# Patient Record
Sex: FEMALE | Race: WHITE | NOT HISPANIC OR LATINO | Employment: OTHER | URBAN - METROPOLITAN AREA
[De-identification: names, ages, dates, MRNs, and addresses within clinical notes are randomized per-mention and may not be internally consistent; named-entity substitution may affect disease eponyms.]

---

## 2017-06-05 ENCOUNTER — OFFICE VISIT (OUTPATIENT)
Dept: FAMILY MEDICINE CLINIC | Facility: CLINIC | Age: 47
End: 2017-06-05

## 2017-06-05 VITALS
OXYGEN SATURATION: 99 % | HEIGHT: 65 IN | WEIGHT: 123 LBS | BODY MASS INDEX: 20.49 KG/M2 | TEMPERATURE: 98.5 F | HEART RATE: 82 BPM | DIASTOLIC BLOOD PRESSURE: 82 MMHG | SYSTOLIC BLOOD PRESSURE: 124 MMHG

## 2017-06-05 DIAGNOSIS — F17.210 CIGARETTE SMOKER: ICD-10-CM

## 2017-06-05 DIAGNOSIS — Z86.19 HISTORY OF HPV INFECTION: ICD-10-CM

## 2017-06-05 DIAGNOSIS — G62.89 OTHER POLYNEUROPATHY: ICD-10-CM

## 2017-06-05 DIAGNOSIS — K58.2 IRRITABLE BOWEL SYNDROME WITH BOTH CONSTIPATION AND DIARRHEA: Primary | ICD-10-CM

## 2017-06-05 DIAGNOSIS — E03.9 HYPOTHYROIDISM, UNSPECIFIED TYPE: ICD-10-CM

## 2017-06-05 DIAGNOSIS — E55.9 VITAMIN D DEFICIENCY: ICD-10-CM

## 2017-06-05 PROBLEM — N17.9 ACUTE RENAL FAILURE: Status: ACTIVE | Noted: 2017-02-20

## 2017-06-05 PROBLEM — N20.0 KIDNEY STONE: Status: RESOLVED | Noted: 2017-06-05 | Resolved: 2017-06-05

## 2017-06-05 PROBLEM — N17.9 ACUTE RENAL FAILURE: Status: RESOLVED | Noted: 2017-02-20 | Resolved: 2017-06-05

## 2017-06-05 PROBLEM — G62.9 PERIPHERAL NEUROPATHY: Status: ACTIVE | Noted: 2017-06-05

## 2017-06-05 PROBLEM — N20.0 KIDNEY STONE: Status: ACTIVE | Noted: 2017-06-05

## 2017-06-05 LAB
25(OH)D3+25(OH)D2 SERPL-MCNC: 48.4 NG/ML (ref 30–100)
T4 FREE SERPL-MCNC: 1.26 NG/DL (ref 0.93–1.7)
TSH SERPL DL<=0.005 MIU/L-ACNC: 4.52 MIU/ML (ref 0.27–4.2)

## 2017-06-05 PROCEDURE — 99214 OFFICE O/P EST MOD 30 MIN: CPT | Performed by: NURSE PRACTITIONER

## 2017-06-05 RX ORDER — GABAPENTIN 600 MG/1
TABLET ORAL
Qty: 30 TABLET | Refills: 2 | Status: SHIPPED | OUTPATIENT
Start: 2017-06-05 | End: 2018-08-22

## 2017-06-05 RX ORDER — FLUOXETINE 10 MG/1
40 TABLET, FILM COATED ORAL DAILY
Qty: 30 TABLET | Refills: 2 | Status: SHIPPED | OUTPATIENT
Start: 2017-06-05 | End: 2017-08-01 | Stop reason: SDUPTHER

## 2017-06-05 NOTE — PROGRESS NOTES
"Subjective   Bekah Gibson is a 46 y.o. female.     History of Present Illness   New pt to me.  Has been seen in the [facility] multiple times in past year.    Hx hypothyroidism:  Last levels checked fall of 2016.  Has been on current dose levothyroxine for many years.  dxd age 5.  Vit d def:  Not on supplement.  Has chronic peripheral neuropathy--\"shooting pains in my wrists.\"  Has been taking gabapentin 600 mg about twice a week.    IBS stable.  Sees Adalberto Hoffman MD.  Hx recurrent nephrolithiasis.  Last stone feb 2017 txd with lithotripsy--Deion Saldana MD.  Former pt Amelie Riggins MD ob/gyn--but she doesn't accept [employer].  Pt has hx HPV infection.  Will refer her to another gyn.  Recovering alcoholic.  Sober x 2 years.  Goes to AA most days of week.  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  , 2 children.  Smokes 1/2 ppd since age 38.  Thinking about quitting.    Depression stable on prozac.    The following portions of the patient's history were reviewed and updated as appropriate: allergies, current medications, past family history, past medical history, past social history, past surgical history and problem list.    Review of Systems   Constitutional: Negative.  Negative for fatigue.   Respiratory: Negative.    Cardiovascular: Negative.    Gastrointestinal: Negative.  Negative for constipation and diarrhea.   Neurological:        As discussed in the HPI   Psychiatric/Behavioral: Negative.  Negative for dysphoric mood.   All other systems reviewed and are negative.      Objective   Physical Exam   Constitutional: Vital signs are normal. She appears well-developed and well-nourished.   Cardiovascular: Normal rate, regular rhythm, S1 normal, S2 normal and normal heart sounds.    No murmur heard.  Pulmonary/Chest: Effort normal and breath sounds normal.   Neurological: She is alert.   Psychiatric: She has a normal mood and affect.   Nursing note and vitals reviewed.      Assessment/Plan "   Bekah was seen today for establish care.    Diagnoses and all orders for this visit:    Irritable bowel syndrome with both constipation and diarrhea    Other polyneuropathy    Vitamin D deficiency  -     Vitamin D 25 Hydroxy    History of HPV infection  -     Ambulatory Referral to Gynecology    Hypothyroidism, unspecified type  -     TSH  -     T4, Free    Cigarette smoker    Other orders  -     gabapentin (NEURONTIN) 600 MG tablet; 1 po once daily prn  -     FLUoxetine (PROzac) 10 MG tablet; Take 4 tablets by mouth Daily.      No changes pending lab results.  FIORELLA queried/acceptable.  RTC 6 months, sooner if necessary.      25 min spent with patient with >50% spent in counseling and coordination of care.

## 2017-06-05 NOTE — PROGRESS NOTES
Subjective   Bekah Gibson is a 46 y.o. female.     History of Present Illness     The following portions of the patient's history were reviewed and updated as appropriate: allergies, current medications, past family history, past medical history, past social history, past surgical history and problem list.    Review of Systems    Objective   Physical Exam    Assessment/Plan   Bekah was seen today for establish care.    Diagnoses and all orders for this visit:    Irritable bowel syndrome with both constipation and diarrhea    Other polyneuropathy    Vitamin D deficiency  -     Vitamin D 25 Hydroxy    History of HPV infection  -     Ambulatory Referral to Gynecology    Hypothyroidism, unspecified type  -     TSH  -     T4, Free    Other orders  -     gabapentin (NEURONTIN) 600 MG tablet; 1 po once daily prn  -     FLUoxetine (PROzac) 10 MG tablet; Take 4 tablets by mouth Daily.

## 2017-06-06 ENCOUNTER — TELEPHONE (OUTPATIENT)
Dept: FAMILY MEDICINE CLINIC | Facility: CLINIC | Age: 47
End: 2017-06-06

## 2017-06-06 RX ORDER — LEVOTHYROXINE SODIUM 0.12 MG/1
125 TABLET ORAL DAILY
Qty: 90 TABLET | Refills: 2 | Status: SHIPPED | OUTPATIENT
Start: 2017-06-06 | End: 2019-12-13 | Stop reason: DRUGHIGH

## 2017-06-06 NOTE — TELEPHONE ENCOUNTER
----- Message from AMBAR Pollack sent at 6/6/2017 12:14 PM EDT -----  Call pt.  Her thyroid hormone level is in normal range.  Vitamin d is in normal range.  Okay to give her 6 months refills of her thyroid medication.  I will see her in December.

## 2017-06-06 NOTE — PROGRESS NOTES
Call pt.  Her thyroid hormone level is in normal range.  Vitamin d is in normal range.  Okay to give her 6 months refills of her thyroid medication.  I will see her in December.

## 2017-06-18 ENCOUNTER — HOSPITAL ENCOUNTER (EMERGENCY)
Facility: HOSPITAL | Age: 47
Discharge: HOME | End: 2017-06-18
Attending: EMERGENCY MEDICINE | Admitting: EMERGENCY MEDICINE

## 2017-06-18 ENCOUNTER — APPOINTMENT (OUTPATIENT)
Dept: CT IMAGING | Facility: HOSPITAL | Age: 47
End: 2017-06-18

## 2017-06-18 VITALS
OXYGEN SATURATION: 99 % | BODY MASS INDEX: 20.83 KG/M2 | HEART RATE: 108 BPM | DIASTOLIC BLOOD PRESSURE: 74 MMHG | TEMPERATURE: 98.4 F | HEIGHT: 65 IN | RESPIRATION RATE: 18 BRPM | WEIGHT: 125 LBS | SYSTOLIC BLOOD PRESSURE: 120 MMHG

## 2017-06-18 DIAGNOSIS — K52.9 ENTERITIS: Primary | ICD-10-CM

## 2017-06-18 DIAGNOSIS — N39.0 URINARY TRACT INFECTION IN FEMALE: ICD-10-CM

## 2017-06-18 LAB
ALBUMIN SERPL-MCNC: 4.6 G/DL (ref 3.5–5.2)
ALBUMIN/GLOB SERPL: 1.4 G/DL
ALP SERPL-CCNC: 92 U/L (ref 39–117)
ALT SERPL W P-5'-P-CCNC: 14 U/L (ref 1–33)
AMPHET+METHAMPHET UR QL: NEGATIVE
ANION GAP SERPL CALCULATED.3IONS-SCNC: 18.9 MMOL/L
AST SERPL-CCNC: 30 U/L (ref 1–32)
BACTERIA UR QL AUTO: ABNORMAL /HPF
BARBITURATES UR QL SCN: NEGATIVE
BASOPHILS # BLD AUTO: 0.03 10*3/MM3 (ref 0–0.2)
BASOPHILS NFR BLD AUTO: 0.5 % (ref 0–1.5)
BENZODIAZ UR QL SCN: NEGATIVE
BILIRUB SERPL-MCNC: 0.5 MG/DL (ref 0.1–1.2)
BILIRUB UR QL STRIP: NEGATIVE
BUN BLD-MCNC: 7 MG/DL (ref 6–20)
BUN/CREAT SERPL: 5.5 (ref 7–25)
CALCIUM SPEC-SCNC: 9.4 MG/DL (ref 8.6–10.5)
CANNABINOIDS SERPL QL: NEGATIVE
CHLORIDE SERPL-SCNC: 94 MMOL/L (ref 98–107)
CLARITY UR: CLEAR
CO2 SERPL-SCNC: 24.1 MMOL/L (ref 22–29)
COCAINE UR QL: NEGATIVE
COLOR UR: YELLOW
CREAT BLD-MCNC: 1.28 MG/DL (ref 0.57–1)
DEPRECATED RDW RBC AUTO: 50.2 FL (ref 37–54)
EOSINOPHIL # BLD AUTO: 0 10*3/MM3 (ref 0–0.7)
EOSINOPHIL NFR BLD AUTO: 0 % (ref 0.3–6.2)
ERYTHROCYTE [DISTWIDTH] IN BLOOD BY AUTOMATED COUNT: 14.1 % (ref 11.7–13)
ETHANOL BLD-MCNC: <10 MG/DL (ref 0–10)
ETHANOL UR QL: <0.01 %
GFR SERPL CREATININE-BSD FRML MDRD: 45 ML/MIN/1.73
GLOBULIN UR ELPH-MCNC: 3.4 GM/DL
GLUCOSE BLD-MCNC: 123 MG/DL (ref 65–99)
GLUCOSE UR STRIP-MCNC: NEGATIVE MG/DL
HCG SERPL QL: NEGATIVE
HCT VFR BLD AUTO: 40.9 % (ref 35.6–45.5)
HGB BLD-MCNC: 14.1 G/DL (ref 11.9–15.5)
HGB UR QL STRIP.AUTO: NEGATIVE
HOLD SPECIMEN: NORMAL
HYALINE CASTS UR QL AUTO: ABNORMAL /LPF
IMM GRANULOCYTES # BLD: 0 10*3/MM3 (ref 0–0.03)
IMM GRANULOCYTES NFR BLD: 0 % (ref 0–0.5)
KETONES UR QL STRIP: ABNORMAL
LEUKOCYTE ESTERASE UR QL STRIP.AUTO: ABNORMAL
LIPASE SERPL-CCNC: 25 U/L (ref 13–60)
LYMPHOCYTES # BLD AUTO: 1.54 10*3/MM3 (ref 0.9–4.8)
LYMPHOCYTES NFR BLD AUTO: 25.4 % (ref 19.6–45.3)
MCH RBC QN AUTO: 33.3 PG (ref 26.9–32)
MCHC RBC AUTO-ENTMCNC: 34.5 G/DL (ref 32.4–36.3)
MCV RBC AUTO: 96.7 FL (ref 80.5–98.2)
METHADONE UR QL SCN: NEGATIVE
MONOCYTES # BLD AUTO: 0.32 10*3/MM3 (ref 0.2–1.2)
MONOCYTES NFR BLD AUTO: 5.3 % (ref 5–12)
NEUTROPHILS # BLD AUTO: 4.18 10*3/MM3 (ref 1.9–8.1)
NEUTROPHILS NFR BLD AUTO: 68.8 % (ref 42.7–76)
NITRITE UR QL STRIP: NEGATIVE
OPIATES UR QL: NEGATIVE
OXYCODONE UR QL SCN: NEGATIVE
PH UR STRIP.AUTO: 6 [PH] (ref 5–8)
PLATELET # BLD AUTO: 208 10*3/MM3 (ref 140–500)
PMV BLD AUTO: 9.7 FL (ref 6–12)
POTASSIUM BLD-SCNC: 2.9 MMOL/L (ref 3.5–5.2)
PROT SERPL-MCNC: 8 G/DL (ref 6–8.5)
PROT UR QL STRIP: ABNORMAL
RBC # BLD AUTO: 4.23 10*6/MM3 (ref 3.9–5.2)
RBC # UR: ABNORMAL /HPF
REF LAB TEST METHOD: ABNORMAL
SODIUM BLD-SCNC: 137 MMOL/L (ref 136–145)
SP GR UR STRIP: 1.02 (ref 1–1.03)
SQUAMOUS #/AREA URNS HPF: ABNORMAL /HPF
UROBILINOGEN UR QL STRIP: ABNORMAL
WBC NRBC COR # BLD: 6.07 10*3/MM3 (ref 4.5–10.7)
WBC UR QL AUTO: ABNORMAL /HPF
WHOLE BLOOD HOLD SPECIMEN: NORMAL
WHOLE BLOOD HOLD SPECIMEN: NORMAL

## 2017-06-18 PROCEDURE — 87186 SC STD MICRODIL/AGAR DIL: CPT | Performed by: EMERGENCY MEDICINE

## 2017-06-18 PROCEDURE — 80307 DRUG TEST PRSMV CHEM ANLYZR: CPT | Performed by: PHYSICIAN ASSISTANT

## 2017-06-18 PROCEDURE — 83690 ASSAY OF LIPASE: CPT | Performed by: EMERGENCY MEDICINE

## 2017-06-18 PROCEDURE — 80053 COMPREHEN METABOLIC PANEL: CPT | Performed by: EMERGENCY MEDICINE

## 2017-06-18 PROCEDURE — 96374 THER/PROPH/DIAG INJ IV PUSH: CPT

## 2017-06-18 PROCEDURE — 81001 URINALYSIS AUTO W/SCOPE: CPT | Performed by: EMERGENCY MEDICINE

## 2017-06-18 PROCEDURE — 25010000002 ONDANSETRON PER 1 MG: Performed by: PHYSICIAN ASSISTANT

## 2017-06-18 PROCEDURE — 36415 COLL VENOUS BLD VENIPUNCTURE: CPT | Performed by: EMERGENCY MEDICINE

## 2017-06-18 PROCEDURE — 85025 COMPLETE CBC W/AUTO DIFF WBC: CPT | Performed by: EMERGENCY MEDICINE

## 2017-06-18 PROCEDURE — 84703 CHORIONIC GONADOTROPIN ASSAY: CPT | Performed by: EMERGENCY MEDICINE

## 2017-06-18 PROCEDURE — 96375 TX/PRO/DX INJ NEW DRUG ADDON: CPT

## 2017-06-18 PROCEDURE — 74176 CT ABD & PELVIS W/O CONTRAST: CPT

## 2017-06-18 PROCEDURE — 25010000002 HYDROMORPHONE PER 4 MG: Performed by: EMERGENCY MEDICINE

## 2017-06-18 PROCEDURE — 99283 EMERGENCY DEPT VISIT LOW MDM: CPT

## 2017-06-18 PROCEDURE — 87086 URINE CULTURE/COLONY COUNT: CPT | Performed by: EMERGENCY MEDICINE

## 2017-06-18 PROCEDURE — 87077 CULTURE AEROBIC IDENTIFY: CPT | Performed by: EMERGENCY MEDICINE

## 2017-06-18 RX ORDER — DICYCLOMINE HYDROCHLORIDE 10 MG/1
10 CAPSULE ORAL 4 TIMES DAILY PRN
Qty: 20 CAPSULE | Refills: 0 | Status: SHIPPED | OUTPATIENT
Start: 2017-06-18 | End: 2017-09-01

## 2017-06-18 RX ORDER — ONDANSETRON 4 MG/1
4 TABLET, ORALLY DISINTEGRATING ORAL EVERY 8 HOURS PRN
Qty: 12 TABLET | Refills: 0 | Status: SHIPPED | OUTPATIENT
Start: 2017-06-18 | End: 2017-09-01

## 2017-06-18 RX ORDER — ONDANSETRON 2 MG/ML
4 INJECTION INTRAMUSCULAR; INTRAVENOUS ONCE
Status: COMPLETED | OUTPATIENT
Start: 2017-06-18 | End: 2017-06-18

## 2017-06-18 RX ORDER — SODIUM CHLORIDE 0.9 % (FLUSH) 0.9 %
10 SYRINGE (ML) INJECTION AS NEEDED
Status: DISCONTINUED | OUTPATIENT
Start: 2017-06-18 | End: 2017-06-19 | Stop reason: HOSPADM

## 2017-06-18 RX ORDER — CEPHALEXIN 500 MG/1
500 CAPSULE ORAL 3 TIMES DAILY
Qty: 21 CAPSULE | Refills: 0 | Status: SHIPPED | OUTPATIENT
Start: 2017-06-18 | End: 2017-09-01

## 2017-06-18 RX ORDER — HYDROMORPHONE HYDROCHLORIDE 1 MG/ML
0.5 INJECTION, SOLUTION INTRAMUSCULAR; INTRAVENOUS; SUBCUTANEOUS ONCE
Status: COMPLETED | OUTPATIENT
Start: 2017-06-18 | End: 2017-06-18

## 2017-06-18 RX ORDER — FLUOXETINE HYDROCHLORIDE 40 MG/1
60 CAPSULE ORAL DAILY
COMMUNITY
End: 2017-08-01 | Stop reason: SDUPTHER

## 2017-06-18 RX ADMIN — ONDANSETRON 4 MG: 2 INJECTION INTRAMUSCULAR; INTRAVENOUS at 21:58

## 2017-06-18 RX ADMIN — SODIUM CHLORIDE 1000 ML: 9 INJECTION, SOLUTION INTRAVENOUS at 22:01

## 2017-06-18 RX ADMIN — HYDROMORPHONE HYDROCHLORIDE 0.5 MG: 1 INJECTION, SOLUTION INTRAMUSCULAR; INTRAVENOUS; SUBCUTANEOUS at 21:59

## 2017-06-19 NOTE — DISCHARGE INSTRUCTIONS
Home, rest, home medicine as prescribed, medicine as directed, keep well hydrated, follow up with PCP for recheck. Return to care with further concerns.

## 2017-06-19 NOTE — ED PROVIDER NOTES
EMERGENCY DEPARTMENT ENCOUNTER    CHIEF COMPLAINT  Chief Complaint: Lupus   History given by: Pt  History limited by: None  Room Number: 05/05  PMD: AMBAR Hernandez       HPI:  Pt is a 46 y.o. female with a history of lupus who presents complaining of lupus flareup for 2 days. She c/o abd pain, weakness, chills, diarrhea, headaches, and nausea but denies fever, rectal bleeding, vomiting, dysuria, loss of appetite, or hematuria. She denies recent sick contacts.     Duration:  2 days  Onset: gradual  Timing: constant  Location: n/a  Radiation: n/a  Quality: flareup  Intensity/Severity: moderate  Progression: worsening  Associated Symptoms: abd pain, weakness, chills, diarrhea, headaches, and nausea   Aggravating Factors: none  Alleviating Factors: none  Previous Episodes: hx of lupus  Treatment before arrival: none    PAST MEDICAL HISTORY  Active Ambulatory Problems     Diagnosis Date Noted   • Irritable bowel syndrome with both constipation and diarrhea 06/05/2017   • Peripheral neuropathy 06/05/2017   • Vitamin D deficiency 06/05/2017   • History of HPV infection 06/05/2017   • Hypothyroidism 06/05/2017   • Cigarette smoker 06/05/2017     Resolved Ambulatory Problems     Diagnosis Date Noted   • Acute renal failure 02/20/2017   • Kidney stone 06/05/2017     Past Medical History:   Diagnosis Date   • Anxiety    • Disease of thyroid gland    • Hypertension    • Kidney stone    • Lupus        PAST SURGICAL HISTORY  Past Surgical History:   Procedure Laterality Date   • JOINT REPLACEMENT     • KIDNEY STONE SURGERY      LITHOTRIPSY AND STENT (R SIDE)       FAMILY HISTORY  History reviewed. No pertinent family history.    SOCIAL HISTORY  Social History     Social History   • Marital status: Unknown     Spouse name: N/A   • Number of children: N/A   • Years of education: N/A     Occupational History   • Not on file.     Social History Main Topics   • Smoking status: Current Every Day Smoker     Types: Cigarettes    • Smokeless tobacco: Not on file   • Alcohol use No      Comment: recovering alcoholic since 2015.     • Drug use: No   • Sexual activity: Not on file     Other Topics Concern   • Not on file     Social History Narrative   • No narrative on file       ALLERGIES  Morphine; Morphine and related; Phenergan [promethazine hcl]; Tessalon [benzonatate]; Cucumber extract; and Promethazine-phenylephrine    REVIEW OF SYSTEMS  Review of Systems   Constitutional: Positive for chills. Negative for fever.   HENT: Negative for sore throat.    Eyes: Negative.    Respiratory: Negative for cough and shortness of breath.    Cardiovascular: Negative for chest pain.   Gastrointestinal: Positive for abdominal pain, diarrhea and nausea. Negative for vomiting.   Genitourinary: Negative for dysuria.   Musculoskeletal: Negative for neck pain.   Skin: Negative for rash.   Allergic/Immunologic: Negative.    Neurological: Positive for weakness and headaches. Negative for numbness.   Hematological: Negative.    Psychiatric/Behavioral: Negative.    All other systems reviewed and are negative.      PHYSICAL EXAM  ED Triage Vitals   Temp Heart Rate Resp BP SpO2   06/18/17 1938 06/18/17 1938 06/18/17 1938 06/18/17 1942 06/18/17 1938   97.5 °F (36.4 °C) 118 18 122/90 99 %      Temp src Heart Rate Source Patient Position BP Location FiO2 (%)   -- -- 06/18/17 1942 06/18/17 1942 --     Sitting Right arm        Physical Exam   Constitutional: She is oriented to person, place, and time and well-developed, well-nourished, and in no distress. No distress.   HENT:   Head: Normocephalic and atraumatic.   Mouth/Throat: Mucous membranes are dry.   Eyes: EOM are normal. Pupils are equal, round, and reactive to light.   Neck: Normal range of motion. Neck supple.   Cardiovascular: Regular rhythm and normal heart sounds.  Tachycardia present.    Pulmonary/Chest: Effort normal and breath sounds normal. No respiratory distress.   Abdominal: Soft. There is  tenderness (diffuse). There is no rebound and no guarding.   Musculoskeletal: Normal range of motion. She exhibits no edema.   Neurological: She is alert and oriented to person, place, and time. She has normal sensation and normal strength.   Skin: Skin is warm and dry. No rash noted.   Psychiatric: Mood and affect normal.   Nursing note and vitals reviewed.      LAB RESULTS  Lab Results (last 24 hours)     Procedure Component Value Units Date/Time    CBC & Differential [[id number]] Collected:  06/18/17 1951    Specimen:  Blood Updated:  06/18/17 2010    Narrative:       The following orders were created for panel order CBC & Differential.  Procedure                               Abnormality         Status                     ---------                               -----------         ------                     CBC Auto Differential[[id number]]        Abnormal            Final result                 Please view results for these tests on the individual orders.    Comprehensive Metabolic Panel [[id number]]  (Abnormal) Collected:  06/18/17 1951    Specimen:  Blood Updated:  06/18/17 2031     Glucose 123 (H) mg/dL      BUN 7 mg/dL      Creatinine 1.28 (H) mg/dL      Sodium 137 mmol/L      Potassium 2.9 (L) mmol/L      Chloride 94 (L) mmol/L      CO2 24.1 mmol/L      Calcium 9.4 mg/dL      Total Protein 8.0 g/dL      Albumin 4.60 g/dL      ALT (SGPT) 14 U/L      AST (SGOT) 30 U/L      Alkaline Phosphatase 92 U/L      Total Bilirubin 0.5 mg/dL      eGFR Non African Amer 45 (L) mL/min/1.73      Globulin 3.4 gm/dL      A/G Ratio 1.4 g/dL      BUN/Creatinine Ratio 5.5 (L)     Anion Gap 18.9 mmol/L     Lipase [[id number]]  (Normal) Collected:  06/18/17 1951    Specimen:  Blood Updated:  06/18/17 2031     Lipase 25 U/L     hCG, Serum, Qualitative [[id number]]  (Normal) Collected:  06/18/17 1951    Specimen:  Blood Updated:  06/18/17 2029     HCG Qualitative Negative    CBC Auto Differential [[id number]]  (Abnormal) Collected:   06/18/17 1951    Specimen:  Blood Updated:  06/18/17 2010     WBC 6.07 10*3/mm3      RBC 4.23 10*6/mm3      Hemoglobin 14.1 g/dL      Hematocrit 40.9 %      MCV 96.7 fL      MCH 33.3 (H) pg      MCHC 34.5 g/dL      RDW 14.1 (H) %      RDW-SD 50.2 fl      MPV 9.7 fL      Platelets 208 10*3/mm3      Neutrophil % 68.8 %      Lymphocyte % 25.4 %      Monocyte % 5.3 %      Eosinophil % 0.0 (L) %      Basophil % 0.5 %      Immature Grans % 0.0 %      Neutrophils, Absolute 4.18 10*3/mm3      Lymphocytes, Absolute 1.54 10*3/mm3      Monocytes, Absolute 0.32 10*3/mm3      Eosinophils, Absolute 0.00 10*3/mm3      Basophils, Absolute 0.03 10*3/mm3      Immature Grans, Absolute 0.00 10*3/mm3     Ethanol [[id number]] Collected:  06/18/17 1951    Specimen:  Blood Updated:  06/18/17 2157     Ethanol <10 mg/dL      Ethanol % <0.010 %     Urinalysis With / Culture If Indicated [[id number]]  (Abnormal) Collected:  06/18/17 2158    Specimen:  Urine from Urine, Catheter Updated:  06/18/17 2225     Color, UA Yellow     Appearance, UA Clear     pH, UA 6.0     Specific Gravity, UA 1.020     Glucose, UA Negative     Ketones, UA Trace (A)     Bilirubin, UA Negative     Blood, UA Negative     Protein, UA 30 mg/dL (1+) (A)     Leuk Esterase, UA Small (1+) (A)     Nitrite, UA Negative     Urobilinogen, UA 0.2 E.U./dL    Urine Drug Screen [[id number]]  (Normal) Collected:  06/18/17 2158    Specimen:  Urine from Urine, Catheter Updated:  06/18/17 2240     Amphet/Methamphet, Screen Negative     Barbiturates Screen, Urine Negative     Benzodiazepine Screen, Urine Negative     Cocaine Screen, Urine Negative     Opiate Screen Negative     THC, Screen, Urine Negative     Methadone Screen, Urine Negative     Oxycodone Screen, Urine Negative    Narrative:       Negative Thresholds For Drugs Screened:     Amphetamines               500 ng/ml   Barbiturates               200 ng/ml   Benzodiazepines            100 ng/ml   Cocaine                    300  ng/ml   Methadone                  300 ng/ml   Opiates                    300 ng/ml   Oxycodone                  100 ng/ml   THC                        50 ng/ml    The Normal Value for all drugs tested is negative. This report includes final unconfirmed screening results to be used for medical treatment purposes only. Unconfirmed results must not be used for non-medical purposes such as employment or legal testing. Clinical consideration should be applied to any drug of abuse test, particulary when unconfirmed results are used.    Urinalysis, Microscopic Only [[id number]]  (Abnormal) Collected:  06/18/17 2158    Specimen:  Urine from Urine, Catheter Updated:  06/18/17 2225     RBC, UA 3-5 (A) /HPF      WBC, UA 6-12 (A) /HPF      Bacteria, UA 3+ (A) /HPF      Squamous Epithelial Cells, UA 0-2 /HPF      Hyaline Casts, UA 0-2 /LPF      Methodology Automated Microscopy    Urine Culture [[id number]] Collected:  06/18/17 2158    Specimen:  Urine from Urine, Catheter Updated:  06/18/17 2218          I ordered the above labs and reviewed the results    RADIOLOGY  CT Abdomen Pelvis Without Contrast   Preliminary Result   1.  Moderately severe enteritis, no obstruction, perforation or abscess.   2.  Nonobstructing 0.3 cm stone in the inferior pole left kidney.                          I ordered the above noted radiological studies. Interpreted by radiologist. Reviewed by me in PACS.       PROCEDURES  Procedures      PROGRESS AND CONSULTS  ED Course     1944  Ordered blood work and UA for further evaluation.     2109  Ordered CT abd/pelvis for further evaluation, Zofran/dialudid for pain and IV fluids for hydration.     2158  Discussed patient's case with Dr. Ragsdale, who agrees to see the patient. After bedside evaluation, he agrees with plan of care.     2253  Pt rechecked, resting comfortably in bed, states feels much better and is ready to go home.  Discussed results showing UTI, unremarkable bloodwork, CT showing enteritis  and plan to discharge. Pt understands and agrees with plan. All questions addressed.     MEDICAL DECISION MAKING  Results were reviewed/discussed with the patient and they were also made aware of online access. Pt also made aware that some labs, such as cultures, will not be resulted during ER visit and follow up with PMD is necessary.     MDM  Number of Diagnoses or Management Options     Amount and/or Complexity of Data Reviewed  Clinical lab tests: reviewed and ordered (WBC - 6.17  Leuk Esterase, UA - 1+   RBC, UA micro - 3-5  WBC, UA micro - 6-12  Bacteria, UA micro - 3+  UDS - negative)  Tests in the radiology section of CPT®: ordered and reviewed (CT abd/pelvis - enteritis, nonobstructing 3mm stone in inferior pole of left kidney)  Decide to obtain previous medical records or to obtain history from someone other than the patient: yes           DIAGNOSIS  Final diagnoses:   Enteritis   Urinary tract infection in female       DISPOSITION  DISCHARGE    Patient discharged in stable condition.    Reviewed implications of results, diagnosis, meds, responsibility to follow up, warning signs and symptoms of possible worsening, potential complications and reasons to return to ER.    Patient/Family voiced understanding of above instructions.    Discussed plan for discharge, as there is no emergent indication for admission.  Pt/family is agreeable and understands need for follow up and repeat testing.  Pt is aware that discharge does not mean that nothing is wrong but it indicates no emergency is present that requires admission and they must continue care with follow-up as given below or physician of their choice.     FOLLOW-UP  Tiffanie Hernandez, APRN  [address] [facility] [address] [phone]    Schedule an appointment as soon as possible for a visit in 3 days           Medication List      New Prescriptions          cephalexin 500 MG capsule   Commonly known as:  KEFLEX   Take 1 capsule by mouth 3  (Three) Times a Day.       dicyclomine 10 MG capsule   Commonly known as:  BENTYL   Take 1 capsule by mouth 4 (Four) Times a Day As Needed (cramping).       ondansetron ODT 4 MG disintegrating tablet   Commonly known as:  ZOFRAN ODT   Take 1 tablet by mouth Every 8 (Eight) Hours As Needed for Nausea.         Changed          gabapentin 600 MG tablet   Commonly known as:  NEURONTIN   1 po once daily prn   What changed:    - how much to take  - how to take this  - when to take this  - reasons to take this  - additional instructions       PROZAC 40 MG capsule   Generic drug:  FLUoxetine   What changed:  Another medication with the same name was removed. Continue   taking this medication, and follow the directions you see here.               Latest Documented Vital Signs:  As of 11:11 PM  BP- 126/83 HR- 89 Temp- 97.5 °F (36.4 °C) O2 sat- 98%    --  Documentation assistance provided by thais Pavon for Ag Archer PA-C.  Information recorded by the jean-paulibe was done at my direction and has been verified and validated by me.         Taty Pavon  06/18/17 0038       JESIKA Donaldson  06/18/17 4695

## 2017-06-19 NOTE — ED PROVIDER NOTES
"I supervised care provided by the midlevel provider.    We have discussed this patient's history, physical exam, and treatment plan.   I have reviewed the note and personally saw and examined the patient and agree with the plan of care.    Pt with abd pain due to lupus flare. She reports that she \"hurts.\"    On exam, there is mild suprapubic tenderness. She is in NAD. Heart is RRR and lungs are CTAB.    Labs showing evidence of UTI. She will be discharged with abx.    Documentation assistance provided by thais Frey for Dr. Ragsdale.  Information recorded by the scribe was done at my direction and has been verified and validated by me.           Hola Frey  06/18/17 2232       Víctor Ragsdale MD  06/19/17 0000    "

## 2017-06-20 ENCOUNTER — TELEPHONE (OUTPATIENT)
Dept: SOCIAL WORK | Facility: HOSPITAL | Age: 47
End: 2017-06-20

## 2017-06-21 LAB — BACTERIA SPEC AEROBE CULT: ABNORMAL

## 2017-08-01 RX ORDER — FLUOXETINE 10 MG/1
40 TABLET, FILM COATED ORAL DAILY
Qty: 30 TABLET | Refills: 4 | Status: SHIPPED | OUTPATIENT
Start: 2017-08-01 | End: 2017-08-01

## 2017-08-01 RX ORDER — FLUOXETINE HYDROCHLORIDE 40 MG/1
40 CAPSULE ORAL DAILY
Qty: 30 CAPSULE | Refills: 3 | Status: SHIPPED | OUTPATIENT
Start: 2017-08-01

## 2018-02-08 ENCOUNTER — TRANSCRIBE ORDERS (OUTPATIENT)
Dept: ADMINISTRATIVE | Facility: HOSPITAL | Age: 48
End: 2018-02-08

## 2018-02-08 DIAGNOSIS — Z78.0 POSTMENOPAUSAL: Primary | ICD-10-CM

## 2018-02-22 ENCOUNTER — HOSPITAL ENCOUNTER (OUTPATIENT)
Dept: BONE DENSITY | Facility: HOSPITAL | Age: 48
Discharge: HOME | End: 2018-02-22
Attending: SPECIALIST

## 2018-03-02 ENCOUNTER — TRANSCRIBE ORDERS (OUTPATIENT)
Dept: LAB | Facility: HOSPITAL | Age: 48
End: 2018-03-02

## 2018-03-02 ENCOUNTER — HOSPITAL ENCOUNTER (OUTPATIENT)
Dept: CARDIOLOGY | Facility: HOSPITAL | Age: 48
Discharge: HOME | End: 2018-03-02
Attending: SPECIALIST | Admitting: SPECIALIST

## 2018-03-02 ENCOUNTER — LAB (OUTPATIENT)
Dept: LAB | Facility: HOSPITAL | Age: 48
End: 2018-03-02
Attending: SPECIALIST

## 2018-03-02 DIAGNOSIS — Z01.818 PRE-OP TESTING: ICD-10-CM

## 2018-03-02 DIAGNOSIS — I10 ESSENTIAL HYPERTENSION, MALIGNANT: Primary | ICD-10-CM

## 2018-03-02 DIAGNOSIS — I10 ESSENTIAL HYPERTENSION, MALIGNANT: ICD-10-CM

## 2018-03-02 LAB
ANION GAP SERPL CALCULATED.3IONS-SCNC: 14.7 MMOL/L
BUN BLD-MCNC: 8 MG/DL (ref 6–20)
BUN/CREAT SERPL: 13.8 (ref 7–25)
CALCIUM SPEC-SCNC: 10 MG/DL (ref 8.6–10.5)
CHLORIDE SERPL-SCNC: 96 MMOL/L (ref 98–107)
CO2 SERPL-SCNC: 26.3 MMOL/L (ref 22–29)
CREAT BLD-MCNC: 0.58 MG/DL (ref 0.57–1)
DEPRECATED RDW RBC AUTO: 48.9 FL (ref 37–54)
ERYTHROCYTE [DISTWIDTH] IN BLOOD BY AUTOMATED COUNT: 12.4 % (ref 11.7–13)
GFR SERPL CREATININE-BSD FRML MDRD: 110 ML/MIN/1.73
GLUCOSE BLD-MCNC: 89 MG/DL (ref 65–99)
HCT VFR BLD AUTO: 42 % (ref 35.6–45.5)
HGB BLD-MCNC: 13.4 G/DL (ref 11.9–15.5)
MCH RBC QN AUTO: 34.6 PG (ref 26.9–32)
MCHC RBC AUTO-ENTMCNC: 31.9 G/DL (ref 32.4–36.3)
MCV RBC AUTO: 108.5 FL (ref 80.5–98.2)
PLATELET # BLD AUTO: 487 10*3/MM3 (ref 140–500)
PMV BLD AUTO: 10.8 FL (ref 6–12)
POTASSIUM BLD-SCNC: 4.4 MMOL/L (ref 3.5–5.2)
RBC # BLD AUTO: 3.87 10*6/MM3 (ref 3.9–5.2)
SODIUM BLD-SCNC: 137 MMOL/L (ref 136–145)
WBC NRBC COR # BLD: 5.99 10*3/MM3 (ref 4.5–10.7)

## 2018-03-02 PROCEDURE — 80048 BASIC METABOLIC PNL TOTAL CA: CPT

## 2018-03-02 PROCEDURE — 36415 COLL VENOUS BLD VENIPUNCTURE: CPT

## 2018-03-02 PROCEDURE — 85027 COMPLETE CBC AUTOMATED: CPT

## 2018-03-02 PROCEDURE — 93010 ELECTROCARDIOGRAM REPORT: CPT | Performed by: INTERNAL MEDICINE

## 2018-03-02 PROCEDURE — 93005 ELECTROCARDIOGRAM TRACING: CPT | Performed by: SPECIALIST

## 2018-05-08 ENCOUNTER — OFFICE (AMBULATORY)
Dept: URBAN - METROPOLITAN AREA CLINIC 64 | Facility: CLINIC | Age: 48
End: 2018-05-08

## 2018-05-08 VITALS
DIASTOLIC BLOOD PRESSURE: 94 MMHG | WEIGHT: 124 LBS | HEART RATE: 90 BPM | SYSTOLIC BLOOD PRESSURE: 139 MMHG | HEIGHT: 65 IN

## 2018-05-08 DIAGNOSIS — Z72.0 TOBACCO USE: ICD-10-CM

## 2018-05-08 DIAGNOSIS — R93.3 ABNORMAL FINDINGS ON DIAGNOSTIC IMAGING OF OTHER PARTS OF DI: ICD-10-CM

## 2018-05-08 DIAGNOSIS — R79.89 OTHER SPECIFIED ABNORMAL FINDINGS OF BLOOD CHEMISTRY: ICD-10-CM

## 2018-05-08 DIAGNOSIS — K85.90 ACUTE PANCREATITIS WITHOUT NECROSIS OR INFECTION, UNSPECIFIE: ICD-10-CM

## 2018-05-08 DIAGNOSIS — R74.8 ABNORMAL LEVELS OF OTHER SERUM ENZYMES: ICD-10-CM

## 2018-05-08 DIAGNOSIS — Z72.89 OTHER PROBLEMS RELATED TO LIFESTYLE: ICD-10-CM

## 2018-05-08 PROCEDURE — 99204 OFFICE O/P NEW MOD 45 MIN: CPT | Performed by: NURSE PRACTITIONER

## 2018-06-13 ENCOUNTER — TRANSCRIBE ORDERS (OUTPATIENT)
Dept: ADMINISTRATIVE | Facility: HOSPITAL | Age: 48
End: 2018-06-13

## 2018-06-13 DIAGNOSIS — T14.8XXA WOUND INFECTION: Primary | ICD-10-CM

## 2018-06-13 DIAGNOSIS — L08.9 WOUND INFECTION: Primary | ICD-10-CM

## 2018-06-29 ENCOUNTER — HOSPITAL ENCOUNTER (INPATIENT)
Facility: HOSPITAL | Age: 48
LOS: 5 days | Discharge: HOME | End: 2018-07-04
Attending: EMERGENCY MEDICINE | Admitting: INTERNAL MEDICINE

## 2018-06-29 ENCOUNTER — APPOINTMENT (OUTPATIENT)
Dept: CT IMAGING | Facility: HOSPITAL | Age: 48
End: 2018-06-29

## 2018-06-29 ENCOUNTER — APPOINTMENT (OUTPATIENT)
Dept: GENERAL RADIOLOGY | Facility: HOSPITAL | Age: 48
End: 2018-06-29

## 2018-06-29 DIAGNOSIS — R26.89 DECREASED FUNCTIONAL MOBILITY: ICD-10-CM

## 2018-06-29 DIAGNOSIS — K62.5 BRBPR (BRIGHT RED BLOOD PER RECTUM): ICD-10-CM

## 2018-06-29 DIAGNOSIS — D53.9 MACROCYTIC ANEMIA: ICD-10-CM

## 2018-06-29 DIAGNOSIS — E83.42 HYPOMAGNESEMIA: ICD-10-CM

## 2018-06-29 DIAGNOSIS — F10.931 DTS (DELIRIUM TREMENS) (HCC): Primary | ICD-10-CM

## 2018-06-29 DIAGNOSIS — G62.89 OTHER POLYNEUROPATHY: ICD-10-CM

## 2018-06-29 PROBLEM — K92.2 GI BLEED: Status: ACTIVE | Noted: 2018-06-29

## 2018-06-29 PROBLEM — K70.10 ALCOHOLIC HEPATITIS: Status: ACTIVE | Noted: 2018-06-29

## 2018-06-29 PROBLEM — S41.001A: Status: ACTIVE | Noted: 2018-06-29

## 2018-06-29 PROBLEM — D69.6 THROMBOCYTOPENIA: Status: ACTIVE | Noted: 2018-06-29

## 2018-06-29 PROBLEM — D62 ACUTE POST-HEMORRHAGIC ANEMIA: Status: ACTIVE | Noted: 2018-06-29

## 2018-06-29 LAB
ABO GROUP BLD: NORMAL
ALBUMIN SERPL-MCNC: 3.9 G/DL (ref 3.5–5.2)
ALBUMIN/GLOB SERPL: 1.3 G/DL
ALP SERPL-CCNC: 115 U/L (ref 39–117)
ALT SERPL W P-5'-P-CCNC: 62 U/L (ref 1–33)
AMMONIA BLD-SCNC: 22 UMOL/L (ref 11–51)
AMPHET+METHAMPHET UR QL: POSITIVE
ANION GAP SERPL CALCULATED.3IONS-SCNC: 14.9 MMOL/L
APTT PPP: 41.1 SECONDS (ref 22.7–35.4)
AST SERPL-CCNC: 46 U/L (ref 1–32)
BACTERIA UR QL AUTO: ABNORMAL /HPF
BARBITURATES UR QL SCN: NEGATIVE
BASOPHILS # BLD AUTO: 0.01 10*3/MM3 (ref 0–0.2)
BASOPHILS NFR BLD AUTO: 0.2 % (ref 0–1.5)
BENZODIAZ UR QL SCN: NEGATIVE
BILIRUB SERPL-MCNC: 1.9 MG/DL (ref 0.1–1.2)
BILIRUB UR QL STRIP: NEGATIVE
BLD GP AB SCN SERPL QL: NEGATIVE
BUN BLD-MCNC: 12 MG/DL (ref 6–20)
BUN/CREAT SERPL: 18.2 (ref 7–25)
CALCIUM SPEC-SCNC: 9.1 MG/DL (ref 8.6–10.5)
CANNABINOIDS SERPL QL: NEGATIVE
CHLORIDE SERPL-SCNC: 100 MMOL/L (ref 98–107)
CLARITY UR: CLEAR
CO2 SERPL-SCNC: 20.1 MMOL/L (ref 22–29)
COCAINE UR QL: NEGATIVE
COLOR UR: ABNORMAL
CREAT BLD-MCNC: 0.66 MG/DL (ref 0.57–1)
DACRYOCYTES BLD QL SMEAR: NORMAL
DEPRECATED RDW RBC AUTO: 70.6 FL (ref 37–54)
EOSINOPHIL # BLD AUTO: 0.01 10*3/MM3 (ref 0–0.7)
EOSINOPHIL NFR BLD AUTO: 0.2 % (ref 0.3–6.2)
ERYTHROCYTE [DISTWIDTH] IN BLOOD BY AUTOMATED COUNT: 17.6 % (ref 11.7–13)
ETHANOL BLD-MCNC: <10 MG/DL (ref 0–10)
ETHANOL UR QL: <0.01 %
GFR SERPL CREATININE-BSD FRML MDRD: 95 ML/MIN/1.73
GLOBULIN UR ELPH-MCNC: 3.1 GM/DL
GLUCOSE BLD-MCNC: 105 MG/DL (ref 65–99)
GLUCOSE UR STRIP-MCNC: NEGATIVE MG/DL
HCT VFR BLD AUTO: 23.1 % (ref 35.6–45.5)
HCT VFR BLD AUTO: 25.5 % (ref 35.6–45.5)
HGB BLD-MCNC: 7.8 G/DL (ref 11.9–15.5)
HGB BLD-MCNC: 8.6 G/DL (ref 11.9–15.5)
HGB UR QL STRIP.AUTO: ABNORMAL
HOLD SPECIMEN: NORMAL
HOLD SPECIMEN: NORMAL
HYALINE CASTS UR QL AUTO: ABNORMAL /LPF
HYPOCHROMIA BLD QL: NORMAL
IMM GRANULOCYTES # BLD: 0.01 10*3/MM3 (ref 0–0.03)
IMM GRANULOCYTES NFR BLD: 0.2 % (ref 0–0.5)
INR PPP: 1.21 (ref 0.9–1.1)
KETONES UR QL STRIP: ABNORMAL
LEUKOCYTE ESTERASE UR QL STRIP.AUTO: NEGATIVE
LYMPHOCYTES # BLD AUTO: 0.48 10*3/MM3 (ref 0.9–4.8)
LYMPHOCYTES NFR BLD AUTO: 9.4 % (ref 19.6–45.3)
MACROCYTES BLD QL SMEAR: NORMAL
MAGNESIUM SERPL-MCNC: 1.1 MG/DL (ref 1.6–2.6)
MCH RBC QN AUTO: 37.7 PG (ref 26.9–32)
MCHC RBC AUTO-ENTMCNC: 33.7 G/DL (ref 32.4–36.3)
MCV RBC AUTO: 111.8 FL (ref 80.5–98.2)
METHADONE UR QL SCN: POSITIVE
MONOCYTES # BLD AUTO: 0.53 10*3/MM3 (ref 0.2–1.2)
MONOCYTES NFR BLD AUTO: 10.4 % (ref 5–12)
NEUTROPHILS # BLD AUTO: 4.08 10*3/MM3 (ref 1.9–8.1)
NEUTROPHILS NFR BLD AUTO: 79.8 % (ref 42.7–76)
NITRITE UR QL STRIP: NEGATIVE
OPIATES UR QL: POSITIVE
OXYCODONE UR QL SCN: ABNORMAL
PH UR STRIP.AUTO: 6 [PH] (ref 5–8)
PLAT MORPH BLD: NORMAL
PLATELET # BLD AUTO: 58 10*3/MM3 (ref 140–500)
PMV BLD AUTO: 11.8 FL (ref 6–12)
POTASSIUM BLD-SCNC: 3.6 MMOL/L (ref 3.5–5.2)
PROT SERPL-MCNC: 7 G/DL (ref 6–8.5)
PROT UR QL STRIP: NEGATIVE
PROTHROMBIN TIME: 15.1 SECONDS (ref 11.7–14.2)
RBC # BLD AUTO: 2.28 10*6/MM3 (ref 3.9–5.2)
RBC # UR: ABNORMAL /HPF
REF LAB TEST METHOD: ABNORMAL
RH BLD: POSITIVE
SODIUM BLD-SCNC: 135 MMOL/L (ref 136–145)
SP GR UR STRIP: 1.02 (ref 1–1.03)
SQUAMOUS #/AREA URNS HPF: ABNORMAL /HPF
T&S EXPIRATION DATE: NORMAL
UROBILINOGEN UR QL STRIP: ABNORMAL
WBC MORPH BLD: NORMAL
WBC NRBC COR # BLD: 5.11 10*3/MM3 (ref 4.5–10.7)
WBC UR QL AUTO: ABNORMAL /HPF
WHOLE BLOOD HOLD SPECIMEN: NORMAL
WHOLE BLOOD HOLD SPECIMEN: NORMAL

## 2018-06-29 PROCEDURE — 85025 COMPLETE CBC W/AUTO DIFF WBC: CPT | Performed by: EMERGENCY MEDICINE

## 2018-06-29 PROCEDURE — 71045 X-RAY EXAM CHEST 1 VIEW: CPT

## 2018-06-29 PROCEDURE — 80307 DRUG TEST PRSMV CHEM ANLYZR: CPT | Performed by: EMERGENCY MEDICINE

## 2018-06-29 PROCEDURE — 25010000002 MAGNESIUM SULFATE PER 500 MG OF MAGNESIUM: Performed by: EMERGENCY MEDICINE

## 2018-06-29 PROCEDURE — 74177 CT ABD & PELVIS W/CONTRAST: CPT

## 2018-06-29 PROCEDURE — 82140 ASSAY OF AMMONIA: CPT | Performed by: INTERNAL MEDICINE

## 2018-06-29 PROCEDURE — 85018 HEMOGLOBIN: CPT | Performed by: INTERNAL MEDICINE

## 2018-06-29 PROCEDURE — 99253 IP/OBS CNSLTJ NEW/EST LOW 45: CPT | Performed by: INTERNAL MEDICINE

## 2018-06-29 PROCEDURE — 25010000002 MAGNESIUM SULFATE 2 GM/50ML SOLUTION: Performed by: INTERNAL MEDICINE

## 2018-06-29 PROCEDURE — G0480 DRUG TEST DEF 1-7 CLASSES: HCPCS | Performed by: EMERGENCY MEDICINE

## 2018-06-29 PROCEDURE — 99285 EMERGENCY DEPT VISIT HI MDM: CPT

## 2018-06-29 PROCEDURE — 85610 PROTHROMBIN TIME: CPT | Performed by: EMERGENCY MEDICINE

## 2018-06-29 PROCEDURE — 25010000002 THIAMINE PER 100 MG: Performed by: EMERGENCY MEDICINE

## 2018-06-29 PROCEDURE — 81001 URINALYSIS AUTO W/SCOPE: CPT | Performed by: EMERGENCY MEDICINE

## 2018-06-29 PROCEDURE — 86850 RBC ANTIBODY SCREEN: CPT | Performed by: EMERGENCY MEDICINE

## 2018-06-29 PROCEDURE — 85007 BL SMEAR W/DIFF WBC COUNT: CPT | Performed by: EMERGENCY MEDICINE

## 2018-06-29 PROCEDURE — 73030 X-RAY EXAM OF SHOULDER: CPT

## 2018-06-29 PROCEDURE — 25010000002 IOPAMIDOL 61 % SOLUTION: Performed by: EMERGENCY MEDICINE

## 2018-06-29 PROCEDURE — 70450 CT HEAD/BRAIN W/O DYE: CPT

## 2018-06-29 PROCEDURE — 85730 THROMBOPLASTIN TIME PARTIAL: CPT | Performed by: EMERGENCY MEDICINE

## 2018-06-29 PROCEDURE — 83735 ASSAY OF MAGNESIUM: CPT | Performed by: EMERGENCY MEDICINE

## 2018-06-29 PROCEDURE — 80074 ACUTE HEPATITIS PANEL: CPT | Performed by: INTERNAL MEDICINE

## 2018-06-29 PROCEDURE — 86901 BLOOD TYPING SEROLOGIC RH(D): CPT | Performed by: EMERGENCY MEDICINE

## 2018-06-29 PROCEDURE — 25010000002 LORAZEPAM PER 2 MG: Performed by: INTERNAL MEDICINE

## 2018-06-29 PROCEDURE — 85014 HEMATOCRIT: CPT | Performed by: INTERNAL MEDICINE

## 2018-06-29 PROCEDURE — 80053 COMPREHEN METABOLIC PANEL: CPT | Performed by: EMERGENCY MEDICINE

## 2018-06-29 PROCEDURE — 25010000002 LORAZEPAM PER 2 MG: Performed by: EMERGENCY MEDICINE

## 2018-06-29 PROCEDURE — 86900 BLOOD TYPING SEROLOGIC ABO: CPT | Performed by: EMERGENCY MEDICINE

## 2018-06-29 RX ORDER — FOLIC ACID 1 MG/1
1 TABLET ORAL DAILY
COMMUNITY

## 2018-06-29 RX ORDER — ONDANSETRON 4 MG/1
4 TABLET, ORALLY DISINTEGRATING ORAL EVERY 6 HOURS PRN
Status: DISCONTINUED | OUTPATIENT
Start: 2018-06-29 | End: 2018-07-04 | Stop reason: HOSPADM

## 2018-06-29 RX ORDER — LORAZEPAM 2 MG/ML
2 INJECTION INTRAMUSCULAR
Status: DISCONTINUED | OUTPATIENT
Start: 2018-06-29 | End: 2018-07-04 | Stop reason: HOSPADM

## 2018-06-29 RX ORDER — PANTOPRAZOLE SODIUM 40 MG/10ML
40 INJECTION, POWDER, LYOPHILIZED, FOR SOLUTION INTRAVENOUS EVERY 12 HOURS SCHEDULED
Status: DISCONTINUED | OUTPATIENT
Start: 2018-06-29 | End: 2018-07-03 | Stop reason: CLARIF

## 2018-06-29 RX ORDER — ONDANSETRON 2 MG/ML
4 INJECTION INTRAMUSCULAR; INTRAVENOUS EVERY 6 HOURS PRN
Status: DISCONTINUED | OUTPATIENT
Start: 2018-06-29 | End: 2018-07-04 | Stop reason: HOSPADM

## 2018-06-29 RX ORDER — ONDANSETRON 4 MG/1
4 TABLET, FILM COATED ORAL EVERY 8 HOURS PRN
COMMUNITY
End: 2019-12-13 | Stop reason: SDUPTHER

## 2018-06-29 RX ORDER — MAGNESIUM SULFATE HEPTAHYDRATE 40 MG/ML
2 INJECTION, SOLUTION INTRAVENOUS AS NEEDED
Status: DISCONTINUED | OUTPATIENT
Start: 2018-06-29 | End: 2018-07-04 | Stop reason: HOSPADM

## 2018-06-29 RX ORDER — ACETAMINOPHEN 325 MG/1
650 TABLET ORAL EVERY 4 HOURS PRN
Status: DISCONTINUED | OUTPATIENT
Start: 2018-06-29 | End: 2018-07-04 | Stop reason: HOSPADM

## 2018-06-29 RX ORDER — CHLORDIAZEPOXIDE HYDROCHLORIDE 25 MG/1
25 CAPSULE, GELATIN COATED ORAL EVERY 8 HOURS SCHEDULED
Status: DISCONTINUED | OUTPATIENT
Start: 2018-06-29 | End: 2018-07-02

## 2018-06-29 RX ORDER — SODIUM CHLORIDE 0.9 % (FLUSH) 0.9 %
1-10 SYRINGE (ML) INJECTION AS NEEDED
Status: DISCONTINUED | OUTPATIENT
Start: 2018-06-29 | End: 2018-07-04 | Stop reason: HOSPADM

## 2018-06-29 RX ORDER — SODIUM CHLORIDE 0.9 % (FLUSH) 0.9 %
10 SYRINGE (ML) INJECTION AS NEEDED
Status: DISCONTINUED | OUTPATIENT
Start: 2018-06-29 | End: 2018-07-04 | Stop reason: HOSPADM

## 2018-06-29 RX ORDER — LORAZEPAM 1 MG/1
1 TABLET ORAL
Status: DISCONTINUED | OUTPATIENT
Start: 2018-06-29 | End: 2018-07-04 | Stop reason: HOSPADM

## 2018-06-29 RX ORDER — MAGNESIUM SULFATE HEPTAHYDRATE 40 MG/ML
4 INJECTION, SOLUTION INTRAVENOUS AS NEEDED
Status: DISCONTINUED | OUTPATIENT
Start: 2018-06-29 | End: 2018-07-04 | Stop reason: HOSPADM

## 2018-06-29 RX ORDER — LORAZEPAM 2 MG/ML
1 INJECTION INTRAMUSCULAR
Status: DISCONTINUED | OUTPATIENT
Start: 2018-06-29 | End: 2018-07-04 | Stop reason: HOSPADM

## 2018-06-29 RX ORDER — HYDROCORTISONE ACETATE 25 MG/1
25 SUPPOSITORY RECTAL 2 TIMES DAILY PRN
Status: DISCONTINUED | OUTPATIENT
Start: 2018-06-29 | End: 2018-07-04 | Stop reason: HOSPADM

## 2018-06-29 RX ORDER — LORAZEPAM 1 MG/1
2 TABLET ORAL
Status: DISCONTINUED | OUTPATIENT
Start: 2018-06-29 | End: 2018-07-04 | Stop reason: HOSPADM

## 2018-06-29 RX ORDER — LORAZEPAM 2 MG/ML
1 INJECTION INTRAMUSCULAR ONCE
Status: COMPLETED | OUTPATIENT
Start: 2018-06-29 | End: 2018-06-29

## 2018-06-29 RX ORDER — ONDANSETRON 4 MG/1
4 TABLET, FILM COATED ORAL EVERY 6 HOURS PRN
Status: DISCONTINUED | OUTPATIENT
Start: 2018-06-29 | End: 2018-07-04 | Stop reason: HOSPADM

## 2018-06-29 RX ORDER — LORAZEPAM 2 MG/1
2 TABLET ORAL EVERY 6 HOURS PRN
COMMUNITY
End: 2019-05-17

## 2018-06-29 RX ADMIN — MAGNESIUM SULFATE HEPTAHYDRATE 2 G: 40 INJECTION, SOLUTION INTRAVENOUS at 20:51

## 2018-06-29 RX ADMIN — LORAZEPAM 1 MG: 2 INJECTION INTRAMUSCULAR; INTRAVENOUS at 11:17

## 2018-06-29 RX ADMIN — PANTOPRAZOLE SODIUM 40 MG: 40 INJECTION, POWDER, FOR SOLUTION INTRAVENOUS at 20:50

## 2018-06-29 RX ADMIN — LORAZEPAM 2 MG: 2 INJECTION INTRAMUSCULAR; INTRAVENOUS at 16:19

## 2018-06-29 RX ADMIN — IOPAMIDOL 85 ML: 612 INJECTION, SOLUTION INTRAVENOUS at 13:01

## 2018-06-29 RX ADMIN — LORAZEPAM 1 MG: 2 INJECTION INTRAMUSCULAR; INTRAVENOUS at 12:58

## 2018-06-29 RX ADMIN — FOLIC ACID 1000 ML/HR: 5 INJECTION, SOLUTION INTRAMUSCULAR; INTRAVENOUS; SUBCUTANEOUS at 11:17

## 2018-06-29 RX ADMIN — MAGNESIUM SULFATE HEPTAHYDRATE 2 G: 40 INJECTION, SOLUTION INTRAVENOUS at 22:59

## 2018-06-29 RX ADMIN — CHLORDIAZEPOXIDE HYDROCHLORIDE 25 MG: 25 CAPSULE ORAL at 16:19

## 2018-06-29 RX ADMIN — LORAZEPAM 2 MG: 2 INJECTION INTRAMUSCULAR; INTRAVENOUS at 15:53

## 2018-06-29 RX ADMIN — CHLORDIAZEPOXIDE HYDROCHLORIDE 25 MG: 25 CAPSULE ORAL at 22:10

## 2018-06-30 LAB
ALBUMIN SERPL-MCNC: 3.3 G/DL (ref 3.5–5.2)
ALBUMIN/GLOB SERPL: 1.2 G/DL
ALP SERPL-CCNC: 107 U/L (ref 39–117)
ALT SERPL W P-5'-P-CCNC: 46 U/L (ref 1–33)
ANION GAP SERPL CALCULATED.3IONS-SCNC: 16 MMOL/L
AST SERPL-CCNC: 42 U/L (ref 1–32)
BASOPHILS # BLD AUTO: 0.01 10*3/MM3 (ref 0–0.2)
BASOPHILS NFR BLD AUTO: 0.2 % (ref 0–1.5)
BILIRUB SERPL-MCNC: 1 MG/DL (ref 0.1–1.2)
BUN BLD-MCNC: 7 MG/DL (ref 6–20)
BUN/CREAT SERPL: 16.3 (ref 7–25)
CALCIUM SPEC-SCNC: 8.8 MG/DL (ref 8.6–10.5)
CHLORIDE SERPL-SCNC: 102 MMOL/L (ref 98–107)
CO2 SERPL-SCNC: 17 MMOL/L (ref 22–29)
CREAT BLD-MCNC: 0.43 MG/DL (ref 0.57–1)
DEPRECATED RDW RBC AUTO: 71.4 FL (ref 37–54)
EOSINOPHIL # BLD AUTO: 0.06 10*3/MM3 (ref 0–0.7)
EOSINOPHIL NFR BLD AUTO: 1.5 % (ref 0.3–6.2)
ERYTHROCYTE [DISTWIDTH] IN BLOOD BY AUTOMATED COUNT: 17.7 % (ref 11.7–13)
GFR SERPL CREATININE-BSD FRML MDRD: >150 ML/MIN/1.73
GLOBULIN UR ELPH-MCNC: 2.8 GM/DL
GLUCOSE BLD-MCNC: 93 MG/DL (ref 65–99)
HAV IGM SERPL QL IA: NORMAL
HBV CORE IGM SERPL QL IA: NORMAL
HBV SURFACE AG SERPL QL IA: NORMAL
HCT VFR BLD AUTO: 24.2 % (ref 35.6–45.5)
HCT VFR BLD AUTO: 24.6 % (ref 35.6–45.5)
HCT VFR BLD AUTO: 24.8 % (ref 35.6–45.5)
HCT VFR BLD AUTO: 25.1 % (ref 35.6–45.5)
HCV AB SER DONR QL: NORMAL
HGB BLD-MCNC: 8 G/DL (ref 11.9–15.5)
HGB BLD-MCNC: 8 G/DL (ref 11.9–15.5)
HGB BLD-MCNC: 8.1 G/DL (ref 11.9–15.5)
HGB BLD-MCNC: 8.3 G/DL (ref 11.9–15.5)
IMM GRANULOCYTES # BLD: 0.01 10*3/MM3 (ref 0–0.03)
IMM GRANULOCYTES NFR BLD: 0.2 % (ref 0–0.5)
LYMPHOCYTES # BLD AUTO: 0.87 10*3/MM3 (ref 0.9–4.8)
LYMPHOCYTES NFR BLD AUTO: 21.3 % (ref 19.6–45.3)
MAGNESIUM SERPL-MCNC: 4.2 MG/DL (ref 1.6–2.6)
MCH RBC QN AUTO: 37 PG (ref 26.9–32)
MCHC RBC AUTO-ENTMCNC: 32.9 G/DL (ref 32.4–36.3)
MCV RBC AUTO: 112.3 FL (ref 80.5–98.2)
MONOCYTES # BLD AUTO: 0.37 10*3/MM3 (ref 0.2–1.2)
MONOCYTES NFR BLD AUTO: 9.1 % (ref 5–12)
NEUTROPHILS # BLD AUTO: 2.77 10*3/MM3 (ref 1.9–8.1)
NEUTROPHILS NFR BLD AUTO: 67.9 % (ref 42.7–76)
NRBC BLD MANUAL-RTO: 0 /100 WBC (ref 0–0)
PLATELET # BLD AUTO: 35 10*3/MM3 (ref 140–500)
PMV BLD AUTO: 11.6 FL (ref 6–12)
POTASSIUM BLD-SCNC: 3.7 MMOL/L (ref 3.5–5.2)
PROT SERPL-MCNC: 6.1 G/DL (ref 6–8.5)
RBC # BLD AUTO: 2.19 10*6/MM3 (ref 3.9–5.2)
SODIUM BLD-SCNC: 135 MMOL/L (ref 136–145)
WBC NRBC COR # BLD: 4.08 10*3/MM3 (ref 4.5–10.7)

## 2018-06-30 PROCEDURE — 85014 HEMATOCRIT: CPT | Performed by: INTERNAL MEDICINE

## 2018-06-30 PROCEDURE — 85018 HEMOGLOBIN: CPT | Performed by: INTERNAL MEDICINE

## 2018-06-30 PROCEDURE — 94799 UNLISTED PULMONARY SVC/PX: CPT

## 2018-06-30 PROCEDURE — 99232 SBSQ HOSP IP/OBS MODERATE 35: CPT | Performed by: INTERNAL MEDICINE

## 2018-06-30 PROCEDURE — 92610 EVALUATE SWALLOWING FUNCTION: CPT

## 2018-06-30 PROCEDURE — 25810000003 DEXTROSE-NACL PER 500 ML: Performed by: INTERNAL MEDICINE

## 2018-06-30 PROCEDURE — 80053 COMPREHEN METABOLIC PANEL: CPT | Performed by: INTERNAL MEDICINE

## 2018-06-30 PROCEDURE — 25010000002 LORAZEPAM PER 2 MG: Performed by: INTERNAL MEDICINE

## 2018-06-30 PROCEDURE — 85025 COMPLETE CBC W/AUTO DIFF WBC: CPT | Performed by: INTERNAL MEDICINE

## 2018-06-30 PROCEDURE — 25010000002 MAGNESIUM SULFATE 2 GM/50ML SOLUTION: Performed by: INTERNAL MEDICINE

## 2018-06-30 PROCEDURE — 83735 ASSAY OF MAGNESIUM: CPT | Performed by: INTERNAL MEDICINE

## 2018-06-30 RX ORDER — OXYCODONE HYDROCHLORIDE 5 MG/1
10 TABLET ORAL EVERY 4 HOURS PRN
Status: DISCONTINUED | OUTPATIENT
Start: 2018-06-30 | End: 2018-07-04 | Stop reason: HOSPADM

## 2018-06-30 RX ORDER — DEXTROSE AND SODIUM CHLORIDE 5; .9 G/100ML; G/100ML
75 INJECTION, SOLUTION INTRAVENOUS CONTINUOUS
Status: DISCONTINUED | OUTPATIENT
Start: 2018-06-30 | End: 2018-07-02

## 2018-06-30 RX ADMIN — CHLORDIAZEPOXIDE HYDROCHLORIDE 25 MG: 25 CAPSULE ORAL at 06:00

## 2018-06-30 RX ADMIN — LORAZEPAM 2 MG: 2 INJECTION INTRAMUSCULAR; INTRAVENOUS at 22:10

## 2018-06-30 RX ADMIN — LORAZEPAM 1 MG: 2 INJECTION INTRAMUSCULAR; INTRAVENOUS at 10:39

## 2018-06-30 RX ADMIN — PANTOPRAZOLE SODIUM 40 MG: 40 INJECTION, POWDER, FOR SOLUTION INTRAVENOUS at 20:19

## 2018-06-30 RX ADMIN — DEXTROSE AND SODIUM CHLORIDE 75 ML/HR: 5; 900 INJECTION, SOLUTION INTRAVENOUS at 09:12

## 2018-06-30 RX ADMIN — DEXTROSE AND SODIUM CHLORIDE 75 ML/HR: 5; 900 INJECTION, SOLUTION INTRAVENOUS at 23:13

## 2018-06-30 RX ADMIN — CHLORDIAZEPOXIDE HYDROCHLORIDE 25 MG: 25 CAPSULE ORAL at 15:48

## 2018-06-30 RX ADMIN — LORAZEPAM 2 MG: 2 INJECTION INTRAMUSCULAR; INTRAVENOUS at 20:19

## 2018-06-30 RX ADMIN — OXYCODONE HYDROCHLORIDE 10 MG: 5 TABLET ORAL at 15:46

## 2018-06-30 RX ADMIN — MAGNESIUM SULFATE HEPTAHYDRATE 2 G: 40 INJECTION, SOLUTION INTRAVENOUS at 01:12

## 2018-06-30 RX ADMIN — OXYCODONE HYDROCHLORIDE 10 MG: 5 TABLET ORAL at 09:12

## 2018-06-30 RX ADMIN — OXYCODONE HYDROCHLORIDE 10 MG: 5 TABLET ORAL at 20:19

## 2018-06-30 RX ADMIN — PANTOPRAZOLE SODIUM 40 MG: 40 INJECTION, POWDER, FOR SOLUTION INTRAVENOUS at 09:12

## 2018-06-30 RX ADMIN — OXYCODONE HYDROCHLORIDE 10 MG: 5 TABLET ORAL at 12:41

## 2018-06-30 RX ADMIN — LORAZEPAM 2 MG: 2 INJECTION INTRAMUSCULAR; INTRAVENOUS at 15:46

## 2018-06-30 RX ADMIN — LORAZEPAM 2 MG: 2 INJECTION INTRAMUSCULAR; INTRAVENOUS at 00:06

## 2018-06-30 RX ADMIN — CHLORDIAZEPOXIDE HYDROCHLORIDE 25 MG: 25 CAPSULE ORAL at 22:10

## 2018-06-30 RX ADMIN — SODIUM CHLORIDE 500 ML: 9 INJECTION, SOLUTION INTRAVENOUS at 22:10

## 2018-07-01 PROBLEM — I95.9 HYPOTENSION: Status: ACTIVE | Noted: 2018-07-01

## 2018-07-01 LAB
ALBUMIN SERPL-MCNC: 3.4 G/DL (ref 3.5–5.2)
ALBUMIN/GLOB SERPL: 1.3 G/DL
ALP SERPL-CCNC: 107 U/L (ref 39–117)
ALT SERPL W P-5'-P-CCNC: 40 U/L (ref 1–33)
ANION GAP SERPL CALCULATED.3IONS-SCNC: 11.9 MMOL/L
AST SERPL-CCNC: 45 U/L (ref 1–32)
BASOPHILS # BLD AUTO: 0.01 10*3/MM3 (ref 0–0.2)
BASOPHILS NFR BLD AUTO: 0.2 % (ref 0–1.5)
BILIRUB SERPL-MCNC: 0.7 MG/DL (ref 0.1–1.2)
BUN BLD-MCNC: 5 MG/DL (ref 6–20)
BUN/CREAT SERPL: 9.6 (ref 7–25)
CALCIUM SPEC-SCNC: 8.7 MG/DL (ref 8.6–10.5)
CHLORIDE SERPL-SCNC: 105 MMOL/L (ref 98–107)
CO2 SERPL-SCNC: 20.1 MMOL/L (ref 22–29)
CREAT BLD-MCNC: 0.52 MG/DL (ref 0.57–1)
DEPRECATED RDW RBC AUTO: 72.3 FL (ref 37–54)
EOSINOPHIL # BLD AUTO: 0.14 10*3/MM3 (ref 0–0.7)
EOSINOPHIL NFR BLD AUTO: 3.3 % (ref 0.3–6.2)
ERYTHROCYTE [DISTWIDTH] IN BLOOD BY AUTOMATED COUNT: 17.3 % (ref 11.7–13)
GFR SERPL CREATININE-BSD FRML MDRD: 125 ML/MIN/1.73
GLOBULIN UR ELPH-MCNC: 2.6 GM/DL
GLUCOSE BLD-MCNC: 90 MG/DL (ref 65–99)
HCT VFR BLD AUTO: 23.2 % (ref 35.6–45.5)
HCT VFR BLD AUTO: 26.1 % (ref 35.6–45.5)
HCT VFR BLD AUTO: 27.6 % (ref 35.6–45.5)
HGB BLD-MCNC: 7.7 G/DL (ref 11.9–15.5)
HGB BLD-MCNC: 8.5 G/DL (ref 11.9–15.5)
HGB BLD-MCNC: 8.5 G/DL (ref 11.9–15.5)
IMM GRANULOCYTES # BLD: 0 10*3/MM3 (ref 0–0.03)
IMM GRANULOCYTES NFR BLD: 0 % (ref 0–0.5)
INR PPP: 1.18 (ref 0.9–1.1)
LYMPHOCYTES # BLD AUTO: 1.09 10*3/MM3 (ref 0.9–4.8)
LYMPHOCYTES NFR BLD AUTO: 26 % (ref 19.6–45.3)
MCH RBC QN AUTO: 37.6 PG (ref 26.9–32)
MCHC RBC AUTO-ENTMCNC: 32.6 G/DL (ref 32.4–36.3)
MCV RBC AUTO: 115.5 FL (ref 80.5–98.2)
MONOCYTES # BLD AUTO: 0.48 10*3/MM3 (ref 0.2–1.2)
MONOCYTES NFR BLD AUTO: 11.4 % (ref 5–12)
NEUTROPHILS # BLD AUTO: 2.48 10*3/MM3 (ref 1.9–8.1)
NEUTROPHILS NFR BLD AUTO: 59.1 % (ref 42.7–76)
PLATELET # BLD AUTO: 48 10*3/MM3 (ref 140–500)
PMV BLD AUTO: 12.1 FL (ref 6–12)
POTASSIUM BLD-SCNC: 3.4 MMOL/L (ref 3.5–5.2)
PROT SERPL-MCNC: 6 G/DL (ref 6–8.5)
PROTHROMBIN TIME: 14.8 SECONDS (ref 11.7–14.2)
RBC # BLD AUTO: 2.26 10*6/MM3 (ref 3.9–5.2)
SODIUM BLD-SCNC: 137 MMOL/L (ref 136–145)
WBC NRBC COR # BLD: 4.2 10*3/MM3 (ref 4.5–10.7)

## 2018-07-01 PROCEDURE — 99232 SBSQ HOSP IP/OBS MODERATE 35: CPT | Performed by: INTERNAL MEDICINE

## 2018-07-01 PROCEDURE — 85018 HEMOGLOBIN: CPT | Performed by: INTERNAL MEDICINE

## 2018-07-01 PROCEDURE — 97161 PT EVAL LOW COMPLEX 20 MIN: CPT

## 2018-07-01 PROCEDURE — 85014 HEMATOCRIT: CPT | Performed by: INTERNAL MEDICINE

## 2018-07-01 PROCEDURE — 85025 COMPLETE CBC W/AUTO DIFF WBC: CPT | Performed by: INTERNAL MEDICINE

## 2018-07-01 PROCEDURE — 97110 THERAPEUTIC EXERCISES: CPT

## 2018-07-01 PROCEDURE — 85610 PROTHROMBIN TIME: CPT | Performed by: INTERNAL MEDICINE

## 2018-07-01 PROCEDURE — 25010000002 LORAZEPAM PER 2 MG: Performed by: INTERNAL MEDICINE

## 2018-07-01 PROCEDURE — 80053 COMPREHEN METABOLIC PANEL: CPT | Performed by: INTERNAL MEDICINE

## 2018-07-01 RX ADMIN — CHLORDIAZEPOXIDE HYDROCHLORIDE 25 MG: 25 CAPSULE ORAL at 22:10

## 2018-07-01 RX ADMIN — CHLORDIAZEPOXIDE HYDROCHLORIDE 25 MG: 25 CAPSULE ORAL at 05:25

## 2018-07-01 RX ADMIN — PANTOPRAZOLE SODIUM 40 MG: 40 INJECTION, POWDER, FOR SOLUTION INTRAVENOUS at 20:35

## 2018-07-01 RX ADMIN — OXYCODONE HYDROCHLORIDE 10 MG: 5 TABLET ORAL at 14:08

## 2018-07-01 RX ADMIN — OXYCODONE HYDROCHLORIDE 10 MG: 5 TABLET ORAL at 23:18

## 2018-07-01 RX ADMIN — CHLORDIAZEPOXIDE HYDROCHLORIDE 25 MG: 25 CAPSULE ORAL at 14:12

## 2018-07-01 RX ADMIN — PANTOPRAZOLE SODIUM 40 MG: 40 INJECTION, POWDER, FOR SOLUTION INTRAVENOUS at 09:20

## 2018-07-01 RX ADMIN — LORAZEPAM 2 MG: 2 INJECTION INTRAMUSCULAR; INTRAVENOUS at 01:35

## 2018-07-01 RX ADMIN — LORAZEPAM 2 MG: 2 INJECTION INTRAMUSCULAR; INTRAVENOUS at 05:37

## 2018-07-01 RX ADMIN — OXYCODONE HYDROCHLORIDE 10 MG: 5 TABLET ORAL at 00:19

## 2018-07-01 RX ADMIN — LORAZEPAM 2 MG: 2 INJECTION INTRAMUSCULAR; INTRAVENOUS at 00:19

## 2018-07-01 NOTE — PROGRESS NOTES
[facility]  Inpatient Progress Note    Reason for Follow Up:  GI bleed, alcohol withdrawl    Subjective     Interval History:   Sleeping, hard to understand. No rectal bleeding? H/H stable.     Current Facility-Administered Medications:   •  acetaminophen (TYLENOL) tablet 650 mg, 650 mg, Oral, Q4H PRN, Jaime Davis MD  •  chlordiazePOXIDE (LIBRIUM) capsule 25 mg, 25 mg, Oral, Q8H, Jaime Davis MD, 25 mg at 07/01/18 0525  •  dextrose 5 % and sodium chloride 0.9 % infusion, 75 mL/hr, Intravenous, Continuous, Jaime Davis MD, Last Rate: 75 mL/hr at 06/30/18 2313, 75 mL/hr at 06/30/18 2313  •  hydrocortisone (ANUSOL-HC) suppository 25 mg, 25 mg, Rectal, BID PRN, Kwaku Sawant MD  •  LORazepam (ATIVAN) tablet 1 mg, 1 mg, Oral, Q2H PRN **OR** LORazepam (ATIVAN) injection 1 mg, 1 mg, Intravenous, Q2H PRN, 1 mg at 06/30/18 1039 **OR** LORazepam (ATIVAN) tablet 2 mg, 2 mg, Oral, Q1H PRN **OR** LORazepam (ATIVAN) injection 2 mg, 2 mg, Intravenous, Q1H PRN, 2 mg at 07/01/18 0537 **OR** LORazepam (ATIVAN) injection 2 mg, 2 mg, Intravenous, Q15 Min PRN, 2 mg at 06/29/18 1619 **OR** LORazepam (ATIVAN) injection 2 mg, 2 mg, Intramuscular, Q15 Min PRN, Jaime Davis MD  •  Magnesium Sulfate 2 gram Bolus, followed by 8 gram infusion (total Mg dose 10 grams)- Mg less than or equal to 1mg/dL, 2 g, Intravenous, PRN **OR** Magnesium Sulfate 2 gram / 50mL Infusion (GIVE X 3 BAGS TO EQUAL 6GM TOTAL DOSE) - Mg 1.1 - 1/5 mg/dl, 2 g, Intravenous, PRN, Last Rate: 25 mL/hr at 06/30/18 0112, 2 g at 06/30/18 0112 **OR** Magnesium Sulfate 4 gram infusion- Mg 1.6-1.9 mg/dL, 4 g, Intravenous, PRN, Jaime Davis MD  •  ondansetron (ZOFRAN) tablet 4 mg, 4 mg, Oral, Q6H PRN **OR** ondansetron ODT (ZOFRAN-ODT) disintegrating tablet 4 mg, 4 mg, Oral, Q6H PRN **OR** ondansetron (ZOFRAN) injection 4 mg, 4 mg, Intravenous, Q6H PRN, Jaime Davis MD  •  oxyCODONE  (ROXICODONE) immediate release tablet 10 mg, 10 mg, Oral, Q4H PRN, Jaime Davis MD, 10 mg at 07/01/18 0019  •  pantoprazole (PROTONIX) injection 40 mg, 40 mg, Intravenous, Q12H, Jaime Davis MD, 40 mg at 06/30/18 2019  •  sodium chloride 0.9 % flush 1-10 mL, 1-10 mL, Intravenous, PRN, Jaime Davis MD  •  sodium chloride 0.9 % flush 10 mL, 10 mL, Intravenous, PRN, Ken Mar MD  Review of Systems:    The following systems were reviewed and negative;  respiratory, cardiovascular and musculoskeletal    Objective     Vital Signs  Temp:  [97.4 °F (36.3 °C)-98 °F (36.7 °C)] 97.4 °F (36.3 °C)  Heart Rate:  [66-85] 66  Resp:  [16-18] 18  BP: ()/(54-78) 110/76  Body mass index is 24.06 kg/m².    Intake/Output Summary (Last 24 hours) at 07/01/18 0950  Last data filed at 07/01/18 0900   Gross per 24 hour   Intake             1784 ml   Output                0 ml   Net             1784 ml     No intake/output data recorded.     Physical Exam:   General: patient awake, alert and cooperative   Eyes: Normal lids and lashes, no scleral icterus   Neck: supple, normal ROM   Skin: warm and dry, not jaundiced   Cardiovascular: regular rhythm and rate, no murmurs auscultated   Pulm: clear to auscultation bilaterally, regular and unlabored   Abdomen: soft, nontender, nondistended; normal bowel sounds   Rectal: deferred   Extremities: no rash or edema   Psychiatric: Normal mood and behavior; memory intact     Results Review:     I reviewed the patient's new clinical results.      Results from last 7 days  Lab Units 07/01/18  0620 07/01/18  0003 06/30/18  1800  06/30/18  0339  06/29/18  1010   WBC 10*3/mm3 4.20*  --   --   --  4.08*  --  5.11   HEMOGLOBIN g/dL 8.5* 7.7* 8.0*  < > 8.1*  < > 8.6*   HEMATOCRIT % 26.1* 23.2* 24.8*  < > 24.6*  < > 25.5*   PLATELETS 10*3/mm3 48*  --   --   --  35*  --  58*   < > = values in this interval not displayed.    Results from last 7 days  Lab Units 07/01/18  0661  06/30/18  0339 06/29/18  1010   SODIUM mmol/L 137 135* 135*   POTASSIUM mmol/L 3.4* 3.7 3.6   CHLORIDE mmol/L 105 102 100   CO2 mmol/L 20.1* 17.0* 20.1*   BUN mg/dL 5* 7 12   CREATININE mg/dL 0.52* 0.43* 0.66   CALCIUM mg/dL 8.7 8.8 9.1   BILIRUBIN mg/dL 0.7 1.0 1.9*   ALK PHOS U/L 107 107 115   ALT (SGPT) U/L 40* 46* 62*   AST (SGOT) U/L 45* 42* 46*   GLUCOSE mg/dL 90 93 105*       Results from last 7 days  Lab Units 07/01/18  0620 06/29/18  1010   INR  1.18* 1.21*       Lab Results  Lab Value Date/Time   LIPASE 25 06/18/2017 1951       Radiology:  CT Head Without Contrast   Final Result   The study is hampered by patient motion but no acute process   identified. Further evaluation could be performed with a MRI examination   of brain as indicated.        The above information was called to and discussed with Dr. Mar.               Radiation dose reduction techniques were utilized, including automated   exposure control and exposure modulation based on body size.       This report was finalized on 6/29/2018 5:05 PM by Dr. Michael Ortiz M.D.          CT Abdomen Pelvis With Contrast   Preliminary Result   Right infraumbilical bulky rectus muscle that appears   heterogeneous suggestive of abdominal wall hematoma.       These findings were discussed with Dr. Mar by telephone.       Radiation dose reduction techniques were utilized, including automated   exposure control and exposure modulation based on body size.              XR Chest 1 View   Final Result   Negative.       This report was finalized on 6/29/2018 12:30 PM by Dr. Ag Cheng M.D.          XR Shoulder 2+ View Right   Final Result   Postsurgical change at the right clavicle.        I discussed the case with Dr. Mar.       This report was finalized on 6/29/2018 12:20 PM by Dr. Ag Cheng M.D.              Assessment/Plan     Patient Active Problem List   Diagnosis   • Irritable bowel syndrome with both constipation and diarrhea    • Peripheral neuropathy   • Vitamin D deficiency   • History of HPV infection   • Hypothyroidism   • Cigarette smoker   • Acute kidney injury   • Ascites   • UTI (urinary tract infection)   • Alcohol withdrawal syndrome, with delirium   • Alcoholic hepatitis   • GI bleed   • Acute post-hemorrhagic anemia   • Thrombocytopenia   • Open wound of right shoulder region       I discussed the patients findings and my recommendations with patient and nursing staff.    Assessment/Recommendations:     1. EtOH withdrawal/DTs - agree with scheduled librium  2. EtOH hepatitis -   3. Rectal bleeding - ? Hemorrhoids vs secondary to possible enteritis/colitis on CT. She last had EGD and colonoscopy in 2015 by [facility]. If she has ongoing Gi bleeding may need to consider endoscopic evaluation although not currently ideal candidate given active withdrawal/DT and low platelet count. Continue Anusol HC suppositories.H/H stable.   4. Thrombocytopenia - secondary to EtOH abuse     Tyson Tierney MD

## 2018-07-01 NOTE — PROGRESS NOTES
Name: Bekah Gibson ADMIT: 2018   : 1970  PCP: No Known Provider    MRN: [id number] LOS: 2 days   AGE/SEX: 48 y.o. female  ROOM: [facility]/   Subjective     Mental status is better today.  Complains of mild shoulder pain.  No shortness breath, chest pain, cough nausea or vomiting      Objective   Vital Signs  Temp:  [97.4 °F (36.3 °C)-98 °F (36.7 °C)] 97.4 °F (36.3 °C)  Heart Rate:  [66-85] 66  Resp:  [16-18] 18  BP: ()/(54-78) 110/76  SpO2:  [100 %] 100 %  on   ;   Device (Oxygen Therapy): room air  Body mass index is 24.06 kg/m².    Physical Exam   Constitutional: She appears well-developed. No distress.   Alcohol withdrawals much improved   HENT:   Head: Normocephalic and atraumatic.   Cardiovascular: Normal rate and regular rhythm.    Pulmonary/Chest: Effort normal and breath sounds normal. No respiratory distress.   Abdominal: Bowel sounds are normal. She exhibits no distension. There is tenderness (mild diffuse). There is no guarding.   Neurological: She is alert.   Mild tremors (improved)  Oriented to person, Hospital but thought it was  but thought it was      Skin: Skin is warm and dry. There is erythema.   Psychiatric: She has a normal mood and affect. Her speech is slurred. She is slowed.   Slurring of speech is better     Vitals reviewed.      Results Review:       I reviewed the patient's new clinical results.    Results from last 7 days  Lab Units 18  0620 18  0003 18  1800 18  0748 18  0339  18  1010   WBC 10*3/mm3 4.20*  --   --   --  4.08*  --  5.11   HEMOGLOBIN g/dL 8.5* 7.7* 8.0* 8.0* 8.1*  < > 8.6*   PLATELETS 10*3/mm3 48*  --   --   --  35*  --  58*   < > = values in this interval not displayed.    Results from last 7 days  Lab Units 18  0620 18  0339 18  1010   SODIUM mmol/L 137 135* 135*   POTASSIUM mmol/L 3.4* 3.7 3.6   CHLORIDE mmol/L 105 102 100   CO2 mmol/L 20.1* 17.0* 20.1*   BUN mg/dL 5* 7 12    CREATININE mg/dL 0.52* 0.43* 0.66   GLUCOSE mg/dL 90 93 105*   Estimated Creatinine Clearance: 125.9 mL/min (A) (by C-G formula based on SCr of 0.52 mg/dL (L)).    Results from last 7 days  Lab Units 07/01/18  0620 06/30/18  0339 06/29/18  1010   CALCIUM mg/dL 8.7 8.8 9.1   ALBUMIN g/dL 3.40* 3.30* 3.90   MAGNESIUM mg/dL  --  4.2* 1.1*         chlordiazePOXIDE 25 mg Oral Q8H   pantoprazole 40 mg Intravenous Q12H       dextrose 5 % and sodium chloride 0.9 % 75 mL/hr Last Rate: 75 mL/hr (06/30/18 2313)   Diet Dysphagia; IV - Mechanical Soft No Mixed Consistencies; Nectar / Syrup Thick; No Straws; Cardiac      Assessment/Plan      Active Hospital Problems    Diagnosis Date Noted   • **Alcohol withdrawal syndrome, with delirium [F10.231] 06/29/2018   • Alcoholic hepatitis [K70.10] 06/29/2018   • GI bleed [K92.2] 06/29/2018   • Acute post-hemorrhagic anemia [D62] 06/29/2018   • Thrombocytopenia [D69.6] 06/29/2018   • Open wound of right shoulder region [S41.001A] 06/29/2018   • Hypothyroidism [E03.9] 06/05/2017      Resolved Hospital Problems    Diagnosis Date Noted Date Resolved   No resolved problems to display.     · Alcohol withdrawal with delirium tremens: CIWA improved.Scheduled Librium and as needed Ativan per protocol.  Multivitamins.  IV fluids. She continues to improve. Consulted access Center  · Hypotension: last night, needed bolus, bp better today, hgb stable  · Elevated LFTs: Suspect mild alcoholic hepatitis, ammonia level normal  · Chronic pain: restarted oxycodone to prevent withdrawal from this  · Lower GI bleed: Bright red blood per rectum in the emergency room, hemoglobin is low but stable.  Appreciate GI-likely has hemorrhoids vs possible enteritis and stool studies pending, may need endoscopy  · Pancytopenoia: Secondary to alcohol use, watch closely, wait to consult heme  · Metabolic acidosis: suspect ketosis, resolving now with PO intake and D5  · Open wound of the right shoulder: We'll try to get  records from [facility] regarding her recent surgery.  Consulted orthopedic surgery as there is an open wound along the incision line.  · Ask PT to see today  · SCDs    D/W RN    Jaime Davis MD  [facility]  07/01/18  11:59 AM

## 2018-07-01 NOTE — PLAN OF CARE
Problem: Patient Care Overview  Goal: Plan of Care Review  Outcome: Ongoing (interventions implemented as appropriate)   07/01/18 1457    07/01/18 1457   Coping/Psychosocial   Plan of Care Reviewed With patient   OTHER   Outcome Summary Pt minAx1 walking 25'. Difficulty with transfers needing frequent verbal cues throughout. Pt with some confusion, is uncertain of d/c disposition. Pt will benefit from skilled acute PT care services.       Plan of Care Reviewed With patient   OTHER   Outcome Summary Pt minAx1 walking 25'. Difficulty with transfers needing frequent verbal cues throughout. Pt with some confusion, is uncertain of d/c disposition. Pt will benefit from skilled PT care services.

## 2018-07-01 NOTE — NURSING NOTE
This nurse spoke to the ortho surgeon and he requested some information. The prior surgeon was Dr. Jaime Mcdowell at [facility] orthopedic surgery. It was done about 4 weeks ago on Wednesday. It started draining about 1 week ago. No post op appts were attended at this time. Will continue to monitor.

## 2018-07-01 NOTE — THERAPY EVALUATION
Acute Care - Physical Therapy Initial Evaluation  [facility]     Patient Name: Bekah Gibson  : 1970  MRN: [id number]  Today's Date: 2018      Date of Referral to PT: 18  Referring Physician: Ryan      Admit Date: 2018    Visit Dx:     ICD-10-CM ICD-9-CM   1. DTs (delirium tremens) F10.231 291.0   2. Hypomagnesemia E83.42 275.2   3. Macrocytic anemia D53.9 281.9   4. BRBPR (bright red blood per rectum) K62.5 569.3   5. Decreased functional mobility R26.89 781.99     Patient Active Problem List   Diagnosis   • Irritable bowel syndrome with both constipation and diarrhea   • Peripheral neuropathy   • Vitamin D deficiency   • History of HPV infection   • Hypothyroidism   • Cigarette smoker   • Acute kidney injury   • Ascites   • UTI (urinary tract infection)   • Alcohol withdrawal syndrome, with delirium   • Alcoholic hepatitis   • GI bleed   • Acute post-hemorrhagic anemia   • Thrombocytopenia   • Open wound of right shoulder region   • Hypotension     Past Medical History:   Diagnosis Date   • Anxiety    • Disease of thyroid gland    • ETOH abuse    • Hypertension    • Kidney stone    • Lupus    • Lupus    • MDD (major depressive disorder)    • Pancreatitis      Past Surgical History:   Procedure Laterality Date   • JOINT REPLACEMENT     • KIDNEY STONE SURGERY      LITHOTRIPSY AND STENT (R SIDE)        PT ASSESSMENT (last 12 hours)      Physical Therapy Evaluation     Row Name 18 1444          PT Evaluation Time/Intention    Subjective Information pain;complains of;weakness  -CS     Document Type evaluation  -CS     Mode of Treatment physical therapy  -CS     Patient Effort good  -CS     Row Name 18 1444          General Information    Patient Profile Reviewed? yes  -CS     Referring Physician Ryan  -CS     Patient Observations alert;cooperative;agree to therapy  -CS     Patient/Family Observations Pt supine in bed. Wanting pain meds. Agreeable to work with PT.   -CS      Prior Level of Function independent:  -CS     Equipment Currently Used at Home cane, straight  -CS     Existing Precautions/Restrictions fall  -CS     Equipment Issued to Patient walker, front wheeled  -CS     Equipment Ordered for Patient walker, front wheeled  -CS     Row Name 07/01/18 1444          Relationship/Environment    Primary Source of Support/Comfort spouse  -CS     Lives With spouse  -CS     Row Name 07/01/18 1444          Resource/Environmental Concerns    Current Living Arrangements home/apartment/condo  -CS     Resource/Environmental Concerns none  -CS     Row Name 07/01/18 1444          Home Main Entrance    Number of Stairs, Main Entrance two  -CS     Row Name 07/01/18 1444          Cognitive Assessment/Intervention- PT/OT    Orientation Status (Cognition) person;place;oriented to  -CS     Follows Commands (Cognition) over 90% accuracy  -CS     Safety Deficit (Cognitive) mild deficit;awareness of need for assistance;ability to follow commands;insight into deficits/self awareness  -     Row Name 07/01/18 1444          Safety Issues, Functional Mobility    Safety Issues Affecting Function (Mobility) safety precautions follow-through/compliance;safety precaution awareness;insight into deficits/self awareness;awareness of need for assistance;ability to follow commands  -CS     Impairments Affecting Function (Mobility) balance;endurance/activity tolerance;coordination;motor control;pain;strength  -CS     Row Name 07/01/18 1440          Bed Mobility Assessment/Treatment    Bed Mobility Assessment/Treatment sit-supine;supine-sit  -CS     Supine-Sit [place] (Bed Mobility) minimum assist (75% patient effort)  -CS     Sit-Supine [place] (Bed Mobility) moderate assist (50% patient effort)  -CS     Bed Mobility, Safety Issues decreased use of legs for bridging/pushing  -CS     Assistive Device (Bed Mobility) head of bed elevated;bed rails  -     Row Name 07/01/18 1444          Transfer  Assessment/Treatment    Transfer Assessment/Treatment sit-stand transfer;stand-sit transfer  -     Sit-Stand [place] (Transfers) verbal cues;minimum assist (75% patient effort)  -CS     Stand-Sit [place] (Transfers) minimum assist (75% patient effort);verbal cues  -[facility] Name 07/01/18 1444          Sit-Stand Transfer    Assistive Device (Sit-Stand Transfers) walker, front-wheeled  -CS     Row Name 07/01/18 1444          Stand-Sit Transfer    Assistive Device (Stand-Sit Transfers) walker, front-wheeled  -CS     [facility] Name 07/01/18 1444          Gait/Stairs Assessment/Training    Gait/Stairs Assessment/Training gait/ambulation independence;gait/ambulation assistive device  -     [place] Level (Gait) minimum assist (75% patient effort)  -CS     Assistive Device (Gait) walker, front-wheeled  -     Distance in Feet (Gait) 25  -CS     Pattern (Gait) step-through  -CS     Deviations/Abnormal Patterns (Gait) stride length decreased;gait speed decreased;[place] decreased  -CS     Comment (Gait/Stairs) Pt with very short choppy steps throughout gait cycle, unable to correct with verbal cues given to take bigger steps.   -[facility] Name 07/01/18 1444          General ROM    GENERAL ROM COMMENTS generally WFL  -CS     Row Name 07/01/18 1444          MMT (Manual Muscle Testing)    Additional Documentation General Assessment (Manual Muscle Testing) (Group)  -CS     Row Name 07/01/18 1444          General Assessment (Manual Muscle Testing)    Comment, General Manual Muscle Testing (MMT) Assessment generally >3+5; with exception to RUE  -CS     Row Name 07/01/18 1444          Pain Assessment    Additional Documentation Pain Scale: Numbers Pre/Post-Treatment (Group)  -CS     Row Name 07/01/18 1444          Pain Scale: Numbers Pre/Post-Treatment    Pain Scale: Numbers, Pretreatment --   nurse gave pain medication, did not rate  -CS     Pain Intervention(s) Repositioned;Ambulation/increased activity  -      Row Name 07/01/18 1444          Physical Therapy Clinical Impression    Date of Referral to PT 07/01/18  -CS     PT Diagnosis (PT Clinical Impression) Poor mobility  -CS     Patient/Family Goals Statement (PT Clinical Impression) Wants to go home  -CS     Criteria for Skilled Interventions Met (PT Clinical Impression) yes  -CS     Pathology/Pathophysiology Noted (Describe Specifically for Each System) neuromuscular  -CS     Impairments Found (describe specific impairments) aerobic capacity/endurance;gait, locomotion, and balance;muscle performance;motor function  -CS     Functional Limitations in Following Categories (Describe Specific Limitations) self-care  -CS     Rehab Potential (PT Clinical Summary) good, to achieve stated therapy goals  -CS     Care Plan Review (PT) evaluation/treatment results reviewed  -CS     Row Name 07/01/18 1444          Physical Therapy Goals    Bed Mobility Goal Selection (PT) bed mobility, PT goal 1  -CS     Transfer Goal Selection (PT) transfer, PT goal 1  -CS     Gait Training Goal Selection (PT) gait training, PT goal 1  -CS     Row Name 07/01/18 1444          Bed Mobility Goal 1 (PT)    Activity/Assistive Device (Bed Mobility Goal 1, PT) sit to supine;supine to sit  -CS     [place] Level/Cues Needed (Bed Mobility Goal 1, PT) contact guard assist  -CS     Time Frame (Bed Mobility Goal 1, PT) 1 week  -CS     Row Name 07/01/18 1444          Transfer Goal 1 (PT)    Activity/Assistive Device (Transfer Goal 1, PT) sit-to-stand/stand-to-sit  -CS     [place] Level/Cues Needed (Transfer Goal 1, PT) contact guard assist  -CS     Time Frame (Transfer Goal 1, PT) 1 week  -CS     Row Name 07/01/18 1444          Gait Training Goal 1 (PT)    Activity/Assistive Device (Gait Training Goal 1, PT) assistive device use  -CS     [place] Level (Gait Training Goal 1, PT) contact guard assist  -CS     Distance (Gait Goal 1, PT) 150  -CS     Time Frame (Gait Training Goal 1, PT) 1 week  -CS      Row Name 07/01/18 1444          Positioning and Restraints    Pre-Treatment Position in bed  -CS     Post Treatment Position bed  -CS     In Bed notified nsg;supine;call light within reach;encouraged to call for assist;exit alarm on  -CS     Row Name 07/01/18 1444          Living Environment    Home Accessibility stairs to enter home  -CS       User Key  (r) = Recorded By, (t) = Taken By, (c) = Cosigned By    Initials Name Provider Type    CS Aric VALLADARES Teresa, PT Physical Therapist              PT Recommendation and Plan  Anticipated Discharge Disposition (PT): unknown, skilled nursing facility  Planned Therapy Interventions (PT Eval): balance training, bed mobility training, gait training, home exercise program, patient/family education, strengthening, transfer training  Therapy Frequency (PT Clinical Impression): daily  Outcome Summary/Treatment Plan (PT)  Anticipated Equipment Needs at Discharge (PT): front wheeled walker  Anticipated Discharge Disposition (PT): unknown, skilled nursing facility  Plan of Care Reviewed With: patient  Outcome Summary: Pt minAx1 walking 25'. Difficulty with transfers needing frequent verbal cues throughout. Pt with some confusion, is uncertain of d/c disposition. Pt will benefit from skilled acute PT care services.           Outcome Measures     Row Name 07/01/18 1500             How much help from another person do you currently need...    Turning from your back to your side while in flat bed without using bedrails? 3  -CS      Moving from lying on back to sitting on the side of a flat bed without bedrails? 3  -CS      Moving to and from a bed to a chair (including a wheelchair)? 3  -CS      Standing up from a chair using your arms (e.g., wheelchair, bedside chair)? 3  -CS      Climbing 3-5 steps with a railing? 2  -CS      To walk in hospital room? 3  -CS      AM-PAC 6 Clicks Score 17  -CS         Functional Assessment    Outcome Measure Options AM-PAC 6 Clicks Basic Mobility  (PT)  -CS        User Key  (r) = Recorded By, (t) = Taken By, (c) = Cosigned By    Initials Name Provider Type    CS Aric Moore PT Physical Therapist           Time Calculation:         PT Charges     Row Name 07/01/18 1500             Time Calculation    Start Time 1420  -CS      Stop Time 1443  -CS      Time Calculation (min) 23 min  -CS      PT Received On 07/01/18  -CS      PT - Next Appointment 07/02/18  -CS      PT Goal Re-Cert Due Date 07/08/18  -CS        User Key  (r) = Recorded By, (t) = Taken By, (c) = Cosigned By    Initials Name Provider Type    CS Aric Moore, PT Physical Therapist        Therapy Suggested Charges     Code   Minutes Charges    None           Therapy Charges for Today     Code Description Service Date Service Provider Modifiers Qty    70916214218 HC PT EVAL LOW COMPLEXITY 1 7/1/2018 Aric Moore, PT GP 1    [id number] HC PT THER PROC EA 15 MIN 7/1/2018 Aric Moore, PT GP 1          PT G-Codes  Outcome Measure Options: AM-PAC 6 Clicks Basic Mobility (PT)      Aric Moore PT  7/1/2018

## 2018-07-01 NOTE — PLAN OF CARE
Problem: Fall Risk (Adult)  Goal: Absence of Fall  Outcome: Ongoing (interventions implemented as appropriate)      Problem: Patient Care Overview  Goal: Plan of Care Review  Outcome: Ongoing (interventions implemented as appropriate)   07/01/18 0444   Coping/Psychosocial   Plan of Care Reviewed With patient   Plan of Care Review   Progress no change   OTHER   Outcome Summary Patients blood pressure low this shift. ALL other vitals stable. Patient very anxious attempting to get out of bed. Patient very confused. Several doses of PRN anxiety medication given. Patients CIWA ranged from 6 to 15. Will continue to monitor.       Problem: Alcohol Withdrawal Acute, Risk/Actual (Adult)  Goal: Signs and Symptoms of Listed Potential Problems Will be Absent, Minimized or Managed (Alcohol Withdrawal Acute, Risk/Actual)  Outcome: Ongoing (interventions implemented as appropriate)      Problem: Mood Impairment (Anxiety Signs/Symptoms) (Adult)  Goal: Improved Mood Symptoms (Anxiety Signs/Symptoms)  Outcome: Ongoing (interventions implemented as appropriate)      Problem: Cognitive Impairment (Anxiety Signs/Symptoms) (Adult)  Goal: Improved Cognitive Function (Anxiety Signs/Symptoms)  Outcome: Ongoing (interventions implemented as appropriate)      Problem: Social/Occupational/Functional Impairment (Anxiety Signs/Symptoms) (Adult)  Goal: Improved Social/Occupational/Functional Skills (Anxiety Signs/Symptoms)  Outcome: Ongoing (interventions implemented as appropriate)

## 2018-07-01 NOTE — CONSULTS
"Adult Nutrition  Assessment/PES    Patient Name:  Bekah Gibson  YOB: 1970  MRN: [id number]  Admit Date:  6/29/2018    Assessment Date:  7/1/2018    Comments:  Nursing nutrition screen trigger. ETOH withdrawal.  No MVI/ thiamine/ folate ordered?    Intake 25-50% of po diet reported.    Follow for intake and tolerance. Suggest vitamin mineral supplementation.           Reason for Assessment     Row Name 07/01/18 1149          Reason for Assessment    Reason For Assessment identified at risk by screening criteria     Diagnosis substance use/abuse   ETOH w/d     Identified At Risk by Screening Criteria MST SCORE 2+               Anthropometrics     Row Name 07/01/18 1149 07/01/18 0500       Anthropometrics    Height 160 cm (62.99\")  --    Weight 61.6 kg (135 lb 13 oz) 61.6 kg (135 lb 12.9 oz)   equipment on bed       Ideal Body Weight (IBW)    Ideal Body Weight (IBW) (kg) 52.7  --    % Ideal Body Weight 116.9  --       Usual Body Weight (UBW)    Usual Body Weight 50.8 kg (112 lb)  --    % Usual Body Weight 121.26  --       Body Mass Index (BMI)    BMI (kg/m2) 24.11  --               Labs/Tests/Procedures/Meds     Row Name 07/01/18 1149          Labs/Procedures/Meds    Lab Results Reviewed reviewed, pertinent        Diagnostic Tests/Procedures    Diagnostic Test/Procedure Reviewed reviewed, pertinent     Diagnostic Test/Procedures Comments SLP eval at bedside        Medications    Pertinent Medications Reviewed reviewed, pertinent     Pertinent Medications Comments librium, PPI,              Physical Findings     Row Name 07/01/18 1152          Physical Findings    Skin --   intact             Estimated/Assessed Needs     Row Name 07/01/18 1152 07/01/18 1149       Calculation Measurements    Weight Used For Calculations 61.6 kg (135 lb 12.9 oz)  --    Height  -- 160 cm (62.99\")       Estimated/Assessed Needs    Additional Documentation Calorie Requirements (Group);KCAL/KG (Group);Protein Requirements " "(Group);Pima-St. Jeor Equation (Group);Fluid Requirements (Group)  --       Calorie Requirements    Estimated Calorie Need Method kcal/kg;Pima-St Jeor  --    Estimated Calorie Requirement Comment 1850  --       KCAL/KG    30 Kcal/Kg (kcal) 1848  --    35 Kcal/Kg (kcal) 2156  --       Pima-St. Jeor Equation    RMR (Pima-St. Jeor Equation) 1205  --       Protein Requirements    Est Protein Requirement Amount (gms/kg) 1.2 gm protein  --    Estimated Protein Requirements (gms/day) 73.92  --       Fluid Requirements    Yari-Rocco Method (over 20 kg) 2732  --            Nutrition Prescription Ordered     Row Name 07/01/18 1153          Nutrition Prescription PO    Current PO Diet Dysphagia     Dysphagia Level 4  Mechanical soft no mixed consistencies     Fluid Consistency Nectar/syrup thick     Common Modifiers Cardiac             Evaluation of Received Nutrient/Fluid Intake     Row Name 07/01/18 1153 07/01/18 1152       Calculation Measurements    Weight Used For Calculations  -- 61.6 kg (135 lb 12.9 oz)       PO Evaluation    % PO Intake 25-50%  --    Row Name 07/01/18 1149          Calculation Measurements    Height 160 cm (62.99\")             Evaluation of Prescribed Nutrient/Fluid Intake     Row Name 07/01/18 1152 07/01/18 1149       Calculation Measurements    Weight Used For Calculations 61.6 kg (135 lb 12.9 oz)  --    Height  -- 160 cm (62.99\")          Problem/Interventions:        Problem 1     Row Name 07/01/18 1153          Nutrition Diagnoses Problem 1    Problem 1 Predicted Suboptimal Intake     Etiology (related to) Medical Diagnosis     Substance Use ETOH     Signs/Symptoms (evidenced by) PO Intake;SLP/Swallow eval     Percent (%) intake recorded 37 %     Over number of meals 2     Swallow eval status Done     Type of SLP Evaluation Bedside                     Intervention Goal     Row Name 07/01/18 3524          Intervention Goal    General Maintain nutrition     PO PO intake (%);Increase " intake;Tolerate PO     PO Intake % 75 %     Weight No significant weight loss             Nutrition Intervention     Row Name 07/01/18 1154          Nutrition Intervention    RD/Tech Action Follow Tx progress;Care plan reviewd;Encourage intake             Nutrition Prescription     Row Name 07/01/18 1155          Other Orders    Supplement Vitamin mineral supplement             Education/Evaluation     Row Name 07/01/18 1155          Monitor/Evaluation    Monitor Per protocol;PO intake;Symptoms;Pertinent labs         Electronically signed by:  Em Lang RD  07/01/18 11:55 AM

## 2018-07-02 LAB
BASOPHILS # BLD AUTO: 0.02 10*3/MM3 (ref 0–0.2)
BASOPHILS NFR BLD AUTO: 0.5 % (ref 0–1.5)
DEPRECATED RDW RBC AUTO: 72.8 FL (ref 37–54)
EOSINOPHIL # BLD AUTO: 0.11 10*3/MM3 (ref 0–0.7)
EOSINOPHIL NFR BLD AUTO: 2.8 % (ref 0.3–6.2)
ERYTHROCYTE [DISTWIDTH] IN BLOOD BY AUTOMATED COUNT: 17.6 % (ref 11.7–13)
FERRITIN SERPL-MCNC: 291.6 NG/ML (ref 13–150)
HCT VFR BLD AUTO: 21.7 % (ref 35.6–45.5)
HCT VFR BLD AUTO: 21.7 % (ref 35.6–45.5)
HCT VFR BLD AUTO: 23.3 % (ref 35.6–45.5)
HGB BLD-MCNC: 7.2 G/DL (ref 11.9–15.5)
HGB BLD-MCNC: 7.3 G/DL (ref 11.9–15.5)
HGB BLD-MCNC: 7.6 G/DL (ref 11.9–15.5)
IMM GRANULOCYTES # BLD: 0.01 10*3/MM3 (ref 0–0.03)
IMM GRANULOCYTES NFR BLD: 0.3 % (ref 0–0.5)
IRON 24H UR-MRATE: 30 MCG/DL (ref 37–145)
IRON SATN MFR SERPL: 15 % (ref 20–50)
LYMPHOCYTES # BLD AUTO: 1.09 10*3/MM3 (ref 0.9–4.8)
LYMPHOCYTES NFR BLD AUTO: 27.8 % (ref 19.6–45.3)
MAGNESIUM SERPL-MCNC: 1.4 MG/DL (ref 1.6–2.6)
MCH RBC QN AUTO: 38.2 PG (ref 26.9–32)
MCHC RBC AUTO-ENTMCNC: 33.6 G/DL (ref 32.4–36.3)
MCV RBC AUTO: 113.6 FL (ref 80.5–98.2)
MONOCYTES # BLD AUTO: 0.57 10*3/MM3 (ref 0.2–1.2)
MONOCYTES NFR BLD AUTO: 14.5 % (ref 5–12)
NEUTROPHILS # BLD AUTO: 2.13 10*3/MM3 (ref 1.9–8.1)
NEUTROPHILS NFR BLD AUTO: 54.4 % (ref 42.7–76)
PLATELET # BLD AUTO: 50 10*3/MM3 (ref 140–500)
PMV BLD AUTO: 11.3 FL (ref 6–12)
RBC # BLD AUTO: 1.91 10*6/MM3 (ref 3.9–5.2)
TIBC SERPL-MCNC: 206 MCG/DL
TRANSFERRIN SERPL-MCNC: 138 MG/DL (ref 200–360)
VIT B12 BLD-MCNC: 737 PG/ML (ref 211–946)
WBC NRBC COR # BLD: 3.92 10*3/MM3 (ref 4.5–10.7)

## 2018-07-02 PROCEDURE — 85025 COMPLETE CBC W/AUTO DIFF WBC: CPT | Performed by: INTERNAL MEDICINE

## 2018-07-02 PROCEDURE — 85014 HEMATOCRIT: CPT | Performed by: INTERNAL MEDICINE

## 2018-07-02 PROCEDURE — 99232 SBSQ HOSP IP/OBS MODERATE 35: CPT | Performed by: INTERNAL MEDICINE

## 2018-07-02 PROCEDURE — 82607 VITAMIN B-12: CPT | Performed by: INTERNAL MEDICINE

## 2018-07-02 PROCEDURE — 85018 HEMOGLOBIN: CPT | Performed by: INTERNAL MEDICINE

## 2018-07-02 PROCEDURE — 82728 ASSAY OF FERRITIN: CPT | Performed by: INTERNAL MEDICINE

## 2018-07-02 PROCEDURE — 84466 ASSAY OF TRANSFERRIN: CPT | Performed by: INTERNAL MEDICINE

## 2018-07-02 PROCEDURE — 83735 ASSAY OF MAGNESIUM: CPT | Performed by: INTERNAL MEDICINE

## 2018-07-02 PROCEDURE — 90791 PSYCH DIAGNOSTIC EVALUATION: CPT

## 2018-07-02 PROCEDURE — 25010000002 MAGNESIUM SULFATE 2 GM/50ML SOLUTION: Performed by: INTERNAL MEDICINE

## 2018-07-02 PROCEDURE — 83540 ASSAY OF IRON: CPT | Performed by: INTERNAL MEDICINE

## 2018-07-02 PROCEDURE — 97110 THERAPEUTIC EXERCISES: CPT

## 2018-07-02 PROCEDURE — 92526 ORAL FUNCTION THERAPY: CPT

## 2018-07-02 PROCEDURE — 82747 ASSAY OF FOLIC ACID RBC: CPT | Performed by: INTERNAL MEDICINE

## 2018-07-02 PROCEDURE — 99254 IP/OBS CNSLTJ NEW/EST MOD 60: CPT | Performed by: INTERNAL MEDICINE

## 2018-07-02 RX ORDER — LEVOFLOXACIN 750 MG/1
750 TABLET ORAL DAILY
COMMUNITY
Start: 2018-06-13 | End: 2018-07-04 | Stop reason: HOSPADM

## 2018-07-02 RX ORDER — SULFAMETHOXAZOLE AND TRIMETHOPRIM 800; 160 MG/1; MG/1
1 TABLET ORAL 2 TIMES DAILY
Status: ON HOLD | COMMUNITY
End: 2018-07-04

## 2018-07-02 RX ADMIN — MAGNESIUM SULFATE HEPTAHYDRATE 2 G: 40 INJECTION, SOLUTION INTRAVENOUS at 12:23

## 2018-07-02 RX ADMIN — OXYCODONE HYDROCHLORIDE 10 MG: 5 TABLET ORAL at 06:02

## 2018-07-02 RX ADMIN — MAGNESIUM SULFATE HEPTAHYDRATE 2 G: 40 INJECTION, SOLUTION INTRAVENOUS at 14:43

## 2018-07-02 RX ADMIN — PANTOPRAZOLE SODIUM 40 MG: 40 INJECTION, POWDER, FOR SOLUTION INTRAVENOUS at 21:18

## 2018-07-02 RX ADMIN — OXYCODONE HYDROCHLORIDE 10 MG: 5 TABLET ORAL at 19:59

## 2018-07-02 RX ADMIN — PANTOPRAZOLE SODIUM 40 MG: 40 INJECTION, POWDER, FOR SOLUTION INTRAVENOUS at 09:41

## 2018-07-02 RX ADMIN — OXYCODONE HYDROCHLORIDE 10 MG: 5 TABLET ORAL at 14:35

## 2018-07-02 RX ADMIN — LORAZEPAM 1 MG: 1 TABLET ORAL at 22:38

## 2018-07-02 RX ADMIN — MAGNESIUM SULFATE HEPTAHYDRATE 2 G: 40 INJECTION, SOLUTION INTRAVENOUS at 09:42

## 2018-07-02 RX ADMIN — CHLORDIAZEPOXIDE HYDROCHLORIDE 25 MG: 25 CAPSULE ORAL at 06:01

## 2018-07-02 NOTE — PROGRESS NOTES
Name: Bekah Gibson ADMIT: 2018   : 1970  PCP: No Known Provider    MRN: [id number] LOS: 3 days   AGE/SEX: 48 y.o. female  ROOM: [facility]   Subjective     Mental status is better today.  Complains of mild shoulder pain.  No shortness breath, chest pain, cough nausea or vomiting  No ativan needed in last 24h    Objective   Vital Signs  Temp:  [97.3 °F (36.3 °C)-97.4 °F (36.3 °C)] 97.4 °F (36.3 °C)  Heart Rate:  [68-72] 72  Resp:  [16] 16  BP: (109-118)/(72-83) 118/83     on   ;   Device (Oxygen Therapy): room air  Body mass index is 30.82 kg/m².    Physical Exam   Constitutional: She appears well-developed. No distress.   Alcohol withdrawals much improved   HENT:   Head: Normocephalic and atraumatic.   Cardiovascular: Normal rate and regular rhythm.    Pulmonary/Chest: Effort normal and breath sounds normal. No respiratory distress.   Abdominal: Bowel sounds are normal. She exhibits no distension. There is tenderness (mild diffuse). There is no guarding.   Neurological: She is alert.   No tremors  Oriented to person, Hospital but thought it was [place],  but thought it was       Skin: Skin is warm and dry. There is erythema.   Psychiatric: She has a normal mood and affect. Her speech is slurred. She is slowed.   Slurring of speech is better     Vitals reviewed.      Results Review:       I reviewed the patient's new clinical results.    Results from last 7 days  Lab Units 18  0551 18  0014 18  1614 18  0620  18  0339  18  1010   WBC 10*3/mm3 3.92*  --   --  4.20*  --  4.08*  --  5.11   HEMOGLOBIN g/dL 7.3* 7.6* 8.5* 8.5*  < > 8.1*  < > 8.6*   PLATELETS 10*3/mm3 50*  --   --  48*  --  35*  --  58*   < > = values in this interval not displayed.    Results from last 7 days  Lab Units 18  0620 18  0339 18  1010   SODIUM mmol/L 137 135* 135*   POTASSIUM mmol/L 3.4* 3.7 3.6   CHLORIDE mmol/L 105 102 100   CO2 mmol/L 20.1* 17.0* 20.1*   BUN mg/dL  5* 7 12   CREATININE mg/dL 0.52* 0.43* 0.66   GLUCOSE mg/dL 90 93 105*   Estimated Creatinine Clearance: 128.7 mL/min (A) (by C-G formula based on SCr of 0.52 mg/dL (L)).    Results from last 7 days  Lab Units 07/02/18  0551 07/01/18  0620 06/30/18  0339 06/29/18  1010   CALCIUM mg/dL  --  8.7 8.8 9.1   ALBUMIN g/dL  --  3.40* 3.30* 3.90   MAGNESIUM mg/dL 1.4*  --  4.2* 1.1*         pantoprazole 40 mg Intravenous Q12H      Diet Soft Texture; Whole Foods; Thin; Cardiac      Assessment/Plan      Active Hospital Problems    Diagnosis Date Noted   • **Alcohol withdrawal syndrome, with delirium [F10.231] 06/29/2018   • Hypotension [I95.9] 07/01/2018   • Alcoholic hepatitis [K70.10] 06/29/2018   • GI bleed [K92.2] 06/29/2018   • Acute post-hemorrhagic anemia [D62] 06/29/2018   • Thrombocytopenia [D69.6] 06/29/2018   • Open wound of right shoulder region [S41.001A] 06/29/2018   • Hypothyroidism [E03.9] 06/05/2017      Resolved Hospital Problems    Diagnosis Date Noted Date Resolved   No resolved problems to display.     · Alcohol withdrawal with delirium tremens: CIWA 0, no ativan in last 24h. She's sleepy so will d/c scheduled Librium given much improvement.  She is 5 days out from last drink and I think she's now come out of withdrawal. Continue needed Ativan per protocol.  Multivitamins.  IV fluids. She continues to improve. Consulted access Center.  · Pancytopenia: Secondary to alcohol use BUT given father's history of leukopenia I'll ask heme/onc to see today, watch closely   · Hypotension: resolved  · Elevated LFTs: Suspect mild alcoholic hepatitis, ammonia level normal  · Chronic pain: restarted oxycodone to prevent withdrawal from this  · Lower GI bleed: Bright red blood per rectum in the emergency room, hemoglobin is low and slowly decreasing.  Appreciate GI-likely has hemorrhoids, may need endoscopy  · Metabolic acidosis: suspect ketosis, resolving now with PO intake and D5  · Open wound of the right shoulder:  We'll try to get records from [facility] regarding her recent surgery.  Consulted orthopedic surgery as there is an open wound along the incision line.  · Replace K and magnesium  · Asked PT   · SCDs    D/W RN    Jaime Davis MD  [facility]  07/02/18  12:10 PM

## 2018-07-02 NOTE — PLAN OF CARE
Problem: Patient Care Overview  Goal: Plan of Care Review   07/02/18 1412   OTHER   Outcome Summary Pt demonstrates decreased balance and is a high fall risk. Decreased safety insight. Recent R shoulder surgery, pt unable to remember if she has restrictions to R UE. Will continue to assess for safe discharge location.

## 2018-07-02 NOTE — THERAPY TREATMENT NOTE
Acute Care - Physical Therapy Treatment Note  [facility]     Patient Name: Bekah Gibson  : 1970  MRN: [id number]  Today's Date: 2018     Date of Referral to PT: 18  Referring Physician: Ryan    Admit Date: 2018    Visit Dx:    ICD-10-CM ICD-9-CM   1. DTs (delirium tremens) F10.231 291.0   2. Hypomagnesemia E83.42 275.2   3. Macrocytic anemia D53.9 281.9   4. BRBPR (bright red blood per rectum) K62.5 569.3   5. Decreased functional mobility R26.89 781.99     Patient Active Problem List   Diagnosis   • Irritable bowel syndrome with both constipation and diarrhea   • Peripheral neuropathy   • Vitamin D deficiency   • History of HPV infection   • Hypothyroidism   • Cigarette smoker   • Acute kidney injury   • Ascites   • UTI (urinary tract infection)   • Alcohol withdrawal syndrome, with delirium   • Alcoholic hepatitis   • GI bleed   • Acute post-hemorrhagic anemia   • Thrombocytopenia   • Open wound of right shoulder region   • Hypotension       Therapy Treatment          Rehabilitation Treatment Summary     Row Name 18 1400             Treatment Time/Intention    Discipline physical therapist  -MG      Document Type therapy note (daily note)  -MG      Subjective Information no complaints  -MG      Patient/Family Observations pt supine in bed, no acute distress  -MG      Patient Effort good  -MG      Existing Precautions/Restrictions fall  -MG      Recorded by [MG] Megan Gosselin, PT 18 1412      Row Name 18 1400             Cognitive Assessment/Intervention- PT/OT    Orientation Status (Cognition) oriented to;person;place  -MG      Follows Commands (Cognition) follows two step commands;over 90% accuracy;delayed response/completion;increased processing time needed;verbal cues/prompting required  -MG      Safety Deficit (Cognitive) mild deficit;awareness of need for assistance;insight into deficits/self awareness  -MG      Recorded by [MG] Megan Gosselin, PT 18  1412      Row Name 07/02/18 1400             Safety Issues, Functional Mobility    Impairments Affecting Function (Mobility) balance;endurance/activity tolerance;strength  -MG      Recorded by [MG] Megan Gosselin, PT 07/02/18 1412      Row Name 07/02/18 1400             Bed Mobility Assessment/Treatment    Supine-Sit Alexander (Bed Mobility) supervision  -MG      Sit-Supine Alexander (Bed Mobility) supervision  -MG      Comment (Bed Mobility) increased time required   -MG      Recorded by [MG] Megan Gosselin, PT 07/02/18 1412      Row Name 07/02/18 1400             Transfer Assessment/Treatment    Sit-Stand Alexander (Transfers) contact guard  -MG      Stand-Sit Alexander (Transfers) contact guard  -MG      Recorded by [MG] Megan Gosselin, PT 07/02/18 1412      Row Name 07/02/18 1400             Sit-Stand Transfer    Assistive Device (Sit-Stand Transfers) --   HHA  -MG      Recorded by [MG] Megan Gosselin, PT 07/02/18 1412      Row Name 07/02/18 1400             Stand-Sit Transfer    Assistive Device (Stand-Sit Transfers) --   HHA  -MG      Recorded by [MG] Megan Gosselin, PT 07/02/18 1412      Row Name 07/02/18 1400             Gait/Stairs Assessment/Training    Alexander Level (Gait) minimum assist (75% patient effort)  -MG      Assistive Device (Gait) --   R HHA  -MG      Distance in Feet (Gait) 30  -MG      Pattern (Gait) step-to  -MG      Deviations/Abnormal Patterns (Gait) stride length decreased;rachana decreased;base of support, narrow  -MG      Comment (Gait/Stairs) very short step length, pause between each step. Declines using FWW or L UE HHA. R UE HHA used however recent shoulder surgery. Limited WB through R UE  -MG      Recorded by [MG] Megan Gosselin, PT 07/02/18 1412      Row Name 07/02/18 1400             Positioning and Restraints    Pre-Treatment Position in bed  -MG      Post Treatment Position bed  -MG      In Bed supine;call light within reach;encouraged to call for assist;exit  alarm on  -MG      Recorded by [MG] Megan Gosselin, PT 07/02/18 1412      Row Name 07/02/18 1400             Pain Scale: Numbers Pre/Post-Treatment    Pain Scale: Numbers, Pretreatment 0/10 - no pain  -MG      Recorded by [MG] Megan Gosselin,  07/02/18 1412        User Key  (r) = Recorded By, (t) = Taken By, (c) = Cosigned By    Initials Name Effective Dates Discipline     Megan Gosselin,  04/03/18 -  PT                     Physical Therapy Education     Title: PT OT SLP Therapies (Active)     Topic: Physical Therapy (Active)     Point: Mobility training (Active)    Learning Progress Summary     Learner Status Readiness Method Response Comment Documented by    Patient Active Acceptance E NR   07/02/18 1412     Done CRISTI Taylor D VU, DU,[facility] 07/01/18 2012          Point: Body mechanics (Done)    Learning Progress Summary     Learner Status Readiness Method Response Comment Documented by    Patient Done CRISTI Taylor D VU, DU,[facility] 07/01/18 2012          Point: Precautions (Active)    Learning Progress Summary     Learner Status Readiness Method Response Comment Documented by    Patient Active Acceptance E NR   07/02/18 1412     Done CRISTI Taylor D VU, DU,[facility] 07/01/18 2012                      User Key     Initials Effective Dates Name Provider Type Discipline     04/06/17 -  Delma Leo, RN Registered Nurse Nurse     04/03/18 -  Megan Gosselin, PT Physical Therapist PT                    PT Recommendation and Plan     Outcome Summary: Pt demonstrates decreased balance and is a high fall risk. Decreased safety insight. Recent R shoulder surgery, pt unable to remember if she has restrictions to R UE. Will continue to assess for safe discharge location.           Outcome Measures     Row Name 07/02/18 1400 07/01/18 1500          How much help from another person do you currently need...    Turning from your back to your side while in flat bed without using bedrails? 3  -MG 3  -CS     Moving from lying on back  to sitting on the side of a flat bed without bedrails? 3  -MG 3  -CS     Moving to and from a bed to a chair (including a wheelchair)? 3  -MG 3  -CS     Standing up from a chair using your arms (e.g., wheelchair, bedside chair)? 3  -MG 3  -CS     Climbing 3-5 steps with a railing? 2  -MG 2  -CS     To walk in hospital room? 3  -MG 3  -CS     AM-PAC 6 Clicks Score 17  -MG 17  -CS        Functional Assessment    Outcome Measure Options AM-PAC 6 Clicks Basic Mobility (PT)  -MG AM-PAC 6 Clicks Basic Mobility (PT)  -CS       User Key  (r) = Recorded By, (t) = Taken By, (c) = Cosigned By    Initials Name Provider Type    MG Megan Gosselin, PT Physical Therapist    CS Aric Moore, PT Physical Therapist           Time Calculation:         PT Charges     Row Name 07/02/18 1413             Time Calculation    Start Time 1358  -MG      Stop Time 1407  -MG      Time Calculation (min) 9 min  -MG      PT Received On 07/02/18  -MG      PT - Next Appointment 07/03/18  -MG         Time Calculation- PT    Total Timed Code Minutes- PT 9 minute(s)  -MG        User Key  (r) = Recorded By, (t) = Taken By, (c) = Cosigned By    Initials Name Provider Type    MG Megan Gosselin, PT Physical Therapist        Therapy Suggested Charges     Code   Minutes Charges    None           Therapy Charges for Today     Code Description Service Date Service Provider Modifiers Qty    17165835712 HC PT THER PROC EA 15 MIN 7/2/2018 Megan Gosselin, PT GP 1          PT G-Codes  Outcome Measure Options: AM-PAC 6 Clicks Basic Mobility (PT)    Megan Gosselin, PT  7/2/2018

## 2018-07-02 NOTE — PLAN OF CARE
Problem: Fall Risk (Adult)  Goal: Absence of Fall  Outcome: Ongoing (interventions implemented as appropriate)      Problem: Skin Injury Risk (Adult)  Goal: Skin Health and Integrity  Outcome: Ongoing (interventions implemented as appropriate)      Problem: Patient Care Overview  Goal: Plan of Care Review  Outcome: Ongoing (interventions implemented as appropriate)   07/02/18 1346   Coping/Psychosocial   Plan of Care Reviewed With patient   Plan of Care Review   Progress improving   OTHER   Outcome Summary Pt up with PT today, CIWA 0. c/o pain in abdomen and generalized. called several times for pain medications but when RN arrived to room pt was asleep. no Ativan given this shift and Librium d/c today. pt somewhat more alert this afternoon. oriented x2 to 3. no falls. will CTM      Goal: Individualization and Mutuality  Outcome: Ongoing (interventions implemented as appropriate)    Goal: Discharge Needs Assessment  Outcome: Ongoing (interventions implemented as appropriate)    Goal: Interprofessional Rounds/Family Conf  Outcome: Ongoing (interventions implemented as appropriate)      Problem: Alcohol Withdrawal Acute, Risk/Actual (Adult)  Goal: Signs and Symptoms of Listed Potential Problems Will be Absent, Minimized or Managed (Alcohol Withdrawal Acute, Risk/Actual)  Outcome: Ongoing (interventions implemented as appropriate)      Problem: Mood Impairment (Anxiety Signs/Symptoms) (Adult)  Goal: Improved Mood Symptoms (Anxiety Signs/Symptoms)  Outcome: Outcome(s) achieved Date Met: 07/02/18      Problem: Cognitive Impairment (Anxiety Signs/Symptoms) (Adult)  Goal: Improved Cognitive Function (Anxiety Signs/Symptoms)  Outcome: Outcome(s) achieved Date Met: 07/02/18      Problem: Social/Occupational/Functional Impairment (Anxiety Signs/Symptoms) (Adult)  Goal: Improved Social/Occupational/Functional Skills (Anxiety Signs/Symptoms)  Outcome: Outcome(s) achieved Date Met: 07/02/18      Problem: Nutrition, Imbalanced:  Inadequate Oral Intake (Adult)  Goal: Improved Oral Intake  Outcome: Ongoing (interventions implemented as appropriate)    Goal: Prevent Further Weight Loss  Outcome: Ongoing (interventions implemented as appropriate)

## 2018-07-02 NOTE — PLAN OF CARE
Problem: Patient Care Overview  Goal: Plan of Care Review   07/02/18 1123   Coping/Psychosocial   Plan of Care Reviewed With patient   OTHER   Outcome Summary Swallow re-eval completed. OK for soft, whole foods/thin liquids. Will follow PRN for diet tolerance.

## 2018-07-02 NOTE — CONSULTS
Subjective     REASON FOR CONSULTATION: pancytopenia  Provide an opinion on any further workup or treatment                             REQUESTING PHYSICIAN:  [facility]    RECORDS OBTAINED:  Records of the patients history including those obtained from the referring provider were reviewed and summarized in detail.    HISTORY OF PRESENT ILLNESS:  The patient is a 48 y.o. year old female who is here for an opinion about the above issue.    History of Present Illness patient is a 48-year-old female admitted from alcohol and drug addiction facility confusion due to alcohol withdrawal and was found to have progressive pancytopenia.  Her blood counts were all normal in March of this year and this is a new finding.  She had been drinking fairly heavily and was being admitted for alcoholic addiction.    Patient fell on the ice and broke her right clavicle in January and she's had trouble with healing and had surgery 3 weeks ago for this.  She has been on antibiotics but this was stopped at admission. Bactrim+levaquin    Interestingly her urine drug screen showed methadone opiates and methamphetamine/  Past Medical History:   Diagnosis Date   • Anxiety    • Disease of thyroid gland    • ETOH abuse    • Hypertension    • Kidney stone    • Lupus    • Lupus    • MDD (major depressive disorder)    • Pancreatitis         Past Surgical History:   Procedure Laterality Date   • JOINT REPLACEMENT     • KIDNEY STONE SURGERY      LITHOTRIPSY AND STENT (R SIDE)        No current facility-administered medications on file prior to encounter.      Current Outpatient Prescriptions on File Prior to Encounter   Medication Sig Dispense Refill   • levothyroxine (SYNTHROID, LEVOTHROID) 125 MCG tablet Take 1 tablet by mouth Daily. 90 tablet 2   • lisinopril (PRINIVIL,ZESTRIL) 20 MG tablet Take 20 mg by mouth.     • Multiple Vitamin (MULTIVITAMIN) capsule Take 1 capsule by mouth Daily.     • FLUoxetine (PROZAC) 40 MG capsule Take 1 capsule by mouth  "Daily. 30 capsule 3   • gabapentin (NEURONTIN) 600 MG tablet 1 po once daily prn (Patient taking differently: Take 600 mg by mouth Daily As Needed (pain). 1 po once daily prn) 30 tablet 2        ALLERGIES:    Allergies   Allergen Reactions   • Morphine      ITCHING   • Morphine And Related    • Phenergan [Promethazine Hcl] Hives   • Tessalon [Benzonatate] Nausea Only   • Cucumber Extract Rash   • Promethazine-Phenylephrine Other (See Comments)     \"FEEL WEIRD\"        Social History     Social History   • Marital status:      Social History Main Topics   • Smoking status: Current Every Day Smoker     Packs/day: 0.50     Types: Cigarettes   • Smokeless tobacco: Never Used   • Alcohol use 3.0 - 6.0 oz/week     5 - 10 Shots of liquor per week      Comment: recovering alcoholic since 2015, sober 4 wks in Nov 2018 with relapse   • Drug use: No      Comment: used THC in college   • Sexual activity: Defer     Other Topics Concern   • Not on file        Family History   Problem Relation Age of Onset   • Thyroid disease Father    • Leukemia Father    • Drug abuse Brother         Review of Systems   Skin: Positive for wound (Right shoulder with).   Psychiatric/Behavioral: Positive for agitation (Alcohol related), behavioral problems, confusion and dysphoric mood. The patient is nervous/anxious.        Objective     Vitals:    07/01/18 2300 07/02/18 0500 07/02/18 0735 07/02/18 1317   BP: 112/80  118/83 112/77   BP Location: Left arm  Left arm Left arm   Patient Position: Lying  Lying Lying   Pulse: 72   71   Resp: 16  16 16   Temp:   97.4 °F (36.3 °C) 97.7 °F (36.5 °C)   TempSrc:   Oral Oral   SpO2:    100%   Weight:  78.9 kg (173 lb 15.1 oz)  Comment: equipment on bed     Height:         No flowsheet data found.    Physical Exam    GENERAL:  Well-developed, well-nourished in no acute distress.   SKIN:  Warm, dry without rashes, ecchymosis on her legs and back.  EYES:  Pupils equal, round and reactive to light.  EOMs " intact.  Conjunctivae normal.  EARS:  Hearing intact.  NOSE:  Septum midline.  No excoriations or nasal discharge.  MOUTH:  Tongue is well-papillated; no stomatitis or ulcers.  Lips normal.  THROAT:  Oropharynx without lesions or exudates.  NECK:  Supple with good range of motion; no thyromegaly or masses, no JVD.  LYMPHATICS:  No cervical, supraclavicular, axillary or inguinal adenopathy.  CHEST:  Lungs clear to auscultation. Good airflow.  CARDIAC:  Regular rate and rhythm without murmurs, rubs or gallops. Normal S1,S2.  ABDOMEN:  Soft, nontender with no hepatosplenomegaly or masses.  EXTREMITIES:  No clubbing, cyanosis or edema.  NEUROLOGICAL:  Cranial Nerves II-XII grossly intact.  No focal neurological deficits.  PSYCHIATRIC:  Normal affect and mood.        RECENT LABS:  Hematology WBC   Date Value Ref Range Status   07/02/2018 3.92 (L) 4.50 - 10.70 10*3/mm3 Final     RBC   Date Value Ref Range Status   07/02/2018 1.91 (L) 3.90 - 5.20 10*6/mm3 Final     Hemoglobin   Date Value Ref Range Status   07/02/2018 7.3 (L) 11.9 - 15.5 g/dL Final     Hematocrit   Date Value Ref Range Status   07/02/2018 21.7 (L) 35.6 - 45.5 % Final     Platelets   Date Value Ref Range Status   07/02/2018 50 (L) 140 - 500 10*3/mm3 Final      PTinr 1.16  Iron studies normal B12 and folate pending   due to alcohol    Assessment/Plan   1.  New onset pancytopenia since 3/18 likely a combination of alcohol and Bactrim  2.  Alcohol abuse with withdrawal  3.  Malunion right clavicle with repeat surgery no obvious significant wound infection    Plan  1.  Check folic acid and follow  Blood counts should recover on their own from alcohol suppression and Bactrim  2. Transfuse if her hemoglobin drops below 7    We'll follow thank you

## 2018-07-02 NOTE — CONSULTS
A 47 yo white female seen in rm # [id number]. Pt states she's here for abdominal pain and clavicle pain. Pt admits she has a problem with alcohol. Pt had 4 weeks sobriety in November 2017 and then relapsed. Pt tells me she has a couple shots of Vodka on a good day and 5-10 shots on a bad day. Pt states she also has depression and anxiety. Pt is a  and starts drinking after 3 pm. States started drinking at age 36 partly due to stress and also everyone else drinking. Pt used marijuana in college. Has had 2 inpt treatments at [facility] for her alcohol use. Has seen Dr. Childs for her depression. Pt has been disoriented, today thoughts she was at [facility] but when corrected was able to give me landmarks for the different hospitals and realizes she's at [facility]. Pt currently has tremors. Thinks she's had hallucinations in the past, denies seizures.  Pt has attended AA with the last meeting a month ago. Has had 1 prior DUI. Has had suicidal ideations in the past, denies currently. No HI. CIWA currently being done and Ativan given as indicated.  [facility] will follow. Discussed with her RN, Trinh.

## 2018-07-02 NOTE — THERAPY RE-EVALUATION
Acute Care - Speech Language Pathology   Swallow Re-Evaluation [facility]     Patient Name: Bekah Gibson  : 1970  MRN: [id number]  Today's Date: 2018        Referring Physician: Ryan      Admit Date: 2018    Visit Dx:     ICD-10-CM ICD-9-CM   1. DTs (delirium tremens) F10.231 291.0   2. Hypomagnesemia E83.42 275.2   3. Macrocytic anemia D53.9 281.9   4. BRBPR (bright red blood per rectum) K62.5 569.3   5. Decreased functional mobility R26.89 781.99     Patient Active Problem List   Diagnosis   • Irritable bowel syndrome with both constipation and diarrhea   • Peripheral neuropathy   • Vitamin D deficiency   • History of HPV infection   • Hypothyroidism   • Cigarette smoker   • Acute kidney injury   • Ascites   • UTI (urinary tract infection)   • Alcohol withdrawal syndrome, with delirium   • Alcoholic hepatitis   • GI bleed   • Acute post-hemorrhagic anemia   • Thrombocytopenia   • Open wound of right shoulder region   • Hypotension     Past Medical History:   Diagnosis Date   • Anxiety    • Disease of thyroid gland    • ETOH abuse    • Hypertension    • Kidney stone    • Lupus    • Lupus    • MDD (major depressive disorder)    • Pancreatitis      Past Surgical History:   Procedure Laterality Date   • JOINT REPLACEMENT     • KIDNEY STONE SURGERY      LITHOTRIPSY AND STENT (R SIDE)          SWALLOW EVALUATION (last 72 hours)      SLP Adult Swallow Evaluation     Row Name 18 1100 18 0950                Rehab Evaluation    Document Type re-evaluation  -CP evaluation  -SA       Subjective Information no complaints  -CP no complaints  -SA       Patient Observations alert;cooperative  -CP cooperative;lethargic;agree to therapy  -SA       Patient/Family Observations  -- Speech is garbled  -SA       Patient Effort good  -CP good  -SA       Symptoms Noted During/After Treatment none  -CP  --          General Information    Patient Profile Reviewed yes  -CP yes  -SA       Pertinent  History Of Current Problem  -- Pt admit w/ ETOH withdrawal; pt w/ possible GI bleed; MD stated pt not ideal candidate for endoscopic evaluation d/t active withdrawal/DT  -SA       Current Method of Nutrition  -- regular textures;thin liquids  -SA       Precautions/Limitations, Vision  -- WFL  -SA       Precautions/Limitations, Hearing  -- WFL  -SA       Prior Level of Function-Communication  -- WFL  -SA       Prior Level of Function-Swallowing  -- no diet consistency restrictions  -SA       Plans/Goals Discussed with patient  -CP patient;agreed upon  -       Barriers to Rehab none identified  -CP none identified  -SA       Patient's Goals for Discharge  -- patient did not state  -SA          Pain Scale: Numbers Pre/Post-Treatment    Pain Scale: Numbers, Pretreatment 0/10 - no pain  -CP  --          Oral Motor and Function    Dentition Assessment  -- natural, present and adequate  -SA       Secretion Management  -- WNL/WFL  -SA       Mucosal Quality  -- moist, healthy  -SA       Volitional Swallow  -- delayed  -SA          Oral Musculature and Cranial Nerve Assessment    Oral Motor General Assessment  -- generalized oral motor weakness  -SA          General Eating/Swallowing Observations    Eating/Swallowing Skills  -- self-fed  -SA       Positioning During Eating  -- upright 90 degree;upright in bed  -SA       Utensils Used  -- spoon;cup  -SA       Consistencies Trialed  -- regular textures;mechanical soft, no mixed consistencies;thin liquids;nectar/syrup-thick liquids  -SA          Clinical Swallow Eval    Oral Prep Phase WFL  -CP WFL  -SA       Oral Transit WFL  -CP impaired  -SA       Oral Residue WFL  -CP WFL  -SA       Pharyngeal Phase no overt signs/symptoms of pharyngeal impairment  -CP suspected pharyngeal impairment  -SA       Esophageal Phase  -- unremarkable  -SA       Clinical Swallow Evaluation Summary No s/s of aspiration with thin via cup or straw this date. Slow but functional mastication with dry  solid. Pt has dry mouth and prefers soft, whole foods.  -CP Bedside swallow completed. Pt w/ difficutly w/ thin post swallow; coughed every time pt given thin. No difficulty w/ nectar liquids. Suspect aspiration w/ thin. Pt w/ slow mastication; appears functional for mech soft. Feel pt would be safe on soft diet/no mixed with nectar. Allow free water protocol b/w meals. ST will follow for diet and reassess swallow for possible advancement as pt strength improves.   -SA          Clinical Impression    SLP Swallowing Diagnosis functional oral phase;functional pharyngeal phase  -CP mild;suspected pharyngeal dysfunction  -SA       Functional Impact no impact on function  -CP risk of aspiration/pneumonia  -SA       Rehab Potential/Prognosis, Swallowing good, to achieve stated therapy goals  -CP good, to achieve stated therapy goals  -SA       Criteria for Skilled Therapeutic Interventions Met demonstrates skilled criteria  -CP demonstrates skilled criteria  -SA          Recommendations    Therapy Frequency (Swallow) PRN  -CP PRN  -SA       Predicted Duration Therapy Intervention (Days) until discharge  -CP until discharge  -SA       SLP Diet Recommendation soft textures;whole;thin liquids  -CP mechanical soft with no mixed consistencies;nectar thick liquids;water between meals after oral care, with supervision;ice chips between meals after oral care, with supervision  -SA       Recommended Diagnostics  -- reassess via clinical swallow evaluation  -SA       Recommended Precautions and Strategies upright posture during/after eating;small bites of food and sips of liquid  -CP upright posture during/after eating;small bites of food and sips of liquid;no straw  -SA       SLP Rec. for Method of Medication Administration meds whole;with pudding or applesauce  -CP meds whole;with thick liquids;with pudding or applesauce  -SA       Monitor for Signs of Aspiration yes;notify SLP if any concerns  -CP yes;notify SLP if any  concerns;elevated WBC count;cough;fever;gurgly voice;throat clearing;upper respiratory;pneumonia;right lower lobe infiltrates  -SA       Anticipated Dischage Disposition unknown  -CP unknown  -SA          Swallow Goals (SLP)    Oral Nutrition/Hydration Goal Selection (SLP)  -- oral nutrition/hydration, SLP goal 1  -SA          Oral Nutrition/Hydration Goal 1 (SLP)    Oral Nutrition/Hydration Goal 1, SLP  -- Pt will tolerate diet w/o clinical s/s aspiration/dysphagia  -SA       Time Frame (Oral Nutrition/Hydration Goal 1, SLP)  -- by discharge  -SA         User Key  (r) = Recorded By, (t) = Taken By, (c) = Cosigned By    Initials Name Effective Dates    CP Jennifer Valles, MS CCC-SLP 06/08/18 -     SA Suzy Gann, MS CCC-SLP 04/03/18 -         EDUCATION  The patient has been educated in the following areas:   Dysphagia (Swallowing Impairment).    SLP Recommendation and Plan  SLP Swallowing Diagnosis: functional oral phase, functional pharyngeal phase  SLP Diet Recommendation: soft textures, whole, thin liquids  Recommended Precautions and Strategies: upright posture during/after eating, small bites of food and sips of liquid     Monitor for Signs of Aspiration: yes, notify SLP if any concerns     Criteria for Skilled Therapeutic Interventions Met: demonstrates skilled criteria  Anticipated Dischage Disposition: unknown  Rehab Potential/Prognosis, Swallowing: good, to achieve stated therapy goals  Therapy Frequency (Swallow): PRN  Predicted Duration Therapy Intervention (Days): until discharge       Plan of Care Reviewed With: patient  Plan of Care Review  Plan of Care Reviewed With: patient  Outcome Summary: Swallow re-eval completed. OK for soft, whole foods/thin liquids. Will follow PRN for diet tolerance.          SLP GOALS     Row Name 06/30/18 0950             Oral Nutrition/Hydration Goal 1 (SLP)    Oral Nutrition/Hydration Goal 1, SLP Pt will tolerate diet w/o clinical s/s aspiration/dysphagia  -SA      Time  Frame (Oral Nutrition/Hydration Goal 1, SLP) by discharge  -        User Key  (r) = Recorded By, (t) = Taken By, (c) = Cosigned By    Initials Name Provider Type    SA Suzy Gann MS CCC-SLP Speech and Language Pathologist             SLP Outcome Measures (last 72 hours)      SLP Outcome Measures     Row Name 07/02/18 1100 06/30/18 1100          SLP Outcome Measures    Outcome Measure Used? Adult NOMS  -CP Adult NOMS  -SA        FCM Scores    FCM Chosen Swallowing  -CP  --     Swallowing FCM Score 6  -CP 5  -SA       User Key  (r) = Recorded By, (t) = Taken By, (c) = Cosigned By    Initials Name Effective Dates    ANTONIO Valles MS CCC-SLP 06/08/18 -     SA Suzy Gann MS CCC-LOULOU 04/03/18 -            Time Calculation:         Time Calculation- SLP     Row Name 07/02/18 1125             Time Calculation- SLP    SLP Start Time 1015  -CP      SLP Stop Time 1100  -CP      SLP Time Calculation (min) 45 min  -CP      SLP Received On 07/02/18  -CP        User Key  (r) = Recorded By, (t) = Taken By, (c) = Cosigned By    Initials Name Provider Type    ANTONIO Valles MS CCC-SLP Speech and Language Pathologist          Therapy Charges for Today     Code Description Service Date Service Provider Modifiers Qty    [id number] HC ST TREATMENT SWALLOW 3 7/2/2018 Jennifer Valles MS CCC-SLP GN 1               Jennifer Valles MS CCC-LOULOU  7/2/2018

## 2018-07-02 NOTE — NURSING NOTE
Patient with recent right shoulder surgery . She is poor historian but according to the chart this was anywhere from 3-5 weeks ago.  Surgical sutures remain intact.  One area of incision is open with adherent slough. Unable to probe any depth with Q tip.  No redness or warmth to cinthia wound area. I applied betadine to the entire incision and covered with gauze dressing. This can be changed once daily.  Orthopedics has been consulted; any orders they give in regards to dressing changed should be followed above mine. Discussed with primary nurse.

## 2018-07-02 NOTE — PLAN OF CARE
Problem: Fall Risk (Adult)  Goal: Absence of Fall  Outcome: Ongoing (interventions implemented as appropriate)      Problem: Skin Injury Risk (Adult)  Goal: Skin Health and Integrity  Outcome: Ongoing (interventions implemented as appropriate)      Problem: Patient Care Overview  Goal: Plan of Care Review  Outcome: Ongoing (interventions implemented as appropriate)   07/02/18 0527   Coping/Psychosocial   Plan of Care Reviewed With patient   Plan of Care Review   Progress improving   OTHER   Outcome Summary PT MORE ALERT knows self, time, situation, c/o of back pain, no nausea, hungry requesting snacks, CIWAS 2-4, BP's improved, Hgb 7.5 MD called oreder to transfuse 2 unit PRBC Hgb<7.0, CTM       Problem: Alcohol Withdrawal Acute, Risk/Actual (Adult)  Goal: Signs and Symptoms of Listed Potential Problems Will be Absent, Minimized or Managed (Alcohol Withdrawal Acute, Risk/Actual)  Outcome: Ongoing (interventions implemented as appropriate)      Problem: Mood Impairment (Anxiety Signs/Symptoms) (Adult)  Goal: Improved Mood Symptoms (Anxiety Signs/Symptoms)  Outcome: Ongoing (interventions implemented as appropriate)      Problem: Nutrition, Imbalanced: Inadequate Oral Intake (Adult)  Goal: Identify Related Risk Factors and Signs and Symptoms  Outcome: Outcome(s) achieved Date Met: 07/02/18    Goal: Improved Oral Intake  Outcome: Ongoing (interventions implemented as appropriate)    Goal: Prevent Further Weight Loss  Outcome: Ongoing (interventions implemented as appropriate)

## 2018-07-02 NOTE — PROGRESS NOTES
"[facility]  Inpatient Progress Note    Reason for Follow Up:  GI bleed, alcohol withdrawl    Subjective     Interval History:   She complains of abdominal pain - diffuse upper pain.  Not as bad as the pancreatitis pain she has had before - feels like \"sore muscles\". No further bleeding.  She reports feeling anxious    Current Facility-Administered Medications:   •  acetaminophen (TYLENOL) tablet 650 mg, 650 mg, Oral, Q4H PRN, Jaime Davis MD  •  chlordiazePOXIDE (LIBRIUM) capsule 25 mg, 25 mg, Oral, Q8H, Jaime Davis MD, 25 mg at 07/02/18 0601  •  dextrose 5 % and sodium chloride 0.9 % infusion, 75 mL/hr, Intravenous, Continuous, Jaime Davis MD, Last Rate: 75 mL/hr at 06/30/18 2313, 75 mL/hr at 06/30/18 2313  •  hydrocortisone (ANUSOL-HC) suppository 25 mg, 25 mg, Rectal, BID PRN, Kwaku Sawant MD  •  LORazepam (ATIVAN) tablet 1 mg, 1 mg, Oral, Q2H PRN **OR** LORazepam (ATIVAN) injection 1 mg, 1 mg, Intravenous, Q2H PRN, 1 mg at 06/30/18 1039 **OR** LORazepam (ATIVAN) tablet 2 mg, 2 mg, Oral, Q1H PRN **OR** LORazepam (ATIVAN) injection 2 mg, 2 mg, Intravenous, Q1H PRN, 2 mg at 07/01/18 0537 **OR** LORazepam (ATIVAN) injection 2 mg, 2 mg, Intravenous, Q15 Min PRN, 2 mg at 06/29/18 1619 **OR** LORazepam (ATIVAN) injection 2 mg, 2 mg, Intramuscular, Q15 Min PRN, Jaime Davis MD  •  Magnesium Sulfate 2 gram Bolus, followed by 8 gram infusion (total Mg dose 10 grams)- Mg less than or equal to 1mg/dL, 2 g, Intravenous, PRN **OR** Magnesium Sulfate 2 gram / 50mL Infusion (GIVE X 3 BAGS TO EQUAL 6GM TOTAL DOSE) - Mg 1.1 - 1/5 mg/dl, 2 g, Intravenous, PRN, Last Rate: 25 mL/hr at 06/30/18 0112, 2 g at 06/30/18 0112 **OR** Magnesium Sulfate 4 gram infusion- Mg 1.6-1.9 mg/dL, 4 g, Intravenous, PRN, Jaime Davis MD  •  ondansetron (ZOFRAN) tablet 4 mg, 4 mg, Oral, Q6H PRN **OR** ondansetron ODT (ZOFRAN-ODT) disintegrating tablet 4 mg, 4 mg, Oral, Q6H " PRN **OR** ondansetron (ZOFRAN) injection 4 mg, 4 mg, Intravenous, Q6H PRN, Jaime Davis MD  •  oxyCODONE (ROXICODONE) immediate release tablet 10 mg, 10 mg, Oral, Q4H PRN, Jaime Davis MD, 10 mg at 07/02/18 0602  •  pantoprazole (PROTONIX) injection 40 mg, 40 mg, Intravenous, Q12H, Jaime Davis MD, 40 mg at 07/01/18 2035  •  sodium chloride 0.9 % flush 1-10 mL, 1-10 mL, Intravenous, PRN, Jaime Davis MD  •  sodium chloride 0.9 % flush 10 mL, 10 mL, Intravenous, PRN, Ken Mar MD  Review of Systems:    All systems were reviewed and negative except for:  Gastrointestinal: postitive for  pain  Musculoskeletal: positive for  muscle pain  Behavioral/Psych: positive for  anxiety    Objective     Vital Signs  Temp:  [97.3 °F (36.3 °C)-97.4 °F (36.3 °C)] 97.4 °F (36.3 °C)  Heart Rate:  [68-72] 72  Resp:  [16] 16  BP: (109-118)/(72-83) 118/83  Body mass index is 30.82 kg/m².    Intake/Output Summary (Last 24 hours) at 07/02/18 0928  Last data filed at 07/01/18 1300   Gross per 24 hour   Intake                0 ml   Output                0 ml   Net                0 ml     No intake/output data recorded.     Physical Exam:   General: patient awake, alert and cooperative   Eyes: Normal lids and lashes, no scleral icterus   Neck: supple, normal ROM   Skin: warm and dry, not jaundiced   Cardiovascular: regular rhythm and rate, no murmurs auscultated   Pulm: clear to auscultation bilaterally, regular and unlabored   Abdomen: soft, nontender, nondistended; no significant hematoma noted   Rectal: deferred   Extremities: no rash or edema   Psychiatric: Normal mood and behavior; memory intact     Results Review:     I reviewed the patient's new clinical results.      Results from last 7 days  Lab Units 07/02/18  0551 07/02/18  0014 07/01/18  1614 07/01/18  0620  06/30/18  0339   WBC 10*3/mm3 3.92*  --   --  4.20*  --  4.08*   HEMOGLOBIN g/dL 7.3* 7.6* 8.5* 8.5*  < > 8.1*   HEMATOCRIT % 21.7*  23.3* 27.6* 26.1*  < > 24.6*   PLATELETS 10*3/mm3 50*  --   --  48*  --  35*   < > = values in this interval not displayed.    Results from last 7 days  Lab Units 07/01/18  0620 06/30/18  0339 06/29/18  1010   SODIUM mmol/L 137 135* 135*   POTASSIUM mmol/L 3.4* 3.7 3.6   CHLORIDE mmol/L 105 102 100   CO2 mmol/L 20.1* 17.0* 20.1*   BUN mg/dL 5* 7 12   CREATININE mg/dL 0.52* 0.43* 0.66   CALCIUM mg/dL 8.7 8.8 9.1   BILIRUBIN mg/dL 0.7 1.0 1.9*   ALK PHOS U/L 107 107 115   ALT (SGPT) U/L 40* 46* 62*   AST (SGOT) U/L 45* 42* 46*   GLUCOSE mg/dL 90 93 105*       Results from last 7 days  Lab Units 07/01/18  0620 06/29/18  1010   INR  1.18* 1.21*       Lab Results  Lab Value Date/Time   LIPASE 25 06/18/2017 1951       Radiology:  CT Head Without Contrast   Final Result   The study is hampered by patient motion but no acute process   identified. Further evaluation could be performed with a MRI examination   of brain as indicated.        The above information was called to and discussed with Dr. Mar.               Radiation dose reduction techniques were utilized, including automated   exposure control and exposure modulation based on body size.       This report was finalized on 6/29/2018 5:05 PM by Dr. Michael Ortiz M.D.          CT Abdomen Pelvis With Contrast   Preliminary Result   Right infraumbilical bulky rectus muscle that appears   heterogeneous suggestive of abdominal wall hematoma.       These findings were discussed with Dr. Mar by telephone.       Radiation dose reduction techniques were utilized, including automated   exposure control and exposure modulation based on body size.              XR Chest 1 View   Final Result   Negative.       This report was finalized on 6/29/2018 12:30 PM by Dr. Ag Cheng M.D.          XR Shoulder 2+ View Right   Final Result   Postsurgical change at the right clavicle.        I discussed the case with Dr. Mar.       This report was finalized on  6/29/2018 12:20 PM by Dr. Ag Cheng M.D.              Assessment/Plan     Patient Active Problem List   Diagnosis   • Irritable bowel syndrome with both constipation and diarrhea   • Peripheral neuropathy   • Vitamin D deficiency   • History of HPV infection   • Hypothyroidism   • Cigarette smoker   • Acute kidney injury   • Ascites   • UTI (urinary tract infection)   • Alcohol withdrawal syndrome, with delirium   • Alcoholic hepatitis   • GI bleed   • Acute post-hemorrhagic anemia   • Thrombocytopenia   • Open wound of right shoulder region   • Hypotension     Assessment:     1. EtOH withdrawal/DTs - agree with scheduled librium  2. EtOH hepatitis    3. Rectal bleeding - ? Hemorrhoids vs secondary to possible enteritis/colitis on CT. She last had EGD and colonoscopy in 2015 by [facility]. If she has ongoing Gi bleeding may need to consider endoscopic evaluation although not currently ideal candidate given active withdrawal/DT and low platelet count. Thrombocytopenia - secondary to EtOH abuse      Plan:  - Continue Anusol HC suppositories.   - h/h trending down - may need transfusion if continues- no gross bleeding  - continue to treat DTs      I discussed the patients findings and my recommendations with patient.    Ashlyn Uribe MD

## 2018-07-03 LAB
ALBUMIN SERPL-MCNC: 3.2 G/DL (ref 3.5–5.2)
ALBUMIN/GLOB SERPL: 1.4 G/DL
ALP SERPL-CCNC: 132 U/L (ref 39–117)
ALT SERPL W P-5'-P-CCNC: 31 U/L (ref 1–33)
ANION GAP SERPL CALCULATED.3IONS-SCNC: 10.5 MMOL/L
AST SERPL-CCNC: 46 U/L (ref 1–32)
BILIRUB SERPL-MCNC: 0.6 MG/DL (ref 0.1–1.2)
BUN BLD-MCNC: 3 MG/DL (ref 6–20)
BUN/CREAT SERPL: 6.8 (ref 7–25)
CALCIUM SPEC-SCNC: 8.1 MG/DL (ref 8.6–10.5)
CHLORIDE SERPL-SCNC: 101 MMOL/L (ref 98–107)
CO2 SERPL-SCNC: 25.5 MMOL/L (ref 22–29)
CREAT BLD-MCNC: 0.44 MG/DL (ref 0.57–1)
FOLATE BLD-MCNC: 329.7 NG/ML
FOLATE RBC-MCNC: 1499 NG/ML
GFR SERPL CREATININE-BSD FRML MDRD: >150 ML/MIN/1.73
GLOBULIN UR ELPH-MCNC: 2.3 GM/DL
GLUCOSE BLD-MCNC: 84 MG/DL (ref 65–99)
HCT VFR BLD AUTO: 22 % (ref 34–46.6)
HCT VFR BLD AUTO: 23.1 % (ref 35.6–45.5)
HGB BLD-MCNC: 7.6 G/DL (ref 11.9–15.5)
MAGNESIUM SERPL-MCNC: 1.8 MG/DL (ref 1.6–2.6)
POTASSIUM BLD-SCNC: 2.9 MMOL/L (ref 3.5–5.2)
POTASSIUM BLD-SCNC: 4.6 MMOL/L (ref 3.5–5.2)
PROT SERPL-MCNC: 5.5 G/DL (ref 6–8.5)
SODIUM BLD-SCNC: 137 MMOL/L (ref 136–145)

## 2018-07-03 PROCEDURE — 85018 HEMOGLOBIN: CPT | Performed by: INTERNAL MEDICINE

## 2018-07-03 PROCEDURE — 80053 COMPREHEN METABOLIC PANEL: CPT | Performed by: INTERNAL MEDICINE

## 2018-07-03 PROCEDURE — 97110 THERAPEUTIC EXERCISES: CPT

## 2018-07-03 PROCEDURE — 99231 SBSQ HOSP IP/OBS SF/LOW 25: CPT | Performed by: INTERNAL MEDICINE

## 2018-07-03 PROCEDURE — 84132 ASSAY OF SERUM POTASSIUM: CPT | Performed by: INTERNAL MEDICINE

## 2018-07-03 PROCEDURE — 85014 HEMATOCRIT: CPT | Performed by: INTERNAL MEDICINE

## 2018-07-03 PROCEDURE — 83735 ASSAY OF MAGNESIUM: CPT | Performed by: INTERNAL MEDICINE

## 2018-07-03 PROCEDURE — 99253 IP/OBS CNSLTJ NEW/EST LOW 45: CPT | Performed by: ORTHOPAEDIC SURGERY

## 2018-07-03 RX ORDER — POTASSIUM CHLORIDE 1.5 G/1.77G
40 POWDER, FOR SOLUTION ORAL AS NEEDED
Status: DISCONTINUED | OUTPATIENT
Start: 2018-07-03 | End: 2018-07-04 | Stop reason: HOSPADM

## 2018-07-03 RX ORDER — LEVOTHYROXINE SODIUM 0.12 MG/1
125 TABLET ORAL
Status: DISCONTINUED | OUTPATIENT
Start: 2018-07-04 | End: 2018-07-04 | Stop reason: HOSPADM

## 2018-07-03 RX ORDER — PANTOPRAZOLE SODIUM 40 MG/1
40 TABLET, DELAYED RELEASE ORAL
Status: DISCONTINUED | OUTPATIENT
Start: 2018-07-03 | End: 2018-07-04 | Stop reason: HOSPADM

## 2018-07-03 RX ORDER — POTASSIUM CHLORIDE 750 MG/1
40 CAPSULE, EXTENDED RELEASE ORAL AS NEEDED
Status: DISCONTINUED | OUTPATIENT
Start: 2018-07-03 | End: 2018-07-04 | Stop reason: HOSPADM

## 2018-07-03 RX ORDER — DOXYCYCLINE 100 MG/1
100 CAPSULE ORAL EVERY 12 HOURS SCHEDULED
Status: DISCONTINUED | OUTPATIENT
Start: 2018-07-03 | End: 2018-07-04 | Stop reason: HOSPADM

## 2018-07-03 RX ADMIN — OXYCODONE HYDROCHLORIDE 10 MG: 5 TABLET ORAL at 06:26

## 2018-07-03 RX ADMIN — PANTOPRAZOLE SODIUM 40 MG: 40 INJECTION, POWDER, FOR SOLUTION INTRAVENOUS at 08:44

## 2018-07-03 RX ADMIN — LORAZEPAM 1 MG: 1 TABLET ORAL at 21:39

## 2018-07-03 RX ADMIN — DOXYCYCLINE 100 MG: 100 CAPSULE ORAL at 20:14

## 2018-07-03 RX ADMIN — OXYCODONE HYDROCHLORIDE 10 MG: 5 TABLET ORAL at 01:35

## 2018-07-03 RX ADMIN — PANTOPRAZOLE SODIUM 40 MG: 40 TABLET, DELAYED RELEASE ORAL at 17:20

## 2018-07-03 RX ADMIN — OXYCODONE HYDROCHLORIDE 10 MG: 5 TABLET ORAL at 15:03

## 2018-07-03 RX ADMIN — OXYCODONE HYDROCHLORIDE 10 MG: 5 TABLET ORAL at 10:20

## 2018-07-03 RX ADMIN — OXYCODONE HYDROCHLORIDE 10 MG: 5 TABLET ORAL at 19:30

## 2018-07-03 RX ADMIN — POTASSIUM CHLORIDE 40 MEQ: 750 CAPSULE, EXTENDED RELEASE ORAL at 19:27

## 2018-07-03 RX ADMIN — POTASSIUM CHLORIDE 40 MEQ: 750 CAPSULE, EXTENDED RELEASE ORAL at 12:42

## 2018-07-03 RX ADMIN — POTASSIUM CHLORIDE 40 MEQ: 750 CAPSULE, EXTENDED RELEASE ORAL at 14:44

## 2018-07-03 NOTE — PLAN OF CARE
Problem: Patient Care Overview  Goal: Plan of Care Review   07/03/18 0955   Coping/Psychosocial   Plan of Care Reviewed With patient   Plan of Care Review   Progress improving   OTHER   Outcome Summary Pt with overall improved activity tolerance today. Pt requires CGA for all transfers with verbal cues for correct hand placement. Pt requires CGA to min A for amb for 100 ft with FWW. Pt amb with narrow SRAVANI, decreased heel strike, and short shuffling steps, but unable to correct despite verbal cues. Will cont to assess for d/c disposition.

## 2018-07-03 NOTE — PLAN OF CARE
Problem: Fall Risk (Adult)  Goal: Absence of Fall  Outcome: Ongoing (interventions implemented as appropriate)      Problem: Skin Injury Risk (Adult)  Goal: Skin Health and Integrity  Outcome: Ongoing (interventions implemented as appropriate)      Problem: Patient Care Overview  Goal: Plan of Care Review  Outcome: Ongoing (interventions implemented as appropriate)   07/03/18 0456   Coping/Psychosocial   Plan of Care Reviewed With patient;spouse   Plan of Care Review   Progress improving   OTHER   Outcome Summary Pt more alert today, c/o of pain and anxiety, Ciwas 8 PO ativan given once, librium D/C'ed, speech still slurred, speech eval diet changed to soft whole with thins, Hgb 7.6, VSS, CTM       Problem: Alcohol Withdrawal Acute, Risk/Actual (Adult)  Goal: Signs and Symptoms of Listed Potential Problems Will be Absent, Minimized or Managed (Alcohol Withdrawal Acute, Risk/Actual)  Outcome: Ongoing (interventions implemented as appropriate)      Problem: Nutrition, Imbalanced: Inadequate Oral Intake (Adult)  Goal: Improved Oral Intake  Outcome: Ongoing (interventions implemented as appropriate)    Goal: Prevent Further Weight Loss  Outcome: Ongoing (interventions implemented as appropriate)

## 2018-07-03 NOTE — PROGRESS NOTES
Discharge Planning Assessment  [facility]     Patient Name: Bekah Gibson  MRN: [id number]  Today's Date: 7/3/2018    Admit Date: 6/29/2018          Discharge Needs Assessment     Row Name 07/03/18 1152       Living Environment    Lives With spouse    Current Living Arrangements home/apartment/condo    Primary Care Provided by self    Quality of Family Relationships involved;supportive;helpful    Able to Return to Prior Arrangements yes       Resource/Environmental Concerns    Resource/Environmental Concerns none    Transportation Concerns car, none       Transition Planning    Patient/Family Anticipates Transition to other (see comments)    Patient/Family Anticipated Services at Transition outpatient care    Transportation Anticipated family or friend will provide       Discharge Needs Assessment    Readmission Within the Last 30 Days no previous admission in last 30 days    Concerns to be Addressed substance/tobacco abuse/use    Anticipated Changes Related to Illness inability to care for self;other (see comments)    Equipment Needed After Discharge none    Discharge Facility/Level of Care Needs substance abuse facility    Offered/Gave Vendor List no    Current Discharge Risk substance use/abuse            Discharge Plan     Row Name 07/03/18 2946       Plan    Plan Return back to [facility] for eval for ETOH treatment    Patient/Family in Agreement with Plan yes    Plan Comments Spoke with patient at bedside, introduced self and explained CCP role. Verified facesheet and confirmed local pharmacy is [facility]. Pt was independent prior to admission, lives with her spouse and children. Pt denies any DME, HH or SNF hx. Pt states she will return back to [facility] and her spouse to transport. CCP explained hospital does not arrange admission, but pts go there and [facility] does their own eval for admission into facility. Pt verbalized understanding. Updated COLEEN Adorno. ANIKET HORNE/CCP         Destination     No service coordination in this encounter.      Durable Medical Equipment     No service coordination in this encounter.      Dialysis/Infusion     No service coordination in this encounter.      Home Medical Care     No service coordination in this encounter.      Social Care     No service coordination in this encounter.                Demographic Summary     Row Name 07/03/18 1150       General Information    Admission Type inpatient    Referral Source admission list    Reason for Consult discharge planning;substance use concerns    Preferred Language English     Used During This Interaction no       Contact Information    Permission Granted to Share Info With family/designee            Functional Status     Row Name 07/03/18 1147       Functional Status    Usual Activity Tolerance good       Functional Status, IADL    Medications independent    Meal Preparation independent    Housekeeping independent    Laundry independent    Shopping independent       Mental Status    General Appearance WDL WDL       Employment/    Employment/ Comments pt states she is self employed            Psychosocial    No documentation.           Abuse/Neglect    No documentation.           Legal     Row Name 07/03/18 1148       Financial/Legal    Finance Comments Pt denies advance directive.             Substance Abuse    No documentation.           Patient Forms    No documentation.         Joy Cespedes RN

## 2018-07-03 NOTE — THERAPY TREATMENT NOTE
Acute Care - Physical Therapy Treatment Note  [facility]     Patient Name: Bekah Gibson  : 1970  MRN: [id number]  Today's Date: 7/3/2018     Date of Referral to PT: 18  Referring Physician: Ryan    Admit Date: 2018    Visit Dx:    ICD-10-CM ICD-9-CM   1. DTs (delirium tremens) (CMS/HCC) F10.231 291.0   2. Hypomagnesemia E83.42 275.2   3. Macrocytic anemia D53.9 281.9   4. BRBPR (bright red blood per rectum) K62.5 569.3   5. Decreased functional mobility R26.89 781.99     Patient Active Problem List   Diagnosis   • Irritable bowel syndrome with both constipation and diarrhea   • Peripheral neuropathy   • Vitamin D deficiency   • History of HPV infection   • Hypothyroidism   • Cigarette smoker   • Acute kidney injury (CMS/HCC)   • Ascites   • UTI (urinary tract infection)   • Alcohol withdrawal syndrome, with delirium (CMS/HCC)   • Alcoholic hepatitis   • GI bleed   • Acute post-hemorrhagic anemia   • Thrombocytopenia (CMS/HCC)   • Open wound of right shoulder region   • Hypotension       Therapy Treatment          Rehabilitation Treatment Summary     Row Name 18 0949             Treatment Time/Intention    Discipline physical therapist  -CA      Document Type therapy note (daily note)  -CA      Subjective Information complains of;pain  -CA      Mode of Treatment individual therapy;physical therapy  -CA      Patient/Family Observations Pt supine in bed sleeping, but easily awakens. No signs of acute distress.  -CA      Patient Effort good  -CA      Recorded by [CA] Ami Gann, PT 18 0954      Row Name 18 0949             Cognitive Assessment/Intervention- PT/OT    Orientation Status (Cognition) oriented to;person;place  -CA      Follows Commands (Cognition) follows two step commands;over 90% accuracy;delayed response/completion;increased processing time needed;verbal cues/prompting required  -CA      Safety Deficit (Cognitive) moderate deficit;ability to follow  commands;judgment  -CA      Recorded by [CA] Ami Gann, PT 07/03/18 0954      Row Name 07/03/18 0949             Bed Mobility Assessment/Treatment    Supine-Sit Jay (Bed Mobility) supervision  -CA      Sit-Supine Jay (Bed Mobility) supervision  -CA      Assistive Device (Bed Mobility) head of bed elevated;bed rails  -CA      Comment (Bed Mobility) Increased time to complete, VC's for encouragement  -CA      Recorded by [CA] Ami Gann, PT 07/03/18 0954      Row Name 07/03/18 0949             Sit-Stand Transfer    Sit-Stand Jay (Transfers) contact guard;verbal cues  -CA      Assistive Device (Sit-Stand Transfers) walker, front-wheeled  -CA      Recorded by [CA] Ami Gann, PT 07/03/18 0954      Row Name 07/03/18 0949             Stand-Sit Transfer    Stand-Sit Jay (Transfers) contact guard;verbal cues  -CA      Assistive Device (Stand-Sit Transfers) walker, front-wheeled  -CA      Recorded by [CA] Ami Gann, PT 07/03/18 0954      Row Name 07/03/18 0949             Gait/Stairs Assessment/Training    Jay Level (Gait) contact guard;minimum assist (75% patient effort);verbal cues  -CA      Assistive Device (Gait) walker, front-wheeled  -CA      Distance in Feet (Gait) 100  -CA      Pattern (Gait) step-to  -CA      Deviations/Abnormal Patterns (Gait) stride length decreased;rachana decreased;base of support, narrow  -CA      Bilateral Gait Deviations heel strike decreased  -CA      Comment (Gait/Stairs) Pt unable to correct short, shuffling steps despite verbal cues. Requires occassional assist for FWW navigation  -CA      Recorded by [CA] Ami Gann, PT 07/03/18 0954      Row Name 07/03/18 0949             Positioning and Restraints    Pre-Treatment Position in bed  -CA      Post Treatment Position chair  -CA      In Chair notified nsg;reclined;call light within reach;encouraged to call for assist  -CA      Recorded by [CA] Ami Gann, PT  07/03/18 0954      Row Name 07/03/18 0949             Pain Assessment    Additional Documentation Pain Scale: Numbers Pre/Post-Treatment (Group)  -CA      Recorded by [CA] Ami Gann, PT 07/03/18 0954      Row Name 07/03/18 0949             Pain Scale: Numbers Pre/Post-Treatment    Pain Location shoulder  -CA      Pre/Post Treatment Pain Comment Pt unable to rate at this time  -CA      Pain Intervention(s) Repositioned;Ambulation/increased activity  -CA      Recorded by [CA] Ami Gann, PT 07/03/18 0954        User Key  (r) = Recorded By, (t) = Taken By, (c) = Cosigned By    Initials Name Effective Dates Discipline    CA Ami Gann, PT 06/13/18 -  PT                     Physical Therapy Education     Title: PT OT SLP Therapies (Done)     Topic: Physical Therapy (Done)     Point: Mobility training (Done)    Learning Progress Summary     Learner Status Readiness Method Response Comment Documented by    Patient Done Acceptance E VU  CA 07/03/18 0954     Done Con ETB,D VU,DU,[facility] 07/02/18 1954     Active Acceptance E NR   07/02/18 1412     Done Eager E,TB,D VU,DU,NR   07/01/18 2012    Family Done Eager ETB,D VU,DU,[facility] 07/02/18 1954          Point: Body mechanics (Done)    Learning Progress Summary     Learner Status Readiness Method Response Comment Documented by    Patient Done Eager E,TB,D VU,DU,NR   07/02/18 1954     Done Eager E,TB,D VU,DU,NR   07/01/18 2012    Family Done Eager ETB,D VU,DU,[facility] 07/02/18 1954          Point: Precautions (Done)    Learning Progress Summary     Learner Status Readiness Method Response Comment Documented by    Patient Done Eager E,TB,D VU,DU,NR   07/02/18 1954     Active Acceptance E NR   07/02/18 1412     Done Eager E,TB,D VU,DU,[facility] 07/01/18 2012    Family Done Eager E,TB,D VU,DU,[facility] 07/02/18 1954                      User Key     Initials Effective Dates Name Provider Type Discipline     04/06/17 -  Delma Leo, RN Registered Nurse Nurse     MG 04/03/18 -  Megan Gosselin, PT Physical Therapist PT    CA 06/13/18 -  Ami Gann, PT Physical Therapist PT                    PT Recommendation and Plan     Plan of Care Reviewed With: patient  Progress: improving  Outcome Summary: Pt with overall improved activity tolerance today. Pt requires CGA for all transfers with verbal cues for correct hand placement. Pt requires CGA to min A for amb for 100 ft with FWW. Pt amb with narrow SRAVANI, decreased heel strike, and short shuffling steps, but unable to correct despite verbal cues. Will cont to assess for d/c disposition.          Outcome Measures     Row Name 07/03/18 0900 07/02/18 1400 07/01/18 1500       How much help from another person do you currently need...    Turning from your back to your side while in flat bed without using bedrails? 4  -CA 3  -MG 3  -CS    Moving from lying on back to sitting on the side of a flat bed without bedrails? 4  -CA 3  -MG 3  -CS    Moving to and from a bed to a chair (including a wheelchair)? 3  -CA 3  -MG 3  -CS    Standing up from a chair using your arms (e.g., wheelchair, bedside chair)? 3  -CA 3  -MG 3  -CS    Climbing 3-5 steps with a railing? 2  -CA 2  -MG 2  -CS    To walk in hospital room? 3  -CA 3  -MG 3  -CS    AM-PAC 6 Clicks Score 19  -CA 17  -MG 17  -CS       Functional Assessment    Outcome Measure Options AM-PAC 6 Clicks Basic Mobility (PT)  -CA AM-PAC 6 Clicks Basic Mobility (PT)  -MG AM-PAC 6 Clicks Basic Mobility (PT)  -CS      User Key  (r) = Recorded By, (t) = Taken By, (c) = Cosigned By    Initials Name Provider Type    MG Megan Gosselin, PT Physical Therapist    CS Aric Moore, PT Physical Therapist    CA Ami Gann, PT Physical Therapist           Time Calculation:         PT Charges     Row Name 07/03/18 0957             Time Calculation    Start Time 0926  -CA      Stop Time 0945  -CA      Time Calculation (min) 19 min  -CA      PT Received On 07/03/18  -CA      PT - Next Appointment  07/04/18  -CA         Time Calculation- PT    Total Timed Code Minutes- PT 19 minute(s)  -CA        User Key  (r) = Recorded By, (t) = Taken By, (c) = Cosigned By    Initials Name Provider Type    MARCUS Gann PT Physical Therapist        Therapy Suggested Charges     Code   Minutes Charges    None           Therapy Charges for Today     Code Description Service Date Service Provider Modifiers Qty    25796792885 HC PT THER PROC EA 15 MIN 7/3/2018 Ami Gann, PT GP 1          PT G-Codes  Outcome Measure Options: AM-PAC 6 Clicks Basic Mobility (PT)    Ami Gann PT  7/3/2018

## 2018-07-03 NOTE — PAYOR COMM NOTE
"Ba Gibson (48 y.o. Female)     PLEASE SEE ATTACHED CLINICAL REVIEW. PT. REMAINS ON A MONITORED TELEM FLOOR.     REF#[id number]    PLEASE CALL   OR  [phone] WITH CONTINUED STAY AUTHORIZATION.    THANK YOU    ARTIS SHAH, HONEY, CCP            Date of Birth Social Security Number Address Home Phone MRN    1970  [facility] [address] [phone] [id number]    Restoration[employer] Marital Status          Hinduism        Admission Date Admission Type Admitting Provider Attending Provider Department, Room/Bed    6/29/18 Emergency Jaime Davis MD [place], Jaime Love MD [address], 534/1    Discharge Date Discharge Disposition Discharge Destination                       Attending Provider:  Jiame Davis MD    Allergies:  Morphine, Morphine And Related, Phenergan [Promethazine Hcl], Tessalon [Benzonatate], Cucumber Extract, Promethazine-phenylephrine    Isolation:  None   Infection:  None   Code Status:  CPR    Ht:  160 cm (62.99\")   Wt:  78.9 kg (173 lb 15.1 oz)    Admission Cmt:  None   Principal Problem:  Alcohol withdrawal syndrome, with delirium (CMS/[facility]) [F10.231]                 Active Insurance as of 6/29/2018     Primary Coverage     Payor Plan Insurance Group Employer/Plan Group    MISC COMMERCIAL MISC COMMERCIAL INS IAIC     Coverage Address Coverage Phone Number Effective From Effective To    [address] [address] [phone] 4/13/2018     [place] [id number]       Subscriber Name Subscriber Birth Date Member ID       BA GIBSON 1970 [id number]                 Emergency Contacts      (Rel.) Home Phone Work Phone Mobile Phone    Jeff Gibson (Spouse) [phone] -- [phone]                  ICU Vital Signs     Row Name 07/03/18 0844 07/03/18 0700 07/03/18 0500 07/02/18 2253 07/02/18 2011       Height and Weight    Weight  --  -- --   Function Disabled; Bed will not zero  --  --       Vitals    " Temp  -- 97.6 °F (36.4 °C)  -- 98.4 °F (36.9 °C) 97.4 °F (36.3 °C)    Temp src  -- Oral  -- Oral Oral    Pulse  -- 82  -- 103 75    Heart Rate Source  -- Monitor  -- Monitor Monitor    Resp  -- 16  -- 16 16    Resp Rate Source  -- Visual  -- Visual Visual    BP  -- 109/74  -- 112/77 104/70    BP Location  -- Left arm  -- Left arm Left arm    BP Method  -- Automatic  -- Automatic Automatic    Patient Position  -- Lying  -- Lying Lying       Oxygen Therapy    SpO2  -- 100 %  -- 100 %  --    Pulse Oximetry Type  -- Continuous  -- Continuous  --    Device (Oxygen Therapy) (P)  room air room air  -- room air room air    Row Name 07/02/18 1317                   Vitals    Temp 97.7 °F (36.5 °C)        Temp src Oral        Pulse 71        Heart Rate Source Monitor        Resp 16        Resp Rate Source Visual        /77        Noninvasive MAP (mmHg) 96        BP Location Left arm        BP Method Automatic        Patient Position Lying           Oxygen Therapy    SpO2 100 %        Pulse Oximetry Type Continuous        Device (Oxygen Therapy) room air            Intake & Output (last day)       07/02 0701 - 07/03 0700 07/03 0701 - 07/04 0700    P.O. 600     I.V. (mL/kg) 150 (1.9)     Total Intake(mL/kg) 750 (9.5)     Urine (mL/kg/hr) 750 (0.4)     Stool 0 (0)     Total Output 750      Net 0            Unmeasured Stool Occurrence 1 x         Lines, Drains & Airways    Active LDAs     Name:   Placement date:   Placement time:   Site:   Days:    Peripheral IV 07/01/18 1620 Left Hand  07/01/18    1620    Hand    1                Lab Results (last 24 hours)     Procedure Component Value Units Date/Time    Comprehensive Metabolic Panel [[id number]] Collected:  07/03/18 0817    Specimen:  Blood Updated:  07/03/18 0838    Magnesium [[id number]] Collected:  07/03/18 0817    Specimen:  Blood Updated:  07/03/18 0838    Hemoglobin & Hematocrit, Blood [[id number]]  (Abnormal) Collected:  07/03/18 0006    Specimen:  Blood Updated:   07/03/18 0025     Hemoglobin 7.6 (L) g/dL      Hematocrit 23.1 (L) %     Hemoglobin & Hematocrit, Blood [[id number]]  (Abnormal) Collected:  07/02/18 1606    Specimen:  Blood Updated:  07/02/18 1620     Hemoglobin 7.2 (L) g/dL      Hematocrit 21.7 (L) %         Imaging Results (last 24 hours)     Procedure Component Value Units Date/Time    CT Abdomen Pelvis With Contrast [[id number]] Collected:  06/29/18 1414     Updated:  07/02/18 1054    Narrative:       CT ABDOMEN AND PELVIS WITH CONTRAST     HISTORY: Abdominal pain and GI bleed     TECHNIQUE: Axial CT images of the abdomen and pelvis were obtained  following administration of intravenous contrast. The patient was was  not given oral contrast Coronal and sagittal reformats were obtained.     COMPARISON: 06/18/2017.     FINDINGS: The liver demonstrates normal attenuation. No focal hepatic  lesion is seen. The portal vein and its intrahepatic branches are  patent. The gallbladder, spleen and the pancreas are normal. Bilateral  adrenal glands are normal. Both kidneys are normal in size and  attenuation. No renal calculi or hydronephrosis. The urinary bladder is  minimally distended limiting evaluation. The uterus is anteverted and  normal. No abnormal adnexal mass is seen. The small and large bowel  loops demonstrate normal caliber. Submucosal fat deposition is seen  within the cecum and the portions of the right colon likely representing  sequela of enteritis. Incidental note is made of a circumaortic left  renal vein. No abdominal lymphadenopathy is identified. Narrowing of the  celiac axis at its origin secondary to median ligament arcuate arteries  syndrome. No ascites is seen.     There is a mildly bulky and heterogenous appearance of the right  infraumbilical rectus muscle that may suggest an abdominal wall rectus  sheath hematoma.       Impression:       Right infraumbilical bulky rectus muscle that appears  heterogeneous suggestive of abdominal wall  hematoma.     These findings were discussed with Dr. Mar by telephone.     Radiation dose reduction techniques were utilized, including automated  exposure control and exposure modulation based on body size.     This report was finalized on 2018 10:51 AM by Dr. Rodrigo Abdalla M.D.           Orders (last 24 hrs)     Start     Ordered    18 0802  Comprehensive Metabolic Panel  Once      18 0801    18 0802  Magnesium  Once      18 0801    18 0800  Inpatient Case Management  Consult  Once     Provider:  (Not yet assigned)    18 0759    18 0600  CBC Auto Differential  PROCEDURE ONCE,   Status:  Canceled      18 0002    18 1133  Hematology & Oncology Inpatient Consult  Once     Specialty:  Hematology and Oncology  Provider:  Hola Leo MD    18 1132    18 1120  Diet Soft Texture; Whole Foods; Thin; Cardiac  Diet Effective Now      18 1119    18 1600  Hemoglobin & Hematocrit, Blood  Every 8 Hours,   Status:  Canceled      18 1520    Unscheduled  Up With Assistance  As Needed      18 1520    Unscheduled  Wound Care  As Needed     Comments:  If orthopedic MD writes orders differently than this, those orders take precedent.    18 1507             Physician Progress Notes (last 24 hours) (Notes from 2018  8:54 AM through 7/3/2018  8:54 AM)      Jaime Davis MD at 2018 12:10 PM              Name: Bekah HUDSON Arthur ADMIT: 2018   : 1970  PCP: No Known Provider    MRN: [id number] LOS: 3 days   AGE/SEX: 48 y.o. female  ROOM: [facility]   Subjective     Mental status is better today.  Complains of mild shoulder pain.  No shortness breath, chest pain, cough nausea or vomiting  No ativan needed in last 24h    Objective   Vital Signs  Temp:  [97.3 °F (36.3 °C)-97.4 °F (36.3 °C)] 97.4 °F (36.3 °C)  Heart Rate:  [68-72] 72  Resp:  [16] 16  BP: (109-118)/(72-83) 118/83     on   ;   Device  (Oxygen Therapy): room air  Body mass index is 30.82 kg/m².    Physical Exam   Constitutional: She appears well-developed. No distress.   Alcohol withdrawals much improved   HENT:   Head: Normocephalic and atraumatic.   Cardiovascular: Normal rate and regular rhythm.    Pulmonary/Chest: Effort normal and breath sounds normal. No respiratory distress.   Abdominal: Bowel sounds are normal. She exhibits no distension. There is tenderness (mild diffuse). There is no guarding.   Neurological: She is alert.   No tremors  Oriented to person, Hospital but thought it was [place], year but thought it was June      Skin: Skin is warm and dry. There is erythema.   Psychiatric: She has a normal mood and affect. Her speech is slurred. She is slowed.   Slurring of speech is better     Vitals reviewed.      Results Review:       I reviewed the patient's new clinical results.    Results from last 7 days  Lab Units 07/02/18  0551 07/02/18  0014 07/01/18  1614 07/01/18 0620 06/30/18 0339 06/29/18  1010   WBC 10*3/mm3 3.92*  --   --  4.20*  --  4.08*  --  5.11   HEMOGLOBIN g/dL 7.3* 7.6* 8.5* 8.5*  < > 8.1*  < > 8.6*   PLATELETS 10*3/mm3 50*  --   --  48*  --  35*  --  58*   < > = values in this interval not displayed.    Results from last 7 days  Lab Units 07/01/18  0620 06/30/18 0339 06/29/18  1010   SODIUM mmol/L 137 135* 135*   POTASSIUM mmol/L 3.4* 3.7 3.6   CHLORIDE mmol/L 105 102 100   CO2 mmol/L 20.1* 17.0* 20.1*   BUN mg/dL 5* 7 12   CREATININE mg/dL 0.52* 0.43* 0.66   GLUCOSE mg/dL 90 93 105*   Estimated Creatinine Clearance: 128.7 mL/min (A) (by C-G formula based on SCr of 0.52 mg/dL (L)).    Results from last 7 days  Lab Units 07/02/18  0551 07/01/18  0620 06/30/18  0339 06/29/18  1010   CALCIUM mg/dL  --  8.7 8.8 9.1   ALBUMIN g/dL  --  3.40* 3.30* 3.90   MAGNESIUM mg/dL 1.4*  --  4.2* 1.1*         pantoprazole 40 mg Intravenous Q12H      Diet Soft Texture; Whole Foods; Thin; Cardiac      Assessment/Plan     Active  Hospital Problems    Diagnosis Date Noted   • **Alcohol withdrawal syndrome, with delirium [F10.231] 06/29/2018   • Hypotension [I95.9] 07/01/2018   • Alcoholic hepatitis [K70.10] 06/29/2018   • GI bleed [K92.2] 06/29/2018   • Acute post-hemorrhagic anemia [D62] 06/29/2018   • Thrombocytopenia [D69.6] 06/29/2018   • Open wound of right shoulder region [S41.001A] 06/29/2018   • Hypothyroidism [E03.9] 06/05/2017      Resolved Hospital Problems    Diagnosis Date Noted Date Resolved   No resolved problems to display.     · Alcohol withdrawal with delirium tremens: CIWA 0, no ativan in last 24h. She's sleepy so will d/c scheduled Librium given much improvement.  She is 5 days out from last drink and I think she's now come out of withdrawal. Continue needed Ativan per protocol.  Multivitamins.  IV fluids. She continues to improve. Consulted access Center.  · Pancytopenia: Secondary to alcohol use BUT given father's history of leukopenia I'll ask heme/onc to see today, watch closely   · Hypotension: resolved  · Elevated LFTs: Suspect mild alcoholic hepatitis, ammonia level normal  · Chronic pain: restarted oxycodone to prevent withdrawal from this  · Lower GI bleed: Bright red blood per rectum in the emergency room, hemoglobin is low and slowly decreasing.  Appreciate GI-likely has hemorrhoids, may need endoscopy  · Metabolic acidosis: suspect ketosis, resolving now with PO intake and D5  · Open wound of the right shoulder: We'll try to get records from [facility] regarding her recent surgery.  Consulted orthopedic surgery as there is an open wound along the incision line.  · Replace K and magnesium  · Asked PT   · SCDs    D/W RN    Jamie Davis MD  [facility]  07/02/18  12:10 PM      Electronically signed by Jaime Davis MD at 7/2/2018 12:16 PM     Ashlyn Uribe MD at 7/2/2018  9:28 AM          [facility]  Inpatient Progress Note    Reason for Follow Up:   "GI bleed, alcohol withdrawl    Subjective     Interval History:   She complains of abdominal pain - diffuse upper pain.  Not as bad as the pancreatitis pain she has had before - feels like \"sore muscles\". No further bleeding.  She reports feeling anxious    Current Facility-Administered Medications:   •  acetaminophen (TYLENOL) tablet 650 mg, 650 mg, Oral, Q4H PRN, Jaime Davis MD  •  chlordiazePOXIDE (LIBRIUM) capsule 25 mg, 25 mg, Oral, Q8H, Jaime Davis MD, 25 mg at 07/02/18 0601  •  dextrose 5 % and sodium chloride 0.9 % infusion, 75 mL/hr, Intravenous, Continuous, Jaime Davis MD, Last Rate: 75 mL/hr at 06/30/18 2313, 75 mL/hr at 06/30/18 2313  •  hydrocortisone (ANUSOL-HC) suppository 25 mg, 25 mg, Rectal, BID PRN, Kwaku Sawant MD  •  LORazepam (ATIVAN) tablet 1 mg, 1 mg, Oral, Q2H PRN **OR** LORazepam (ATIVAN) injection 1 mg, 1 mg, Intravenous, Q2H PRN, 1 mg at 06/30/18 1039 **OR** LORazepam (ATIVAN) tablet 2 mg, 2 mg, Oral, Q1H PRN **OR** LORazepam (ATIVAN) injection 2 mg, 2 mg, Intravenous, Q1H PRN, 2 mg at 07/01/18 0537 **OR** LORazepam (ATIVAN) injection 2 mg, 2 mg, Intravenous, Q15 Min PRN, 2 mg at 06/29/18 1619 **OR** LORazepam (ATIVAN) injection 2 mg, 2 mg, Intramuscular, Q15 Min PRN, Jaime Davis MD  •  Magnesium Sulfate 2 gram Bolus, followed by 8 gram infusion (total Mg dose 10 grams)- Mg less than or equal to 1mg/dL, 2 g, Intravenous, PRN **OR** Magnesium Sulfate 2 gram / 50mL Infusion (GIVE X 3 BAGS TO EQUAL 6GM TOTAL DOSE) - Mg 1.1 - 1/5 mg/dl, 2 g, Intravenous, PRN, Last Rate: 25 mL/hr at 06/30/18 0112, 2 g at 06/30/18 0112 **OR** Magnesium Sulfate 4 gram infusion- Mg 1.6-1.9 mg/dL, 4 g, Intravenous, PRN, Jaime Davis MD  •  ondansetron (ZOFRAN) tablet 4 mg, 4 mg, Oral, Q6H PRN **OR** ondansetron ODT (ZOFRAN-ODT) disintegrating tablet 4 mg, 4 mg, Oral, Q6H PRN **OR** ondansetron (ZOFRAN) injection 4 mg, 4 mg, Intravenous, Q6H PRN, Jaime Love " MD Ryan  •  oxyCODONE (ROXICODONE) immediate release tablet 10 mg, 10 mg, Oral, Q4H PRN, Jaime Davis MD, 10 mg at 07/02/18 0602  •  pantoprazole (PROTONIX) injection 40 mg, 40 mg, Intravenous, Q12H, Jaime Davis MD, 40 mg at 07/01/18 2035  •  sodium chloride 0.9 % flush 1-10 mL, 1-10 mL, Intravenous, PRN, Jaime Davis MD  •  sodium chloride 0.9 % flush 10 mL, 10 mL, Intravenous, PRN, Ken Mar MD  Review of Systems:    All systems were reviewed and negative except for:  Gastrointestinal: postitive for  pain  Musculoskeletal: positive for  muscle pain  Behavioral/Psych: positive for  anxiety    Objective     Vital Signs  Temp:  [97.3 °F (36.3 °C)-97.4 °F (36.3 °C)] 97.4 °F (36.3 °C)  Heart Rate:  [68-72] 72  Resp:  [16] 16  BP: (109-118)/(72-83) 118/83  Body mass index is 30.82 kg/m².    Intake/Output Summary (Last 24 hours) at 07/02/18 0928  Last data filed at 07/01/18 1300   Gross per 24 hour   Intake                0 ml   Output                0 ml   Net                0 ml     No intake/output data recorded.     Physical Exam:   General: patient awake, alert and cooperative   Eyes: Normal lids and lashes, no scleral icterus   Neck: supple, normal ROM   Skin: warm and dry, not jaundiced   Cardiovascular: regular rhythm and rate, no murmurs auscultated   Pulm: clear to auscultation bilaterally, regular and unlabored   Abdomen: soft, nontender, nondistended; no significant hematoma noted   Rectal: deferred   Extremities: no rash or edema   Psychiatric: Normal mood and behavior; memory intact        Assessment/Plan     Patient Active Problem List   Diagnosis   • Irritable bowel syndrome with both constipation and diarrhea   • Peripheral neuropathy   • Vitamin D deficiency   • History of HPV infection   • Hypothyroidism   • Cigarette smoker   • Acute kidney injury   • Ascites   • UTI (urinary tract infection)   • Alcohol withdrawal syndrome, with delirium   • Alcoholic hepatitis    • GI bleed   • Acute post-hemorrhagic anemia   • Thrombocytopenia   • Open wound of right shoulder region   • Hypotension     Assessment:     1. EtOH withdrawal/DTs - agree with scheduled librium  2. EtOH hepatitis    3. Rectal bleeding - ? Hemorrhoids vs secondary to possible enteritis/colitis on CT. She last had EGD and colonoscopy in 2015 by [facility]. If she has ongoing Gi bleeding may need to consider endoscopic evaluation although not currently ideal candidate given active withdrawal/DT and low platelet count. Thrombocytopenia - secondary to EtOH abuse      Plan:  - Continue Anusol HC suppositories.   - h/h trending down - may need transfusion if continues- no gross bleeding  - continue to treat DTs      I discussed the patients findings and my recommendations with patient.    Ashlyn Uribe MD                  Electronically signed by Ashlyn Uribe MD at 7/2/2018  9:49 AM          Consult Notes (last 24 hours) (Notes from 7/2/2018  8:54 AM through 7/3/2018  8:54 AM)      Tab Malloy MD at 7/2/2018  2:30 PM      Consult Orders:    1. Hematology & Oncology Inpatient Consult [[id number]] ordered by Jaime Davis MD at 07/02/18 1132                  Subjective     REASON FOR CONSULTATION: pancytopenia  Provide an opinion on any further workup or treatment                             REQUESTING PHYSICIAN:  A    RECORDS OBTAINED:  Records of the patients history including those obtained from the referring provider were reviewed and summarized in detail.    HISTORY OF PRESENT ILLNESS:  The patient is a 48 y.o. year old female who is here for an opinion about the above issue.    History of Present Illness patient is a 48-year-old female admitted from alcohol and drug addiction facility confusion due to alcohol withdrawal and was found to have progressive pancytopenia.  Her blood counts were all normal in March of this year and this is a new finding.  She had been drinking fairly heavily  "and was being admitted for alcoholic addiction.    Patient fell on the ice and broke her right clavicle in January and she's had trouble with healing and had surgery 3 weeks ago for this.  She has been on antibiotics but this was stopped at admission. Bactrim+levaquin    Interestingly her urine drug screen showed methadone opiates and methamphetamine/  Past Medical History:   Diagnosis Date   • Anxiety    • Disease of thyroid gland    • ETOH abuse    • Hypertension    • Kidney stone    • Lupus    • Lupus    • MDD (major depressive disorder)    • Pancreatitis         Past Surgical History:   Procedure Laterality Date   • JOINT REPLACEMENT     • KIDNEY STONE SURGERY      LITHOTRIPSY AND STENT (R SIDE)        No current facility-administered medications on file prior to encounter.      Current Outpatient Prescriptions on File Prior to Encounter   Medication Sig Dispense Refill   • levothyroxine (SYNTHROID, LEVOTHROID) 125 MCG tablet Take 1 tablet by mouth Daily. 90 tablet 2   • lisinopril (PRINIVIL,ZESTRIL) 20 MG tablet Take 20 mg by mouth.     • Multiple Vitamin (MULTIVITAMIN) capsule Take 1 capsule by mouth Daily.     • FLUoxetine (PROZAC) 40 MG capsule Take 1 capsule by mouth Daily. 30 capsule 3   • gabapentin (NEURONTIN) 600 MG tablet 1 po once daily prn (Patient taking differently: Take 600 mg by mouth Daily As Needed (pain). 1 po once daily prn) 30 tablet 2        ALLERGIES:    Allergies   Allergen Reactions   • Morphine      ITCHING   • Morphine And Related    • Phenergan [Promethazine Hcl] Hives   • Tessalon [Benzonatate] Nausea Only   • Cucumber Extract Rash   • Promethazine-Phenylephrine Other (See Comments)     \"FEEL WEIRD\"        Social History     Social History   • Marital status:      Social History Main Topics   • Smoking status: Current Every Day Smoker     Packs/day: 0.50     Types: Cigarettes   • Smokeless tobacco: Never Used   • Alcohol use 3.0 - 6.0 oz/week     5 - 10 Shots of liquor per " week      Comment: recovering alcoholic since 2015, sober 4 wks in Nov 2018 with relapse   • Drug use: No      Comment: used THC in college   • Sexual activity: Defer     Other Topics Concern   • Not on file        Family History   Problem Relation Age of Onset   • Thyroid disease Father    • Leukemia Father    • Drug abuse Brother         Review of Systems   Skin: Positive for wound (Right shoulder with).   Psychiatric/Behavioral: Positive for agitation (Alcohol related), behavioral problems, confusion and dysphoric mood. The patient is nervous/anxious.        Objective     Vitals:    07/01/18 2300 07/02/18 0500 07/02/18 0735 07/02/18 1317   BP: 112/80  118/83 112/77   BP Location: Left arm  Left arm Left arm   Patient Position: Lying  Lying Lying   Pulse: 72   71   Resp: 16  16 16   Temp:   97.4 °F (36.3 °C) 97.7 °F (36.5 °C)   TempSrc:   Oral Oral   SpO2:    100%   Weight:  78.9 kg (173 lb 15.1 oz)  Comment: equipment on bed     Height:         No flowsheet data found.    Physical Exam    GENERAL:  Well-developed, well-nourished in no acute distress.   SKIN:  Warm, dry without rashes, ecchymosis on her legs and back.  EYES:  Pupils equal, round and reactive to light.  EOMs intact.  Conjunctivae normal.  EARS:  Hearing intact.  NOSE:  Septum midline.  No excoriations or nasal discharge.  MOUTH:  Tongue is well-papillated; no stomatitis or ulcers.  Lips normal.  THROAT:  Oropharynx without lesions or exudates.  NECK:  Supple with good range of motion; no thyromegaly or masses, no JVD.  LYMPHATICS:  No cervical, supraclavicular, axillary or inguinal adenopathy.  CHEST:  Lungs clear to auscultation. Good airflow.  CARDIAC:  Regular rate and rhythm without murmurs, rubs or gallops. Normal S1,S2.  ABDOMEN:  Soft, nontender with no hepatosplenomegaly or masses.  EXTREMITIES:  No clubbing, cyanosis or edema.  NEUROLOGICAL:  Cranial Nerves II-XII grossly intact.  No focal neurological deficits.  PSYCHIATRIC:  Normal  affect and mood.      PTinr 1.16  Iron studies normal B12 and folate pending   due to alcohol    Assessment/Plan   1.  New onset pancytopenia since 3/18 likely a combination of alcohol and Bactrim  2.  Alcohol abuse with withdrawal  3.  Malunion right clavicle with repeat surgery no obvious significant wound infection    Plan  1.  Check folic acid and follow  Blood counts should recover on their own from alcohol suppression and Bactrim  2. Transfuse if her hemoglobin drops below 7    We'll follow thank you    Electronically signed by Tab Malloy MD at 7/2/2018  2:57 PM         All medication doses during the admission are shown, including meds that are no longer on order.   Scheduled Meds Sorted by Name   for Arthur Bekah TRISTAN as of 7/1/18 through 7/3/18     1 Day 3 Days 7 Days 10 Days < Today >    Legend:                          Inactive     Active     Other Encounter    Linked               Medications 07/01/18 07/02/18 07/03/18   chlordiazePOXIDE (LIBRIUM) capsule 25 mg  Dose: 25 mg  Freq: Every 8 Hours Scheduled Route: PO  Start: 06/29/18 1600 End: 07/02/18 1042    Admin Instructions:   Caution: Look alike/sound alike drug alert.    0525   1412   2210      0601   1042-D/C'd         iopamidol (ISOVUE-300) 61 % injection 100 mL  Dose: 100 mL  Freq: Once in Imaging Route: IV  Start: 06/29/18 1301 End: 06/29/18 1301         LORazepam (ATIVAN) injection 1 mg  Dose: 1 mg  Freq: Once Route: IV  Start: 06/29/18 1256 End: 06/29/18 1258    Admin Instructions:   Caution: Look alike/sound alike drug alert         LORazepam (ATIVAN) injection 1 mg  Dose: 1 mg  Freq: Once Route: IV  Start: 06/29/18 1035 End: 06/29/18 1117    Admin Instructions:   Caution: Look alike/sound alike drug alert         multiple vitamin (M.V.I. Adult) 10 mL, thiamine (B-1) 100 mg, folic acid 1 mg, magnesium sulfate 2 g in sodium chloride 0.9 % 1,000 mL infusion  Dose: 1,000 mL/hr  Freq: Once Route: IV  Last Dose: Stopped (06/29/18  1242)  Start: 06/29/18 1035 End: 06/29/18 1242         pantoprazole (PROTONIX) injection 40 mg  Dose: 40 mg  Freq: Every 12 Hours Scheduled Route: IV  Indications of Use: Gastrointestinal (GI) Bleed  Start: 06/29/18 2100    Admin Instructions:   Dilute with 10 mL of 0.9% NaCl and give IV push over 2 minutes.    0920   2035        0941   2118        0844   2100          sodium chloride 0.9 % bolus 500 mL  Dose: 500 mL  Freq: Once Route: IV  Last Dose: 500 mL (06/30/18 2210)  Start: 06/30/18 2230 End: 06/30/18 2310         Medications 07/01/18 07/02/18 07/03/18       Continuous Meds Sorted by Name   for Bekah Gibson as of 7/1/18 through 7/3/18    Legend:                          Inactive     Active     Other Encounter    Linked               Medications 07/01/18 07/02/18 07/03/18   dextrose 5 % and sodium chloride 0.9 % infusion  Rate: 75 mL/hr Dose: 75 mL/hr  Freq: Continuous Route: IV  Last Dose: 75 mL/hr (06/30/18 2313)  Start: 06/30/18 0930 End: 07/02/18 1042     1042-D/C'd               PRN Meds Sorted by Name   for Bekah Gibson as of 7/1/18 through 7/3/18    Legend:                          Inactive     Active     Other Encounter    Linked               Medications 07/01/18 07/02/18 07/03/18   acetaminophen (TYLENOL) tablet 650 mg  Dose: 650 mg  Freq: Every 4 Hours PRN Route: PO  PRN Reason: Mild Pain   Start: 06/29/18 1520    Admin Instructions:   Do not exceed 4 grams of acetaminophen in a 24 hr period.    If given for pain, use the following pain scale:   Mild Pain = Pain Score of 1-3, CPOT 1-2  Moderate Pain = Pain Score of 4-6, CPOT 3-4  Severe Pain = Pain Score of 7-10, CPOT 5-8         hydrocortisone (ANUSOL-HC) suppository 25 mg  Dose: 25 mg  Freq: 2 Times Daily PRN Route: RE  PRN Reason: Hemorrhoids  Start: 06/29/18 1703         LORazepam (ATIVAN) tablet 1 mg  Dose: 1 mg  Freq: Every 2 Hours PRN Route: PO  PRN Reason: Withdrawal  PRN Comment: For CIWA score 8-10  Start: 06/29/18 1519 End: 07/09/18  1518    Admin Instructions:   CIWA-Ar  8-10 : Give lorazepam 1 mg oral or IV and reassess patient in 2 hours  Caution: Look alike/sound alike drug alert          2238             Or  LORazepam (ATIVAN) injection 1 mg  Dose: 1 mg  Freq: Every 2 Hours PRN Route: IV  PRN Reason: Withdrawal  PRN Comment: For CIWA score 8-10  Start: 06/29/18 1519 End: 07/09/18 1518    Admin Instructions:   CIWA-Ar  8-10 : Give lorazepam 1 mg oral or IV and reassess patient in 2 hours  Caution: Look alike/sound alike drug alert                     Or  LORazepam (ATIVAN) tablet 2 mg  Dose: 2 mg  Freq: Every 1 Hour PRN Route: PO  PRN Reason: Withdrawal  PRN Comment: For CIWA score 11-15  Start: 06/29/18 1519 End: 07/09/18 1518    Admin Instructions:   CIWA-Ar 11-15: Give lorazepam 2 mg oral or IV and reassess patient in 1 hour  Caution: Look alike/sound alike drug alert                     Or  LORazepam (ATIVAN) injection 2 mg  Dose: 2 mg  Freq: Every 1 Hour PRN Route: IV  PRN Reason: Withdrawal  PRN Comment: For CIWA score 11-15  Start: 06/29/18 1519 End: 07/09/18 1518    Admin Instructions:   CIWA-Ar 11-15: Give lorazepam 2 mg oral or IV and reassess patient in 1 hour  Caution: Look alike/sound alike drug alert    0019   0135   0537                 Or  LORazepam (ATIVAN) injection 2 mg  Dose: 2 mg  Freq: Every 15 Minutes PRN Route: IV  PRN Reason: Anxiety  PRN Comment: Use IV route first.  If unable to give IV, use IM.  For CIWA-Ar greater than 15.  Repeat dose in 15 minutes if CIWA-Ar is not decreasing.  Start: 06/29/18 1519 End: 07/09/18 1518    Admin Instructions:   CIWA-Ar  > 15 : Give lorazepam 2 mg IV or IM and reassess every 15 minutes x 2.  If CIWA-Ar not decreasing contact physician for transfer to higher level of care. May repeat lorazepam dose in 15 minutes if CIWA-Ar is not decreasing. Use IV route first; if unable to use IV, give IM.  Caution: Look alike/sound alike drug alert                     Or  LORazepam (ATIVAN)  injection 2 mg  Dose: 2 mg  Freq: Every 15 Minutes PRN Route: IM  PRN Reason: Withdrawal  PRN Comment: Use IV route first.  If unable to give IV, use IM.  For CIWA-Ar greater than 15.  Repeat dose in 15 minutes if CIWA-Ar is not decreasing.  Start: 06/29/18 1519 End: 07/09/18 1518    Admin Instructions:   CIWA-Ar  > 15 : Give lorazepam 2 mg IV or IM and reassess every 15 minutes x 2.  If CIWA-Ar not decreasing contact physician for transfer to higher level of care. May repeat lorazepam dose in 15 minutes if CIWA-Ar is not decreasing. Use IV route first; if unable to use IV, give IM.  Caution: Look alike/sound alike drug alert                     Magnesium Sulfate 2 gram Bolus, followed by 8 gram infusion (total Mg dose 10 grams)- Mg less than or equal to 1mg/dL  Dose: 2 g  Freq: As Needed Route: IV  PRN Comment: See Administration Instructions  Start: 06/29/18 1757    Admin Instructions:   Mg less than or equal to 1mg/dL. Give 2 gm over 30 minutes as bolus, then infuse 2 gm over 2 hours for 4 doses (8 grams) for total dose of 10 grams.  Recheck Mg levels in the AM.              Or  Magnesium Sulfate 2 gram / 50mL Infusion (GIVE X 3 BAGS TO EQUAL 6GM TOTAL DOSE) - Mg 1.1 - 1/5 mg/dl  Dose: 2 g  Freq: As Needed Route: IV  PRN Comment: See Administration Instructions  Start: 06/29/18 1757    Admin Instructions:   Mg 1.1 -1.5 mg/dL. Infuse 2 grams over 2 hours for 3 doses (for a total Mg dose of 6 grams).  Recheck Mg level in the AM.     0942   [id number]         Or  Magnesium Sulfate 4 gram infusion- Mg 1.6-1.9 mg/dL  Dose: 4 g  Freq: As Needed Route: IV  PRN Comment: See Administration Instructions  Start: 06/29/18 1757    Admin Instructions:   Mg 1.6-1.9 mg/dL. Recheck Mg level in the AM.              ondansetron (ZOFRAN) tablet 4 mg  Dose: 4 mg  Freq: Every 6 Hours PRN Route: PO  PRN Reasons: Nausea,Vomiting  Start: 06/29/18 1520    Admin Instructions:   If BOTH ondansetron (ZOFRAN) and promethazine (PHENERGAN) are  ordered use ondansetron first and THEN promethazine IF ondansetron is ineffective.         Or  ondansetron ODT (ZOFRAN-ODT) disintegrating tablet 4 mg  Dose: 4 mg  Freq: Every 6 Hours PRN Route: PO  PRN Reasons: Nausea,Vomiting  Start: 18 1520    Admin Instructions:   If BOTH ondansetron (ZOFRAN) and promethazine (PHENERGAN) are ordered use ondansetron first and THEN promethazine IF ondansetron is ineffective.  Place on tongue and allow to dissolve.         Or  ondansetron (ZOFRAN) injection 4 mg  Dose: 4 mg  Freq: Every 6 Hours PRN Route: IV  PRN Reasons: Nausea,Vomiting  Start: 18 1520    Admin Instructions:   If BOTH ondansetron (ZOFRAN) and promethazine (PHENERGAN) are ordered use ondansetron first and THEN promethazine IF ondansetron is ineffective.         oxyCODONE (ROXICODONE) immediate release tablet 10 mg  Dose: 10 mg  Freq: Every 4 Hours PRN Route: PO  PRN Reason: Moderate Pain   Start: 18 0850 End: 07/10/18 0849    Admin Instructions:   If given for pain, use the following pain scale:  Mild Pain = Pain Score of 1-3, CPOT 1-2  Moderate Pain = Pain Score of 4-6, CPOT 3-4  Severe Pain = Pain Score of 7-10, CPOT 5-8    0019   0950   1408     2318          0602   1435   1959      0135   0626          sodium chloride 0.9 % flush 1-10 mL  Dose: 1-10 mL  Freq: As Needed Route: IV  PRN Reason: Line Care  Start: 18 1517         sodium chloride 0.9 % flush 10 mL  Dose: 10 mL  Freq: As Needed Route: IV  PRN Reason: Line Care  Start: 18 1009         Medications 18            Jaime Davis MD Physician Signed Medicine  Progress Notes Date of Service: 7/3/2018 12:16 PM      Expand All Collapse All    []Manual[]Template  []Copied       Name: Bekah HUDSON Arthur ADMIT: 2018   : 1970  PCP: No Known Provider    MRN: [id number] LOS: 4 days   AGE/SEX: 48 y.o. female  ROOM: [facility]      Subjective         Mental status back to baseline, c/o chronic dizziness  and trouble walking.  Complains of mild shoulder pain.  No shortness breath, chest pain, cough nausea or vomiting           Objective      Vital Signs  Temp:  [97.4 °F (36.3 °C)-98.4 °F (36.9 °C)] 97.6 °F (36.4 °C)  Heart Rate:  [] 82  Resp:  [16] 16  BP: (104-112)/(70-77) 109/74  SpO2:  [100 %] 100 %  on   ;   Device (Oxygen Therapy): room air  Body mass index is 30.82 kg/m².     Physical Exam   Constitutional: She appears well-developed. No distress.   Alcohol withdrawals resolved     HENT:   Head: Normocephalic and atraumatic.   Cardiovascular: Normal rate and regular rhythm.    Pulmonary/Chest: Effort normal and breath sounds normal. No respiratory distress.   Abdominal: Bowel sounds are normal. She exhibits no distension. There is no tenderness. There is no guarding.   Neurological: She is alert.   No tremors  Oriented to person, [place] but thought it was [place], year but thought it was [place]      Skin: Skin is warm and dry. No erythema.   Psychiatric: She has a normal mood and affect. Her speech is slurred. She is slowed.   Slurring of speech is better     Vitals reviewed.        Results Review:       I reviewed the patient's new clinical results.     Results from last 7 days  Lab Units 07/03/18  0006 07/02/18  1606 07/02/18  0551 07/02/18  0014   07/01/18  0620   06/30/18  0339   06/29/18  1010   WBC 10*3/mm3  --   --  3.92*  --   --  4.20*  --  4.08*  --  5.11   HEMOGLOBIN g/dL 7.6* 7.2* 7.3* 7.6*  < > 8.5*  < > 8.1*  < > 8.6*   PLATELETS 10*3/mm3  --   --  50*  --   --  48*  --  35*  --  58*   < > = values in this interval not displayed.     Results from last 7 days  Lab Units 07/03/18  0817 07/01/18  0620 06/30/18  0339 06/29/18  1010   SODIUM mmol/L 137 137 135* 135*   POTASSIUM mmol/L 2.9* 3.4* 3.7 3.6   CHLORIDE mmol/L 101 105 102 100   CO2 mmol/L 25.5 20.1* 17.0* 20.1*   BUN mg/dL 3* 5* 7 12   CREATININE mg/dL 0.44* 0.52* 0.43* 0.66   GLUCOSE mg/dL 84 90 93 105*   Estimated Creatinine  Clearance: 152.1 mL/min (A) (by C-G formula based on SCr of 0.44 mg/dL (L)).     Results from last 7 days  Lab Units 07/03/18  0817 07/02/18  0551 07/01/18  0620 06/30/18  0339 06/29/18  1010   CALCIUM mg/dL 8.1*  --  8.7 8.8 9.1   ALBUMIN g/dL 3.20*  --  3.40* 3.30* 3.90   MAGNESIUM mg/dL 1.8 1.4*  --  4.2* 1.1*            pantoprazole 40 mg Intravenous Q12H       Diet Soft Texture; Whole Foods; Thin; Cardiac           Assessment/Plan               Active Hospital Problems     Diagnosis Date Noted   • **Alcohol withdrawal syndrome, with delirium (CMS/HCC) [F10.231] 06/29/2018   • Hypotension [I95.9] 07/01/2018   • Alcoholic hepatitis [K70.10] 06/29/2018   • GI bleed [K92.2] 06/29/2018   • Acute post-hemorrhagic anemia [D62] 06/29/2018   • Thrombocytopenia (CMS/HCC) [D69.6] 06/29/2018   • Open wound of right shoulder region [S41.001A] 06/29/2018   • Hypothyroidism [E03.9] 06/05/2017       Resolved Hospital Problems     Diagnosis Date Noted Date Resolved   No resolved problems to display.      · Alcohol withdrawal with delirium tremens: CIWA 0, no ativan in last 24h. Doing well of Librium and is more awake.  She is 6 days out from last drink and I think she's now come out of withdrawal. Continue needed Ativan per protocol.  Multivitamins. She continues to improve. Consulted access Center.  · Pancytopenia: Secondary to alcohol use and bactrim BUT given father's history of leukopenia I've asked heme/onc to see, watch closely   · Hypotension: resolved  · Elevated LFTs: Suspect mild alcoholic hepatitis, ammonia level normal  · Chronic pain: restarted oxycodone to prevent withdrawal from this  · Lower GI bleed: Bright red blood per rectum in the emergency room, hemoglobin is low and slowly decreasing.  Appreciate GI-likely has hemorrhoids, may need endoscopy  · Metabolic acidosis: suspect ketosis, resolved   · Open wound of the right shoulder: Consulted orthopedic surgery days ago. Awaiting eval to give OK to go back to  the [place]  · Replace K and magnesium  · Asked PT   · SCDs     D/W RN    DISPO: [facility] tomorrow     Jaime Davis MD  [facility]  07/03/18  12:16 PM           AMBAR Dunn Nurse Practitioner Cos[facility] Needed Gastroenterology  Progress Notes Date of Service: 7/3/2018 10:12 AM      Expand All Collapse All    []Manual[]Template  []Copied  [facility]  Inpatient Progress Note     Reason for Follow Up:  GI bleed, alcohol withdrawl        Subjective         Interval History:   C/o lower abdominal soreness but better. C/o shoulder pain.    No further rectal bleeding.  She reports feeling anxious and is seeing people in room that are not there     Current Facility-Administered Medications:   •  acetaminophen (TYLENOL) tablet 650 mg, 650 mg, Oral, Q4H PRN, Jaime Davis MD  •  hydrocortisone (ANUSOL-HC) suppository 25 mg, 25 mg, Rectal, BID PRN, Kwaku Sawant MD  •  LORazepam (ATIVAN) tablet 1 mg, 1 mg, Oral, Q2H PRN, 1 mg at 07/02/18 2238 **OR** LORazepam (ATIVAN) injection 1 mg, 1 mg, Intravenous, Q2H PRN, 1 mg at 06/30/18 1039 **OR** LORazepam (ATIVAN) tablet 2 mg, 2 mg, Oral, Q1H PRN **OR** LORazepam (ATIVAN) injection 2 mg, 2 mg, Intravenous, Q1H PRN, 2 mg at 07/01/18 0537 **OR** LORazepam (ATIVAN) injection 2 mg, 2 mg, Intravenous, Q15 Min PRN, 2 mg at 06/29/18 1619 **OR** LORazepam (ATIVAN) injection 2 mg, 2 mg, Intramuscular, Q15 Min PRN, Jaime Davis MD  •  Magnesium Sulfate 2 gram Bolus, followed by 8 gram infusion (total Mg dose 10 grams)- Mg less than or equal to 1mg/dL, 2 g, Intravenous, PRN **OR** Magnesium Sulfate 2 gram / 50mL Infusion (GIVE X 3 BAGS TO EQUAL 6GM TOTAL DOSE) - Mg 1.1 - 1/5 mg/dl, 2 g, Intravenous, PRN, Last Rate: 25 mL/hr at 07/02/18 1443, 2 g at 07/02/18 1443 **OR** Magnesium Sulfate 4 gram infusion- Mg 1.6-1.9 mg/dL, 4 g, Intravenous, PRN, Jaime Davis MD  •  ondansetron (ZOFRAN) tablet 4 mg, 4  mg, Oral, Q6H PRN **OR** ondansetron ODT (ZOFRAN-ODT) disintegrating tablet 4 mg, 4 mg, Oral, Q6H PRN **OR** ondansetron (ZOFRAN) injection 4 mg, 4 mg, Intravenous, Q6H PRN, Jaime Davis MD  •  oxyCODONE (ROXICODONE) immediate release tablet 10 mg, 10 mg, Oral, Q4H PRN, Jaime Davis MD, 10 mg at 07/03/18 0626  •  pantoprazole (PROTONIX) injection 40 mg, 40 mg, Intravenous, Q12H, Jaime Davis MD, 40 mg at 07/03/18 0844  •  sodium chloride 0.9 % flush 1-10 mL, 1-10 mL, Intravenous, PRN, Jaime Davis MD  •  sodium chloride 0.9 % flush 10 mL, 10 mL, Intravenous, PRN, Ken Mar MD  Review of Systems:               All systems were reviewed and negative except for:  Gastrointestinal: postitive for  pain  Musculoskeletal: positive for  muscle pain  Behavioral/Psych: positive for  anxiety        Objective         Vital Signs  Temp:  [97.4 °F (36.3 °C)-98.4 °F (36.9 °C)] 97.6 °F (36.4 °C)  Heart Rate:  [] 82  Resp:  [16] 16  BP: (104-112)/(70-77) 109/74  Body mass index is 30.82 kg/m².     Intake/Output Summary (Last 24 hours) at 07/03/18 1012  Last data filed at 07/03/18 0900    Gross per 24 hour   Intake              700 ml   Output              750 ml   Net              -50 ml      I/O this shift:  In: 360 [P.O.:360]  Out: -                 Physical Exam:              General: patient awake, alert and cooperative              Eyes: Normal lids and lashes, no scleral icterus              Neck: supple, normal ROM              Skin: warm and dry, not jaundiced              Cardiovascular: regular rhythm and rate, no murmurs auscultated              Pulm: clear to auscultation bilaterally, regular and unlabored              Abdomen: soft, nontender, nondistended; no significant hematoma noted              Rectal: deferred              Extremities: no rash or edema              Psychiatric: Normal mood and behavior; memory intact                Results Review:                I  reviewed the patient's new clinical results.        Results from last 7 days  Lab Units 07/03/18  0006 07/02/18  1606 07/02/18  0551   07/01/18  0620 06/30/18  0339   WBC 10*3/mm3  --   --  3.92*  --  4.20*  --  4.08*   HEMOGLOBIN g/dL 7.6* 7.2* 7.3*  < > 8.5*  < > 8.1*   HEMATOCRIT % 23.1* 21.7* 21.7*  < > 26.1*  < > 24.6*   PLATELETS 10*3/mm3  --   --  50*  --  48*  --  35*   < > = values in this interval not displayed.     Results from last 7 days  Lab Units 07/03/18  0817 07/01/18  0620 06/30/18  0339   SODIUM mmol/L 137 137 135*   POTASSIUM mmol/L 2.9* 3.4* 3.7   CHLORIDE mmol/L 101 105 102   CO2 mmol/L 25.5 20.1* 17.0*   BUN mg/dL 3* 5* 7   CREATININE mg/dL 0.44* 0.52* 0.43*   CALCIUM mg/dL 8.1* 8.7 8.8   BILIRUBIN mg/dL 0.6 0.7 1.0   ALK PHOS U/L 132* 107 107   ALT (SGPT) U/L 31 40* 46*   AST (SGOT) U/L 46* 45* 42*   GLUCOSE mg/dL 84 90 93         Results from last 7 days  Lab Units 07/01/18  0620 06/29/18  1010   INR   1.18* 1.21*         Lab Results  Lab Value Date/Time   LIPASE 25 06/18/2017 1951         Radiology:  CT Abdomen Pelvis With Contrast   Final Result   Right infraumbilical bulky rectus muscle that appears   heterogeneous suggestive of abdominal wall hematoma.       These findings were discussed with Dr. Mar by telephone.       Radiation dose reduction techniques were utilized, including automated   exposure control and exposure modulation based on body size.       This report was finalized on 7/2/2018 10:51 AM by Dr. Rodrigo Abdalla M.D.           CT Head Without Contrast   Final Result   The study is hampered by patient motion but no acute process   identified. Further evaluation could be performed with a MRI examination   of brain as indicated.        The above information was called to and discussed with Dr. Mar.               Radiation dose reduction techniques were utilized, including automated   exposure control and exposure modulation based on body size.       This report  was finalized on 6/29/2018 5:05 PM by Dr. Michael Ortiz M.D.           XR Chest 1 View   Final Result   Negative.       This report was finalized on 6/29/2018 12:30 PM by Dr. Ag Cheng M.D.           XR Shoulder 2+ View Right   Final Result   Postsurgical change at the right clavicle.        I discussed the case with Dr. Mar.       This report was finalized on 6/29/2018 12:20 PM by Dr. Ag Cheng M.D.                    Assessment/Plan             Patient Active Problem List   Diagnosis   • Irritable bowel syndrome with both constipation and diarrhea   • Peripheral neuropathy   • Vitamin D deficiency   • History of HPV infection   • Hypothyroidism   • Cigarette smoker   • Acute kidney injury (CMS/HCC)   • Ascites   • UTI (urinary tract infection)   • Alcohol withdrawal syndrome, with delirium (CMS/HCC)   • Alcoholic hepatitis   • GI bleed   • Acute post-hemorrhagic anemia   • Thrombocytopenia (CMS/HCC)   • Open wound of right shoulder region   • Hypotension            I discussed the patients findings and my recommendations with patient.     AMBAR Dunn

## 2018-07-03 NOTE — PROGRESS NOTES
Name: Bekah Gibson ADMIT: 2018   : 1970  PCP: No Known Provider    MRN: [id number] LOS: 4 days   AGE/SEX: 48 y.o. female  ROOM: [facility]   Subjective     Mental status back to baseline, c/o chronic dizziness and trouble walking.  Complains of mild shoulder pain.  No shortness breath, chest pain, cough nausea or vomiting       Objective   Vital Signs  Temp:  [97.4 °F (36.3 °C)-98.4 °F (36.9 °C)] 97.6 °F (36.4 °C)  Heart Rate:  [] 82  Resp:  [16] 16  BP: (104-112)/(70-77) 109/74  SpO2:  [100 %] 100 %  on   ;   Device (Oxygen Therapy): room air  Body mass index is 30.82 kg/m².    Physical Exam   Constitutional: She appears well-developed. No distress.   Alcohol withdrawals resolved     HENT:   Head: Normocephalic and atraumatic.   Cardiovascular: Normal rate and regular rhythm.    Pulmonary/Chest: Effort normal and breath sounds normal. No respiratory distress.   Abdominal: Bowel sounds are normal. She exhibits no distension. There is no tenderness. There is no guarding.   Neurological: She is alert.   No tremors  Oriented to person, [place] but thought it was [place],  but thought it was       Skin: Skin is warm and dry. No erythema.   Psychiatric: She has a normal mood and affect. Her speech is slurred. She is slowed.   Slurring of speech is better     Vitals reviewed.      Results Review:       I reviewed the patient's new clinical results.    Results from last 7 days  Lab Units 18  0006 18  1606 18  0551 18  0014  18  0620  18  0339  18  1010   WBC 10*3/mm3  --   --  3.92*  --   --  4.20*  --  4.08*  --  5.11   HEMOGLOBIN g/dL 7.6* 7.2* 7.3* 7.6*  < > 8.5*  < > 8.1*  < > 8.6*   PLATELETS 10*3/mm3  --   --  50*  --   --  48*  --  35*  --  58*   < > = values in this interval not displayed.    Results from last 7 days  Lab Units 18  0817 18  0620 18  0339 18  1010   SODIUM mmol/L 137 137 135* 135*   POTASSIUM mmol/L 2.9*  3.4* 3.7 3.6   CHLORIDE mmol/L 101 105 102 100   CO2 mmol/L 25.5 20.1* 17.0* 20.1*   BUN mg/dL 3* 5* 7 12   CREATININE mg/dL 0.44* 0.52* 0.43* 0.66   GLUCOSE mg/dL 84 90 93 105*   Estimated Creatinine Clearance: 152.1 mL/min (A) (by C-G formula based on SCr of 0.44 mg/dL (L)).    Results from last 7 days  Lab Units 07/03/18  0817 07/02/18  0551 07/01/18  0620 06/30/18  0339 06/29/18  1010   CALCIUM mg/dL 8.1*  --  8.7 8.8 9.1   ALBUMIN g/dL 3.20*  --  3.40* 3.30* 3.90   MAGNESIUM mg/dL 1.8 1.4*  --  4.2* 1.1*         pantoprazole 40 mg Intravenous Q12H      Diet Soft Texture; Whole Foods; Thin; Cardiac      Assessment/Plan      Active Hospital Problems    Diagnosis Date Noted   • **Alcohol withdrawal syndrome, with delirium (CMS/HCC) [F10.231] 06/29/2018   • Hypotension [I95.9] 07/01/2018   • Alcoholic hepatitis [K70.10] 06/29/2018   • GI bleed [K92.2] 06/29/2018   • Acute post-hemorrhagic anemia [D62] 06/29/2018   • Thrombocytopenia (CMS/HCC) [D69.6] 06/29/2018   • Open wound of right shoulder region [S41.001A] 06/29/2018   • Hypothyroidism [E03.9] 06/05/2017      Resolved Hospital Problems    Diagnosis Date Noted Date Resolved   No resolved problems to display.     · Alcohol withdrawal with delirium tremens: CIWA 0, no ativan in last 24h. Doing well of Librium and is more awake.  She is 6 days out from last drink and I think she's now come out of withdrawal. Continue needed Ativan per protocol.  Multivitamins. She continues to improve. Consulted access Center.  · Pancytopenia: Secondary to alcohol use and bactrim BUT given father's history of leukopenia I've asked heme/onc to see, watch closely   · Hypotension: resolved  · Elevated LFTs: Suspect mild alcoholic hepatitis, ammonia level normal  · Chronic pain: restarted oxycodone to prevent withdrawal from this  · Lower GI bleed: Bright red blood per rectum in the emergency room, hemoglobin is low and slowly decreasing.  Appreciate GI-likely has hemorrhoids, may  need endoscopy  · Metabolic acidosis: suspect ketosis, resolved   · Open wound of the right shoulder: Consulted orthopedic surgery days ago. Awaiting eval to give OK to go back to the [facility]  · Replace K and magnesium  · Asked PT   · SCDs    D/W RN    DISPO: [facility] tomorrow    Jaime Davis MD  [facility]  07/03/18  12:16 PM

## 2018-07-03 NOTE — PROGRESS NOTES
"  Subjective   Reason for consultation  1-pancytopenia-new since 3/18  2.  Alcohol abuse    History of Present Illness  patient is a 48-year-old female admitted from alcohol and drug addiction facility confusion due to alcohol withdrawal and was found to have progressive pancytopenia.  Her blood counts were all normal in March of this year and this is a new finding.  She had been drinking fairly heavily and was being admitted for alcoholic addiction.     Patient fell on the ice and broke her right clavicle in January and she's had trouble with healing and had surgery 3 weeks ago for this.  She has been on antibiotics but this was stopped at admission. Bactrim+levaquin     Interestingly her urine drug screen showed methadone opiates and methamphetamine/     Interval history:  7/3  More alert and coherent-no fever or bleeding  Hemoglobin stable white count and platelets not drawn today  We'll follow    Past Medical History, Past Surgical History, Social History, Family History have been reviewed and are without significant changes except as mentioned.    Review of Systems   A comprehensive 14 point review of systems was performed and was negative except as mentioned.    Medications:  The current medication list was reviewed in the EMR    ALLERGIES:    Allergies   Allergen Reactions   • Morphine      ITCHING   • Morphine And Related    • Phenergan [Promethazine Hcl] Hives   • Tessalon [Benzonatate] Nausea Only   • Cucumber Extract Rash   • Promethazine-Phenylephrine Other (See Comments)     \"FEEL WEIRD\"       Objective      Vitals:    07/02/18 2253 07/03/18 0500 07/03/18 0700 07/03/18 1300   BP: 112/77  109/74 116/86   BP Location: Left arm  Left arm Left arm   Patient Position: Lying  Lying Lying   Pulse: 103  82 84   Resp: 16 16 18   Temp: 98.4 °F (36.9 °C)  97.6 °F (36.4 °C) 97.8 °F (36.6 °C)   TempSrc: Oral  Oral Oral   SpO2: 100%  100%    Weight:  Comment: Function Disabled; Bed will not zero     Height:     "     No flowsheet data found.    Physical Exam  Physical Exam    GENERAL:  Well-developed, well-nourished in no acute distress.   SKIN:  Warm, dry without rashes, ecchymosis on her legs and back.  EYES:  Pupils equal, round and reactive to light.  EOMs intact.  Conjunctivae normal.  EARS:  Hearing intact.  NOSE:  Septum midline.  No excoriations or nasal discharge.  MOUTH:  Tongue is well-papillated; no stomatitis or ulcers.  Lips normal.  THROAT:  Oropharynx without lesions or exudates.  NECK:  Supple with good range of motion; no thyromegaly or masses, no JVD.  LYMPHATICS:  No cervical, supraclavicular, axillary or inguinal adenopathy.  CHEST:  Lungs clear to auscultation. Good airflow.  CARDIAC:  Regular rate and rhythm without murmurs, rubs or gallops. Normal S1,S2.  ABDOMEN:  Soft, nontender with no hepatosplenomegaly or masses.  EXTREMITIES:  No clubbing, cyanosis or edema.  NEUROLOGICAL:  Cranial Nerves II-XII grossly intact.  No focal neurological deficits.  PSYCHIATRIC:  Normal affect and mood.         RECENT LABS:  Hematology WBC   Date Value Ref Range Status   07/02/2018 3.92 (L) 4.50 - 10.70 10*3/mm3 Final     RBC   Date Value Ref Range Status   07/02/2018 1.91 (L) 3.90 - 5.20 10*6/mm3 Final     Hemoglobin   Date Value Ref Range Status   07/03/2018 7.6 (L) 11.9 - 15.5 g/dL Final     Hematocrit   Date Value Ref Range Status   07/03/2018 23.1 (L) 35.6 - 45.5 % Final     Platelets   Date Value Ref Range Status   07/02/2018 50 (L) 140 - 500 10*3/mm3 Final     B12 folate and iron stores were normal.         Assessment/Plan   1.  New onset pancytopenia since 3/18 likely a combination of alcohol and Bactrim  2.  Alcohol abuse with withdrawal  3.  Malunion right clavicle with repeat surgery no obvious significant wound infection     Plan  1.  Follow Blood counts -should recover on their own from alcohol suppression and Bactrim  2. Transfuse if her hemoglobin drops below 7                     7/3/2018      CC:

## 2018-07-03 NOTE — PLAN OF CARE
Problem: Patient Care Overview  Goal: Plan of Care Review   07/03/18 1732   Coping/Psychosocial   Plan of Care Reviewed With patient   Plan of Care Review   Progress improving   OTHER   Outcome Summary Pain meds q4h. CIWA-3-4. No ativan required. worked with PT. D/C to [facility]. CT[facility]

## 2018-07-03 NOTE — PROGRESS NOTES
"[facility] did follow up with pt. Pt states she is planning to go to [facility] upon d/c from [facility]. Pt was mumbling some but clear at times. She stated she was \"hungry\" which she stated was \"a good thing\" since she had not eaten in 4 days. Access will follow.  "

## 2018-07-03 NOTE — CONSULTS
"   Orthopedic Consult      Patient: Bekah Gibson    Date of Admission: 6/29/2018  9:51 AM    YOB: 1970    Medical Record Number: [id number]    Consulting Physician: Jaime Davis MD    Chief Complaints: Consult for right shoulder wound    History of Present Illness: 48 y.o. female admitted to [facility] several days ago to services of Jaime Davis MD with alcohol withdrawal and delirium tremens.  The patient is a poor historian.  She tells me that she fractured her clavicle back in January.  She had surgery for this with Dr. Felder.  She tells me that she went back in to see Dr. Felder and was told that \"it was a disaster\".  She tells me that at some point she was then referred to [place] and had a second surgery.  She had yet another surgery approximately 2 weeks ago with another physician.  She cannot remember the physician's name but she thinks it might have been Dr. Mcdowell.  She describes her current pain as mild, constant, and aching.  She does not recall having injured the shoulder since the surgery but she is unsure.    Allergies:   Allergies   Allergen Reactions   • Morphine      ITCHING   • Morphine And Related    • Phenergan [Promethazine Hcl] Hives   • Tessalon [Benzonatate] Nausea Only   • Cucumber Extract Rash   • Promethazine-Phenylephrine Other (See Comments)     \"FEEL WEIRD\"       Home Medications:    Current Facility-Administered Medications:   •  acetaminophen (TYLENOL) tablet 650 mg, 650 mg, Oral, Q4H PRN, Jaime Davis MD  •  hydrocortisone (ANUSOL-HC) suppository 25 mg, 25 mg, Rectal, BID PRN, Kwaku Sawant MD  •  LORazepam (ATIVAN) tablet 1 mg, 1 mg, Oral, Q2H PRN, 1 mg at 07/02/18 2238 **OR** LORazepam (ATIVAN) injection 1 mg, 1 mg, Intravenous, Q2H PRN, 1 mg at 06/30/18 1039 **OR** LORazepam (ATIVAN) tablet 2 mg, 2 mg, Oral, Q1H PRN **OR** LORazepam (ATIVAN) injection 2 mg, 2 mg, Intravenous, Q1H PRN, 2 mg at 07/01/18 0937 " **OR** LORazepam (ATIVAN) injection 2 mg, 2 mg, Intravenous, Q15 Min PRN, 2 mg at 06/29/18 1619 **OR** LORazepam (ATIVAN) injection 2 mg, 2 mg, Intramuscular, Q15 Min PRN, Jaime Davis MD  •  Magnesium Sulfate 2 gram Bolus, followed by 8 gram infusion (total Mg dose 10 grams)- Mg less than or equal to 1mg/dL, 2 g, Intravenous, PRN **OR** Magnesium Sulfate 2 gram / 50mL Infusion (GIVE X 3 BAGS TO EQUAL 6GM TOTAL DOSE) - Mg 1.1 - 1/5 mg/dl, 2 g, Intravenous, PRN, Last Rate: 25 mL/hr at 07/02/18 1443, 2 g at 07/02/18 1443 **OR** Magnesium Sulfate 4 gram infusion- Mg 1.6-1.9 mg/dL, 4 g, Intravenous, PRN, Jaime Davis MD  •  ondansetron (ZOFRAN) tablet 4 mg, 4 mg, Oral, Q6H PRN **OR** ondansetron ODT (ZOFRAN-ODT) disintegrating tablet 4 mg, 4 mg, Oral, Q6H PRN **OR** ondansetron (ZOFRAN) injection 4 mg, 4 mg, Intravenous, Q6H PRN, Jaime Davis MD  •  oxyCODONE (ROXICODONE) immediate release tablet 10 mg, 10 mg, Oral, Q4H PRN, Jaime Davis MD, 10 mg at 07/03/18 1020  •  pantoprazole (PROTONIX) injection 40 mg, 40 mg, Intravenous, Q12H, Jaime Davis MD, 40 mg at 07/03/18 0844  •  potassium chloride (KLOR-CON) packet 40 mEq, 40 mEq, Oral, PRN, Jaime Davis MD  •  potassium chloride (MICRO-K) CR capsule 40 mEq, 40 mEq, Oral, PRN, Jaime Davis MD, 40 mEq at 07/03/18 1242  •  sodium chloride 0.9 % flush 1-10 mL, 1-10 mL, Intravenous, PRN, Jaime Davis MD  •  sodium chloride 0.9 % flush 10 mL, 10 mL, Intravenous, PRN, Ken Mar MD    Current Medications:  Scheduled Meds:  pantoprazole 40 mg Intravenous Q12H     Continuous Infusions:   PRN Meds:.•  acetaminophen  •  hydrocortisone  •  LORazepam **OR** LORazepam **OR** LORazepam **OR** LORazepam **OR** LORazepam **OR** LORazepam  •  magnesium sulfate **OR** magnesium sulfate **OR** magnesium sulfate  •  ondansetron **OR** ondansetron ODT **OR** ondansetron  •  oxyCODONE  •  potassium chloride  •   potassium chloride  •  sodium chloride  •  sodium chloride    Past Medical History:   Diagnosis Date   • Anxiety    • Disease of thyroid gland    • ETOH abuse    • Hypertension    • Kidney stone    • Lupus    • Lupus    • MDD (major depressive disorder)    • Pancreatitis        Past Surgical History:   Procedure Laterality Date   • JOINT REPLACEMENT     • KIDNEY STONE SURGERY      LITHOTRIPSY AND STENT (R SIDE)       Social History     Occupational History   • Not on file.     Social History Main Topics   • Smoking status: Current Every Day Smoker     Packs/day: 0.50     Types: Cigarettes   • Smokeless tobacco: Never Used   • Alcohol use 3.0 - 6.0 oz/week     5 - 10 Shots of liquor per week      Comment: recovering alcoholic since 2015, sober 4 wks in Nov 2018 with relapse   • Drug use: No      Comment: used THC in college   • Sexual activity: Defer    Social History     Social History Narrative   • No narrative on file   see above as well    Family History   Problem Relation Age of Onset   • Thyroid disease Father    • Leukemia Father    • Drug abuse Brother        Review of Systems:     Constitutional:  Denies fever, shaking or chills   Eyes:  Denies change in visual acuity   HEENT:  Denies nasal congestion or sore throat   Respiratory:  Denies cough or shortness of breath   Cardiovascular:  Denies chest pain or edema  Endocrine: Denies tremors, palpitations, intolerance of heat or cold, polyuria, polydipsia.  GI:  Denies abdominal pain, nausea, vomiting, bloody stools or diarrhea  :  Denies frequency, urgency, incontinence, retention, or nocturia.  Musculoskeletal:  Denies numbness tingling or loss of motor function  Integument:  Denies rash, lesion or ulceration   Neurologic:  Denies headache or focal weakness, deficits  Heme:  Denies epistaxis, spontaneous or excessive bleeding, epistaxis, hematuria, melena, fatigue, enlarged or tender lymph nodes.      All other pertinent positives and negatives as noted  above in HPI.    Physical Exam: 48 y.o. female  Vitals:    07/04/18 0036 07/04/18 0155 07/04/18 0405 07/04/18 0713   BP: 112/81   121/84   BP Location: Left arm   Right arm   Patient Position: Sitting   Lying   Pulse: 93 90 89 84   Resp: 18   18   Temp: 98.4 °F (36.9 °C)   97.5 °F (36.4 °C)   TempSrc: Oral   Oral   SpO2: 97% 98% 97% 99%   Weight:       Height:         General:  Awake, alert. No acute distress.  She is oriented to person and place but has difficulty recalling the details of her history of present illness and is a poor historian    Head/Neck:  Normocephalic, atraumatic.  Conjunctiva and sclera clear.  Hearing adequate for the exam.  Neck is supple with normal ROM.    Psych:  Affect and demeanor appropriate.    CV:  Regular rate and rhythm.  Hemodynamically stable.    Lungs:  Good chest expansion, breathing unlabored.    Abdomen:  Soft.  Non-tender, non-distended.    Extremities:      Right shoulder:  There is an incision over the clavicle with partial wound dehiscence at the distal extent.  The sutures remain in place.  There is slight marginal erythema.  There is some slight serous sanguinous drainage at the site of the dehiscence.  No purulence.  No palpable fluid collections or fluctuance.  I can range her shoulder passively without significant discomfort.  She struggles with active motion.  I could get her to weakly fire her deltoid onexam but she reports diminished sensation in the axillary nerve distribution.  Arm and forearm are both soft.  She can weakly flex and extend the elbow and wrist as well as weakly perform  and pinch.  She seems to have diffusely diminished strength in the right arm as compared to the left.  Sensation is intact in the lower arm and hand.  Palpable radial pulse.  Brisk capillary refill.    All other extremities atraumatic without gross abnormality.       Diagnostic Tests:    Admission on 06/29/2018   No results displayed because visit has over 200 results.         Lab Results (last 24 hours)     Procedure Component Value Units Date/Time    Magnesium [[id number]]  (Normal) Collected:  07/03/18 0817    Specimen:  Blood Updated:  07/03/18 0911     Magnesium 1.8 mg/dL     Comprehensive Metabolic Panel [[id number]]  (Abnormal) Collected:  07/03/18 0817    Specimen:  Blood Updated:  07/03/18 0911     Glucose 84 mg/dL      BUN 3 (L) mg/dL      Creatinine 0.44 (L) mg/dL      Sodium 137 mmol/L      Potassium 2.9 (L) mmol/L      Chloride 101 mmol/L      CO2 25.5 mmol/L      Calcium 8.1 (L) mg/dL      Total Protein 5.5 (L) g/dL      Albumin 3.20 (L) g/dL      ALT (SGPT) 31 U/L      AST (SGOT) 46 (H) U/L      Alkaline Phosphatase 132 (H) U/L      Total Bilirubin 0.6 mg/dL      eGFR Non African Amer >150 mL/min/1.73      Globulin 2.3 gm/dL      A/G Ratio 1.4 g/dL      BUN/Creatinine Ratio 6.8 (L)     Anion Gap 10.5 mmol/L     Hemoglobin & Hematocrit, Blood [[id number]]  (Abnormal) Collected:  07/03/18 0006    Specimen:  Blood Updated:  07/03/18 0025     Hemoglobin 7.6 (L) g/dL      Hematocrit 23.1 (L) %     Hemoglobin & Hematocrit, Blood [[id number]]  (Abnormal) Collected:  07/02/18 1606    Specimen:  Blood Updated:  07/02/18 1620     Hemoglobin 7.2 (L) g/dL      Hematocrit 21.7 (L) %         Imaging: AP and AP axial views of the right shoulder are reviewed on the [employer] system along with the associated reports.  She has a clavicle plate which was dislodged and hook is now sitting above the acromion.  There is widening of the coracoclavicular interval.  She has a distal clavicle fracture but I cannot tell on the limited views if this is healing or not.    Assessment: Wound dehiscence status post plate fixation of clavicle fracture and failure of fixation of the hook plate    Plan:  She is not septic but she has a post-operative wound infection.  There is no urgency to intervene but she is certainly going to need further intervention.  I recommend putting her on oral suppressive  antibiotics and getting her back to her surgeon to discuss options.  It appears that she has fallen recently and dislodged the hook plate.  She can discuss how to address this problem for the long-term with her surgeon.  I am happy to follow her while she remains here.  If she is going to be here for some time then I can take over care.  It sounds like she is going to be leaving later today or tomorrow and so she needs to follow-up with her surgeon in early next week to discuss options.  Thank you for the consultation.  Please call with any questions or concerns.    Date: 7/3/2018    Giovanni Denise MD    CC: Jaime Davis MD

## 2018-07-03 NOTE — PROGRESS NOTES
[facility]  Inpatient Progress Note    Reason for Follow Up:  GI bleed, alcohol withdrawl    Subjective     Interval History:   C/o lower abdominal soreness but better. C/o shoulder pain.    No further rectal bleeding.  She reports feeling anxious and is seeing people in room that are not there    Current Facility-Administered Medications:   •  acetaminophen (TYLENOL) tablet 650 mg, 650 mg, Oral, Q4H PRN, Jaime Davis MD  •  hydrocortisone (ANUSOL-HC) suppository 25 mg, 25 mg, Rectal, BID PRN, Kwaku Sawant MD  •  LORazepam (ATIVAN) tablet 1 mg, 1 mg, Oral, Q2H PRN, 1 mg at 07/02/18 2238 **OR** LORazepam (ATIVAN) injection 1 mg, 1 mg, Intravenous, Q2H PRN, 1 mg at 06/30/18 1039 **OR** LORazepam (ATIVAN) tablet 2 mg, 2 mg, Oral, Q1H PRN **OR** LORazepam (ATIVAN) injection 2 mg, 2 mg, Intravenous, Q1H PRN, 2 mg at 07/01/18 0537 **OR** LORazepam (ATIVAN) injection 2 mg, 2 mg, Intravenous, Q15 Min PRN, 2 mg at 06/29/18 1619 **OR** LORazepam (ATIVAN) injection 2 mg, 2 mg, Intramuscular, Q15 Min PRN, Jaime Davis MD  •  Magnesium Sulfate 2 gram Bolus, followed by 8 gram infusion (total Mg dose 10 grams)- Mg less than or equal to 1mg/dL, 2 g, Intravenous, PRN **OR** Magnesium Sulfate 2 gram / 50mL Infusion (GIVE X 3 BAGS TO EQUAL 6GM TOTAL DOSE) - Mg 1.1 - 1/5 mg/dl, 2 g, Intravenous, PRN, Last Rate: 25 mL/hr at 07/02/18 1443, 2 g at 07/02/18 1443 **OR** Magnesium Sulfate 4 gram infusion- Mg 1.6-1.9 mg/dL, 4 g, Intravenous, PRN, Jaime Davis MD  •  ondansetron (ZOFRAN) tablet 4 mg, 4 mg, Oral, Q6H PRN **OR** ondansetron ODT (ZOFRAN-ODT) disintegrating tablet 4 mg, 4 mg, Oral, Q6H PRN **OR** ondansetron (ZOFRAN) injection 4 mg, 4 mg, Intravenous, Q6H PRN, Jaime Davis MD  •  oxyCODONE (ROXICODONE) immediate release tablet 10 mg, 10 mg, Oral, Q4H PRN, Jaime Davis MD, 10 mg at 07/03/18 0626  •  pantoprazole (PROTONIX) injection 40 mg, 40 mg,  Intravenous, Q12H, Jaime Davis MD, 40 mg at 07/03/18 0844  •  sodium chloride 0.9 % flush 1-10 mL, 1-10 mL, Intravenous, PRN, Jaime Davis MD  •  sodium chloride 0.9 % flush 10 mL, 10 mL, Intravenous, PRN, Ken Mar MD  Review of Systems:    All systems were reviewed and negative except for:  Gastrointestinal: postitive for  pain  Musculoskeletal: positive for  muscle pain  Behavioral/Psych: positive for  anxiety    Objective     Vital Signs  Temp:  [97.4 °F (36.3 °C)-98.4 °F (36.9 °C)] 97.6 °F (36.4 °C)  Heart Rate:  [] 82  Resp:  [16] 16  BP: (104-112)/(70-77) 109/74  Body mass index is 30.82 kg/m².    Intake/Output Summary (Last 24 hours) at 07/03/18 1012  Last data filed at 07/03/18 0900   Gross per 24 hour   Intake              700 ml   Output              750 ml   Net              -50 ml     I/O this shift:  In: 360 [P.O.:360]  Out: -      Physical Exam:   General: patient awake, alert and cooperative   Eyes: Normal lids and lashes, no scleral icterus   Neck: supple, normal ROM   Skin: warm and dry, not jaundiced   Cardiovascular: regular rhythm and rate, no murmurs auscultated   Pulm: clear to auscultation bilaterally, regular and unlabored   Abdomen: soft, nontender, nondistended; no significant hematoma noted   Rectal: deferred   Extremities: no rash or edema   Psychiatric: Normal mood and behavior; memory intact     Results Review:     I reviewed the patient's new clinical results.      Results from last 7 days  Lab Units 07/03/18  0006 07/02/18  1606 07/02/18  0551  07/01/18  0620  06/30/18  0339   WBC 10*3/mm3  --   --  3.92*  --  4.20*  --  4.08*   HEMOGLOBIN g/dL 7.6* 7.2* 7.3*  < > 8.5*  < > 8.1*   HEMATOCRIT % 23.1* 21.7* 21.7*  < > 26.1*  < > 24.6*   PLATELETS 10*3/mm3  --   --  50*  --  48*  --  35*   < > = values in this interval not displayed.    Results from last 7 days  Lab Units 07/03/18  0817 07/01/18  0620 06/30/18  0339   SODIUM mmol/L 137 137 135*   POTASSIUM  mmol/L 2.9* 3.4* 3.7   CHLORIDE mmol/L 101 105 102   CO2 mmol/L 25.5 20.1* 17.0*   BUN mg/dL 3* 5* 7   CREATININE mg/dL 0.44* 0.52* 0.43*   CALCIUM mg/dL 8.1* 8.7 8.8   BILIRUBIN mg/dL 0.6 0.7 1.0   ALK PHOS U/L 132* 107 107   ALT (SGPT) U/L 31 40* 46*   AST (SGOT) U/L 46* 45* 42*   GLUCOSE mg/dL 84 90 93       Results from last 7 days  Lab Units 07/01/18  0620 06/29/18  1010   INR  1.18* 1.21*       Lab Results  Lab Value Date/Time   LIPASE 25 06/18/2017 1951       Radiology:  CT Abdomen Pelvis With Contrast   Final Result   Right infraumbilical bulky rectus muscle that appears   heterogeneous suggestive of abdominal wall hematoma.       These findings were discussed with Dr. Mar by telephone.       Radiation dose reduction techniques were utilized, including automated   exposure control and exposure modulation based on body size.       This report was finalized on 7/2/2018 10:51 AM by Dr. Rodrigo Abdalla M.D.          CT Head Without Contrast   Final Result   The study is hampered by patient motion but no acute process   identified. Further evaluation could be performed with a MRI examination   of brain as indicated.        The above information was called to and discussed with Dr. Mar.               Radiation dose reduction techniques were utilized, including automated   exposure control and exposure modulation based on body size.       This report was finalized on 6/29/2018 5:05 PM by Dr. Michael Ortiz M.D.          XR Chest 1 View   Final Result   Negative.       This report was finalized on 6/29/2018 12:30 PM by Dr. Ag Cheng M.D.          XR Shoulder 2+ View Right   Final Result   Postsurgical change at the right clavicle.        I discussed the case with Dr. Mar.       This report was finalized on 6/29/2018 12:20 PM by Dr. Ag Cheng M.D.              Assessment/Plan     Patient Active Problem List   Diagnosis   • Irritable bowel syndrome with both constipation and diarrhea    • Peripheral neuropathy   • Vitamin D deficiency   • History of HPV infection   • Hypothyroidism   • Cigarette smoker   • Acute kidney injury (CMS/HCC)   • Ascites   • UTI (urinary tract infection)   • Alcohol withdrawal syndrome, with delirium (CMS/HCC)   • Alcoholic hepatitis   • GI bleed   • Acute post-hemorrhagic anemia   • Thrombocytopenia (CMS/HCC)   • Open wound of right shoulder region   • Hypotension          I discussed the patients findings and my recommendations with patient.    Jessica Fatima, APRN    C/o right shoulder pain.  Eating.  Bowels are moving - no further bleeding    abd - mildly tender over liver, otherwise soft and nontender    Labs reviewed    Assessment:     1. EtOH withdrawal/DTs -  librium d/c'd. Ativan PRN   2. EtOH hepatitis- improved    3. Rectal bleeding - ? Hemorrhoids vs secondary to possible enteritis/colitis on CT. She last had EGD and colonoscopy in 2015 by [facility].    4. Thrombocytopenia - secondary to EtOH abuse  5. Anemia/pancytopenia - H&H low but stable.  Likely BM suppression from etoh abuse     Plan:  -   Discharge with Anusol HC suppositories PRN, no rectal bleeding or pain currently    -   h/h trending down, no further bleeding - consider transfusion if continues to decline   -   DTs improving - plans for d/c tomorrow to the [place] noted  -   Ok to d/c from GI standpoint

## 2018-07-04 VITALS
DIASTOLIC BLOOD PRESSURE: 84 MMHG | OXYGEN SATURATION: 99 % | HEIGHT: 63 IN | SYSTOLIC BLOOD PRESSURE: 121 MMHG | RESPIRATION RATE: 18 BRPM | TEMPERATURE: 97.5 F | WEIGHT: 173.94 LBS | BODY MASS INDEX: 30.82 KG/M2 | HEART RATE: 84 BPM

## 2018-07-04 PROBLEM — K86.89 PANCREATIC INSUFFICIENCY: Status: ACTIVE | Noted: 2018-07-04

## 2018-07-04 PROBLEM — I95.9 HYPOTENSION: Status: RESOLVED | Noted: 2018-07-01 | Resolved: 2018-07-04

## 2018-07-04 LAB
Lab: NORMAL
OXYCODONE/OXYMORPH: NEGATIVE

## 2018-07-04 RX ORDER — FERROUS SULFATE 325(65) MG
325 TABLET ORAL
Qty: 30 TABLET | Refills: 0 | OUTPATIENT
Start: 2018-07-04 | End: 2019-06-28

## 2018-07-04 RX ORDER — FLUOXETINE HYDROCHLORIDE 20 MG/1
40 CAPSULE ORAL DAILY
Status: DISCONTINUED | OUTPATIENT
Start: 2018-07-04 | End: 2018-07-04 | Stop reason: HOSPADM

## 2018-07-04 RX ORDER — DOXYCYCLINE 100 MG/1
100 CAPSULE ORAL EVERY 12 HOURS SCHEDULED
Qty: 59 CAPSULE | Refills: 0 | Status: SHIPPED | OUTPATIENT
Start: 2018-07-04 | End: 2018-08-03

## 2018-07-04 RX ORDER — FOLIC ACID 1 MG/1
1 TABLET ORAL DAILY
Status: DISCONTINUED | OUTPATIENT
Start: 2018-07-04 | End: 2018-07-04 | Stop reason: HOSPADM

## 2018-07-04 RX ORDER — HYDROCORTISONE ACETATE 25 MG/1
25 SUPPOSITORY RECTAL 2 TIMES DAILY PRN
Qty: 24 EACH | Refills: 0 | OUTPATIENT
Start: 2018-07-04 | End: 2019-05-17

## 2018-07-04 RX ORDER — PANTOPRAZOLE SODIUM 40 MG/1
40 TABLET, DELAYED RELEASE ORAL
Qty: 60 TABLET | Refills: 0 | OUTPATIENT
Start: 2018-07-04 | End: 2019-05-17

## 2018-07-04 RX ORDER — OXYCODONE HYDROCHLORIDE 10 MG/1
10 TABLET ORAL EVERY 4 HOURS PRN
Qty: 18 TABLET | Refills: 0 | Status: SHIPPED | OUTPATIENT
Start: 2018-07-04 | End: 2018-07-10

## 2018-07-04 RX ORDER — LORAZEPAM 1 MG/1
2 TABLET ORAL EVERY 6 HOURS PRN
Status: DISCONTINUED | OUTPATIENT
Start: 2018-07-04 | End: 2018-07-04 | Stop reason: HOSPADM

## 2018-07-04 RX ADMIN — PANCRELIPASE 24000 UNITS OF LIPASE: 60000; 12000; 38000 CAPSULE, DELAYED RELEASE PELLETS ORAL at 08:45

## 2018-07-04 RX ADMIN — OXYCODONE HYDROCHLORIDE 10 MG: 5 TABLET ORAL at 06:58

## 2018-07-04 RX ADMIN — FLUOXETINE HYDROCHLORIDE 40 MG: 20 CAPSULE ORAL at 08:44

## 2018-07-04 RX ADMIN — LEVOTHYROXINE SODIUM 125 MCG: 125 TABLET ORAL at 06:58

## 2018-07-04 RX ADMIN — OXYCODONE HYDROCHLORIDE 10 MG: 5 TABLET ORAL at 00:36

## 2018-07-04 RX ADMIN — FOLIC ACID 1 MG: 1 TABLET ORAL at 08:44

## 2018-07-04 RX ADMIN — PANTOPRAZOLE SODIUM 40 MG: 40 TABLET, DELAYED RELEASE ORAL at 06:58

## 2018-07-04 RX ADMIN — DOXYCYCLINE 100 MG: 100 CAPSULE ORAL at 08:44

## 2018-07-04 NOTE — DISCHARGE SUMMARY
Name: Bekah Gibson  Age: 48 y.o.  Sex: female  :  1970  MRN: [id number]         Primary Care Physician: No Known Provider      Date of Admission:  2018  Date of Discharge:  2018      CHIEF COMPLAINT  Altered Mental Status      DISCHARGE DIAGNOSIS  Active Hospital Problems    Diagnosis Date Noted   • **Alcohol withdrawal syndrome, with delirium (CMS/HCC) [F10.231] 2018   • Pancreatic insufficiency [K86.89] 2018   • Alcoholic hepatitis [K70.10] 2018   • GI bleed [K92.2] 2018   • Acute post-hemorrhagic anemia [D62] 2018   • Thrombocytopenia (CMS/HCC) [D69.6] 2018   • Open wound of right shoulder region [S41.001A] 2018   • Hypothyroidism [E03.9] 2017   • Peripheral neuropathy [G62.9] 2017      Resolved Hospital Problems    Diagnosis Date Noted Date Resolved   • Hypotension [I95.9] 2018       SECONDARY DIAGNOSES  Past Medical History:   Diagnosis Date   • Anxiety    • Disease of thyroid gland    • ETOH abuse    • Hypertension    • Kidney stone    • Lupus    • Lupus    • MDD (major depressive disorder)    • Pancreatitis        CONSULTS   Consult Orders (all)     Start     Ordered    18 0800  Inpatient Case Management  Consult  Once     Provider:  (Not yet assigned)    18 0759    18 1133  Hematology & Oncology Inpatient Consult  Once     Specialty:  Hematology and Oncology  Provider:  Hola Leo MD    18 1132    18 1653  Inpatient Orthopedic Surgery Consult  Once     Specialty:  Orthopedic Surgery  Provider:  Mauri Patel MD    18 1653    18 1601  Inpatient Nutrition Consult  Once     Provider:  (Not yet assigned)    18 1601    18 1538  Inpatient Orthopedic Surgery Consult  Once,   Status:  Canceled     Specialty:  Orthopedic Surgery  Provider:  Marlen Auguste MD    18 1537    18 1520  Inpatient Gastroenterology Consult  Once      Specialty:  Gastroenterology  Provider:  Kwaku Sawant MD    06/29/18 1520    06/29/18 1519  Inpatient Case Management  Consult  Once     Provider:  (Not yet assigned)    06/29/18 1520    06/29/18 1519  Inpatient Access Center Consult  Once     Provider:  (Not yet assigned)    06/29/18 1520    06/29/18 1300  [facility] (on-call MD unless specified)  Once     Specialty:  Internal Medicine  Provider:  (Not yet assigned)    06/29/18 1259        Consulting Physician(s)     Provider Relationship Specialty    Hola Leo MD Consulting Physician Hematology and Oncology    Mauri Patel MD Consulting Physician Orthopedic Surgery            PROCEDURES PERFORMED    CT ABDOMEN AND PELVIS WITH CONTRAST      IMPRESSION:  Right infraumbilical bulky rectus muscle that appears  heterogeneous suggestive of abdominal wall hematoma.     CT HEAD WITHOUT CONTRAST    IMPRESSION:  The study is hampered by patient motion but no acute process  identified. Further evaluation could be performed with a MRI examination  of brain as indicated.     HOSPITAL COURSE    The patient is a 48-year-old female with a history of alcohol abuse, recent right shoulder surgery, previous alcohol withdrawal seizures, chronic pancreatic insufficiency who came to the hospital from the [facility] for alcohol withdrawal.  Initially history was completely unobtainable from the patient due to her altered mental status.  She was aggressively treated for her alcohol withdrawal with scheduled Librium and Ativan per protocol.  Fortunately she did not require intensive care unit and she improved with time.  On admission she did have some bright red blood per rectum and GI was consulted.  CT abdomen was also obtained and showed possible rectus sheath hematoma which I suspect is from a fall.  Her hemoglobin did drop but has remained overall pretty stable.  She was placed on Anusol suppositories for possible hemorrhoids.  She did not require any scopes.  Her  hemoglobin is now 7.6 I recommend rechecking this in a couple days.  In addition she had alcoholic hepatitis which remained pretty stable.  She had a low discriminate function and did not require steroids for it.  She had decreased mobility and physical therapy worked with her and have her walk with a walker which will be somewhat difficult due to her right shoulder injury.  I asked orthopedic surgery to see her as well and she's been placed on suppressive antibiotics.  She needs to follow-up with Dr. Mcdowell very shortly in a couple days for further care of this right shoulder chronic infection.  She was placed on doxycycline for this.  She also had pancytopenia and oncology saw her given her father's history of leukemia.  It was felt like her pancytopenia was bone marrow suppression from her alcohol use and Bactrim.  She did receive Ativan last night but this was to help her sleep and not for alcohol withdrawal.  She was taken off her scheduled Librium and prior to last night did not have any Ativan for 48 hours.  She is medically stable for discharge today.  She will go back to the [facility] today for continuation of her alcohol abuse treatment.  PHYSICAL EXAM  Temp:  [97.5 °F (36.4 °C)-98.5 °F (36.9 °C)] 97.5 °F (36.4 °C)  Heart Rate:  [82-93] 84  Resp:  [16-18] 18  BP: (112-121)/(81-86) 121/84  Body mass index is 30.82 kg/m².  Physical Exam  Constitutional: She appears well-developed. No distress. Sitting eating breakfast  Alcohol withdrawals resolved  Head: Normocephalic and atraumatic.   Cardiovascular: Normal rate and regular rhythm.    Pulmonary/Chest: Effort normal and breath sounds normal. No respiratory distress.   Abdominal: Bowel sounds are normal. She exhibits no distension. There is no tenderness. There is no guarding.   Neurological: She is alert.   No tremors  Oriented to person, Hospital but thought it was [place], year but thought it was June      Skin: Skin is warm and dry. No erythema.  "  Psychiatric: She has a normal mood and affect. Her speech is slurred. She is slowed.   Slurring of speech is better    CONDITION ON DISCHARGE  Stable.      DISCHARGE DISPOSITION   [facility]      ALLERGIES  Allergies   Allergen Reactions   • Morphine      ITCHING   • Morphine And Related    • Phenergan [Promethazine Hcl] Hives   • Tessalon [Benzonatate] Nausea Only   • Cucumber Extract Rash   • Promethazine-Phenylephrine Other (See Comments)     \"FEEL WEIRD\"         DISCHARGE MEDICATIONS     Your medication list      START taking these medications      Instructions Last Dose Given Next Dose Due   doxycycline 100 MG capsule  Commonly known as:  MONODOX      Take 1 capsule by mouth Every 12 (Twelve) Hours for 59 doses.       ferrous sulfate 325 (65 FE) MG tablet  Commonly known as:  IRON SUPPLEMENT      Take 1 tablet by mouth Daily With Breakfast.       hydrocortisone 25 MG suppository  Commonly known as:  ANUSOL-HC      Insert 1 suppository into the rectum 2 (Two) Times a Day As Needed for Hemorrhoids.       oxyCODONE 10 MG tablet  Commonly known as:  ROXICODONE      Take 1 tablet by mouth Every 4 (Four) Hours As Needed for Moderate Pain  for up to 6 days.       pantoprazole 40 MG EC tablet  Commonly known as:  PROTONIX      Take 1 tablet by mouth 2 (Two) Times a Day Before Meals.          CHANGE how you take these medications      Instructions Last Dose Given Next Dose Due   gabapentin 600 MG tablet  Commonly known as:  NEURONTIN  What changed:  · how much to take  · how to take this  · when to take this  · reasons to take this  · additional instructions      1 po once daily prn          CONTINUE taking these medications      Instructions Last Dose Given Next Dose Due   FLUoxetine 40 MG capsule  Commonly known as:  PROZAC      Take 1 capsule by mouth Daily.       folic acid 1 MG tablet  Commonly known as:  FOLVITE      Take 1 mg by mouth Daily.       levothyroxine 125 MCG tablet  Commonly known as:  SYNTHROID, " LEVOTHROID      Take 1 tablet by mouth Daily.       LORazepam 2 MG tablet  Commonly known as:  ATIVAN      Take 2 mg by mouth Every 6 (Six) Hours As Needed for Anxiety.       multivitamin capsule      Take 1 capsule by mouth Daily.       ondansetron 4 MG tablet  Commonly known as:  ZOFRAN      Take 4 mg by mouth Every 8 (Eight) Hours As Needed for Nausea or Vomiting.       pancrelipase (Lip-Prot-Amyl) 25501-64857 units capsule delayed-release particles capsule  Commonly known as:  CREON      Take 24,000 units of lipase by mouth 3 (Three) Times a Day With Meals.          STOP taking these medications    levoFLOXacin 750 MG tablet  Commonly known as:  LEVAQUIN        lisinopril 20 MG tablet  Commonly known as:  PRINIVIL,ZESTRIL              Where to Get Your Medications      These medications were sent to [address] - [phone]  - [phone]   [address] [place], [place] [address]    Phone:  [phone]   · doxycycline 100 MG capsule  · ferrous sulfate 325 (65 FE) MG tablet  · hydrocortisone 25 MG suppository  · pantoprazole 40 MG EC tablet     You can get these medications from any pharmacy    Bring a paper prescription for each of these medications  · oxyCODONE 10 MG tablet       Diet Instructions     Advance Diet As Tolerated            Activity Instructions     Activity as Tolerated         No future appointments.  Follow-up Information     No Known Provider .    Contact information:  [facility] [address]                   TEST  RESULTS PENDING AT DISCHARGE  None   Order Current Status    Oxycodone / Morphone Confirmation, Urine - Urine, Clean Catch In process             CODE STATUS  Code Status and Medical Interventions:   Ordered at: 06/29/18 8328     Code Status:    CPR     Medical Interventions (Level of Support Prior to Arrest):    Full           Jaime Davis MD  [facility]  07/04/18  7:56 AM      Time: greater than 30 minutes.

## 2018-07-04 NOTE — PLAN OF CARE
Problem: Fall Risk (Adult)  Goal: Absence of Fall  Outcome: Ongoing (interventions implemented as appropriate)      Problem: Skin Injury Risk (Adult)  Goal: Skin Health and Integrity  Outcome: Ongoing (interventions implemented as appropriate)      Problem: Patient Care Overview  Goal: Plan of Care Review  Outcome: Ongoing (interventions implemented as appropriate)   07/04/18 0412   Coping/Psychosocial   Plan of Care Reviewed With patient   Plan of Care Review   Progress improving   OTHER   Outcome Summary pt is alert and oriented, room air, up with assist, no falls, some anxiety/tearful, ativan given once before bed, continue to monitor     Goal: Individualization and Mutuality  Outcome: Ongoing (interventions implemented as appropriate)    Goal: Discharge Needs Assessment  Outcome: Ongoing (interventions implemented as appropriate)      Problem: Alcohol Withdrawal Acute, Risk/Actual (Adult)  Goal: Signs and Symptoms of Listed Potential Problems Will be Absent, Minimized or Managed (Alcohol Withdrawal Acute, Risk/Actual)  Outcome: Ongoing (interventions implemented as appropriate)      Problem: Nutrition, Imbalanced: Inadequate Oral Intake (Adult)  Goal: Improved Oral Intake  Outcome: Ongoing (interventions implemented as appropriate)    Goal: Prevent Further Weight Loss  Outcome: Ongoing (interventions implemented as appropriate)

## 2018-07-04 NOTE — PROGRESS NOTES
Continued Stay Note  [facility]     Patient Name: Bekah Gibson  MRN: [id number]  Today's Date: 7/4/2018    Admit Date: 6/29/2018          Discharge Plan     Row Name 07/04/18 0839       Plan    Plan Return back to the [facility]     Patient/Family in Agreement with Plan yes    Plan Comments Order for walker noted.  Spoke with pt's  who requests [facility].  Spoke with Sandra/Stephanie and order faxed.  [facility] to provide.   SHANE Goldsmith RN              Discharge Codes    No documentation.       Expected Discharge Date and Time     Expected Discharge Date Expected Discharge Time    Jul 4, 2018             Jennifer Goldsmith

## 2018-07-06 NOTE — PROGRESS NOTES
Case Management Discharge Note    Final Note: Pt was dc'd from hospital, plans were to go to the [facility] for eval to continue ETOH tx    Destination     No service has been selected for the patient.      Durable Medical Equipment     No service has been selected for the patient.      Dialysis/Infusion     No service has been selected for the patient.      Home Medical Care     No service has been selected for the patient.      Social Care     No service has been selected for the patient.        Other: Other    Final Discharge Disposition Code: 01 - home or self-care

## 2018-09-16 ENCOUNTER — HOSPITAL ENCOUNTER (EMERGENCY)
Facility: HOSPITAL | Age: 48
Discharge: HOME | End: 2018-09-16
Attending: EMERGENCY MEDICINE | Admitting: EMERGENCY MEDICINE

## 2018-09-16 VITALS
OXYGEN SATURATION: 97 % | DIASTOLIC BLOOD PRESSURE: 110 MMHG | RESPIRATION RATE: 16 BRPM | TEMPERATURE: 97.9 F | SYSTOLIC BLOOD PRESSURE: 151 MMHG | BODY MASS INDEX: 20.33 KG/M2 | WEIGHT: 122 LBS | HEART RATE: 88 BPM | HEIGHT: 65 IN

## 2018-09-16 DIAGNOSIS — K29.21 ACUTE ALCOHOLIC GASTRITIS WITH HEMORRHAGE: Primary | ICD-10-CM

## 2018-09-16 DIAGNOSIS — N39.0 ACUTE UTI: ICD-10-CM

## 2018-09-16 LAB
ALBUMIN SERPL-MCNC: 3.9 G/DL (ref 3.5–5.2)
ALBUMIN/GLOB SERPL: 1.3 G/DL
ALP SERPL-CCNC: 130 U/L (ref 39–117)
ALT SERPL W P-5'-P-CCNC: 21 U/L (ref 1–33)
ANION GAP SERPL CALCULATED.3IONS-SCNC: 16.5 MMOL/L
AST SERPL-CCNC: 69 U/L (ref 1–32)
BACTERIA UR QL AUTO: ABNORMAL /HPF
BASOPHILS # BLD AUTO: 0.02 10*3/MM3 (ref 0–0.2)
BASOPHILS NFR BLD AUTO: 0.6 % (ref 0–1.5)
BILIRUB SERPL-MCNC: 0.6 MG/DL (ref 0.1–1.2)
BILIRUB UR QL STRIP: NEGATIVE
BUN BLD-MCNC: 22 MG/DL (ref 6–20)
BUN/CREAT SERPL: 40 (ref 7–25)
CALCIUM SPEC-SCNC: 8.8 MG/DL (ref 8.6–10.5)
CHLORIDE SERPL-SCNC: 102 MMOL/L (ref 98–107)
CLARITY UR: ABNORMAL
CO2 SERPL-SCNC: 19.5 MMOL/L (ref 22–29)
COLOR UR: YELLOW
CREAT BLD-MCNC: 0.55 MG/DL (ref 0.57–1)
DEPRECATED RDW RBC AUTO: 47.5 FL (ref 37–54)
EOSINOPHIL # BLD AUTO: 0.04 10*3/MM3 (ref 0–0.7)
EOSINOPHIL NFR BLD AUTO: 1.1 % (ref 0.3–6.2)
ERYTHROCYTE [DISTWIDTH] IN BLOOD BY AUTOMATED COUNT: 14.1 % (ref 11.7–13)
ETHANOL BLD-MCNC: 192 MG/DL (ref 0–10)
ETHANOL UR QL: 0.19 %
GFR SERPL CREATININE-BSD FRML MDRD: 117 ML/MIN/1.73
GLOBULIN UR ELPH-MCNC: 3 GM/DL
GLUCOSE BLD-MCNC: 93 MG/DL (ref 65–99)
GLUCOSE UR STRIP-MCNC: NEGATIVE MG/DL
HCT VFR BLD AUTO: 34 % (ref 35.6–45.5)
HGB BLD-MCNC: 11 G/DL (ref 11.9–15.5)
HGB UR QL STRIP.AUTO: NEGATIVE
HYALINE CASTS UR QL AUTO: ABNORMAL /LPF
IMM GRANULOCYTES # BLD: 0 10*3/MM3 (ref 0–0.03)
IMM GRANULOCYTES NFR BLD: 0 % (ref 0–0.5)
KETONES UR QL STRIP: NEGATIVE
LEUKOCYTE ESTERASE UR QL STRIP.AUTO: ABNORMAL
LIPASE SERPL-CCNC: 29 U/L (ref 13–60)
LYMPHOCYTES # BLD AUTO: 1.09 10*3/MM3 (ref 0.9–4.8)
LYMPHOCYTES NFR BLD AUTO: 31.3 % (ref 19.6–45.3)
MCH RBC QN AUTO: 30 PG (ref 26.9–32)
MCHC RBC AUTO-ENTMCNC: 32.4 G/DL (ref 32.4–36.3)
MCV RBC AUTO: 92.6 FL (ref 80.5–98.2)
MONOCYTES # BLD AUTO: 0.29 10*3/MM3 (ref 0.2–1.2)
MONOCYTES NFR BLD AUTO: 8.3 % (ref 5–12)
NEUTROPHILS # BLD AUTO: 2.04 10*3/MM3 (ref 1.9–8.1)
NEUTROPHILS NFR BLD AUTO: 58.7 % (ref 42.7–76)
NITRITE UR QL STRIP: POSITIVE
PH UR STRIP.AUTO: 6 [PH] (ref 5–8)
PLATELET # BLD AUTO: 86 10*3/MM3 (ref 140–500)
PMV BLD AUTO: 11.2 FL (ref 6–12)
POTASSIUM BLD-SCNC: 3.1 MMOL/L (ref 3.5–5.2)
PROT SERPL-MCNC: 6.9 G/DL (ref 6–8.5)
PROT UR QL STRIP: ABNORMAL
RBC # BLD AUTO: 3.67 10*6/MM3 (ref 3.9–5.2)
RBC # UR: ABNORMAL /HPF
REF LAB TEST METHOD: ABNORMAL
SODIUM BLD-SCNC: 138 MMOL/L (ref 136–145)
SP GR UR STRIP: 1.03 (ref 1–1.03)
SQUAMOUS #/AREA URNS HPF: ABNORMAL /HPF
UROBILINOGEN UR QL STRIP: ABNORMAL
WBC NRBC COR # BLD: 3.48 10*3/MM3 (ref 4.5–10.7)
WBC UR QL AUTO: ABNORMAL /HPF

## 2018-09-16 PROCEDURE — 99284 EMERGENCY DEPT VISIT MOD MDM: CPT

## 2018-09-16 PROCEDURE — 96374 THER/PROPH/DIAG INJ IV PUSH: CPT

## 2018-09-16 PROCEDURE — 80307 DRUG TEST PRSMV CHEM ANLYZR: CPT | Performed by: NURSE PRACTITIONER

## 2018-09-16 PROCEDURE — 85025 COMPLETE CBC W/AUTO DIFF WBC: CPT | Performed by: NURSE PRACTITIONER

## 2018-09-16 PROCEDURE — 96361 HYDRATE IV INFUSION ADD-ON: CPT

## 2018-09-16 PROCEDURE — 81001 URINALYSIS AUTO W/SCOPE: CPT | Performed by: NURSE PRACTITIONER

## 2018-09-16 PROCEDURE — 83690 ASSAY OF LIPASE: CPT | Performed by: NURSE PRACTITIONER

## 2018-09-16 PROCEDURE — 25010000002 ONDANSETRON PER 1 MG: Performed by: NURSE PRACTITIONER

## 2018-09-16 PROCEDURE — 96375 TX/PRO/DX INJ NEW DRUG ADDON: CPT

## 2018-09-16 PROCEDURE — 80053 COMPREHEN METABOLIC PANEL: CPT | Performed by: NURSE PRACTITIONER

## 2018-09-16 RX ORDER — ONDANSETRON 2 MG/ML
4 INJECTION INTRAMUSCULAR; INTRAVENOUS ONCE
Status: COMPLETED | OUTPATIENT
Start: 2018-09-16 | End: 2018-09-16

## 2018-09-16 RX ORDER — CEPHALEXIN 250 MG/1
250 CAPSULE ORAL 3 TIMES DAILY
Qty: 15 CAPSULE | Refills: 0 | OUTPATIENT
Start: 2018-09-16 | End: 2019-05-17

## 2018-09-16 RX ORDER — PANTOPRAZOLE SODIUM 40 MG/10ML
80 INJECTION, POWDER, LYOPHILIZED, FOR SOLUTION INTRAVENOUS ONCE
Status: COMPLETED | OUTPATIENT
Start: 2018-09-16 | End: 2018-09-16

## 2018-09-16 RX ADMIN — PANTOPRAZOLE SODIUM 80 MG: 40 INJECTION, POWDER, FOR SOLUTION INTRAVENOUS at 12:09

## 2018-09-16 RX ADMIN — SODIUM CHLORIDE 1000 ML: 9 INJECTION, SOLUTION INTRAVENOUS at 10:52

## 2018-09-16 RX ADMIN — ONDANSETRON 4 MG: 2 INJECTION INTRAMUSCULAR; INTRAVENOUS at 10:58

## 2018-09-16 NOTE — ED TRIAGE NOTES
"Generalized abd. Pain started last night with dark stools since yesterday. Patient also states she wants medical clearance so she can go to the [facility] for alcohol detox. Pt reports nausea and vomiting. Last drink 0500 \"little bit\" of [place]. Daily drinker for last 3 weeks liquor states usually vodka less than half pint.   "

## 2018-09-16 NOTE — ED PROVIDER NOTES
EMERGENCY DEPARTMENT ENCOUNTER    CHIEF COMPLAINT  Chief Complaint: abdominal pain  History given by:patient  History limited by:nothing  Time Seen: 1050  Room Number: 24/24  PMD: Tylor Davis      HPI:  Pt is a 48 y.o. female who presents with abdominal pain that started last night.  Patient describes pain as burning and generalized.  Patient also reports nausea, vomiting and diarrhea but denies fever chills, urinary concerns or back pain.  Patient has a history of alcohol-induced pancreatitis.  Patient denies drinking yesterday.  Patient also denies any sick contacts, recent travel.  Patient states she finished a seven-day dose of doxycycline yesterday for an eye infection    Duration: since last night  Location: abd  Radiation:denies  Quality:burning  Intensity/Severity:10/10  Progression:persistent  Associated Symptoms:n/v/d  Aggravating Factors:denies  Alleviating Factors:denies  Previous Episodes:hx of pancreatitis and chronic abd pain  Treatment before arrival:nothing    PAST MEDICAL HISTORY  Active Ambulatory Problems     Diagnosis Date Noted   • Irritable bowel syndrome with both constipation and diarrhea 06/05/2017   • Peripheral neuropathy 06/05/2017   • Vitamin D deficiency 06/05/2017   • History of HPV infection 06/05/2017   • Hypothyroidism 06/05/2017   • Cigarette smoker 06/05/2017   • Acute kidney injury (CMS/HCC) 12/28/2015   • Ascites 06/06/2016   • UTI (urinary tract infection) 06/06/2016   • Alcohol withdrawal syndrome, with delirium (CMS/HCC) 06/29/2018   • Alcoholic hepatitis 06/29/2018   • GI bleed 06/29/2018   • Acute post-hemorrhagic anemia 06/29/2018   • Thrombocytopenia (CMS/HCC) 06/29/2018   • Open wound of right shoulder region 06/29/2018   • Pancreatic insufficiency 07/04/2018     Resolved Ambulatory Problems     Diagnosis Date Noted   • Acute renal failure (CMS/HCC) 02/20/2017   • Kidney stone 06/05/2017   • Hypotension 07/01/2018     Past Medical History:   Diagnosis Date   •  Anxiety    • Disease of thyroid gland    • ETOH abuse    • Hypertension    • Kidney stone    • Lupus    • Lupus    • MDD (major depressive disorder)    • Pancreatitis        PAST SURGICAL HISTORY  Past Surgical History:   Procedure Laterality Date   • JOINT REPLACEMENT     • KIDNEY STONE SURGERY      LITHOTRIPSY AND STENT (R SIDE)       FAMILY HISTORY  Family History   Problem Relation Age of Onset   • Thyroid disease Father    • Leukemia Father    • Drug abuse Brother        SOCIAL HISTORY  Social History     Social History   • Marital status:      Spouse name: N/A   • Number of children: N/A   • Years of education: N/A     Occupational History   • Not on file.     Social History Main Topics   • Smoking status: Current Every Day Smoker     Packs/day: 0.50     Types: Cigarettes   • Smokeless tobacco: Never Used   • Alcohol use 3.0 - 6.0 oz/week     5 - 10 Shots of liquor per week      Comment: recovering alcoholic since 2015, sober 4 wks in Nov 2018 with relapse   • Drug use: No      Comment: used THC in college   • Sexual activity: Defer     Other Topics Concern   • Not on file     Social History Narrative   • No narrative on file         ALLERGIES  Morphine; Morphine and related; Phenergan [promethazine hcl]; Tessalon [benzonatate]; Cucumber extract; and Promethazine-phenylephrine    REVIEW OF SYSTEMS  Review of Systems   Constitutional: Negative.  Negative for activity change, appetite change ( decreased), chills, fatigue and fever.   HENT: Negative.  Negative for congestion, ear pain, rhinorrhea and sore throat.    Eyes: Negative.    Respiratory: Negative.  Negative for cough and shortness of breath.    Cardiovascular: Negative.  Negative for chest pain, palpitations and leg swelling ( pedal).   Gastrointestinal: Positive for abdominal pain, diarrhea, nausea and vomiting. Negative for constipation.   Endocrine: Negative.    Genitourinary: Negative.  Negative for decreased urine volume, difficulty  urinating, dysuria, menstrual problem, pelvic pain, urgency and vaginal discharge.   Musculoskeletal: Negative.  Negative for back pain.   Skin: Negative.  Negative for rash.   Allergic/Immunologic: Negative.    Neurological: Negative.  Negative for dizziness, weakness, light-headedness, numbness and headaches.   Hematological: Negative.    Psychiatric/Behavioral: Negative.  The patient is not nervous/anxious.    All other systems reviewed and are negative.      PHYSICAL EXAM  ED Triage Vitals [09/16/18 1038]   Temp Heart Rate Resp BP SpO2   97.9 °F (36.6 °C) 87 18 125/82 99 %      Temp src Heart Rate Source Patient Position BP Location FiO2 (%)   Tympanic Monitor -- -- --       Physical Exam   Constitutional: She is well-developed, well-nourished, and in no distress. No distress.   HENT:   Head: Normocephalic and atraumatic.   Mouth/Throat: Oropharynx is clear and moist and mucous membranes are normal.   Eyes: Pupils are equal, round, and reactive to light.   Neck: Normal range of motion.   Cardiovascular: Normal rate, regular rhythm and normal heart sounds.    Pulmonary/Chest: Effort normal and breath sounds normal. She has no wheezes.   Abdominal: Soft. Bowel sounds are normal. There is no tenderness. There is no rebound and no guarding.   Musculoskeletal: Normal range of motion. She exhibits no edema.   Neurological: She is alert.   Skin: Skin is warm and dry. No rash noted.   Psychiatric: Mood, memory, affect and judgment normal.   Nursing note and vitals reviewed.      LAB RESULTS  Recent Results (from the past 24 hour(s))   Comprehensive Metabolic Panel    Collection Time: 09/16/18 10:51 AM   Result Value Ref Range    Glucose 93 65 - 99 mg/dL    BUN 22 (H) 6 - 20 mg/dL    Creatinine 0.55 (L) 0.57 - 1.00 mg/dL    Sodium 138 136 - 145 mmol/L    Potassium 3.1 (L) 3.5 - 5.2 mmol/L    Chloride 102 98 - 107 mmol/L    CO2 19.5 (L) 22.0 - 29.0 mmol/L    Calcium 8.8 8.6 - 10.5 mg/dL    Total Protein 6.9 6.0 - 8.5 g/dL     Albumin 3.90 3.50 - 5.20 g/dL    ALT (SGPT) 21 1 - 33 U/L    AST (SGOT) 69 (H) 1 - 32 U/L    Alkaline Phosphatase 130 (H) 39 - 117 U/L    Total Bilirubin 0.6 0.1 - 1.2 mg/dL    eGFR Non African Amer 117 >60 mL/min/1.73    Globulin 3.0 gm/dL    A/G Ratio 1.3 g/dL    BUN/Creatinine Ratio 40.0 (H) 7.0 - 25.0    Anion Gap 16.5 mmol/L   Lipase    Collection Time: 09/16/18 10:51 AM   Result Value Ref Range    Lipase 29 13 - 60 U/L   CBC Auto Differential    Collection Time: 09/16/18 10:51 AM   Result Value Ref Range    WBC 3.48 (L) 4.50 - 10.70 10*3/mm3    RBC 3.67 (L) 3.90 - 5.20 10*6/mm3    Hemoglobin 11.0 (L) 11.9 - 15.5 g/dL    Hematocrit 34.0 (L) 35.6 - 45.5 %    MCV 92.6 80.5 - 98.2 fL    MCH 30.0 26.9 - 32.0 pg    MCHC 32.4 32.4 - 36.3 g/dL    RDW 14.1 (H) 11.7 - 13.0 %    RDW-SD 47.5 37.0 - 54.0 fl    MPV 11.2 6.0 - 12.0 fL    Platelets 86 (L) 140 - 500 10*3/mm3    Neutrophil % 58.7 42.7 - 76.0 %    Lymphocyte % 31.3 19.6 - 45.3 %    Monocyte % 8.3 5.0 - 12.0 %    Eosinophil % 1.1 0.3 - 6.2 %    Basophil % 0.6 0.0 - 1.5 %    Immature Grans % 0.0 0.0 - 0.5 %    Neutrophils, Absolute 2.04 1.90 - 8.10 10*3/mm3    Lymphocytes, Absolute 1.09 0.90 - 4.80 10*3/mm3    Monocytes, Absolute 0.29 0.20 - 1.20 10*3/mm3    Eosinophils, Absolute 0.04 0.00 - 0.70 10*3/mm3    Basophils, Absolute 0.02 0.00 - 0.20 10*3/mm3    Immature Grans, Absolute 0.00 0.00 - 0.03 10*3/mm3   Ethanol    Collection Time: 09/16/18 10:51 AM   Result Value Ref Range    Ethanol 192 (H) 0 - 10 mg/dL    Ethanol % 0.192 %   Urinalysis With Microscopic If Indicated (No Culture) - Urine, Clean Catch    Collection Time: 09/16/18 11:27 AM   Result Value Ref Range    Color, UA Yellow Yellow, Straw    Appearance, UA Cloudy (A) Clear    pH, UA 6.0 5.0 - 8.0    Specific Gravity, UA 1.026 1.005 - 1.030    Glucose, UA Negative Negative    Ketones, UA Negative Negative    Bilirubin, UA Negative Negative    Blood, UA Negative Negative    Protein, UA Trace (A)  "Negative    Leuk Esterase, UA Moderate (2+) (A) Negative    Nitrite, UA Positive (A) Negative    Urobilinogen, UA 0.2 E.U./dL 0.2 - 1.0 E.U./dL   Urinalysis, Microscopic Only - Urine, Clean Catch    Collection Time: 09/16/18 11:27 AM   Result Value Ref Range    RBC, UA 0-2 None Seen, 0-2 /HPF    WBC, UA 21-30 (A) None Seen, 0-2 /HPF    Bacteria, UA 4+ (A) None Seen /HPF    Squamous Epithelial Cells, UA 3-6 (A) None Seen, 0-2 /HPF    Hyaline Casts, UA None Seen None Seen /LPF    Methodology Manual Light Microscopy        I ordered the above labs and reviewed the results    PROGRESS AND CONSULTS    Reviewed pt's history and workup with Dr. Ragsdale   After a bedside evaluation; Dr Ragsdale agrees with the plan of care    COURSE & MEDICAL DECISION MAKING  Pertinent Labs  studies that were ordered and reviewed are noted above.  Results were reviewed/discussed with the patient and they were also made aware of online assess.   Pt also made aware that some labs, such as cultures, will not be resulted during ER visit and follow up with PMD is necessary.     MEDICATIONS GIVEN IN ER  Medications   sodium chloride 0.9 % bolus 1,000 mL (1,000 mL Intravenous New Bag 9/16/18 1052)   ondansetron (ZOFRAN) injection 4 mg (4 mg Intravenous Given 9/16/18 1058)   pantoprazole (PROTONIX) injection 80 mg (80 mg Intravenous Given 9/16/18 1209)       /90   Pulse 78   Temp 97.9 °F (36.6 °C) (Tympanic)   Resp 18   Ht 165.1 cm (65\")   Wt 55.3 kg (122 lb)   LMP 01/01/2013 Comment: N[place]NAEL  SpO2 98%   BMI 20.30 kg/m²     Discussed all results and noted any abnormalities with patient.  Discussed absoute need to recheck abnormalities with PMD    Reviewed implications of results, diagnosis, meds, responsibility to follow up, warning signs and symptoms of possible worsening, potential complications and reasons to return to ER with patient    Discussed plan for discharge, as there is no emergent indication for admission.  Pt is agreeable and " understands need for follow up and repeat testing.  Pt is aware that discharge does not mean that nothing is wrong but it indicates no emergency is present and they must continue care with PMD.  Pt is discharged with instructions to follow up with primary care doctor to have their blood pressure rechecked.       DIAGNOSIS  Final diagnoses:   Acute alcoholic gastritis with hemorrhage   Acute UTI       FOLLOW UP   Tylor Davis  [address] [place] [id number] [phone]    Call         RX     Medication List      New Prescriptions    cephalexin 250 MG capsule  Commonly known as:  KEFLEX  Take 1 capsule by mouth 3 (Three) Times a Day.             Sofia Smyth, APRN  09/16/18 1247

## 2018-09-16 NOTE — ED PROVIDER NOTES
MD ATTESTATION NOTE    The PATO and I have discussed this patient's history, physical exam, and treatment plan.  I have reviewed the documentation and personally had a face to face interaction with the patient. I affirm the documentation and agree with the treatment and plan.  The attached note describes my personal findings.    Pt, with a h/x of alcohol induced pancreatitis, presents with diffuse abdominal pain that began last night. She also c/o N/V as well as chronic diarrhea. She states that her diarrhea has been dark and bloody since last night and that she had multiple BMs.     Limited Physical Exam:   Constitutional: awake, alert, oriented, NAD, pt does not show any indications of alcohol withdrawal at this time.  Heart: RRR  Lungs: CTAB  Abdomen: soft, non-tender, and non-distended    Informed Pt that her labs indicate that she has a UTI but her Hb is stable. Informed her that at this time, she can be discharged and that she needs to refrain from drinking alcohol in order to prevent gastric bleeding. Discussed plans for discharge and Pt has a ride here in the ER who agrees to take her home. Pt understands and agrees to all plans. All questions answered.      Miya Hassan  09/16/18 1217       Víctor Ragsdale MD  09/16/18 [id number]

## 2019-07-21 ENCOUNTER — HOSPITAL ENCOUNTER (INPATIENT)
Facility: HOSPITAL | Age: 49
LOS: 8 days | Discharge: HOME HEALTH | End: 2019-07-29
Attending: EMERGENCY MEDICINE | Admitting: INTERNAL MEDICINE

## 2019-07-21 DIAGNOSIS — F10.10 CHRONIC ALCOHOL ABUSE: ICD-10-CM

## 2019-07-21 DIAGNOSIS — E87.29 ALCOHOLIC KETOACIDOSIS: Primary | ICD-10-CM

## 2019-07-21 LAB
ALBUMIN SERPL-MCNC: 3.1 G/DL (ref 3.5–5.2)
ALBUMIN/GLOB SERPL: 0.8 G/DL
ALP SERPL-CCNC: 266 U/L (ref 39–117)
ALT SERPL W P-5'-P-CCNC: 30 U/L (ref 1–33)
AMPHET+METHAMPHET UR QL: NEGATIVE
AMPHETAMINES UR QL: NEGATIVE
ANION GAP SERPL CALCULATED.3IONS-SCNC: 19.4 MMOL/L (ref 5–15)
ANION GAP SERPL CALCULATED.3IONS-SCNC: 26.9 MMOL/L (ref 5–15)
APAP SERPL-MCNC: <5 MCG/ML (ref 10–30)
AST SERPL-CCNC: 119 U/L (ref 1–32)
BACTERIA UR QL AUTO: ABNORMAL /HPF
BARBITURATES UR QL SCN: NEGATIVE
BASOPHILS # BLD AUTO: 0.03 10*3/MM3 (ref 0–0.2)
BASOPHILS NFR BLD AUTO: 1 % (ref 0–1.5)
BENZODIAZ UR QL SCN: NEGATIVE
BILIRUB SERPL-MCNC: 1.7 MG/DL (ref 0.2–1.2)
BILIRUB UR QL STRIP: ABNORMAL
BUN BLD-MCNC: 4 MG/DL (ref 6–20)
BUN BLD-MCNC: 4 MG/DL (ref 6–20)
BUN/CREAT SERPL: 5.6 (ref 7–25)
BUN/CREAT SERPL: 6.2 (ref 7–25)
BUPRENORPHINE SERPL-MCNC: NEGATIVE NG/ML
CALCIUM SPEC-SCNC: 8 MG/DL (ref 8.6–10.5)
CALCIUM SPEC-SCNC: 8.2 MG/DL (ref 8.6–10.5)
CANNABINOIDS SERPL QL: NEGATIVE
CHLORIDE SERPL-SCNC: 95 MMOL/L (ref 98–107)
CHLORIDE SERPL-SCNC: 98 MMOL/L (ref 98–107)
CLARITY UR: ABNORMAL
CO2 SERPL-SCNC: 17.1 MMOL/L (ref 22–29)
CO2 SERPL-SCNC: 20.6 MMOL/L (ref 22–29)
COCAINE UR QL: NEGATIVE
COLOR UR: YELLOW
CREAT BLD-MCNC: 0.65 MG/DL (ref 0.57–1)
CREAT BLD-MCNC: 0.71 MG/DL (ref 0.57–1)
DEPRECATED RDW RBC AUTO: 61.4 FL (ref 37–54)
EOSINOPHIL # BLD AUTO: 0 10*3/MM3 (ref 0–0.4)
EOSINOPHIL NFR BLD AUTO: 0 % (ref 0.3–6.2)
ERYTHROCYTE [DISTWIDTH] IN BLOOD BY AUTOMATED COUNT: 16.5 % (ref 12.3–15.4)
ETHANOL BLD-MCNC: 55 MG/DL (ref 0–10)
ETHANOL UR QL: 0.06 %
GFR SERPL CREATININE-BSD FRML MDRD: 87 ML/MIN/1.73
GFR SERPL CREATININE-BSD FRML MDRD: 96 ML/MIN/1.73
GLOBULIN UR ELPH-MCNC: 3.8 GM/DL
GLUCOSE BLD-MCNC: 118 MG/DL (ref 65–99)
GLUCOSE BLD-MCNC: 132 MG/DL (ref 65–99)
GLUCOSE UR STRIP-MCNC: NEGATIVE MG/DL
HCT VFR BLD AUTO: 34.7 % (ref 34–46.6)
HGB BLD-MCNC: 10.6 G/DL (ref 12–15.9)
HGB UR QL STRIP.AUTO: ABNORMAL
HYALINE CASTS UR QL AUTO: ABNORMAL /LPF
IMM GRANULOCYTES # BLD AUTO: 0.02 10*3/MM3 (ref 0–0.05)
IMM GRANULOCYTES NFR BLD AUTO: 0.7 % (ref 0–0.5)
INR PPP: 1.46 (ref 0.9–1.1)
KETONES UR QL STRIP: NEGATIVE
LEUKOCYTE ESTERASE UR QL STRIP.AUTO: ABNORMAL
LIPASE SERPL-CCNC: 42 U/L (ref 13–60)
LYMPHOCYTES # BLD AUTO: 0.46 10*3/MM3 (ref 0.7–3.1)
LYMPHOCYTES NFR BLD AUTO: 16 % (ref 19.6–45.3)
MAGNESIUM SERPL-MCNC: 1.3 MG/DL (ref 1.6–2.6)
MAGNESIUM SERPL-MCNC: 1.7 MG/DL (ref 1.6–2.6)
MCH RBC QN AUTO: 30.8 PG (ref 26.6–33)
MCHC RBC AUTO-ENTMCNC: 30.5 G/DL (ref 31.5–35.7)
MCV RBC AUTO: 100.9 FL (ref 79–97)
METHADONE UR QL SCN: NEGATIVE
MONOCYTES # BLD AUTO: 0.46 10*3/MM3 (ref 0.1–0.9)
MONOCYTES NFR BLD AUTO: 16 % (ref 5–12)
NEUTROPHILS # BLD AUTO: 1.9 10*3/MM3 (ref 1.7–7)
NEUTROPHILS NFR BLD AUTO: 66.3 % (ref 42.7–76)
NITRITE UR QL STRIP: POSITIVE
OPIATES UR QL: POSITIVE
OXYCODONE UR QL SCN: NEGATIVE
PCP UR QL SCN: NEGATIVE
PH UR STRIP.AUTO: 6 [PH] (ref 4.5–8)
PHOSPHATE SERPL-MCNC: 2 MG/DL (ref 2.5–4.5)
PHOSPHATE SERPL-MCNC: 2 MG/DL (ref 2.5–4.5)
PLATELET # BLD AUTO: 79 10*3/MM3 (ref 140–450)
PMV BLD AUTO: 11.7 FL (ref 6–12)
POTASSIUM BLD-SCNC: 2.8 MMOL/L (ref 3.5–5.2)
POTASSIUM BLD-SCNC: 3.4 MMOL/L (ref 3.5–5.2)
PROPOXYPH UR QL: NEGATIVE
PROT SERPL-MCNC: 6.9 G/DL (ref 6–8.5)
PROT UR QL STRIP: ABNORMAL
PROTHROMBIN TIME: 17.4 SECONDS (ref 12.1–15)
RBC # BLD AUTO: 3.44 10*6/MM3 (ref 3.77–5.28)
RBC # UR: ABNORMAL /HPF
REF LAB TEST METHOD: ABNORMAL
SALICYLATES SERPL-MCNC: <3 MG/DL
SODIUM BLD-SCNC: 138 MMOL/L (ref 136–145)
SODIUM BLD-SCNC: 139 MMOL/L (ref 136–145)
SP GR UR STRIP: 1.01 (ref 1–1.03)
SQUAMOUS #/AREA URNS HPF: ABNORMAL /HPF
TRICYCLICS UR QL SCN: NEGATIVE
UROBILINOGEN UR QL STRIP: ABNORMAL
WBC NRBC COR # BLD: 2.87 10*3/MM3 (ref 3.4–10.8)
WBC UR QL AUTO: ABNORMAL /HPF

## 2019-07-21 PROCEDURE — 25010000002 ONDANSETRON PER 1 MG: Performed by: INTERNAL MEDICINE

## 2019-07-21 PROCEDURE — 80306 DRUG TEST PRSMV INSTRMNT: CPT | Performed by: EMERGENCY MEDICINE

## 2019-07-21 PROCEDURE — 85610 PROTHROMBIN TIME: CPT | Performed by: EMERGENCY MEDICINE

## 2019-07-21 PROCEDURE — 85025 COMPLETE CBC W/AUTO DIFF WBC: CPT | Performed by: EMERGENCY MEDICINE

## 2019-07-21 PROCEDURE — 84100 ASSAY OF PHOSPHORUS: CPT | Performed by: EMERGENCY MEDICINE

## 2019-07-21 PROCEDURE — 25010000002 MAGNESIUM SULFATE PER 500 MG OF MAGNESIUM: Performed by: EMERGENCY MEDICINE

## 2019-07-21 PROCEDURE — 87088 URINE BACTERIA CULTURE: CPT | Performed by: HOSPITALIST

## 2019-07-21 PROCEDURE — 99284 EMERGENCY DEPT VISIT MOD MDM: CPT

## 2019-07-21 PROCEDURE — 87186 SC STD MICRODIL/AGAR DIL: CPT | Performed by: HOSPITALIST

## 2019-07-21 PROCEDURE — 81001 URINALYSIS AUTO W/SCOPE: CPT | Performed by: EMERGENCY MEDICINE

## 2019-07-21 PROCEDURE — 25010000002 LORAZEPAM PER 2 MG: Performed by: EMERGENCY MEDICINE

## 2019-07-21 PROCEDURE — 80053 COMPREHEN METABOLIC PANEL: CPT | Performed by: EMERGENCY MEDICINE

## 2019-07-21 PROCEDURE — 83735 ASSAY OF MAGNESIUM: CPT | Performed by: EMERGENCY MEDICINE

## 2019-07-21 PROCEDURE — 87086 URINE CULTURE/COLONY COUNT: CPT | Performed by: HOSPITALIST

## 2019-07-21 PROCEDURE — 80307 DRUG TEST PRSMV CHEM ANLYZR: CPT | Performed by: EMERGENCY MEDICINE

## 2019-07-21 PROCEDURE — 83690 ASSAY OF LIPASE: CPT | Performed by: EMERGENCY MEDICINE

## 2019-07-21 PROCEDURE — 25010000002 HYDROMORPHONE PER 4 MG: Performed by: HOSPITALIST

## 2019-07-21 PROCEDURE — 83735 ASSAY OF MAGNESIUM: CPT | Performed by: INTERNAL MEDICINE

## 2019-07-21 PROCEDURE — 25010000002 THIAMINE PER 100 MG: Performed by: EMERGENCY MEDICINE

## 2019-07-21 PROCEDURE — 25010000002 ONDANSETRON PER 1 MG: Performed by: EMERGENCY MEDICINE

## 2019-07-21 PROCEDURE — 99284 EMERGENCY DEPT VISIT MOD MDM: CPT | Performed by: EMERGENCY MEDICINE

## 2019-07-21 PROCEDURE — 94799 UNLISTED PULMONARY SVC/PX: CPT

## 2019-07-21 PROCEDURE — 84100 ASSAY OF PHOSPHORUS: CPT | Performed by: INTERNAL MEDICINE

## 2019-07-21 PROCEDURE — 99223 1ST HOSP IP/OBS HIGH 75: CPT | Performed by: HOSPITALIST

## 2019-07-21 RX ORDER — MULTIPLE VITAMINS W/ MINERALS TAB 9MG-400MCG
1 TAB ORAL DAILY
Status: COMPLETED | OUTPATIENT
Start: 2019-07-22 | End: 2019-07-24

## 2019-07-21 RX ORDER — HYDROMORPHONE HCL 110MG/55ML
0.5 PATIENT CONTROLLED ANALGESIA SYRINGE INTRAVENOUS ONCE
Status: COMPLETED | OUTPATIENT
Start: 2019-07-21 | End: 2019-07-21

## 2019-07-21 RX ORDER — DEXTROSE AND SODIUM CHLORIDE 5; .9 G/100ML; G/100ML
50 INJECTION, SOLUTION INTRAVENOUS CONTINUOUS
Status: DISCONTINUED | OUTPATIENT
Start: 2019-07-21 | End: 2019-07-27

## 2019-07-21 RX ORDER — SODIUM CHLORIDE 0.9 % (FLUSH) 0.9 %
3 SYRINGE (ML) INJECTION EVERY 12 HOURS SCHEDULED
Status: DISCONTINUED | OUTPATIENT
Start: 2019-07-21 | End: 2019-07-29 | Stop reason: HOSPADM

## 2019-07-21 RX ORDER — LORAZEPAM 2 MG/ML
2 INJECTION INTRAMUSCULAR
Status: DISCONTINUED | OUTPATIENT
Start: 2019-07-21 | End: 2019-07-29 | Stop reason: HOSPADM

## 2019-07-21 RX ORDER — SODIUM CHLORIDE, SODIUM LACTATE, POTASSIUM CHLORIDE, CALCIUM CHLORIDE 600; 310; 30; 20 MG/100ML; MG/100ML; MG/100ML; MG/100ML
200 INJECTION, SOLUTION INTRAVENOUS CONTINUOUS
Status: DISCONTINUED | OUTPATIENT
Start: 2019-07-21 | End: 2019-07-21

## 2019-07-21 RX ORDER — LORAZEPAM 2 MG/ML
1 INJECTION INTRAMUSCULAR
Status: DISCONTINUED | OUTPATIENT
Start: 2019-07-21 | End: 2019-07-29 | Stop reason: HOSPADM

## 2019-07-21 RX ORDER — DEXTROSE AND SODIUM CHLORIDE 5; .9 G/100ML; G/100ML
INJECTION, SOLUTION INTRAVENOUS
Status: COMPLETED
Start: 2019-07-21 | End: 2019-07-21

## 2019-07-21 RX ORDER — SENNA AND DOCUSATE SODIUM 50; 8.6 MG/1; MG/1
2 TABLET, FILM COATED ORAL NIGHTLY PRN
Status: DISCONTINUED | OUTPATIENT
Start: 2019-07-21 | End: 2019-07-29 | Stop reason: HOSPADM

## 2019-07-21 RX ORDER — SODIUM CHLORIDE 9 MG/ML
40 INJECTION, SOLUTION INTRAVENOUS AS NEEDED
Status: DISCONTINUED | OUTPATIENT
Start: 2019-07-21 | End: 2019-07-29 | Stop reason: HOSPADM

## 2019-07-21 RX ORDER — LORAZEPAM 1 MG/1
2 TABLET ORAL
Status: DISCONTINUED | OUTPATIENT
Start: 2019-07-21 | End: 2019-07-29 | Stop reason: HOSPADM

## 2019-07-21 RX ORDER — FOLIC ACID 1 MG/1
1 TABLET ORAL DAILY
Status: COMPLETED | OUTPATIENT
Start: 2019-07-22 | End: 2019-07-24

## 2019-07-21 RX ORDER — THIAMINE MONONITRATE (VIT B1) 100 MG
100 TABLET ORAL ONCE
Status: COMPLETED | OUTPATIENT
Start: 2019-07-21 | End: 2019-07-21

## 2019-07-21 RX ORDER — CHOLECALCIFEROL (VITAMIN D3) 125 MCG
5 CAPSULE ORAL NIGHTLY PRN
Status: DISCONTINUED | OUTPATIENT
Start: 2019-07-21 | End: 2019-07-22

## 2019-07-21 RX ORDER — LORAZEPAM 2 MG/ML
1 INJECTION INTRAMUSCULAR ONCE
Status: COMPLETED | OUTPATIENT
Start: 2019-07-21 | End: 2019-07-21

## 2019-07-21 RX ORDER — MAGNESIUM SULFATE HEPTAHYDRATE 40 MG/ML
2 INJECTION, SOLUTION INTRAVENOUS AS NEEDED
Status: DISCONTINUED | OUTPATIENT
Start: 2019-07-21 | End: 2019-07-27

## 2019-07-21 RX ORDER — LORAZEPAM 1 MG/1
1 TABLET ORAL
Status: DISCONTINUED | OUTPATIENT
Start: 2019-07-21 | End: 2019-07-29 | Stop reason: HOSPADM

## 2019-07-21 RX ORDER — MAGNESIUM SULFATE HEPTAHYDRATE 40 MG/ML
4 INJECTION, SOLUTION INTRAVENOUS AS NEEDED
Status: DISCONTINUED | OUTPATIENT
Start: 2019-07-21 | End: 2019-07-27

## 2019-07-21 RX ORDER — SODIUM CHLORIDE 0.9 % (FLUSH) 0.9 %
10 SYRINGE (ML) INJECTION AS NEEDED
Status: DISCONTINUED | OUTPATIENT
Start: 2019-07-21 | End: 2019-07-29 | Stop reason: HOSPADM

## 2019-07-21 RX ORDER — FLUOXETINE HYDROCHLORIDE 20 MG/1
40 CAPSULE ORAL DAILY
Status: DISCONTINUED | OUTPATIENT
Start: 2019-07-21 | End: 2019-07-29 | Stop reason: HOSPADM

## 2019-07-21 RX ORDER — SODIUM CHLORIDE 0.9 % (FLUSH) 0.9 %
1-10 SYRINGE (ML) INJECTION AS NEEDED
Status: DISCONTINUED | OUTPATIENT
Start: 2019-07-21 | End: 2019-07-29 | Stop reason: HOSPADM

## 2019-07-21 RX ORDER — CHLORDIAZEPOXIDE HYDROCHLORIDE 5 MG/1
10 CAPSULE, GELATIN COATED ORAL EVERY 8 HOURS SCHEDULED
Status: DISCONTINUED | OUTPATIENT
Start: 2019-07-22 | End: 2019-07-26

## 2019-07-21 RX ORDER — ONDANSETRON 2 MG/ML
8 INJECTION INTRAMUSCULAR; INTRAVENOUS ONCE
Status: COMPLETED | OUTPATIENT
Start: 2019-07-21 | End: 2019-07-21

## 2019-07-21 RX ORDER — ONDANSETRON 4 MG/1
4 TABLET, FILM COATED ORAL EVERY 6 HOURS PRN
Status: DISCONTINUED | OUTPATIENT
Start: 2019-07-21 | End: 2019-07-22

## 2019-07-21 RX ORDER — POTASSIUM CHLORIDE 7.45 MG/ML
10 INJECTION INTRAVENOUS
Status: DISCONTINUED | OUTPATIENT
Start: 2019-07-21 | End: 2019-07-29 | Stop reason: HOSPADM

## 2019-07-21 RX ORDER — ONDANSETRON 2 MG/ML
4 INJECTION INTRAMUSCULAR; INTRAVENOUS EVERY 6 HOURS PRN
Status: DISCONTINUED | OUTPATIENT
Start: 2019-07-21 | End: 2019-07-22

## 2019-07-21 RX ORDER — FOLIC ACID 1 MG/1
1 TABLET ORAL DAILY
Status: DISCONTINUED | OUTPATIENT
Start: 2019-07-21 | End: 2019-07-21

## 2019-07-21 RX ORDER — THIAMINE MONONITRATE (VIT B1) 100 MG
100 TABLET ORAL DAILY
Status: COMPLETED | OUTPATIENT
Start: 2019-07-22 | End: 2019-07-24

## 2019-07-21 RX ORDER — MEPERIDINE HYDROCHLORIDE 25 MG/ML
25 INJECTION INTRAMUSCULAR; INTRAVENOUS; SUBCUTANEOUS ONCE
Status: ACTIVE | OUTPATIENT
Start: 2019-07-21 | End: 2019-07-24

## 2019-07-21 RX ORDER — AMLODIPINE BESYLATE 5 MG/1
5 TABLET ORAL DAILY
Status: DISCONTINUED | OUTPATIENT
Start: 2019-07-21 | End: 2019-07-29 | Stop reason: HOSPADM

## 2019-07-21 RX ORDER — LEVOTHYROXINE SODIUM 0.12 MG/1
125 TABLET ORAL EVERY MORNING
Status: DISCONTINUED | OUTPATIENT
Start: 2019-07-22 | End: 2019-07-29 | Stop reason: HOSPADM

## 2019-07-21 RX ORDER — AMLODIPINE BESYLATE 5 MG/1
5 TABLET ORAL DAILY
COMMUNITY

## 2019-07-21 RX ORDER — POTASSIUM CHLORIDE 1.5 G/1.77G
40 POWDER, FOR SOLUTION ORAL ONCE
Status: COMPLETED | OUTPATIENT
Start: 2019-07-21 | End: 2019-07-21

## 2019-07-21 RX ADMIN — Medication 100 MG: at 23:10

## 2019-07-21 RX ADMIN — SODIUM CHLORIDE, PRESERVATIVE FREE 3 ML: 5 INJECTION INTRAVENOUS at 20:15

## 2019-07-21 RX ADMIN — AMLODIPINE BESYLATE 5 MG: 5 TABLET ORAL at 15:52

## 2019-07-21 RX ADMIN — DEXTROSE AND SODIUM CHLORIDE 150 ML/HR: 5; 900 INJECTION, SOLUTION INTRAVENOUS at 21:16

## 2019-07-21 RX ADMIN — SODIUM CHLORIDE, POTASSIUM CHLORIDE, SODIUM LACTATE AND CALCIUM CHLORIDE 200 ML/HR: 600; 310; 30; 20 INJECTION, SOLUTION INTRAVENOUS at 12:48

## 2019-07-21 RX ADMIN — HYDROMORPHONE HYDROCHLORIDE 0.5 MG: 2 INJECTION, SOLUTION INTRAMUSCULAR; INTRAVENOUS; SUBCUTANEOUS at 22:12

## 2019-07-21 RX ADMIN — LORAZEPAM 1 MG: 2 INJECTION INTRAMUSCULAR; INTRAVENOUS at 10:20

## 2019-07-21 RX ADMIN — ONDANSETRON 4 MG: 2 INJECTION INTRAMUSCULAR; INTRAVENOUS at 21:41

## 2019-07-21 RX ADMIN — MAGNESIUM SULFATE HEPTAHYDRATE 4 G: 40 INJECTION, SOLUTION INTRAVENOUS at 22:13

## 2019-07-21 RX ADMIN — THIAMINE HYDROCHLORIDE 1000 ML/HR: 100 INJECTION, SOLUTION INTRAMUSCULAR; INTRAVENOUS at 10:22

## 2019-07-21 RX ADMIN — ONDANSETRON 8 MG: 2 INJECTION, SOLUTION INTRAMUSCULAR; INTRAVENOUS at 10:02

## 2019-07-21 RX ADMIN — FLUOXETINE 40 MG: 20 CAPSULE ORAL at 15:51

## 2019-07-21 RX ADMIN — ONDANSETRON 4 MG: 2 INJECTION INTRAMUSCULAR; INTRAVENOUS at 15:08

## 2019-07-21 RX ADMIN — FOLIC ACID 1 MG: 1 TABLET ORAL at 15:51

## 2019-07-21 RX ADMIN — POTASSIUM CHLORIDE 40 MEQ: 1.5 POWDER, FOR SOLUTION ORAL at 13:51

## 2019-07-22 LAB
ACETONE BLD QL: NEGATIVE
ALBUMIN SERPL-MCNC: 2.5 G/DL (ref 3.5–5.2)
ALBUMIN/GLOB SERPL: 0.8 G/DL
ALP SERPL-CCNC: 237 U/L (ref 39–117)
ALT SERPL W P-5'-P-CCNC: 26 U/L (ref 1–33)
ANION GAP SERPL CALCULATED.3IONS-SCNC: 14.6 MMOL/L (ref 5–15)
AST SERPL-CCNC: 104 U/L (ref 1–32)
BASOPHILS # BLD AUTO: 0.02 10*3/MM3 (ref 0–0.2)
BASOPHILS NFR BLD AUTO: 0.8 % (ref 0–1.5)
BILIRUB SERPL-MCNC: 1.4 MG/DL (ref 0.2–1.2)
BUN BLD-MCNC: 3 MG/DL (ref 6–20)
BUN/CREAT SERPL: 5.6 (ref 7–25)
CALCIUM SPEC-SCNC: 7 MG/DL (ref 8.6–10.5)
CHLORIDE SERPL-SCNC: 103 MMOL/L (ref 98–107)
CO2 SERPL-SCNC: 21.4 MMOL/L (ref 22–29)
CREAT BLD-MCNC: 0.54 MG/DL (ref 0.57–1)
DEPRECATED RDW RBC AUTO: 62.9 FL (ref 37–54)
EOSINOPHIL # BLD AUTO: 0.03 10*3/MM3 (ref 0–0.4)
EOSINOPHIL NFR BLD AUTO: 1.2 % (ref 0.3–6.2)
ERYTHROCYTE [DISTWIDTH] IN BLOOD BY AUTOMATED COUNT: 17.4 % (ref 12.3–15.4)
GFR SERPL CREATININE-BSD FRML MDRD: 119 ML/MIN/1.73
GLOBULIN UR ELPH-MCNC: 3.2 GM/DL
GLUCOSE BLD-MCNC: 143 MG/DL (ref 65–99)
HCT VFR BLD AUTO: 31.6 % (ref 34–46.6)
HGB BLD-MCNC: 10 G/DL (ref 12–15.9)
IMM GRANULOCYTES # BLD AUTO: 0.01 10*3/MM3 (ref 0–0.05)
IMM GRANULOCYTES NFR BLD AUTO: 0.4 % (ref 0–0.5)
LYMPHOCYTES # BLD AUTO: 0.82 10*3/MM3 (ref 0.7–3.1)
LYMPHOCYTES NFR BLD AUTO: 31.8 % (ref 19.6–45.3)
MAGNESIUM SERPL-MCNC: 2.4 MG/DL (ref 1.6–2.6)
MCH RBC QN AUTO: 31.2 PG (ref 26.6–33)
MCHC RBC AUTO-ENTMCNC: 31.6 G/DL (ref 31.5–35.7)
MCV RBC AUTO: 98.4 FL (ref 79–97)
MONOCYTES # BLD AUTO: 0.42 10*3/MM3 (ref 0.1–0.9)
MONOCYTES NFR BLD AUTO: 16.3 % (ref 5–12)
NEUTROPHILS # BLD AUTO: 1.28 10*3/MM3 (ref 1.7–7)
NEUTROPHILS NFR BLD AUTO: 49.5 % (ref 42.7–76)
NRBC BLD AUTO-RTO: 0 /100 WBC (ref 0–0.2)
PHOSPHATE SERPL-MCNC: 2.2 MG/DL (ref 2.5–4.5)
PHOSPHATE SERPL-MCNC: 2.4 MG/DL (ref 2.5–4.5)
PLATELET # BLD AUTO: 57 10*3/MM3 (ref 140–450)
PMV BLD AUTO: 11.4 FL (ref 6–12)
POTASSIUM BLD-SCNC: 2.8 MMOL/L (ref 3.5–5.2)
POTASSIUM BLD-SCNC: 3.3 MMOL/L (ref 3.5–5.2)
PROT SERPL-MCNC: 5.7 G/DL (ref 6–8.5)
RBC # BLD AUTO: 3.21 10*6/MM3 (ref 3.77–5.28)
SODIUM BLD-SCNC: 139 MMOL/L (ref 136–145)
WBC NRBC COR # BLD: 2.58 10*3/MM3 (ref 3.4–10.8)

## 2019-07-22 PROCEDURE — 82009 KETONE BODYS QUAL: CPT | Performed by: HOSPITALIST

## 2019-07-22 PROCEDURE — 80053 COMPREHEN METABOLIC PANEL: CPT | Performed by: INTERNAL MEDICINE

## 2019-07-22 PROCEDURE — 25010000003 POTASSIUM CHLORIDE 10 MEQ/100ML SOLUTION: Performed by: INTERNAL MEDICINE

## 2019-07-22 PROCEDURE — 25010000002 CEFTRIAXONE: Performed by: HOSPITALIST

## 2019-07-22 PROCEDURE — 83735 ASSAY OF MAGNESIUM: CPT | Performed by: INTERNAL MEDICINE

## 2019-07-22 PROCEDURE — 25010000002 ONDANSETRON PER 1 MG: Performed by: INTERNAL MEDICINE

## 2019-07-22 PROCEDURE — 84100 ASSAY OF PHOSPHORUS: CPT | Performed by: INTERNAL MEDICINE

## 2019-07-22 PROCEDURE — 84132 ASSAY OF SERUM POTASSIUM: CPT | Performed by: INTERNAL MEDICINE

## 2019-07-22 PROCEDURE — 99254 IP/OBS CNSLTJ NEW/EST MOD 60: CPT | Performed by: INTERNAL MEDICINE

## 2019-07-22 PROCEDURE — 85025 COMPLETE CBC W/AUTO DIFF WBC: CPT | Performed by: INTERNAL MEDICINE

## 2019-07-22 PROCEDURE — 99232 SBSQ HOSP IP/OBS MODERATE 35: CPT | Performed by: HOSPITALIST

## 2019-07-22 RX ORDER — ONDANSETRON 4 MG/1
4 TABLET, FILM COATED ORAL EVERY 4 HOURS
Status: DISCONTINUED | OUTPATIENT
Start: 2019-07-22 | End: 2019-07-23

## 2019-07-22 RX ORDER — CHOLECALCIFEROL (VITAMIN D3) 125 MCG
10 CAPSULE ORAL NIGHTLY PRN
Status: DISCONTINUED | OUTPATIENT
Start: 2019-07-22 | End: 2019-07-29 | Stop reason: HOSPADM

## 2019-07-22 RX ORDER — DEXTROSE AND SODIUM CHLORIDE 5; .9 G/100ML; G/100ML
INJECTION, SOLUTION INTRAVENOUS
Status: COMPLETED
Start: 2019-07-22 | End: 2019-07-22

## 2019-07-22 RX ORDER — CEFTRIAXONE 1 G/1
INJECTION, POWDER, FOR SOLUTION INTRAMUSCULAR; INTRAVENOUS
Status: DISPENSED
Start: 2019-07-22 | End: 2019-07-22

## 2019-07-22 RX ADMIN — CHLORDIAZEPOXIDE HYDROCHLORIDE 10 MG: 5 CAPSULE ORAL at 21:01

## 2019-07-22 RX ADMIN — POTASSIUM CHLORIDE 10 MEQ: 10 INJECTION, SOLUTION INTRAVENOUS at 18:52

## 2019-07-22 RX ADMIN — DEXTROSE AND SODIUM CHLORIDE 150 ML/HR: 5; 900 INJECTION, SOLUTION INTRAVENOUS at 18:00

## 2019-07-22 RX ADMIN — Medication 100 MG: at 09:00

## 2019-07-22 RX ADMIN — ONDANSETRON HYDROCHLORIDE 4 MG: 4 TABLET, FILM COATED ORAL at 20:59

## 2019-07-22 RX ADMIN — DEXTROSE AND SODIUM CHLORIDE: 5; 900 INJECTION, SOLUTION INTRAVENOUS at 13:57

## 2019-07-22 RX ADMIN — ONDANSETRON HYDROCHLORIDE 4 MG: 4 TABLET, FILM COATED ORAL at 13:28

## 2019-07-22 RX ADMIN — POTASSIUM CHLORIDE 10 MEQ: 10 INJECTION, SOLUTION INTRAVENOUS at 23:06

## 2019-07-22 RX ADMIN — POTASSIUM PHOSPHATE, MONOBASIC AND POTASSIUM PHOSPHATE, DIBASIC 15 MMOL: 224; 236 INJECTION, SOLUTION INTRAVENOUS at 20:05

## 2019-07-22 RX ADMIN — FAMOTIDINE 20 MG: 10 INJECTION, SOLUTION INTRAVENOUS at 08:24

## 2019-07-22 RX ADMIN — ONDANSETRON 4 MG: 2 INJECTION INTRAMUSCULAR; INTRAVENOUS at 03:57

## 2019-07-22 RX ADMIN — ONDANSETRON HYDROCHLORIDE 4 MG: 4 TABLET, FILM COATED ORAL at 10:17

## 2019-07-22 RX ADMIN — LEVOTHYROXINE SODIUM 125 MCG: 125 TABLET ORAL at 06:41

## 2019-07-22 RX ADMIN — LORAZEPAM 1 MG: 1 TABLET ORAL at 08:21

## 2019-07-22 RX ADMIN — CEFTRIAXONE 1 G: 1 INJECTION, POWDER, FOR SOLUTION INTRAMUSCULAR; INTRAVENOUS at 00:41

## 2019-07-22 RX ADMIN — FAMOTIDINE 20 MG: 10 INJECTION, SOLUTION INTRAVENOUS at 00:23

## 2019-07-22 RX ADMIN — SODIUM CHLORIDE, PRESERVATIVE FREE 3 ML: 5 INJECTION INTRAVENOUS at 08:34

## 2019-07-22 RX ADMIN — DEXTROSE AND SODIUM CHLORIDE 150 ML/HR: 5; 900 INJECTION, SOLUTION INTRAVENOUS at 05:13

## 2019-07-22 RX ADMIN — POTASSIUM CHLORIDE 10 MEQ: 10 INJECTION, SOLUTION INTRAVENOUS at 00:23

## 2019-07-22 RX ADMIN — LORAZEPAM 1 MG: 1 TABLET ORAL at 21:01

## 2019-07-22 RX ADMIN — SODIUM PHOSPHATE, MONOBASIC, MONOHYDRATE 15 MMOL: 276; 142 INJECTION, SOLUTION INTRAVENOUS at 07:41

## 2019-07-22 RX ADMIN — CHLORDIAZEPOXIDE HYDROCHLORIDE 10 MG: 5 CAPSULE ORAL at 13:28

## 2019-07-22 RX ADMIN — DEXTROSE AND SODIUM CHLORIDE 150 ML/HR: 5; 900 INJECTION, SOLUTION INTRAVENOUS at 11:47

## 2019-07-22 RX ADMIN — AMLODIPINE BESYLATE 5 MG: 5 TABLET ORAL at 08:22

## 2019-07-22 RX ADMIN — CHLORDIAZEPOXIDE HYDROCHLORIDE 10 MG: 5 CAPSULE ORAL at 00:23

## 2019-07-22 RX ADMIN — MULTIPLE VITAMINS W/ MINERALS TAB 1 TABLET: TAB at 08:23

## 2019-07-22 RX ADMIN — ONDANSETRON HYDROCHLORIDE 4 MG: 4 TABLET, FILM COATED ORAL at 17:20

## 2019-07-22 RX ADMIN — FOLIC ACID 1 MG: 1 TABLET ORAL at 09:00

## 2019-07-22 RX ADMIN — POTASSIUM CHLORIDE 10 MEQ: 10 INJECTION, SOLUTION INTRAVENOUS at 20:11

## 2019-07-22 RX ADMIN — NYSTATIN 500000 UNITS: 500000 SUSPENSION ORAL at 08:22

## 2019-07-22 RX ADMIN — FLUOXETINE 40 MG: 20 CAPSULE ORAL at 08:22

## 2019-07-22 RX ADMIN — NYSTATIN 500000 UNITS: 500000 SUSPENSION ORAL at 00:23

## 2019-07-22 RX ADMIN — POTASSIUM CHLORIDE 10 MEQ: 10 INJECTION, SOLUTION INTRAVENOUS at 03:55

## 2019-07-22 RX ADMIN — FAMOTIDINE 20 MG: 10 INJECTION, SOLUTION INTRAVENOUS at 20:10

## 2019-07-22 RX ADMIN — MELATONIN TAB 5 MG 5 MG: 5 TAB at 20:11

## 2019-07-22 RX ADMIN — POTASSIUM CHLORIDE 10 MEQ: 10 INJECTION, SOLUTION INTRAVENOUS at 02:58

## 2019-07-22 RX ADMIN — NYSTATIN 500000 UNITS: 500000 SUSPENSION ORAL at 20:11

## 2019-07-22 RX ADMIN — POTASSIUM CHLORIDE 10 MEQ: 10 INJECTION, SOLUTION INTRAVENOUS at 01:54

## 2019-07-22 RX ADMIN — CHLORDIAZEPOXIDE HYDROCHLORIDE 10 MG: 5 CAPSULE ORAL at 06:41

## 2019-07-23 ENCOUNTER — APPOINTMENT (OUTPATIENT)
Dept: CT IMAGING | Facility: HOSPITAL | Age: 49
End: 2019-07-23

## 2019-07-23 ENCOUNTER — APPOINTMENT (OUTPATIENT)
Dept: ULTRASOUND IMAGING | Facility: HOSPITAL | Age: 49
End: 2019-07-23

## 2019-07-23 LAB
ALBUMIN SERPL-MCNC: 2.2 G/DL (ref 3.5–5.2)
ALBUMIN/GLOB SERPL: 0.7 G/DL
ALP SERPL-CCNC: 212 U/L (ref 39–117)
ALT SERPL W P-5'-P-CCNC: 28 U/L (ref 1–33)
AMMONIA BLD-SCNC: 54 UMOL/L (ref 11–51)
ANION GAP SERPL CALCULATED.3IONS-SCNC: 11.7 MMOL/L (ref 5–15)
AST SERPL-CCNC: 132 U/L (ref 1–32)
BILIRUB SERPL-MCNC: 1.4 MG/DL (ref 0.2–1.2)
BUN BLD-MCNC: <2 MG/DL (ref 6–20)
BUN/CREAT SERPL: ABNORMAL (ref 7–25)
C DIFF GDH STL QL: NEGATIVE
CALCIUM SPEC-SCNC: 7 MG/DL (ref 8.6–10.5)
CHLORIDE SERPL-SCNC: 102 MMOL/L (ref 98–107)
CO2 SERPL-SCNC: 18.3 MMOL/L (ref 22–29)
CREAT BLD-MCNC: 0.39 MG/DL (ref 0.57–1)
GFR SERPL CREATININE-BSD FRML MDRD: >150 ML/MIN/1.73
GLOBULIN UR ELPH-MCNC: 3.3 GM/DL
GLUCOSE BLD-MCNC: 103 MG/DL (ref 65–99)
MAGNESIUM SERPL-MCNC: 1.3 MG/DL (ref 1.6–2.6)
PHOSPHATE SERPL-MCNC: 2.1 MG/DL (ref 2.5–4.5)
POTASSIUM BLD-SCNC: 3.6 MMOL/L (ref 3.5–5.2)
PROT SERPL-MCNC: 5.5 G/DL (ref 6–8.5)
SODIUM BLD-SCNC: 132 MMOL/L (ref 136–145)

## 2019-07-23 PROCEDURE — 99231 SBSQ HOSP IP/OBS SF/LOW 25: CPT | Performed by: HOSPITALIST

## 2019-07-23 PROCEDURE — 80053 COMPREHEN METABOLIC PANEL: CPT | Performed by: INTERNAL MEDICINE

## 2019-07-23 PROCEDURE — 25010000002 CEFTRIAXONE: Performed by: HOSPITALIST

## 2019-07-23 PROCEDURE — 87449 NOS EACH ORGANISM AG IA: CPT | Performed by: INTERNAL MEDICINE

## 2019-07-23 PROCEDURE — 25010000002 IOPAMIDOL 61 % SOLUTION: Performed by: INTERNAL MEDICINE

## 2019-07-23 PROCEDURE — 25010000003 POTASSIUM CHLORIDE 10 MEQ/100ML SOLUTION: Performed by: INTERNAL MEDICINE

## 2019-07-23 PROCEDURE — 84100 ASSAY OF PHOSPHORUS: CPT | Performed by: INTERNAL MEDICINE

## 2019-07-23 PROCEDURE — 74177 CT ABD & PELVIS W/CONTRAST: CPT

## 2019-07-23 PROCEDURE — 99232 SBSQ HOSP IP/OBS MODERATE 35: CPT | Performed by: INTERNAL MEDICINE

## 2019-07-23 PROCEDURE — 83735 ASSAY OF MAGNESIUM: CPT | Performed by: INTERNAL MEDICINE

## 2019-07-23 PROCEDURE — 82140 ASSAY OF AMMONIA: CPT | Performed by: INTERNAL MEDICINE

## 2019-07-23 PROCEDURE — 0 DIATRIZOATE MEGLUMINE & SODIUM PER 1 ML: Performed by: INTERNAL MEDICINE

## 2019-07-23 PROCEDURE — 25010000002 MAGNESIUM SULFATE 2 GM/50ML SOLUTION: Performed by: INTERNAL MEDICINE

## 2019-07-23 PROCEDURE — 76705 ECHO EXAM OF ABDOMEN: CPT

## 2019-07-23 PROCEDURE — 87324 CLOSTRIDIUM AG IA: CPT | Performed by: INTERNAL MEDICINE

## 2019-07-23 RX ORDER — HYDROCODONE BITARTRATE AND ACETAMINOPHEN 5; 325 MG/1; MG/1
TABLET ORAL
Status: COMPLETED
Start: 2019-07-23 | End: 2019-07-23

## 2019-07-23 RX ORDER — HYDROCODONE BITARTRATE AND ACETAMINOPHEN 5; 325 MG/1; MG/1
1 TABLET ORAL ONCE
Status: COMPLETED | OUTPATIENT
Start: 2019-07-23 | End: 2019-07-23

## 2019-07-23 RX ORDER — ONDANSETRON 4 MG/1
4 TABLET, FILM COATED ORAL EVERY 6 HOURS PRN
Status: DISCONTINUED | OUTPATIENT
Start: 2019-07-23 | End: 2019-07-29 | Stop reason: HOSPADM

## 2019-07-23 RX ADMIN — AMLODIPINE BESYLATE 5 MG: 5 TABLET ORAL at 08:48

## 2019-07-23 RX ADMIN — HYDROCODONE BITARTRATE AND ACETAMINOPHEN 1 TABLET: 5; 325 TABLET ORAL at 02:28

## 2019-07-23 RX ADMIN — FOLIC ACID 1 MG: 1 TABLET ORAL at 08:48

## 2019-07-23 RX ADMIN — MELATONIN TAB 5 MG 10 MG: 5 TAB at 20:56

## 2019-07-23 RX ADMIN — CEFTRIAXONE 1 G: 1 INJECTION, POWDER, FOR SOLUTION INTRAMUSCULAR; INTRAVENOUS at 00:08

## 2019-07-23 RX ADMIN — NYSTATIN 500000 UNITS: 500000 SUSPENSION ORAL at 19:03

## 2019-07-23 RX ADMIN — NYSTATIN 500000 UNITS: 500000 SUSPENSION ORAL at 20:57

## 2019-07-23 RX ADMIN — SODIUM CHLORIDE, PRESERVATIVE FREE 3 ML: 5 INJECTION INTRAVENOUS at 08:51

## 2019-07-23 RX ADMIN — ONDANSETRON HYDROCHLORIDE 4 MG: 4 TABLET, FILM COATED ORAL at 06:30

## 2019-07-23 RX ADMIN — POTASSIUM CHLORIDE 10 MEQ: 10 INJECTION, SOLUTION INTRAVENOUS at 02:29

## 2019-07-23 RX ADMIN — Medication 100 MG: at 08:48

## 2019-07-23 RX ADMIN — ONDANSETRON HYDROCHLORIDE 4 MG: 4 TABLET, FILM COATED ORAL at 02:33

## 2019-07-23 RX ADMIN — DEXTROSE AND SODIUM CHLORIDE 150 ML/HR: 5; 900 INJECTION, SOLUTION INTRAVENOUS at 06:33

## 2019-07-23 RX ADMIN — CHLORDIAZEPOXIDE HYDROCHLORIDE 10 MG: 5 CAPSULE ORAL at 20:57

## 2019-07-23 RX ADMIN — MULTIPLE VITAMINS W/ MINERALS TAB 1 TABLET: TAB at 08:48

## 2019-07-23 RX ADMIN — MAGNESIUM SULFATE HEPTAHYDRATE 2 G: 40 INJECTION, SOLUTION INTRAVENOUS at 11:34

## 2019-07-23 RX ADMIN — MAGNESIUM SULFATE HEPTAHYDRATE 2 G: 40 INJECTION, SOLUTION INTRAVENOUS at 16:22

## 2019-07-23 RX ADMIN — CHLORDIAZEPOXIDE HYDROCHLORIDE 10 MG: 5 CAPSULE ORAL at 13:09

## 2019-07-23 RX ADMIN — IOPAMIDOL 100 ML: 612 INJECTION, SOLUTION INTRAVENOUS at 12:25

## 2019-07-23 RX ADMIN — CHLORDIAZEPOXIDE HYDROCHLORIDE 10 MG: 5 CAPSULE ORAL at 06:30

## 2019-07-23 RX ADMIN — POTASSIUM CHLORIDE 10 MEQ: 10 INJECTION, SOLUTION INTRAVENOUS at 03:51

## 2019-07-23 RX ADMIN — ONDANSETRON HYDROCHLORIDE 4 MG: 4 TABLET, FILM COATED ORAL at 08:47

## 2019-07-23 RX ADMIN — FLUOXETINE 40 MG: 20 CAPSULE ORAL at 08:48

## 2019-07-23 RX ADMIN — FAMOTIDINE 20 MG: 10 INJECTION, SOLUTION INTRAVENOUS at 21:00

## 2019-07-23 RX ADMIN — POTASSIUM CHLORIDE 10 MEQ: 10 INJECTION, SOLUTION INTRAVENOUS at 00:01

## 2019-07-23 RX ADMIN — MAGNESIUM SULFATE HEPTAHYDRATE 2 G: 40 INJECTION, SOLUTION INTRAVENOUS at 08:48

## 2019-07-23 RX ADMIN — FAMOTIDINE 20 MG: 10 INJECTION, SOLUTION INTRAVENOUS at 08:51

## 2019-07-23 RX ADMIN — NYSTATIN 500000 UNITS: 500000 SUSPENSION ORAL at 08:48

## 2019-07-23 RX ADMIN — LEVOTHYROXINE SODIUM 125 MCG: 125 TABLET ORAL at 06:31

## 2019-07-23 RX ADMIN — POTASSIUM PHOSPHATE, MONOBASIC AND POTASSIUM PHOSPHATE, DIBASIC 15 MMOL: 224; 236 INJECTION, SOLUTION INTRAVENOUS at 20:57

## 2019-07-23 RX ADMIN — DEXTROSE AND SODIUM CHLORIDE 100 ML/HR: 5; 900 INJECTION, SOLUTION INTRAVENOUS at 20:57

## 2019-07-23 RX ADMIN — DIATRIZOATE MEGLUMINE AND DIATRIZOATE SODIUM 30 ML: 600; 100 SOLUTION ORAL; RECTAL at 13:06

## 2019-07-23 RX ADMIN — LORAZEPAM 1 MG: 1 TABLET ORAL at 21:11

## 2019-07-23 RX ADMIN — DEXTROSE AND SODIUM CHLORIDE 150 ML/HR: 5; 900 INJECTION, SOLUTION INTRAVENOUS at 00:05

## 2019-07-23 RX ADMIN — LORAZEPAM 2 MG: 1 TABLET ORAL at 06:39

## 2019-07-24 LAB
BACTERIA SPEC AEROBE CULT: ABNORMAL
MAGNESIUM SERPL-MCNC: 1.9 MG/DL (ref 1.6–2.6)
PHOSPHATE SERPL-MCNC: 3.1 MG/DL (ref 2.5–4.5)

## 2019-07-24 PROCEDURE — 84100 ASSAY OF PHOSPHORUS: CPT | Performed by: INTERNAL MEDICINE

## 2019-07-24 PROCEDURE — 25010000002 MAGNESIUM SULFATE 2 GM/50ML SOLUTION: Performed by: INTERNAL MEDICINE

## 2019-07-24 PROCEDURE — 25010000002 PIPERACILLIN SOD-TAZOBACTAM PER 1 G: Performed by: HOSPITALIST

## 2019-07-24 PROCEDURE — 99232 SBSQ HOSP IP/OBS MODERATE 35: CPT | Performed by: HOSPITALIST

## 2019-07-24 PROCEDURE — 83735 ASSAY OF MAGNESIUM: CPT | Performed by: INTERNAL MEDICINE

## 2019-07-24 PROCEDURE — 99232 SBSQ HOSP IP/OBS MODERATE 35: CPT | Performed by: INTERNAL MEDICINE

## 2019-07-24 PROCEDURE — 25010000002 CEFTRIAXONE: Performed by: HOSPITALIST

## 2019-07-24 PROCEDURE — 25010000002 CEFTRIAXONE SODIUM-DEXTROSE 1-3.74 GM-%(50ML) RECONSTITUTED SOLUTION: Performed by: HOSPITALIST

## 2019-07-24 RX ORDER — HYDROCODONE BITARTRATE AND ACETAMINOPHEN 5; 325 MG/1; MG/1
1 TABLET ORAL EVERY 6 HOURS PRN
Status: DISCONTINUED | OUTPATIENT
Start: 2019-07-24 | End: 2019-07-28

## 2019-07-24 RX ORDER — NICOTINE 21 MG/24HR
1 PATCH, TRANSDERMAL 24 HOURS TRANSDERMAL
Status: DISCONTINUED | OUTPATIENT
Start: 2019-07-24 | End: 2019-07-25

## 2019-07-24 RX ORDER — CEFTRIAXONE 1 G/50ML
1 INJECTION, SOLUTION INTRAVENOUS EVERY 24 HOURS
Status: DISCONTINUED | OUTPATIENT
Start: 2019-07-24 | End: 2019-07-26

## 2019-07-24 RX ADMIN — MULTIPLE VITAMINS W/ MINERALS TAB 1 TABLET: TAB at 08:51

## 2019-07-24 RX ADMIN — DEXTROSE AND SODIUM CHLORIDE 100 ML/HR: 5; 900 INJECTION, SOLUTION INTRAVENOUS at 20:47

## 2019-07-24 RX ADMIN — CEFTRIAXONE 1 G: 1 INJECTION, POWDER, FOR SOLUTION INTRAMUSCULAR; INTRAVENOUS at 00:37

## 2019-07-24 RX ADMIN — CHLORDIAZEPOXIDE HYDROCHLORIDE 10 MG: 5 CAPSULE ORAL at 13:43

## 2019-07-24 RX ADMIN — AMLODIPINE BESYLATE 5 MG: 5 TABLET ORAL at 08:52

## 2019-07-24 RX ADMIN — FLUOXETINE 40 MG: 20 CAPSULE ORAL at 08:51

## 2019-07-24 RX ADMIN — MELATONIN TAB 5 MG 10 MG: 5 TAB at 22:10

## 2019-07-24 RX ADMIN — ONDANSETRON HYDROCHLORIDE 4 MG: 4 TABLET, FILM COATED ORAL at 22:10

## 2019-07-24 RX ADMIN — DEXTROSE AND SODIUM CHLORIDE 100 ML/HR: 5; 900 INJECTION, SOLUTION INTRAVENOUS at 09:33

## 2019-07-24 RX ADMIN — Medication 100 MG: at 08:52

## 2019-07-24 RX ADMIN — NYSTATIN 500000 UNITS: 500000 SUSPENSION ORAL at 08:51

## 2019-07-24 RX ADMIN — HYDROCODONE BITARTRATE AND ACETAMINOPHEN 1 TABLET: 5; 325 TABLET ORAL at 16:00

## 2019-07-24 RX ADMIN — CHLORDIAZEPOXIDE HYDROCHLORIDE 10 MG: 5 CAPSULE ORAL at 21:06

## 2019-07-24 RX ADMIN — NICOTINE 1 PATCH: 14 PATCH, EXTENDED RELEASE TRANSDERMAL at 15:59

## 2019-07-24 RX ADMIN — NYSTATIN 500000 UNITS: 500000 SUSPENSION ORAL at 20:48

## 2019-07-24 RX ADMIN — CEFTRIAXONE 1 G: 1 INJECTION, SOLUTION INTRAVENOUS at 12:39

## 2019-07-24 RX ADMIN — PIPERACILLIN, TAZOBACTAM 4.5 G: 4; .5 INJECTION, POWDER, LYOPHILIZED, FOR SOLUTION INTRAVENOUS at 17:42

## 2019-07-24 RX ADMIN — HYDROCODONE BITARTRATE AND ACETAMINOPHEN 1 TABLET: 5; 325 TABLET ORAL at 22:10

## 2019-07-24 RX ADMIN — LORAZEPAM 1 MG: 1 TABLET ORAL at 04:34

## 2019-07-24 RX ADMIN — MAGNESIUM SULFATE HEPTAHYDRATE 4 G: 40 INJECTION, SOLUTION INTRAVENOUS at 13:09

## 2019-07-24 RX ADMIN — LEVOTHYROXINE SODIUM 125 MCG: 125 TABLET ORAL at 06:26

## 2019-07-24 RX ADMIN — FAMOTIDINE 20 MG: 10 INJECTION, SOLUTION INTRAVENOUS at 08:52

## 2019-07-24 RX ADMIN — SODIUM CHLORIDE, PRESERVATIVE FREE 3 ML: 5 INJECTION INTRAVENOUS at 08:52

## 2019-07-24 RX ADMIN — CHLORDIAZEPOXIDE HYDROCHLORIDE 10 MG: 5 CAPSULE ORAL at 06:26

## 2019-07-24 RX ADMIN — FAMOTIDINE 20 MG: 10 INJECTION, SOLUTION INTRAVENOUS at 20:47

## 2019-07-24 RX ADMIN — FOLIC ACID 1 MG: 1 TABLET ORAL at 08:51

## 2019-07-24 RX ADMIN — SODIUM CHLORIDE, PRESERVATIVE FREE 3 ML: 5 INJECTION INTRAVENOUS at 20:48

## 2019-07-25 LAB
ANION GAP SERPL CALCULATED.3IONS-SCNC: 9.8 MMOL/L (ref 5–15)
BUN BLD-MCNC: <2 MG/DL (ref 6–20)
BUN/CREAT SERPL: ABNORMAL (ref 7–25)
CALCIUM SPEC-SCNC: 7.4 MG/DL (ref 8.6–10.5)
CHLORIDE SERPL-SCNC: 103 MMOL/L (ref 98–107)
CO2 SERPL-SCNC: 21.2 MMOL/L (ref 22–29)
CREAT BLD-MCNC: 0.39 MG/DL (ref 0.57–1)
GFR SERPL CREATININE-BSD FRML MDRD: >150 ML/MIN/1.73
GLUCOSE BLD-MCNC: 111 MG/DL (ref 65–99)
MAGNESIUM SERPL-MCNC: 1.7 MG/DL (ref 1.6–2.6)
PHOSPHATE SERPL-MCNC: 2.6 MG/DL (ref 2.5–4.5)
POTASSIUM BLD-SCNC: 3.5 MMOL/L (ref 3.5–5.2)
SODIUM BLD-SCNC: 134 MMOL/L (ref 136–145)

## 2019-07-25 PROCEDURE — 94799 UNLISTED PULMONARY SVC/PX: CPT

## 2019-07-25 PROCEDURE — 25010000002 PIPERACILLIN SOD-TAZOBACTAM PER 1 G: Performed by: HOSPITALIST

## 2019-07-25 PROCEDURE — 83735 ASSAY OF MAGNESIUM: CPT | Performed by: INTERNAL MEDICINE

## 2019-07-25 PROCEDURE — 80048 BASIC METABOLIC PNL TOTAL CA: CPT | Performed by: HOSPITALIST

## 2019-07-25 PROCEDURE — 99232 SBSQ HOSP IP/OBS MODERATE 35: CPT | Performed by: INTERNAL MEDICINE

## 2019-07-25 PROCEDURE — 84100 ASSAY OF PHOSPHORUS: CPT | Performed by: INTERNAL MEDICINE

## 2019-07-25 PROCEDURE — 25010000002 CEFTRIAXONE SODIUM-DEXTROSE 1-3.74 GM-%(50ML) RECONSTITUTED SOLUTION: Performed by: HOSPITALIST

## 2019-07-25 PROCEDURE — 99231 SBSQ HOSP IP/OBS SF/LOW 25: CPT | Performed by: HOSPITALIST

## 2019-07-25 RX ORDER — LOPERAMIDE HYDROCHLORIDE 2 MG/1
2 CAPSULE ORAL 2 TIMES DAILY PRN
Status: DISCONTINUED | OUTPATIENT
Start: 2019-07-25 | End: 2019-07-29 | Stop reason: HOSPADM

## 2019-07-25 RX ADMIN — NYSTATIN 500000 UNITS: 500000 SUSPENSION ORAL at 17:43

## 2019-07-25 RX ADMIN — FLUOXETINE 40 MG: 20 CAPSULE ORAL at 08:07

## 2019-07-25 RX ADMIN — NYSTATIN 500000 UNITS: 500000 SUSPENSION ORAL at 08:08

## 2019-07-25 RX ADMIN — SODIUM CHLORIDE, PRESERVATIVE FREE 3 ML: 5 INJECTION INTRAVENOUS at 21:32

## 2019-07-25 RX ADMIN — PANCRELIPASE 12000 UNITS OF LIPASE: 30000; 6000; 19000 CAPSULE, DELAYED RELEASE PELLETS ORAL at 17:44

## 2019-07-25 RX ADMIN — PANCRELIPASE 12000 UNITS OF LIPASE: 30000; 6000; 19000 CAPSULE, DELAYED RELEASE PELLETS ORAL at 14:30

## 2019-07-25 RX ADMIN — LOPERAMIDE HYDROCHLORIDE 2 MG: 2 CAPSULE ORAL at 16:02

## 2019-07-25 RX ADMIN — FAMOTIDINE 20 MG: 10 INJECTION, SOLUTION INTRAVENOUS at 21:31

## 2019-07-25 RX ADMIN — HYDROCODONE BITARTRATE AND ACETAMINOPHEN 1 TABLET: 5; 325 TABLET ORAL at 16:46

## 2019-07-25 RX ADMIN — NYSTATIN 500000 UNITS: 500000 SUSPENSION ORAL at 13:32

## 2019-07-25 RX ADMIN — NYSTATIN 500000 UNITS: 500000 SUSPENSION ORAL at 21:31

## 2019-07-25 RX ADMIN — SODIUM CHLORIDE, PRESERVATIVE FREE 3 ML: 5 INJECTION INTRAVENOUS at 08:09

## 2019-07-25 RX ADMIN — PIPERACILLIN, TAZOBACTAM 4.5 G: 4; .5 INJECTION, POWDER, LYOPHILIZED, FOR SOLUTION INTRAVENOUS at 01:14

## 2019-07-25 RX ADMIN — PIPERACILLIN, TAZOBACTAM 4.5 G: 4; .5 INJECTION, POWDER, LYOPHILIZED, FOR SOLUTION INTRAVENOUS at 16:02

## 2019-07-25 RX ADMIN — DEXTROSE AND SODIUM CHLORIDE 100 ML/HR: 5; 900 INJECTION, SOLUTION INTRAVENOUS at 20:40

## 2019-07-25 RX ADMIN — LEVOTHYROXINE SODIUM 125 MCG: 125 TABLET ORAL at 06:46

## 2019-07-25 RX ADMIN — HYDROCODONE BITARTRATE AND ACETAMINOPHEN 1 TABLET: 5; 325 TABLET ORAL at 04:24

## 2019-07-25 RX ADMIN — HYDROCODONE BITARTRATE AND ACETAMINOPHEN 1 TABLET: 5; 325 TABLET ORAL at 23:16

## 2019-07-25 RX ADMIN — NICOTINE 1 PATCH: 7 PATCH, EXTENDED RELEASE TRANSDERMAL at 08:08

## 2019-07-25 RX ADMIN — PIPERACILLIN, TAZOBACTAM 4.5 G: 4; .5 INJECTION, POWDER, LYOPHILIZED, FOR SOLUTION INTRAVENOUS at 08:08

## 2019-07-25 RX ADMIN — CHLORDIAZEPOXIDE HYDROCHLORIDE 10 MG: 5 CAPSULE ORAL at 21:31

## 2019-07-25 RX ADMIN — CHLORDIAZEPOXIDE HYDROCHLORIDE 10 MG: 5 CAPSULE ORAL at 16:02

## 2019-07-25 RX ADMIN — AMLODIPINE BESYLATE 5 MG: 5 TABLET ORAL at 08:07

## 2019-07-25 RX ADMIN — HYDROCODONE BITARTRATE AND ACETAMINOPHEN 1 TABLET: 5; 325 TABLET ORAL at 10:30

## 2019-07-25 RX ADMIN — FAMOTIDINE 20 MG: 10 INJECTION, SOLUTION INTRAVENOUS at 08:08

## 2019-07-25 RX ADMIN — DEXTROSE AND SODIUM CHLORIDE 100 ML/HR: 5; 900 INJECTION, SOLUTION INTRAVENOUS at 08:20

## 2019-07-25 RX ADMIN — CHLORDIAZEPOXIDE HYDROCHLORIDE 10 MG: 5 CAPSULE ORAL at 05:24

## 2019-07-25 RX ADMIN — CEFTRIAXONE 1 G: 1 INJECTION, SOLUTION INTRAVENOUS at 13:32

## 2019-07-26 LAB
ANION GAP SERPL CALCULATED.3IONS-SCNC: 14.1 MMOL/L (ref 5–15)
ANISOCYTOSIS BLD QL: NORMAL
BASOPHILS # BLD AUTO: 0.01 10*3/MM3 (ref 0–0.2)
BASOPHILS NFR BLD AUTO: 0.3 % (ref 0–1.5)
BUN BLD-MCNC: <2 MG/DL (ref 6–20)
BUN/CREAT SERPL: ABNORMAL (ref 7–25)
CALCIUM SPEC-SCNC: 7.6 MG/DL (ref 8.6–10.5)
CHLORIDE SERPL-SCNC: 108 MMOL/L (ref 98–107)
CO2 SERPL-SCNC: 16.9 MMOL/L (ref 22–29)
CREAT BLD-MCNC: 0.41 MG/DL (ref 0.57–1)
DEPRECATED RDW RBC AUTO: 69.7 FL (ref 37–54)
EOSINOPHIL # BLD AUTO: 0.05 10*3/MM3 (ref 0–0.4)
EOSINOPHIL NFR BLD AUTO: 1.6 % (ref 0.3–6.2)
ERYTHROCYTE [DISTWIDTH] IN BLOOD BY AUTOMATED COUNT: 19.7 % (ref 12.3–15.4)
GFR SERPL CREATININE-BSD FRML MDRD: >150 ML/MIN/1.73
GLUCOSE BLD-MCNC: 126 MG/DL (ref 65–99)
HCT VFR BLD AUTO: 34.6 % (ref 34–46.6)
HGB BLD-MCNC: 11.3 G/DL (ref 12–15.9)
IMM GRANULOCYTES # BLD AUTO: 0.02 10*3/MM3 (ref 0–0.05)
IMM GRANULOCYTES NFR BLD AUTO: 0.6 % (ref 0–0.5)
LYMPHOCYTES # BLD AUTO: 0.59 10*3/MM3 (ref 0.7–3.1)
LYMPHOCYTES NFR BLD AUTO: 18.6 % (ref 19.6–45.3)
MAGNESIUM SERPL-MCNC: 1.3 MG/DL (ref 1.6–2.6)
MCH RBC QN AUTO: 31.7 PG (ref 26.6–33)
MCHC RBC AUTO-ENTMCNC: 32.7 G/DL (ref 31.5–35.7)
MCV RBC AUTO: 97.2 FL (ref 79–97)
MONOCYTES # BLD AUTO: 0.44 10*3/MM3 (ref 0.1–0.9)
MONOCYTES NFR BLD AUTO: 13.9 % (ref 5–12)
NEUTROPHILS # BLD AUTO: 2.06 10*3/MM3 (ref 1.7–7)
NEUTROPHILS NFR BLD AUTO: 65 % (ref 42.7–76)
NRBC BLD AUTO-RTO: 0 /100 WBC (ref 0–0.2)
PHOSPHATE SERPL-MCNC: 2.5 MG/DL (ref 2.5–4.5)
PLATELET # BLD AUTO: 48 10*3/MM3 (ref 140–450)
PMV BLD AUTO: 13 FL (ref 6–12)
POTASSIUM BLD-SCNC: 3.2 MMOL/L (ref 3.5–5.2)
RBC # BLD AUTO: 3.56 10*6/MM3 (ref 3.77–5.28)
SMALL PLATELETS BLD QL SMEAR: NORMAL
SODIUM BLD-SCNC: 139 MMOL/L (ref 136–145)
WBC MORPH BLD: NORMAL
WBC NRBC COR # BLD: 3.17 10*3/MM3 (ref 3.4–10.8)

## 2019-07-26 PROCEDURE — 99232 SBSQ HOSP IP/OBS MODERATE 35: CPT | Performed by: INTERNAL MEDICINE

## 2019-07-26 PROCEDURE — 85007 BL SMEAR W/DIFF WBC COUNT: CPT | Performed by: HOSPITALIST

## 2019-07-26 PROCEDURE — 80048 BASIC METABOLIC PNL TOTAL CA: CPT | Performed by: HOSPITALIST

## 2019-07-26 PROCEDURE — 0097U HC BIOFIRE FILMARRAY GI PANEL: CPT | Performed by: HOSPITALIST

## 2019-07-26 PROCEDURE — 25010000002 PIPERACILLIN SOD-TAZOBACTAM PER 1 G: Performed by: HOSPITALIST

## 2019-07-26 PROCEDURE — 85025 COMPLETE CBC W/AUTO DIFF WBC: CPT | Performed by: HOSPITALIST

## 2019-07-26 PROCEDURE — 99233 SBSQ HOSP IP/OBS HIGH 50: CPT | Performed by: HOSPITALIST

## 2019-07-26 PROCEDURE — 84100 ASSAY OF PHOSPHORUS: CPT | Performed by: INTERNAL MEDICINE

## 2019-07-26 PROCEDURE — 83735 ASSAY OF MAGNESIUM: CPT | Performed by: INTERNAL MEDICINE

## 2019-07-26 PROCEDURE — 25010000002 MAGNESIUM SULFATE 2 GM/50ML SOLUTION: Performed by: INTERNAL MEDICINE

## 2019-07-26 RX ORDER — CHLORDIAZEPOXIDE HYDROCHLORIDE 5 MG/1
5 CAPSULE, GELATIN COATED ORAL 2 TIMES DAILY
Status: DISCONTINUED | OUTPATIENT
Start: 2019-07-26 | End: 2019-07-28

## 2019-07-26 RX ADMIN — MAGNESIUM SULFATE HEPTAHYDRATE 2 G: 40 INJECTION, SOLUTION INTRAVENOUS at 15:33

## 2019-07-26 RX ADMIN — FAMOTIDINE 20 MG: 10 INJECTION, SOLUTION INTRAVENOUS at 09:04

## 2019-07-26 RX ADMIN — NYSTATIN 500000 UNITS: 500000 SUSPENSION ORAL at 08:45

## 2019-07-26 RX ADMIN — NYSTATIN 500000 UNITS: 500000 SUSPENSION ORAL at 21:19

## 2019-07-26 RX ADMIN — DEXTROSE AND SODIUM CHLORIDE 100 ML/HR: 5; 900 INJECTION, SOLUTION INTRAVENOUS at 06:45

## 2019-07-26 RX ADMIN — PANCRELIPASE 12000 UNITS OF LIPASE: 30000; 6000; 19000 CAPSULE, DELAYED RELEASE PELLETS ORAL at 17:45

## 2019-07-26 RX ADMIN — NYSTATIN 500000 UNITS: 500000 SUSPENSION ORAL at 11:17

## 2019-07-26 RX ADMIN — SODIUM CHLORIDE, PRESERVATIVE FREE 3 ML: 5 INJECTION INTRAVENOUS at 21:19

## 2019-07-26 RX ADMIN — PIPERACILLIN, TAZOBACTAM 4.5 G: 4; .5 INJECTION, POWDER, LYOPHILIZED, FOR SOLUTION INTRAVENOUS at 00:30

## 2019-07-26 RX ADMIN — HYDROCODONE BITARTRATE AND ACETAMINOPHEN 1 TABLET: 5; 325 TABLET ORAL at 17:44

## 2019-07-26 RX ADMIN — FLUOXETINE 40 MG: 20 CAPSULE ORAL at 08:43

## 2019-07-26 RX ADMIN — MAGNESIUM SULFATE HEPTAHYDRATE 2 G: 40 INJECTION, SOLUTION INTRAVENOUS at 13:36

## 2019-07-26 RX ADMIN — PIPERACILLIN, TAZOBACTAM 4.5 G: 4; .5 INJECTION, POWDER, LYOPHILIZED, FOR SOLUTION INTRAVENOUS at 16:54

## 2019-07-26 RX ADMIN — AMLODIPINE BESYLATE 5 MG: 5 TABLET ORAL at 08:43

## 2019-07-26 RX ADMIN — PANCRELIPASE 12000 UNITS OF LIPASE: 30000; 6000; 19000 CAPSULE, DELAYED RELEASE PELLETS ORAL at 08:45

## 2019-07-26 RX ADMIN — FAMOTIDINE 20 MG: 10 INJECTION, SOLUTION INTRAVENOUS at 21:29

## 2019-07-26 RX ADMIN — HYDROCODONE BITARTRATE AND ACETAMINOPHEN 1 TABLET: 5; 325 TABLET ORAL at 11:16

## 2019-07-26 RX ADMIN — PANCRELIPASE 12000 UNITS OF LIPASE: 30000; 6000; 19000 CAPSULE, DELAYED RELEASE PELLETS ORAL at 11:18

## 2019-07-26 RX ADMIN — LEVOTHYROXINE SODIUM 125 MCG: 125 TABLET ORAL at 06:47

## 2019-07-26 RX ADMIN — NYSTATIN 500000 UNITS: 500000 SUSPENSION ORAL at 17:45

## 2019-07-26 RX ADMIN — PIPERACILLIN, TAZOBACTAM 4.5 G: 4; .5 INJECTION, POWDER, LYOPHILIZED, FOR SOLUTION INTRAVENOUS at 08:46

## 2019-07-26 RX ADMIN — MAGNESIUM SULFATE HEPTAHYDRATE 2 G: 40 INJECTION, SOLUTION INTRAVENOUS at 12:02

## 2019-07-26 RX ADMIN — HYDROCODONE BITARTRATE AND ACETAMINOPHEN 1 TABLET: 5; 325 TABLET ORAL at 04:55

## 2019-07-26 RX ADMIN — CHLORDIAZEPOXIDE HYDROCHLORIDE 10 MG: 5 CAPSULE ORAL at 06:45

## 2019-07-26 RX ADMIN — CHLORDIAZEPOXIDE HYDROCHLORIDE 5 MG: 5 CAPSULE ORAL at 21:29

## 2019-07-26 RX ADMIN — NICOTINE 1 PATCH: 7 PATCH, EXTENDED RELEASE TRANSDERMAL at 08:45

## 2019-07-26 RX ADMIN — LOPERAMIDE HYDROCHLORIDE 2 MG: 2 CAPSULE ORAL at 09:04

## 2019-07-27 LAB
ADV 40+41 DNA STL QL NAA+NON-PROBE: NOT DETECTED
ANION GAP SERPL CALCULATED.3IONS-SCNC: 9.1 MMOL/L (ref 5–15)
ASTRO TYP 1-8 RNA STL QL NAA+NON-PROBE: NOT DETECTED
BUN BLD-MCNC: <2 MG/DL (ref 6–20)
BUN/CREAT SERPL: ABNORMAL (ref 7–25)
C CAYETANENSIS DNA STL QL NAA+NON-PROBE: NOT DETECTED
CALCIUM SPEC-SCNC: 7.9 MG/DL (ref 8.6–10.5)
CAMPY SP DNA.DIARRHEA STL QL NAA+PROBE: NOT DETECTED
CHLORIDE SERPL-SCNC: 103 MMOL/L (ref 98–107)
CO2 SERPL-SCNC: 23.9 MMOL/L (ref 22–29)
CREAT BLD-MCNC: 0.4 MG/DL (ref 0.57–1)
CRYPTOSP STL CULT: NOT DETECTED
E COLI DNA SPEC QL NAA+PROBE: NOT DETECTED
E HISTOLYT AG STL-ACNC: NOT DETECTED
EAEC PAA PLAS AGGR+AATA ST NAA+NON-PRB: NOT DETECTED
EC STX1 + STX2 GENES STL NAA+PROBE: NOT DETECTED
EPEC EAE GENE STL QL NAA+NON-PROBE: NOT DETECTED
ETEC LTA+ST1A+ST1B TOX ST NAA+NON-PROBE: NOT DETECTED
G LAMBLIA DNA SPEC QL NAA+PROBE: NOT DETECTED
GFR SERPL CREATININE-BSD FRML MDRD: >150 ML/MIN/1.73
GLUCOSE BLD-MCNC: 99 MG/DL (ref 65–99)
MAGNESIUM SERPL-MCNC: 1.9 MG/DL (ref 1.6–2.6)
NOROVIRUS GI+II RNA STL QL NAA+NON-PROBE: NOT DETECTED
P SHIGELLOIDES DNA STL QL NAA+PROBE: NOT DETECTED
PHOSPHATE SERPL-MCNC: 3.1 MG/DL (ref 2.5–4.5)
POTASSIUM BLD-SCNC: 3 MMOL/L (ref 3.5–5.2)
RV RNA STL NAA+PROBE: NOT DETECTED
SALMONELLA DNA SPEC QL NAA+PROBE: NOT DETECTED
SAPO I+II+IV+V RNA STL QL NAA+NON-PROBE: NOT DETECTED
SHIGELLA SP+EIEC IPAH STL QL NAA+PROBE: NOT DETECTED
SODIUM BLD-SCNC: 136 MMOL/L (ref 136–145)
TSH SERPL DL<=0.05 MIU/L-ACNC: 21.41 MIU/ML (ref 0.27–4.2)
V CHOLERAE DNA SPEC QL NAA+PROBE: NOT DETECTED
VIBRIO DNA SPEC NAA+PROBE: NOT DETECTED
YERSINIA STL CULT: NOT DETECTED

## 2019-07-27 PROCEDURE — 83735 ASSAY OF MAGNESIUM: CPT | Performed by: INTERNAL MEDICINE

## 2019-07-27 PROCEDURE — 25010000002 PIPERACILLIN-TAZOBACTAM: Performed by: HOSPITALIST

## 2019-07-27 PROCEDURE — 99232 SBSQ HOSP IP/OBS MODERATE 35: CPT | Performed by: HOSPITALIST

## 2019-07-27 PROCEDURE — 84443 ASSAY THYROID STIM HORMONE: CPT | Performed by: HOSPITALIST

## 2019-07-27 PROCEDURE — 99231 SBSQ HOSP IP/OBS SF/LOW 25: CPT | Performed by: INTERNAL MEDICINE

## 2019-07-27 PROCEDURE — 25010000002 MAGNESIUM SULFATE 2 GM/50ML SOLUTION: Performed by: HOSPITALIST

## 2019-07-27 PROCEDURE — 25010000002 ERTAPENEM 1 GM/100ML SOLUTION 1 EACH BAG: Performed by: HOSPITALIST

## 2019-07-27 PROCEDURE — 80048 BASIC METABOLIC PNL TOTAL CA: CPT | Performed by: HOSPITALIST

## 2019-07-27 PROCEDURE — 84100 ASSAY OF PHOSPHORUS: CPT | Performed by: INTERNAL MEDICINE

## 2019-07-27 RX ORDER — MAGNESIUM SULFATE HEPTAHYDRATE 40 MG/ML
4 INJECTION, SOLUTION INTRAVENOUS AS NEEDED
Status: DISCONTINUED | OUTPATIENT
Start: 2019-07-27 | End: 2019-07-29 | Stop reason: HOSPADM

## 2019-07-27 RX ORDER — SODIUM CHLORIDE 0.9 % (FLUSH) 0.9 %
20 SYRINGE (ML) INJECTION AS NEEDED
Status: DISCONTINUED | OUTPATIENT
Start: 2019-07-27 | End: 2019-07-29 | Stop reason: HOSPADM

## 2019-07-27 RX ORDER — L.ACID,PARA/B.BIFIDUM/S.THERM 8B CELL
1 CAPSULE ORAL DAILY
Status: DISCONTINUED | OUTPATIENT
Start: 2019-07-27 | End: 2019-07-29 | Stop reason: HOSPADM

## 2019-07-27 RX ORDER — MAGNESIUM SULFATE HEPTAHYDRATE 40 MG/ML
2 INJECTION, SOLUTION INTRAVENOUS AS NEEDED
Status: DISCONTINUED | OUTPATIENT
Start: 2019-07-27 | End: 2019-07-29 | Stop reason: HOSPADM

## 2019-07-27 RX ORDER — POTASSIUM CHLORIDE 7.45 MG/ML
10 INJECTION INTRAVENOUS
Status: DISCONTINUED | OUTPATIENT
Start: 2019-07-27 | End: 2019-07-29 | Stop reason: HOSPADM

## 2019-07-27 RX ORDER — POTASSIUM CHLORIDE 20 MEQ/1
40 TABLET, EXTENDED RELEASE ORAL AS NEEDED
Status: DISCONTINUED | OUTPATIENT
Start: 2019-07-27 | End: 2019-07-29 | Stop reason: HOSPADM

## 2019-07-27 RX ORDER — POTASSIUM CHLORIDE 1.5 G/1.77G
40 POWDER, FOR SOLUTION ORAL AS NEEDED
Status: DISCONTINUED | OUTPATIENT
Start: 2019-07-27 | End: 2019-07-29 | Stop reason: HOSPADM

## 2019-07-27 RX ORDER — SODIUM CHLORIDE 0.9 % (FLUSH) 0.9 %
10 SYRINGE (ML) INJECTION AS NEEDED
Status: DISCONTINUED | OUTPATIENT
Start: 2019-07-27 | End: 2019-07-29 | Stop reason: HOSPADM

## 2019-07-27 RX ORDER — MAGNESIUM SULFATE 1 G/100ML
1 INJECTION INTRAVENOUS AS NEEDED
Status: DISCONTINUED | OUTPATIENT
Start: 2019-07-27 | End: 2019-07-29 | Stop reason: HOSPADM

## 2019-07-27 RX ORDER — SODIUM CHLORIDE 0.9 % (FLUSH) 0.9 %
10 SYRINGE (ML) INJECTION EVERY 12 HOURS SCHEDULED
Status: DISCONTINUED | OUTPATIENT
Start: 2019-07-27 | End: 2019-07-29 | Stop reason: HOSPADM

## 2019-07-27 RX ORDER — LEVOTHYROXINE SODIUM 0.05 MG/1
TABLET ORAL
Status: COMPLETED
Start: 2019-07-27 | End: 2019-07-27

## 2019-07-27 RX ORDER — LEVOTHYROXINE SODIUM 0.03 MG/1
TABLET ORAL
Status: DISPENSED
Start: 2019-07-27 | End: 2019-07-27

## 2019-07-27 RX ADMIN — CHLORDIAZEPOXIDE HYDROCHLORIDE 5 MG: 5 CAPSULE ORAL at 08:45

## 2019-07-27 RX ADMIN — NYSTATIN 500000 UNITS: 500000 SUSPENSION ORAL at 11:11

## 2019-07-27 RX ADMIN — MAGNESIUM SULFATE HEPTAHYDRATE 2 G: 40 INJECTION, SOLUTION INTRAVENOUS at 11:06

## 2019-07-27 RX ADMIN — HYDROCODONE BITARTRATE AND ACETAMINOPHEN 1 TABLET: 5; 325 TABLET ORAL at 06:36

## 2019-07-27 RX ADMIN — PIPERACILLIN, TAZOBACTAM 4.5 G: 4; .5 INJECTION, POWDER, LYOPHILIZED, FOR SOLUTION INTRAVENOUS at 06:35

## 2019-07-27 RX ADMIN — AMLODIPINE BESYLATE 5 MG: 5 TABLET ORAL at 08:37

## 2019-07-27 RX ADMIN — NICOTINE 1 PATCH: 7 PATCH, EXTENDED RELEASE TRANSDERMAL at 08:34

## 2019-07-27 RX ADMIN — POTASSIUM CHLORIDE 40 MEQ: 1500 TABLET, EXTENDED RELEASE ORAL at 12:36

## 2019-07-27 RX ADMIN — ERTAPENEM SODIUM 1 G: 1 INJECTION, POWDER, LYOPHILIZED, FOR SOLUTION INTRAMUSCULAR; INTRAVENOUS at 13:26

## 2019-07-27 RX ADMIN — FAMOTIDINE 20 MG: 10 INJECTION, SOLUTION INTRAVENOUS at 08:45

## 2019-07-27 RX ADMIN — NYSTATIN 500000 UNITS: 500000 SUSPENSION ORAL at 17:03

## 2019-07-27 RX ADMIN — HYDROCODONE BITARTRATE AND ACETAMINOPHEN 1 TABLET: 5; 325 TABLET ORAL at 12:55

## 2019-07-27 RX ADMIN — LEVOTHYROXINE SODIUM 125 MCG: 125 TABLET ORAL at 06:36

## 2019-07-27 RX ADMIN — CHLORDIAZEPOXIDE HYDROCHLORIDE 5 MG: 5 CAPSULE ORAL at 21:27

## 2019-07-27 RX ADMIN — PANCRELIPASE 12000 UNITS OF LIPASE: 30000; 6000; 19000 CAPSULE, DELAYED RELEASE PELLETS ORAL at 17:01

## 2019-07-27 RX ADMIN — FAMOTIDINE 20 MG: 10 INJECTION, SOLUTION INTRAVENOUS at 21:27

## 2019-07-27 RX ADMIN — Medication 1 CAPSULE: at 15:22

## 2019-07-27 RX ADMIN — NYSTATIN 500000 UNITS: 500000 SUSPENSION ORAL at 08:32

## 2019-07-27 RX ADMIN — NYSTATIN 500000 UNITS: 500000 SUSPENSION ORAL at 21:27

## 2019-07-27 RX ADMIN — SODIUM CHLORIDE, PRESERVATIVE FREE 3 ML: 5 INJECTION INTRAVENOUS at 21:27

## 2019-07-27 RX ADMIN — LEVOTHYROXINE SODIUM 125 MCG: 25 TABLET ORAL at 06:36

## 2019-07-27 RX ADMIN — PANCRELIPASE 12000 UNITS OF LIPASE: 30000; 6000; 19000 CAPSULE, DELAYED RELEASE PELLETS ORAL at 11:11

## 2019-07-27 RX ADMIN — POTASSIUM CHLORIDE 40 MEQ: 1500 TABLET, EXTENDED RELEASE ORAL at 08:54

## 2019-07-27 RX ADMIN — FLUOXETINE 40 MG: 20 CAPSULE ORAL at 08:36

## 2019-07-27 RX ADMIN — POTASSIUM CHLORIDE 40 MEQ: 1500 TABLET, EXTENDED RELEASE ORAL at 17:01

## 2019-07-27 RX ADMIN — PANCRELIPASE 12000 UNITS OF LIPASE: 30000; 6000; 19000 CAPSULE, DELAYED RELEASE PELLETS ORAL at 08:32

## 2019-07-27 RX ADMIN — PIPERACILLIN, TAZOBACTAM 4.5 G: 4; .5 INJECTION, POWDER, LYOPHILIZED, FOR SOLUTION INTRAVENOUS at 00:05

## 2019-07-27 RX ADMIN — HYDROCODONE BITARTRATE AND ACETAMINOPHEN 1 TABLET: 5; 325 TABLET ORAL at 19:07

## 2019-07-27 RX ADMIN — SODIUM CHLORIDE, PRESERVATIVE FREE 10 ML: 5 INJECTION INTRAVENOUS at 15:23

## 2019-07-27 RX ADMIN — SODIUM CHLORIDE, PRESERVATIVE FREE 10 ML: 5 INJECTION INTRAVENOUS at 21:00

## 2019-07-27 RX ADMIN — HYDROCODONE BITARTRATE AND ACETAMINOPHEN 1 TABLET: 5; 325 TABLET ORAL at 00:05

## 2019-07-28 LAB
ANION GAP SERPL CALCULATED.3IONS-SCNC: 8.2 MMOL/L (ref 5–15)
ANISOCYTOSIS BLD QL: NORMAL
BASOPHILS # BLD AUTO: 0.03 10*3/MM3 (ref 0–0.2)
BASOPHILS # BLD AUTO: 0.04 10*3/MM3 (ref 0–0.2)
BASOPHILS NFR BLD AUTO: 0.7 % (ref 0–1.5)
BASOPHILS NFR BLD AUTO: 1 % (ref 0–1.5)
BUN BLD-MCNC: <2 MG/DL (ref 6–20)
BUN/CREAT SERPL: ABNORMAL (ref 7–25)
CALCIUM SPEC-SCNC: 8.2 MG/DL (ref 8.6–10.5)
CHLORIDE SERPL-SCNC: 103 MMOL/L (ref 98–107)
CO2 SERPL-SCNC: 23.8 MMOL/L (ref 22–29)
CREAT BLD-MCNC: 0.36 MG/DL (ref 0.57–1)
DEPRECATED RDW RBC AUTO: 78.1 FL (ref 37–54)
DEPRECATED RDW RBC AUTO: 78.4 FL (ref 37–54)
DIMORPHIC RBC: PRESENT
EOSINOPHIL # BLD AUTO: 0.05 10*3/MM3 (ref 0–0.4)
EOSINOPHIL # BLD AUTO: 0.08 10*3/MM3 (ref 0–0.4)
EOSINOPHIL NFR BLD AUTO: 1.2 % (ref 0.3–6.2)
EOSINOPHIL NFR BLD AUTO: 1.8 % (ref 0.3–6.2)
ERYTHROCYTE [DISTWIDTH] IN BLOOD BY AUTOMATED COUNT: 21 % (ref 12.3–15.4)
ERYTHROCYTE [DISTWIDTH] IN BLOOD BY AUTOMATED COUNT: 21 % (ref 12.3–15.4)
GFR SERPL CREATININE-BSD FRML MDRD: >150 ML/MIN/1.73
GLUCOSE BLD-MCNC: 91 MG/DL (ref 65–99)
HCT VFR BLD AUTO: 30.3 % (ref 34–46.6)
HCT VFR BLD AUTO: 31.8 % (ref 34–46.6)
HGB BLD-MCNC: 9.4 G/DL (ref 12–15.9)
HGB BLD-MCNC: 9.8 G/DL (ref 12–15.9)
IMM GRANULOCYTES # BLD AUTO: 0.01 10*3/MM3 (ref 0–0.05)
IMM GRANULOCYTES # BLD AUTO: 0.02 10*3/MM3 (ref 0–0.05)
IMM GRANULOCYTES NFR BLD AUTO: 0.2 % (ref 0–0.5)
IMM GRANULOCYTES NFR BLD AUTO: 0.5 % (ref 0–0.5)
LYMPHOCYTES # BLD AUTO: 0.9 10*3/MM3 (ref 0.7–3.1)
LYMPHOCYTES # BLD AUTO: 1.08 10*3/MM3 (ref 0.7–3.1)
LYMPHOCYTES NFR BLD AUTO: 21.8 % (ref 19.6–45.3)
LYMPHOCYTES NFR BLD AUTO: 23.8 % (ref 19.6–45.3)
MACROCYTES BLD QL SMEAR: NORMAL
MAGNESIUM SERPL-MCNC: 1.6 MG/DL (ref 1.6–2.6)
MCH RBC QN AUTO: 31.3 PG (ref 26.6–33)
MCH RBC QN AUTO: 31.4 PG (ref 26.6–33)
MCHC RBC AUTO-ENTMCNC: 30.8 G/DL (ref 31.5–35.7)
MCHC RBC AUTO-ENTMCNC: 31 G/DL (ref 31.5–35.7)
MCV RBC AUTO: 101 FL (ref 79–97)
MCV RBC AUTO: 101.9 FL (ref 79–97)
MONOCYTES # BLD AUTO: 0.61 10*3/MM3 (ref 0.1–0.9)
MONOCYTES # BLD AUTO: 0.92 10*3/MM3 (ref 0.1–0.9)
MONOCYTES NFR BLD AUTO: 14.8 % (ref 5–12)
MONOCYTES NFR BLD AUTO: 20.3 % (ref 5–12)
NEUTROPHILS # BLD AUTO: 2.42 10*3/MM3 (ref 1.7–7)
NEUTROPHILS # BLD AUTO: 2.5 10*3/MM3 (ref 1.7–7)
NEUTROPHILS NFR BLD AUTO: 53.2 % (ref 42.7–76)
NEUTROPHILS NFR BLD AUTO: 60.7 % (ref 42.7–76)
NRBC BLD AUTO-RTO: 0 /100 WBC (ref 0–0.2)
NRBC BLD AUTO-RTO: 0 /100 WBC (ref 0–0.2)
PHOSPHATE SERPL-MCNC: 2.4 MG/DL (ref 2.5–4.5)
PLAT MORPH BLD: NORMAL
PLATELET # BLD AUTO: 116 10*3/MM3 (ref 140–450)
PLATELET # BLD AUTO: 122 10*3/MM3 (ref 140–450)
PMV BLD AUTO: 12.1 FL (ref 6–12)
PMV BLD AUTO: 12.2 FL (ref 6–12)
POTASSIUM BLD-SCNC: 4.7 MMOL/L (ref 3.5–5.2)
RBC # BLD AUTO: 3 10*6/MM3 (ref 3.77–5.28)
RBC # BLD AUTO: 3.12 10*6/MM3 (ref 3.77–5.28)
SODIUM BLD-SCNC: 135 MMOL/L (ref 136–145)
WBC MORPH BLD: NORMAL
WBC NRBC COR # BLD: 4.12 10*3/MM3 (ref 3.4–10.8)
WBC NRBC COR # BLD: 4.54 10*3/MM3 (ref 3.4–10.8)

## 2019-07-28 PROCEDURE — 85025 COMPLETE CBC W/AUTO DIFF WBC: CPT | Performed by: HOSPITALIST

## 2019-07-28 PROCEDURE — 99233 SBSQ HOSP IP/OBS HIGH 50: CPT | Performed by: HOSPITALIST

## 2019-07-28 PROCEDURE — 25010000002 ERTAPENEM 1 GM/100ML SOLUTION 1 EACH BAG: Performed by: HOSPITALIST

## 2019-07-28 PROCEDURE — 85007 BL SMEAR W/DIFF WBC COUNT: CPT | Performed by: HOSPITALIST

## 2019-07-28 PROCEDURE — 84100 ASSAY OF PHOSPHORUS: CPT | Performed by: INTERNAL MEDICINE

## 2019-07-28 PROCEDURE — 25010000002 MAGNESIUM SULFATE 2 GM/50ML SOLUTION: Performed by: HOSPITALIST

## 2019-07-28 PROCEDURE — 80048 BASIC METABOLIC PNL TOTAL CA: CPT | Performed by: HOSPITALIST

## 2019-07-28 PROCEDURE — 83735 ASSAY OF MAGNESIUM: CPT | Performed by: INTERNAL MEDICINE

## 2019-07-28 RX ORDER — HYDROCODONE BITARTRATE AND ACETAMINOPHEN 7.5; 325 MG/1; MG/1
1 TABLET ORAL EVERY 4 HOURS PRN
Status: DISCONTINUED | OUTPATIENT
Start: 2019-07-28 | End: 2019-07-29 | Stop reason: HOSPADM

## 2019-07-28 RX ADMIN — HYDROCODONE BITARTRATE AND ACETAMINOPHEN 1 TABLET: 7.5; 325 TABLET ORAL at 20:50

## 2019-07-28 RX ADMIN — NYSTATIN 500000 UNITS: 500000 SUSPENSION ORAL at 18:24

## 2019-07-28 RX ADMIN — Medication 1 CAPSULE: at 08:40

## 2019-07-28 RX ADMIN — NICOTINE 1 PATCH: 7 PATCH, EXTENDED RELEASE TRANSDERMAL at 08:45

## 2019-07-28 RX ADMIN — HYDROCODONE BITARTRATE AND ACETAMINOPHEN 1 TABLET: 5; 325 TABLET ORAL at 08:41

## 2019-07-28 RX ADMIN — LEVOTHYROXINE SODIUM 125 MCG: 125 TABLET ORAL at 08:37

## 2019-07-28 RX ADMIN — FAMOTIDINE 20 MG: 10 INJECTION, SOLUTION INTRAVENOUS at 20:53

## 2019-07-28 RX ADMIN — CHLORDIAZEPOXIDE HYDROCHLORIDE 5 MG: 5 CAPSULE ORAL at 08:40

## 2019-07-28 RX ADMIN — FLUOXETINE 40 MG: 20 CAPSULE ORAL at 08:40

## 2019-07-28 RX ADMIN — NYSTATIN 500000 UNITS: 500000 SUSPENSION ORAL at 12:16

## 2019-07-28 RX ADMIN — HYDROCODONE BITARTRATE AND ACETAMINOPHEN 1 TABLET: 7.5; 325 TABLET ORAL at 14:58

## 2019-07-28 RX ADMIN — FAMOTIDINE 20 MG: 10 INJECTION, SOLUTION INTRAVENOUS at 08:43

## 2019-07-28 RX ADMIN — PANCRELIPASE 12000 UNITS OF LIPASE: 30000; 6000; 19000 CAPSULE, DELAYED RELEASE PELLETS ORAL at 08:43

## 2019-07-28 RX ADMIN — SODIUM CHLORIDE, PRESERVATIVE FREE 10 ML: 5 INJECTION INTRAVENOUS at 08:43

## 2019-07-28 RX ADMIN — HYDROCODONE BITARTRATE AND ACETAMINOPHEN 1 TABLET: 5; 325 TABLET ORAL at 02:05

## 2019-07-28 RX ADMIN — NYSTATIN 500000 UNITS: 500000 SUSPENSION ORAL at 20:53

## 2019-07-28 RX ADMIN — PANCRELIPASE 12000 UNITS OF LIPASE: 30000; 6000; 19000 CAPSULE, DELAYED RELEASE PELLETS ORAL at 12:16

## 2019-07-28 RX ADMIN — NYSTATIN 500000 UNITS: 500000 SUSPENSION ORAL at 08:43

## 2019-07-28 RX ADMIN — ERTAPENEM SODIUM 1 G: 1 INJECTION, POWDER, LYOPHILIZED, FOR SOLUTION INTRAMUSCULAR; INTRAVENOUS at 12:49

## 2019-07-28 RX ADMIN — SODIUM CHLORIDE, PRESERVATIVE FREE 10 ML: 5 INJECTION INTRAVENOUS at 20:53

## 2019-07-28 RX ADMIN — PANCRELIPASE 12000 UNITS OF LIPASE: 30000; 6000; 19000 CAPSULE, DELAYED RELEASE PELLETS ORAL at 18:24

## 2019-07-28 RX ADMIN — MAGNESIUM SULFATE HEPTAHYDRATE 2 G: 40 INJECTION, SOLUTION INTRAVENOUS at 14:50

## 2019-07-28 RX ADMIN — AMLODIPINE BESYLATE 5 MG: 5 TABLET ORAL at 08:40

## 2019-07-28 RX ADMIN — SODIUM CHLORIDE, PRESERVATIVE FREE 3 ML: 5 INJECTION INTRAVENOUS at 20:53

## 2019-07-28 RX ADMIN — SODIUM PHOSPHATE, MONOBASIC, MONOHYDRATE 15 MMOL: 276; 142 INJECTION, SOLUTION INTRAVENOUS at 10:08

## 2019-07-29 ENCOUNTER — APPOINTMENT (OUTPATIENT)
Dept: GENERAL RADIOLOGY | Facility: HOSPITAL | Age: 49
End: 2019-07-29

## 2019-07-29 VITALS
OXYGEN SATURATION: 98 % | WEIGHT: 117.5 LBS | SYSTOLIC BLOOD PRESSURE: 93 MMHG | BODY MASS INDEX: 19.58 KG/M2 | RESPIRATION RATE: 18 BRPM | HEIGHT: 65 IN | DIASTOLIC BLOOD PRESSURE: 64 MMHG | HEART RATE: 96 BPM | TEMPERATURE: 98.6 F

## 2019-07-29 PROBLEM — F10.10 CHRONIC ALCOHOL ABUSE: Status: ACTIVE | Noted: 2019-07-29

## 2019-07-29 PROBLEM — Z16.12 ESBL (EXTENDED SPECTRUM BETA-LACTAMASE) PRODUCING BACTERIA INFECTION: Status: ACTIVE | Noted: 2019-07-29

## 2019-07-29 PROBLEM — A49.9 ESBL (EXTENDED SPECTRUM BETA-LACTAMASE) PRODUCING BACTERIA INFECTION: Status: ACTIVE | Noted: 2019-07-29

## 2019-07-29 LAB
ANION GAP SERPL CALCULATED.3IONS-SCNC: 9.5 MMOL/L (ref 5–15)
BASOPHILS # BLD AUTO: 0.02 10*3/MM3 (ref 0–0.2)
BASOPHILS NFR BLD AUTO: 0.6 % (ref 0–1.5)
BUN BLD-MCNC: <2 MG/DL (ref 6–20)
BUN/CREAT SERPL: ABNORMAL (ref 7–25)
CALCIUM SPEC-SCNC: 8 MG/DL (ref 8.6–10.5)
CHLORIDE SERPL-SCNC: 97 MMOL/L (ref 98–107)
CO2 SERPL-SCNC: 26.5 MMOL/L (ref 22–29)
CREAT BLD-MCNC: 0.38 MG/DL (ref 0.57–1)
DEPRECATED RDW RBC AUTO: 78.3 FL (ref 37–54)
EOSINOPHIL # BLD AUTO: 0.05 10*3/MM3 (ref 0–0.4)
EOSINOPHIL NFR BLD AUTO: 1.4 % (ref 0.3–6.2)
ERYTHROCYTE [DISTWIDTH] IN BLOOD BY AUTOMATED COUNT: 21.5 % (ref 12.3–15.4)
GFR SERPL CREATININE-BSD FRML MDRD: >150 ML/MIN/1.73
GLUCOSE BLD-MCNC: 103 MG/DL (ref 65–99)
HCT VFR BLD AUTO: 27.5 % (ref 34–46.6)
HGB BLD-MCNC: 8.6 G/DL (ref 12–15.9)
IMM GRANULOCYTES # BLD AUTO: 0.01 10*3/MM3 (ref 0–0.05)
IMM GRANULOCYTES NFR BLD AUTO: 0.3 % (ref 0–0.5)
LYMPHOCYTES # BLD AUTO: 0.91 10*3/MM3 (ref 0.7–3.1)
LYMPHOCYTES NFR BLD AUTO: 25.4 % (ref 19.6–45.3)
MAGNESIUM SERPL-MCNC: 1.5 MG/DL (ref 1.6–2.6)
MCH RBC QN AUTO: 31.4 PG (ref 26.6–33)
MCHC RBC AUTO-ENTMCNC: 31.3 G/DL (ref 31.5–35.7)
MCV RBC AUTO: 100.4 FL (ref 79–97)
MONOCYTES # BLD AUTO: 0.72 10*3/MM3 (ref 0.1–0.9)
MONOCYTES NFR BLD AUTO: 20.1 % (ref 5–12)
NEUTROPHILS # BLD AUTO: 1.87 10*3/MM3 (ref 1.7–7)
NEUTROPHILS NFR BLD AUTO: 52.2 % (ref 42.7–76)
NRBC BLD AUTO-RTO: 0 /100 WBC (ref 0–0.2)
PHOSPHATE SERPL-MCNC: 3.7 MG/DL (ref 2.5–4.5)
PLATELET # BLD AUTO: 116 10*3/MM3 (ref 140–450)
PMV BLD AUTO: 12.4 FL (ref 6–12)
POTASSIUM BLD-SCNC: 4.2 MMOL/L (ref 3.5–5.2)
RBC # BLD AUTO: 2.74 10*6/MM3 (ref 3.77–5.28)
SODIUM BLD-SCNC: 133 MMOL/L (ref 136–145)
WBC NRBC COR # BLD: 3.58 10*3/MM3 (ref 3.4–10.8)

## 2019-07-29 PROCEDURE — 25010000002 ERTAPENEM 1 GM/100ML SOLUTION 1 EACH BAG: Performed by: HOSPITALIST

## 2019-07-29 PROCEDURE — 83735 ASSAY OF MAGNESIUM: CPT | Performed by: INTERNAL MEDICINE

## 2019-07-29 PROCEDURE — C1751 CATH, INF, PER/CENT/MIDLINE: HCPCS

## 2019-07-29 PROCEDURE — 84100 ASSAY OF PHOSPHORUS: CPT | Performed by: INTERNAL MEDICINE

## 2019-07-29 PROCEDURE — 85025 COMPLETE CBC W/AUTO DIFF WBC: CPT | Performed by: HOSPITALIST

## 2019-07-29 PROCEDURE — 02HV33Z INSERTION OF INFUSION DEVICE INTO SUPERIOR VENA CAVA, PERCUTANEOUS APPROACH: ICD-10-PCS | Performed by: HOSPITALIST

## 2019-07-29 PROCEDURE — 80048 BASIC METABOLIC PNL TOTAL CA: CPT | Performed by: HOSPITALIST

## 2019-07-29 PROCEDURE — 71045 X-RAY EXAM CHEST 1 VIEW: CPT

## 2019-07-29 PROCEDURE — 99238 HOSP IP/OBS DSCHRG MGMT 30/<: CPT | Performed by: INTERNAL MEDICINE

## 2019-07-29 RX ORDER — SODIUM CHLORIDE 0.9 % (FLUSH) 0.9 %
10 SYRINGE (ML) INJECTION EVERY 12 HOURS SCHEDULED
Status: DISCONTINUED | OUTPATIENT
Start: 2019-07-29 | End: 2019-07-29 | Stop reason: HOSPADM

## 2019-07-29 RX ORDER — L.ACID,PARA/B.BIFIDUM/S.THERM 8B CELL
1 CAPSULE ORAL DAILY
Qty: 30 CAPSULE | Refills: 0 | Status: SHIPPED | OUTPATIENT
Start: 2019-07-30 | End: 2019-08-29

## 2019-07-29 RX ORDER — MAGNESIUM OXIDE 400 MG/1
400 TABLET ORAL ONCE
Status: COMPLETED | OUTPATIENT
Start: 2019-07-29 | End: 2019-07-29

## 2019-07-29 RX ORDER — SODIUM CHLORIDE 0.9 % (FLUSH) 0.9 %
20 SYRINGE (ML) INJECTION AS NEEDED
Status: DISCONTINUED | OUTPATIENT
Start: 2019-07-29 | End: 2019-07-29 | Stop reason: HOSPADM

## 2019-07-29 RX ORDER — SODIUM CHLORIDE 0.9 % (FLUSH) 0.9 %
10 SYRINGE (ML) INJECTION AS NEEDED
Status: DISCONTINUED | OUTPATIENT
Start: 2019-07-29 | End: 2019-07-29 | Stop reason: HOSPADM

## 2019-07-29 RX ORDER — LOPERAMIDE HYDROCHLORIDE 2 MG/1
2 CAPSULE ORAL 2 TIMES DAILY PRN
Qty: 30 CAPSULE | Refills: 0 | Status: SHIPPED | OUTPATIENT
Start: 2019-07-29 | End: 2019-08-12

## 2019-07-29 RX ADMIN — NYSTATIN 500000 UNITS: 500000 SUSPENSION ORAL at 12:40

## 2019-07-29 RX ADMIN — FLUOXETINE 40 MG: 20 CAPSULE ORAL at 08:55

## 2019-07-29 RX ADMIN — MAGNESIUM OXIDE TAB 400 MG (241.3 MG ELEMENTAL MG) 400 MG: 400 (241.3 MG) TAB at 16:05

## 2019-07-29 RX ADMIN — SODIUM CHLORIDE, PRESERVATIVE FREE 3 ML: 5 INJECTION INTRAVENOUS at 10:54

## 2019-07-29 RX ADMIN — Medication 1 CAPSULE: at 08:57

## 2019-07-29 RX ADMIN — LEVOTHYROXINE SODIUM 125 MCG: 125 TABLET ORAL at 08:54

## 2019-07-29 RX ADMIN — NICOTINE 1 PATCH: 7 PATCH, EXTENDED RELEASE TRANSDERMAL at 10:53

## 2019-07-29 RX ADMIN — PANCRELIPASE 12000 UNITS OF LIPASE: 30000; 6000; 19000 CAPSULE, DELAYED RELEASE PELLETS ORAL at 12:40

## 2019-07-29 RX ADMIN — FAMOTIDINE 20 MG: 10 INJECTION, SOLUTION INTRAVENOUS at 09:17

## 2019-07-29 RX ADMIN — SODIUM CHLORIDE, PRESERVATIVE FREE 10 ML: 5 INJECTION INTRAVENOUS at 09:22

## 2019-07-29 RX ADMIN — NYSTATIN 500000 UNITS: 500000 SUSPENSION ORAL at 09:02

## 2019-07-29 RX ADMIN — HYDROCODONE BITARTRATE AND ACETAMINOPHEN 1 TABLET: 7.5; 325 TABLET ORAL at 03:05

## 2019-07-29 RX ADMIN — ERTAPENEM SODIUM 1 G: 1 INJECTION, POWDER, LYOPHILIZED, FOR SOLUTION INTRAMUSCULAR; INTRAVENOUS at 12:40

## 2019-07-29 RX ADMIN — PANCRELIPASE 12000 UNITS OF LIPASE: 30000; 6000; 19000 CAPSULE, DELAYED RELEASE PELLETS ORAL at 08:57

## 2019-07-29 RX ADMIN — HYDROCODONE BITARTRATE AND ACETAMINOPHEN 1 TABLET: 7.5; 325 TABLET ORAL at 10:57

## 2019-07-29 RX ADMIN — AMLODIPINE BESYLATE 5 MG: 5 TABLET ORAL at 08:54

## 2019-07-30 ENCOUNTER — READMISSION MANAGEMENT (OUTPATIENT)
Dept: CALL CENTER | Facility: HOSPITAL | Age: 49
End: 2019-07-30

## 2019-07-30 NOTE — OUTREACH NOTE
Prep Survey      Responses   Facility patient discharged from?  [place]   Is LACE score < 7 ?  No   Is patient eligible?  No   What are the reasons patient is not eligible?  Other [Alcoholic ketoacidosis]   Does the patient have one of the following disease processes/diagnoses(primary or secondary)?  Other   Prep survey completed?  Yes          Beena Stone RN

## 2019-08-06 DIAGNOSIS — B96.29 UTI DUE TO EXTENDED-SPECTRUM BETA LACTAMASE (ESBL) PRODUCING ESCHERICHIA COLI: Primary | ICD-10-CM

## 2019-08-06 DIAGNOSIS — N39.0 UTI DUE TO EXTENDED-SPECTRUM BETA LACTAMASE (ESBL) PRODUCING ESCHERICHIA COLI: Primary | ICD-10-CM

## 2019-08-06 DIAGNOSIS — Z16.12 UTI DUE TO EXTENDED-SPECTRUM BETA LACTAMASE (ESBL) PRODUCING ESCHERICHIA COLI: Primary | ICD-10-CM

## 2019-08-06 RX ORDER — SODIUM CHLORIDE 0.9 % (FLUSH) 0.9 %
20 SYRINGE (ML) INJECTION AS NEEDED
Status: ACTIVE | OUTPATIENT
Start: 2019-08-06

## 2019-08-06 RX ORDER — SODIUM CHLORIDE 0.9 % (FLUSH) 0.9 %
10 SYRINGE (ML) INJECTION AS NEEDED
Status: ACTIVE | OUTPATIENT
Start: 2019-08-06

## 2019-08-06 RX ORDER — SODIUM CHLORIDE 0.9 % (FLUSH) 0.9 %
10 SYRINGE (ML) INJECTION EVERY 12 HOURS SCHEDULED
Status: ACTIVE | OUTPATIENT
Start: 2019-08-06

## 2019-08-07 ENCOUNTER — HOSPITAL ENCOUNTER (OUTPATIENT)
Dept: INFUSION THERAPY | Facility: HOSPITAL | Age: 49
Discharge: HOME | End: 2019-08-07
Admitting: HOSPITALIST

## 2019-08-07 VITALS
BODY MASS INDEX: 19.58 KG/M2 | HEIGHT: 65 IN | WEIGHT: 117.5 LBS | RESPIRATION RATE: 16 BRPM | OXYGEN SATURATION: 98 % | SYSTOLIC BLOOD PRESSURE: 125 MMHG | DIASTOLIC BLOOD PRESSURE: 87 MMHG | HEART RATE: 100 BPM | TEMPERATURE: 98.1 F

## 2019-08-07 DIAGNOSIS — N39.0 UTI DUE TO EXTENDED-SPECTRUM BETA LACTAMASE (ESBL) PRODUCING ESCHERICHIA COLI: ICD-10-CM

## 2019-08-07 DIAGNOSIS — Z16.12 UTI DUE TO EXTENDED-SPECTRUM BETA LACTAMASE (ESBL) PRODUCING ESCHERICHIA COLI: ICD-10-CM

## 2019-08-07 DIAGNOSIS — B96.29 UTI DUE TO EXTENDED-SPECTRUM BETA LACTAMASE (ESBL) PRODUCING ESCHERICHIA COLI: ICD-10-CM

## 2019-08-07 PROCEDURE — G0463 HOSPITAL OUTPT CLINIC VISIT: HCPCS

## 2019-08-07 NOTE — NURSING NOTE
NURSING PROGRESS NOTE:Patient ambulated to [facility] for scheduled PICC maintenance at 1135.  Left upper single lumen power PICC dressing changed per policy.  See flow sheet for documentation.  Tolerated Procedure well.  Discharged home at 1200. COLEEN Olmos

## 2019-08-13 ENCOUNTER — HOSPITAL ENCOUNTER (OUTPATIENT)
Dept: INFUSION THERAPY | Facility: HOSPITAL | Age: 49
Discharge: HOME | End: 2019-08-13
Admitting: INTERNAL MEDICINE

## 2019-08-13 VITALS
HEART RATE: 95 BPM | TEMPERATURE: 98 F | DIASTOLIC BLOOD PRESSURE: 84 MMHG | OXYGEN SATURATION: 100 % | RESPIRATION RATE: 16 BRPM | SYSTOLIC BLOOD PRESSURE: 128 MMHG

## 2019-08-13 DIAGNOSIS — B96.29 UTI DUE TO EXTENDED-SPECTRUM BETA LACTAMASE (ESBL) PRODUCING ESCHERICHIA COLI: Primary | ICD-10-CM

## 2019-08-13 DIAGNOSIS — N39.0 UTI DUE TO EXTENDED-SPECTRUM BETA LACTAMASE (ESBL) PRODUCING ESCHERICHIA COLI: Primary | ICD-10-CM

## 2019-08-13 DIAGNOSIS — Z16.12 UTI DUE TO EXTENDED-SPECTRUM BETA LACTAMASE (ESBL) PRODUCING ESCHERICHIA COLI: Primary | ICD-10-CM

## 2019-08-13 PROCEDURE — G0463 HOSPITAL OUTPT CLINIC VISIT: HCPCS

## 2019-08-13 NOTE — PATIENT INSTRUCTIONS
"  Call DR. ARELI REID [phone] if you have any problems or concerns.    We know you have a Choice in healthcare and appreciate you using [facility].  Our purpose is to provide you \"Excellent Care\".  We hope that you will always choose us in the future and continue to recommend us to your family and friends.              PICC Removal, Adult, Care After  This sheet gives you information about how to care for yourself after your procedure. Your health care provider may also give you more specific instructions. If you have problems or questions, contact your health care provider.  What can I expect after the procedure?  After your procedure, it is common to have:  · Tenderness or soreness.  · Redness, swelling, or a scab where the PICC was removed (exit site).  Follow these instructions at home:  For the first 24 hours after the procedure    · Keep the bandage (dressing) on the exit site clean and dry. Do not remove the dressing until your health care provider tells you to do so.  · Check your arm often for signs and symptoms of an infection. Check for:  ? A red streak that spreads away from the dressing.  ? Blood or fluid that you can see on the dressing.  ? More redness or swelling.  · Do not lift anything heavy or do activities that require great effort until your health care provider says it is okay. You should avoid:  ? Lifting weights.  ? Yard work.  ? Any physical activity with repetitive arm movement.  · Watch closely for any signs of an air bubble in the vein (air embolism). This is a rare but serious complication. If you have signs of air embolism, call 911 immediately and lie down on your left side to keep the air from moving into the lungs. Signs of an air embolism include:  ? Difficulty breathing.  ? Chest pain.  ? Coughing or wheezing.  ? Skin that is pale, blue, cold, or clammy.  ? Rapid pulse.  ? Rapid breathing.  ? Fainting.  After 24 Hours have passed:  · Remove your dressing " as told by your health care provider. Make sure you wash your hands with soap and water before and after you change the dressing. If soap and water are not available, use hand .  · Return to your normal activities as told by your health care provider.  · A small scab may develop over the exit site. Do not pick at the scab.  · When bathing or showering, gently wash the exit site with soap and water. Pat it dry.  · Watch for signs of infection, such as:  ? Fever or chills.  ? Swollen glands under the arm.  ? More redness, swelling, or soreness in the arm.  ? Blood, fluid, or pus coming from the exit site.  ? Warmth or a bad smell at the exit site.  ? A red streak spreading away from the exit site.  General instructions  · Take over-the-counter and prescription medicines only as told by your health care provider. Do not take any new medicines without checking with your health care provider first.  · If you were prescribed an antibiotic medicine, apply or take it as told by your health care provider. Do not stop using the antibiotic even if your condition improves.  · Keep all follow-up visits as told by your health care provider. This is important.  Contact a health care provider if:  · You have a fever or chills.  · You have soreness, redness, or swelling on your exit site, and it gets worse.  · You have swollen glands under your arm.  · You have any of the following symptoms at your exit site:  ? Blood, fluid, or pus.  ? Unusual warmth.  ? A bad smell.  ? A red streak spreading away from the exit site.  Get help right away if:  · You have numbness or tingling in your fingers, hand, or arm.  · Your arm looks blue and feels cold.  · You have signs of an air embolism, such as:  ? Difficulty breathing.  ? Chest pain.  ? Coughing or wheezing.  ? Skin that is pale, blue, cold, or clammy.  ? Rapid pulse.  ? Rapid breathing.  ? Fainting.  These symptoms may represent a serious problem that is an emergency. Do not  wait to see if the symptoms will go away. Get medical help right away. Call your local emergency services (911 in the [place] Do not drive yourself to the hospital.  Summary  · After your procedure, it is common to have tenderness or soreness, redness, swelling, or a scab at the exit site.  · Keep the dressing over the exit site clean and dry. Do not remove the dressing until your health care provider tells you to do so.  · Do not lift anything heavy or do activities that require great effort until your health care provider says it is okay.  · Watch closely for any signs of an air embolism. If you have signs of air embolism, call 911 immediately and lie down on your left side.  This information is not intended to replace advice given to you by your health care provider. Make sure you discuss any questions you have with your health care provider.  Document Released: 12/23/2014 Document Revised: 02/13/2018 Document Reviewed: 02/13/2018  [employer] Interactive Patient Education © 2019 [employer]

## 2019-08-13 NOTE — NURSING NOTE
NURSING PROGRESS NOTE: Patient ambulated to [facility] to have left upper arm single lumen PICC removed per order.  VSS.  PICC removed per Policy, with patient supine at 1135. Petroleum gauze applied to site and secured with a clear op-site dressing.  Remained supine on stretcher times 30 minutes. Tolerated well.  AVS reviewed with patient and a copy provided.  Verbalized understanding. Discharged home with her  at 1210. COLEEN Heller

## 2019-08-14 ENCOUNTER — APPOINTMENT (OUTPATIENT)
Dept: INFUSION THERAPY | Facility: HOSPITAL | Age: 49
End: 2019-08-14

## 2019-08-21 ENCOUNTER — APPOINTMENT (OUTPATIENT)
Dept: INFUSION THERAPY | Facility: HOSPITAL | Age: 49
End: 2019-08-21

## 2019-10-10 ENCOUNTER — TELEPHONE (OUTPATIENT)
Dept: ENDOCRINOLOGY | Age: 49
End: 2019-10-10

## 2019-10-10 NOTE — TELEPHONE ENCOUNTER
RECORDS FOR NEW PT ENDO REFERRAL HAVE BEEN GIVEN TO DR GARCIA TO REVIEW. WAITING FOR APPROVAL FROM DR THAPA TO SCHEDULE. PT REFERRED BY DR MOHINDER LANCE. NEW PT RECORDS HAVE BEEN SCANNED INTO PT'S CHART.

## 2019-10-29 ENCOUNTER — HOSPITAL ENCOUNTER (EMERGENCY)
Facility: HOSPITAL | Age: 49
Discharge: HOME | End: 2019-10-29
Attending: EMERGENCY MEDICINE | Admitting: EMERGENCY MEDICINE

## 2019-10-29 VITALS
WEIGHT: 125.5 LBS | OXYGEN SATURATION: 97 % | BODY MASS INDEX: 20.88 KG/M2 | DIASTOLIC BLOOD PRESSURE: 82 MMHG | RESPIRATION RATE: 18 BRPM | HEART RATE: 100 BPM | SYSTOLIC BLOOD PRESSURE: 120 MMHG | TEMPERATURE: 98 F

## 2019-10-29 DIAGNOSIS — R82.71 BACTERIA IN URINE: Primary | ICD-10-CM

## 2019-10-29 DIAGNOSIS — R10.30 LOWER ABDOMINAL PAIN: ICD-10-CM

## 2019-10-29 DIAGNOSIS — R11.10 VOMITING, INTRACTABILITY OF VOMITING NOT SPECIFIED, PRESENCE OF NAUSEA NOT SPECIFIED, UNSPECIFIED VOMITING TYPE: ICD-10-CM

## 2019-10-29 LAB
ALBUMIN SERPL-MCNC: 3.2 G/DL (ref 3.5–5.2)
ALBUMIN/GLOB SERPL: 0.7 G/DL
ALP SERPL-CCNC: 207 U/L (ref 39–117)
ALT SERPL W P-5'-P-CCNC: 17 U/L (ref 1–33)
ANION GAP SERPL CALCULATED.3IONS-SCNC: 11.5 MMOL/L (ref 5–15)
ANISOCYTOSIS BLD QL: NORMAL
AST SERPL-CCNC: 82 U/L (ref 1–32)
BACTERIA UR QL AUTO: ABNORMAL /HPF
BASOPHILS # BLD AUTO: 0.08 10*3/MM3 (ref 0–0.2)
BASOPHILS NFR BLD AUTO: 1.6 % (ref 0–1.5)
BILIRUB SERPL-MCNC: 2.1 MG/DL (ref 0.2–1.2)
BILIRUB UR QL STRIP: NEGATIVE
BUN BLD-MCNC: 4 MG/DL (ref 6–20)
BUN/CREAT SERPL: 4.9 (ref 7–25)
CALCIUM SPEC-SCNC: 8.7 MG/DL (ref 8.6–10.5)
CHLORIDE SERPL-SCNC: 104 MMOL/L (ref 98–107)
CLARITY UR: CLEAR
CO2 SERPL-SCNC: 24.5 MMOL/L (ref 22–29)
COLOR UR: YELLOW
CREAT BLD-MCNC: 0.82 MG/DL (ref 0.57–1)
DEPRECATED RDW RBC AUTO: 71.7 FL (ref 37–54)
EOSINOPHIL # BLD AUTO: 0.02 10*3/MM3 (ref 0–0.4)
EOSINOPHIL NFR BLD AUTO: 0.4 % (ref 0.3–6.2)
ERYTHROCYTE [DISTWIDTH] IN BLOOD BY AUTOMATED COUNT: 20.1 % (ref 12.3–15.4)
GFR SERPL CREATININE-BSD FRML MDRD: 73 ML/MIN/1.73
GLOBULIN UR ELPH-MCNC: 4.3 GM/DL
GLUCOSE BLD-MCNC: 122 MG/DL (ref 65–99)
GLUCOSE UR STRIP-MCNC: NEGATIVE MG/DL
HCT VFR BLD AUTO: 33.2 % (ref 34–46.6)
HGB BLD-MCNC: 10.2 G/DL (ref 12–15.9)
HGB UR QL STRIP.AUTO: ABNORMAL
HYALINE CASTS UR QL AUTO: ABNORMAL /LPF
IMM GRANULOCYTES # BLD AUTO: 0.02 10*3/MM3 (ref 0–0.05)
IMM GRANULOCYTES NFR BLD AUTO: 0.4 % (ref 0–0.5)
KETONES UR QL STRIP: NEGATIVE
LEUKOCYTE ESTERASE UR QL STRIP.AUTO: ABNORMAL
LIPASE SERPL-CCNC: 19 U/L (ref 13–60)
LYMPHOCYTES # BLD AUTO: 1.21 10*3/MM3 (ref 0.7–3.1)
LYMPHOCYTES NFR BLD AUTO: 23.7 % (ref 19.6–45.3)
MCH RBC QN AUTO: 29.8 PG (ref 26.6–33)
MCHC RBC AUTO-ENTMCNC: 30.7 G/DL (ref 31.5–35.7)
MCV RBC AUTO: 97.1 FL (ref 79–97)
MONOCYTES # BLD AUTO: 0.34 10*3/MM3 (ref 0.1–0.9)
MONOCYTES NFR BLD AUTO: 6.7 % (ref 5–12)
NEUTROPHILS # BLD AUTO: 3.44 10*3/MM3 (ref 1.7–7)
NEUTROPHILS NFR BLD AUTO: 67.2 % (ref 42.7–76)
NITRITE UR QL STRIP: NEGATIVE
NRBC BLD AUTO-RTO: 0 /100 WBC (ref 0–0.2)
PH UR STRIP.AUTO: 7 [PH] (ref 4.5–8)
PLAT MORPH BLD: NORMAL
PLATELET # BLD AUTO: 106 10*3/MM3 (ref 140–450)
PMV BLD AUTO: 12.5 FL (ref 6–12)
POTASSIUM BLD-SCNC: 3.5 MMOL/L (ref 3.5–5.2)
PROT SERPL-MCNC: 7.5 G/DL (ref 6–8.5)
PROT UR QL STRIP: ABNORMAL
RBC # BLD AUTO: 3.42 10*6/MM3 (ref 3.77–5.28)
RBC # UR: ABNORMAL /HPF
REF LAB TEST METHOD: ABNORMAL
SODIUM BLD-SCNC: 140 MMOL/L (ref 136–145)
SP GR UR STRIP: 1.02 (ref 1–1.03)
SQUAMOUS #/AREA URNS HPF: ABNORMAL /HPF
UROBILINOGEN UR QL STRIP: ABNORMAL
WBC MORPH BLD: NORMAL
WBC NRBC COR # BLD: 5.11 10*3/MM3 (ref 3.4–10.8)
WBC UR QL AUTO: ABNORMAL /HPF

## 2019-10-29 PROCEDURE — 96374 THER/PROPH/DIAG INJ IV PUSH: CPT

## 2019-10-29 PROCEDURE — 80053 COMPREHEN METABOLIC PANEL: CPT | Performed by: EMERGENCY MEDICINE

## 2019-10-29 PROCEDURE — 85025 COMPLETE CBC W/AUTO DIFF WBC: CPT | Performed by: EMERGENCY MEDICINE

## 2019-10-29 PROCEDURE — 99282 EMERGENCY DEPT VISIT SF MDM: CPT | Performed by: EMERGENCY MEDICINE

## 2019-10-29 PROCEDURE — 96375 TX/PRO/DX INJ NEW DRUG ADDON: CPT

## 2019-10-29 PROCEDURE — 81001 URINALYSIS AUTO W/SCOPE: CPT | Performed by: EMERGENCY MEDICINE

## 2019-10-29 PROCEDURE — 83690 ASSAY OF LIPASE: CPT | Performed by: EMERGENCY MEDICINE

## 2019-10-29 PROCEDURE — 25010000002 METOCLOPRAMIDE PER 10 MG: Performed by: EMERGENCY MEDICINE

## 2019-10-29 PROCEDURE — 87077 CULTURE AEROBIC IDENTIFY: CPT | Performed by: EMERGENCY MEDICINE

## 2019-10-29 PROCEDURE — 25010000002 ONDANSETRON PER 1 MG: Performed by: EMERGENCY MEDICINE

## 2019-10-29 PROCEDURE — 99283 EMERGENCY DEPT VISIT LOW MDM: CPT

## 2019-10-29 PROCEDURE — 87086 URINE CULTURE/COLONY COUNT: CPT | Performed by: EMERGENCY MEDICINE

## 2019-10-29 PROCEDURE — 85007 BL SMEAR W/DIFF WBC COUNT: CPT | Performed by: EMERGENCY MEDICINE

## 2019-10-29 RX ORDER — METOCLOPRAMIDE HYDROCHLORIDE 5 MG/ML
10 INJECTION INTRAMUSCULAR; INTRAVENOUS ONCE
Status: COMPLETED | OUTPATIENT
Start: 2019-10-29 | End: 2019-10-29

## 2019-10-29 RX ORDER — DICYCLOMINE HYDROCHLORIDE 10 MG/1
10 CAPSULE ORAL 3 TIMES DAILY PRN
Qty: 12 CAPSULE | Refills: 0 | Status: SHIPPED | OUTPATIENT
Start: 2019-10-29 | End: 2019-12-13

## 2019-10-29 RX ORDER — ONDANSETRON 2 MG/ML
8 INJECTION INTRAMUSCULAR; INTRAVENOUS ONCE
Status: COMPLETED | OUTPATIENT
Start: 2019-10-29 | End: 2019-10-29

## 2019-10-29 RX ORDER — CEPHALEXIN 500 MG/1
500 CAPSULE ORAL 3 TIMES DAILY
Qty: 21 CAPSULE | Refills: 0 | Status: SHIPPED | OUTPATIENT
Start: 2019-10-29 | End: 2019-12-13

## 2019-10-29 RX ORDER — DICYCLOMINE HYDROCHLORIDE 10 MG/1
20 CAPSULE ORAL ONCE
Status: COMPLETED | OUTPATIENT
Start: 2019-10-29 | End: 2019-10-29

## 2019-10-29 RX ORDER — ONDANSETRON 4 MG/1
4 TABLET, ORALLY DISINTEGRATING ORAL 4 TIMES DAILY PRN
Qty: 12 TABLET | Refills: 0 | Status: ON HOLD | OUTPATIENT
Start: 2019-10-29 | End: 2022-10-24

## 2019-10-29 RX ADMIN — SODIUM CHLORIDE 1000 ML: 9 INJECTION, SOLUTION INTRAVENOUS at 11:21

## 2019-10-29 RX ADMIN — DICYCLOMINE HYDROCHLORIDE 20 MG: 10 CAPSULE ORAL at 12:07

## 2019-10-29 RX ADMIN — ONDANSETRON 8 MG: 2 INJECTION, SOLUTION INTRAMUSCULAR; INTRAVENOUS at 11:22

## 2019-10-29 RX ADMIN — METOCLOPRAMIDE 10 MG: 5 INJECTION, SOLUTION INTRAMUSCULAR; INTRAVENOUS at 11:22

## 2019-10-31 LAB
BACTERIA SPEC AEROBE CULT: ABNORMAL
BACTERIA SPEC AEROBE CULT: ABNORMAL

## 2019-12-13 ENCOUNTER — OFFICE VISIT (OUTPATIENT)
Dept: ENDOCRINOLOGY | Age: 49
End: 2019-12-13

## 2019-12-13 VITALS
SYSTOLIC BLOOD PRESSURE: 136 MMHG | HEIGHT: 65 IN | HEART RATE: 100 BPM | BODY MASS INDEX: 19.79 KG/M2 | DIASTOLIC BLOOD PRESSURE: 88 MMHG | WEIGHT: 118.8 LBS

## 2019-12-13 DIAGNOSIS — R63.4 ABNORMAL WEIGHT LOSS: ICD-10-CM

## 2019-12-13 DIAGNOSIS — E06.3 HASHIMOTO'S DISEASE: ICD-10-CM

## 2019-12-13 DIAGNOSIS — G62.89 OTHER POLYNEUROPATHY: ICD-10-CM

## 2019-12-13 DIAGNOSIS — E03.9 HYPOTHYROIDISM, UNSPECIFIED TYPE: Primary | ICD-10-CM

## 2019-12-13 DIAGNOSIS — R53.82 CHRONIC FATIGUE: ICD-10-CM

## 2019-12-13 PROCEDURE — 99204 OFFICE O/P NEW MOD 45 MIN: CPT | Performed by: INTERNAL MEDICINE

## 2019-12-13 RX ORDER — HYDROCODONE BITARTRATE AND ACETAMINOPHEN 5; 325 MG/1; MG/1
1 TABLET ORAL EVERY 4 HOURS PRN
Status: ON HOLD | COMMUNITY
Start: 2019-10-04 | End: 2022-10-24

## 2019-12-13 RX ORDER — PANTOPRAZOLE SODIUM 40 MG/1
TABLET, DELAYED RELEASE ORAL
COMMUNITY
Start: 2019-11-24 | End: 2022-05-04 | Stop reason: SDUPTHER

## 2019-12-13 RX ORDER — EPINEPHRINE 0.15 MG/.3ML
INJECTION INTRAMUSCULAR
COMMUNITY
Start: 2019-07-29 | End: 2023-01-16

## 2019-12-13 RX ORDER — POTASSIUM CHLORIDE 20 MEQ/1
TABLET, EXTENDED RELEASE ORAL
COMMUNITY
Start: 2019-10-11 | End: 2022-10-30 | Stop reason: HOSPADM

## 2019-12-13 RX ORDER — LEVOTHYROXINE SODIUM 0.1 MG/1
TABLET ORAL
COMMUNITY
Start: 2019-12-03 | End: 2019-12-13 | Stop reason: SDUPTHER

## 2019-12-13 RX ORDER — LEVOTHYROXINE SODIUM 0.1 MG/1
100 TABLET ORAL DAILY
Qty: 90 TABLET | Refills: 2 | Status: SHIPPED | OUTPATIENT
Start: 2019-12-13 | End: 2020-08-12

## 2019-12-13 RX ORDER — INFLUENZA A VIRUS A/MICHIGAN/45/2015 (H1N1) RECOMBINANT HEMAGGLUTININ ANTIGEN, INFLUENZA A VIRUS A/SINGAPORE/INFIMH-16-0019/2016 (H3N2) RECOMBINANT HEMAGGLUTININ ANTIGEN, INFLUENZA B VIRUS B/MARYLAND/15/2016 RECOMBINANT HEMAGGLUTININ ANTIGEN, AND INFLUENZA B VIRUS B/PHUKET/3073/2013 RECOMBINANT HEMAGGLUTININ ANTIGEN 45; 45; 45; 45 UG/.5ML; UG/.5ML; UG/.5ML; UG/.5ML
INJECTION INTRAMUSCULAR
COMMUNITY
Start: 2019-10-17 | End: 2021-09-21

## 2019-12-13 NOTE — PROGRESS NOTES
49 y.o.  Patient Care Team:  Mohinder Davis as PCP - General (Family Medicine)  Deion Saldana MD as Consulting Physician (Urology)  Adalberto Hoffman MD as Consulting Physician (Gastroenterology)    Chief complaint     NEW PATIENT CONSULT, REFERRED BY MOHINDER DAVIS MD FOR HYPOTHYROIDISM.    HPI   Patient is a 49-year-old white female with a history of Hashimoto's thyroiditis and hypothyroidism since age 6 came for new patient consultation    Patient was seeing endocrinologist in [facility] and is now seeking to move to [facility]    Hashimoto's thyroiditis  Patient was diagnosed with positive antibodies  Hypothyroidism  Patient has been on medication since age 6  She has tried several different brands and is currently taking generic levothyroxine  She is currently on 100 mcg at least for the past 1 year  She reports compliance with the medication  She takes it on empty stomach early in the morning  She does not eat typically for 4 hours  She takes all other medications in the afternoon or in the evening  Abnormal weight loss  Patient reports nearly 14 pound weight loss recently  She apparently had mucositis and had ulcers in the mouth and could not eat for the past several weeks  She has been managing drinking Ensure for the most part    Patient denies any nausea vomiting diarrhea or constipation  She does however report chronic fatigue  Fatigue has been longstanding and worsening since her weight loss  She does not recall having had any thyroid ultrasound in the past    Interval History      The following portions of the patient's history were reviewed and updated as appropriate: allergies, current medications, past family history, past medical history, past social history, past surgical history and problem list.    Past Medical History:   Diagnosis Date   • Anxiety    • Disease of thyroid gland    • ETOH abuse    • Hypertension    • Kidney stone    • Lupus (CMS/HCC)    • Lupus (CMS/HCC)    • MDD (major  "depressive disorder)    • Pancreatitis        Family History   Problem Relation Age of Onset   • Thyroid disease Father    • Leukemia Father    • Drug abuse Brother        Social History     Socioeconomic History   • Marital status:      Spouse name: Not on file   • Number of children: Not on file   • Years of education: Not on file   • Highest education level: Not on file   Tobacco Use   • Smoking status: Current Every Day Smoker     Packs/day: 0.50     Types: Cigarettes   • Smokeless tobacco: Never Used   Substance and Sexual Activity   • Alcohol use: No     Frequency: Never   • Drug use: No     Comment: used THC in college   • Sexual activity: Defer       Allergies   Allergen Reactions   • Benzonatate Nausea Only and Other (See Comments)     Rash    • Phenergan [Promethazine Hcl] Hives   • Promethazine Hives   • Cucumber Extract Rash   • Promethazine-Phenylephrine Other (See Comments)     \"FEEL WEIRD\"         Current Outpatient Medications:   •  acetaminophen (TYLENOL) 500 MG tablet, Take 500 mg by mouth Every 6 (Six) Hours As Needed for Mild Pain ., Disp: , Rfl:   •  amLODIPine (NORVASC) 5 MG tablet, Take 5 mg by mouth Daily., Disp: , Rfl:   •  EPINEPHrine (EPIPEN JR) 0.15 MG/0.3ML solution auto-injector injection, , Disp: , Rfl:   •  FLUBLOK QUADRIVALENT 0.5 ML solution prefilled syringe IM suspension, , Disp: , Rfl:   •  FLUoxetine (PROZAC) 40 MG capsule, Take 1 capsule by mouth Daily., Disp: 30 capsule, Rfl: 3  •  folic acid (FOLVITE) 1 MG tablet, Take 1 mg by mouth Daily., Disp: , Rfl:   •  HYDROcodone-acetaminophen (NORCO) 5-325 MG per tablet, , Disp: , Rfl:   •  levothyroxine (SYNTHROID, LEVOTHROID) 100 MCG tablet, Take 1 tablet by mouth Daily., Disp: 90 tablet, Rfl: 2  •  Multiple Vitamin (MULTIVITAMIN) capsule, Take 1 capsule by mouth Daily., Disp: , Rfl:   •  ondansetron ODT (ZOFRAN-ODT) 4 MG disintegrating tablet, Take 1 tablet by mouth 4 (Four) Times a Day As Needed for Nausea or Vomiting., " "Disp: 12 tablet, Rfl: 0  •  pancrelipase, Lip-Prot-Amyl, (CREON) 6000 units capsule delayed-release particles capsule, Take  by mouth 3 (Three) Times a Day., Disp: , Rfl:   •  pantoprazole (PROTONIX) 40 MG EC tablet, , Disp: , Rfl:   •  potassium chloride (K-DUR,KLOR-CON) 20 MEQ CR tablet, , Disp: , Rfl:   No current facility-administered medications for this visit.     Facility-Administered Medications Ordered in Other Visits:   •  sodium chloride 0.9 % flush 10 mL, 10 mL, Intravenous, Q12H, Callie Quiroz MD  •  sodium chloride 0.9 % flush 10 mL, 10 mL, Intravenous, PRN, Callie Quiroz MD  •  sodium chloride 0.9 % flush 20 mL, 20 mL, Intravenous, PRN, Callie Quiroz MD           Review of Systems   Constitutional: Positive for fatigue. Negative for chills and fever.   HENT: Negative for sore throat, trouble swallowing and voice change.    Eyes: Negative for pain and redness.   Respiratory: Negative for shortness of breath and wheezing.    Cardiovascular: Negative for chest pain and palpitations.   Gastrointestinal: Negative for abdominal pain, constipation, diarrhea, nausea and vomiting.   Endocrine: Negative for cold intolerance and heat intolerance.   Genitourinary: Negative for frequency.   Musculoskeletal: Negative for joint swelling.   Skin: Negative for rash and wound.   Neurological: Negative for tremors, light-headedness and headaches.   Hematological: Bruises/bleeds easily.   Psychiatric/Behavioral: Positive for sleep disturbance. Negative for confusion. The patient is not nervous/anxious.    All other systems reviewed and are negative.        Objective:  /88   Pulse 100   Ht 165.1 cm (65\")   Wt 53.9 kg (118 lb 12.8 oz)   LMP 01/01/2013 Comment: NATANAEL  BMI 19.77 kg/m²     Physical Exam   Constitutional: She is oriented to person, place, and time.   Eyes: Pupils are equal, round, and reactive to light. EOM are normal.   No circumorbital puffiness "   Neck: Normal range of motion. Neck supple. No thyromegaly present.   Cardiovascular: Normal rate, regular rhythm, normal heart sounds and intact distal pulses.   Pulmonary/Chest: Effort normal and breath sounds normal.   Abdominal: Soft. Bowel sounds are normal.   Musculoskeletal: Normal range of motion. She exhibits no edema.   Neurological: She is alert and oriented to person, place, and time.   Skin: Skin is warm and dry.   Psychiatric: She has a normal mood and affect. Her behavior is normal.   Nursing note and vitals reviewed.      Medical record review  Medical records from [facility]  Patient was known to have Hashimoto's thyroiditis since age 6 and was on replacement since that age  She had a problem with alcohol for a very long time  She apparently has been abstinent since November 17, 2019  She joined Alcoholics Anonymous and is keeping up with regular appointments  \  Results Review:    I reviewed the patient's new clinical results.  Admission on 10/29/2019, Discharged on 10/29/2019   Component Date Value Ref Range Status   • Glucose 10/29/2019 122* 65 - 99 mg/dL Final   • BUN 10/29/2019 4* 6 - 20 mg/dL Final   • Creatinine 10/29/2019 0.82  0.57 - 1.00 mg/dL Final   • Sodium 10/29/2019 140  136 - 145 mmol/L Final   • Potassium 10/29/2019 3.5  3.5 - 5.2 mmol/L Final   • Chloride 10/29/2019 104  98 - 107 mmol/L Final   • CO2 10/29/2019 24.5  22.0 - 29.0 mmol/L Final   • Calcium 10/29/2019 8.7  8.6 - 10.5 mg/dL Final   • Total Protein 10/29/2019 7.5  6.0 - 8.5 g/dL Final   • Albumin 10/29/2019 3.20* 3.50 - 5.20 g/dL Final   • ALT (SGPT) 10/29/2019 17  1 - 33 U/L Final   • AST (SGOT) 10/29/2019 82* 1 - 32 U/L Final   • Alkaline Phosphatase 10/29/2019 207* 39 - 117 U/L Final   • Total Bilirubin 10/29/2019 2.1* 0.2 - 1.2 mg/dL Final   • eGFR Non African Amer 10/29/2019 73  >60 mL/min/1.73 Final   • Globulin 10/29/2019 4.3  gm/dL Final   • A/G Ratio 10/29/2019 0.7  g/dL Final   • BUN/Creatinine Ratio  10/29/2019 4.9* 7.0 - 25.0 Final   • Anion Gap 10/29/2019 11.5  5.0 - 15.0 mmol/L Final   • Lipase 10/29/2019 19  13 - 60 U/L Final   • Color, UA 10/29/2019 Yellow  Yellow, Straw Final   • Appearance, UA 10/29/2019 Clear  Clear Final   • pH, UA 10/29/2019 7.0  4.5 - 8.0 Final   • Specific Gravity, UA 10/29/2019 1.020  1.003 - 1.030 Final   • Glucose, UA 10/29/2019 Negative  Negative Final   • Ketones, UA 10/29/2019 Negative  Negative Final   • Bilirubin, UA 10/29/2019 Negative  Negative Final   • Blood, UA 10/29/2019 Trace* Negative Final   • Protein, UA 10/29/2019 Trace* Negative Final   • Leuk Esterase, UA 10/29/2019 Small (1+)* Negative Final   • Nitrite, UA 10/29/2019 Negative  Negative Final   • Urobilinogen, UA 10/29/2019 0.2 E.U./dL  0.2 - 1.0 E.U./dL Final   • WBC 10/29/2019 5.11  3.40 - 10.80 10*3/mm3 Final   • RBC 10/29/2019 3.42* 3.77 - 5.28 10*6/mm3 Final   • Hemoglobin 10/29/2019 10.2* 12.0 - 15.9 g/dL Final   • Hematocrit 10/29/2019 33.2* 34.0 - 46.6 % Final   • MCV 10/29/2019 97.1* 79.0 - 97.0 fL Final   • MCH 10/29/2019 29.8  26.6 - 33.0 pg Final   • MCHC 10/29/2019 30.7* 31.5 - 35.7 g/dL Final   • RDW 10/29/2019 20.1* 12.3 - 15.4 % Final   • RDW-SD 10/29/2019 71.7* 37.0 - 54.0 fl Final   • MPV 10/29/2019 12.5* 6.0 - 12.0 fL Final   • Platelets 10/29/2019 106* 140 - 450 10*3/mm3 Final   • Neutrophil % 10/29/2019 67.2  42.7 - 76.0 % Final   • Lymphocyte % 10/29/2019 23.7  19.6 - 45.3 % Final   • Monocyte % 10/29/2019 6.7  5.0 - 12.0 % Final   • Eosinophil % 10/29/2019 0.4  0.3 - 6.2 % Final   • Basophil % 10/29/2019 1.6* 0.0 - 1.5 % Final   • Immature Grans % 10/29/2019 0.4  0.0 - 0.5 % Final   • Neutrophils, Absolute 10/29/2019 3.44  1.70 - 7.00 10*3/mm3 Final   • Lymphocytes, Absolute 10/29/2019 1.21  0.70 - 3.10 10*3/mm3 Final   • Monocytes, Absolute 10/29/2019 0.34  0.10 - 0.90 10*3/mm3 Final   • Eosinophils, Absolute 10/29/2019 0.02  0.00 - 0.40 10*3/mm3 Final   • Basophils, Absolute 10/29/2019  0.08  0.00 - 0.20 10*3/mm3 Final   • Immature Grans, Absolute 10/29/2019 0.02  0.00 - 0.05 10*3/mm3 Final   • nRBC 10/29/2019 0.0  0.0 - 0.2 /100 WBC Final   • Anisocytosis 10/29/2019 Mod/2+  None Seen Final   • WBC Morphology 10/29/2019 Normal  Normal Final   • Platelet Morphology 10/29/2019 Normal  Normal Final   • RBC, UA 10/29/2019 3-5* None Seen /HPF Final   • WBC, UA 10/29/2019 6-12* None Seen /HPF Final   • Bacteria, UA 10/29/2019 Trace* None Seen /HPF Final   • Squamous Epithelial Cells, UA 10/29/2019 3-6* None Seen, 0-2 /HPF Final   • Hyaline Casts, UA 10/29/2019 None Seen  None Seen /LPF Final   • Methodology 10/29/2019 Manual Light Microscopy   Final   • Urine Culture 10/29/2019 <10,000 CFU/mL Enterococcus species*  Final    Call if further workup needed.     • Urine Culture 10/29/2019 <10,000 CFU/mL Mixed [place] Isolated   Final       No images are attached to the encounter.    Bekah was seen today for hypothyroidism.    Diagnoses and all orders for this visit:    Hypothyroidism, unspecified type  -     Comprehensive Metabolic Panel  -     T4, Free  -     TSH  -     Thyroid Peroxidase Antibody  -     Thyroid Stimulating Immunoglobulin  -     US Thyroid; Future    Hashimoto's disease  -     Comprehensive Metabolic Panel  -     T4, Free  -     TSH  -     Thyroid Peroxidase Antibody  -     Thyroid Stimulating Immunoglobulin    Abnormal weight loss    Other polyneuropathy    Chronic fatigue    Other orders  -     levothyroxine (SYNTHROID, LEVOTHROID) 100 MCG tablet; Take 1 tablet by mouth Daily.      Patient reports that she has been on 100 mcg at least for the past 1 year  This was the lowest dose    She reports increasing fatigue  She lost significant weight nearly 18 pounds in the past 3 months  She reports that she had mucositis and could not eat much in the past 4 to 5 weeks    Patient never had a thyroid ultrasound  I recommend lab testing today  I also recommend thyroid ultrasound completed    Advised  patient to take levothyroxine on empty stomach early in the morning  No other medication to take along with  She typically eats 3 to 4 hours later which is good    Patient return to follow-up in 3 months

## 2019-12-14 PROBLEM — R53.82 CHRONIC FATIGUE: Status: ACTIVE | Noted: 2019-12-14

## 2019-12-14 LAB
ALBUMIN SERPL-MCNC: 3.3 G/DL (ref 3.5–5.2)
ALBUMIN/GLOB SERPL: 0.8 G/DL
ALP SERPL-CCNC: 138 U/L (ref 39–117)
ALT SERPL-CCNC: 22 U/L (ref 1–33)
AST SERPL-CCNC: 56 U/L (ref 1–32)
BILIRUB SERPL-MCNC: 1.4 MG/DL (ref 0.2–1.2)
BUN SERPL-MCNC: 4 MG/DL (ref 6–20)
BUN/CREAT SERPL: 6.1 (ref 7–25)
CALCIUM SERPL-MCNC: 8.7 MG/DL (ref 8.6–10.5)
CHLORIDE SERPL-SCNC: 103 MMOL/L (ref 98–107)
CO2 SERPL-SCNC: 25.8 MMOL/L (ref 22–29)
CREAT SERPL-MCNC: 0.66 MG/DL (ref 0.57–1)
GLOBULIN SER CALC-MCNC: 4.1 GM/DL
GLUCOSE SERPL-MCNC: 91 MG/DL (ref 65–99)
POTASSIUM SERPL-SCNC: 3 MMOL/L (ref 3.5–5.2)
PROT SERPL-MCNC: 7.4 G/DL (ref 6–8.5)
SODIUM SERPL-SCNC: 142 MMOL/L (ref 136–145)
T4 FREE SERPL-MCNC: 1.78 NG/DL (ref 0.93–1.7)
THYROPEROXIDASE AB SERPL-ACNC: 15 IU/ML (ref 0–34)
TSH SERPL DL<=0.005 MIU/L-ACNC: 1.98 UIU/ML (ref 0.27–4.2)
TSI SER-ACNC: <0.1 IU/L (ref 0–0.55)

## 2019-12-16 ENCOUNTER — APPOINTMENT (OUTPATIENT)
Dept: ULTRASOUND IMAGING | Facility: HOSPITAL | Age: 49
End: 2019-12-16

## 2020-03-12 DIAGNOSIS — E06.3 HASHIMOTO'S DISEASE: Primary | ICD-10-CM

## 2020-03-12 DIAGNOSIS — E03.9 HYPOTHYROIDISM, UNSPECIFIED TYPE: ICD-10-CM

## 2020-03-20 ENCOUNTER — TELEPHONE (OUTPATIENT)
Dept: ENDOCRINOLOGY | Age: 50
End: 2020-03-20

## 2020-03-20 NOTE — TELEPHONE ENCOUNTER
S/W PT TO COMPLETE TRAVEL PRESCREEN. PT DOES NOT HAVE ANY SYMPTOMS RELATED TO COVID-19 AND WILL BE AT APPT 3/23/2020.

## 2020-03-23 ENCOUNTER — RESULTS ENCOUNTER (OUTPATIENT)
Dept: ENDOCRINOLOGY | Age: 50
End: 2020-03-23

## 2020-03-23 DIAGNOSIS — E03.9 HYPOTHYROIDISM, UNSPECIFIED TYPE: ICD-10-CM

## 2020-03-23 DIAGNOSIS — E06.3 HASHIMOTO'S DISEASE: ICD-10-CM

## 2020-08-12 RX ORDER — LEVOTHYROXINE SODIUM 0.1 MG/1
TABLET ORAL
Qty: 90 TABLET | Refills: 1 | Status: SHIPPED | OUTPATIENT
Start: 2020-08-12 | End: 2021-01-27

## 2021-01-28 RX ORDER — LEVOTHYROXINE SODIUM 0.1 MG/1
TABLET ORAL
Qty: 30 TABLET | Refills: 3 | Status: SHIPPED | OUTPATIENT
Start: 2021-01-28

## 2021-03-26 ENCOUNTER — BULK ORDERING (OUTPATIENT)
Dept: CASE MANAGEMENT | Facility: OTHER | Age: 51
End: 2021-03-26

## 2021-03-26 DIAGNOSIS — Z23 IMMUNIZATION DUE: ICD-10-CM

## 2021-09-20 ENCOUNTER — APPOINTMENT (OUTPATIENT)
Dept: CT IMAGING | Facility: HOSPITAL | Age: 51
End: 2021-09-20

## 2021-09-20 ENCOUNTER — HOSPITAL ENCOUNTER (EMERGENCY)
Facility: HOSPITAL | Age: 51
Discharge: LEFT BEFORE DISCHARGE | End: 2021-09-20
Attending: EMERGENCY MEDICINE | Admitting: EMERGENCY MEDICINE

## 2021-09-20 VITALS
HEART RATE: 70 BPM | SYSTOLIC BLOOD PRESSURE: 143 MMHG | DIASTOLIC BLOOD PRESSURE: 84 MMHG | RESPIRATION RATE: 16 BRPM | TEMPERATURE: 97.5 F | OXYGEN SATURATION: 98 %

## 2021-09-20 DIAGNOSIS — R10.9 ABDOMINAL PAIN, UNSPECIFIED ABDOMINAL LOCATION: ICD-10-CM

## 2021-09-20 DIAGNOSIS — R19.7 DIARRHEA, UNSPECIFIED TYPE: ICD-10-CM

## 2021-09-20 DIAGNOSIS — F10.10 ALCOHOL ABUSE: Primary | ICD-10-CM

## 2021-09-20 DIAGNOSIS — R11.2 NAUSEA AND VOMITING, INTRACTABILITY OF VOMITING NOT SPECIFIED, UNSPECIFIED VOMITING TYPE: ICD-10-CM

## 2021-09-20 LAB
AMPHET+METHAMPHET UR QL: NEGATIVE
AMPHETAMINES UR QL: NEGATIVE
BACTERIA UR QL AUTO: ABNORMAL /HPF
BARBITURATES UR QL SCN: NEGATIVE
BENZODIAZ UR QL SCN: NEGATIVE
BILIRUB UR QL STRIP: NEGATIVE
BUPRENORPHINE SERPL-MCNC: NEGATIVE NG/ML
CANNABINOIDS SERPL QL: NEGATIVE
CLARITY UR: CLEAR
COCAINE UR QL: NEGATIVE
COLOR UR: YELLOW
GLUCOSE UR STRIP-MCNC: NEGATIVE MG/DL
HGB UR QL STRIP.AUTO: NEGATIVE
HYALINE CASTS UR QL AUTO: ABNORMAL /LPF
KETONES UR QL STRIP: NEGATIVE
LEUKOCYTE ESTERASE UR QL STRIP.AUTO: ABNORMAL
METHADONE UR QL SCN: NEGATIVE
NITRITE UR QL STRIP: POSITIVE
OPIATES UR QL: NEGATIVE
OXYCODONE UR QL SCN: NEGATIVE
PCP UR QL SCN: NEGATIVE
PH UR STRIP.AUTO: 6 [PH] (ref 4.5–8)
PROPOXYPH UR QL: NEGATIVE
PROT UR QL STRIP: NEGATIVE
RBC # UR: ABNORMAL /HPF
REF LAB TEST METHOD: ABNORMAL
SP GR UR STRIP: 1.01 (ref 1–1.03)
SQUAMOUS #/AREA URNS HPF: ABNORMAL /HPF
TRICYCLICS UR QL SCN: NEGATIVE
UROBILINOGEN UR QL STRIP: ABNORMAL
WBC UR QL AUTO: ABNORMAL /HPF

## 2021-09-20 PROCEDURE — 80306 DRUG TEST PRSMV INSTRMNT: CPT | Performed by: PHYSICIAN ASSISTANT

## 2021-09-20 PROCEDURE — 81001 URINALYSIS AUTO W/SCOPE: CPT | Performed by: PHYSICIAN ASSISTANT

## 2021-09-20 PROCEDURE — 99283 EMERGENCY DEPT VISIT LOW MDM: CPT

## 2021-09-20 RX ORDER — SODIUM CHLORIDE 0.9 % (FLUSH) 0.9 %
10 SYRINGE (ML) INJECTION AS NEEDED
Status: DISCONTINUED | OUTPATIENT
Start: 2021-09-20 | End: 2021-09-20 | Stop reason: HOSPADM

## 2021-09-20 RX ORDER — ONDANSETRON 2 MG/ML
4 INJECTION INTRAMUSCULAR; INTRAVENOUS ONCE
Status: DISCONTINUED | OUTPATIENT
Start: 2021-09-20 | End: 2021-09-20 | Stop reason: HOSPADM

## 2021-09-20 RX ORDER — DICYCLOMINE HYDROCHLORIDE 10 MG/ML
20 INJECTION INTRAMUSCULAR ONCE
Status: DISCONTINUED | OUTPATIENT
Start: 2021-09-20 | End: 2021-09-20 | Stop reason: HOSPADM

## 2021-09-20 NOTE — ED NOTES
"Attempting to place IV when patient stated \" I just want to leave, Im not important\" encouraged patient to stay for tests, but declined. Patient to waiting room per w/c for family to .     Joyce Freeman, RN  09/20/21 7993    "

## 2021-09-20 NOTE — ED PROVIDER NOTES
EMERGENCY DEPARTMENT ENCOUNTER      Room Number: KIERAN/KIERAN    History is provided by the patient, no translation services needed    HPI:    Chief complaint: Abdominal pain    Location: Generalized    Quality/Severity: Pounding    Timing/Duration: Today    Modifying Factors: Nothing makes it better or worse    Associated Symptoms: Vomiting and diarrhea    Narrative: Pt is a 51 y.o. female who presents complaining of abdominal pain times today.  Patient dates that she has been drinking for the last 4 days heavily.  Patient states that she is also had vomiting and diarrhea.  Patient denies any fever or chills.  Patient states that she is an alcoholic.      PMD: Tylor Davis    REVIEW OF SYSTEMS  Review of Systems   Constitutional: Positive for appetite change. Negative for chills and fever.   Eyes: Negative for pain and visual disturbance.   Respiratory: Negative for cough and shortness of breath.    Cardiovascular: Negative for chest pain and leg swelling.   Gastrointestinal: Positive for abdominal pain, diarrhea, nausea and vomiting. Negative for constipation.   Genitourinary: Negative for dysuria and flank pain.   Musculoskeletal: Negative for arthralgias and myalgias.   Skin: Negative for rash and wound.   Neurological: Negative for dizziness, syncope and headaches.   Psychiatric/Behavioral: Negative for suicidal ideas. The patient is not nervous/anxious.          PAST MEDICAL HISTORY  Active Ambulatory Problems     Diagnosis Date Noted   • Irritable bowel syndrome with both constipation and diarrhea 06/05/2017   • Peripheral neuropathy 06/05/2017   • Vitamin D deficiency 06/05/2017   • History of HPV infection 06/05/2017   • Hypothyroidism 06/05/2017   • Cigarette smoker 06/05/2017   • Acute kidney injury (CMS/[facility]) 12/28/2015   • Ascites 06/06/2016   • E. coli UTI (urinary tract infection) 06/06/2016   • Alcohol withdrawal syndrome, with delirium (CMS/[facility]) 06/29/2018   • Alcoholic hepatitis 06/29/2018   • GI  bleed 06/29/2018   • Acute post-hemorrhagic anemia 06/29/2018   • Thrombocytopenia (CMS/HCC) 06/29/2018   • Open wound of right shoulder region 06/29/2018   • Pancreatic insufficiency 07/04/2018   • Alcoholic ketoacidosis 07/21/2019   • Chronic alcohol abuse 07/29/2019   • ESBL (extended spectrum beta-lactamase) producing bacteria infection 07/29/2019   • Abnormal weight loss 12/13/2019   • Hashimoto's disease 12/13/2019   • Chronic fatigue 12/14/2019     Resolved Ambulatory Problems     Diagnosis Date Noted   • Acute renal failure (CMS/HCC) 02/20/2017   • Kidney stone 06/05/2017   • Hypotension 07/01/2018     Past Medical History:   Diagnosis Date   • Anxiety    • Disease of thyroid gland    • ETOH abuse    • Hypertension    • Lupus (CMS/HCC)    • Lupus (CMS/[facility])    • MDD (major depressive disorder)    • Pancreatitis        PAST SURGICAL HISTORY  Past Surgical History:   Procedure Laterality Date   • CLAVICLE SURGERY Right 2018   • JOINT REPLACEMENT      GREAT TOE   • KIDNEY STONE SURGERY      LITHOTRIPSY AND STENT (R SIDE)   • LAPAROSCOPIC TUBAL LIGATION  2005       FAMILY HISTORY  Family History   Problem Relation Age of Onset   • Thyroid disease Father    • Leukemia Father    • Drug abuse Brother        SOCIAL HISTORY  Social History     Socioeconomic History   • Marital status:      Spouse name: Not on file   • Number of children: Not on file   • Years of education: Not on file   • Highest education level: Not on file   Tobacco Use   • Smoking status: Current Every Day Smoker     Packs/day: 0.50     Types: Cigarettes   • Smokeless tobacco: Never Used   Substance and Sexual Activity   • Alcohol use: Yes     Comment: has had alcohol today   • Drug use: No     Comment: used THC in college   • Sexual activity: Defer       ALLERGIES  Benzonatate, Phenergan [promethazine hcl], Promethazine, Cucumber extract, and Promethazine-phenylephrine      Current Facility-Administered Medications:   •  dicyclomine  (BENTYL) injection 20 mg, 20 mg, Intramuscular, Once, Maribel Tirado PA-C  •  ondansetron (ZOFRAN) injection 4 mg, 4 mg, Intravenous, Once, Maribel Tirado PA-C  •  sodium chloride 0.9 % bolus 1,000 mL, 1,000 mL, Intravenous, Once, Maribel Tirado PA-C  •  Insert peripheral IV, , , Once **AND** sodium chloride 0.9 % flush 10 mL, 10 mL, Intravenous, PRN, Maribel Tirado PA-C    Current Outpatient Medications:   •  amLODIPine (NORVASC) 5 MG tablet, Take 5 mg by mouth Daily., Disp: , Rfl:   •  FLUoxetine (PROZAC) 40 MG capsule, Take 1 capsule by mouth Daily., Disp: 30 capsule, Rfl: 3  •  folic acid (FOLVITE) 1 MG tablet, Take 1 mg by mouth Daily., Disp: , Rfl:   •  levothyroxine (SYNTHROID, LEVOTHROID) 100 MCG tablet, TAKE ONE TABLET BY MOUTH DAILY, Disp: 30 tablet, Rfl: 3  •  Multiple Vitamin (MULTIVITAMIN) capsule, Take 1 capsule by mouth Daily., Disp: , Rfl:   •  ondansetron ODT (ZOFRAN-ODT) 4 MG disintegrating tablet, Take 1 tablet by mouth 4 (Four) Times a Day As Needed for Nausea or Vomiting., Disp: 12 tablet, Rfl: 0  •  pancrelipase, Lip-Prot-Amyl, (CREON) 6000 units capsule delayed-release particles capsule, Take  by mouth 3 (Three) Times a Day., Disp: , Rfl:   •  pantoprazole (PROTONIX) 40 MG EC tablet, , Disp: , Rfl:   •  potassium chloride (K-DUR,KLOR-CON) 20 MEQ CR tablet, , Disp: , Rfl:   •  acetaminophen (TYLENOL) 500 MG tablet, Take 500 mg by mouth Every 6 (Six) Hours As Needed for Mild Pain ., Disp: , Rfl:   •  EPINEPHrine (EPIPEN JR) 0.15 MG/0.3ML solution auto-injector injection, , Disp: , Rfl:   •  FLUBLOK QUADRIVALENT 0.5 ML solution prefilled syringe IM suspension, , Disp: , Rfl:   •  HYDROcodone-acetaminophen (NORCO) 5-325 MG per tablet, , Disp: , Rfl:     Facility-Administered Medications Ordered in Other Encounters:   •  sodium chloride 0.9 % flush 10 mL, 10 mL, Intravenous, Q12H, Callie Quiroz MD  •  sodium chloride 0.9 % flush 10 mL, 10 mL, Intravenous, PRN,  Callie Quiroz MD  •  sodium chloride 0.9 % flush 20 mL, 20 mL, Intravenous, PRN, Callie Quiroz MD    PHYSICAL EXAM  ED Triage Vitals [09/20/21 1503]   Temp Heart Rate Resp BP SpO2   97.5 °F (36.4 °C) 70 16 143/84 98 %      Temp src Heart Rate Source Patient Position BP Location FiO2 (%)   Oral Monitor Lying Right arm --       Physical Exam  Vitals and nursing note reviewed.   HENT:      Head: Normocephalic and atraumatic.   Eyes:      General: No visual field deficit.     Conjunctiva/sclera: Conjunctivae normal.   Cardiovascular:      Rate and Rhythm: Normal rate and regular rhythm.      Heart sounds: Normal heart sounds.   Pulmonary:      Effort: Pulmonary effort is normal. No respiratory distress.      Breath sounds: Normal breath sounds.   Abdominal:      General: Bowel sounds are normal. There is no distension.      Palpations: Abdomen is soft.      Tenderness: There is no abdominal tenderness.   Musculoskeletal:         General: Normal range of motion.      Cervical back: Normal range of motion and neck supple.   Skin:     General: Skin is warm and dry.   Neurological:      Mental Status: She is alert and oriented to person, place, and time.      Cranial Nerves: No cranial nerve deficit or facial asymmetry.      Sensory: Sensation is intact.      Motor: Motor function is intact.      Coordination: Coordination is intact.   Psychiatric:         Mood and Affect: Mood and affect normal.         Speech: Speech is slurred.           LAB RESULTS  Lab Results (last 24 hours)     Procedure Component Value Units Date/Time    Urinalysis With Microscopic If Indicated (No Culture) - Urine, Clean Catch [[id number]] Collected: 09/20/21 1532    Specimen: Urine, Clean Catch Updated: 09/20/21 1545    Urine Drug Screen - Urine, Clean Catch [[id number]] Collected: 09/20/21 1545    Specimen: Urine, Clean Catch Updated: 09/20/21 1545            I ordered the above labs and reviewed the  results    RADIOLOGY  No Radiology Exams Resulted Within Past 24 Hours    I ordered the above radiologic testing and reviewed the results    PROCEDURES  Procedures      PROGRESS AND CONSULTS  ED Course as of Sep 20 1552   Mon Sep 20, 2021   1551 Patient left the ER AMA.  No laboratory studies or radiologic exams had been started other than the urinalysis and urine drug screen.  Results are still pending.    [GT]      ED Course User Index  [GT] Maribel Tirado PA-C           MEDICAL DECISION MAKING    MDM       DIAGNOSIS  Final diagnoses:   Alcohol abuse   Nausea and vomiting, intractability of vomiting not specified, unspecified vomiting type   Abdominal pain, unspecified abdominal location   Diarrhea, unspecified type       Latest Documented Vital Signs:  As of 15:52 EDT  BP- 143/84 HR- 70 Temp- 97.5 °F (36.4 °C) (Oral) O2 sat- 98%    DISPOSITION  Left AMA        Discussed pertinent findings with the patient/family.  Patient/Family voiced understanding of need to follow-up for recheck and further testing as needed.  Return to the Emergency Department warnings were given.         Medication List      No changes were made to your prescriptions during this visit.                   Dictated utilizing Dragon dictation     Maribel Tirado PA-C  09/20/21 1545       Maribel Tirado PA-C  09/20/21 2326

## 2021-09-20 NOTE — ED NOTES
Patient ambulated to waiting room chair independently with steady gait.      Joyce Freeman RN  09/20/21 0110

## 2021-09-21 ENCOUNTER — APPOINTMENT (OUTPATIENT)
Dept: CT IMAGING | Facility: HOSPITAL | Age: 51
End: 2021-09-21

## 2021-09-21 ENCOUNTER — HOSPITAL ENCOUNTER (INPATIENT)
Facility: HOSPITAL | Age: 51
LOS: 6 days | Discharge: HOME | End: 2021-09-27
Attending: EMERGENCY MEDICINE | Admitting: INTERNAL MEDICINE

## 2021-09-21 DIAGNOSIS — D61.818 PANCYTOPENIA (HCC): ICD-10-CM

## 2021-09-21 DIAGNOSIS — K70.30 ALCOHOLIC CIRRHOSIS, UNSPECIFIED WHETHER ASCITES PRESENT (HCC): ICD-10-CM

## 2021-09-21 DIAGNOSIS — E83.42 HYPOMAGNESEMIA: ICD-10-CM

## 2021-09-21 DIAGNOSIS — R10.84 GENERALIZED ABDOMINAL PAIN: ICD-10-CM

## 2021-09-21 DIAGNOSIS — D68.9 COAGULOPATHY (HCC): ICD-10-CM

## 2021-09-21 DIAGNOSIS — R74.01 TRANSAMINITIS: ICD-10-CM

## 2021-09-21 DIAGNOSIS — Z87.898 HISTORY OF ALCOHOL USE DISORDER: Primary | ICD-10-CM

## 2021-09-21 PROBLEM — F10.929 ALCOHOL INTOXICATION: Status: ACTIVE | Noted: 2021-09-21

## 2021-09-21 PROBLEM — R10.31 BILATERAL LOWER ABDOMINAL DISCOMFORT: Status: ACTIVE | Noted: 2021-09-21

## 2021-09-21 PROBLEM — R10.32 BILATERAL LOWER ABDOMINAL DISCOMFORT: Status: ACTIVE | Noted: 2021-09-21

## 2021-09-21 PROBLEM — R11.2 NAUSEA AND VOMITING: Status: ACTIVE | Noted: 2021-09-21

## 2021-09-21 LAB
ALBUMIN SERPL-MCNC: 3.4 G/DL (ref 3.5–5.2)
ALBUMIN/GLOB SERPL: 0.9 G/DL
ALP SERPL-CCNC: 144 U/L (ref 39–117)
ALT SERPL W P-5'-P-CCNC: 28 U/L (ref 1–33)
ANION GAP SERPL CALCULATED.3IONS-SCNC: 13.3 MMOL/L (ref 5–15)
APTT PPP: 43.7 SECONDS (ref 22.7–35.4)
AST SERPL-CCNC: 119 U/L (ref 1–32)
BASOPHILS # BLD AUTO: 0.04 10*3/MM3 (ref 0–0.2)
BASOPHILS NFR BLD AUTO: 1.3 % (ref 0–1.5)
BILIRUB SERPL-MCNC: 1.6 MG/DL (ref 0–1.2)
BUN SERPL-MCNC: 5 MG/DL (ref 6–20)
BUN/CREAT SERPL: 6.8 (ref 7–25)
CALCIUM SPEC-SCNC: 7.7 MG/DL (ref 8.6–10.5)
CHLORIDE SERPL-SCNC: 107 MMOL/L (ref 98–107)
CO2 SERPL-SCNC: 23.7 MMOL/L (ref 22–29)
CREAT SERPL-MCNC: 0.74 MG/DL (ref 0.57–1)
DEPRECATED RDW RBC AUTO: 60.3 FL (ref 37–54)
EOSINOPHIL # BLD AUTO: 0.09 10*3/MM3 (ref 0–0.4)
EOSINOPHIL NFR BLD AUTO: 2.8 % (ref 0.3–6.2)
ERYTHROCYTE [DISTWIDTH] IN BLOOD BY AUTOMATED COUNT: 20.9 % (ref 12.3–15.4)
ETHANOL BLD-MCNC: 254 MG/DL (ref 0–10)
ETHANOL UR QL: 0.25 %
GFR SERPL CREATININE-BSD FRML MDRD: 82 ML/MIN/1.73
GLOBULIN UR ELPH-MCNC: 3.7 GM/DL
GLUCOSE SERPL-MCNC: 98 MG/DL (ref 65–99)
HCT VFR BLD AUTO: 37.1 % (ref 34–46.6)
HGB BLD-MCNC: 11.4 G/DL (ref 12–15.9)
INR PPP: 1.78 (ref 0.9–1.1)
LIPASE SERPL-CCNC: 36 U/L (ref 13–60)
LYMPHOCYTES # BLD AUTO: 0.97 10*3/MM3 (ref 0.7–3.1)
LYMPHOCYTES NFR BLD AUTO: 30.6 % (ref 19.6–45.3)
MAGNESIUM SERPL-MCNC: 1.4 MG/DL (ref 1.6–2.6)
MCH RBC QN AUTO: 24.9 PG (ref 26.6–33)
MCHC RBC AUTO-ENTMCNC: 30.7 G/DL (ref 31.5–35.7)
MCV RBC AUTO: 81.2 FL (ref 79–97)
MONOCYTES # BLD AUTO: 0.25 10*3/MM3 (ref 0.1–0.9)
MONOCYTES NFR BLD AUTO: 7.9 % (ref 5–12)
NEUTROPHILS NFR BLD AUTO: 1.82 10*3/MM3 (ref 1.7–7)
NEUTROPHILS NFR BLD AUTO: 57.4 % (ref 42.7–76)
PLATELET # BLD AUTO: 44 10*3/MM3 (ref 140–450)
POTASSIUM SERPL-SCNC: 4.8 MMOL/L (ref 3.5–5.2)
PROT SERPL-MCNC: 7.1 G/DL (ref 6–8.5)
PROTHROMBIN TIME: 20.5 SECONDS (ref 11.7–14.2)
RBC # BLD AUTO: 4.57 10*6/MM3 (ref 3.77–5.28)
SARS-COV-2 ORF1AB RESP QL NAA+PROBE: NOT DETECTED
SODIUM SERPL-SCNC: 144 MMOL/L (ref 136–145)
WBC # BLD AUTO: 3.17 10*3/MM3 (ref 3.4–10.8)

## 2021-09-21 PROCEDURE — 25010000002 ONDANSETRON PER 1 MG: Performed by: PHYSICIAN ASSISTANT

## 2021-09-21 PROCEDURE — 82077 ASSAY SPEC XCP UR&BREATH IA: CPT | Performed by: PHYSICIAN ASSISTANT

## 2021-09-21 PROCEDURE — 25010000002 MAGNESIUM SULFATE 2 GM/50ML SOLUTION: Performed by: PHYSICIAN ASSISTANT

## 2021-09-21 PROCEDURE — 74176 CT ABD & PELVIS W/O CONTRAST: CPT

## 2021-09-21 PROCEDURE — 83735 ASSAY OF MAGNESIUM: CPT | Performed by: PHYSICIAN ASSISTANT

## 2021-09-21 PROCEDURE — 25010000002 THIAMINE PER 100 MG: Performed by: NURSE PRACTITIONER

## 2021-09-21 PROCEDURE — 85610 PROTHROMBIN TIME: CPT | Performed by: PHYSICIAN ASSISTANT

## 2021-09-21 PROCEDURE — 25010000002 MORPHINE PER 10 MG: Performed by: EMERGENCY MEDICINE

## 2021-09-21 PROCEDURE — 85025 COMPLETE CBC W/AUTO DIFF WBC: CPT | Performed by: PHYSICIAN ASSISTANT

## 2021-09-21 PROCEDURE — 25010000002 LORAZEPAM PER 2 MG: Performed by: NURSE PRACTITIONER

## 2021-09-21 PROCEDURE — 80053 COMPREHEN METABOLIC PANEL: CPT | Performed by: PHYSICIAN ASSISTANT

## 2021-09-21 PROCEDURE — 83690 ASSAY OF LIPASE: CPT | Performed by: PHYSICIAN ASSISTANT

## 2021-09-21 PROCEDURE — 99284 EMERGENCY DEPT VISIT MOD MDM: CPT

## 2021-09-21 PROCEDURE — U0004 COV-19 TEST NON-CDC HGH THRU: HCPCS | Performed by: PHYSICIAN ASSISTANT

## 2021-09-21 PROCEDURE — 25010000002 MORPHINE PER 10 MG: Performed by: NURSE PRACTITIONER

## 2021-09-21 PROCEDURE — 25010000002 ONDANSETRON PER 1 MG: Performed by: NURSE PRACTITIONER

## 2021-09-21 PROCEDURE — 99221 1ST HOSP IP/OBS SF/LOW 40: CPT | Performed by: INTERNAL MEDICINE

## 2021-09-21 PROCEDURE — 85730 THROMBOPLASTIN TIME PARTIAL: CPT | Performed by: PHYSICIAN ASSISTANT

## 2021-09-21 RX ORDER — MAGNESIUM SULFATE HEPTAHYDRATE 40 MG/ML
2 INJECTION, SOLUTION INTRAVENOUS ONCE
Status: COMPLETED | OUTPATIENT
Start: 2021-09-21 | End: 2021-09-21

## 2021-09-21 RX ORDER — LORAZEPAM 1 MG/1
2 TABLET ORAL
Status: DISCONTINUED | OUTPATIENT
Start: 2021-09-21 | End: 2021-09-27 | Stop reason: HOSPADM

## 2021-09-21 RX ORDER — FOLIC ACID 1 MG/1
1 TABLET ORAL DAILY
Status: DISPENSED | OUTPATIENT
Start: 2021-09-22 | End: 2021-09-25

## 2021-09-21 RX ORDER — SODIUM CHLORIDE 0.9 % (FLUSH) 0.9 %
10 SYRINGE (ML) INJECTION AS NEEDED
Status: DISCONTINUED | OUTPATIENT
Start: 2021-09-21 | End: 2021-09-27 | Stop reason: HOSPADM

## 2021-09-21 RX ORDER — LORAZEPAM 2 MG/ML
2 INJECTION INTRAMUSCULAR
Status: DISCONTINUED | OUTPATIENT
Start: 2021-09-21 | End: 2021-09-27 | Stop reason: HOSPADM

## 2021-09-21 RX ORDER — PANTOPRAZOLE SODIUM 40 MG/10ML
40 INJECTION, POWDER, LYOPHILIZED, FOR SOLUTION INTRAVENOUS ONCE
Status: COMPLETED | OUTPATIENT
Start: 2021-09-21 | End: 2021-09-21

## 2021-09-21 RX ORDER — FLUOXETINE HYDROCHLORIDE 20 MG/1
40 CAPSULE ORAL DAILY
Status: DISCONTINUED | OUTPATIENT
Start: 2021-09-21 | End: 2021-09-27 | Stop reason: HOSPADM

## 2021-09-21 RX ORDER — LORAZEPAM 2 MG/ML
1 INJECTION INTRAMUSCULAR
Status: DISCONTINUED | OUTPATIENT
Start: 2021-09-21 | End: 2021-09-27 | Stop reason: HOSPADM

## 2021-09-21 RX ORDER — ACETAMINOPHEN 325 MG/1
650 TABLET ORAL EVERY 6 HOURS PRN
Status: DISCONTINUED | OUTPATIENT
Start: 2021-09-21 | End: 2021-09-27 | Stop reason: HOSPADM

## 2021-09-21 RX ORDER — MORPHINE SULFATE 2 MG/ML
1 INJECTION, SOLUTION INTRAMUSCULAR; INTRAVENOUS EVERY 4 HOURS PRN
Status: DISCONTINUED | OUTPATIENT
Start: 2021-09-21 | End: 2021-09-22

## 2021-09-21 RX ORDER — SODIUM CHLORIDE 9 MG/ML
50 INJECTION, SOLUTION INTRAVENOUS CONTINUOUS
Status: DISCONTINUED | OUTPATIENT
Start: 2021-09-21 | End: 2021-09-27

## 2021-09-21 RX ORDER — NICOTINE 21 MG/24HR
1 PATCH, TRANSDERMAL 24 HOURS TRANSDERMAL
Status: DISCONTINUED | OUTPATIENT
Start: 2021-09-21 | End: 2021-09-27 | Stop reason: HOSPADM

## 2021-09-21 RX ORDER — LEVOTHYROXINE SODIUM 0.1 MG/1
100 TABLET ORAL
Status: DISCONTINUED | OUTPATIENT
Start: 2021-09-21 | End: 2021-09-24

## 2021-09-21 RX ORDER — DIPHENOXYLATE HYDROCHLORIDE AND ATROPINE SULFATE 2.5; .025 MG/1; MG/1
1 TABLET ORAL DAILY
Status: DISPENSED | OUTPATIENT
Start: 2021-09-22 | End: 2021-09-25

## 2021-09-21 RX ORDER — MORPHINE SULFATE 2 MG/ML
2 INJECTION, SOLUTION INTRAMUSCULAR; INTRAVENOUS ONCE
Status: COMPLETED | OUTPATIENT
Start: 2021-09-21 | End: 2021-09-21

## 2021-09-21 RX ORDER — ONDANSETRON 2 MG/ML
4 INJECTION INTRAMUSCULAR; INTRAVENOUS ONCE
Status: COMPLETED | OUTPATIENT
Start: 2021-09-21 | End: 2021-09-21

## 2021-09-21 RX ORDER — ONDANSETRON 4 MG/1
4 TABLET, FILM COATED ORAL EVERY 6 HOURS PRN
Status: DISCONTINUED | OUTPATIENT
Start: 2021-09-21 | End: 2021-09-27 | Stop reason: HOSPADM

## 2021-09-21 RX ORDER — LORAZEPAM 1 MG/1
1 TABLET ORAL
Status: DISCONTINUED | OUTPATIENT
Start: 2021-09-21 | End: 2021-09-27 | Stop reason: HOSPADM

## 2021-09-21 RX ORDER — ONDANSETRON 2 MG/ML
4 INJECTION INTRAMUSCULAR; INTRAVENOUS EVERY 6 HOURS PRN
Status: DISCONTINUED | OUTPATIENT
Start: 2021-09-21 | End: 2021-09-27 | Stop reason: HOSPADM

## 2021-09-21 RX ORDER — PANTOPRAZOLE SODIUM 40 MG/10ML
40 INJECTION, POWDER, LYOPHILIZED, FOR SOLUTION INTRAVENOUS
Status: DISCONTINUED | OUTPATIENT
Start: 2021-09-21 | End: 2021-09-22

## 2021-09-21 RX ORDER — ECHINACEA PURPUREA EXTRACT 125 MG
2 TABLET ORAL AS NEEDED
Status: DISCONTINUED | OUTPATIENT
Start: 2021-09-21 | End: 2021-09-27 | Stop reason: HOSPADM

## 2021-09-21 RX ADMIN — NICOTINE 1 PATCH: 14 PATCH, EXTENDED RELEASE TRANSDERMAL at 13:35

## 2021-09-21 RX ADMIN — LORAZEPAM 1 MG: 2 INJECTION INTRAMUSCULAR; INTRAVENOUS at 18:19

## 2021-09-21 RX ADMIN — MORPHINE SULFATE 1 MG: 2 INJECTION, SOLUTION INTRAMUSCULAR; INTRAVENOUS at 21:44

## 2021-09-21 RX ADMIN — PANTOPRAZOLE SODIUM 40 MG: 40 INJECTION, POWDER, FOR SOLUTION INTRAVENOUS at 13:34

## 2021-09-21 RX ADMIN — THIAMINE HYDROCHLORIDE 100 MG: 100 INJECTION, SOLUTION INTRAMUSCULAR; INTRAVENOUS at 10:28

## 2021-09-21 RX ADMIN — LEVOTHYROXINE SODIUM 100 MCG: 0.1 TABLET ORAL at 13:41

## 2021-09-21 RX ADMIN — ONDANSETRON 4 MG: 2 INJECTION INTRAMUSCULAR; INTRAVENOUS at 08:01

## 2021-09-21 RX ADMIN — MORPHINE SULFATE 2 MG: 2 INJECTION, SOLUTION INTRAMUSCULAR; INTRAVENOUS at 06:07

## 2021-09-21 RX ADMIN — LORAZEPAM 1 MG: 2 INJECTION INTRAMUSCULAR; INTRAVENOUS at 13:56

## 2021-09-21 RX ADMIN — MORPHINE SULFATE 1 MG: 2 INJECTION, SOLUTION INTRAMUSCULAR; INTRAVENOUS at 17:39

## 2021-09-21 RX ADMIN — SODIUM CHLORIDE 100 ML/HR: 9 INJECTION, SOLUTION INTRAVENOUS at 20:17

## 2021-09-21 RX ADMIN — LORAZEPAM 1 MG: 2 INJECTION INTRAMUSCULAR; INTRAVENOUS at 10:28

## 2021-09-21 RX ADMIN — ONDANSETRON 4 MG: 2 INJECTION INTRAMUSCULAR; INTRAVENOUS at 23:08

## 2021-09-21 RX ADMIN — MORPHINE SULFATE 1 MG: 2 INJECTION, SOLUTION INTRAMUSCULAR; INTRAVENOUS at 13:37

## 2021-09-21 RX ADMIN — LORAZEPAM 1 MG: 2 INJECTION INTRAMUSCULAR; INTRAVENOUS at 20:17

## 2021-09-21 RX ADMIN — FLUOXETINE HYDROCHLORIDE 40 MG: 20 CAPSULE ORAL at 13:40

## 2021-09-21 RX ADMIN — SODIUM CHLORIDE, PRESERVATIVE FREE 10 ML: 5 INJECTION INTRAVENOUS at 04:45

## 2021-09-21 RX ADMIN — MAGNESIUM SULFATE HEPTAHYDRATE 2 G: 2 INJECTION, SOLUTION INTRAVENOUS at 05:47

## 2021-09-21 RX ADMIN — PANTOPRAZOLE SODIUM 40 MG: 40 INJECTION, POWDER, FOR SOLUTION INTRAVENOUS at 04:41

## 2021-09-21 RX ADMIN — ONDANSETRON 4 MG: 2 INJECTION INTRAMUSCULAR; INTRAVENOUS at 04:44

## 2021-09-21 RX ADMIN — SODIUM CHLORIDE 100 ML/HR: 9 INJECTION, SOLUTION INTRAVENOUS at 09:15

## 2021-09-21 RX ADMIN — SODIUM CHLORIDE, POTASSIUM CHLORIDE, SODIUM LACTATE AND CALCIUM CHLORIDE 1000 ML: 600; 310; 30; 20 INJECTION, SOLUTION INTRAVENOUS at 04:40

## 2021-09-22 LAB
ADV 40+41 DNA STL QL NAA+NON-PROBE: NOT DETECTED
ALBUMIN SERPL-MCNC: 3.1 G/DL (ref 3.5–5.2)
ALBUMIN/GLOB SERPL: 1 G/DL
ALP SERPL-CCNC: 123 U/L (ref 39–117)
ALT SERPL W P-5'-P-CCNC: 21 U/L (ref 1–33)
ANION GAP SERPL CALCULATED.3IONS-SCNC: 9.8 MMOL/L (ref 5–15)
AST SERPL-CCNC: 81 U/L (ref 1–32)
ASTRO TYP 1-8 RNA STL QL NAA+NON-PROBE: NOT DETECTED
BILIRUB SERPL-MCNC: 3 MG/DL (ref 0–1.2)
BUN SERPL-MCNC: 5 MG/DL (ref 6–20)
BUN/CREAT SERPL: 7.7 (ref 7–25)
C CAYETANENSIS DNA STL QL NAA+NON-PROBE: NOT DETECTED
C COLI+JEJ+UPSA DNA STL QL NAA+NON-PROBE: NOT DETECTED
CALCIUM SPEC-SCNC: 7.4 MG/DL (ref 8.6–10.5)
CHLORIDE SERPL-SCNC: 104 MMOL/L (ref 98–107)
CO2 SERPL-SCNC: 22.2 MMOL/L (ref 22–29)
CREAT SERPL-MCNC: 0.65 MG/DL (ref 0.57–1)
CRYPTOSP DNA STL QL NAA+NON-PROBE: NOT DETECTED
DEPRECATED RDW RBC AUTO: 58.9 FL (ref 37–54)
E HISTOLYT DNA STL QL NAA+NON-PROBE: NOT DETECTED
EAEC PAA PLAS AGGR+AATA ST NAA+NON-PRB: NOT DETECTED
EC STX1+STX2 GENES STL QL NAA+NON-PROBE: NOT DETECTED
EPEC EAE GENE STL QL NAA+NON-PROBE: NOT DETECTED
ERYTHROCYTE [DISTWIDTH] IN BLOOD BY AUTOMATED COUNT: 19.6 % (ref 12.3–15.4)
ETEC LTA+ST1A+ST1B TOX ST NAA+NON-PROBE: NOT DETECTED
G LAMBLIA DNA STL QL NAA+NON-PROBE: NOT DETECTED
GFR SERPL CREATININE-BSD FRML MDRD: 95 ML/MIN/1.73
GLOBULIN UR ELPH-MCNC: 3 GM/DL
GLUCOSE SERPL-MCNC: 105 MG/DL (ref 65–99)
HCT VFR BLD AUTO: 32.4 % (ref 34–46.6)
HCT VFR BLD AUTO: 32.9 % (ref 34–46.6)
HGB BLD-MCNC: 10.1 G/DL (ref 12–15.9)
HGB BLD-MCNC: 9.8 G/DL (ref 12–15.9)
INR PPP: 1.97 (ref 0.9–1.1)
MAGNESIUM SERPL-MCNC: 1.3 MG/DL (ref 1.6–2.6)
MCH RBC QN AUTO: 24.7 PG (ref 26.6–33)
MCHC RBC AUTO-ENTMCNC: 29.8 G/DL (ref 31.5–35.7)
MCV RBC AUTO: 83.1 FL (ref 79–97)
NOROVIRUS GI+II RNA STL QL NAA+NON-PROBE: NOT DETECTED
P SHIGELLOIDES DNA STL QL NAA+NON-PROBE: NOT DETECTED
PLATELET # BLD AUTO: 32 10*3/MM3 (ref 140–450)
POTASSIUM SERPL-SCNC: 3.3 MMOL/L (ref 3.5–5.2)
PROT SERPL-MCNC: 6.1 G/DL (ref 6–8.5)
PROTHROMBIN TIME: 22.1 SECONDS (ref 11.7–14.2)
RBC # BLD AUTO: 3.96 10*6/MM3 (ref 3.77–5.28)
RVA RNA STL QL NAA+NON-PROBE: NOT DETECTED
S ENT+BONG DNA STL QL NAA+NON-PROBE: NOT DETECTED
SAPO I+II+IV+V RNA STL QL NAA+NON-PROBE: NOT DETECTED
SHIGELLA SP+EIEC IPAH ST NAA+NON-PROBE: NOT DETECTED
SODIUM SERPL-SCNC: 136 MMOL/L (ref 136–145)
V CHOL+PARA+VUL DNA STL QL NAA+NON-PROBE: NOT DETECTED
V CHOLERAE DNA STL QL NAA+NON-PROBE: NOT DETECTED
WBC # BLD AUTO: 2.16 10*3/MM3 (ref 3.4–10.8)
Y ENTEROCOL DNA STL QL NAA+NON-PROBE: NOT DETECTED

## 2021-09-22 PROCEDURE — 99222 1ST HOSP IP/OBS MODERATE 55: CPT | Performed by: INTERNAL MEDICINE

## 2021-09-22 PROCEDURE — 85018 HEMOGLOBIN: CPT | Performed by: PHYSICIAN ASSISTANT

## 2021-09-22 PROCEDURE — 25010000002 ONDANSETRON PER 1 MG: Performed by: NURSE PRACTITIONER

## 2021-09-22 PROCEDURE — 0097U HC BIOFIRE FILMARRAY GI PANEL: CPT | Performed by: NURSE PRACTITIONER

## 2021-09-22 PROCEDURE — 85610 PROTHROMBIN TIME: CPT | Performed by: NURSE PRACTITIONER

## 2021-09-22 PROCEDURE — 36415 COLL VENOUS BLD VENIPUNCTURE: CPT | Performed by: NURSE PRACTITIONER

## 2021-09-22 PROCEDURE — 25010000002 MORPHINE PER 10 MG: Performed by: NURSE PRACTITIONER

## 2021-09-22 PROCEDURE — 25010000002 OCTREOTIDE PER 25 MCG: Performed by: PHYSICIAN ASSISTANT

## 2021-09-22 PROCEDURE — 63710000001 ONDANSETRON PER 8 MG: Performed by: NURSE PRACTITIONER

## 2021-09-22 PROCEDURE — 80053 COMPREHEN METABOLIC PANEL: CPT | Performed by: NURSE PRACTITIONER

## 2021-09-22 PROCEDURE — 99232 SBSQ HOSP IP/OBS MODERATE 35: CPT | Performed by: PHYSICIAN ASSISTANT

## 2021-09-22 PROCEDURE — 25010000002 LORAZEPAM PER 2 MG: Performed by: NURSE PRACTITIONER

## 2021-09-22 PROCEDURE — 83735 ASSAY OF MAGNESIUM: CPT | Performed by: NURSE PRACTITIONER

## 2021-09-22 PROCEDURE — 25010000002 MAGNESIUM SULFATE 2 GM/50ML SOLUTION: Performed by: INTERNAL MEDICINE

## 2021-09-22 PROCEDURE — 85027 COMPLETE CBC AUTOMATED: CPT | Performed by: NURSE PRACTITIONER

## 2021-09-22 PROCEDURE — 85014 HEMATOCRIT: CPT | Performed by: PHYSICIAN ASSISTANT

## 2021-09-22 RX ORDER — MAGNESIUM SULFATE HEPTAHYDRATE 40 MG/ML
2 INJECTION, SOLUTION INTRAVENOUS AS NEEDED
Status: DISCONTINUED | OUTPATIENT
Start: 2021-09-22 | End: 2021-09-27 | Stop reason: HOSPADM

## 2021-09-22 RX ORDER — POTASSIUM CHLORIDE 1.5 G/1.77G
40 POWDER, FOR SOLUTION ORAL AS NEEDED
Status: DISCONTINUED | OUTPATIENT
Start: 2021-09-22 | End: 2021-09-27 | Stop reason: HOSPADM

## 2021-09-22 RX ORDER — MORPHINE SULFATE 2 MG/ML
2 INJECTION, SOLUTION INTRAMUSCULAR; INTRAVENOUS
Status: DISCONTINUED | OUTPATIENT
Start: 2021-09-22 | End: 2021-09-26

## 2021-09-22 RX ORDER — POTASSIUM CHLORIDE 750 MG/1
40 TABLET, FILM COATED, EXTENDED RELEASE ORAL AS NEEDED
Status: DISCONTINUED | OUTPATIENT
Start: 2021-09-22 | End: 2021-09-27 | Stop reason: HOSPADM

## 2021-09-22 RX ORDER — OCTREOTIDE ACETATE 50 UG/ML
50 INJECTION, SOLUTION INTRAVENOUS; SUBCUTANEOUS ONCE
Status: COMPLETED | OUTPATIENT
Start: 2021-09-22 | End: 2021-09-22

## 2021-09-22 RX ORDER — MAGNESIUM SULFATE HEPTAHYDRATE 40 MG/ML
4 INJECTION, SOLUTION INTRAVENOUS AS NEEDED
Status: DISCONTINUED | OUTPATIENT
Start: 2021-09-22 | End: 2021-09-27 | Stop reason: HOSPADM

## 2021-09-22 RX ORDER — CALCIUM GLUCONATE 20 MG/ML
1 INJECTION, SOLUTION INTRAVENOUS AS NEEDED
Status: DISCONTINUED | OUTPATIENT
Start: 2021-09-22 | End: 2021-09-27 | Stop reason: HOSPADM

## 2021-09-22 RX ADMIN — MORPHINE SULFATE 2 MG: 2 INJECTION, SOLUTION INTRAMUSCULAR; INTRAVENOUS at 15:56

## 2021-09-22 RX ADMIN — LORAZEPAM 2 MG: 1 TABLET ORAL at 10:54

## 2021-09-22 RX ADMIN — LORAZEPAM 1 MG: 2 INJECTION INTRAMUSCULAR; INTRAVENOUS at 03:30

## 2021-09-22 RX ADMIN — PANTOPRAZOLE SODIUM 40 MG: 40 INJECTION, POWDER, FOR SOLUTION INTRAVENOUS at 05:10

## 2021-09-22 RX ADMIN — Medication 100 MG: at 08:42

## 2021-09-22 RX ADMIN — FLUOXETINE HYDROCHLORIDE 40 MG: 20 CAPSULE ORAL at 08:43

## 2021-09-22 RX ADMIN — MAGNESIUM SULFATE HEPTAHYDRATE 2 G: 2 INJECTION, SOLUTION INTRAVENOUS at 08:43

## 2021-09-22 RX ADMIN — LORAZEPAM 1 MG: 2 INJECTION INTRAMUSCULAR; INTRAVENOUS at 00:41

## 2021-09-22 RX ADMIN — MORPHINE SULFATE 2 MG: 2 INJECTION, SOLUTION INTRAMUSCULAR; INTRAVENOUS at 01:09

## 2021-09-22 RX ADMIN — SODIUM CHLORIDE 100 ML/HR: 9 INJECTION, SOLUTION INTRAVENOUS at 08:44

## 2021-09-22 RX ADMIN — LORAZEPAM 1 MG: 2 INJECTION INTRAMUSCULAR; INTRAVENOUS at 06:37

## 2021-09-22 RX ADMIN — MORPHINE SULFATE 2 MG: 2 INJECTION, SOLUTION INTRAMUSCULAR; INTRAVENOUS at 12:31

## 2021-09-22 RX ADMIN — Medication 1 TABLET: at 08:43

## 2021-09-22 RX ADMIN — LORAZEPAM 1 MG: 1 TABLET ORAL at 12:19

## 2021-09-22 RX ADMIN — LORAZEPAM 1 MG: 1 TABLET ORAL at 21:52

## 2021-09-22 RX ADMIN — MORPHINE SULFATE 2 MG: 2 INJECTION, SOLUTION INTRAMUSCULAR; INTRAVENOUS at 21:52

## 2021-09-22 RX ADMIN — POTASSIUM CHLORIDE 40 MEQ: 750 TABLET, EXTENDED RELEASE ORAL at 08:43

## 2021-09-22 RX ADMIN — OCTREOTIDE ACETATE 50 MCG: 50 INJECTION, SOLUTION INTRAVENOUS; SUBCUTANEOUS at 14:44

## 2021-09-22 RX ADMIN — MAGNESIUM SULFATE HEPTAHYDRATE 2 G: 2 INJECTION, SOLUTION INTRAVENOUS at 10:54

## 2021-09-22 RX ADMIN — LORAZEPAM 2 MG: 2 INJECTION INTRAMUSCULAR; INTRAVENOUS at 15:56

## 2021-09-22 RX ADMIN — LEVOTHYROXINE SODIUM 100 MCG: 0.1 TABLET ORAL at 05:11

## 2021-09-22 RX ADMIN — ONDANSETRON 4 MG: 2 INJECTION INTRAMUSCULAR; INTRAVENOUS at 05:10

## 2021-09-22 RX ADMIN — PANTOPRAZOLE SODIUM 8 MG/HR: 40 INJECTION, POWDER, FOR SOLUTION INTRAVENOUS at 13:45

## 2021-09-22 RX ADMIN — OCTREOTIDE ACETATE 25 MCG/HR: 500 INJECTION, SOLUTION INTRAVENOUS; SUBCUTANEOUS at 14:44

## 2021-09-22 RX ADMIN — NICOTINE 1 PATCH: 14 PATCH, EXTENDED RELEASE TRANSDERMAL at 12:19

## 2021-09-22 RX ADMIN — ONDANSETRON HYDROCHLORIDE 4 MG: 4 TABLET, FILM COATED ORAL at 21:52

## 2021-09-22 RX ADMIN — LORAZEPAM 1 MG: 2 INJECTION INTRAMUSCULAR; INTRAVENOUS at 08:57

## 2021-09-22 RX ADMIN — FOLIC ACID 1 MG: 1 TABLET ORAL at 08:43

## 2021-09-22 RX ADMIN — MORPHINE SULFATE 2 MG: 2 INJECTION, SOLUTION INTRAMUSCULAR; INTRAVENOUS at 05:10

## 2021-09-23 LAB
ALBUMIN SERPL-MCNC: 3.1 G/DL (ref 3.5–5.2)
ALBUMIN/GLOB SERPL: 1 G/DL
ALP SERPL-CCNC: 127 U/L (ref 39–117)
ALT SERPL W P-5'-P-CCNC: 19 U/L (ref 1–33)
ANION GAP SERPL CALCULATED.3IONS-SCNC: 9.5 MMOL/L (ref 5–15)
AST SERPL-CCNC: 65 U/L (ref 1–32)
BASOPHILS # BLD AUTO: 0.03 10*3/MM3 (ref 0–0.2)
BASOPHILS NFR BLD AUTO: 1.1 % (ref 0–1.5)
BILIRUB SERPL-MCNC: 3.3 MG/DL (ref 0–1.2)
BUN SERPL-MCNC: 5 MG/DL (ref 6–20)
BUN/CREAT SERPL: 7.4 (ref 7–25)
CALCIUM SPEC-SCNC: 7.8 MG/DL (ref 8.6–10.5)
CHLORIDE SERPL-SCNC: 105 MMOL/L (ref 98–107)
CO2 SERPL-SCNC: 21.5 MMOL/L (ref 22–29)
CREAT SERPL-MCNC: 0.68 MG/DL (ref 0.57–1)
DEPRECATED RDW RBC AUTO: 57 FL (ref 37–54)
EOSINOPHIL # BLD AUTO: 0.08 10*3/MM3 (ref 0–0.4)
EOSINOPHIL NFR BLD AUTO: 2.9 % (ref 0.3–6.2)
ERYTHROCYTE [DISTWIDTH] IN BLOOD BY AUTOMATED COUNT: 19.3 % (ref 12.3–15.4)
FERRITIN SERPL-MCNC: 43.2 NG/ML (ref 13–150)
FOLATE SERPL-MCNC: 14.2 NG/ML (ref 4.78–24.2)
GFR SERPL CREATININE-BSD FRML MDRD: 91 ML/MIN/1.73
GLOBULIN UR ELPH-MCNC: 3.2 GM/DL
GLUCOSE SERPL-MCNC: 100 MG/DL (ref 65–99)
HCT VFR BLD AUTO: 33.2 % (ref 34–46.6)
HGB BLD-MCNC: 10.2 G/DL (ref 12–15.9)
INR PPP: 1.88 (ref 0.9–1.1)
IRON 24H UR-MRATE: 141 MCG/DL (ref 37–145)
IRON SATN MFR SERPL: 38 % (ref 20–50)
LYMPHOCYTES # BLD AUTO: 0.77 10*3/MM3 (ref 0.7–3.1)
LYMPHOCYTES NFR BLD AUTO: 28.3 % (ref 19.6–45.3)
MAGNESIUM SERPL-MCNC: 1.7 MG/DL (ref 1.6–2.6)
MCH RBC QN AUTO: 25 PG (ref 26.6–33)
MCHC RBC AUTO-ENTMCNC: 30.7 G/DL (ref 31.5–35.7)
MCV RBC AUTO: 81.4 FL (ref 79–97)
MONOCYTES # BLD AUTO: 0.25 10*3/MM3 (ref 0.1–0.9)
MONOCYTES NFR BLD AUTO: 9.2 % (ref 5–12)
NEUTROPHILS NFR BLD AUTO: 1.58 10*3/MM3 (ref 1.7–7)
NEUTROPHILS NFR BLD AUTO: 58.1 % (ref 42.7–76)
PLATELET # BLD AUTO: 23 10*3/MM3 (ref 140–450)
POTASSIUM SERPL-SCNC: 3.9 MMOL/L (ref 3.5–5.2)
PROT SERPL-MCNC: 6.3 G/DL (ref 6–8.5)
PROTHROMBIN TIME: 21.4 SECONDS (ref 11.7–14.2)
RBC # BLD AUTO: 4.08 10*6/MM3 (ref 3.77–5.28)
SODIUM SERPL-SCNC: 136 MMOL/L (ref 136–145)
TIBC SERPL-MCNC: 371 MCG/DL (ref 298–536)
TRANSFERRIN SERPL-MCNC: 249 MG/DL (ref 200–360)
VIT B12 BLD-MCNC: 1343 PG/ML (ref 211–946)
WBC # BLD AUTO: 2.72 10*3/MM3 (ref 3.4–10.8)

## 2021-09-23 PROCEDURE — 85025 COMPLETE CBC W/AUTO DIFF WBC: CPT | Performed by: INTERNAL MEDICINE

## 2021-09-23 PROCEDURE — 90791 PSYCH DIAGNOSTIC EVALUATION: CPT

## 2021-09-23 PROCEDURE — 99232 SBSQ HOSP IP/OBS MODERATE 35: CPT | Performed by: NURSE PRACTITIONER

## 2021-09-23 PROCEDURE — 25010000002 LORAZEPAM PER 2 MG: Performed by: NURSE PRACTITIONER

## 2021-09-23 PROCEDURE — 85610 PROTHROMBIN TIME: CPT | Performed by: NURSE PRACTITIONER

## 2021-09-23 PROCEDURE — 25010000002 DIAZEPAM PER 5 MG: Performed by: INTERNAL MEDICINE

## 2021-09-23 PROCEDURE — 82746 ASSAY OF FOLIC ACID SERUM: CPT | Performed by: INTERNAL MEDICINE

## 2021-09-23 PROCEDURE — 84466 ASSAY OF TRANSFERRIN: CPT | Performed by: INTERNAL MEDICINE

## 2021-09-23 PROCEDURE — 25010000002 OCTREOTIDE PER 25 MCG: Performed by: PHYSICIAN ASSISTANT

## 2021-09-23 PROCEDURE — 25010000002 MORPHINE PER 10 MG: Performed by: NURSE PRACTITIONER

## 2021-09-23 PROCEDURE — 99232 SBSQ HOSP IP/OBS MODERATE 35: CPT | Performed by: INTERNAL MEDICINE

## 2021-09-23 PROCEDURE — 83735 ASSAY OF MAGNESIUM: CPT | Performed by: NURSE PRACTITIONER

## 2021-09-23 PROCEDURE — 82607 VITAMIN B-12: CPT | Performed by: INTERNAL MEDICINE

## 2021-09-23 PROCEDURE — 82728 ASSAY OF FERRITIN: CPT | Performed by: INTERNAL MEDICINE

## 2021-09-23 PROCEDURE — 63710000001 ONDANSETRON PER 8 MG: Performed by: NURSE PRACTITIONER

## 2021-09-23 PROCEDURE — 83540 ASSAY OF IRON: CPT | Performed by: INTERNAL MEDICINE

## 2021-09-23 PROCEDURE — 80053 COMPREHEN METABOLIC PANEL: CPT | Performed by: PHYSICIAN ASSISTANT

## 2021-09-23 RX ORDER — LORAZEPAM 1 MG/1
1 TABLET ORAL EVERY 8 HOURS
Status: DISCONTINUED | OUTPATIENT
Start: 2021-09-24 | End: 2021-09-24

## 2021-09-23 RX ORDER — DIAZEPAM 5 MG/ML
2.5 INJECTION, SOLUTION INTRAMUSCULAR; INTRAVENOUS ONCE
Status: DISCONTINUED | OUTPATIENT
Start: 2021-09-23 | End: 2021-09-23

## 2021-09-23 RX ORDER — DIAZEPAM 5 MG/ML
5 INJECTION, SOLUTION INTRAMUSCULAR; INTRAVENOUS ONCE
Status: COMPLETED | OUTPATIENT
Start: 2021-09-23 | End: 2021-09-23

## 2021-09-23 RX ORDER — LORAZEPAM 1 MG/1
1 TABLET ORAL EVERY 6 HOURS
Status: COMPLETED | OUTPATIENT
Start: 2021-09-23 | End: 2021-09-24

## 2021-09-23 RX ORDER — DIAZEPAM 5 MG/ML
5 INJECTION, SOLUTION INTRAMUSCULAR; INTRAVENOUS ONCE AS NEEDED
Status: COMPLETED | OUTPATIENT
Start: 2021-09-23 | End: 2021-09-24

## 2021-09-23 RX ADMIN — Medication 1 TABLET: at 08:41

## 2021-09-23 RX ADMIN — MORPHINE SULFATE 2 MG: 2 INJECTION, SOLUTION INTRAMUSCULAR; INTRAVENOUS at 08:40

## 2021-09-23 RX ADMIN — DIAZEPAM 5 MG: 5 INJECTION, SOLUTION INTRAMUSCULAR; INTRAVENOUS at 17:45

## 2021-09-23 RX ADMIN — LORAZEPAM 2 MG: 2 INJECTION INTRAMUSCULAR; INTRAVENOUS at 05:38

## 2021-09-23 RX ADMIN — NICOTINE 1 PATCH: 14 PATCH, EXTENDED RELEASE TRANSDERMAL at 08:41

## 2021-09-23 RX ADMIN — LORAZEPAM 1 MG: 1 TABLET ORAL at 20:50

## 2021-09-23 RX ADMIN — OCTREOTIDE ACETATE 25 MCG/HR: 500 INJECTION, SOLUTION INTRAVENOUS; SUBCUTANEOUS at 09:18

## 2021-09-23 RX ADMIN — PANTOPRAZOLE SODIUM 8 MG/HR: 40 INJECTION, POWDER, FOR SOLUTION INTRAVENOUS at 05:55

## 2021-09-23 RX ADMIN — LORAZEPAM 1 MG: 1 TABLET ORAL at 14:38

## 2021-09-23 RX ADMIN — MORPHINE SULFATE 2 MG: 2 INJECTION, SOLUTION INTRAMUSCULAR; INTRAVENOUS at 04:27

## 2021-09-23 RX ADMIN — MORPHINE SULFATE 2 MG: 2 INJECTION, SOLUTION INTRAMUSCULAR; INTRAVENOUS at 01:36

## 2021-09-23 RX ADMIN — LORAZEPAM 2 MG: 2 INJECTION INTRAMUSCULAR; INTRAVENOUS at 11:12

## 2021-09-23 RX ADMIN — LORAZEPAM 2 MG: 2 INJECTION INTRAMUSCULAR; INTRAVENOUS at 09:47

## 2021-09-23 RX ADMIN — MORPHINE SULFATE 2 MG: 2 INJECTION, SOLUTION INTRAMUSCULAR; INTRAVENOUS at 11:13

## 2021-09-23 RX ADMIN — LEVOTHYROXINE SODIUM 100 MCG: 0.1 TABLET ORAL at 05:58

## 2021-09-23 RX ADMIN — LORAZEPAM 1 MG: 1 TABLET ORAL at 01:36

## 2021-09-23 RX ADMIN — LORAZEPAM 2 MG: 1 TABLET ORAL at 16:46

## 2021-09-23 RX ADMIN — ONDANSETRON HYDROCHLORIDE 4 MG: 4 TABLET, FILM COATED ORAL at 04:27

## 2021-09-23 RX ADMIN — LORAZEPAM 1 MG: 1 TABLET ORAL at 04:27

## 2021-09-23 RX ADMIN — LORAZEPAM 2 MG: 2 INJECTION INTRAMUSCULAR; INTRAVENOUS at 11:39

## 2021-09-23 RX ADMIN — FLUOXETINE HYDROCHLORIDE 40 MG: 20 CAPSULE ORAL at 08:41

## 2021-09-23 RX ADMIN — PANTOPRAZOLE SODIUM 8 MG/HR: 40 INJECTION, POWDER, FOR SOLUTION INTRAVENOUS at 11:48

## 2021-09-23 RX ADMIN — MORPHINE SULFATE 2 MG: 2 INJECTION, SOLUTION INTRAMUSCULAR; INTRAVENOUS at 21:54

## 2021-09-23 RX ADMIN — Medication 100 MG: at 08:40

## 2021-09-23 RX ADMIN — LORAZEPAM 2 MG: 2 INJECTION INTRAMUSCULAR; INTRAVENOUS at 08:40

## 2021-09-23 RX ADMIN — MORPHINE SULFATE 2 MG: 2 INJECTION, SOLUTION INTRAMUSCULAR; INTRAVENOUS at 14:38

## 2021-09-23 RX ADMIN — FOLIC ACID 1 MG: 1 TABLET ORAL at 08:41

## 2021-09-24 LAB
ALBUMIN SERPL-MCNC: 3.4 G/DL (ref 3.5–5.2)
ALBUMIN/GLOB SERPL: 1 G/DL
ALP SERPL-CCNC: 127 U/L (ref 39–117)
ALT SERPL W P-5'-P-CCNC: 17 U/L (ref 1–33)
AMMONIA BLD-SCNC: 49 UMOL/L (ref 11–51)
ANION GAP SERPL CALCULATED.3IONS-SCNC: 9 MMOL/L (ref 5–15)
AST SERPL-CCNC: 54 U/L (ref 1–32)
BASOPHILS # BLD AUTO: 0.03 10*3/MM3 (ref 0–0.2)
BASOPHILS NFR BLD AUTO: 0.9 % (ref 0–1.5)
BILIRUB SERPL-MCNC: 3.2 MG/DL (ref 0–1.2)
BUN SERPL-MCNC: 4 MG/DL (ref 6–20)
BUN/CREAT SERPL: 5.7 (ref 7–25)
CALCIUM SPEC-SCNC: 7.9 MG/DL (ref 8.6–10.5)
CHLORIDE SERPL-SCNC: 105 MMOL/L (ref 98–107)
CO2 SERPL-SCNC: 23 MMOL/L (ref 22–29)
CREAT SERPL-MCNC: 0.7 MG/DL (ref 0.57–1)
DEPRECATED RDW RBC AUTO: 56.7 FL (ref 37–54)
EOSINOPHIL # BLD AUTO: 0.08 10*3/MM3 (ref 0–0.4)
EOSINOPHIL NFR BLD AUTO: 2.4 % (ref 0.3–6.2)
ERYTHROCYTE [DISTWIDTH] IN BLOOD BY AUTOMATED COUNT: 19.2 % (ref 12.3–15.4)
GFR SERPL CREATININE-BSD FRML MDRD: 88 ML/MIN/1.73
GLOBULIN UR ELPH-MCNC: 3.3 GM/DL
GLUCOSE SERPL-MCNC: 96 MG/DL (ref 65–99)
HCT VFR BLD AUTO: 34.9 % (ref 34–46.6)
HGB BLD-MCNC: 10.4 G/DL (ref 12–15.9)
LYMPHOCYTES # BLD AUTO: 0.62 10*3/MM3 (ref 0.7–3.1)
LYMPHOCYTES NFR BLD AUTO: 18.3 % (ref 19.6–45.3)
MCH RBC QN AUTO: 24.9 PG (ref 26.6–33)
MCHC RBC AUTO-ENTMCNC: 29.8 G/DL (ref 31.5–35.7)
MCV RBC AUTO: 83.7 FL (ref 79–97)
MONOCYTES # BLD AUTO: 0.31 10*3/MM3 (ref 0.1–0.9)
MONOCYTES NFR BLD AUTO: 9.1 % (ref 5–12)
NEUTROPHILS NFR BLD AUTO: 2.33 10*3/MM3 (ref 1.7–7)
NEUTROPHILS NFR BLD AUTO: 68.7 % (ref 42.7–76)
PLATELET # BLD AUTO: 28 10*3/MM3 (ref 140–450)
POTASSIUM SERPL-SCNC: 3.4 MMOL/L (ref 3.5–5.2)
PROT SERPL-MCNC: 6.7 G/DL (ref 6–8.5)
RBC # BLD AUTO: 4.17 10*6/MM3 (ref 3.77–5.28)
SODIUM SERPL-SCNC: 137 MMOL/L (ref 136–145)
WBC # BLD AUTO: 3.39 10*3/MM3 (ref 3.4–10.8)

## 2021-09-24 PROCEDURE — 25010000002 OCTREOTIDE PER 25 MCG: Performed by: PHYSICIAN ASSISTANT

## 2021-09-24 PROCEDURE — 99232 SBSQ HOSP IP/OBS MODERATE 35: CPT | Performed by: NURSE PRACTITIONER

## 2021-09-24 PROCEDURE — 25010000002 LORAZEPAM PER 2 MG: Performed by: NURSE PRACTITIONER

## 2021-09-24 PROCEDURE — 80053 COMPREHEN METABOLIC PANEL: CPT | Performed by: NURSE PRACTITIONER

## 2021-09-24 PROCEDURE — 82140 ASSAY OF AMMONIA: CPT | Performed by: INTERNAL MEDICINE

## 2021-09-24 PROCEDURE — 99231 SBSQ HOSP IP/OBS SF/LOW 25: CPT | Performed by: INTERNAL MEDICINE

## 2021-09-24 PROCEDURE — 25010000002 HALOPERIDOL LACTATE PER 5 MG: Performed by: SPECIALIST

## 2021-09-24 PROCEDURE — 25010000002 DIAZEPAM PER 5 MG: Performed by: INTERNAL MEDICINE

## 2021-09-24 PROCEDURE — 25010000002 MORPHINE PER 10 MG: Performed by: NURSE PRACTITIONER

## 2021-09-24 PROCEDURE — 85025 COMPLETE CBC W/AUTO DIFF WBC: CPT | Performed by: INTERNAL MEDICINE

## 2021-09-24 RX ORDER — NITROGLYCERIN 0.4 MG/1
0.4 TABLET SUBLINGUAL
Status: DISCONTINUED | OUTPATIENT
Start: 2021-09-24 | End: 2021-09-27 | Stop reason: HOSPADM

## 2021-09-24 RX ORDER — HALOPERIDOL 5 MG/ML
5 INJECTION INTRAMUSCULAR EVERY 6 HOURS PRN
Status: DISCONTINUED | OUTPATIENT
Start: 2021-09-24 | End: 2021-09-27 | Stop reason: HOSPADM

## 2021-09-24 RX ORDER — DIAZEPAM 5 MG/ML
5 INJECTION, SOLUTION INTRAMUSCULAR; INTRAVENOUS ONCE
Status: COMPLETED | OUTPATIENT
Start: 2021-09-24 | End: 2021-09-24

## 2021-09-24 RX ADMIN — LORAZEPAM 2 MG: 2 INJECTION INTRAMUSCULAR; INTRAVENOUS at 12:13

## 2021-09-24 RX ADMIN — PANTOPRAZOLE SODIUM 8 MG/HR: 40 INJECTION, POWDER, FOR SOLUTION INTRAVENOUS at 04:55

## 2021-09-24 RX ADMIN — LORAZEPAM 2 MG: 2 INJECTION INTRAMUSCULAR; INTRAVENOUS at 10:21

## 2021-09-24 RX ADMIN — HALOPERIDOL LACTATE 5 MG: 5 INJECTION, SOLUTION INTRAMUSCULAR at 13:23

## 2021-09-24 RX ADMIN — LORAZEPAM 2 MG: 2 INJECTION INTRAMUSCULAR; INTRAVENOUS at 10:55

## 2021-09-24 RX ADMIN — LORAZEPAM 2 MG: 2 INJECTION INTRAMUSCULAR; INTRAVENOUS at 13:13

## 2021-09-24 RX ADMIN — DIAZEPAM 5 MG: 5 INJECTION, SOLUTION INTRAMUSCULAR; INTRAVENOUS at 14:54

## 2021-09-24 RX ADMIN — LORAZEPAM 1 MG: 2 INJECTION INTRAMUSCULAR; INTRAVENOUS at 02:40

## 2021-09-24 RX ADMIN — LORAZEPAM 2 MG: 2 INJECTION INTRAMUSCULAR; INTRAVENOUS at 21:30

## 2021-09-24 RX ADMIN — PANTOPRAZOLE SODIUM 8 MG/HR: 40 INJECTION, POWDER, FOR SOLUTION INTRAVENOUS at 10:55

## 2021-09-24 RX ADMIN — DIAZEPAM 5 MG: 5 INJECTION, SOLUTION INTRAMUSCULAR; INTRAVENOUS at 09:40

## 2021-09-24 RX ADMIN — NICOTINE 1 PATCH: 14 PATCH, EXTENDED RELEASE TRANSDERMAL at 10:21

## 2021-09-24 RX ADMIN — LORAZEPAM 2 MG: 2 INJECTION INTRAMUSCULAR; INTRAVENOUS at 09:59

## 2021-09-24 RX ADMIN — LORAZEPAM 2 MG: 2 INJECTION INTRAMUSCULAR; INTRAVENOUS at 07:58

## 2021-09-24 RX ADMIN — LORAZEPAM 1 MG: 1 TABLET ORAL at 06:50

## 2021-09-24 RX ADMIN — PANTOPRAZOLE SODIUM 8 MG/HR: 40 INJECTION, POWDER, FOR SOLUTION INTRAVENOUS at 00:03

## 2021-09-24 RX ADMIN — OCTREOTIDE ACETATE 25 MCG/HR: 500 INJECTION, SOLUTION INTRAVENOUS; SUBCUTANEOUS at 09:40

## 2021-09-24 RX ADMIN — LORAZEPAM 2 MG: 2 INJECTION INTRAMUSCULAR; INTRAVENOUS at 08:39

## 2021-09-24 RX ADMIN — LEVOTHYROXINE SODIUM 100 MCG: 0.1 TABLET ORAL at 06:42

## 2021-09-24 RX ADMIN — LORAZEPAM 2 MG: 2 INJECTION INTRAMUSCULAR; INTRAVENOUS at 03:30

## 2021-09-24 RX ADMIN — LORAZEPAM 1 MG: 1 TABLET ORAL at 01:35

## 2021-09-24 RX ADMIN — LORAZEPAM 2 MG: 2 INJECTION INTRAMUSCULAR; INTRAVENOUS at 05:35

## 2021-09-24 RX ADMIN — LORAZEPAM 2 MG: 2 INJECTION INTRAMUSCULAR; INTRAVENOUS at 09:04

## 2021-09-24 RX ADMIN — MORPHINE SULFATE 2 MG: 2 INJECTION, SOLUTION INTRAMUSCULAR; INTRAVENOUS at 01:35

## 2021-09-24 RX ADMIN — SODIUM CHLORIDE 100 ML/HR: 9 INJECTION, SOLUTION INTRAVENOUS at 21:46

## 2021-09-24 RX ADMIN — LORAZEPAM 2 MG: 2 INJECTION INTRAMUSCULAR; INTRAVENOUS at 04:37

## 2021-09-25 LAB
ALBUMIN SERPL-MCNC: 3.2 G/DL (ref 3.5–5.2)
ALBUMIN/GLOB SERPL: 1.1 G/DL
ALP SERPL-CCNC: 119 U/L (ref 39–117)
ALT SERPL W P-5'-P-CCNC: 19 U/L (ref 1–33)
ANION GAP SERPL CALCULATED.3IONS-SCNC: 15.5 MMOL/L (ref 5–15)
AST SERPL-CCNC: 60 U/L (ref 1–32)
BASOPHILS # BLD AUTO: 0.02 10*3/MM3 (ref 0–0.2)
BASOPHILS NFR BLD AUTO: 0.4 % (ref 0–1.5)
BILIRUB SERPL-MCNC: 3.6 MG/DL (ref 0–1.2)
BUN SERPL-MCNC: 5 MG/DL (ref 6–20)
BUN/CREAT SERPL: 8.1 (ref 7–25)
CALCIUM SPEC-SCNC: 7.6 MG/DL (ref 8.6–10.5)
CHLORIDE SERPL-SCNC: 105 MMOL/L (ref 98–107)
CO2 SERPL-SCNC: 18.5 MMOL/L (ref 22–29)
CREAT SERPL-MCNC: 0.62 MG/DL (ref 0.57–1)
DEPRECATED RDW RBC AUTO: 57.8 FL (ref 37–54)
EOSINOPHIL # BLD AUTO: 0.03 10*3/MM3 (ref 0–0.4)
EOSINOPHIL NFR BLD AUTO: 0.7 % (ref 0.3–6.2)
ERYTHROCYTE [DISTWIDTH] IN BLOOD BY AUTOMATED COUNT: 20 % (ref 12.3–15.4)
GFR SERPL CREATININE-BSD FRML MDRD: 101 ML/MIN/1.73
GLOBULIN UR ELPH-MCNC: 3 GM/DL
GLUCOSE SERPL-MCNC: 90 MG/DL (ref 65–99)
HCT VFR BLD AUTO: 35.2 % (ref 34–46.6)
HGB BLD-MCNC: 11 G/DL (ref 12–15.9)
LYMPHOCYTES # BLD AUTO: 0.5 10*3/MM3 (ref 0.7–3.1)
LYMPHOCYTES NFR BLD AUTO: 10.9 % (ref 19.6–45.3)
MAGNESIUM SERPL-MCNC: 1.3 MG/DL (ref 1.6–2.6)
MCH RBC QN AUTO: 25.6 PG (ref 26.6–33)
MCHC RBC AUTO-ENTMCNC: 31.3 G/DL (ref 31.5–35.7)
MCV RBC AUTO: 81.9 FL (ref 79–97)
MONOCYTES # BLD AUTO: 0.26 10*3/MM3 (ref 0.1–0.9)
MONOCYTES NFR BLD AUTO: 5.7 % (ref 5–12)
NEUTROPHILS NFR BLD AUTO: 3.74 10*3/MM3 (ref 1.7–7)
NEUTROPHILS NFR BLD AUTO: 81.6 % (ref 42.7–76)
PLATELET # BLD AUTO: 25 10*3/MM3 (ref 140–450)
PMV BLD AUTO: ABNORMAL FL
POTASSIUM SERPL-SCNC: 3.8 MMOL/L (ref 3.5–5.2)
POTASSIUM SERPL-SCNC: 4.8 MMOL/L (ref 3.5–5.2)
PROT SERPL-MCNC: 6.2 G/DL (ref 6–8.5)
RBC # BLD AUTO: 4.3 10*6/MM3 (ref 3.77–5.28)
SODIUM SERPL-SCNC: 139 MMOL/L (ref 136–145)
WBC # BLD AUTO: 4.58 10*3/MM3 (ref 3.4–10.8)

## 2021-09-25 PROCEDURE — 25010000002 OCTREOTIDE PER 25 MCG: Performed by: PHYSICIAN ASSISTANT

## 2021-09-25 PROCEDURE — 99232 SBSQ HOSP IP/OBS MODERATE 35: CPT | Performed by: INTERNAL MEDICINE

## 2021-09-25 PROCEDURE — 25010000002 MAGNESIUM SULFATE 2 GM/50ML SOLUTION: Performed by: INTERNAL MEDICINE

## 2021-09-25 PROCEDURE — 85025 COMPLETE CBC W/AUTO DIFF WBC: CPT | Performed by: INTERNAL MEDICINE

## 2021-09-25 PROCEDURE — 83735 ASSAY OF MAGNESIUM: CPT | Performed by: INTERNAL MEDICINE

## 2021-09-25 PROCEDURE — 80053 COMPREHEN METABOLIC PANEL: CPT | Performed by: INTERNAL MEDICINE

## 2021-09-25 PROCEDURE — 84132 ASSAY OF SERUM POTASSIUM: CPT | Performed by: INTERNAL MEDICINE

## 2021-09-25 PROCEDURE — 25010000002 MORPHINE PER 10 MG: Performed by: NURSE PRACTITIONER

## 2021-09-25 RX ADMIN — PANTOPRAZOLE SODIUM 8 MG/HR: 40 INJECTION, POWDER, FOR SOLUTION INTRAVENOUS at 00:37

## 2021-09-25 RX ADMIN — FLUOXETINE HYDROCHLORIDE 40 MG: 20 CAPSULE ORAL at 08:33

## 2021-09-25 RX ADMIN — PANTOPRAZOLE SODIUM 8 MG/HR: 40 INJECTION, POWDER, FOR SOLUTION INTRAVENOUS at 17:35

## 2021-09-25 RX ADMIN — PANTOPRAZOLE SODIUM 8 MG/HR: 40 INJECTION, POWDER, FOR SOLUTION INTRAVENOUS at 05:05

## 2021-09-25 RX ADMIN — MAGNESIUM SULFATE HEPTAHYDRATE 2 G: 2 INJECTION, SOLUTION INTRAVENOUS at 17:29

## 2021-09-25 RX ADMIN — POTASSIUM CHLORIDE 40 MEQ: 750 TABLET, EXTENDED RELEASE ORAL at 07:32

## 2021-09-25 RX ADMIN — PANTOPRAZOLE SODIUM 8 MG/HR: 40 INJECTION, POWDER, FOR SOLUTION INTRAVENOUS at 12:15

## 2021-09-25 RX ADMIN — OCTREOTIDE ACETATE 25 MCG/HR: 500 INJECTION, SOLUTION INTRAVENOUS; SUBCUTANEOUS at 03:59

## 2021-09-25 RX ADMIN — MORPHINE SULFATE 2 MG: 2 INJECTION, SOLUTION INTRAMUSCULAR; INTRAVENOUS at 06:07

## 2021-09-25 RX ADMIN — POTASSIUM CHLORIDE 40 MEQ: 750 TABLET, EXTENDED RELEASE ORAL at 12:11

## 2021-09-25 RX ADMIN — MAGNESIUM SULFATE HEPTAHYDRATE 2 G: 2 INJECTION, SOLUTION INTRAVENOUS at 15:39

## 2021-09-25 RX ADMIN — MAGNESIUM SULFATE HEPTAHYDRATE 2 G: 2 INJECTION, SOLUTION INTRAVENOUS at 20:54

## 2021-09-25 RX ADMIN — NICOTINE 1 PATCH: 14 PATCH, EXTENDED RELEASE TRANSDERMAL at 08:50

## 2021-09-26 LAB
ALBUMIN SERPL-MCNC: 3.4 G/DL (ref 3.5–5.2)
ALBUMIN/GLOB SERPL: 1.1 G/DL
ALP SERPL-CCNC: 131 U/L (ref 39–117)
ALT SERPL W P-5'-P-CCNC: 20 U/L (ref 1–33)
ANION GAP SERPL CALCULATED.3IONS-SCNC: 11.9 MMOL/L (ref 5–15)
AST SERPL-CCNC: 59 U/L (ref 1–32)
BASOPHILS # BLD AUTO: 0.03 10*3/MM3 (ref 0–0.2)
BASOPHILS NFR BLD AUTO: 0.6 % (ref 0–1.5)
BILIRUB SERPL-MCNC: 3.2 MG/DL (ref 0–1.2)
BUN SERPL-MCNC: 4 MG/DL (ref 6–20)
BUN/CREAT SERPL: 5.7 (ref 7–25)
CALCIUM SPEC-SCNC: 8.1 MG/DL (ref 8.6–10.5)
CHLORIDE SERPL-SCNC: 107 MMOL/L (ref 98–107)
CO2 SERPL-SCNC: 17.1 MMOL/L (ref 22–29)
CREAT SERPL-MCNC: 0.7 MG/DL (ref 0.57–1)
DEPRECATED RDW RBC AUTO: 59.8 FL (ref 37–54)
EOSINOPHIL # BLD AUTO: 0.07 10*3/MM3 (ref 0–0.4)
EOSINOPHIL NFR BLD AUTO: 1.4 % (ref 0.3–6.2)
ERYTHROCYTE [DISTWIDTH] IN BLOOD BY AUTOMATED COUNT: 21 % (ref 12.3–15.4)
GFR SERPL CREATININE-BSD FRML MDRD: 88 ML/MIN/1.73
GLOBULIN UR ELPH-MCNC: 3.2 GM/DL
GLUCOSE SERPL-MCNC: 152 MG/DL (ref 65–99)
HCT VFR BLD AUTO: 36 % (ref 34–46.6)
HGB BLD-MCNC: 11.2 G/DL (ref 12–15.9)
LYMPHOCYTES # BLD AUTO: 0.58 10*3/MM3 (ref 0.7–3.1)
LYMPHOCYTES NFR BLD AUTO: 11.4 % (ref 19.6–45.3)
MAGNESIUM SERPL-MCNC: 1.9 MG/DL (ref 1.6–2.6)
MCH RBC QN AUTO: 25.6 PG (ref 26.6–33)
MCHC RBC AUTO-ENTMCNC: 31.1 G/DL (ref 31.5–35.7)
MCV RBC AUTO: 82.2 FL (ref 79–97)
MONOCYTES # BLD AUTO: 0.42 10*3/MM3 (ref 0.1–0.9)
MONOCYTES NFR BLD AUTO: 8.3 % (ref 5–12)
NEUTROPHILS NFR BLD AUTO: 3.85 10*3/MM3 (ref 1.7–7)
NEUTROPHILS NFR BLD AUTO: 75.9 % (ref 42.7–76)
PLATELET # BLD AUTO: 24 10*3/MM3 (ref 140–450)
PMV BLD AUTO: ABNORMAL FL
POTASSIUM SERPL-SCNC: 4.7 MMOL/L (ref 3.5–5.2)
PROT SERPL-MCNC: 6.6 G/DL (ref 6–8.5)
RBC # BLD AUTO: 4.38 10*6/MM3 (ref 3.77–5.28)
SODIUM SERPL-SCNC: 136 MMOL/L (ref 136–145)
WBC # BLD AUTO: 5.07 10*3/MM3 (ref 3.4–10.8)

## 2021-09-26 PROCEDURE — 25010000002 OCTREOTIDE PER 25 MCG: Performed by: PHYSICIAN ASSISTANT

## 2021-09-26 PROCEDURE — 99232 SBSQ HOSP IP/OBS MODERATE 35: CPT | Performed by: INTERNAL MEDICINE

## 2021-09-26 PROCEDURE — 83735 ASSAY OF MAGNESIUM: CPT | Performed by: INTERNAL MEDICINE

## 2021-09-26 PROCEDURE — 85025 COMPLETE CBC W/AUTO DIFF WBC: CPT | Performed by: INTERNAL MEDICINE

## 2021-09-26 PROCEDURE — 99231 SBSQ HOSP IP/OBS SF/LOW 25: CPT | Performed by: INTERNAL MEDICINE

## 2021-09-26 PROCEDURE — 80053 COMPREHEN METABOLIC PANEL: CPT | Performed by: INTERNAL MEDICINE

## 2021-09-26 RX ORDER — HYDROCODONE BITARTRATE AND ACETAMINOPHEN 5; 325 MG/1; MG/1
1 TABLET ORAL EVERY 4 HOURS PRN
Status: DISCONTINUED | OUTPATIENT
Start: 2021-09-26 | End: 2021-09-26

## 2021-09-26 RX ORDER — OXYCODONE HYDROCHLORIDE 5 MG/1
5 TABLET ORAL EVERY 4 HOURS PRN
Status: DISCONTINUED | OUTPATIENT
Start: 2021-09-26 | End: 2021-09-27 | Stop reason: HOSPADM

## 2021-09-26 RX ADMIN — PANTOPRAZOLE SODIUM 8 MG/HR: 40 INJECTION, POWDER, FOR SOLUTION INTRAVENOUS at 12:50

## 2021-09-26 RX ADMIN — PANTOPRAZOLE SODIUM 8 MG/HR: 40 INJECTION, POWDER, FOR SOLUTION INTRAVENOUS at 01:32

## 2021-09-26 RX ADMIN — SODIUM CHLORIDE 100 ML/HR: 9 INJECTION, SOLUTION INTRAVENOUS at 04:02

## 2021-09-26 RX ADMIN — PANTOPRAZOLE SODIUM 8 MG/HR: 40 INJECTION, POWDER, FOR SOLUTION INTRAVENOUS at 19:02

## 2021-09-26 RX ADMIN — OCTREOTIDE ACETATE 25 MCG/HR: 500 INJECTION, SOLUTION INTRAVENOUS; SUBCUTANEOUS at 01:32

## 2021-09-26 RX ADMIN — FLUOXETINE HYDROCHLORIDE 40 MG: 20 CAPSULE ORAL at 08:57

## 2021-09-26 RX ADMIN — OCTREOTIDE ACETATE 25 MCG/HR: 500 INJECTION, SOLUTION INTRAVENOUS; SUBCUTANEOUS at 22:20

## 2021-09-26 RX ADMIN — OXYCODONE 5 MG: 5 TABLET ORAL at 14:40

## 2021-09-26 RX ADMIN — NICOTINE 1 PATCH: 14 PATCH, EXTENDED RELEASE TRANSDERMAL at 09:00

## 2021-09-26 RX ADMIN — OXYCODONE 5 MG: 5 TABLET ORAL at 08:57

## 2021-09-26 RX ADMIN — PANTOPRAZOLE SODIUM 8 MG/HR: 40 INJECTION, POWDER, FOR SOLUTION INTRAVENOUS at 06:38

## 2021-09-26 RX ADMIN — OXYCODONE 5 MG: 5 TABLET ORAL at 19:38

## 2021-09-27 VITALS
HEIGHT: 65 IN | TEMPERATURE: 98.6 F | HEART RATE: 74 BPM | RESPIRATION RATE: 18 BRPM | BODY MASS INDEX: 23.32 KG/M2 | WEIGHT: 140 LBS | DIASTOLIC BLOOD PRESSURE: 98 MMHG | SYSTOLIC BLOOD PRESSURE: 148 MMHG | OXYGEN SATURATION: 97 %

## 2021-09-27 LAB
ALBUMIN SERPL-MCNC: 3.1 G/DL (ref 3.5–5.2)
ALBUMIN/GLOB SERPL: 1 G/DL
ALP SERPL-CCNC: 147 U/L (ref 39–117)
ALT SERPL W P-5'-P-CCNC: 15 U/L (ref 1–33)
ANION GAP SERPL CALCULATED.3IONS-SCNC: 9.8 MMOL/L (ref 5–15)
AST SERPL-CCNC: 42 U/L (ref 1–32)
BASOPHILS # BLD AUTO: 0.03 10*3/MM3 (ref 0–0.2)
BASOPHILS NFR BLD AUTO: 0.8 % (ref 0–1.5)
BILIRUB SERPL-MCNC: 2.4 MG/DL (ref 0–1.2)
BUN SERPL-MCNC: 4 MG/DL (ref 6–20)
BUN/CREAT SERPL: 5.5 (ref 7–25)
CALCIUM SPEC-SCNC: 7.8 MG/DL (ref 8.6–10.5)
CHLORIDE SERPL-SCNC: 109 MMOL/L (ref 98–107)
CO2 SERPL-SCNC: 21.2 MMOL/L (ref 22–29)
CREAT SERPL-MCNC: 0.73 MG/DL (ref 0.57–1)
DEPRECATED RDW RBC AUTO: 59.4 FL (ref 37–54)
EOSINOPHIL # BLD AUTO: 0.1 10*3/MM3 (ref 0–0.4)
EOSINOPHIL NFR BLD AUTO: 2.8 % (ref 0.3–6.2)
ERYTHROCYTE [DISTWIDTH] IN BLOOD BY AUTOMATED COUNT: 20.3 % (ref 12.3–15.4)
GFR SERPL CREATININE-BSD FRML MDRD: 83 ML/MIN/1.73
GLOBULIN UR ELPH-MCNC: 3 GM/DL
GLUCOSE SERPL-MCNC: 96 MG/DL (ref 65–99)
HCT VFR BLD AUTO: 32.8 % (ref 34–46.6)
HGB BLD-MCNC: 10.3 G/DL (ref 12–15.9)
LYMPHOCYTES # BLD AUTO: 0.9 10*3/MM3 (ref 0.7–3.1)
LYMPHOCYTES NFR BLD AUTO: 25.4 % (ref 19.6–45.3)
MCH RBC QN AUTO: 25.7 PG (ref 26.6–33)
MCHC RBC AUTO-ENTMCNC: 31.4 G/DL (ref 31.5–35.7)
MCV RBC AUTO: 81.8 FL (ref 79–97)
MONOCYTES # BLD AUTO: 0.45 10*3/MM3 (ref 0.1–0.9)
MONOCYTES NFR BLD AUTO: 12.7 % (ref 5–12)
NEUTROPHILS NFR BLD AUTO: 2.06 10*3/MM3 (ref 1.7–7)
NEUTROPHILS NFR BLD AUTO: 58 % (ref 42.7–76)
PLATELET # BLD AUTO: 31 10*3/MM3 (ref 140–450)
PMV BLD AUTO: ABNORMAL FL
POTASSIUM SERPL-SCNC: 3.6 MMOL/L (ref 3.5–5.2)
PROT SERPL-MCNC: 6.1 G/DL (ref 6–8.5)
RBC # BLD AUTO: 4.01 10*6/MM3 (ref 3.77–5.28)
SODIUM SERPL-SCNC: 140 MMOL/L (ref 136–145)
WBC # BLD AUTO: 3.55 10*3/MM3 (ref 3.4–10.8)

## 2021-09-27 PROCEDURE — 85025 COMPLETE CBC W/AUTO DIFF WBC: CPT | Performed by: INTERNAL MEDICINE

## 2021-09-27 PROCEDURE — 99231 SBSQ HOSP IP/OBS SF/LOW 25: CPT | Performed by: PHYSICIAN ASSISTANT

## 2021-09-27 PROCEDURE — 80053 COMPREHEN METABOLIC PANEL: CPT | Performed by: NURSE PRACTITIONER

## 2021-09-27 RX ORDER — AMLODIPINE BESYLATE 5 MG/1
5 TABLET ORAL DAILY
Status: DISCONTINUED | OUTPATIENT
Start: 2021-09-27 | End: 2021-09-27 | Stop reason: HOSPADM

## 2021-09-27 RX ADMIN — POTASSIUM CHLORIDE 40 MEQ: 750 TABLET, EXTENDED RELEASE ORAL at 13:41

## 2021-09-27 RX ADMIN — OXYCODONE 5 MG: 5 TABLET ORAL at 06:00

## 2021-09-27 RX ADMIN — PANTOPRAZOLE SODIUM 8 MG/HR: 40 INJECTION, POWDER, FOR SOLUTION INTRAVENOUS at 00:23

## 2021-09-27 RX ADMIN — AMLODIPINE BESYLATE 5 MG: 5 TABLET ORAL at 11:26

## 2021-09-27 RX ADMIN — PANTOPRAZOLE SODIUM 8 MG/HR: 40 INJECTION, POWDER, FOR SOLUTION INTRAVENOUS at 13:41

## 2021-09-27 RX ADMIN — POTASSIUM CHLORIDE 40 MEQ: 750 TABLET, EXTENDED RELEASE ORAL at 09:32

## 2021-09-27 RX ADMIN — NICOTINE 1 PATCH: 14 PATCH, EXTENDED RELEASE TRANSDERMAL at 08:48

## 2021-09-27 RX ADMIN — FLUOXETINE HYDROCHLORIDE 40 MG: 20 CAPSULE ORAL at 08:48

## 2021-09-27 RX ADMIN — OXYCODONE 5 MG: 5 TABLET ORAL at 01:55

## 2021-09-27 RX ADMIN — OXYCODONE 5 MG: 5 TABLET ORAL at 13:40

## 2021-09-27 RX ADMIN — PANTOPRAZOLE SODIUM 8 MG/HR: 40 INJECTION, POWDER, FOR SOLUTION INTRAVENOUS at 05:18

## 2021-09-28 ENCOUNTER — READMISSION MANAGEMENT (OUTPATIENT)
Dept: CALL CENTER | Facility: HOSPITAL | Age: 51
End: 2021-09-28

## 2021-09-28 NOTE — OUTREACH NOTE
Prep Survey      Responses   [facility] facility patient discharged from?  [place]   Is LACE score < 7 ?  No   Emergency Room discharge w/ pulse ox?  No   Eligibility  Not Eligible   What are the reasons patient is not eligible?  Other [etoh]   Does the patient have one of the following disease processes/diagnoses(primary or secondary)?  Other   Prep survey completed?  Yes          Sheeba Valdez RN

## 2022-05-04 ENCOUNTER — APPOINTMENT (OUTPATIENT)
Dept: CT IMAGING | Facility: HOSPITAL | Age: 52
End: 2022-05-04

## 2022-05-04 ENCOUNTER — HOSPITAL ENCOUNTER (EMERGENCY)
Facility: HOSPITAL | Age: 52
Discharge: HOME | End: 2022-05-04
Attending: EMERGENCY MEDICINE | Admitting: EMERGENCY MEDICINE

## 2022-05-04 ENCOUNTER — APPOINTMENT (OUTPATIENT)
Dept: GENERAL RADIOLOGY | Facility: HOSPITAL | Age: 52
End: 2022-05-04

## 2022-05-04 VITALS
SYSTOLIC BLOOD PRESSURE: 137 MMHG | TEMPERATURE: 98.3 F | HEIGHT: 65 IN | HEART RATE: 74 BPM | BODY MASS INDEX: 23.3 KG/M2 | RESPIRATION RATE: 16 BRPM | DIASTOLIC BLOOD PRESSURE: 80 MMHG | OXYGEN SATURATION: 99 %

## 2022-05-04 DIAGNOSIS — R10.13 EPIGASTRIC PAIN: ICD-10-CM

## 2022-05-04 DIAGNOSIS — F10.10 ALCOHOL ABUSE: Primary | ICD-10-CM

## 2022-05-04 LAB
ALBUMIN SERPL-MCNC: 4.1 G/DL (ref 3.5–5.2)
ALBUMIN/GLOB SERPL: 1.3 G/DL
ALP SERPL-CCNC: 117 U/L (ref 39–117)
ALT SERPL W P-5'-P-CCNC: 23 U/L (ref 1–33)
ANION GAP SERPL CALCULATED.3IONS-SCNC: 15.9 MMOL/L (ref 5–15)
AST SERPL-CCNC: 59 U/L (ref 1–32)
BASOPHILS # BLD AUTO: 0.04 10*3/MM3 (ref 0–0.2)
BASOPHILS NFR BLD AUTO: 1.1 % (ref 0–1.5)
BILIRUB SERPL-MCNC: 2.3 MG/DL (ref 0–1.2)
BUN SERPL-MCNC: 6 MG/DL (ref 6–20)
BUN/CREAT SERPL: 10 (ref 7–25)
CALCIUM SPEC-SCNC: 9.1 MG/DL (ref 8.6–10.5)
CHLORIDE SERPL-SCNC: 100 MMOL/L (ref 98–107)
CO2 SERPL-SCNC: 23.1 MMOL/L (ref 22–29)
CREAT SERPL-MCNC: 0.6 MG/DL (ref 0.57–1)
DEPRECATED RDW RBC AUTO: 51.9 FL (ref 37–54)
EGFRCR SERPLBLD CKD-EPI 2021: 107.5 ML/MIN/1.73
EOSINOPHIL # BLD AUTO: 0.11 10*3/MM3 (ref 0–0.4)
EOSINOPHIL NFR BLD AUTO: 2.9 % (ref 0.3–6.2)
ERYTHROCYTE [DISTWIDTH] IN BLOOD BY AUTOMATED COUNT: 16.5 % (ref 12.3–15.4)
ETHANOL BLD-MCNC: <10 MG/DL (ref 0–10)
ETHANOL UR QL: <0.01 %
GLOBULIN UR ELPH-MCNC: 3.2 GM/DL
GLUCOSE SERPL-MCNC: 126 MG/DL (ref 65–99)
HCT VFR BLD AUTO: 41.2 % (ref 34–46.6)
HGB BLD-MCNC: 14 G/DL (ref 12–15.9)
LIPASE SERPL-CCNC: 23 U/L (ref 13–60)
LYMPHOCYTES # BLD AUTO: 0.93 10*3/MM3 (ref 0.7–3.1)
LYMPHOCYTES NFR BLD AUTO: 24.9 % (ref 19.6–45.3)
MAGNESIUM SERPL-MCNC: 1.5 MG/DL (ref 1.6–2.6)
MCH RBC QN AUTO: 29.3 PG (ref 26.6–33)
MCHC RBC AUTO-ENTMCNC: 34 G/DL (ref 31.5–35.7)
MCV RBC AUTO: 86.2 FL (ref 79–97)
MONOCYTES # BLD AUTO: 0.51 10*3/MM3 (ref 0.1–0.9)
MONOCYTES NFR BLD AUTO: 13.7 % (ref 5–12)
NEUTROPHILS NFR BLD AUTO: 2.13 10*3/MM3 (ref 1.7–7)
NEUTROPHILS NFR BLD AUTO: 57.1 % (ref 42.7–76)
PLATELET # BLD AUTO: 33 10*3/MM3 (ref 140–450)
PMV BLD AUTO: 11.5 FL (ref 6–12)
POTASSIUM SERPL-SCNC: 3.7 MMOL/L (ref 3.5–5.2)
PROT SERPL-MCNC: 7.3 G/DL (ref 6–8.5)
QT INTERVAL: 483 MS
RBC # BLD AUTO: 4.78 10*6/MM3 (ref 3.77–5.28)
SODIUM SERPL-SCNC: 139 MMOL/L (ref 136–145)
TROPONIN T SERPL-MCNC: <0.01 NG/ML (ref 0–0.03)
WBC NRBC COR # BLD: 3.73 10*3/MM3 (ref 3.4–10.8)

## 2022-05-04 PROCEDURE — 74176 CT ABD & PELVIS W/O CONTRAST: CPT

## 2022-05-04 PROCEDURE — 80053 COMPREHEN METABOLIC PANEL: CPT | Performed by: EMERGENCY MEDICINE

## 2022-05-04 PROCEDURE — 83690 ASSAY OF LIPASE: CPT | Performed by: EMERGENCY MEDICINE

## 2022-05-04 PROCEDURE — 84484 ASSAY OF TROPONIN QUANT: CPT | Performed by: EMERGENCY MEDICINE

## 2022-05-04 PROCEDURE — 83735 ASSAY OF MAGNESIUM: CPT | Performed by: PHYSICIAN ASSISTANT

## 2022-05-04 PROCEDURE — 85025 COMPLETE CBC W/AUTO DIFF WBC: CPT | Performed by: EMERGENCY MEDICINE

## 2022-05-04 PROCEDURE — 71045 X-RAY EXAM CHEST 1 VIEW: CPT

## 2022-05-04 PROCEDURE — 99284 EMERGENCY DEPT VISIT MOD MDM: CPT

## 2022-05-04 PROCEDURE — 93010 ELECTROCARDIOGRAM REPORT: CPT | Performed by: INTERNAL MEDICINE

## 2022-05-04 PROCEDURE — 82077 ASSAY SPEC XCP UR&BREATH IA: CPT | Performed by: EMERGENCY MEDICINE

## 2022-05-04 PROCEDURE — 93005 ELECTROCARDIOGRAM TRACING: CPT | Performed by: EMERGENCY MEDICINE

## 2022-05-04 RX ORDER — PANTOPRAZOLE SODIUM 40 MG/1
40 TABLET, DELAYED RELEASE ORAL DAILY
Qty: 30 TABLET | Refills: 0 | Status: SHIPPED | OUTPATIENT
Start: 2022-05-04 | End: 2022-10-30 | Stop reason: HOSPADM

## 2022-05-04 RX ORDER — CHLORDIAZEPOXIDE HYDROCHLORIDE 25 MG/1
25 CAPSULE, GELATIN COATED ORAL ONCE
Status: COMPLETED | OUTPATIENT
Start: 2022-05-04 | End: 2022-05-04

## 2022-05-04 RX ORDER — CHLORDIAZEPOXIDE HYDROCHLORIDE 25 MG/1
25 CAPSULE, GELATIN COATED ORAL 3 TIMES DAILY PRN
Qty: 6 CAPSULE | Refills: 0 | Status: SHIPPED | OUTPATIENT
Start: 2022-05-04 | End: 2022-10-24

## 2022-05-04 RX ADMIN — CHLORDIAZEPOXIDE HYDROCHLORIDE 25 MG: 25 CAPSULE ORAL at 07:40

## 2022-05-04 NOTE — ED PROVIDER NOTES
MD ATTESTATION NOTE    The PATO and I have discussed this patient's history, physical exam, and treatment plan.  I have reviewed the documentation and personally had a face to face interaction with the patient. I affirm the documentation and agree with the treatment and plan.  The attached note describes my personal findings.      I provided a substantive portion of the care of the patient.  I personally performed the physical exam in its entirety, and below are my findings.  For this patient encounter, the patient wore surgical mask, I wore full protective PPE including N95 and eye protection.      Brief HPI: Patient has been drinking daily for the past 5 days.  She had previously been sober for the past 2 months.  Her last drink was yesterday afternoon.  She is wanting help to stop drinking.  She complains of feeling anxious and having mild epigastric pain.  Denies vomiting, diarrhea, fever, chills, chest pain, or shortness of breath.    PHYSICAL EXAM  ED Triage Vitals [05/04/22 0612]   Temp Heart Rate Resp BP SpO2   98.3 °F (36.8 °C) 79 16 138/90 98 %      Temp src Heart Rate Source Patient Position BP Location FiO2 (%)   Tympanic Monitor Standing Right arm --         GENERAL: Awake, alert, oriented x3.  Well-developed, well-nourished female.  Resting comfortably in no acute distress.  She is not tremulous  HENT: nares patent  EYES: no scleral icterus  CV: regular rhythm, normal rate  RESPIRATORY: normal effort, clear to auscultation bilaterally  ABDOMEN: soft, minimal epigastric tenderness without rebound or guarding, no CVA tenderness  MUSCULOSKELETAL: no deformity  NEURO: alert, moves all extremities, follows commands  PSYCH:  calm, cooperative  SKIN: warm, dry    Vital signs and nursing notes reviewed.        Plan: We will obtain labs, chest x-ray, and CT abdomen/pelvis for further evaluation.  Patient will be given Librium.  I personally reviewed the patient's EKG and agree with Steph's interpretation.    845:  Labs reviewed.  CT is negative acute.  Patient is not tachycardic or tremulous.  She will be discharged with a prescription for Librium.  She is going [facility].     Babar Martinez MD  05/04/22 0812

## 2022-05-04 NOTE — ED NOTES
Pt ambulatory to triage from home with c/o etoh withdrawal  - states hallucinations and tremors.  Last etoh Tuesday afternoon. Pt states had 2 shots of schnapps, normally drinks 4 shot daily. Pt states had been clean for a few months when she relapsed.  Pt wearing mask in triage. Triage personnel wore appropriate PPE

## 2022-05-04 NOTE — ED PROVIDER NOTES
EMERGENCY DEPARTMENT ENCOUNTER    Room Number:  14/14  Date seen:  5/4/2022  Time seen: 06:31 EDT  PCP: Tylor Davis  Historian: patient      HPI:  Chief Complaint: Alcohol abuse, epigastric pain    A complete HPI/ROS/PMH/PSH/SH/FH are unobtainable due to: none    Context: Bekah Gibson is a 51 y.o. female with a history of alcohol induced cirrhosis, pancreatitis, who presents to the ED for evaluation of alcohol abuse and epigastric pain.  She states she was sober from alcohol for 2 months and 5 days ago relapsed.  She has been drinking 2 or 3 airplane bottles of peach schnapps daily since.  She called the nurse hotline on her insurance card for rehab recommendations and was referred to the ER.  She does report some mild epigastric pain, some feelings of anxiety.  She denies any chest pain shortness of breath nausea vomiting diarrhea or urinary symptoms.        PAST MEDICAL HISTORY  Active Ambulatory Problems     Diagnosis Date Noted   • Irritable bowel syndrome with both constipation and diarrhea 06/05/2017   • Peripheral neuropathy 06/05/2017   • Vitamin D deficiency 06/05/2017   • History of HPV infection 06/05/2017   • Hypothyroidism 06/05/2017   • Cigarette smoker 06/05/2017   • Acute kidney injury (HCC) 12/28/2015   • Ascites 06/06/2016   • E. coli UTI (urinary tract infection) 06/06/2016   • Alcohol withdrawal syndrome, with delirium (HCC) 06/29/2018   • Alcoholic hepatitis 06/29/2018   • GI bleed 06/29/2018   • Acute post-hemorrhagic anemia 06/29/2018   • Thrombocytopenia (HCC) 06/29/2018   • Open wound of right shoulder region 06/29/2018   • Pancreatic insufficiency 07/04/2018   • Alcoholic ketoacidosis 07/21/2019   • Chronic alcohol abuse 07/29/2019   • ESBL (extended spectrum beta-lactamase) producing bacteria infection 07/29/2019   • Abnormal weight loss 12/13/2019   • Hashimoto's disease 12/13/2019   • Chronic fatigue 12/14/2019   • History of alcohol use disorder 09/21/2021   • Alcohol intoxication  (HCC) 09/21/2021   • Lupus (HCC)    • Hypomagnesemia    • Pancytopenia (HCC)    • Alcoholic cirrhosis (HCC)    • Nausea and vomiting 09/21/2021   • Bilateral lower abdominal discomfort 09/21/2021     Resolved Ambulatory Problems     Diagnosis Date Noted   • Acute renal failure (HCC) 02/20/2017   • Kidney stone 06/05/2017   • Hypotension 07/01/2018     Past Medical History:   Diagnosis Date   • Anxiety    • Disease of thyroid gland    • ETOH abuse    • Hypertension    • MDD (major depressive disorder)    • Pancreatitis          PAST SURGICAL HISTORY  Past Surgical History:   Procedure Laterality Date   • CLAVICLE SURGERY Right 2018   • JOINT REPLACEMENT      GREAT TOE   • KIDNEY STONE SURGERY      LITHOTRIPSY AND STENT (R SIDE)   • LAPAROSCOPIC TUBAL LIGATION  2005         FAMILY HISTORY  Family History   Problem Relation Age of Onset   • Thyroid disease Father    • Leukemia Father    • Drug abuse Brother          SOCIAL HISTORY  Social History     Socioeconomic History   • Marital status:    Tobacco Use   • Smoking status: Current Every Day Smoker     Packs/day: 0.50     Types: Cigarettes   • Smokeless tobacco: Never Used   Substance and Sexual Activity   • Alcohol use: Yes     Comment: has had alcohol today   • Drug use: No     Comment: used THC in college   • Sexual activity: Defer         ALLERGIES  Benzonatate, Phenergan [promethazine hcl], Promethazine, Cucumber extract, and Promethazine-phenylephrine        REVIEW OF SYSTEMS  Review of Systems     All systems reviewed and negative except for those discussed in HPI.       PHYSICAL EXAM  ED Triage Vitals [05/04/22 0612]   Temp Heart Rate Resp BP SpO2   98.3 °F (36.8 °C) 79 16 138/90 98 %      Temp src Heart Rate Source Patient Position BP Location FiO2 (%)   Tympanic Monitor Standing Right arm --         GENERAL: not distressed  HENT: atraumatic  EYES: no scleral icterus  CV: regular rhythm, regular rate  RESPIRATORY: normal effort CTA B  ABDOMEN: soft,  mild epigastric tenderness, nondistended normal bowel sounds no guarding or rigidity  MUSCULOSKELETAL: no deformity  NEURO: alert, moves all extremities, follows commands  SKIN: warm, dry    Vital signs and nursing notes reviewed.          LAB RESULTS  Recent Results (from the past 24 hour(s))   ECG 12 Lead    Collection Time: 05/04/22  6:51 AM   Result Value Ref Range    QT Interval 483 ms   Comprehensive Metabolic Panel    Collection Time: 05/04/22  6:53 AM    Specimen: Blood   Result Value Ref Range    Glucose 126 (H) 65 - 99 mg/dL    BUN 6 6 - 20 mg/dL    Creatinine 0.60 0.57 - 1.00 mg/dL    Sodium 139 136 - 145 mmol/L    Potassium 3.7 3.5 - 5.2 mmol/L    Chloride 100 98 - 107 mmol/L    CO2 23.1 22.0 - 29.0 mmol/L    Calcium 9.1 8.6 - 10.5 mg/dL    Total Protein 7.3 6.0 - 8.5 g/dL    Albumin 4.10 3.50 - 5.20 g/dL    ALT (SGPT) 23 1 - 33 U/L    AST (SGOT) 59 (H) 1 - 32 U/L    Alkaline Phosphatase 117 39 - 117 U/L    Total Bilirubin 2.3 (H) 0.0 - 1.2 mg/dL    Globulin 3.2 gm/dL    A/G Ratio 1.3 g/dL    BUN/Creatinine Ratio 10.0 7.0 - 25.0    Anion Gap 15.9 (H) 5.0 - 15.0 mmol/L    eGFR 107.5 >60.0 mL/min/1.73   Lipase    Collection Time: 05/04/22  6:53 AM    Specimen: Blood   Result Value Ref Range    Lipase 23 13 - 60 U/L   Troponin    Collection Time: 05/04/22  6:53 AM    Specimen: Blood   Result Value Ref Range    Troponin T <0.010 0.000 - 0.030 ng/mL   Ethanol    Collection Time: 05/04/22  6:53 AM    Specimen: Blood   Result Value Ref Range    Ethanol <10 0 - 10 mg/dL    Ethanol % <0.010 %   CBC Auto Differential    Collection Time: 05/04/22  6:53 AM    Specimen: Blood   Result Value Ref Range    WBC 3.73 3.40 - 10.80 10*3/mm3    RBC 4.78 3.77 - 5.28 10*6/mm3    Hemoglobin 14.0 12.0 - 15.9 g/dL    Hematocrit 41.2 34.0 - 46.6 %    MCV 86.2 79.0 - 97.0 fL    MCH 29.3 26.6 - 33.0 pg    MCHC 34.0 31.5 - 35.7 g/dL    RDW 16.5 (H) 12.3 - 15.4 %    RDW-SD 51.9 37.0 - 54.0 fl    MPV 11.5 6.0 - 12.0 fL    Platelets 33  (C) 140 - 450 10*3/mm3    Neutrophil % 57.1 42.7 - 76.0 %    Lymphocyte % 24.9 19.6 - 45.3 %    Monocyte % 13.7 (H) 5.0 - 12.0 %    Eosinophil % 2.9 0.3 - 6.2 %    Basophil % 1.1 0.0 - 1.5 %    Neutrophils, Absolute 2.13 1.70 - 7.00 10*3/mm3    Lymphocytes, Absolute 0.93 0.70 - 3.10 10*3/mm3    Monocytes, Absolute 0.51 0.10 - 0.90 10*3/mm3    Eosinophils, Absolute 0.11 0.00 - 0.40 10*3/mm3    Basophils, Absolute 0.04 0.00 - 0.20 10*3/mm3   Magnesium    Collection Time: 05/04/22  6:53 AM    Specimen: Blood   Result Value Ref Range    Magnesium 1.5 (L) 1.6 - 2.6 mg/dL       Ordered the above labs and independently reviewed the results.        RADIOLOGY  CT Abdomen Pelvis Without Contrast   Final Result      XR Chest 1 View   Final Result   Negative.       This report was finalized on 5/4/2022 7:20 AM by Dr. Ag Cheng M.D.              I ordered the above noted radiological studies. Reviewed by me and discussed with radiologist.  See dictation for official radiology interpretation.    PROCEDURES  Procedures        MEDICATIONS GIVEN IN ER  Medications   chlordiazePOXIDE (LIBRIUM) capsule 25 mg (25 mg Oral Given 5/4/22 0740)             PROGRESS AND CONSULTS    Differential diagnosis includes but is not limited to:  - hepatobiliary pathology such as cholecystitis, cholangitis, and symptomatic cholelithiasis  - Pancreatitis  - Dyspepsia  - Small bowel obstruction  - Appendicitis  - Diverticulitis  - UTI including pyelonephritis  - Ureteral stone  - Zoster  - Colitis, including infectious and ischemic  - Atypical ACS      ED Course as of 05/04/22 0929   Wed May 04, 2022   0632 Medical chart reviewed.  Patient admitted 9/21/2021 until 9/27/2021 with EtOH dependence with significantly drawl, noted to have a history of esophageal varices which have been banded and chronic pancreatitis and chronic thrombocytopenia/pancytopenia. [KA]   2645 My interpretation of the EKG performed at 6:51 AM is sinus rhythm rate 67 normal  axis, normal PA and QRS, prolonged QT, no ST elevation or acute ST depression.  No significant change from prior on 3/2/2018. [KA]   0802 Platelets(!!): 33  Chronic, stable [KA]   0802 Ethanol: <10 [KA]   0802 Troponin T: <0.010 [KA]   0802 Lipase: 23 [KA]   0832 I reassessed the patient, she is resting comfortably.  Vitals normal on the monitor.  Mild hypertension, not tachycardic.  She has no hallucinations or delusions or agitated behavior in the ER.  No indication that she is an active alcohol withdrawal.  We discussed all of her labs and imaging including all the incidental findings which are chronic or improved from priors.  She states she is already in touch with [facility] for [facility] and will call them today.  I suspect epigastric pain is due to some gastric irritation from her alcohol abuse and recommended cessation and represcribed her PPI, Protonix.  Will prescribe a short course of Librium.  She is stable for discharge and outpatient follow-up.  Counseled her not to take Librium and use alcohol.  If she is going to drink she needs to discontinue the Librium immediately. [KA]      ED Course User Index  [KA] Yanira Lee PA             Patient was placed in face mask in first look. Patient was wearing facemask each time I entered the room and throughout our encounter. I wore protective equipment throughout this patient encounter including a face mask, eye shield and gloves. Hand hygiene was performed before donning protective equipment and after removal when leaving the room.        DIAGNOSIS  Final diagnoses:   Alcohol abuse   Epigastric pain         Follow Up:  Tylor Davis  [address] [id number] [phone]    In 2 days        RX:     Medication List      New Prescriptions    chlordiazePOXIDE 25 MG capsule  Commonly known as: LIBRIUM  Take 1 capsule by mouth 3 (Three) Times a Day As Needed for Anxiety.        Changed    pantoprazole 40 MG EC tablet  Commonly  known as: PROTONIX  Take 1 tablet by mouth Daily.  What changed:   · how much to take  · how to take this  · when to take this           Where to Get Your Medications      These medications were sent to [facility] - [address] - [phone]  - [phone]   [address] [place] [address]    Phone: [phone]   · chlordiazePOXIDE 25 MG capsule  · pantoprazole 40 MG EC tablet           Latest Documented Vital Signs:  As of 09:29 EDT  BP- 137/80 HR- 74 Temp- 98.3 °F (36.8 °C) (Tympanic) O2 sat- 99%       Yanira Lee PA  05/04/22 0928       Yanira Lee PA  05/04/22 0929

## 2022-10-18 ENCOUNTER — HOSPITAL ENCOUNTER (EMERGENCY)
Facility: HOSPITAL | Age: 52
Discharge: LEFT BEFORE DISCHARGE | End: 2022-10-18

## 2022-10-18 VITALS
OXYGEN SATURATION: 98 % | SYSTOLIC BLOOD PRESSURE: 121 MMHG | DIASTOLIC BLOOD PRESSURE: 81 MMHG | TEMPERATURE: 98 F | HEART RATE: 76 BPM

## 2022-10-18 PROCEDURE — 99211 OFF/OP EST MAY X REQ PHY/QHP: CPT

## 2022-10-19 NOTE — ED NOTES
Pt arrives PV. Pt fell today, c/o extreme lethargy, dizziness, pain in the right arm, and midsternal CP. Denies hitting head. Denies blood thinners.     RN wearing appropriate PPE while in triage, patient in mask.

## 2022-10-24 ENCOUNTER — HOSPITAL ENCOUNTER (INPATIENT)
Facility: HOSPITAL | Age: 52
LOS: 3 days | Discharge: HOME | End: 2022-10-30
Attending: EMERGENCY MEDICINE | Admitting: STUDENT IN AN ORGANIZED HEALTH CARE EDUCATION/TRAINING PROGRAM

## 2022-10-24 DIAGNOSIS — Z87.19 HISTORY OF ESOPHAGEAL VARICES: ICD-10-CM

## 2022-10-24 DIAGNOSIS — K92.0 GASTROINTESTINAL HEMORRHAGE WITH HEMATEMESIS: ICD-10-CM

## 2022-10-24 DIAGNOSIS — D69.6 THROMBOCYTOPENIA: Primary | ICD-10-CM

## 2022-10-24 PROBLEM — K92.2 ACUTE GI BLEEDING: Status: ACTIVE | Noted: 2022-10-24

## 2022-10-24 PROBLEM — I10 PRIMARY HYPERTENSION: Status: ACTIVE | Noted: 2022-10-24

## 2022-10-24 LAB
ABO GROUP BLD: NORMAL
ALBUMIN SERPL-MCNC: 3.9 G/DL (ref 3.5–5.2)
ALBUMIN/GLOB SERPL: 1.4 G/DL
ALP SERPL-CCNC: 123 U/L (ref 39–117)
ALT SERPL W P-5'-P-CCNC: 38 U/L (ref 1–33)
ANION GAP SERPL CALCULATED.3IONS-SCNC: 14.4 MMOL/L (ref 5–15)
APTT PPP: 28.7 SECONDS (ref 22.7–35.4)
AST SERPL-CCNC: 47 U/L (ref 1–32)
BILIRUB SERPL-MCNC: 1.8 MG/DL (ref 0–1.2)
BLD GP AB SCN SERPL QL: NEGATIVE
BUN SERPL-MCNC: 13 MG/DL (ref 6–20)
BUN/CREAT SERPL: 18.6 (ref 7–25)
CALCIUM SPEC-SCNC: 8.9 MG/DL (ref 8.6–10.5)
CHLORIDE SERPL-SCNC: 99 MMOL/L (ref 98–107)
CO2 SERPL-SCNC: 27.6 MMOL/L (ref 22–29)
CREAT SERPL-MCNC: 0.7 MG/DL (ref 0.57–1)
DEPRECATED RDW RBC AUTO: 45 FL (ref 37–54)
EGFRCR SERPLBLD CKD-EPI 2021: 102.9 ML/MIN/1.73
EOSINOPHIL # BLD MANUAL: 0.02 10*3/MM3 (ref 0–0.4)
EOSINOPHIL NFR BLD MANUAL: 0.4 % (ref 0.3–6.2)
ERYTHROCYTE [DISTWIDTH] IN BLOOD BY AUTOMATED COUNT: 13.4 % (ref 12.3–15.4)
ETHANOL BLD-MCNC: <10 MG/DL (ref 0–10)
ETHANOL UR QL: <0.01 %
FERRITIN SERPL-MCNC: 61.6 NG/ML (ref 13–150)
GLOBULIN UR ELPH-MCNC: 2.8 GM/DL
GLUCOSE SERPL-MCNC: 106 MG/DL (ref 65–99)
HCT VFR BLD AUTO: 43.7 % (ref 34–46.6)
HCT VFR BLD AUTO: 43.9 % (ref 34–46.6)
HGB BLD-MCNC: 14.8 G/DL (ref 12–15.9)
HGB BLD-MCNC: 15.8 G/DL (ref 12–15.9)
INR PPP: 1.49 (ref 0.9–1.1)
IRON 24H UR-MRATE: 116 MCG/DL (ref 37–145)
IRON SATN MFR SERPL: 29 % (ref 20–50)
LIPASE SERPL-CCNC: 15 U/L (ref 13–60)
LYMPHOCYTES # BLD MANUAL: 0.92 10*3/MM3 (ref 0.7–3.1)
LYMPHOCYTES NFR BLD MANUAL: 8.5 % (ref 5–12)
MAGNESIUM SERPL-MCNC: 1.8 MG/DL (ref 1.6–2.6)
MCH RBC QN AUTO: 32.4 PG (ref 26.6–33)
MCHC RBC AUTO-ENTMCNC: 36 G/DL (ref 31.5–35.7)
MCV RBC AUTO: 90 FL (ref 79–97)
MONOCYTES # BLD: 0.46 10*3/MM3 (ref 0.1–0.9)
NEUTROPHILS # BLD AUTO: 4.06 10*3/MM3 (ref 1.7–7)
NEUTROPHILS NFR BLD MANUAL: 74.2 % (ref 42.7–76)
PLAT MORPH BLD: NORMAL
PLATELET # BLD AUTO: 38 10*3/MM3 (ref 140–450)
PMV BLD AUTO: 11.3 FL (ref 6–12)
POTASSIUM SERPL-SCNC: 3.8 MMOL/L (ref 3.5–5.2)
PROT SERPL-MCNC: 6.7 G/DL (ref 6–8.5)
PROTHROMBIN TIME: 18.2 SECONDS (ref 11.7–14.2)
RBC # BLD AUTO: 4.88 10*6/MM3 (ref 3.77–5.28)
RBC MORPH BLD: NORMAL
RH BLD: POSITIVE
SODIUM SERPL-SCNC: 141 MMOL/L (ref 136–145)
T&S EXPIRATION DATE: NORMAL
TIBC SERPL-MCNC: 393 MCG/DL (ref 298–536)
TRANSFERRIN SERPL-MCNC: 264 MG/DL (ref 200–360)
VARIANT LYMPHS NFR BLD MANUAL: 16.9 % (ref 19.6–45.3)
WBC MORPH BLD: NORMAL
WBC NRBC COR # BLD: 5.47 10*3/MM3 (ref 3.4–10.8)

## 2022-10-24 PROCEDURE — 86850 RBC ANTIBODY SCREEN: CPT | Performed by: PHYSICIAN ASSISTANT

## 2022-10-24 PROCEDURE — 82077 ASSAY SPEC XCP UR&BREATH IA: CPT | Performed by: PHYSICIAN ASSISTANT

## 2022-10-24 PROCEDURE — 85014 HEMATOCRIT: CPT | Performed by: STUDENT IN AN ORGANIZED HEALTH CARE EDUCATION/TRAINING PROGRAM

## 2022-10-24 PROCEDURE — 86901 BLOOD TYPING SEROLOGIC RH(D): CPT | Performed by: PHYSICIAN ASSISTANT

## 2022-10-24 PROCEDURE — 83540 ASSAY OF IRON: CPT | Performed by: STUDENT IN AN ORGANIZED HEALTH CARE EDUCATION/TRAINING PROGRAM

## 2022-10-24 PROCEDURE — G0378 HOSPITAL OBSERVATION PER HR: HCPCS

## 2022-10-24 PROCEDURE — 85018 HEMOGLOBIN: CPT | Performed by: STUDENT IN AN ORGANIZED HEALTH CARE EDUCATION/TRAINING PROGRAM

## 2022-10-24 PROCEDURE — 85730 THROMBOPLASTIN TIME PARTIAL: CPT | Performed by: PHYSICIAN ASSISTANT

## 2022-10-24 PROCEDURE — 25010000002 ONDANSETRON PER 1 MG: Performed by: PHYSICIAN ASSISTANT

## 2022-10-24 PROCEDURE — 85025 COMPLETE CBC W/AUTO DIFF WBC: CPT | Performed by: PHYSICIAN ASSISTANT

## 2022-10-24 PROCEDURE — 85610 PROTHROMBIN TIME: CPT | Performed by: PHYSICIAN ASSISTANT

## 2022-10-24 PROCEDURE — 99284 EMERGENCY DEPT VISIT MOD MDM: CPT

## 2022-10-24 PROCEDURE — 86900 BLOOD TYPING SEROLOGIC ABO: CPT | Performed by: PHYSICIAN ASSISTANT

## 2022-10-24 PROCEDURE — 80053 COMPREHEN METABOLIC PANEL: CPT | Performed by: PHYSICIAN ASSISTANT

## 2022-10-24 PROCEDURE — 84466 ASSAY OF TRANSFERRIN: CPT | Performed by: STUDENT IN AN ORGANIZED HEALTH CARE EDUCATION/TRAINING PROGRAM

## 2022-10-24 PROCEDURE — 85007 BL SMEAR W/DIFF WBC COUNT: CPT | Performed by: PHYSICIAN ASSISTANT

## 2022-10-24 PROCEDURE — 83690 ASSAY OF LIPASE: CPT | Performed by: PHYSICIAN ASSISTANT

## 2022-10-24 PROCEDURE — 25010000002 LORAZEPAM PER 2 MG: Performed by: STUDENT IN AN ORGANIZED HEALTH CARE EDUCATION/TRAINING PROGRAM

## 2022-10-24 PROCEDURE — 82728 ASSAY OF FERRITIN: CPT | Performed by: STUDENT IN AN ORGANIZED HEALTH CARE EDUCATION/TRAINING PROGRAM

## 2022-10-24 PROCEDURE — 83735 ASSAY OF MAGNESIUM: CPT | Performed by: PHYSICIAN ASSISTANT

## 2022-10-24 RX ORDER — NITROGLYCERIN 0.4 MG/1
0.4 TABLET SUBLINGUAL
Status: DISCONTINUED | OUTPATIENT
Start: 2022-10-24 | End: 2022-10-30 | Stop reason: HOSPADM

## 2022-10-24 RX ORDER — LORAZEPAM 2 MG/ML
1 INJECTION INTRAMUSCULAR
Status: DISCONTINUED | OUTPATIENT
Start: 2022-10-24 | End: 2022-10-30 | Stop reason: HOSPADM

## 2022-10-24 RX ORDER — RISPERIDONE 0.5 MG/1
0.5 TABLET ORAL NIGHTLY
COMMUNITY
End: 2023-01-16

## 2022-10-24 RX ORDER — PANTOPRAZOLE SODIUM 40 MG/10ML
80 INJECTION, POWDER, LYOPHILIZED, FOR SOLUTION INTRAVENOUS ONCE
Status: COMPLETED | OUTPATIENT
Start: 2022-10-24 | End: 2022-10-24

## 2022-10-24 RX ORDER — UREA 10 %
3 LOTION (ML) TOPICAL NIGHTLY PRN
Status: DISCONTINUED | OUTPATIENT
Start: 2022-10-24 | End: 2022-10-30 | Stop reason: HOSPADM

## 2022-10-24 RX ORDER — LORAZEPAM 2 MG/ML
2 INJECTION INTRAMUSCULAR
Status: DISCONTINUED | OUTPATIENT
Start: 2022-10-24 | End: 2022-10-30 | Stop reason: HOSPADM

## 2022-10-24 RX ORDER — SODIUM CHLORIDE 9 MG/ML
75 INJECTION, SOLUTION INTRAVENOUS CONTINUOUS
Status: DISCONTINUED | OUTPATIENT
Start: 2022-10-24 | End: 2022-10-28

## 2022-10-24 RX ORDER — ONDANSETRON 4 MG/1
4 TABLET, FILM COATED ORAL EVERY 6 HOURS PRN
Status: DISCONTINUED | OUTPATIENT
Start: 2022-10-24 | End: 2022-10-30 | Stop reason: HOSPADM

## 2022-10-24 RX ORDER — ONDANSETRON 2 MG/ML
4 INJECTION INTRAMUSCULAR; INTRAVENOUS EVERY 6 HOURS PRN
Status: DISCONTINUED | OUTPATIENT
Start: 2022-10-24 | End: 2022-10-30 | Stop reason: HOSPADM

## 2022-10-24 RX ORDER — THIAMINE HYDROCHLORIDE 100 MG/ML
100 INJECTION, SOLUTION INTRAMUSCULAR; INTRAVENOUS ONCE
Status: DISCONTINUED | OUTPATIENT
Start: 2022-10-24 | End: 2022-10-30 | Stop reason: HOSPADM

## 2022-10-24 RX ORDER — ACETAMINOPHEN 325 MG/1
650 TABLET ORAL EVERY 4 HOURS PRN
Status: DISCONTINUED | OUTPATIENT
Start: 2022-10-24 | End: 2022-10-30 | Stop reason: HOSPADM

## 2022-10-24 RX ORDER — FOLIC ACID 1 MG/1
1 TABLET ORAL DAILY
Status: COMPLETED | OUTPATIENT
Start: 2022-10-25 | End: 2022-10-27

## 2022-10-24 RX ORDER — DIPHENOXYLATE HYDROCHLORIDE AND ATROPINE SULFATE 2.5; .025 MG/1; MG/1
1 TABLET ORAL DAILY
Status: COMPLETED | OUTPATIENT
Start: 2022-10-25 | End: 2022-10-27

## 2022-10-24 RX ORDER — LORAZEPAM 1 MG/1
2 TABLET ORAL
Status: DISCONTINUED | OUTPATIENT
Start: 2022-10-24 | End: 2022-10-30 | Stop reason: HOSPADM

## 2022-10-24 RX ORDER — LEVOTHYROXINE SODIUM 0.1 MG/1
100 TABLET ORAL DAILY
Status: DISCONTINUED | OUTPATIENT
Start: 2022-10-24 | End: 2022-10-30 | Stop reason: HOSPADM

## 2022-10-24 RX ORDER — LORAZEPAM 1 MG/1
1 TABLET ORAL
Status: DISCONTINUED | OUTPATIENT
Start: 2022-10-24 | End: 2022-10-30 | Stop reason: HOSPADM

## 2022-10-24 RX ORDER — NICOTINE 21 MG/24HR
1 PATCH, TRANSDERMAL 24 HOURS TRANSDERMAL
Status: DISCONTINUED | OUTPATIENT
Start: 2022-10-24 | End: 2022-10-30 | Stop reason: HOSPADM

## 2022-10-24 RX ORDER — ONDANSETRON 2 MG/ML
4 INJECTION INTRAMUSCULAR; INTRAVENOUS ONCE
Status: COMPLETED | OUTPATIENT
Start: 2022-10-24 | End: 2022-10-24

## 2022-10-24 RX ORDER — FLUOXETINE HYDROCHLORIDE 20 MG/1
40 CAPSULE ORAL DAILY
Status: DISCONTINUED | OUTPATIENT
Start: 2022-10-24 | End: 2022-10-30 | Stop reason: HOSPADM

## 2022-10-24 RX ADMIN — ACETAMINOPHEN 650 MG: 325 TABLET, FILM COATED ORAL at 12:39

## 2022-10-24 RX ADMIN — SODIUM CHLORIDE 150 ML/HR: 9 INJECTION, SOLUTION INTRAVENOUS at 23:33

## 2022-10-24 RX ADMIN — PANTOPRAZOLE SODIUM 8 MG/HR: 40 INJECTION, POWDER, FOR SOLUTION INTRAVENOUS at 11:32

## 2022-10-24 RX ADMIN — SODIUM CHLORIDE 150 ML/HR: 9 INJECTION, SOLUTION INTRAVENOUS at 18:16

## 2022-10-24 RX ADMIN — Medication 1 PATCH: at 21:43

## 2022-10-24 RX ADMIN — PANTOPRAZOLE SODIUM 8 MG/HR: 40 INJECTION, POWDER, FOR SOLUTION INTRAVENOUS at 21:49

## 2022-10-24 RX ADMIN — SODIUM CHLORIDE 150 ML/HR: 9 INJECTION, SOLUTION INTRAVENOUS at 12:39

## 2022-10-24 RX ADMIN — SODIUM CHLORIDE 1000 ML: 9 INJECTION, SOLUTION INTRAVENOUS at 09:38

## 2022-10-24 RX ADMIN — LORAZEPAM 2 MG: 2 INJECTION INTRAMUSCULAR; INTRAVENOUS at 17:40

## 2022-10-24 RX ADMIN — PANTOPRAZOLE SODIUM 80 MG: 40 INJECTION, POWDER, FOR SOLUTION INTRAVENOUS at 11:29

## 2022-10-24 RX ADMIN — ONDANSETRON 4 MG: 2 INJECTION INTRAMUSCULAR; INTRAVENOUS at 09:39

## 2022-10-24 RX ADMIN — PANTOPRAZOLE SODIUM 8 MG/HR: 40 INJECTION, POWDER, FOR SOLUTION INTRAVENOUS at 17:41

## 2022-10-25 ENCOUNTER — PREP FOR SURGERY (OUTPATIENT)
Dept: OTHER | Facility: HOSPITAL | Age: 52
End: 2022-10-25

## 2022-10-25 DIAGNOSIS — K92.1 MELENA: Primary | ICD-10-CM

## 2022-10-25 LAB
ALBUMIN SERPL-MCNC: 3 G/DL (ref 3.5–5.2)
ALBUMIN/GLOB SERPL: 1.2 G/DL
ALP SERPL-CCNC: 96 U/L (ref 39–117)
ALT SERPL W P-5'-P-CCNC: 25 U/L (ref 1–33)
ANION GAP SERPL CALCULATED.3IONS-SCNC: 7.7 MMOL/L (ref 5–15)
AST SERPL-CCNC: 32 U/L (ref 1–32)
BASOPHILS # BLD AUTO: 0 10*3/MM3 (ref 0–0.2)
BASOPHILS NFR BLD AUTO: 0 % (ref 0–1.5)
BILIRUB SERPL-MCNC: 1.7 MG/DL (ref 0–1.2)
BUN SERPL-MCNC: 10 MG/DL (ref 6–20)
BUN/CREAT SERPL: 15.6 (ref 7–25)
CALCIUM SPEC-SCNC: 8.2 MG/DL (ref 8.6–10.5)
CHLORIDE SERPL-SCNC: 106 MMOL/L (ref 98–107)
CO2 SERPL-SCNC: 25.3 MMOL/L (ref 22–29)
CREAT SERPL-MCNC: 0.64 MG/DL (ref 0.57–1)
DEPRECATED RDW RBC AUTO: 47.7 FL (ref 37–54)
EGFRCR SERPLBLD CKD-EPI 2021: 105.2 ML/MIN/1.73
EOSINOPHIL # BLD AUTO: 0.13 10*3/MM3 (ref 0–0.4)
EOSINOPHIL NFR BLD AUTO: 3.2 % (ref 0.3–6.2)
ERYTHROCYTE [DISTWIDTH] IN BLOOD BY AUTOMATED COUNT: 14 % (ref 12.3–15.4)
GLOBULIN UR ELPH-MCNC: 2.6 GM/DL
GLUCOSE SERPL-MCNC: 86 MG/DL (ref 65–99)
HCT VFR BLD AUTO: 38.7 % (ref 34–46.6)
HCT VFR BLD AUTO: 39.3 % (ref 34–46.6)
HCT VFR BLD AUTO: 40.3 % (ref 34–46.6)
HGB BLD-MCNC: 13.7 G/DL (ref 12–15.9)
HGB BLD-MCNC: 14 G/DL (ref 12–15.9)
HGB BLD-MCNC: 14.3 G/DL (ref 12–15.9)
LYMPHOCYTES # BLD AUTO: 1.03 10*3/MM3 (ref 0.7–3.1)
LYMPHOCYTES NFR BLD AUTO: 25.1 % (ref 19.6–45.3)
MCH RBC QN AUTO: 32.6 PG (ref 26.6–33)
MCHC RBC AUTO-ENTMCNC: 34.7 G/DL (ref 31.5–35.7)
MCV RBC AUTO: 93.7 FL (ref 79–97)
MONOCYTES # BLD AUTO: 0.43 10*3/MM3 (ref 0.1–0.9)
MONOCYTES NFR BLD AUTO: 10.5 % (ref 5–12)
NEUTROPHILS NFR BLD AUTO: 2.49 10*3/MM3 (ref 1.7–7)
NEUTROPHILS NFR BLD AUTO: 60.5 % (ref 42.7–76)
PLATELET # BLD AUTO: 35 10*3/MM3 (ref 140–450)
PMV BLD AUTO: 11.1 FL (ref 6–12)
POTASSIUM SERPL-SCNC: 4.2 MMOL/L (ref 3.5–5.2)
PROT SERPL-MCNC: 5.6 G/DL (ref 6–8.5)
RBC # BLD AUTO: 4.3 10*6/MM3 (ref 3.77–5.28)
SODIUM SERPL-SCNC: 139 MMOL/L (ref 136–145)
TSH SERPL DL<=0.05 MIU/L-ACNC: 1.13 UIU/ML (ref 0.27–4.2)
WBC NRBC COR # BLD: 4.11 10*3/MM3 (ref 3.4–10.8)

## 2022-10-25 PROCEDURE — 85014 HEMATOCRIT: CPT | Performed by: STUDENT IN AN ORGANIZED HEALTH CARE EDUCATION/TRAINING PROGRAM

## 2022-10-25 PROCEDURE — 25010000002 LORAZEPAM PER 2 MG: Performed by: STUDENT IN AN ORGANIZED HEALTH CARE EDUCATION/TRAINING PROGRAM

## 2022-10-25 PROCEDURE — 85018 HEMOGLOBIN: CPT | Performed by: STUDENT IN AN ORGANIZED HEALTH CARE EDUCATION/TRAINING PROGRAM

## 2022-10-25 PROCEDURE — 80050 GENERAL HEALTH PANEL: CPT | Performed by: NURSE PRACTITIONER

## 2022-10-25 PROCEDURE — G0378 HOSPITAL OBSERVATION PER HR: HCPCS

## 2022-10-25 PROCEDURE — 99214 OFFICE O/P EST MOD 30 MIN: CPT | Performed by: INTERNAL MEDICINE

## 2022-10-25 PROCEDURE — 36415 COLL VENOUS BLD VENIPUNCTURE: CPT | Performed by: STUDENT IN AN ORGANIZED HEALTH CARE EDUCATION/TRAINING PROGRAM

## 2022-10-25 PROCEDURE — 90791 PSYCH DIAGNOSTIC EVALUATION: CPT

## 2022-10-25 RX ADMIN — LORAZEPAM 2 MG: 1 TABLET ORAL at 09:24

## 2022-10-25 RX ADMIN — Medication 1 TABLET: at 09:24

## 2022-10-25 RX ADMIN — PANTOPRAZOLE SODIUM 8 MG/HR: 40 INJECTION, POWDER, FOR SOLUTION INTRAVENOUS at 06:50

## 2022-10-25 RX ADMIN — LORAZEPAM 1 MG: 1 TABLET ORAL at 16:58

## 2022-10-25 RX ADMIN — SODIUM CHLORIDE 75 ML/HR: 9 INJECTION, SOLUTION INTRAVENOUS at 22:27

## 2022-10-25 RX ADMIN — LORAZEPAM 2 MG: 1 TABLET ORAL at 21:46

## 2022-10-25 RX ADMIN — LEVOTHYROXINE SODIUM 100 MCG: 0.1 TABLET ORAL at 09:24

## 2022-10-25 RX ADMIN — FLUOXETINE HYDROCHLORIDE 40 MG: 20 CAPSULE ORAL at 09:24

## 2022-10-25 RX ADMIN — SODIUM CHLORIDE 150 ML/HR: 9 INJECTION, SOLUTION INTRAVENOUS at 06:51

## 2022-10-25 RX ADMIN — PANTOPRAZOLE SODIUM 8 MG/HR: 40 INJECTION, POWDER, FOR SOLUTION INTRAVENOUS at 02:25

## 2022-10-25 RX ADMIN — FOLIC ACID 1 MG: 1 TABLET ORAL at 09:24

## 2022-10-25 RX ADMIN — PANTOPRAZOLE SODIUM 8 MG/HR: 40 INJECTION, POWDER, FOR SOLUTION INTRAVENOUS at 16:58

## 2022-10-25 RX ADMIN — LORAZEPAM 2 MG: 2 INJECTION INTRAMUSCULAR; INTRAVENOUS at 22:35

## 2022-10-25 RX ADMIN — PANTOPRAZOLE SODIUM 8 MG/HR: 40 INJECTION, POWDER, FOR SOLUTION INTRAVENOUS at 22:27

## 2022-10-25 RX ADMIN — SODIUM CHLORIDE 150 ML/HR: 9 INJECTION, SOLUTION INTRAVENOUS at 13:16

## 2022-10-25 RX ADMIN — PANTOPRAZOLE SODIUM 8 MG/HR: 40 INJECTION, POWDER, FOR SOLUTION INTRAVENOUS at 11:41

## 2022-10-25 RX ADMIN — Medication 1 PATCH: at 16:58

## 2022-10-25 RX ADMIN — Medication 100 MG: at 09:25

## 2022-10-26 ENCOUNTER — ANESTHESIA (OUTPATIENT)
Dept: GASTROENTEROLOGY | Facility: HOSPITAL | Age: 52
End: 2022-10-26

## 2022-10-26 ENCOUNTER — ANESTHESIA EVENT (OUTPATIENT)
Dept: GASTROENTEROLOGY | Facility: HOSPITAL | Age: 52
End: 2022-10-26

## 2022-10-26 PROBLEM — K92.1 MELENA: Status: ACTIVE | Noted: 2022-10-24

## 2022-10-26 LAB
BASOPHILS # BLD AUTO: 0.01 10*3/MM3 (ref 0–0.2)
BASOPHILS NFR BLD AUTO: 0.2 % (ref 0–1.5)
DEPRECATED RDW RBC AUTO: 48.4 FL (ref 37–54)
EOSINOPHIL # BLD AUTO: 0.17 10*3/MM3 (ref 0–0.4)
EOSINOPHIL NFR BLD AUTO: 3.6 % (ref 0.3–6.2)
ERYTHROCYTE [DISTWIDTH] IN BLOOD BY AUTOMATED COUNT: 14.1 % (ref 12.3–15.4)
GLUCOSE BLDC GLUCOMTR-MCNC: 87 MG/DL (ref 70–130)
HCT VFR BLD AUTO: 40.6 % (ref 34–46.6)
HCT VFR BLD AUTO: 42.6 % (ref 34–46.6)
HCT VFR BLD AUTO: 43.5 % (ref 34–46.6)
HGB BLD-MCNC: 14.5 G/DL (ref 12–15.9)
HGB BLD-MCNC: 14.8 G/DL (ref 12–15.9)
HGB BLD-MCNC: 15 G/DL (ref 12–15.9)
LYMPHOCYTES # BLD AUTO: 0.98 10*3/MM3 (ref 0.7–3.1)
LYMPHOCYTES NFR BLD AUTO: 20.7 % (ref 19.6–45.3)
MCH RBC QN AUTO: 31.8 PG (ref 26.6–33)
MCHC RBC AUTO-ENTMCNC: 33.9 G/DL (ref 31.5–35.7)
MCV RBC AUTO: 93.7 FL (ref 79–97)
MONOCYTES # BLD AUTO: 0.48 10*3/MM3 (ref 0.1–0.9)
MONOCYTES NFR BLD AUTO: 10.1 % (ref 5–12)
NEUTROPHILS NFR BLD AUTO: 3.07 10*3/MM3 (ref 1.7–7)
NEUTROPHILS NFR BLD AUTO: 65 % (ref 42.7–76)
PLATELET # BLD AUTO: 34 10*3/MM3 (ref 140–450)
PMV BLD AUTO: 11.5 FL (ref 6–12)
RBC # BLD AUTO: 4.62 10*6/MM3 (ref 3.77–5.28)
WBC NRBC COR # BLD: 4.73 10*3/MM3 (ref 3.4–10.8)

## 2022-10-26 PROCEDURE — 82962 GLUCOSE BLOOD TEST: CPT

## 2022-10-26 PROCEDURE — G0378 HOSPITAL OBSERVATION PER HR: HCPCS

## 2022-10-26 PROCEDURE — 25010000002 LORAZEPAM PER 2 MG: Performed by: STUDENT IN AN ORGANIZED HEALTH CARE EDUCATION/TRAINING PROGRAM

## 2022-10-26 PROCEDURE — 36415 COLL VENOUS BLD VENIPUNCTURE: CPT | Performed by: STUDENT IN AN ORGANIZED HEALTH CARE EDUCATION/TRAINING PROGRAM

## 2022-10-26 PROCEDURE — 85025 COMPLETE CBC W/AUTO DIFF WBC: CPT | Performed by: STUDENT IN AN ORGANIZED HEALTH CARE EDUCATION/TRAINING PROGRAM

## 2022-10-26 PROCEDURE — 25010000002 ONDANSETRON PER 1 MG: Performed by: STUDENT IN AN ORGANIZED HEALTH CARE EDUCATION/TRAINING PROGRAM

## 2022-10-26 PROCEDURE — 25010000002 CEFTRIAXONE PER 250 MG: Performed by: INTERNAL MEDICINE

## 2022-10-26 PROCEDURE — 25010000002 OCTREOTIDE PER 25 MCG: Performed by: INTERNAL MEDICINE

## 2022-10-26 PROCEDURE — 43244 EGD VARICES LIGATION: CPT | Performed by: INTERNAL MEDICINE

## 2022-10-26 PROCEDURE — 25010000002 PROPOFOL 10 MG/ML EMULSION: Performed by: STUDENT IN AN ORGANIZED HEALTH CARE EDUCATION/TRAINING PROGRAM

## 2022-10-26 PROCEDURE — 85018 HEMOGLOBIN: CPT | Performed by: STUDENT IN AN ORGANIZED HEALTH CARE EDUCATION/TRAINING PROGRAM

## 2022-10-26 PROCEDURE — 06L38CZ OCCLUSION OF ESOPHAGEAL VEIN WITH EXTRALUMINAL DEVICE, VIA NATURAL OR ARTIFICIAL OPENING ENDOSCOPIC: ICD-10-PCS | Performed by: INTERNAL MEDICINE

## 2022-10-26 PROCEDURE — 85014 HEMATOCRIT: CPT | Performed by: STUDENT IN AN ORGANIZED HEALTH CARE EDUCATION/TRAINING PROGRAM

## 2022-10-26 RX ORDER — SODIUM CHLORIDE, SODIUM LACTATE, POTASSIUM CHLORIDE, CALCIUM CHLORIDE 600; 310; 30; 20 MG/100ML; MG/100ML; MG/100ML; MG/100ML
INJECTION, SOLUTION INTRAVENOUS CONTINUOUS PRN
Status: DISCONTINUED | OUTPATIENT
Start: 2022-10-26 | End: 2022-10-26 | Stop reason: SURG

## 2022-10-26 RX ORDER — SODIUM CHLORIDE 9 MG/ML
30 INJECTION, SOLUTION INTRAVENOUS CONTINUOUS PRN
Status: DISCONTINUED | OUTPATIENT
Start: 2022-10-26 | End: 2022-10-30 | Stop reason: HOSPADM

## 2022-10-26 RX ORDER — PROPOFOL 10 MG/ML
VIAL (ML) INTRAVENOUS AS NEEDED
Status: DISCONTINUED | OUTPATIENT
Start: 2022-10-26 | End: 2022-10-26 | Stop reason: SURG

## 2022-10-26 RX ORDER — OCTREOTIDE ACETATE 50 UG/ML
50 INJECTION, SOLUTION INTRAVENOUS; SUBCUTANEOUS ONCE
Status: DISCONTINUED | OUTPATIENT
Start: 2022-10-26 | End: 2022-10-29

## 2022-10-26 RX ORDER — LIDOCAINE HYDROCHLORIDE 20 MG/ML
INJECTION, SOLUTION INFILTRATION; PERINEURAL AS NEEDED
Status: DISCONTINUED | OUTPATIENT
Start: 2022-10-26 | End: 2022-10-26 | Stop reason: SURG

## 2022-10-26 RX ADMIN — PANTOPRAZOLE SODIUM 8 MG/HR: 40 INJECTION, POWDER, FOR SOLUTION INTRAVENOUS at 03:15

## 2022-10-26 RX ADMIN — PROPOFOL 200 MCG/KG/MIN: 10 INJECTION, EMULSION INTRAVENOUS at 12:26

## 2022-10-26 RX ADMIN — PROPOFOL 100 MG: 10 INJECTION, EMULSION INTRAVENOUS at 12:25

## 2022-10-26 RX ADMIN — OCTREOTIDE ACETATE 50 MCG/HR: 500 INJECTION, SOLUTION INTRAVENOUS; SUBCUTANEOUS at 20:02

## 2022-10-26 RX ADMIN — LORAZEPAM 2 MG: 2 INJECTION INTRAMUSCULAR; INTRAVENOUS at 17:54

## 2022-10-26 RX ADMIN — LEVOTHYROXINE SODIUM 100 MCG: 0.1 TABLET ORAL at 08:35

## 2022-10-26 RX ADMIN — Medication 100 MG: at 08:35

## 2022-10-26 RX ADMIN — SODIUM CHLORIDE 75 ML/HR: 9 INJECTION, SOLUTION INTRAVENOUS at 19:54

## 2022-10-26 RX ADMIN — PANTOPRAZOLE SODIUM 8 MG/HR: 40 INJECTION, POWDER, FOR SOLUTION INTRAVENOUS at 08:35

## 2022-10-26 RX ADMIN — CEFTRIAXONE SODIUM 1 G: 1 INJECTION, POWDER, FOR SOLUTION INTRAMUSCULAR; INTRAVENOUS at 16:13

## 2022-10-26 RX ADMIN — PANTOPRAZOLE SODIUM 8 MG/HR: 40 INJECTION, POWDER, FOR SOLUTION INTRAVENOUS at 20:09

## 2022-10-26 RX ADMIN — SODIUM CHLORIDE, POTASSIUM CHLORIDE, SODIUM LACTATE AND CALCIUM CHLORIDE: 600; 310; 30; 20 INJECTION, SOLUTION INTRAVENOUS at 12:10

## 2022-10-26 RX ADMIN — FLUOXETINE HYDROCHLORIDE 40 MG: 20 CAPSULE ORAL at 08:35

## 2022-10-26 RX ADMIN — PANTOPRAZOLE SODIUM 8 MG/HR: 40 INJECTION, POWDER, FOR SOLUTION INTRAVENOUS at 23:53

## 2022-10-26 RX ADMIN — LIDOCAINE HYDROCHLORIDE 60 MG: 20 INJECTION, SOLUTION INFILTRATION; PERINEURAL at 12:25

## 2022-10-26 RX ADMIN — LORAZEPAM 2 MG: 1 TABLET ORAL at 08:35

## 2022-10-26 RX ADMIN — ONDANSETRON 4 MG: 2 INJECTION INTRAMUSCULAR; INTRAVENOUS at 17:49

## 2022-10-26 RX ADMIN — SODIUM CHLORIDE 30 ML/HR: 9 INJECTION, SOLUTION INTRAVENOUS at 10:48

## 2022-10-26 RX ADMIN — Medication 1 PATCH: at 16:13

## 2022-10-26 RX ADMIN — Medication 1 TABLET: at 08:35

## 2022-10-26 RX ADMIN — FOLIC ACID 1 MG: 1 TABLET ORAL at 08:35

## 2022-10-26 RX ADMIN — LORAZEPAM 2 MG: 2 INJECTION INTRAMUSCULAR; INTRAVENOUS at 19:53

## 2022-10-26 NOTE — ANESTHESIA POSTPROCEDURE EVALUATION
Patient: Bekah Gibson    Procedure Summary     Date: 10/26/22 Room / Location:  [facility]    Anesthesia Start: 1206 Anesthesia Stop: 1245    Procedure: ESOPHAGOGASTRODUODENOSCOPY wtih banding (Esophagus) Diagnosis:       Melena      (Melena [K92.1])    Surgeons: Ag Patel MD Provider: Felix Major MD    Anesthesia Type: MAC ASA Status: 3          Anesthesia Type: MAC    Vitals  Vitals Value Taken Time   /110 10/26/22 1304   Temp     Pulse 77 10/26/22 1304   Resp 20 10/26/22 1256   SpO2 97 % 10/26/22 1304   Vitals shown include unvalidated device data.        Post Anesthesia Care and Evaluation    Patient location during evaluation: bedside  Patient participation: complete - patient participated  Level of consciousness: awake and alert  Pain management: adequate    Airway patency: patent  Anesthetic complications: No anesthetic complications  PONV Status: controlled  Cardiovascular status: blood pressure returned to baseline and acceptable  Respiratory status: acceptable  Hydration status: acceptable

## 2022-10-26 NOTE — ANESTHESIA PREPROCEDURE EVALUATION
Anesthesia Evaluation     Patient summary reviewed and Nursing notes reviewed   no history of anesthetic complications:  NPO Solid Status: > 8 hours  NPO Liquid Status: > 2 hours           Airway   Mallampati: II  TM distance: >3 FB  Neck ROM: full  Dental      Pulmonary    Cardiovascular     ECG reviewed    (+) hypertension,       Neuro/Psych  GI/Hepatic/Renal/Endo    (+)  GI bleeding , liver disease cirrhosis, thyroid problem hypothyroidism    Musculoskeletal     Abdominal    Substance History   (+) alcohol use,      OB/GYN          Other   autoimmune disease lupus, blood dyscrasia thrombocytopenia,                     Anesthesia Plan    ASA 3     MAC     intravenous induction     Anesthetic plan, risks, benefits, and alternatives have been provided, discussed and informed consent has been obtained with: patient.        CODE STATUS:    Level Of Support Discussed With: Patient  Code Status (Patient has no pulse and is not breathing): CPR (Attempt to Resuscitate)  Medical Interventions (Patient has pulse or is breathing): Full

## 2022-10-27 ENCOUNTER — APPOINTMENT (OUTPATIENT)
Dept: GENERAL RADIOLOGY | Facility: HOSPITAL | Age: 52
End: 2022-10-27

## 2022-10-27 LAB
ALBUMIN SERPL-MCNC: 3.5 G/DL (ref 3.5–5.2)
ALBUMIN/GLOB SERPL: 1.3 G/DL
ALP SERPL-CCNC: 113 U/L (ref 39–117)
ALT SERPL W P-5'-P-CCNC: 24 U/L (ref 1–33)
ANION GAP SERPL CALCULATED.3IONS-SCNC: 13.1 MMOL/L (ref 5–15)
AST SERPL-CCNC: 33 U/L (ref 1–32)
BASOPHILS # BLD AUTO: 0.01 10*3/MM3 (ref 0–0.2)
BASOPHILS NFR BLD AUTO: 0.2 % (ref 0–1.5)
BILIRUB SERPL-MCNC: 1.7 MG/DL (ref 0–1.2)
BUN SERPL-MCNC: 9 MG/DL (ref 6–20)
BUN/CREAT SERPL: 14.8 (ref 7–25)
CALCIUM SPEC-SCNC: 8.6 MG/DL (ref 8.6–10.5)
CHLORIDE SERPL-SCNC: 99 MMOL/L (ref 98–107)
CO2 SERPL-SCNC: 26.9 MMOL/L (ref 22–29)
CREAT SERPL-MCNC: 0.61 MG/DL (ref 0.57–1)
DEPRECATED RDW RBC AUTO: 47.6 FL (ref 37–54)
EGFRCR SERPLBLD CKD-EPI 2021: 106.4 ML/MIN/1.73
EOSINOPHIL # BLD AUTO: 0.16 10*3/MM3 (ref 0–0.4)
EOSINOPHIL NFR BLD AUTO: 3.4 % (ref 0.3–6.2)
ERYTHROCYTE [DISTWIDTH] IN BLOOD BY AUTOMATED COUNT: 13.9 % (ref 12.3–15.4)
GLOBULIN UR ELPH-MCNC: 2.8 GM/DL
GLUCOSE SERPL-MCNC: 157 MG/DL (ref 65–99)
HCT VFR BLD AUTO: 44.8 % (ref 34–46.6)
HCT VFR BLD AUTO: 46 % (ref 34–46.6)
HGB BLD-MCNC: 15.6 G/DL (ref 12–15.9)
HGB BLD-MCNC: 16 G/DL (ref 12–15.9)
LYMPHOCYTES # BLD AUTO: 0.96 10*3/MM3 (ref 0.7–3.1)
LYMPHOCYTES NFR BLD AUTO: 20.3 % (ref 19.6–45.3)
MCH RBC QN AUTO: 31.9 PG (ref 26.6–33)
MCHC RBC AUTO-ENTMCNC: 33.9 G/DL (ref 31.5–35.7)
MCV RBC AUTO: 94.1 FL (ref 79–97)
MONOCYTES # BLD AUTO: 0.65 10*3/MM3 (ref 0.1–0.9)
MONOCYTES NFR BLD AUTO: 13.7 % (ref 5–12)
NEUTROPHILS NFR BLD AUTO: 2.91 10*3/MM3 (ref 1.7–7)
NEUTROPHILS NFR BLD AUTO: 61.6 % (ref 42.7–76)
PLATELET # BLD AUTO: 34 10*3/MM3 (ref 140–450)
PMV BLD AUTO: 12.4 FL (ref 6–12)
POTASSIUM SERPL-SCNC: 4.1 MMOL/L (ref 3.5–5.2)
PROT SERPL-MCNC: 6.3 G/DL (ref 6–8.5)
RBC # BLD AUTO: 4.89 10*6/MM3 (ref 3.77–5.28)
SODIUM SERPL-SCNC: 139 MMOL/L (ref 136–145)
WBC NRBC COR # BLD: 4.73 10*3/MM3 (ref 3.4–10.8)

## 2022-10-27 PROCEDURE — 85014 HEMATOCRIT: CPT | Performed by: STUDENT IN AN ORGANIZED HEALTH CARE EDUCATION/TRAINING PROGRAM

## 2022-10-27 PROCEDURE — 25010000002 CEFTRIAXONE PER 250 MG: Performed by: INTERNAL MEDICINE

## 2022-10-27 PROCEDURE — 85025 COMPLETE CBC W/AUTO DIFF WBC: CPT | Performed by: STUDENT IN AN ORGANIZED HEALTH CARE EDUCATION/TRAINING PROGRAM

## 2022-10-27 PROCEDURE — 85018 HEMOGLOBIN: CPT | Performed by: STUDENT IN AN ORGANIZED HEALTH CARE EDUCATION/TRAINING PROGRAM

## 2022-10-27 PROCEDURE — 99232 SBSQ HOSP IP/OBS MODERATE 35: CPT | Performed by: NURSE PRACTITIONER

## 2022-10-27 PROCEDURE — 80053 COMPREHEN METABOLIC PANEL: CPT | Performed by: NURSE PRACTITIONER

## 2022-10-27 PROCEDURE — 25010000002 LORAZEPAM PER 2 MG: Performed by: STUDENT IN AN ORGANIZED HEALTH CARE EDUCATION/TRAINING PROGRAM

## 2022-10-27 PROCEDURE — 71045 X-RAY EXAM CHEST 1 VIEW: CPT

## 2022-10-27 PROCEDURE — 25010000002 OCTREOTIDE PER 25 MCG: Performed by: INTERNAL MEDICINE

## 2022-10-27 RX ADMIN — LORAZEPAM 2 MG: 2 INJECTION INTRAMUSCULAR; INTRAVENOUS at 15:24

## 2022-10-27 RX ADMIN — FLUOXETINE HYDROCHLORIDE 40 MG: 20 CAPSULE ORAL at 08:23

## 2022-10-27 RX ADMIN — PANTOPRAZOLE SODIUM 8 MG/HR: 40 INJECTION, POWDER, FOR SOLUTION INTRAVENOUS at 03:14

## 2022-10-27 RX ADMIN — LORAZEPAM 2 MG: 2 INJECTION INTRAMUSCULAR; INTRAVENOUS at 18:01

## 2022-10-27 RX ADMIN — PANTOPRAZOLE SODIUM 8 MG/HR: 40 INJECTION, POWDER, FOR SOLUTION INTRAVENOUS at 16:42

## 2022-10-27 RX ADMIN — CEFTRIAXONE SODIUM 1 G: 1 INJECTION, POWDER, FOR SOLUTION INTRAMUSCULAR; INTRAVENOUS at 14:07

## 2022-10-27 RX ADMIN — OCTREOTIDE ACETATE 50 MCG/HR: 500 INJECTION, SOLUTION INTRAVENOUS; SUBCUTANEOUS at 20:34

## 2022-10-27 RX ADMIN — LORAZEPAM 2 MG: 1 TABLET ORAL at 20:34

## 2022-10-27 RX ADMIN — LORAZEPAM 2 MG: 2 INJECTION INTRAMUSCULAR; INTRAVENOUS at 03:14

## 2022-10-27 RX ADMIN — PANTOPRAZOLE SODIUM 8 MG/HR: 40 INJECTION, POWDER, FOR SOLUTION INTRAVENOUS at 08:23

## 2022-10-27 RX ADMIN — SODIUM CHLORIDE 75 ML/HR: 9 INJECTION, SOLUTION INTRAVENOUS at 16:41

## 2022-10-27 RX ADMIN — FOLIC ACID 1 MG: 1 TABLET ORAL at 08:23

## 2022-10-27 RX ADMIN — Medication 1 TABLET: at 08:23

## 2022-10-27 RX ADMIN — LORAZEPAM 2 MG: 2 INJECTION INTRAMUSCULAR; INTRAVENOUS at 00:09

## 2022-10-27 RX ADMIN — LEVOTHYROXINE SODIUM 100 MCG: 0.1 TABLET ORAL at 08:23

## 2022-10-27 RX ADMIN — Medication 1 PATCH: at 16:44

## 2022-10-27 RX ADMIN — Medication 100 MG: at 08:23

## 2022-10-28 ENCOUNTER — TELEPHONE (OUTPATIENT)
Dept: GASTROENTEROLOGY | Facility: CLINIC | Age: 52
End: 2022-10-28

## 2022-10-28 LAB
ALBUMIN SERPL-MCNC: 3.4 G/DL (ref 3.5–5.2)
ALBUMIN/GLOB SERPL: 1.3 G/DL
ALP SERPL-CCNC: 115 U/L (ref 39–117)
ALT SERPL W P-5'-P-CCNC: 23 U/L (ref 1–33)
ANION GAP SERPL CALCULATED.3IONS-SCNC: 9.8 MMOL/L (ref 5–15)
AST SERPL-CCNC: 32 U/L (ref 1–32)
BASOPHILS # BLD AUTO: 0.01 10*3/MM3 (ref 0–0.2)
BASOPHILS NFR BLD AUTO: 0.2 % (ref 0–1.5)
BILIRUB SERPL-MCNC: 1.6 MG/DL (ref 0–1.2)
BUN SERPL-MCNC: 7 MG/DL (ref 6–20)
BUN/CREAT SERPL: 11.9 (ref 7–25)
CALCIUM SPEC-SCNC: 8.9 MG/DL (ref 8.6–10.5)
CHLORIDE SERPL-SCNC: 103 MMOL/L (ref 98–107)
CO2 SERPL-SCNC: 23.2 MMOL/L (ref 22–29)
CREAT SERPL-MCNC: 0.59 MG/DL (ref 0.57–1)
DEPRECATED RDW RBC AUTO: 47.5 FL (ref 37–54)
EGFRCR SERPLBLD CKD-EPI 2021: 107.3 ML/MIN/1.73
EOSINOPHIL # BLD AUTO: 0.21 10*3/MM3 (ref 0–0.4)
EOSINOPHIL NFR BLD AUTO: 4 % (ref 0.3–6.2)
ERYTHROCYTE [DISTWIDTH] IN BLOOD BY AUTOMATED COUNT: 13.9 % (ref 12.3–15.4)
GLOBULIN UR ELPH-MCNC: 2.6 GM/DL
GLUCOSE SERPL-MCNC: 124 MG/DL (ref 65–99)
HCT VFR BLD AUTO: 43.9 % (ref 34–46.6)
HGB BLD-MCNC: 15 G/DL (ref 12–15.9)
LYMPHOCYTES # BLD AUTO: 1.06 10*3/MM3 (ref 0.7–3.1)
LYMPHOCYTES NFR BLD AUTO: 20.2 % (ref 19.6–45.3)
MCH RBC QN AUTO: 32.1 PG (ref 26.6–33)
MCHC RBC AUTO-ENTMCNC: 34.2 G/DL (ref 31.5–35.7)
MCV RBC AUTO: 93.8 FL (ref 79–97)
MONOCYTES # BLD AUTO: 0.78 10*3/MM3 (ref 0.1–0.9)
MONOCYTES NFR BLD AUTO: 14.9 % (ref 5–12)
NEUTROPHILS NFR BLD AUTO: 3.15 10*3/MM3 (ref 1.7–7)
NEUTROPHILS NFR BLD AUTO: 60.1 % (ref 42.7–76)
PLATELET # BLD AUTO: 32 10*3/MM3 (ref 140–450)
PMV BLD AUTO: 11.3 FL (ref 6–12)
POTASSIUM SERPL-SCNC: 3.6 MMOL/L (ref 3.5–5.2)
PROT SERPL-MCNC: 6 G/DL (ref 6–8.5)
RBC # BLD AUTO: 4.68 10*6/MM3 (ref 3.77–5.28)
SODIUM SERPL-SCNC: 136 MMOL/L (ref 136–145)
WBC NRBC COR # BLD: 5.24 10*3/MM3 (ref 3.4–10.8)

## 2022-10-28 PROCEDURE — 80053 COMPREHEN METABOLIC PANEL: CPT | Performed by: NURSE PRACTITIONER

## 2022-10-28 PROCEDURE — 97530 THERAPEUTIC ACTIVITIES: CPT

## 2022-10-28 PROCEDURE — 99232 SBSQ HOSP IP/OBS MODERATE 35: CPT | Performed by: NURSE PRACTITIONER

## 2022-10-28 PROCEDURE — 25010000002 CEFTRIAXONE PER 250 MG: Performed by: INTERNAL MEDICINE

## 2022-10-28 PROCEDURE — 25010000002 OCTREOTIDE PER 25 MCG: Performed by: INTERNAL MEDICINE

## 2022-10-28 PROCEDURE — 97162 PT EVAL MOD COMPLEX 30 MIN: CPT

## 2022-10-28 PROCEDURE — 85025 COMPLETE CBC W/AUTO DIFF WBC: CPT | Performed by: NURSE PRACTITIONER

## 2022-10-28 RX ADMIN — ACETAMINOPHEN 650 MG: 325 TABLET, FILM COATED ORAL at 08:12

## 2022-10-28 RX ADMIN — Medication 3 MG: at 22:22

## 2022-10-28 RX ADMIN — LORAZEPAM 2 MG: 1 TABLET ORAL at 08:12

## 2022-10-28 RX ADMIN — OCTREOTIDE ACETATE 50 MCG/HR: 500 INJECTION, SOLUTION INTRAVENOUS; SUBCUTANEOUS at 17:30

## 2022-10-28 RX ADMIN — PANTOPRAZOLE SODIUM 8 MG/HR: 40 INJECTION, POWDER, FOR SOLUTION INTRAVENOUS at 01:37

## 2022-10-28 RX ADMIN — LORAZEPAM 1 MG: 1 TABLET ORAL at 20:09

## 2022-10-28 RX ADMIN — LEVOTHYROXINE SODIUM 100 MCG: 0.1 TABLET ORAL at 08:12

## 2022-10-28 RX ADMIN — PANTOPRAZOLE SODIUM 8 MG/HR: 40 INJECTION, POWDER, FOR SOLUTION INTRAVENOUS at 22:03

## 2022-10-28 RX ADMIN — Medication 1 PATCH: at 15:42

## 2022-10-28 RX ADMIN — PANTOPRAZOLE SODIUM 8 MG/HR: 40 INJECTION, POWDER, FOR SOLUTION INTRAVENOUS at 10:58

## 2022-10-28 RX ADMIN — LORAZEPAM 2 MG: 1 TABLET ORAL at 22:21

## 2022-10-28 RX ADMIN — FLUOXETINE HYDROCHLORIDE 40 MG: 20 CAPSULE ORAL at 08:12

## 2022-10-28 RX ADMIN — PANTOPRAZOLE SODIUM 8 MG/HR: 40 INJECTION, POWDER, FOR SOLUTION INTRAVENOUS at 17:28

## 2022-10-28 RX ADMIN — CEFTRIAXONE SODIUM 1 G: 1 INJECTION, POWDER, FOR SOLUTION INTRAMUSCULAR; INTRAVENOUS at 13:54

## 2022-10-29 LAB
BASOPHILS # BLD AUTO: 0.01 10*3/MM3 (ref 0–0.2)
BASOPHILS NFR BLD AUTO: 0.2 % (ref 0–1.5)
DEPRECATED RDW RBC AUTO: 45.3 FL (ref 37–54)
EOSINOPHIL # BLD AUTO: 0.16 10*3/MM3 (ref 0–0.4)
EOSINOPHIL NFR BLD AUTO: 3.1 % (ref 0.3–6.2)
ERYTHROCYTE [DISTWIDTH] IN BLOOD BY AUTOMATED COUNT: 13.5 % (ref 12.3–15.4)
HCT VFR BLD AUTO: 41.8 % (ref 34–46.6)
HGB BLD-MCNC: 14.6 G/DL (ref 12–15.9)
LYMPHOCYTES # BLD AUTO: 0.89 10*3/MM3 (ref 0.7–3.1)
LYMPHOCYTES NFR BLD AUTO: 17.4 % (ref 19.6–45.3)
MCH RBC QN AUTO: 31.7 PG (ref 26.6–33)
MCHC RBC AUTO-ENTMCNC: 34.9 G/DL (ref 31.5–35.7)
MCV RBC AUTO: 90.7 FL (ref 79–97)
MONOCYTES # BLD AUTO: 0.69 10*3/MM3 (ref 0.1–0.9)
MONOCYTES NFR BLD AUTO: 13.5 % (ref 5–12)
NEUTROPHILS NFR BLD AUTO: 3.33 10*3/MM3 (ref 1.7–7)
NEUTROPHILS NFR BLD AUTO: 65.2 % (ref 42.7–76)
PLATELET # BLD AUTO: 32 10*3/MM3 (ref 140–450)
PMV BLD AUTO: 11.9 FL (ref 6–12)
RBC # BLD AUTO: 4.61 10*6/MM3 (ref 3.77–5.28)
WBC NRBC COR # BLD: 5.11 10*3/MM3 (ref 3.4–10.8)

## 2022-10-29 PROCEDURE — 85025 COMPLETE CBC W/AUTO DIFF WBC: CPT | Performed by: NURSE PRACTITIONER

## 2022-10-29 PROCEDURE — 97530 THERAPEUTIC ACTIVITIES: CPT

## 2022-10-29 PROCEDURE — 25010000002 OCTREOTIDE PER 25 MCG: Performed by: INTERNAL MEDICINE

## 2022-10-29 PROCEDURE — 99232 SBSQ HOSP IP/OBS MODERATE 35: CPT | Performed by: INTERNAL MEDICINE

## 2022-10-29 PROCEDURE — 25010000002 CEFTRIAXONE PER 250 MG: Performed by: INTERNAL MEDICINE

## 2022-10-29 PROCEDURE — 97110 THERAPEUTIC EXERCISES: CPT

## 2022-10-29 RX ORDER — PANTOPRAZOLE SODIUM 40 MG/1
40 TABLET, DELAYED RELEASE ORAL
Status: DISCONTINUED | OUTPATIENT
Start: 2022-10-29 | End: 2022-10-30 | Stop reason: HOSPADM

## 2022-10-29 RX ADMIN — OCTREOTIDE ACETATE 50 MCG/HR: 500 INJECTION, SOLUTION INTRAVENOUS; SUBCUTANEOUS at 12:21

## 2022-10-29 RX ADMIN — PANTOPRAZOLE SODIUM 8 MG/HR: 40 INJECTION, POWDER, FOR SOLUTION INTRAVENOUS at 12:21

## 2022-10-29 RX ADMIN — PANTOPRAZOLE SODIUM 8 MG/HR: 40 INJECTION, POWDER, FOR SOLUTION INTRAVENOUS at 07:35

## 2022-10-29 RX ADMIN — Medication 1 PATCH: at 15:10

## 2022-10-29 RX ADMIN — PANTOPRAZOLE SODIUM 8 MG/HR: 40 INJECTION, POWDER, FOR SOLUTION INTRAVENOUS at 02:35

## 2022-10-29 RX ADMIN — PANTOPRAZOLE SODIUM 40 MG: 40 TABLET, DELAYED RELEASE ORAL at 17:20

## 2022-10-29 RX ADMIN — FLUOXETINE HYDROCHLORIDE 40 MG: 20 CAPSULE ORAL at 08:47

## 2022-10-29 RX ADMIN — CEFTRIAXONE SODIUM 1 G: 1 INJECTION, POWDER, FOR SOLUTION INTRAMUSCULAR; INTRAVENOUS at 11:52

## 2022-10-29 RX ADMIN — LORAZEPAM 1 MG: 1 TABLET ORAL at 00:07

## 2022-10-29 RX ADMIN — LEVOTHYROXINE SODIUM 100 MCG: 0.1 TABLET ORAL at 08:47

## 2022-10-29 RX ADMIN — OCTREOTIDE ACETATE 50 MCG/HR: 500 INJECTION, SOLUTION INTRAVENOUS; SUBCUTANEOUS at 02:50

## 2022-10-30 ENCOUNTER — READMISSION MANAGEMENT (OUTPATIENT)
Dept: CALL CENTER | Facility: HOSPITAL | Age: 52
End: 2022-10-30

## 2022-10-30 VITALS
BODY MASS INDEX: 23.32 KG/M2 | HEART RATE: 87 BPM | WEIGHT: 140 LBS | DIASTOLIC BLOOD PRESSURE: 101 MMHG | RESPIRATION RATE: 16 BRPM | HEIGHT: 65 IN | SYSTOLIC BLOOD PRESSURE: 151 MMHG | OXYGEN SATURATION: 100 % | TEMPERATURE: 98.2 F

## 2022-10-30 PROBLEM — K92.1 MELENA: Status: RESOLVED | Noted: 2022-10-24 | Resolved: 2022-10-30

## 2022-10-30 PROBLEM — R11.2 NAUSEA AND VOMITING: Status: RESOLVED | Noted: 2021-09-21 | Resolved: 2022-10-30

## 2022-10-30 PROBLEM — K92.2 ACUTE GI BLEEDING: Status: RESOLVED | Noted: 2022-10-24 | Resolved: 2022-10-30

## 2022-10-30 PROCEDURE — 99232 SBSQ HOSP IP/OBS MODERATE 35: CPT | Performed by: INTERNAL MEDICINE

## 2022-10-30 RX ORDER — PANTOPRAZOLE SODIUM 40 MG/1
40 TABLET, DELAYED RELEASE ORAL
Qty: 60 TABLET | Refills: 0 | Status: SHIPPED | OUTPATIENT
Start: 2022-10-30 | End: 2022-12-29

## 2022-10-30 RX ORDER — LANOLIN ALCOHOL/MO/W.PET/CERES
100 CREAM (GRAM) TOPICAL DAILY
Qty: 30 TABLET | Refills: 0 | Status: SHIPPED | OUTPATIENT
Start: 2022-10-30 | End: 2022-11-29

## 2022-10-30 RX ADMIN — LEVOTHYROXINE SODIUM 100 MCG: 0.1 TABLET ORAL at 08:34

## 2022-10-30 RX ADMIN — PANTOPRAZOLE SODIUM 40 MG: 40 TABLET, DELAYED RELEASE ORAL at 06:42

## 2022-10-30 RX ADMIN — FLUOXETINE HYDROCHLORIDE 40 MG: 20 CAPSULE ORAL at 08:34

## 2022-10-31 ENCOUNTER — PATIENT ROUNDING (BHMG ONLY) (OUTPATIENT)
Dept: ORTHOPEDIC SURGERY | Facility: CLINIC | Age: 52
End: 2022-10-31

## 2022-10-31 ENCOUNTER — OFFICE VISIT (OUTPATIENT)
Dept: ORTHOPEDIC SURGERY | Facility: CLINIC | Age: 52
End: 2022-10-31

## 2022-10-31 VITALS — WEIGHT: 139.9 LBS | BODY MASS INDEX: 23.31 KG/M2 | TEMPERATURE: 98.2 F | HEIGHT: 65 IN

## 2022-10-31 DIAGNOSIS — R52 PAIN: Primary | ICD-10-CM

## 2022-10-31 PROCEDURE — 73030 X-RAY EXAM OF SHOULDER: CPT | Performed by: ORTHOPAEDIC SURGERY

## 2022-10-31 PROCEDURE — 99203 OFFICE O/P NEW LOW 30 MIN: CPT | Performed by: ORTHOPAEDIC SURGERY

## 2022-10-31 RX ORDER — METHOCARBAMOL 750 MG/1
TABLET, FILM COATED ORAL
COMMUNITY
Start: 2022-10-06 | End: 2023-01-16

## 2022-10-31 RX ORDER — NADOLOL 40 MG/1
20 TABLET ORAL
COMMUNITY
Start: 2022-08-31

## 2022-10-31 RX ORDER — TRAZODONE HYDROCHLORIDE 50 MG/1
TABLET ORAL
COMMUNITY
Start: 2022-10-11 | End: 2023-01-16

## 2022-10-31 RX ORDER — NALTREXONE HYDROCHLORIDE 50 MG/1
TABLET, FILM COATED ORAL
COMMUNITY
Start: 2022-08-31 | End: 2023-01-16

## 2022-10-31 RX ORDER — BUSPIRONE HYDROCHLORIDE 15 MG/1
15 TABLET ORAL 3 TIMES DAILY PRN
COMMUNITY
Start: 2022-10-11

## 2022-10-31 RX ORDER — MAGNESIUM OXIDE TAB 400 MG (241.3 MG ELEMENTAL MG) 400 (241.3 MG) MG
TAB ORAL DAILY
COMMUNITY
Start: 2022-09-16

## 2022-10-31 NOTE — OUTREACH NOTE
Prep Survey    Flowsheet Row Responses   [facility] facility patient discharged from? [place]   Is LACE score < 7 ? No   Emergency Room discharge w/ pulse ox? No   Eligibility Readm Mgmt   Discharge diagnosis Acute GI bleeding    Does the patient have one of the following disease processes/diagnoses(primary or secondary)? Other   Does the patient have Home health ordered? No   Is there a DME ordered? No   Prep survey completed? Yes          SANJUANA CONTI - Registered Nurse

## 2022-10-31 NOTE — PROGRESS NOTES
"   New Left Shoulder      Patient: Bekah Gibson        YOB: 1970    Medical Record Number: [id number]        Chief Complaints:  Left shoulder pain      History of Present Illness: This is a 52-year-old female who is right-hand-dominant presents left shoulder pain is been ongoing for more than 5 months she states over the last 5 months its gotten a lot worse no history injury change in activity she had an injection by Dr. Mchugh in the past which only lasted a couple of days.  She was trying to get scheduled for surgery with him but she states he just kept putting her off.  She is an alcoholic but has been sober for 6 months and is doing well from that standpoint.  She does have thrombocytopenia she does see a hematologist and they know she is getting ready have released needs shoulder surgery and we will give her medication to bump her platelets up prior to surgery.  In addition to the above issue she has kidney stones thyroid disease depressive disorder pancreatitis      Allergies:   Allergies   Allergen Reactions   • Benzonatate Nausea Only and Other (See Comments)     Rash    • Phenergan [Promethazine Hcl] Hives   • Promethazine Hives   • Cucumber Extract Rash   • Promethazine-Phenylephrine Other (See Comments)     \"FEEL WEIRD\"       Medications:   Home Medications:  Current Outpatient Medications on File Prior to Visit   Medication Sig   • amLODIPine (NORVASC) 5 MG tablet Take 5 mg by mouth Daily.   • busPIRone (BUSPAR) 15 MG tablet    • FLUoxetine (PROZAC) 40 MG capsule Take 1 capsule by mouth Daily.   • folic acid (FOLVITE) 1 MG tablet Take 1 mg by mouth Daily.   • levothyroxine (SYNTHROID, LEVOTHROID) 100 MCG tablet TAKE ONE TABLET BY MOUTH DAILY   • MAGnesium-Oxide 400 (240 Mg) MG tablet    • methocarbamol (ROBAXIN) 750 MG tablet    • Multiple Vitamin (MULTIVITAMIN) capsule Take 1 capsule by mouth Daily.   • nadolol (CORGARD) 40 MG tablet    • naltrexone (DEPADE) 50 MG tablet    • " pantoprazole (PROTONIX) 40 MG EC tablet Take 1 tablet by mouth 2 (Two) Times a Day Before Meals for 30 days.   • risperiDONE (risperDAL) 0.5 MG tablet Take 1 tablet by mouth Every Night.   • thiamine (VITAMIN B1) 100 MG tablet Take 1 tablet by mouth Daily for 30 doses.   • traZODone (DESYREL) 50 MG tablet    • EPINEPHrine (EPIPEN JR) 0.15 MG/0.3ML solution auto-injector injection      Current Facility-Administered Medications on File Prior to Visit   Medication   • sodium chloride 0.9 % flush 10 mL   • sodium chloride 0.9 % flush 10 mL   • sodium chloride 0.9 % flush 20 mL   • [DISCONTINUED] acetaminophen (TYLENOL) tablet 650 mg   • [DISCONTINUED] cefTRIAXone (ROCEPHIN) 1 g in sodium chloride 0.9 % 100 mL IVPB-VTB   • [DISCONTINUED] FLUoxetine (PROzac) capsule 40 mg   • [DISCONTINUED] levothyroxine (SYNTHROID, LEVOTHROID) tablet 100 mcg   • [DISCONTINUED] LORazepam (ATIVAN) injection 1 mg   • [DISCONTINUED] LORazepam (ATIVAN) injection 2 mg   • [DISCONTINUED] LORazepam (ATIVAN) injection 2 mg   • [DISCONTINUED] LORazepam (ATIVAN) injection 2 mg   • [DISCONTINUED] LORazepam (ATIVAN) tablet 1 mg   • [DISCONTINUED] LORazepam (ATIVAN) tablet 2 mg   • [DISCONTINUED] melatonin tablet 3 mg   • [DISCONTINUED] nicotine (NICODERM CQ) 14 MG/24HR patch 1 patch   • [DISCONTINUED] nitroglycerin (NITROSTAT) SL tablet 0.4 mg   • [DISCONTINUED] ondansetron (ZOFRAN) injection 4 mg   • [DISCONTINUED] ondansetron (ZOFRAN) tablet 4 mg   • [DISCONTINUED] pantoprazole (PROTONIX) EC tablet 40 mg   • [DISCONTINUED] sodium chloride 0.9 % infusion   • [DISCONTINUED] thiamine (B-1) injection 100 mg   • [DISCONTINUED] thiamine (VITAMIN B-1) tablet 100 mg     Current Medications:  Scheduled Meds:  Continuous Infusions:No current facility-administered medications for this visit.    PRN Meds:.    Past Medical History:   Diagnosis Date   • Ankle sprain 2/2004    no operation nessesary   • Anxiety    • Disease of thyroid gland    • ETOH abuse    •  Fracture, clavicle 1/2021    shattered clavicel on issue slip and fall   • Hypertension    • Kidney stone    • Lupus (HCC)    • Lupus (HCC)    • MDD (major depressive disorder)    • Pancreatitis    • Periarthritis of shoulder see above   • Rotator cuff syndrome odre for shoulder replacement    unable to receive due to low platelets        Past Surgical History:   Procedure Laterality Date   • CLAVICLE SURGERY Right 2018   • ENDOSCOPY N/A 10/26/2022    Procedure: ESOPHAGOGASTRODUODENOSCOPY wtih banding;  Surgeon: Ag Patel MD;  Location: [facility] END[facility];  Service: Gastroenterology;  Laterality: N/A;  pre: melena  post: esophageal varicies with banding x2 bands   • JOINT REPLACEMENT      GREAT TOE   • KIDNEY STONE SURGERY      LITHOTRIPSY AND STENT (R SIDE)   • LAPAROSCOPIC TUBAL LIGATION  2005   • SHOULDER SURGERY  desperately needed        Social History     Occupational History   • Not on file   Tobacco Use   • Smoking status: Some Days     Packs/day: 0.25     Years: 10.00     Pack years: 2.50     Types: Cigarettes   • Smokeless tobacco: Never   • Tobacco comments:     Rarely smoke sometimes will to rlaxx   Vaping Use   • Vaping Use: Never used   Substance and Sexual Activity   • Alcohol use: Yes     Alcohol/week: 5.0 - 10.0 standard drinks     Types: 5 - 10 Shots of liquor per week     Comment: Am in recovery   • Drug use: No     Comment: used THC in college   • Sexual activity: Not Currently     Partners: Male     Birth control/protection: Surgical     Comment: cinthia- menepausal      Social History     Social History Narrative   • Not on file        Family History   Problem Relation Age of Onset   • Rheumatologic disease Mother         congestive heart   • Thyroid disease Father    • Leukemia Father    • Cancer Father    • Drug abuse Brother    • Malig Hyperthermia Neg Hx              Review of Systems:     Review of Systems      Physical Exam: 52 y.o. female  General Appearance:    Alert, cooperative, in no  "acute distress                   Vitals:    10/31/22 0937   Temp: 98.2 °F (36.8 °C)   Weight: 63.5 kg (139 lb 14.4 oz)   Height: 165.1 cm (65\")   PainSc:   9      Patient is alert and read ×3 no acute distress appears her above-listed at height weight and age.  Affect is normal respiratory rate is normal unlabored. Heart rate regular rate rhythm, sclera, dentition and hearing are normal for the purpose of this exam.    Ortho Exam Physical exam the left shoulder reveals no overlying skin changes no lymphedema lymphadenopathy the patient can actively flex to about 150 passively I get them to 160 abduction is similar external rotation is 40 internal rotation to there buttock.  Rotator cuff strength is 4+ over 5 with isometric strength testing no overlying skin changes.  Patient has reasonable cervical range of motion for their age no radicular symptoms and a normal elbow exam.  There are good distal pulses.    Procedures          Radiology:   AP, Scapular Y and Axillary Lateral of the left shoulder were ordered/reviewed to evauate shoulder pain.  I have no comparative films she has significant degenerative change glenohumeral joint  Imaging Results (Most Recent)     Procedure Component Value Units Date/Time    XR Shoulder 2+ View Left [[id number]] Resulted: 10/31/22 0920     Updated: 10/31/22 0920    Impression:      Ordering physician's impression is located in the Encounter Note dated 10/31/22. X-ray performed in the DR room.            Assessment/Plan: Left shoulder OA plan is to proceed with a referral to Dr. Denise I have discussed with Dr. Sams and he knows she is coming she understands the importance of getting her platelets up    "

## 2022-11-01 NOTE — PAYOR COMM NOTE
"Ba Gibson (52 y.o. Female)     PLEASE SEE ATTACHED FOR DC NOTICE    PT ID  [id number]  REF #  [id number]      THANK YOU  EMA RUBIO RN/CCP  [phone]   [phone]    Date of Birth   1970    Social Security Number       Address   [address] [place] [address]    Home Phone   [phone]    MRN   [id number]       Shinto   Lutheran    Marital Status                               Admission Date   10/24/22    Admission Type   Emergency    Admitting Provider   Ivan Bateman MD    Attending Provider       Department, Room/Bed   [address], [address]/       Discharge Date   10/30/2022    Discharge Disposition   Home or Self Care    Discharge Destination                               Attending Provider: (none)   Allergies: Benzonatate, Phenergan [Promethazine Hcl], Promethazine, Cucumber Extract, Promethazine-phenylephrine    Isolation: None   Infection: None   Code Status: Prior    Ht: 165.1 cm (65\")   Wt: 63.5 kg (140 lb)    Admission Cmt: None   Principal Problem: Acute GI bleeding [K92.2]                 Active Insurance as of 10/24/2022     Primary Coverage     Payor Plan Insurance Group Employer/Plan Group    ANTHEM MEDICAID ANTHEM MEDICAID [id number]     Payor Plan Address Payor Plan Phone Number Payor Plan Fax Number Effective Dates    [address] [address] [phone]  2018 - None Entered    [facility] [address]       Subscriber Name Subscriber Birth Date Member ID       BA GIBSON 1970 [id number]                 Emergency Contacts      (Rel.) Home Phone Work Phone Mobile Phone    Jeff Gibson (Spouse) [phone] -- [phone]            [place]: [facility] [id number]  Tax ID [id number]     Discharge Summary      Preston Whitten MD at 10/30/22 1102              Patient Name: Ba Gibson  : 1970  MRN: [id number]    Date of Admission: 10/24/2022  Date of Discharge:  10/30/2022  Primary Care " Physician: Tylor Davis      Chief Complaint:   Vomiting and Black or Bloody Stool      Discharge Diagnoses     Active Hospital Problems    Diagnosis  POA   • Primary hypertension [I10]  Yes   • Alcoholic cirrhosis (HCC) [K70.30]  Yes   • Lupus (HCC) [M32.9]  Yes   • Thrombocytopenia (HCC) [D69.6]  Yes   • Hypothyroidism [E03.9]  Yes   • Tobacco dependence due to cigarettes [F17.210]  Yes      Resolved Hospital Problems    Diagnosis Date Resolved POA   • **Acute GI bleeding [K92.2] 10/30/2022 Yes   • Melena [K92.1] 10/30/2022 Yes   • Nausea and vomiting [R11.2] 10/30/2022 Yes        Hospital Course     Ms. Gibson is a 52 y.o. female with a history of hypothyroidism, lupus, alcohol dependence, cirrhosis with history of Veraseal bleed, portal hypertension, chronic thrombocytopenia who presented to [facility] initially complaining of neurolysed weakness as well as melena.  Please see the admitting history and physical for further details.  She was found to have alcohol withdrawal and some mild anemia was admitted to the hospital for further evaluation and treatment.      She was seen in consultation by GI and started on a PPI drip.  Hemoglobin remained stable.  GI took her for an EGD on 10/26 which showed grade 2 esophageal varices, a type I gastric varix, portal hypertensive gastropathy.  The esophageal varices were banded, and she was started on an octreotide drip in addition to the PPI drip as well as ceftriaxone.  She was monitored for the next several days on the drips and her hemoglobin remained stable.  She did experience alcohol withdrawal, and required CIWA protocol/as needed Ativan.  After several days, her alcohol withdrawal stopped, and she completed her course of antibiotics.  She was transitioned to oral twice daily PPI.  GI recommends follow-up for repeat EGD in about 4 weeks.  These recommendations were discussed with the patient.    Day of Discharge     Subjective:  Events overnight.   The patient is no longer experiencing any alcohol withdrawal and has not required as needed Ativan for greater than 24 hours.  He feels well, and is ready go home.    Physical Exam:  Temp:  [97.5 °F (36.4 °C)-98.6 °F (37 °C)] 98.2 °F (36.8 °C)  Heart Rate:  [67-87] 87  Resp:  [16-18] 16  BP: (123-151)/() 151/101  Body mass index is 23.3 kg/m².  Physical Exam  Constitutional:       General: She is not in acute distress.     Appearance: She is not toxic-appearing.   Cardiovascular:      Rate and Rhythm: Normal rate and regular rhythm.      Heart sounds: Normal heart sounds.   Pulmonary:      Effort: Pulmonary effort is normal.      Breath sounds: Normal breath sounds.   Abdominal:      General: Bowel sounds are normal.      Palpations: Abdomen is soft.   Musculoskeletal:         General: No tenderness.      Right lower leg: No edema.      Left lower leg: No edema.   Neurological:      Mental Status: She is alert.   Psychiatric:         Mood and Affect: Mood normal.         Behavior: Behavior normal.         Consultants     Consult Orders (all) (From admission, onward)     Start     Ordered    10/25/22 1421  Inpatient Gastroenterology Consult  Once        Specialty:  Gastroenterology  Provider:  Tyson Tierney MD    10/25/22 1420    10/24/22 1445  Inpatient Access Center Consult  Once        Provider:  (Not yet assigned)    10/24/22 1445    10/24/22 1219  Inpatient Gastroenterology Consult  Once,   Status:  Canceled        Specialty:  Gastroenterology  Provider:  (Not yet assigned)    10/24/22 1221    10/24/22 1057  [facility] (on-call MD unless specified) Details  Once        Specialty:  Hospitalist  Provider:  Ivan Bateman MD    10/24/22 1056              Procedures     Imaging Results (All)     Procedure Component Value Units Date/Time    XR Chest 1 View [[id number]] Collected: 10/27/22 1624     Updated: 10/27/22 1637    Narrative:      PORTABLE CHEST X-RAY     HISTORY: Hypoxia.     Portable chest x-ray is  provided. Correlation: Chest x-ray 05/04/2022.     FINDINGS: The cardiomediastinal silhouette is normal. The lungs are  clear. The costophrenic sulci are dry and the bones appear normal. There  is no pneumothorax.       Impression:      Negative.     This report was finalized on 10/27/2022 4:34 PM by Dr. Ag Cheng M.D.             Pertinent Labs     Results from last 7 days   Lab Units 10/29/22  0903 10/28/22  0554 10/27/22  0521 10/27/22  0009 10/26/22  1624 10/26/22  0749   WBC 10*3/mm3 5.11 5.24 4.73  --   --  4.73   HEMOGLOBIN g/dL 14.6 15.0 15.6 16.0*   < > 14.8   PLATELETS 10*3/mm3 32* 32* 34*  --   --  34*    < > = values in this interval not displayed.     Results from last 7 days   Lab Units 10/28/22  0554 10/27/22  0521 10/25/22  0548 10/24/22  0937   SODIUM mmol/L 136 139 139 141   POTASSIUM mmol/L 3.6 4.1 4.2 3.8   CHLORIDE mmol/L 103 99 106 99   CO2 mmol/L 23.2 26.9 25.3 27.6   BUN mg/dL 7 9 10 13   CREATININE mg/dL 0.59 0.61 0.64 0.70   GLUCOSE mg/dL 124* 157* 86 106*   Estimated Creatinine Clearance: 109.3 mL/min (by C-G formula based on SCr of 0.59 mg/dL).  Results from last 7 days   Lab Units 10/28/22  0554 10/27/22  0521 10/25/22  0548 10/24/22  0937   ALBUMIN g/dL 3.40* 3.50 3.00* 3.90   BILIRUBIN mg/dL 1.6* 1.7* 1.7* 1.8*   ALK PHOS U/L 115 113 96 123*   AST (SGOT) U/L 32 33* 32 47*   ALT (SGPT) U/L 23 24 25 38*     Results from last 7 days   Lab Units 10/28/22  0554 10/27/22  0521 10/25/22  0548 10/24/22  0937   CALCIUM mg/dL 8.9 8.6 8.2* 8.9   ALBUMIN g/dL 3.40* 3.50 3.00* 3.90   MAGNESIUM mg/dL  --   --   --  1.8     Results from last 7 days   Lab Units 10/24/22  0937   LIPASE U/L 15             Invalid input(s): LDLCALC        Test Results Pending at Discharge       Discharge Details        Discharge Medications      New Medications      Instructions Start Date   thiamine 100 MG tablet  Commonly known as: VITAMIN B1   100 mg, Oral, Daily         Changes to Medications      Instructions  "Start Date   pantoprazole 40 MG EC tablet  Commonly known as: PROTONIX  What changed: when to take this   40 mg, Oral, 2 Times Daily Before Meals         Continue These Medications      Instructions Start Date   amLODIPine 5 MG tablet  Commonly known as: NORVASC   5 mg, Oral, Daily      EPINEPHrine 0.15 MG/0.3ML solution auto-injector injection  Commonly known as: EPIPEN JR   No dose, route, or frequency recorded.      FLUoxetine 40 MG capsule  Commonly known as: PROzac   40 mg, Oral, Daily      folic acid 1 MG tablet  Commonly known as: FOLVITE   1 mg, Oral, Daily      levothyroxine 100 MCG tablet  Commonly known as: SYNTHROID, LEVOTHROID   TAKE ONE TABLET BY MOUTH DAILY      multivitamin capsule   1 capsule, Oral, Daily      risperiDONE 0.5 MG tablet  Commonly known as: risperDAL   0.5 mg, Oral, Nightly         Stop These Medications    potassium chloride 20 MEQ CR tablet  Commonly known as: K-DUR,KLOR-CON            Allergies   Allergen Reactions   • Benzonatate Nausea Only and Other (See Comments)     Rash    • Phenergan [Promethazine Hcl] Hives   • Promethazine Hives   • Cucumber Extract Rash   • Promethazine-Phenylephrine Other (See Comments)     \"FEEL WEIRD\"         Discharge Disposition:  Home or Self Care    Discharge Diet:  Diet Order   Procedures   • Diet Regular; GI Soft       Discharge Activity:   Activity Instructions     Activity as Tolerated            CODE STATUS:    Code Status and Medical Interventions:   Ordered at: 10/24/22 1221     Level Of Support Discussed With:    Patient     Code Status (Patient has no pulse and is not breathing):    CPR (Attempt to Resuscitate)     Medical Interventions (Patient has pulse or is breathing):    Full       Future Appointments   Date Time Provider Department Center   10/31/2022  9:00 AM Dorothy Hdz MD [facility] [address] [facility]     Additional Instructions for the Follow-ups that You Need to Schedule     Call MD With Problems / Concerns   As directed      " Instructions: return to the hospital if you experience chest pain, shortness of breath, abdominal pain, nausea, vomiting, fevers, sweats, chills, or worsening of your symptoms    Order Comments: Instructions: return to the hospital if you experience chest pain, shortness of breath, abdominal pain, nausea, vomiting, fevers, sweats, chills, or worsening of your symptoms          Discharge Follow-up with PCP   As directed       Currently Documented PCP:    Tylor Davis    PCP Phone Number:    [phone]     Follow Up Details: 2 weeks         Discharge Follow-up with Specialty: gastroenterology for repeat EGD in 4 weeks   As directed      Specialty: gastroenterology for repeat EGD in 4 weeks            Follow-up Information     Tylor Davis .    Specialty: Family Medicine  Why: 2 weeks  Contact information:  [address]  [facility] [address]  [facility] [id number] [phone]             Kwaku Hui MD. Schedule an appointment as soon as possible for a visit in 4 week(s).    Specialty: Gastroenterology  Why: Needs repeat EGD.  Contact information:  [address]  [facility] [address]  [facility] [id number] [phone]                         Additional Instructions for the Follow-ups that You Need to Schedule     Call MD With Problems / Concerns   As directed      Instructions: return to the hospital if you experience chest pain, shortness of breath, abdominal pain, nausea, vomiting, fevers, sweats, chills, or worsening of your symptoms    Order Comments: Instructions: return to the hospital if you experience chest pain, shortness of breath, abdominal pain, nausea, vomiting, fevers, sweats, chills, or worsening of your symptoms          Discharge Follow-up with PCP   As directed       Currently Documented PCP:    Tylor Davis    PCP Phone Number:    [phone]     Follow Up Details: 2 weeks         Discharge Follow-up with Specialty: gastroenterology for repeat EGD in 4 weeks   As directed      Specialty: gastroenterology  for repeat EGD in 4 weeks           Time Spent on Discharge:  Greater than 30 minutes      Preston Whittne MD  [facility]  10/30/22  11:11 EDT              Electronically signed by Preston Whitten MD at 10/30/22 7061

## 2022-11-02 ENCOUNTER — READMISSION MANAGEMENT (OUTPATIENT)
Dept: CALL CENTER | Facility: HOSPITAL | Age: 52
End: 2022-11-02

## 2022-11-02 NOTE — OUTREACH NOTE
Medical Week 1 Survey    Flowsheet Row Responses   [facility] [place] patient discharged from? [facility]   Does the patient have one of the following disease processes/diagnoses(primary or secondary)? Other   Week 1 attempt successful? No   Unsuccessful attempts Attempt 1          MADHU RAWLS - Registered Nurse

## 2022-11-03 ENCOUNTER — TELEPHONE (OUTPATIENT)
Dept: GASTROENTEROLOGY | Facility: CLINIC | Age: 52
End: 2022-11-03

## 2022-11-03 NOTE — TELEPHONE ENCOUNTER
Caller: Bekah Gibson    Relationship to patient: Self    Best call back number: [phone]    Patient is needing: PT CALLED TO SCHEDULE ENDOSCOPY.

## 2022-11-06 ENCOUNTER — HOSPITAL ENCOUNTER (EMERGENCY)
Facility: HOSPITAL | Age: 52
Discharge: HOME | End: 2022-11-06
Attending: EMERGENCY MEDICINE | Admitting: EMERGENCY MEDICINE

## 2022-11-06 ENCOUNTER — APPOINTMENT (OUTPATIENT)
Dept: GENERAL RADIOLOGY | Facility: HOSPITAL | Age: 52
End: 2022-11-06

## 2022-11-06 VITALS
HEART RATE: 62 BPM | DIASTOLIC BLOOD PRESSURE: 95 MMHG | SYSTOLIC BLOOD PRESSURE: 158 MMHG | RESPIRATION RATE: 18 BRPM | OXYGEN SATURATION: 98 % | TEMPERATURE: 98.3 F

## 2022-11-06 DIAGNOSIS — R79.89 ELEVATED LFTS: ICD-10-CM

## 2022-11-06 DIAGNOSIS — I10 HYPERTENSION NOT AT GOAL: Primary | ICD-10-CM

## 2022-11-06 DIAGNOSIS — R07.89 ATYPICAL CHEST PAIN: ICD-10-CM

## 2022-11-06 DIAGNOSIS — D69.6 THROMBOCYTOPENIA: ICD-10-CM

## 2022-11-06 LAB
ALBUMIN SERPL-MCNC: 4.6 G/DL (ref 3.5–5.2)
ALBUMIN/GLOB SERPL: 2 G/DL
ALP SERPL-CCNC: 137 U/L (ref 39–117)
ALT SERPL W P-5'-P-CCNC: 49 U/L (ref 1–33)
ANION GAP SERPL CALCULATED.3IONS-SCNC: 10.9 MMOL/L (ref 5–15)
AST SERPL-CCNC: 42 U/L (ref 1–32)
BASOPHILS # BLD AUTO: 0 10*3/MM3 (ref 0–0.2)
BASOPHILS NFR BLD AUTO: 0 % (ref 0–1.5)
BILIRUB SERPL-MCNC: 1.5 MG/DL (ref 0–1.2)
BUN SERPL-MCNC: 10 MG/DL (ref 6–20)
BUN/CREAT SERPL: 16.4 (ref 7–25)
CALCIUM SPEC-SCNC: 9.8 MG/DL (ref 8.6–10.5)
CHLORIDE SERPL-SCNC: 100 MMOL/L (ref 98–107)
CO2 SERPL-SCNC: 29.1 MMOL/L (ref 22–29)
CREAT SERPL-MCNC: 0.61 MG/DL (ref 0.57–1)
DEPRECATED RDW RBC AUTO: 48 FL (ref 37–54)
EGFRCR SERPLBLD CKD-EPI 2021: 106.4 ML/MIN/1.73
EOSINOPHIL # BLD AUTO: 0 10*3/MM3 (ref 0–0.4)
EOSINOPHIL NFR BLD AUTO: 0 % (ref 0.3–6.2)
ERYTHROCYTE [DISTWIDTH] IN BLOOD BY AUTOMATED COUNT: 13.9 % (ref 12.3–15.4)
GLOBULIN UR ELPH-MCNC: 2.3 GM/DL
GLUCOSE SERPL-MCNC: 162 MG/DL (ref 65–99)
HCT VFR BLD AUTO: 45 % (ref 34–46.6)
HGB BLD-MCNC: 15.4 G/DL (ref 12–15.9)
LYMPHOCYTES # BLD AUTO: 0.4 10*3/MM3 (ref 0.7–3.1)
LYMPHOCYTES NFR BLD AUTO: 7.2 % (ref 19.6–45.3)
MCH RBC QN AUTO: 32.3 PG (ref 26.6–33)
MCHC RBC AUTO-ENTMCNC: 34.2 G/DL (ref 31.5–35.7)
MCV RBC AUTO: 94.3 FL (ref 79–97)
MONOCYTES # BLD AUTO: 0.31 10*3/MM3 (ref 0.1–0.9)
MONOCYTES NFR BLD AUTO: 5.6 % (ref 5–12)
NEUTROPHILS NFR BLD AUTO: 4.8 10*3/MM3 (ref 1.7–7)
NEUTROPHILS NFR BLD AUTO: 86.7 % (ref 42.7–76)
NT-PROBNP SERPL-MCNC: 531 PG/ML (ref 0–900)
PLAT MORPH BLD: NORMAL
PLATELET # BLD AUTO: 42 10*3/MM3 (ref 140–450)
PMV BLD AUTO: 11.5 FL (ref 6–12)
POTASSIUM SERPL-SCNC: 4 MMOL/L (ref 3.5–5.2)
PROT SERPL-MCNC: 6.9 G/DL (ref 6–8.5)
RBC # BLD AUTO: 4.77 10*6/MM3 (ref 3.77–5.28)
RBC MORPH BLD: NORMAL
SODIUM SERPL-SCNC: 140 MMOL/L (ref 136–145)
TROPONIN T SERPL-MCNC: <0.01 NG/ML (ref 0–0.03)
WBC MORPH BLD: NORMAL
WBC NRBC COR # BLD: 5.54 10*3/MM3 (ref 3.4–10.8)

## 2022-11-06 PROCEDURE — 80053 COMPREHEN METABOLIC PANEL: CPT | Performed by: EMERGENCY MEDICINE

## 2022-11-06 PROCEDURE — 71045 X-RAY EXAM CHEST 1 VIEW: CPT

## 2022-11-06 PROCEDURE — 83880 ASSAY OF NATRIURETIC PEPTIDE: CPT | Performed by: EMERGENCY MEDICINE

## 2022-11-06 PROCEDURE — 85025 COMPLETE CBC W/AUTO DIFF WBC: CPT | Performed by: EMERGENCY MEDICINE

## 2022-11-06 PROCEDURE — 99284 EMERGENCY DEPT VISIT MOD MDM: CPT

## 2022-11-06 PROCEDURE — 85007 BL SMEAR W/DIFF WBC COUNT: CPT | Performed by: EMERGENCY MEDICINE

## 2022-11-06 PROCEDURE — 84484 ASSAY OF TROPONIN QUANT: CPT | Performed by: EMERGENCY MEDICINE

## 2022-11-06 RX ORDER — SODIUM CHLORIDE 0.9 % (FLUSH) 0.9 %
10 SYRINGE (ML) INJECTION AS NEEDED
Status: DISCONTINUED | OUTPATIENT
Start: 2022-11-06 | End: 2022-11-06 | Stop reason: HOSPADM

## 2022-11-06 NOTE — ED TRIAGE NOTES
Patient to ER via car from home for high blood pressure  Patient states she took her blood pressure because she had a headache, dizziness, balance issues and chest tightness.  x3am    Patients  states her speech and face is normal  Patient a&ox4 and no other stroke deficits  Patient is on medication for high blood pressure

## 2022-11-06 NOTE — ED PROVIDER NOTES
" EMERGENCY DEPARTMENT ENCOUNTER    Room Number:  16/16  Date seen:  11/6/2022  PCP: Tylor Davis  Historian: Patient      HPI:  Chief Complaint: High blood pressure  A complete HPI/ROS/PMH/PSH/SH/FH are unobtainable due to: Nothing  Context: Bekah Gibson is a 52 y.o. female who presents to the ED c/o high blood pressure that she noticed this morning.  When asked more specifically what actual symptoms she was having, she reports that it was primarily the elevated number that concerned her enough to come to the emergency department.  She also reports a little bit of chest discomfort.  She also noticed upon getting up out of bed around 3 AM that she was slightly dizzy.  She denies room spinning sensation.  She denies double vision.  She denies difficulty walking.  She denies any acute vision or speech disturbance.  Her dizziness is improved currently.  She reports only minimal chest discomfort currently.  She has chronic left shoulder pain.  She denies acute shortness of breath, nausea, vomiting, diaphoresis.  Patient reports that she was recently in the hospital due to being ill.  She denies leg pain or leg swelling.  When asked if she smokes she responded \"not really, less than a pack a week\".            PAST MEDICAL HISTORY  Active Ambulatory Problems     Diagnosis Date Noted   • Irritable bowel syndrome with both constipation and diarrhea 06/05/2017   • Peripheral neuropathy 06/05/2017   • Vitamin D deficiency 06/05/2017   • History of HPV infection 06/05/2017   • Hypothyroidism 06/05/2017   • Tobacco dependence due to cigarettes 06/05/2017   • Acute kidney injury (HCC) 12/28/2015   • Ascites 06/06/2016   • E. coli UTI (urinary tract infection) 06/06/2016   • Alcohol withdrawal syndrome, with delirium (HCC) 06/29/2018   • Alcoholic hepatitis 06/29/2018   • GI bleed 06/29/2018   • Acute post-hemorrhagic anemia 06/29/2018   • Thrombocytopenia (HCC) 06/29/2018   • Open wound of right shoulder region 06/29/2018   • " Pancreatic insufficiency 07/04/2018   • Alcoholic ketoacidosis 07/21/2019   • Chronic alcohol abuse 07/29/2019   • ESBL (extended spectrum beta-lactamase) producing bacteria infection 07/29/2019   • Abnormal weight loss 12/13/2019   • Hashimoto's disease 12/13/2019   • Chronic fatigue 12/14/2019   • History of alcohol use disorder 09/21/2021   • Alcohol intoxication (HCC) 09/21/2021   • Lupus (HCC)    • Hypomagnesemia    • Pancytopenia (HCC)    • Alcoholic cirrhosis (HCC)    • Bilateral lower abdominal discomfort 09/21/2021   • Primary hypertension 10/24/2022     Resolved Ambulatory Problems     Diagnosis Date Noted   • Acute renal failure (HCC) 02/20/2017   • Kidney stone 06/05/2017   • Hypotension 07/01/2018   • Nausea and vomiting 09/21/2021   • Acute GI bleeding 10/24/2022   • Melena 10/24/2022     Past Medical History:   Diagnosis Date   • Ankle sprain 2/2004   • Anxiety    • Disease of thyroid gland    • ETOH abuse    • Fracture, clavicle 1/2021   • Hypertension    • MDD (major depressive disorder)    • Pancreatitis    • Periarthritis of shoulder see above   • Rotator cuff syndrome odre for shoulder replacement         PAST SURGICAL HISTORY  Past Surgical History:   Procedure Laterality Date   • CLAVICLE SURGERY Right 2018   • ENDOSCOPY N/A 10/26/2022    Procedure: ESOPHAGOGASTRODUODENOSCOPY wtih banding;  Surgeon: Ag Patel MD;  Location: [facility] ENDOSCOPY;  Service: Gastroenterology;  Laterality: N/A;  pre: melena  post: esophageal varicies with banding x2 bands   • JOINT REPLACEMENT      GREAT TOE   • KIDNEY STONE SURGERY      LITHOTRIPSY AND STENT (R SIDE)   • LAPAROSCOPIC TUBAL LIGATION  2005   • SHOULDER SURGERY  desperately needed         FAMILY HISTORY  Family History   Problem Relation Age of Onset   • Rheumatologic disease Mother         congestive heart   • Thyroid disease Father    • Leukemia Father    • Cancer Father    • Drug abuse Brother    • Malig Hyperthermia Neg Hx          SOCIAL  HISTORY  Social History     Socioeconomic History   • Marital status:    Tobacco Use   • Smoking status: Some Days     Packs/day: 0.25     Years: 10.00     Pack years: 2.50     Types: Cigarettes   • Smokeless tobacco: Never   • Tobacco comments:     Rarely smoke sometimes will to rlaxx   Vaping Use   • Vaping Use: Never used   Substance and Sexual Activity   • Alcohol use: Yes     Alcohol/week: 5.0 - 10.0 standard drinks     Types: 5 - 10 Shots of liquor per week     Comment: Am in recovery   • Drug use: No     Comment: used THC in college   • Sexual activity: Not Currently     Partners: Male     Birth control/protection: Surgical     Comment: cinthia- menepausal         ALLERGIES  Benzonatate, Phenergan [promethazine hcl], Promethazine, Cucumber extract, and Promethazine-phenylephrine        REVIEW OF SYSTEMS  Review of Systems   Review of all 14 systems is negative other than stated in the HPI above.      PHYSICAL EXAM  ED Triage Vitals [11/06/22 0826]   Temp Heart Rate Resp BP SpO2   98.3 °F (36.8 °C) 83 18 167/99 97 %      Temp src Heart Rate Source Patient Position BP Location FiO2 (%)   -- -- -- -- --         GENERAL: Awake and alert, no acute distress  HENT: nares patent, oropharynx clear  EYES: no scleral icterus, pupils 3 mm reactive bilaterally  CV: regular rhythm, normal rate, no murmur  RESPIRATORY: normal effort, lungs clear auscultation bilaterally  ABDOMEN: soft, nondistended, nontender throughout.  Small area of ecchymosis left lower abdominal wall.  MUSCULOSKELETAL: no deformity, no lower extremity edema, no calf tenderness bilaterally  NEURO: alert, moves all extremities, follows commands, cranial nerves II through XII grossly intact, speech fluent and clear.  Extraocular is intact without nystagmus.  Normal sensation to light touch about all extremities.  PSYCH:  calm, cooperative  SKIN: warm, dry    Vital signs and nursing notes reviewed.          LAB RESULTS  Recent Results (from the past 24  hour(s))   Comprehensive Metabolic Panel    Collection Time: 11/06/22  8:52 AM    Specimen: Blood   Result Value Ref Range    Glucose 162 (H) 65 - 99 mg/dL    BUN 10 6 - 20 mg/dL    Creatinine 0.61 0.57 - 1.00 mg/dL    Sodium 140 136 - 145 mmol/L    Potassium 4.0 3.5 - 5.2 mmol/L    Chloride 100 98 - 107 mmol/L    CO2 29.1 (H) 22.0 - 29.0 mmol/L    Calcium 9.8 8.6 - 10.5 mg/dL    Total Protein 6.9 6.0 - 8.5 g/dL    Albumin 4.60 3.50 - 5.20 g/dL    ALT (SGPT) 49 (H) 1 - 33 U/L    AST (SGOT) 42 (H) 1 - 32 U/L    Alkaline Phosphatase 137 (H) 39 - 117 U/L    Total Bilirubin 1.5 (H) 0.0 - 1.2 mg/dL    Globulin 2.3 gm/dL    A/G Ratio 2.0 g/dL    BUN/Creatinine Ratio 16.4 7.0 - 25.0    Anion Gap 10.9 5.0 - 15.0 mmol/L    eGFR 106.4 >60.0 mL/min/1.73   BNP    Collection Time: 11/06/22  8:52 AM    Specimen: Blood   Result Value Ref Range    proBNP 531.0 0.0 - 900.0 pg/mL   Troponin    Collection Time: 11/06/22  8:52 AM    Specimen: Blood   Result Value Ref Range    Troponin T <0.010 0.000 - 0.030 ng/mL   CBC Auto Differential    Collection Time: 11/06/22  8:52 AM    Specimen: Blood   Result Value Ref Range    WBC 5.54 3.40 - 10.80 10*3/mm3    RBC 4.77 3.77 - 5.28 10*6/mm3    Hemoglobin 15.4 12.0 - 15.9 g/dL    Hematocrit 45.0 34.0 - 46.6 %    MCV 94.3 79.0 - 97.0 fL    MCH 32.3 26.6 - 33.0 pg    MCHC 34.2 31.5 - 35.7 g/dL    RDW 13.9 12.3 - 15.4 %    RDW-SD 48.0 37.0 - 54.0 fl    MPV 11.5 6.0 - 12.0 fL    Platelets 42 (C) 140 - 450 10*3/mm3    Neutrophil % 86.7 (H) 42.7 - 76.0 %    Lymphocyte % 7.2 (L) 19.6 - 45.3 %    Monocyte % 5.6 5.0 - 12.0 %    Eosinophil % 0.0 (L) 0.3 - 6.2 %    Basophil % 0.0 0.0 - 1.5 %    Neutrophils, Absolute 4.80 1.70 - 7.00 10*3/mm3    Lymphocytes, Absolute 0.40 (L) 0.70 - 3.10 10*3/mm3    Monocytes, Absolute 0.31 0.10 - 0.90 10*3/mm3    Eosinophils, Absolute 0.00 0.00 - 0.40 10*3/mm3    Basophils, Absolute 0.00 0.00 - 0.20 10*3/mm3   Scan Slide    Collection Time: 11/06/22  8:52 AM    Specimen:  Blood   Result Value Ref Range    RBC Morphology Normal Normal    WBC Morphology Normal Normal    Platelet Morphology Normal Normal       Ordered the above labs and reviewed the results.        RADIOLOGY  XR Chest 1 View    Result Date: 11/6/2022  XR CHEST 1 VW-  HISTORY: Female who is 52 years-old,  chest pain  TECHNIQUE: Frontal view of the chest  COMPARISON: 10/27/2022  FINDINGS: Heart, mediastinum and pulmonary vasculature are unremarkable. No focal pulmonary consolidation, pleural effusion, or pneumothorax. No acute osseous process.      No evidence for acute pulmonary process. Follow-up as clinical indications persist.  This report was finalized on 11/6/2022 9:36 AM by Dr. Wilder Hanna M.D.        Ordered the above noted radiological studies. Reviewed by me in PACS.            PROCEDURES  Procedures            MEDICATIONS GIVEN IN ER  Medications - No data to display                MEDICAL DECISION MAKING, PROGRESS, and CONSULTS    All labs have been independently reviewed by me.  All radiology studies have been reviewed by me and discussed with radiologist dictating the report.   EKG's independently viewed and interpreted by me.  Discussion below represents my analysis of pertinent findings related to patient's condition, differential diagnosis, treatment plan and final disposition.      Differential diagnosis:  Hypertensive urgency  Acute coronary syndrome  Pneumothorax  Acute CHF  Acute renal failure      ED Course as of 11/06/22 1323   Sun Nov 06, 2022   0845 Stroke alert was called from triage.  Patient does not have any acute neurologic deficits.  NIH stroke scale 0.  I do not suspect acute ischemic or hemorrhagic stroke. [JR]   0958 Platelets(!!): 42 [JR]   0958 CO2(!): 29.1 [JR]   0958 ALT (SGPT)(!): 49 [JR]   0958 AST (SGOT)(!): 42 [JR]   0958 Glucose(!): 162 [JR]   0958 Hemoglobin: 15.4 [JR]   0959 Troponin T: <0.010 [JR]   0959 BP: 151/92 [JR]   0959 Chronic thrombocytopenia and elevation of  liver enzymes, likely secondary to patient's history of alcohol dependence and underlying liver disease. [JR]   0959 Blood pressure has spontaneously improved to 151/92. [JR]   0959 EKG without ischemic changes, cardiac troponin is normal. [JR]   1000 HEART score 2. [JR]   1000 Patient appropriate discharge home with outpatient PCP follow-up for further evaluation of her elevated blood pressure.  Return precautions were discussed. [JR]   1018 EKG          EKG time: 6:51 AM  Rhythm/Rate: Sinus rhythm, 67  P waves and CO: Normal  QRS, axis: Normal axis  ST and T waves: No acute ischemic changes    Interpreted Contemporaneously by me, independently viewed         [JR]      ED Course User Index  [JR] Gama Mendez MD              I wore an N95 mask, face shield, and gloves during this patient encounter.  Patient also wearing a surgical mask.  Hand hygeine performed before and after seeing the patient.    DIAGNOSIS  Final diagnoses:   Hypertension not at goal   Atypical chest pain   Elevated LFTs   Thrombocytopenia (HCC)         DISPOSITION  DISCHARGE    Patient discharged in stable condition.    Reviewed implications of results, diagnosis, meds, responsibility to follow up, warning signs and symptoms of possible worsening, potential complications and reasons to return to ER.    Patient/Family voiced understanding of above instructions.    Discussed plan for discharge, as there is no emergent indication for admission. Patient referred to primary care provider for BP management due to today's BP. Pt/family is agreeable and understands need for follow up and repeat testing.  Pt is aware that discharge does not mean that nothing is wrong but it indicates no emergency is present that requires admission and they must continue care with follow-up as given below or physician of their choice.     FOLLOW-UP  Tylor Davis  [address]  [address] [address] [phone]    Schedule an appointment as soon as  possible for a visit            Medication List      No changes were made to your prescriptions during this visit.                   Latest Documented Vital Signs:  As of 13:23 EST  BP- 158/95 HR- 62 Temp- 98.3 °F (36.8 °C) O2 sat- 98%        --    Please note that portions of this were completed with a voice recognition program.          Gama Mendez MD  11/06/22 5213

## 2022-11-09 ENCOUNTER — HOSPITAL ENCOUNTER (EMERGENCY)
Facility: HOSPITAL | Age: 52
Discharge: HOME | End: 2022-11-09
Attending: EMERGENCY MEDICINE | Admitting: EMERGENCY MEDICINE

## 2022-11-09 ENCOUNTER — READMISSION MANAGEMENT (OUTPATIENT)
Dept: CALL CENTER | Facility: HOSPITAL | Age: 52
End: 2022-11-09

## 2022-11-09 VITALS
HEIGHT: 65 IN | BODY MASS INDEX: 23.66 KG/M2 | RESPIRATION RATE: 18 BRPM | SYSTOLIC BLOOD PRESSURE: 184 MMHG | DIASTOLIC BLOOD PRESSURE: 102 MMHG | OXYGEN SATURATION: 97 % | TEMPERATURE: 98.4 F | HEART RATE: 92 BPM | WEIGHT: 142 LBS

## 2022-11-09 DIAGNOSIS — I10 POORLY-CONTROLLED HYPERTENSION: Primary | ICD-10-CM

## 2022-11-09 LAB
HOLD SPECIMEN: NORMAL
HOLD SPECIMEN: NORMAL
WHOLE BLOOD HOLD COAG: NORMAL
WHOLE BLOOD HOLD SPECIMEN: NORMAL

## 2022-11-09 PROCEDURE — 99284 EMERGENCY DEPT VISIT MOD MDM: CPT

## 2022-11-09 RX ORDER — AMLODIPINE BESYLATE 5 MG/1
5 TABLET ORAL ONCE
Status: COMPLETED | OUTPATIENT
Start: 2022-11-09 | End: 2022-11-09

## 2022-11-09 RX ADMIN — AMLODIPINE BESYLATE 5 MG: 5 TABLET ORAL at 11:13

## 2022-11-09 NOTE — DISCHARGE INSTRUCTIONS
Increase your amlodipine/Norvasc dose to 10 mg daily.  Check and record your blood pressure regularly.  Follow-up with your primary care provider soon as possible.  Return to the emergency department for worsening/persistent symptoms, chest pain, headache, dizziness, shortness of breath, or other concern.
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
## 2022-11-09 NOTE — OUTREACH NOTE
Medical Week 2 Survey    Flowsheet Row Responses   [facility] patient discharged from? [facility]   Does the patient have one of the following disease processes/diagnoses(primary or secondary)? Other   Week 2 attempt successful? Yes   Call start time 1607   Call end time 1608   Person spoke with today (if not patient) and relationship Sister   Meds reviewed with patient/caregiver? Yes   Is the patient having any side effects they believe may be caused by any medication additions or changes? No   Does the patient have all medications ordered at discharge? Yes   Is the patient taking all medications as directed (includes completed medication regime)? Yes   Does the patient have a primary care provider?  Yes   Does the patient have an appointment with their PCP within 7 days of discharge? Yes   Has the patient kept scheduled appointments due by today? Yes   What is the patient's perception of their health status since discharge? Improving   Week 2 Call Completed? Yes          MAG MONROY - Registered Nurse

## 2022-11-09 NOTE — ED PROVIDER NOTES
EMERGENCY DEPARTMENT ENCOUNTER    Room Number:  22/22  Date of encounter:  11/9/2022  PCP: Tylor Davis  Historian: Patient     I used full protective equipment while examining this patient.  This includes face mask, gloves and protective eyewear.  I washed my hands before entering the room and immediately upon leaving the room.  Patient was wearing a surgical mask.      HPI:  Chief Complaint: Elevated blood pressure  A complete HPI/ROS/PMH/PSH/SH/FH are unobtainable due to: None    Context: Bekah Gibson is a 52 y.o. female who presents to the ED from home by EMS c/o high blood pressure.  Patient checked her blood pressure this morning it was 180/106.  Because her blood pressure was elevated, she states that she became anxious and then believes she had a panic attack.  She felt like her heart was racing and she was short of breath.  She took her normal morning dose of amlodipine at around 7 AM.  She takes nadolol at night.  Denies headache, dizziness, vision changes, chest pain, abdominal pain, or numbness/tingling/weakness in her extremities.  Patient was seen here 3 days ago for high blood pressure.  Patient is currently feeling better.  Triage note states that the patient says she does not feel safe at home but the patient adamantly denies this and states she did not say that.      PAST MEDICAL HISTORY  Active Ambulatory Problems     Diagnosis Date Noted   • Irritable bowel syndrome with both constipation and diarrhea 06/05/2017   • Peripheral neuropathy 06/05/2017   • Vitamin D deficiency 06/05/2017   • History of HPV infection 06/05/2017   • Hypothyroidism 06/05/2017   • Tobacco dependence due to cigarettes 06/05/2017   • Acute kidney injury (HCC) 12/28/2015   • Ascites 06/06/2016   • E. coli UTI (urinary tract infection) 06/06/2016   • Alcohol withdrawal syndrome, with delirium (HCC) 06/29/2018   • Alcoholic hepatitis 06/29/2018   • GI bleed 06/29/2018   • Acute post-hemorrhagic anemia 06/29/2018   •  Thrombocytopenia (HCC) 06/29/2018   • Open wound of right shoulder region 06/29/2018   • Pancreatic insufficiency 07/04/2018   • Alcoholic ketoacidosis 07/21/2019   • Chronic alcohol abuse 07/29/2019   • ESBL (extended spectrum beta-lactamase) producing bacteria infection 07/29/2019   • Abnormal weight loss 12/13/2019   • Hashimoto's disease 12/13/2019   • Chronic fatigue 12/14/2019   • History of alcohol use disorder 09/21/2021   • Alcohol intoxication (HCC) 09/21/2021   • Lupus (HCC)    • Hypomagnesemia    • Pancytopenia (HCC)    • Alcoholic cirrhosis (HCC)    • Bilateral lower abdominal discomfort 09/21/2021   • Primary hypertension 10/24/2022     Resolved Ambulatory Problems     Diagnosis Date Noted   • Acute renal failure (HCC) 02/20/2017   • Kidney stone 06/05/2017   • Hypotension 07/01/2018   • Nausea and vomiting 09/21/2021   • Acute GI bleeding 10/24/2022   • Melena 10/24/2022     Past Medical History:   Diagnosis Date   • Ankle sprain 2/2004   • Anxiety    • Disease of thyroid gland    • ETOH abuse    • Fracture, clavicle 1/2021   • Hypertension    • MDD (major depressive disorder)    • Pancreatitis    • Periarthritis of shoulder see above   • Rotator cuff syndrome odre for shoulder replacement         PAST SURGICAL HISTORY  Past Surgical History:   Procedure Laterality Date   • CLAVICLE SURGERY Right 2018   • ENDOSCOPY N/A 10/26/2022    Procedure: ESOPHAGOGASTRODUODENOSCOPY wtih banding;  Surgeon: Ag Patel MD;  Location: [facility] ENDOSCOPY;  Service: Gastroenterology;  Laterality: N/A;  pre: melena  post: esophageal varicies with banding x2 bands   • JOINT REPLACEMENT      GREAT TOE   • KIDNEY STONE SURGERY      LITHOTRIPSY AND STENT (R SIDE)   • LAPAROSCOPIC TUBAL LIGATION  2005   • SHOULDER SURGERY  desperately needed         FAMILY HISTORY  Family History   Problem Relation Age of Onset   • Rheumatologic disease Mother         congestive heart   • Thyroid disease Father    • Leukemia Father    •  Cancer Father    • Drug abuse Brother    • Malig Hyperthermia Neg Hx          SOCIAL HISTORY  Social History     Socioeconomic History   • Marital status:    Tobacco Use   • Smoking status: Some Days     Packs/day: 0.25     Years: 10.00     Pack years: 2.50     Types: Cigarettes   • Smokeless tobacco: Never   • Tobacco comments:     Rarely smoke sometimes will to rlaxx   Vaping Use   • Vaping Use: Never used   Substance and Sexual Activity   • Alcohol use: Yes     Alcohol/week: 5.0 - 10.0 standard drinks     Types: 5 - 10 Shots of liquor per week     Comment: Am in recovery   • Drug use: No     Comment: used THC in college   • Sexual activity: Not Currently     Partners: Male     Birth control/protection: Surgical     Comment: cinthia- menepausal         ALLERGIES  Benzonatate, Phenergan [promethazine hcl], Promethazine, Cucumber extract, and Promethazine-phenylephrine       REVIEW OF SYSTEMS  Review of Systems      All systems have been reviewed and are negative except as as discussed in the HPI    PHYSICAL EXAM    I have reviewed the triage vital signs and nursing notes.    ED Triage Vitals [11/09/22 0845]   Temp Heart Rate Resp BP SpO2   98.4 °F (36.9 °C) 89 18 (!) 172/101 99 %      Temp src Heart Rate Source Patient Position BP Location FiO2 (%)   -- -- -- -- --       Physical Exam  GENERAL: Awake, alert, oriented x3.  Well-developed, well-nourished female.  Resting comfortably in no acute distress  HENT: NCAT, nares patent, moist mucous membranes  EYES: Extraocular muscles intact  CV: regular rhythm, regular rate  RESPIRATORY: normal effort, clear to auscultation bilaterally  ABDOMEN: soft, nontender  MUSCULOSKELETAL: Extremities are nontender and without obvious deformity.  There is no calf tenderness or pedal edema  NEURO: Speech is normal.  No facial droop.  Normal strength and light touch sensation all extremities  SKIN: warm, dry, no rash  PSYCH: Normal mood and affect      LAB RESULTS  Recent Results  (from the past 24 hour(s))   Green Top (Gel)    Collection Time: 11/09/22 10:11 AM   Result Value Ref Range    Extra Tube Hold for add-ons.    Lavender Top    Collection Time: 11/09/22 10:11 AM   Result Value Ref Range    Extra Tube hold for add-on    Gold Top - SST    Collection Time: 11/09/22 10:11 AM   Result Value Ref Range    Extra Tube Hold for add-ons.    Light Blue Top    Collection Time: 11/09/22 10:11 AM   Result Value Ref Range    Extra Tube Hold for add-ons.        Ordered the above labs and independently reviewed the results.      RADIOLOGY  No Radiology Exams Resulted Within Past 24 Hours    I ordered the above noted radiological studies. Reviewed by me and discussed with radiologist.  See dictation for official radiology interpretation.      PROCEDURES  Procedures      MEDICATIONS GIVEN IN ER    Medications   amLODIPine (NORVASC) tablet 5 mg (5 mg Oral Given 11/9/22 1113)         PROGRESS, DATA ANALYSIS, CONSULTS, AND MEDICAL DECISION MAKING    All labs have been independently reviewed by me.  All radiology studies have been reviewed by me and discussed with radiologist dictating the report.   EKG's independently viewed and interpreted by me.  I have reviewed the nurse's notes, vital signs, past medical history, and medication list.  Discussion below represents my analysis of pertinent findings related to patient's condition, differential diagnosis, treatment plan and final disposition.      ED Course as of 11/09/22 1747   Wed Nov 09, 2022   1101 Records reviewed.  Patient was seen here in the ED 3 days ago for hypertension and atypical chest pain.  Work-up was unremarkable except for chronic thrombocytopenia and mild chronic elevation of ALT and AST.  She was admitted here last month for alcohol withdrawal and anemia.  She has a history of alcohol dependence and cirrhosis. [WH]   1101 BP(!): 172/101 [WH]   1110 Blood pressure is currently 170/100.  Patient will be given an extra 5 mg of amlodipine.   I advised her to increase her amlodipine dose to 10 mg daily and to check and record her blood pressure regularly until she can follow-up with her PCP. [WH]   1229 Patient is resting comfortably.  Blood pressure is now 180/102.  She wants to go home.  She is asymptomatic.  She was advised to follow-up with her PCP.  Return precautions were discussed. [WH]      ED Course User Index  [WH] Babar Martinez MD       AS OF 17:47 EST VITALS:    BP - (!) 184/102  HR - 92  TEMP - 98.4 °F (36.9 °C)  O2 SATS - 97%      DIAGNOSIS  Final diagnoses:   Poorly-controlled hypertension         DISPOSITION  DISCHARGE    Patient discharged in stable condition.    Reviewed implications of results, diagnosis, meds, responsibility to follow up, warning signs and symptoms of possible worsening, potential complications and reasons to return to ER, including worsening or persistent symptoms, chest pain, headache, dizziness, numbness/tingling/weakness in extremities, or other concern..    Patient/Family voiced understanding of above instructions.    Discussed plan for discharge, as there is no emergent indication for admission. Patient referred to primary care provider for BP management due to today's BP. Pt/family is agreeable and understands need for follow up and repeat testing.  Pt is aware that discharge does not mean that nothing is wrong but it indicates no emergency is present that requires admission and they must continue care with follow-up as given below or physician of their choice.     FOLLOW-UP  Tylor Davis  [address] [id number] [phone]    Schedule an appointment as soon as possible for a visit            Medication List      No changes were made to your prescriptions during this visit.           Dictated utilizing Dragon dictation     Babar Martinez MD  11/09/22 [id number]

## 2022-11-10 ENCOUNTER — HOSPITAL ENCOUNTER (EMERGENCY)
Facility: HOSPITAL | Age: 52
Discharge: HOME | End: 2022-11-10
Attending: EMERGENCY MEDICINE | Admitting: EMERGENCY MEDICINE

## 2022-11-10 VITALS
HEIGHT: 65 IN | BODY MASS INDEX: 23.63 KG/M2 | SYSTOLIC BLOOD PRESSURE: 165 MMHG | HEART RATE: 66 BPM | RESPIRATION RATE: 18 BRPM | OXYGEN SATURATION: 99 % | TEMPERATURE: 97.6 F | DIASTOLIC BLOOD PRESSURE: 107 MMHG

## 2022-11-10 DIAGNOSIS — F41.1 ANXIETY STATE: ICD-10-CM

## 2022-11-10 DIAGNOSIS — I10 ELEVATED BLOOD PRESSURE READING WITH DIAGNOSIS OF HYPERTENSION: Primary | ICD-10-CM

## 2022-11-10 DIAGNOSIS — D69.6 THROMBOCYTOPENIA: ICD-10-CM

## 2022-11-10 LAB
ALBUMIN SERPL-MCNC: 3.5 G/DL (ref 3.5–5.2)
ALBUMIN/GLOB SERPL: 1.5 G/DL
ALP SERPL-CCNC: 125 U/L (ref 39–117)
ALT SERPL W P-5'-P-CCNC: 40 U/L (ref 1–33)
ANION GAP SERPL CALCULATED.3IONS-SCNC: 10.6 MMOL/L (ref 5–15)
AST SERPL-CCNC: 40 U/L (ref 1–32)
BASOPHILS # BLD AUTO: 0 10*3/MM3 (ref 0–0.2)
BASOPHILS NFR BLD AUTO: 0 % (ref 0–1.5)
BILIRUB SERPL-MCNC: 2 MG/DL (ref 0–1.2)
BUN SERPL-MCNC: 10 MG/DL (ref 6–20)
BUN/CREAT SERPL: 15.9 (ref 7–25)
CALCIUM SPEC-SCNC: 8.8 MG/DL (ref 8.6–10.5)
CHLORIDE SERPL-SCNC: 101 MMOL/L (ref 98–107)
CO2 SERPL-SCNC: 24.4 MMOL/L (ref 22–29)
CREAT SERPL-MCNC: 0.63 MG/DL (ref 0.57–1)
DEPRECATED RDW RBC AUTO: 45.4 FL (ref 37–54)
EGFRCR SERPLBLD CKD-EPI 2021: 105.6 ML/MIN/1.73
EOSINOPHIL # BLD AUTO: 0.1 10*3/MM3 (ref 0–0.4)
EOSINOPHIL NFR BLD AUTO: 1.7 % (ref 0.3–6.2)
ERYTHROCYTE [DISTWIDTH] IN BLOOD BY AUTOMATED COUNT: 13.7 % (ref 12.3–15.4)
GLOBULIN UR ELPH-MCNC: 2.3 GM/DL
GLUCOSE SERPL-MCNC: 123 MG/DL (ref 65–99)
HCT VFR BLD AUTO: 41.9 % (ref 34–46.6)
HGB BLD-MCNC: 14.9 G/DL (ref 12–15.9)
LYMPHOCYTES # BLD AUTO: 1.04 10*3/MM3 (ref 0.7–3.1)
LYMPHOCYTES NFR BLD AUTO: 17.8 % (ref 19.6–45.3)
MCH RBC QN AUTO: 32.1 PG (ref 26.6–33)
MCHC RBC AUTO-ENTMCNC: 35.6 G/DL (ref 31.5–35.7)
MCV RBC AUTO: 90.3 FL (ref 79–97)
MONOCYTES # BLD AUTO: 0.91 10*3/MM3 (ref 0.1–0.9)
MONOCYTES NFR BLD AUTO: 15.6 % (ref 5–12)
NEUTROPHILS NFR BLD AUTO: 3.72 10*3/MM3 (ref 1.7–7)
NEUTROPHILS NFR BLD AUTO: 63.9 % (ref 42.7–76)
PLATELET # BLD AUTO: 47 10*3/MM3 (ref 140–450)
PMV BLD AUTO: 11.2 FL (ref 6–12)
POTASSIUM SERPL-SCNC: 3.7 MMOL/L (ref 3.5–5.2)
PROT SERPL-MCNC: 5.8 G/DL (ref 6–8.5)
QT INTERVAL: 473 MS
RBC # BLD AUTO: 4.64 10*6/MM3 (ref 3.77–5.28)
SODIUM SERPL-SCNC: 136 MMOL/L (ref 136–145)
TROPONIN T SERPL-MCNC: <0.01 NG/ML (ref 0–0.03)
WBC NRBC COR # BLD: 5.83 10*3/MM3 (ref 3.4–10.8)

## 2022-11-10 PROCEDURE — 93010 ELECTROCARDIOGRAM REPORT: CPT | Performed by: INTERNAL MEDICINE

## 2022-11-10 PROCEDURE — 25010000002 LORAZEPAM PER 2 MG: Performed by: NURSE PRACTITIONER

## 2022-11-10 PROCEDURE — 84484 ASSAY OF TROPONIN QUANT: CPT | Performed by: NURSE PRACTITIONER

## 2022-11-10 PROCEDURE — 96374 THER/PROPH/DIAG INJ IV PUSH: CPT

## 2022-11-10 PROCEDURE — 93005 ELECTROCARDIOGRAM TRACING: CPT

## 2022-11-10 PROCEDURE — 80053 COMPREHEN METABOLIC PANEL: CPT | Performed by: NURSE PRACTITIONER

## 2022-11-10 PROCEDURE — 85025 COMPLETE CBC W/AUTO DIFF WBC: CPT | Performed by: NURSE PRACTITIONER

## 2022-11-10 PROCEDURE — 99284 EMERGENCY DEPT VISIT MOD MDM: CPT

## 2022-11-10 PROCEDURE — 93005 ELECTROCARDIOGRAM TRACING: CPT | Performed by: EMERGENCY MEDICINE

## 2022-11-10 RX ORDER — SODIUM CHLORIDE 0.9 % (FLUSH) 0.9 %
10 SYRINGE (ML) INJECTION AS NEEDED
Status: DISCONTINUED | OUTPATIENT
Start: 2022-11-10 | End: 2022-11-10 | Stop reason: HOSPADM

## 2022-11-10 RX ORDER — LORAZEPAM 2 MG/ML
1 INJECTION INTRAMUSCULAR ONCE
Status: COMPLETED | OUTPATIENT
Start: 2022-11-10 | End: 2022-11-10

## 2022-11-10 RX ADMIN — LORAZEPAM 1 MG: 2 INJECTION INTRAMUSCULAR; INTRAVENOUS at 06:58

## 2022-11-10 NOTE — ED PROVIDER NOTES
The PATO and I have discussed this patient's history, physical exam and treatment plan.  I provided a substantive portion of the care of this patient.  I have reviewed the documentation and personally had a face to face interaction with the patient and personally performed the physical exam, in its entirety.  I affirm the documentation and agree with the treatment and plan.  The following describes my personal findings.      The patient presents complaining of constant chest tightness for the past 3 months, elevated blood pressures, anxiety.  Patient denies SI/HI, hallucinations.  Patient reports she is no longer drinking, is decreasing her smoking, reports only a few cigarettes daily.      Comprehensive Physical exam:  Patient is nontoxic appearing oriented, conversant awake, alert  HEENT: normocephalic, atraumatic  Neck: No JVD, no goiter, no pain with ROM  Pulmonary: Nontachypneic, breath sounds heard well bilaterally  cardiovascular: Nontachycardic  Abdomen: Soft, nontender  musculoskeletal: Good range of motion, pulse, sensation is warm  Neuro/psychiatric:calm, appropriate, cooperative  Skin:warm, dry    EKG          EKG time: 06 23  Rhythm/Rate: Sinus rhythm, rate in the 60s  P waves and LA: Normal P waves, normal KATERINA's  QRS, axis: Poor R wave progression  ST and T waves: Unremarkable ST/T wave findings, somewhat limited by baseline artifact in V3/V6    Interpreted Contemporaneously by me, independently viewed  No old for comparison    Patient was wearing facemask when I entered the room and throughout our encounter. Full protective equipment was worn throughout this patient encounter including a face mask, eye protection and gloves. Hand hygiene was performed before donning protective equipment and after removal when leaving the room.           Janice Medeiros MD  11/11/22 1956

## 2022-11-10 NOTE — ED TRIAGE NOTES
Patient to ED per PV from home w/ reports of high blood pressure, nausea, chest tightness x15 minutes. Patient states she was seen for same yesterday, started on new BP med; took 2 doses, states med now working.

## 2022-11-10 NOTE — ED PROVIDER NOTES
"EMERGENCY DEPARTMENT ENCOUNTER    Room Number:  26/26  Date seen:  11/10/2022  Time seen: 06:25 EST  PCP: Tylor Davis  Historian: Patient, recent records    HPI:  Chief complaint: Evaded blood pressure reading, chest tightness  A complete HPI/ROS/PMH/PSH/SH/FH are unobtainable due to: Not applicable  Context:Bekah Gibson is a 52 y.o. female with history of IBS, peripheral neuropathy, hypothyroid, acute kidney injury, alcohol use disorder, alcoholic hepatitis, hypomagnesemia, hypertension who presents to the ED with c/o several elevated blood pressure readings noted at home.  She also reports recent significant stressors related to losing her parents and recent hospitalizations and she also tells me that she has stopped alcohol use after long history of drinking.  She states that she feels \"in a mode of panic\".  She does take some BuSpar 3 times a day but states it does not really help.  She denies any chest pain but does have some chest tightness.  She has no current complaints of shortness of breath, headache or unilateral weakness.    Patient was placed in face mask in first look. Patient was wearing facemask when I entered the room and throughout our encounter. I wore full protective equipment throughout this patient encounter including a N95 face mask, eye shield and gloves. Hand hygiene/washing of hands was performed before donning protective equipment and after removal when leaving the room.      MEDICAL RECORD REVIEW  Patient was seen here in the emergency department 1 day ago for complaints of high blood pressure.  She also had some associated sensation of heart racing and felt short of breath.  She was cleared from a cardiac standpoint and blood pressure was improved after an extra dose of amlodipine 5 mg.  She was instructed to increase her amlodipine to 10 mg daily.    Patient also had emergency department visit dated 11/6/2022 with complaints of high blood pressure.  At that time, she also reported " a bit of chest discomfort.  Her blood pressure spontaneously improved.  She had a normal EKG without ischemic changes and a very low heart score.      ALLERGIES  Benzonatate, Phenergan [promethazine hcl], Promethazine, Cucumber extract, and Promethazine-phenylephrine    PAST MEDICAL HISTORY  Active Ambulatory Problems     Diagnosis Date Noted   • Irritable bowel syndrome with both constipation and diarrhea 06/05/2017   • Peripheral neuropathy 06/05/2017   • Vitamin D deficiency 06/05/2017   • History of HPV infection 06/05/2017   • Hypothyroidism 06/05/2017   • Tobacco dependence due to cigarettes 06/05/2017   • Acute kidney injury (HCC) 12/28/2015   • Ascites 06/06/2016   • E. coli UTI (urinary tract infection) 06/06/2016   • Alcohol withdrawal syndrome, with delirium ([facility]) 06/29/2018   • Alcoholic hepatitis 06/29/2018   • GI bleed 06/29/2018   • Acute post-hemorrhagic anemia 06/29/2018   • Thrombocytopenia ([facility]) 06/29/2018   • Open wound of right shoulder region 06/29/2018   • Pancreatic insufficiency 07/04/2018   • Alcoholic ketoacidosis 07/21/2019   • Chronic alcohol abuse 07/29/2019   • ESBL (extended spectrum beta-lactamase) producing bacteria infection 07/29/2019   • Abnormal weight loss 12/13/2019   • Hashimoto's disease 12/13/2019   • Chronic fatigue 12/14/2019   • History of alcohol use disorder 09/21/2021   • Alcohol intoxication (HCC) 09/21/2021   • Lupus ([facility])    • Hypomagnesemia    • Pancytopenia ([facility])    • Alcoholic cirrhosis (HCC)    • Bilateral lower abdominal discomfort 09/21/2021   • Primary hypertension 10/24/2022     Resolved Ambulatory Problems     Diagnosis Date Noted   • Acute renal failure (HCC) 02/20/2017   • Kidney stone 06/05/2017   • Hypotension 07/01/2018   • Nausea and vomiting 09/21/2021   • Acute GI bleeding 10/24/2022   • Melena 10/24/2022     Past Medical History:   Diagnosis Date   • Ankle sprain 2/2004   • Anxiety    • Disease of thyroid gland    • ETOH abuse    • Fracture,  clavicle 1/2021   • Hypertension    • MDD (major depressive disorder)    • Pancreatitis    • Periarthritis of shoulder see above   • Rotator cuff syndrome odre for shoulder replacement       PAST SURGICAL HISTORY  Past Surgical History:   Procedure Laterality Date   • CLAVICLE SURGERY Right 2018   • ENDOSCOPY N/A 10/26/2022    Procedure: ESOPHAGOGASTRODUODENOSCOPY wtih banding;  Surgeon: Ag Patel MD;  Location: [facility] ENDOSCOPY;  Service: Gastroenterology;  Laterality: N/A;  pre: melena  post: esophageal varicies with banding x2 bands   • JOINT REPLACEMENT      GREAT TOE   • KIDNEY STONE SURGERY      LITHOTRIPSY AND STENT (R SIDE)   • LAPAROSCOPIC TUBAL LIGATION  2005   • SHOULDER SURGERY  desperately needed       FAMILY HISTORY  Family History   Problem Relation Age of Onset   • Rheumatologic disease Mother         congestive heart   • Thyroid disease Father    • Leukemia Father    • Cancer Father    • Drug abuse Brother    • Malig Hyperthermia Neg Hx        SOCIAL HISTORY  Social History     Socioeconomic History   • Marital status:    Tobacco Use   • Smoking status: Some Days     Packs/day: 0.25     Years: 10.00     Pack years: 2.50     Types: Cigarettes   • Smokeless tobacco: Never   • Tobacco comments:     Rarely smoke sometimes will to rlaxx   Vaping Use   • Vaping Use: Never used   Substance and Sexual Activity   • Alcohol use: Yes     Alcohol/week: 5.0 - 10.0 standard drinks     Types: 5 - 10 Shots of liquor per week     Comment: Am in recovery   • Drug use: No     Comment: used THC in college   • Sexual activity: Not Currently     Partners: Male     Birth control/protection: Surgical     Comment: cinthia- menepausal       REVIEW OF SYSTEMS  Review of Systems    All systems reviewed and negative except for those discussed in HPI.     PHYSICAL EXAM    ED Triage Vitals [11/10/22 0549]   Temp Heart Rate Resp BP SpO2   97.6 °F (36.4 °C) 74 18 150/95 97 %      Temp src Heart Rate Source Patient Position  BP Location FiO2 (%)   Tympanic Monitor Standing Right arm --     Physical Exam    I have reviewed the triage vital signs and nursing notes.      GENERAL: not distressed, anxious  HENT: nares patent, mm moist  EYES: no scleral icterus  NECK: no ROM limitations  CV: regular rhythm, regular rate, no murmur  RESPIRATORY: normal effort, CTAB  ABDOMEN: soft  : deferred  MUSCULOSKELETAL: no deformity  NEURO: alert, moves all extremities, follows commands  SKIN: warm, dry    HEARTSCORE    History  Highly suspicious              2    Moderately suspicious             1    Slightly or non-suspicious             0    ECG  Significant ST depression              2    Nonspecific repol disturbance            1    Normal                           0    Age  > or = 65                          2     46-65                           1    < or = 45                          0    Risk factors (hypercholesterolemia, HTN, DM, smoking, pos fam hx, obesity)                            > or = to 3 RF for atherosclerotic dx   2    1 or 2                 1    No risk factors                0    Troponin > or = 3x normal limit               2    1-3x normal limit    1    < or = Normal limit    0    Score  0 - 3 is low risk (0.9-1.7% risk of adverse cardiac event)  Score  4 - 6 is moderate risk (12-16.6% risk of adverse cardiac event)  Score  6 - 9 is high risk (50-65% risk of adverse cardiac event)    This patient's HEART score is 2         LAB RESULTS  Recent Results (from the past 24 hour(s))   ECG 12 Lead Chest Pain    Collection Time: 11/10/22  6:23 AM   Result Value Ref Range    QT Interval 473 ms   Comprehensive Metabolic Panel    Collection Time: 11/10/22  7:05 AM    Specimen: Blood   Result Value Ref Range    Glucose 123 (H) 65 - 99 mg/dL    BUN 10 6 - 20 mg/dL    Creatinine 0.63 0.57 - 1.00 mg/dL    Sodium 136 136 - 145 mmol/L    Potassium 3.7 3.5 - 5.2 mmol/L    Chloride 101 98 - 107 mmol/L    CO2 24.4 22.0 - 29.0 mmol/L    Calcium  8.8 8.6 - 10.5 mg/dL    Total Protein 5.8 (L) 6.0 - 8.5 g/dL    Albumin 3.50 3.50 - 5.20 g/dL    ALT (SGPT) 40 (H) 1 - 33 U/L    AST (SGOT) 40 (H) 1 - 32 U/L    Alkaline Phosphatase 125 (H) 39 - 117 U/L    Total Bilirubin 2.0 (H) 0.0 - 1.2 mg/dL    Globulin 2.3 gm/dL    A/G Ratio 1.5 g/dL    BUN/Creatinine Ratio 15.9 7.0 - 25.0    Anion Gap 10.6 5.0 - 15.0 mmol/L    eGFR 105.6 >60.0 mL/min/1.73   Troponin    Collection Time: 11/10/22  7:05 AM    Specimen: Blood   Result Value Ref Range    Troponin T <0.010 0.000 - 0.030 ng/mL   CBC Auto Differential    Collection Time: 11/10/22  7:05 AM    Specimen: Blood   Result Value Ref Range    WBC 5.83 3.40 - 10.80 10*3/mm3    RBC 4.64 3.77 - 5.28 10*6/mm3    Hemoglobin 14.9 12.0 - 15.9 g/dL    Hematocrit 41.9 34.0 - 46.6 %    MCV 90.3 79.0 - 97.0 fL    MCH 32.1 26.6 - 33.0 pg    MCHC 35.6 31.5 - 35.7 g/dL    RDW 13.7 12.3 - 15.4 %    RDW-SD 45.4 37.0 - 54.0 fl    MPV 11.2 6.0 - 12.0 fL    Platelets 47 (C) 140 - 450 10*3/mm3    Neutrophil % 63.9 42.7 - 76.0 %    Lymphocyte % 17.8 (L) 19.6 - 45.3 %    Monocyte % 15.6 (H) 5.0 - 12.0 %    Eosinophil % 1.7 0.3 - 6.2 %    Basophil % 0.0 0.0 - 1.5 %    Neutrophils, Absolute 3.72 1.70 - 7.00 10*3/mm3    Lymphocytes, Absolute 1.04 0.70 - 3.10 10*3/mm3    Monocytes, Absolute 0.91 (H) 0.10 - 0.90 10*3/mm3    Eosinophils, Absolute 0.10 0.00 - 0.40 10*3/mm3    Basophils, Absolute 0.00 0.00 - 0.20 10*3/mm3         RADIOLOGY RESULTS  No Radiology Exams Resulted Within Past 24 Hours       PROGRESS, DATA ANALYSIS, CONSULTS AND MEDICAL DECISION MAKING  All labs have been independently reviewed by me.  All radiology studies have been reviewed by me and discussed with radiologist dictating the report.  EKG's independently viewed and interpreted by me unless stated otherwise. Discussion below represents my analysis of pertinent findings related to patient's condition, differential diagnosis, treatment plan and final disposition.     ED Course as  "of 11/10/22 1011   u Nov 10, 2022   0639 Patient seems very wound up and anxious.  She has had several ED visits in the past 4 days for elevated blood pressure readings.  She has had some medication adjustments at home.  She does endorse feeling very \"wound up\".  I will recheck a cardiac enzyme and I plan to give her a dose of Ativan to see if it helps her.  Her blood pressure was systolic 160 but I feel it might trend down with some Ativan and a little bit of relaxation and reassurance. [EW]   0650 EKG      Viewed by ER MD prior to my interpretation    EKG time: 0623  Rhythm/Rate: 65, sinus rhythm  P waves and AL: borderline prolonged KATERINA  QRS, axis: normal QRS and axis  ST and T waves: no acute ST/T wave abnormality    Interpreted Contemporaneously by me, independently viewed  No prior available for comparison.  There are rhythm strips documented and rate/rhythm are normal from 10/24/2022   [EW]   0759 Troponin T: <0.010 [EW]   0812 Platelets(!!): 47  Pt has h/o thrombocytopenia   [EW]   0813 ALT (SGPT)(!): 40 [EW]   0813 AST (SGOT)(!): 40  Pt with chronically elevated liver enzymes, today's values are improved from one day ago.  [EW]   0816 Pt reports feeling less anxious. Labs are stable. We discussed plan to stop taking blood pressure as frequently as it is contributing to her anxiety.  She desires to get her life back and wants to go back to gym.  BP has trended down here. EKG unchanged.  [EW]      ED Course User Index  [EW] Lisa Wilks, AMBAR     DDX: elevated blood pressure reading, anxiety, atypical chest pain    Reviewed pt's history and workup with Dr. Medeiros.  After a bedside evaluation, Dr. Medeiros agrees with the plan of care.    The patient's history, physical exam, and lab findings were discussed with the physician, who also performed a face to face history and physical exam.  I discussed all results and noted any abnormalities with patient.  Discussed absoute need to recheck abnormalities with " "their family physician.  I answered any of the patient's questions.  Discussed plan for discharge, as there is no emergent indication for admission.  Pt is agreeable and understands need for follow up and repeat testing.  Pt is aware that discharge does not mean that nothing is wrong but it indicates no emergency is present and they must continue care with their family physician.  Pt is discharged with instructions to follow up with primary care doctor to have their blood pressure rechecked.         Disposition vitals:  BP (!) 165/107   Pulse 66   Temp 97.6 °F (36.4 °C) (Tympanic)   Resp 18   Ht 165.1 cm (65\")   LMP 01/01/2013 Comment: NATANAEL  SpO2 99%   BMI 23.63 kg/m²       DIAGNOSIS  Final diagnoses:   Elevated blood pressure reading with diagnosis of hypertension   Anxiety state   Thrombocytopenia (HCC)       FOLLOW UP   Tylor Davis  [address] [id number] [phone]    Schedule an appointment as soon as possible for a visit in 1 week           Lisa Wilks, APRN  11/10/22 1013    "

## 2022-11-11 ENCOUNTER — OFFICE VISIT (OUTPATIENT)
Dept: ORTHOPEDIC SURGERY | Facility: CLINIC | Age: 52
End: 2022-11-11

## 2022-11-11 VITALS — WEIGHT: 141 LBS | HEIGHT: 65 IN | TEMPERATURE: 97.6 F | BODY MASS INDEX: 23.49 KG/M2

## 2022-11-11 DIAGNOSIS — M19.019 ARTHRITIS OF SHOULDER: Primary | ICD-10-CM

## 2022-11-11 PROCEDURE — 99214 OFFICE O/P EST MOD 30 MIN: CPT | Performed by: ORTHOPAEDIC SURGERY

## 2022-11-11 RX ORDER — METOPROLOL TARTRATE 50 MG/1
25 TABLET, FILM COATED ORAL NIGHTLY
Status: ON HOLD | COMMUNITY
Start: 2022-08-29 | End: 2023-01-16

## 2022-11-11 NOTE — PROGRESS NOTES
"Patient: Bekah Gibson    YOB: 1970    Medical Record Number: [id number]    Chief Complaints:  Left shoulder pain    History of Present Illness:     52 y.o. female patient who presents with a complaint of left shoulder pain.  She reports that the symptoms first started years ago.  This has been a longstanding issue for her, slowly getting worse.  She was being treated by physicians at [facility].  She was actually scheduled for a shoulder arthroplasty earlier this year.  She had very low platelets and they ended up canceling her surgery the day it was scheduled.  She says that her low platelets are the result of alcoholism and cirrhosis.  She says that she has been off of alcohol since June.  She is seeing Dr. Quinonez, a hematologist who is managing her low platelets.  She comes to me for second opinion wanting to get the surgery scheduled.  She reports constant severe pain in the left shoulder.  She reports very limited movement and function.  She has tried activity modifications, anti-inflammatories, therapy and multiple injections through her other doctors.      Allergies   Allergen Reactions   • Benzonatate Nausea Only and Other (See Comments)     Rash    • Phenergan [Promethazine Hcl] Hives   • Promethazine Hives   • Cucumber Extract Rash   • Promethazine-Phenylephrine Other (See Comments)     \"FEEL WEIRD\"       Home Medications:    Current Outpatient Medications:   •  amLODIPine (NORVASC) 5 MG tablet, Take 5 mg by mouth Daily., Disp: , Rfl:   •  busPIRone (BUSPAR) 15 MG tablet, , Disp: , Rfl:   •  EPINEPHrine (EPIPEN JR) 0.15 MG/0.3ML solution auto-injector injection, , Disp: , Rfl:   •  FLUoxetine (PROZAC) 40 MG capsule, Take 1 capsule by mouth Daily., Disp: 30 capsule, Rfl: 3  •  folic acid (FOLVITE) 1 MG tablet, Take 1 mg by mouth Daily., Disp: , Rfl:   •  levothyroxine (SYNTHROID, LEVOTHROID) 100 MCG tablet, TAKE ONE TABLET BY MOUTH DAILY, Disp: 30 tablet, Rfl: 3  •  MAGnesium-Oxide 400 (240 " Mg) MG tablet, , Disp: , Rfl:   •  methocarbamol (ROBAXIN) 750 MG tablet, , Disp: , Rfl:   •  metoprolol tartrate (LOPRESSOR) 50 MG tablet, , Disp: , Rfl:   •  Multiple Vitamin (MULTIVITAMIN) capsule, Take 1 capsule by mouth Daily., Disp: , Rfl:   •  pantoprazole (PROTONIX) 40 MG EC tablet, Take 1 tablet by mouth 2 (Two) Times a Day Before Meals for 30 days., Disp: 60 tablet, Rfl: 0  •  risperiDONE (risperDAL) 0.5 MG tablet, Take 1 tablet by mouth Every Night., Disp: , Rfl:   •  thiamine (VITAMIN B1) 100 MG tablet, Take 1 tablet by mouth Daily for 30 doses., Disp: 30 tablet, Rfl: 0  •  traZODone (DESYREL) 50 MG tablet, , Disp: , Rfl:   •  nadolol (CORGARD) 40 MG tablet, , Disp: , Rfl:   •  naltrexone (DEPADE) 50 MG tablet, , Disp: , Rfl:   No current facility-administered medications for this visit.    Facility-Administered Medications Ordered in Other Visits:   •  sodium chloride 0.9 % flush 10 mL, 10 mL, Intravenous, Q12H, Callie Quiroz MD  •  sodium chloride 0.9 % flush 10 mL, 10 mL, Intravenous, PRN, Callie Quiroz MD  •  sodium chloride 0.9 % flush 20 mL, 20 mL, Intravenous, PRN, Callie Quiroz MD    Past Medical History:   Diagnosis Date   • Ankle sprain 2/2004    no operation nessesary   • Anxiety    • Disease of thyroid gland    • ETOH abuse    • Fracture, clavicle 1/2021    shattered clavicel on issue slip and fall   • Hypertension    • Kidney stone    • Lupus (HCC)    • Lupus (HCC)    • MDD (major depressive disorder)    • Pancreatitis    • Periarthritis of shoulder see above   • Rotator cuff syndrome odre for shoulder replacement    unable to receive due to low platelets       Past Surgical History:   Procedure Laterality Date   • CLAVICLE SURGERY Right 2018   • ENDOSCOPY N/A 10/26/2022    Procedure: ESOPHAGOGASTRODUODENOSCOPY wtih banding;  Surgeon: Ag Patel MD;  Location: [facility] ENDOSCOPY;  Service: Gastroenterology;  Laterality: N/A;  pre:  melena  post: esophageal varicies with banding x2 bands   • JOINT REPLACEMENT      GREAT TOE   • KIDNEY STONE SURGERY      LITHOTRIPSY AND STENT (R SIDE)   • LAPAROSCOPIC TUBAL LIGATION  2005   • SHOULDER SURGERY  desperately needed       Social History     Occupational History   • Not on file   Tobacco Use   • Smoking status: Some Days     Packs/day: 0.25     Years: 10.00     Pack years: 2.50     Types: Cigarettes   • Smokeless tobacco: Never   • Tobacco comments:     Rarely smoke sometimes will to rlaxx   Vaping Use   • Vaping Use: Never used   Substance and Sexual Activity   • Alcohol use: Yes     Alcohol/week: 5.0 - 10.0 standard drinks     Types: 5 - 10 Shots of liquor per week     Comment: Am in recovery   • Drug use: No     Comment: used THC in college   • Sexual activity: Not Currently     Partners: Male     Birth control/protection: Surgical     Comment: cinthia- menepausal      Social History     Social History Narrative   • Not on file       Family History   Problem Relation Age of Onset   • Rheumatologic disease Mother         congestive heart   • Thyroid disease Father    • Leukemia Father    • Cancer Father    • Drug abuse Brother    • Malig Hyperthermia Neg Hx        Review of Systems:      Constitutional: Denies fever, shaking or chills   Eyes: Denies change in visual acuity   HEENT: Denies nasal congestion or sore throat   Respiratory: Denies cough or shortness of breath   Cardiovascular: Denies chest pain or edema  Endocrine: Denies tremors, palpitations, intolerance of heat or cold, polyuria, polydipsia.  GI: Denies abdominal pain, nausea, vomiting, bloody stools or diarrhea  : Denies frequency, urgency, incontinence, retention, or nocturia.  Musculoskeletal: Denies numbness, tingling or loss of motor function   Integument: Denies rash, lesion or ulceration   Neurologic: Denies headache or focal weakness, deficits  Heme: Denies spontaneous or excessive bleeding, epistaxis, hematuria, melena,  "fatigue, enlarged or tender lymph nodes.      All other pertinent positives and negatives as noted above in HPI.    Physical Exam:   52 y.o. female  Vitals:    11/11/22 1457   Temp: 97.6 °F (36.4 °C)   Weight: 64 kg (141 lb)   Height: 165.1 cm (65\")       General:  Patient is awake and alert.  Appears in no acute distress or discomfort.    Psych:  Affect and demeanor are appropriate.    Cardiovascular:  Regular rate and rhythm.    Lungs:  Good chest expansion.  Breathing unlabored.    Spine:  Neck appears grossly normal.  No palpable masses or adenopathy.  Good motion.  Spurling's maneuver is negative for any shoulder or arm symptoms.    Extremities:  Left shoulder is examined.  Skin is benign.  No obvious gross abnormalities.  No palpable masses or adenopathy.  Moderate tenderness noted over anterior glenohumeral joint and rotator interval.  Motion is to 85 of forward elevation, 20 external rotation, minus 10 horizontal abduction, L2 internal rotation.  No instability.  Rotator cuff strength seems to be well preserved but the exam is limited due to discomfort with resisted active motion.  Good motor and sensory function in her lower arm and hand.  Palpable radial pulse.  Brisk capillary refill.         Radiology:  AP, scapular Y, and axillary views of the left shoulder are ordered by myself and reviewed to evaluate the patient's complaint.  No comparison films are immediately available.  The x-rays show severe glenohumeral osteoarthritis with bone on bone, osteophyte formation, and subchondral sclerosis.  The acromiohumeral interval measures normal.  Reports of outside MRI and CT scan are reviewed.  Both describe end-stage glenohumeral osteoarthritis with severe glenoid retroversion and glenoid dysplasia.  Rotator cuff appears to be intact.    Assessment/Plan:  1.  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  Thrombocytopenia     I had a long conversation with her.  Her last platelet count " that I have on record was 47.  There is mention in the notes that she did not respond to a platelet transfusion.  From a pathology standpoint she is certainly a candidate for an arthroplasty.  Unfortunately she would require a reverse due to the severity of her retroversion.  That is an option for her but I need her platelets above 50 and preferably above 100 before I would offer her the surgery.  I think it best that I have a conversation with her hematologist, Dr. Quinonez.  She is scheduled to see him on Monday.  I will try to reach out to him early next week to discuss her.  I told her I will reach back out to her once I have a chance to talk with him.  Before we consider any surgery for her we need to make sure that we can do this safely.  She understands.    Giovanni Denise MD    11/11/2022    CC to Tylor Davis

## 2022-11-18 ENCOUNTER — READMISSION MANAGEMENT (OUTPATIENT)
Dept: CALL CENTER | Facility: HOSPITAL | Age: 52
End: 2022-11-18

## 2022-11-18 NOTE — OUTREACH NOTE
Medical Week 3 Survey    Flowsheet Row Responses   [facility] patient discharged from? [place]   Does the patient have one of the following disease processes/diagnoses(primary or secondary)? Other   Week 3 attempt successful? No   Unsuccessful attempts Attempt 1          ALLAN MAGUIRE - Registered Nurse

## 2022-11-23 ENCOUNTER — READMISSION MANAGEMENT (OUTPATIENT)
Dept: CALL CENTER | Facility: HOSPITAL | Age: 52
End: 2022-11-23

## 2022-11-23 NOTE — OUTREACH NOTE
Medical Week 3 Survey    Flowsheet Row Responses   [facility] patient discharged from? [place]   Does the patient have one of the following disease processes/diagnoses(primary or secondary)? Other   Week 3 attempt successful? No   Unsuccessful attempts Attempt 2          RAMILA Ball Registered Nurse

## 2022-12-09 ENCOUNTER — OFFICE VISIT (OUTPATIENT)
Dept: ORTHOPEDIC SURGERY | Facility: CLINIC | Age: 52
End: 2022-12-09

## 2022-12-09 VITALS — RESPIRATION RATE: 12 BRPM | TEMPERATURE: 96.2 F | WEIGHT: 149.1 LBS | BODY MASS INDEX: 24.84 KG/M2 | HEIGHT: 65 IN

## 2022-12-09 DIAGNOSIS — M19.019 ARTHRITIS OF SHOULDER: Primary | ICD-10-CM

## 2022-12-09 PROCEDURE — 99214 OFFICE O/P EST MOD 30 MIN: CPT | Performed by: ORTHOPAEDIC SURGERY

## 2022-12-09 NOTE — PROGRESS NOTES
CC:  Endstage left shoulder osteoarthritis, thrombocytopenia    Ms. Gibson follows up for her left shoulder.  Basically, she wants to discuss getting the surgery scheduled.  She says she is miserable.  Nothing seems to help.  She is having trouble with activities of daily living.  Her platelets remain low.  She is not sure exactly what her hematologist has planned for her.  We had a long discussion about the surgery.  Her last lab work shows her platelets were 48.  I told her I would prefer that her platelets were over 100 for surgery.  The absolute floor is 50.  I was not able to contact Dr. Mancia after her last visit.  I will try again today or first of next week.  I told her to reach back out to me if she has not heard from our office by the middle of next week.    Giovanni Denise MD

## 2022-12-12 ENCOUNTER — TELEPHONE (OUTPATIENT)
Dept: ORTHOPEDIC SURGERY | Facility: CLINIC | Age: 52
End: 2022-12-12

## 2022-12-12 NOTE — TELEPHONE ENCOUNTER
I received a call from the nurse with hematology's office.  Patient's platelet count is 48 and low which is her baseline.  Is due to her past history of cirrhosis.  I have talked to the nurse about the possibility of transfusing her prior to her surgery.  She states that they did transfuse her prior to her surgery she was having and unfortunately her platelet count did not change.  Therefore they would recommend giving her medication for about 10 days before surgery which would help increase her platelet count.  I have asked that we notify them when the surgery is scheduled so they can prescribe the medication.  Will notify [facility] and his surgery scheduler.

## 2022-12-14 ENCOUNTER — PREP FOR SURGERY (OUTPATIENT)
Dept: OTHER | Facility: HOSPITAL | Age: 52
End: 2022-12-14

## 2022-12-14 DIAGNOSIS — M19.019 ARTHRITIS OF SHOULDER: Primary | ICD-10-CM

## 2022-12-14 RX ORDER — VANCOMYCIN HYDROCHLORIDE 1 G/200ML
15 INJECTION, SOLUTION INTRAVENOUS ONCE
Status: CANCELLED | OUTPATIENT
Start: 2023-02-02 | End: 2022-12-14

## 2022-12-14 RX ORDER — PREGABALIN 75 MG/1
150 CAPSULE ORAL ONCE
Status: CANCELLED | OUTPATIENT
Start: 2023-02-02 | End: 2022-12-14

## 2022-12-14 RX ORDER — MELOXICAM 15 MG/1
15 TABLET ORAL ONCE
Status: CANCELLED | OUTPATIENT
Start: 2023-02-02 | End: 2022-12-14

## 2022-12-14 RX ORDER — CEFAZOLIN SODIUM 2 G/100ML
2 INJECTION, SOLUTION INTRAVENOUS ONCE
Status: CANCELLED | OUTPATIENT
Start: 2023-02-02 | End: 2022-12-14

## 2022-12-14 RX ORDER — ACETAMINOPHEN 500 MG
1000 TABLET ORAL ONCE
Status: CANCELLED | OUTPATIENT
Start: 2023-02-02 | End: 2022-12-14

## 2022-12-19 PROBLEM — M19.019 ARTHRITIS OF SHOULDER: Status: ACTIVE | Noted: 2022-12-19

## 2023-01-04 ENCOUNTER — TELEPHONE (OUTPATIENT)
Dept: ORTHOPEDIC SURGERY | Facility: CLINIC | Age: 53
End: 2023-01-04
Payer: MEDICAID

## 2023-01-04 NOTE — TELEPHONE ENCOUNTER
Have contacted the hematologist office to let them know that the patient surgery has been scheduled for February 2, 2023.  They were planning to treat her with medication 10 days prior to her surgery in order to see if they could improve her platelet count prior to surgery.  I have left a message on the nurses voicemail with this information and have asked him to call me if they have any other questions.     I have also left a message for the patient with this information and have asked her to call me if she has any questions

## 2023-01-16 ENCOUNTER — APPOINTMENT (OUTPATIENT)
Dept: CT IMAGING | Facility: HOSPITAL | Age: 53
DRG: 433 | End: 2023-01-16
Payer: MEDICAID

## 2023-01-16 ENCOUNTER — HOSPITAL ENCOUNTER (INPATIENT)
Facility: HOSPITAL | Age: 53
LOS: 5 days | Discharge: HOME | DRG: 433 | End: 2023-01-21
Attending: EMERGENCY MEDICINE | Admitting: STUDENT IN AN ORGANIZED HEALTH CARE EDUCATION/TRAINING PROGRAM
Payer: MEDICAID

## 2023-01-16 DIAGNOSIS — K70.30 ALCOHOLIC CIRRHOSIS OF LIVER WITHOUT ASCITES: ICD-10-CM

## 2023-01-16 DIAGNOSIS — F10.10 ALCOHOL ABUSE: ICD-10-CM

## 2023-01-16 DIAGNOSIS — K92.2 GASTROINTESTINAL HEMORRHAGE, UNSPECIFIED GASTROINTESTINAL HEMORRHAGE TYPE: ICD-10-CM

## 2023-01-16 DIAGNOSIS — D69.6 THROMBOCYTOPENIA: Primary | ICD-10-CM

## 2023-01-16 PROBLEM — I85.00 ESOPHAGEAL VARICES: Chronic | Status: ACTIVE | Noted: 2023-01-16

## 2023-01-16 PROBLEM — I10 ESSENTIAL HYPERTENSION: Chronic | Status: ACTIVE | Noted: 2023-01-16

## 2023-01-16 LAB
ABO GROUP BLD: NORMAL
ALBUMIN SERPL-MCNC: 4.1 G/DL (ref 3.5–5.2)
ALBUMIN/GLOB SERPL: 1.7 G/DL
ALP SERPL-CCNC: 143 U/L (ref 39–117)
ALT SERPL W P-5'-P-CCNC: 16 U/L (ref 1–33)
ANION GAP SERPL CALCULATED.3IONS-SCNC: 12.1 MMOL/L (ref 5–15)
APTT PPP: 39.2 SECONDS (ref 22.7–35.4)
AST SERPL-CCNC: 47 U/L (ref 1–32)
BACTERIA UR QL AUTO: NORMAL /HPF
BASOPHILS # BLD AUTO: 0.03 10*3/MM3 (ref 0–0.2)
BASOPHILS # BLD AUTO: 0.05 10*3/MM3 (ref 0–0.2)
BASOPHILS NFR BLD AUTO: 0.8 % (ref 0–1.5)
BASOPHILS NFR BLD AUTO: 1.1 % (ref 0–1.5)
BILIRUB SERPL-MCNC: 1.7 MG/DL (ref 0–1.2)
BILIRUB UR QL STRIP: NEGATIVE
BLD GP AB SCN SERPL QL: NEGATIVE
BUN SERPL-MCNC: 8 MG/DL (ref 6–20)
BUN/CREAT SERPL: 13.1 (ref 7–25)
CALCIUM SPEC-SCNC: 8.7 MG/DL (ref 8.6–10.5)
CHLORIDE SERPL-SCNC: 105 MMOL/L (ref 98–107)
CLARITY UR: CLEAR
CO2 SERPL-SCNC: 25.9 MMOL/L (ref 22–29)
COLOR UR: YELLOW
CREAT SERPL-MCNC: 0.61 MG/DL (ref 0.57–1)
D-LACTATE SERPL-SCNC: 1.8 MMOL/L (ref 0.5–2)
DEPRECATED RDW RBC AUTO: 40.8 FL (ref 37–54)
DEPRECATED RDW RBC AUTO: 42.7 FL (ref 37–54)
EGFRCR SERPLBLD CKD-EPI 2021: 106.4 ML/MIN/1.73
EOSINOPHIL # BLD AUTO: 0.04 10*3/MM3 (ref 0–0.4)
EOSINOPHIL # BLD AUTO: 0.17 10*3/MM3 (ref 0–0.4)
EOSINOPHIL NFR BLD AUTO: 1 % (ref 0.3–6.2)
EOSINOPHIL NFR BLD AUTO: 3.6 % (ref 0.3–6.2)
ERYTHROCYTE [DISTWIDTH] IN BLOOD BY AUTOMATED COUNT: 12.5 % (ref 12.3–15.4)
ERYTHROCYTE [DISTWIDTH] IN BLOOD BY AUTOMATED COUNT: 12.8 % (ref 12.3–15.4)
ETHANOL BLD-MCNC: 42 MG/DL (ref 0–10)
ETHANOL UR QL: 0.04 %
EXPIRATION DATE: NORMAL
FECAL OCCULT BLOOD SCREEN, POC: NEGATIVE
GLOBULIN UR ELPH-MCNC: 2.4 GM/DL
GLUCOSE SERPL-MCNC: 110 MG/DL (ref 65–99)
GLUCOSE UR STRIP-MCNC: NEGATIVE MG/DL
HCT VFR BLD AUTO: 39.8 % (ref 34–46.6)
HCT VFR BLD AUTO: 43.2 % (ref 34–46.6)
HCT VFR BLD AUTO: 44.8 % (ref 34–46.6)
HGB BLD-MCNC: 13.4 G/DL (ref 12–15.9)
HGB BLD-MCNC: 14.3 G/DL (ref 12–15.9)
HGB BLD-MCNC: 14.8 G/DL (ref 12–15.9)
HGB UR QL STRIP.AUTO: NEGATIVE
HYALINE CASTS UR QL AUTO: NORMAL /LPF
IMM GRANULOCYTES # BLD AUTO: 0.01 10*3/MM3 (ref 0–0.05)
IMM GRANULOCYTES NFR BLD AUTO: 0.3 % (ref 0–0.5)
INR PPP: 1.48 (ref 0.9–1.1)
KETONES UR QL STRIP: NEGATIVE
LEUKOCYTE ESTERASE UR QL STRIP.AUTO: ABNORMAL
LYMPHOCYTES # BLD AUTO: 0.86 10*3/MM3 (ref 0.7–3.1)
LYMPHOCYTES # BLD AUTO: 1.3 10*3/MM3 (ref 0.7–3.1)
LYMPHOCYTES NFR BLD AUTO: 22.5 % (ref 19.6–45.3)
LYMPHOCYTES NFR BLD AUTO: 27.7 % (ref 19.6–45.3)
Lab: 204
MAGNESIUM SERPL-MCNC: 1.5 MG/DL (ref 1.6–2.6)
MCH RBC QN AUTO: 30.2 PG (ref 26.6–33)
MCH RBC QN AUTO: 30.6 PG (ref 26.6–33)
MCHC RBC AUTO-ENTMCNC: 33.1 G/DL (ref 31.5–35.7)
MCHC RBC AUTO-ENTMCNC: 33.7 G/DL (ref 31.5–35.7)
MCV RBC AUTO: 90.9 FL (ref 79–97)
MCV RBC AUTO: 91.1 FL (ref 79–97)
MONOCYTES # BLD AUTO: 0.45 10*3/MM3 (ref 0.1–0.9)
MONOCYTES # BLD AUTO: 0.46 10*3/MM3 (ref 0.1–0.9)
MONOCYTES NFR BLD AUTO: 11.7 % (ref 5–12)
MONOCYTES NFR BLD AUTO: 9.8 % (ref 5–12)
NEGATIVE CONTROL: NEGATIVE
NEUTROPHILS NFR BLD AUTO: 2.44 10*3/MM3 (ref 1.7–7)
NEUTROPHILS NFR BLD AUTO: 2.72 10*3/MM3 (ref 1.7–7)
NEUTROPHILS NFR BLD AUTO: 57.8 % (ref 42.7–76)
NEUTROPHILS NFR BLD AUTO: 63.7 % (ref 42.7–76)
NITRITE UR QL STRIP: NEGATIVE
NRBC BLD AUTO-RTO: 0 /100 WBC (ref 0–0.2)
PH UR STRIP.AUTO: 7 [PH] (ref 5–8)
PHOSPHATE SERPL-MCNC: 3.2 MG/DL (ref 2.5–4.5)
PLATELET # BLD AUTO: 35 10*3/MM3 (ref 140–450)
PLATELET # BLD AUTO: 45 10*3/MM3 (ref 140–450)
PMV BLD AUTO: 12.3 FL (ref 6–12)
PMV BLD AUTO: 12.9 FL (ref 6–12)
POSITIVE CONTROL: POSITIVE
POTASSIUM SERPL-SCNC: 3.6 MMOL/L (ref 3.5–5.2)
PROT SERPL-MCNC: 6.5 G/DL (ref 6–8.5)
PROT UR QL STRIP: NEGATIVE
PROTHROMBIN TIME: 18.1 SECONDS (ref 11.7–14.2)
RBC # BLD AUTO: 4.38 10*6/MM3 (ref 3.77–5.28)
RBC # BLD AUTO: 4.74 10*6/MM3 (ref 3.77–5.28)
RBC # UR STRIP: NORMAL /HPF
REF LAB TEST METHOD: NORMAL
RH BLD: POSITIVE
SODIUM SERPL-SCNC: 143 MMOL/L (ref 136–145)
SP GR UR STRIP: 1.01 (ref 1–1.03)
SQUAMOUS #/AREA URNS HPF: NORMAL /HPF
T&S EXPIRATION DATE: NORMAL
UROBILINOGEN UR QL STRIP: ABNORMAL
WBC # UR STRIP: NORMAL /HPF
WBC NRBC COR # BLD: 3.83 10*3/MM3 (ref 3.4–10.8)
WBC NRBC COR # BLD: 4.7 10*3/MM3 (ref 3.4–10.8)

## 2023-01-16 PROCEDURE — 84100 ASSAY OF PHOSPHORUS: CPT | Performed by: STUDENT IN AN ORGANIZED HEALTH CARE EDUCATION/TRAINING PROGRAM

## 2023-01-16 PROCEDURE — 25010000002 MAGNESIUM SULFATE 2 GM/50ML SOLUTION: Performed by: STUDENT IN AN ORGANIZED HEALTH CARE EDUCATION/TRAINING PROGRAM

## 2023-01-16 PROCEDURE — P9035 PLATELET PHERES LEUKOREDUCED: HCPCS

## 2023-01-16 PROCEDURE — 85025 COMPLETE CBC W/AUTO DIFF WBC: CPT | Performed by: STUDENT IN AN ORGANIZED HEALTH CARE EDUCATION/TRAINING PROGRAM

## 2023-01-16 PROCEDURE — 80053 COMPREHEN METABOLIC PANEL: CPT | Performed by: EMERGENCY MEDICINE

## 2023-01-16 PROCEDURE — P9100 PATHOGEN TEST FOR PLATELETS: HCPCS

## 2023-01-16 PROCEDURE — G0378 HOSPITAL OBSERVATION PER HR: HCPCS

## 2023-01-16 PROCEDURE — 86901 BLOOD TYPING SEROLOGIC RH(D): CPT | Performed by: EMERGENCY MEDICINE

## 2023-01-16 PROCEDURE — 25010000002 ONDANSETRON PER 1 MG: Performed by: EMERGENCY MEDICINE

## 2023-01-16 PROCEDURE — 81001 URINALYSIS AUTO W/SCOPE: CPT | Performed by: PHYSICIAN ASSISTANT

## 2023-01-16 PROCEDURE — 25010000002 THIAMINE PER 100 MG: Performed by: STUDENT IN AN ORGANIZED HEALTH CARE EDUCATION/TRAINING PROGRAM

## 2023-01-16 PROCEDURE — 25010000002 MORPHINE PER 10 MG: Performed by: EMERGENCY MEDICINE

## 2023-01-16 PROCEDURE — 85025 COMPLETE CBC W/AUTO DIFF WBC: CPT | Performed by: EMERGENCY MEDICINE

## 2023-01-16 PROCEDURE — 85014 HEMATOCRIT: CPT | Performed by: PHYSICIAN ASSISTANT

## 2023-01-16 PROCEDURE — 25010000002 IOPAMIDOL 61 % SOLUTION: Performed by: EMERGENCY MEDICINE

## 2023-01-16 PROCEDURE — 85730 THROMBOPLASTIN TIME PARTIAL: CPT | Performed by: EMERGENCY MEDICINE

## 2023-01-16 PROCEDURE — 90791 PSYCH DIAGNOSTIC EVALUATION: CPT

## 2023-01-16 PROCEDURE — 25010000002 OCTREOTIDE PER 25 MCG: Performed by: INTERNAL MEDICINE

## 2023-01-16 PROCEDURE — 25010000002 ONDANSETRON PER 1 MG: Performed by: NURSE PRACTITIONER

## 2023-01-16 PROCEDURE — 86850 RBC ANTIBODY SCREEN: CPT | Performed by: EMERGENCY MEDICINE

## 2023-01-16 PROCEDURE — 36415 COLL VENOUS BLD VENIPUNCTURE: CPT

## 2023-01-16 PROCEDURE — 85018 HEMOGLOBIN: CPT | Performed by: PHYSICIAN ASSISTANT

## 2023-01-16 PROCEDURE — 83605 ASSAY OF LACTIC ACID: CPT | Performed by: PHYSICIAN ASSISTANT

## 2023-01-16 PROCEDURE — 82270 OCCULT BLOOD FECES: CPT | Performed by: PHYSICIAN ASSISTANT

## 2023-01-16 PROCEDURE — 25010000002 MORPHINE PER 10 MG: Performed by: NURSE PRACTITIONER

## 2023-01-16 PROCEDURE — 85610 PROTHROMBIN TIME: CPT | Performed by: EMERGENCY MEDICINE

## 2023-01-16 PROCEDURE — 74177 CT ABD & PELVIS W/CONTRAST: CPT

## 2023-01-16 PROCEDURE — 25010000002 OCTREOTIDE PER 25 MCG: Performed by: STUDENT IN AN ORGANIZED HEALTH CARE EDUCATION/TRAINING PROGRAM

## 2023-01-16 PROCEDURE — 86900 BLOOD TYPING SEROLOGIC ABO: CPT | Performed by: EMERGENCY MEDICINE

## 2023-01-16 PROCEDURE — 99285 EMERGENCY DEPT VISIT HI MDM: CPT

## 2023-01-16 PROCEDURE — 82077 ASSAY SPEC XCP UR&BREATH IA: CPT | Performed by: NURSE PRACTITIONER

## 2023-01-16 PROCEDURE — 99214 OFFICE O/P EST MOD 30 MIN: CPT | Performed by: PHYSICIAN ASSISTANT

## 2023-01-16 PROCEDURE — 36430 TRANSFUSION BLD/BLD COMPNT: CPT

## 2023-01-16 PROCEDURE — 25010000002 OCTREOTIDE PER 25 MCG: Performed by: PHYSICIAN ASSISTANT

## 2023-01-16 PROCEDURE — 83735 ASSAY OF MAGNESIUM: CPT | Performed by: STUDENT IN AN ORGANIZED HEALTH CARE EDUCATION/TRAINING PROGRAM

## 2023-01-16 RX ORDER — OCTREOTIDE ACETATE 50 UG/ML
50 INJECTION, SOLUTION INTRAVENOUS; SUBCUTANEOUS ONCE
Status: COMPLETED | OUTPATIENT
Start: 2023-01-16 | End: 2023-01-16

## 2023-01-16 RX ORDER — HYDROMORPHONE HYDROCHLORIDE 1 MG/ML
0.5 INJECTION, SOLUTION INTRAMUSCULAR; INTRAVENOUS; SUBCUTANEOUS
Status: DISCONTINUED | OUTPATIENT
Start: 2023-01-16 | End: 2023-01-21 | Stop reason: HOSPADM

## 2023-01-16 RX ORDER — LORAZEPAM 2 MG/ML
2 INJECTION INTRAMUSCULAR
Status: DISCONTINUED | OUTPATIENT
Start: 2023-01-16 | End: 2023-01-21 | Stop reason: HOSPADM

## 2023-01-16 RX ORDER — PANTOPRAZOLE SODIUM 40 MG/10ML
80 INJECTION, POWDER, LYOPHILIZED, FOR SOLUTION INTRAVENOUS ONCE
Status: COMPLETED | OUTPATIENT
Start: 2023-01-16 | End: 2023-01-16

## 2023-01-16 RX ORDER — MAGNESIUM SULFATE HEPTAHYDRATE 40 MG/ML
2 INJECTION, SOLUTION INTRAVENOUS ONCE
Status: COMPLETED | OUTPATIENT
Start: 2023-01-16 | End: 2023-01-16

## 2023-01-16 RX ORDER — LORAZEPAM 2 MG/ML
1 INJECTION INTRAMUSCULAR
Status: DISCONTINUED | OUTPATIENT
Start: 2023-01-16 | End: 2023-01-21 | Stop reason: HOSPADM

## 2023-01-16 RX ORDER — LEVOTHYROXINE SODIUM 0.1 MG/1
100 TABLET ORAL DAILY
Status: DISCONTINUED | OUTPATIENT
Start: 2023-01-16 | End: 2023-01-17

## 2023-01-16 RX ORDER — THIAMINE HYDROCHLORIDE 100 MG/ML
100 INJECTION, SOLUTION INTRAMUSCULAR; INTRAVENOUS ONCE
Status: COMPLETED | OUTPATIENT
Start: 2023-01-16 | End: 2023-01-16

## 2023-01-16 RX ORDER — FOLIC ACID 1 MG/1
1 TABLET ORAL DAILY
Status: COMPLETED | OUTPATIENT
Start: 2023-01-17 | End: 2023-01-19

## 2023-01-16 RX ORDER — MORPHINE SULFATE 2 MG/ML
4 INJECTION, SOLUTION INTRAMUSCULAR; INTRAVENOUS ONCE
Status: COMPLETED | OUTPATIENT
Start: 2023-01-16 | End: 2023-01-16

## 2023-01-16 RX ORDER — ONDANSETRON 2 MG/ML
4 INJECTION INTRAMUSCULAR; INTRAVENOUS ONCE
Status: COMPLETED | OUTPATIENT
Start: 2023-01-16 | End: 2023-01-16

## 2023-01-16 RX ORDER — SODIUM CHLORIDE 0.9 % (FLUSH) 0.9 %
10 SYRINGE (ML) INJECTION AS NEEDED
Status: DISCONTINUED | OUTPATIENT
Start: 2023-01-16 | End: 2023-01-21 | Stop reason: HOSPADM

## 2023-01-16 RX ORDER — TRAZODONE HYDROCHLORIDE 50 MG/1
50 TABLET ORAL NIGHTLY
COMMUNITY

## 2023-01-16 RX ORDER — LORAZEPAM 1 MG/1
2 TABLET ORAL
Status: DISCONTINUED | OUTPATIENT
Start: 2023-01-16 | End: 2023-01-21 | Stop reason: HOSPADM

## 2023-01-16 RX ORDER — NITROGLYCERIN 0.4 MG/1
0.4 TABLET SUBLINGUAL
Status: DISCONTINUED | OUTPATIENT
Start: 2023-01-16 | End: 2023-01-21 | Stop reason: HOSPADM

## 2023-01-16 RX ORDER — OXYCODONE HYDROCHLORIDE AND ACETAMINOPHEN 5; 325 MG/1; MG/1
1 TABLET ORAL EVERY 6 HOURS PRN
Status: DISCONTINUED | OUTPATIENT
Start: 2023-01-16 | End: 2023-01-21 | Stop reason: HOSPADM

## 2023-01-16 RX ORDER — SODIUM CHLORIDE 0.9 % (FLUSH) 0.9 %
10 SYRINGE (ML) INJECTION EVERY 12 HOURS SCHEDULED
Status: DISCONTINUED | OUTPATIENT
Start: 2023-01-16 | End: 2023-01-21 | Stop reason: HOSPADM

## 2023-01-16 RX ORDER — FLUOXETINE HYDROCHLORIDE 20 MG/1
40 CAPSULE ORAL DAILY
Status: DISCONTINUED | OUTPATIENT
Start: 2023-01-16 | End: 2023-01-21 | Stop reason: HOSPADM

## 2023-01-16 RX ORDER — DIPHENOXYLATE HYDROCHLORIDE AND ATROPINE SULFATE 2.5; .025 MG/1; MG/1
1 TABLET ORAL DAILY
Status: COMPLETED | OUTPATIENT
Start: 2023-01-17 | End: 2023-01-19

## 2023-01-16 RX ORDER — SODIUM CHLORIDE 9 MG/ML
40 INJECTION, SOLUTION INTRAVENOUS AS NEEDED
Status: DISCONTINUED | OUTPATIENT
Start: 2023-01-16 | End: 2023-01-21 | Stop reason: HOSPADM

## 2023-01-16 RX ORDER — LORAZEPAM 1 MG/1
1 TABLET ORAL
Status: DISCONTINUED | OUTPATIENT
Start: 2023-01-16 | End: 2023-01-21 | Stop reason: HOSPADM

## 2023-01-16 RX ORDER — TRAZODONE HYDROCHLORIDE 50 MG/1
50 TABLET ORAL NIGHTLY
Status: DISCONTINUED | OUTPATIENT
Start: 2023-01-16 | End: 2023-01-21 | Stop reason: HOSPADM

## 2023-01-16 RX ORDER — NADOLOL 40 MG/1
40 TABLET ORAL
Status: DISCONTINUED | OUTPATIENT
Start: 2023-01-16 | End: 2023-01-21 | Stop reason: HOSPADM

## 2023-01-16 RX ADMIN — OXYCODONE AND ACETAMINOPHEN 1 TABLET: 5; 325 TABLET ORAL at 15:10

## 2023-01-16 RX ADMIN — ONDANSETRON 4 MG: 2 INJECTION INTRAMUSCULAR; INTRAVENOUS at 08:48

## 2023-01-16 RX ADMIN — ONDANSETRON 4 MG: 2 INJECTION INTRAMUSCULAR; INTRAVENOUS at 05:16

## 2023-01-16 RX ADMIN — LORAZEPAM 2 MG: 1 TABLET ORAL at 10:52

## 2023-01-16 RX ADMIN — OXYCODONE AND ACETAMINOPHEN 1 TABLET: 5; 325 TABLET ORAL at 21:14

## 2023-01-16 RX ADMIN — MORPHINE SULFATE 4 MG: 2 INJECTION, SOLUTION INTRAMUSCULAR; INTRAVENOUS at 05:16

## 2023-01-16 RX ADMIN — LEVOTHYROXINE SODIUM 100 MCG: 0.1 TABLET ORAL at 10:52

## 2023-01-16 RX ADMIN — LORAZEPAM 2 MG: 1 TABLET ORAL at 18:57

## 2023-01-16 RX ADMIN — MAGNESIUM SULFATE HEPTAHYDRATE 2 G: 2 INJECTION, SOLUTION INTRAVENOUS at 20:46

## 2023-01-16 RX ADMIN — OCTREOTIDE ACETATE 50 MCG/HR: 500 INJECTION, SOLUTION INTRAVENOUS; SUBCUTANEOUS at 11:36

## 2023-01-16 RX ADMIN — Medication 10 ML: at 20:47

## 2023-01-16 RX ADMIN — FLUOXETINE HYDROCHLORIDE 40 MG: 20 CAPSULE ORAL at 11:36

## 2023-01-16 RX ADMIN — OCTREOTIDE ACETATE 50 MCG: 50 INJECTION, SOLUTION INTRAVENOUS; SUBCUTANEOUS at 05:40

## 2023-01-16 RX ADMIN — OCTREOTIDE ACETATE 50 MCG/HR: 500 INJECTION, SOLUTION INTRAVENOUS; SUBCUTANEOUS at 22:46

## 2023-01-16 RX ADMIN — NADOLOL 40 MG: 40 TABLET ORAL at 11:36

## 2023-01-16 RX ADMIN — THIAMINE HYDROCHLORIDE 100 MG: 100 INJECTION, SOLUTION INTRAMUSCULAR; INTRAVENOUS at 11:37

## 2023-01-16 RX ADMIN — PANTOPRAZOLE SODIUM 40 MG: 40 INJECTION, POWDER, FOR SOLUTION INTRAVENOUS at 15:11

## 2023-01-16 RX ADMIN — PANTOPRAZOLE SODIUM 40 MG: 40 INJECTION, POWDER, FOR SOLUTION INTRAVENOUS at 10:53

## 2023-01-16 RX ADMIN — MORPHINE SULFATE 4 MG: 2 INJECTION, SOLUTION INTRAMUSCULAR; INTRAVENOUS at 08:48

## 2023-01-16 RX ADMIN — PANTOPRAZOLE SODIUM 80 MG: 40 INJECTION, POWDER, FOR SOLUTION INTRAVENOUS at 05:11

## 2023-01-16 RX ADMIN — POLYETHYLENE GLYCOL 3350, SODIUM SULFATE ANHYDROUS, SODIUM BICARBONATE, SODIUM CHLORIDE, POTASSIUM CHLORIDE 2000 ML: 236; 22.74; 6.74; 5.86; 2.97 POWDER, FOR SOLUTION ORAL at 21:02

## 2023-01-16 RX ADMIN — IOPAMIDOL 85 ML: 612 INJECTION, SOLUTION INTRAVENOUS at 07:26

## 2023-01-17 LAB
ALBUMIN SERPL-MCNC: 3.9 G/DL (ref 3.5–5.2)
ALBUMIN/GLOB SERPL: 1.7 G/DL
ALP SERPL-CCNC: 119 U/L (ref 39–117)
ALT SERPL W P-5'-P-CCNC: 17 U/L (ref 1–33)
ANION GAP SERPL CALCULATED.3IONS-SCNC: 10.4 MMOL/L (ref 5–15)
AST SERPL-CCNC: 36 U/L (ref 1–32)
BASOPHILS # BLD AUTO: 0.02 10*3/MM3 (ref 0–0.2)
BASOPHILS # BLD AUTO: 0.02 10*3/MM3 (ref 0–0.2)
BASOPHILS NFR BLD AUTO: 0.6 % (ref 0–1.5)
BASOPHILS NFR BLD AUTO: 0.7 % (ref 0–1.5)
BH BB BLOOD EXPIRATION DATE: NORMAL
BH BB BLOOD TYPE BARCODE: 6200
BH BB DISPENSE STATUS: NORMAL
BH BB PRODUCT CODE: NORMAL
BH BB UNIT NUMBER: NORMAL
BILIRUB SERPL-MCNC: 3.2 MG/DL (ref 0–1.2)
BUN SERPL-MCNC: 9 MG/DL (ref 6–20)
BUN/CREAT SERPL: 16.7 (ref 7–25)
CALCIUM SPEC-SCNC: 8.7 MG/DL (ref 8.6–10.5)
CHLORIDE SERPL-SCNC: 102 MMOL/L (ref 98–107)
CO2 SERPL-SCNC: 26.6 MMOL/L (ref 22–29)
CREAT SERPL-MCNC: 0.54 MG/DL (ref 0.57–1)
DEPRECATED RDW RBC AUTO: 40.7 FL (ref 37–54)
DEPRECATED RDW RBC AUTO: 41.9 FL (ref 37–54)
EGFRCR SERPLBLD CKD-EPI 2021: 109.6 ML/MIN/1.73
EOSINOPHIL # BLD AUTO: 0.15 10*3/MM3 (ref 0–0.4)
EOSINOPHIL # BLD AUTO: 0.15 10*3/MM3 (ref 0–0.4)
EOSINOPHIL NFR BLD AUTO: 4.7 % (ref 0.3–6.2)
EOSINOPHIL NFR BLD AUTO: 5.5 % (ref 0.3–6.2)
ERYTHROCYTE [DISTWIDTH] IN BLOOD BY AUTOMATED COUNT: 12.4 % (ref 12.3–15.4)
ERYTHROCYTE [DISTWIDTH] IN BLOOD BY AUTOMATED COUNT: 12.6 % (ref 12.3–15.4)
GLOBULIN UR ELPH-MCNC: 2.3 GM/DL
GLUCOSE SERPL-MCNC: 101 MG/DL (ref 65–99)
HCT VFR BLD AUTO: 38.3 % (ref 34–46.6)
HCT VFR BLD AUTO: 38.3 % (ref 34–46.6)
HGB BLD-MCNC: 12.7 G/DL (ref 12–15.9)
HGB BLD-MCNC: 12.9 G/DL (ref 12–15.9)
LYMPHOCYTES # BLD AUTO: 0.6 10*3/MM3 (ref 0.7–3.1)
LYMPHOCYTES # BLD AUTO: 0.9 10*3/MM3 (ref 0.7–3.1)
LYMPHOCYTES NFR BLD AUTO: 18.9 % (ref 19.6–45.3)
LYMPHOCYTES NFR BLD AUTO: 32.7 % (ref 19.6–45.3)
MAGNESIUM SERPL-MCNC: 1.8 MG/DL (ref 1.6–2.6)
MCH RBC QN AUTO: 30.2 PG (ref 26.6–33)
MCH RBC QN AUTO: 30.9 PG (ref 26.6–33)
MCHC RBC AUTO-ENTMCNC: 33.2 G/DL (ref 31.5–35.7)
MCHC RBC AUTO-ENTMCNC: 33.7 G/DL (ref 31.5–35.7)
MCV RBC AUTO: 91.2 FL (ref 79–97)
MCV RBC AUTO: 91.6 FL (ref 79–97)
MONOCYTES # BLD AUTO: 0.32 10*3/MM3 (ref 0.1–0.9)
MONOCYTES # BLD AUTO: 0.45 10*3/MM3 (ref 0.1–0.9)
MONOCYTES NFR BLD AUTO: 10.1 % (ref 5–12)
MONOCYTES NFR BLD AUTO: 16.4 % (ref 5–12)
NEUTROPHILS NFR BLD AUTO: 1.22 10*3/MM3 (ref 1.7–7)
NEUTROPHILS NFR BLD AUTO: 2.08 10*3/MM3 (ref 1.7–7)
NEUTROPHILS NFR BLD AUTO: 44.3 % (ref 42.7–76)
NEUTROPHILS NFR BLD AUTO: 65.4 % (ref 42.7–76)
PHOSPHATE SERPL-MCNC: 2.7 MG/DL (ref 2.5–4.5)
PLATELET # BLD AUTO: 29 10*3/MM3 (ref 140–450)
PLATELET # BLD AUTO: 30 10*3/MM3 (ref 140–450)
PMV BLD AUTO: 12.4 FL (ref 6–12)
POTASSIUM SERPL-SCNC: 3.5 MMOL/L (ref 3.5–5.2)
PROT SERPL-MCNC: 6.2 G/DL (ref 6–8.5)
RBC # BLD AUTO: 4.18 10*6/MM3 (ref 3.77–5.28)
RBC # BLD AUTO: 4.2 10*6/MM3 (ref 3.77–5.28)
SODIUM SERPL-SCNC: 139 MMOL/L (ref 136–145)
T4 FREE SERPL-MCNC: 1.79 NG/DL (ref 0.93–1.7)
TSH SERPL DL<=0.05 MIU/L-ACNC: 0.1 UIU/ML (ref 0.27–4.2)
UNIT  ABO: NORMAL
UNIT  RH: NORMAL
VIT B12 BLD-MCNC: 827 PG/ML (ref 211–946)
WBC NRBC COR # BLD: 2.75 10*3/MM3 (ref 3.4–10.8)
WBC NRBC COR # BLD: 3.18 10*3/MM3 (ref 3.4–10.8)

## 2023-01-17 PROCEDURE — 25010000002 OCTREOTIDE PER 25 MCG: Performed by: STUDENT IN AN ORGANIZED HEALTH CARE EDUCATION/TRAINING PROGRAM

## 2023-01-17 PROCEDURE — 80050 GENERAL HEALTH PANEL: CPT | Performed by: STUDENT IN AN ORGANIZED HEALTH CARE EDUCATION/TRAINING PROGRAM

## 2023-01-17 PROCEDURE — 25010000002 CEFTRIAXONE PER 250 MG: Performed by: INTERNAL MEDICINE

## 2023-01-17 PROCEDURE — G0378 HOSPITAL OBSERVATION PER HR: HCPCS

## 2023-01-17 PROCEDURE — 84439 ASSAY OF FREE THYROXINE: CPT | Performed by: STUDENT IN AN ORGANIZED HEALTH CARE EDUCATION/TRAINING PROGRAM

## 2023-01-17 PROCEDURE — 83735 ASSAY OF MAGNESIUM: CPT | Performed by: STUDENT IN AN ORGANIZED HEALTH CARE EDUCATION/TRAINING PROGRAM

## 2023-01-17 PROCEDURE — 99214 OFFICE O/P EST MOD 30 MIN: CPT | Performed by: INTERNAL MEDICINE

## 2023-01-17 PROCEDURE — 99223 1ST HOSP IP/OBS HIGH 75: CPT | Performed by: INTERNAL MEDICINE

## 2023-01-17 PROCEDURE — 83921 ORGANIC ACID SINGLE QUANT: CPT | Performed by: INTERNAL MEDICINE

## 2023-01-17 PROCEDURE — 82607 VITAMIN B-12: CPT | Performed by: INTERNAL MEDICINE

## 2023-01-17 PROCEDURE — 85014 HEMATOCRIT: CPT | Performed by: INTERNAL MEDICINE

## 2023-01-17 PROCEDURE — 84100 ASSAY OF PHOSPHORUS: CPT | Performed by: STUDENT IN AN ORGANIZED HEALTH CARE EDUCATION/TRAINING PROGRAM

## 2023-01-17 PROCEDURE — 25010000002 OCTREOTIDE PER 25 MCG: Performed by: INTERNAL MEDICINE

## 2023-01-17 PROCEDURE — 82747 ASSAY OF FOLIC ACID RBC: CPT | Performed by: INTERNAL MEDICINE

## 2023-01-17 PROCEDURE — 85025 COMPLETE CBC W/AUTO DIFF WBC: CPT | Performed by: STUDENT IN AN ORGANIZED HEALTH CARE EDUCATION/TRAINING PROGRAM

## 2023-01-17 RX ORDER — LEVOTHYROXINE SODIUM 0.07 MG/1
75 TABLET ORAL DAILY
Status: DISCONTINUED | OUTPATIENT
Start: 2023-01-18 | End: 2023-01-21 | Stop reason: HOSPADM

## 2023-01-17 RX ADMIN — NICOTINE 1 PATCH: 7 PATCH, EXTENDED RELEASE TRANSDERMAL at 12:10

## 2023-01-17 RX ADMIN — OXYCODONE AND ACETAMINOPHEN 1 TABLET: 5; 325 TABLET ORAL at 05:18

## 2023-01-17 RX ADMIN — LORAZEPAM 2 MG: 1 TABLET ORAL at 10:39

## 2023-01-17 RX ADMIN — Medication 100 MG: at 08:33

## 2023-01-17 RX ADMIN — OCTREOTIDE ACETATE 50 MCG/HR: 500 INJECTION, SOLUTION INTRAVENOUS; SUBCUTANEOUS at 20:18

## 2023-01-17 RX ADMIN — Medication 1 TABLET: at 08:33

## 2023-01-17 RX ADMIN — Medication 10 ML: at 20:19

## 2023-01-17 RX ADMIN — PANTOPRAZOLE SODIUM 40 MG: 40 INJECTION, POWDER, FOR SOLUTION INTRAVENOUS at 02:01

## 2023-01-17 RX ADMIN — PANTOPRAZOLE SODIUM 40 MG: 40 INJECTION, POWDER, FOR SOLUTION INTRAVENOUS at 08:47

## 2023-01-17 RX ADMIN — OXYCODONE AND ACETAMINOPHEN 1 TABLET: 5; 325 TABLET ORAL at 23:19

## 2023-01-17 RX ADMIN — NADOLOL 40 MG: 40 TABLET ORAL at 08:33

## 2023-01-17 RX ADMIN — FOLIC ACID 1 MG: 1 TABLET ORAL at 08:33

## 2023-01-17 RX ADMIN — OCTREOTIDE ACETATE 50 MCG/HR: 500 INJECTION, SOLUTION INTRAVENOUS; SUBCUTANEOUS at 08:47

## 2023-01-17 RX ADMIN — TRAZODONE HYDROCHLORIDE 50 MG: 50 TABLET ORAL at 20:19

## 2023-01-17 RX ADMIN — FLUOXETINE HYDROCHLORIDE 40 MG: 20 CAPSULE ORAL at 08:33

## 2023-01-17 RX ADMIN — LORAZEPAM 1 MG: 1 TABLET ORAL at 08:32

## 2023-01-17 RX ADMIN — LEVOTHYROXINE SODIUM 100 MCG: 0.1 TABLET ORAL at 08:33

## 2023-01-17 RX ADMIN — OXYCODONE AND ACETAMINOPHEN 1 TABLET: 5; 325 TABLET ORAL at 12:10

## 2023-01-17 RX ADMIN — CEFTRIAXONE SODIUM 1 G: 1 INJECTION, POWDER, FOR SOLUTION INTRAMUSCULAR; INTRAVENOUS at 15:49

## 2023-01-18 ENCOUNTER — ANESTHESIA (OUTPATIENT)
Dept: GASTROENTEROLOGY | Facility: HOSPITAL | Age: 53
DRG: 433 | End: 2023-01-18
Payer: MEDICAID

## 2023-01-18 ENCOUNTER — ANESTHESIA EVENT (OUTPATIENT)
Dept: GASTROENTEROLOGY | Facility: HOSPITAL | Age: 53
DRG: 433 | End: 2023-01-18
Payer: MEDICAID

## 2023-01-18 LAB
ALBUMIN SERPL-MCNC: 4.1 G/DL (ref 3.5–5.2)
ALBUMIN/GLOB SERPL: 2.1 G/DL
ALP SERPL-CCNC: 116 U/L (ref 39–117)
ALT SERPL W P-5'-P-CCNC: 16 U/L (ref 1–33)
ANION GAP SERPL CALCULATED.3IONS-SCNC: 11.3 MMOL/L (ref 5–15)
AST SERPL-CCNC: 40 U/L (ref 1–32)
BASOPHILS # BLD AUTO: 0.03 10*3/MM3 (ref 0–0.2)
BASOPHILS NFR BLD AUTO: 1 % (ref 0–1.5)
BILIRUB SERPL-MCNC: 2.3 MG/DL (ref 0–1.2)
BUN SERPL-MCNC: 7 MG/DL (ref 6–20)
BUN/CREAT SERPL: 12.1 (ref 7–25)
CALCIUM SPEC-SCNC: 8.9 MG/DL (ref 8.6–10.5)
CHLORIDE SERPL-SCNC: 104 MMOL/L (ref 98–107)
CO2 SERPL-SCNC: 25.7 MMOL/L (ref 22–29)
CREAT SERPL-MCNC: 0.58 MG/DL (ref 0.57–1)
DEPRECATED RDW RBC AUTO: 41.6 FL (ref 37–54)
EGFRCR SERPLBLD CKD-EPI 2021: 107.7 ML/MIN/1.73
EOSINOPHIL # BLD AUTO: 0.12 10*3/MM3 (ref 0–0.4)
EOSINOPHIL NFR BLD AUTO: 4.1 % (ref 0.3–6.2)
ERYTHROCYTE [DISTWIDTH] IN BLOOD BY AUTOMATED COUNT: 12.5 % (ref 12.3–15.4)
FOLATE BLD-MCNC: 538 NG/ML
FOLATE RBC-MCNC: 1355 NG/ML
GLOBULIN UR ELPH-MCNC: 2 GM/DL
GLUCOSE SERPL-MCNC: 91 MG/DL (ref 65–99)
HCT VFR BLD AUTO: 38.2 % (ref 34–46.6)
HCT VFR BLD AUTO: 39.7 % (ref 34–46.6)
HGB BLD-MCNC: 12.7 G/DL (ref 12–15.9)
LYMPHOCYTES # BLD AUTO: 0.65 10*3/MM3 (ref 0.7–3.1)
LYMPHOCYTES NFR BLD AUTO: 22 % (ref 19.6–45.3)
MAGNESIUM SERPL-MCNC: 1.5 MG/DL (ref 1.6–2.6)
MCH RBC QN AUTO: 30.5 PG (ref 26.6–33)
MCHC RBC AUTO-ENTMCNC: 33.2 G/DL (ref 31.5–35.7)
MCV RBC AUTO: 91.8 FL (ref 79–97)
MONOCYTES # BLD AUTO: 0.43 10*3/MM3 (ref 0.1–0.9)
MONOCYTES NFR BLD AUTO: 14.5 % (ref 5–12)
NEUTROPHILS NFR BLD AUTO: 1.72 10*3/MM3 (ref 1.7–7)
NEUTROPHILS NFR BLD AUTO: 58.1 % (ref 42.7–76)
PHOSPHATE SERPL-MCNC: 3 MG/DL (ref 2.5–4.5)
PLATELET # BLD AUTO: 28 10*3/MM3 (ref 140–450)
PMV BLD AUTO: 13 FL (ref 6–12)
POTASSIUM SERPL-SCNC: 3.6 MMOL/L (ref 3.5–5.2)
PROT SERPL-MCNC: 6.1 G/DL (ref 6–8.5)
RBC # BLD AUTO: 4.16 10*6/MM3 (ref 3.77–5.28)
SODIUM SERPL-SCNC: 141 MMOL/L (ref 136–145)
WBC NRBC COR # BLD: 2.96 10*3/MM3 (ref 3.4–10.8)

## 2023-01-18 PROCEDURE — G0378 HOSPITAL OBSERVATION PER HR: HCPCS

## 2023-01-18 PROCEDURE — 80053 COMPREHEN METABOLIC PANEL: CPT | Performed by: STUDENT IN AN ORGANIZED HEALTH CARE EDUCATION/TRAINING PROGRAM

## 2023-01-18 PROCEDURE — 45330 DIAGNOSTIC SIGMOIDOSCOPY: CPT | Performed by: INTERNAL MEDICINE

## 2023-01-18 PROCEDURE — 84100 ASSAY OF PHOSPHORUS: CPT | Performed by: STUDENT IN AN ORGANIZED HEALTH CARE EDUCATION/TRAINING PROGRAM

## 2023-01-18 PROCEDURE — 85025 COMPLETE CBC W/AUTO DIFF WBC: CPT | Performed by: STUDENT IN AN ORGANIZED HEALTH CARE EDUCATION/TRAINING PROGRAM

## 2023-01-18 PROCEDURE — 83735 ASSAY OF MAGNESIUM: CPT | Performed by: STUDENT IN AN ORGANIZED HEALTH CARE EDUCATION/TRAINING PROGRAM

## 2023-01-18 PROCEDURE — 43235 EGD DIAGNOSTIC BRUSH WASH: CPT | Performed by: INTERNAL MEDICINE

## 2023-01-18 PROCEDURE — 0DJD8ZZ INSPECTION OF LOWER INTESTINAL TRACT, VIA NATURAL OR ARTIFICIAL OPENING ENDOSCOPIC: ICD-10-PCS | Performed by: INTERNAL MEDICINE

## 2023-01-18 PROCEDURE — 99232 SBSQ HOSP IP/OBS MODERATE 35: CPT | Performed by: INTERNAL MEDICINE

## 2023-01-18 PROCEDURE — 25010000002 OCTREOTIDE PER 25 MCG: Performed by: INTERNAL MEDICINE

## 2023-01-18 PROCEDURE — 0DJ08ZZ INSPECTION OF UPPER INTESTINAL TRACT, VIA NATURAL OR ARTIFICIAL OPENING ENDOSCOPIC: ICD-10-PCS | Performed by: INTERNAL MEDICINE

## 2023-01-18 PROCEDURE — 25010000002 PROPOFOL 10 MG/ML EMULSION: Performed by: ANESTHESIOLOGY

## 2023-01-18 PROCEDURE — 36430 TRANSFUSION BLD/BLD COMPNT: CPT

## 2023-01-18 PROCEDURE — P9035 PLATELET PHERES LEUKOREDUCED: HCPCS

## 2023-01-18 PROCEDURE — P9100 PATHOGEN TEST FOR PLATELETS: HCPCS

## 2023-01-18 RX ORDER — LIDOCAINE HYDROCHLORIDE 20 MG/ML
INJECTION, SOLUTION INFILTRATION; PERINEURAL AS NEEDED
Status: DISCONTINUED | OUTPATIENT
Start: 2023-01-18 | End: 2023-01-18 | Stop reason: SURG

## 2023-01-18 RX ORDER — SODIUM CHLORIDE 9 MG/ML
100 INJECTION, SOLUTION INTRAVENOUS CONTINUOUS
Status: DISCONTINUED | OUTPATIENT
Start: 2023-01-18 | End: 2023-01-19

## 2023-01-18 RX ORDER — PROPOFOL 10 MG/ML
VIAL (ML) INTRAVENOUS AS NEEDED
Status: DISCONTINUED | OUTPATIENT
Start: 2023-01-18 | End: 2023-01-18 | Stop reason: SURG

## 2023-01-18 RX ORDER — AMLODIPINE BESYLATE 5 MG/1
5 TABLET ORAL DAILY
Status: DISCONTINUED | OUTPATIENT
Start: 2023-01-18 | End: 2023-01-21 | Stop reason: HOSPADM

## 2023-01-18 RX ORDER — PROPOFOL 10 MG/ML
VIAL (ML) INTRAVENOUS CONTINUOUS PRN
Status: DISCONTINUED | OUTPATIENT
Start: 2023-01-18 | End: 2023-01-18 | Stop reason: SURG

## 2023-01-18 RX ORDER — SODIUM CHLORIDE 0.9 % (FLUSH) 0.9 %
10 SYRINGE (ML) INJECTION AS NEEDED
Status: DISCONTINUED | OUTPATIENT
Start: 2023-01-18 | End: 2023-01-20

## 2023-01-18 RX ORDER — SODIUM CHLORIDE 9 MG/ML
40 INJECTION, SOLUTION INTRAVENOUS AS NEEDED
Status: DISCONTINUED | OUTPATIENT
Start: 2023-01-18 | End: 2023-01-20

## 2023-01-18 RX ORDER — SODIUM CHLORIDE 0.9 % (FLUSH) 0.9 %
10 SYRINGE (ML) INJECTION EVERY 12 HOURS SCHEDULED
Status: DISCONTINUED | OUTPATIENT
Start: 2023-01-18 | End: 2023-01-20

## 2023-01-18 RX ORDER — POTASSIUM CHLORIDE 750 MG/1
40 TABLET, FILM COATED, EXTENDED RELEASE ORAL ONCE
Status: COMPLETED | OUTPATIENT
Start: 2023-01-18 | End: 2023-01-18

## 2023-01-18 RX ORDER — SODIUM CHLORIDE 9 MG/ML
30 INJECTION, SOLUTION INTRAVENOUS CONTINUOUS PRN
Status: DISCONTINUED | OUTPATIENT
Start: 2023-01-18 | End: 2023-01-21 | Stop reason: HOSPADM

## 2023-01-18 RX ORDER — PANTOPRAZOLE SODIUM 40 MG/1
40 TABLET, DELAYED RELEASE ORAL
Status: DISCONTINUED | OUTPATIENT
Start: 2023-01-19 | End: 2023-01-21 | Stop reason: HOSPADM

## 2023-01-18 RX ADMIN — NADOLOL 40 MG: 40 TABLET ORAL at 08:06

## 2023-01-18 RX ADMIN — PROPOFOL 30 MG: 10 INJECTION, EMULSION INTRAVENOUS at 13:35

## 2023-01-18 RX ADMIN — OXYCODONE AND ACETAMINOPHEN 1 TABLET: 5; 325 TABLET ORAL at 21:46

## 2023-01-18 RX ADMIN — PANTOPRAZOLE SODIUM 40 MG: 40 INJECTION, POWDER, FOR SOLUTION INTRAVENOUS at 05:42

## 2023-01-18 RX ADMIN — TRAZODONE HYDROCHLORIDE 50 MG: 50 TABLET ORAL at 21:40

## 2023-01-18 RX ADMIN — SODIUM CHLORIDE 100 ML/HR: 9 INJECTION, SOLUTION INTRAVENOUS at 21:46

## 2023-01-18 RX ADMIN — MAGNESIUM OXIDE 400 MG (241.3 MG MAGNESIUM) TABLET 400 MG: TABLET at 10:43

## 2023-01-18 RX ADMIN — FOLIC ACID 1 MG: 1 TABLET ORAL at 08:06

## 2023-01-18 RX ADMIN — FLUOXETINE HYDROCHLORIDE 40 MG: 20 CAPSULE ORAL at 08:06

## 2023-01-18 RX ADMIN — SODIUM CHLORIDE: 9 INJECTION, SOLUTION INTRAVENOUS at 13:00

## 2023-01-18 RX ADMIN — NICOTINE 1 PATCH: 7 PATCH, EXTENDED RELEASE TRANSDERMAL at 08:06

## 2023-01-18 RX ADMIN — PROPOFOL 60 MG: 10 INJECTION, EMULSION INTRAVENOUS at 13:33

## 2023-01-18 RX ADMIN — Medication 1 TABLET: at 08:06

## 2023-01-18 RX ADMIN — OCTREOTIDE ACETATE 50 MCG/HR: 500 INJECTION, SOLUTION INTRAVENOUS; SUBCUTANEOUS at 15:09

## 2023-01-18 RX ADMIN — Medication 140 MCG/KG/MIN: at 13:33

## 2023-01-18 RX ADMIN — LORAZEPAM 1 MG: 1 TABLET ORAL at 10:54

## 2023-01-18 RX ADMIN — OXYCODONE AND ACETAMINOPHEN 1 TABLET: 5; 325 TABLET ORAL at 05:48

## 2023-01-18 RX ADMIN — AMLODIPINE BESYLATE 5 MG: 5 TABLET ORAL at 15:09

## 2023-01-18 RX ADMIN — POTASSIUM CHLORIDE 40 MEQ: 750 TABLET, EXTENDED RELEASE ORAL at 15:10

## 2023-01-18 RX ADMIN — Medication 10 ML: at 21:40

## 2023-01-18 RX ADMIN — Medication 100 MG: at 08:06

## 2023-01-18 RX ADMIN — Medication 10 ML: at 08:07

## 2023-01-18 RX ADMIN — PANTOPRAZOLE SODIUM: 40 INJECTION, POWDER, FOR SOLUTION INTRAVENOUS at 11:52

## 2023-01-18 RX ADMIN — PROPOFOL 30 MG: 10 INJECTION, EMULSION INTRAVENOUS at 13:34

## 2023-01-18 RX ADMIN — MAGNESIUM OXIDE 400 MG (241.3 MG MAGNESIUM) TABLET 400 MG: TABLET at 21:40

## 2023-01-18 RX ADMIN — LIDOCAINE HYDROCHLORIDE 30 MG: 20 INJECTION, SOLUTION INFILTRATION; PERINEURAL at 13:32

## 2023-01-18 RX ADMIN — LORAZEPAM 1 MG: 1 TABLET ORAL at 08:13

## 2023-01-18 RX ADMIN — OXYCODONE AND ACETAMINOPHEN 1 TABLET: 5; 325 TABLET ORAL at 15:14

## 2023-01-18 RX ADMIN — LEVOTHYROXINE SODIUM 75 MCG: 0.07 TABLET ORAL at 08:06

## 2023-01-18 RX ADMIN — LORAZEPAM 1 MG: 1 TABLET ORAL at 17:08

## 2023-01-18 RX ADMIN — Medication 10 ML: at 15:15

## 2023-01-18 NOTE — ANESTHESIA POSTPROCEDURE EVALUATION
"Patient: Bekah Gibson    Procedure Summary     Date: 01/18/23 Room / Location:  [facility]    Anesthesia Start: 1312 Anesthesia Stop: 1402    Procedures:       ESOPHAGOGASTRODUODENOSCOPY (Esophagus)      SIGMOIDOSCOPY FLEXIBLE Diagnosis:       Alcoholic cirrhosis of liver without ascites (HCC)      Gastrointestinal hemorrhage, unspecified gastrointestinal hemorrhage type      (Alcoholic cirrhosis of liver without ascites (HCC) [K70.30])      (Gastrointestinal hemorrhage, unspecified gastrointestinal hemorrhage type [K92.2])    Surgeons: Ag Patel MD Provider: Víctor Saldana MD    Anesthesia Type: MAC ASA Status: 4          Anesthesia Type: MAC    Vitals  Vitals Value Taken Time   /74 01/18/23 1410   Temp 36.8 °C (98.3 °F) 01/18/23 1330   Pulse 62 01/18/23 1410   Resp 16 01/18/23 1410   SpO2 95 % 01/18/23 1410           Post Anesthesia Care and Evaluation    Level of consciousness: awake and alert  Pain management: adequate  Anesthetic complications: No anesthetic complications    Cardiovascular status: acceptable  Respiratory status: acceptable  Hydration status: acceptable    Comments: /74 (BP Location: Right arm, Patient Position: Lying)   Pulse 62   Temp 36.8 °C (98.3 °F)   Resp 16   Ht 165.1 cm (65\")   Wt 65.8 kg (145 lb)   LMP 01/01/2013 Comment: NATANAEL  SpO2 95%   BMI 24.13 kg/m²         "

## 2023-01-18 NOTE — ANESTHESIA PREPROCEDURE EVALUATION
Anesthesia Evaluation     Patient summary reviewed and Nursing notes reviewed   no history of anesthetic complications:  NPO Solid Status: > 8 hours  NPO Liquid Status: > 2 hours           Airway   Mallampati: II  TM distance: >3 FB  Neck ROM: full  Dental      Comment: Loose and broken teeth, Risks of dental trauma discussed    Pulmonary    Cardiovascular     ECG reviewed    (+) hypertension,       Neuro/Psych  (+) psychiatric history Anxiety,    GI/Hepatic/Renal/Endo    (+)  GI bleeding , liver disease cirrhosis, renal disease stones, thyroid problem hypothyroidism    Musculoskeletal     Abdominal    Substance History   (+) alcohol use,      OB/GYN          Other   autoimmune disease lupus, blood dyscrasia thrombocytopenia,     ROS/Med Hx Other: Received platelets                    Anesthesia Plan    ASA 4     MAC     intravenous induction     Anesthetic plan, risks, benefits, and alternatives have been provided, discussed and informed consent has been obtained with: patient.        CODE STATUS:

## 2023-01-19 ENCOUNTER — APPOINTMENT (OUTPATIENT)
Dept: GENERAL RADIOLOGY | Facility: HOSPITAL | Age: 53
DRG: 433 | End: 2023-01-19
Payer: MEDICAID

## 2023-01-19 LAB
ANION GAP SERPL CALCULATED.3IONS-SCNC: 12.1 MMOL/L (ref 5–15)
BASOPHILS # BLD AUTO: 0.02 10*3/MM3 (ref 0–0.2)
BASOPHILS NFR BLD AUTO: 0.7 % (ref 0–1.5)
BH BB BLOOD EXPIRATION DATE: NORMAL
BH BB BLOOD EXPIRATION DATE: NORMAL
BH BB BLOOD TYPE BARCODE: 600
BH BB BLOOD TYPE BARCODE: 6200
BH BB DISPENSE STATUS: NORMAL
BH BB DISPENSE STATUS: NORMAL
BH BB PRODUCT CODE: NORMAL
BH BB PRODUCT CODE: NORMAL
BH BB UNIT NUMBER: NORMAL
BH BB UNIT NUMBER: NORMAL
BUN SERPL-MCNC: 6 MG/DL (ref 6–20)
BUN/CREAT SERPL: 11.1 (ref 7–25)
CALCIUM SPEC-SCNC: 8.9 MG/DL (ref 8.6–10.5)
CHLORIDE SERPL-SCNC: 104 MMOL/L (ref 98–107)
CO2 SERPL-SCNC: 23.9 MMOL/L (ref 22–29)
CREAT SERPL-MCNC: 0.54 MG/DL (ref 0.57–1)
DEPRECATED RDW RBC AUTO: 40.4 FL (ref 37–54)
EGFRCR SERPLBLD CKD-EPI 2021: 109.6 ML/MIN/1.73
EOSINOPHIL # BLD AUTO: 0.12 10*3/MM3 (ref 0–0.4)
EOSINOPHIL NFR BLD AUTO: 4.1 % (ref 0.3–6.2)
ERYTHROCYTE [DISTWIDTH] IN BLOOD BY AUTOMATED COUNT: 12.2 % (ref 12.3–15.4)
GLUCOSE SERPL-MCNC: 189 MG/DL (ref 65–99)
HCT VFR BLD AUTO: 37.4 % (ref 34–46.6)
HGB BLD-MCNC: 12.5 G/DL (ref 12–15.9)
LYMPHOCYTES # BLD AUTO: 0.71 10*3/MM3 (ref 0.7–3.1)
LYMPHOCYTES NFR BLD AUTO: 24.1 % (ref 19.6–45.3)
MAGNESIUM SERPL-MCNC: 1.5 MG/DL (ref 1.6–2.6)
MCH RBC QN AUTO: 30 PG (ref 26.6–33)
MCHC RBC AUTO-ENTMCNC: 33.4 G/DL (ref 31.5–35.7)
MCV RBC AUTO: 89.9 FL (ref 79–97)
MONOCYTES # BLD AUTO: 0.42 10*3/MM3 (ref 0.1–0.9)
MONOCYTES NFR BLD AUTO: 14.2 % (ref 5–12)
NEUTROPHILS NFR BLD AUTO: 1.67 10*3/MM3 (ref 1.7–7)
NEUTROPHILS NFR BLD AUTO: 56.6 % (ref 42.7–76)
PHOSPHATE SERPL-MCNC: 2.6 MG/DL (ref 2.5–4.5)
PLATELET # BLD AUTO: 38 10*3/MM3 (ref 140–450)
PMV BLD AUTO: 12 FL (ref 6–12)
POTASSIUM SERPL-SCNC: 3.7 MMOL/L (ref 3.5–5.2)
RBC # BLD AUTO: 4.16 10*6/MM3 (ref 3.77–5.28)
SODIUM SERPL-SCNC: 140 MMOL/L (ref 136–145)
UNIT  ABO: NORMAL
UNIT  ABO: NORMAL
UNIT  RH: NORMAL
UNIT  RH: NORMAL
WBC NRBC COR # BLD: 2.95 10*3/MM3 (ref 3.4–10.8)

## 2023-01-19 PROCEDURE — 71046 X-RAY EXAM CHEST 2 VIEWS: CPT

## 2023-01-19 PROCEDURE — G0378 HOSPITAL OBSERVATION PER HR: HCPCS

## 2023-01-19 PROCEDURE — 25010000002 MAGNESIUM SULFATE IN D5W 1G/100ML (PREMIX) 1-5 GM/100ML-% SOLUTION: Performed by: STUDENT IN AN ORGANIZED HEALTH CARE EDUCATION/TRAINING PROGRAM

## 2023-01-19 PROCEDURE — 85025 COMPLETE CBC W/AUTO DIFF WBC: CPT | Performed by: INTERNAL MEDICINE

## 2023-01-19 PROCEDURE — 80048 BASIC METABOLIC PNL TOTAL CA: CPT | Performed by: STUDENT IN AN ORGANIZED HEALTH CARE EDUCATION/TRAINING PROGRAM

## 2023-01-19 PROCEDURE — 99214 OFFICE O/P EST MOD 30 MIN: CPT | Performed by: NURSE PRACTITIONER

## 2023-01-19 PROCEDURE — 83735 ASSAY OF MAGNESIUM: CPT | Performed by: INTERNAL MEDICINE

## 2023-01-19 PROCEDURE — 99231 SBSQ HOSP IP/OBS SF/LOW 25: CPT | Performed by: INTERNAL MEDICINE

## 2023-01-19 PROCEDURE — 84100 ASSAY OF PHOSPHORUS: CPT | Performed by: INTERNAL MEDICINE

## 2023-01-19 RX ORDER — MAGNESIUM SULFATE HEPTAHYDRATE 40 MG/ML
2 INJECTION, SOLUTION INTRAVENOUS ONCE
Status: DISCONTINUED | OUTPATIENT
Start: 2023-01-19 | End: 2023-01-19

## 2023-01-19 RX ORDER — MAGNESIUM SULFATE 1 G/100ML
1 INJECTION INTRAVENOUS ONCE
Status: COMPLETED | OUTPATIENT
Start: 2023-01-19 | End: 2023-01-19

## 2023-01-19 RX ADMIN — FLUOXETINE HYDROCHLORIDE 40 MG: 20 CAPSULE ORAL at 09:08

## 2023-01-19 RX ADMIN — Medication 10 ML: at 09:08

## 2023-01-19 RX ADMIN — NICOTINE 1 PATCH: 7 PATCH, EXTENDED RELEASE TRANSDERMAL at 09:08

## 2023-01-19 RX ADMIN — TRAZODONE HYDROCHLORIDE 50 MG: 50 TABLET ORAL at 20:15

## 2023-01-19 RX ADMIN — MAGNESIUM OXIDE 400 MG (241.3 MG MAGNESIUM) TABLET 400 MG: TABLET at 20:15

## 2023-01-19 RX ADMIN — LORAZEPAM 1 MG: 1 TABLET ORAL at 13:37

## 2023-01-19 RX ADMIN — NADOLOL 40 MG: 40 TABLET ORAL at 09:08

## 2023-01-19 RX ADMIN — PANTOPRAZOLE SODIUM 40 MG: 40 TABLET, DELAYED RELEASE ORAL at 06:33

## 2023-01-19 RX ADMIN — Medication 1 TABLET: at 09:08

## 2023-01-19 RX ADMIN — MAGNESIUM OXIDE 400 MG (241.3 MG MAGNESIUM) TABLET 400 MG: TABLET at 09:08

## 2023-01-19 RX ADMIN — OXYCODONE AND ACETAMINOPHEN 1 TABLET: 5; 325 TABLET ORAL at 20:15

## 2023-01-19 RX ADMIN — LEVOTHYROXINE SODIUM 75 MCG: 0.07 TABLET ORAL at 09:08

## 2023-01-19 RX ADMIN — AMLODIPINE BESYLATE 5 MG: 5 TABLET ORAL at 09:08

## 2023-01-19 RX ADMIN — OXYCODONE AND ACETAMINOPHEN 1 TABLET: 5; 325 TABLET ORAL at 13:33

## 2023-01-19 RX ADMIN — Medication 10 ML: at 20:16

## 2023-01-19 RX ADMIN — LORAZEPAM 1 MG: 1 TABLET ORAL at 09:08

## 2023-01-19 RX ADMIN — MAGNESIUM SULFATE IN DEXTROSE 1 G: 10 INJECTION, SOLUTION INTRAVENOUS at 13:33

## 2023-01-19 RX ADMIN — OXYCODONE AND ACETAMINOPHEN 1 TABLET: 5; 325 TABLET ORAL at 06:33

## 2023-01-19 RX ADMIN — Medication 100 MG: at 09:08

## 2023-01-19 RX ADMIN — FOLIC ACID 1 MG: 1 TABLET ORAL at 09:08

## 2023-01-20 LAB
DEPRECATED RDW RBC AUTO: 40.5 FL (ref 37–54)
EOSINOPHIL # BLD MANUAL: 0.03 10*3/MM3 (ref 0–0.4)
EOSINOPHIL NFR BLD MANUAL: 1.1 % (ref 0.3–6.2)
ERYTHROCYTE [DISTWIDTH] IN BLOOD BY AUTOMATED COUNT: 12.4 % (ref 12.3–15.4)
GIANT PLATELETS: ABNORMAL
GLUCOSE BLDC GLUCOMTR-MCNC: 110 MG/DL (ref 70–130)
HCT VFR BLD AUTO: 38.2 % (ref 34–46.6)
HGB BLD-MCNC: 12.9 G/DL (ref 12–15.9)
LYMPHOCYTES # BLD MANUAL: 0.71 10*3/MM3 (ref 0.7–3.1)
LYMPHOCYTES NFR BLD MANUAL: 12.9 % (ref 5–12)
MAGNESIUM SERPL-MCNC: 1.6 MG/DL (ref 1.6–2.6)
MCH RBC QN AUTO: 30.3 PG (ref 26.6–33)
MCHC RBC AUTO-ENTMCNC: 33.8 G/DL (ref 31.5–35.7)
MCV RBC AUTO: 89.7 FL (ref 79–97)
METHYLMALONATE SERPL-SCNC: 82 NMOL/L (ref 0–378)
MONOCYTES # BLD: 0.41 10*3/MM3 (ref 0.1–0.9)
NEUTROPHILS # BLD AUTO: 2 10*3/MM3 (ref 1.7–7)
NEUTROPHILS NFR BLD MANUAL: 63.4 % (ref 42.7–76)
NRBC SPEC MANUAL: 1.1 /100 WBC (ref 0–0.2)
PHOSPHATE SERPL-MCNC: 2.4 MG/DL (ref 2.5–4.5)
PLATELET # BLD AUTO: 35 10*3/MM3 (ref 140–450)
PMV BLD AUTO: 12 FL (ref 6–12)
RBC # BLD AUTO: 4.26 10*6/MM3 (ref 3.77–5.28)
RBC MORPH BLD: NORMAL
VARIANT LYMPHS NFR BLD MANUAL: 22.6 % (ref 19.6–45.3)
WBC MORPH BLD: NORMAL
WBC NRBC COR # BLD: 3.15 10*3/MM3 (ref 3.4–10.8)

## 2023-01-20 PROCEDURE — 82962 GLUCOSE BLOOD TEST: CPT

## 2023-01-20 PROCEDURE — 85007 BL SMEAR W/DIFF WBC COUNT: CPT | Performed by: INTERNAL MEDICINE

## 2023-01-20 PROCEDURE — 83735 ASSAY OF MAGNESIUM: CPT | Performed by: INTERNAL MEDICINE

## 2023-01-20 PROCEDURE — 85025 COMPLETE CBC W/AUTO DIFF WBC: CPT | Performed by: INTERNAL MEDICINE

## 2023-01-20 PROCEDURE — 84100 ASSAY OF PHOSPHORUS: CPT | Performed by: INTERNAL MEDICINE

## 2023-01-20 RX ADMIN — PANTOPRAZOLE SODIUM 40 MG: 40 TABLET, DELAYED RELEASE ORAL at 06:20

## 2023-01-20 RX ADMIN — AMLODIPINE BESYLATE 5 MG: 5 TABLET ORAL at 08:18

## 2023-01-20 RX ADMIN — OXYCODONE AND ACETAMINOPHEN 1 TABLET: 5; 325 TABLET ORAL at 18:21

## 2023-01-20 RX ADMIN — TRAZODONE HYDROCHLORIDE 50 MG: 50 TABLET ORAL at 21:07

## 2023-01-20 RX ADMIN — Medication 10 ML: at 21:07

## 2023-01-20 RX ADMIN — MAGNESIUM OXIDE 400 MG (241.3 MG MAGNESIUM) TABLET 400 MG: TABLET at 08:18

## 2023-01-20 RX ADMIN — NADOLOL 40 MG: 40 TABLET ORAL at 08:18

## 2023-01-20 RX ADMIN — MAGNESIUM OXIDE 400 MG (241.3 MG MAGNESIUM) TABLET 400 MG: TABLET at 21:07

## 2023-01-20 RX ADMIN — LEVOTHYROXINE SODIUM 75 MCG: 0.07 TABLET ORAL at 08:18

## 2023-01-20 RX ADMIN — NICOTINE 1 PATCH: 7 PATCH, EXTENDED RELEASE TRANSDERMAL at 08:19

## 2023-01-20 RX ADMIN — FLUOXETINE HYDROCHLORIDE 40 MG: 20 CAPSULE ORAL at 08:18

## 2023-01-20 RX ADMIN — OXYCODONE AND ACETAMINOPHEN 1 TABLET: 5; 325 TABLET ORAL at 12:16

## 2023-01-20 RX ADMIN — OXYCODONE AND ACETAMINOPHEN 1 TABLET: 5; 325 TABLET ORAL at 06:20

## 2023-01-20 RX ADMIN — Medication 10 ML: at 08:18

## 2023-01-21 ENCOUNTER — READMISSION MANAGEMENT (OUTPATIENT)
Dept: CALL CENTER | Facility: HOSPITAL | Age: 53
End: 2023-01-21
Payer: MEDICAID

## 2023-01-21 ENCOUNTER — HOSPITAL ENCOUNTER (OUTPATIENT)
Facility: HOSPITAL | Age: 53
Setting detail: OBSERVATION
Discharge: HOME | DRG: 433 | End: 2023-01-24
Attending: EMERGENCY MEDICINE | Admitting: STUDENT IN AN ORGANIZED HEALTH CARE EDUCATION/TRAINING PROGRAM
Payer: MEDICAID

## 2023-01-21 VITALS
HEART RATE: 69 BPM | DIASTOLIC BLOOD PRESSURE: 83 MMHG | TEMPERATURE: 98 F | WEIGHT: 158.73 LBS | HEIGHT: 65 IN | BODY MASS INDEX: 26.45 KG/M2 | RESPIRATION RATE: 18 BRPM | SYSTOLIC BLOOD PRESSURE: 145 MMHG | OXYGEN SATURATION: 94 %

## 2023-01-21 DIAGNOSIS — K86.1 CHRONIC PANCREATITIS, UNSPECIFIED PANCREATITIS TYPE: ICD-10-CM

## 2023-01-21 DIAGNOSIS — K86.1 ACUTE ON CHRONIC PANCREATITIS: Primary | ICD-10-CM

## 2023-01-21 DIAGNOSIS — K85.90 ACUTE ON CHRONIC PANCREATITIS: Primary | ICD-10-CM

## 2023-01-21 DIAGNOSIS — D69.6 THROMBOCYTOPENIA: ICD-10-CM

## 2023-01-21 LAB
ALBUMIN SERPL-MCNC: 4 G/DL (ref 3.5–5.2)
ALBUMIN SERPL-MCNC: 4.2 G/DL (ref 3.5–5.2)
ALBUMIN/GLOB SERPL: 1.4 G/DL
ALBUMIN/GLOB SERPL: 1.4 G/DL
ALP SERPL-CCNC: 131 U/L (ref 39–117)
ALP SERPL-CCNC: 134 U/L (ref 39–117)
ALT SERPL W P-5'-P-CCNC: 15 U/L (ref 1–33)
ALT SERPL W P-5'-P-CCNC: 16 U/L (ref 1–33)
ANION GAP SERPL CALCULATED.3IONS-SCNC: 11.3 MMOL/L (ref 5–15)
ANION GAP SERPL CALCULATED.3IONS-SCNC: 13.7 MMOL/L (ref 5–15)
APTT PPP: 36.5 SECONDS (ref 22.7–35.4)
AST SERPL-CCNC: 29 U/L (ref 1–32)
AST SERPL-CCNC: 30 U/L (ref 1–32)
BACTERIA UR QL AUTO: ABNORMAL /HPF
BASOPHILS # BLD AUTO: 0.02 10*3/MM3 (ref 0–0.2)
BASOPHILS NFR BLD AUTO: 0.6 % (ref 0–1.5)
BILIRUB SERPL-MCNC: 1.6 MG/DL (ref 0–1.2)
BILIRUB SERPL-MCNC: 1.7 MG/DL (ref 0–1.2)
BILIRUB UR QL STRIP: NEGATIVE
BUN SERPL-MCNC: 9 MG/DL (ref 6–20)
BUN SERPL-MCNC: 9 MG/DL (ref 6–20)
BUN/CREAT SERPL: 14.5 (ref 7–25)
BUN/CREAT SERPL: 17.3 (ref 7–25)
CALCIUM SPEC-SCNC: 9.3 MG/DL (ref 8.6–10.5)
CALCIUM SPEC-SCNC: 9.7 MG/DL (ref 8.6–10.5)
CHLORIDE SERPL-SCNC: 104 MMOL/L (ref 98–107)
CHLORIDE SERPL-SCNC: 105 MMOL/L (ref 98–107)
CLARITY UR: CLEAR
CO2 SERPL-SCNC: 24.3 MMOL/L (ref 22–29)
CO2 SERPL-SCNC: 24.7 MMOL/L (ref 22–29)
COLOR UR: YELLOW
CREAT SERPL-MCNC: 0.52 MG/DL (ref 0.57–1)
CREAT SERPL-MCNC: 0.62 MG/DL (ref 0.57–1)
D-LACTATE SERPL-SCNC: 1.2 MMOL/L (ref 0.5–2)
DEPRECATED RDW RBC AUTO: 41 FL (ref 37–54)
DEPRECATED RDW RBC AUTO: 42.9 FL (ref 37–54)
EGFRCR SERPLBLD CKD-EPI 2021: 106 ML/MIN/1.73
EGFRCR SERPLBLD CKD-EPI 2021: 110.6 ML/MIN/1.73
EOSINOPHIL # BLD AUTO: 0.13 10*3/MM3 (ref 0–0.4)
EOSINOPHIL NFR BLD AUTO: 3.6 % (ref 0.3–6.2)
ERYTHROCYTE [DISTWIDTH] IN BLOOD BY AUTOMATED COUNT: 12.8 % (ref 12.3–15.4)
ERYTHROCYTE [DISTWIDTH] IN BLOOD BY AUTOMATED COUNT: 13.1 % (ref 12.3–15.4)
ETHANOL BLD-MCNC: <10 MG/DL (ref 0–10)
ETHANOL UR QL: <0.01 %
GLOBULIN UR ELPH-MCNC: 2.8 GM/DL
GLOBULIN UR ELPH-MCNC: 3.1 GM/DL
GLUCOSE SERPL-MCNC: 110 MG/DL (ref 65–99)
GLUCOSE SERPL-MCNC: 96 MG/DL (ref 65–99)
GLUCOSE UR STRIP-MCNC: NEGATIVE MG/DL
HCT VFR BLD AUTO: 42.4 % (ref 34–46.6)
HCT VFR BLD AUTO: 43.6 % (ref 34–46.6)
HGB BLD-MCNC: 14.1 G/DL (ref 12–15.9)
HGB BLD-MCNC: 14.8 G/DL (ref 12–15.9)
HGB UR QL STRIP.AUTO: NEGATIVE
HOLD SPECIMEN: NORMAL
HOLD SPECIMEN: NORMAL
HYALINE CASTS UR QL AUTO: ABNORMAL /LPF
IMM GRANULOCYTES # BLD AUTO: 0.01 10*3/MM3 (ref 0–0.05)
IMM GRANULOCYTES NFR BLD AUTO: 0.3 % (ref 0–0.5)
INR PPP: 1.35 (ref 0.9–1.1)
KETONES UR QL STRIP: NEGATIVE
LEUKOCYTE ESTERASE UR QL STRIP.AUTO: ABNORMAL
LIPASE SERPL-CCNC: 31 U/L (ref 13–60)
LYMPHOCYTES # BLD AUTO: 0.82 10*3/MM3 (ref 0.7–3.1)
LYMPHOCYTES NFR BLD AUTO: 22.7 % (ref 19.6–45.3)
MAGNESIUM SERPL-MCNC: 1.6 MG/DL (ref 1.6–2.6)
MCH RBC QN AUTO: 29.9 PG (ref 26.6–33)
MCH RBC QN AUTO: 30.8 PG (ref 26.6–33)
MCHC RBC AUTO-ENTMCNC: 33.3 G/DL (ref 31.5–35.7)
MCHC RBC AUTO-ENTMCNC: 33.9 G/DL (ref 31.5–35.7)
MCV RBC AUTO: 89.8 FL (ref 79–97)
MCV RBC AUTO: 90.8 FL (ref 79–97)
MONOCYTES # BLD AUTO: 0.53 10*3/MM3 (ref 0.1–0.9)
MONOCYTES NFR BLD AUTO: 14.6 % (ref 5–12)
NEUTROPHILS NFR BLD AUTO: 2.11 10*3/MM3 (ref 1.7–7)
NEUTROPHILS NFR BLD AUTO: 58.2 % (ref 42.7–76)
NITRITE UR QL STRIP: NEGATIVE
NRBC BLD AUTO-RTO: 0 /100 WBC (ref 0–0.2)
PH UR STRIP.AUTO: 7 [PH] (ref 5–8)
PHOSPHATE SERPL-MCNC: 3.8 MG/DL (ref 2.5–4.5)
PLATELET # BLD AUTO: 39 10*3/MM3 (ref 140–450)
PLATELET # BLD AUTO: 40 10*3/MM3 (ref 140–450)
PMV BLD AUTO: 12.3 FL (ref 6–12)
PMV BLD AUTO: 12.8 FL (ref 6–12)
POTASSIUM SERPL-SCNC: 3.1 MMOL/L (ref 3.5–5.2)
POTASSIUM SERPL-SCNC: 3.5 MMOL/L (ref 3.5–5.2)
PROT SERPL-MCNC: 6.8 G/DL (ref 6–8.5)
PROT SERPL-MCNC: 7.3 G/DL (ref 6–8.5)
PROT UR QL STRIP: NEGATIVE
PROTHROMBIN TIME: 16.9 SECONDS (ref 11.7–14.2)
RBC # BLD AUTO: 4.72 10*6/MM3 (ref 3.77–5.28)
RBC # BLD AUTO: 4.8 10*6/MM3 (ref 3.77–5.28)
RBC # UR STRIP: ABNORMAL /HPF
REF LAB TEST METHOD: ABNORMAL
SODIUM SERPL-SCNC: 141 MMOL/L (ref 136–145)
SODIUM SERPL-SCNC: 142 MMOL/L (ref 136–145)
SP GR UR STRIP: 1.01 (ref 1–1.03)
SQUAMOUS #/AREA URNS HPF: ABNORMAL /HPF
UROBILINOGEN UR QL STRIP: ABNORMAL
WBC # UR STRIP: ABNORMAL /HPF
WBC NRBC COR # BLD: 3.62 10*3/MM3 (ref 3.4–10.8)
WBC NRBC COR # BLD: 5.14 10*3/MM3 (ref 3.4–10.8)
WHOLE BLOOD HOLD COAG: NORMAL
WHOLE BLOOD HOLD SPECIMEN: NORMAL

## 2023-01-21 PROCEDURE — 99284 EMERGENCY DEPT VISIT MOD MDM: CPT

## 2023-01-21 PROCEDURE — 84100 ASSAY OF PHOSPHORUS: CPT | Performed by: INTERNAL MEDICINE

## 2023-01-21 PROCEDURE — 85025 COMPLETE CBC W/AUTO DIFF WBC: CPT | Performed by: EMERGENCY MEDICINE

## 2023-01-21 PROCEDURE — 82077 ASSAY SPEC XCP UR&BREATH IA: CPT | Performed by: EMERGENCY MEDICINE

## 2023-01-21 PROCEDURE — 85610 PROTHROMBIN TIME: CPT | Performed by: EMERGENCY MEDICINE

## 2023-01-21 PROCEDURE — 83605 ASSAY OF LACTIC ACID: CPT | Performed by: EMERGENCY MEDICINE

## 2023-01-21 PROCEDURE — 85025 COMPLETE CBC W/AUTO DIFF WBC: CPT | Performed by: INTERNAL MEDICINE

## 2023-01-21 PROCEDURE — 81001 URINALYSIS AUTO W/SCOPE: CPT

## 2023-01-21 PROCEDURE — 83690 ASSAY OF LIPASE: CPT | Performed by: EMERGENCY MEDICINE

## 2023-01-21 PROCEDURE — 83735 ASSAY OF MAGNESIUM: CPT | Performed by: INTERNAL MEDICINE

## 2023-01-21 PROCEDURE — 85730 THROMBOPLASTIN TIME PARTIAL: CPT | Performed by: EMERGENCY MEDICINE

## 2023-01-21 PROCEDURE — 80053 COMPREHEN METABOLIC PANEL: CPT | Performed by: INTERNAL MEDICINE

## 2023-01-21 PROCEDURE — 85007 BL SMEAR W/DIFF WBC COUNT: CPT | Performed by: EMERGENCY MEDICINE

## 2023-01-21 PROCEDURE — 80053 COMPREHEN METABOLIC PANEL: CPT | Performed by: EMERGENCY MEDICINE

## 2023-01-21 RX ORDER — SODIUM CHLORIDE 0.9 % (FLUSH) 0.9 %
10 SYRINGE (ML) INJECTION AS NEEDED
Status: DISCONTINUED | OUTPATIENT
Start: 2023-01-21 | End: 2023-01-24 | Stop reason: HOSPADM

## 2023-01-21 RX ORDER — PANTOPRAZOLE SODIUM 40 MG/1
40 TABLET, DELAYED RELEASE ORAL
Qty: 30 TABLET | Refills: 0 | Status: SHIPPED | OUTPATIENT
Start: 2023-01-22 | End: 2023-02-21

## 2023-01-21 RX ADMIN — OXYCODONE AND ACETAMINOPHEN 1 TABLET: 5; 325 TABLET ORAL at 13:06

## 2023-01-21 RX ADMIN — Medication 10 ML: at 13:07

## 2023-01-21 RX ADMIN — FLUOXETINE HYDROCHLORIDE 40 MG: 20 CAPSULE ORAL at 08:37

## 2023-01-21 RX ADMIN — AMLODIPINE BESYLATE 5 MG: 5 TABLET ORAL at 08:37

## 2023-01-21 RX ADMIN — MAGNESIUM OXIDE 400 MG (241.3 MG MAGNESIUM) TABLET 400 MG: TABLET at 08:37

## 2023-01-21 RX ADMIN — OXYCODONE AND ACETAMINOPHEN 1 TABLET: 5; 325 TABLET ORAL at 06:45

## 2023-01-21 RX ADMIN — NICOTINE 1 PATCH: 7 PATCH, EXTENDED RELEASE TRANSDERMAL at 08:40

## 2023-01-21 RX ADMIN — OXYCODONE AND ACETAMINOPHEN 1 TABLET: 5; 325 TABLET ORAL at 00:26

## 2023-01-21 RX ADMIN — PANTOPRAZOLE SODIUM 40 MG: 40 TABLET, DELAYED RELEASE ORAL at 06:29

## 2023-01-21 RX ADMIN — NADOLOL 40 MG: 40 TABLET ORAL at 08:37

## 2023-01-21 RX ADMIN — LEVOTHYROXINE SODIUM 75 MCG: 0.07 TABLET ORAL at 08:37

## 2023-01-21 NOTE — OUTREACH NOTE
Prep Survey    Flowsheet Row Responses   [facility] facility patient discharged from? [place]   Is LACE score < 7 ? No   Eligibility Readm Mgmt   Discharge diagnosis Melena   Does the patient have one of the following disease processes/diagnoses(primary or secondary)? Other   Does the patient have Home health ordered? No   Is there a DME ordered? No   Prep survey completed? Yes          LARON VALLADARES - Registered Nurse

## 2023-01-22 PROBLEM — K85.90 ACUTE ON CHRONIC PANCREATITIS: Status: ACTIVE | Noted: 2023-01-22

## 2023-01-22 PROBLEM — R10.9 ABDOMINAL PAIN: Status: ACTIVE | Noted: 2023-01-22

## 2023-01-22 PROBLEM — K86.1 ACUTE ON CHRONIC PANCREATITIS: Status: ACTIVE | Noted: 2023-01-22

## 2023-01-22 LAB
ALBUMIN SERPL-MCNC: 3.9 G/DL (ref 3.5–5.2)
ALBUMIN/GLOB SERPL: 1.3 G/DL
ALP SERPL-CCNC: 120 U/L (ref 39–117)
ALT SERPL W P-5'-P-CCNC: 14 U/L (ref 1–33)
ANION GAP SERPL CALCULATED.3IONS-SCNC: 10.3 MMOL/L (ref 5–15)
AST SERPL-CCNC: 26 U/L (ref 1–32)
BASOPHILS # BLD AUTO: 0.02 10*3/MM3 (ref 0–0.2)
BASOPHILS NFR BLD AUTO: 0.5 % (ref 0–1.5)
BILIRUB SERPL-MCNC: 1.6 MG/DL (ref 0–1.2)
BUN SERPL-MCNC: 10 MG/DL (ref 6–20)
BUN/CREAT SERPL: 15.9 (ref 7–25)
CALCIUM SPEC-SCNC: 9.3 MG/DL (ref 8.6–10.5)
CHLORIDE SERPL-SCNC: 105 MMOL/L (ref 98–107)
CO2 SERPL-SCNC: 24.7 MMOL/L (ref 22–29)
CREAT SERPL-MCNC: 0.63 MG/DL (ref 0.57–1)
DEPRECATED RDW RBC AUTO: 42.9 FL (ref 37–54)
EGFRCR SERPLBLD CKD-EPI 2021: 105.6 ML/MIN/1.73
EOSINOPHIL # BLD AUTO: 0.13 10*3/MM3 (ref 0–0.4)
EOSINOPHIL NFR BLD AUTO: 3.5 % (ref 0.3–6.2)
ERYTHROCYTE [DISTWIDTH] IN BLOOD BY AUTOMATED COUNT: 13.1 % (ref 12.3–15.4)
GLOBULIN UR ELPH-MCNC: 3 GM/DL
GLUCOSE SERPL-MCNC: 113 MG/DL (ref 65–99)
HCT VFR BLD AUTO: 41.1 % (ref 34–46.6)
HGB BLD-MCNC: 13.7 G/DL (ref 12–15.9)
IMM GRANULOCYTES # BLD AUTO: 0.01 10*3/MM3 (ref 0–0.05)
IMM GRANULOCYTES NFR BLD AUTO: 0.3 % (ref 0–0.5)
LYMPHOCYTES # BLD AUTO: 0.82 10*3/MM3 (ref 0.7–3.1)
LYMPHOCYTES NFR BLD AUTO: 22 % (ref 19.6–45.3)
MAGNESIUM SERPL-MCNC: 1.5 MG/DL (ref 1.6–2.6)
MCH RBC QN AUTO: 30.6 PG (ref 26.6–33)
MCHC RBC AUTO-ENTMCNC: 33.3 G/DL (ref 31.5–35.7)
MCV RBC AUTO: 91.7 FL (ref 79–97)
MONOCYTES # BLD AUTO: 0.49 10*3/MM3 (ref 0.1–0.9)
MONOCYTES NFR BLD AUTO: 13.2 % (ref 5–12)
NEUTROPHILS NFR BLD AUTO: 2.25 10*3/MM3 (ref 1.7–7)
NEUTROPHILS NFR BLD AUTO: 60.5 % (ref 42.7–76)
NRBC BLD AUTO-RTO: 0 /100 WBC (ref 0–0.2)
PLAT MORPH BLD: NORMAL
PLATELET # BLD AUTO: 37 10*3/MM3 (ref 140–450)
PMV BLD AUTO: 10.6 FL (ref 6–12)
POTASSIUM SERPL-SCNC: 3.7 MMOL/L (ref 3.5–5.2)
PROT SERPL-MCNC: 6.9 G/DL (ref 6–8.5)
RBC # BLD AUTO: 4.48 10*6/MM3 (ref 3.77–5.28)
RBC MORPH BLD: NORMAL
SODIUM SERPL-SCNC: 140 MMOL/L (ref 136–145)
WBC MORPH BLD: NORMAL
WBC NRBC COR # BLD: 3.72 10*3/MM3 (ref 3.4–10.8)

## 2023-01-22 PROCEDURE — 96361 HYDRATE IV INFUSION ADD-ON: CPT

## 2023-01-22 PROCEDURE — G0378 HOSPITAL OBSERVATION PER HR: HCPCS

## 2023-01-22 PROCEDURE — 25010000002 ONDANSETRON PER 1 MG: Performed by: NURSE PRACTITIONER

## 2023-01-22 PROCEDURE — 36415 COLL VENOUS BLD VENIPUNCTURE: CPT | Performed by: NURSE PRACTITIONER

## 2023-01-22 PROCEDURE — 25010000002 ONDANSETRON PER 1 MG: Performed by: EMERGENCY MEDICINE

## 2023-01-22 PROCEDURE — 83735 ASSAY OF MAGNESIUM: CPT | Performed by: STUDENT IN AN ORGANIZED HEALTH CARE EDUCATION/TRAINING PROGRAM

## 2023-01-22 PROCEDURE — 25010000002 HYDROMORPHONE PER 4 MG: Performed by: EMERGENCY MEDICINE

## 2023-01-22 PROCEDURE — 85025 COMPLETE CBC W/AUTO DIFF WBC: CPT | Performed by: NURSE PRACTITIONER

## 2023-01-22 PROCEDURE — 85007 BL SMEAR W/DIFF WBC COUNT: CPT | Performed by: NURSE PRACTITIONER

## 2023-01-22 PROCEDURE — 80053 COMPREHEN METABOLIC PANEL: CPT | Performed by: NURSE PRACTITIONER

## 2023-01-22 PROCEDURE — 25010000002 HYDROMORPHONE PER 4 MG: Performed by: NURSE PRACTITIONER

## 2023-01-22 PROCEDURE — 96374 THER/PROPH/DIAG INJ IV PUSH: CPT

## 2023-01-22 PROCEDURE — 96376 TX/PRO/DX INJ SAME DRUG ADON: CPT

## 2023-01-22 PROCEDURE — 96375 TX/PRO/DX INJ NEW DRUG ADDON: CPT

## 2023-01-22 RX ORDER — HYDROMORPHONE HYDROCHLORIDE 1 MG/ML
0.5 INJECTION, SOLUTION INTRAMUSCULAR; INTRAVENOUS; SUBCUTANEOUS EVERY 4 HOURS PRN
Status: COMPLETED | OUTPATIENT
Start: 2023-01-22 | End: 2023-01-22

## 2023-01-22 RX ORDER — AMLODIPINE BESYLATE 5 MG/1
5 TABLET ORAL DAILY
Status: DISCONTINUED | OUTPATIENT
Start: 2023-01-22 | End: 2023-01-24 | Stop reason: HOSPADM

## 2023-01-22 RX ORDER — BUSPIRONE HYDROCHLORIDE 15 MG/1
15 TABLET ORAL EVERY 8 HOURS SCHEDULED
Status: DISCONTINUED | OUTPATIENT
Start: 2023-01-22 | End: 2023-01-24 | Stop reason: HOSPADM

## 2023-01-22 RX ORDER — FLUOXETINE HYDROCHLORIDE 20 MG/1
40 CAPSULE ORAL DAILY
Status: DISCONTINUED | OUTPATIENT
Start: 2023-01-22 | End: 2023-01-24 | Stop reason: HOSPADM

## 2023-01-22 RX ORDER — SODIUM CHLORIDE 0.9 % (FLUSH) 0.9 %
10 SYRINGE (ML) INJECTION AS NEEDED
Status: DISCONTINUED | OUTPATIENT
Start: 2023-01-22 | End: 2023-01-24 | Stop reason: HOSPADM

## 2023-01-22 RX ORDER — LEVOTHYROXINE SODIUM 0.1 MG/1
100 TABLET ORAL DAILY
Status: DISCONTINUED | OUTPATIENT
Start: 2023-01-22 | End: 2023-01-24 | Stop reason: HOSPADM

## 2023-01-22 RX ORDER — NALOXONE HCL 0.4 MG/ML
0.4 VIAL (ML) INJECTION
Status: DISCONTINUED | OUTPATIENT
Start: 2023-01-22 | End: 2023-01-24 | Stop reason: HOSPADM

## 2023-01-22 RX ORDER — NADOLOL 40 MG/1
40 TABLET ORAL
Status: DISCONTINUED | OUTPATIENT
Start: 2023-01-22 | End: 2023-01-24 | Stop reason: HOSPADM

## 2023-01-22 RX ORDER — PANTOPRAZOLE SODIUM 40 MG/1
40 TABLET, DELAYED RELEASE ORAL
Status: DISCONTINUED | OUTPATIENT
Start: 2023-01-22 | End: 2023-01-24 | Stop reason: HOSPADM

## 2023-01-22 RX ORDER — SODIUM CHLORIDE 9 MG/ML
40 INJECTION, SOLUTION INTRAVENOUS AS NEEDED
Status: DISCONTINUED | OUTPATIENT
Start: 2023-01-22 | End: 2023-01-24 | Stop reason: HOSPADM

## 2023-01-22 RX ORDER — SODIUM CHLORIDE 0.9 % (FLUSH) 0.9 %
10 SYRINGE (ML) INJECTION EVERY 12 HOURS SCHEDULED
Status: DISCONTINUED | OUTPATIENT
Start: 2023-01-22 | End: 2023-01-24 | Stop reason: HOSPADM

## 2023-01-22 RX ORDER — NICOTINE 21 MG/24HR
1 PATCH, TRANSDERMAL 24 HOURS TRANSDERMAL
Status: DISCONTINUED | OUTPATIENT
Start: 2023-01-22 | End: 2023-01-24 | Stop reason: HOSPADM

## 2023-01-22 RX ORDER — LANOLIN ALCOHOL/MO/W.PET/CERES
400 CREAM (GRAM) TOPICAL DAILY
Status: DISCONTINUED | OUTPATIENT
Start: 2023-01-22 | End: 2023-01-24 | Stop reason: HOSPADM

## 2023-01-22 RX ORDER — DIPHENOXYLATE HYDROCHLORIDE AND ATROPINE SULFATE 2.5; .025 MG/1; MG/1
1 TABLET ORAL DAILY
Status: DISCONTINUED | OUTPATIENT
Start: 2023-01-22 | End: 2023-01-24 | Stop reason: HOSPADM

## 2023-01-22 RX ORDER — FERROUS SULFATE 325(65) MG
325 TABLET ORAL
Status: DISCONTINUED | OUTPATIENT
Start: 2023-01-22 | End: 2023-01-24 | Stop reason: HOSPADM

## 2023-01-22 RX ORDER — ONDANSETRON 2 MG/ML
4 INJECTION INTRAMUSCULAR; INTRAVENOUS ONCE
Status: COMPLETED | OUTPATIENT
Start: 2023-01-22 | End: 2023-01-22

## 2023-01-22 RX ORDER — SODIUM CHLORIDE 9 MG/ML
100 INJECTION, SOLUTION INTRAVENOUS CONTINUOUS
Status: DISCONTINUED | OUTPATIENT
Start: 2023-01-22 | End: 2023-01-24 | Stop reason: HOSPADM

## 2023-01-22 RX ORDER — TRAZODONE HYDROCHLORIDE 50 MG/1
50 TABLET ORAL NIGHTLY
Status: DISCONTINUED | OUTPATIENT
Start: 2023-01-22 | End: 2023-01-24 | Stop reason: HOSPADM

## 2023-01-22 RX ORDER — ONDANSETRON 2 MG/ML
4 INJECTION INTRAMUSCULAR; INTRAVENOUS EVERY 6 HOURS PRN
Status: DISCONTINUED | OUTPATIENT
Start: 2023-01-22 | End: 2023-01-24 | Stop reason: HOSPADM

## 2023-01-22 RX ORDER — FERROUS SULFATE 325(65) MG
325 TABLET ORAL
COMMUNITY

## 2023-01-22 RX ORDER — ACETAMINOPHEN 325 MG/1
650 TABLET ORAL EVERY 4 HOURS PRN
Status: DISCONTINUED | OUTPATIENT
Start: 2023-01-22 | End: 2023-01-24 | Stop reason: HOSPADM

## 2023-01-22 RX ORDER — HYDROCODONE BITARTRATE AND ACETAMINOPHEN 5; 325 MG/1; MG/1
1 TABLET ORAL EVERY 4 HOURS PRN
Status: DISCONTINUED | OUTPATIENT
Start: 2023-01-22 | End: 2023-01-24 | Stop reason: HOSPADM

## 2023-01-22 RX ORDER — ACETAMINOPHEN 160 MG/5ML
650 SOLUTION ORAL EVERY 4 HOURS PRN
Status: DISCONTINUED | OUTPATIENT
Start: 2023-01-22 | End: 2023-01-24 | Stop reason: HOSPADM

## 2023-01-22 RX ORDER — ACETAMINOPHEN 650 MG/1
650 SUPPOSITORY RECTAL EVERY 4 HOURS PRN
Status: DISCONTINUED | OUTPATIENT
Start: 2023-01-22 | End: 2023-01-24 | Stop reason: HOSPADM

## 2023-01-22 RX ORDER — HYDROMORPHONE HYDROCHLORIDE 1 MG/ML
0.5 INJECTION, SOLUTION INTRAMUSCULAR; INTRAVENOUS; SUBCUTANEOUS ONCE
Status: COMPLETED | OUTPATIENT
Start: 2023-01-22 | End: 2023-01-22

## 2023-01-22 RX ORDER — FOLIC ACID 1 MG/1
1 TABLET ORAL DAILY
Status: DISCONTINUED | OUTPATIENT
Start: 2023-01-22 | End: 2023-01-24 | Stop reason: HOSPADM

## 2023-01-22 RX ADMIN — MAGNESIUM OXIDE 400 MG (241.3 MG MAGNESIUM) TABLET 400 MG: TABLET at 12:15

## 2023-01-22 RX ADMIN — HYDROMORPHONE HYDROCHLORIDE 0.5 MG: 1 INJECTION, SOLUTION INTRAMUSCULAR; INTRAVENOUS; SUBCUTANEOUS at 09:28

## 2023-01-22 RX ADMIN — FERROUS SULFATE TAB 325 MG (65 MG ELEMENTAL FE) 325 MG: 325 (65 FE) TAB at 12:15

## 2023-01-22 RX ADMIN — FLUOXETINE HYDROCHLORIDE 40 MG: 20 CAPSULE ORAL at 12:15

## 2023-01-22 RX ADMIN — HYDROMORPHONE HYDROCHLORIDE 0.5 MG: 1 INJECTION, SOLUTION INTRAMUSCULAR; INTRAVENOUS; SUBCUTANEOUS at 15:03

## 2023-01-22 RX ADMIN — FOLIC ACID 1 MG: 1 TABLET ORAL at 12:16

## 2023-01-22 RX ADMIN — HYDROMORPHONE HYDROCHLORIDE 0.5 MG: 1 INJECTION, SOLUTION INTRAMUSCULAR; INTRAVENOUS; SUBCUTANEOUS at 04:39

## 2023-01-22 RX ADMIN — HYDROCODONE BITARTRATE AND ACETAMINOPHEN 1 TABLET: 5; 325 TABLET ORAL at 20:21

## 2023-01-22 RX ADMIN — NADOLOL 40 MG: 40 TABLET ORAL at 20:22

## 2023-01-22 RX ADMIN — SODIUM CHLORIDE 100 ML/HR: 9 INJECTION, SOLUTION INTRAVENOUS at 21:36

## 2023-01-22 RX ADMIN — ONDANSETRON 4 MG: 2 INJECTION INTRAMUSCULAR; INTRAVENOUS at 20:23

## 2023-01-22 RX ADMIN — SODIUM CHLORIDE 100 ML/HR: 9 INJECTION, SOLUTION INTRAVENOUS at 11:30

## 2023-01-22 RX ADMIN — AMLODIPINE BESYLATE 5 MG: 5 TABLET ORAL at 12:15

## 2023-01-22 RX ADMIN — NICOTINE 1 PATCH: 21 PATCH, EXTENDED RELEASE TRANSDERMAL at 20:22

## 2023-01-22 RX ADMIN — HYDROMORPHONE HYDROCHLORIDE 0.5 MG: 1 INJECTION, SOLUTION INTRAMUSCULAR; INTRAVENOUS; SUBCUTANEOUS at 00:20

## 2023-01-22 RX ADMIN — BUSPIRONE HYDROCHLORIDE 15 MG: 15 TABLET ORAL at 21:33

## 2023-01-22 RX ADMIN — Medication 1 TABLET: at 12:16

## 2023-01-22 RX ADMIN — TRAZODONE HYDROCHLORIDE 50 MG: 50 TABLET ORAL at 20:23

## 2023-01-22 RX ADMIN — LEVOTHYROXINE SODIUM 100 MCG: 0.1 TABLET ORAL at 12:16

## 2023-01-22 RX ADMIN — PANTOPRAZOLE SODIUM 40 MG: 40 TABLET, DELAYED RELEASE ORAL at 11:30

## 2023-01-22 RX ADMIN — ONDANSETRON 4 MG: 2 INJECTION INTRAMUSCULAR; INTRAVENOUS at 00:19

## 2023-01-22 RX ADMIN — SODIUM CHLORIDE 100 ML/HR: 9 INJECTION, SOLUTION INTRAVENOUS at 01:18

## 2023-01-23 ENCOUNTER — READMISSION MANAGEMENT (OUTPATIENT)
Dept: CALL CENTER | Facility: HOSPITAL | Age: 53
End: 2023-01-23
Payer: MEDICAID

## 2023-01-23 LAB
ALBUMIN SERPL-MCNC: 3.8 G/DL (ref 3.5–5.2)
ALBUMIN/GLOB SERPL: 1.4 G/DL
ALP SERPL-CCNC: 118 U/L (ref 39–117)
ALT SERPL W P-5'-P-CCNC: 16 U/L (ref 1–33)
ANION GAP SERPL CALCULATED.3IONS-SCNC: 10.1 MMOL/L (ref 5–15)
AST SERPL-CCNC: 25 U/L (ref 1–32)
BILIRUB SERPL-MCNC: 1.6 MG/DL (ref 0–1.2)
BUN SERPL-MCNC: 7 MG/DL (ref 6–20)
BUN/CREAT SERPL: 11.1 (ref 7–25)
CALCIUM SPEC-SCNC: 9 MG/DL (ref 8.6–10.5)
CHLORIDE SERPL-SCNC: 108 MMOL/L (ref 98–107)
CO2 SERPL-SCNC: 21.9 MMOL/L (ref 22–29)
CREAT SERPL-MCNC: 0.63 MG/DL (ref 0.57–1)
DEPRECATED RDW RBC AUTO: 41 FL (ref 37–54)
EGFRCR SERPLBLD CKD-EPI 2021: 105.6 ML/MIN/1.73
ERYTHROCYTE [DISTWIDTH] IN BLOOD BY AUTOMATED COUNT: 12.6 % (ref 12.3–15.4)
GLOBULIN UR ELPH-MCNC: 2.7 GM/DL
GLUCOSE SERPL-MCNC: 103 MG/DL (ref 65–99)
HCT VFR BLD AUTO: 39.7 % (ref 34–46.6)
HGB BLD-MCNC: 13.6 G/DL (ref 12–15.9)
MCH RBC QN AUTO: 30.6 PG (ref 26.6–33)
MCHC RBC AUTO-ENTMCNC: 34.3 G/DL (ref 31.5–35.7)
MCV RBC AUTO: 89.2 FL (ref 79–97)
PHOSPHATE SERPL-MCNC: 3.5 MG/DL (ref 2.5–4.5)
PLATELET # BLD AUTO: 36 10*3/MM3 (ref 140–450)
PMV BLD AUTO: 11.9 FL (ref 6–12)
POTASSIUM SERPL-SCNC: 3.6 MMOL/L (ref 3.5–5.2)
PROT SERPL-MCNC: 6.5 G/DL (ref 6–8.5)
RBC # BLD AUTO: 4.45 10*6/MM3 (ref 3.77–5.28)
SODIUM SERPL-SCNC: 140 MMOL/L (ref 136–145)
WBC NRBC COR # BLD: 3.71 10*3/MM3 (ref 3.4–10.8)

## 2023-01-23 PROCEDURE — 85027 COMPLETE CBC AUTOMATED: CPT | Performed by: STUDENT IN AN ORGANIZED HEALTH CARE EDUCATION/TRAINING PROGRAM

## 2023-01-23 PROCEDURE — G0378 HOSPITAL OBSERVATION PER HR: HCPCS

## 2023-01-23 PROCEDURE — 96361 HYDRATE IV INFUSION ADD-ON: CPT

## 2023-01-23 PROCEDURE — 80053 COMPREHEN METABOLIC PANEL: CPT | Performed by: STUDENT IN AN ORGANIZED HEALTH CARE EDUCATION/TRAINING PROGRAM

## 2023-01-23 PROCEDURE — 96376 TX/PRO/DX INJ SAME DRUG ADON: CPT

## 2023-01-23 PROCEDURE — 25010000002 ONDANSETRON PER 1 MG: Performed by: NURSE PRACTITIONER

## 2023-01-23 PROCEDURE — 84100 ASSAY OF PHOSPHORUS: CPT | Performed by: STUDENT IN AN ORGANIZED HEALTH CARE EDUCATION/TRAINING PROGRAM

## 2023-01-23 RX ADMIN — ONDANSETRON 4 MG: 2 INJECTION INTRAMUSCULAR; INTRAVENOUS at 06:51

## 2023-01-23 RX ADMIN — HYDROCODONE BITARTRATE AND ACETAMINOPHEN 1 TABLET: 5; 325 TABLET ORAL at 05:01

## 2023-01-23 RX ADMIN — BUSPIRONE HYDROCHLORIDE 15 MG: 15 TABLET ORAL at 05:01

## 2023-01-23 RX ADMIN — HYDROCODONE BITARTRATE AND ACETAMINOPHEN 1 TABLET: 5; 325 TABLET ORAL at 14:06

## 2023-01-23 RX ADMIN — SODIUM CHLORIDE 100 ML/HR: 9 INJECTION, SOLUTION INTRAVENOUS at 21:07

## 2023-01-23 RX ADMIN — AMLODIPINE BESYLATE 5 MG: 5 TABLET ORAL at 08:52

## 2023-01-23 RX ADMIN — FLUOXETINE HYDROCHLORIDE 40 MG: 20 CAPSULE ORAL at 08:52

## 2023-01-23 RX ADMIN — BUSPIRONE HYDROCHLORIDE 15 MG: 15 TABLET ORAL at 21:07

## 2023-01-23 RX ADMIN — PANTOPRAZOLE SODIUM 40 MG: 40 TABLET, DELAYED RELEASE ORAL at 05:01

## 2023-01-23 RX ADMIN — MAGNESIUM OXIDE 400 MG (241.3 MG MAGNESIUM) TABLET 400 MG: TABLET at 08:52

## 2023-01-23 RX ADMIN — FERROUS SULFATE TAB 325 MG (65 MG ELEMENTAL FE) 325 MG: 325 (65 FE) TAB at 08:52

## 2023-01-23 RX ADMIN — SODIUM CHLORIDE 100 ML/HR: 9 INJECTION, SOLUTION INTRAVENOUS at 05:05

## 2023-01-23 RX ADMIN — FOLIC ACID 1 MG: 1 TABLET ORAL at 08:51

## 2023-01-23 RX ADMIN — ONDANSETRON 4 MG: 2 INJECTION INTRAMUSCULAR; INTRAVENOUS at 16:29

## 2023-01-23 RX ADMIN — HYDROCODONE BITARTRATE AND ACETAMINOPHEN 1 TABLET: 5; 325 TABLET ORAL at 19:37

## 2023-01-23 RX ADMIN — HYDROCODONE BITARTRATE AND ACETAMINOPHEN 1 TABLET: 5; 325 TABLET ORAL at 00:19

## 2023-01-23 RX ADMIN — BUSPIRONE HYDROCHLORIDE 15 MG: 15 TABLET ORAL at 16:30

## 2023-01-23 RX ADMIN — NADOLOL 40 MG: 40 TABLET ORAL at 20:15

## 2023-01-23 RX ADMIN — Medication 1 TABLET: at 08:51

## 2023-01-23 RX ADMIN — HYDROCODONE BITARTRATE AND ACETAMINOPHEN 1 TABLET: 5; 325 TABLET ORAL at 08:58

## 2023-01-23 RX ADMIN — LEVOTHYROXINE SODIUM 100 MCG: 0.1 TABLET ORAL at 08:51

## 2023-01-23 RX ADMIN — NICOTINE 1 PATCH: 21 PATCH, EXTENDED RELEASE TRANSDERMAL at 08:58

## 2023-01-23 RX ADMIN — TRAZODONE HYDROCHLORIDE 50 MG: 50 TABLET ORAL at 20:15

## 2023-01-23 NOTE — OUTREACH NOTE
Medical Week 1 Survey    Flowsheet Row Responses   [facility] patient discharged from? [place]   Does the patient have one of the following disease processes/diagnoses(primary or secondary)? Other   Week 1 attempt successful? No   Unsuccessful attempts Attempt 1   Revoke Readmitted          SANJUANA CONTI - Registered Nurse

## 2023-01-24 ENCOUNTER — READMISSION MANAGEMENT (OUTPATIENT)
Dept: CALL CENTER | Facility: HOSPITAL | Age: 53
End: 2023-01-24
Payer: MEDICAID

## 2023-01-24 VITALS
TEMPERATURE: 97.2 F | HEIGHT: 65 IN | WEIGHT: 145.06 LBS | OXYGEN SATURATION: 98 % | DIASTOLIC BLOOD PRESSURE: 92 MMHG | RESPIRATION RATE: 16 BRPM | HEART RATE: 69 BPM | BODY MASS INDEX: 24.17 KG/M2 | SYSTOLIC BLOOD PRESSURE: 150 MMHG

## 2023-01-24 LAB
ALBUMIN SERPL-MCNC: 4.1 G/DL (ref 3.5–5.2)
ALBUMIN/GLOB SERPL: 1.6 G/DL
ALP SERPL-CCNC: 118 U/L (ref 39–117)
ALT SERPL W P-5'-P-CCNC: 25 U/L (ref 1–33)
ANION GAP SERPL CALCULATED.3IONS-SCNC: 9.9 MMOL/L (ref 5–15)
AST SERPL-CCNC: 43 U/L (ref 1–32)
BILIRUB SERPL-MCNC: 1.9 MG/DL (ref 0–1.2)
BUN SERPL-MCNC: 6 MG/DL (ref 6–20)
BUN/CREAT SERPL: 11.1 (ref 7–25)
CALCIUM SPEC-SCNC: 9.3 MG/DL (ref 8.6–10.5)
CHLORIDE SERPL-SCNC: 102 MMOL/L (ref 98–107)
CO2 SERPL-SCNC: 23.1 MMOL/L (ref 22–29)
CREAT SERPL-MCNC: 0.54 MG/DL (ref 0.57–1)
DEPRECATED RDW RBC AUTO: 43.2 FL (ref 37–54)
EGFRCR SERPLBLD CKD-EPI 2021: 109.6 ML/MIN/1.73
ERYTHROCYTE [DISTWIDTH] IN BLOOD BY AUTOMATED COUNT: 13.1 % (ref 12.3–15.4)
GLOBULIN UR ELPH-MCNC: 2.5 GM/DL
GLUCOSE SERPL-MCNC: 102 MG/DL (ref 65–99)
HCT VFR BLD AUTO: 41.3 % (ref 34–46.6)
HGB BLD-MCNC: 13.4 G/DL (ref 12–15.9)
MCH RBC QN AUTO: 29.8 PG (ref 26.6–33)
MCHC RBC AUTO-ENTMCNC: 32.4 G/DL (ref 31.5–35.7)
MCV RBC AUTO: 91.8 FL (ref 79–97)
PLATELET # BLD AUTO: 35 10*3/MM3 (ref 140–450)
PMV BLD AUTO: 12.8 FL (ref 6–12)
POTASSIUM SERPL-SCNC: 3.3 MMOL/L (ref 3.5–5.2)
PROT SERPL-MCNC: 6.6 G/DL (ref 6–8.5)
RBC # BLD AUTO: 4.5 10*6/MM3 (ref 3.77–5.28)
SODIUM SERPL-SCNC: 135 MMOL/L (ref 136–145)
WBC NRBC COR # BLD: 2.87 10*3/MM3 (ref 3.4–10.8)

## 2023-01-24 PROCEDURE — 80053 COMPREHEN METABOLIC PANEL: CPT | Performed by: STUDENT IN AN ORGANIZED HEALTH CARE EDUCATION/TRAINING PROGRAM

## 2023-01-24 PROCEDURE — 96361 HYDRATE IV INFUSION ADD-ON: CPT

## 2023-01-24 PROCEDURE — G0378 HOSPITAL OBSERVATION PER HR: HCPCS

## 2023-01-24 PROCEDURE — 85027 COMPLETE CBC AUTOMATED: CPT | Performed by: STUDENT IN AN ORGANIZED HEALTH CARE EDUCATION/TRAINING PROGRAM

## 2023-01-24 RX ORDER — OXYCODONE HYDROCHLORIDE 5 MG/1
5 TABLET ORAL EVERY 4 HOURS PRN
Qty: 20 TABLET | Refills: 0 | Status: SHIPPED | OUTPATIENT
Start: 2023-01-24

## 2023-01-24 RX ORDER — NICOTINE 21 MG/24HR
1 PATCH, TRANSDERMAL 24 HOURS TRANSDERMAL
Qty: 21 EACH | Refills: 0 | Status: SHIPPED | OUTPATIENT
Start: 2023-01-24

## 2023-01-24 RX ADMIN — HYDROCODONE BITARTRATE AND ACETAMINOPHEN 1 TABLET: 5; 325 TABLET ORAL at 05:03

## 2023-01-24 RX ADMIN — MAGNESIUM OXIDE 400 MG (241.3 MG MAGNESIUM) TABLET 400 MG: TABLET at 09:28

## 2023-01-24 RX ADMIN — NICOTINE 1 PATCH: 21 PATCH, EXTENDED RELEASE TRANSDERMAL at 09:28

## 2023-01-24 RX ADMIN — Medication 1 TABLET: at 09:28

## 2023-01-24 RX ADMIN — FLUOXETINE HYDROCHLORIDE 40 MG: 20 CAPSULE ORAL at 09:28

## 2023-01-24 RX ADMIN — LEVOTHYROXINE SODIUM 100 MCG: 0.1 TABLET ORAL at 09:28

## 2023-01-24 RX ADMIN — BUSPIRONE HYDROCHLORIDE 15 MG: 15 TABLET ORAL at 05:03

## 2023-01-24 RX ADMIN — SODIUM CHLORIDE 100 ML/HR: 9 INJECTION, SOLUTION INTRAVENOUS at 05:07

## 2023-01-24 RX ADMIN — PANTOPRAZOLE SODIUM 40 MG: 40 TABLET, DELAYED RELEASE ORAL at 05:03

## 2023-01-24 RX ADMIN — HYDROCODONE BITARTRATE AND ACETAMINOPHEN 1 TABLET: 5; 325 TABLET ORAL at 09:28

## 2023-01-24 RX ADMIN — HYDROCODONE BITARTRATE AND ACETAMINOPHEN 1 TABLET: 5; 325 TABLET ORAL at 00:17

## 2023-01-24 RX ADMIN — FERROUS SULFATE TAB 325 MG (65 MG ELEMENTAL FE) 325 MG: 325 (65 FE) TAB at 09:28

## 2023-01-24 RX ADMIN — AMLODIPINE BESYLATE 5 MG: 5 TABLET ORAL at 09:28

## 2023-01-24 RX ADMIN — FOLIC ACID 1 MG: 1 TABLET ORAL at 09:28

## 2023-01-25 ENCOUNTER — TELEPHONE (OUTPATIENT)
Dept: ORTHOPEDIC SURGERY | Facility: CLINIC | Age: 53
End: 2023-01-25

## 2023-01-25 NOTE — TELEPHONE ENCOUNTER
Caller: BA JENNINGS    Relationship to patient: SELF    Best call back number: [phone]    Chief complaint: PREOP    Type of visit: PREOP    Requested date: NA    If rescheduling, when is the original appointment: 01/23/2023    Additional notes: PATIENT MISSED HER PREOP APPOINTMENT ON 01/23/2023

## 2023-01-25 NOTE — OUTREACH NOTE
Prep Survey    Flowsheet Row Responses   [facility] facility patient discharged from? [place]   Is LACE score < 7 ? No   Eligibility Readm Mgmt   Discharge diagnosis Acute on chronic pancreatitis    Does the patient have one of the following disease processes/diagnoses(primary or secondary)? Other   Does the patient have Home health ordered? No   Is there a DME ordered? No   Prep survey completed? Yes          ARIN MAGUIRE - Registered Nurse

## 2023-01-26 ENCOUNTER — READMISSION MANAGEMENT (OUTPATIENT)
Dept: CALL CENTER | Facility: HOSPITAL | Age: 53
End: 2023-01-26
Payer: MEDICAID

## 2023-01-26 NOTE — OUTREACH NOTE
Medical Week 1 Survey    Flowsheet Row Responses   [facility] patient discharged from? [place]   Does the patient have one of the following disease processes/diagnoses(primary or secondary)? Other   Week 1 attempt successful? No   Unsuccessful attempts Attempt 2  [attempted pt and ]          BRENDA STAHL - Registered Nurse

## 2023-01-30 ENCOUNTER — TELEPHONE (OUTPATIENT)
Dept: ONCOLOGY | Facility: CLINIC | Age: 53
End: 2023-01-30
Payer: MEDICAID

## 2023-01-30 ENCOUNTER — READMISSION MANAGEMENT (OUTPATIENT)
Dept: CALL CENTER | Facility: HOSPITAL | Age: 53
End: 2023-01-30
Payer: MEDICAID

## 2023-01-30 NOTE — OUTREACH NOTE
"Medical Week 1 Survey    Flowsheet Row Responses   [facility] patient discharged from? [place]   Does the patient have one of the following disease processes/diagnoses(primary or secondary)? Other   Week 1 attempt successful? Yes   Call start time 1801   Call end time 1804   Discharge diagnosis Acute on chronic pancreatitis    Meds reviewed with patient/caregiver? Yes   Is the patient having any side effects they believe may be caused by any medication additions or changes? No   Does the patient have all medications ordered at discharge? Yes   Is the patient taking all medications as directed (includes completed medication regime)? Yes   Does the patient have a primary care provider?  Yes   Does the patient have an appointment with their PCP within 7 days of discharge? Greater than 7 days   Has the patient kept scheduled appointments due by today? N/A   Psychosocial issues? No   Did the patient receive a copy of their discharge instructions? Yes   Nursing interventions Reviewed instructions with patient   What is the patient's perception of their health status since discharge? Same   Is the patient/caregiver able to teach back signs and symptoms related to disease process for when to call PCP? Yes   Is the patient/caregiver able to teach back signs and symptoms related to disease process for when to call 911? Yes   Is the patient/caregiver able to teach back the hierarchy of who to call/visit for symptoms/problems? PCP, Specialist, Home health nurse, Urgent Care, ED, 911 Yes   Additional teach back comments States she is doing \"terrible\".  Still in pain and has taken all pain meds.  Has contacted her pain management and they are reviewing.  Advised if pain is unmanagable to go to ED.     Week 1 call completed? Yes   Wrap up additional comments If symptoms worsen and pain is not managed, she will go to ED.          EMA CONTI - Licensed Nurse  "
No

## 2023-02-02 ENCOUNTER — TELEPHONE (OUTPATIENT)
Dept: ORTHOPEDIC SURGERY | Facility: CLINIC | Age: 53
End: 2023-02-02

## 2023-02-02 NOTE — TELEPHONE ENCOUNTER
Caller: Bekah Gisbon     Relationship: SELF    Best call back number: [phone]    What is your medical concern?PT WANTS TO LET JOHN KNOW THAT SHE HAS SCHEDULED HER GASTRO APPT 02/16 AND SHE DOESN'T SEE HER ONCOLOGIST UNTIL 02/26 BUT IN ORDER FOR HER TO HAVE ORTHOPEDIC SURGERY, DR. ARIAS [phone] [facility] WILL FILL ANY MEDICAL CLEARANCE THAT IS NEEDED, PER PT.    Writer called and lmom for patient to call our office back. Writer discussed with nephrology RN and we will be recommending patient to contact her PCP or a gynecologist in regards to her symptoms.

## 2023-02-08 ENCOUNTER — READMISSION MANAGEMENT (OUTPATIENT)
Dept: CALL CENTER | Facility: HOSPITAL | Age: 53
End: 2023-02-08
Payer: MEDICAID

## 2023-02-09 NOTE — OUTREACH NOTE
Medical Week 2 Survey    Flowsheet Row Responses   [facility] patient discharged from? [place]   Does the patient have one of the following disease processes/diagnoses(primary or secondary)? Other   Week 2 attempt successful? No   Unsuccessful attempts Attempt 1          Tonya CONTI - Licensed Nurse

## 2023-02-16 ENCOUNTER — READMISSION MANAGEMENT (OUTPATIENT)
Dept: CALL CENTER | Facility: HOSPITAL | Age: 53
End: 2023-02-16
Payer: MEDICAID

## 2023-02-16 NOTE — OUTREACH NOTE
Medical Week 4 Survey    Flowsheet Row Responses   [facility] patient discharged from? [place]   Does the patient have one of the following disease processes/diagnoses(primary or secondary)? Other   Week 4 attempt successful? No          Ashia MONROY - Registered Nurse

## 2023-04-13 ENCOUNTER — PREP FOR SURGERY (OUTPATIENT)
Dept: OTHER | Facility: HOSPITAL | Age: 53
End: 2023-04-13
Payer: MEDICAID

## 2023-04-13 VITALS — HEIGHT: 65 IN | WEIGHT: 145 LBS | BODY MASS INDEX: 24.16 KG/M2

## 2023-04-13 DIAGNOSIS — M19.019 ARTHRITIS OF SHOULDER: Primary | ICD-10-CM

## 2023-04-13 RX ORDER — VANCOMYCIN HYDROCHLORIDE 1 G/200ML
15 INJECTION, SOLUTION INTRAVENOUS ONCE
OUTPATIENT
Start: 2023-05-09 | End: 2023-04-13

## 2023-04-13 RX ORDER — CEFAZOLIN SODIUM 2 G/100ML
2 INJECTION, SOLUTION INTRAVENOUS ONCE
OUTPATIENT
Start: 2023-05-09 | End: 2023-04-13

## 2023-04-13 RX ORDER — ACETAMINOPHEN 500 MG
1000 TABLET ORAL ONCE
OUTPATIENT
Start: 2023-05-09 | End: 2023-04-13

## 2023-04-13 RX ORDER — PREGABALIN 75 MG/1
150 CAPSULE ORAL ONCE
OUTPATIENT
Start: 2023-05-09 | End: 2023-04-13

## 2023-04-13 RX ORDER — MELOXICAM 15 MG/1
15 TABLET ORAL ONCE
OUTPATIENT
Start: 2023-05-09 | End: 2023-04-13

## 2023-04-25 ENCOUNTER — PRE-ADMISSION TESTING (OUTPATIENT)
Dept: PREADMISSION TESTING | Facility: HOSPITAL | Age: 53
End: 2023-04-25
Payer: MEDICAID

## 2023-04-25 VITALS
BODY MASS INDEX: 24.36 KG/M2 | HEIGHT: 65 IN | WEIGHT: 146.2 LBS | TEMPERATURE: 97.7 F | OXYGEN SATURATION: 97 % | SYSTOLIC BLOOD PRESSURE: 134 MMHG | RESPIRATION RATE: 18 BRPM | HEART RATE: 99 BPM | DIASTOLIC BLOOD PRESSURE: 90 MMHG

## 2023-04-25 DIAGNOSIS — M19.019 ARTHRITIS OF SHOULDER: ICD-10-CM

## 2023-04-25 LAB
DEPRECATED RDW RBC AUTO: 45.2 FL (ref 37–54)
ERYTHROCYTE [DISTWIDTH] IN BLOOD BY AUTOMATED COUNT: 13.3 % (ref 12.3–15.4)
HBA1C MFR BLD: 5.2 % (ref 4.8–5.6)
HCT VFR BLD AUTO: 42.2 % (ref 34–46.6)
HGB BLD-MCNC: 13.9 G/DL (ref 12–15.9)
MCH RBC QN AUTO: 30.2 PG (ref 26.6–33)
MCHC RBC AUTO-ENTMCNC: 32.9 G/DL (ref 31.5–35.7)
MCV RBC AUTO: 91.7 FL (ref 79–97)
PLATELET # BLD AUTO: 44 10*3/MM3 (ref 140–450)
PMV BLD AUTO: 12.8 FL (ref 6–12)
RBC # BLD AUTO: 4.6 10*6/MM3 (ref 3.77–5.28)
WBC NRBC COR # BLD: 2.93 10*3/MM3 (ref 3.4–10.8)

## 2023-04-25 PROCEDURE — 36415 COLL VENOUS BLD VENIPUNCTURE: CPT

## 2023-04-25 PROCEDURE — 83036 HEMOGLOBIN GLYCOSYLATED A1C: CPT

## 2023-04-25 PROCEDURE — 85027 COMPLETE CBC AUTOMATED: CPT

## 2023-04-25 RX ORDER — AVATROMBOPAG MALEATE 20 MG/1
60 TABLET, FILM COATED ORAL DAILY
COMMUNITY
Start: 2023-02-01

## 2023-04-25 RX ORDER — DICYCLOMINE HCL 20 MG
20 TABLET ORAL EVERY 6 HOURS
COMMUNITY
Start: 2023-04-19

## 2023-04-25 RX ORDER — ONDANSETRON 4 MG/1
4 TABLET, ORALLY DISINTEGRATING ORAL EVERY 8 HOURS PRN
COMMUNITY
Start: 2023-04-19

## 2023-04-25 RX ORDER — PANTOPRAZOLE SODIUM 40 MG/1
40 TABLET, DELAYED RELEASE ORAL DAILY
COMMUNITY
Start: 2023-03-16

## 2023-04-25 NOTE — DISCHARGE INSTRUCTIONS
Take the following medications the morning of surgery:  AMLODIPINE,PROZAC,LEVOTHYROXINE AND PROTONIX    If you are on prescription narcotic pain medication to control your pain you may also take that medication the morning of surgery.    General Instructions:  Do not eat solid food after midnight the night before surgery.  You may drink clear liquids day of surgery but must stop at least one hour before your hospital arrival time.  It is beneficial for you to have a clear drink that contains carbohydrates the day of surgery.  We suggest a 12 to 20 ounce bottle of Gatorade or Powerade for non-diabetic patients or a 12 to 20 ounce bottle of G2 or Powerade Zero for diabetic patients. (Pediatric patients, are not advised to drink a 12 to 20 ounce carbohydrate drink)    Clear liquids are liquids you can see through.  Nothing red in color.     Plain water                               Sports drinks  Sodas                                   Gelatin (Jell-O)  Fruit juices without pulp such as white grape juice and apple juice  Popsicles that contain no fruit or yogurt  Tea or coffee (no cream or milk added)  Gatorade / Powerade  G2 / Powerade Zero    Infants may have breast milk up to four hours before surgery.  Infants drinking formula may drink formula up to six hours before surgery.   Patients who avoid smoking, chewing tobacco and alcohol for 4 weeks prior to surgery have a reduced risk of post-operative complications.  Quit smoking as many days before surgery as you can.  Do not smoke, use chewing tobacco or drink alcohol the day of surgery.   If applicable bring your C-PAP/ BI-PAP machine in with you to preop day of surgery.  Bring any papers given to you in the doctor’s office.  Wear clean comfortable clothes.  Do not wear contact lenses, false eyelashes or make-up.  Bring a case for your glasses.   Bring crutches or walker if applicable.  Remove all piercings.  Leave jewelry and any other valuables at home.  Hair  extensions with metal clips must be removed prior to surgery.  The Pre-Admission Testing nurse will instruct you to bring medications if unable to obtain an accurate list in Pre-Admission Testing.        If you were given a blood bank ID arm band remember to bring it with you the day of surgery.    Day of surgery:5/9/2023 PT WILL BE NOTIFIED OF ARRIVAL TIME  Your arrival time is approximately two hours before your scheduled surgery time.  Upon arrival, a Pre-op nurse and Anesthesiologist will review your health history, obtain vital signs, and answer questions you may have.  The only belongings needed at this time will be a list of your home medications and if applicable your C-PAP/BI-PAP machine.  A Pre-op nurse will start an IV and you may receive medication in preparation for surgery, including something to help you relax.     Please be aware that surgery does come with discomfort.  We want to make every effort to control your discomfort so please discuss any uncontrolled symptoms with your nurse.   Your doctor will most likely have prescribed pain medications.      If you are going home after surgery you will receive individualized written care instructions before being discharged.  A responsible adult must drive you to and from the hospital on the day of your surgery and stay with you for 24 hours.  Discharge prescriptions can be filled by the hospital pharmacy during regular pharmacy hours.  If you are having surgery late in the day/evening your prescription may be e-prescribed to your pharmacy.  Please verify your pharmacy hours or chose a 24 hour pharmacy to avoid not having access to your prescription because your pharmacy has closed for the day.    If you are staying overnight following surgery, you will be transported to your hospital room following the recovery period.  [facility] has all private rooms.    If you have any questions please call Pre-Admission Testing at  [phone].  Deductibles and co-payments are collected on the day of service. Please be prepared to pay the required co-pay, deductible or deposit on the day of service as defined by your plan.    Call your surgeon immediately if you experience any of the following symptoms:  Sore Throat  Shortness of Breath or difficulty breathing  Cough  Chills  Body soreness or muscle pain  Headache  Fever  New loss of taste or smell  Do not arrive for your surgery ill.  Your procedure will need to be rescheduled to another time.  You will need to call your physician before the day of surgery to avoid any unnecessary exposure to hospital staff as well as other patients.        PREVENTING INFECTION IN SHOULDER SURGICAL SITES     C. acnes is a bacteria that lives deep within follicles and pores of the skin. It is found in large numbers on the skin of the face, axilla (armpit), chest and back and is the primary bacteria to cause a surgical site infection after shoulder surgery.      Use of a Benzoyl Peroxide solution prior to shoulder surgery decreases C. acnes and reduces post-op infections.   Your surgeon has ordered 5% Benzoyl Peroxide wash to be used three times prior to your surgery.     Please read the following instructions carefully and bring this form with you the day of surgery.     General bathing instructions starting two days before your surgery:    Shower using a fresh bar of anti-bacterial soap (such as Dial) and clean washcloth.  Pay special attention to the neck, shoulder and armpit area.   Wash your hair as usual with your regular shampoo.   Rinse hair and body thoroughly with warm water (not hot water) to remove shampoo and residue.   Dry with a clean towel.              Sleep in a clean bed with clean clothing.  Do not allow pets to sleep with you.     For 2 days before surgery, avoid shaving with a razor because the razor can irritate skin and make it easier to develop an infection.    Any areas of open skin  can increase the risk of a post-operative wound infection by allowing bacteria to enter and travel throughout the body.  Notify your surgeon if you have any skin wounds / rashes even if it is not near the expected surgical site.  The area will need assessed to determine if surgery should be delayed until it is healed.      First application of 5% Benzoyl Peroxide Wash two nights before surgery:                                                                Wash neck, shoulder (front, back, side) and armpit   with warm water, rinse and dry - see picture.  Gently wash the same areas with the Benzoyl Peroxide   cleanser going away from the neck for 10-20 seconds.   Work into a full lather and leave on the skin for   2 minutes for greatest effect.  Rinse thoroughly with warm water, not hot water.                     Pat dry with a clean towel.                                                            Wash your hands thoroughly.  Do not apply lotion, powder, perfume or deodorant.   Put on clean clothes.    Second application the night before surgery:  Repeat the above steps.        Third application morning of surgery:  Repeat the above steps.    Due to shoulder pain or decreased range of motion of your shoulder and arm, you may need assistance washing under the arm or the back portion of your shoulder.     Avoid further washing the areas of the skin treated with Benzoyl Peroxide for at least 1 hour.    For your convenience, you may purchase Benzoyl Peroxide at [facility] retail pharmacy.     Warning:  Let your physician know if you are allergic to Benzoyl Peroxide or have very sensitive skin and cannot use it.   Stop using and contact your surgeon if you experience any excessive scaling, itching, swelling, skin irritation or other signs of a reaction.  Keep out of eyes, ears, nose and mouth.  Do not apply to sunburned, irritated or broken area on the skin.  Avoid unwanted problems with bleaching effect by following  these tips:  Wash hands after each use.  Avoid contact with hair, clothing, furnishings or carpeting.  Wear clean, old t-shirt or clothing to bed.   Use clean, old white pillow cases and sheets to avoid discoloring your bed linens.                                                                                                                                                                                                                                                                                   Please complete the checklist below, bring it with you to the hospital                               the day of surgery and give to the Pre-op Nurse     Preoperative Skin Prep Checklist        Patient Name Label             Enter dates and ?  boxes to indicate completed    Surgery Date: _______________ Regular Shower  Benzoyl Peroxide Wash   First Application:  2 days before_______________  (Stop shaving all body parts)           Morning or Evening                        Evening    Second Application:  1 day  before________________        Morning or Evening                          Evening   Third Application:  Day of Surgery______________                           Morning                   Morning

## 2023-05-02 ENCOUNTER — TELEPHONE (OUTPATIENT)
Dept: ORTHOPEDIC SURGERY | Facility: CLINIC | Age: 53
End: 2023-05-02
Payer: MEDICAID

## 2023-05-05 ENCOUNTER — TELEPHONE (OUTPATIENT)
Dept: ORTHOPEDIC SURGERY | Facility: CLINIC | Age: 53
End: 2023-05-05
Payer: MEDICAID

## 2023-05-05 NOTE — TELEPHONE ENCOUNTER
Called patient again to advise her of the importance of a return call.  I need to know if she has started with her platelets.  I will cancel surgery per Dr. Denise if I do not hear from her by 1:00 today.

## 2023-05-08 ENCOUNTER — OFFICE VISIT (OUTPATIENT)
Dept: ORTHOPEDIC SURGERY | Facility: CLINIC | Age: 53
End: 2023-05-08
Payer: MEDICAID

## 2023-05-08 VITALS — WEIGHT: 146 LBS | TEMPERATURE: 98 F | BODY MASS INDEX: 24.32 KG/M2 | HEIGHT: 65 IN

## 2023-05-08 DIAGNOSIS — Z01.818 PREOP EXAMINATION: Primary | ICD-10-CM

## 2023-05-08 RX ORDER — LEVOTHYROXINE SODIUM 88 UG/1
TABLET ORAL
COMMUNITY
Start: 2023-05-04

## 2023-05-08 RX ORDER — LACTULOSE 10 G/15ML
SOLUTION ORAL
COMMUNITY
Start: 2023-05-04

## 2023-05-08 NOTE — PROGRESS NOTES
"   History & Physical       Patient: Bekah Gibson    YOB: 1970    Medical Record Number: [id number]    Chief Complaints:   Chief Complaint   Patient presents with   • Left Shoulder - Follow-up       History of Present Illness: 52 y.o. female who comes in today in anticipation of upcoming total shoulder replacement surgery. Reports a long history of progressively worsening pain, motion loss, and dysfunction.  Describes current pain as severe.  Has failed prolonged conservative treatment.  Denies any new complaints or issues.    Allergies:   Allergies   Allergen Reactions   • Benzonatate Nausea Only and Other (See Comments)     Rash    • Phenergan [Promethazine Hcl] Hives   • Cucumber Extract Rash   • Promethazine-Phenylephrine Other (See Comments)     \"FEEL WEIRD\"       Medications:   Home Medications:    Current Outpatient Medications:   •  amLODIPine (NORVASC) 5 MG tablet, Take 1 tablet by mouth Daily., Disp: , Rfl:   •  busPIRone (BUSPAR) 15 MG tablet, 1 tablet 3 (Three) Times a Day As Needed., Disp: , Rfl:   •  dicyclomine (BENTYL) 20 MG tablet, Take 1 tablet by mouth Every 6 (Six) Hours., Disp: , Rfl:   •  Doptelet 20 MG tablet, 3 tablets Daily. TAKES 10 DAYS BEFORE SURGERY, Disp: , Rfl:   •  ferrous sulfate 325 (65 FE) MG tablet, Take 1 tablet by mouth Daily With Breakfast., Disp: , Rfl:   •  FLUoxetine (PROZAC) 40 MG capsule, Take 1 capsule by mouth Daily., Disp: 30 capsule, Rfl: 3  •  folic acid (FOLVITE) 1 MG tablet, Take 1 tablet by mouth Daily., Disp: , Rfl:   •  lactulose (CHRONULAC) 10 GM/15ML solution, , Disp: , Rfl:   •  levothyroxine (SYNTHROID, LEVOTHROID) 88 MCG tablet, , Disp: , Rfl:   •  MAGnesium-Oxide 400 (240 Mg) MG tablet, Daily. HOLD PRIOR TO SURG, Disp: , Rfl:   •  Multiple Vitamin (MULTIVITAMIN) capsule, Take 1 capsule by mouth Daily. HOLD PRIOR TO SURG, Disp: , Rfl:   •  nadolol (CORGARD) 40 MG tablet, Take 1 tablet by mouth every night at bedtime., Disp: , Rfl:   •  " nicotine (NICODERM CQ) 21 MG/24HR patch, Place 1 patch on the skin as directed by provider Daily., Disp: 21 each, Rfl: 0  •  ondansetron ODT (ZOFRAN-ODT) 4 MG disintegrating tablet, Place 1 tablet on the tongue Every 8 (Eight) Hours As Needed., Disp: , Rfl:   •  oxyCODONE (Roxicodone) 5 MG immediate release tablet, Take 1 tablet by mouth Every 4 (Four) Hours As Needed for Moderate Pain., Disp: 20 tablet, Rfl: 0  •  pantoprazole (PROTONIX) 40 MG EC tablet, 1 tablet Daily., Disp: , Rfl:   •  traZODone (DESYREL) 50 MG tablet, Take 1 tablet by mouth At Night As Needed., Disp: , Rfl:   •  levothyroxine (SYNTHROID, LEVOTHROID) 100 MCG tablet, TAKE ONE TABLET BY MOUTH DAILY (Patient not taking: Reported on 5/8/2023), Disp: 30 tablet, Rfl: 3  No current facility-administered medications for this visit.    Facility-Administered Medications Ordered in Other Visits:   •  sodium chloride 0.9 % flush 10 mL, 10 mL, Intravenous, Q12H, Callie Quiroz MD  •  sodium chloride 0.9 % flush 10 mL, 10 mL, Intravenous, PRN, Callie Quiroz MD  •  sodium chloride 0.9 % flush 20 mL, 20 mL, Intravenous, PRN, Callie Quiroz MD    Past Medical History:   Diagnosis Date   • Anxiety    • Arthritis    • Cirrhosis    • Disease of thyroid gland    • ETOH abuse     SOBER FOR 3 MONTHS   • Fracture, clavicle 1/2021    shattered clavicel on issue slip and fall   • GERD (gastroesophageal reflux disease)    • History of kidney stones     5 YR AGO   • Hypertension    • Left shoulder pain    • MDD (major depressive disorder)    • Pancreatitis    • Periarthritis of shoulder see above   • Rotator cuff syndrome odre for shoulder replacement    unable to receive due to low platelets   • Thrombocytopenia           Past Surgical History:   Procedure Laterality Date   • CLAVICLE SURGERY Right 2018   • ENDOSCOPY N/A 10/26/2022    Procedure: ESOPHAGOGASTRODUODENOSCOPY wtih banding;  Surgeon: Ag Patel MD;  Location:  [facility] ENDOSCOPY;  Service: Gastroenterology;  Laterality: N/A;  pre: melena  post: esophageal varicies with banding x2 bands   • ENDOSCOPY N/A 01/18/2023    Procedure: ESOPHAGOGASTRODUODENOSCOPY;  Surgeon: Ag Patel MD;  Location: [facility] ENDOSCOPY;  Service: Gastroenterology;  Laterality: N/A;  PRE OP - MAROON STOOLS  POST OP - SM ESOPHAGEAL VARICES   • ESOPHAGEAL VARICES LIGATION     • JOINT REPLACEMENT Bilateral     GREAT TOE   • KIDNEY STONE SURGERY      LITHOTRIPSY AND STENT (R SIDE)   • LAPAROSCOPIC TUBAL LIGATION  2005   • SIGMOIDOSCOPY N/A 01/18/2023    Procedure: SIGMOIDOSCOPY FLEXIBLE;  Surgeon: Ag Patel MD;  Location: [facility] ENDOSCOPY;  Service: Gastroenterology;  Laterality: N/A;  PRE OP - MAROON STOOLS  POST OP - RECTAL VARICES          Social History     Occupational History   • Not on file   Tobacco Use   • Smoking status: Some Days     Packs/day: 0.25     Years: 10.00     Pack years: 2.50     Types: Cigarettes   • Smokeless tobacco: Never   • Tobacco comments:     1 PACK PER WEEK   Vaping Use   • Vaping Use: Never used   Substance and Sexual Activity   • Alcohol use: Not Currently     Comment: SOBER FOR 3 MONTHS   • Drug use: Not Currently   • Sexual activity: Not Currently     Partners: Male     Birth control/protection: Surgical     Comment: cinthia- menepausal      Social History     Social History Narrative   • Not on file          Family History   Problem Relation Age of Onset   • Rheumatologic disease Mother         congestive heart   • Thyroid disease Father    • Leukemia Father    • Cancer Father    • Drug abuse Brother    • Malig Hyperthermia Neg Hx        Review of Systems:     Constitutional:  Denies fever, shaking or chills   Eyes:  Denies change in visual acuity   HEENT:  Denies nasal congestion or sore throat   Respiratory:  Denies cough or shortness of breath   Cardiovascular:  Denies chest pain or edema   GI:  Denies abdominal pain, nausea, vomiting, bloody stools or diarrhea  "  Musculoskeletal:  Denies numbness tingling or loss of motor function except as outlined above in history of present illness.  Integument:  Denies rash, lesion or ulceration   Neurologic:  Denies headache or focal weakness, deficits      All other pertinent positives and negatives as noted above in HPI.    Physical Exam: 52 y.o. female    Vitals:    05/08/23 1339   Temp: 98 °F (36.7 °C)   TempSrc: Temporal   Weight: 66.2 kg (146 lb)   Height: 165.1 cm (65\")     General:  Patient is awake and alert.  Appears in no acute distress or discomfort.    Psych:  Affect and demeanor are appropriate.    Eyes:  Conjunctiva and sclera appear grossly normal.  Eyes track well and EOM seem to be intact.    Ears:  No gross abnormalities.  Hearing adequate for the exam.    Cardiovascular:  Regular rate and rhythm.    Lungs:  Good chest expansion.  Breathing unlabored.    Lymph:  No palpable adenopathy about neck or axilla.    Neck:  Supple.  Normal ROM.  Negative Spurling's for shoulder or arm pain.    Left upper extremity:  Skin benign and intact without evidence for swelling, masses or atrophy.  No palpable masses.  No focal areas of exquisite tenderness.  Shoulder ROM limited and uncomfortable--90° FE, 20° ER, IR to back pocket.  Crepitus noted with passive range of motion.  No evident instability or apprehension.  Good strength in rotator cuff, but her exam is limited due to pain.  Good strength in the deltoid, wrist and hand.  Intact sensation in arm, hand.  Palpable radial pulse with brisk cap refill.    Diagnostic Tests:  Lab Results   Component Value Date    GLUCOSE 102 (H) 01/24/2023    CALCIUM 9.3 01/24/2023     (L) 01/24/2023    K 3.3 (L) 01/24/2023    CO2 23.1 01/24/2023     01/24/2023    BUN 6 01/24/2023    CREATININE 0.54 (L) 01/24/2023    EGFRIFAFRI 114 12/13/2019    EGFRIFNONA 83 09/27/2021    BCR 11.1 01/24/2023    ANIONGAP 9.9 01/24/2023     Lab Results   Component Value Date    WBC 4.16 (L) 05/08/2023 "    HGB 13.6 05/08/2023    HCT 41.2 05/08/2023    MCV 91.2 05/08/2023    PLT 50 (L) 05/08/2023     Lab Results   Component Value Date    INR 1.4 05/01/2023    INR 1.3 02/16/2023    INR 1.35 (H) 01/21/2023    PROTIME 16.1 (H) 05/01/2023    PROTIME 14.8 (H) 02/16/2023    PROTIME 16.9 (H) 01/21/2023       Imaging:   AP, scapular Y, and axillary views of the left shoulder are ordered by myself and reviewed to evaluate the patient's complaint.  These are compared to previous x-rays.  The x-rays show severe glenohumeral osteoarthritis with bone on bone degeneration, subluxation of the humeral head, osteophyte formation, and subchondral sclerosis.  The acromiohumeral interval measures normal.    The left shoulder MRI report from 7/13/2022 is reviewed.  The findings are as follows:    IMPRESSION:  1.  There is a single low-grade partial-thickness interstitial tear in the supraspinatus tendon at the footprint.  The rotator cuff tendons are otherwise intact.  2.  There is severe osteoarthritis of the glenohumeral joint.  3.  The posterior aspect of the bony glenoid is mildly hypoplastic with secondary hypertrophy of the posterior labral.  This is suggestive of mild glenoid hypoplasia.  4.  There is extensive degenerative tearing of the superior labrum.  The posterior glenoid labrum is hypertrophied, degenerated with extensive complex tearing noted.  5.  Secondary findings are discussed in the body of the report.     Assessment:  1.  Left shoulder end-stage osteoarthritis with glenoid retroversion  2.  Thrombocytopenia    Plan: We had a thorough discussion regarding the risks, benefits and alternatives to an arthroplasty and non-surgical management versus surgery.  I explained that surgical risks include infection, hematoma, hardware related complications including failure of fixation, loosening, fracture, subscapularis repair failure and/or rotator cuff tear necessitating revision surgery, persistent pain and/or loss of  motion, iatrogenic nerve and/or blood vessel injury resulting in permanent weakness, numbness or dysfunction, DVT, PE, positioning related neuropraxia, and anesthesia related complications resulting in death.  I did explain the possible need for reverse arthroplasty depending upon the degree of retroversion and the status of the rotator cuff at the time of surgery.  I further explained the risks inherent to reverse arthroplasty as compared to a standard total shoulder.  Specifically, we discussed the risks of notching, glenoid loosening, instability, and traction related neuropraxia, any of which could result in persistent pain or problems requiring further surgery.   Lastly, we discussed the rehab and all that will be expected of the patient post operatively to ensure an optimal outcome.  The patient has acknowledged understanding and consents to proceed.     Patient has completed a 5-day course of Doptelet 60 mg daily to help improve her platelet levels.  Her platelet value today is 50.  Dr. Denise was made aware.  He has agreed to proceed with surgery, but will plan transfuse her in the preoperative area.  I explained she is at increased risk for bleeding and infection due to her health status.  She verbalized understanding of all we discussed and consented to proceed.      Patient is planning for hospital admission following surgery.  Denies history of DVT or metal allergy.     Date: 5/8/2023    AMBAR Greene

## 2023-05-08 NOTE — H&P (VIEW-ONLY)
"   History & Physical       Patient: Bekah Gibson    YOB: 1970    Medical Record Number: [id number]    Chief Complaints:   Chief Complaint   Patient presents with   • Left Shoulder - Follow-up       History of Present Illness: 52 y.o. female who comes in today in anticipation of upcoming total shoulder replacement surgery. Reports a long history of progressively worsening pain, motion loss, and dysfunction.  Describes current pain as severe.  Has failed prolonged conservative treatment.  Denies any new complaints or issues.    Allergies:   Allergies   Allergen Reactions   • Benzonatate Nausea Only and Other (See Comments)     Rash    • Phenergan [Promethazine Hcl] Hives   • Cucumber Extract Rash   • Promethazine-Phenylephrine Other (See Comments)     \"FEEL WEIRD\"       Medications:   Home Medications:    Current Outpatient Medications:   •  amLODIPine (NORVASC) 5 MG tablet, Take 1 tablet by mouth Daily., Disp: , Rfl:   •  busPIRone (BUSPAR) 15 MG tablet, 1 tablet 3 (Three) Times a Day As Needed., Disp: , Rfl:   •  dicyclomine (BENTYL) 20 MG tablet, Take 1 tablet by mouth Every 6 (Six) Hours., Disp: , Rfl:   •  Doptelet 20 MG tablet, 3 tablets Daily. TAKES 10 DAYS BEFORE SURGERY, Disp: , Rfl:   •  ferrous sulfate 325 (65 FE) MG tablet, Take 1 tablet by mouth Daily With Breakfast., Disp: , Rfl:   •  FLUoxetine (PROZAC) 40 MG capsule, Take 1 capsule by mouth Daily., Disp: 30 capsule, Rfl: 3  •  folic acid (FOLVITE) 1 MG tablet, Take 1 tablet by mouth Daily., Disp: , Rfl:   •  lactulose (CHRONULAC) 10 GM/15ML solution, , Disp: , Rfl:   •  levothyroxine (SYNTHROID, LEVOTHROID) 88 MCG tablet, , Disp: , Rfl:   •  MAGnesium-Oxide 400 (240 Mg) MG tablet, Daily. HOLD PRIOR TO SURG, Disp: , Rfl:   •  Multiple Vitamin (MULTIVITAMIN) capsule, Take 1 capsule by mouth Daily. HOLD PRIOR TO SURG, Disp: , Rfl:   •  nadolol (CORGARD) 40 MG tablet, Take 1 tablet by mouth every night at bedtime., Disp: , Rfl:   •  " nicotine (NICODERM CQ) 21 MG/24HR patch, Place 1 patch on the skin as directed by provider Daily., Disp: 21 each, Rfl: 0  •  ondansetron ODT (ZOFRAN-ODT) 4 MG disintegrating tablet, Place 1 tablet on the tongue Every 8 (Eight) Hours As Needed., Disp: , Rfl:   •  oxyCODONE (Roxicodone) 5 MG immediate release tablet, Take 1 tablet by mouth Every 4 (Four) Hours As Needed for Moderate Pain., Disp: 20 tablet, Rfl: 0  •  pantoprazole (PROTONIX) 40 MG EC tablet, 1 tablet Daily., Disp: , Rfl:   •  traZODone (DESYREL) 50 MG tablet, Take 1 tablet by mouth At Night As Needed., Disp: , Rfl:   •  levothyroxine (SYNTHROID, LEVOTHROID) 100 MCG tablet, TAKE ONE TABLET BY MOUTH DAILY (Patient not taking: Reported on 5/8/2023), Disp: 30 tablet, Rfl: 3  No current facility-administered medications for this visit.    Facility-Administered Medications Ordered in Other Visits:   •  sodium chloride 0.9 % flush 10 mL, 10 mL, Intravenous, Q12H, Callie Quiroz MD  •  sodium chloride 0.9 % flush 10 mL, 10 mL, Intravenous, PRN, Callie Quiroz MD  •  sodium chloride 0.9 % flush 20 mL, 20 mL, Intravenous, PRN, Callie Quiroz MD    Past Medical History:   Diagnosis Date   • Anxiety    • Arthritis    • Cirrhosis    • Disease of thyroid gland    • ETOH abuse     SOBER FOR 3 MONTHS   • Fracture, clavicle 1/2021    shattered clavicel on issue slip and fall   • GERD (gastroesophageal reflux disease)    • History of kidney stones     5 YR AGO   • Hypertension    • Left shoulder pain    • MDD (major depressive disorder)    • Pancreatitis    • Periarthritis of shoulder see above   • Rotator cuff syndrome odre for shoulder replacement    unable to receive due to low platelets   • Thrombocytopenia           Past Surgical History:   Procedure Laterality Date   • CLAVICLE SURGERY Right 2018   • ENDOSCOPY N/A 10/26/2022    Procedure: ESOPHAGOGASTRODUODENOSCOPY wtih banding;  Surgeon: Ag Patel MD;  Location:  [facility] ENDOSCOPY;  Service: Gastroenterology;  Laterality: N/A;  pre: melena  post: esophageal varicies with banding x2 bands   • ENDOSCOPY N/A 01/18/2023    Procedure: ESOPHAGOGASTRODUODENOSCOPY;  Surgeon: Ag Patel MD;  Location: [facility] ENDOSCOPY;  Service: Gastroenterology;  Laterality: N/A;  PRE OP - MAROON STOOLS  POST OP - SM ESOPHAGEAL VARICES   • ESOPHAGEAL VARICES LIGATION     • JOINT REPLACEMENT Bilateral     GREAT TOE   • KIDNEY STONE SURGERY      LITHOTRIPSY AND STENT (R SIDE)   • LAPAROSCOPIC TUBAL LIGATION  2005   • SIGMOIDOSCOPY N/A 01/18/2023    Procedure: SIGMOIDOSCOPY FLEXIBLE;  Surgeon: Ag Patel MD;  Location: [facility] ENDOSCOPY;  Service: Gastroenterology;  Laterality: N/A;  PRE OP - MAROON STOOLS  POST OP - RECTAL VARICES          Social History     Occupational History   • Not on file   Tobacco Use   • Smoking status: Some Days     Packs/day: 0.25     Years: 10.00     Pack years: 2.50     Types: Cigarettes   • Smokeless tobacco: Never   • Tobacco comments:     1 PACK PER WEEK   Vaping Use   • Vaping Use: Never used   Substance and Sexual Activity   • Alcohol use: Not Currently     Comment: SOBER FOR 3 MONTHS   • Drug use: Not Currently   • Sexual activity: Not Currently     Partners: Male     Birth control/protection: Surgical     Comment: cinthia- menepausal      Social History     Social History Narrative   • Not on file          Family History   Problem Relation Age of Onset   • Rheumatologic disease Mother         congestive heart   • Thyroid disease Father    • Leukemia Father    • Cancer Father    • Drug abuse Brother    • Malig Hyperthermia Neg Hx        Review of Systems:     Constitutional:  Denies fever, shaking or chills   Eyes:  Denies change in visual acuity   HEENT:  Denies nasal congestion or sore throat   Respiratory:  Denies cough or shortness of breath   Cardiovascular:  Denies chest pain or edema   GI:  Denies abdominal pain, nausea, vomiting, bloody stools or diarrhea  "  Musculoskeletal:  Denies numbness tingling or loss of motor function except as outlined above in history of present illness.  Integument:  Denies rash, lesion or ulceration   Neurologic:  Denies headache or focal weakness, deficits      All other pertinent positives and negatives as noted above in HPI.    Physical Exam: 52 y.o. female    Vitals:    05/08/23 1339   Temp: 98 °F (36.7 °C)   TempSrc: Temporal   Weight: 66.2 kg (146 lb)   Height: 165.1 cm (65\")     General:  Patient is awake and alert.  Appears in no acute distress or discomfort.    Psych:  Affect and demeanor are appropriate.    Eyes:  Conjunctiva and sclera appear grossly normal.  Eyes track well and EOM seem to be intact.    Ears:  No gross abnormalities.  Hearing adequate for the exam.    Cardiovascular:  Regular rate and rhythm.    Lungs:  Good chest expansion.  Breathing unlabored.    Lymph:  No palpable adenopathy about neck or axilla.    Neck:  Supple.  Normal ROM.  Negative Spurling's for shoulder or arm pain.    Left upper extremity:  Skin benign and intact without evidence for swelling, masses or atrophy.  No palpable masses.  No focal areas of exquisite tenderness.  Shoulder ROM limited and uncomfortable--90° FE, 20° ER, IR to back pocket.  Crepitus noted with passive range of motion.  No evident instability or apprehension.  Good strength in rotator cuff, but her exam is limited due to pain.  Good strength in the deltoid, wrist and hand.  Intact sensation in arm, hand.  Palpable radial pulse with brisk cap refill.    Diagnostic Tests:  Lab Results   Component Value Date    GLUCOSE 102 (H) 01/24/2023    CALCIUM 9.3 01/24/2023     (L) 01/24/2023    K 3.3 (L) 01/24/2023    CO2 23.1 01/24/2023     01/24/2023    BUN 6 01/24/2023    CREATININE 0.54 (L) 01/24/2023    EGFRIFAFRI 114 12/13/2019    EGFRIFNONA 83 09/27/2021    BCR 11.1 01/24/2023    ANIONGAP 9.9 01/24/2023     Lab Results   Component Value Date    WBC 4.16 (L) 05/08/2023 "    HGB 13.6 05/08/2023    HCT 41.2 05/08/2023    MCV 91.2 05/08/2023    PLT 50 (L) 05/08/2023     Lab Results   Component Value Date    INR 1.4 05/01/2023    INR 1.3 02/16/2023    INR 1.35 (H) 01/21/2023    PROTIME 16.1 (H) 05/01/2023    PROTIME 14.8 (H) 02/16/2023    PROTIME 16.9 (H) 01/21/2023       Imaging:   AP, scapular Y, and axillary views of the left shoulder are ordered by myself and reviewed to evaluate the patient's complaint.  These are compared to previous x-rays.  The x-rays show severe glenohumeral osteoarthritis with bone on bone degeneration, subluxation of the humeral head, osteophyte formation, and subchondral sclerosis.  The acromiohumeral interval measures normal.    The left shoulder MRI report from 7/13/2022 is reviewed.  The findings are as follows:    IMPRESSION:  1.  There is a single low-grade partial-thickness interstitial tear in the supraspinatus tendon at the footprint.  The rotator cuff tendons are otherwise intact.  2.  There is severe osteoarthritis of the glenohumeral joint.  3.  The posterior aspect of the bony glenoid is mildly hypoplastic with secondary hypertrophy of the posterior labral.  This is suggestive of mild glenoid hypoplasia.  4.  There is extensive degenerative tearing of the superior labrum.  The posterior glenoid labrum is hypertrophied, degenerated with extensive complex tearing noted.  5.  Secondary findings are discussed in the body of the report.     Assessment:  1.  Left shoulder end-stage osteoarthritis with glenoid retroversion  2.  Thrombocytopenia    Plan: We had a thorough discussion regarding the risks, benefits and alternatives to an arthroplasty and non-surgical management versus surgery.  I explained that surgical risks include infection, hematoma, hardware related complications including failure of fixation, loosening, fracture, subscapularis repair failure and/or rotator cuff tear necessitating revision surgery, persistent pain and/or loss of  motion, iatrogenic nerve and/or blood vessel injury resulting in permanent weakness, numbness or dysfunction, DVT, PE, positioning related neuropraxia, and anesthesia related complications resulting in death.  I did explain the possible need for reverse arthroplasty depending upon the degree of retroversion and the status of the rotator cuff at the time of surgery.  I further explained the risks inherent to reverse arthroplasty as compared to a standard total shoulder.  Specifically, we discussed the risks of notching, glenoid loosening, instability, and traction related neuropraxia, any of which could result in persistent pain or problems requiring further surgery.   Lastly, we discussed the rehab and all that will be expected of the patient post operatively to ensure an optimal outcome.  The patient has acknowledged understanding and consents to proceed.     Patient has completed a 5-day course of Doptelet 60 mg daily to help improve her platelet levels.  Her platelet value today is 50.  Dr. Denise was made aware.  He has agreed to proceed with surgery, but will plan transfuse her in the preoperative area.  I explained she is at increased risk for bleeding and infection due to her health status.  She verbalized understanding of all we discussed and consented to proceed.      Patient is planning for hospital admission following surgery.  Denies history of DVT or metal allergy.     Date: 5/8/2023    AMBAR Greene

## 2023-05-09 ENCOUNTER — APPOINTMENT (OUTPATIENT)
Dept: GENERAL RADIOLOGY | Facility: HOSPITAL | Age: 53
End: 2023-05-09
Payer: MEDICAID

## 2023-05-09 ENCOUNTER — HOSPITAL ENCOUNTER (OUTPATIENT)
Facility: HOSPITAL | Age: 53
Discharge: HOME | End: 2023-05-10
Attending: ORTHOPAEDIC SURGERY | Admitting: ORTHOPAEDIC SURGERY
Payer: MEDICAID

## 2023-05-09 ENCOUNTER — ANESTHESIA EVENT (OUTPATIENT)
Dept: PERIOP | Facility: HOSPITAL | Age: 53
End: 2023-05-09
Payer: MEDICAID

## 2023-05-09 ENCOUNTER — ANESTHESIA (OUTPATIENT)
Dept: PERIOP | Facility: HOSPITAL | Age: 53
End: 2023-05-09
Payer: MEDICAID

## 2023-05-09 DIAGNOSIS — Z96.612 H/O TOTAL SHOULDER REPLACEMENT, LEFT: Primary | ICD-10-CM

## 2023-05-09 DIAGNOSIS — M19.019 ARTHRITIS OF SHOULDER: ICD-10-CM

## 2023-05-09 PROCEDURE — 25010000002 SUGAMMADEX 200 MG/2ML SOLUTION: Performed by: NURSE ANESTHETIST, CERTIFIED REGISTERED

## 2023-05-09 PROCEDURE — 25010000002 PROPOFOL 10 MG/ML EMULSION: Performed by: NURSE ANESTHETIST, CERTIFIED REGISTERED

## 2023-05-09 PROCEDURE — C1776 JOINT DEVICE (IMPLANTABLE): HCPCS | Performed by: ORTHOPAEDIC SURGERY

## 2023-05-09 PROCEDURE — G0378 HOSPITAL OBSERVATION PER HR: HCPCS

## 2023-05-09 PROCEDURE — P9100 PATHOGEN TEST FOR PLATELETS: HCPCS

## 2023-05-09 PROCEDURE — 25010000002 ONDANSETRON PER 1 MG: Performed by: NURSE ANESTHETIST, CERTIFIED REGISTERED

## 2023-05-09 PROCEDURE — 73020 X-RAY EXAM OF SHOULDER: CPT

## 2023-05-09 PROCEDURE — 25010000002 PHENYLEPHRINE 10 MG/ML SOLUTION: Performed by: NURSE ANESTHETIST, CERTIFIED REGISTERED

## 2023-05-09 PROCEDURE — 23472 RECONSTRUCT SHOULDER JOINT: CPT | Performed by: NURSE PRACTITIONER

## 2023-05-09 PROCEDURE — C9290 INJ, BUPIVACAINE LIPOSOME: HCPCS | Performed by: ANESTHESIOLOGY

## 2023-05-09 PROCEDURE — 25010000002 VANCOMYCIN PER 500 MG: Performed by: ORTHOPAEDIC SURGERY

## 2023-05-09 PROCEDURE — P9035 PLATELET PHERES LEUKOREDUCED: HCPCS

## 2023-05-09 PROCEDURE — 25010000002 MIDAZOLAM PER 1 MG: Performed by: ANESTHESIOLOGY

## 2023-05-09 PROCEDURE — 25010000002 DEXAMETHASONE SODIUM PHOSPHATE 20 MG/5ML SOLUTION: Performed by: NURSE ANESTHETIST, CERTIFIED REGISTERED

## 2023-05-09 PROCEDURE — 25010000002 FENTANYL CITRATE (PF) 50 MCG/ML SOLUTION: Performed by: ANESTHESIOLOGY

## 2023-05-09 PROCEDURE — 25010000002 ONDANSETRON PER 1 MG: Performed by: ANESTHESIOLOGY

## 2023-05-09 PROCEDURE — 25010000002 CEFAZOLIN IN DEXTROSE 2-4 GM/100ML-% SOLUTION: Performed by: ORTHOPAEDIC SURGERY

## 2023-05-09 PROCEDURE — 25010000002 HYDROMORPHONE PER 4 MG: Performed by: ORTHOPAEDIC SURGERY

## 2023-05-09 PROCEDURE — 36430 TRANSFUSION BLD/BLD COMPNT: CPT

## 2023-05-09 PROCEDURE — 23472 RECONSTRUCT SHOULDER JOINT: CPT | Performed by: ORTHOPAEDIC SURGERY

## 2023-05-09 PROCEDURE — 0 BUPIVACAINE LIPOSOME 1.3 % SUSPENSION: Performed by: ANESTHESIOLOGY

## 2023-05-09 DEVICE — IMPLANTABLE DEVICE
Type: IMPLANTABLE DEVICE | Site: SHOULDER | Status: FUNCTIONAL
Brand: COMPREHENSIVE® REVERSE SHOULDER

## 2023-05-09 DEVICE — TRY HUM/SHLDR COMPREHENSIVE/REVERSE MINI COCR STD PLS3MM 40M: Type: IMPLANTABLE DEVICE | Site: SHOULDER | Status: FUNCTIONAL

## 2023-05-09 DEVICE — IMPLANTABLE DEVICE
Type: IMPLANTABLE DEVICE | Site: SHOULDER | Status: FUNCTIONAL
Brand: COMPREHENSIVE REVERSE SHOULDER

## 2023-05-09 DEVICE — BEAR HUM VIT/E STD 36MM: Type: IMPLANTABLE DEVICE | Site: SHOULDER | Status: FUNCTIONAL

## 2023-05-09 DEVICE — SUT NONABS MAXBRAID/PE NMBR2 C7 38IN BLU 900335: Type: IMPLANTABLE DEVICE | Site: SHOULDER | Status: FUNCTIONAL

## 2023-05-09 DEVICE — STEM HUM/SHLDR COMPREHENSIVE MIC 10X55MM: Type: IMPLANTABLE DEVICE | Site: SHOULDER | Status: FUNCTIONAL

## 2023-05-09 DEVICE — TOTL SHLDER REV: Type: IMPLANTABLE DEVICE | Status: FUNCTIONAL

## 2023-05-09 DEVICE — KNOTLESS TISSUE CONTROL DEVICE, UNDYED UNIDIRECTIONAL (ANTIBACTERIAL) SYNTHETIC ABSORBABLE DEVICE
Type: IMPLANTABLE DEVICE | Site: SHOULDER | Status: FUNCTIONAL
Brand: STRATAFIX

## 2023-05-09 RX ORDER — MIDAZOLAM HYDROCHLORIDE 1 MG/ML
1 INJECTION INTRAMUSCULAR; INTRAVENOUS
Status: DISCONTINUED | OUTPATIENT
Start: 2023-05-09 | End: 2023-05-09 | Stop reason: HOSPADM

## 2023-05-09 RX ORDER — SODIUM CHLORIDE 0.9 % (FLUSH) 0.9 %
3 SYRINGE (ML) INJECTION EVERY 12 HOURS SCHEDULED
Status: DISCONTINUED | OUTPATIENT
Start: 2023-05-09 | End: 2023-05-09 | Stop reason: HOSPADM

## 2023-05-09 RX ORDER — DIPHENHYDRAMINE HYDROCHLORIDE 50 MG/ML
12.5 INJECTION INTRAMUSCULAR; INTRAVENOUS
Status: DISCONTINUED | OUTPATIENT
Start: 2023-05-09 | End: 2023-05-09 | Stop reason: HOSPADM

## 2023-05-09 RX ORDER — NALOXONE HCL 0.4 MG/ML
0.2 VIAL (ML) INJECTION AS NEEDED
Status: DISCONTINUED | OUTPATIENT
Start: 2023-05-09 | End: 2023-05-09 | Stop reason: HOSPADM

## 2023-05-09 RX ORDER — DEXAMETHASONE SODIUM PHOSPHATE 4 MG/ML
INJECTION, SOLUTION INTRA-ARTICULAR; INTRALESIONAL; INTRAMUSCULAR; INTRAVENOUS; SOFT TISSUE AS NEEDED
Status: DISCONTINUED | OUTPATIENT
Start: 2023-05-09 | End: 2023-05-09 | Stop reason: SURG

## 2023-05-09 RX ORDER — ONDANSETRON 2 MG/ML
4 INJECTION INTRAMUSCULAR; INTRAVENOUS ONCE AS NEEDED
Status: DISCONTINUED | OUTPATIENT
Start: 2023-05-09 | End: 2023-05-09 | Stop reason: HOSPADM

## 2023-05-09 RX ORDER — VANCOMYCIN HYDROCHLORIDE 1 G/200ML
15 INJECTION, SOLUTION INTRAVENOUS ONCE
Status: COMPLETED | OUTPATIENT
Start: 2023-05-09 | End: 2023-05-09

## 2023-05-09 RX ORDER — FLUOXETINE HYDROCHLORIDE 20 MG/1
40 CAPSULE ORAL DAILY
Status: DISCONTINUED | OUTPATIENT
Start: 2023-05-10 | End: 2023-05-10 | Stop reason: HOSPADM

## 2023-05-09 RX ORDER — TRAZODONE HYDROCHLORIDE 50 MG/1
50 TABLET ORAL NIGHTLY PRN
Status: DISCONTINUED | OUTPATIENT
Start: 2023-05-09 | End: 2023-05-10 | Stop reason: HOSPADM

## 2023-05-09 RX ORDER — DROPERIDOL 2.5 MG/ML
0.62 INJECTION, SOLUTION INTRAMUSCULAR; INTRAVENOUS
Status: DISCONTINUED | OUTPATIENT
Start: 2023-05-09 | End: 2023-05-09 | Stop reason: HOSPADM

## 2023-05-09 RX ORDER — HYDROCODONE BITARTRATE AND ACETAMINOPHEN 7.5; 325 MG/1; MG/1
1 TABLET ORAL EVERY 4 HOURS PRN
Status: DISCONTINUED | OUTPATIENT
Start: 2023-05-09 | End: 2023-05-10 | Stop reason: HOSPADM

## 2023-05-09 RX ORDER — SODIUM CHLORIDE 0.9 % (FLUSH) 0.9 %
3-10 SYRINGE (ML) INJECTION AS NEEDED
Status: DISCONTINUED | OUTPATIENT
Start: 2023-05-09 | End: 2023-05-09 | Stop reason: HOSPADM

## 2023-05-09 RX ORDER — TRANEXAMIC ACID 100 MG/ML
INJECTION, SOLUTION INTRAVENOUS AS NEEDED
Status: DISCONTINUED | OUTPATIENT
Start: 2023-05-09 | End: 2023-05-09 | Stop reason: SURG

## 2023-05-09 RX ORDER — CEFAZOLIN SODIUM 2 G/100ML
2 INJECTION, SOLUTION INTRAVENOUS ONCE
Status: COMPLETED | OUTPATIENT
Start: 2023-05-09 | End: 2023-05-09

## 2023-05-09 RX ORDER — ACETAMINOPHEN 500 MG
1000 TABLET ORAL ONCE
Status: DISCONTINUED | OUTPATIENT
Start: 2023-05-09 | End: 2023-05-09 | Stop reason: HOSPADM

## 2023-05-09 RX ORDER — PREGABALIN 75 MG/1
150 CAPSULE ORAL ONCE
Status: COMPLETED | OUTPATIENT
Start: 2023-05-09 | End: 2023-05-09

## 2023-05-09 RX ORDER — SODIUM CHLORIDE, SODIUM LACTATE, POTASSIUM CHLORIDE, CALCIUM CHLORIDE 600; 310; 30; 20 MG/100ML; MG/100ML; MG/100ML; MG/100ML
100 INJECTION, SOLUTION INTRAVENOUS CONTINUOUS
Status: DISCONTINUED | OUTPATIENT
Start: 2023-05-09 | End: 2023-05-10 | Stop reason: HOSPADM

## 2023-05-09 RX ORDER — SODIUM CHLORIDE 9 MG/ML
40 INJECTION, SOLUTION INTRAVENOUS AS NEEDED
Status: DISCONTINUED | OUTPATIENT
Start: 2023-05-09 | End: 2023-05-10 | Stop reason: HOSPADM

## 2023-05-09 RX ORDER — HYDRALAZINE HYDROCHLORIDE 20 MG/ML
5 INJECTION INTRAMUSCULAR; INTRAVENOUS
Status: DISCONTINUED | OUTPATIENT
Start: 2023-05-09 | End: 2023-05-09 | Stop reason: HOSPADM

## 2023-05-09 RX ORDER — LIDOCAINE HYDROCHLORIDE 10 MG/ML
0.5 INJECTION, SOLUTION EPIDURAL; INFILTRATION; INTRACAUDAL; PERINEURAL ONCE AS NEEDED
Status: DISCONTINUED | OUTPATIENT
Start: 2023-05-09 | End: 2023-05-09 | Stop reason: HOSPADM

## 2023-05-09 RX ORDER — BISACODYL 10 MG
10 SUPPOSITORY, RECTAL RECTAL DAILY PRN
Status: DISCONTINUED | OUTPATIENT
Start: 2023-05-09 | End: 2023-05-10 | Stop reason: HOSPADM

## 2023-05-09 RX ORDER — FERROUS SULFATE 325(65) MG
325 TABLET ORAL
Status: DISCONTINUED | OUTPATIENT
Start: 2023-05-10 | End: 2023-05-10 | Stop reason: HOSPADM

## 2023-05-09 RX ORDER — BUPIVACAINE HYDROCHLORIDE 2.5 MG/ML
INJECTION, SOLUTION EPIDURAL; INFILTRATION; INTRACAUDAL
Status: COMPLETED | OUTPATIENT
Start: 2023-05-09 | End: 2023-05-09

## 2023-05-09 RX ORDER — EPHEDRINE SULFATE 50 MG/ML
INJECTION INTRAVENOUS AS NEEDED
Status: DISCONTINUED | OUTPATIENT
Start: 2023-05-09 | End: 2023-05-09 | Stop reason: SURG

## 2023-05-09 RX ORDER — AMLODIPINE BESYLATE 5 MG/1
5 TABLET ORAL DAILY
Status: DISCONTINUED | OUTPATIENT
Start: 2023-05-10 | End: 2023-05-10 | Stop reason: HOSPADM

## 2023-05-09 RX ORDER — FLUMAZENIL 0.1 MG/ML
0.2 INJECTION INTRAVENOUS AS NEEDED
Status: DISCONTINUED | OUTPATIENT
Start: 2023-05-09 | End: 2023-05-09 | Stop reason: HOSPADM

## 2023-05-09 RX ORDER — FENTANYL CITRATE 50 UG/ML
50 INJECTION, SOLUTION INTRAMUSCULAR; INTRAVENOUS
Status: DISCONTINUED | OUTPATIENT
Start: 2023-05-09 | End: 2023-05-09 | Stop reason: HOSPADM

## 2023-05-09 RX ORDER — LABETALOL HYDROCHLORIDE 5 MG/ML
5 INJECTION, SOLUTION INTRAVENOUS
Status: DISCONTINUED | OUTPATIENT
Start: 2023-05-09 | End: 2023-05-09 | Stop reason: HOSPADM

## 2023-05-09 RX ORDER — POLYETHYLENE GLYCOL 3350 17 G/17G
17 POWDER, FOR SOLUTION ORAL DAILY
Status: DISCONTINUED | OUTPATIENT
Start: 2023-05-09 | End: 2023-05-10 | Stop reason: HOSPADM

## 2023-05-09 RX ORDER — MAGNESIUM HYDROXIDE 1200 MG/15ML
LIQUID ORAL AS NEEDED
Status: DISCONTINUED | OUTPATIENT
Start: 2023-05-09 | End: 2023-05-09 | Stop reason: HOSPADM

## 2023-05-09 RX ORDER — IPRATROPIUM BROMIDE AND ALBUTEROL SULFATE 2.5; .5 MG/3ML; MG/3ML
3 SOLUTION RESPIRATORY (INHALATION) ONCE AS NEEDED
Status: DISCONTINUED | OUTPATIENT
Start: 2023-05-09 | End: 2023-05-09 | Stop reason: HOSPADM

## 2023-05-09 RX ORDER — HYDROCODONE BITARTRATE AND ACETAMINOPHEN 7.5; 325 MG/1; MG/1
2 TABLET ORAL EVERY 4 HOURS PRN
Status: DISCONTINUED | OUTPATIENT
Start: 2023-05-09 | End: 2023-05-10 | Stop reason: HOSPADM

## 2023-05-09 RX ORDER — ONDANSETRON 2 MG/ML
INJECTION INTRAMUSCULAR; INTRAVENOUS AS NEEDED
Status: DISCONTINUED | OUTPATIENT
Start: 2023-05-09 | End: 2023-05-09 | Stop reason: SURG

## 2023-05-09 RX ORDER — CEFAZOLIN SODIUM 2 G/100ML
2 INJECTION, SOLUTION INTRAVENOUS EVERY 8 HOURS
Status: COMPLETED | OUTPATIENT
Start: 2023-05-09 | End: 2023-05-10

## 2023-05-09 RX ORDER — OXYCODONE HYDROCHLORIDE 5 MG/1
5 TABLET ORAL EVERY 4 HOURS PRN
Status: DISCONTINUED | OUTPATIENT
Start: 2023-05-09 | End: 2023-05-10 | Stop reason: HOSPADM

## 2023-05-09 RX ORDER — PROPOFOL 10 MG/ML
VIAL (ML) INTRAVENOUS AS NEEDED
Status: DISCONTINUED | OUTPATIENT
Start: 2023-05-09 | End: 2023-05-09 | Stop reason: SURG

## 2023-05-09 RX ORDER — ONDANSETRON 4 MG/1
4 TABLET, FILM COATED ORAL EVERY 6 HOURS PRN
Status: DISCONTINUED | OUTPATIENT
Start: 2023-05-09 | End: 2023-05-10 | Stop reason: HOSPADM

## 2023-05-09 RX ORDER — SODIUM CHLORIDE 0.9 % (FLUSH) 0.9 %
3 SYRINGE (ML) INJECTION EVERY 12 HOURS SCHEDULED
Status: DISCONTINUED | OUTPATIENT
Start: 2023-05-09 | End: 2023-05-10 | Stop reason: HOSPADM

## 2023-05-09 RX ORDER — SODIUM CHLORIDE, SODIUM LACTATE, POTASSIUM CHLORIDE, CALCIUM CHLORIDE 600; 310; 30; 20 MG/100ML; MG/100ML; MG/100ML; MG/100ML
9 INJECTION, SOLUTION INTRAVENOUS CONTINUOUS
Status: DISCONTINUED | OUTPATIENT
Start: 2023-05-09 | End: 2023-05-09

## 2023-05-09 RX ORDER — HYDROMORPHONE HYDROCHLORIDE 1 MG/ML
0.5 INJECTION, SOLUTION INTRAMUSCULAR; INTRAVENOUS; SUBCUTANEOUS
Status: DISCONTINUED | OUTPATIENT
Start: 2023-05-09 | End: 2023-05-09 | Stop reason: HOSPADM

## 2023-05-09 RX ORDER — ACETAMINOPHEN 500 MG
500 TABLET ORAL 2 TIMES DAILY PRN
Status: DISCONTINUED | OUTPATIENT
Start: 2023-05-09 | End: 2023-05-10 | Stop reason: HOSPADM

## 2023-05-09 RX ORDER — NICOTINE 21 MG/24HR
1 PATCH, TRANSDERMAL 24 HOURS TRANSDERMAL
Status: DISCONTINUED | OUTPATIENT
Start: 2023-05-09 | End: 2023-05-10 | Stop reason: HOSPADM

## 2023-05-09 RX ORDER — LEVOTHYROXINE SODIUM 0.1 MG/1
100 TABLET ORAL DAILY
Status: DISCONTINUED | OUTPATIENT
Start: 2023-05-09 | End: 2023-05-10 | Stop reason: HOSPADM

## 2023-05-09 RX ORDER — NADOLOL 40 MG/1
40 TABLET ORAL
Status: DISCONTINUED | OUTPATIENT
Start: 2023-05-09 | End: 2023-05-10 | Stop reason: HOSPADM

## 2023-05-09 RX ORDER — PANTOPRAZOLE SODIUM 40 MG/1
40 TABLET, DELAYED RELEASE ORAL
Status: DISCONTINUED | OUTPATIENT
Start: 2023-05-10 | End: 2023-05-10 | Stop reason: HOSPADM

## 2023-05-09 RX ORDER — PHENYLEPHRINE HYDROCHLORIDE 10 MG/ML
INJECTION INTRAVENOUS AS NEEDED
Status: DISCONTINUED | OUTPATIENT
Start: 2023-05-09 | End: 2023-05-09 | Stop reason: SURG

## 2023-05-09 RX ORDER — HYDROMORPHONE HYDROCHLORIDE 1 MG/ML
0.5 INJECTION, SOLUTION INTRAMUSCULAR; INTRAVENOUS; SUBCUTANEOUS
Status: DISCONTINUED | OUTPATIENT
Start: 2023-05-09 | End: 2023-05-10 | Stop reason: HOSPADM

## 2023-05-09 RX ORDER — LIDOCAINE HYDROCHLORIDE 20 MG/ML
INJECTION, SOLUTION EPIDURAL; INFILTRATION; INTRACAUDAL; PERINEURAL AS NEEDED
Status: DISCONTINUED | OUTPATIENT
Start: 2023-05-09 | End: 2023-05-09 | Stop reason: SURG

## 2023-05-09 RX ORDER — OXYCODONE AND ACETAMINOPHEN 7.5; 325 MG/1; MG/1
1 TABLET ORAL EVERY 4 HOURS PRN
Status: DISCONTINUED | OUTPATIENT
Start: 2023-05-09 | End: 2023-05-09 | Stop reason: HOSPADM

## 2023-05-09 RX ORDER — ROCURONIUM BROMIDE 10 MG/ML
INJECTION, SOLUTION INTRAVENOUS AS NEEDED
Status: DISCONTINUED | OUTPATIENT
Start: 2023-05-09 | End: 2023-05-09 | Stop reason: SURG

## 2023-05-09 RX ORDER — ONDANSETRON 2 MG/ML
4 INJECTION INTRAMUSCULAR; INTRAVENOUS ONCE AS NEEDED
Status: COMPLETED | OUTPATIENT
Start: 2023-05-09 | End: 2023-05-09

## 2023-05-09 RX ORDER — ONDANSETRON 2 MG/ML
4 INJECTION INTRAMUSCULAR; INTRAVENOUS EVERY 6 HOURS PRN
Status: DISCONTINUED | OUTPATIENT
Start: 2023-05-09 | End: 2023-05-10 | Stop reason: HOSPADM

## 2023-05-09 RX ORDER — DOCUSATE SODIUM 100 MG/1
100 CAPSULE, LIQUID FILLED ORAL 2 TIMES DAILY
Status: DISCONTINUED | OUTPATIENT
Start: 2023-05-09 | End: 2023-05-10 | Stop reason: HOSPADM

## 2023-05-09 RX ORDER — FOLIC ACID 1 MG/1
1 TABLET ORAL DAILY
Status: DISCONTINUED | OUTPATIENT
Start: 2023-05-09 | End: 2023-05-10 | Stop reason: HOSPADM

## 2023-05-09 RX ORDER — BUSPIRONE HYDROCHLORIDE 15 MG/1
15 TABLET ORAL EVERY 12 HOURS SCHEDULED
Status: DISCONTINUED | OUTPATIENT
Start: 2023-05-09 | End: 2023-05-10 | Stop reason: HOSPADM

## 2023-05-09 RX ORDER — SODIUM CHLORIDE 0.9 % (FLUSH) 0.9 %
10 SYRINGE (ML) INJECTION AS NEEDED
Status: DISCONTINUED | OUTPATIENT
Start: 2023-05-09 | End: 2023-05-10 | Stop reason: HOSPADM

## 2023-05-09 RX ORDER — HYDROCODONE BITARTRATE AND ACETAMINOPHEN 7.5; 325 MG/1; MG/1
1 TABLET ORAL ONCE AS NEEDED
Status: COMPLETED | OUTPATIENT
Start: 2023-05-09 | End: 2023-05-09

## 2023-05-09 RX ORDER — FAMOTIDINE 10 MG/ML
20 INJECTION, SOLUTION INTRAVENOUS ONCE
Status: COMPLETED | OUTPATIENT
Start: 2023-05-09 | End: 2023-05-09

## 2023-05-09 RX ORDER — NALOXONE HCL 0.4 MG/ML
0.1 VIAL (ML) INJECTION
Status: DISCONTINUED | OUTPATIENT
Start: 2023-05-09 | End: 2023-05-10 | Stop reason: HOSPADM

## 2023-05-09 RX ORDER — EPHEDRINE SULFATE 50 MG/ML
5 INJECTION, SOLUTION INTRAVENOUS ONCE AS NEEDED
Status: DISCONTINUED | OUTPATIENT
Start: 2023-05-09 | End: 2023-05-09 | Stop reason: HOSPADM

## 2023-05-09 RX ORDER — MELOXICAM 15 MG/1
15 TABLET ORAL ONCE
Status: COMPLETED | OUTPATIENT
Start: 2023-05-09 | End: 2023-05-09

## 2023-05-09 RX ADMIN — DOCUSATE SODIUM 100 MG: 100 CAPSULE, LIQUID FILLED ORAL at 20:52

## 2023-05-09 RX ADMIN — PROPOFOL 100 MG: 10 INJECTION, EMULSION INTRAVENOUS at 11:56

## 2023-05-09 RX ADMIN — BUPIVACAINE 10 ML: 13.3 INJECTION, SUSPENSION, LIPOSOMAL INFILTRATION at 10:31

## 2023-05-09 RX ADMIN — ROCURONIUM BROMIDE 50 MG: 10 INJECTION, SOLUTION INTRAVENOUS at 11:56

## 2023-05-09 RX ADMIN — POLYETHYLENE GLYCOL 3350 17 G: 17 POWDER, FOR SOLUTION ORAL at 16:46

## 2023-05-09 RX ADMIN — DEXAMETHASONE SODIUM PHOSPHATE 6 MG: 4 INJECTION, SOLUTION INTRAMUSCULAR; INTRAVENOUS at 12:03

## 2023-05-09 RX ADMIN — MELOXICAM 15 MG: 15 TABLET ORAL at 09:33

## 2023-05-09 RX ADMIN — FENTANYL CITRATE 50 MCG: 50 INJECTION, SOLUTION INTRAMUSCULAR; INTRAVENOUS at 10:24

## 2023-05-09 RX ADMIN — CEFAZOLIN SODIUM 2 G: 2 INJECTION, SOLUTION INTRAVENOUS at 18:00

## 2023-05-09 RX ADMIN — HYDROMORPHONE HYDROCHLORIDE 0.5 MG: 1 INJECTION, SOLUTION INTRAMUSCULAR; INTRAVENOUS; SUBCUTANEOUS at 20:52

## 2023-05-09 RX ADMIN — Medication 3 ML: at 21:00

## 2023-05-09 RX ADMIN — SODIUM CHLORIDE, POTASSIUM CHLORIDE, SODIUM LACTATE AND CALCIUM CHLORIDE 100 ML/HR: 600; 310; 30; 20 INJECTION, SOLUTION INTRAVENOUS at 15:19

## 2023-05-09 RX ADMIN — SODIUM CHLORIDE, POTASSIUM CHLORIDE, SODIUM LACTATE AND CALCIUM CHLORIDE 9 ML/HR: 600; 310; 30; 20 INJECTION, SOLUTION INTRAVENOUS at 09:52

## 2023-05-09 RX ADMIN — ONDANSETRON 4 MG: 2 INJECTION INTRAMUSCULAR; INTRAVENOUS at 10:22

## 2023-05-09 RX ADMIN — HYDROCODONE BITARTRATE AND ACETAMINOPHEN 2 TABLET: 7.5; 325 TABLET ORAL at 18:24

## 2023-05-09 RX ADMIN — EPHEDRINE SULFATE 10 MG: 50 INJECTION INTRAVENOUS at 12:26

## 2023-05-09 RX ADMIN — PREGABALIN 150 MG: 75 CAPSULE ORAL at 09:33

## 2023-05-09 RX ADMIN — EPHEDRINE SULFATE 10 MG: 50 INJECTION INTRAVENOUS at 12:06

## 2023-05-09 RX ADMIN — VANCOMYCIN HYDROCHLORIDE 1000 MG: 1 INJECTION, SOLUTION INTRAVENOUS at 11:00

## 2023-05-09 RX ADMIN — HYDROMORPHONE HYDROCHLORIDE 0.5 MG: 1 INJECTION, SOLUTION INTRAMUSCULAR; INTRAVENOUS; SUBCUTANEOUS at 15:54

## 2023-05-09 RX ADMIN — BUPIVACAINE HYDROCHLORIDE 20 ML: 2.5 INJECTION, SOLUTION EPIDURAL; INFILTRATION; INTRACAUDAL; PERINEURAL at 10:31

## 2023-05-09 RX ADMIN — ONDANSETRON 4 MG: 2 INJECTION INTRAMUSCULAR; INTRAVENOUS at 13:05

## 2023-05-09 RX ADMIN — LIDOCAINE HYDROCHLORIDE 60 MG: 20 INJECTION, SOLUTION EPIDURAL; INFILTRATION; INTRACAUDAL; PERINEURAL at 11:56

## 2023-05-09 RX ADMIN — BUSPIRONE HYDROCHLORIDE 15 MG: 15 TABLET ORAL at 20:51

## 2023-05-09 RX ADMIN — HYDROCODONE BITARTRATE AND ACETAMINOPHEN 1 TABLET: 7.5; 325 TABLET ORAL at 14:27

## 2023-05-09 RX ADMIN — MIDAZOLAM 1 MG: 1 INJECTION INTRAMUSCULAR; INTRAVENOUS at 10:24

## 2023-05-09 RX ADMIN — SODIUM CHLORIDE, POTASSIUM CHLORIDE, SODIUM LACTATE AND CALCIUM CHLORIDE: 600; 310; 30; 20 INJECTION, SOLUTION INTRAVENOUS at 13:04

## 2023-05-09 RX ADMIN — CEFAZOLIN SODIUM 2 G: 2 INJECTION, SOLUTION INTRAVENOUS at 11:38

## 2023-05-09 RX ADMIN — NICOTINE 1 PATCH: 21 PATCH, EXTENDED RELEASE TRANSDERMAL at 17:58

## 2023-05-09 RX ADMIN — SUGAMMADEX 200 MG: 100 INJECTION, SOLUTION INTRAVENOUS at 13:16

## 2023-05-09 RX ADMIN — TRANEXAMIC ACID 1000 MG: 100 INJECTION INTRAVENOUS at 12:07

## 2023-05-09 RX ADMIN — NADOLOL 40 MG: 40 TABLET ORAL at 20:52

## 2023-05-09 RX ADMIN — FAMOTIDINE 20 MG: 10 INJECTION INTRAVENOUS at 09:49

## 2023-05-09 RX ADMIN — PHENYLEPHRINE HYDROCHLORIDE 200 MCG: 10 INJECTION, SOLUTION INTRAVENOUS at 12:43

## 2023-05-09 RX ADMIN — HYDROCODONE BITARTRATE AND ACETAMINOPHEN 2 TABLET: 7.5; 325 TABLET ORAL at 22:52

## 2023-05-09 NOTE — BRIEF OP NOTE
TOTAL SHOULDER ARTHROPLASTY  Progress Note    Bekah Gibson  5/9/2023    Pre-op Diagnosis:   Arthritis of shoulder [M19.019]       Post-Op Diagnosis Codes:     * Arthritis of shoulder [M19.019]    Procedure/CPT® Codes:        Procedure(s):  reverse total shoulder arthroplasty        Surgeon(s):  Giovanni Denise MD    Anesthesia: General with Block    Staff:   Circulator: Nadia Whipple RN  Scrub Person: Eduard Brush  Vendor Representative: Lambert Izquierdo  Assistant: Jessica Gaona  Assistant: Jessica Gaona      Estimated Blood Loss: 100ml    Urine Voided: * No values recorded between 5/9/2023 11:45 AM and 5/9/2023  1:25 PM *    Specimens:                None          Drains: * No LDAs found *    Findings: See dictation        Complications: None    Assistant: Jessica Gaona  was responsible for performing the following activities: Retraction and their skilled assistance was necessary for the success of this case.    Giovanni Denise MD     Date: 5/9/2023  Time: 14:19 EDT

## 2023-05-09 NOTE — ANESTHESIA PROCEDURE NOTES
Peripheral Block      Patient reassessed immediately prior to procedure    Patient location during procedure: pre-op  Start time: 5/9/2023 10:26 AM  Stop time: 5/9/2023 10:31 AM  Reason for block: at surgeon's request and post-op pain management  Performed by  Anesthesiologist: Hola Montenegro MD  Preanesthetic Checklist  Completed: patient identified, IV checked, site marked, risks and benefits discussed, surgical consent, monitors and equipment checked, pre-op evaluation and timeout performed  Prep:  Sterile barriers:cap, gloves, mask, washed/disinfected hands and alcohol skin prep  Prep: ChloraPrep  Patient monitoring: blood pressure monitoring, continuous pulse oximetry and EKG  Procedure    Sedation: yes    Guidance:ultrasound guided    ULTRASOUND INTERPRETATION.  Using ultrasound guidance a 21 G gauge needle was placed in close proximity to the brachial plexus nerve, at which point, under ultrasound guidance anesthetic was injected in the area of the nerve and spread of the anesthesia was seen on ultrasound in close proximity thereto.  There were no abnormalities seen on ultrasound; a digital image was taken; and the patient tolerated the procedure with no complications. Images:still images obtained, printed/placed on chart    Laterality:left  Block Type:interscalene  Injection Technique:single-shotNeedle Gauge:21 G  Loss of resistance: normal.    Medications Used: bupivacaine PF (MARCAINE) 0.25 % injection - Injection   20 mL - 5/9/2023 10:31:00 AM  bupivacaine liposome (EXPAREL) 1.3 % injection - Infiltration   10 mL - 5/9/2023 10:31:00 AM      Medications  Comment:Ultrasound Interpretation:  Ultrasound guidance utilized for visualization of needle approach to brachial plexus at interscalene level and verification of local anesthetic disbursement to surrounding tissues. Photo printed and placed on chart for reference.    Post Assessment  Injection Assessment: negative aspiration for heme, no  paresthesia on injection and incremental injection  Patient Tolerance:comfortable throughout block  Complications:no

## 2023-05-09 NOTE — ANESTHESIA PREPROCEDURE EVALUATION
Anesthesia Evaluation     no history of anesthetic complications:  NPO Solid Status: > 8 hours  NPO Liquid Status: > 2 hours           Airway   Mallampati: II  Neck ROM: full  no difficulty expected  Dental      Comment: Temporary implants on top teeth; lower gums with posts for implants yet to be inserted    Pulmonary - normal exam   (+) a smoker ( just recently quit) Former,   (-) COPD, asthma, sleep apnea    PE comment: nonlabored  Cardiovascular - normal exam  Exercise tolerance: good (4-7 METS)    Rhythm: regular  Rate: normal    (+) hypertension,   (-) valvular problems/murmurs, past MI, CAD, dysrhythmias, angina      Neuro/Psych  (+) numbness (peripheral neuropathy), psychiatric history Anxiety and Depression,    (-) seizures, TIA, CVA  GI/Hepatic/Renal/Endo    (+)  GERD, GI bleeding upper , liver disease (EtOH-induced cirrhosis) cirrhosis, renal disease (h/o PINO) stones, thyroid problem (Hashimoto's thyroiditis) hypothyroidism  (-) diabetes    ROS Comment: H/o pancreatitis--acute on chronic;  Esophageal varices    Musculoskeletal     (+) arthralgias,   Abdominal    Substance History   (+) alcohol use (EtOH abuse hx),      OB/GYN          Other   arthritis, autoimmune disease lupus, blood dyscrasia (platelets 50k on 5/8--receiving platelets for surgery) thrombocytopenia,                     Anesthesia Plan    ASA 3     general     (ISB for post-op pain PSR)  intravenous induction     Anesthetic plan, risks, benefits, and alternatives have been provided, discussed and informed consent has been obtained with: patient.        CODE STATUS:

## 2023-05-09 NOTE — ANESTHESIA PROCEDURE NOTES
Airway  Urgency: elective    Date/Time: 5/9/2023 11:58 AM  Airway not difficult    General Information and Staff    Patient location during procedure: OR  Anesthesiologist: Felix Major MD  CRNA/CAA: Danielle Hughes CRNA    Indications and Patient Condition  Indications for airway management: airway protection    Preoxygenated: yes  MILS not maintained throughout  Mask difficulty assessment: 1 - vent by mask    Final Airway Details  Final airway type: endotracheal airway      Successful airway: ETT  Cuffed: yes   Successful intubation technique: direct laryngoscopy  Facilitating devices/methods: intubating stylet  Endotracheal tube insertion site: oral  Blade: Lisa  Blade size: 3  ETT size (mm): 7.0  Cormack-Lehane Classification: grade I - full view of glottis  Placement verified by: chest auscultation   Cuff volume (mL): 8  Measured from: lips  Number of attempts at approach: 1  Assessment: lips, teeth, and gum same as pre-op and atraumatic intubation    Additional Comments  PreO2 100% face mask, IV induction, easy mask, DVL x1, cords noted, tube through, cuff up, EBBSH, +etCO2, = chest movement, tube secured in place, atraumatic, teeth and lips intact as preop.

## 2023-05-09 NOTE — PLAN OF CARE
Goal Outcome Evaluation:           Progress: improving  Outcome Evaluation: 51 y/o s/POD 0 L reverse TSA. AOX4. Pt up w/ stand by assist x1. Voiding function intact. Neuro checks WNL, no c/o numbness/tingling. ABD dsg c/d/i and sling intact. Pain managed via PO pills. PIV saline locked. Needs met. VSS. RA. Educated pt on falls precautions. Plan to d/c home tomorrow. Will CTM.

## 2023-05-09 NOTE — INTERVAL H&P NOTE
H&P reviewed. The patient was examined and there are no changes to the H&P.  We brought her in for surgery today several hours early so that she could get a platelet transfusion prior to surgery.  She understands the significant added surgical risk with her low platelet count and end-stage liver disease.

## 2023-05-09 NOTE — OP NOTE
Orthopaedic Operative Note    Facility: [facility]    Patient: Bekah Gibson    Medical Record Number: [id number]    YOB: 1970    Dictating Surgeon: Giovanni Denise M.D.*    Primary Care Physician: Tylor Davis    Date of Operation: 5/9/2023    Pre-Operative Diagnosis:  Left shoulder end-stage osteoarthritis    Post-Operative Diagnosis: Left shoulder end-stage osteoarthritis with severe glenoid retroversion    Procedure Performed:    1.  Left reverse total shoulder arthroplasty    2.  Tenodesis of long head of biceps tendon    Surgeon: Giovanni Denise MD     Assistant: AMBAR Mauro whose assistance was critical for help with patient positioning, suctioning and irrigation, retraction, manipulation of the extremity for insertion of the implants, wound closure and application of the bandages.  Her assistance was critical to the success of this case.    Anesthesia: Regional followed by Gen.    Complications: None.     Estimated Blood Loss: Less than 50 mL.     Implants: 1.  Biomet size 10 mini comprehensive stem  2.  Large augmented mini glenoid baseplate with one 6.5 mm central compression screw and 4 peripheral 4.75 mm locking screws  3.  Size 36 glenosphere  4.  +3 offset standard thickness humeral tray with 36 mm standard thickness E1 humeral bearing    Specimens:   Order Name Source Comment Collection Info Order Time   PREPARE PLATELET PHERESIS    5/9/2023  6:45 AM     When to Transfuse?   Today          Indication for Transfusion   Platelet <50,000 with Planned Surgery or Procedure          Brief Operative Indication:  Ms. Gibson had a history of worsening left shoulder pain and dysfunction which had been nonresponsive to conservative treatment.  We had a thorough discussion regarding the risks, benefits and alternatives to an arthroplasty and nonsurgical management versus surgery.  She was very high risk due to her history of alcoholic cirrhosis and thrombocytopenia.  We  did bring her in early for a platelet transfusion prior to the surgery.  We also had her hematologist optimize her medically to try to get her platelets as high as possible.  I explained that surgical risks include infection, hematoma, hardware related complications including failure of fixation, loosening, fracture, persistent pain and/or loss of motion, iatrogenic nerve and/or blood vessel injury resulting in permanent weakness, numbness or dysfunction, DVT, PE, positioning related neuropraxia, and anesthesia related complications resulting in death.  We discussed the indication for a reverse as opposed to a standard total shoulder and the risks inherent to the reverse including notching, glenoid loosening, instability, and traction related neuropraxia, any of which could result in persistent pain or problems requiring further surgery.  Last, we discussed the rehab and all that will be expected of the patient postoperatively to ensure an optimal outcome.  She voiced understanding of the risks, benefits, and alternative forms of treatment that were discussed and consented to proceed.    Description of the procedure in detail:  The patient and operative site were identified in the preoperative holding area.  The surgical site was marked.  Adequate regional anesthesia was administered. She was then taken to the operating room.  The patient was placed on the operating table where adequate general anesthesia was administered. She was then repositioned into the modified beachchair position with the head and neck in neutral alignment.  All bony prominences were carefully padded and protected.    The left upper extremity was then prepped and draped in the standard, sterile fashion.  The extremity was cleaned with an alcohol solution.  A Hibiclens scrub was performed and then the extremity was prepped with 2 ChloraPrep preps.  I allowed this to dry for 3 minutes before the draping procedure was carried out.    A timeout  was taken and preoperative antibiotics administered.  Tranexamic acid was administered at this time as well.  Following this, I began by fashioning an approximately 6 cm incision over the anterior aspect of the shoulder.  This was carried down through the skin and subcutaneous tissues.  Full-thickness medial and lateral skin flaps were developed.  The interval between the deltoid and pectoralis was carefully identified and developed.  The underlying cephalic vein was dissected out and retracted laterally.  This structure was kept protected throughout the case.    The clavipectoral fascia was divided.  The strap muscles were retracted medially.  The biceps groove was identified.  The biceps tendon was carefully dissected out.  The biceps was released from its attachment to the supraglenoid tubercle using curved Hunter scissors.  The diseased, intra-articular portion of the biceps was removed.  The remaining intact tendon was tenodesed to the pectoralis tendon using multiple MaxBraid sutures which were tied using 6 throw surgeon's knots.    I then directed my attention to the shoulder.  At this point, I was still deciding between an anatomic versus reverse replacement.  Her subscapularis was carefully tagged and released.  I left a small cuff of tissue on the lesser tuberosity for a later anatomic repair of this structure.   The humeral head was then completely exposed.  The periarticular osteophytes were carefully removed with a rongeur.    The cutting guide for the head cut was inserted.  This was set to 30° of retroversion which I judged off of the forearm.  With the guide in position, I demarcated the cut and then carried out the cut using an oscillating saw.  The cut portion of the bone was removed and taken to the back table for possible bone grafting later in the case, if needed.    Having completed the humeral sided preparations, I then directed my attention to the glenoid.  The axillary nerve was carefully  dissected out and identified.  This structure was protected.  Retractors were carefully positioned along the anterior, posterior and inferior glenoid rim.  I carefully exposed the caudal rim of the glenoid using the electrocautery.  The anterior, inferior and posterior glenoid labrum were then carefully debrided and removed along with the remaining biceps stump superiorly.      She had severe retroversion, greater than 25 to 30 degrees by my estimate.  Even with an augmented glenoid and corrective reaming, I was concerned that she would be high risk for failure with an anatomic total shoulder.  At this point I decided to abort to the reverse replacement.  The centering guide for the baseplate was inserted.  I did place this to allow for eccentric reaming to correct some of her version but I consider that an augment would be necessary.  The center pin for the baseplate was drilled in the center of the glenoid vault.  I then carefully reamed and prepared the glenoid in typical fashion, taking care to maintain appropriate inferior tilt for proper positioning of the baseplate.  I trialed with multiple augments.  The large augment was necessary to restore optimal version and inclination.    Once I had completed the reaming process and made sure that the reaming was adequate, the large augmented baseplate was impacted into position.  This was secured with a single screw central compression screw which got great purchase.  The 4 peripheral locking screws were then placed without complication.  All 4 screws were confirmed to lock into the baseplate.  With the baseplate secured, I examined the remaining glenoid.  I did determine that a 36 glenosphere would fit best in this case.  The appropriate size implant was selected for use and then impacted.  I took care to make sure that the Nguyen taper was fully engaged and that the implant was well-seated.  I used a right angle clamp to pass this beneath the implant and pull up.   When doing so, the entire scapula moved.  Once I was satisfied that this was well seated, I then directed my attention back to the humerus.    The humeral sided preparations were carried out in the typical fashion.  I did recut to 20 degrees of retroversion as opposed to the 30 which I had cut initially.  Next, I reamed and broached the humerus up to a 10 micro stem which seemed to fit well.  The appropriate size implant was impacted into position, taking care to maintain appropriate retroversion as judged off of the forearm.  The implants seated well.  I then trialed off of the stem.  The +3 offset standard thickness trial tray with a 36 standard thickness trial bearing seemed to fit best.  This allowed for excellent motion and stability.  The trial component was removed and then the final component impacted.  Again, I took care to make sure that the Nguyen taper was fully engaged before reducing it and carrying the shoulder through range of motion.    Again, the shoulder demonstrated excellent motion and stability.  I could fully elevate, abduct and externally rotate the shoulder without any impingement or limitation.  The shoulder demonstrated excellent motion and absolutely no instability.  There was good tension in the periarticular soft tissue structures.  At this point, I directed my attention to the subscapularis repair.  The arm was placed in approximately 30 degrees of external rotation.  The subscapularis was then anatomically repaired using multiple #2 MaxBraid sutures.  I then irrigated the wound with 500 mL of a Betadine-containing saline solution.  I then irrigated with 3 L of sterile saline via pulsatile lavage.  I made sure that we had complete hemostasis prior to wound closure.  I made sure her wound was completely bone dry before we started the closure process.  The wound was sequentially closed in a layered fashion.  Vicryl was used to repair the subcutaneous tissues.  A running subcuticular  Monocryl stitch was used to close the skin followed by Steri-strips.  Sterile dressings were applied.  The drapes were withdrawn. Her arm was placed in a sling. She was awakened and taken to the recovery room in good condition.    Giovanni Denise MD  05/09/23

## 2023-05-09 NOTE — ANESTHESIA POSTPROCEDURE EVALUATION
Patient: Bekah Gibson    Procedure Summary     Date: 05/09/23 Room / Location:  [facility]  /  [facility]    Anesthesia Start: 1145 Anesthesia Stop: 1331    Procedure: reverse total shoulder arthroplasty (Left: Shoulder) Diagnosis:       Arthritis of shoulder      (Arthritis of shoulder [M19.019])    Surgeons: Giovanni Denise MD Provider: Felix Major MD    Anesthesia Type: general ASA Status: 3          Anesthesia Type: general    Vitals  Vitals Value Taken Time   /88 05/09/23 1430   Temp 37.2 °C (98.9 °F) 05/09/23 1425   Pulse 75 05/09/23 1440   Resp 16 05/09/23 1430   SpO2 94 % 05/09/23 1440   Vitals shown include unvalidated device data.        Post Anesthesia Care and Evaluation    Patient location during evaluation: bedside  Patient participation: complete - patient participated  Level of consciousness: awake and alert  Pain management: adequate    Airway patency: patent  Anesthetic complications: No anesthetic complications  PONV Status: controlled  Cardiovascular status: acceptable  Respiratory status: acceptable  Hydration status: acceptable

## 2023-05-10 ENCOUNTER — READMISSION MANAGEMENT (OUTPATIENT)
Dept: CALL CENTER | Facility: HOSPITAL | Age: 53
End: 2023-05-10
Payer: MEDICAID

## 2023-05-10 VITALS
OXYGEN SATURATION: 97 % | HEART RATE: 75 BPM | BODY MASS INDEX: 23.32 KG/M2 | RESPIRATION RATE: 16 BRPM | SYSTOLIC BLOOD PRESSURE: 129 MMHG | DIASTOLIC BLOOD PRESSURE: 77 MMHG | HEIGHT: 65 IN | TEMPERATURE: 97.4 F | WEIGHT: 140 LBS

## 2023-05-10 LAB
BASOPHILS # BLD AUTO: 0.01 10*3/MM3 (ref 0–0.2)
BASOPHILS NFR BLD AUTO: 0.2 % (ref 0–1.5)
BH BB BLOOD EXPIRATION DATE: NORMAL
BH BB BLOOD TYPE BARCODE: 7300
BH BB DISPENSE STATUS: NORMAL
BH BB PRODUCT CODE: NORMAL
BH BB UNIT NUMBER: NORMAL
DEPRECATED RDW RBC AUTO: 44.3 FL (ref 37–54)
EOSINOPHIL # BLD AUTO: 0 10*3/MM3 (ref 0–0.4)
EOSINOPHIL NFR BLD AUTO: 0 % (ref 0.3–6.2)
ERYTHROCYTE [DISTWIDTH] IN BLOOD BY AUTOMATED COUNT: 13.7 % (ref 12.3–15.4)
HCT VFR BLD AUTO: 35 % (ref 34–46.6)
HGB BLD-MCNC: 11.7 G/DL (ref 12–15.9)
LYMPHOCYTES # BLD AUTO: 0.33 10*3/MM3 (ref 0.7–3.1)
LYMPHOCYTES NFR BLD AUTO: 7.7 % (ref 19.6–45.3)
MCH RBC QN AUTO: 30 PG (ref 26.6–33)
MCHC RBC AUTO-ENTMCNC: 33.4 G/DL (ref 31.5–35.7)
MCV RBC AUTO: 89.7 FL (ref 79–97)
MONOCYTES # BLD AUTO: 0.47 10*3/MM3 (ref 0.1–0.9)
MONOCYTES NFR BLD AUTO: 11 % (ref 5–12)
NEUTROPHILS NFR BLD AUTO: 3.41 10*3/MM3 (ref 1.7–7)
NEUTROPHILS NFR BLD AUTO: 79.9 % (ref 42.7–76)
PLATELET # BLD AUTO: 53 10*3/MM3 (ref 140–450)
PMV BLD AUTO: 13 FL (ref 6–12)
RBC # BLD AUTO: 3.9 10*6/MM3 (ref 3.77–5.28)
UNIT  ABO: NORMAL
UNIT  RH: NORMAL
WBC NRBC COR # BLD: 4.27 10*3/MM3 (ref 3.4–10.8)

## 2023-05-10 PROCEDURE — 25010000002 HYDROMORPHONE PER 4 MG: Performed by: ORTHOPAEDIC SURGERY

## 2023-05-10 PROCEDURE — G0378 HOSPITAL OBSERVATION PER HR: HCPCS

## 2023-05-10 PROCEDURE — 97535 SELF CARE MNGMENT TRAINING: CPT

## 2023-05-10 PROCEDURE — 25010000002 CEFAZOLIN IN DEXTROSE 2-4 GM/100ML-% SOLUTION: Performed by: ORTHOPAEDIC SURGERY

## 2023-05-10 PROCEDURE — 63710000001 ONDANSETRON PER 8 MG: Performed by: ORTHOPAEDIC SURGERY

## 2023-05-10 PROCEDURE — 85025 COMPLETE CBC W/AUTO DIFF WBC: CPT | Performed by: ORTHOPAEDIC SURGERY

## 2023-05-10 PROCEDURE — 97165 OT EVAL LOW COMPLEX 30 MIN: CPT

## 2023-05-10 RX ORDER — ONDANSETRON 4 MG/1
4 TABLET, FILM COATED ORAL ONCE AS NEEDED
Status: DISCONTINUED | OUTPATIENT
Start: 2023-05-10 | End: 2023-05-10 | Stop reason: HOSPADM

## 2023-05-10 RX ORDER — ACETAMINOPHEN 325 MG/1
650 TABLET ORAL 2 TIMES DAILY PRN
Qty: 60 TABLET | Refills: 0 | Status: SHIPPED | OUTPATIENT
Start: 2023-05-10

## 2023-05-10 RX ORDER — OXYCODONE AND ACETAMINOPHEN 10; 325 MG/1; MG/1
1 TABLET ORAL EVERY 4 HOURS PRN
Qty: 50 TABLET | Refills: 0 | Status: SHIPPED | OUTPATIENT
Start: 2023-05-10 | End: 2023-05-19 | Stop reason: SDUPTHER

## 2023-05-10 RX ORDER — DOCUSATE SODIUM 100 MG/1
100 CAPSULE, LIQUID FILLED ORAL 2 TIMES DAILY
Qty: 60 CAPSULE | Refills: 0 | Status: SHIPPED | OUTPATIENT
Start: 2023-05-10

## 2023-05-10 RX ORDER — ONDANSETRON 2 MG/ML
4 INJECTION INTRAMUSCULAR; INTRAVENOUS ONCE AS NEEDED
Status: DISCONTINUED | OUTPATIENT
Start: 2023-05-10 | End: 2023-05-10 | Stop reason: HOSPADM

## 2023-05-10 RX ORDER — ONDANSETRON 4 MG/1
4 TABLET, FILM COATED ORAL EVERY 8 HOURS PRN
Qty: 12 TABLET | Refills: 0 | Status: SHIPPED | OUTPATIENT
Start: 2023-05-10

## 2023-05-10 RX ORDER — NALOXONE HYDROCHLORIDE 4 MG/.1ML
SPRAY NASAL
Qty: 2 EACH | Refills: 0 | Status: SHIPPED | OUTPATIENT
Start: 2023-05-10

## 2023-05-10 RX ADMIN — NADOLOL 40 MG: 40 TABLET ORAL at 08:55

## 2023-05-10 RX ADMIN — HYDROCODONE BITARTRATE AND ACETAMINOPHEN 2 TABLET: 7.5; 325 TABLET ORAL at 05:47

## 2023-05-10 RX ADMIN — BUSPIRONE HYDROCHLORIDE 15 MG: 15 TABLET ORAL at 08:53

## 2023-05-10 RX ADMIN — Medication 3 ML: at 08:59

## 2023-05-10 RX ADMIN — AMLODIPINE BESYLATE 5 MG: 5 TABLET ORAL at 08:55

## 2023-05-10 RX ADMIN — HYDROMORPHONE HYDROCHLORIDE 0.5 MG: 1 INJECTION, SOLUTION INTRAMUSCULAR; INTRAVENOUS; SUBCUTANEOUS at 01:36

## 2023-05-10 RX ADMIN — HYDROMORPHONE HYDROCHLORIDE 0.5 MG: 1 INJECTION, SOLUTION INTRAMUSCULAR; INTRAVENOUS; SUBCUTANEOUS at 08:53

## 2023-05-10 RX ADMIN — Medication 1 MG: at 08:53

## 2023-05-10 RX ADMIN — FERROUS SULFATE TAB 325 MG (65 MG ELEMENTAL FE) 325 MG: 325 (65 FE) TAB at 08:53

## 2023-05-10 RX ADMIN — PANTOPRAZOLE SODIUM 40 MG: 40 TABLET, DELAYED RELEASE ORAL at 05:47

## 2023-05-10 RX ADMIN — FLUOXETINE HYDROCHLORIDE 40 MG: 20 CAPSULE ORAL at 08:53

## 2023-05-10 RX ADMIN — NICOTINE 1 PATCH: 21 PATCH, EXTENDED RELEASE TRANSDERMAL at 09:05

## 2023-05-10 RX ADMIN — ONDANSETRON HYDROCHLORIDE 4 MG: 4 TABLET, FILM COATED ORAL at 05:47

## 2023-05-10 RX ADMIN — CEFAZOLIN SODIUM 2 G: 2 INJECTION, SOLUTION INTRAVENOUS at 03:12

## 2023-05-10 RX ADMIN — LEVOTHYROXINE SODIUM 100 MCG: 0.1 TABLET ORAL at 08:54

## 2023-05-10 RX ADMIN — DOCUSATE SODIUM 100 MG: 100 CAPSULE, LIQUID FILLED ORAL at 08:53

## 2023-05-10 RX ADMIN — POLYETHYLENE GLYCOL 3350 17 G: 17 POWDER, FOR SOLUTION ORAL at 08:55

## 2023-05-10 NOTE — PLAN OF CARE
Goal Outcome Evaluation:  Plan of Care Reviewed With: patient        Progress: improving  Outcome Evaluation: Pt seen for OT, POD1 for LUE RTSA. Pt alert, supine in bed, pain meds given prior to session. Pt completed TBD this date with education provided for adaptive dressing, don/doffing sling, total shoulder HEP education and education on protocols. Pt verbalized understanding, plan is home with spouse and OPPT. Acute OT to sign off.

## 2023-05-10 NOTE — THERAPY EVALUATION
Patient Name: Bekah Gibson  : 1970    MRN: [id number]                              Today's Date: 5/10/2023       Admit Date: 2023    Visit Dx:     ICD-10-CM ICD-9-CM   1. H/O total shoulder replacement, left  Z96.612 V43.61   2. Arthritis of shoulder  M19.019 716.91     Patient Active Problem List   Diagnosis   • Irritable bowel syndrome with both constipation and diarrhea   • Peripheral neuropathy   • Vitamin D deficiency   • History of HPV infection   • Hypothyroidism   • Tobacco dependence due to cigarettes   • Acute kidney injury   • Ascites   • E. coli UTI (urinary tract infection)   • Alcohol withdrawal syndrome, with delirium   • Alcoholic hepatitis   • GI bleed   • Acute post-hemorrhagic anemia   • Thrombocytopenia   • Open wound of right shoulder region   • Pancreatic insufficiency   • Alcoholic ketoacidosis   • Chronic alcohol abuse   • ESBL (extended spectrum beta-lactamase) producing bacteria infection   • Abnormal weight loss   • Hashimoto's disease   • Chronic fatigue   • History of alcohol use disorder   • Alcohol intoxication   • Lupus   • Hypomagnesemia   • Pancytopenia   • Alcoholic cirrhosis (HCC)   • Bilateral lower abdominal discomfort   • Primary hypertension   • Melena   • Arthritis of shoulder   • Esophageal varices   • Essential hypertension   • Acute on chronic pancreatitis   • Abdominal pain     Past Medical History:   Diagnosis Date   • Anxiety    • Arthritis    • Cirrhosis    • Disease of thyroid gland    • ETOH abuse     SOBER FOR 3 MONTHS   • Fracture, clavicle 2021    shattered clavicel on issue slip and fall   • GERD (gastroesophageal reflux disease)    • History of kidney stones     5 YR AGO   • Hypertension    • Left shoulder pain    • MDD (major depressive disorder)    • Pancreatitis    • Periarthritis of shoulder see above   • Rotator cuff syndrome odre for shoulder replacement    unable to receive due to low platelets   • Thrombocytopenia      Past Surgical  History:   Procedure Laterality Date   • CLAVICLE SURGERY Right 2018   • ENDOSCOPY N/A 10/26/2022    Procedure: ESOPHAGOGASTRODUODENOSCOPY wtih banding;  Surgeon: Ag Patel MD;  [facility];  Service: Gastroenterology;  Laterality: N/A;  pre: melena  post: esophageal varicies with banding x2 bands   • ENDOSCOPY N/A 01/18/2023    Procedure: ESOPHAGOGASTRODUODENOSCOPY;  Surgeon: Ag Patel MD;  [facility];  Service: Gastroenterology;  Laterality: N/A;  PRE OP - MAROON STOOLS  POST OP - SM ESOPHAGEAL VARICES   • ESOPHAGEAL VARICES LIGATION     • JOINT REPLACEMENT Bilateral     GREAT TOE   • KIDNEY STONE SURGERY      LITHOTRIPSY AND STENT (R SIDE)   • LAPAROSCOPIC TUBAL LIGATION  2005   • SIGMOIDOSCOPY N/A 01/18/2023    Procedure: SIGMOIDOSCOPY FLEXIBLE;  Surgeon: Ag Patel MD;  [facility];  Service: Gastroenterology;  Laterality: N/A;  PRE OP - MAROON STOOLS  POST OP - RECTAL VARICES   • TOTAL SHOULDER ARTHROPLASTY Left 5/9/2023    Procedure: reverse total shoulder arthroplasty;  Surgeon: Giovanni Denise MD;  Location:  [facility];  Service: Orthopedics;  Laterality: Left;      General Information     Row Name 05/10/23 [facility]          OT Time and Intention    Document Type evaluation;discharge evaluation/summary  -     Mode of Treatment occupational therapy;individual therapy  -MW     Row Name 05/10/23 [facility]          General Information    Patient Profile Reviewed yes  -MW     Prior Level of Function independent:  -MW     Existing Precautions/Restrictions left;non-weight bearing;shoulder  -MW     Barriers to Rehab none identified  -MW     Row Name 05/10/23 1221          Living Environment    People in Home spouse  -MW     Row Name 05/10/23 1221          Cognition    Orientation Status (Cognition) oriented x 4  -MW           User Key  (r) = Recorded By, (t) = Taken By, (c) = Cosigned By    Initials Name Provider Type    Radha Blanco OT Occupational  Therapist                 Mobility/ADL's     [facility] Name 05/10/23 1221          Bed Mobility    Bed Mobility supine-sit;sit-supine  -     Supine-Sit [place] (Bed Mobility) standby assist  -     Sit-Supine [place] (Bed Mobility) standby assist  -[facility] Name 05/10/23 1221          Transfers    Transfers sit-stand transfer  -[facility] Name 05/10/23 1221          Sit-Stand Transfer    Sit-Stand [place] (Transfers) standby assist  -[facility] Name 05/10/23 1221          Activities of Daily Living    BADL Assessment/Intervention upper body dressing;lower body dressing  -[facility] Name 05/10/23 1221          Upper Body Dressing Assessment/Training    Comment, (Upper Body Dressing) adaptive dressing education completed, don/doffing sling, education, over head shirt education  -[facility] Name 05/10/23 1221          Lower Body Dressing Assessment/Training    Comment, (Lower Body Dressing) donning pants and socks, min A for donning completely over hips  -           User Key  (r) = Recorded By, (t) = Taken By, (c) = Cosigned By    Initials Name Provider Type    Radha Blanco OT Occupational Therapist               Obj/Interventions     [facility] Name 05/10/23 1223          Sensory Assessment (Somatosensory)    Sensory Assessment nerve block in place, decreased sensation  -[facility] Name 05/10/23 1223          Motor Skills    Therapeutic Exercise --  educated on total shoulder HEP, pt able to demo on unaffected extremity, verbalized understanding  -[facility] Name 05/10/23 1223          Balance    Balance Interventions occupation based/functional task  -           User Key  (r) = Recorded By, (t) = Taken By, (c) = Cosigned By    Initials Name Provider Type    Radha Blanco OT Occupational Therapist               Goals/Plan     [facility] Name 05/10/23 1227          Strength Goal 1 (OT)    Strength Goal 1 (OT) (I) total shoulder HEP  -     Time Frame (Strength Goal 1, OT) short term goal (STG);1  day  -MW     Progress/Outcome (Strength Goal 1, OT) goal met  -           User Key  (r) = Recorded By, (t) = Taken By, (c) = Cosigned By    Initials Name Provider Type    Radha Blanco OT Occupational Therapist               Clinical Impression     Row Name 05/10/23 1225          Pain Assessment    Pretreatment Pain Rating 5/10  -MW     Posttreatment Pain Rating 5/10  -MW     Pain Location - Side/Orientation Left  -     Pain Location - shoulder  -MW     Pre/Posttreatment Pain Comment pain meds given prior to session  -     Row Name 05/10/23 1225          Plan of Care Review    Plan of Care Reviewed With patient  -     Progress improving  -MW     Outcome Evaluation Pt seen for OT, POD1 for LUE RTSA. Pt alert, supine in bed, pain meds given prior to session. Pt completed TBD this date with education provided for adaptive dressing, don/doffing sling, total shoulder HEP education and education on protocols. Pt verbalized understanding, plan is home with spouse and OPPT. Acute OT to sign off.  -     Row Name 05/10/23 1225          Therapy Plan Review/Discharge Plan (OT)    Anticipated Discharge Disposition (OT) home;home with assist;home with outpatient therapy services  -     Row Name 05/10/23 1225          Vital Signs    O2 Delivery Pre Treatment room air  -     Row Name 05/10/23 1225          Positioning and Restraints    Pre-Treatment Position in bed  -     Post Treatment Position bed  -MW     In Bed notified nsg;call light within reach;encouraged to call for assist;fowlers  -           User Key  (r) = Recorded By, (t) = Taken By, (c) = Cosigned By    Initials Name Provider Type    Radha Blanco OT Occupational Therapist               Outcome Measures     Row Name 05/10/23 1228          How much help from another is currently needed...    Putting on and taking off regular lower body clothing? 3  -MW     Bathing (including washing, rinsing, and drying) 4  -MW     Toileting (which  includes using toilet bed pan or urinal) 4  -MW     Putting on and taking off regular upper body clothing 3  -MW     Taking care of personal grooming (such as brushing teeth) 4  -MW     Eating meals 4  -MW     AM-PAC 6 Clicks Score (OT) 22  -MW     Row Name 05/10/23 1228          Functional Assessment    Outcome Measure Options AM-PAC 6 Clicks Daily Activity (OT)  -MW           User Key  (r) = Recorded By, (t) = Taken By, (c) = Cosigned By    Initials Name Provider Type    Radha Blanco OT Occupational Therapist                Occupational Therapy Education     Title: PT OT SLP Therapies (Done)     Topic: Occupational Therapy (Done)     Point: ADL training (Done)     Description:   Instruct learner(s) on proper safety adaptation and remediation techniques during self care or transfers.   Instruct in proper use of assistive devices.              Learning Progress Summary           Patient Acceptance, E,TB,H,D, VU by ANAHI at 5/10/2023 1228    Comment: role of OT, HEP handout, don/doffing sling, adaptive dressing                   Point: Home exercise program (Done)     Description:   Instruct learner(s) on appropriate technique for monitoring, assisting and/or progressing therapeutic exercises/activities.              Learning Progress Summary           Patient Acceptance, E,TB,H,D, VU by ANAHI at 5/10/2023 1228    Comment: role of OT, HEP handout, don/doffing sling, adaptive dressing                   Point: Precautions (Done)     Description:   Instruct learner(s) on prescribed precautions during self-care and functional transfers.              Learning Progress Summary           Patient Acceptance, E,TB,H,D, VU by  at 5/10/2023 1228    Comment: role of OT, HEP handout, don/doffing sling, adaptive dressing                   Point: Body mechanics (Done)     Description:   Instruct learner(s) on proper positioning and spine alignment during self-care, functional mobility activities and/or exercises.               Learning Progress Summary           Patient Acceptance, E,TB,H,D, VU by  at 5/10/2023 1228    Comment: role of OT, HEP handout, don/doffing sling, adaptive dressing                               User Key     Initials Effective Dates Name Provider Type Discipline     08/20/21 -  Radha Wills OT Occupational Therapist OT              OT Recommendation and Plan     Plan of Care Review  Plan of Care Reviewed With: patient  Progress: improving  Outcome Evaluation: Pt seen for OT, POD1 for LUE RTSA. Pt alert, supine in bed, pain meds given prior to session. Pt completed TBD this date with education provided for adaptive dressing, don/doffing sling, total shoulder HEP education and education on protocols. Pt verbalized understanding, plan is home with spouse and OPPT. Acute OT to sign off.     Time Calculation:    Time Calculation- OT     Row Name 05/10/23 1229             Time Calculation- OT    OT Start Time 0927  -MW      OT Stop Time 0946  -MW      OT Time Calculation (min) 19 min  -MW      Total Timed Code Minutes- OT 12 minute(s)  -MW      OT Received On 05/10/23  -MW         Timed Charges    35366 - OT Self Care/Mgmt Minutes 12  -MW         Untimed Charges    OT Eval/Re-eval Minutes 7  -MW         Total Minutes    Timed Charges Total Minutes 12  -MW      Untimed Charges Total Minutes 7  -MW       Total Minutes 19  -MW            User Key  (r) = Recorded By, (t) = Taken By, (c) = Cosigned By    Initials Name Provider Type     Radha Wills OT Occupational Therapist              Therapy Charges for Today     Code Description Service Date Service Provider Modifiers Qty    24361595345 HC OT SELF CARE/MGMT/TRAIN EA 15 MIN 5/10/2023 Radha Wills OT GO 1    38507092212 HC OT EVAL LOW COMPLEXITY 2 5/10/2023 Radha Wills OT GO 1               Radha Wills OT  5/10/2023

## 2023-05-10 NOTE — DISCHARGE SUMMARY
Date of Admission:  5/9/2023    Date of Discharge:  5/10/2023    Discharge Diagnosis: s/p Left reverse total shoulder arthroplasty    Admitting Physician: Giovanni Denise    Consults: none    DETAILS OF HOSPITAL STAY:  Patient is a 52 y.o. female was admitted to the floor following a reverse total shoulder arthroplasty.  Post-operatively the patient was transferred to the post-operative floor where the patient underwent physical therapy including both active and passive ROM exercises. Opioids were titrated to achieve appropriate pain management to allow for participation in mobilization exercises. Vital signs are now stable. The incision is benign without signs or symptoms of infection. Operative extremity neurovascular status remains intact. Appropriate education re: incision care, activity levels, medications, and follow up visits was completed and all questions were answered. The patient is now deemed stable for discharge to home.    Condition on Discharge:  Stable    Discharge Medications     Discharge Medications      New Medications      Instructions Start Date   acetaminophen 325 MG tablet  Commonly known as: TYLENOL   650 mg, Oral, 2 Times Daily PRN      docusate sodium 100 MG capsule  Commonly known as: COLACE   100 mg, Oral, 2 Times Daily      naloxone 4 MG/0.1ML nasal spray  Commonly known as: NARCAN   Call 911. Don't prime. [place] in 1 nostril for overdose. Repeat in 2-3 minutes in other nostril if no or minimal breathing/responsiveness.      ondansetron 4 MG tablet  Commonly known as: Zofran   4 mg, Oral, Every 8 Hours PRN      oxyCODONE-acetaminophen  MG per tablet  Commonly known as: Percocet   1 tablet, Oral, Every 4 Hours PRN         Continue These Medications      Instructions Start Date   amLODIPine 5 MG tablet  Commonly known as: NORVASC   5 mg, Oral, Daily      busPIRone 15 MG tablet  Commonly known as: BUSPAR   15 mg, 3 Times Daily PRN      dicyclomine 20 MG tablet  Commonly known as:  BENTYL   20 mg, Oral, Every 6 Hours      Doptelet 20 MG tablet  Generic drug: avatrombopag maleate   60 mg, Daily, TAKES 10 DAYS BEFORE SURGERY      ferrous sulfate 325 (65 FE) MG tablet   325 mg, Oral, Daily With Breakfast      FLUoxetine 40 MG capsule  Commonly known as: PROzac   40 mg, Oral, Daily      folic acid 1 MG tablet  Commonly known as: FOLVITE   1 mg, Oral, Daily      lactulose 10 GM/15ML solution  Commonly known as: CHRONULAC   No dose, route, or frequency recorded.      levothyroxine 88 MCG tablet  Commonly known as: SYNTHROID, LEVOTHROID   No dose, route, or frequency recorded.      MAGnesium-Oxide 400 (240 Mg) MG tablet  Generic drug: Magnesium Oxide   Daily, HOLD PRIOR TO SURG      multivitamin capsule   1 capsule, Oral, Daily, HOLD PRIOR TO SURG      nadolol 40 MG tablet  Commonly known as: CORGARD   40 mg, Oral, Every Night at Bedtime      nicotine 21 MG/24HR patch  Commonly known as: NICODERM CQ   1 patch, Transdermal, Every 24 Hours Scheduled      ondansetron ODT 4 MG disintegrating tablet  Commonly known as: ZOFRAN-ODT   4 mg, Translingual, Every 8 Hours PRN      oxyCODONE 5 MG immediate release tablet  Commonly known as: Roxicodone   5 mg, Oral, Every 4 Hours PRN      pantoprazole 40 MG EC tablet  Commonly known as: PROTONIX   40 mg, Daily      traZODone 50 MG tablet  Commonly known as: DESYREL   50 mg, Oral, Nightly PRN         ASK your doctor about these medications      Instructions Start Date   levothyroxine 100 MCG tablet  Commonly known as: SYNTHROID, LEVOTHROID   TAKE ONE TABLET BY MOUTH DAILY             Discharge Diet: regular    Activity at Discharge: as tolerated    Discharge Instructions:   1)  Patient is to continue with physical therapy exercises daily and continue working with the physical therapist as ordered.  2)  Continue to follow precautions as instructed.   3)  Patient is instructed to continue use of the sling except when performing their physical therapy exercising and  while dressing or showering.  4)  Continue to ice regularly. Patient also instructed on incentive spirometer during hospitalization and encouraged to continue to use at home regularly.   5)  The dressing should be left in place. If waterproof dressing is intact the patient may shower immediately following discharge. If the dressing becomes disloged or saturated it should be changed and patient must wait until POD #5 to shower. If dressing is changed, apply dry sterile dressing after showering.  6)  Follow up appointment in 2 weeks - patient to call the office at [phone] to schedule.     Follow-up Appointments  2 weeks with Dr Camelia Denise MD  05/10/23  15:00 EDT

## 2023-05-10 NOTE — PLAN OF CARE
Goal Outcome Evaluation:               Pt is a post op day 1 of a left reverse total shoulder. Dressing is clean dry and intact. Pt continues with the Gauze and Tegaderm with the blue sling. Pt is alert and oriented x4. Pt has ambulated to the bathroom with standby assist. Pt continues with increased pain, IV and PO pain meds given. Pt educated on the importance of monitoring blood pressures related to comorbidity of HTN. Pt voiced understanding. Pt is resting at this time, will continue to monitor.

## 2023-05-11 NOTE — OUTREACH NOTE
Prep Survey    Flowsheet Row Responses   [facility] facility patient discharged from? [facility]   Is LACE score < 7 ? No   Eligibility Readm Mgmt   Discharge diagnosis s/p Left reverse total shoulder arthroplasty   Does the patient have one of the following disease processes/diagnoses(primary or secondary)? Total Joint Replacement   Does the patient have Home health ordered? No   Is there a DME ordered? No   Prep survey completed? Yes          Irma MAGUIRE - Registered Nurse

## 2023-05-12 ENCOUNTER — TELEPHONE (OUTPATIENT)
Dept: ORTHOPEDIC SURGERY | Facility: CLINIC | Age: 53
End: 2023-05-12

## 2023-05-12 NOTE — TELEPHONE ENCOUNTER
Caller: BA JENNINGS    Relationship: PATIENT    Best call back number:  [phone]    What orders are you requesting (i.e. lab or imaging):  POST OP PT ORDER FOR LEFT SHLDR-IN Epic 5/10/23        Where will you receive your lab/imaging services:  [facility] [place] / FAX# [phone]    Additional notes:  PLEASE INFORM PATIENT WHEN ORDER FAXED SO PATIENT CAN GET SCHEDULED

## 2023-05-12 NOTE — CASE MANAGEMENT/SOCIAL WORK
Discharge Planning Assessment  [facility]     Patient Name: Bekah Gibson  MRN: [id number]  Today's Date: 5/12/2023    Admit Date: 5/9/2023        Discharge Needs Assessment    No documentation.                Discharge Plan     Row Name 05/12/23 1550       Plan    Final Discharge Disposition Code 01 - home or self-care    Final Note DC Home              Continued Care and Services - Discharged on 5/10/2023 Admission date: 5/9/2023 - Discharge disposition: Home or Self Care   Coordination has not been started for this encounter.       Expected Discharge Date and Time     Expected Discharge Date Expected Discharge Time    May 10, 2023          Demographic Summary    No documentation.                Functional Status    No documentation.                Psychosocial    No documentation.                Abuse/Neglect    No documentation.                Legal    No documentation.                Substance Abuse    No documentation.                Patient Forms    No documentation.                   Gail Mo

## 2023-05-15 ENCOUNTER — TELEPHONE (OUTPATIENT)
Dept: ORTHOPEDIC SURGERY | Facility: CLINIC | Age: 53
End: 2023-05-15

## 2023-05-15 ENCOUNTER — READMISSION MANAGEMENT (OUTPATIENT)
Dept: CALL CENTER | Facility: HOSPITAL | Age: 53
End: 2023-05-15
Payer: MEDICAID

## 2023-05-15 ENCOUNTER — TELEPHONE (OUTPATIENT)
Dept: ORTHOPEDIC SURGERY | Facility: CLINIC | Age: 53
End: 2023-05-15
Payer: MEDICAID

## 2023-05-15 NOTE — OUTREACH NOTE
Total Joint Week 1 Survey    Flowsheet Row Responses   [facility] facility patient discharged from? [facility]   Does the patient have one of the following disease processes/diagnoses(primary or secondary)? Total Joint Replacement   Joint surgery performed? Shoulder   Week 1 attempt successful? No   Unsuccessful attempts Attempt 1          Vadim EAST - Registered Nurse

## 2023-05-15 NOTE — TELEPHONE ENCOUNTER
Caller: Bekah Gibson    Relationship to patient: Self    Best call back number: [phone]    Patient is needing: WOULD LIKE TO KNOW WHEN SHE CAN START DRIVING AGAIN AND BEGIN THE EXERCISES SHE WAS TOLD TO DO.

## 2023-05-15 NOTE — TELEPHONE ENCOUNTER
Caller: BA JENNINGS    Relationship: PATIENT     Best call back number: [phone]    What orders are you requesting (i.e. lab or imaging): PHYSICAL THERAPY     In what timeframe would the patient need to come in: ASAP     Where will you receive your lab/imaging services: [facility]  [address] [facility]  [address]  PHONE [phone]  [phone]  PATIENT ALSO PROVIDED CENTRAL OFFICE -[phone]

## 2023-05-16 ENCOUNTER — TELEPHONE (OUTPATIENT)
Dept: ORTHOPEDIC SURGERY | Facility: CLINIC | Age: 53
End: 2023-05-16

## 2023-05-16 NOTE — TELEPHONE ENCOUNTER
Could you please reopen her referral and change it back to Internal so scheduling can contact her again? Her locations are limited.

## 2023-05-16 NOTE — TELEPHONE ENCOUNTER
Caller: Bekah Gibson    Relationship: Self    Best call back number: [phone]     What orders are you requesting (i.e. lab or imaging): LEFT SHOULDER PHYSICAL THERAPY ORDER (REFERRAL [id number] ALREADY IN [facility] IF CAN BE USED?)     In what timeframe would the patient need to come in: 1st AVAILABLE, ASAP     Where will you receive your lab/imaging services: [facility]     Additional notes: PATIENT HAD LEFT REVERSE TOTAL SHOULDER ARTHROPLASTY 05-09-23 BY DR FUENTES     FOUND OUT [facility] DOES NOT TAKE PATIENT's [facility] MEDICAID INSURANCE     PLEASE CALL / LEAVE VMAIL NOTIFYING PATIENT WHEN LEFT SHOULDER PT ORDER HAS BEEN SENT OUT     THANKS

## 2023-05-17 ENCOUNTER — READMISSION MANAGEMENT (OUTPATIENT)
Dept: CALL CENTER | Facility: HOSPITAL | Age: 53
End: 2023-05-17
Payer: MEDICAID

## 2023-05-17 NOTE — OUTREACH NOTE
Total Joint Week 1 Survey    Flowsheet Row Responses   [facility], [place], operated by [facility] patient discharged from? [place]   Does the patient have one of the following disease processes/diagnoses(primary or secondary)? Total Joint Replacement   Joint surgery performed? Shoulder   Week 1 attempt successful? Yes   Call start time 1014   Call end time 1017   Has the patient been back in either the hospital or Emergency Department since discharge? No   Discharge diagnosis s/p Left reverse total shoulder arthroplasty   Is patient permission given to speak with other caregiver? Yes   List who call center can speak with Spouse   Person spoke with today (if not patient) and relationship Spouse   Does the patient have all medications related to this admission filled (includes all antibiotics, pain medications, etc.) Yes   Is the patient taking all medications as directed (includes completed medication regime)? Yes   Is the patient able to teach back alternate methods of pain control? Ice, Reposition, Shoulder-elevate above heart/ keep in sling as advised   Does the patient have a follow up appointment with their surgeon? Yes   Has the patient kept scheduled appointments due by today? N/A   Comments Surgery follow up 5/22/23   Has home health visited the patient within 72 hours of discharge? N/A   Psychosocial issues? No   Has the patient began therapy sessions (either in the home or as an out patient)? No  [Spouse reports therapy to start next week]   Does the patient have a wound vac in place? N/A   Has the patient fallen since discharge? No   Did the patient receive a copy of their discharge instructions? Yes   Nursing interventions Reviewed instructions with patient   What is the patient's perception of their functional status since discharge? Improving   Is the patient able to teach back signs and symptoms of infection? Temp >100.4 for 24h or longer, Incisional drainage, Blisters around incision, Increased swelling or redness around  incision (not associated with surgical edema), Severe discomfort or pain, Changes in mobility, Shortness of breath or chest pain   Is the patient able to teach back how to prevent infection? Check incision daily, Keep incision covered if drainage, Eat well-balanced diet, No tub baths, hot tub or swimming   Is the patient able to teach back signs and symptoms of DVT? Redness in calf, Area hot to touch, Shortness of breath or chest pain, Swelling in calf, Severe pain in calf   Did the patient implement home safety suggestions from pre-surgery classes if attended? N/A   If the patient is a current smoker, are they able to teach back resources for cessation? Smoking cessation medications   Is the patient/caregiver able to teach back the hierarchy of who to call/visit for symptoms/problems? PCP, Specialist, Home health nurse, Urgent Care, ED, 911 Yes   Week 1 call completed? Yes   Is the patient interested in additional calls from an ambulatory ?  NOTE:  applies to high risk patients requiring additional follow-up. No   Wrap up additional comments Spouse reports patient doing better than she expected. No concerns today.          Starr VALLADARES - Registered Nurse

## 2023-05-18 ENCOUNTER — HOSPITAL ENCOUNTER (OUTPATIENT)
Dept: PHYSICAL THERAPY | Facility: HOSPITAL | Age: 53
Setting detail: THERAPIES SERIES
Discharge: HOME | End: 2023-05-18
Payer: MEDICAID

## 2023-05-18 DIAGNOSIS — R29.898 IMPAIRED STRENGTH OF SHOULDER MUSCLES: ICD-10-CM

## 2023-05-18 DIAGNOSIS — M25.612 IMPAIRED RANGE OF MOTION OF LEFT SHOULDER: ICD-10-CM

## 2023-05-18 DIAGNOSIS — Z47.89 ORTHOPEDIC AFTERCARE: Primary | ICD-10-CM

## 2023-05-18 DIAGNOSIS — Z96.612 STATUS POST REVERSE TOTAL REPLACEMENT OF LEFT SHOULDER: ICD-10-CM

## 2023-05-18 PROCEDURE — 97110 THERAPEUTIC EXERCISES: CPT

## 2023-05-18 PROCEDURE — 97161 PT EVAL LOW COMPLEX 20 MIN: CPT

## 2023-05-18 NOTE — THERAPY EVALUATION
Outpatient Physical Therapy Ortho Initial Evaluation  [facility]     Patient Name: Bekah Gibson  : 1970  MRN: [id number]  Today's Date: 2023      Visit Date: 2023    Patient Active Problem List   Diagnosis   • Irritable bowel syndrome with both constipation and diarrhea   • Peripheral neuropathy   • Vitamin D deficiency   • History of HPV infection   • Hypothyroidism   • Tobacco dependence due to cigarettes   • Acute kidney injury   • Ascites   • E. coli UTI (urinary tract infection)   • Alcohol withdrawal syndrome, with delirium   • Alcoholic hepatitis   • GI bleed   • Acute post-hemorrhagic anemia   • Thrombocytopenia   • Open wound of right shoulder region   • Pancreatic insufficiency   • Alcoholic ketoacidosis   • Chronic alcohol abuse   • ESBL (extended spectrum beta-lactamase) producing bacteria infection   • Abnormal weight loss   • Hashimoto's disease   • Chronic fatigue   • History of alcohol use disorder   • Alcohol intoxication   • Lupus   • Hypomagnesemia   • Pancytopenia   • Alcoholic cirrhosis (HCC)   • Bilateral lower abdominal discomfort   • Primary hypertension   • Melena   • Arthritis of shoulder   • Esophageal varices   • Essential hypertension   • Acute on chronic pancreatitis   • Abdominal pain        Past Medical History:   Diagnosis Date   • Anxiety    • Arthritis    • Cirrhosis    • Disease of thyroid gland    • ETOH abuse     SOBER FOR 3 MONTHS   • Fracture, clavicle 2021    shattered clavicel on issue slip and fall   • GERD (gastroesophageal reflux disease)    • History of kidney stones     5 YR AGO   • Hypertension    • Left shoulder pain    • MDD (major depressive disorder)    • Pancreatitis    • Periarthritis of shoulder see above   • Rotator cuff syndrome odre for shoulder replacement    unable to receive due to low platelets   • Thrombocytopenia         Past Surgical History:   Procedure Laterality Date   • CLAVICLE SURGERY Right 2018   • ENDOSCOPY N/A  10/26/2022    Procedure: ESOPHAGOGASTRODUODENOSCOPY wtih banding;  Surgeon: Ag Patel MD;  Location: [facility] ENDOSCOPY;  Service: Gastroenterology;  Laterality: N/A;  pre: melena  post: esophageal varicies with banding x2 bands   • ENDOSCOPY N/A 01/18/2023    Procedure: ESOPHAGOGASTRODUODENOSCOPY;  Surgeon: Ag Patel MD;  Location: [facility] ENDOSCOPY;  Service: Gastroenterology;  Laterality: N/A;  PRE OP - MAROON STOOLS  POST OP - SM ESOPHAGEAL VARICES   • ESOPHAGEAL VARICES LIGATION     • JOINT REPLACEMENT Bilateral     GREAT TOE   • KIDNEY STONE SURGERY      LITHOTRIPSY AND STENT (R SIDE)   • LAPAROSCOPIC TUBAL LIGATION  2005   • SIGMOIDOSCOPY N/A 01/18/2023    Procedure: SIGMOIDOSCOPY FLEXIBLE;  Surgeon: Ag Patel MD;  Location: [facility] ENDOSCOPY;  Service: Gastroenterology;  Laterality: N/A;  PRE OP - MAROON STOOLS  POST OP - RECTAL VARICES   • TOTAL SHOULDER ARTHROPLASTY Left 5/9/2023    Procedure: reverse total shoulder arthroplasty;  Surgeon: Giovanni Denise MD;  Location: [facility] OR [facility];  Service: Orthopedics;  Laterality: Left;       Visit Dx:     ICD-10-CM ICD-9-CM   1. Orthopedic aftercare  Z47.89 V54.9   2. Status post reverse total replacement of left shoulder  Z96.612 V43.61   3. Impaired range of motion of left shoulder  M25.612 719.51   4. Impaired strength of shoulder muscles  R29.898 729.89          Patient History     Row Name 05/18/23 1600             History    Chief Complaint Pain  -MH      Type of Pain Shoulder pain  -      Date Current Problem(s) Began --  chronic shoulder pain  -MH      Brief Description of Current Complaint Pt. Presents to clinic for evaluation s/p L rTSA 5/9/2023. She presents wearing sling, reports understanding of precautions. Prior to surgery pt. Was having pretty significant pain leading to have rTSA. Since surgery she reports moderate to severe pain levels and using pain medication as prescribed, she is wearing sling but states MD told her it was  optional at home and doing pendulums 5x/daily.  -      Patient/Caregiver Goals Relieve pain;Return to prior level of function;Know what to do to help the symptoms  -      Patient's Rating of General Health Good  -      Hand Dominance right-handed  -      Occupation/sports/leisure activities retired, enjoys gardening, watering plants, weeding, has bene unable to feed her sewing macchine or play guitar  -      What clinical tests have you had for this problem? X-ray  -      Related/Recent Hospitalizations Yes  -      Surgery/Hospitalization 5/9/2023  -         Pain     Pain Location Shoulder  -      Pain at Present 8  -      What Performance Factors Make the Current Problem(s) BETTER? pain medication, icing  -      Is your sleep disturbed? Yes  -      Difficulties with ADL's? yes  -      Difficulties with recreational activities? yes  -         Fall Risk Assessment    Any falls in the past year: No  -         Services    Prior Rehab/Home Health Experiences Yes  -      When was the prior experience with Rehab/Home Health 20021  -         Daily Activities    Primary Language English  -      Are you able to read Yes  -      Are you able to write Yes  -      How does patient learn best? Listening;Reading;Demonstration  -      Does patient have problems with the following? None  -      Barriers to learning None  -      Pt Participated in POC and Goals Yes  -            User Key  (r) = Recorded By, (t) = Taken By, (c) = Cosigned By    Initials Name Provider Type     Charity Vazquez PT Physical Therapist                 PT Ortho     Row Name 05/18/23 1500       Posture/Observations    Observations Incision healing;Ecchymosis/bruising  -    Posture/Observations Comments brusing into forearm on posterior side, bandage intact over incision but no s/s of infection  -       Special Tests/Palpation    Special Tests/Palpation Shoulder  -       Shoulder Girdle Palpation     Shoulder Girdle Palpation? Yes  -MH    Deltoid Left:;Tender  -MH    Subscapularis Left:;Tender  -MH    Infraspinatus Left:;Tender  -MH    Teres Minor Left:;Tender  -MH       General ROM    RT Upper Ext Rt Shoulder ABduction;Rt Shoulder Flexion;Rt Shoulder External Rotation;Rt Shoulder Internal Rotation  -    LT Upper Ext Lt Shoulder ABduction;Lt Shoulder Flexion;Lt Shoulder External Rotation;Lt Shoulder Internal Rotation  -       Right Upper Ext    Rt Shoulder Abduction AROM 0-125  -    Rt Shoulder Flexion AROM 0-140  -    Rt Shoulder External Rotation AROM PETEY to C7  -    Rt Shoulder Internal Rotation AROM FIR to L3 midline  -    Rt Upper Extremity Comments  seated, compesation 2' hx R clavicle fracture  -       Left Upper Ext    Lt Shoulder Abduction PROM 0-90  -    Lt Shoulder External Rotation AROM 0-15  -MH    Lt Upper Extremity Comments  supine  -       MMT (Manual Muscle Testing)    Rt Upper Ext Rt Shoulder Flexion;Rt Shoulder ABduction;Rt Shoulder Internal Rotation;Rt Shoulder External Rotation;Rt Elbow Extension;Rt Elbow Flexion  -    Lt Upper Ext Lt Shoulder Flexion;Lt Shoulder ABduction;Lt Shoulder Internal Rotation;Lt Shoulder External Rotation;Lt Elbow Extension;Lt Elbow Flexion  -       MMT Right Upper Ext    Rt Shoulder Flexion MMT, Gross Movement (4/5) good  -MH    Rt Shoulder ABduction MMT, Gross Movement (4-/5) good minus  -MH    Rt Shoulder Internal Rotation MMT, Gross Movement (4/5) good  -MH    Rt Shoulder External Rotation MMT, Gross Movement (4-/5) good minus  -MH    Rt Elbow Flexion MMT, Gross Movement: (4+/5) good plus  -MH    Rt Elbow Extension MMT, Gross Movement: (4+/5) good plus  -       MMT Left Upper Ext    Lt Upper Extremity Comments  not tested 2' post-op status  -       Sensation    Sensation WNL? WNL  -          User Key  (r) = Recorded By, (t) = Taken By, (c) = Cosigned By    Initials Name Provider Type    Charity Morris PT Physical Therapist                             Therapy Education  Education Details: Educated on PT role and POC; discussed expectations/timeframe for healing. Post-op protocol - advised to continue with sling full time right now due to pain and protocol; Provided HEP, Access Code: [email or web address]  Given: HEP, Symptoms/condition management, Posture/body mechanics  Program: New  How Provided: Verbal, Demonstration, Written  Provided to: Patient  Level of Understanding: Verbalized, Demonstrated      PT OP Goals     Row Name 05/18/23 1500          PT Short Term Goals    STG Date to Achieve 06/17/23  -     STG 1 Pt. Will be independent with initial HEP to improve self-management of condition.  -     STG 1 Progress New  -     STG 2 Pt. Will improve PROM flexion to 140 and ER to 20 degrees to progress to next phase of protocol.  -     STG 2 Progress New  -     STG 3 Pt. Will be compliant with precautions including sling usage and ROM restrictions to reduce risk of injury/dislocation.  -     STG 3 Progress New  -     STG 4 Pt. will return to sleeping in bed with waking </= 1x due to shoulder pain to improve sleep quality.  -     STG 4 Progress New  -        Long Term Goals    LTG Date to Achieve 07/17/23  -     LTG 1 Pt. Will be independent with advanced HEP to improve long-term management of condition and independence.  -     LTG 1 Progress New  -     LTG 2 Pt. Will score </=30 on QuickDASH  (from 63.64 on initial evaluation) to indicate improved perception of disability.  -     LTG 2 Progress New  -     LTG 3 Pt. Will demonstrate improved AROM flexion to >/= 140, abd >/= 130, FIR >/= T12 to improve ability to complete ADLs.  -     LTG 3 Progress New  -     LTG 4 Pt. Will improve shoulder strength to >/= 4/5 to ease ability to perform household chores.  -     LTG 4 Progress New  [facility]     LTG 5 Pt. will report ability to feed sewing machine without pain to improve ability to participate in hobbies.  -     LTG 5 Progress  New  -        Time Calculation    PT Goal Re-Cert Due Date 08/16/23  -           User Key  (r) = Recorded By, (t) = Taken By, (c) = Cosigned By    Initials Name Provider Type    Charity Morris, [place] Physical Therapist                 PT Assessment/Plan     Row Name 05/18/23 6303          PT Assessment    Functional Limitations Performance in self-care ADL;Limitation in home management;Performance in leisure activities;Performance in work activities;Performance in sport activities;Limitations in functional capacity and performance  -     Impairments Range of motion;Posture;Poor body mechanics;Pain;Muscle strength;Joint mobility  -     Assessment Comments Bekah Gibson is a 52 y.o. year-old female referred to physical therapy s/p L rTSA 5/9/2023. She presents with a stable clinical presentation. She has comorbidities of low platelets, history R clavicle fracture and personal factors of chronicity of symptoms that may affect her progress in the plan of care. Self scored disability measure of QuickDASH was a 63.64 (0 represents no disability). She demonstrated expected deficits in L shoulder PROM, increased pain levels, tender to palpation and manual interventions. No s/s of infections or DVT, bandage intact over incision. She has bene unable to feed her sewing machine or play guitar due to shoulder pain. Signs and symptoms are consistent with referring diagnosis. She is appropriate for skilled therapy services at this time to address deficits and improve ease with ADLs and resume hobbies.  -     Please refer to paper survey for additional self-reported information No  -MH     Rehab Potential Fair  -     Patient/caregiver participated in establishment of treatment plan and goals Yes  -     Patient would benefit from skilled therapy intervention Yes  -        PT Plan    PT Frequency 2x/week  -     Predicted Duration of Therapy Intervention (PT) 14 visits  -     Planned CPT's? PT EVAL LOW  COMPLEXITY: 15756;PT RE-EVAL: 32145;PT THER PROC EA 15 MIN: 87315;PT THER ACT EA 15 MIN: 93942;PT MANUAL THERAPY EA 15 MIN: 99309;PT NEUROMUSC RE-EDUCATION EA 15 MIN: 28220;PT SELF CARE/HOME MGMT/TRAIN EA 15: 63416;PT HOT OR COLD PACK TREAT MCARE;PT ELECTRICAL STIM UNATTEND:   -     PT Plan Comments Progress per protocol; STM L shoulder; review HEP, self assist flex PROM to 90?  -           User Key  (r) = Recorded By, (t) = Taken By, (c) = Cosigned By    Initials Name Provider Type     Charity Vazquez, PT Physical Therapist                   OP Exercises     Row Name 05/18/23 1600             Total Minutes    23913 - PT Therapeutic Exercise Minutes 8  -MH      74835 - PT Manual Therapy Minutes 8  -MH         Exercise 2    Exercise Name 2 scap retract  -      Cueing 2 Verbal;Demo  -      Reps 2 10  -MH      Time 2 5sec  -         Exercise 3    Exercise Name 3 shoulder rolls  -      Cueing 3 Verbal;Demo  -      Reps 3 10  -MH      Time 3 5sec  -MH         Exercise 4    Exercise Name 4 elbow flex/ext  -      Cueing 4 Verbal;Demo  -      Reps 4 10  -MH         Exercise 5    Exercise Name 5 pronation/supination  -      Cueing 5 Verbal;Demo  -MH      Reps 5 10ea  -MH         Exercise 6    Exercise Name 6 reviewed pendulums  -         Exercise 7    Exercise Name 7 open/close fist  -      Cueing 7 Verbal;Demo  -MH      Reps 7 10  -MH            User Key  (r) = Recorded By, (t) = Taken By, (c) = Cosigned By    Initials Name Provider Type     Charity Vazquez, PT Physical Therapist              Manual Rx (last 36 hours)     Manual Treatments     Row Name 05/18/23 1600             Total Minutes    06787 - PT Manual Therapy Minutes 8  -MH         Manual Rx 1    Manual Rx 1 Location PROM L shoulder flex/ER per protocol  -      Manual Rx 1 Type gentle oscillations  -            User Key  (r) = Recorded By, (t) = Taken By, (c) = Cosigned By    Initials Name Provider Type     Charity Vazquez,  PT Physical Therapist                            Outcome Measure Options: Quick DASH  Quick DASH  Open a tight or new jar.: Moderate Difficulty  Do heavy household chores (e.g., wash walls, wash floors): Severe Difficulty  Carry a shopping bag or briefcase: Mild Difficulty  Wash your back: Severe Difficulty  Use a knife to cut food: Moderate Difficulty  Recreational activities in which you take some force or impact through your arm, should or hand (e.g. golf, hammering, tennis, etc.): Severe Difficulty  During the past week, to what extent has your arm, shoulder, or hand problem interfered with your normal social activites with family, friends, neighbors or groups?: Quite a bit  During the past week, were you limited in your work or other regular daily activities as a result of your arm, shoulder or hand problem?: Very limited  Arm, Shoulder, or hand pain: Severe  Tingling (pins and needles) in your arm, shoulder, or hand: Moderate  During the past week, how much difficulty have you had sleeping because of the pain in your arm, shoulder or hand?: Severe Difficulty  Number of Questions Answered: 11  Quick DASH Score: 63.64         Time Calculation:     Start Time: 1559  Stop Time: 1630  Time Calculation (min): 31 min  Total Timed Code Minutes- PT: 16 minute(s)  Timed Charges  13572 - PT Therapeutic Exercise Minutes: 8  55412 - PT Manual Therapy Minutes: 8  Untimed Charges  PT Eval/Re-eval Minutes: 15  Total Minutes  Timed Charges Total Minutes: 16  Untimed Charges Total Minutes: 15   Total Minutes: 31     Therapy Charges for Today     Code Description Service Date Service Provider Modifiers Qty    31497134771 HC PT EVAL LOW COMPLEXITY 1 5/18/2023 Charity Vazquez, PT GP 1    [id number] HC PT THER PROC EA 15 MIN 5/18/2023 Charity Vazquez, PT GP 1          PT G-Codes  Outcome Measure Options: Quick DASH  Quick DASH Score: 63.64         Charity Vazquez PT  5/18/2023

## 2023-05-19 DIAGNOSIS — Z96.612 H/O TOTAL SHOULDER REPLACEMENT, LEFT: ICD-10-CM

## 2023-05-19 RX ORDER — OXYCODONE AND ACETAMINOPHEN 10; 325 MG/1; MG/1
1 TABLET ORAL EVERY 4 HOURS PRN
Qty: 50 TABLET | Refills: 0 | Status: SHIPPED | OUTPATIENT
Start: 2023-05-19

## 2023-05-22 ENCOUNTER — OFFICE VISIT (OUTPATIENT)
Dept: ORTHOPEDIC SURGERY | Facility: CLINIC | Age: 53
End: 2023-05-22
Payer: MEDICAID

## 2023-05-22 VITALS — TEMPERATURE: 98.6 F | WEIGHT: 140 LBS | HEIGHT: 65 IN | BODY MASS INDEX: 23.32 KG/M2

## 2023-05-22 DIAGNOSIS — Z09 SURGERY FOLLOW-UP: ICD-10-CM

## 2023-05-22 DIAGNOSIS — Z96.612 H/O TOTAL SHOULDER REPLACEMENT, LEFT: Primary | ICD-10-CM

## 2023-05-22 NOTE — PROGRESS NOTES
"Bekah Gibson : 1970 MRN: [id number] DATE: 2023    DIAGNOSIS:  2 week follow up left shoulder arthroplasty      SUBJECTIVE:  Patient returns today for 2 week follow up of left shoulder replacement. Patient reports doing well with no unusual complaints.      OBJECTIVE:    Temp 98.6 °F (37 °C) (Temporal)   Ht 165.1 cm (65\")   Wt 63.5 kg (140 lb)   LMP 2013 Comment: NATANAEL  BMI 23.30 kg/m²     Exam:  The incision is well approximated.  No erythema or drainage. Shoulder moves fluidly with pendulums.  The arm is soft and nontender.  Intact motor and sensory function in the lower arm and hand.  Palpable radial pulse.    DIAGNOSTIC STUDIES    Xrays: AP and scapular Y views of the left shoulder are ordered and reviewed for evaluation of the recent shoulder replacement.  The x-rays demonstrate a well positioned, well aligned replacement without complicating factors noted.  In comparison with previous films, there has been no change.    ASSESSMENT: 2 week follow up left shoulder replacement.    PLAN:   1.  PT per protocol  2.  Continue sling until next visit.  3.  Counseled patient about appropriate activity modifications and restrictions, including no driving at this point.    Giovanni Denise MD    "

## 2023-05-23 ENCOUNTER — HOSPITAL ENCOUNTER (OUTPATIENT)
Dept: PHYSICAL THERAPY | Facility: HOSPITAL | Age: 53
Setting detail: THERAPIES SERIES
Discharge: HOME | End: 2023-05-23
Payer: MEDICAID

## 2023-05-23 DIAGNOSIS — Z47.89 ORTHOPEDIC AFTERCARE: Primary | ICD-10-CM

## 2023-05-23 DIAGNOSIS — M25.612 IMPAIRED RANGE OF MOTION OF LEFT SHOULDER: ICD-10-CM

## 2023-05-23 DIAGNOSIS — R29.898 IMPAIRED STRENGTH OF SHOULDER MUSCLES: ICD-10-CM

## 2023-05-23 DIAGNOSIS — Z96.612 STATUS POST REVERSE TOTAL REPLACEMENT OF LEFT SHOULDER: ICD-10-CM

## 2023-05-23 PROCEDURE — 97140 MANUAL THERAPY 1/> REGIONS: CPT | Performed by: PHYSICAL THERAPIST

## 2023-05-23 PROCEDURE — 97110 THERAPEUTIC EXERCISES: CPT | Performed by: PHYSICAL THERAPIST

## 2023-05-23 NOTE — THERAPY TREATMENT NOTE
Outpatient Physical Therapy Ortho Treatment Note  [facility]     Patient Name: Bekah Gibson  : 1970  MRN: [id number]  Today's Date: 2023      Visit Date: 2023    Visit Dx:    ICD-10-CM ICD-9-CM   1. Orthopedic aftercare  Z47.89 V54.9   2. Status post reverse total replacement of left shoulder  Z96.612 V43.61   3. Impaired range of motion of left shoulder  M25.612 719.51   4. Impaired strength of shoulder muscles  R29.898 729.89       Patient Active Problem List   Diagnosis   • Irritable bowel syndrome with both constipation and diarrhea   • Peripheral neuropathy   • Vitamin D deficiency   • History of HPV infection   • Hypothyroidism   • Tobacco dependence due to cigarettes   • Acute kidney injury   • Ascites   • E. coli UTI (urinary tract infection)   • Alcohol withdrawal syndrome, with delirium   • Alcoholic hepatitis   • GI bleed   • Acute post-hemorrhagic anemia   • Thrombocytopenia   • Open wound of right shoulder region   • Pancreatic insufficiency   • Alcoholic ketoacidosis   • Chronic alcohol abuse   • ESBL (extended spectrum beta-lactamase) producing bacteria infection   • Abnormal weight loss   • Hashimoto's disease   • Chronic fatigue   • History of alcohol use disorder   • Alcohol intoxication   • Lupus   • Hypomagnesemia   • Pancytopenia   • Alcoholic cirrhosis (HCC)   • Bilateral lower abdominal discomfort   • Primary hypertension   • Melena   • Arthritis of shoulder   • Esophageal varices   • Essential hypertension   • Acute on chronic pancreatitis   • Abdominal pain        Past Medical History:   Diagnosis Date   • Anxiety    • Arthritis    • Cirrhosis    • Disease of thyroid gland    • ETOH abuse     SOBER FOR 3 MONTHS   • Fracture, clavicle 2021    shattered clavicel on issue slip and fall   • GERD (gastroesophageal reflux disease)    • History of kidney stones     5 YR AGO   • Hypertension    • Left shoulder pain    • MDD (major depressive disorder)    • Pancreatitis     • Periarthritis of shoulder see above   • Rotator cuff syndrome odre for shoulder replacement    unable to receive due to low platelets   • Thrombocytopenia         Past Surgical History:   Procedure Laterality Date   • CLAVICLE SURGERY Right 2018   • ENDOSCOPY N/A 10/26/2022    Procedure: ESOPHAGOGASTRODUODENOSCOPY wtih banding;  Surgeon: Ag Patel MD;  Location: [facility] ENDOSCOPY;  Service: Gastroenterology;  Laterality: N/A;  pre: melena  post: esophageal varicies with banding x2 bands   • ENDOSCOPY N/A 01/18/2023    Procedure: ESOPHAGOGASTRODUODENOSCOPY;  Surgeon: Ag Patel MD;  Location: [facility] ENDOSCOPY;  Service: Gastroenterology;  Laterality: N/A;  PRE OP - MAROON STOOLS  POST OP - SM ESOPHAGEAL VARICES   • ESOPHAGEAL VARICES LIGATION     • JOINT REPLACEMENT Bilateral     GREAT TOE   • KIDNEY STONE SURGERY      LITHOTRIPSY AND STENT (R SIDE)   • LAPAROSCOPIC TUBAL LIGATION  2005   • SIGMOIDOSCOPY N/A 01/18/2023    Procedure: SIGMOIDOSCOPY FLEXIBLE;  Surgeon: Ag Patel MD;  Location: [facility] ENDOSCOPY;  Service: Gastroenterology;  Laterality: N/A;  PRE OP - MAROON STOOLS  POST OP - RECTAL VARICES   • TOTAL SHOULDER ARTHROPLASTY Left 5/9/2023    Procedure: reverse total shoulder arthroplasty;  Surgeon: Giovanni Denise MD;  Location: [facility] OR [facility] [place];  Service: Orthopedics;  Laterality: Left;        PT Ortho     Row Name 05/23/23 1400       Left Upper Ext    Lt Shoulder Flexion PROM 90supine  -GJ    Lt Shoulder External Rotation PROM 10 supine  -GJ          User Key  (r) = Recorded By, (t) = Taken By, (c) = Cosigned By    Initials Name Provider Type    Wilder Pierce, PT Physical Therapist                             PT Assessment/Plan     Row Name 05/23/23 1317          PT Assessment    Assessment Comments Ms. Gibson is 2 weeks s/p L rTSA 5/9/2023.  She returns for her first follow up session of PT. She presents out of sling, reporting MD has taken her out of the sling. We reviewed activity  modifications and precautions per protoccol.  She tends to gurad LUE across body with elbow flexed. Encoruaged her to let her LUE swing more freely with gait. Reviewed current program, added gentle shoulder flexionin supine with self and chest press with cane to HEP.  She demosntrates well healing incision, no s/s of infection, steri strips in place.  Ms. Gibson demonstates 90 degrees of PROM flexion fluidly and 10-15 degrees of PROM ER.  She continues to be a good candidate for skilled physical therapy.  -GJ        PT Plan    PT Plan Comments assess response to new exercises, progress PROM as able, scapular girdle activation as able  -GJ           User Key  (r) = Recorded By, (t) = Taken By, (c) = Cosigned By    Initials Name Provider Type    Wilder Pierce, PT Physical Therapist                   OP Exercises     Row Name 05/23/23 1316 05/23/23 1300          Total Minutes    72410 - PT Therapeutic Exercise Minutes 15  -GJ --     24999 - PT Manual Therapy Minutes 24  -GJ --        Exercise 2    Exercise Name 2 -- scap retract  -GJ     Cueing 2 -- Verbal;Demo  -GJ     Reps 2 -- 15  -GJ     Time 2 -- 5s  -GJ        Exercise 3    Exercise Name 3 -- shoulder shrugs  -GJ     Cueing 3 -- Verbal;Demo  -GJ     Reps 3 -- 15  -GJ     Time 3 -- 5s  -GJ        Exercise 4    Exercise Name 4 -- elbow flex/ext  -GJ     Cueing 4 -- Verbal;Demo  -GJ     Reps 4 -- 15  -GJ        Exercise 8    Exercise Name 8 -- supine AAROM flexion (with self)  -GJ     Cueing 8 -- Verbal;Demo  -GJ     Reps 8 -- 15  -GJ        Exercise 9    Exercise Name 9 -- supine chest press  -GJ     Cueing 9 -- Verbal;Demo  -GJ     Reps 9 -- 15  -GJ           User Key  (r) = Recorded By, (t) = Taken By, (c) = Cosigned By    Initials Name Provider Type    Wilder Pierce, PT Physical Therapist                         Manual Rx (last 36 hours)     Manual Treatments     Row Name 05/23/23 1316 05/23/23 1300          Total Minutes    65027 - PT Manual Therapy  Minutes 24  -GJ --        Manual Rx 1    Manual Rx 1 Location -- PROM L shoulder flex/ER per protocol  -GJ     Manual Rx 1 Type -- gentle oscillations  -GJ     Manual Rx 1 Grade -- gentle STM L mid to distal biceps tissue  -GJ     Manual Rx 1 Duration -- cues for relaxation  -GJ        Manual Rx 2    Manual Rx 2 Location -- pt in R SL,  -GJ     Manual Rx 2 Type -- rhythmic initiation of L scapular elevation/depression, protraction/retraction  -GJ           User Key  (r) = Recorded By, (t) = Taken By, (c) = Cosigned By    Initials Name Provider Type    Wilder Pierce, PT Physical Therapist                 PT OP Goals     Row Name 05/23/23 1300          PT Short Term Goals    STG Date to Achieve 06/17/23  -GJ     STG 1 Pt. Will be independent with initial HEP to improve self-management of condition.  -GJ     STG 1 Progress Ongoing  -GJ     STG 1 Progress Comments reviewed  -GJ     STG 2 Pt. Will improve PROM flexion to 140 and ER to 20 degrees to progress to next phase of protocol.  -GJ     STG 2 Progress Ongoing  -GJ     STG 3 Pt. Will be compliant with precautions including sling usage and ROM restrictions to reduce risk of injury/dislocation.  -GJ     STG 3 Progress Ongoing  -GJ     STG 4 Pt. will return to sleeping in bed with waking </= 1x due to shoulder pain to improve sleep quality.  -GJ     STG 4 Progress Ongoing  -GJ        Long Term Goals    LTG Date to Achieve 07/17/23  -GJ     LTG 1 Pt. Will be independent with advanced HEP to improve long-term management of condition and independence.  -GJ     LTG 1 Progress Ongoing  -GJ     LTG 2 Pt. Will score </=30 on QuickDASH  (from 63.64 on initial evaluation) to indicate improved perception of disability.  -GJ     LTG 2 Progress New  -GJ     LTG 3 Pt. Will demonstrate improved AROM flexion to >/= 140, abd >/= 130, FIR >/= T12 to improve ability to complete ADLs.  -GJ     LTG 3 Progress Ongoing  -GJ     LTG 4 Pt. Will improve shoulder strength to >/= 4/5 to  ease ability to perform household chores.  -GJ     LTG 4 Progress Ongoing  -     LTG 5 Pt. will report ability to feed sewing machine without pain to improve ability to participate in hobbies.  -     LTG 5 Progress Ongoing  -           User Key  (r) = Recorded By, (t) = Taken By, (c) = Cosigned By    Initials Name Provider Type    Wilder Pierce, PT Physical Therapist                Therapy Education  Education Details: reviewd activity modifiations, precautions per protocol, updated HEP, reviewed anatomy of the shoulder and physiology of healing, encoruaged pt to swing arm with gait to avoid guarding  Given: HEP, Symptoms/condition management, Pain management, Posture/body mechanics, Mobility training  Program: Reinforced, New, Progressed  How Provided: Verbal, Demonstration  Provided to: Patient  Level of Understanding: Teach back education performed, Verbalized, Demonstrated              Time Calculation:   Start Time: 1316  Stop Time: 1355  Time Calculation (min): 39 min  Timed Charges  14463 - PT Therapeutic Exercise Minutes: 15  62010 - PT Manual Therapy Minutes: 24  Total Minutes  Timed Charges Total Minutes: 39   Total Minutes: 39  Therapy Charges for Today     Code Description Service Date Service Provider Modifiers Qty    27857394192  PT THER PROC EA 15 MIN 5/23/2023 Wilder White, PT GP 1    76315685423 HC PT MANUAL THERAPY EA 15 MIN 5/23/2023 Wilder White, PT GP 2                    Wilder White, PT  5/23/2023

## 2023-05-26 ENCOUNTER — READMISSION MANAGEMENT (OUTPATIENT)
Dept: CALL CENTER | Facility: HOSPITAL | Age: 53
End: 2023-05-26
Payer: MEDICAID

## 2023-05-26 ENCOUNTER — HOSPITAL ENCOUNTER (OUTPATIENT)
Dept: PHYSICAL THERAPY | Facility: HOSPITAL | Age: 53
Setting detail: THERAPIES SERIES
Discharge: HOME | End: 2023-05-26
Payer: MEDICAID

## 2023-05-26 DIAGNOSIS — M25.612 IMPAIRED RANGE OF MOTION OF LEFT SHOULDER: ICD-10-CM

## 2023-05-26 DIAGNOSIS — Z47.89 ORTHOPEDIC AFTERCARE: Primary | ICD-10-CM

## 2023-05-26 DIAGNOSIS — R29.898 IMPAIRED STRENGTH OF SHOULDER MUSCLES: ICD-10-CM

## 2023-05-26 DIAGNOSIS — Z96.612 STATUS POST REVERSE TOTAL REPLACEMENT OF LEFT SHOULDER: ICD-10-CM

## 2023-05-26 PROCEDURE — 97110 THERAPEUTIC EXERCISES: CPT

## 2023-05-26 PROCEDURE — 97140 MANUAL THERAPY 1/> REGIONS: CPT

## 2023-05-26 NOTE — THERAPY TREATMENT NOTE
Outpatient Physical Therapy Ortho Treatment Note  [facility]     Patient Name: Bekah Gibson  : 1970  MRN: [id number]  Today's Date: 2023      Visit Date: 2023    Visit Dx:    ICD-10-CM ICD-9-CM   1. Orthopedic aftercare  Z47.89 V54.9   2. Impaired range of motion of left shoulder  M25.612 719.51   3. Status post reverse total replacement of left shoulder  Z96.612 V43.61   4. Impaired strength of shoulder muscles  R29.898 729.89       Patient Active Problem List   Diagnosis   • Irritable bowel syndrome with both constipation and diarrhea   • Peripheral neuropathy   • Vitamin D deficiency   • History of HPV infection   • Hypothyroidism   • Tobacco dependence due to cigarettes   • Acute kidney injury   • Ascites   • E. coli UTI (urinary tract infection)   • Alcohol withdrawal syndrome, with delirium   • Alcoholic hepatitis   • GI bleed   • Acute post-hemorrhagic anemia   • Thrombocytopenia   • Open wound of right shoulder region   • Pancreatic insufficiency   • Alcoholic ketoacidosis   • Chronic alcohol abuse   • ESBL (extended spectrum beta-lactamase) producing bacteria infection   • Abnormal weight loss   • Hashimoto's disease   • Chronic fatigue   • History of alcohol use disorder   • Alcohol intoxication   • Lupus   • Hypomagnesemia   • Pancytopenia   • Alcoholic cirrhosis (HCC)   • Bilateral lower abdominal discomfort   • Primary hypertension   • Melena   • Arthritis of shoulder   • Esophageal varices   • Essential hypertension   • Acute on chronic pancreatitis   • Abdominal pain        Past Medical History:   Diagnosis Date   • Anxiety    • Arthritis    • Cirrhosis    • Disease of thyroid gland    • ETOH abuse     SOBER FOR 3 MONTHS   • Fracture, clavicle 2021    shattered clavicel on issue slip and fall   • GERD (gastroesophageal reflux disease)    • History of kidney stones     5 YR AGO   • Hypertension    • Left shoulder pain    • MDD (major depressive disorder)    • Pancreatitis     • Periarthritis of shoulder see above   • Rotator cuff syndrome odre for shoulder replacement    unable to receive due to low platelets   • Thrombocytopenia         Past Surgical History:   Procedure Laterality Date   • CLAVICLE SURGERY Right 2018   • ENDOSCOPY N/A 10/26/2022    Procedure: ESOPHAGOGASTRODUODENOSCOPY wtih banding;  Surgeon: Ag Patel MD;  Location: [facility] ENDOSCOPY;  Service: Gastroenterology;  Laterality: N/A;  pre: melena  post: esophageal varicies with banding x2 bands   • ENDOSCOPY N/A 01/18/2023    Procedure: ESOPHAGOGASTRODUODENOSCOPY;  Surgeon: Ag Patel MD;  Location: [facility] ENDOSCOPY;  Service: Gastroenterology;  Laterality: N/A;  PRE OP - MAROON STOOLS  POST OP - SM ESOPHAGEAL VARICES   • ESOPHAGEAL VARICES LIGATION     • JOINT REPLACEMENT Bilateral     GREAT TOE   • KIDNEY STONE SURGERY      LITHOTRIPSY AND STENT (R SIDE)   • LAPAROSCOPIC TUBAL LIGATION  2005   • SIGMOIDOSCOPY N/A 01/18/2023    Procedure: SIGMOIDOSCOPY FLEXIBLE;  Surgeon: Ag Patel MD;  Location: [facility] ENDOSCOPY;  Service: Gastroenterology;  Laterality: N/A;  PRE OP - MAROON STOOLS  POST OP - RECTAL VARICES   • TOTAL SHOULDER ARTHROPLASTY Left 5/9/2023    Procedure: reverse total shoulder arthroplasty;  Surgeon: Giovanni Denise MD;  Location: [facility] OR [facility] [place];  Service: Orthopedics;  Laterality: Left;        PT Ortho     Row Name 05/26/23 1100       Left Upper Ext    Lt Shoulder Flexion PROM 0-110 supine  Pt guarded and asked to stop due to pain  -MH (r) CS (t) MH (c)    Lt Shoulder External Rotation PROM 0-15, supine  -MH (r) CS (t) MH (c)          User Key  (r) = Recorded By, (t) = Taken By, (c) = Cosigned By    Initials Name Provider Type    MH Charity Vazquez, PT Physical Therapist    CS Christopher Saldana, PT Student PT Student                             PT Assessment/Plan     Row Name 05/26/23 1200          PT Assessment    Assessment Comments Ms. Gibson is 2 weeks and 3 days s/p L rTSA on 05/09/2023. Pt  presented today without a sling reporting MD cleared her to removed. Reviewed with pt per protocol to wear sling during longer walks. Pt presented with L elbow flexed and  internally rotated across body. Pt able to progress this visit to 0-110 L shoulder flexion with PROM, pt heavily guarded and asked to stop at this range due to increase in pain. L shoulder ER PROM measured at 0-15 this visit. Pt able to tolerate exercises this visit with mild increase in symptoms during supine chest press and supine AAROM flexion with assistance from TRISTAN LOONEY. Pt remains a good candidate for skilled PT intervention.  -MH (r) CS (t) MH (c)        PT Plan    PT Plan Comments Continue to work through PROM per protocol (P)   -CS           User Key  (r) = Recorded By, (t) = Taken By, (c) = Cosigned By    Initials Name Provider Type     Charity Vazquez, PT Physical Therapist    CS Christopher Saldana, PT Student PT Student                   OP Exercises     Row Name 05/26/23 1100             Total Minutes    78810 - PT Therapeutic Exercise Minutes 18  -MH (r) CS (t) MH (c)      66466 - PT Manual Therapy Minutes 22  -MH (r) CS (t) MH (c)         Exercise 2    Exercise Name 2 scap retract  -MH (r) CS (t) MH (c)      Cueing 2 Verbal;Demo  -MH (r) CS (t) MH (c)      Reps 2 15  -MH (r) CS (t) MH (c)      Time 2 5s  -MH (r) CS (t) MH (c)         Exercise 3    Exercise Name 3 shoulder shrugs  -MH (r) CS (t) MH (c)      Cueing 3 Verbal;Demo  -MH (r) CS (t) MH (c)      Reps 3 15  -MH (r) CS (t) MH (c)      Time 3 5s  -MH (r) CS (t) MH (c)         Exercise 4    Exercise Name 4 elbow flex/ext  -MH (r) CS (t) MH (c)      Cueing 4 Verbal;Demo  -MH (r) CS (t) MH (c)      Reps 4 15  -MH (r) CS (t) MH (c)         Exercise 5    Exercise Name 5 Table top flex walk outs  -MH (r) CS (t) MH (c)      Cueing 5 Verbal;Demo  -MH (r) CS (t) MH (c)      Reps 5 10  -MH (r) CS (t) MH (c)      Time 5 5sec hold  -MH (r) CS (t) MH (c)         Exercise 8    Exercise Name 8  supine AAROM flexion (with self)  -MH (r) CS (t) MH (c)      Cueing 8 Verbal;Demo  -MH (r) CS (t) MH (c)      Reps 8 15  -MH (r) CS (t) MH (c)      Additional Comments interlocking fingers  -MH (r) CS (t) MH (c)         Exercise 9    Exercise Name 9 supine chest press  -MH (r) CS (t) MH (c)      Cueing 9 Verbal;Demo  -MH (r) CS (t) MH (c)      Reps 9 15  -MH (r) CS (t) MH (c)            User Key  (r) = Recorded By, (t) = Taken By, (c) = Cosigned By    Initials Name Provider Type    MH Charity Vazquez, PT Physical Therapist    Christopher Kingsley, PT Student PT Student                         Manual Rx (last 36 hours)     Manual Treatments     Row Name 05/26/23 1100 05/26/23 0900          Total Minutes    65623 - PT Manual Therapy Minutes 22  -MH (r) CS (t) MH (c) --        Manual Rx 1    Manual Rx 1 Location PROM L shoulder flex/ER per protocol  -MH (r) CS (t) MH (c) --  -MH (r) CS (t) MH (c)     Manual Rx 1 Type gentle oscillations  -MH (r) CS (t) MH (c) --  -MH (r) CS (t) MH (c)     Manual Rx 1 Grade gentle STM L mid to distal biceps tissue  -MH (r) CS (t) MH (c) --  -MH (r) CS (t) MH (c)        Manual Rx 2    Manual Rx 2 Location pt in R SL,  -MH (r) CS (t) MH (c) --  -MH (r) CS (t) MH (c)     Manual Rx 2 Type rhythmic initiation of L scapular elevation/depression, protraction/retraction  -MH (r) CS (t) MH (c) --  -MH (r) CS (t) MH (c)           User Key  (r) = Recorded By, (t) = Taken By, (c) = Cosigned By    Initials Name Provider Type     Charity Vazquez, PT Physical Therapist    Christopher Kingsley, PT Student PT Student                 PT OP Goals     Row Name 05/26/23 1100          PT Short Term Goals    STG Date to Achieve 06/17/23  -MH (r) CS (t) MH (c)     STG 1 Pt. Will be independent with initial HEP to improve self-management of condition.  -MH (r) CS (t) MH (c)     STG 1 Progress Ongoing  -MH (r) CS (t) MH (c)     STG 2 Pt. Will improve PROM flexion to 140 and ER to 20 degrees to progress to next phase of  protocol.  -MH (r) CS (t) MH (c)     STG 2 Progress Ongoing  -MH (r) CS (t) MH (c)     STG 3 Pt. Will be compliant with precautions including sling usage and ROM restrictions to reduce risk of injury/dislocation.  -MH (r) CS (t) MH (c)     STG 3 Progress Ongoing  -MH (r) CS (t) MH (c)     STG 4 Pt. will return to sleeping in bed with waking </= 1x due to shoulder pain to improve sleep quality.  -MH (r) CS (t) MH (c)     STG 4 Progress Ongoing  -MH (r) CS (t) MH (c)        Long Term Goals    LTG Date to Achieve 07/17/23  -MH (r) CS (t) MH (c)     LTG 1 Pt. Will be independent with advanced HEP to improve long-term management of condition and independence.  -MH (r) CS (t) MH (c)     LTG 1 Progress Ongoing  -MH (r) CS (t) MH (c)     LTG 2 Pt. Will score </=30 on QuickDASH  (from 63.64 on initial evaluation) to indicate improved perception of disability.  -MH (r) CS (t) MH (c)     LTG 2 Progress New  -MH (r) CS (t) MH (c)     LTG 3 Pt. Will demonstrate improved AROM flexion to >/= 140, abd >/= 130, FIR >/= T12 to improve ability to complete ADLs.  -MH (r) CS (t) MH (c)     LTG 3 Progress Ongoing  -MH (r) CS (t) MH (c)     LTG 4 Pt. Will improve shoulder strength to >/= 4/5 to ease ability to perform household chores.  -MH (r) CS (t) MH (c)     LTG 4 Progress Ongoing  -MH (r) CS (t) MH (c)     LTG 5 Pt. will report ability to feed sewing machine without pain to improve ability to participate in hobbies.  -MH (r) CS (t) MH (c)     LTG 5 Progress Ongoing  -MH (r) CS (t) MH (c)           User Key  (r) = Recorded By, (t) = Taken By, (c) = Cosigned By    Initials Name Provider Type    Charity Morris, PT Physical Therapist    Christopher Kingsley, PT Student PT Student                Therapy Education  Education Details: Discussed future visits, goals, and working towards new ROM. Reviewed with pt per protocol to wear sling, pt reports MD cleared her for no sling use. Encouraged pt to wear sling with long walks.  Given: HEP,  Symptoms/condition management, Pain management, Posture/body mechanics, Mobility training  Program: Reinforced  How Provided: Verbal, Demonstration  Provided to: Patient  Level of Understanding: Teach back education performed, Verbalized, Demonstrated              Time Calculation:   Start Time: 1048  Stop Time: 1128  Time Calculation (min): 40 min  Total Timed Code Minutes- PT: 40 minute(s)  Timed Charges  21431 - PT Therapeutic Exercise Minutes: 18  29541 - PT Manual Therapy Minutes: 22  Total Minutes  Timed Charges Total Minutes: 40   Total Minutes: 40  Therapy Charges for Today     Code Description Service Date Service Provider Modifiers Qty    68152794907 HC PT THER PROC EA 15 MIN 5/26/2023 Christopher Saldana, PT Student GP 1    04076340241 HC PT MANUAL THERAPY EA 15 MIN 5/26/2023 Christopher Saldana, PT Student GP 2                    Christopher Saldana, PT Student  5/26/2023

## 2023-05-26 NOTE — OUTREACH NOTE
Total Joint Week 2 Survey    Flowsheet Row Responses   [facility] facility patient discharged from? [place]   Does the patient have one of the following disease processes/diagnoses(primary or secondary)? Total Joint Replacement   Joint surgery performed? Shoulder   Week 2 attempt successful? No   Unsuccessful attempts Attempt 1  [All numbers listed were attempted-no answer]          Savi H - Registered Nurse

## 2023-05-30 DIAGNOSIS — Z96.612 H/O TOTAL SHOULDER REPLACEMENT, LEFT: ICD-10-CM

## 2023-05-30 RX ORDER — OXYCODONE AND ACETAMINOPHEN 10; 325 MG/1; MG/1
1 TABLET ORAL EVERY 4 HOURS PRN
Qty: 50 TABLET | Refills: 0 | Status: SHIPPED | OUTPATIENT
Start: 2023-05-30

## 2023-05-30 NOTE — TELEPHONE ENCOUNTER
LT RTSA 5/9/23  Last fill: 5/19/23, qty 50  Last OV: 5/22/23  Next OV: 6/19/23  Pharmacy confirmed.

## 2023-05-30 NOTE — TELEPHONE ENCOUNTER
Caller: BA JENNINGS    Relationship: SELF    Best call back number: [phone]    Requested Prescriptions:   PERCOCET    Pharmacy where request should be sent:  [address]    Last office visit with prescribing clinician: 5/22/2023   Last telemedicine visit with prescribing clinician: Visit date not found   Next office visit with prescribing clinician: Visit date not found     Additional details provided by patient: NA    Does the patient have less than a 3 day supply:  [x] Yes  [] No    Would you like a call back once the refill request has been completed: [x] Yes [] No    If the office needs to give you a call back, can they leave a voicemail: [x] Yes [] No    Arsenio Su Rep   05/30/23 10:12 EDT

## 2023-06-13 ENCOUNTER — HOSPITAL ENCOUNTER (OUTPATIENT)
Dept: PHYSICAL THERAPY | Facility: HOSPITAL | Age: 53
Setting detail: THERAPIES SERIES
Discharge: HOME | End: 2023-06-13
Payer: MEDICAID

## 2023-06-13 DIAGNOSIS — R29.898 IMPAIRED STRENGTH OF SHOULDER MUSCLES: ICD-10-CM

## 2023-06-13 DIAGNOSIS — Z47.89 ORTHOPEDIC AFTERCARE: Primary | ICD-10-CM

## 2023-06-13 DIAGNOSIS — K86.1 CHRONIC PANCREATITIS, UNSPECIFIED PANCREATITIS TYPE: ICD-10-CM

## 2023-06-13 DIAGNOSIS — Z96.612 STATUS POST REVERSE TOTAL REPLACEMENT OF LEFT SHOULDER: ICD-10-CM

## 2023-06-13 DIAGNOSIS — M25.612 IMPAIRED RANGE OF MOTION OF LEFT SHOULDER: ICD-10-CM

## 2023-06-13 PROCEDURE — 97110 THERAPEUTIC EXERCISES: CPT

## 2023-06-13 PROCEDURE — 97140 MANUAL THERAPY 1/> REGIONS: CPT

## 2023-06-13 RX ORDER — OXYCODONE HYDROCHLORIDE 5 MG/1
5 TABLET ORAL EVERY 4 HOURS PRN
Qty: 20 TABLET | Refills: 0 | Status: SHIPPED | OUTPATIENT
Start: 2023-06-13 | End: 2023-06-19 | Stop reason: SDUPTHER

## 2023-06-13 NOTE — THERAPY TREATMENT NOTE
Outpatient Physical Therapy Ortho Treatment Note  [facility]     Patient Name: Bekah Gibson  : 1970  MRN: [id number]  Today's Date: 2023      Visit Date: 2023    Visit Dx:    ICD-10-CM ICD-9-CM   1. Orthopedic aftercare  Z47.89 V54.9   2. Impaired range of motion of left shoulder  M25.612 719.51   3. Status post reverse total replacement of left shoulder  Z96.612 V43.61   4. Impaired strength of shoulder muscles  R29.898 729.89       Patient Active Problem List   Diagnosis    Irritable bowel syndrome with both constipation and diarrhea    Peripheral neuropathy    Vitamin D deficiency    History of HPV infection    Hypothyroidism    Tobacco dependence due to cigarettes    Acute kidney injury    Ascites    E. coli UTI (urinary tract infection)    Alcohol withdrawal syndrome, with delirium    Alcoholic hepatitis    GI bleed    Acute post-hemorrhagic anemia    Thrombocytopenia    Open wound of right shoulder region    Pancreatic insufficiency    Alcoholic ketoacidosis    Chronic alcohol abuse    ESBL (extended spectrum beta-lactamase) producing bacteria infection    Abnormal weight loss    Hashimoto's disease    Chronic fatigue    History of alcohol use disorder    Alcohol intoxication    Lupus    Hypomagnesemia    Pancytopenia    Alcoholic cirrhosis    Bilateral lower abdominal discomfort    Primary hypertension    Melena    Arthritis of shoulder    Esophageal varices    Essential hypertension    Acute on chronic pancreatitis    Abdominal pain        Past Medical History:   Diagnosis Date    Anxiety     Arthritis     Cirrhosis     Disease of thyroid gland     ETOH abuse     SOBER FOR 3 MONTHS    Fracture, clavicle 2021    shattered clavicel on issue slip and fall    GERD (gastroesophageal reflux disease)     History of kidney stones     5 YR AGO    Hypertension     Left shoulder pain     MDD (major depressive disorder)     Pancreatitis     Periarthritis of shoulder see above    Rotator cuff  syndrome odre for shoulder replacement    unable to receive due to low platelets    Thrombocytopenia         Past Surgical History:   Procedure Laterality Date    CLAVICLE SURGERY Right 2018    ENDOSCOPY N/A 10/26/2022    Procedure: ESOPHAGOGASTRODUODENOSCOPY wtih banding;  Surgeon: Ag Patel MD;  Location: [facility] ENDOSCOPY;  Service: Gastroenterology;  Laterality: N/A;  pre: melena  post: esophageal varicies with banding x2 bands    ENDOSCOPY N/A 01/18/2023    Procedure: ESOPHAGOGASTRODUODENOSCOPY;  Surgeon: Ag Patel MD;  Location: [facility] ENDOSCOPY;  Service: Gastroenterology;  Laterality: N/A;  PRE OP - MAROON STOOLS  POST OP - SM ESOPHAGEAL VARICES    ESOPHAGEAL VARICES LIGATION      JOINT REPLACEMENT Bilateral     GREAT TOE    KIDNEY STONE SURGERY      LITHOTRIPSY AND STENT (R SIDE)    LAPAROSCOPIC TUBAL LIGATION  2005    SIGMOIDOSCOPY N/A 01/18/2023    Procedure: SIGMOIDOSCOPY FLEXIBLE;  Surgeon: Ag Patel MD;  Location: [facility] ENDOSCOPY;  Service: Gastroenterology;  Laterality: N/A;  PRE OP - MAROON STOOLS  POST OP - RECTAL VARICES    TOTAL SHOULDER ARTHROPLASTY Left 5/9/2023    Procedure: reverse total shoulder arthroplasty;  Surgeon: Giovanni Denise MD;  [place] [facility] [place]  Service: Orthopedics;  Laterality: Left;                        PT Assessment/Plan       Row Name 06/13/23 1700          PT Assessment    Assessment Comments Ms. Gibson returns to PT after not being seen since 5/26/23 (2.5 weeks) due to patient cancellation. She continues to report ongoing L shoulder pain, although slowly improving. Progressed to AAROM per post operative protocol with addition of shoulder flexion pulleys, wall slides (R over L) and SL scapular clock with good tolerance. Patient remains guarded at end range and benefits from gentle oscillations and cues to focus on relaxing breathing techniques. Added seated UT stretch due to patient reporting ongoing muscle tension and presenting with muscle  tension and noted trigger points. HEP not updated to determine patient response. Ms. Gibson remains a candidate for skilled PT.  -KENY        PT Plan    PT Plan Comments update HEP with LADAN consider deltoid iso  -KENY               User Key  (r) = Recorded By, (t) = Taken By, (c) = Cosigned By      Initials Name Provider Type    Gloria Calixto, PT Physical Therapist                       OP Exercises       Row Name 06/13/23 1600             Subjective Comments    Subjective Comments I am doing okay. I still have pain but it is getting better  -KENY         Total Minutes    82744 - PT Therapeutic Exercise Minutes 25  -KENY      51289 - PT Manual Therapy Minutes 15  -KENY         Exercise 1    Exercise Name 1 pulleys  -KENY      Cueing 1 Verbal;Demo  -KENY      Time 1 3 mins  -KENY      Additional Comments flexion only  -KENY         Exercise 2    Exercise Name 2 scap retract  -KENY      Cueing 2 Verbal;Demo  -KENY      Reps 2 15  -KENY      Time 2 5s  -KENY         Exercise 3    Exercise Name 3 shoulder shrugs  -KENY      Cueing 3 Verbal;Demo  -KENY      Reps 3 15  -KENY      Time 3 5s  -KENY         Exercise 5    Exercise Name 5 Table top flex walk outs  -KENY      Cueing 5 Verbal;Demo  -KENY      Reps 5 10  -KENY      Time 5 5sec hold  -KENY         Exercise 6    Exercise Name 6 posterior shoulder rolls  -KENY      Cueing 6 Verbal;Demo  -KENY      Reps 6 20  -KENY         Exercise 7    Exercise Name 7 wall slides  -KENY      Cueing 7 Verbal;Demo  -KENY      Sets 7 1  -KENY      Reps 7 10  -KENY      Time 7 R over L and AAROM  -KENY         Exercise 8    Exercise Name 8 supine AAROM flexion (with self)  -KENY      Cueing 8 Verbal;Demo  -KENY      Reps 8 15  -KENY      Time 8 progress to [facility] next visit?  -KENY      Additional Comments interlocking fingers  -KENY         Exercise 9    Exercise Name 9 supine chest press  -KENY      Cueing 9 Verbal;Demo  -KENY      Reps 9 15  -KENY         Exercise 10    Exercise Name 10 SL scapular clock  -KENY      Cueing 10 Verbal;Demo  -KENY       Sets 10 1  -KENY      Reps 10 10e  -KENY      Time 10 12-6 and 3-9  -KENY         Exercise 11    Exercise Name 11 supine AAROM shoulder ER  -KENY      Cueing 11 Verbal;Tactile  -KENY      Sets 11 1  -KENY      Reps 11 15  -KENY      Time 11 5s  -KENY         Exercise 12    Exercise Name 12 seated UT stretch  -KENY      Cueing 12 Verbal;Demo  -KENY      Reps 12 3  -KENY      Time 12 20s  -KENY      Additional Comments R only  -KENY                User Key  (r) = Recorded By, (t) = Taken By, (c) = Cosigned By      Initials Name Provider Type    Gloria Calixto, PT Physical Therapist                             Manual Rx (last 36 hours)       Manual Treatments       Row Name 06/13/23 1600             Total Minutes    80790 - PT Manual Therapy Minutes 15  -KENY         Manual Rx 1    Manual Rx 1 Location PROM L shoulder flex/ER per protocol  -KENY      Manual Rx 1 Type gentle oscillations  -KENY      Manual Rx 1 Grade gentle STM L mid to distal biceps tissue  -KENY                User Key  (r) = Recorded By, (t) = Taken By, (c) = Cosigned By      Initials Name Provider Type    Gloria Calixto, PT Physical Therapist                     PT OP Goals       Row Name 06/13/23 1700          PT Short Term Goals    STG Date to Achieve 06/17/23  -KENY     STG 1 Pt. Will be independent with initial HEP to improve self-management of condition.  -     STG 1 Progress Ongoing  -     STG 2 Pt. Will improve PROM flexion to 140 and ER to 20 degrees to progress to next phase of protocol.  -     STG 2 Progress Ongoing  -     STG 3 Pt. Will be compliant with precautions including sling usage and ROM restrictions to reduce risk of injury/dislocation.  -     STG 3 Progress Ongoing  -     STG 4 Pt. will return to sleeping in bed with waking </= 1x due to shoulder pain to improve sleep quality.  -     STG 4 Progress Ongoing  -        Long Term Goals    LTG Date to Achieve 07/17/23  -     LTG 1 Pt. Will be independent with advanced HEP  to improve long-term management of condition and independence.  -KENY     LTG 1 Progress Ongoing  -KENY     LTG 2 Pt. Will score </=30 on QuickDASH  (from 63.64 on initial evaluation) to indicate improved perception of disability.  -KENY     LTG 2 Progress Ongoing  -KENY     LTG 3 Pt. Will demonstrate improved AROM flexion to >/= 140, abd >/= 130, FIR >/= T12 to improve ability to complete ADLs.  -KENY     LTG 3 Progress Ongoing  -KENY     LTG 4 Pt. Will improve shoulder strength to >/= 4/5 to ease ability to perform household chores.  -KENY     LTG 4 Progress Ongoing  -KENY     LTG 5 Pt. will report ability to feed sewing machine without pain to improve ability to participate in hobbies.  -     LTG 5 Progress Ongoing  -               User Key  (r) = Recorded By, (t) = Taken By, (c) = Cosigned By      Initials Name Provider Type    Gloria Calixto, PT Physical Therapist                    Therapy Education  Given: HEP, Symptoms/condition management, Pain management, Posture/body mechanics  Program: Reinforced  How Provided: Verbal, Demonstration  Provided to: Patient  Level of Understanding: Verbalized, Demonstrated              Time Calculation:   Start Time: 1650  Stop Time: 1730  Time Calculation (min): 40 min  Timed Charges  78549 - PT Therapeutic Exercise Minutes: 25  75944 - PT Manual Therapy Minutes: 15  Total Minutes  Timed Charges Total Minutes: 40   Total Minutes: 40  Therapy Charges for Today       Code Description Service Date Service Provider Modifiers Qty    64193142993  PT THER PROC EA 15 MIN 6/13/2023 Gloria Donaldson, PT GP 2    [id number]  PT MANUAL THERAPY EA 15 MIN 6/13/2023 Gloria Donaldson PT GP 1                      Gloria Donaldson, PT  6/13/2023

## 2023-06-15 ENCOUNTER — HOSPITAL ENCOUNTER (OUTPATIENT)
Dept: PHYSICAL THERAPY | Facility: HOSPITAL | Age: 53
Setting detail: THERAPIES SERIES
Discharge: HOME | End: 2023-06-15
Payer: MEDICAID

## 2023-06-15 DIAGNOSIS — R29.898 IMPAIRED STRENGTH OF SHOULDER MUSCLES: ICD-10-CM

## 2023-06-15 DIAGNOSIS — Z96.612 STATUS POST REVERSE TOTAL REPLACEMENT OF LEFT SHOULDER: ICD-10-CM

## 2023-06-15 DIAGNOSIS — M25.612 IMPAIRED RANGE OF MOTION OF LEFT SHOULDER: ICD-10-CM

## 2023-06-15 DIAGNOSIS — Z47.89 ORTHOPEDIC AFTERCARE: Primary | ICD-10-CM

## 2023-06-15 PROCEDURE — 97140 MANUAL THERAPY 1/> REGIONS: CPT

## 2023-06-15 PROCEDURE — 97110 THERAPEUTIC EXERCISES: CPT

## 2023-06-15 NOTE — THERAPY PROGRESS REPORT/RE-CERT
Outpatient Physical Therapy Ortho Progress Note  [facility]     Patient Name: Bekah Gibson  : 1970  MRN: [id number]  Today's Date: 6/15/2023      Visit Date: 06/15/2023    Visit Dx:    ICD-10-CM ICD-9-CM   1. Orthopedic aftercare  Z47.89 V54.9   2. Impaired range of motion of left shoulder  M25.612 719.51   3. Impaired strength of shoulder muscles  R29.898 729.89   4. Status post reverse total replacement of left shoulder  Z96.612 V43.61       Patient Active Problem List   Diagnosis    Irritable bowel syndrome with both constipation and diarrhea    Peripheral neuropathy    Vitamin D deficiency    History of HPV infection    Hypothyroidism    Tobacco dependence due to cigarettes    Acute kidney injury    Ascites    E. coli UTI (urinary tract infection)    Alcohol withdrawal syndrome, with delirium    Alcoholic hepatitis    GI bleed    Acute post-hemorrhagic anemia    Thrombocytopenia    Open wound of right shoulder region    Pancreatic insufficiency    Alcoholic ketoacidosis    Chronic alcohol abuse    ESBL (extended spectrum beta-lactamase) producing bacteria infection    Abnormal weight loss    Hashimoto's disease    Chronic fatigue    History of alcohol use disorder    Alcohol intoxication    Lupus    Hypomagnesemia    Pancytopenia    Alcoholic cirrhosis    Bilateral lower abdominal discomfort    Primary hypertension    Melena    Arthritis of shoulder    Esophageal varices    Essential hypertension    Acute on chronic pancreatitis    Abdominal pain        Past Medical History:   Diagnosis Date    Anxiety     Arthritis     Cirrhosis     Disease of thyroid gland     ETOH abuse     SOBER FOR 3 MONTHS    Fracture, clavicle 2021    shattered clavicel on issue slip and fall    GERD (gastroesophageal reflux disease)     History of kidney stones     5 YR AGO    Hypertension     Left shoulder pain     MDD (major depressive disorder)     Pancreatitis     Periarthritis of shoulder see above    Rotator cuff  syndrome odre for shoulder replacement    unable to receive due to low platelets    Thrombocytopenia         Past Surgical History:   Procedure Laterality Date    CLAVICLE SURGERY Right 2018    ENDOSCOPY N/A 10/26/2022    Procedure: ESOPHAGOGASTRODUODENOSCOPY wtih banding;  Surgeon: Ag Patel MD;  Location: [facility] ENDOSCOPY;  Service: Gastroenterology;  Laterality: N/A;  pre: melena  post: esophageal varicies with banding x2 bands    ENDOSCOPY N/A 01/18/2023    Procedure: ESOPHAGOGASTRODUODENOSCOPY;  Surgeon: Ag Patel MD;  Location: [facility] ENDOSCOPY;  Service: Gastroenterology;  Laterality: N/A;  PRE OP - MAROON STOOLS  POST OP - SM ESOPHAGEAL VARICES    ESOPHAGEAL VARICES LIGATION      JOINT REPLACEMENT Bilateral     GREAT TOE    KIDNEY STONE SURGERY      LITHOTRIPSY AND STENT (R SIDE)    LAPAROSCOPIC TUBAL LIGATION  2005    SIGMOIDOSCOPY N/A 01/18/2023    Procedure: SIGMOIDOSCOPY FLEXIBLE;  Surgeon: Ag Patel MD;  Location: [facility] ENDOSCOPY;  Service: Gastroenterology;  Laterality: N/A;  PRE OP - MAROON STOOLS  POST OP - RECTAL VARICES    TOTAL SHOULDER ARTHROPLASTY Left 5/9/2023    Procedure: reverse total shoulder arthroplasty;  Surgeon: Giovanni Denise MD;  [place] [facility] [place] [facility] [place]  Service: Orthopedics;  Laterality: Left;        PT Ortho       Row Name 06/15/23 1700       Left Upper Ext    Lt Shoulder Flexion PROM 0-122  -              User Key  (r) = Recorded By, (t) = Taken By, (c) = Cosigned By      Initials Name Provider Type    Charity Morris, PT Physical Therapist                                 PT Assessment/Plan       Row Name 06/15/23 1600          PT Assessment    Functional Limitations Performance in self-care ADL;Limitation in home management;Performance in leisure activities;Performance in work activities;Performance in sport activities;Limitations in functional capacity and performance  -     Impairments Range of motion;Posture;Poor body mechanics;Pain;Muscle  strength;Joint mobility  -     Assessment Comments Ms. Gibson has been seen for 5 skilled PT sessions, she is now ~5.5 weeks s/p L rTSA 5/9/2023, she is out of sling and progressing with P/AAROM. Treatment has consisted of manual therapy, therapeutic exericse and home exercise program and education. She has met 2/4 STGs and 0/5 LTGs. Remains limited into PROM flexion and ER (see ortho for measurements) and guarded through passive range. She continues to have difficulty sleeping due to pain but reports compliance with HEP. Cueing with AAROM ER to avoid elbow extension and with wall flexion to reduce shoulder hike. She will benefit from continued skilled PT.  -     Please refer to paper survey for additional self-reported information No  -MH     Rehab Potential Fair  -     Patient/caregiver participated in establishment of treatment plan and goals Yes  -     Patient would benefit from skilled therapy intervention Yes  -        PT Plan    PT Frequency 2x/week  -     Predicted Duration of Therapy Intervention (PT) 10 visits  -     Planned CPT's? PT RE-EVAL: 35526;PT THER PROC EA 15 MIN: 91228;PT MANUAL THERAPY EA 15 MIN: 82620;PT THER ACT EA 15 MIN: 79037;PT NEUROMUSC RE-EDUCATION EA 15 MIN: 72618;PT SELF CARE/HOME MGMT/TRAIN EA 15: 44033;PT HOT OR COLD PACK TREAT MCARE  -     PT Plan Comments deltoid iso?  -               User Key  (r) = Recorded By, (t) = Taken By, (c) = Cosigned By      Initials Name Provider Type     Charity Vazquez, PT Physical Therapist                       OP Exercises       Row Name 06/15/23 1600             Subjective Comments    Subjective Comments I feel like I have a little more motion in my shoulder  -         Total Minutes    45768 - PT Therapeutic Exercise Minutes 25  -      64431 - PT Manual Therapy Minutes 15  -         Exercise 1    Exercise Name 1 UBE AAROM/leatha  -      Cueing 1 Verbal;Demo  -      Time 1 3 min/3 min  -         Exercise 5     Exercise Name 5 counter walk out  -MH      Cueing 5 Verbal;Demo  -MH      Reps 5 10  -MH      Time 5 10sec  -MH         Exercise 7    Exercise Name 7 wall slides  -MH      Cueing 7 Verbal;Demo  -MH      Sets 7 1  -MH      Reps 7 10  -MH      Time 7 R over L and AAROM  -MH         Exercise 8    Exercise Name 8 supine AAROM flexion  -MH      Cueing 8 Verbal;Demo  -MH      Reps 8 15  -MH      Time 8 with dowel  -MH         Exercise 9    Exercise Name 9 supine chest press  -MH      Cueing 9 Verbal;Demo  -MH      Reps 9 15  -MH         Exercise 10    Exercise Name 10 SL scapular clock  -MH      Cueing 10 Verbal;Demo  -MH      Sets 10 1  -MH      Reps 10 15  -MH      Time 10 12-6 and 3-9  -MH         Exercise 11    Exercise Name 11 supine AAROM shoulder ER  -MH      Cueing 11 Verbal;Tactile  -MH      Reps 11 15  -MH      Time 11 5s  -MH                User Key  (r) = Recorded By, (t) = Taken By, (c) = Cosigned By      Initials Name Provider Type     Charity Vazquez, PT Physical Therapist                             Manual Rx (last 36 hours)       Manual Treatments       Row Name 06/15/23 1600             Total Minutes    17871 - PT Manual Therapy Minutes 15  -MH         Manual Rx 1    Manual Rx 1 Location PROM L shoulder flex/ER per protocol  -      Manual Rx 1 Type gentle oscillations  -      Manual Rx 1 Grade gentle STM L mid to distal biceps tissue  -                User Key  (r) = Recorded By, (t) = Taken By, (c) = Cosigned By      Initials Name Provider Type     Charity Vazquez, PT Physical Therapist                     PT OP Goals       Row Name 06/15/23 1600          PT Short Term Goals    STG Date to Achieve 06/17/23  -     STG 1 Pt. Will be independent with initial HEP to improve self-management of condition.  -     STG 1 Progress Met  -     STG 1 Progress Comments repotts compliance  -     STG 2 Pt. Will improve PROM flexion to 140 and ER to 20 degrees to progress to next phase of protocol.   -     STG 2 Progress Ongoing  -     STG 2 Progress Comments see ortho  -     STG 3 Pt. Will be compliant with precautions including sling usage and ROM restrictions to reduce risk of injury/dislocation.  -     STG 3 Progress Met  -     STG 3 Progress Comments no longer wearing sling, MD cleared at last appointment  -     STG 4 Pt. will return to sleeping in bed with waking </= 1x due to shoulder pain to improve sleep quality.  -     STG 4 Progress Ongoing  -     STG 4 Progress Comments still waking with pain  -        Long Term Goals    LTG Date to Achieve 07/17/23  -     LTG 1 Pt. Will be independent with advanced HEP to improve long-term management of condition and independence.  -     LTG 1 Progress Ongoing  -     LTG 1 Progress Comments updating as able to protocol  -     LTG 2 Pt. Will score </=30 on QuickDASH  (from 63.64 on initial evaluation) to indicate improved perception of disability.  -     LTG 2 Progress Ongoing  -     LTG 3 Pt. Will demonstrate improved AROM flexion to >/= 140, abd >/= 130, FIR >/= T12 to improve ability to complete ADLs.  -     LTG 3 Progress Ongoing  -     LTG 4 Pt. Will improve shoulder strength to >/= 4/5 to ease ability to perform household chores.  -     LTG 4 Progress Ongoing  -     LTG 5 Pt. will report ability to feed sewing machine without pain to improve ability to participate in hobbies.  -     LTG 5 Progress Ongoing  -               User Key  (r) = Recorded By, (t) = Taken By, (c) = Cosigned By      Initials Name Provider Type    Charity Morris, PT Physical Therapist                    Therapy Education  Given: Symptoms/condition management, Posture/body mechanics  Program: Reinforced  How Provided: Verbal, Demonstration  Provided to: Patient  Level of Understanding: Verbalized, Demonstrated              Time Calculation:   Start Time: 1637  Stop Time: 1717  Time Calculation (min): 40 min  Total Timed Code Minutes- PT: 40  minute(s)  Timed Charges  34209 - PT Therapeutic Exercise Minutes: 25  31546 - PT Manual Therapy Minutes: 15  Total Minutes  Timed Charges Total Minutes: 40   Total Minutes: 40  Therapy Charges for Today       Code Description Service Date Service Provider Modifiers Qty    51230018666 HC PT MANUAL THERAPY EA 15 MIN 6/15/2023 Charity Vazquez, PT GP 1    [id number] HC PT THER PROC EA 15 MIN 6/15/2023 Charity Vazquez, PT GP 2                      Charity Vazquez PT  6/15/2023

## 2023-06-19 ENCOUNTER — OFFICE VISIT (OUTPATIENT)
Dept: ORTHOPEDIC SURGERY | Facility: CLINIC | Age: 53
End: 2023-06-19
Payer: MEDICAID

## 2023-06-19 VITALS — HEIGHT: 65 IN | TEMPERATURE: 97.7 F | BODY MASS INDEX: 23.21 KG/M2 | WEIGHT: 139.3 LBS

## 2023-06-19 DIAGNOSIS — Z09 SURGERY FOLLOW-UP: Primary | ICD-10-CM

## 2023-06-19 DIAGNOSIS — K86.1 CHRONIC PANCREATITIS, UNSPECIFIED PANCREATITIS TYPE: ICD-10-CM

## 2023-06-19 RX ORDER — OXYCODONE HYDROCHLORIDE 5 MG/1
5 TABLET ORAL EVERY 4 HOURS PRN
Qty: 20 TABLET | Refills: 0 | Status: SHIPPED | OUTPATIENT
Start: 2023-06-19

## 2023-06-19 NOTE — PROGRESS NOTES
"Bekah Gibson : 1970 MRN: [id number] DATE: 2023    DIAGNOSIS: 6 week follow up left shoulder arthroplasty      SUBJECTIVE:  Patient returns today for 6 week follow up of left shoulder replacement. Reports physical therapy is going well.  She is pleased with her progress and happy she can fashion her hair in a ponytail.  She is requesting a refill of oxycodone today.  Denies any new issues or concerns.    OBJECTIVE:    Temp 97.7 °F (36.5 °C) (Temporal)   Ht 165.1 cm (65\")   Wt 63.2 kg (139 lb 4.8 oz)   LMP 2013 Comment: NATANAEL  BMI 23.18 kg/m²     Exam: The incision is well healed. No erythema or drainage. Shoulder moves fluidly with pendulums.  Motion is on track per protocol.  The arm is soft and nontender.  Good motor and sensory function.  Palpable distal pulses.     DIAGNOSTIC STUDIES    Xrays: AP and scapular Y views of the left shoulder are ordered and reviewed for evaluation of shoulder replacement.  They demonstrate a well positioned, well aligned replacement without complicating factors noted.  In comparison with previous films there has been no change.    ASSESSMENT: 6 week follow up left shoulder replacement    PLAN:   1.  Continue PT per protocol.  2.  Discontinue sling and begin working on progressing ROM as tolerated.  3.  Counseled patient about appropriate activity modifications and restrictions.  Released to drive at this point.  4.  I will ask Dr. Denise to refill her Oxycodone.   5.  We discussed the need for prophylactic antibiotics prior to dental appointments.  6.  Follow up with Dr. Denise in 6 weeks.      Kelsey Piedra, APRN    2023   "

## 2023-07-25 ENCOUNTER — HOSPITAL ENCOUNTER (OUTPATIENT)
Dept: PHYSICAL THERAPY | Facility: HOSPITAL | Age: 53
Setting detail: THERAPIES SERIES
Discharge: HOME | End: 2023-07-25
Payer: MEDICAID

## 2023-07-25 DIAGNOSIS — R29.898 IMPAIRED STRENGTH OF SHOULDER MUSCLES: ICD-10-CM

## 2023-07-25 DIAGNOSIS — M25.612 IMPAIRED RANGE OF MOTION OF LEFT SHOULDER: Primary | ICD-10-CM

## 2023-07-25 DIAGNOSIS — Z96.612 STATUS POST REVERSE TOTAL REPLACEMENT OF LEFT SHOULDER: ICD-10-CM

## 2023-07-25 DIAGNOSIS — Z47.89 ORTHOPEDIC AFTERCARE: ICD-10-CM

## 2023-07-25 PROCEDURE — 97110 THERAPEUTIC EXERCISES: CPT

## 2023-07-25 PROCEDURE — 97140 MANUAL THERAPY 1/> REGIONS: CPT

## 2023-07-25 NOTE — THERAPY TREATMENT NOTE
Outpatient Physical Therapy Ortho Treatment Note  [facility]     Patient Name: Bekah Gibson  : 1970  MRN: [id number]  Today's Date: 2023      Visit Date: 2023    Visit Dx:    ICD-10-CM ICD-9-CM   1. Impaired range of motion of left shoulder  M25.612 719.51   2. Orthopedic aftercare  Z47.89 V54.9   3. Impaired strength of shoulder muscles  R29.898 729.89   4. Status post reverse total replacement of left shoulder  Z96.612 V43.61       Patient Active Problem List   Diagnosis    Irritable bowel syndrome with both constipation and diarrhea    Peripheral neuropathy    Vitamin D deficiency    History of HPV infection    Hypothyroidism    Tobacco dependence due to cigarettes    Acute kidney injury    Ascites    E. coli UTI (urinary tract infection)    Alcohol withdrawal syndrome, with delirium    Alcoholic hepatitis    GI bleed    Acute post-hemorrhagic anemia    Thrombocytopenia    Open wound of right shoulder region    Pancreatic insufficiency    Alcoholic ketoacidosis    Chronic alcohol abuse    ESBL (extended spectrum beta-lactamase) producing bacteria infection    Abnormal weight loss    Hashimoto's disease    Chronic fatigue    History of alcohol use disorder    Alcohol intoxication    Lupus    Hypomagnesemia    Pancytopenia    Alcoholic cirrhosis    Bilateral lower abdominal discomfort    Primary hypertension    Melena    Arthritis of shoulder    Esophageal varices    Essential hypertension    Acute on chronic pancreatitis    Abdominal pain        Past Medical History:   Diagnosis Date    Anxiety     Arthritis     Cirrhosis     Disease of thyroid gland     ETOH abuse     SOBER FOR 3 MONTHS    Fracture, clavicle 2021    shattered clavicel on issue slip and fall    GERD (gastroesophageal reflux disease)     History of kidney stones     5 YR AGO    Hypertension     Left shoulder pain     MDD (major depressive disorder)     Pancreatitis     Periarthritis of shoulder see above    Rotator cuff  syndrome odre for shoulder replacement    unable to receive due to low platelets    Thrombocytopenia         Past Surgical History:   Procedure Laterality Date    CLAVICLE SURGERY Right 2018    ENDOSCOPY N/A 10/26/2022    Procedure: ESOPHAGOGASTRODUODENOSCOPY wtih banding;  Surgeon: Ag Patel MD;  Location: [facility] ENDOSCOPY;  Service: Gastroenterology;  Laterality: N/A;  pre: melena  post: esophageal varicies with banding x2 bands    ENDOSCOPY N/A 01/18/2023    Procedure: ESOPHAGOGASTRODUODENOSCOPY;  Surgeon: Ag Patel MD;  Location: [facility] ENDOSCOPY;  Service: Gastroenterology;  Laterality: N/A;  PRE OP - MAROON STOOLS  POST OP - SM ESOPHAGEAL VARICES    ESOPHAGEAL VARICES LIGATION      JOINT REPLACEMENT Bilateral     GREAT TOE    KIDNEY STONE SURGERY      LITHOTRIPSY AND STENT (R SIDE)    LAPAROSCOPIC TUBAL LIGATION  2005    SIGMOIDOSCOPY N/A 01/18/2023    Procedure: SIGMOIDOSCOPY FLEXIBLE;  Surgeon: Ag Patel MD;  Location: [facility] ENDOSCOPY;  Service: Gastroenterology;  Laterality: N/A;  PRE OP - MAROON STOOLS  POST OP - RECTAL VARICES    TOTAL SHOULDER ARTHROPLASTY Left 5/9/2023    Procedure: reverse total shoulder arthroplasty;  Surgeon: Giovanni Denise MD;  [place] [facility] [place] [facility] [place]  Service: Orthopedics;  Laterality: Left;                        PT Assessment/Plan       Row Name 07/25/23 1600          PT Assessment    Assessment Comments Ms. Gibson arrives to PT reporting minimal HEP compliance over the last several days due to  being in hospital. Therefore, she arrives with slight increase in pain and inc L shoulder stiffness. Inc time spent on on manual therapy and STM to L lat/subscap. Added gentle RS with shoulder flexion and IR/ER. Continued with current exercise routine with no new interventions added. Updated HEP, reviewed changes with patient and provided handout. Bekah Gibson remains a candidate for skilled PT.  -KENY        PT Plan    PT Plan Comments response to last session,  consider IR, gentle active ER?  -KENY               User Key  (r) = Recorded By, (t) = Taken By, (c) = Cosigned By      Initials Name Provider Type    Gloria Calixto, PT Physical Therapist                       OP Exercises       Row Name 07/25/23 1600 07/25/23 1500          Subjective Comments    Subjective Comments -- I have been in ED with my  and on lack of sleep0. shoulder feels tight  -KENY        Total Minutes    29239 - PT Therapeutic Exercise Minutes -- 32  -KENY     61557 - PT Manual Therapy Minutes -- 15  -KENY        Exercise 1    Exercise Name 1 -- UBE AAROM/pulleys  -KENY     Cueing 1 -- Verbal;Demo  -KENY     Time 1 -- 4 min/3 min  -KENY        Exercise 7    Exercise Name 7 --  -KENY wall slides  -KENY     Cueing 7 --  -KENY Verbal;Demo  -KENY     Sets 7 --  -KENY 1  -KENY     Reps 7 --  -KENY 15  -KENY     Time 7 --  -KENY tactile cues at scap  -KENY        Exercise 8    Exercise Name 8 --  -KENY supine AAROM flexion  -KENY     Cueing 8 --  -KENY Verbal;Demo  -KENY     Sets 8 --  -KENY 1  -KENY     Reps 8 --  -KENY 15  -KENY     Time 8 --  -KENY with dowel  -KENY     Additional Comments --  -KENY 1#  -KENY        Exercise 11    Exercise Name 11 --  -KENY supine AAROM shoulder ER  -KENY     Cueing 11 --  -KENY Verbal;Tactile  -KENY     Sets 11 --  -KENY 1  -KENY     Reps 11 --  -KENY 15  -KENY     Time 11 --  -KENY 5s  -KENY        Exercise 12    Exercise Name 12 --  -KENY supine SAP  -KENY     Cueing 12 --  -KENY Verbal;Tactile  -KENY     Reps 12 --  -KENY 1  -KENY     Time 12 --  -KENY 15  -KENY        Exercise 13    Exercise Name 13 --  -KENY supine ABC  -KENY     Cueing 13 --  -KENY Verbal;Tactile  -KENY     Sets 13 --  -KENY 1  -KENY     Reps 13 --  -KENY A-Z  -KENY        Exercise 14    Exercise Name 14 --  -KENY standing Isometric-Deltoid step outs  -KENY     Cueing 14 --  -KENY Verbal;Tactile;Demo  -KENY     Reps 14 --  -KENY 10  -KENY     Time 14 --  -KENY 3s  -KENY     Additional Comments --  -KENY YTB  -KENY        Exercise 15    Exercise Name 15 --  -KENY ER/IR isometric step outs  -KENY     Cueing 15  --  -KENY Verbal;Demo  -KENY     Reps 15 --  -KENY 10ea  -KENY     Time 15 --  -KENY 5s  -KENY     Additional Comments --  -KENY YTB  -KENY        Exercise 16    Exercise Name 16 --  -KENY shoulder row  -KENY     Cueing 16 --  -KENY Verbal;Demo  -KENY     Sets 16 --  -KENY 2  -KEYN     Reps 16 --  -KENY 10  -KENY     Time 16 --  -KENY YTB  -KENY        Exercise 17    Exercise Name 17 --  -KENY S/L flexion  -KENY     Cueing 17 --  -KENY Verbal;Tactile  -KENY     Reps 17 --  -KENY 10  -KENY        Exercise 18    Exercise Name 18 --  -KENY shoulder ext  -KENY     Cueing 18 --  -KENY Verbal;Demo  -KENY     Sets 18 --  -KENY 1  -KENY     Reps 18 --  -KENY 10  -KENY     Time 18 --  -KENY YTB  -KENY               User Key  (r) = Recorded By, (t) = Taken By, (c) = Cosigned By      Initials Name Provider Type    Gloria Calixto, PT Physical Therapist                             Manual Rx (last 36 hours)       Manual Treatments       Row Name 07/25/23 1500             Total Minutes    29937 - PT Manual Therapy Minutes 15  -KENY         Manual Rx 1    Manual Rx 1 Location PROM L shoulder flex/ER per protocol  -      Manual Rx 1 Type gentle oscillations  -KENY      Manual Rx 1 Grade gentle STM L mid to distal biceps tissue, pectoralis - lat/sub scap  -         Manual Rx 2    Manual Rx 2 Location RS shoulder flexion at 90 and IR/ER in neutral  -KENY                User Key  (r) = Recorded By, (t) = Taken By, (c) = Cosigned By      Initials Name Provider Type    Gloria Calixto PT Physical Therapist                     PT OP Goals       Row Name 07/25/23 1500          PT Short Term Goals    STG Date to Achieve 06/17/23  -     STG 1 Pt. Will be independent with initial HEP to improve self-management of condition.  -     STG 1 Progress Met  -     STG 2 Pt. Will improve PROM flexion to 140 and ER to 20 degrees to progress to next phase of protocol.  -     STG 2 Progress Met  -     STG 3 Pt. Will be compliant with precautions including sling usage and ROM restrictions  to reduce risk of injury/dislocation.  -     STG 3 Progress Met  -     STG 4 Pt. will return to sleeping in bed with waking </= 1x due to shoulder pain to improve sleep quality.  -     STG 4 Progress Met  -        Long Term Goals    LTG Date to Achieve 07/17/23  -     LTG 1 Pt. Will be independent with advanced HEP to improve long-term management of condition and independence.  -     LTG 1 Progress Ongoing  -     LTG 2 Pt. Will score </=30 on QuickDASH  (from 63.64 on initial evaluation) to indicate improved perception of disability.  -KENY     LTG 2 Progress Ongoing;Progressing  -     LTG 3 Pt. Will demonstrate improved AROM flexion to >/= 140, abd >/= 130, FIR >/= T12 to improve ability to complete ADLs.  -     LTG 3 Progress Ongoing  -KENY     LTG 4 Pt. Will improve shoulder strength to >/= 4/5 to ease ability to perform household chores.  -     LTG 4 Progress Ongoing  -     LTG 5 Pt. will report ability to feed sewing machine without pain to improve ability to participate in hobbies.  -     LTG 5 Progress Ongoing  -               User Key  (r) = Recorded By, (t) = Taken By, (c) = Cosigned By      Initials Name Provider Type    Gloria Calixto, PT Physical Therapist                    Therapy Education  Education Details: updated HEP  Given: HEP, Symptoms/condition management, Pain management, Posture/body mechanics  Program: Reinforced, Progressed  How Provided: Verbal, Demonstration, Written  Provided to: Patient  Level of Understanding: Verbalized, Demonstrated              Time Calculation:   Start Time: 1530  Stop Time: 1617  Time Calculation (min): 47 min  Timed Charges  22575 - PT Therapeutic Exercise Minutes: 32  80221 - PT Manual Therapy Minutes: 15  Total Minutes  Timed Charges Total Minutes: 47   Total Minutes: 47  Therapy Charges for Today       Code Description Service Date Service Provider Modifiers Qty    72790138599 HC PT THER PROC EA 15 MIN 7/25/2023 Anika  Gloria Lmia, PT GP 2    [id number]  PT MANUAL THERAPY EA 15 MIN 7/25/2023 Gloria Donaldson, PT GP 1                      Gloria Donaldson, PT  7/25/2023

## 2023-07-27 ENCOUNTER — HOSPITAL ENCOUNTER (OUTPATIENT)
Dept: PHYSICAL THERAPY | Facility: HOSPITAL | Age: 53
Setting detail: THERAPIES SERIES
Discharge: HOME | End: 2023-07-27
Payer: MEDICAID

## 2023-07-27 DIAGNOSIS — M25.612 IMPAIRED RANGE OF MOTION OF LEFT SHOULDER: Primary | ICD-10-CM

## 2023-07-27 DIAGNOSIS — Z96.612 STATUS POST REVERSE TOTAL REPLACEMENT OF LEFT SHOULDER: ICD-10-CM

## 2023-07-27 DIAGNOSIS — R29.898 IMPAIRED STRENGTH OF SHOULDER MUSCLES: ICD-10-CM

## 2023-07-27 DIAGNOSIS — Z47.89 ORTHOPEDIC AFTERCARE: ICD-10-CM

## 2023-07-27 PROCEDURE — 97110 THERAPEUTIC EXERCISES: CPT

## 2023-07-27 NOTE — THERAPY TREATMENT NOTE
Outpatient Physical Therapy Ortho Treatment Note  [facility]     Patient Name: Bekah Gibson  : 1970  MRN: [id number]  Today's Date: 2023      Visit Date: 2023    Visit Dx:    ICD-10-CM ICD-9-CM   1. Impaired range of motion of left shoulder  M25.612 719.51   2. Orthopedic aftercare  Z47.89 V54.9   3. Impaired strength of shoulder muscles  R29.898 729.89   4. Status post reverse total replacement of left shoulder  Z96.612 V43.61       Patient Active Problem List   Diagnosis    Irritable bowel syndrome with both constipation and diarrhea    Peripheral neuropathy    Vitamin D deficiency    History of HPV infection    Hypothyroidism    Tobacco dependence due to cigarettes    Acute kidney injury    Ascites    E. coli UTI (urinary tract infection)    Alcohol withdrawal syndrome, with delirium    Alcoholic hepatitis    GI bleed    Acute post-hemorrhagic anemia    Thrombocytopenia    Open wound of right shoulder region    Pancreatic insufficiency    Alcoholic ketoacidosis    Chronic alcohol abuse    ESBL (extended spectrum beta-lactamase) producing bacteria infection    Abnormal weight loss    Hashimoto's disease    Chronic fatigue    History of alcohol use disorder    Alcohol intoxication    Lupus    Hypomagnesemia    Pancytopenia    Alcoholic cirrhosis    Bilateral lower abdominal discomfort    Primary hypertension    Melena    Arthritis of shoulder    Esophageal varices    Essential hypertension    Acute on chronic pancreatitis    Abdominal pain        Past Medical History:   Diagnosis Date    Anxiety     Arthritis     Cirrhosis     Disease of thyroid gland     ETOH abuse     SOBER FOR 3 MONTHS    Fracture, clavicle 2021    shattered clavicel on issue slip and fall    GERD (gastroesophageal reflux disease)     History of kidney stones     5 YR AGO    Hypertension     Left shoulder pain     MDD (major depressive disorder)     Pancreatitis     Periarthritis of shoulder see above    Rotator cuff  syndrome odre for shoulder replacement    unable to receive due to low platelets    Thrombocytopenia         Past Surgical History:   Procedure Laterality Date    CLAVICLE SURGERY Right 2018    ENDOSCOPY N/A 10/26/2022    Procedure: ESOPHAGOGASTRODUODENOSCOPY wtih banding;  Surgeon: Ag Patel MD;  Location: [facility] ENDOSCOPY;  Service: Gastroenterology;  Laterality: N/A;  pre: melena  post: esophageal varicies with banding x2 bands    ENDOSCOPY N/A 01/18/2023    Procedure: ESOPHAGOGASTRODUODENOSCOPY;  Surgeon: Ag Patel MD;  Location: [facility] ENDOSCOPY;  Service: Gastroenterology;  Laterality: N/A;  PRE OP - MAROON STOOLS  POST OP - SM ESOPHAGEAL VARICES    ESOPHAGEAL VARICES LIGATION      JOINT REPLACEMENT Bilateral     GREAT TOE    KIDNEY STONE SURGERY      LITHOTRIPSY AND STENT (R SIDE)    LAPAROSCOPIC TUBAL LIGATION  2005    SIGMOIDOSCOPY N/A 01/18/2023    Procedure: SIGMOIDOSCOPY FLEXIBLE;  Surgeon: Ag Patel MD;  Location: [facility] ENDOSCOPY;  Service: Gastroenterology;  Laterality: N/A;  PRE OP - MAROON STOOLS  POST OP - RECTAL VARICES    TOTAL SHOULDER ARTHROPLASTY Left 5/9/2023    Procedure: reverse total shoulder arthroplasty;  Surgeon: Giovanni Denise MD;  [place]:  [facility] [place];  Service: Orthopedics;  Laterality: Left;                        PT Assessment/Plan       Row Name 07/27/23 1700          PT Assessment    Assessment Comments Ms. Gibson continues to make slow progress toward remaining goals. Her ROM remains limited in all cardinal planes and significant L shoulder muscle weakness. She presents with ongoing muscle guarding with PROM in shoulder flexion/ER. Initiated gentle IR stretching in PROM and also added small range FIR stretch with dowel. Also added gentle SL shoulder ER and patient declines increased pain. No changes made to HEP. Ms. Gibson remains a candidate for skilled PT.  -KENY        PT Plan    PT Plan Comments consider bent over row? update HEP  -KENY               User  Key  (r) = Recorded By, (t) = Taken By, (c) = Cosigned By      Initials Name Provider Type    Gloria Calixto, PT Physical Therapist                       OP Exercises       Row Name 07/27/23 1700 07/27/23 1600          Subjective Comments    Subjective Comments -- No changes  -KENY        Total Minutes    36225 - PT Therapeutic Exercise Minutes -- 40  -KENY     73082 - PT Manual Therapy Minutes -- 8  -KENY        Exercise 1    Exercise Name 1 -- UBE AAROM/pulleys  -KENY (r) BS (t) KENY (c)     Cueing 1 -- Verbal;Demo  -KENY (r) BS (t) KENY (c)     Time 1 -- 4 min/3 min  -KENY (r) BS (t) KENY (c)        Exercise 7    Exercise Name 7 -- wall slides  -KENY (r) BS (t) KENY (c)     Cueing 7 -- Verbal;Demo  -KENY (r) BS (t) KENY (c)     Sets 7 -- 1  -KENY (r) BS (t) KENY (c)     Reps 7 -- 15  -KENY (r) BS (t) KENY (c)     Time 7 -- tactile cues at scap  -KENY (r) BS (t) KENY (c)        Exercise 8    Exercise Name 8 -- supine AAROM flexion  -KENY (r) BS (t) KENY (c)     Cueing 8 -- Verbal;Demo  -KENY (r) BS (t) KENY (c)     Sets 8 -- 1  -KENY (r) BS (t) KENY (c)     Reps 8 -- 15  -KENY (r) BS (t) KENY (c)     Time 8 -- with dowel  -KENY (r) BS (t) KENY (c)        Exercise 11    Exercise Name 11 -- supine AAROM shoulder ER  -KENY (r) BS (t) KENY (c)     Cueing 11 -- Verbal;Tactile  -KENY (r) BS (t) KENY (c)     Sets 11 -- 1  -KENY (r) BS (t) KENY (c)     Reps 11 -- 15  -KENY (r) BS (t) KENY (c)     Time 11 -- 5s  -KENY (r) BS (t) KENY (c)        Exercise 12    Exercise Name 12 -- supine SAP  -KENY (r) BS (t) KENY (c)     Cueing 12 -- Verbal;Tactile  -KENY (r) BS (t) KENY (c)     Reps 12 -- 1  -KENY (r) BS (t) KENY (c)     Time 12 -- 15  -KENY (r) BS (t) KENY (c)        Exercise 13    Exercise Name 13 -- supine ABC  -KENY (r) BS (t) KENY (c)     Cueing 13 -- Verbal;Tactile  -KENY (r) BS (t) KENY (c)     Sets 13 -- 1  -KENY (r) BS (t) KENY (c)     Reps 13 -- A-Z  -KENY (r) BS (t) KENY (c)        Exercise 14    Exercise Name 14 -- standing Isometric-Deltoid step outs  -KENY (r) BS (t) KENY (c)     Cueing 14 --  Verbal;Tactile;Demo  -KENY (r) BS (t) KENY (c)     Reps 14 -- 10  -KENY (r) BS (t) KENY (c)     Time 14 -- 3s  -KENY (r) BS (t) KENY (c)     Additional Comments -- YTB  -KENY (r) BS (t) KENY (c)        Exercise 15    Exercise Name 15 -- ER/IR isometric step outs  -KENY (r) BS (t) KENY (c)     Cueing 15 -- Verbal;Demo  -KENY (r) BS (t) KENY (c)     Reps 15 -- 10ea  -KENY (r) BS (t) KENY (c)     Time 15 -- 5s  -KENY (r) BS (t) KENY (c)     Additional Comments -- YTB  -KENY (r) BS (t) KENY (c)        Exercise 16    Exercise Name 16 --  -KENY (r) BS (t) KENY (c) shoulder row  -KENY (r) BS (t) KENY (c)     Cueing 16 --  -KENY (r) BS (t) KENY (c) Verbal;Demo  -KENY (r) BS (t) KENY (c)     Sets 16 --  -KENY (r) BS (t) KENY (c) 2  -KENY (r) BS (t) KENY (c)     Reps 16 --  -KENY (r) BS (t) KENY (c) 10  -KENY (r) BS (t) KENY (c)     Time 16 --  -KENY (r) BS (t) KENY (c) YTB  -KENY (r) BS (t) KENY (c)        Exercise 17    Exercise Name 17 -- S/L flexion  -KENY (r) BS (t) KENY (c)     Cueing 17 -- Verbal;Tactile  -KENY (r) BS (t) KENY (c)     Reps 17 -- 10  -KENY (r) BS (t) KENY (c)        Exercise 18    Exercise Name 18 -- shoulder ext  -KENY (r) BS (t) KENY (c)     Cueing 18 -- Verbal;Demo  -KENY (r) BS (t) KENY (c)     Sets 18 -- 1  -KENY (r) BS (t) KENY (c)     Reps 18 -- 10  -KENY (r) BS (t) KENY (c)     Time 18 -- YTB  -KENY (r) BS (t) KENY (c)        Exercise 19    Exercise Name 19 -- SL ER  -KENY (r) BS (t) KENY (c)     Cueing 19 -- Verbal;Demo  -KENY (r) BS (t) KENY (c)     Sets 19 -- 1  -KENY (r) BS (t) KENY (c)     Reps 19 -- 10  -KENY (r) BS (t) KENY (c)               User Key  (r) = Recorded By, (t) = Taken By, (c) = Cosigned By      Initials Name Provider Type    Gloria Calixto, PT Physical Therapist    Russel Bird, PT Student PT Student                             Manual Rx (last 36 hours)       Manual Treatments       Row Name 07/27/23 1600             Total Minutes    17166 - PT Manual Therapy Minutes 8  -KENY         Manual Rx 1    Manual Rx 1 Location PROM L shoulder flex/ER per protocol  -KENY      Manual Rx 1  Type gentle oscillations  -KENY      Manual Rx 1 Grade gentle STM L mid to distal biceps tissue, pectoralis - lat/sub scap  -         Manual Rx 2    Manual Rx 2 Location RS shoulder flexion at 90 and IR/ER in neutral  -KENY                User Key  (r) = Recorded By, (t) = Taken By, (c) = Cosigned By      Initials Name Provider Type    Gloria Calixto, PT Physical Therapist                     PT OP Goals       Row Name 07/27/23 1700          PT Short Term Goals    STG Date to Achieve 06/17/23  -KENY     STG 1 Pt. Will be independent with initial HEP to improve self-management of condition.  -KENY     STG 1 Progress Met  -KENY     STG 2 Pt. Will improve PROM flexion to 140 and ER to 20 degrees to progress to next phase of protocol.  -KENY     STG 2 Progress Met  -KENY     STG 3 Pt. Will be compliant with precautions including sling usage and ROM restrictions to reduce risk of injury/dislocation.  -     STG 3 Progress Met  -KENY     STG 4 Pt. will return to sleeping in bed with waking </= 1x due to shoulder pain to improve sleep quality.  -     STG 4 Progress Met  -KENY        Long Term Goals    LTG Date to Achieve 07/17/23  -KENY     LTG 1 Pt. Will be independent with advanced HEP to improve long-term management of condition and independence.  -KENY     LTG 1 Progress Ongoing  -KENY     LTG 2 Pt. Will score </=30 on QuickDASH  (from 63.64 on initial evaluation) to indicate improved perception of disability.  -     LTG 2 Progress Ongoing;Progressing  -KENY     LTG 3 Pt. Will demonstrate improved AROM flexion to >/= 140, abd >/= 130, FIR >/= T12 to improve ability to complete ADLs.  -     LTG 3 Progress Ongoing  -KENY     LTG 4 Pt. Will improve shoulder strength to >/= 4/5 to ease ability to perform household chores.  -     LTG 4 Progress Ongoing  -KENY     LTG 5 Pt. will report ability to feed sewing machine without pain to improve ability to participate in hobbies.  -     LTG 5 Progress Ongoing  -KENY               User  Key  (r) = Recorded By, (t) = Taken By, (c) = Cosigned By      Initials Name Provider Type    Gloria Calixto, PT Physical Therapist                                   Time Calculation:   Start Time: 1652  Stop Time: 1740  Time Calculation (min): 48 min  Timed Charges  40175 - PT Therapeutic Exercise Minutes: 40  22466 - PT Manual Therapy Minutes: 8  Total Minutes  Timed Charges Total Minutes: 48   Total Minutes: 48  Therapy Charges for Today       Code Description Service Date Service Provider Modifiers Qty    [id number]  PT THER PROC EA 15 MIN 7/27/2023 Gloria Donaldson, PT GP 3                      Gloria Donaldson, PT  7/27/2023

## 2023-07-31 ENCOUNTER — OFFICE VISIT (OUTPATIENT)
Dept: ORTHOPEDIC SURGERY | Facility: CLINIC | Age: 53
End: 2023-07-31
Payer: MEDICAID

## 2023-07-31 VITALS — HEIGHT: 65 IN | BODY MASS INDEX: 23.57 KG/M2 | WEIGHT: 141.5 LBS | TEMPERATURE: 98.2 F

## 2023-07-31 DIAGNOSIS — Z96.612 H/O TOTAL SHOULDER REPLACEMENT, LEFT: Primary | ICD-10-CM

## 2023-08-07 ENCOUNTER — HOSPITAL ENCOUNTER (OUTPATIENT)
Dept: PHYSICAL THERAPY | Facility: HOSPITAL | Age: 53
Setting detail: THERAPIES SERIES
Discharge: HOME | End: 2023-08-07
Payer: MEDICAID

## 2023-08-07 DIAGNOSIS — Z96.612 STATUS POST REVERSE TOTAL REPLACEMENT OF LEFT SHOULDER: ICD-10-CM

## 2023-08-07 DIAGNOSIS — Z47.89 ORTHOPEDIC AFTERCARE: ICD-10-CM

## 2023-08-07 DIAGNOSIS — R29.898 IMPAIRED STRENGTH OF SHOULDER MUSCLES: ICD-10-CM

## 2023-08-07 DIAGNOSIS — M25.612 IMPAIRED RANGE OF MOTION OF LEFT SHOULDER: Primary | ICD-10-CM

## 2023-08-07 PROCEDURE — 97110 THERAPEUTIC EXERCISES: CPT

## 2023-08-07 NOTE — THERAPY TREATMENT NOTE
Outpatient Physical Therapy Ortho Treatment Note  [facility]     Patient Name: Bekah Gibson  : 1970  MRN: [id number]  Today's Date: 2023      Visit Date: 2023    Visit Dx:    ICD-10-CM ICD-9-CM   1. Impaired range of motion of left shoulder  M25.612 719.51   2. Orthopedic aftercare  Z47.89 V54.9   3. Impaired strength of shoulder muscles  R29.898 729.89   4. Status post reverse total replacement of left shoulder  Z96.612 V43.61       Patient Active Problem List   Diagnosis    Irritable bowel syndrome with both constipation and diarrhea    Peripheral neuropathy    Vitamin D deficiency    History of HPV infection    Hypothyroidism    Tobacco dependence due to cigarettes    Acute kidney injury    Ascites    E. coli UTI (urinary tract infection)    Alcohol withdrawal syndrome, with delirium    Alcoholic hepatitis    GI bleed    Acute post-hemorrhagic anemia    Thrombocytopenia    Open wound of right shoulder region    Pancreatic insufficiency    Alcoholic ketoacidosis    Chronic alcohol abuse    ESBL (extended spectrum beta-lactamase) producing bacteria infection    Abnormal weight loss    Hashimoto's disease    Chronic fatigue    History of alcohol use disorder    Alcohol intoxication    Lupus    Hypomagnesemia    Pancytopenia    Alcoholic cirrhosis    Bilateral lower abdominal discomfort    Primary hypertension    Melena    Arthritis of shoulder    Esophageal varices    Essential hypertension    Acute on chronic pancreatitis    Abdominal pain        Past Medical History:   Diagnosis Date    Anxiety     Arthritis     Cirrhosis     Disease of thyroid gland     ETOH abuse     SOBER FOR 3 MONTHS    Fracture, clavicle 2021    shattered clavicel on issue slip and fall    GERD (gastroesophageal reflux disease)     History of kidney stones     5 YR AGO    Hypertension     Left shoulder pain     MDD (major depressive disorder)     Pancreatitis     Periarthritis of shoulder see above    Rotator cuff  syndrome odre for shoulder replacement    unable to receive due to low platelets    Thrombocytopenia         Past Surgical History:   Procedure Laterality Date    CLAVICLE SURGERY Right 2018    ENDOSCOPY N/A 10/26/2022    Procedure: ESOPHAGOGASTRODUODENOSCOPY wtih banding;  Surgeon: Ag Patel MD;  Location: [facility] ENDOSCOPY;  Service: Gastroenterology;  Laterality: N/A;  pre: melena  post: esophageal varicies with banding x2 bands    ENDOSCOPY N/A 01/18/2023    Procedure: ESOPHAGOGASTRODUODENOSCOPY;  Surgeon: Ag Patel MD;  Location: [facility] ENDOSCOPY;  Service: Gastroenterology;  Laterality: N/A;  PRE OP - MAROON STOOLS  POST OP - SM ESOPHAGEAL VARICES    ESOPHAGEAL VARICES LIGATION      JOINT REPLACEMENT Bilateral     GREAT TOE    KIDNEY STONE SURGERY      LITHOTRIPSY AND STENT (R SIDE)    LAPAROSCOPIC TUBAL LIGATION  2005    SIGMOIDOSCOPY N/A 01/18/2023    Procedure: SIGMOIDOSCOPY FLEXIBLE;  Surgeon: Ag Patel MD;  Location: [facility] ENDOSCOPY;  Service: Gastroenterology;  Laterality: N/A;  PRE OP - MAROON STOOLS  POST OP - RECTAL VARICES    TOTAL SHOULDER ARTHROPLASTY Left 5/9/2023    Procedure: reverse total shoulder arthroplasty;  Surgeon: Giovanni Denise MD;  Location:  [facility] [place];  Service: Orthopedics;  Laterality: Left;                        PT Assessment/Plan       Row Name 08/07/23 0900          PT Assessment    Assessment Comments Ms. Gibson returns to PT reporting recent MD appt and received overall positive report by needs to continue to improve her L shoulder mobility. Reintroduced counter walk outs with emphasis on shoulder flexion ROM. Patient reports wanting to return to gym to improve her biceps/triceps strength. Therefore, incorporated into today's strengthening regimen. Also added SL shoulder abduction to tolerance. Updated HEP, reviewed changes with patient and provided handout. Bekah Gibson remains a candidate for skilled PT.  -KENY        PT Plan    PT Plan Comments resume  manual, consider FIR stretch? bent over row?  -KENY               User Key  (r) = Recorded By, (t) = Taken By, (c) = Cosigned By      Initials Name Provider Type    Gloria Calixto, PT Physical Therapist                       OP Exercises       Row Name 08/07/23 0800             Subjective Comments    Subjective Comments MD says things are going good but my motion could be better, I am doing my HEP  -KENY         Total Minutes    47124 - PT Therapeutic Exercise Minutes 40  -KENY         Exercise 1    Exercise Name 1 UBE AAROM/pulleys  -KENY      Cueing 1 Verbal;Demo  -KENY      Time 1 4 min/3 min  -KENY         Exercise 6    Exercise Name 6 supine skull crushers  -KENY      Cueing 6 Verbal;Demo  -KENY      Sets 6 2  -KENY      Reps 6 10  -KENY      Time 6 1#  -KENY         Exercise 7    Exercise Name 7 wall slides  -KENY      Cueing 7 Verbal;Demo  -KENY      Sets 7 1  -KENY      Reps 7 15  -KENY      Time 7 tactile cues at scap  -KENY         Exercise 9    Exercise Name 9 counter walk outs  -KENY      Cueing 9 Verbal;Demo  -KENY      Sets 9 1  -KENY      Reps 9 10  -KENY      Time 9 5s  -KENY         Exercise 10    Exercise Name 10 standing biceps curls  -KENY      Cueing 10 Verbal;Demo  -KENY      Sets 10 2  -KENY      Reps 10 10  -KENY      Time 10 1#  -KENY         Exercise 12    Exercise Name 12 supine SAP  -KENY      Cueing 12 Verbal;Tactile  -KENY      Reps 12 1  -KENY      Time 12 15  -KENY      Additional Comments 1#  -KENY         Exercise 13    Exercise Name 13 supine ABC  -KENY      Cueing 13 Verbal;Tactile  -KENY      Sets 13 1  -KENY      Reps 13 A-Z  -KENY      Additional Comments 1#  -KENY         Exercise 16    Exercise Name 16 shoulder row  -KENY      Cueing 16 Verbal;Demo  -KENY      Sets 16 2  -KENY      Reps 16 10  -KENY      Time 16 RTB  -KENY         Exercise 17    Exercise Name 17 S/L flexion/abduction  -KENY      Cueing 17 Verbal;Tactile  -KENY      Reps 17 10  -KENY         Exercise 18    Exercise Name 18 shoulder ext  -KENY      Cueing 18 Verbal;Demo  -KENY      Sets 18  2  -KENY      Reps 18 10  -KENY      Time 18 YTB  -KENY         Exercise 19    Exercise Name 19 SL ER  -KENY      Cueing 19 Verbal;Demo  -KENY      Sets 19 2  -KENY      Reps 19 10  -KENY                User Key  (r) = Recorded By, (t) = Taken By, (c) = Cosigned By      Initials Name Provider Type    Gloria Calixto, PT Physical Therapist                                  PT OP Goals       Row Name 08/07/23 0800          PT Short Term Goals    STG Date to Achieve 06/17/23  -KENY     STG 1 Pt. Will be independent with initial HEP to improve self-management of condition.  -KENY     STG 1 Progress Met  -KENY     STG 2 Pt. Will improve PROM flexion to 140 and ER to 20 degrees to progress to next phase of protocol.  -KENY     STG 2 Progress Met  -KENY     STG 3 Pt. Will be compliant with precautions including sling usage and ROM restrictions to reduce risk of injury/dislocation.  -KENY     STG 3 Progress Met  -KENY     STG 4 Pt. will return to sleeping in bed with waking </= 1x due to shoulder pain to improve sleep quality.  -     STG 4 Progress Met  -        Long Term Goals    LTG Date to Achieve 07/17/23  -KENY     LTG 1 Pt. Will be independent with advanced HEP to improve long-term management of condition and independence.  -KENY     LTG 1 Progress Ongoing  -KENY     LTG 2 Pt. Will score </=30 on QuickDASH  (from 63.64 on initial evaluation) to indicate improved perception of disability.  -KENY     LTG 2 Progress Ongoing;Progressing  -KENY     LTG 3 Pt. Will demonstrate improved AROM flexion to >/= 140, abd >/= 130, FIR >/= T12 to improve ability to complete ADLs.  -KENY     LTG 3 Progress Ongoing  -KENY     LTG 4 Pt. Will improve shoulder strength to >/= 4/5 to ease ability to perform household chores.  -KENY     LTG 4 Progress Ongoing  -KENY     LTG 5 Pt. will report ability to feed sewing machine without pain to improve ability to participate in hobbies.  -     LTG 5 Progress Ongoing  -KENY               User Key  (r) = Recorded By, (t) = Taken  By, (c) = Cosigned By      Initials Name Provider Type    Gloria Calixto, PT Physical Therapist                    Therapy Education  Education Details: updated HEP  Given: HEP, Symptoms/condition management, Pain management  Program: Reinforced, Progressed  How Provided: Verbal, Demonstration, Written  Provided to: Patient  Level of Understanding: Verbalized, Demonstrated              Time Calculation:   Start Time: 0835  Stop Time: 0915  Time Calculation (min): 40 min  Timed Charges  74254 - PT Therapeutic Exercise Minutes: 40  Total Minutes  Timed Charges Total Minutes: 40   Total Minutes: 40  Therapy Charges for Today       Code Description Service Date Service Provider Modifiers Qty    [id number]  PT THER PROC EA 15 MIN 8/7/2023 Gloria Donaldson, PT GP 3                      Gloria Donaldson, PT  8/7/2023

## 2023-08-11 ENCOUNTER — HOSPITAL ENCOUNTER (OUTPATIENT)
Dept: PHYSICAL THERAPY | Facility: HOSPITAL | Age: 53
Setting detail: THERAPIES SERIES
Discharge: HOME | End: 2023-08-11
Payer: MEDICAID

## 2023-08-11 DIAGNOSIS — Z96.612 STATUS POST REVERSE TOTAL REPLACEMENT OF LEFT SHOULDER: ICD-10-CM

## 2023-08-11 DIAGNOSIS — R29.898 IMPAIRED STRENGTH OF SHOULDER MUSCLES: ICD-10-CM

## 2023-08-11 DIAGNOSIS — M25.612 IMPAIRED RANGE OF MOTION OF LEFT SHOULDER: Primary | ICD-10-CM

## 2023-08-11 DIAGNOSIS — Z47.89 ORTHOPEDIC AFTERCARE: ICD-10-CM

## 2023-08-11 PROCEDURE — 97110 THERAPEUTIC EXERCISES: CPT

## 2023-08-11 PROCEDURE — 97140 MANUAL THERAPY 1/> REGIONS: CPT

## 2023-08-11 NOTE — THERAPY TREATMENT NOTE
Outpatient Physical Therapy Ortho Treatment Note  [facility]     Patient Name: Bekah Gibson  : 1970  MRN: [id number]  Today's Date: 2023      Visit Date: 2023    Visit Dx:    ICD-10-CM ICD-9-CM   1. Impaired range of motion of left shoulder  M25.612 719.51   2. Orthopedic aftercare  Z47.89 V54.9   3. Impaired strength of shoulder muscles  R29.898 729.89   4. Status post reverse total replacement of left shoulder  Z96.612 V43.61       Patient Active Problem List   Diagnosis    Irritable bowel syndrome with both constipation and diarrhea    Peripheral neuropathy    Vitamin D deficiency    History of HPV infection    Hypothyroidism    Tobacco dependence due to cigarettes    Acute kidney injury    Ascites    E. coli UTI (urinary tract infection)    Alcohol withdrawal syndrome, with delirium    Alcoholic hepatitis    GI bleed    Acute post-hemorrhagic anemia    Thrombocytopenia    Open wound of right shoulder region    Pancreatic insufficiency    Alcoholic ketoacidosis    Chronic alcohol abuse    ESBL (extended spectrum beta-lactamase) producing bacteria infection    Abnormal weight loss    Hashimoto's disease    Chronic fatigue    History of alcohol use disorder    Alcohol intoxication    Lupus    Hypomagnesemia    Pancytopenia    Alcoholic cirrhosis    Bilateral lower abdominal discomfort    Primary hypertension    Melena    Arthritis of shoulder    Esophageal varices    Essential hypertension    Acute on chronic pancreatitis    Abdominal pain        Past Medical History:   Diagnosis Date    Anxiety     Arthritis     Cirrhosis     Disease of thyroid gland     ETOH abuse     SOBER FOR 3 MONTHS    Fracture, clavicle 2021    shattered clavicel on issue slip and fall    GERD (gastroesophageal reflux disease)     History of kidney stones     5 YR AGO    Hypertension     Left shoulder pain     MDD (major depressive disorder)     Pancreatitis     Periarthritis of shoulder see above    Rotator cuff  syndrome odre for shoulder replacement    unable to receive due to low platelets    Thrombocytopenia         Past Surgical History:   Procedure Laterality Date    CLAVICLE SURGERY Right 2018    ENDOSCOPY N/A 10/26/2022    Procedure: ESOPHAGOGASTRODUODENOSCOPY wtih banding;  Surgeon: Ag Patel MD;  Location: [facility] ENDOSCOPY;  Service: Gastroenterology;  Laterality: N/A;  pre: melena  post: esophageal varicies with banding x2 bands    ENDOSCOPY N/A 01/18/2023    Procedure: ESOPHAGOGASTRODUODENOSCOPY;  Surgeon: Ag Patel MD;  Location: [facility] ENDOSCOPY;  Service: Gastroenterology;  Laterality: N/A;  PRE OP - MAROON STOOLS  POST OP - SM ESOPHAGEAL VARICES    ESOPHAGEAL VARICES LIGATION      JOINT REPLACEMENT Bilateral     GREAT TOE    KIDNEY STONE SURGERY      LITHOTRIPSY AND STENT (R SIDE)    LAPAROSCOPIC TUBAL LIGATION  2005    SIGMOIDOSCOPY N/A 01/18/2023    Procedure: SIGMOIDOSCOPY FLEXIBLE;  Surgeon: Ag Patel MD;  Location: [facility] ENDOSCOPY;  Service: Gastroenterology;  Laterality: N/A;  PRE OP - MAROON STOOLS  POST OP - RECTAL VARICES    TOTAL SHOULDER ARTHROPLASTY Left 5/9/2023    Procedure: reverse total shoulder arthroplasty;  Surgeon: Giovanni Denise MD;  [place]  [facility]  Service: [place]opedics;  Laterality: Left;                        PT Assessment/Plan       Row Name 08/11/23 0800          PT Assessment    Assessment Comments Ms. Gibson returns to therapy 13 weeks and 2 days s/p L rTSA. She arrives reporting mild L shoulder pain. Pt continues to demo limited L shoulder ROM in all directions, so initiated session with mobility exercises and resumed manual therapy. Continued with exercise program with addition of IR stretch with dowel tito. She has started going back to the gym, and reports good compliance with HEP. Ms. Gibson remains a candidate for skilled PT.  -KENY (r) BS (t) KENY (c)        PT Plan    PT Plan Comments 90 day, continue with manual, consider bent over row, potentially  add 1# to SL flex/abd, provide print out  -KENY               User Key  (r) = Recorded By, (t) = Taken By, (c) = Cosigned By      Initials Name Provider Type    Gloria Calixto, PT Physical Therapist    BS Russel Saldana, PT Student PT Student                       OP Exercises       Row Name 08/11/23 1100 08/11/23 0700          Subjective Comments    Subjective Comments -- My shoulder hurts a little today.  -KENY (r) BS (t) KENY (c)        Total Minutes    01287 - PT Therapeutic Exercise Minutes -- 25  -KENY (r) BS (t) KENY (c)     15790 - PT Manual Therapy Minutes --  -KENY (r) BS (t) KENY (c) 15  -KENY (r) BS (t) KENY (c)        Exercise 1    Exercise Name 1 -- Pulleys  -KENY (r) BS (t) KENY (c)     Cueing 1 -- Verbal  -KENY (r) BS (t) KENY (c)     Time 1 -- 3 min  -KENY (r) BS (t) KENY (c)     Additional Comments -- UBE taken  -KENY (r) BS (t) KENY (c)        Exercise 7    Exercise Name 7 -- wall slides  -KENY (r) BS (t) KENY (c)     Cueing 7 -- Verbal;Demo  -KENY (r) BS (t) KENY (c)     Sets 7 -- 1  -KENY (r) BS (t) KENY (c)     Reps 7 -- 15  -KENY (r) BS (t) KENY (c)     Time 7 -- tactile cues at scap  -KENY (r) BS (t) KENY (c)        Exercise 9    Exercise Name 9 -- counter walk outs  -KENY (r) BS (t) KENY (c)     Cueing 9 -- Verbal;Demo  -KENY (r) BS (t) KENY (c)     Sets 9 -- 1  -KENY (r) BS (t) KENY (c)     Reps 9 -- 10  -KENY (r) BS (t) KENY (c)     Time 9 -- 5s  -KENY (r) BS (t) KENY (c)        Exercise 12    Exercise Name 12 -- supine SAP  -KENY (r) BS (t) KENY (c)     Cueing 12 -- Verbal;Tactile  -KENY (r) BS (t) KENY (c)     Reps 12 -- 1  -KENY (r) BS (t) KENY (c)     Time 12 -- 15  -KENY (r) BS (t) KENY (c)        Exercise 13    Exercise Name 13 -- supine ABC  -KENY (r) BS (t) KENY (c)     Cueing 13 -- Verbal;Tactile  -KENY (r) BS (t) KENY (c)     Sets 13 -- 1  -KENY (r) BS (t) KENY (c)     Reps 13 -- A-Z  -KENY (r) BS (t) KENY (c)        Exercise 17    Exercise Name 17 -- S/L flexion/abduction  -KENY (r) BS (t) KENY (c)     Cueing 17 -- Verbal;Tactile  -KENY (r) BS (t) KENY (c)     Reps 17 -- 10   -KENY (r) BS (t) KENY (c)        Exercise 19    Exercise Name 19 -- SL ER  -KENY (r) BS (t) KENY (c)     Cueing 19 -- Verbal;Demo  -KENY (r) BS (t) KENY (c)     Sets 19 -- 2  -KENY (r) BS (t) KENY (c)     Reps 19 -- 10  -KENY (r) BS (t) KENY (c)        Exercise 20    Exercise Name 20 -- FIR stretch  -KENY (r) BS (t) KENY (c)     Cueing 20 -- Verbal;Demo  -KENY (r) BS (t) KENY (c)     Reps 20 -- 10  -KENY (r) BS (t) KENY (c)     Time 20 -- 5s  -KENY (r) BS (t) KENY (c)     Additional Comments -- with dowel tito  -KENY (r) BS (t) KENY (c)               User Key  (r) = Recorded By, (t) = Taken By, (c) = Cosigned By      Initials Name Provider Type    Gloria Calixto, PT Physical Therapist    Russel Bird, PT Student PT Student                             Manual Rx (last 36 hours)       Manual Treatments       Row Name 08/11/23 1100 08/11/23 0700          Total Minutes    37954 - PT Manual Therapy Minutes --  -KENY (r) BS (t) KENY (c) 15  -KENY (r) BS (t) KENY (c)        Manual Rx 1    Manual Rx 1 Location PROM L shoulder flex/ER per protocol  -KENY (r) BS (t) KENY (c) --     Manual Rx 1 Type gentle oscillations  -KENY (r) BS (t) KENY (c) --     Manual Rx 1 Grade gentle STM L mid to distal biceps tissue, pectoralis - lat/sub scap  -KENY (r) BS (t) KENY (c) --               User Key  (r) = Recorded By, (t) = Taken By, (c) = Cosigned By      Initials Name Provider Type    Gloria Calixto, PT Physical Therapist    Russel Bird, PT Student PT Student                     PT OP Goals       Row Name 08/11/23 0700          PT Short Term Goals    STG Date to Achieve 06/17/23  -KENY (r) BS (t) KENY (c)     STG 1 Pt. Will be independent with initial HEP to improve self-management of condition.  -KENY (r) BS (t) KENY (c)     STG 1 Progress Met  -KENY (r) BS (t) KENY (c)     STG 2 Pt. Will improve PROM flexion to 140 and ER to 20 degrees to progress to next phase of protocol.  -KENY (r) BS (t) KENY (c)     STG 2 Progress Met  -KENY (r) BS (t) KENY (c)     STG 3 Pt. Will be  compliant with precautions including sling usage and ROM restrictions to reduce risk of injury/dislocation.  -KENY (r) BS (t) KENY (c)     STG 3 Progress Met  -KENY (r) BS (t) KENY (c)     STG 4 Pt. will return to sleeping in bed with waking </= 1x due to shoulder pain to improve sleep quality.  -KENY (r) BS (t) KENY (c)     STG 4 Progress Met  -KENY (r) BS (t) KENY (c)        Long Term Goals    LTG Date to Achieve 07/17/23  -KENY (r) BS (t) KENY (c)     LTG 1 Pt. Will be independent with advanced HEP to improve long-term management of condition and independence.  -KENY (r) BS (t) KENY (c)     LTG 1 Progress Ongoing  -KENY (r) BS (t) KENY (c)     LTG 2 Pt. Will score </=30 on QuickDASH  (from 63.64 on initial evaluation) to indicate improved perception of disability.  -KENY (r) BS (t) KENY (c)     LTG 2 Progress Ongoing;Progressing  -KENY (r) BS (t) KENY (c)     LTG 3 Pt. Will demonstrate improved AROM flexion to >/= 140, abd >/= 130, FIR >/= T12 to improve ability to complete ADLs.  -KENY (r) BS (t) KENY (c)     LTG 3 Progress Ongoing  -KENY (r) BS (t) KENY (c)     LTG 4 Pt. Will improve shoulder strength to >/= 4/5 to ease ability to perform household chores.  -KENY (r) BS (t) KENY (c)     LTG 4 Progress Ongoing  -KENY (r) BS (t) KENY (c)     LTG 5 Pt. will report ability to feed sewing machine without pain to improve ability to participate in hobbies.  -KENY (r) BS (t) KENY (c)     LTG 5 Progress Ongoing  -KENY (r) BS (t) KENY (c)               User Key  (r) = Recorded By, (t) = Taken By, (c) = Cosigned By      Initials Name Provider Type    Gloria Calixto, PT Physical Therapist    Russel Bird, PT Student PT Student                    Therapy Education  Given: HEP  Program: Reinforced  How Provided: Verbal  Provided to: Patient  Level of Understanding: Verbalized              Time Calculation:   Start Time: 0750  Stop Time: 0830  Time Calculation (min): 40 min  Timed Charges  40205 - PT Therapeutic Exercise Minutes: 25  33297 - PT Manual Therapy  Minutes: 15  Total Minutes  Timed Charges Total Minutes: 40   Total Minutes: 40  Therapy Charges for Today       Code Description Service Date Service Provider Modifiers Qty    [id number]  PT THER PROC EA 15 MIN 8/11/2023 Russel Saldana, PT Student GP 2    [id number]  PT MANUAL THERAPY EA 15 MIN 8/11/2023 Russel Saldana, PT Student GP 1                      Russel Saldana, PT Student  8/11/2023

## 2023-08-15 ENCOUNTER — APPOINTMENT (OUTPATIENT)
Dept: PHYSICAL THERAPY | Facility: HOSPITAL | Age: 53
End: 2023-08-15
Payer: MEDICAID

## 2023-08-18 ENCOUNTER — TELEPHONE (OUTPATIENT)
Dept: PHYSICAL THERAPY | Facility: HOSPITAL | Age: 53
End: 2023-08-18
Payer: MEDICAID

## 2023-08-18 NOTE — TELEPHONE ENCOUNTER
Left voicemail regarding no show for yesterdays appointment. Reminded pt of next appointment date and time, as well as reminded of 24 hour cancellation policy. Requested pt call back if unable to make next appointment.

## 2023-08-29 ENCOUNTER — HOSPITAL ENCOUNTER (OUTPATIENT)
Dept: PHYSICAL THERAPY | Facility: HOSPITAL | Age: 53
Setting detail: THERAPIES SERIES
Discharge: HOME | End: 2023-08-29
Payer: MEDICAID

## 2023-08-29 DIAGNOSIS — R29.898 IMPAIRED STRENGTH OF SHOULDER MUSCLES: ICD-10-CM

## 2023-08-29 DIAGNOSIS — Z96.612 STATUS POST REVERSE TOTAL REPLACEMENT OF LEFT SHOULDER: ICD-10-CM

## 2023-08-29 DIAGNOSIS — M25.612 IMPAIRED RANGE OF MOTION OF LEFT SHOULDER: Primary | ICD-10-CM

## 2023-08-29 DIAGNOSIS — Z47.89 ORTHOPEDIC AFTERCARE: ICD-10-CM

## 2023-08-29 PROCEDURE — 97110 THERAPEUTIC EXERCISES: CPT

## 2023-08-29 PROCEDURE — 97140 MANUAL THERAPY 1/> REGIONS: CPT

## 2023-08-29 NOTE — THERAPY PROGRESS REPORT/RE-CERT
Outpatient Physical Therapy Ortho Re-Assessment  [facility]     Patient Name: Bekah Gibson  : 1970  MRN: [id number]  Today's Date: 2023      Visit Date: 2023    Patient Active Problem List   Diagnosis    Irritable bowel syndrome with both constipation and diarrhea    Peripheral neuropathy    Vitamin D deficiency    History of HPV infection    Hypothyroidism    Tobacco dependence due to cigarettes    Acute kidney injury    Ascites    E. coli UTI (urinary tract infection)    Alcohol withdrawal syndrome, with delirium    Alcoholic hepatitis    GI bleed    Acute post-hemorrhagic anemia    Thrombocytopenia    Open wound of right shoulder region    Pancreatic insufficiency    Alcoholic ketoacidosis    Chronic alcohol abuse    ESBL (extended spectrum beta-lactamase) producing bacteria infection    Abnormal weight loss    Hashimoto's disease    Chronic fatigue    History of alcohol use disorder    Alcohol intoxication    Lupus    Hypomagnesemia    Pancytopenia    Alcoholic cirrhosis    Bilateral lower abdominal discomfort    Primary hypertension    Melena    Arthritis of shoulder    Esophageal varices    Essential hypertension    Acute on chronic pancreatitis    Abdominal pain        Past Medical History:   Diagnosis Date    Anxiety     Arthritis     Cirrhosis     Disease of thyroid gland     ETOH abuse     SOBER FOR 3 MONTHS    Fracture, clavicle 2021    shattered clavicel on issue slip and fall    GERD (gastroesophageal reflux disease)     History of kidney stones     5 YR AGO    Hypertension     Left shoulder pain     MDD (major depressive disorder)     Pancreatitis     Periarthritis of shoulder see above    Rotator cuff syndrome odre for shoulder replacement    unable to receive due to low platelets    Thrombocytopenia         Past Surgical History:   Procedure Laterality Date    CLAVICLE SURGERY Right 2018    ENDOSCOPY N/A 10/26/2022    Procedure: ESOPHAGOGASTRODUODENOSCOPY wtih banding;   Surgeon: Ag Patel MD;  Location: [facility] ENDOSCOPY;  Service: Gastroenterology;  Laterality: N/A;  pre: melena  post: esophageal varicies with banding x2 bands    ENDOSCOPY N/A 01/18/2023    Procedure: ESOPHAGOGASTRODUODENOSCOPY;  Surgeon: Ag Patel MD;  Location: [facility] ENDOSCOPY;  Service: Gastroenterology;  Laterality: N/A;  PRE OP - MAROON STOOLS  POST OP - SM ESOPHAGEAL VARICES    ESOPHAGEAL VARICES LIGATION      JOINT REPLACEMENT Bilateral     GREAT TOE    KIDNEY STONE SURGERY      LITHOTRIPSY AND STENT (R SIDE)    LAPAROSCOPIC TUBAL LIGATION  2005    SIGMOIDOSCOPY N/A 01/18/2023    Procedure: SIGMOIDOSCOPY FLEXIBLE;  Surgeon: Ag Patel MD;  Location: [facility] ENDOSCOPY;  Service: Gastroenterology;  Laterality: N/A;  PRE OP - MAROON STOOLS  POST OP - RECTAL VARICES    TOTAL SHOULDER ARTHROPLASTY Left 5/9/2023    Procedure: reverse total shoulder arthroplasty;  Surgeon: Giovanni Denise MD;  [place] [facility] [place] [facility] [place]  Service: Orthopedics;  Laterality: Left;       Visit Dx:     ICD-10-CM ICD-9-CM   1. Impaired range of motion of left shoulder  M25.612 719.51   2. Orthopedic aftercare  Z47.89 V54.9   3. Impaired strength of shoulder muscles  R29.898 729.89   4. Status post reverse total replacement of left shoulder  Z96.612 V43.61              PT Ortho       Row Name 08/29/23 1000       Left Upper Ext    Lt Shoulder Abduction AROM 100  -KENY    Lt Shoulder Flexion AROM 120  -KENY    Lt Shoulder Flexion PROM 140  -KENY    Lt Shoulder External Rotation AROM PETEY to C7  -KENY    Lt Shoulder External Rotation PROM 53 in POS  -KENY    Lt Shoulder Internal Rotation AROM FIR to sacrum  -KENY    Lt Shoulder Internal Rotation PROM 62 deg in POS  -KENY    Lt Upper Extremity Comments  AROM in seated  -KENY       MMT Left Upper Ext    Lt Shoulder Flexion MMT, Gross Movement (4-/5) good minus  -KENY    Lt Shoulder ABduction MMT, Gross Movement (4-/5) good minus  -KENY    Lt Shoulder Internal Rotation MMT, Gross Movement (4-/5) good  minus  -KENY    Lt Shoulder External Rotation MMT, Gross Movement (3+/5) fair plus  -KENY              User Key  (r) = Recorded By, (t) = Taken By, (c) = Cosigned By      Initials Name Provider Type    Gloria Calixto, PT Physical Therapist                                Therapy Education  Education Details: gym related movements within HEP  Given: HEP  Program: Reinforced  How Provided: Verbal, Demonstration  Provided to: Patient  Level of Understanding: Verbalized, Demonstrated      PT OP Goals       Row Name 08/29/23 0900          PT Short Term Goals    STG Date to Achieve 06/17/23  -KENY     STG 1 Pt. Will be independent with initial HEP to improve self-management of condition.  -KENY     STG 1 Progress Met  -KENY     STG 2 Pt. Will improve PROM flexion to 140 and ER to 20 degrees to progress to next phase of protocol.  -KENY     STG 2 Progress Met  -KENY     STG 3 Pt. Will be compliant with precautions including sling usage and ROM restrictions to reduce risk of injury/dislocation.  -KENY     STG 3 Progress Met  -KENY     STG 4 Pt. will return to sleeping in bed with waking </= 1x due to shoulder pain to improve sleep quality.  -KENY     STG 4 Progress Met  -KENY        Long Term Goals    LTG Date to Achieve 07/17/23  -KENY     LTG 1 Pt. Will be independent with advanced HEP to improve long-term management of condition and independence.  -KENY     LTG 1 Progress Ongoing  -KENY     LTG 2 Pt. Will score </=30 on QuickDASH  (from 63.64 on initial evaluation) to indicate improved perception of disability.  -KENY     LTG 2 Progress Met  -KENY     LTG 2 Progress Comments 25%  -KENY     LTG 3 Pt. Will demonstrate improved AROM flexion to >/= 125, abd >/= 110, FIR >/= L4 to improve ability to complete ADLs.  -KENY     LTG 3 Progress Ongoing;Goal Revised  -KENY     LTG 4 Pt. Will improve shoulder strength to >/= 4/5 to ease ability to perform household chores.  -KENY     LTG 4 Progress Ongoing;Progressing  -KENY     LTG 4 Progress Comments see ortho   -     LTG 5 Pt. will report ability to feed sewing machine without pain to improve ability to participate in hobbies.  -     LTG 5 Progress Met  -KENY               User Key  (r) = Recorded By, (t) = Taken By, (c) = Cosigned By      Initials Name Provider Type    Gloria Calixto, PT Physical Therapist                     PT Assessment/Plan       Row Name 08/29/23 0900          PT Assessment    Functional Limitations Performance in self-care ADL;Limitation in home management;Performance in leisure activities;Performance in work activities;Performance in sport activities;Limitations in functional capacity and performance  -     Impairments Range of motion;Posture;Poor body mechanics;Pain;Muscle strength;Joint mobility  -     Assessment Comments Bekah Gibson has been seen for 11 physical therapy sessions for s/p L rTSA on 5/9/23 (16 weeks). Patient now reports overall 90% improvement in L shoulder pain/function since the start of PT. She also feels her L shoulder is 75% functional ability in comparison to contralateral side. She reports greater ease when fixing/washing her hair, dressing and when tucking in her shirt despite limited range of motion. Throughout her POC, treatment has included therapeutic exercise, manual therapy, and patient education with home exercise program. Progress to physical therapy goals is good. Pt has met 4/4 STG and 2/5 LTG and progressing toward remaining goals. Revised her AROM goal due to current status of L shoulder mobility of 120 degrees of flexion, 100 deg of abduction, ER to C7 and FIR to sacrum. She demonstrates improved L shoulder PROM to 140 deg of flexion and 53 deg of ER and 62 deg of IR in POS. Her greatest limitation involves limited L shoulder strength in all cardinal plances. Her QuickDASH score also improved from 63% disability to 25% disability, where 0% represents no perceived disability allowing her to meet LTG. Due to lingering strength/ROM deficits,  she will benefit from continued skilled physical therapy to address remaining impairments and functional limitations.  -KENY     Please refer to paper survey for additional self-reported information Yes  -KENY     Rehab Potential Good  -KENY     Patient/caregiver participated in establishment of treatment plan and goals Yes  -KENY     Patient would benefit from skilled therapy intervention Yes  -KENY        PT Plan    PT Frequency 1x/week  -KENY     Predicted Duration of Therapy Intervention (PT) 3-5 visits  -KENY     Planned CPT's? PT RE-EVAL: 78255;PT THER PROC EA 15 MIN: 91805;PT THER ACT EA 15 MIN: 40847;PT MANUAL THERAPY EA 15 MIN: 70102;PT NEUROMUSC RE-EDUCATION EA 15 MIN: 34731;PT SELF CARE/HOME MGMT/TRAIN EA 15: 11256;PT HOT OR COLD PACK TREAT MCARE;PT ELECTRICAL STIM UNATTEND:   -KENY     PT Plan Comments consider bent over row, lateral stepping, lat pulls?  -KENY               User Key  (r) = Recorded By, (t) = Taken By, (c) = Cosigned By      Initials Name Provider Type    Gloria Calixto, PT Physical Therapist                       OP Exercises       Row Name 08/29/23 1000             Subjective Comments    Subjective Comments Things have been going really well  -         Total Minutes    96788 - PT Therapeutic Exercise Minutes 37  -KENY      89931 - PT Manual Therapy Minutes 8  -KENY         Exercise 1    Exercise Name 1 UBE AAROM/pulleys  -KENY      Cueing 1 Verbal;Demo  -KENY      Time 1 4 min/3 min  -KENY         Exercise 2    Exercise Name 2 Time spent filling out survey, taking ROM/MMT, discussing goals/POC, expectations for remaining session  -KENY      Time 2 15 mins  -KENY         Exercise 12    Exercise Name 12 supine SAP  -KENY      Cueing 12 Verbal;Tactile  -KENY      Reps 12 2  -KENY      Time 12 10  -KENY      Additional Comments 1#  -KENY         Exercise 13    Exercise Name 13 supine ABC  -KENY      Cueing 13 Verbal;Tactile  -KENY      Sets 13 1  -KENY      Reps 13 A-Z  -KENY      Additional Comments 1#  -KENY          Exercise 17    Exercise Name 17 S/L flexion/abduction  -KENY      Cueing 17 Verbal;Tactile  -KENY      Reps 17 10  -KENY      Time 17 1#  -KENY         Exercise 19    Exercise Name 19 SL ER  -KENY      Cueing 19 Verbal;Demo  -KENY      Sets 19 2  -KENY      Reps 19 10  -KENY      Time 19 1#  -KENY         Exercise 20    Exercise Name 20 FIR stretch  -KENY      Cueing 20 Verbal;Demo  -KENY      Reps 20 10  -KENY      Time 20 5s  -KENY      Additional Comments with dowel tito  -KENY                User Key  (r) = Recorded By, (t) = Taken By, (c) = Cosigned By      Initials Name Provider Type    Gloria Calixto PT Physical Therapist                  Manual Rx (last 36 hours)       Manual Treatments       Row Name 08/29/23 1000             Total Minutes    06871 - PT Manual Therapy Minutes 8  -KENY         Manual Rx 1    Manual Rx 1 Location PROM L shoulder flex/ER/IR per protocol  -KENY      Manual Rx 1 Type gentle oscillations  -KENY                User Key  (r) = Recorded By, (t) = Taken By, (c) = Cosigned By      Initials Name Provider Type    Gloria Calixto PT Physical Therapist                                Outcome Measure Options: Quick DASH  Quick DASH  Open a tight or new jar.: Mild Difficulty  Do heavy household chores (e.g., wash walls, wash floors): Mild Difficulty  Carry a shopping bag or briefcase: No Difficulty  Wash your back: Moderate Difficulty  Use a knife to cut food: No Difficulty  Recreational activities in which you take some force or impact through your arm, should or hand (e.g. golf, hammering, tennis, etc.): Mild Difficulty  During the past week, to what extent has your arm, shoulder, or hand problem interfered with your normal social activites with family, friends, neighbors or groups?: Not at all  During the past week, were you limited in your work or other regular daily activities as a result of your arm, shoulder or hand problem?: Slightly Limited  Arm, Shoulder, or hand pain: Moderate  Tingling  (pins and needles) in your arm, shoulder, or hand: Moderate  During the past week, how much difficulty have you had sleeping because of the pain in your arm, shoulder or hand?: Mild Difficulty  Number of Questions Answered: 11  Quick DASH Score: 25         Time Calculation:     Start Time: 1000  Stop Time: 1045  Time Calculation (min): 45 min  Timed Charges  71183 - PT Therapeutic Exercise Minutes: 37  04016 - PT Manual Therapy Minutes: 8  Total Minutes  Timed Charges Total Minutes: 45   Total Minutes: 45     Therapy Charges for Today       Code Description Service Date Service Provider Modifiers Qty    75222649057  PT THER PROC EA 15 MIN 8/29/2023 Gloria Donaldson, PT GP 2    83446895168 HC PT MANUAL THERAPY EA 15 MIN 8/29/2023 Gloria Donaldson, PT GP 1            PT G-Codes  Outcome Measure Options: Quick DASH  Quick DASH Score: 25         Gloria Donaldson, PT  8/29/2023

## 2023-09-07 ENCOUNTER — HOSPITAL ENCOUNTER (OUTPATIENT)
Dept: PHYSICAL THERAPY | Facility: HOSPITAL | Age: 53
Setting detail: THERAPIES SERIES
Discharge: HOME | End: 2023-09-07
Payer: MEDICAID

## 2023-09-07 DIAGNOSIS — M25.612 IMPAIRED RANGE OF MOTION OF LEFT SHOULDER: Primary | ICD-10-CM

## 2023-09-07 DIAGNOSIS — Z47.89 ORTHOPEDIC AFTERCARE: ICD-10-CM

## 2023-09-07 DIAGNOSIS — R29.898 IMPAIRED STRENGTH OF SHOULDER MUSCLES: ICD-10-CM

## 2023-09-07 DIAGNOSIS — Z96.612 STATUS POST REVERSE TOTAL REPLACEMENT OF LEFT SHOULDER: ICD-10-CM

## 2023-09-07 PROCEDURE — 97110 THERAPEUTIC EXERCISES: CPT

## 2023-09-07 NOTE — THERAPY TREATMENT NOTE
Outpatient Physical Therapy Ortho Treatment Note  [facility]     Patient Name: Bekah Gibson  : 1970  MRN: [id number]  Today's Date: 2023      Visit Date: 2023    Visit Dx:    ICD-10-CM ICD-9-CM   1. Impaired range of motion of left shoulder  M25.612 719.51   2. Orthopedic aftercare  Z47.89 V54.9   3. Impaired strength of shoulder muscles  R29.898 729.89   4. Status post reverse total replacement of left shoulder  Z96.612 V43.61       Patient Active Problem List   Diagnosis    Irritable bowel syndrome with both constipation and diarrhea    Peripheral neuropathy    Vitamin D deficiency    History of HPV infection    Hypothyroidism    Tobacco dependence due to cigarettes    Acute kidney injury    Ascites    E. coli UTI (urinary tract infection)    Alcohol withdrawal syndrome, with delirium    Alcoholic hepatitis    GI bleed    Acute post-hemorrhagic anemia    Thrombocytopenia    Open wound of right shoulder region    Pancreatic insufficiency    Alcoholic ketoacidosis    Chronic alcohol abuse    ESBL (extended spectrum beta-lactamase) producing bacteria infection    Abnormal weight loss    Hashimoto's disease    Chronic fatigue    History of alcohol use disorder    Alcohol intoxication    Lupus    Hypomagnesemia    Pancytopenia    Alcoholic cirrhosis    Bilateral lower abdominal discomfort    Primary hypertension    Melena    Arthritis of shoulder    Esophageal varices    Essential hypertension    Acute on chronic pancreatitis    Abdominal pain        Past Medical History:   Diagnosis Date    Anxiety     Arthritis     Cirrhosis     Disease of thyroid gland     ETOH abuse     SOBER FOR 3 MONTHS    Fracture, clavicle 2021    shattered clavicel on issue slip and fall    GERD (gastroesophageal reflux disease)     History of kidney stones     5 YR AGO    Hypertension     Left shoulder pain     MDD (major depressive disorder)     Pancreatitis     Periarthritis of shoulder see above    Rotator cuff  syndrome odre for shoulder replacement    unable to receive due to low platelets    Thrombocytopenia         Past Surgical History:   Procedure Laterality Date    CLAVICLE SURGERY Right 2018    ENDOSCOPY N/A 10/26/2022    Procedure: ESOPHAGOGASTRODUODENOSCOPY wtih banding;  Surgeon: Ag Patel MD;  Location: [facility] ENDOSCOPY;  Service: Gastroenterology;  Laterality: N/A;  pre: melena  post: esophageal varicies with banding x2 bands    ENDOSCOPY N/A 01/18/2023    Procedure: ESOPHAGOGASTRODUODENOSCOPY;  Surgeon: Ag Patel MD;  Location: [facility] ENDOSCOPY;  Service: Gastroenterology;  Laterality: N/A;  PRE OP - MAROON STOOLS  POST OP - SM ESOPHAGEAL VARICES    ESOPHAGEAL VARICES LIGATION      JOINT REPLACEMENT Bilateral     GREAT TOE    KIDNEY STONE SURGERY      LITHOTRIPSY AND STENT (R SIDE)    LAPAROSCOPIC TUBAL LIGATION  2005    SIGMOIDOSCOPY N/A 01/18/2023    Procedure: SIGMOIDOSCOPY FLEXIBLE;  Surgeon: Ag Patel MD;  Location: [facility] ENDOSCOPY;  Service: Gastroenterology;  Laterality: N/A;  PRE OP - MAROON STOOLS  POST OP - RECTAL VARICES    TOTAL SHOULDER ARTHROPLASTY Left 5/9/2023    Procedure: reverse total shoulder arthroplasty;  Surgeon: Giovanni Denise MD;  [place]  [facility] [place] [facility] [place];  Service: Orthopedics;  Laterality: Left;                        PT Assessment/Plan       Row Name 09/07/23 1500          PT Assessment    Assessment Comments Ms. Gibson continues to make progress toward remaining goals. She request review of AAROM shoulder stretches. Therefore, initiated session with AAROM exercises in all cardinal planes. Added standing AAROM shoulder flexion/scaption with dowel. Following mobility exercises, performed active shoulder flexion in standing at mirror. Patient benefits from cues to avoid lateral trunk lean and compensatory muscle activation. Also added lat pull downs and B shoulder ER with good tolerance. Updated HEP and reviewed changes with patient. Provided handout of HEP and  schedule. Ms. Gibson remains a candidate for skilled PT.  -KENY        PT Plan    PT Plan Comments continue finalizing HEP, consider supine HA if able?  -KENY               User Key  (r) = Recorded By, (t) = Taken By, (c) = Cosigned By      Initials Name Provider Type    Gloria Calixto, PT Physical Therapist                       OP Exercises       Row Name 09/07/23 1500             Subjective Comments    Subjective Comments I would like to go over my stretches  -KENY         Total Minutes    91999 - PT Therapeutic Exercise Minutes 38  -KENY         Exercise 1    Exercise Name 1 UBE  -KENY      Time 1 4 mins  -KENY         Exercise 3    Exercise Name 3 AAROM shoulder scaption in standing  -KENY      Cueing 3 Verbal;Demo  -KENY      Sets 3 1  -KENY      Reps 3 10  -KENY      Time 3 5s  -KENY         Exercise 7    Exercise Name 7 wall slides  -KENY      Cueing 7 Verbal;Demo  -KENY      Sets 7 1  -KENY      Reps 7 15  -KENY      Time 7 tactile cues at scap  -KENY         Exercise 9    Exercise Name 9 counter walk outs  -KENY      Cueing 9 Verbal;Demo  -KENY      Sets 9 1  -KENY      Reps 9 10  -KENY      Time 9 5s  -KENY         Exercise 11    Exercise Name 11 AAROM shoulder flexion  -KENY      Cueing 11 Verbal;Demo  -KENY      Sets 11 1  -KENY      Reps 11 10  -KENY      Time 11 5s  -KENY      Additional Comments standing, dowel  -KENY         Exercise 14    Exercise Name 14 standing active shoulder flexion  -KENY      Cueing 14 Verbal;Demo  -KENY      Sets 14 2  -KENY      Reps 14 8  -KENY      Time 14 at mirror  -KENY         Exercise 15    Exercise Name 15 lat pull downs  -KENY      Cueing 15 Verbal;Demo  -KENY      Sets 15 2  -KENY      Reps 15 10  -EKNY         Exercise 16    Exercise Name 16 shoulder row  -KENY      Cueing 16 Verbal;Demo  -KENY      Sets 16 2  -KENY      Reps 16 10  -KENY      Time 16 RTB  -KENY         Exercise 18    Exercise Name 18 B shoulder ER  -KENY      Cueing 18 Verbal;Demo  -KENY      Sets 18 2  -KENY      Reps 18 10  -KENY      Time 18 YTB  -KENY         Exercise  19    Exercise Name 19 SL ER  -KENY      Cueing 19 Verbal;Demo  -KENY      Sets 19 2  -KENY      Reps 19 10  -KENY         Exercise 20    Exercise Name 20 FIR stretch  -KENY      Cueing 20 Verbal;Demo  -KENY      Reps 20 10  -KENY      Time 20 5s  -KENY      Additional Comments with xi tito  -KENY                User Key  (r) = Recorded By, (t) = Taken By, (c) = Cosigned By      Initials Name Provider Type    Gloria Calixto, PT Physical Therapist                                  PT OP Goals       Row Name 09/07/23 1500          PT Short Term Goals    STG Date to Achieve 06/17/23  -KENY     STG 1 Pt. Will be independent with initial HEP to improve self-management of condition.  -KENY     STG 1 Progress Met  -KENY     STG 2 Pt. Will improve PROM flexion to 140 and ER to 20 degrees to progress to next phase of protocol.  -KENY     STG 2 Progress Met  -KENY     STG 3 Pt. Will be compliant with precautions including sling usage and ROM restrictions to reduce risk of injury/dislocation.  -KENY     STG 3 Progress Met  -KENY     STG 4 Pt. will return to sleeping in bed with waking </= 1x due to shoulder pain to improve sleep quality.  -     STG 4 Progress Met  -        Long Term Goals    LTG Date to Achieve 07/17/23  -KENY     LTG 1 Pt. Will be independent with advanced HEP to improve long-term management of condition and independence.  -KENY     LTG 1 Progress Ongoing  -KENY     LTG 2 Pt. Will score </=30 on QuickDASH  (from 63.64 on initial evaluation) to indicate improved perception of disability.  -KENY     LTG 2 Progress Met  -KENY     LTG 3 Pt. Will demonstrate improved AROM flexion to >/= 125, abd >/= 110, FIR >/= L4 to improve ability to complete ADLs.  -KENY     LTG 3 Progress Ongoing;Goal Revised  -KENY     LTG 4 Pt. Will improve shoulder strength to >/= 4/5 to ease ability to perform household chores.  -KENY     LTG 4 Progress Ongoing;Progressing  -KENY     LTG 5 Pt. will report ability to feed sewing machine without pain to improve ability to  participate in hobbies.  -KENY     LTG 5 Progress Met  -KENY               User Key  (r) = Recorded By, (t) = Taken By, (c) = Cosigned By      Initials Name Provider Type    Gloria Calixto, PT Physical Therapist                    Therapy Education  Education Details: updated HEP  Given: HEP, Symptoms/condition management, Pain management, Posture/body mechanics  Program: Reinforced, Progressed  How Provided: Verbal, Demonstration, Written  Provided to: Patient  Level of Understanding: Verbalized, Demonstrated              Time Calculation:   Start Time: 1510  Stop Time: 1548  Time Calculation (min): 38 min  Timed Charges  64799 - PT Therapeutic Exercise Minutes: 38  Total Minutes  Timed Charges Total Minutes: 38   Total Minutes: 38  Therapy Charges for Today       Code Description Service Date Service Provider Modifiers Qty    [id number]  PT THER PROC EA 15 MIN 9/7/2023 Gloria Donaldson, PT GP 3                      Gloria Donaldson, PT  9/7/2023

## 2023-09-14 ENCOUNTER — HOSPITAL ENCOUNTER (OUTPATIENT)
Dept: PHYSICAL THERAPY | Facility: HOSPITAL | Age: 53
Setting detail: THERAPIES SERIES
Discharge: HOME | End: 2023-09-14
Payer: MEDICAID

## 2023-09-20 ENCOUNTER — APPOINTMENT (OUTPATIENT)
Dept: PHYSICAL THERAPY | Facility: HOSPITAL | Age: 53
End: 2023-09-20
Payer: MEDICAID

## 2023-09-22 ENCOUNTER — HOSPITAL ENCOUNTER (OUTPATIENT)
Dept: PHYSICAL THERAPY | Facility: HOSPITAL | Age: 53
Setting detail: THERAPIES SERIES
Discharge: HOME | End: 2023-09-22
Payer: MEDICAID

## 2023-09-25 ENCOUNTER — HOSPITAL ENCOUNTER (EMERGENCY)
Facility: HOSPITAL | Age: 53
Discharge: HOME | End: 2023-09-25
Attending: EMERGENCY MEDICINE | Admitting: EMERGENCY MEDICINE
Payer: MEDICAID

## 2023-09-25 ENCOUNTER — APPOINTMENT (OUTPATIENT)
Dept: CT IMAGING | Facility: HOSPITAL | Age: 53
End: 2023-09-25
Payer: MEDICAID

## 2023-09-25 VITALS
BODY MASS INDEX: 23.32 KG/M2 | HEIGHT: 65 IN | SYSTOLIC BLOOD PRESSURE: 116 MMHG | TEMPERATURE: 98.2 F | DIASTOLIC BLOOD PRESSURE: 80 MMHG | RESPIRATION RATE: 18 BRPM | WEIGHT: 140 LBS | OXYGEN SATURATION: 96 % | HEART RATE: 80 BPM

## 2023-09-25 DIAGNOSIS — K86.0 ALCOHOL-INDUCED CHRONIC PANCREATITIS: Primary | ICD-10-CM

## 2023-09-25 DIAGNOSIS — F14.90 COCAINE USE: ICD-10-CM

## 2023-09-25 LAB
ALBUMIN SERPL-MCNC: 4.1 G/DL (ref 3.5–5.2)
ALBUMIN/GLOB SERPL: 1.6 G/DL
ALP SERPL-CCNC: 153 U/L (ref 39–117)
ALT SERPL W P-5'-P-CCNC: 32 U/L (ref 1–33)
AMPHET+METHAMPHET UR QL: POSITIVE
ANION GAP SERPL CALCULATED.3IONS-SCNC: 13 MMOL/L (ref 5–15)
AST SERPL-CCNC: 60 U/L (ref 1–32)
BACTERIA UR QL AUTO: ABNORMAL /HPF
BARBITURATES UR QL SCN: NEGATIVE
BASOPHILS # BLD AUTO: 0.03 10*3/MM3 (ref 0–0.2)
BASOPHILS NFR BLD AUTO: 0.7 % (ref 0–1.5)
BENZODIAZ UR QL SCN: NEGATIVE
BILIRUB SERPL-MCNC: 0.9 MG/DL (ref 0–1.2)
BILIRUB UR QL STRIP: NEGATIVE
BUN SERPL-MCNC: 6 MG/DL (ref 6–20)
BUN/CREAT SERPL: 10.2 (ref 7–25)
CALCIUM SPEC-SCNC: 9.2 MG/DL (ref 8.6–10.5)
CANNABINOIDS SERPL QL: NEGATIVE
CHLORIDE SERPL-SCNC: 105 MMOL/L (ref 98–107)
CLARITY UR: CLEAR
CO2 SERPL-SCNC: 20 MMOL/L (ref 22–29)
COCAINE UR QL: POSITIVE
COLOR UR: ABNORMAL
CREAT SERPL-MCNC: 0.59 MG/DL (ref 0.57–1)
D-LACTATE SERPL-SCNC: 1.9 MMOL/L (ref 0.5–2)
DEPRECATED RDW RBC AUTO: 50.7 FL (ref 37–54)
EGFRCR SERPLBLD CKD-EPI 2021: 106.6 ML/MIN/1.73
EOSINOPHIL # BLD AUTO: 0.11 10*3/MM3 (ref 0–0.4)
EOSINOPHIL NFR BLD AUTO: 2.7 % (ref 0.3–6.2)
ERYTHROCYTE [DISTWIDTH] IN BLOOD BY AUTOMATED COUNT: 14.8 % (ref 12.3–15.4)
ETHANOL BLD-MCNC: 109 MG/DL (ref 0–10)
ETHANOL UR QL: 0.11 %
FENTANYL UR-MCNC: NEGATIVE NG/ML
GLOBULIN UR ELPH-MCNC: 2.6 GM/DL
GLUCOSE SERPL-MCNC: 112 MG/DL (ref 65–99)
GLUCOSE UR STRIP-MCNC: NEGATIVE MG/DL
HCT VFR BLD AUTO: 43.8 % (ref 34–46.6)
HGB BLD-MCNC: 14.8 G/DL (ref 12–15.9)
HGB UR QL STRIP.AUTO: NEGATIVE
HOLD SPECIMEN: NORMAL
HOLD SPECIMEN: NORMAL
HYALINE CASTS UR QL AUTO: ABNORMAL /LPF
KETONES UR QL STRIP: ABNORMAL
LEUKOCYTE ESTERASE UR QL STRIP.AUTO: ABNORMAL
LIPASE SERPL-CCNC: 18 U/L (ref 13–60)
LYMPHOCYTES # BLD AUTO: 1.43 10*3/MM3 (ref 0.7–3.1)
LYMPHOCYTES NFR BLD AUTO: 34.7 % (ref 19.6–45.3)
MCH RBC QN AUTO: 31.7 PG (ref 26.6–33)
MCHC RBC AUTO-ENTMCNC: 33.8 G/DL (ref 31.5–35.7)
MCV RBC AUTO: 93.8 FL (ref 79–97)
METHADONE UR QL SCN: NEGATIVE
MONOCYTES # BLD AUTO: 0.59 10*3/MM3 (ref 0.1–0.9)
MONOCYTES NFR BLD AUTO: 14.3 % (ref 5–12)
MUCOUS THREADS URNS QL MICRO: ABNORMAL /HPF
NEUTROPHILS NFR BLD AUTO: 1.96 10*3/MM3 (ref 1.7–7)
NEUTROPHILS NFR BLD AUTO: 47.6 % (ref 42.7–76)
NITRITE UR QL STRIP: NEGATIVE
OPIATES UR QL: POSITIVE
OXYCODONE UR QL SCN: POSITIVE
PH UR STRIP.AUTO: 6 [PH] (ref 5–8)
PLATELET # BLD AUTO: 58 10*3/MM3 (ref 140–450)
PMV BLD AUTO: 12.1 FL (ref 6–12)
POTASSIUM SERPL-SCNC: 3.6 MMOL/L (ref 3.5–5.2)
PROT SERPL-MCNC: 6.7 G/DL (ref 6–8.5)
PROT UR QL STRIP: NEGATIVE
RBC # BLD AUTO: 4.67 10*6/MM3 (ref 3.77–5.28)
RBC # UR STRIP: ABNORMAL /HPF
REF LAB TEST METHOD: ABNORMAL
SODIUM SERPL-SCNC: 138 MMOL/L (ref 136–145)
SP GR UR STRIP: >=1.03 (ref 1–1.03)
SQUAMOUS #/AREA URNS HPF: ABNORMAL /HPF
UROBILINOGEN UR QL STRIP: ABNORMAL
WBC # UR STRIP: ABNORMAL /HPF
WBC NRBC COR # BLD: 4.12 10*3/MM3 (ref 3.4–10.8)
WHOLE BLOOD HOLD COAG: NORMAL
WHOLE BLOOD HOLD SPECIMEN: NORMAL

## 2023-09-25 PROCEDURE — 83605 ASSAY OF LACTIC ACID: CPT | Performed by: EMERGENCY MEDICINE

## 2023-09-25 PROCEDURE — 80307 DRUG TEST PRSMV CHEM ANLYZR: CPT | Performed by: EMERGENCY MEDICINE

## 2023-09-25 PROCEDURE — 74176 CT ABD & PELVIS W/O CONTRAST: CPT

## 2023-09-25 PROCEDURE — 85025 COMPLETE CBC W/AUTO DIFF WBC: CPT | Performed by: EMERGENCY MEDICINE

## 2023-09-25 PROCEDURE — 81001 URINALYSIS AUTO W/SCOPE: CPT | Performed by: EMERGENCY MEDICINE

## 2023-09-25 PROCEDURE — 25010000002 HYDROMORPHONE PER 4 MG: Performed by: EMERGENCY MEDICINE

## 2023-09-25 PROCEDURE — 85007 BL SMEAR W/DIFF WBC COUNT: CPT | Performed by: EMERGENCY MEDICINE

## 2023-09-25 PROCEDURE — 96374 THER/PROPH/DIAG INJ IV PUSH: CPT

## 2023-09-25 PROCEDURE — 36415 COLL VENOUS BLD VENIPUNCTURE: CPT

## 2023-09-25 PROCEDURE — 99284 EMERGENCY DEPT VISIT MOD MDM: CPT

## 2023-09-25 PROCEDURE — 82077 ASSAY SPEC XCP UR&BREATH IA: CPT | Performed by: EMERGENCY MEDICINE

## 2023-09-25 PROCEDURE — 80053 COMPREHEN METABOLIC PANEL: CPT | Performed by: EMERGENCY MEDICINE

## 2023-09-25 PROCEDURE — 83690 ASSAY OF LIPASE: CPT | Performed by: EMERGENCY MEDICINE

## 2023-09-25 RX ORDER — SODIUM CHLORIDE 0.9 % (FLUSH) 0.9 %
10 SYRINGE (ML) INJECTION AS NEEDED
Status: DISCONTINUED | OUTPATIENT
Start: 2023-09-25 | End: 2023-09-26 | Stop reason: HOSPADM

## 2023-09-25 RX ORDER — HYDROMORPHONE HYDROCHLORIDE 1 MG/ML
0.5 INJECTION, SOLUTION INTRAMUSCULAR; INTRAVENOUS; SUBCUTANEOUS ONCE
Status: COMPLETED | OUTPATIENT
Start: 2023-09-25 | End: 2023-09-25

## 2023-09-25 RX ADMIN — HYDROMORPHONE HYDROCHLORIDE 0.5 MG: 1 INJECTION, SOLUTION INTRAMUSCULAR; INTRAVENOUS; SUBCUTANEOUS at 21:24

## 2023-09-25 RX ADMIN — SODIUM CHLORIDE, POTASSIUM CHLORIDE, SODIUM LACTATE AND CALCIUM CHLORIDE 1000 ML: 600; 310; 30; 20 INJECTION, SOLUTION INTRAVENOUS at 21:25

## 2023-09-25 NOTE — ED NOTES
"Pt to ED via [facility]. Pt c/o abdominal pain, N/V/D. Pt states \"I think it is my pancreatitis flaring up\". Denies fever.     Pt treated for UTI with keflx currently.   "

## 2023-09-26 NOTE — DISCHARGE INSTRUCTIONS
Recommend gentle diet and plenty of clear liquids as tolerated.  Please abstain from alcohol and all illicit drugs.  May return to the emergency room for any worsening pain, fevers, vomiting, weakness or any other concerns.

## 2023-09-26 NOTE — ED PROVIDER NOTES
" EMERGENCY DEPARTMENT ENCOUNTER    Room Number:  33/33  Date seen:  9/25/2023  PCP: Tylor Davis  Historian(s): Patient and her       HPI:  Chief Complaint: Abdominal pain, nausea and vomiting, alcohol use  A complete HPI/ROS/PMH/PSH/SH/FH are unobtainable / limited due to: Intoxicated  Context: Bekah Gibson is a 53 y.o. female who presents to the ED c/o abdominal pain with vomiting and diarrhea which she attributes to a \"flareup of her pancreatitis.\"  Patient has a history of alcohol induced pancreatitis problem.  She says that she had been sober for several months but relapsed over the past week and has been drinking heavily every day.  She also admits to recent illicit substance abuse with cocaine.  She apparently was seen at [facility] emergency department last Wednesday for similar problems and was told that she has \"mild pancreatitis at that time.\"  Despite the diagnosis, she has continued drinking alcohol and says her last drink was earlier this morning.  She denies any withdrawal symptoms At this time.  She was additionally diagnosed with a UTI at that time and has been taking Keflex antibiotic as prescribed.      PAST MEDICAL HISTORY  Active Ambulatory Problems     Diagnosis Date Noted    Irritable bowel syndrome with both constipation and diarrhea 06/05/2017    Peripheral neuropathy 06/05/2017    Vitamin D deficiency 06/05/2017    History of HPV infection 06/05/2017    Hypothyroidism 06/05/2017    Tobacco dependence due to cigarettes 06/05/2017    Acute kidney injury 12/28/2015    Ascites 06/06/2016    E. coli UTI (urinary tract infection) 06/06/2016    Alcohol withdrawal syndrome, with delirium 06/29/2018    Alcoholic hepatitis 06/29/2018    GI bleed 06/29/2018    Acute post-hemorrhagic anemia 06/29/2018    Thrombocytopenia 06/29/2018    Open wound of right shoulder region 06/29/2018    Pancreatic insufficiency 07/04/2018    Alcoholic ketoacidosis 07/21/2019    Chronic alcohol abuse " 07/29/2019    ESBL (extended spectrum beta-lactamase) producing bacteria infection 07/29/2019    Abnormal weight loss 12/13/2019    Hashimoto's disease 12/13/2019    Chronic fatigue 12/14/2019    History of alcohol use disorder 09/21/2021    Alcohol intoxication 09/21/2021    Lupus     Hypomagnesemia     Pancytopenia     Alcoholic cirrhosis     Bilateral lower abdominal discomfort 09/21/2021    Primary hypertension 10/24/2022    Melena 10/24/2022    Arthritis of shoulder 12/19/2022    Esophageal varices 01/16/2023    Essential hypertension 01/16/2023    Acute on chronic pancreatitis 01/22/2023    Abdominal pain 01/22/2023     Resolved Ambulatory Problems     Diagnosis Date Noted    Acute renal failure 02/20/2017    Kidney stone 06/05/2017    Hypotension 07/01/2018    Nausea and vomiting 09/21/2021    Acute GI bleeding 10/24/2022     Past Medical History:   Diagnosis Date    Anxiety     Arthritis     Cirrhosis     Disease of thyroid gland     ETOH abuse     Fracture, clavicle 1/2021    GERD (gastroesophageal reflux disease)     History of kidney stones     Hypertension     Left shoulder pain     MDD (major depressive disorder)     Pancreatitis     Periarthritis of shoulder see above    Rotator cuff syndrome odre for shoulder replacement         PAST SURGICAL HISTORY  Past Surgical History:   Procedure Laterality Date    CLAVICLE SURGERY Right 2018    ENDOSCOPY N/A 10/26/2022    Procedure: ESOPHAGOGASTRODUODENOSCOPY wtih banding;  Surgeon: Ag Patel MD;  Location: [facility] ENDOSCOPY;  Service: Gastroenterology;  Laterality: N/A;  pre: melena  post: esophageal varicies with banding x2 bands    ENDOSCOPY N/A 01/18/2023    Procedure: ESOPHAGOGASTRODUODENOSCOPY;  Surgeon: Ag Patel MD;  Location: [facility] ENDOSCOPY;  Service: Gastroenterology;  Laterality: N/A;  PRE OP - MAROON STOOLS  POST OP - SM ESOPHAGEAL VARICES    ESOPHAGEAL VARICES LIGATION      JOINT REPLACEMENT Bilateral     GREAT TOE    KIDNEY STONE  SURGERY      LITHOTRIPSY AND STENT (R SIDE)    LAPAROSCOPIC TUBAL LIGATION  2005    SIGMOIDOSCOPY N/A 01/18/2023    Procedure: SIGMOIDOSCOPY FLEXIBLE;  Surgeon: Ag Patel MD;  Location: [facility] ENDOSCOPY;  Service: Gastroenterology;  Laterality: N/A;  PRE OP - MAROON STOOLS  POST OP - RECTAL VARICES    TOTAL SHOULDER ARTHROPLASTY Left 5/9/2023    Procedure: reverse total shoulder arthroplasty;  Surgeon: Giovanni Denise MD;  [place] [facility] [place] [facility] [place]  Service: Orthopedics;  Laterality: Left;         FAMILY HISTORY  Family History   Problem Relation Age of Onset    Rheumatologic disease Mother         congestive heart    Thyroid disease Father     Leukemia Father     Cancer Father     Drug abuse Brother     Malig Hyperthermia Neg Hx          SOCIAL HISTORY  Social History     Socioeconomic History    Marital status:    Tobacco Use    Smoking status: Some Days     Packs/day: 0.25     Years: 10.00     Pack years: 2.50     Types: Cigarettes    Smokeless tobacco: Never    Tobacco comments:     1 PACK PER WEEK   Vaping Use    Vaping Use: Never used   Substance and Sexual Activity    Alcohol use: Not Currently     Comment: SOBER FOR 3 MONTHS    Drug use: Not Currently    Sexual activity: Not Currently     Partners: Male     Birth control/protection: Surgical     Comment: cinthia- menepausal         ALLERGIES  Benzonatate, Phenergan [promethazine hcl], Cucumber extract, and Promethazine-phenylephrine        REVIEW OF SYSTEMS  Review of Systems   Constitutional:  Positive for activity change and appetite change. Negative for fever.   HENT: Negative.     Eyes:  Negative for pain and visual disturbance.   Respiratory:  Negative for cough and shortness of breath.    Cardiovascular:  Negative for chest pain.   Gastrointestinal:  Positive for abdominal pain, diarrhea, nausea and vomiting.   Genitourinary:  Negative for dysuria.   Skin:  Negative for color change.   Neurological:  Negative for syncope and headaches.   All  other systems reviewed and are negative.         PHYSICAL EXAM  ED Triage Vitals   Temp Heart Rate Resp BP SpO2   09/25/23 1958 09/25/23 1958 09/25/23 1958 09/25/23 2019 09/25/23 1958   98.9 °F (37.2 °C) 92 18 100/69 94 %      Temp src Heart Rate Source Patient Position BP Location FiO2 (%)   -- -- 09/25/23 2019 09/25/23 2019 --     Sitting Left arm        Physical Exam      GENERAL: Intoxicated, appears uncomfortable but no acute distress, no diaphoresis  HENT: nares patent, normocephalic and atraumatic  EYES: no scleral icterus, EOMI, normal conjunctivae  CV: regular rhythm, normal rate, normal distal pulses  RESPIRATORY: normal effort, no stridor, lungs clear to auscultation bilaterally  ABDOMEN: soft, mild tenderness in the epigastrium only.  No peritoneal features evident anywhere.  Bowel sounds hypoactive throughout.  No masses palpable.  MUSCULOSKELETAL: no deformity, no asymmetry  NEURO: alert, moves all extremities, follows commands  PSYCH:  calm, cooperative, depressed affect  SKIN: warm, dry    Vital signs and nursing notes reviewed.        LAB RESULTS  Recent Results (from the past 24 hour(s))   Comprehensive Metabolic Panel    Collection Time: 09/25/23  8:26 PM    Specimen: Hand, Right; Blood   Result Value Ref Range    Glucose 112 (H) 65 - 99 mg/dL    BUN 6 6 - 20 mg/dL    Creatinine 0.59 0.57 - 1.00 mg/dL    Sodium 138 136 - 145 mmol/L    Potassium 3.6 3.5 - 5.2 mmol/L    Chloride 105 98 - 107 mmol/L    CO2 20.0 (L) 22.0 - 29.0 mmol/L    Calcium 9.2 8.6 - 10.5 mg/dL    Total Protein 6.7 6.0 - 8.5 g/dL    Albumin 4.1 3.5 - 5.2 g/dL    ALT (SGPT) 32 1 - 33 U/L    AST (SGOT) 60 (H) 1 - 32 U/L    Alkaline Phosphatase 153 (H) 39 - 117 U/L    Total Bilirubin 0.9 0.0 - 1.2 mg/dL    Globulin 2.6 gm/dL    A/G Ratio 1.6 g/dL    BUN/Creatinine Ratio 10.2 7.0 - 25.0    Anion Gap 13.0 5.0 - 15.0 mmol/L    eGFR 106.6 >60.0 mL/min/1.73   Lipase    Collection Time: 09/25/23  8:26 PM    Specimen: Hand, Right; Blood    Result Value Ref Range    Lipase 18 13 - 60 U/L   Lactic Acid, Plasma    Collection Time: 09/25/23  8:26 PM    Specimen: Hand, Right; Blood   Result Value Ref Range    Lactate 1.9 0.5 - 2.0 mmol/L   Green Top (Gel)    Collection Time: 09/25/23  8:26 PM   Result Value Ref Range    Extra Tube Hold for add-ons.    Lavender Top    Collection Time: 09/25/23  8:26 PM   Result Value Ref Range    Extra Tube hold for add-on    Gold Top - SST    Collection Time: 09/25/23  8:26 PM   Result Value Ref Range    Extra Tube Hold for add-ons.    Light Blue Top    Collection Time: 09/25/23  8:26 PM   Result Value Ref Range    Extra Tube Hold for add-ons.    CBC Auto Differential    Collection Time: 09/25/23  8:26 PM    Specimen: Blood   Result Value Ref Range    WBC 4.12 3.40 - 10.80 10*3/mm3    RBC 4.67 3.77 - 5.28 10*6/mm3    Hemoglobin 14.8 12.0 - 15.9 g/dL    Hematocrit 43.8 34.0 - 46.6 %    MCV 93.8 79.0 - 97.0 fL    MCH 31.7 26.6 - 33.0 pg    MCHC 33.8 31.5 - 35.7 g/dL    RDW 14.8 12.3 - 15.4 %    RDW-SD 50.7 37.0 - 54.0 fl    MPV 12.1 (H) 6.0 - 12.0 fL    Platelets 58 (L) 140 - 450 10*3/mm3    Neutrophil % 47.6 42.7 - 76.0 %    Lymphocyte % 34.7 19.6 - 45.3 %    Monocyte % 14.3 (H) 5.0 - 12.0 %    Eosinophil % 2.7 0.3 - 6.2 %    Basophil % 0.7 0.0 - 1.5 %    Neutrophils, Absolute 1.96 1.70 - 7.00 10*3/mm3    Lymphocytes, Absolute 1.43 0.70 - 3.10 10*3/mm3    Monocytes, Absolute 0.59 0.10 - 0.90 10*3/mm3    Eosinophils, Absolute 0.11 0.00 - 0.40 10*3/mm3    Basophils, Absolute 0.03 0.00 - 0.20 10*3/mm3   Ethanol    Collection Time: 09/25/23  8:26 PM    Specimen: Hand, Right; Blood   Result Value Ref Range    Ethanol 109 (H) 0 - 10 mg/dL    Ethanol % 0.109 %   Urinalysis With Microscopic If Indicated (No Culture) - Urine, Clean Catch    Collection Time: 09/25/23  9:09 PM    Specimen: Urine, Clean Catch   Result Value Ref Range    Color, UA Dark Yellow (A) Yellow, Straw    Appearance, UA Clear Clear    pH, UA 6.0 5.0 - 8.0     Specific Gravity, UA >=1.030 1.005 - 1.030    Glucose, UA Negative Negative    Ketones, UA Trace (A) Negative    Bilirubin, UA Negative Negative    Blood, UA Negative Negative    Protein, UA Negative Negative    Leuk Esterase, UA Small (1+) (A) Negative    Nitrite, UA Negative Negative    Urobilinogen, UA 0.2 E.U./dL 0.2 - 1.0 E.U./dL   Urine Drug Screen - Urine, Clean Catch    Collection Time: 09/25/23  9:09 PM    Specimen: Urine, Clean Catch   Result Value Ref Range    Amphet/Methamphet, Screen Positive (A) Negative    Barbiturates Screen, Urine Negative Negative    Benzodiazepine Screen, Urine Negative Negative    Cocaine Screen, Urine Positive (A) Negative    Opiate Screen Positive (A) Negative    THC, Screen, Urine Negative Negative    Methadone Screen, Urine Negative Negative    Oxycodone Screen, Urine Positive (A) Negative    Fentanyl, Urine Negative Negative   Urinalysis, Microscopic Only - Urine, Clean Catch    Collection Time: 09/25/23  9:09 PM    Specimen: Urine, Clean Catch   Result Value Ref Range    RBC, UA None Seen None Seen, 0-2 /HPF    WBC, UA 6-12 (A) None Seen, 0-2 /HPF    Bacteria, UA Trace (A) None Seen /HPF    Squamous Epithelial Cells, UA 7-12 (A) None Seen, 0-2 /HPF    Hyaline Casts, UA 0-2 None Seen /LPF    Mucus, UA Trace None Seen, Trace /HPF    Methodology Manual Light Microscopy        Ordered the above labs and reviewed the results.        RADIOLOGY  CT Abdomen Pelvis Without Contrast    Result Date: 9/25/2023  CT ABDOMEN AND PELVIS WITHOUT IV CONTRAST  HISTORY: Abdominal pain.  TECHNIQUE: Radiation dose reduction techniques were utilized, including automated exposure control and exposure modulation based on body size. 3 mm images were obtained through the abdomen and pelvis without the administration of IV contrast.  COMPARISON: CT abdomen pelvis 1/16/2023, 5/4/2022, 9/21/2021   FINDINGS: Unchanged dystrophic calcifications throughout the pancreas parenchyma with mild peripancreatic  inflammatory changes. A 1.2 cm organized mesenteric fluid collection with new dependent calcification (series 2 image 56).  Hepatic cirrhosis with similar sequela portal hypertension including upper abdominal portosystemic collaterals and mild splenomegaly (14 cm). Cholelithiasis. Remaining solid organs and bowel, including the appendix are normal. No abdominal pelvic lymphadenopathy. No focal osseous lesion.      1. Similar CT findings of chronic pancreatitis. Remaining small mesenteric pseudocyst is unchanged in size. 2. Hepatic cirrhosis with sequela of portal hypertension.  This report was finalized on 9/25/2023 10:26 PM by Dr. Familia Tobin M.D.       Ordered the above noted radiological studies. Reviewed by me in PACS.          PROCEDURES  Procedures        MEDICATIONS GIVEN IN ER  Medications   sodium chloride 0.9 % flush 10 mL (has no administration in time range)   sodium chloride 0.9 % flush 10 mL (has no administration in time range)   lactated ringers bolus 1,000 mL (0 mL Intravenous Stopped 9/25/23 2155)   HYDROmorphone (DILAUDID) injection 0.5 mg (0.5 mg Intravenous Given 9/25/23 2124)           MEDICAL DECISION MAKING, PROGRESS, and CONSULTS    All labs have been independently reviewed by me.  All radiology studies have been reviewed by me and I have also reviewed the radiology report.   EKG's independently viewed and interpreted by me.  Discussion below represents my analysis of pertinent findings related to patient's condition, differential diagnosis, treatment plan and final disposition.      Additional sources:  - Discussed/ obtained information from independent historians: None    - External (non-ED) record review: I reviewed the past discharge summary from May 10, 2023 when she was admitted here for a left reverse total shoulder arthroplasty.  She had follow-up care on May 22 and was recovering well without any complications.    - Chronic or social conditions impacting care: History of  alcohol abuse.  Says that she has been sober for several months but recently relapsed with heavy use and also admits to illicit substance abuse.  Tells me that she has been established with AA in the past and is planning to get back into their program this week.        Orders placed during this visit:  Orders Placed This Encounter   Procedures    CT Abdomen Pelvis Without Contrast    [place] Draw    Comprehensive Metabolic Panel    Lipase    Urinalysis With Microscopic If Indicated (No Culture) - Urine, Clean Catch    Lactic Acid, Plasma    CBC Auto Differential    Ethanol    Urine Drug Screen - Urine, Clean Catch    Scan Slide    Urinalysis, Microscopic Only - Urine, Clean Catch    NPO Diet NPO Type: Strict NPO    Undress & Gown    LHA (on-call MD unless specified) Details    Insert Peripheral IV    Insert Peripheral IV    CBC & Differential    Green Top (Gel)    Lavender Top    Gold Top - SST    Light Blue Top           Differential diagnosis includes but is not limited to:    Pancreatitis, small bowel obstruction, IBS, UTI, gastritis, peptic ulcer disease      Independent interpretation of labs, radiology studies, and discussions with consultants:  ED Course as of 09/25/23 2308   Mon Sep 25, 2023   2218 Second ED visit this week for persistent abdominal pain, nausea vomiting and diarrhea.  Patient says her last drink was earlier this morning.  High clinical suspicion for alcoholic pancreatitis. [ERIC]   2302 I just reassessed the patient.  She is calm and seems to be resting comfortably.  She has not had any vomiting.  Her pain has improved after a dose of Dilaudid and some IV fluids here.  Her work-up is stable.  Lipase is normal range.  CT shows chronic changes.  Her metabolic panel is not very worrisome.  AST and alk phos are elevated, consistent with alcohol use.  I do not think there is a clear indication that she has to be admitted at this time.  She indicates that me that she feels confident she can hold  "some clear liquids down at home and in fact she has been able to take her antibiotics as previously prescribed this week.  At this time, I will discharge her home to continue with her recovery process.  I encouraged her to abstain from alcohol and all illicit drugs and encouraged her to follow-up with AA group meetings this week as soon as possible.  We will review with her the usual \"return to ER\" instructions prior to discharge as well. [ERIC]   2308 Amphet/Methamphet, Screen(!): Positive [ERIC]   2308 Cocaine Screen, Urine(!): Positive [ERIC]   2308 Opiate Screen(!): Positive [ERIC]   2308 Oxycodone Screen, Urine(!): Positive [ERIC]   2308 Ethanol(!): 109 [ERIC]      ED Course User Index  [ERIC] Mukesh Andujar MD         DIAGNOSIS  Final diagnoses:   Alcohol-induced chronic pancreatitis   Cocaine use         DISPOSITION  Discharge home        Latest Documented Vital Signs:  As of 23:08 EDT  BP- 116/80 HR- 80 Temp- 98.2 °F (36.8 °C) O2 sat- 96%              --    Please note that portions of this were completed with a voice recognition program.       Note Disclaimer: At [facility], we believe that sharing information builds trust and better relationships. You are receiving this note because you are receiving care at [facility] or recently visited. It is possible you will see health information before a provider has talked with you about it. This kind of information can be easy to misunderstand. To help you fully understand what it means for your health, we urge you to discuss this note with your provider.             Mukesh Andujar MD  09/25/23 2308    "

## 2023-09-26 NOTE — ED TRIAGE NOTES
"Pt arrives ambulatory to triage desk via PV.    Pt states \"I have nausea, vomiting and diarrhea. I think my pancreas is flaring up again, I have been sober for 6 months but relapsed over the weekend and have been drinking everyday again. I also used cocaine this weekend too.\"    "

## 2023-10-07 ENCOUNTER — APPOINTMENT (OUTPATIENT)
Dept: CT IMAGING | Facility: HOSPITAL | Age: 53
DRG: 439 | End: 2023-10-07
Payer: MEDICAID

## 2023-10-07 ENCOUNTER — HOSPITAL ENCOUNTER (INPATIENT)
Facility: HOSPITAL | Age: 53
LOS: 3 days | Discharge: HOME | DRG: 439 | End: 2023-10-10
Attending: STUDENT IN AN ORGANIZED HEALTH CARE EDUCATION/TRAINING PROGRAM | Admitting: INTERNAL MEDICINE
Payer: MEDICAID

## 2023-10-07 DIAGNOSIS — F10.10 ALCOHOL ABUSE: ICD-10-CM

## 2023-10-07 DIAGNOSIS — K85.20 ALCOHOL-INDUCED ACUTE PANCREATITIS WITHOUT INFECTION OR NECROSIS: Primary | ICD-10-CM

## 2023-10-07 PROBLEM — D72.819 LEUKOPENIA: Status: ACTIVE | Noted: 2023-10-07

## 2023-10-07 PROBLEM — K85.90 PANCREATITIS: Status: ACTIVE | Noted: 2023-10-07

## 2023-10-07 LAB
ALBUMIN SERPL-MCNC: 4 G/DL (ref 3.5–5.2)
ALBUMIN/GLOB SERPL: 1.5 G/DL
ALP SERPL-CCNC: 139 U/L (ref 39–117)
ALT SERPL W P-5'-P-CCNC: 27 U/L (ref 1–33)
ANION GAP SERPL CALCULATED.3IONS-SCNC: 12.2 MMOL/L (ref 5–15)
AST SERPL-CCNC: 55 U/L (ref 1–32)
BACTERIA UR QL AUTO: ABNORMAL /HPF
BASOPHILS # BLD AUTO: 0.03 10*3/MM3 (ref 0–0.2)
BASOPHILS NFR BLD AUTO: 1 % (ref 0–1.5)
BILIRUB SERPL-MCNC: 1.3 MG/DL (ref 0–1.2)
BILIRUB UR QL STRIP: NEGATIVE
BUN SERPL-MCNC: 6 MG/DL (ref 6–20)
BUN/CREAT SERPL: 9.1 (ref 7–25)
CALCIUM SPEC-SCNC: 9.3 MG/DL (ref 8.6–10.5)
CHLORIDE SERPL-SCNC: 105 MMOL/L (ref 98–107)
CLARITY UR: CLEAR
CO2 SERPL-SCNC: 24.8 MMOL/L (ref 22–29)
COLOR UR: ABNORMAL
CREAT SERPL-MCNC: 0.66 MG/DL (ref 0.57–1)
D-LACTATE SERPL-SCNC: 1.2 MMOL/L (ref 0.5–2)
DEPRECATED RDW RBC AUTO: 49.2 FL (ref 37–54)
EGFRCR SERPLBLD CKD-EPI 2021: 103.7 ML/MIN/1.73
EOSINOPHIL # BLD AUTO: 0.13 10*3/MM3 (ref 0–0.4)
EOSINOPHIL NFR BLD AUTO: 4.4 % (ref 0.3–6.2)
ERYTHROCYTE [DISTWIDTH] IN BLOOD BY AUTOMATED COUNT: 14.3 % (ref 12.3–15.4)
ETHANOL BLD-MCNC: <10 MG/DL (ref 0–10)
ETHANOL UR QL: <0.01 %
GLOBULIN UR ELPH-MCNC: 2.7 GM/DL
GLUCOSE SERPL-MCNC: 106 MG/DL (ref 65–99)
GLUCOSE UR STRIP-MCNC: NEGATIVE MG/DL
HCT VFR BLD AUTO: 44.3 % (ref 34–46.6)
HGB BLD-MCNC: 15.2 G/DL (ref 12–15.9)
HGB UR QL STRIP.AUTO: NEGATIVE
HOLD SPECIMEN: NORMAL
HOLD SPECIMEN: NORMAL
HYALINE CASTS UR QL AUTO: ABNORMAL /LPF
IMM GRANULOCYTES # BLD AUTO: 0 10*3/MM3 (ref 0–0.05)
IMM GRANULOCYTES NFR BLD AUTO: 0 % (ref 0–0.5)
KETONES UR QL STRIP: NEGATIVE
LEUKOCYTE ESTERASE UR QL STRIP.AUTO: ABNORMAL
LIPASE SERPL-CCNC: 18 U/L (ref 13–60)
LYMPHOCYTES # BLD AUTO: 1.14 10*3/MM3 (ref 0.7–3.1)
LYMPHOCYTES NFR BLD AUTO: 38.5 % (ref 19.6–45.3)
MCH RBC QN AUTO: 32.6 PG (ref 26.6–33)
MCHC RBC AUTO-ENTMCNC: 34.3 G/DL (ref 31.5–35.7)
MCV RBC AUTO: 95.1 FL (ref 79–97)
MONOCYTES # BLD AUTO: 0.36 10*3/MM3 (ref 0.1–0.9)
MONOCYTES NFR BLD AUTO: 12.2 % (ref 5–12)
NEUTROPHILS NFR BLD AUTO: 1.3 10*3/MM3 (ref 1.7–7)
NEUTROPHILS NFR BLD AUTO: 43.9 % (ref 42.7–76)
NITRITE UR QL STRIP: NEGATIVE
NRBC BLD AUTO-RTO: 0 /100 WBC (ref 0–0.2)
PH UR STRIP.AUTO: 6 [PH] (ref 5–8)
PLATELET # BLD AUTO: 60 10*3/MM3 (ref 140–450)
PMV BLD AUTO: 11.7 FL (ref 6–12)
POTASSIUM SERPL-SCNC: 3.4 MMOL/L (ref 3.5–5.2)
PROT SERPL-MCNC: 6.7 G/DL (ref 6–8.5)
PROT UR QL STRIP: NEGATIVE
RBC # BLD AUTO: 4.66 10*6/MM3 (ref 3.77–5.28)
RBC # UR STRIP: ABNORMAL /HPF
REF LAB TEST METHOD: ABNORMAL
SODIUM SERPL-SCNC: 142 MMOL/L (ref 136–145)
SP GR UR STRIP: 1.02 (ref 1–1.03)
SQUAMOUS #/AREA URNS HPF: ABNORMAL /HPF
UROBILINOGEN UR QL STRIP: ABNORMAL
WBC # UR STRIP: ABNORMAL /HPF
WBC NRBC COR # BLD: 2.96 10*3/MM3 (ref 3.4–10.8)
WHOLE BLOOD HOLD COAG: NORMAL
WHOLE BLOOD HOLD SPECIMEN: NORMAL

## 2023-10-07 PROCEDURE — 83605 ASSAY OF LACTIC ACID: CPT

## 2023-10-07 PROCEDURE — 25010000002 MORPHINE PER 10 MG: Performed by: INTERNAL MEDICINE

## 2023-10-07 PROCEDURE — 25010000002 ONDANSETRON PER 1 MG: Performed by: STUDENT IN AN ORGANIZED HEALTH CARE EDUCATION/TRAINING PROGRAM

## 2023-10-07 PROCEDURE — 80053 COMPREHEN METABOLIC PANEL: CPT

## 2023-10-07 PROCEDURE — 25010000002 MORPHINE PER 10 MG: Performed by: STUDENT IN AN ORGANIZED HEALTH CARE EDUCATION/TRAINING PROGRAM

## 2023-10-07 PROCEDURE — 85025 COMPLETE CBC W/AUTO DIFF WBC: CPT

## 2023-10-07 PROCEDURE — 25010000002 HYDROMORPHONE PER 4 MG: Performed by: INTERNAL MEDICINE

## 2023-10-07 PROCEDURE — 83690 ASSAY OF LIPASE: CPT

## 2023-10-07 PROCEDURE — 99285 EMERGENCY DEPT VISIT HI MDM: CPT

## 2023-10-07 PROCEDURE — G0378 HOSPITAL OBSERVATION PER HR: HCPCS

## 2023-10-07 PROCEDURE — 25010000002 ONDANSETRON PER 1 MG: Performed by: INTERNAL MEDICINE

## 2023-10-07 PROCEDURE — 36415 COLL VENOUS BLD VENIPUNCTURE: CPT

## 2023-10-07 PROCEDURE — 82077 ASSAY SPEC XCP UR&BREATH IA: CPT

## 2023-10-07 PROCEDURE — 25010000002 SODIUM CHLORIDE 0.9 % WITH KCL 20 MEQ 20-0.9 MEQ/L-% SOLUTION: Performed by: INTERNAL MEDICINE

## 2023-10-07 PROCEDURE — 25010000002 THIAMINE HCL 200 MG/2ML SOLUTION: Performed by: INTERNAL MEDICINE

## 2023-10-07 PROCEDURE — 74176 CT ABD & PELVIS W/O CONTRAST: CPT

## 2023-10-07 PROCEDURE — 25810000003 SODIUM CHLORIDE 0.9 % SOLUTION: Performed by: STUDENT IN AN ORGANIZED HEALTH CARE EDUCATION/TRAINING PROGRAM

## 2023-10-07 PROCEDURE — 81001 URINALYSIS AUTO W/SCOPE: CPT | Performed by: STUDENT IN AN ORGANIZED HEALTH CARE EDUCATION/TRAINING PROGRAM

## 2023-10-07 RX ORDER — FOLIC ACID 1 MG/1
1 TABLET ORAL DAILY
Status: DISCONTINUED | OUTPATIENT
Start: 2023-10-07 | End: 2023-10-10 | Stop reason: HOSPADM

## 2023-10-07 RX ORDER — ONDANSETRON 2 MG/ML
4 INJECTION INTRAMUSCULAR; INTRAVENOUS ONCE
Status: COMPLETED | OUTPATIENT
Start: 2023-10-07 | End: 2023-10-07

## 2023-10-07 RX ORDER — OXYBUTYNIN CHLORIDE 10 MG/1
10 TABLET, EXTENDED RELEASE ORAL DAILY
COMMUNITY
Start: 2023-09-10

## 2023-10-07 RX ORDER — SODIUM CHLORIDE 0.9 % (FLUSH) 0.9 %
10 SYRINGE (ML) INJECTION AS NEEDED
Status: DISCONTINUED | OUTPATIENT
Start: 2023-10-07 | End: 2023-10-10 | Stop reason: HOSPADM

## 2023-10-07 RX ORDER — NADOLOL 40 MG/1
40 TABLET ORAL NIGHTLY
Status: DISCONTINUED | OUTPATIENT
Start: 2023-10-08 | End: 2023-10-07

## 2023-10-07 RX ORDER — LORAZEPAM 1 MG/1
2 TABLET ORAL
Status: DISCONTINUED | OUTPATIENT
Start: 2023-10-07 | End: 2023-10-10 | Stop reason: HOSPADM

## 2023-10-07 RX ORDER — FLUOXETINE HYDROCHLORIDE 20 MG/1
40 CAPSULE ORAL DAILY
Status: DISCONTINUED | OUTPATIENT
Start: 2023-10-07 | End: 2023-10-10 | Stop reason: HOSPADM

## 2023-10-07 RX ORDER — LORAZEPAM 1 MG/1
1 TABLET ORAL
Status: DISCONTINUED | OUTPATIENT
Start: 2023-10-07 | End: 2023-10-10 | Stop reason: HOSPADM

## 2023-10-07 RX ORDER — MORPHINE SULFATE 2 MG/ML
4 INJECTION, SOLUTION INTRAMUSCULAR; INTRAVENOUS ONCE
Status: COMPLETED | OUTPATIENT
Start: 2023-10-07 | End: 2023-10-07

## 2023-10-07 RX ORDER — NICOTINE 21 MG/24HR
1 PATCH, TRANSDERMAL 24 HOURS TRANSDERMAL
Status: DISCONTINUED | OUTPATIENT
Start: 2023-10-07 | End: 2023-10-10 | Stop reason: HOSPADM

## 2023-10-07 RX ORDER — NADOLOL 40 MG/1
40 TABLET ORAL
Status: DISCONTINUED | OUTPATIENT
Start: 2023-10-07 | End: 2023-10-07

## 2023-10-07 RX ORDER — BUSPIRONE HYDROCHLORIDE 15 MG/1
15 TABLET ORAL 3 TIMES DAILY PRN
Status: DISCONTINUED | OUTPATIENT
Start: 2023-10-07 | End: 2023-10-10 | Stop reason: HOSPADM

## 2023-10-07 RX ORDER — ONDANSETRON 2 MG/ML
4 INJECTION INTRAMUSCULAR; INTRAVENOUS EVERY 6 HOURS PRN
Status: DISCONTINUED | OUTPATIENT
Start: 2023-10-07 | End: 2023-10-10 | Stop reason: HOSPADM

## 2023-10-07 RX ORDER — MORPHINE SULFATE 2 MG/ML
4 INJECTION, SOLUTION INTRAMUSCULAR; INTRAVENOUS EVERY 4 HOURS PRN
Status: DISCONTINUED | OUTPATIENT
Start: 2023-10-07 | End: 2023-10-07

## 2023-10-07 RX ORDER — LACTULOSE 10 G/15ML
10 SOLUTION ORAL 3 TIMES DAILY
Status: DISCONTINUED | OUTPATIENT
Start: 2023-10-07 | End: 2023-10-10 | Stop reason: HOSPADM

## 2023-10-07 RX ORDER — LORAZEPAM 2 MG/ML
1 INJECTION INTRAMUSCULAR
Status: DISCONTINUED | OUTPATIENT
Start: 2023-10-07 | End: 2023-10-10 | Stop reason: HOSPADM

## 2023-10-07 RX ORDER — LORAZEPAM 2 MG/ML
2 INJECTION INTRAMUSCULAR
Status: DISCONTINUED | OUTPATIENT
Start: 2023-10-07 | End: 2023-10-10 | Stop reason: HOSPADM

## 2023-10-07 RX ORDER — SODIUM CHLORIDE 9 MG/ML
40 INJECTION, SOLUTION INTRAVENOUS AS NEEDED
Status: DISCONTINUED | OUTPATIENT
Start: 2023-10-07 | End: 2023-10-10 | Stop reason: HOSPADM

## 2023-10-07 RX ORDER — OXYBUTYNIN CHLORIDE 10 MG/1
10 TABLET, EXTENDED RELEASE ORAL DAILY
Status: DISCONTINUED | OUTPATIENT
Start: 2023-10-07 | End: 2023-10-10 | Stop reason: HOSPADM

## 2023-10-07 RX ORDER — TRAZODONE HYDROCHLORIDE 50 MG/1
50 TABLET ORAL NIGHTLY PRN
Status: DISCONTINUED | OUTPATIENT
Start: 2023-10-07 | End: 2023-10-10 | Stop reason: HOSPADM

## 2023-10-07 RX ORDER — THIAMINE HYDROCHLORIDE 100 MG/ML
200 INJECTION, SOLUTION INTRAMUSCULAR; INTRAVENOUS EVERY 8 HOURS SCHEDULED
Status: DISCONTINUED | OUTPATIENT
Start: 2023-10-07 | End: 2023-10-10 | Stop reason: HOSPADM

## 2023-10-07 RX ORDER — HYDROMORPHONE HYDROCHLORIDE 1 MG/ML
0.5 INJECTION, SOLUTION INTRAMUSCULAR; INTRAVENOUS; SUBCUTANEOUS
Status: DISCONTINUED | OUTPATIENT
Start: 2023-10-07 | End: 2023-10-08

## 2023-10-07 RX ORDER — LANOLIN ALCOHOL/MO/W.PET/CERES
400 CREAM (GRAM) TOPICAL DAILY
Status: DISCONTINUED | OUTPATIENT
Start: 2023-10-07 | End: 2023-10-10 | Stop reason: HOSPADM

## 2023-10-07 RX ORDER — NADOLOL 40 MG/1
40 TABLET ORAL NIGHTLY
Status: DISCONTINUED | OUTPATIENT
Start: 2023-10-07 | End: 2023-10-10 | Stop reason: HOSPADM

## 2023-10-07 RX ORDER — LEVOTHYROXINE SODIUM 0.1 MG/1
100 TABLET ORAL DAILY
Status: DISCONTINUED | OUTPATIENT
Start: 2023-10-07 | End: 2023-10-10 | Stop reason: HOSPADM

## 2023-10-07 RX ORDER — OXYCODONE HYDROCHLORIDE 5 MG/1
5 TABLET ORAL EVERY 4 HOURS PRN
Status: DISCONTINUED | OUTPATIENT
Start: 2023-10-07 | End: 2023-10-10 | Stop reason: HOSPADM

## 2023-10-07 RX ORDER — FERROUS SULFATE 325(65) MG
325 TABLET ORAL
Status: DISCONTINUED | OUTPATIENT
Start: 2023-10-08 | End: 2023-10-10 | Stop reason: HOSPADM

## 2023-10-07 RX ORDER — LEVOTHYROXINE SODIUM 88 UG/1
88 TABLET ORAL
Status: DISCONTINUED | OUTPATIENT
Start: 2023-10-08 | End: 2023-10-07

## 2023-10-07 RX ORDER — AMLODIPINE BESYLATE 5 MG/1
5 TABLET ORAL DAILY
Status: DISCONTINUED | OUTPATIENT
Start: 2023-10-07 | End: 2023-10-10 | Stop reason: HOSPADM

## 2023-10-07 RX ORDER — DIPHENOXYLATE HYDROCHLORIDE AND ATROPINE SULFATE 2.5; .025 MG/1; MG/1
1 TABLET ORAL DAILY
Status: DISCONTINUED | OUTPATIENT
Start: 2023-10-07 | End: 2023-10-10 | Stop reason: HOSPADM

## 2023-10-07 RX ORDER — SODIUM CHLORIDE AND POTASSIUM CHLORIDE 150; 900 MG/100ML; MG/100ML
100 INJECTION, SOLUTION INTRAVENOUS CONTINUOUS
Status: DISCONTINUED | OUTPATIENT
Start: 2023-10-07 | End: 2023-10-10 | Stop reason: HOSPADM

## 2023-10-07 RX ORDER — LORAZEPAM 1 MG/1
2 TABLET ORAL EVERY 6 HOURS
Status: DISCONTINUED | OUTPATIENT
Start: 2023-10-07 | End: 2023-10-08

## 2023-10-07 RX ORDER — PANTOPRAZOLE SODIUM 40 MG/1
40 TABLET, DELAYED RELEASE ORAL DAILY
Status: DISCONTINUED | OUTPATIENT
Start: 2023-10-07 | End: 2023-10-10 | Stop reason: HOSPADM

## 2023-10-07 RX ORDER — LORAZEPAM 1 MG/1
1 TABLET ORAL EVERY 6 HOURS
Status: DISCONTINUED | OUTPATIENT
Start: 2023-10-08 | End: 2023-10-08

## 2023-10-07 RX ORDER — SODIUM CHLORIDE 0.9 % (FLUSH) 0.9 %
10 SYRINGE (ML) INJECTION EVERY 12 HOURS SCHEDULED
Status: DISCONTINUED | OUTPATIENT
Start: 2023-10-07 | End: 2023-10-10 | Stop reason: HOSPADM

## 2023-10-07 RX ADMIN — ONDANSETRON 4 MG: 2 INJECTION INTRAMUSCULAR; INTRAVENOUS at 18:21

## 2023-10-07 RX ADMIN — NADOLOL 40 MG: 40 TABLET ORAL at 21:10

## 2023-10-07 RX ADMIN — ONDANSETRON 4 MG: 2 INJECTION INTRAMUSCULAR; INTRAVENOUS at 12:45

## 2023-10-07 RX ADMIN — THIAMINE HYDROCHLORIDE 200 MG: 100 INJECTION, SOLUTION INTRAMUSCULAR; INTRAVENOUS at 22:11

## 2023-10-07 RX ADMIN — FLUOXETINE HYDROCHLORIDE 40 MG: 20 CAPSULE ORAL at 21:10

## 2023-10-07 RX ADMIN — POTASSIUM CHLORIDE AND SODIUM CHLORIDE 100 ML/HR: 900; 150 INJECTION, SOLUTION INTRAVENOUS at 21:04

## 2023-10-07 RX ADMIN — HYDROMORPHONE HYDROCHLORIDE 0.5 MG: 1 INJECTION, SOLUTION INTRAMUSCULAR; INTRAVENOUS; SUBCUTANEOUS at 22:11

## 2023-10-07 RX ADMIN — Medication 10 ML: at 21:11

## 2023-10-07 RX ADMIN — NICOTINE 1 PATCH: 21 PATCH, EXTENDED RELEASE TRANSDERMAL at 19:35

## 2023-10-07 RX ADMIN — MORPHINE SULFATE 4 MG: 2 INJECTION, SOLUTION INTRAMUSCULAR; INTRAVENOUS at 18:21

## 2023-10-07 RX ADMIN — MORPHINE SULFATE 4 MG: 2 INJECTION, SOLUTION INTRAMUSCULAR; INTRAVENOUS at 12:48

## 2023-10-07 RX ADMIN — PANTOPRAZOLE SODIUM 40 MG: 40 TABLET, DELAYED RELEASE ORAL at 18:20

## 2023-10-07 RX ADMIN — OXYCODONE HYDROCHLORIDE 5 MG: 5 TABLET ORAL at 19:36

## 2023-10-07 RX ADMIN — SODIUM CHLORIDE 1000 ML: 9 INJECTION, SOLUTION INTRAVENOUS at 12:46

## 2023-10-07 RX ADMIN — LORAZEPAM 2 MG: 1 TABLET ORAL at 18:20

## 2023-10-07 NOTE — ED NOTES
Nursing report ED to floor  Bekah Gibson  53 y.o.  female    HPI :   Chief Complaint   Patient presents with    Alcohol Problem       Admitting doctor:   Giovana Baires MD    Admitting diagnosis:   The primary encounter diagnosis was Alcohol-induced acute pancreatitis without infection or necrosis. A diagnosis of Alcohol abuse was also pertinent to this visit.    Code status:   Current Code Status       Date Active Code Status Order ID Comments User Context       Prior            Allergies:   Benzonatate, Phenergan [promethazine hcl], Cucumber extract, and Promethazine-phenylephrine    Isolation:   No active isolations    Intake and Output    Intake/Output Summary (Last 24 hours) at 10/7/2023 1555  Last data filed at 10/7/2023 1246  Gross per 24 hour   Intake 1000 ml   Output --   Net 1000 ml       Weight:       10/07/23  1010   Weight: 62.1 kg (137 lb)       Most recent vitals:   Vitals:    10/07/23 1201 10/07/23 1213 10/07/23 1231 10/07/23 1250   BP: 141/80  143/87    BP Location:       Patient Position:       Pulse: 56 60 61 60   Resp:       Temp:       TempSrc:       SpO2: 92% 92% 90% 96%   Weight:       Height:           Active LDAs/IV Access:   Lines, Drains & Airways       Active LDAs       Name Placement date Placement time Site Days    Peripheral IV 10/07/23 1016 Anterior;Proximal;Right Forearm 10/07/23  1016  Forearm  less than 1                    Labs (abnormal labs have a star):   Labs Reviewed   COMPREHENSIVE METABOLIC PANEL - Abnormal; Notable for the following components:       Result Value    Glucose 106 (*)     Potassium 3.4 (*)     AST (SGOT) 55 (*)     Alkaline Phosphatase 139 (*)     Total Bilirubin 1.3 (*)     All other components within normal limits    Narrative:     GFR Normal >60  Chronic Kidney Disease <60  Kidney Failure <15     URINALYSIS W/ MICROSCOPIC IF INDICATED (NO CULTURE) - Abnormal; Notable for the following components:    Color, UA Dark Yellow (*)     Leuk Esterase, UA Small  (1+) (*)     All other components within normal limits   CBC WITH AUTO DIFFERENTIAL - Abnormal; Notable for the following components:    WBC 2.96 (*)     Platelets 60 (*)     Monocyte % 12.2 (*)     Neutrophils, Absolute 1.30 (*)     All other components within normal limits   URINALYSIS, MICROSCOPIC ONLY - Abnormal; Notable for the following components:    WBC, UA 6-12 (*)     Squamous Epithelial Cells, UA 3-6 (*)     All other components within normal limits   LIPASE - Normal   LACTIC ACID, PLASMA - Normal   RAINBOW DRAW    Narrative:     The following orders were created for panel order [place] Draw.  Procedure                               Abnormality         Status                     ---------                               -----------         ------                     Green Top (Gel)[[id number]]                                  Final result               Lavender Top[[id number]]                                     Final result               Gold Top - SST[[id number]]                                   Final result               Light Blue Top[[id number]]                                   Final result                 Please view results for these tests on the individual orders.   ETHANOL   CBC AND DIFFERENTIAL    Narrative:     The following orders were created for panel order CBC & Differential.  Procedure                               Abnormality         Status                     ---------                               -----------         ------                     CBC Auto Differential[[id number]]        Abnormal            Final result                 Please view results for these tests on the individual orders.   GREEN TOP   LAVENDER TOP   GOLD TOP - SST   LIGHT BLUE TOP       EKG:   No orders to display       Meds given in ED:   Medications   sodium chloride 0.9 % flush 10 mL (has no administration in time range)   morphine injection 4 mg (4 mg Intravenous Given 10/7/23 1248)   ondansetron (ZOFRAN) injection  4 mg (4 mg Intravenous Given 10/7/23 1245)   sodium chloride 0.9 % bolus 1,000 mL (1,000 mL Intravenous New Bag 10/7/23 1246)       Imaging results:  CT Abdomen Pelvis Without Contrast    Result Date: 10/7/2023    1. Small strandy density around the pancreatic body could be evidence of acute pancreatitis, superimposed on chronic pancreatitis, correlate clinically. Cholelithiasis.  2. No acute inflammatory process of bowel is identified, follow-up as indications persist.  3. No urolithiasis or hydronephrosis.   This report was finalized on 10/7/2023 11:34 AM by Dr. Wilder Hanna M.D on Workstation: [employer]       Ambulatory status:   - bedrest    Social issues:   Social History     Socioeconomic History    Marital status:    Tobacco Use    Smoking status: Some Days     Packs/day: 0.25     Years: 10.00     Additional pack years: 0.00     Total pack years: 2.50     Types: Cigarettes    Smokeless tobacco: Never    Tobacco comments:     1 PACK PER WEEK   Vaping Use    Vaping Use: Never used   Substance and Sexual Activity    Alcohol use: Not Currently     Comment: SOBER FOR 3 MONTHS    Drug use: Not Currently    Sexual activity: Not Currently     Partners: Male     Birth control/protection: Surgical     Comment: cinthia- menepausal       NIH Stroke Scale:       Aide Potts RN  10/07/23 15:55 EDT

## 2023-10-07 NOTE — ED TRIAGE NOTES
C/O ETOH relapse since Monday after 90 days clean. Last ETOH drink 1500 yesterday. Also C/O pancreatitis flare up with upper abd pain

## 2023-10-07 NOTE — ED NOTES
Nursing report ED to floor  Bekah Gibson  53 y.o.  female    HPI :   Chief Complaint   Patient presents with    Alcohol Problem       Admitting doctor:   No admitting provider for patient encounter.    Admitting diagnosis:   The primary encounter diagnosis was Alcohol-induced acute pancreatitis without infection or necrosis. A diagnosis of Alcohol abuse was also pertinent to this visit.    Code status:   Current Code Status       Date Active Code Status Order ID Comments User Context       Prior            Allergies:   Benzonatate, Phenergan [promethazine hcl], Cucumber extract, and Promethazine-phenylephrine    Isolation:   No active isolations    Intake and Output    Intake/Output Summary (Last 24 hours) at 10/7/2023 1251  Last data filed at 10/7/2023 1246  Gross per 24 hour   Intake 1000 ml   Output --   Net 1000 ml       Weight:       10/07/23  1010   Weight: 62.1 kg (137 lb)       Most recent vitals:   Vitals:    10/07/23 1201 10/07/23 1213 10/07/23 1231 10/07/23 1250   BP: 141/80  143/87    BP Location:       Patient Position:       Pulse: 56 60 61 60   Resp:       Temp:       TempSrc:       SpO2: 92% 92% 90% 96%   Weight:       Height:           Active LDAs/IV Access:   Lines, Drains & Airways       Active LDAs       Name Placement date Placement time Site Days    Peripheral IV 10/07/23 1016 Anterior;Proximal;Right Forearm 10/07/23  1016  Forearm  less than 1                    Labs (abnormal labs have a star):   Labs Reviewed   COMPREHENSIVE METABOLIC PANEL - Abnormal; Notable for the following components:       Result Value    Glucose 106 (*)     Potassium 3.4 (*)     AST (SGOT) 55 (*)     Alkaline Phosphatase 139 (*)     Total Bilirubin 1.3 (*)     All other components within normal limits    Narrative:     GFR Normal >60  Chronic Kidney Disease <60  Kidney Failure <15     URINALYSIS W/ MICROSCOPIC IF INDICATED (NO CULTURE) - Abnormal; Notable for the following components:    Color, UA Dark Yellow (*)      Leuk Esterase, UA Small (1+) (*)     All other components within normal limits   CBC WITH AUTO DIFFERENTIAL - Abnormal; Notable for the following components:    WBC 2.96 (*)     Platelets 60 (*)     Monocyte % 12.2 (*)     Neutrophils, Absolute 1.30 (*)     All other components within normal limits   URINALYSIS, MICROSCOPIC ONLY - Abnormal; Notable for the following components:    WBC, UA 6-12 (*)     Squamous Epithelial Cells, UA 3-6 (*)     All other components within normal limits   LIPASE - Normal   LACTIC ACID, PLASMA - Normal   RAINBOW DRAW    Narrative:     The following orders were created for panel order [facility] Draw.  Procedure                               Abnormality         Status                     ---------                               -----------         ------                     Green Top (Gel)[[id number]]                                  Final result               Lavender Top[[id number]]                                     Final result               Gold Top - SST[[id number]]                                   Final result               Light Blue Top[[id number]]                                   Final result                 Please view results for these tests on the individual orders.   ETHANOL   CBC AND DIFFERENTIAL    Narrative:     The following orders were created for panel order CBC & Differential.  Procedure                               Abnormality         Status                     ---------                               -----------         ------                     CBC Auto Differential[[id number]]        Abnormal            Final result                 Please view results for these tests on the individual orders.   GREEN TOP   LAVENDER TOP   GOLD TOP - SST   LIGHT BLUE TOP       EKG:   No orders to display       Meds given in ED:   Medications   sodium chloride 0.9 % flush 10 mL (has no administration in time range)   sodium chloride 0.9 % bolus 1,000 mL (1,000 mL Intravenous New Bag  10/7/23 1246)   morphine injection 4 mg (4 mg Intravenous Given 10/7/23 1248)   ondansetron (ZOFRAN) injection 4 mg (4 mg Intravenous Given 10/7/23 1245)       Imaging results:  CT Abdomen Pelvis Without Contrast    Result Date: 10/7/2023    1. Small strandy density around the pancreatic body could be evidence of acute pancreatitis, superimposed on chronic pancreatitis, correlate clinically. Cholelithiasis.  2. No acute inflammatory process of bowel is identified, follow-up as indications persist.  3. No urolithiasis or hydronephrosis.   This report was finalized on 10/7/2023 11:34 AM by Dr. Wilder Hanna M.D on Workstation: [employer]       Ambulatory status:   - bedrest/ up with assist    Social issues:   Social History     Socioeconomic History    Marital status:    Tobacco Use    Smoking status: Some Days     Packs/day: 0.25     Years: 10.00     Additional pack years: 0.00     Total pack years: 2.50     Types: Cigarettes    Smokeless tobacco: Never    Tobacco comments:     1 PACK PER WEEK   Vaping Use    Vaping Use: Never used   Substance and Sexual Activity    Alcohol use: Not Currently     Comment: SOBER FOR 3 MONTHS    Drug use: Not Currently    Sexual activity: Not Currently     Partners: Male     Birth control/protection: Surgical     Comment: cinthia- menepausal       NIH Stroke Scale:       Aide Potts RN  10/07/23 12:51 EDT

## 2023-10-07 NOTE — H&P
Internal medicine history and physical  INTERNAL MEDICINE   [facility]       Patient Identification:  Name: Bekah Gibson  Age: 53 y.o.  Sex: female  :  1970  MRN: [id number]                   Primary Care Physician: Tylor Davis                               Date of admission:10/7/2023    Chief Complaint: Upper abdominal pain and possible flare of pancreatitis with discomfort going on for the last few days.    History of Present Illness:   Patient is a 53-year-old female who has history of chronic liver disease due to alcohol abuse, chronic alcohol abuse, chronic recurrent abdominal pain and pancreatitis, history of cirrhosis and major depressive disorder as well as hypertension has been sober for quite some time from drinking alcohol until a month or so ago when she had a flare and started drinking again.  She has been to various emergency rooms in the last month including [facility] emergency room as well as to our facility on 2023.  Patient has similar symptoms of abdominal pain after bout of drinking and was diagnosed with mild pancreatitis and was treated with supportive care.  In this background patient had another flare of alcoholic changed and had significant drinking this past Wednesday.  Subsequently she started having abdominal discomfort which she said this was there all along but got worse.  She decided not to drink any alcohol anymore but had to have a drink yesterday because she was having tremors and shakes.  Abdominal pain got worse and was not getting any better so decided to come to the emergency room and is diagnosed with acute pancreatitis based on the imaging studies as her lipase essentially within normal limit.  Patient is feeling somewhat better after receiving IV fluids and pain medications but wants Dilaudid instead of morphine as morphine does not last very long.  She denies any hematemesis or melena.  Her pain does get worse upon  eating food.  She is nauseous but has not had significant vomiting.  Upon review of record it appears that she tells the same story about having been free of alcohol use for 100 days but has had visits to the emergency room for the last month.      Past Medical History:  Past Medical History:   Diagnosis Date    Anxiety     Arthritis     Cirrhosis     Disease of thyroid gland     ETOH abuse     SOBER FOR 3 MONTHS    Fracture, clavicle 1/2021    shattered clavicel on issue slip and fall    GERD (gastroesophageal reflux disease)     History of kidney stones     5 YR AGO    Hypertension     Left shoulder pain     MDD (major depressive disorder)     Pancreatitis     Periarthritis of shoulder see above    Rotator cuff syndrome odre for shoulder replacement    unable to receive due to low platelets    Thrombocytopenia      Past Surgical History:  Past Surgical History:   Procedure Laterality Date    CLAVICLE SURGERY Right 2018    ENDOSCOPY N/A 10/26/2022    Procedure: ESOPHAGOGASTRODUODENOSCOPY wtih banding;  Surgeon: Ag Patel MD;  Location: [facility] ENDOSCOPY;  Service: Gastroenterology;  Laterality: N/A;  pre: melena  post: esophageal varicies with banding x2 bands    ENDOSCOPY N/A 01/18/2023    Procedure: ESOPHAGOGASTRODUODENOSCOPY;  Surgeon: Ag Patel MD;  Location: [facility] ENDOSCOPY;  Service: Gastroenterology;  Laterality: N/A;  PRE OP - MAROON STOOLS  POST OP - SM ESOPHAGEAL VARICES    ESOPHAGEAL VARICES LIGATION      JOINT REPLACEMENT Bilateral     GREAT TOE    KIDNEY STONE SURGERY      LITHOTRIPSY AND STENT (R SIDE)    LAPAROSCOPIC TUBAL LIGATION  2005    SIGMOIDOSCOPY N/A 01/18/2023    Procedure: SIGMOIDOSCOPY FLEXIBLE;  Surgeon: Ag Patel MD;  Location: [facility] ENDOSCOPY;  Service: Gastroenterology;  Laterality: N/A;  PRE OP - MAROON STOOLS  POST OP - RECTAL VARICES    TOTAL SHOULDER ARTHROPLASTY Left 5/9/2023    Procedure: reverse total shoulder arthroplasty;  Surgeon: Giovanni Denise MD;   [place] [facility] [place] [facility] [place]  Service: [place]opedics;  Laterality: Left;      Home Meds:  Medications Prior to Admission   Medication Sig Dispense Refill Last Dose    acetaminophen (TYLENOL) 325 MG tablet Take 2 tablets by mouth 2 (Two) Times a Day As Needed for Mild Pain. 60 tablet 0     amLODIPine (NORVASC) 5 MG tablet Take 1 tablet by mouth Daily.   10/7/2023    busPIRone (BUSPAR) 15 MG tablet 1 tablet 3 (Three) Times a Day As Needed.   10/7/2023    Doptelet 20 MG tablet 3 tablets Daily. TAKES 10 DAYS BEFORE SURGERY       ferrous sulfate 325 (65 FE) MG tablet Take 1 tablet by mouth Daily With Breakfast.   10/7/2023    FLUoxetine (PROZAC) 40 MG capsule Take 1 capsule by mouth Daily. 30 capsule 3 10/7/2023    folic acid (FOLVITE) 1 MG tablet Take 1 tablet by mouth Daily.   10/7/2023    lactulose (CHRONULAC) 10 GM/15ML solution    Past Month    levothyroxine (SYNTHROID, LEVOTHROID) 100 MCG tablet TAKE ONE TABLET BY MOUTH DAILY 30 tablet 3 10/6/2023    levothyroxine (SYNTHROID, LEVOTHROID) 88 MCG tablet    10/7/2023    MAGnesium-Oxide 400 (240 Mg) MG tablet Daily. HOLD PRIOR TO SURG   10/7/2023    Multiple Vitamin (MULTIVITAMIN) capsule Take 1 capsule by mouth Daily. HOLD PRIOR TO SURG   10/7/2023    nadolol (CORGARD) 40 MG tablet Take 1 tablet by mouth every night at bedtime.   10/6/2023    nicotine (NICODERM CQ) 21 MG/24HR patch Place 1 patch on the skin as directed by provider Daily. 21 each 0     ondansetron (Zofran) 4 MG tablet Take 1 tablet by mouth Every 8 (Eight) Hours As Needed for Nausea or Vomiting. 12 tablet 0 10/6/2023    ondansetron ODT (ZOFRAN-ODT) 4 MG disintegrating tablet Place 1 tablet on the tongue Every 8 (Eight) Hours As Needed.   10/6/2023    oxybutynin XL (DITROPAN-XL) 10 MG 24 hr tablet Take 1 tablet by mouth Daily.       oxyCODONE (Roxicodone) 5 MG immediate release tablet Take 1 tablet by mouth Every 4 (Four) Hours As Needed for Moderate Pain. 20 tablet 0 10/6/2023    oxyCODONE-acetaminophen  "(Percocet)  MG per tablet Take 1 tablet by mouth Every 4 (Four) Hours As Needed for Moderate Pain. 50 tablet 0 Past Month    pantoprazole (PROTONIX) 40 MG EC tablet 1 tablet Daily.   10/7/2023    traZODone (DESYREL) 50 MG tablet Take 1 tablet by mouth At Night As Needed.   10/6/2023    naloxone (NARCAN) 4 MG/0.1ML nasal spray Call 911. Don't prime. [place] in 1 nostril for overdose. Repeat in 2-3 minutes in other nostril if no or minimal breathing/responsiveness. 2 each 0 Unknown     Current Meds:     Current Facility-Administered Medications:     sodium chloride 0.9 % flush 10 mL, 10 mL, Intravenous, PRN, Familia Crum MD    Facility-Administered Medications Ordered in Other Encounters:     sodium chloride 0.9 % flush 10 mL, 10 mL, Intravenous, Q12H, Callie Quiroz MD    sodium chloride 0.9 % flush 10 mL, 10 mL, Intravenous, PRN, Callie Quiroz MD    sodium chloride 0.9 % flush 20 mL, 20 mL, Intravenous, PRN, Callie Quiroz MD  Allergies:  Allergies   Allergen Reactions    Benzonatate Nausea Only and Other (See Comments)     Rash     Phenergan [Promethazine Hcl] Hives    Cucumber Extract Rash    Promethazine-Phenylephrine Other (See Comments)     \"FEEL WEIRD\"     Social History:   Social History     Tobacco Use    Smoking status: Some Days     Packs/day: 0.25     Years: 10.00     Additional pack years: 0.00     Total pack years: 2.50     Types: Cigarettes    Smokeless tobacco: Never    Tobacco comments:     1 PACK PER WEEK   Substance Use Topics    Alcohol use: Not Currently     Comment: SOBER FOR 3 MONTHS      Family History:  Family History   Problem Relation Age of Onset    Rheumatologic disease Mother         congestive heart    Thyroid disease Father     Leukemia Father     Cancer Father     Drug abuse Brother     Malig Hyperthermia Neg Hx           Review of Systems  See history of present illness and past medical history.    As described in the history of " "presenting illness.      Vitals:   /85 (BP Location: Left arm, Patient Position: Lying)   Pulse 62   Temp 97.8 øF (36.6 øC) (Oral)   Resp 16   Ht 165.1 cm (65\")   Wt 62.9 kg (138 lb 10.7 oz)   LMP 01/01/2013 Comment: NATANAEL  SpO2 94%   BMI 23.08 kg/mý   I/O:   Intake/Output Summary (Last 24 hours) at 10/7/2023 1721  Last data filed at 10/7/2023 1316  Gross per 24 hour   Intake 2000 ml   Output --   Net 2000 ml     Exam:  Patient is examined using the personal protective equipment as per guidelines from infection control for this particular patient as enacted.  Hand washing was performed before and after patient interaction.  General Appearance:    Alert, cooperative, no distress, appears stated age   Head:    Normocephalic, without obvious abnormality, atraumatic   Eyes:    PERRL, conjunctiva/corneas clear, EOM's intact, both eyes   Ears:    Normal external ear canals, both ears   Nose:   Nares normal, septum midline, mucosa normal, no drainage    or sinus tenderness   Throat:   Lips, tongue, gums normal; oral mucosa pink and moist   Neck: Supple   Back:     Symmetric, no curvature, ROM normal, no CVA tenderness   Lungs:     Clear to auscultation bilaterally, respirations unlabored   Chest Wall:    No tenderness or deformity    Heart:    Regular rate and rhythm, S1 and S2 normal, no murmur, rub   or gallop   Abdomen:   Soft mild generalized abdominal tenderness   Extremities:   Extremities normal, atraumatic, no cyanosis or edema   Pulses:   Pulses palpable in all extremities; symmetric all extremities   Skin:   Skin color normal, Skin is warm and dry,  no rashes or palpable lesions   Neurologic: Alert oriented and grossly nonfocal examination with minimal tremors       Data Review:      I reviewed the patient's new clinical results.  Results from last 7 days   Lab Units 10/07/23  0924   WBC 10*3/mm3 2.96*   HEMOGLOBIN g/dL 15.2   PLATELETS 10*3/mm3 60*     Results from last 7 days   Lab Units " 10/07/23  0924   SODIUM mmol/L 142   POTASSIUM mmol/L 3.4*   CHLORIDE mmol/L 105   CO2 mmol/L 24.8   BUN mg/dL 6   CREATININE mg/dL 0.66   CALCIUM mg/dL 9.3   GLUCOSE mg/dL 106*     CT Abdomen Pelvis Without Contrast    Result Date: 10/7/2023    1. Small strandy density around the pancreatic body could be evidence of acute pancreatitis, superimposed on chronic pancreatitis, correlate clinically. Cholelithiasis.  2. No acute inflammatory process of bowel is identified, follow-up as indications persist.  3. No urolithiasis or hydronephrosis.   This report was finalized on 10/7/2023 11:34 AM by Dr. Wilder Hanna M.D on Workstation: [employer]      CT Abdomen Pelvis Without Contrast    Result Date: 9/25/2023  1. Similar CT findings of chronic pancreatitis. Remaining small mesenteric pseudocyst is unchanged in size. 2. Hepatic cirrhosis with sequela of portal hypertension.  This report was finalized on 9/25/2023 10:26 PM by Dr. Familia Tobin M.D.     Microbiology Results (last 10 days)       ** No results found for the last 240 hours. **            Assessment:  Active Hospital Problems    Diagnosis  POA    **Pancreatitis [K85.90]  Yes    Leukopenia [D72.819]  Unknown    Essential hypertension [I10]  Yes    History of alcohol use disorder [Z87.898]  Yes    Alcoholic cirrhosis [K70.30]  Yes    Lupus [M32.9]  Yes    Chronic fatigue [R53.82]  Yes    Hashimoto's disease [E06.3]  Yes    Chronic alcohol abuse [F10.10]  Yes    Thrombocytopenia [D69.6]  Yes    Hypothyroidism [E03.9]  Yes    Irritable bowel syndrome with both constipation and diarrhea [K58.2]  Yes       Medical decision making/care plan: See admitting orders  Probable acute on chronic recurrent pancreatitis with normal lipase level following an episode of drinking in the setting of chronic recurrent alcohol abuse-plan is to admit the patient provide her with symptomatic relief watch her pain pattern and advance diet as tolerated.  History of alcohol use and  recent binge drinking this past Wednesday with last alcohol intake yesterday-her blood alcohol level is 0 and she is at risk of alcohol withdrawal.  Plan is to provide her with CIWA protocol, replace vitamins and monitor.  Continue with Protonix.  Alcoholic cirrhosis with leukopenia and thrombocytopenia-monitor.  Hypothyroidism-continue thyroid replacement therapy.  Hypertension-continue her home regimen and avoid hypotensive episodes.  Electrolyte imbalance and dehydration-continue with IV fluids and replace electrolytes as per protocol.  Abnormal urinalysis but no symptoms of urinary tract infection and was recently treated with Keflex for UTI and middle of September when she visited [facility]-plan is to watch provided with review systems and if she has symptoms of urinary tract infection repeat urinalysis and urine culture and empirically treat her with antibiotic therapy.    Giovana Baires MD   10/7/2023  17:21 EDT    Parts of this note may be an electronic transcription/translation of spoken language to printed text using the Dragon dictation system.

## 2023-10-07 NOTE — ED PROVIDER NOTES
MD ATTESTATION NOTE    The PATO and I have discussed this patient's history, physical exam, and treatment plan.  I have reviewed the documentation and personally had a face to face interaction with the patient. I affirm the documentation and agree with the treatment and plan.  The attached note describes my personal findings.      I provided a substantive portion of the care of the patient.  I personally performed the physical exam in its entirety, and below are my findings.        Brief HPI: 53-year-old female presenting for evaluation of alcohol use.  Patient had been sober for approximately 100 days until 1 week ago when she began binge drinking again and has developed epigastric abdominal pain.  Patient has no history of pancreatitis.    PHYSICAL EXAM  ED Triage Vitals   Temp Heart Rate Resp BP SpO2   10/07/23 0855 10/07/23 0852 10/07/23 0856 10/07/23 0856 10/07/23 0852   97.7 øF (36.5 øC) 75 16 129/83 96 %      Temp src Heart Rate Source Patient Position BP Location FiO2 (%)   10/07/23 0855 10/07/23 1010 10/07/23 1010 10/07/23 1010 --   Tympanic Monitor Lying Right arm          GENERAL: no acute distress  HENT: nares patent  EYES: no scleral icterus  CV: regular rhythm, normal rate  RESPIRATORY: normal effort  ABDOMEN: soft, nondistended, mild epigastric tenderness to palpation  MUSCULOSKELETAL: no deformity  NEURO: alert, moves all extremities, follows commands  PSYCH:  calm, cooperative  SKIN: warm, dry    Vital signs and nursing notes reviewed.        Plan: 53-year-old female presenting for evaluation of relapse on alcohol use with associated epigastric abdominal pain.  Laboratory evaluation is notable for leukopenia, lipase within normal limits, otherwise unremarkable.  Radiological evaluation demonstrates possible acute on chronic pancreatitis with mild inflammatory stranding.  On reevaluation patient demonstrates worsening pain.  Will admit for further evaluation and management.       Familia Crum,  MD  10/07/23 4161

## 2023-10-07 NOTE — ED PROVIDER NOTES
EMERGENCY DEPARTMENT ENCOUNTER    Room Number:  24/24  PCP: Tylor Davis  Discussed/ obtained information from independent historians: patient      HPI:  Chief Complaint: abdominal pain. Alcohol relapse  A complete HPI/ROS/PMH/PSH/SH/FH are unobtainable due to: none  Context: Bekah Gibson is a 53 y.o. female who presents to the ED c/o relapse of alcohol approximately 1 week ago after 98 days sober.  States she has been drinking half a pint of peach paz daily.  Around the same time she started drinking again she started having epigastric abdominal pain consistent with prior episodes of pancreatitis.  She states her pain worsens with eating and other than not eating, nothing really makes it feel better.  Her last drink was yesterday evening around 6 PM.  She was feeling a little shaky this morning, having increased upper abdominal pain and presents for further evaluation.  She states she has chronic diarrhea which is stable and unchanged.  Denies any nausea or vomiting fever or chills or upper respiratory symptoms chest pain or shortness of breath.      External (non-ED) record review: Office visit with oncology 7/26/2023 at [facility] for macrocytic anemia leukopenia throughout both cytopenia which was felt likely secondary to cirrhosis and immune thrombocytopenia.  Patient previously had extensive work-up including bone marrow biopsy for anemia, she was to continue her oral iron.  Leukopenia and thrombocytopenia were stable and were going to be monitored.      PAST MEDICAL HISTORY  Active Ambulatory Problems     Diagnosis Date Noted    Irritable bowel syndrome with both constipation and diarrhea 06/05/2017    Peripheral neuropathy 06/05/2017    Vitamin D deficiency 06/05/2017    History of HPV infection 06/05/2017    Hypothyroidism 06/05/2017    Tobacco dependence due to cigarettes 06/05/2017    Acute kidney injury 12/28/2015    Ascites 06/06/2016    E. coli UTI (urinary tract infection) 06/06/2016     Alcohol withdrawal syndrome, with delirium 06/29/2018    Alcoholic hepatitis 06/29/2018    GI bleed 06/29/2018    Acute post-hemorrhagic anemia 06/29/2018    Thrombocytopenia 06/29/2018    Open wound of right shoulder region 06/29/2018    Pancreatic insufficiency 07/04/2018    Alcoholic ketoacidosis 07/21/2019    Chronic alcohol abuse 07/29/2019    ESBL (extended spectrum beta-lactamase) producing bacteria infection 07/29/2019    Abnormal weight loss 12/13/2019    Hashimoto's disease 12/13/2019    Chronic fatigue 12/14/2019    History of alcohol use disorder 09/21/2021    Alcohol intoxication 09/21/2021    Lupus     Hypomagnesemia     Pancytopenia     Alcoholic cirrhosis     Bilateral lower abdominal discomfort 09/21/2021    Primary hypertension 10/24/2022    Melena 10/24/2022    Arthritis of shoulder 12/19/2022    Esophageal varices 01/16/2023    Essential hypertension 01/16/2023    Acute on chronic pancreatitis 01/22/2023    Abdominal pain 01/22/2023     Resolved Ambulatory Problems     Diagnosis Date Noted    Acute renal failure 02/20/2017    Kidney stone 06/05/2017    Hypotension 07/01/2018    Nausea and vomiting 09/21/2021    Acute GI bleeding 10/24/2022     Past Medical History:   Diagnosis Date    Anxiety     Arthritis     Cirrhosis     Disease of thyroid gland     ETOH abuse     Fracture, clavicle 1/2021    GERD (gastroesophageal reflux disease)     History of kidney stones     Hypertension     Left shoulder pain     MDD (major depressive disorder)     Pancreatitis     Periarthritis of shoulder see above    Rotator cuff syndrome odre for shoulder replacement         PAST SURGICAL HISTORY  Past Surgical History:   Procedure Laterality Date    CLAVICLE SURGERY Right 2018    ENDOSCOPY N/A 10/26/2022    Procedure: ESOPHAGOGASTRODUODENOSCOPY wtih banding;  Surgeon: Ag Patel MD;  Location: [facility] ENDOSCOPY;  Service: Gastroenterology;  Laterality: N/A;  pre: melena  post: esophageal varicies with banding x2  bands    ENDOSCOPY N/A 01/18/2023    Procedure: ESOPHAGOGASTRODUODENOSCOPY;  Surgeon: Ag Patel MD;  Location:  [facility] ENDOSCOPY;  Service: Gastroenterology;  Laterality: N/A;  PRE OP - MAROON STOOLS  POST OP - SM ESOPHAGEAL VARICES    ESOPHAGEAL VARICES LIGATION      JOINT REPLACEMENT Bilateral     GREAT TOE    KIDNEY STONE SURGERY      LITHOTRIPSY AND STENT (R SIDE)    LAPAROSCOPIC TUBAL LIGATION  2005    SIGMOIDOSCOPY N/A 01/18/2023    Procedure: SIGMOIDOSCOPY FLEXIBLE;  Surgeon: Ag Patel MD;  Location:  [facility] ENDOSCOPY;  Service: Gastroenterology;  Laterality: N/A;  PRE OP - MAROON STOOLS  POST OP - RECTAL VARICES    TOTAL SHOULDER ARTHROPLASTY Left 5/9/2023    Procedure: reverse total shoulder arthroplasty;  Surgeon: Giovanni Denise MD;  Location:  [facility];  Service: Orthopedics;  Laterality: Left;         FAMILY HISTORY  Family History   Problem Relation Age of Onset    Rheumatologic disease Mother         congestive heart    Thyroid disease Father     Leukemia Father     Cancer Father     Drug abuse Brother     Malig Hyperthermia Neg Hx          SOCIAL HISTORY  Social History     Socioeconomic History    Marital status:    Tobacco Use    Smoking status: Some Days     Packs/day: 0.25     Years: 10.00     Additional pack years: 0.00     Total pack years: 2.50     Types: Cigarettes    Smokeless tobacco: Never    Tobacco comments:     1 PACK PER WEEK   Vaping Use    Vaping Use: Never used   Substance and Sexual Activity    Alcohol use: Not Currently     Comment: SOBER FOR 3 MONTHS    Drug use: Not Currently    Sexual activity: Not Currently     Partners: Male     Birth control/protection: Surgical     Comment: cinthia- menepausal         ALLERGIES  Benzonatate, Phenergan [promethazine hcl], Cucumber extract, and Promethazine-phenylephrine        REVIEW OF SYSTEMS  Review of Systems         PHYSICAL EXAM  ED Triage Vitals   Temp Heart Rate Resp BP SpO2   10/07/23 0855 10/07/23 0852 10/07/23  0856 10/07/23 0856 10/07/23 0852   97.7 øF (36.5 øC) 75 16 129/83 96 %      Temp src Heart Rate Source Patient Position BP Location FiO2 (%)   10/07/23 0855 -- -- -- --   Tympanic           Physical Exam      GENERAL: no acute distress  HENT: normocephalic, atraumatic  EYES: no scleral icterus  CV: regular rhythm, normal rate  RESPIRATORY: normal effort, CTA B  ABDOMEN: Soft moderate epigastric tenderness nondistended normal bowel sounds, no guarding or rigidity  MUSCULOSKELETAL: no deformity  NEURO: alert, moves all extremities, follows commands  PSYCH:  calm, cooperative  SKIN: warm, dry    Vital signs and nursing notes reviewed.          LAB RESULTS  Recent Results (from the past 24 hour(s))   Comprehensive Metabolic Panel    Collection Time: 10/07/23  9:24 AM    Specimen: Blood   Result Value Ref Range    Glucose 106 (H) 65 - 99 mg/dL    BUN 6 6 - 20 mg/dL    Creatinine 0.66 0.57 - 1.00 mg/dL    Sodium 142 136 - 145 mmol/L    Potassium 3.4 (L) 3.5 - 5.2 mmol/L    Chloride 105 98 - 107 mmol/L    CO2 24.8 22.0 - 29.0 mmol/L    Calcium 9.3 8.6 - 10.5 mg/dL    Total Protein 6.7 6.0 - 8.5 g/dL    Albumin 4.0 3.5 - 5.2 g/dL    ALT (SGPT) 27 1 - 33 U/L    AST (SGOT) 55 (H) 1 - 32 U/L    Alkaline Phosphatase 139 (H) 39 - 117 U/L    Total Bilirubin 1.3 (H) 0.0 - 1.2 mg/dL    Globulin 2.7 gm/dL    A/G Ratio 1.5 g/dL    BUN/Creatinine Ratio 9.1 7.0 - 25.0    Anion Gap 12.2 5.0 - 15.0 mmol/L    eGFR 103.7 >60.0 mL/min/1.73   Lipase    Collection Time: 10/07/23  9:24 AM    Specimen: Blood   Result Value Ref Range    Lipase 18 13 - 60 U/L   Lactic Acid, Plasma    Collection Time: 10/07/23  9:24 AM    Specimen: Blood   Result Value Ref Range    Lactate 1.2 0.5 - 2.0 mmol/L   CBC Auto Differential    Collection Time: 10/07/23  9:24 AM    Specimen: Blood   Result Value Ref Range    WBC 2.96 (L) 3.40 - 10.80 10*3/mm3    RBC 4.66 3.77 - 5.28 10*6/mm3    Hemoglobin 15.2 12.0 - 15.9 g/dL    Hematocrit 44.3 34.0 - 46.6 %    MCV 95.1  79.0 - 97.0 fL    MCH 32.6 26.6 - 33.0 pg    MCHC 34.3 31.5 - 35.7 g/dL    RDW 14.3 12.3 - 15.4 %    RDW-SD 49.2 37.0 - 54.0 fl    MPV 11.7 6.0 - 12.0 fL    Platelets 60 (L) 140 - 450 10*3/mm3    Neutrophil % 43.9 42.7 - 76.0 %    Lymphocyte % 38.5 19.6 - 45.3 %    Monocyte % 12.2 (H) 5.0 - 12.0 %    Eosinophil % 4.4 0.3 - 6.2 %    Basophil % 1.0 0.0 - 1.5 %    Immature Grans % 0.0 0.0 - 0.5 %    Neutrophils, Absolute 1.30 (L) 1.70 - 7.00 10*3/mm3    Lymphocytes, Absolute 1.14 0.70 - 3.10 10*3/mm3    Monocytes, Absolute 0.36 0.10 - 0.90 10*3/mm3    Eosinophils, Absolute 0.13 0.00 - 0.40 10*3/mm3    Basophils, Absolute 0.03 0.00 - 0.20 10*3/mm3    Immature Grans, Absolute 0.00 0.00 - 0.05 10*3/mm3    nRBC 0.0 0.0 - 0.2 /100 WBC   Ethanol    Collection Time: 10/07/23  9:24 AM    Specimen: Blood   Result Value Ref Range    Ethanol <10 0 - 10 mg/dL    Ethanol % <0.010 %       Ordered the above labs and reviewed the results.        RADIOLOGY  CT Abdomen Pelvis Without Contrast    Result Date: 10/7/2023  Narrative: CT ABDOMEN PELVIS WO CONTRAST-  INDICATIONS: Abdominal pain  TECHNIQUE: Radiation dose reduction techniques were utilized, including automated exposure control and exposure modulation based on body size. Unenhanced ABDOMEN AND PELVIS CT  COMPARISON: 9/25/2023  FINDINGS:  Cholelithiasis is evident.  The spleen is mildly enlarged, 13.2 cm.  Numerous pancreatic calcifications are seen, compatible with chronic pancreatitis. Small strandy density around the pancreatic body could be evidence of superimposed acute pancreatitis, correlate clinically.  Otherwise unremarkable unenhanced appearance of the liver, spleen, adrenal glands, pancreas, kidneys, bladder.  No bowel obstruction or abnormal bowel thickening is identified. The appendix does not appear inflamed.  No free intraperitoneal gas or free fluid. Minimal umbilical hernia of fat is present.  Scattered small mesenteric and para-aortic lymph nodes are seen that  are not significant by size criteria.  Abdominal aorta is not aneurysmal. Aortic and other arterial calcifications are present.  The lung bases show minimal likely atelectasis.  Degenerative changes are seen in the spine. No acute fracture is identified.        Impression:   1. Small strandy density around the pancreatic body could be evidence of acute pancreatitis, superimposed on chronic pancreatitis, correlate clinically. Cholelithiasis.  2. No acute inflammatory process of bowel is identified, follow-up as indications persist.  3. No urolithiasis or hydronephrosis.   This report was finalized on 10/7/2023 11:34 AM by Dr. Wilder Hanna M.D on Workstation: [facility]          Ordered the above noted radiological studies. Reviewed by me in PACS.            PROCEDURES  Procedures              MEDICATIONS GIVEN IN ER  Medications   sodium chloride 0.9 % flush 10 mL (has no administration in time range)                   MEDICAL DECISION MAKING, PROGRESS, and CONSULTS    All labs have been independently reviewed by me.  All radiology studies have been reviewed by me and I have also reviewed the radiology report.   EKG's independently viewed and interpreted by me.  Discussion below represents my analysis of pertinent findings related to patient's condition, differential diagnosis, treatment plan and final disposition.            Orders placed during this visit:  Orders Placed This Encounter   Procedures    [place] Draw    Comprehensive Metabolic Panel    Lipase    Urinalysis With Microscopic If Indicated (No Culture) - Urine, Clean Catch    Lactic Acid, Plasma    CBC Auto Differential    Ethanol    NPO Diet NPO Type: Strict NPO    Undress & Gown    Insert Peripheral IV    CBC & Differential    Green Top (Gel)    Lavender Top    Gold Top - SST    Light Blue Top           Differential diagnosis:  Differential diagnosis includes but is not limited to:  - hepatobiliary pathology such as cholecystitis, cholangitis, and  symptomatic cholelithiasis  - Pancreatitis  - Dyspepsia  - Small bowel obstruction  - Appendicitis  - Diverticulitis  - UTI including pyelonephritis  - Ureteral stone  - Zoster  - Colitis, including infectious and ischemic  - Atypical ACS      Independent interpretation of labs, radiology studies, and discussions with consultants:  ED Course as of 10/08/23 2039   Sat Oct 07, 2023   1010 Ethanol: <10 [KA]   1011 Lipase: 18 [KA]   1011 CBC & Differential(!) [KA]   1011 WBC(!): 2.96 [KA]   1011 Hemoglobin: 15.2 [KA]   1011 Platelets(!): 60  Chronic thrombocytopenia between 32 and 60 over the last 5 months [KA]   1011 Glucose(!): 106 [KA]   1011 Potassium(!): 3.4 [KA]   1011 AST (SGOT)(!): 55 [KA]   1011 Alkaline Phosphatase(!): 139 [KA]   1011 Total Bilirubin(!): 1.3 [KA]   1302 Reassessed the patient, he is reporting escalating epigastric pain, now rates it a 7 out of 10.  She does have some pancreatic stranding, tenderness in the epigastrium, history of pancreatitis, CT scan looks consistent with some acute on chronic pancreatitis.  Lipase normal, remaining labs that she does have some abnormalities are not significant based on previous findings and chronic illness.  Will admit for [KA]   1303  pancreatitis and further evaluation. [KA]   1303 I discussed patient with Dr. Baires, hospitalist including history presentation labs and imaging and he agrees to admit. [KA]      ED Course User Index  [KA] Yanira Lee PA       - Shared decision making: Discussed admission versus discharge patient states she would like to be admitted, I think reasonable based on her symptoms and CT findings        Additional sources:    - Chronic or social conditions impacting care: alcoholism          DIAGNOSIS  Final diagnoses:   Alcohol-induced acute pancreatitis without infection or necrosis   Alcohol abuse               Latest Documented Vital Signs:  As of 10:08 EDT  BP- 129/83 HR- 75 Temp- 97.7 øF (36.5 øC) (Tympanic) O2 sat-  96%              --    Please note that portions of this were completed with a voice recognition program.       Note Disclaimer: At [facility], we believe that sharing information builds trust and better relationships. You are receiving this note because you are receiving care at [facility] or recently visited. It is possible you will see health information before a provider has talked with you about it. This kind of information can be easy to misunderstand. To help you fully understand what it means for your health, we urge you to discuss this note with your provider.             Yanira Lee PA  10/08/23 2048

## 2023-10-08 LAB
ALBUMIN SERPL-MCNC: 3.5 G/DL (ref 3.5–5.2)
ALBUMIN/GLOB SERPL: 1.5 G/DL
ALP SERPL-CCNC: 109 U/L (ref 39–117)
ALT SERPL W P-5'-P-CCNC: 21 U/L (ref 1–33)
ANION GAP SERPL CALCULATED.3IONS-SCNC: 8 MMOL/L (ref 5–15)
AST SERPL-CCNC: 43 U/L (ref 1–32)
BASOPHILS # BLD AUTO: 0.01 10*3/MM3 (ref 0–0.2)
BASOPHILS NFR BLD AUTO: 0.4 % (ref 0–1.5)
BILIRUB SERPL-MCNC: 1.1 MG/DL (ref 0–1.2)
BUN SERPL-MCNC: 6 MG/DL (ref 6–20)
BUN/CREAT SERPL: 11.1 (ref 7–25)
CALCIUM SPEC-SCNC: 8.6 MG/DL (ref 8.6–10.5)
CHLORIDE SERPL-SCNC: 108 MMOL/L (ref 98–107)
CO2 SERPL-SCNC: 25 MMOL/L (ref 22–29)
CREAT SERPL-MCNC: 0.54 MG/DL (ref 0.57–1)
DEPRECATED RDW RBC AUTO: 49.3 FL (ref 37–54)
EGFRCR SERPLBLD CKD-EPI 2021: 108.9 ML/MIN/1.73
EOSINOPHIL # BLD AUTO: 0.07 10*3/MM3 (ref 0–0.4)
EOSINOPHIL NFR BLD AUTO: 2.9 % (ref 0.3–6.2)
ERYTHROCYTE [DISTWIDTH] IN BLOOD BY AUTOMATED COUNT: 14.3 % (ref 12.3–15.4)
GLOBULIN UR ELPH-MCNC: 2.4 GM/DL
GLUCOSE SERPL-MCNC: 87 MG/DL (ref 65–99)
HCT VFR BLD AUTO: 39.6 % (ref 34–46.6)
HGB BLD-MCNC: 13.6 G/DL (ref 12–15.9)
LIPASE SERPL-CCNC: 18 U/L (ref 13–60)
LYMPHOCYTES # BLD AUTO: 0.91 10*3/MM3 (ref 0.7–3.1)
LYMPHOCYTES NFR BLD AUTO: 37.3 % (ref 19.6–45.3)
MAGNESIUM SERPL-MCNC: 1.4 MG/DL (ref 1.6–2.6)
MCH RBC QN AUTO: 32.2 PG (ref 26.6–33)
MCHC RBC AUTO-ENTMCNC: 34.3 G/DL (ref 31.5–35.7)
MCV RBC AUTO: 93.8 FL (ref 79–97)
MONOCYTES # BLD AUTO: 0.34 10*3/MM3 (ref 0.1–0.9)
MONOCYTES NFR BLD AUTO: 13.9 % (ref 5–12)
NEUTROPHILS NFR BLD AUTO: 1.11 10*3/MM3 (ref 1.7–7)
NEUTROPHILS NFR BLD AUTO: 45.5 % (ref 42.7–76)
PHOSPHATE SERPL-MCNC: 3.3 MG/DL (ref 2.5–4.5)
PLATELET # BLD AUTO: 49 10*3/MM3 (ref 140–450)
PMV BLD AUTO: 11.4 FL (ref 6–12)
POTASSIUM SERPL-SCNC: 3.8 MMOL/L (ref 3.5–5.2)
PROT SERPL-MCNC: 5.9 G/DL (ref 6–8.5)
RBC # BLD AUTO: 4.22 10*6/MM3 (ref 3.77–5.28)
SODIUM SERPL-SCNC: 141 MMOL/L (ref 136–145)
WBC NRBC COR # BLD: 2.44 10*3/MM3 (ref 3.4–10.8)

## 2023-10-08 PROCEDURE — 25010000002 SODIUM CHLORIDE 0.9 % WITH KCL 20 MEQ 20-0.9 MEQ/L-% SOLUTION: Performed by: INTERNAL MEDICINE

## 2023-10-08 PROCEDURE — 85025 COMPLETE CBC W/AUTO DIFF WBC: CPT | Performed by: INTERNAL MEDICINE

## 2023-10-08 PROCEDURE — 25010000002 HYDROMORPHONE PER 4 MG: Performed by: INTERNAL MEDICINE

## 2023-10-08 PROCEDURE — 84100 ASSAY OF PHOSPHORUS: CPT | Performed by: INTERNAL MEDICINE

## 2023-10-08 PROCEDURE — 80053 COMPREHEN METABOLIC PANEL: CPT | Performed by: INTERNAL MEDICINE

## 2023-10-08 PROCEDURE — 83735 ASSAY OF MAGNESIUM: CPT | Performed by: INTERNAL MEDICINE

## 2023-10-08 PROCEDURE — 25010000002 MAGNESIUM SULFATE 2 GM/50ML SOLUTION: Performed by: INTERNAL MEDICINE

## 2023-10-08 PROCEDURE — 83690 ASSAY OF LIPASE: CPT | Performed by: INTERNAL MEDICINE

## 2023-10-08 PROCEDURE — 25010000002 THIAMINE HCL 200 MG/2ML SOLUTION: Performed by: INTERNAL MEDICINE

## 2023-10-08 RX ORDER — MAGNESIUM SULFATE HEPTAHYDRATE 40 MG/ML
2 INJECTION, SOLUTION INTRAVENOUS
Status: COMPLETED | OUTPATIENT
Start: 2023-10-08 | End: 2023-10-08

## 2023-10-08 RX ORDER — OXYCODONE HYDROCHLORIDE 5 MG/1
5 TABLET ORAL EVERY 4 HOURS PRN
Status: DISCONTINUED | OUTPATIENT
Start: 2023-10-08 | End: 2023-10-08

## 2023-10-08 RX ORDER — HYDROMORPHONE HYDROCHLORIDE 1 MG/ML
0.5 INJECTION, SOLUTION INTRAMUSCULAR; INTRAVENOUS; SUBCUTANEOUS EVERY 4 HOURS PRN
Status: DISCONTINUED | OUTPATIENT
Start: 2023-10-08 | End: 2023-10-10 | Stop reason: HOSPADM

## 2023-10-08 RX ADMIN — HYDROMORPHONE HYDROCHLORIDE 0.5 MG: 1 INJECTION, SOLUTION INTRAMUSCULAR; INTRAVENOUS; SUBCUTANEOUS at 20:19

## 2023-10-08 RX ADMIN — NADOLOL 40 MG: 40 TABLET ORAL at 20:18

## 2023-10-08 RX ADMIN — HYDROMORPHONE HYDROCHLORIDE 0.5 MG: 1 INJECTION, SOLUTION INTRAMUSCULAR; INTRAVENOUS; SUBCUTANEOUS at 00:07

## 2023-10-08 RX ADMIN — PANTOPRAZOLE SODIUM 40 MG: 40 TABLET, DELAYED RELEASE ORAL at 08:55

## 2023-10-08 RX ADMIN — THIAMINE HYDROCHLORIDE 200 MG: 100 INJECTION, SOLUTION INTRAMUSCULAR; INTRAVENOUS at 15:44

## 2023-10-08 RX ADMIN — POTASSIUM CHLORIDE AND SODIUM CHLORIDE 100 ML/HR: 900; 150 INJECTION, SOLUTION INTRAVENOUS at 18:09

## 2023-10-08 RX ADMIN — LORAZEPAM 2 MG: 1 TABLET ORAL at 05:53

## 2023-10-08 RX ADMIN — MAGNESIUM SULFATE HEPTAHYDRATE 2 G: 2 INJECTION, SOLUTION INTRAVENOUS at 12:01

## 2023-10-08 RX ADMIN — OXYCODONE HYDROCHLORIDE 5 MG: 5 TABLET ORAL at 12:02

## 2023-10-08 RX ADMIN — AMLODIPINE BESYLATE 5 MG: 5 TABLET ORAL at 08:53

## 2023-10-08 RX ADMIN — THIAMINE HYDROCHLORIDE 200 MG: 100 INJECTION, SOLUTION INTRAMUSCULAR; INTRAVENOUS at 22:14

## 2023-10-08 RX ADMIN — OXYBUTYNIN CHLORIDE 10 MG: 10 TABLET, EXTENDED RELEASE ORAL at 08:53

## 2023-10-08 RX ADMIN — FLUOXETINE HYDROCHLORIDE 40 MG: 20 CAPSULE ORAL at 08:53

## 2023-10-08 RX ADMIN — MAGNESIUM SULFATE HEPTAHYDRATE 2 G: 2 INJECTION, SOLUTION INTRAVENOUS at 08:52

## 2023-10-08 RX ADMIN — FERROUS SULFATE TAB 325 MG (65 MG ELEMENTAL FE) 325 MG: 325 (65 FE) TAB at 08:54

## 2023-10-08 RX ADMIN — POTASSIUM CHLORIDE AND SODIUM CHLORIDE 100 ML/HR: 900; 150 INJECTION, SOLUTION INTRAVENOUS at 08:25

## 2023-10-08 RX ADMIN — Medication 1 TABLET: at 08:53

## 2023-10-08 RX ADMIN — THIAMINE HYDROCHLORIDE 200 MG: 100 INJECTION, SOLUTION INTRAMUSCULAR; INTRAVENOUS at 05:53

## 2023-10-08 RX ADMIN — OXYCODONE HYDROCHLORIDE 5 MG: 5 TABLET ORAL at 22:14

## 2023-10-08 RX ADMIN — Medication 10 ML: at 08:55

## 2023-10-08 RX ADMIN — FOLIC ACID 1 MG: 1 TABLET ORAL at 08:53

## 2023-10-08 RX ADMIN — HYDROMORPHONE HYDROCHLORIDE 0.5 MG: 1 INJECTION, SOLUTION INTRAMUSCULAR; INTRAVENOUS; SUBCUTANEOUS at 14:23

## 2023-10-08 RX ADMIN — HYDROMORPHONE HYDROCHLORIDE 0.5 MG: 1 INJECTION, SOLUTION INTRAMUSCULAR; INTRAVENOUS; SUBCUTANEOUS at 05:04

## 2023-10-08 RX ADMIN — LEVOTHYROXINE SODIUM 100 MCG: 0.1 TABLET ORAL at 08:53

## 2023-10-08 RX ADMIN — MAGNESIUM SULFATE HEPTAHYDRATE 2 G: 2 INJECTION, SOLUTION INTRAVENOUS at 15:44

## 2023-10-08 RX ADMIN — LORAZEPAM 2 MG: 1 TABLET ORAL at 00:07

## 2023-10-08 RX ADMIN — OXYCODONE HYDROCHLORIDE 5 MG: 5 TABLET ORAL at 18:09

## 2023-10-08 RX ADMIN — NICOTINE 1 PATCH: 21 PATCH, EXTENDED RELEASE TRANSDERMAL at 08:56

## 2023-10-08 RX ADMIN — LACTULOSE 10 G: 10 SOLUTION ORAL at 15:44

## 2023-10-08 RX ADMIN — HYDROMORPHONE HYDROCHLORIDE 0.5 MG: 1 INJECTION, SOLUTION INTRAMUSCULAR; INTRAVENOUS; SUBCUTANEOUS at 02:35

## 2023-10-08 RX ADMIN — LORAZEPAM 1 MG: 1 TABLET ORAL at 08:53

## 2023-10-08 RX ADMIN — LACTULOSE 10 G: 10 SOLUTION ORAL at 08:54

## 2023-10-08 RX ADMIN — Medication 10 ML: at 20:18

## 2023-10-08 NOTE — PLAN OF CARE
Goal Outcome Evaluation: Pt resting in bed with  at bedisde. GCS 15. Vitals stable. Pain managed with PRN pain medication. Magnesium replaced this shift. No distress or complaints at this time. Will continue to monitor.

## 2023-10-09 LAB — MAGNESIUM SERPL-MCNC: 2 MG/DL (ref 1.6–2.6)

## 2023-10-09 PROCEDURE — 25010000002 HYDROMORPHONE PER 4 MG: Performed by: INTERNAL MEDICINE

## 2023-10-09 PROCEDURE — 25010000002 THIAMINE PER 100 MG: Performed by: INTERNAL MEDICINE

## 2023-10-09 PROCEDURE — 90791 PSYCH DIAGNOSTIC EVALUATION: CPT

## 2023-10-09 PROCEDURE — 83735 ASSAY OF MAGNESIUM: CPT | Performed by: INTERNAL MEDICINE

## 2023-10-09 PROCEDURE — 25010000002 THIAMINE HCL 200 MG/2ML SOLUTION: Performed by: INTERNAL MEDICINE

## 2023-10-09 PROCEDURE — 25010000002 HYDROMORPHONE 1 MG/ML SOLUTION: Performed by: INTERNAL MEDICINE

## 2023-10-09 PROCEDURE — 25010000002 SODIUM CHLORIDE 0.9 % WITH KCL 20 MEQ 20-0.9 MEQ/L-% SOLUTION: Performed by: INTERNAL MEDICINE

## 2023-10-09 RX ORDER — FLUTICASONE PROPIONATE 50 MCG
2 SPRAY, SUSPENSION (ML) NASAL DAILY
Status: DISCONTINUED | OUTPATIENT
Start: 2023-10-09 | End: 2023-10-10 | Stop reason: HOSPADM

## 2023-10-09 RX ADMIN — LEVOTHYROXINE SODIUM 100 MCG: 0.1 TABLET ORAL at 08:04

## 2023-10-09 RX ADMIN — POTASSIUM CHLORIDE AND SODIUM CHLORIDE 100 ML/HR: 900; 150 INJECTION, SOLUTION INTRAVENOUS at 17:26

## 2023-10-09 RX ADMIN — PANCRELIPASE 12000 UNITS OF LIPASE: 60000; 12000; 38000 CAPSULE, DELAYED RELEASE PELLETS ORAL at 12:53

## 2023-10-09 RX ADMIN — OXYCODONE HYDROCHLORIDE 5 MG: 5 TABLET ORAL at 04:33

## 2023-10-09 RX ADMIN — HYDROMORPHONE HYDROCHLORIDE 0.5 MG: 1 INJECTION, SOLUTION INTRAMUSCULAR; INTRAVENOUS; SUBCUTANEOUS at 00:39

## 2023-10-09 RX ADMIN — HYDROMORPHONE HYDROCHLORIDE 0.5 MG: 1 INJECTION, SOLUTION INTRAMUSCULAR; INTRAVENOUS; SUBCUTANEOUS at 12:34

## 2023-10-09 RX ADMIN — LACTULOSE 10 G: 10 SOLUTION ORAL at 20:05

## 2023-10-09 RX ADMIN — MAGNESIUM OXIDE 400 MG (241.3 MG MAGNESIUM) TABLET 400 MG: TABLET at 11:43

## 2023-10-09 RX ADMIN — THIAMINE HYDROCHLORIDE 200 MG: 100 INJECTION, SOLUTION INTRAMUSCULAR; INTRAVENOUS at 15:03

## 2023-10-09 RX ADMIN — NICOTINE 1 PATCH: 21 PATCH, EXTENDED RELEASE TRANSDERMAL at 08:09

## 2023-10-09 RX ADMIN — FOLIC ACID 1 MG: 1 TABLET ORAL at 08:05

## 2023-10-09 RX ADMIN — HYDROMORPHONE HYDROCHLORIDE 0.5 MG: 1 INJECTION, SOLUTION INTRAMUSCULAR; INTRAVENOUS; SUBCUTANEOUS at 08:05

## 2023-10-09 RX ADMIN — THIAMINE HYDROCHLORIDE 200 MG: 100 INJECTION, SOLUTION INTRAMUSCULAR; INTRAVENOUS at 22:03

## 2023-10-09 RX ADMIN — OXYCODONE HYDROCHLORIDE 5 MG: 5 TABLET ORAL at 10:35

## 2023-10-09 RX ADMIN — Medication 10 ML: at 08:05

## 2023-10-09 RX ADMIN — FERROUS SULFATE TAB 325 MG (65 MG ELEMENTAL FE) 325 MG: 325 (65 FE) TAB at 08:05

## 2023-10-09 RX ADMIN — NADOLOL 40 MG: 40 TABLET ORAL at 20:05

## 2023-10-09 RX ADMIN — AMLODIPINE BESYLATE 5 MG: 5 TABLET ORAL at 08:04

## 2023-10-09 RX ADMIN — HYDROMORPHONE HYDROCHLORIDE 0.5 MG: 1 INJECTION, SOLUTION INTRAMUSCULAR; INTRAVENOUS; SUBCUTANEOUS at 22:03

## 2023-10-09 RX ADMIN — FLUTICASONE PROPIONATE 2 SPRAY: 50 SPRAY, METERED NASAL at 08:07

## 2023-10-09 RX ADMIN — Medication 1 TABLET: at 08:05

## 2023-10-09 RX ADMIN — OXYCODONE HYDROCHLORIDE 5 MG: 5 TABLET ORAL at 15:11

## 2023-10-09 RX ADMIN — HYDROMORPHONE HYDROCHLORIDE 0.5 MG: 1 INJECTION, SOLUTION INTRAMUSCULAR; INTRAVENOUS; SUBCUTANEOUS at 17:19

## 2023-10-09 RX ADMIN — OXYCODONE HYDROCHLORIDE 5 MG: 5 TABLET ORAL at 20:05

## 2023-10-09 RX ADMIN — LACTULOSE 10 G: 10 SOLUTION ORAL at 08:08

## 2023-10-09 RX ADMIN — PANCRELIPASE 12000 UNITS OF LIPASE: 60000; 12000; 38000 CAPSULE, DELAYED RELEASE PELLETS ORAL at 17:19

## 2023-10-09 RX ADMIN — THIAMINE HYDROCHLORIDE 200 MG: 100 INJECTION, SOLUTION INTRAMUSCULAR; INTRAVENOUS at 05:53

## 2023-10-09 RX ADMIN — FLUOXETINE HYDROCHLORIDE 40 MG: 20 CAPSULE ORAL at 08:04

## 2023-10-09 RX ADMIN — PANTOPRAZOLE SODIUM 40 MG: 40 TABLET, DELAYED RELEASE ORAL at 08:05

## 2023-10-09 RX ADMIN — OXYBUTYNIN CHLORIDE 10 MG: 10 TABLET, EXTENDED RELEASE ORAL at 08:05

## 2023-10-09 NOTE — PLAN OF CARE
Goal Outcome Evaluation:  Pt alert and oriented today. Tolerated care well. Pain medications needed and pain level lowered. WCTM the rest of the shift.

## 2023-10-09 NOTE — CONSULTS
"Patient seen by [facility] d/t ETOH. Patient interviewed alone in room 526 (5N). Introduced self and role. Patient agreed to be evaluated. Patient is A/OX4. Patient seeny by [facility] multiple times with the last time being in January 2023. Please see previous notes.    The patient is a 53 y.o.  female living with her  and their dogs.   Adventism/Spiritual: The patient stated her and her  are spiritual but not into organized [employer].  Children: 2 children  Occupation: Retired. Previously worked as a .  Hobbies: Play guitar, sing, and gardening.  Legal: Denies  : No  Support System: 2 daughters, , 2 Brothers, AA sponsor  Hx of Violence: Denies  Hx of Abuse/Trauma: Denies  Feel safe at Home: Yes    The patient presented to the ED on 10/7/23 d/t pancreatitis flare up. The patient recently relapsed and began having epigastric abdominal pain which she said felt similar to past episodes of pancreatitis.     The patient stated she had been sober since she was discharged from the hospital in January 2023. The patient stated another time she had almost a year of sobriety. The patient stated she had been attending AA, has a sponsor and was relying on her support system to help her stay sober. The patient named stress from a signing performance and marital issues as contributing factors to her relapse. The patient stated she has been drinking about 1/2 pint/day since her relapse. The patient's last drink was on 10/6/23. The patient has done treatment at the [facility] in the past. The patient stated she did a line of \"coke\" over the weekend but stated this is not a regular occurrence for her. The patient denied any other substance use.     The patient has a mental health history of anxiety and major depressive disorder. The patient is currently prescribed Buspar, Trazadone and Prozac. The patient stated a psychiatrist originally prescribed these medications but now " "here PCP prescribes them. The patient stated she used to have a psychiatrist, Dr. Childs, but they retired and she does not currently have any outpatient mental health providers. The patient stated there is a program through her insurance where an EMT/paramedic comes by her house once/week and they are going to help her find a new psychiatrist. The patient denied any other mental health history.     The patient rated depression 7/10. The patient stated anxiety was \"not too bad.\" The patient denied any SI, wish to be dead, HI, or hallucinations. The patient voiced poor appetite and \"enough\" sleep.     The patient stated her plan is to abstain from ETOH. The patient stated she plans on continuing to utilize her support system and attend AA meeting. The patient stated she may seek treatment in [place] but outside of [place]. The patient stated she has resources for this at home. [facility] will follow and provide resources as needed.              "

## 2023-10-09 NOTE — PROGRESS NOTES
Name: Bekah Gibson ADMIT: 10/7/2023   : 1970  PCP: Davis Tylor    MRN: [id number] LOS: 1 days   AGE/SEX: 53 y.o. female  ROOM: [facility]   Subjective   Chief Complaint   Patient presents with    Alcohol Problem     53-year-old with history of hypothyroidism, lupus, alcohol dependence, cirrhosis with history of Grade 2 esophageal varices-s/p banded on , portal hypertension, chronic thrombocytopenia and other medical issues who presents with abdominal pain and potential acute on chronic pancreatitis    On IVFs  On liquid diet  Mild pain- though does have chronic pain  Tolerating liquids        Objective   Vital Signs  Temp:  [97.6 øF (36.4 øC)-98 øF (36.7 øC)] 98 øF (36.7 øC)  Heart Rate:  [72-75] 75  Resp:  [16] 16  BP: (104-140)/(70-84) 140/81  SpO2:  [94 %-97 %] 97 %  on   ;   Device (Oxygen Therapy): room air  Body mass index is 23.08 kg/mý.    Physical Exam  Constitutional:       General: She is not in acute distress.     Appearance: She is ill-appearing (chronically).   HENT:      Head: Normocephalic and atraumatic.   Eyes:      General: No scleral icterus.  Cardiovascular:      Rate and Rhythm: Regular rhythm.   Pulmonary:      Effort: Pulmonary effort is normal. No respiratory distress.   Abdominal:      General: There is no distension.      Palpations: Abdomen is soft.      Tenderness: There is no abdominal tenderness. There is no guarding.   Musculoskeletal:      Cervical back: Neck supple.     Drowsy, wakes up and converses but then goes back to sleep this morning  Abd benign     Results Review:       I reviewed the patient's new clinical results.  Results from last 7 days   Lab Units 10/08/23  0537 10/07/23  0924   WBC 10*3/mm3 2.44* 2.96*   HEMOGLOBIN g/dL 13.6 15.2   PLATELETS 10*3/mm3 49* 60*     Results from last 7 days   Lab Units 10/08/23  0537 10/07/23  0924   SODIUM mmol/L 141 142   POTASSIUM mmol/L 3.8 3.4*   CHLORIDE mmol/L 108* 105   CO2 mmol/L 25.0 24.8   BUN mg/dL 6 6  "  CREATININE mg/dL 0.54* 0.66   GLUCOSE mg/dL 87 106*   Estimated Creatinine Clearance: 116.9 mL/min (A) (by C-G formula based on SCr of 0.54 mg/dL (L)).  Results from last 7 days   Lab Units 10/08/23  0537 10/07/23  0924   ALBUMIN g/dL 3.5 4.0   BILIRUBIN mg/dL 1.1 1.3*   ALK PHOS U/L 109 139*   AST (SGOT) U/L 43* 55*   ALT (SGPT) U/L 21 27     Results from last 7 days   Lab Units 10/09/23  0430 10/08/23  0537 10/07/23  0924   CALCIUM mg/dL  --  8.6 9.3   ALBUMIN g/dL  --  3.5 4.0   MAGNESIUM mg/dL 2.0 1.4*  --    PHOSPHORUS mg/dL  --  3.3  --      Results from last 7 days   Lab Units 10/07/23  0924   LACTATE mmol/L 1.2       Coag     HbA1C   Lab Results   Component Value Date    HGBA1C 5.20 04/25/2023    HGBA1C 5.7 (H) 11/14/2022    HGBA1C 4.4 08/30/2020     Infection     Radiology(recent) CT Abdomen Pelvis Without Contrast    Result Date: 10/7/2023    1. Small strandy density around the pancreatic body could be evidence of acute pancreatitis, superimposed on chronic pancreatitis, correlate clinically. Cholelithiasis.  2. No acute inflammatory process of bowel is identified, follow-up as indications persist.  3. No urolithiasis or hydronephrosis.   This report was finalized on 10/7/2023 11:34 AM by Dr. Wilder Hanna M.D on Workstation: [id number]     No results found for: \"TROPONINT\", \"TROPONINI\", \"BNP\"  No components found for: \"TSH;2\"    amLODIPine, 5 mg, Oral, Daily  ferrous sulfate, 325 mg, Oral, Daily With Breakfast  FLUoxetine, 40 mg, Oral, Daily  fluticasone, 2 spray, Each Nare, Daily  folic acid, 1 mg, Oral, Daily  lactulose, 10 g, Oral, TID  levothyroxine, 100 mcg, Oral, Daily  Magnesium Oxide -Mg Supplement, 400 mg, Oral, Daily  multivitamin, 1 tablet, Oral, Daily  nadolol, 40 mg, Oral, Nightly  nicotine, 1 patch, Transdermal, Q24H  oxybutynin XL, 10 mg, Oral, Daily  pancrelipase (Lip-Prot-Amyl), 12,000 units of lipase, Oral, TID With Meals  pantoprazole, 40 mg, Oral, Daily  sodium chloride, 10 mL, " Intravenous, Q12H  thiamine (B-1) IV, 200 mg, Intravenous, Q8H   Followed by  [START ON 10/13/2023] thiamine, 100 mg, Oral, Daily      sodium chloride 0.9 % with KCl 20 mEq, 100 mL/hr, Last Rate: 100 mL/hr (10/08/23 1809)    Diet: Liquid Diets; Full Liquid; Fluid Consistency: Thin (IDDSI 0)      Assessment & Plan      Active Hospital Problems    Diagnosis  POA    **Pancreatitis [K85.90]  Yes    Leukopenia [D72.819]  Unknown    Essential hypertension [I10]  Yes    History of alcohol use disorder [Z87.898]  Yes    Alcoholic cirrhosis [K70.30]  Yes    Lupus [M32.9]  Yes    Chronic fatigue [R53.82]  Yes    Hashimoto's disease [E06.3]  Yes    Chronic alcohol abuse [F10.10]  Yes    Thrombocytopenia [D69.6]  Yes    Hypothyroidism [E03.9]  Yes    Irritable bowel syndrome with both constipation and diarrhea [K58.2]  Yes      Resolved Hospital Problems   No resolved problems to display.     53-year-old with history of hypothyroidism, lupus, alcohol dependence, cirrhosis with history of Grade 2 esophageal varices-s/p banded on , portal hypertension, chronic thrombocytopenia and other medical issues who presents with abdominal pain and potential acute on chronic pancreatitis    Fluids, bowel rest, PRN agents for nausea and pain. Symptoms are better will advance to full liquid. Add creon here and likely at discharged.   Monitor for alcohol withdrawal on CIWA protocol.    Abnormal U/A recently treated for UTI  Monitor leukopenia and thrombocytopenia likely related to alcohol use and liver disease. Recheck labs in AM  Replace electrolytes including magnesium and monitor      DW staff  Reviewed records     Dispo- likely to home 10/10    Judd Deal MD  [facility]  10/09/23  08:00 EDT

## 2023-10-09 NOTE — PAYOR COMM NOTE
"Ba Gibson (53 y.o. Female)     PLEASE SEE ATTACHED FOR INPT AUTH     REF #    [id number]    PLEASE CALL EMA RUBIO RN/ DEPT @ [phone]  OR FAX  DEPARTMENT @  [phone]    THANK YOU   EMA RUBIO RN  [facility]          Date of Birth   1970    Social Security Number       Address   [address] [place], [place] [address]    Home Phone   [phone]    MRN   [id number]       Hindu   Restoration    Marital Status                               Admission Date   10/7/23    Admission Type   Emergency    Admitting Provider   Giovana Baires MD    Attending Provider   Judd Deal MD    Department, Room/Bed   [address], [address]/1       Discharge Date       Discharge Disposition       Discharge Destination                                 Attending Provider: Judd Deal MD    Allergies: Benzonatate, Phenergan [Promethazine Hcl], Cucumber Extract, Promethazine-phenylephrine    Isolation: None   Infection: None   Code Status: CPR    Ht: 165.1 cm (65\")   Wt: 62.9 kg (138 lb 10.7 oz)    Admission Cmt: None   Principal Problem: Pancreatitis [K85.90]                   Active Insurance as of 10/7/2023       Primary Coverage       Payor Plan Insurance Group Employer/Plan Group    ANTHEM MEDICAID ANTHEM MEDICAID [id number]       Payor Plan Address Payor Plan Phone Number Payor Plan Fax Number Effective Dates    [address] [address] [phone]  6/1/2018 - None Entered    [facility] [id number]         Subscriber Name Subscriber Birth Date Member ID       BA GIBSON 1970 [id number]                     Emergency Contacts        (Rel.) Home Phone Work Phone Mobile Phone    Jeff Gibson (Spouse) [phone] -- [phone]              [place]: [facility] [id number]  Tax ID [id number]     History & Physical        Giovana Baires MD at 10/07/23 1721          Internal medicine history and physical  INTERNAL MEDICINE   [facility]   " [facility]       Patient Identification:  Name: Bekah Gibson  Age: 53 y.o.  Sex: female  :  1970  MRN: [id number]                   Primary Care Physician: Tylor Davis                               Date of admission:10/7/2023    Chief Complaint: Upper abdominal pain and possible flare of pancreatitis with discomfort going on for the last few days.    History of Present Illness:   Patient is a 53-year-old female who has history of chronic liver disease due to alcohol abuse, chronic alcohol abuse, chronic recurrent abdominal pain and pancreatitis, history of cirrhosis and major depressive disorder as well as hypertension has been sober for quite some time from drinking alcohol until a month or so ago when she had a flare and started drinking again.  She has been to various emergency rooms in the last month including [facility] emergency room as well as to our facility on 2023.  Patient has similar symptoms of abdominal pain after bout of drinking and was diagnosed with mild pancreatitis and was treated with supportive care.  In this background patient had another flare of alcoholic changed and had significant drinking this past Wednesday.  Subsequently she started having abdominal discomfort which she said this was there all along but got worse.  She decided not to drink any alcohol anymore but had to have a drink yesterday because she was having tremors and shakes.  Abdominal pain got worse and was not getting any better so decided to come to the emergency room and is diagnosed with acute pancreatitis based on the imaging studies as her lipase essentially within normal limit.  Patient is feeling somewhat better after receiving IV fluids and pain medications but wants Dilaudid instead of morphine as morphine does not last very long.  She denies any hematemesis or melena.  Her pain does get worse upon eating food.  She is nauseous but has not had significant vomiting.   Upon review of record it appears that she tells the same story about having been free of alcohol use for 100 days but has had visits to the emergency room for the last month.      Past Medical History:  Past Medical History:   Diagnosis Date    Anxiety     Arthritis     Cirrhosis     Disease of thyroid gland     ETOH abuse     SOBER FOR 3 MONTHS    Fracture, clavicle 1/2021    shattered clavicel on issue slip and fall    GERD (gastroesophageal reflux disease)     History of kidney stones     5 YR AGO    Hypertension     Left shoulder pain     MDD (major depressive disorder)     Pancreatitis     Periarthritis of shoulder see above    Rotator cuff syndrome odre for shoulder replacement    unable to receive due to low platelets    Thrombocytopenia      Past Surgical History:  Past Surgical History:   Procedure Laterality Date    CLAVICLE SURGERY Right 2018    ENDOSCOPY N/A 10/26/2022    Procedure: ESOPHAGOGASTRODUODENOSCOPY wtih banding;  Surgeon: Ag Patel MD;  Location: [facility] ENDOSCOPY;  Service: Gastroenterology;  Laterality: N/A;  pre: melena  post: esophageal varicies with banding x2 bands    ENDOSCOPY N/A 01/18/2023    Procedure: ESOPHAGOGASTRODUODENOSCOPY;  Surgeon: Ag Patel MD;  Location: [facility] ENDOSCOPY;  Service: Gastroenterology;  Laterality: N/A;  PRE OP - MAROON STOOLS  POST OP - SM ESOPHAGEAL VARICES    ESOPHAGEAL VARICES LIGATION      JOINT REPLACEMENT Bilateral     GREAT TOE    KIDNEY STONE SURGERY      LITHOTRIPSY AND STENT (R SIDE)    LAPAROSCOPIC TUBAL LIGATION  2005    SIGMOIDOSCOPY N/A 01/18/2023    Procedure: SIGMOIDOSCOPY FLEXIBLE;  Surgeon: Ag Patel MD;  Location: [facility] ENDOSCOPY;  Service: Gastroenterology;  Laterality: N/A;  PRE OP - MAROON STOOLS  POST OP - RECTAL VARICES    TOTAL SHOULDER ARTHROPLASTY Left 5/9/2023    Procedure: reverse total shoulder arthroplasty;  Surgeon: Giovanni Denise MD;  Location:  [facility];  Service: Orthopedics;  Laterality: Left;       Home Meds:  Medications Prior to Admission   Medication Sig Dispense Refill Last Dose    acetaminophen (TYLENOL) 325 MG tablet Take 2 tablets by mouth 2 (Two) Times a Day As Needed for Mild Pain. 60 tablet 0     amLODIPine (NORVASC) 5 MG tablet Take 1 tablet by mouth Daily.   10/7/2023    busPIRone (BUSPAR) 15 MG tablet 1 tablet 3 (Three) Times a Day As Needed.   10/7/2023    Doptelet 20 MG tablet 3 tablets Daily. TAKES 10 DAYS BEFORE SURGERY       ferrous sulfate 325 (65 FE) MG tablet Take 1 tablet by mouth Daily With Breakfast.   10/7/2023    FLUoxetine (PROZAC) 40 MG capsule Take 1 capsule by mouth Daily. 30 capsule 3 10/7/2023    folic acid (FOLVITE) 1 MG tablet Take 1 tablet by mouth Daily.   10/7/2023    lactulose (CHRONULAC) 10 GM/15ML solution    Past Month    levothyroxine (SYNTHROID, LEVOTHROID) 100 MCG tablet TAKE ONE TABLET BY MOUTH DAILY 30 tablet 3 10/6/2023    levothyroxine (SYNTHROID, LEVOTHROID) 88 MCG tablet    10/7/2023    MAGnesium-Oxide 400 (240 Mg) MG tablet Daily. HOLD PRIOR TO SURG   10/7/2023    Multiple Vitamin (MULTIVITAMIN) capsule Take 1 capsule by mouth Daily. HOLD PRIOR TO SURG   10/7/2023    nadolol (CORGARD) 40 MG tablet Take 1 tablet by mouth every night at bedtime.   10/6/2023    nicotine (NICODERM CQ) 21 MG/24HR patch Place 1 patch on the skin as directed by provider Daily. 21 each 0     ondansetron (Zofran) 4 MG tablet Take 1 tablet by mouth Every 8 (Eight) Hours As Needed for Nausea or Vomiting. 12 tablet 0 10/6/2023    ondansetron ODT (ZOFRAN-ODT) 4 MG disintegrating tablet Place 1 tablet on the tongue Every 8 (Eight) Hours As Needed.   10/6/2023    oxybutynin XL (DITROPAN-XL) 10 MG 24 hr tablet Take 1 tablet by mouth Daily.       oxyCODONE (Roxicodone) 5 MG immediate release tablet Take 1 tablet by mouth Every 4 (Four) Hours As Needed for Moderate Pain. 20 tablet 0 10/6/2023    oxyCODONE-acetaminophen (Percocet)  MG per tablet Take 1 tablet by mouth Every 4 (Four)  "Hours As Needed for Moderate Pain. 50 tablet 0 Past Month    pantoprazole (PROTONIX) 40 MG EC tablet 1 tablet Daily.   10/7/2023    traZODone (DESYREL) 50 MG tablet Take 1 tablet by mouth At Night As Needed.   10/6/2023    naloxone (NARCAN) 4 MG/0.1ML nasal spray Call 911. Don't prime. [place] in 1 nostril for overdose. Repeat in 2-3 minutes in other nostril if no or minimal breathing/responsiveness. 2 each 0 Unknown     Current Meds:     Current Facility-Administered Medications:     sodium chloride 0.9 % flush 10 mL, 10 mL, Intravenous, PRN, Familia Crum MD    Facility-Administered Medications Ordered in Other Encounters:     sodium chloride 0.9 % flush 10 mL, 10 mL, Intravenous, Q12H, Callie Quiroz MD    sodium chloride 0.9 % flush 10 mL, 10 mL, Intravenous, PRN, Callie Quiroz MD    sodium chloride 0.9 % flush 20 mL, 20 mL, Intravenous, PRN, Callie Quiroz MD  Allergies:  Allergies   Allergen Reactions    Benzonatate Nausea Only and Other (See Comments)     Rash     Phenergan [Promethazine Hcl] Hives    Cucumber Extract Rash    Promethazine-Phenylephrine Other (See Comments)     \"FEEL WEIRD\"     Social History:   Social History     Tobacco Use    Smoking status: Some Days     Packs/day: 0.25     Years: 10.00     Additional pack years: 0.00     Total pack years: 2.50     Types: Cigarettes    Smokeless tobacco: Never    Tobacco comments:     1 PACK PER WEEK   Substance Use Topics    Alcohol use: Not Currently     Comment: SOBER FOR 3 MONTHS      Family History:  Family History   Problem Relation Age of Onset    Rheumatologic disease Mother         congestive heart    Thyroid disease Father     Leukemia Father     Cancer Father     Drug abuse Brother     Malig Hyperthermia Neg Hx           Review of Systems  See history of present illness and past medical history.    As described in the history of presenting illness.      Vitals:   /85 (BP Location: Left arm, " "Patient Position: Lying)   Pulse 62   Temp 97.8 øF (36.6 øC) (Oral)   Resp 16   Ht 165.1 cm (65\")   Wt 62.9 kg (138 lb 10.7 oz)   LMP 01/01/2013 Comment: NATANAEL  SpO2 94%   BMI 23.08 kg/mý   I/O:   Intake/Output Summary (Last 24 hours) at 10/7/2023 1721  Last data filed at 10/7/2023 1316  Gross per 24 hour   Intake 2000 ml   Output --   Net 2000 ml     Exam:  Patient is examined using the personal protective equipment as per guidelines from infection control for this particular patient as enacted.  Hand washing was performed before and after patient interaction.  General Appearance:    Alert, cooperative, no distress, appears stated age   Head:    Normocephalic, without obvious abnormality, atraumatic   Eyes:    PERRL, conjunctiva/corneas clear, EOM's intact, both eyes   Ears:    Normal external ear canals, both ears   Nose:   Nares normal, septum midline, mucosa normal, no drainage    or sinus tenderness   Throat:   Lips, tongue, gums normal; oral mucosa pink and moist   Neck: Supple   Back:     Symmetric, no curvature, ROM normal, no CVA tenderness   Lungs:     Clear to auscultation bilaterally, respirations unlabored   Chest Wall:    No tenderness or deformity    Heart:    Regular rate and rhythm, S1 and S2 normal, no murmur, rub   or gallop   Abdomen:   Soft mild generalized abdominal tenderness   Extremities:   Extremities normal, atraumatic, no cyanosis or edema   Pulses:   Pulses palpable in all extremities; symmetric all extremities   Skin:   Skin color normal, Skin is warm and dry,  no rashes or palpable lesions   Neurologic: Alert oriented and grossly nonfocal examination with minimal tremors       Data Review:      I reviewed the patient's new clinical results.  Results from last 7 days   Lab Units 10/07/23  0924   WBC 10*3/mm3 2.96*   HEMOGLOBIN g/dL 15.2   PLATELETS 10*3/mm3 60*     Results from last 7 days   Lab Units 10/07/23  0924   SODIUM mmol/L 142   POTASSIUM mmol/L 3.4*   CHLORIDE mmol/L " 105   CO2 mmol/L 24.8   BUN mg/dL 6   CREATININE mg/dL 0.66   CALCIUM mg/dL 9.3   GLUCOSE mg/dL 106*     CT Abdomen Pelvis Without Contrast    Result Date: 10/7/2023    1. Small strandy density around the pancreatic body could be evidence of acute pancreatitis, superimposed on chronic pancreatitis, correlate clinically. Cholelithiasis.  2. No acute inflammatory process of bowel is identified, follow-up as indications persist.  3. No urolithiasis or hydronephrosis.   This report was finalized on 10/7/2023 11:34 AM by Dr. Wilder Hanna M.D on Workstation: [id number]      CT Abdomen Pelvis Without Contrast    Result Date: 9/25/2023  1. Similar CT findings of chronic pancreatitis. Remaining small mesenteric pseudocyst is unchanged in size. 2. Hepatic cirrhosis with sequela of portal hypertension.  This report was finalized on 9/25/2023 10:26 PM by Dr. Familia Tobin M.D.     Microbiology Results (last 10 days)       ** No results found for the last 240 hours. **            Assessment:  Active Hospital Problems    Diagnosis  POA    **Pancreatitis [K85.90]  Yes    Leukopenia [D72.819]  Unknown    Essential hypertension [I10]  Yes    History of alcohol use disorder [Z87.898]  Yes    Alcoholic cirrhosis [K70.30]  Yes    Lupus [M32.9]  Yes    Chronic fatigue [R53.82]  Yes    Hashimoto's disease [E06.3]  Yes    Chronic alcohol abuse [F10.10]  Yes    Thrombocytopenia [D69.6]  Yes    Hypothyroidism [E03.9]  Yes    Irritable bowel syndrome with both constipation and diarrhea [K58.2]  Yes       Medical decision making/care plan: See admitting orders  Probable acute on chronic recurrent pancreatitis with normal lipase level following an episode of drinking in the setting of chronic recurrent alcohol abuse-plan is to admit the patient provide her with symptomatic relief watch her pain pattern and advance diet as tolerated.  History of alcohol use and recent binge drinking this past Wednesday with last alcohol intake  yesterday-her blood alcohol level is 0 and she is at risk of alcohol withdrawal.  Plan is to provide her with CIWA protocol, replace vitamins and monitor.  Continue with Protonix.  Alcoholic cirrhosis with leukopenia and thrombocytopenia-monitor.  Hypothyroidism-continue thyroid replacement therapy.  Hypertension-continue her home regimen and avoid hypotensive episodes.  Electrolyte imbalance and dehydration-continue with IV fluids and replace electrolytes as per protocol.  Abnormal urinalysis but no symptoms of urinary tract infection and was recently treated with Keflex for UTI and middle of September when she visited [facility]-plan is to watch provided with review systems and if she has symptoms of urinary tract infection repeat urinalysis and urine culture and empirically treat her with antibiotic therapy.    Giovana Baires MD   10/7/2023  17:21 EDT    Parts of this note may be an electronic transcription/translation of spoken language to printed text using the Dragon dictation system.      Electronically signed by Giovana Baires MD at 10/08/23 0514          Emergency Department Notes        Aide Potts, RN at 10/07/23 1555          Nursing report ED to floor  Bekah Gibson  53 y.o.  female    HPI :   Chief Complaint   Patient presents with    Alcohol Problem       Admitting doctor:   Giovana Baires MD    Admitting diagnosis:   The primary encounter diagnosis was Alcohol-induced acute pancreatitis without infection or necrosis. A diagnosis of Alcohol abuse was also pertinent to this visit.    Code status:   Current Code Status       Date Active Code Status Order ID Comments User Context       Prior            Allergies:   Benzonatate, Phenergan [promethazine hcl], Cucumber extract, and Promethazine-phenylephrine    Isolation:   No active isolations    Intake and Output    Intake/Output Summary (Last 24 hours) at 10/7/2023 8794  Last data filed at 10/7/2023 1246  Gross per 24 hour    Intake 1000 ml   Output --   Net 1000 ml       Weight:       10/07/23  1010   Weight: 62.1 kg (137 lb)       Most recent vitals:   Vitals:    10/07/23 1201 10/07/23 1213 10/07/23 1231 10/07/23 1250   BP: 141/80  143/87    BP Location:       Patient Position:       Pulse: 56 60 61 60   Resp:       Temp:       TempSrc:       SpO2: 92% 92% 90% 96%   Weight:       Height:           Active LDAs/IV Access:   Lines, Drains & Airways       Active LDAs       Name Placement date Placement time Site Days    Peripheral IV 10/07/23 1016 Anterior;Proximal;Right Forearm 10/07/23  1016  Forearm  less than 1                    Labs (abnormal labs have a star):   Labs Reviewed   COMPREHENSIVE METABOLIC PANEL - Abnormal; Notable for the following components:       Result Value    Glucose 106 (*)     Potassium 3.4 (*)     AST (SGOT) 55 (*)     Alkaline Phosphatase 139 (*)     Total Bilirubin 1.3 (*)     All other components within normal limits    Narrative:     GFR Normal >60  Chronic Kidney Disease <60  Kidney Failure <15     URINALYSIS W/ MICROSCOPIC IF INDICATED (NO CULTURE) - Abnormal; Notable for the following components:    Color, UA Dark Yellow (*)     Leuk Esterase, UA Small (1+) (*)     All other components within normal limits   CBC WITH AUTO DIFFERENTIAL - Abnormal; Notable for the following components:    WBC 2.96 (*)     Platelets 60 (*)     Monocyte % 12.2 (*)     Neutrophils, Absolute 1.30 (*)     All other components within normal limits   URINALYSIS, MICROSCOPIC ONLY - Abnormal; Notable for the following components:    WBC, UA 6-12 (*)     Squamous Epithelial Cells, UA 3-6 (*)     All other components within normal limits   LIPASE - Normal   LACTIC ACID, PLASMA - Normal   RAINBOW DRAW    Narrative:     The following orders were created for panel order [place] Draw.  Procedure                               Abnormality         Status                     ---------                               -----------          ------                     Green Top (Gel)[[id number]]                                  Final result               Lavender Top[[id number]]                                     Final result               Gold Top - SST[[id number]]                                   Final result               Light Blue Top[[id number]]                                   Final result                 Please view results for these tests on the individual orders.   ETHANOL   CBC AND DIFFERENTIAL    Narrative:     The following orders were created for panel order CBC & Differential.  Procedure                               Abnormality         Status                     ---------                               -----------         ------                     CBC Auto Differential[[id number]]        Abnormal            Final result                 Please view results for these tests on the individual orders.   GREEN TOP   LAVENDER TOP   GOLD TOP - SST   LIGHT BLUE TOP       EKG:   No orders to display       Meds given in ED:   Medications   sodium chloride 0.9 % flush 10 mL (has no administration in time range)   morphine injection 4 mg (4 mg Intravenous Given 10/7/23 1248)   ondansetron (ZOFRAN) injection 4 mg (4 mg Intravenous Given 10/7/23 1245)   sodium chloride 0.9 % bolus 1,000 mL (1,000 mL Intravenous New Bag 10/7/23 1246)       Imaging results:  CT Abdomen Pelvis Without Contrast    Result Date: 10/7/2023    1. Small strandy density around the pancreatic body could be evidence of acute pancreatitis, superimposed on chronic pancreatitis, correlate clinically. Cholelithiasis.  2. No acute inflammatory process of bowel is identified, follow-up as indications persist.  3. No urolithiasis or hydronephrosis.   This report was finalized on 10/7/2023 11:34 AM by Dr. Wilder Hanna M.D on Workstation: [id number]       Ambulatory status:   - bedrest    Social issues:   Social History     Socioeconomic History    Marital status:    Tobacco Use     Smoking status: Some Days     Packs/day: 0.25     Years: 10.00     Additional pack years: 0.00     Total pack years: 2.50     Types: Cigarettes    Smokeless tobacco: Never    Tobacco comments:     1 PACK PER WEEK   Vaping Use    Vaping Use: Never used   Substance and Sexual Activity    Alcohol use: Not Currently     Comment: SOBER FOR 3 MONTHS    Drug use: Not Currently    Sexual activity: Not Currently     Partners: Male     Birth control/protection: Surgical     Comment: cinthia- menepausal       NIH Stroke Scale:       Aide Potts RN  10/07/23 15:55 EDT          Electronically signed by Aide Potts, RN at 10/07/23 1555       Aide Potts RN at 10/07/23 1251          Nursing report ED to floor  Bekah TRISTAN Gibson  53 y.o.  female    HPI :   Chief Complaint   Patient presents with    Alcohol Problem       Admitting doctor:   No admitting provider for patient encounter.    Admitting diagnosis:   The primary encounter diagnosis was Alcohol-induced acute pancreatitis without infection or necrosis. A diagnosis of Alcohol abuse was also pertinent to this visit.    Code status:   Current Code Status       Date Active Code Status Order ID Comments User Context       Prior            Allergies:   Benzonatate, Phenergan [promethazine hcl], Cucumber extract, and Promethazine-phenylephrine    Isolation:   No active isolations    Intake and Output    Intake/Output Summary (Last 24 hours) at 10/7/2023 1251  Last data filed at 10/7/2023 1246  Gross per 24 hour   Intake 1000 ml   Output --   Net 1000 ml       Weight:       10/07/23  1010   Weight: 62.1 kg (137 lb)       Most recent vitals:   Vitals:    10/07/23 1201 10/07/23 1213 10/07/23 1231 10/07/23 1250   BP: 141/80  143/87    BP Location:       Patient Position:       Pulse: 56 60 61 60   Resp:       Temp:       TempSrc:       SpO2: 92% 92% 90% 96%   Weight:       Height:           Active LDAs/IV Access:   Lines, Drains & Airways       Active LDAs       Name  Placement date Placement time Site Days    Peripheral IV 10/07/23 1016 Anterior;Proximal;Right Forearm 10/07/23  1016  Forearm  less than 1                    Labs (abnormal labs have a star):   Labs Reviewed   COMPREHENSIVE METABOLIC PANEL - Abnormal; Notable for the following components:       Result Value    Glucose 106 (*)     Potassium 3.4 (*)     AST (SGOT) 55 (*)     Alkaline Phosphatase 139 (*)     Total Bilirubin 1.3 (*)     All other components within normal limits    Narrative:     GFR Normal >60  Chronic Kidney Disease <60  Kidney Failure <15     URINALYSIS W/ MICROSCOPIC IF INDICATED (NO CULTURE) - Abnormal; Notable for the following components:    Color, UA Dark Yellow (*)     Leuk Esterase, UA Small (1+) (*)     All other components within normal limits   CBC WITH AUTO DIFFERENTIAL - Abnormal; Notable for the following components:    WBC 2.96 (*)     Platelets 60 (*)     Monocyte % 12.2 (*)     Neutrophils, Absolute 1.30 (*)     All other components within normal limits   URINALYSIS, MICROSCOPIC ONLY - Abnormal; Notable for the following components:    WBC, UA 6-12 (*)     Squamous Epithelial Cells, UA 3-6 (*)     All other components within normal limits   LIPASE - Normal   LACTIC ACID, PLASMA - Normal   RAINBOW DRAW    Narrative:     The following orders were created for panel order [place] Draw.  Procedure                               Abnormality         Status                     ---------                               -----------         ------                     Green Top (Gel)[[id number]]                                  Final result               Lavender Top[[id number]]                                     Final result               Gold Top - SST[[id number]]                                   Final result               Light Blue Top[[id number]]                                   Final result                 Please view results for these tests on the individual orders.   ETHANOL   CBC AND  DIFFERENTIAL    Narrative:     The following orders were created for panel order CBC & Differential.  Procedure                               Abnormality         Status                     ---------                               -----------         ------                     CBC Auto Differential[[id number]]        Abnormal            Final result                 Please view results for these tests on the individual orders.   GREEN TOP   LAVENDER TOP   GOLD TOP - SST   LIGHT BLUE TOP       EKG:   No orders to display       Meds given in ED:   Medications   sodium chloride 0.9 % flush 10 mL (has no administration in time range)   sodium chloride 0.9 % bolus 1,000 mL (1,000 mL Intravenous [place] Bag 10/7/23 1246)   morphine injection 4 mg (4 mg Intravenous Given 10/7/23 1248)   ondansetron (ZOFRAN) injection 4 mg (4 mg Intravenous Given 10/7/23 1245)       Imaging results:  CT Abdomen Pelvis Without Contrast    Result Date: 10/7/2023    1. Small strandy density around the pancreatic body could be evidence of acute pancreatitis, superimposed on chronic pancreatitis, correlate clinically. Cholelithiasis.  2. No acute inflammatory process of bowel is identified, follow-up as indications persist.  3. No urolithiasis or hydronephrosis.   This report was finalized on 10/7/2023 11:34 AM by Dr. Wilder Hanna M.D on Workstation: [employer]       Ambulatory status:   - bedrest/ up with assist    Social issues:   Social History     Socioeconomic History    Marital status:    Tobacco Use    Smoking status: Some Days     Packs/day: 0.25     Years: 10.00     Additional pack years: 0.00     Total pack years: 2.50     Types: Cigarettes    Smokeless tobacco: Never    Tobacco comments:     1 PACK PER WEEK   Vaping Use    Vaping Use: Never used   Substance and Sexual Activity    Alcohol use: Not Currently     Comment: SOBER FOR 3 MONTHS    Drug use: Not Currently    Sexual activity: Not Currently     Partners: Male     Birth  control/protection: Surgical     Comment: cinthia- menepausal       NIH Stroke Scale:       Aide Potts RN  10/07/23 12:51 EDT          Electronically signed by Aide Potts RN at 10/07/23 8586       Familia Crum MD at 10/07/23 6167          MD ATTESTATION NOTE    The PATO and I have discussed this patient's history, physical exam, and treatment plan.  I have reviewed the documentation and personally had a face to face interaction with the patient. I affirm the documentation and agree with the treatment and plan.  The attached note describes my personal findings.      I provided a substantive portion of the care of the patient.  I personally performed the physical exam in its entirety, and below are my findings.        Brief HPI: 53-year-old female presenting for evaluation of alcohol use.  Patient had been sober for approximately 100 days until 1 week ago when she began binge drinking again and has developed epigastric abdominal pain.  Patient has no history of pancreatitis.    PHYSICAL EXAM  ED Triage Vitals   Temp Heart Rate Resp BP SpO2   10/07/23 0855 10/07/23 0852 10/07/23 0856 10/07/23 0856 10/07/23 0852   97.7 øF (36.5 øC) 75 16 129/83 96 %      Temp src Heart Rate Source Patient Position BP Location FiO2 (%)   10/07/23 0855 10/07/23 1010 10/07/23 1010 10/07/23 1010 --   Tympanic Monitor Lying Right arm          GENERAL: no acute distress  HENT: nares patent  EYES: no scleral icterus  CV: regular rhythm, normal rate  RESPIRATORY: normal effort  ABDOMEN: soft, nondistended, mild epigastric tenderness to palpation  MUSCULOSKELETAL: no deformity  NEURO: alert, moves all extremities, follows commands  PSYCH:  calm, cooperative  SKIN: warm, dry    Vital signs and nursing notes reviewed.        Plan: 53-year-old female presenting for evaluation of relapse on alcohol use with associated epigastric abdominal pain.  Laboratory evaluation is notable for leukopenia, lipase within normal limits,  otherwise unremarkable.  Radiological evaluation demonstrates possible acute on chronic pancreatitis with mild inflammatory stranding.  On reevaluation patient demonstrates worsening pain.  Will admit for further evaluation and management.       Familia Crum MD  10/07/23 1511      Electronically signed by Familia Crum MD at 10/07/23 1511       Yanira Lee PA at 10/07/23 1008       Attestation signed by Familia Crum MD at 10/09/23 0153        SHARED APC FACE TO FACE: This visit was performed by both the physician and an APC. I personally evaluated and examined the patient. I performed the entirety of the physical exam and I documented this exam in this attestation or in accompanying documentation.    Familia Crum MD 10/9/2023 01:53 EDT                          EMERGENCY DEPARTMENT ENCOUNTER    Room Number:  24/24  PCP: Tylor Davis  Discussed/ obtained information from independent historians: patient      HPI:  Chief Complaint: abdominal pain. Alcohol relapse  A complete HPI/ROS/PMH/PSH/SH/FH are unobtainable due to: none  Context: Bekah Gibson is a 53 y.o. female who presents to the ED c/o relapse of alcohol approximately 1 week ago after 98 days sober.  States she has been drinking half a pint of peach paz daily.  Around the same time she started drinking again she started having epigastric abdominal pain consistent with prior episodes of pancreatitis.  She states her pain worsens with eating and other than not eating, nothing really makes it feel better.  Her last drink was yesterday evening around 6 PM.  She was feeling a little shaky this morning, having increased upper abdominal pain and presents for further evaluation.  She states she has chronic diarrhea which is stable and unchanged.  Denies any nausea or vomiting fever or chills or upper respiratory symptoms chest pain or shortness of breath.      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  Patient previously had extensive work-up including bone marrow biopsy for anemia, she was to continue her oral iron.  Leukopenia and thrombocytopenia were stable and were going to be monitored.      PAST MEDICAL HISTORY  Active Ambulatory Problems     Diagnosis Date Noted    Irritable bowel syndrome with both constipation and diarrhea 06/05/2017    Peripheral neuropathy 06/05/2017    Vitamin D deficiency 06/05/2017    History of HPV infection 06/05/2017    Hypothyroidism 06/05/2017    Tobacco dependence due to cigarettes 06/05/2017    Acute kidney injury 12/28/2015    Ascites 06/06/2016    E. coli UTI (urinary tract infection) 06/06/2016    Alcohol withdrawal syndrome, with delirium 06/29/2018    Alcoholic hepatitis 06/29/2018    GI bleed 06/29/2018    Acute post-hemorrhagic anemia 06/29/2018    Thrombocytopenia 06/29/2018    Open wound of right shoulder region 06/29/2018    Pancreatic insufficiency 07/04/2018    Alcoholic ketoacidosis 07/21/2019    Chronic alcohol abuse 07/29/2019    ESBL (extended spectrum beta-lactamase) producing bacteria infection 07/29/2019    Abnormal weight loss 12/13/2019    Hashimoto's disease 12/13/2019    Chronic fatigue 12/14/2019    History of alcohol use disorder 09/21/2021    Alcohol intoxication 09/21/2021    Lupus     Hypomagnesemia     Pancytopenia     Alcoholic cirrhosis     Bilateral lower abdominal discomfort 09/21/2021    Primary hypertension 10/24/2022    Melena 10/24/2022    Arthritis of shoulder 12/19/2022    Esophageal varices 01/16/2023    Essential hypertension 01/16/2023    Acute on chronic pancreatitis 01/22/2023    Abdominal pain 01/22/2023     Resolved Ambulatory Problems     Diagnosis Date Noted    Acute renal failure 02/20/2017    Kidney stone 06/05/2017    Hypotension 07/01/2018    Nausea and vomiting 09/21/2021     Acute GI bleeding 10/24/2022     Past Medical History:   Diagnosis Date    Anxiety     Arthritis     Cirrhosis     Disease of thyroid gland     ETOH abuse     Fracture, clavicle 1/2021    GERD (gastroesophageal reflux disease)     History of kidney stones     Hypertension     Left shoulder pain     MDD (major depressive disorder)     Pancreatitis     Periarthritis of shoulder see above    Rotator cuff syndrome odre for shoulder replacement         PAST SURGICAL HISTORY  Past Surgical History:   Procedure Laterality Date    CLAVICLE SURGERY Right 2018    ENDOSCOPY N/A 10/26/2022    Procedure: ESOPHAGOGASTRODUODENOSCOPY wtih banding;  Surgeon: Ag Patel MD;  Location: [facility] ENDOSCOPY;  Service: Gastroenterology;  Laterality: N/A;  pre: melena  post: esophageal varicies with banding x2 bands    ENDOSCOPY N/A 01/18/2023    Procedure: ESOPHAGOGASTRODUODENOSCOPY;  Surgeon: Ag Patel MD;  Location: [facility] ENDOSCOPY;  Service: Gastroenterology;  Laterality: N/A;  PRE OP - MAROON STOOLS  POST OP - SM ESOPHAGEAL VARICES    ESOPHAGEAL VARICES LIGATION      JOINT REPLACEMENT Bilateral     GREAT TOE    KIDNEY STONE SURGERY      LITHOTRIPSY AND STENT (R SIDE)    LAPAROSCOPIC TUBAL LIGATION  2005    SIGMOIDOSCOPY N/A 01/18/2023    Procedure: SIGMOIDOSCOPY FLEXIBLE;  Surgeon: Ag Patel MD;  Location: [facility] ENDOSCOPY;  Service: Gastroenterology;  Laterality: N/A;  PRE OP - MAROON STOOLS  POST OP - RECTAL VARICES    TOTAL SHOULDER ARTHROPLASTY Left 5/9/2023    Procedure: reverse total shoulder arthroplasty;  Surgeon: Giovanni Denise MD;  Location: [facility] OR [facility];  Service: Orthopedics;  Laterality: Left;         FAMILY HISTORY  Family History   Problem Relation Age of Onset    Rheumatologic disease Mother         congestive heart    Thyroid disease Father     Leukemia Father     Cancer Father     Drug abuse Brother     Malig Hyperthermia Neg Hx          SOCIAL HISTORY  Social History     Socioeconomic History     Marital status:    Tobacco Use    Smoking status: Some Days     Packs/day: 0.25     Years: 10.00     Additional pack years: 0.00     Total pack years: 2.50     Types: Cigarettes    Smokeless tobacco: Never    Tobacco comments:     1 PACK PER WEEK   Vaping Use    Vaping Use: Never used   Substance and Sexual Activity    Alcohol use: Not Currently     Comment: SOBER FOR 3 MONTHS    Drug use: Not Currently    Sexual activity: Not Currently     Partners: Male     Birth control/protection: Surgical     Comment: cinthia- menepausal         ALLERGIES  Benzonatate, Phenergan [promethazine hcl], Cucumber extract, and Promethazine-phenylephrine        REVIEW OF SYSTEMS  Review of Systems         PHYSICAL EXAM  ED Triage Vitals   Temp Heart Rate Resp BP SpO2   10/07/23 0855 10/07/23 0852 10/07/23 0856 10/07/23 0856 10/07/23 0852   97.7 øF (36.5 øC) 75 16 129/83 96 %      Temp src Heart Rate Source Patient Position BP Location FiO2 (%)   10/07/23 0855 -- -- -- --   Tympanic           Physical Exam      GENERAL: no acute distress  HENT: normocephalic, atraumatic  EYES: no scleral icterus  CV: regular rhythm, normal rate  RESPIRATORY: normal effort, CTA B  ABDOMEN: Soft moderate epigastric tenderness nondistended normal bowel sounds, no guarding or rigidity  MUSCULOSKELETAL: no deformity  NEURO: alert, moves all extremities, follows commands  PSYCH:  calm, cooperative  SKIN: warm, dry    Vital signs and nursing notes reviewed.          LAB RESULTS  Recent Results (from the past 24 hour(s))   Comprehensive Metabolic Panel    Collection Time: 10/07/23  9:24 AM    Specimen: Blood   Result Value Ref Range    Glucose 106 (H) 65 - 99 mg/dL    BUN 6 6 - 20 mg/dL    Creatinine 0.66 0.57 - 1.00 mg/dL    Sodium 142 136 - 145 mmol/L    Potassium 3.4 (L) 3.5 - 5.2 mmol/L    Chloride 105 98 - 107 mmol/L    CO2 24.8 22.0 - 29.0 mmol/L    Calcium 9.3 8.6 - 10.5 mg/dL    Total Protein 6.7 6.0 - 8.5 g/dL    Albumin 4.0 3.5 - 5.2 g/dL    ALT  (SGPT) 27 1 - 33 U/L    AST (SGOT) 55 (H) 1 - 32 U/L    Alkaline Phosphatase 139 (H) 39 - 117 U/L    Total Bilirubin 1.3 (H) 0.0 - 1.2 mg/dL    Globulin 2.7 gm/dL    A/G Ratio 1.5 g/dL    BUN/Creatinine Ratio 9.1 7.0 - 25.0    Anion Gap 12.2 5.0 - 15.0 mmol/L    eGFR 103.7 >60.0 mL/min/1.73   Lipase    Collection Time: 10/07/23  9:24 AM    Specimen: Blood   Result Value Ref Range    Lipase 18 13 - 60 U/L   Lactic Acid, Plasma    Collection Time: 10/07/23  9:24 AM    Specimen: Blood   Result Value Ref Range    Lactate 1.2 0.5 - 2.0 mmol/L   CBC Auto Differential    Collection Time: 10/07/23  9:24 AM    Specimen: Blood   Result Value Ref Range    WBC 2.96 (L) 3.40 - 10.80 10*3/mm3    RBC 4.66 3.77 - 5.28 10*6/mm3    Hemoglobin 15.2 12.0 - 15.9 g/dL    Hematocrit 44.3 34.0 - 46.6 %    MCV 95.1 79.0 - 97.0 fL    MCH 32.6 26.6 - 33.0 pg    MCHC 34.3 31.5 - 35.7 g/dL    RDW 14.3 12.3 - 15.4 %    RDW-SD 49.2 37.0 - 54.0 fl    MPV 11.7 6.0 - 12.0 fL    Platelets 60 (L) 140 - 450 10*3/mm3    Neutrophil % 43.9 42.7 - 76.0 %    Lymphocyte % 38.5 19.6 - 45.3 %    Monocyte % 12.2 (H) 5.0 - 12.0 %    Eosinophil % 4.4 0.3 - 6.2 %    Basophil % 1.0 0.0 - 1.5 %    Immature Grans % 0.0 0.0 - 0.5 %    Neutrophils, Absolute 1.30 (L) 1.70 - 7.00 10*3/mm3    Lymphocytes, Absolute 1.14 0.70 - 3.10 10*3/mm3    Monocytes, Absolute 0.36 0.10 - 0.90 10*3/mm3    Eosinophils, Absolute 0.13 0.00 - 0.40 10*3/mm3    Basophils, Absolute 0.03 0.00 - 0.20 10*3/mm3    Immature Grans, Absolute 0.00 0.00 - 0.05 10*3/mm3    nRBC 0.0 0.0 - 0.2 /100 WBC   Ethanol    Collection Time: 10/07/23  9:24 AM    Specimen: Blood   Result Value Ref Range    Ethanol <10 0 - 10 mg/dL    Ethanol % <0.010 %       Ordered the above labs and reviewed the results.        RADIOLOGY  CT Abdomen Pelvis Without Contrast    Result Date: 10/7/2023  Narrative: CT ABDOMEN PELVIS WO CONTRAST-  INDICATIONS: Abdominal pain  TECHNIQUE: Radiation dose reduction techniques were utilized,  including automated exposure control and exposure modulation based on body size. Unenhanced ABDOMEN AND PELVIS CT  COMPARISON: 9/25/2023  FINDINGS:  Cholelithiasis is evident.  The spleen is mildly enlarged, 13.2 cm.  Numerous pancreatic calcifications are seen, compatible with chronic pancreatitis. Small strandy density around the pancreatic body could be evidence of superimposed acute pancreatitis, correlate clinically.  Otherwise unremarkable unenhanced appearance of the liver, spleen, adrenal glands, pancreas, kidneys, bladder.  No bowel obstruction or abnormal bowel thickening is identified. The appendix does not appear inflamed.  No free intraperitoneal gas or free fluid. Minimal umbilical hernia of fat is present.  Scattered small mesenteric and para-aortic lymph nodes are seen that are not significant by size criteria.  Abdominal aorta is not aneurysmal. Aortic and other arterial calcifications are present.  The lung bases show minimal likely atelectasis.  Degenerative changes are seen in the spine. No acute fracture is identified.        Impression:   1. Small strandy density around the pancreatic body could be evidence of acute pancreatitis, superimposed on chronic pancreatitis, correlate clinically. Cholelithiasis.  2. No acute inflammatory process of bowel is identified, follow-up as indications persist.  3. No urolithiasis or hydronephrosis.   This report was finalized on 10/7/2023 11:34 AM by Dr. Wilder Hanna M.D on Workstation: [facility]          Ordered the above noted radiological studies. Reviewed by me in PACS.            PROCEDURES  Procedures              MEDICATIONS GIVEN IN ER  Medications   sodium chloride 0.9 % flush 10 mL (has no administration in time range)                   MEDICAL DECISION MAKING, PROGRESS, and CONSULTS    All labs have been independently reviewed by me.  All radiology studies have been reviewed by me and I have also reviewed the radiology report.   EKG's  independently viewed and interpreted by me.  Discussion below represents my analysis of pertinent findings related to patient's condition, differential diagnosis, treatment plan and final disposition.            Orders placed during this visit:  Orders Placed This Encounter   Procedures    [place] Draw    Comprehensive Metabolic Panel    Lipase    Urinalysis With Microscopic If Indicated (No Culture) - Urine, Clean Catch    Lactic Acid, Plasma    CBC Auto Differential    Ethanol    NPO Diet NPO Type: Strict NPO    Undress & Gown    Insert Peripheral IV    CBC & Differential    Green Top (Gel)    Lavender Top    Gold Top - SST    Light Blue Top           Differential diagnosis:  Differential diagnosis includes but is not limited to:  - hepatobiliary pathology such as cholecystitis, cholangitis, and symptomatic cholelithiasis  - Pancreatitis  - Dyspepsia  - Small bowel obstruction  - Appendicitis  - Diverticulitis  - UTI including pyelonephritis  - Ureteral stone  - Zoster  - Colitis, including infectious and ischemic  - Atypical ACS      Independent interpretation of labs, radiology studies, and discussions with consultants:  ED Course as of 10/08/23 2039   Sat Oct 07, 2023   1010 Ethanol: <10 [KA]   1011 Lipase: 18 [KA]   1011 CBC & Differential(!) [KA]   1011 WBC(!): 2.96 [KA]   1011 Hemoglobin: 15.2 [KA]   1011 Platelets(!): 60  Chronic thrombocytopenia between 32 and 60 over the last 5 months [KA]   1011 Glucose(!): 106 [KA]   1011 Potassium(!): 3.4 [KA]   1011 AST (SGOT)(!): 55 [KA]   1011 Alkaline Phosphatase(!): 139 [KA]   1011 Total Bilirubin(!): 1.3 [KA]   1302 Reassessed the patient, he is reporting escalating epigastric pain, now rates it a 7 out of 10.  She does have some pancreatic stranding, tenderness in the epigastrium, history of pancreatitis, CT scan looks consistent with some acute on chronic pancreatitis.  Lipase normal, remaining labs that she does have some abnormalities are not significant  based on previous findings and chronic illness.  Will admit for [KA]   1303  pancreatitis and further evaluation. [KA]   1303 I discussed patient with Dr. Baires, hospitalist including history presentation labs and imaging and he agrees to admit. [KA]      ED Course User Index  [KA] Yanira Lee PA       - Shared decision making: Discussed admission versus discharge patient states she would like to be admitted, I think reasonable based on her symptoms and CT findings        Additional sources:    - Chronic or social conditions impacting care: alcoholism          DIAGNOSIS  Final diagnoses:   Alcohol-induced acute pancreatitis without infection or necrosis   Alcohol abuse               Latest Documented Vital Signs:  As of 10:08 EDT  BP- 129/83 HR- 75 Temp- 97.7 øF (36.5 øC) (Tympanic) O2 sat- 96%              --    Please note that portions of this were completed with a voice recognition program.       Note Disclaimer: At [facility], we believe that sharing information builds trust and better relationships. You are receiving this note because you are receiving care at [facility] or recently visited. It is possible you will see health information before a provider has talked with you about it. This kind of information can be easy to misunderstand. To help you fully understand what it means for your health, we urge you to discuss this note with your provider.             Yanira Lee PA  10/08/23 2040      Electronically signed by Familia Crum MD at 10/09/23 0153       Jerri Alonso, RN at 10/07/23 0854          C/O ETOH relapse since Monday after 90 days clean. Last ETOH drink 1500 yesterday. Also C/O pancreatitis flare up with upper abd pain     Electronically signed by Jerri Alonso, RN at 10/07/23 0901       Medication Administration Report for Bekah Gibson as of 10/09/23 0920     Legend:    Given Hold Not Given Due Canceled Entry Other Actions    Time Time (Time) Time  "Time-Action         Discontinued     Completed     Future     MAR Hold     Linked             Medications 10/08/23 10/09/23      amLODIPine (NORVASC) tablet 5 mg  Dose: 5 mg  Freq: Daily Route: PO  Start: 10/07/23 1815   Admin Instructions:   Hold for SBP less than 100, DBP less than 60  Caution: Look alike/sound alike drug alert. Avoid grapefruit juice.    0853-Given            0804-Given               busPIRone (BUSPAR) tablet 15 mg  Dose: 15 mg  Freq: 3 Times Daily PRN Route: PO  PRN Comment: anxiety  Start: 10/07/23 1724   Admin Instructions:   Caution: Look alike/sound alike drug alert. Take with food.  Avoid grapefruit juice.         Calcium Replacement - Follow Nurse / BPA Driven Protocol  Freq: As Needed Route: XX  PRN Reason: Other  Start: 10/07/23 1725   Admin Instructions:   Open Order & Select \"[facility] Electrolyte Replacement Protocol Algorithm\" to View Details         ferrous sulfate tablet 325 mg  Dose: 325 mg  Freq: Daily With Breakfast Route: PO  Start: 10/08/23 0800   Admin Instructions:   Swallow whole. Do not crush, split, or chew. Take with food if GI upset occurs.    0854-Given            0805-Given               FLUoxetine (PROzac) capsule 40 mg  Dose: 40 mg  Freq: Daily Route: PO  Start: 10/07/23 1815   Admin Instructions:   Caution: Look alike/sound alike drug alert    0853-Given            0804-Given               fluticasone (FLONASE) 50 MCG/ACT nasal spray 2 spray  Dose: 2 spray  Freq: Daily Route: EACH NARE  Start: 10/09/23 0900     0807-Given               folic acid (FOLVITE) tablet 1 mg  Dose: 1 mg  Freq: Daily Route: PO  Start: 10/07/23 1815    0853-Given            0805-Given               HYDROmorphone (DILAUDID) injection 0.5 mg  Dose: 0.5 mg  Freq: Every 4 Hours PRN Route: IV  PRN Reason: Severe Pain  Start: 10/08/23 0930   Admin Instructions:   Based on patient request - if ordered for moderate or severe pain, provider allows for administration of a medication prescribed for a lower " pain scale.  If given for pain, use the following pain scale:  Mild Pain = Pain Score of 1-3, CPOT 1-2  Moderate Pain = Pain Score of 4-6, CPOT 3-4  Severe Pain = Pain Score of 7-10, CPOT 5-8    1423-Given     2019-Given           0039-Given     0805-Given              lactulose (CHRONULAC) 10 GM/15ML solution 10 g  Dose: 10 g  Freq: 3 Times Daily Route: PO  Start: 10/07/23 2100   Admin Instructions:   May be mixed with fruit juice, water, or milk.    0854-Given     1544-Given     (2018)-Not Given          0808-Given     1600 2100             levothyroxine (SYNTHROID, LEVOTHROID) tablet 100 mcg  Dose: 100 mcg  Freq: Daily Route: PO  Start: 10/07/23 1815   Admin Instructions:   Take on empty stomach.    0853-Given            0804-Given               LORazepam (ATIVAN) tablet 1 mg  Dose: 1 mg  Freq: Every 1 Hour PRN Route: PO  PRN Reason: Withdrawal  PRN Comment: For CIWA-Ar 8-10  Start: 10/07/23 1725   End: 10/14/23 1724   Admin Instructions:   Reassess 1 Hour After Administration   Caution: Look alike/sound alike drug alert    0853-Given               Or  LORazepam (ATIVAN) injection 1 mg  Dose: 1 mg  Freq: Every 1 Hour PRN Route: IV  PRN Reason: Withdrawal  PRN Comment: For CIWA-Ar 8-10  Start: 10/07/23 1725   End: 10/14/23 1724   Admin Instructions:   Reassess 1 Hour After Administration   Caution: Look alike/sound alike drug alert. Dilute 1:1 with normal saline.    0853-Not Given:  See Alt               Or  LORazepam (ATIVAN) tablet 2 mg  Dose: 2 mg  Freq: Every 1 Hour PRN Route: PO  PRN Reason: Withdrawal  PRN Comment: For CIWA-Ar 11-15 or -160, DBP , -125  Start: 10/07/23 1725   End: 10/14/23 1724   Admin Instructions:   Reassess 1 Hour After Administration.   Caution: Look alike/sound alike drug alert    0853-Not Given:  See Alt               Or  LORazepam (ATIVAN) injection 2 mg  Dose: 2 mg  Freq: Every 1 Hour PRN Route: IV  PRN Reason: Withdrawal  PRN Comment: For CIWA-Ar 11-15 or SBP  "140-160, DBP , -125  Start: 10/07/23 1725   End: 10/14/23 1724   Admin Instructions:   Reassess 1 Hour After Administration   Caution: Look alike/sound alike drug alert. Dilute 1:1 with normal saline.    0853-Not Given:  See Alt               Or  LORazepam (ATIVAN) injection 2 mg  Dose: 2 mg  Freq: Every 15 Minutes PRN Route: IV  PRN Reason: Withdrawal  PRN Comment: For CIWA-Ar Greater Than 15 or SBP greater than 160, DBP greater than 110, or HR greater than 125.  Start: 10/07/23 1725   End: 10/14/23 1724   Admin Instructions:   Reassess 15 Minutes After Each Administration.  If CIWA-Ar Does Not Decrease Contact Provider To Discuss Transfer to Higher Level of Care.   Caution: Look alike/sound alike drug alert. Dilute 1:1 with normal saline.    0853-Not Given:  See Alt               Or  LORazepam (ATIVAN) injection 2 mg  Dose: 2 mg  Freq: Every 15 Minutes PRN Route: IM  PRN Reason: Withdrawal  PRN Comment: For CIWA-Ar Greater Than 15 or SBP greater than 160, DBP greater than 110, or HR greater than 125.  Start: 10/07/23 1725   End: 10/14/23 1724   Admin Instructions:   Reassess 15 Minutes After Each Administration.  If CIWA-Ar Does Not Decrease Contact Provider To Discuss Transfer to Higher Level of Care.   Caution: Look alike/sound alike drug alert. Dilute 1:1 with normal saline.    0853-Not Given:  See Alt                Magnesium Oxide -Mg Supplement tablet 400 mg  Dose: 400 mg  Freq: Daily Route: PO  Start: 10/07/23 1815    (0855)-Not Given [C]            0900               Magnesium Standard Dose Replacement - Follow Nurse / BPA Driven Protocol  Freq: As Needed Route: XX  PRN Reason: Other  Start: 10/07/23 1725   Admin Instructions:   Open Order & Select \"[facility] Electrolyte Replacement Protocol Algorithm\" to View Details         multivitamin (THERAGRAN) tablet 1 tablet  Dose: 1 tablet  Freq: Daily Route: PO  Start: 10/07/23 1815   Admin Instructions:       0853-Given            0805-Given               " "nadolol (CORGARD) tablet 40 mg  Dose: 40 mg  Freq: Nightly Route: PO  Start: 10/07/23 2100   Admin Instructions:   Hold for SBP less than 100, DBP less than 60, or heart rate less than 50    2018-Given            2100               nicotine (NICODERM CQ) 21 MG/24HR patch 1 patch  Dose: 1 patch  Freq: Every 24 Hours Scheduled Route: TD  Start: 10/07/23 1815   Admin Instructions:   Apply to clean, dry, nonhairy area of skin (typically upper arm or shoulder)  Dispose of nicotine replacement therapies and their wrappers in non-hazardous pharmaceutical waste or in regular trash.    0854-Medication Removed     0856-Medication Applied           0808-Medication Removed     0809-Medication Applied              ondansetron (ZOFRAN) injection 4 mg  Dose: 4 mg  Freq: Every 6 Hours PRN Route: IV  PRN Reasons: Nausea,Vomiting  Start: 10/07/23 1722   Admin Instructions:   \"If multiple N/V medications ordered, use in the following order: Ondansetron, Prochlorperazine, Promethazine. Use PO unless patient refuses or patient unable to swallow.\"         oxybutynin XL (DITROPAN-XL) 24 hr tablet 10 mg  Dose: 10 mg  Freq: Daily Route: PO  Start: 10/07/23 1815   Admin Instructions:   Do not crush or chew the capsules or tablets. The drug may not work as designed if the capsule or tablet is crushed or chewed. Swallow whole.    0853-Given            0805-Given               oxyCODONE (ROXICODONE) immediate release tablet 5 mg  Dose: 5 mg  Freq: Every 4 Hours PRN Route: PO  PRN Reason: Moderate Pain  Start: 10/07/23 1722   Admin Instructions:   Based on patient request - if ordered for moderate or severe pain, provider allows for administration of a medication prescribed for a lower pain scale.      If given for pain, use the following pain scale:  Mild Pain = Pain Score of 1-3, CPOT 1-2  Moderate Pain = Pain Score of 4-6, CPOT 3-4  Severe Pain = Pain Score of 7-10, CPOT 5-8    1202-Given     1809-Given     2214-Given          0433-Given      " "         pantoprazole (PROTONIX) EC tablet 40 mg  Dose: 40 mg  Freq: Daily Route: PO  Start: 10/07/23 1815   Admin Instructions:   Do not crush or chew the capsules or tablets. The drug may not work as designed if the capsule or tablet is crushed or chewed. Swallow whole.  Swallow whole; do not crush, split, or chew.    0855-Given            0805-Given               Phosphorus Replacement - Follow Nurse / BPA Driven Protocol  Freq: As Needed Route: XX  PRN Reason: Other  Start: 10/07/23 1725   Admin Instructions:   Open Order & Select \"[facility] Electrolyte Replacement Protocol Algorithm\" to View Details         Potassium Replacement - Follow Nurse / BPA Driven Protocol  Freq: As Needed Route: XX  PRN Reason: Other  Start: 10/07/23 1725   Admin Instructions:   Open Order & Select \"[facility] Electrolyte Replacement Protocol Algorithm\" to View Details         sodium chloride 0.9 % flush 10 mL  Dose: 10 mL  Freq: As Needed Route: IV  PRN Reason: Line Care  Start: 10/07/23 1724         sodium chloride 0.9 % flush 10 mL  Dose: 10 mL  Freq: Every 12 Hours Scheduled Route: IV  Start: 10/07/23 2100    0855-Given     2018-Given           0805-Given     2100              sodium chloride 0.9 % flush 10 mL  Dose: 10 mL  Freq: As Needed Route: IV  PRN Reason: Line Care  Start: 10/07/23 0916         sodium chloride 0.9 % infusion 40 mL  Dose: 40 mL  Freq: As Needed Route: IV  PRN Reason: Line Care  Start: 10/07/23 1724   Admin Instructions:   Following administration of an IV intermittent medication, flush line with 40mL NS at 100mL/hr.         sodium chloride 0.9 % with KCl 20 mEq/L infusion  Rate: 100 mL/hr Dose: 100 mL/hr  Freq: Continuous Route: IV  Start: 10/07/23 1815    0235-Currently Infusing     0811-Currently Infusing     0825-New Bag     1809-New Bag             thiamine (B-1) injection 200 mg  Dose: 200 mg  Freq: Every 8 Hours Scheduled Route: IV  Start: 10/07/23 2200   End: 10/12/23 2159   Admin Instructions:   Doses of up to " "250mg, give over 1-2 minutes (IV push). Doses 250mg and above, give over 30 minutes.    0553-Given     1544-Given     2214-Given          0553-Given     1400     2200            Followed by  thiamine (VITAMIN B-1) tablet 100 mg  Dose: 100 mg  Freq: Daily Route: PO  Start: 10/13/23 0900         traZODone (DESYREL) tablet 50 mg  Dose: 50 mg  Freq: Nightly PRN Route: PO  PRN Reason: Sleep  Start: 10/07/23 1722   Admin Instructions:   Take with food.  Caution: Look alike/sound alike drug alert        Completed Medications  Medications 10/08/23 10/09/23       magnesium sulfate 2g/50 mL (PREMIX) infusion  Dose: 2 g  Freq: Every 2 Hours Route: IV  Start: 10/08/23 0845   End: 10/08/23 1744    0852-New Bag     1201-New Bag     1544-New Bag              morphine injection 4 mg  Dose: 4 mg  Freq: Once Route: IV  Start: 10/07/23 1256   End: 10/07/23 1248   Admin Instructions:   Based on patient request - if ordered for moderate or severe pain, provider allows for administration of a medication prescribed for a lower pain scale.  If given for pain, use the following pain scale:  Mild Pain = Pain Score of 1-3, CPOT 1-2  Moderate Pain = Pain Score of 4-6, CPOT 3-4  Severe Pain = Pain Score of 7-10, CPOT 5-8         ondansetron (ZOFRAN) injection 4 mg  Dose: 4 mg  Freq: Once Route: IV  Start: 10/07/23 1256   End: 10/07/23 1245   Admin Instructions:   \"If multiple N/V medications ordered, use in the following order: Ondansetron, Prochlorperazine, Promethazine. Use PO unless patient refuses or patient unable to swallow.\"         sodium chloride 0.9 % bolus 1,000 mL  Dose: 1,000 mL  Freq: Once Route: IV  Start: 10/07/23 1256   End: 10/07/23 1316        Discontinued Medications  Medications 10/08/23 10/09/23       HYDROmorphone (DILAUDID) injection 0.5 mg  Dose: 0.5 mg  Freq: Every 2 Hours PRN Route: IV  PRN Reason: Severe Pain  Start: 10/07/23 2108   End: 10/08/23 0917   Admin Instructions:   Based on patient request - if ordered for " moderate or severe pain, provider allows for administration of a medication prescribed for a lower pain scale.  If given for pain, use the following pain scale:  Mild Pain = Pain Score of 1-3, CPOT 1-2  Moderate Pain = Pain Score of 4-6, CPOT 3-4  Severe Pain = Pain Score of 7-10, CPOT 5-8    0007-Given     0235-Given     0504-Given              levothyroxine (SYNTHROID, LEVOTHROID) tablet 88 mcg  Dose: 88 mcg  Freq: Every Early Morning Route: PO  Start: 10/08/23 0600   End: 10/07/23 2202   Admin Instructions:   Take on empty stomach.         LORazepam (ATIVAN) tablet 2 mg  Dose: 2 mg  Freq: Every 6 Hours Route: PO  Start: 10/07/23 1815   End: 10/08/23 0804   Admin Instructions:    Caution: Look alike/sound alike drug alert    0007-Given     0553-Given              Followed by  LORazepam (ATIVAN) tablet 1 mg  Dose: 1 mg  Freq: Every 6 Hours Route: PO  Start: 10/08/23 1815   End: 10/08/23 0804   Admin Instructions:    Caution: Look alike/sound alike drug alert         morphine injection 4 mg  Dose: 4 mg  Freq: Every 4 Hours PRN Route: IV  PRN Reason: Severe Pain  Start: 10/07/23 1722   End: 10/07/23 2108   Admin Instructions:   Based on patient request - if ordered for moderate or severe pain, provider allows for administration of a medication prescribed for a lower pain scale.  If given for pain, use the following pain scale:  Mild Pain = Pain Score of 1-3, CPOT 1-2  Moderate Pain = Pain Score of 4-6, CPOT 3-4  Severe Pain = Pain Score of 7-10, CPOT 5-8         nadolol (CORGARD) tablet 40 mg  Dose: 40 mg  Freq: Nightly Route: PO  Start: 10/08/23 2100   End: 10/07/23 1829   Admin Instructions:   Hold for SBP less than 100, DBP less than 60, or heart rate less than 50         nadolol (CORGARD) tablet 40 mg  Dose: 40 mg  Freq: Every 24 Hours Scheduled Route: PO  Start: 10/07/23 1815   End: 10/07/23 1828   Admin Instructions:   Hold for SBP less than 100, DBP less than 60, or heart rate less than 50         oxyCODONE  (ROXICODONE) immediate release tablet 5 mg  Dose: 5 mg  Freq: Every 4 Hours PRN Route: PO  PRN Reason: Moderate Pain  Start: 10/08/23 0916   End: 10/08/23 0917   Admin Instructions:   Based on patient request - if ordered for moderate or severe pain, provider allows for administration of a medication prescribed for a lower pain scale.      If given for pain, use the following pain scale:  Mild Pain = Pain Score of 1-3, CPOT 1-2  Moderate Pain = Pain Score of 4-6, CPOT 3-4  Severe Pain = Pain Score of 7-10, CPOT 5-8                       Physician Progress Notes (last 48 hours)        Judd Deal MD at 10/08/23 0800              Name: Bekah Gibson ADMIT: 10/7/2023   : 1970  PCP: Tylor Davis    MRN: [id number] LOS: 0 days   AGE/SEX: 53 y.o. female  ROOM: [facility]   Subjective   Chief Complaint   Patient presents with    Alcohol Problem     53-year-old with history of hypothyroidism, lupus, alcohol dependence, cirrhosis with history of Grade 2 esophageal varices-s/p banded on , portal hypertension, chronic thrombocytopenia and other medical issues who presents with abdominal pain and potential acute on chronic pancreatitis    On IVFs  NPO  No current pain        Objective   Vital Signs  Temp:  [97.7 øF (36.5 øC)-98.5 øF (36.9 øC)] 97.9 øF (36.6 øC)  Heart Rate:  [56-75] 67  Resp:  [15-16] 16  BP: (128-150)/(79-99) 149/92  SpO2:  [90 %-96 %] 91 %  on   ;   Device (Oxygen Therapy): room air  Body mass index is 23.08 kg/mý.    Physical Exam  Constitutional:       General: She is not in acute distress.     Appearance: She is ill-appearing (chronically).   HENT:      Head: Normocephalic and atraumatic.   Eyes:      General: No scleral icterus.  Cardiovascular:      Rate and Rhythm: Regular rhythm.   Pulmonary:      Effort: Pulmonary effort is normal. No respiratory distress.   Abdominal:      General: There is no distension.      Palpations: Abdomen is soft.      Tenderness: There is no abdominal  "tenderness. There is no guarding.   Musculoskeletal:      Cervical back: Neck supple.     Drowsy, wakes up and converses but then goes back to sleep this morning  Abd benign     Results Review:       I reviewed the patient's new clinical results.  Results from last 7 days   Lab Units 10/08/23  0537 10/07/23  0924   WBC 10*3/mm3 2.44* 2.96*   HEMOGLOBIN g/dL 13.6 15.2   PLATELETS 10*3/mm3 49* 60*     Results from last 7 days   Lab Units 10/08/23  0537 10/07/23  0924   SODIUM mmol/L 141 142   POTASSIUM mmol/L 3.8 3.4*   CHLORIDE mmol/L 108* 105   CO2 mmol/L 25.0 24.8   BUN mg/dL 6 6   CREATININE mg/dL 0.54* 0.66   GLUCOSE mg/dL 87 106*   Estimated Creatinine Clearance: 116.9 mL/min (A) (by C-G formula based on SCr of 0.54 mg/dL (L)).  Results from last 7 days   Lab Units 10/08/23  0537 10/07/23  0924   ALBUMIN g/dL 3.5 4.0   BILIRUBIN mg/dL 1.1 1.3*   ALK PHOS U/L 109 139*   AST (SGOT) U/L 43* 55*   ALT (SGPT) U/L 21 27     Results from last 7 days   Lab Units 10/08/23  0537 10/07/23  0924   CALCIUM mg/dL 8.6 9.3   ALBUMIN g/dL 3.5 4.0   MAGNESIUM mg/dL 1.4*  --    PHOSPHORUS mg/dL 3.3  --      Results from last 7 days   Lab Units 10/07/23  0924   LACTATE mmol/L 1.2       Coag     HbA1C   Lab Results   Component Value Date    HGBA1C 5.20 04/25/2023    HGBA1C 5.7 (H) 11/14/2022    HGBA1C 4.4 08/30/2020     Infection     Radiology(recent) CT Abdomen Pelvis Without Contrast    Result Date: 10/7/2023    1. Small strandy density around the pancreatic body could be evidence of acute pancreatitis, superimposed on chronic pancreatitis, correlate clinically. Cholelithiasis.  2. No acute inflammatory process of bowel is identified, follow-up as indications persist.  3. No urolithiasis or hydronephrosis.   This report was finalized on 10/7/2023 11:34 AM by Dr. Wilder Hanna M.D on Workstation: [id number]     No results found for: \"TROPONINT\", \"TROPONINI\", \"BNP\"  No components found for: \"TSH;2\"    amLODIPine, 5 mg, Oral, " Daily  ferrous sulfate, 325 mg, Oral, Daily With Breakfast  FLUoxetine, 40 mg, Oral, Daily  folic acid, 1 mg, Oral, Daily  lactulose, 10 g, Oral, TID  levothyroxine, 100 mcg, Oral, Daily  LORazepam, 2 mg, Oral, Q6H   Followed by  LORazepam, 1 mg, Oral, Q6H  Magnesium Oxide -Mg Supplement, 400 mg, Oral, Daily  magnesium sulfate, 2 g, Intravenous, Q2H  multivitamin, 1 tablet, Oral, Daily  nadolol, 40 mg, Oral, Nightly  nicotine, 1 patch, Transdermal, Q24H  oxybutynin XL, 10 mg, Oral, Daily  pantoprazole, 40 mg, Oral, Daily  sodium chloride, 10 mL, Intravenous, Q12H  thiamine (B-1) IV, 200 mg, Intravenous, Q8H   Followed by  [START ON 10/13/2023] thiamine, 100 mg, Oral, Daily      sodium chloride 0.9 % with KCl 20 mEq, 100 mL/hr, Last Rate: 100 mL/hr (10/08/23 0235)    NPO Diet NPO Type: Sips with Meds, Ice Chips      Assessment & Plan     Active Hospital Problems    Diagnosis  POA    **Pancreatitis [K85.90]  Yes    Leukopenia [D72.819]  Unknown    Essential hypertension [I10]  Yes    History of alcohol use disorder [Z87.898]  Yes    Alcoholic cirrhosis [K70.30]  Yes    Lupus [M32.9]  Yes    Chronic fatigue [R53.82]  Yes    Hashimoto's disease [E06.3]  Yes    Chronic alcohol abuse [F10.10]  Yes    Thrombocytopenia [D69.6]  Yes    Hypothyroidism [E03.9]  Yes    Irritable bowel syndrome with both constipation and diarrhea [K58.2]  Yes      Resolved Hospital Problems   No resolved problems to display.     53-year-old with history of hypothyroidism, lupus, alcohol dependence, cirrhosis with history of Grade 2 esophageal varices-s/p banded on , portal hypertension, chronic thrombocytopenia and other medical issues who presents with abdominal pain and potential acute on chronic pancreatitis    Fluids, bowel rest, PRN agents for nausea and pain. Symptoms are better will advance to clears.   Monitor for alcohol withdrawal on CIWA protocol. Stop scheduled ativan   Abnormal U/A recently treated for UTI  Monitor leukopenia and  thrombocytopenia likely related to alcohol use and liver disease.  Replace electrolytes including magnesium and monitor      DW RN  Reviewed records     Judd Deal MD  [facility]  10/08/23  08:00 EDT    Electronically signed by Judd Deal MD at 10/08/23 0558

## 2023-10-09 NOTE — CASE MANAGEMENT/SOCIAL WORK
Discharge Planning Assessment  [facility]     Patient Name: Bekah Gibson  MRN: [id number]  Today's Date: 10/9/2023    Admit Date: 10/7/2023    Plan: Home with etoh tx pending   Discharge Needs Assessment       Row Name 10/09/23 1453       Living Environment    People in Home spouse    Current Living Arrangements home    Potentially Unsafe Housing Conditions none    Primary Care Provided by self    Provides Primary Care For no one, unable/limited ability to care for self    Family Caregiver if Needed spouse    Quality of Family Relationships helpful    Able to Return to Prior Arrangements yes       Resource/Environmental Concerns    Resource/Environmental Concerns home accessibility    Home Accessibility Concerns stairs to enter home    Transportation Concerns none       Transition Planning    Patient/Family Anticipates Transition to other (see comments);inpatient rehabilitation facility;home with family    Patient/Family Anticipated Services at Transition outpatient care;mental health services;rehabilitation services    Transportation Anticipated family or friend will provide       Discharge Needs Assessment    Readmission Within the Last 30 Days no previous admission in last 30 days    Equipment Currently Used at Home none    Anticipated Changes Related to Illness none    Equipment Needed After Discharge none    Outpatient/Agency/Support Group Needs outpatient substance abuse treatment    Current Discharge Risk chronically ill;substance use/abuse                   Discharge Plan       Row Name 10/09/23 1452       Plan    Plan Home with etoh tx pending    Roadmap to Recovery Yes    Patient/Family in Agreement with Plan yes    Provided Post Acute Provider List? Yes    Provided Post Acute Provider Quality & Resource List? Refused    Plan Comments CCP spoke with pt at bedside, introduced self and explained CCP role. Verified facesheet and confirmed [facility]. Pt denies  problems affording medication. Pt denies advance directive. PCP is Dr. Tylor Davis. Pt lives at home with spouse Jeff [phone] in a s/s home with 3 steps to enter. Prior to admission pt is IADL with mobility, denies any DME, HH or SNF hx. CCP discussed dc planning and pt interested in Substance abuse tx program. She states she is awaiting the list of places that are in network with her insurance. CCP explained Access team consulted and will be able to provide that. She denies any other dc needs at this time. Gunner HORNE/CCP                  Continued Care and Services - Admitted Since 10/7/2023    Coordination has not been started for this encounter.       Expected Discharge Date and Time       Expected Discharge Date Expected Discharge Time    Oct 10, 2023            Demographic Summary       Row Name 10/09/23 1452       General Information    Admission Type inpatient    Referral Source admission list    Reason for Consult decision-making;discharge planning    Preferred Language English                   Functional Status       Row Name 10/09/23 1452       Functional Status    Usual Activity Tolerance good    Current Activity Tolerance moderate       Functional Status, IADL    Medications independent    Meal Preparation independent    Housekeeping independent    Laundry independent    Shopping independent       Mental Status    General Appearance WDL WDL       Mental Status Summary    Recent Changes in Mental Status/Cognitive Functioning no changes       Employment/    Employment Status unemployed                   Psychosocial    No documentation.                  Abuse/Neglect    No documentation.                  Legal    No documentation.                  Substance Abuse    No documentation.                  Patient Forms    No documentation.                     Joy Mai RN

## 2023-10-10 ENCOUNTER — READMISSION MANAGEMENT (OUTPATIENT)
Dept: CALL CENTER | Facility: HOSPITAL | Age: 53
End: 2023-10-10
Payer: MEDICAID

## 2023-10-10 VITALS
WEIGHT: 138.67 LBS | HEIGHT: 65 IN | SYSTOLIC BLOOD PRESSURE: 140 MMHG | RESPIRATION RATE: 16 BRPM | DIASTOLIC BLOOD PRESSURE: 85 MMHG | TEMPERATURE: 97.5 F | HEART RATE: 65 BPM | BODY MASS INDEX: 23.1 KG/M2 | OXYGEN SATURATION: 95 %

## 2023-10-10 LAB
ALBUMIN SERPL-MCNC: 3.7 G/DL (ref 3.5–5.2)
ALBUMIN/GLOB SERPL: 1.5 G/DL
ALP SERPL-CCNC: 109 U/L (ref 39–117)
ALT SERPL W P-5'-P-CCNC: 20 U/L (ref 1–33)
ANION GAP SERPL CALCULATED.3IONS-SCNC: 8.9 MMOL/L (ref 5–15)
AST SERPL-CCNC: 36 U/L (ref 1–32)
BILIRUB SERPL-MCNC: 1.2 MG/DL (ref 0–1.2)
BUN SERPL-MCNC: 3 MG/DL (ref 6–20)
BUN/CREAT SERPL: 7 (ref 7–25)
CALCIUM SPEC-SCNC: 9 MG/DL (ref 8.6–10.5)
CHLORIDE SERPL-SCNC: 107 MMOL/L (ref 98–107)
CO2 SERPL-SCNC: 23.1 MMOL/L (ref 22–29)
CREAT SERPL-MCNC: 0.43 MG/DL (ref 0.57–1)
DEPRECATED RDW RBC AUTO: 46.9 FL (ref 37–54)
EGFRCR SERPLBLD CKD-EPI 2021: 115 ML/MIN/1.73
ERYTHROCYTE [DISTWIDTH] IN BLOOD BY AUTOMATED COUNT: 14 % (ref 12.3–15.4)
GLOBULIN UR ELPH-MCNC: 2.4 GM/DL
GLUCOSE SERPL-MCNC: 97 MG/DL (ref 65–99)
HCT VFR BLD AUTO: 40.2 % (ref 34–46.6)
HGB BLD-MCNC: 14.1 G/DL (ref 12–15.9)
MCH RBC QN AUTO: 32.3 PG (ref 26.6–33)
MCHC RBC AUTO-ENTMCNC: 35.1 G/DL (ref 31.5–35.7)
MCV RBC AUTO: 92.2 FL (ref 79–97)
PLATELET # BLD AUTO: 48 10*3/MM3 (ref 140–450)
PMV BLD AUTO: 11.7 FL (ref 6–12)
POTASSIUM SERPL-SCNC: 4 MMOL/L (ref 3.5–5.2)
PROT SERPL-MCNC: 6.1 G/DL (ref 6–8.5)
RBC # BLD AUTO: 4.36 10*6/MM3 (ref 3.77–5.28)
SODIUM SERPL-SCNC: 139 MMOL/L (ref 136–145)
WBC NRBC COR # BLD: 2.82 10*3/MM3 (ref 3.4–10.8)

## 2023-10-10 PROCEDURE — 85027 COMPLETE CBC AUTOMATED: CPT | Performed by: INTERNAL MEDICINE

## 2023-10-10 PROCEDURE — 80053 COMPREHEN METABOLIC PANEL: CPT | Performed by: INTERNAL MEDICINE

## 2023-10-10 PROCEDURE — 25010000002 SODIUM CHLORIDE 0.9 % WITH KCL 20 MEQ 20-0.9 MEQ/L-% SOLUTION: Performed by: INTERNAL MEDICINE

## 2023-10-10 PROCEDURE — 25010000002 THIAMINE HCL 200 MG/2ML SOLUTION: Performed by: INTERNAL MEDICINE

## 2023-10-10 PROCEDURE — 25010000002 HYDROMORPHONE PER 4 MG: Performed by: INTERNAL MEDICINE

## 2023-10-10 RX ORDER — LANOLIN ALCOHOL/MO/W.PET/CERES
100 CREAM (GRAM) TOPICAL DAILY
Qty: 30 TABLET | Refills: 0 | Status: SHIPPED | OUTPATIENT
Start: 2023-10-13

## 2023-10-10 RX ORDER — ONDANSETRON 4 MG/1
4 TABLET, ORALLY DISINTEGRATING ORAL EVERY 8 HOURS PRN
Qty: 10 TABLET | Refills: 0 | Status: SHIPPED | OUTPATIENT
Start: 2023-10-10

## 2023-10-10 RX ADMIN — PANCRELIPASE 12000 UNITS OF LIPASE: 60000; 12000; 38000 CAPSULE, DELAYED RELEASE PELLETS ORAL at 08:20

## 2023-10-10 RX ADMIN — FLUOXETINE HYDROCHLORIDE 40 MG: 20 CAPSULE ORAL at 08:20

## 2023-10-10 RX ADMIN — Medication 1 TABLET: at 08:21

## 2023-10-10 RX ADMIN — THIAMINE HYDROCHLORIDE 200 MG: 100 INJECTION, SOLUTION INTRAMUSCULAR; INTRAVENOUS at 05:38

## 2023-10-10 RX ADMIN — OXYBUTYNIN CHLORIDE 10 MG: 10 TABLET, EXTENDED RELEASE ORAL at 08:20

## 2023-10-10 RX ADMIN — MAGNESIUM OXIDE 400 MG (241.3 MG MAGNESIUM) TABLET 400 MG: TABLET at 08:21

## 2023-10-10 RX ADMIN — LACTULOSE 10 G: 10 SOLUTION ORAL at 08:19

## 2023-10-10 RX ADMIN — PANTOPRAZOLE SODIUM 40 MG: 40 TABLET, DELAYED RELEASE ORAL at 08:21

## 2023-10-10 RX ADMIN — HYDROMORPHONE HYDROCHLORIDE 0.5 MG: 1 INJECTION, SOLUTION INTRAMUSCULAR; INTRAVENOUS; SUBCUTANEOUS at 02:48

## 2023-10-10 RX ADMIN — OXYCODONE HYDROCHLORIDE 5 MG: 5 TABLET ORAL at 00:42

## 2023-10-10 RX ADMIN — LEVOTHYROXINE SODIUM 100 MCG: 0.1 TABLET ORAL at 08:20

## 2023-10-10 RX ADMIN — FOLIC ACID 1 MG: 1 TABLET ORAL at 08:21

## 2023-10-10 RX ADMIN — FLUTICASONE PROPIONATE 2 SPRAY: 50 SPRAY, METERED NASAL at 08:21

## 2023-10-10 RX ADMIN — AMLODIPINE BESYLATE 5 MG: 5 TABLET ORAL at 08:21

## 2023-10-10 RX ADMIN — FERROUS SULFATE TAB 325 MG (65 MG ELEMENTAL FE) 325 MG: 325 (65 FE) TAB at 08:20

## 2023-10-10 RX ADMIN — OXYCODONE HYDROCHLORIDE 5 MG: 5 TABLET ORAL at 06:59

## 2023-10-10 RX ADMIN — POTASSIUM CHLORIDE AND SODIUM CHLORIDE 100 ML/HR: 900; 150 INJECTION, SOLUTION INTRAVENOUS at 02:58

## 2023-10-10 NOTE — PLAN OF CARE
Goal Outcome Evaluation:  Plan of Care Reviewed With: patient  Pt tolerating diet well dc home

## 2023-10-10 NOTE — DISCHARGE SUMMARY
NAME: Bekah Gibsno ADMIT: 10/7/2023   : 1970  PCP: Tylor Davis    MRN: [id number] LOS: 2 days   AGE/SEX: 53 y.o. female  ROOM: [facility]     Date of Admission:  10/7/2023  Date of Discharge:  10/10/2023    PCP: Tylor Davis    CHIEF COMPLAINT  Alcohol Problem      DISCHARGE DIAGNOSIS  Active Hospital Problems    Diagnosis  POA    **Pancreatitis [K85.90]  Yes    Leukopenia [D72.819]  Unknown    Essential hypertension [I10]  Yes    History of alcohol use disorder [Z87.898]  Yes    Alcoholic cirrhosis [K70.30]  Yes    Lupus [M32.9]  Yes    Chronic fatigue [R53.82]  Yes    Hashimoto's disease [E06.3]  Yes    Chronic alcohol abuse [F10.10]  Yes    Thrombocytopenia [D69.6]  Yes    Hypothyroidism [E03.9]  Yes    Irritable bowel syndrome with both constipation and diarrhea [K58.2]  Yes      Resolved Hospital Problems   No resolved problems to display.       SECONDARY DIAGNOSES  Past Medical History:   Diagnosis Date    Anxiety     Arthritis     Cirrhosis     Disease of thyroid gland     ETOH abuse     SOBER FOR 3 MONTHS    Fracture, clavicle 2021    shattered clavicel on issue slip and fall    GERD (gastroesophageal reflux disease)     History of kidney stones     5 YR AGO    Hypertension     Left shoulder pain     MDD (major depressive disorder)     Pancreatitis     Periarthritis of shoulder see above    Rotator cuff syndrome odre for shoulder replacement    unable to receive due to low platelets    Thrombocytopenia        CONSULTS   Access Psychiatry     HOSPITAL COURSE  53-year-old with history of hypothyroidism, lupus, alcohol dependence, cirrhosis with history of Grade 2 esophageal varices-s/p banded on , portal hypertension, chronic thrombocytopenia and other medical issues who presents with abdominal pain and potential acute on chronic pancreatitis.    She was given bowel rest and had improvement in her pain and is now tolerating regular diet. Added creon with her element of chronic pancreatitis.  "No significant alcohol w/d. She is doing better and wishes for discharged today with follow up with her outpatient providers.     DIAGNOSTICS           10/10/2023 0441 10/10/2023 0550 Comprehensive Metabolic Panel [[id number]]    (Abnormal)   Blood    Final result Component Value Units   Glucose 97 mg/dL   BUN 3 Low  mg/dL   Creatinine 0.43 Low  mg/dL   Sodium 139 mmol/L   Potassium 4.0 mmol/L   Chloride 107 mmol/L   CO2 23.1 mmol/L   Calcium 9.0 mg/dL   Total Protein 6.1 g/dL   Albumin 3.7 g/dL   ALT (SGPT) 20 U/L   AST (SGOT) 36 High  U/L   Alkaline Phosphatase 109 U/L   Total Bilirubin 1.2 mg/dL   Globulin 2.4 gm/dL   A/G Ratio 1.5 g/dL   BUN/Creatinine Ratio 7.0    Anion Gap 8.9 mmol/L   eGFR 115.0 mL/min/1.73          10/10/2023 0440 10/10/2023 0541 CBC (No Diff) [[id number]]   (Abnormal)   Blood    Final result Component Value Units   WBC 2.82 Low  10*3/mm3   RBC 4.36 10*6/mm3   Hemoglobin 14.1 g/dL   Hematocrit 40.2 %   MCV 92.2 fL   MCH 32.3 pg   MCHC 35.1 g/dL   RDW 14.0 %   RDW-SD 46.9 fl   MPV 11.7 fL   Platelets 48 Low Critical  10*3/mm3                   PHYSICAL EXAM  Objective:  Vital signs: (most recent): Blood pressure 140/85, pulse 65, temperature 97.5 øF (36.4 øC), temperature source Oral, resp. rate 16, height 165.1 cm (65\"), weight 62.9 kg (138 lb 10.7 oz), last menstrual period 01/01/2013, SpO2 95%, not currently breastfeeding.                Alert  nad  No resp distress  Soft, nt  Chronically ill     CONDITION ON DISCHARGE  Stable.      DISCHARGE DISPOSITION   Home or Self Care      DISCHARGE MEDICATIONS       Your medication list        START taking these medications        Instructions Last Dose Given Next Dose Due   pancrelipase (Lip-Prot-Amyl) 35074-96531 units capsule delayed-release particles capsule  Commonly known as: CREON      Take 2 capsules by mouth 3 (Three) Times a Day With Meals.       thiamine 100 MG tablet  Commonly known as: VITAMIN B1  Start taking on: October 13, 2023      " Take 1 tablet by mouth Daily.              CHANGE how you take these medications        Instructions Last Dose Given Next Dose Due   levothyroxine 100 MCG tablet  Commonly known as: SYNTHROID, LEVOTHROID  What changed: Another medication with the same name was removed. Continue taking this medication, and follow the directions you see here.      TAKE ONE TABLET BY MOUTH DAILY       ondansetron ODT 4 MG disintegrating tablet  Commonly known as: ZOFRAN-ODT  What changed: reasons to take this      Place 1 tablet on the tongue Every 8 (Eight) Hours As Needed for Nausea or Vomiting.              CONTINUE taking these medications        Instructions Last Dose Given Next Dose Due   acetaminophen 325 MG tablet  Commonly known as: TYLENOL      Take 2 tablets by mouth 2 (Two) Times a Day As Needed for Mild Pain.       amLODIPine 5 MG tablet  Commonly known as: NORVASC      Take 1 tablet by mouth Daily.       busPIRone 15 MG tablet  Commonly known as: BUSPAR      1 tablet 3 (Three) Times a Day As Needed.       Doptelet 20 MG tablet  Generic drug: avatrombopag maleate      3 tablets Daily. TAKES 10 DAYS BEFORE SURGERY       ferrous sulfate 325 (65 FE) MG tablet      Take 1 tablet by mouth Daily With Breakfast.       FLUoxetine 40 MG capsule  Commonly known as: PROzac      Take 1 capsule by mouth Daily.       folic acid 1 MG tablet  Commonly known as: FOLVITE      Take 1 tablet by mouth Daily.       lactulose 10 GM/15ML solution  Commonly known as: CHRONULAC           MAGnesium-Oxide 400 (240 Mg) MG tablet  Generic drug: Magnesium Oxide -Mg Supplement      Daily. HOLD PRIOR TO SURG       multivitamin capsule      Take 1 capsule by mouth Daily. HOLD PRIOR TO SURG       nadolol 40 MG tablet  Commonly known as: CORGARD      Take 1 tablet by mouth every night at bedtime.       naloxone 4 MG/0.1ML nasal spray  Commonly known as: NARCAN      Call 911. Don't prime. [place] in 1 nostril for overdose. Repeat in 2-3 minutes in other nostril  if no or minimal breathing/responsiveness.       nicotine 21 MG/24HR patch  Commonly known as: NICODERM CQ      Place 1 patch on the skin as directed by provider Daily.       oxybutynin XL 10 MG 24 hr tablet  Commonly known as: DITROPAN-XL      Take 1 tablet by mouth Daily.       oxyCODONE 5 MG immediate release tablet  Commonly known as: Roxicodone      Take 1 tablet by mouth Every 4 (Four) Hours As Needed for Moderate Pain.       pantoprazole 40 MG EC tablet  Commonly known as: PROTONIX      1 tablet Daily.       traZODone 50 MG tablet  Commonly known as: DESYREL      Take 1 tablet by mouth At Night As Needed.              STOP taking these medications      ondansetron 4 MG tablet  Commonly known as: Zofran        oxyCODONE-acetaminophen  MG per tablet  Commonly known as: Percocet                  Where to Get Your Medications        These medications were sent to [place] [address]      Hours: Monday to Friday 7 AM to 6 PM, Saturday & Sunday 8 AM to 4:30 PM (Closed 12 PM to 12:30 PM) Phone: [phone]   ondansetron ODT 4 MG disintegrating tablet  pancrelipase (Lip-Prot-Amyl) 28665-24485 units capsule delayed-release particles capsule  thiamine 100 MG tablet       Diet Instructions       Diet: Gastrointestinal Diets; Fat-Restricted, Low Irritant; Regular Texture (IDDSI 7); Thin (IDDSI 0)      Discharge Diet: Gastrointestinal Diets    Gastrointestinal Diet:  Fat-Restricted  Low Irritant       Texture: Regular Texture (IDDSI 7)    Fluid Consistency: Thin (IDDSI 0)           Future Appointments   Date Time Provider Department [place]   11/1/2023  1:00 PM Giovanni Denise MD [facility]     Additional Instructions for the Follow-ups that You Need to Schedule       Discharge Follow-up with Specialty: PCP in 1-2 weeks, GI physician in 2-6 weeks, Psychiatrist call for appointment   As directed      Specialty: PCP in 1-2 weeks, GI physician in 2-6 weeks,  Psychiatrist call for appointment               Follow-up Information       Tylor Davis .    Specialty: Family Medicine  Contact information:  [address]  [place] [id number] [phone]                             TEST  RESULTS PENDING AT DISCHARGE         Judd Deal MD  [facility]  10/10/23  09:53 EDT      Time: greater than 32 minutes on discharge  It was a pleasure taking care of this patient while in the hospital.

## 2023-10-10 NOTE — CASE MANAGEMENT/SOCIAL WORK
Case Management Discharge Note      Final Note: HOME NO ADDITIONAL DC ORDERS NOTED FOR ccp. luis RN/CCP    Provided Post Acute Provider List?: Yes  Provided Post Acute Provider Quality & Resource List?: Refused    Selected Continued Care - Discharged on 10/10/2023 Admission date: 10/7/2023 - Discharge disposition: Home or Self Care      Destination    No services have been selected for the patient.                Durable Medical Equipment    No services have been selected for the patient.                Dialysis/Infusion    No services have been selected for the patient.                Home Medical Care    No services have been selected for the patient.                Therapy    No services have been selected for the patient.                Community Resources    No services have been selected for the patient.                Community & DME    No services have been selected for the patient.                    Transportation Services  Private: Car    Final Discharge Disposition Code: 01 - home or self-care

## 2023-10-11 NOTE — PAYOR COMM NOTE
"Ba Gibson (53 y.o. Female)   PLEASE SEE ATTACHED FOR DC NOTICE    REF #  [id number]     THANK YOU  EMA RUBIO RN/ DEPT  [facility]   [phone]  [phone]        Date of Birth   1970    Social Security Number       Address   [address] [place], [place] [address]    Home Phone   [phone]    MRN   [id number]       B[place]ai   Restorationist    Marital Status                               Admission Date   10/7/23    Admission Type   Emergency    Admitting Provider   Giovana Baires MD    Attending Provider       Department, Room/Bed   [address], [address]       Discharge Date   10/10/2023    Discharge Disposition   Home or Self Care    Discharge Destination                                 Attending Provider: (none)   Allergies: Benzonatate, Phenergan [Promethazine Hcl], Cucumber Extract, Promethazine-phenylephrine    Isolation: None   Infection: None   Code Status: Prior    Ht: 165.1 cm (65\")   Wt: 62.9 kg (138 lb 10.7 oz)    Admission Cmt: None   Principal Problem: Pancreatitis [K85.90]                   Active Insurance as of 10/7/2023       Primary Coverage       Payor Plan Insurance Group Employer/Plan Group    ANTHEM MEDICAID ANTHEM MEDICAID [id number]       Payor Plan Address Payor Plan Phone Number Payor Plan Fax Number Effective Dates    [address] [address] [phone]  2018 - None Entered    [employer] [address]         Subscriber Name Subscriber Birth Date Member ID       BA GIBSON 1970 [id number]                     Emergency Contacts        (Rel.) Home Phone Work Phone Mobile Phone    Jeff Gibson (Spouse) [phone] -- [phone]              [place]: [facility] [id number]  Tax ID [id number]     Discharge Summary        Judd Deal MD at 10/10/23 0953                 NAME: Ba Gibson ADMIT: 10/7/2023   : 1970  PCP: Tylor Davis    MRN: [id number] LOS: 2 days   AGE/SEX: 53 y.o. " female  ROOM: N526/1     Date of Admission:  10/7/2023  Date of Discharge:  10/10/2023    PCP: Tylor Davis    CHIEF COMPLAINT  Alcohol Problem      DISCHARGE DIAGNOSIS  Active Hospital Problems    Diagnosis  POA    **Pancreatitis [K85.90]  Yes    Leukopenia [D72.819]  Unknown    Essential hypertension [I10]  Yes    History of alcohol use disorder [Z87.898]  Yes    Alcoholic cirrhosis [K70.30]  Yes    Lupus [M32.9]  Yes    Chronic fatigue [R53.82]  Yes    Hashimoto's disease [E06.3]  Yes    Chronic alcohol abuse [F10.10]  Yes    Thrombocytopenia [D69.6]  Yes    Hypothyroidism [E03.9]  Yes    Irritable bowel syndrome with both constipation and diarrhea [K58.2]  Yes      Resolved Hospital Problems   No resolved problems to display.       SECONDARY DIAGNOSES  Past Medical History:   Diagnosis Date    Anxiety     Arthritis     Cirrhosis     Disease of thyroid gland     ETOH abuse     SOBER FOR 3 MONTHS    Fracture, clavicle 1/2021    shattered clavicel on issue slip and fall    GERD (gastroesophageal reflux disease)     History of kidney stones     5 YR AGO    Hypertension     Left shoulder pain     MDD (major depressive disorder)     Pancreatitis     Periarthritis of shoulder see above    Rotator cuff syndrome odre for shoulder replacement    unable to receive due to low platelets    Thrombocytopenia        CONSULTS   Access Psychiatry     HOSPITAL COURSE  53-year-old with history of hypothyroidism, lupus, alcohol dependence, cirrhosis with history of Grade 2 esophageal varices-s/p banded on , portal hypertension, chronic thrombocytopenia and other medical issues who presents with abdominal pain and potential acute on chronic pancreatitis.    She was given bowel rest and had improvement in her pain and is now tolerating regular diet. Added creon with her element of chronic pancreatitis. No significant alcohol w/d. She is doing better and wishes for discharged today with follow up with her outpatient providers.  "    DIAGNOSTICS           10/10/2023 0441 10/10/2023 0550 Comprehensive Metabolic Panel [[id number]]    (Abnormal)   Blood    Final result Component Value Units   Glucose 97 mg/dL   BUN 3 Low  mg/dL   Creatinine 0.43 Low  mg/dL   Sodium 139 mmol/L   Potassium 4.0 mmol/L   Chloride 107 mmol/L   CO2 23.1 mmol/L   Calcium 9.0 mg/dL   Total Protein 6.1 g/dL   Albumin 3.7 g/dL   ALT (SGPT) 20 U/L   AST (SGOT) 36 High  U/L   Alkaline Phosphatase 109 U/L   Total Bilirubin 1.2 mg/dL   Globulin 2.4 gm/dL   A/G Ratio 1.5 g/dL   BUN/Creatinine Ratio 7.0    Anion Gap 8.9 mmol/L   eGFR 115.0 mL/min/1.73          10/10/2023 0440 10/10/2023 0541 CBC (No Diff) [[id number]]   (Abnormal)   Blood    Final result Component Value Units   WBC 2.82 Low  10*3/mm3   RBC 4.36 10*6/mm3   Hemoglobin 14.1 g/dL   Hematocrit 40.2 %   MCV 92.2 fL   MCH 32.3 pg   MCHC 35.1 g/dL   RDW 14.0 %   RDW-SD 46.9 fl   MPV 11.7 fL   Platelets 48 Low Critical  10*3/mm3                   PHYSICAL EXAM  Objective:  Vital signs: (most recent): Blood pressure 140/85, pulse 65, temperature 97.5 øF (36.4 øC), temperature source Oral, resp. rate 16, height 165.1 cm (65\"), weight 62.9 kg (138 lb 10.7 oz), last menstrual period 01/01/2013, SpO2 95%, not currently breastfeeding.                Alert  nad  No resp distress  Soft, nt  Chronically ill     CONDITION ON DISCHARGE  Stable.      DISCHARGE DISPOSITION   Home or Self Care      DISCHARGE MEDICATIONS       Your medication list        START taking these medications        Instructions Last Dose Given Next Dose Due   pancrelipase (Lip-Prot-Amyl) 16427-43727 units capsule delayed-release particles capsule  Commonly known as: CREON      Take 2 capsules by mouth 3 (Three) Times a Day With Meals.       thiamine 100 MG tablet  Commonly known as: VITAMIN B1  Start taking on: October 13, 2023      Take 1 tablet by mouth Daily.              CHANGE how you take these medications        Instructions Last Dose Given Next Dose " Due   levothyroxine 100 MCG tablet  Commonly known as: SYNTHROID, LEVOTHROID  What changed: Another medication with the same name was removed. Continue taking this medication, and follow the directions you see here.      TAKE ONE TABLET BY MOUTH DAILY       ondansetron ODT 4 MG disintegrating tablet  Commonly known as: ZOFRAN-ODT  What changed: reasons to take this      Place 1 tablet on the tongue Every 8 (Eight) Hours As Needed for Nausea or Vomiting.              CONTINUE taking these medications        Instructions Last Dose Given Next Dose Due   acetaminophen 325 MG tablet  Commonly known as: TYLENOL      Take 2 tablets by mouth 2 (Two) Times a Day As Needed for Mild Pain.       amLODIPine 5 MG tablet  Commonly known as: NORVASC      Take 1 tablet by mouth Daily.       busPIRone 15 MG tablet  Commonly known as: BUSPAR      1 tablet 3 (Three) Times a Day As Needed.       Doptelet 20 MG tablet  Generic drug: avatrombopag maleate      3 tablets Daily. TAKES 10 DAYS BEFORE SURGERY       ferrous sulfate 325 (65 FE) MG tablet      Take 1 tablet by mouth Daily With Breakfast.       FLUoxetine 40 MG capsule  Commonly known as: PROzac      Take 1 capsule by mouth Daily.       folic acid 1 MG tablet  Commonly known as: FOLVITE      Take 1 tablet by mouth Daily.       lactulose 10 GM/15ML solution  Commonly known as: CHRONULAC           MAGnesium-Oxide 400 (240 Mg) MG tablet  Generic drug: Magnesium Oxide -Mg Supplement      Daily. HOLD PRIOR TO SURG       multivitamin capsule      Take 1 capsule by mouth Daily. HOLD PRIOR TO SURG       nadolol 40 MG tablet  Commonly known as: CORGARD      Take 1 tablet by mouth every night at bedtime.       naloxone 4 MG/0.1ML nasal spray  Commonly known as: NARCAN      Call 911. Don't prime. [place] in 1 nostril for overdose. Repeat in 2-3 minutes in other nostril if no or minimal breathing/responsiveness.       nicotine 21 MG/24HR patch  Commonly known as: NICODERM CQ      Place 1 patch  on the skin as directed by provider Daily.       oxybutynin XL 10 MG 24 hr tablet  Commonly known as: DITROPAN-XL      Take 1 tablet by mouth Daily.       oxyCODONE 5 MG immediate release tablet  Commonly known as: Roxicodone      Take 1 tablet by mouth Every 4 (Four) Hours As Needed for Moderate Pain.       pantoprazole 40 MG EC tablet  Commonly known as: PROTONIX      1 tablet Daily.       traZODone 50 MG tablet  Commonly known as: DESYREL      Take 1 tablet by mouth At Night As Needed.              STOP taking these medications      ondansetron 4 MG tablet  Commonly known as: Zofran        oxyCODONE-acetaminophen  MG per tablet  Commonly known as: Percocet                  Where to Get Your Medications        These medications were sent to [place] [address]      Hours: Monday to Friday 7 AM to 6 PM, Saturday & Sunday 8 AM to 4:30 PM (Closed 12 PM to 12:30 PM) Phone: [phone]   ondansetron ODT 4 MG disintegrating tablet  pancrelipase (Lip-Prot-Amyl) 05155-82743 units capsule delayed-release particles capsule  thiamine 100 MG tablet       Diet Instructions       Diet: Gastrointestinal Diets; Fat-Restricted, Low Irritant; Regular Texture (IDDSI 7); Thin (IDDSI 0)      Discharge Diet: Gastrointestinal Diets    Gastrointestinal Diet:  Fat-Restricted  Low Irritant       Texture: Regular Texture (IDDSI 7)    Fluid Consistency: Thin (IDDSI 0)           Future Appointments   Date Time Provider Department [place]   11/1/2023  1:00 PM Giovanni Denise MD [facility]     Additional Instructions for the Follow-ups that You Need to Schedule       Discharge Follow-up with Specialty: PCP in 1-2 weeks, GI physician in 2-6 weeks, Psychiatrist call for appointment   As directed      Specialty: PCP in 1-2 weeks, GI physician in 2-6 weeks, Psychiatrist call for appointment               Follow-up Information       Tylor Davis .    Specialty: Family  Medicine  Contact information:  [address]  [place] [id number]  [phone]                             TEST  RESULTS PENDING AT DISCHARGE         Judd Deal MD  [facility]  10/10/23  09:53 EDT      Time: greater than 32 minutes on discharge  It was a pleasure taking care of this patient while in the hospital.       Electronically signed by Judd Deal MD at 10/10/23 0909

## 2023-10-11 NOTE — OUTREACH NOTE
Prep Survey      Flowsheet Row Responses   [facility] [place]   Is LACE score < 7 ? No   Eligibility Readm Mgmt   Discharge diagnosis *Pancreatitis   Does the patient have one of the following disease processes/diagnoses(primary or secondary)? Other   Does the patient have Home health ordered? No   Is there a DME ordered? No   Prep survey completed? Yes            LARON VALLADARES - Registered Nurse

## 2023-10-13 ENCOUNTER — READMISSION MANAGEMENT (OUTPATIENT)
Dept: CALL CENTER | Facility: HOSPITAL | Age: 53
End: 2023-10-13
Payer: MEDICAID

## 2023-10-13 NOTE — OUTREACH NOTE
Medical Week 1 Survey      Flowsheet Row Responses   [facility] patient discharged from? [place]   Does the patient have one of the following disease processes/diagnoses(primary or secondary)? Other   Week 1 attempt successful? No   Unsuccessful attempts Attempt 1            Marley Ball Registered Nurse

## 2023-10-18 ENCOUNTER — READMISSION MANAGEMENT (OUTPATIENT)
Dept: CALL CENTER | Facility: HOSPITAL | Age: 53
End: 2023-10-18
Payer: MEDICAID

## 2023-10-18 NOTE — OUTREACH NOTE
Medical Week 1 Survey      Flowsheet Row Responses   [facility] patient discharged from? [place]   Does the patient have one of the following disease processes/diagnoses(primary or secondary)? Other   Week 1 attempt successful? Yes   Call start time 1329   Call end time 1332   Discharge diagnosis Pancreatitis   Meds reviewed with patient/caregiver? Yes   Is the patient having any side effects they believe may be caused by any medication additions or changes? No   Does the patient have all medications ordered at discharge? Yes   Is the patient taking all medications as directed (includes completed medication regime)? Yes   Does the patient have a primary care provider?  Yes   Comments regarding PCP 10/19/23 PCP apt   Has the patient kept scheduled appointments due by today? N/A   Has home health visited the patient within 72 hours of discharge? N/A   Psychosocial issues? No   Did the patient receive a copy of their discharge instructions? Yes   Nursing interventions Reviewed instructions with patient   What is the patient's perception of their health status since discharge? Improving   Is the patient/caregiver able to teach back signs and symptoms related to disease process for when to call PCP? Yes   Is the patient/caregiver able to teach back signs and symptoms related to disease process for when to call 911? Yes   Is the patient/caregiver able to teach back the hierarchy of who to call/visit for symptoms/problems? PCP, Specialist, Home health nurse, Urgent Care, ED, 911 Yes   If the patient is a current smoker, are they able to teach back resources for cessation? [id number]   Week 1 call completed? Yes   Did the patient feel the follow up calls were helpful during their recovery period? Yes   Was the number of calls appropriate? Yes   Wrap up additional comments Pt plans to check into an alcohol treatment center in the very near future   Call end time 1332            Savi H - Registered Nurse

## 2023-10-25 ENCOUNTER — READMISSION MANAGEMENT (OUTPATIENT)
Dept: CALL CENTER | Facility: HOSPITAL | Age: 53
End: 2023-10-25
Payer: MEDICAID

## 2023-10-25 NOTE — OUTREACH NOTE
Medical Week 2 Survey      Flowsheet Row Responses   [facility] patient discharged from? [place]   Does the patient have one of the following disease processes/diagnoses(primary or secondary)? Other   Week 2 attempt successful? No   Unsuccessful attempts Attempt 1            Alexa HUDSON - Registered Nurse

## 2023-11-08 ENCOUNTER — OFFICE VISIT (OUTPATIENT)
Dept: ORTHOPEDIC SURGERY | Facility: CLINIC | Age: 53
End: 2023-11-08
Payer: MEDICAID

## 2023-11-08 VITALS — HEIGHT: 65 IN | BODY MASS INDEX: 22.88 KG/M2 | WEIGHT: 137.3 LBS | TEMPERATURE: 98.3 F

## 2023-11-08 DIAGNOSIS — Z09 SURGERY FOLLOW-UP: ICD-10-CM

## 2023-11-08 DIAGNOSIS — Z96.612 H/O TOTAL SHOULDER REPLACEMENT, LEFT: Primary | ICD-10-CM

## 2023-11-08 NOTE — PROGRESS NOTES
Chief Complaint:  6 months s/p left RTSA    HPI:  Ms. Gibson is seen today in follow-up for her left shoulder.  She says that he is doing great.  No complaints or problems.    Exam: Left shoulder is examined.  Incision is healed.  No tenderness or effusion.  She has excellent motion: Forward elevation 160, external rotation 50, internal rotation to approximately T10.  She has good strength with abduction and forward elevation.    Imaging: AP, scapular Y and axillary views left shoulder are ordered and reviewed to evaluate her implants.  These compared to previous x-rays.  Implants appear well fixed and well-positioned.  No complicating process noted.    Assessment:  6 months s/p left RTSA    Plan: She seems to be doing well.  Appropriate activity modifications and restrictions were discussed.  Antibiotic prophylaxis recommendations were discussed as well.  Follow-up in 6 months for an annual visit.    Giovanni Denise MD  11/08/2023

## 2023-11-24 ENCOUNTER — HOSPITAL ENCOUNTER (EMERGENCY)
Facility: HOSPITAL | Age: 53
Discharge: HOME | End: 2023-11-24
Attending: EMERGENCY MEDICINE
Payer: MEDICAID

## 2023-11-24 ENCOUNTER — APPOINTMENT (OUTPATIENT)
Dept: CT IMAGING | Facility: HOSPITAL | Age: 53
End: 2023-11-24
Payer: MEDICAID

## 2023-11-24 VITALS
RESPIRATION RATE: 16 BRPM | TEMPERATURE: 97.7 F | DIASTOLIC BLOOD PRESSURE: 66 MMHG | HEART RATE: 92 BPM | OXYGEN SATURATION: 93 % | SYSTOLIC BLOOD PRESSURE: 121 MMHG

## 2023-11-24 DIAGNOSIS — D69.6 THROMBOCYTOPENIA: ICD-10-CM

## 2023-11-24 DIAGNOSIS — F10.10 ALCOHOL ABUSE: ICD-10-CM

## 2023-11-24 DIAGNOSIS — E83.42 HYPOMAGNESEMIA: ICD-10-CM

## 2023-11-24 DIAGNOSIS — F14.10 COCAINE ABUSE: Primary | ICD-10-CM

## 2023-11-24 DIAGNOSIS — R41.0 TRANSIENT CONFUSION: ICD-10-CM

## 2023-11-24 LAB
ALBUMIN SERPL-MCNC: 4 G/DL (ref 3.5–5.2)
ALBUMIN/GLOB SERPL: 1.5 G/DL
ALP SERPL-CCNC: 120 U/L (ref 39–117)
ALT SERPL W P-5'-P-CCNC: 21 U/L (ref 1–33)
AMPHET+METHAMPHET UR QL: NEGATIVE
ANION GAP SERPL CALCULATED.3IONS-SCNC: 16 MMOL/L (ref 5–15)
APAP SERPL-MCNC: <5 MCG/ML (ref 0–30)
AST SERPL-CCNC: 50 U/L (ref 1–32)
BARBITURATES UR QL SCN: NEGATIVE
BASOPHILS # BLD AUTO: 0.07 10*3/MM3 (ref 0–0.2)
BASOPHILS NFR BLD AUTO: 1.2 % (ref 0–1.5)
BENZODIAZ UR QL SCN: NEGATIVE
BILIRUB SERPL-MCNC: 1.4 MG/DL (ref 0–1.2)
BILIRUB UR QL STRIP: NEGATIVE
BUN SERPL-MCNC: 10 MG/DL (ref 6–20)
BUN/CREAT SERPL: 10.5 (ref 7–25)
CALCIUM SPEC-SCNC: 9.3 MG/DL (ref 8.6–10.5)
CANNABINOIDS SERPL QL: NEGATIVE
CHLORIDE SERPL-SCNC: 106 MMOL/L (ref 98–107)
CLARITY UR: CLEAR
CO2 SERPL-SCNC: 21 MMOL/L (ref 22–29)
COCAINE UR QL: POSITIVE
COLOR UR: ABNORMAL
CREAT SERPL-MCNC: 0.95 MG/DL (ref 0.57–1)
DEPRECATED RDW RBC AUTO: 41.6 FL (ref 37–54)
EGFRCR SERPLBLD CKD-EPI 2021: 70.9 ML/MIN/1.73
EOSINOPHIL # BLD AUTO: 0.09 10*3/MM3 (ref 0–0.4)
EOSINOPHIL NFR BLD AUTO: 1.5 % (ref 0.3–6.2)
ERYTHROCYTE [DISTWIDTH] IN BLOOD BY AUTOMATED COUNT: 12.2 % (ref 12.3–15.4)
ETHANOL BLD-MCNC: <10 MG/DL (ref 0–10)
ETHANOL UR QL: <0.01 %
FENTANYL UR-MCNC: NEGATIVE NG/ML
GLOBULIN UR ELPH-MCNC: 2.7 GM/DL
GLUCOSE SERPL-MCNC: 90 MG/DL (ref 65–99)
GLUCOSE UR STRIP-MCNC: NEGATIVE MG/DL
HCT VFR BLD AUTO: 47.2 % (ref 34–46.6)
HGB BLD-MCNC: 16.4 G/DL (ref 12–15.9)
HGB UR QL STRIP.AUTO: NEGATIVE
KETONES UR QL STRIP: ABNORMAL
LEUKOCYTE ESTERASE UR QL STRIP.AUTO: NEGATIVE
LYMPHOCYTES # BLD AUTO: 1.62 10*3/MM3 (ref 0.7–3.1)
LYMPHOCYTES NFR BLD AUTO: 27.2 % (ref 19.6–45.3)
MAGNESIUM SERPL-MCNC: 1.5 MG/DL (ref 1.6–2.6)
MCH RBC QN AUTO: 32.5 PG (ref 26.6–33)
MCHC RBC AUTO-ENTMCNC: 34.7 G/DL (ref 31.5–35.7)
MCV RBC AUTO: 93.7 FL (ref 79–97)
METHADONE UR QL SCN: NEGATIVE
MONOCYTES # BLD AUTO: 0.32 10*3/MM3 (ref 0.1–0.9)
MONOCYTES NFR BLD AUTO: 5.4 % (ref 5–12)
NEUTROPHILS NFR BLD AUTO: 3.85 10*3/MM3 (ref 1.7–7)
NEUTROPHILS NFR BLD AUTO: 64.5 % (ref 42.7–76)
NITRITE UR QL STRIP: NEGATIVE
OPIATES UR QL: NEGATIVE
OXYCODONE UR QL SCN: NEGATIVE
PH UR STRIP.AUTO: 6 [PH] (ref 5–8)
PLAT MORPH BLD: NORMAL
PLATELET # BLD AUTO: 70 10*3/MM3 (ref 140–450)
PMV BLD AUTO: 11.6 FL (ref 6–12)
POTASSIUM SERPL-SCNC: 4 MMOL/L (ref 3.5–5.2)
PROT SERPL-MCNC: 6.7 G/DL (ref 6–8.5)
PROT UR QL STRIP: NEGATIVE
RBC # BLD AUTO: 5.04 10*6/MM3 (ref 3.77–5.28)
RBC MORPH BLD: NORMAL
SALICYLATES SERPL-MCNC: <0.3 MG/DL
SODIUM SERPL-SCNC: 143 MMOL/L (ref 136–145)
SP GR UR STRIP: 1.02 (ref 1–1.03)
UROBILINOGEN UR QL STRIP: ABNORMAL
WBC MORPH BLD: NORMAL
WBC NRBC COR # BLD AUTO: 5.96 10*3/MM3 (ref 3.4–10.8)

## 2023-11-24 PROCEDURE — 80179 DRUG ASSAY SALICYLATE: CPT | Performed by: EMERGENCY MEDICINE

## 2023-11-24 PROCEDURE — 80307 DRUG TEST PRSMV CHEM ANLYZR: CPT | Performed by: EMERGENCY MEDICINE

## 2023-11-24 PROCEDURE — 96375 TX/PRO/DX INJ NEW DRUG ADDON: CPT

## 2023-11-24 PROCEDURE — 25810000003 SODIUM CHLORIDE 0.9 % SOLUTION: Performed by: EMERGENCY MEDICINE

## 2023-11-24 PROCEDURE — 99284 EMERGENCY DEPT VISIT MOD MDM: CPT

## 2023-11-24 PROCEDURE — 25010000002 THIAMINE HCL 200 MG/2ML SOLUTION: Performed by: EMERGENCY MEDICINE

## 2023-11-24 PROCEDURE — 96361 HYDRATE IV INFUSION ADD-ON: CPT

## 2023-11-24 PROCEDURE — 96365 THER/PROPH/DIAG IV INF INIT: CPT

## 2023-11-24 PROCEDURE — 82077 ASSAY SPEC XCP UR&BREATH IA: CPT | Performed by: EMERGENCY MEDICINE

## 2023-11-24 PROCEDURE — 81003 URINALYSIS AUTO W/O SCOPE: CPT | Performed by: EMERGENCY MEDICINE

## 2023-11-24 PROCEDURE — 70450 CT HEAD/BRAIN W/O DYE: CPT

## 2023-11-24 PROCEDURE — 36415 COLL VENOUS BLD VENIPUNCTURE: CPT

## 2023-11-24 PROCEDURE — 25010000002 MAGNESIUM SULFATE 2 GM/50ML SOLUTION: Performed by: EMERGENCY MEDICINE

## 2023-11-24 PROCEDURE — 85007 BL SMEAR W/DIFF WBC COUNT: CPT | Performed by: EMERGENCY MEDICINE

## 2023-11-24 PROCEDURE — 80143 DRUG ASSAY ACETAMINOPHEN: CPT | Performed by: EMERGENCY MEDICINE

## 2023-11-24 PROCEDURE — 96367 TX/PROPH/DG ADDL SEQ IV INF: CPT

## 2023-11-24 PROCEDURE — 36415 COLL VENOUS BLD VENIPUNCTURE: CPT | Performed by: EMERGENCY MEDICINE

## 2023-11-24 PROCEDURE — 80053 COMPREHEN METABOLIC PANEL: CPT | Performed by: EMERGENCY MEDICINE

## 2023-11-24 PROCEDURE — 83735 ASSAY OF MAGNESIUM: CPT | Performed by: EMERGENCY MEDICINE

## 2023-11-24 PROCEDURE — 85025 COMPLETE CBC W/AUTO DIFF WBC: CPT | Performed by: EMERGENCY MEDICINE

## 2023-11-24 PROCEDURE — P9612 CATHETERIZE FOR URINE SPEC: HCPCS

## 2023-11-24 RX ORDER — SODIUM CHLORIDE 9 MG/ML
1000 INJECTION, SOLUTION INTRAVENOUS ONCE
Status: COMPLETED | OUTPATIENT
Start: 2023-11-24 | End: 2023-11-24

## 2023-11-24 RX ORDER — MAGNESIUM SULFATE HEPTAHYDRATE 40 MG/ML
2 INJECTION, SOLUTION INTRAVENOUS ONCE
Status: COMPLETED | OUTPATIENT
Start: 2023-11-24 | End: 2023-11-24

## 2023-11-24 RX ORDER — MULTIPLE VITAMINS W/ MINERALS TAB 9MG-400MCG
1 TAB ORAL ONCE
Status: DISCONTINUED | OUTPATIENT
Start: 2023-11-24 | End: 2023-11-24 | Stop reason: HOSPADM

## 2023-11-24 RX ORDER — THIAMINE HYDROCHLORIDE 100 MG/ML
200 INJECTION, SOLUTION INTRAMUSCULAR; INTRAVENOUS ONCE
Status: COMPLETED | OUTPATIENT
Start: 2023-11-24 | End: 2023-11-24

## 2023-11-24 RX ADMIN — THIAMINE HYDROCHLORIDE 200 MG: 100 INJECTION, SOLUTION INTRAMUSCULAR; INTRAVENOUS at 14:20

## 2023-11-24 RX ADMIN — MAGNESIUM SULFATE HEPTAHYDRATE 2 G: 2 INJECTION, SOLUTION INTRAVENOUS at 17:50

## 2023-11-24 RX ADMIN — SODIUM CHLORIDE 1000 ML: 9 INJECTION, SOLUTION INTRAVENOUS at 14:17

## 2023-11-24 RX ADMIN — FOLIC ACID 1 MG: 5 INJECTION, SOLUTION INTRAMUSCULAR; INTRAVENOUS; SUBCUTANEOUS at 14:34

## 2023-11-24 NOTE — ED NOTES
"Pt asked to take a MVI for me. States \" I don't want it\". States she really needs something for her stomach pain, informed I cannot give her any pain meds right now, still awaiting CT head results and pt I still groggy. Md aware.   "

## 2023-11-24 NOTE — ED NOTES
" here, states he took her to [facility] last night but had him come pick her up because \"she would not take shower\". States \"she has a personal EMT that they called when she said she wanted to go into a rehab program today\". Informed we are still waiting on some bloodwork. States to his knowledge her last drink would have been 2 nights ago.  "

## 2023-11-24 NOTE — ED PROVIDER NOTES
" EMERGENCY DEPARTMENT ENCOUNTER  Room Number:  18/18  Date of encounter:  11/25/2023  PCP: Tylor Davis  Patient Care Team:  Tylor Davis as PCP - General (Family Medicine)  Deion Saldana MD as Consulting Physician (Urology)  Adalberto Hoffman MD as Consulting Physician (Gastroenterology)  Eliazar Quinonez MD (Hematology)     HPI:  Context: Bekah Gibson is a 53 y.o. female who presents to the ED c/o chief complaint of \"trouble with alcohol.\"  Patient reports that she has an alcoholic, drinks a pint a day, was drinking today, is unable to say last drink or how much she drank.  Patient denies any illicit substances.  Patient reports that she would like help with quitting drinking alcohol.  Patient denies any withdrawal symptoms but reports that she has had alcohol withdrawal in the past, denies any seizures related to alcohol withdrawal.  Patient denies any medical complaints, no fever or infectious symptoms, no cough or upper respiratory symptoms, no vomiting or diarrhea, no abdominal pain or chest pain.    MEDICAL HISTORY REVIEW  Reviewed in EPIC    PAST MEDICAL HISTORY  Active Ambulatory Problems     Diagnosis Date Noted    Irritable bowel syndrome with both constipation and diarrhea 06/05/2017    Peripheral neuropathy 06/05/2017    Vitamin D deficiency 06/05/2017    History of HPV infection 06/05/2017    Hypothyroidism 06/05/2017    Tobacco dependence due to cigarettes 06/05/2017    Acute kidney injury 12/28/2015    Ascites 06/06/2016    E. coli UTI (urinary tract infection) 06/06/2016    Alcohol withdrawal syndrome, with delirium 06/29/2018    Alcoholic hepatitis 06/29/2018    GI bleed 06/29/2018    Acute post-hemorrhagic anemia 06/29/2018    Thrombocytopenia 06/29/2018    Open wound of right shoulder region 06/29/2018    Pancreatic insufficiency 07/04/2018    Alcoholic ketoacidosis 07/21/2019    Chronic alcohol abuse 07/29/2019    ESBL (extended spectrum beta-lactamase) producing bacteria " infection 07/29/2019    Abnormal weight loss 12/13/2019    Hashimoto's disease 12/13/2019    Chronic fatigue 12/14/2019    History of alcohol use disorder 09/21/2021    Alcohol intoxication 09/21/2021    Lupus     Hypomagnesemia     Pancytopenia     Alcoholic cirrhosis     Bilateral lower abdominal discomfort 09/21/2021    Primary hypertension 10/24/2022    Melena 10/24/2022    Arthritis of shoulder 12/19/2022    Esophageal varices 01/16/2023    Essential hypertension 01/16/2023    Acute on chronic pancreatitis 01/22/2023    Abdominal pain 01/22/2023    Pancreatitis 10/07/2023    Leukopenia 10/07/2023     Resolved Ambulatory Problems     Diagnosis Date Noted    Acute renal failure 02/20/2017    Kidney stone 06/05/2017    Hypotension 07/01/2018    Nausea and vomiting 09/21/2021    Acute GI bleeding 10/24/2022     Past Medical History:   Diagnosis Date    Acromioclavicular separation 1/2021    Ankle sprain 2/2004    Anxiety     Arthritis     Arthritis of back Unsure    Cirrhosis     Disease of thyroid gland     ETOH abuse     Fracture, clavicle 1/2021    Fracture, foot Unsure    Frozen shoulder 1 /2009    GERD (gastroesophageal reflux disease)     History of kidney stones     Hypertension     Left shoulder pain     Low back strain 1/21    Lumbosacral disc disease 1/21    MDD (major depressive disorder)     Neck strain Unsure    Periarthritis of shoulder see above    Rotator cuff syndrome odre for shoulder replacement    Tennis elbow Unsure       PAST SURGICAL HISTORY  Past Surgical History:   Procedure Laterality Date    CLAVICLE SURGERY Right 2018    ENDOSCOPY N/A 10/26/2022    Procedure: ESOPHAGOGASTRODUODENOSCOPY wtih banding;  Surgeon: Ag Patel MD;  Location: [facility] ENDOSCOPY;  Service: Gastroenterology;  Laterality: N/A;  pre: melena  post: esophageal varicies with banding x2 bands    ENDOSCOPY N/A 01/18/2023    Procedure: ESOPHAGOGASTRODUODENOSCOPY;  Surgeon: Ag Patel MD;  Location: [facility] ENDOSCOPY;   Service: Gastroenterology;  Laterality: N/A;  PRE OP - MAROON STOOLS  POST OP - SM ESOPHAGEAL VARICES    ESOPHAGEAL VARICES LIGATION      FOOT SURGERY  12    JOINT REPLACEMENT Bilateral     GREAT TOE    KIDNEY STONE SURGERY      LITHOTRIPSY AND STENT (R SIDE)    LAPAROSCOPIC TUBAL LIGATION      NECK SURGERY  3/07    Not sure of dates    SIGMOIDOSCOPY N/A 2023    Procedure: SIGMOIDOSCOPY FLEXIBLE;  Surgeon: Ag Patel MD;  Location: [facility] ENDOSCOPY;  Service: Gastroenterology;  Laterality: N/A;  PRE OP - MAROON STOOLS  POST OP - RECTAL VARICES    TOTAL SHOULDER ARTHROPLASTY Left 2023    Procedure: reverse total shoulder arthroplasty;  Surgeon: Giovanni Denise MD;  [place] [facility] [place] [facility] [place]  Service: Orthopedics;  Laterality: Left;       FAMILY HISTORY  Family History   Problem Relation Age of Onset    Rheumatologic disease Mother         congestive heart diseased    Osteoporosis Mother             Thyroid disease Father     Leukemia Father     Cancer Father         Leukemia  deseased     Broken bones Father     Clotting disorder Father     Drug abuse Brother     Malig Hyperthermia Neg Hx        SOCIAL HISTORY  Social History     Socioeconomic History    Marital status:    Tobacco Use    Smoking status: Some Days     Packs/day: 0.25     Years: 15.00     Additional pack years: 0.00     Total pack years: 3.75     Types: Cigarettes     Passive exposure: Past    Smokeless tobacco: Never    Tobacco comments:     Rarely smoke sometimes will to   Vaping Use    Vaping Use: Never used   Substance and Sexual Activity    Alcohol use: Not Currently     Alcohol/week: 5.0 - 10.0 standard drinks of alcohol     Types: 5 - 10 Shots of liquor per week     Comment: Am in recovery 7 months!    Drug use: Not Currently    Sexual activity: Not Currently     Partners: Male     Birth control/protection: Post-menopausal, Natural family planning/Rhythm     Comment: cinthia- menepausal        ALLERGIES  Benzonatate, Ketorolac tromethamine, Phenergan [promethazine hcl], Cucumber extract, and Promethazine-phenylephrine    The patient's allergies have been reviewed    REVIEW OF SYSTEMS  All systems reviewed and negative except for those discussed in HPI.     PHYSICAL EXAM  I have reviewed the triage vital signs and nursing notes.  ED Triage Vitals [11/24/23 1342]   Temp Heart Rate Resp BP SpO2   97.7 °F (36.5 °C) 72 17 107/72 94 %      Temp src Heart Rate Source Patient Position BP Location FiO2 (%)   Tympanic -- -- -- --       General: No acute distress.  HENT: NCAT, PERRL, Nares patent.  Eyes: no scleral icterus.  Neck: trachea midline, no ROM limitations.  CV: regular rhythm, regular rate.  Respiratory: normal effort, CTAB.  Abdomen: soft, nondistended, NTTP, no rebound tenderness, no guarding or rigidity.  Musculoskeletal: no deformity.  Neuro: Drowsy, slurred speech, appears intoxicated, moves all extremities, follows commands.  Skin: warm, dry.    LAB RESULTS  Procedure Component Value Ref Range Date/Time   Urine Drug Screen - Straight Cath [[id number]] (Abnormal) Collected: 11/24/23 1751   Order Status: Completed Specimen: Urine from Straight Cath Updated: 11/24/23 1856    Amphet/Methamphet, Screen Negative Negative     Barbiturates Screen, Urine Negative Negative     Benzodiazepine Screen, Urine Negative Negative     Cocaine Screen, Urine Positive Abnormal  Negative     Opiate Screen Negative Negative     THC, Screen, Urine Negative Negative     Methadone Screen, Urine Negative Negative     Oxycodone Screen, Urine Negative Negative     Fentanyl, Urine Negative Negative    Narrative:     Negative Thresholds Per Drugs Screened:    Amphetamines                 500 ng/ml  Barbiturates                 200 ng/ml  Benzodiazepines              100 ng/ml  Cocaine                      300 ng/ml  Methadone                    300 ng/ml  Opiates                      300 ng/ml  Oxycodone                     100 ng/ml  THC                           50 ng/ml  Fentanyl                       5 ng/ml      The Normal Value for all drugs tested is negative. This report includes final unconfirmed screening results to be used for medical treatment purposes only. Unconfirmed results must not be used for non-medical purposes such as employment or legal testing. Clinical consideration should be applied to any drug of abuse test, particularly when unconfirmed results are used.         Urinalysis With Microscopic If Indicated (No Culture) - Straight Cath [[id number]] (Abnormal) Collected: 11/24/23 1751   Order Status: Completed Specimen: Urine from Straight Cath Updated: 11/24/23 1811    Color, UA Dark Yellow Abnormal  Yellow, Straw     Appearance, UA Clear Clear     pH, UA 6.0 5.0 - 8.0     Specific Gravity, UA 1.023 1.005 - 1.030     Glucose, UA Negative Negative     Ketones, UA 80 mg/dL (3+) Abnormal  Negative     Bilirubin, UA Negative Negative     Blood, UA Negative Negative     Protein, UA Negative Negative     Leuk Esterase, UA Negative Negative     Nitrite, UA Negative Negative     Urobilinogen, UA 1.0 E.U./dL 0.2 - 1.0 E.U./dL    Narrative:     Urine microscopic not indicated.   Urine Drug Screen - Straight Cath [[id number]]    Order Status: Canceled Specimen: Urine from Straight Cath    Ammonia [[id number]]    Order Status: Canceled Specimen: Blood    Comprehensive Metabolic Panel [[id number]] (Abnormal) Collected: 11/24/23 1642   Order Status: Completed Specimen: Blood Updated: 11/24/23 1707    Glucose 90 65 - 99 mg/dL     BUN 10 6 - 20 mg/dL     Creatinine 0.95 0.57 - 1.00 mg/dL     Sodium 143 136 - 145 mmol/L     Potassium 4.0 3.5 - 5.2 mmol/L     Comment: Slight hemolysis detected by analyzer. Result may be falsely elevated.       Chloride 106 98 - 107 mmol/L     CO2 21.0 Low  22.0 - 29.0 mmol/L     Calcium 9.3 8.6 - 10.5 mg/dL     Total Protein 6.7 6.0 - 8.5 g/dL     Albumin 4.0 3.5 - 5.2 g/dL     ALT (SGPT) 21 1 - 33 U/L      AST (SGOT) 50 High  1 - 32 U/L     Comment: Slight hemolysis detected by analyzer. Result may be falsely elevated.       Alkaline Phosphatase 120 High  39 - 117 U/L     Total Bilirubin 1.4 High  0.0 - 1.2 mg/dL     Globulin 2.7 gm/dL     A/G Ratio 1.5 g/dL     BUN/Creatinine Ratio 10.5 7.0 - 25.0     Anion Gap 16.0 High  5.0 - 15.0 mmol/L     eGFR 70.9 >60.0 mL/min/1.73    Narrative:     GFR Normal >60  Chronic Kidney Disease <60  Kidney Failure <15   Magnesium [[id number]] (Abnormal) Collected: 11/24/23 1642   Order Status: Completed Specimen: Blood Updated: 11/24/23 1707    Magnesium 1.5 Low  1.6 - 2.6 mg/dL    Ethanol [[id number]] Collected: 11/24/23 1642   Order Status: Completed Specimen: Blood Updated: 11/24/23 1706    Ethanol <10 0 - 10 mg/dL     Ethanol % <0.010 %    CBC Auto Differential [[id number]] (Abnormal) Collected: 11/24/23 1413   Order Status: Completed Specimen: Blood Updated: 11/24/23 1451    WBC 5.96 3.40 - 10.80 10*3/mm3     RBC 5.04 3.77 - 5.28 10*6/mm3     Hemoglobin 16.4 High  12.0 - 15.9 g/dL     Hematocrit 47.2 High  34.0 - 46.6 %     MCV 93.7 79.0 - 97.0 fL     MCH 32.5 26.6 - 33.0 pg     MCHC 34.7 31.5 - 35.7 g/dL     RDW 12.2 Low  12.3 - 15.4 %     RDW-SD 41.6 37.0 - 54.0 fl     MPV 11.6 6.0 - 12.0 fL     Platelets 70 Low  140 - 450 10*3/mm3     Neutrophil % 64.5 42.7 - 76.0 %     Lymphocyte % 27.2 19.6 - 45.3 %     Monocyte % 5.4 5.0 - 12.0 %     Eosinophil % 1.5 0.3 - 6.2 %     Basophil % 1.2 0.0 - 1.5 %     Neutrophils, Absolute 3.85 1.70 - 7.00 10*3/mm3     Lymphocytes, Absolute 1.62 0.70 - 3.10 10*3/mm3     Monocytes, Absolute 0.32 0.10 - 0.90 10*3/mm3     Eosinophils, Absolute 0.09 0.00 - 0.40 10*3/mm3     Basophils, Absolute 0.07 0.00 - 0.20 10*3/mm3    Scan Slide [[id number]] (Normal) Collected: 11/24/23 1413   Order Status: Completed Specimen: Blood Updated: 11/24/23 1451    RBC Morphology Normal Normal     WBC Morphology Normal Normal     Platelet Morphology Normal Normal     Acetaminophen Level [[id number]] (Normal) Collected: 11/24/23 1413   Order Status: Completed Specimen: Blood Updated: 11/24/23 1449    Acetaminophen <5.0 0.0 - 30.0 mcg/mL    Salicylate Level [[id number]] (Normal) Collected: 11/24/23 1413   Order Status: Completed Specimen: Blood Updated: 11/24/23 1449    Salicylate <0.3 <=30.0 mg/dL    Narrative:     Therapeutic range for Salicylates:  3.0 - 10.0 mg/dL for antipyretic/analgesic conditions  15.0 - 30.0 mg/dL for anti-inflammatory conditions   Manual Differential [[id number]] Collected: 11/24/23 1413   Order Status: Canceled Specimen: Blood Updated: 11/24/23 1428   Urinalysis With Microscopic If Indicated (No Culture) - Urine, Clean Catch [[id number]]    Order Status: Canceled Specimen: Urine, Clean Catch    Urine Drug Screen - Urine, Clean Catch [[id number]]    Order Status: Canceled Specimen: Urine, Clean Catch          I ordered the above labs and reviewed the results.    RADIOLOGY  CT Head Without Contrast (Final result)  Result time 11/24/23 18:10:23  Final result by Arnold Mullins MD (11/24/23 18:10:23)                Impression:       No CT evidence for acute intracranial pathology. The etiology of the  patient's confusion is not further elucidated on this examination; and  if further assessment is warranted, one could obtain an MRI of the brain  for follow-up.        Radiation dose reduction techniques were utilized, including automated  exposure control and exposure modulation based on body size.     This report was finalized on 11/24/2023 6:10 PM by Dr. Arnold Mullins M.D  on Workstation: [id number]               Narrative:    CT HEAD WITHOUT CONTRAST     CLINICAL HISTORY: Alcohol problem. Confusion.     TECHNIQUE: CT scan of the head was obtained with 3 mm axial soft tissue  algorithm images. No intravenous contrast was administered. Sagittal and  coronal reconstructions were obtained.     COMPARISON: CT head dated 06/29/2018.     FINDINGS:       The  ventricles, sulci, and cisterns are age-appropriate. The basal  ganglia and thalami are unremarkable in appearance. The gray-white  matter differentiation is within normal limits. The posterior fossa  structures are unremarkable.                I ordered the above noted radiological studies. I reviewed the images and results. I agree with the radiologist interpretation.    PROCEDURES  Procedures    MEDICATIONS GIVEN IN ER  Medications   sodium chloride 0.9 % infusion 1,000 mL (0 mL Intravenous Stopped 11/24/23 1702)   folic acid 1 mg in sodium chloride 0.9 % 50 mL IVPB (0 mg Intravenous Stopped 11/24/23 1511)   thiamine (B-1) injection 200 mg (200 mg Intravenous Given 11/24/23 1420)   magnesium sulfate 2g/50 mL (PREMIX) infusion (0 g Intravenous Stopped 11/24/23 1900)       PROGRESS, DATA ANALYSIS, CONSULTS, AND MEDICAL DECISION MAKING  A complete history and physical exam have been performed.  All available laboratory and imaging results have been reviewed by myself prior to disposition.    MDM    After the initial H&P, I discussed pertinent information from history and physical exam with patient/family.  Discussed differential diagnosis.  Discussed plan for ED evaluation/workup/treatment.  All questions answered.  Patient/family is agreeable with plan.  ED Course as of 11/25/23 2130   Fri Nov 24, 2023   1530 I reviewed patient's discharge summary from October 7, 2023, patient was admitted for acute on chronic pancreatitis.  Patient's last CT abdomen pelvis is from 10/15/2023, cirrhosis with portal hypertension and esophageal varices with chronic pancreatitis and cholelithiasis. [JG]   1705  now at bedside.  He reports that patient was admitted to alcohol rehab yesterday, patient was refusing to shower, patient left. [JG]   1707 Magnesium 1.5, repleted via IV. [JG]   1708 Thrombocytopenia, chronic in nature, likely secondary to chronic alcohol abuse. [JG]   1710 Blood alcohol level is negative.  Patient  altered, tox screen ordered, patient has not yet yet given urine sample.  Will obtain cath urinalysis, obtaining ammonia level and CT head imaging to ensure patient does not have intracranial hemorrhage given history of alcohol abuse and thrombocytopenia. [JG]   1719 I reviewed patient's Kirit, patient had previously been on benzos as well as hydrocodone and oxycodone but appears currently is only being prescribed buprenorphine hydrochloride/naloxone with last prescription from 11/17/2023, given 7-day supply at that time. [JG]   1750 Patient's wife is now here, additional history obtained.  She reports that patient had showered, was shaving, began to feel extremely weak, she assisted him to the toilet and then down to the ground, patient did lose consciousness.  Patient was not complaining of any chest pain or shortness of breath, patient did break out into a sweat.  No seizure-like activity, patient was not postictal.  She reports patient has passed out in the past, multiple times, has a history of vasovagal syncope.  She reports that prior to this patient was at baseline. [JG]   1811 Reviewed CT head imaging in PACS, negative for intracranial hemorrhage per my read. [JG]   1818 Patient is now awake and alert requesting something to drink.  Giving multivitamin. [JG]   1850 Patient is now alert and oriented, responding appropriately, has ambulated without difficulty and tolerating oral intake.  Patient is clinically sober. [JG]   1855 Patient's urine tox pending, phone call with laboratory, they inform me that tox has resulted, positive for cocaine but otherwise negative. [JG]   1856 ED workup unremarkable other than hypomagnesia anemia, chronic thrombocytopenia, patient positive for cocaine.  Patient has no signs of alcohol withdrawal, patient is well-appearing, appears appropriate for discharge.  Discussed need for close follow-up with primary care, discussed need for drug and alcohol cessation, given resources  to help out with drug and alcohol sensation, had extensive discussion return precautions and discussed return to the emergency department if patient begins to experience alcohol withdrawal symptoms.  Discharging. [JG]      ED Course User Index  [JG] Tray Gallegos MD       AS OF 21:30 EST VITALS:    BP - 121/66  HR - 92  TEMP - 97.7 °F (36.5 °C) (Tympanic)  O2 SATS - 93%    DIAGNOSIS  Final diagnoses:   Cocaine abuse   Transient confusion   Alcohol abuse   Hypomagnesemia   Thrombocytopenia         DISPOSITION  DISCHARGE    Patient discharged in stable condition.    Reviewed implications of results, diagnosis, meds, responsibility to follow up, warning signs and symptoms of possible worsening, potential complications and reasons to return to ER.    Patient/Family voiced understanding of above instructions.    Discussed plan for discharge, as there is no emergent indication for admission. Patient referred to primary care provider for BP management due to today's BP. Pt/family is agreeable and understands need for follow up and repeat testing.  Pt is aware that discharge does not mean that nothing is wrong but it indicates no emergency is present that requires admission and they must continue care with follow-up as given below or physician of their choice.     FOLLOW-UP  Tylor Davis  [address] [id number] [phone]    Schedule an appointment as soon as possible for a visit in 2 days  even if well    [facility]  [address] [facility] [address] [phone]  Go today  for further management of your substance abuse    [facility] EMERGENCY DEPARTMENT  [address] [facility] [address] [phone]  Go to   as needed         Medication List      No changes were made to your prescriptions during this visit.            Tray Gallegos MD  11/25/23 2289

## 2023-11-24 NOTE — ED NOTES
Pt to ED, appears intoxicated, can tell me her bday and name, arouses to loud voice, states she normally drinks a lot of wine, states her last drink as late last night. Pt sleeping.     Reports mild abd soreness.

## 2023-11-24 NOTE — ED NOTES
Pt now awake, states her stomach hurts, denies nausea. Reports has had this pain in the past. Drinking oneil. Md aware

## 2023-11-24 NOTE — DISCHARGE INSTRUCTIONS
You have been given emergency department evaluation.  This evaluation is intended to rule out life-threatening conditions.  Is not a complete evaluation.  You could require further testing as determined by your primary care physician or any referred specialist.  Please follow-up with all doctors that you are referred to.  Please be sure to take your prescribed medications and follow any specific instructions in the discharge instructions.  Please follow-up with your primary care physician within 48 hours.  Please have your primary care provider recheck your blood pressure.  Please return to the emergency department if you experience chest pain, shortness of breath, abdominal pain, fever greater than 102, intractable vomiting.  Please return to the emergency department if your symptoms continue or worsen, or if you begin to experience any other concerning symptom.    You have been seen for substance abuse related complaint.  Please stay with a responsible adult for 24 hours.  Please avoid driving for 24 hours.  Please quit taking alcohol/drugs, as it can result in your death or permanent disability.    Substance abuse treatment centers  The following list are some of the available alcohol and drug abuse treatment centers in the [facility] area.  This list is by no means inclusive of all available options, nor does it imply that these options are better than any other in the area.  Cost and available treatment plans to vary from site to site.  You will need to call, to see which if any of these options will work for your needs.    [facility]  [address] [facility] [address]  [phone]    [facility]  [address] [place] [address]  [phone]    [facility]  [address] [facility] [address]    [facility] [address]  [phone]    [facility]-medical stabilization  service  [address] [address]  [phone]    [facility]  [facility] [place]  [address] [place] [address]  [phone]  [facility]  [address], [place] [address] [phone]    [facility] [address] [address] [phone]

## 2023-11-24 NOTE — ED TRIAGE NOTES
To ER via EMS with request to get into a rehab facility. States usually drinks a pint/day. Last drink 1 hour PTA

## 2023-11-25 NOTE — ED NOTES
Assisted patient to sitting up and getting dressed.   at the bedside and verbalized understanding of discharge instructions and follow up care.. Wheeled patient from the ER to the car...

## 2024-04-01 ENCOUNTER — APPOINTMENT (OUTPATIENT)
Dept: CT IMAGING | Facility: HOSPITAL | Age: 54
End: 2024-04-01
Payer: MEDICAID

## 2024-04-01 ENCOUNTER — HOSPITAL ENCOUNTER (OUTPATIENT)
Facility: HOSPITAL | Age: 54
Setting detail: OBSERVATION
Discharge: HOME | End: 2024-04-03
Attending: EMERGENCY MEDICINE | Admitting: INTERNAL MEDICINE
Payer: MEDICAID

## 2024-04-01 DIAGNOSIS — R10.13 EPIGASTRIC PAIN: Primary | ICD-10-CM

## 2024-04-01 PROBLEM — K86.0 ALCOHOL-INDUCED CHRONIC PANCREATITIS: Status: ACTIVE | Noted: 2023-01-22

## 2024-04-01 PROBLEM — K29.20 ACUTE ALCOHOLIC GASTRITIS WITHOUT HEMORRHAGE: Status: ACTIVE | Noted: 2024-04-01

## 2024-04-01 LAB
ALBUMIN SERPL-MCNC: 4.2 G/DL (ref 3.5–5.2)
ALBUMIN/GLOB SERPL: 1.4 G/DL
ALP SERPL-CCNC: 165 U/L (ref 39–117)
ALT SERPL W P-5'-P-CCNC: 25 U/L (ref 1–33)
AMPHET+METHAMPHET UR QL: POSITIVE
ANION GAP SERPL CALCULATED.3IONS-SCNC: 10 MMOL/L (ref 5–15)
APTT PPP: 33.5 SECONDS (ref 22.7–35.4)
AST SERPL-CCNC: 57 U/L (ref 1–32)
BACTERIA UR QL AUTO: ABNORMAL /HPF
BARBITURATES UR QL SCN: NEGATIVE
BASOPHILS # BLD AUTO: 0.02 10*3/MM3 (ref 0–0.2)
BASOPHILS NFR BLD AUTO: 0.5 % (ref 0–1.5)
BENZODIAZ UR QL SCN: NEGATIVE
BILIRUB SERPL-MCNC: 1.2 MG/DL (ref 0–1.2)
BILIRUB UR QL STRIP: NEGATIVE
BUN SERPL-MCNC: 10 MG/DL (ref 6–20)
BUN/CREAT SERPL: 15.6 (ref 7–25)
CALCIUM SPEC-SCNC: 9.1 MG/DL (ref 8.6–10.5)
CANNABINOIDS SERPL QL: NEGATIVE
CHLORIDE SERPL-SCNC: 107 MMOL/L (ref 98–107)
CLARITY UR: CLEAR
CO2 SERPL-SCNC: 24 MMOL/L (ref 22–29)
COCAINE UR QL: NEGATIVE
COLOR UR: YELLOW
CREAT SERPL-MCNC: 0.64 MG/DL (ref 0.57–1)
DEPRECATED RDW RBC AUTO: 47 FL (ref 37–54)
EGFRCR SERPLBLD CKD-EPI 2021: 104.5 ML/MIN/1.73
EOSINOPHIL # BLD AUTO: 0.1 10*3/MM3 (ref 0–0.4)
EOSINOPHIL NFR BLD AUTO: 2.4 % (ref 0.3–6.2)
ERYTHROCYTE [DISTWIDTH] IN BLOOD BY AUTOMATED COUNT: 13.8 % (ref 12.3–15.4)
ETHANOL BLD-MCNC: <10 MG/DL (ref 0–10)
ETHANOL UR QL: <0.01 %
FENTANYL UR-MCNC: POSITIVE NG/ML
GLOBULIN UR ELPH-MCNC: 3 GM/DL
GLUCOSE SERPL-MCNC: 148 MG/DL (ref 65–99)
GLUCOSE UR STRIP-MCNC: NEGATIVE MG/DL
HCT VFR BLD AUTO: 41.8 % (ref 34–46.6)
HGB BLD-MCNC: 14 G/DL (ref 12–15.9)
HGB UR QL STRIP.AUTO: NEGATIVE
HOLD SPECIMEN: NORMAL
HOLD SPECIMEN: NORMAL
HYALINE CASTS UR QL AUTO: ABNORMAL /LPF
IMM GRANULOCYTES # BLD AUTO: 0.01 10*3/MM3 (ref 0–0.05)
IMM GRANULOCYTES NFR BLD AUTO: 0.2 % (ref 0–0.5)
INR PPP: 1.36 (ref 0.9–1.1)
KETONES UR QL STRIP: NEGATIVE
LEUKOCYTE ESTERASE UR QL STRIP.AUTO: ABNORMAL
LIPASE SERPL-CCNC: 30 U/L (ref 13–60)
LYMPHOCYTES # BLD AUTO: 0.74 10*3/MM3 (ref 0.7–3.1)
LYMPHOCYTES NFR BLD AUTO: 17.8 % (ref 19.6–45.3)
MCH RBC QN AUTO: 31.2 PG (ref 26.6–33)
MCHC RBC AUTO-ENTMCNC: 33.5 G/DL (ref 31.5–35.7)
MCV RBC AUTO: 93.1 FL (ref 79–97)
METHADONE UR QL SCN: NEGATIVE
MONOCYTES # BLD AUTO: 0.48 10*3/MM3 (ref 0.1–0.9)
MONOCYTES NFR BLD AUTO: 11.5 % (ref 5–12)
NEUTROPHILS NFR BLD AUTO: 2.81 10*3/MM3 (ref 1.7–7)
NEUTROPHILS NFR BLD AUTO: 67.6 % (ref 42.7–76)
NITRITE UR QL STRIP: NEGATIVE
NRBC BLD AUTO-RTO: 0 /100 WBC (ref 0–0.2)
OPIATES UR QL: NEGATIVE
OXYCODONE UR QL SCN: POSITIVE
PH UR STRIP.AUTO: 6 [PH] (ref 5–8)
PLATELET # BLD AUTO: 76 10*3/MM3 (ref 140–450)
PMV BLD AUTO: 10.9 FL (ref 6–12)
POTASSIUM SERPL-SCNC: 3.8 MMOL/L (ref 3.5–5.2)
PROT SERPL-MCNC: 7.2 G/DL (ref 6–8.5)
PROT UR QL STRIP: NEGATIVE
PROTHROMBIN TIME: 17 SECONDS (ref 11.7–14.2)
RBC # BLD AUTO: 4.49 10*6/MM3 (ref 3.77–5.28)
RBC # UR STRIP: ABNORMAL /HPF
REF LAB TEST METHOD: ABNORMAL
SODIUM SERPL-SCNC: 141 MMOL/L (ref 136–145)
SP GR UR STRIP: 1.02 (ref 1–1.03)
SQUAMOUS #/AREA URNS HPF: ABNORMAL /HPF
UROBILINOGEN UR QL STRIP: ABNORMAL
WBC # UR STRIP: ABNORMAL /HPF
WBC NRBC COR # BLD AUTO: 4.16 10*3/MM3 (ref 3.4–10.8)
WHOLE BLOOD HOLD COAG: NORMAL

## 2024-04-01 PROCEDURE — 80307 DRUG TEST PRSMV CHEM ANLYZR: CPT | Performed by: PHYSICIAN ASSISTANT

## 2024-04-01 PROCEDURE — 25010000002 HYDROMORPHONE PER 4 MG: Performed by: EMERGENCY MEDICINE

## 2024-04-01 PROCEDURE — 82077 ASSAY SPEC XCP UR&BREATH IA: CPT | Performed by: PHYSICIAN ASSISTANT

## 2024-04-01 PROCEDURE — 85610 PROTHROMBIN TIME: CPT | Performed by: PHYSICIAN ASSISTANT

## 2024-04-01 PROCEDURE — 99285 EMERGENCY DEPT VISIT HI MDM: CPT

## 2024-04-01 PROCEDURE — 25810000003 SODIUM CHLORIDE 0.9 % SOLUTION 1,000 ML FLEX CONT: Performed by: PHYSICIAN ASSISTANT

## 2024-04-01 PROCEDURE — 96375 TX/PRO/DX INJ NEW DRUG ADDON: CPT

## 2024-04-01 PROCEDURE — G0378 HOSPITAL OBSERVATION PER HR: HCPCS

## 2024-04-01 PROCEDURE — 25010000002 THIAMINE HCL 200 MG/2ML SOLUTION: Performed by: INTERNAL MEDICINE

## 2024-04-01 PROCEDURE — 80053 COMPREHEN METABOLIC PANEL: CPT | Performed by: STUDENT IN AN ORGANIZED HEALTH CARE EDUCATION/TRAINING PROGRAM

## 2024-04-01 PROCEDURE — 83690 ASSAY OF LIPASE: CPT | Performed by: STUDENT IN AN ORGANIZED HEALTH CARE EDUCATION/TRAINING PROGRAM

## 2024-04-01 PROCEDURE — 25010000002 THIAMINE HCL 200 MG/2ML SOLUTION 2 ML VIAL: Performed by: PHYSICIAN ASSISTANT

## 2024-04-01 PROCEDURE — 81001 URINALYSIS AUTO W/SCOPE: CPT | Performed by: STUDENT IN AN ORGANIZED HEALTH CARE EDUCATION/TRAINING PROGRAM

## 2024-04-01 PROCEDURE — 25010000002 ONDANSETRON PER 1 MG: Performed by: INTERNAL MEDICINE

## 2024-04-01 PROCEDURE — 25010000002 MORPHINE PER 10 MG: Performed by: EMERGENCY MEDICINE

## 2024-04-01 PROCEDURE — 96376 TX/PRO/DX INJ SAME DRUG ADON: CPT

## 2024-04-01 PROCEDURE — 25010000002 SODIUM CHLORIDE 0.9 % WITH KCL 20 MEQ 20-0.9 MEQ/L-% SOLUTION: Performed by: INTERNAL MEDICINE

## 2024-04-01 PROCEDURE — 85730 THROMBOPLASTIN TIME PARTIAL: CPT | Performed by: PHYSICIAN ASSISTANT

## 2024-04-01 PROCEDURE — 85025 COMPLETE CBC W/AUTO DIFF WBC: CPT | Performed by: STUDENT IN AN ORGANIZED HEALTH CARE EDUCATION/TRAINING PROGRAM

## 2024-04-01 PROCEDURE — 25010000002 LORAZEPAM PER 2 MG: Performed by: INTERNAL MEDICINE

## 2024-04-01 PROCEDURE — 74177 CT ABD & PELVIS W/CONTRAST: CPT

## 2024-04-01 PROCEDURE — 25510000001 IOPAMIDOL 61 % SOLUTION: Performed by: PHYSICIAN ASSISTANT

## 2024-04-01 PROCEDURE — 25010000002 HYDROMORPHONE PER 4 MG: Performed by: INTERNAL MEDICINE

## 2024-04-01 PROCEDURE — 96361 HYDRATE IV INFUSION ADD-ON: CPT

## 2024-04-01 RX ORDER — BISACODYL 5 MG/1
5 TABLET, DELAYED RELEASE ORAL DAILY PRN
Status: DISCONTINUED | OUTPATIENT
Start: 2024-04-01 | End: 2024-04-03 | Stop reason: HOSPADM

## 2024-04-01 RX ORDER — HYDROXYZINE 50 MG/1
50 TABLET, FILM COATED ORAL ONCE
Status: DISCONTINUED | OUTPATIENT
Start: 2024-04-01 | End: 2024-04-03 | Stop reason: HOSPADM

## 2024-04-01 RX ORDER — HYDROMORPHONE HYDROCHLORIDE 1 MG/ML
0.5 INJECTION, SOLUTION INTRAMUSCULAR; INTRAVENOUS; SUBCUTANEOUS
Status: DISCONTINUED | OUTPATIENT
Start: 2024-04-01 | End: 2024-04-03

## 2024-04-01 RX ORDER — LORAZEPAM 1 MG/1
1 TABLET ORAL
Status: DISCONTINUED | OUTPATIENT
Start: 2024-04-01 | End: 2024-04-03

## 2024-04-01 RX ORDER — SUCRALFATE 1 G/1
1 TABLET ORAL
Status: DISCONTINUED | OUTPATIENT
Start: 2024-04-01 | End: 2024-04-03 | Stop reason: HOSPADM

## 2024-04-01 RX ORDER — ACETAMINOPHEN 650 MG/1
650 SUPPOSITORY RECTAL EVERY 4 HOURS PRN
Status: DISCONTINUED | OUTPATIENT
Start: 2024-04-01 | End: 2024-04-03 | Stop reason: HOSPADM

## 2024-04-01 RX ORDER — ACETAMINOPHEN 160 MG/5ML
650 SOLUTION ORAL EVERY 4 HOURS PRN
Status: DISCONTINUED | OUTPATIENT
Start: 2024-04-01 | End: 2024-04-03 | Stop reason: HOSPADM

## 2024-04-01 RX ORDER — LORAZEPAM 1 MG/1
1 TABLET ORAL EVERY 6 HOURS
Status: DISCONTINUED | OUTPATIENT
Start: 2024-04-02 | End: 2024-04-02

## 2024-04-01 RX ORDER — HYDROCODONE BITARTRATE AND ACETAMINOPHEN 5; 325 MG/1; MG/1
1 TABLET ORAL EVERY 4 HOURS PRN
Status: DISCONTINUED | OUTPATIENT
Start: 2024-04-01 | End: 2024-04-03

## 2024-04-01 RX ORDER — NADOLOL 40 MG/1
40 TABLET ORAL
Status: DISCONTINUED | OUTPATIENT
Start: 2024-04-01 | End: 2024-04-03 | Stop reason: HOSPADM

## 2024-04-01 RX ORDER — CALCIUM CARBONATE 500 MG/1
3 TABLET, CHEWABLE ORAL 3 TIMES DAILY PRN
Status: DISCONTINUED | OUTPATIENT
Start: 2024-04-01 | End: 2024-04-03 | Stop reason: HOSPADM

## 2024-04-01 RX ORDER — POLYETHYLENE GLYCOL 3350 17 G/17G
17 POWDER, FOR SOLUTION ORAL DAILY PRN
Status: DISCONTINUED | OUTPATIENT
Start: 2024-04-01 | End: 2024-04-03 | Stop reason: HOSPADM

## 2024-04-01 RX ORDER — SODIUM CHLORIDE AND POTASSIUM CHLORIDE 150; 900 MG/100ML; MG/100ML
75 INJECTION, SOLUTION INTRAVENOUS CONTINUOUS
Status: DISCONTINUED | OUTPATIENT
Start: 2024-04-01 | End: 2024-04-03

## 2024-04-01 RX ORDER — MIRTAZAPINE 15 MG/1
15 TABLET, FILM COATED ORAL DAILY
Status: DISCONTINUED | OUTPATIENT
Start: 2024-04-01 | End: 2024-04-03 | Stop reason: HOSPADM

## 2024-04-01 RX ORDER — LORAZEPAM 1 MG/1
2 TABLET ORAL EVERY 6 HOURS
Status: DISCONTINUED | OUTPATIENT
Start: 2024-04-01 | End: 2024-04-02

## 2024-04-01 RX ORDER — LORAZEPAM 2 MG/ML
2 INJECTION INTRAMUSCULAR
Status: DISCONTINUED | OUTPATIENT
Start: 2024-04-01 | End: 2024-04-03

## 2024-04-01 RX ORDER — CEPHALEXIN 500 MG/1
500 CAPSULE ORAL DAILY
COMMUNITY
Start: 2024-03-22

## 2024-04-01 RX ORDER — HYDROMORPHONE HYDROCHLORIDE 1 MG/ML
0.5 INJECTION, SOLUTION INTRAMUSCULAR; INTRAVENOUS; SUBCUTANEOUS ONCE
Status: COMPLETED | OUTPATIENT
Start: 2024-04-01 | End: 2024-04-01

## 2024-04-01 RX ORDER — ACETAMINOPHEN 325 MG/1
650 TABLET ORAL EVERY 4 HOURS PRN
Status: DISCONTINUED | OUTPATIENT
Start: 2024-04-01 | End: 2024-04-03 | Stop reason: HOSPADM

## 2024-04-01 RX ORDER — SODIUM CHLORIDE 0.9 % (FLUSH) 0.9 %
10 SYRINGE (ML) INJECTION EVERY 12 HOURS SCHEDULED
Status: DISCONTINUED | OUTPATIENT
Start: 2024-04-01 | End: 2024-04-03 | Stop reason: HOSPADM

## 2024-04-01 RX ORDER — LANOLIN ALCOHOL/MO/W.PET/CERES
400 CREAM (GRAM) TOPICAL DAILY
Status: DISCONTINUED | OUTPATIENT
Start: 2024-04-01 | End: 2024-04-03 | Stop reason: HOSPADM

## 2024-04-01 RX ORDER — LORAZEPAM 1 MG/1
2 TABLET ORAL
Status: DISCONTINUED | OUTPATIENT
Start: 2024-04-01 | End: 2024-04-03

## 2024-04-01 RX ORDER — PANTOPRAZOLE SODIUM 40 MG/10ML
80 INJECTION, POWDER, LYOPHILIZED, FOR SOLUTION INTRAVENOUS ONCE
Status: COMPLETED | OUTPATIENT
Start: 2024-04-01 | End: 2024-04-01

## 2024-04-01 RX ORDER — LEVOTHYROXINE SODIUM 0.1 MG/1
100 TABLET ORAL DAILY
Status: DISCONTINUED | OUTPATIENT
Start: 2024-04-01 | End: 2024-04-03 | Stop reason: HOSPADM

## 2024-04-01 RX ORDER — SODIUM CHLORIDE 0.9 % (FLUSH) 0.9 %
10 SYRINGE (ML) INJECTION AS NEEDED
Status: DISCONTINUED | OUTPATIENT
Start: 2024-04-01 | End: 2024-04-03 | Stop reason: HOSPADM

## 2024-04-01 RX ORDER — HYDROXYZINE PAMOATE 50 MG/1
50 CAPSULE ORAL 2 TIMES DAILY PRN
Status: DISCONTINUED | OUTPATIENT
Start: 2024-04-01 | End: 2024-04-03 | Stop reason: HOSPADM

## 2024-04-01 RX ORDER — ERGOCALCIFEROL 1.25 MG/1
50000 CAPSULE ORAL
COMMUNITY
Start: 2024-03-07

## 2024-04-01 RX ORDER — NALOXONE HCL 0.4 MG/ML
0.4 VIAL (ML) INJECTION
Status: DISCONTINUED | OUTPATIENT
Start: 2024-04-01 | End: 2024-04-03

## 2024-04-01 RX ORDER — MIRTAZAPINE 15 MG/1
15 TABLET, FILM COATED ORAL DAILY
COMMUNITY
Start: 2024-02-19

## 2024-04-01 RX ORDER — FOLIC ACID 1 MG/1
1 TABLET ORAL DAILY
Status: DISCONTINUED | OUTPATIENT
Start: 2024-04-01 | End: 2024-04-03 | Stop reason: HOSPADM

## 2024-04-01 RX ORDER — LACTULOSE 10 G/15ML
10 SOLUTION ORAL 2 TIMES DAILY
Status: DISCONTINUED | OUTPATIENT
Start: 2024-04-01 | End: 2024-04-03 | Stop reason: HOSPADM

## 2024-04-01 RX ORDER — ONDANSETRON 4 MG/1
4 TABLET, ORALLY DISINTEGRATING ORAL EVERY 6 HOURS PRN
Status: DISCONTINUED | OUTPATIENT
Start: 2024-04-01 | End: 2024-04-02

## 2024-04-01 RX ORDER — AMOXICILLIN 250 MG
2 CAPSULE ORAL 2 TIMES DAILY PRN
Status: DISCONTINUED | OUTPATIENT
Start: 2024-04-01 | End: 2024-04-03 | Stop reason: HOSPADM

## 2024-04-01 RX ORDER — FLUOXETINE HYDROCHLORIDE 20 MG/1
40 CAPSULE ORAL DAILY
Status: DISCONTINUED | OUTPATIENT
Start: 2024-04-01 | End: 2024-04-03 | Stop reason: HOSPADM

## 2024-04-01 RX ORDER — PANTOPRAZOLE SODIUM 40 MG/10ML
40 INJECTION, POWDER, LYOPHILIZED, FOR SOLUTION INTRAVENOUS EVERY 12 HOURS SCHEDULED
Status: DISCONTINUED | OUTPATIENT
Start: 2024-04-01 | End: 2024-04-03 | Stop reason: HOSPADM

## 2024-04-01 RX ORDER — MORPHINE SULFATE 2 MG/ML
4 INJECTION, SOLUTION INTRAMUSCULAR; INTRAVENOUS ONCE
Status: COMPLETED | OUTPATIENT
Start: 2024-04-01 | End: 2024-04-01

## 2024-04-01 RX ORDER — THIAMINE HYDROCHLORIDE 100 MG/ML
200 INJECTION, SOLUTION INTRAMUSCULAR; INTRAVENOUS EVERY 8 HOURS SCHEDULED
Status: DISCONTINUED | OUTPATIENT
Start: 2024-04-01 | End: 2024-04-03 | Stop reason: HOSPADM

## 2024-04-01 RX ORDER — NITROGLYCERIN 0.4 MG/1
0.4 TABLET SUBLINGUAL
Status: DISCONTINUED | OUTPATIENT
Start: 2024-04-01 | End: 2024-04-03 | Stop reason: HOSPADM

## 2024-04-01 RX ORDER — LORAZEPAM 2 MG/ML
1 INJECTION INTRAMUSCULAR
Status: DISCONTINUED | OUTPATIENT
Start: 2024-04-01 | End: 2024-04-03

## 2024-04-01 RX ORDER — CHOLECALCIFEROL (VITAMIN D3) 125 MCG
5 CAPSULE ORAL NIGHTLY PRN
Status: DISCONTINUED | OUTPATIENT
Start: 2024-04-01 | End: 2024-04-03 | Stop reason: HOSPADM

## 2024-04-01 RX ORDER — BISACODYL 10 MG
10 SUPPOSITORY, RECTAL RECTAL DAILY PRN
Status: DISCONTINUED | OUTPATIENT
Start: 2024-04-01 | End: 2024-04-03 | Stop reason: HOSPADM

## 2024-04-01 RX ORDER — SODIUM CHLORIDE 9 MG/ML
40 INJECTION, SOLUTION INTRAVENOUS AS NEEDED
Status: DISCONTINUED | OUTPATIENT
Start: 2024-04-01 | End: 2024-04-03 | Stop reason: HOSPADM

## 2024-04-01 RX ORDER — ONDANSETRON 2 MG/ML
4 INJECTION INTRAMUSCULAR; INTRAVENOUS EVERY 6 HOURS PRN
Status: DISCONTINUED | OUTPATIENT
Start: 2024-04-01 | End: 2024-04-02

## 2024-04-01 RX ORDER — MULTIPLE VITAMINS W/ MINERALS TAB 9MG-400MCG
1 TAB ORAL DAILY
Status: DISCONTINUED | OUTPATIENT
Start: 2024-04-01 | End: 2024-04-03 | Stop reason: HOSPADM

## 2024-04-01 RX ADMIN — HYDROMORPHONE HYDROCHLORIDE 0.5 MG: 1 INJECTION, SOLUTION INTRAMUSCULAR; INTRAVENOUS; SUBCUTANEOUS at 17:41

## 2024-04-01 RX ADMIN — ONDANSETRON 4 MG: 2 INJECTION INTRAMUSCULAR; INTRAVENOUS at 21:26

## 2024-04-01 RX ADMIN — MORPHINE SULFATE 4 MG: 2 INJECTION, SOLUTION INTRAMUSCULAR; INTRAVENOUS at 07:01

## 2024-04-01 RX ADMIN — MIRTAZAPINE 15 MG: 15 TABLET, FILM COATED ORAL at 18:05

## 2024-04-01 RX ADMIN — SUCRALFATE 1 G: 1 TABLET ORAL at 17:40

## 2024-04-01 RX ADMIN — SUCRALFATE 1 G: 1 TABLET ORAL at 21:37

## 2024-04-01 RX ADMIN — HYDROCODONE BITARTRATE AND ACETAMINOPHEN 1 TABLET: 5; 325 TABLET ORAL at 13:23

## 2024-04-01 RX ADMIN — LORAZEPAM 1 MG: 2 INJECTION INTRAMUSCULAR; INTRAVENOUS at 13:56

## 2024-04-01 RX ADMIN — MUPIROCIN 1 APPLICATION: 20 OINTMENT TOPICAL at 17:40

## 2024-04-01 RX ADMIN — THIAMINE HYDROCHLORIDE 125 ML/HR: 100 INJECTION, SOLUTION INTRAMUSCULAR; INTRAVENOUS at 09:28

## 2024-04-01 RX ADMIN — LORAZEPAM 1 MG: 2 INJECTION INTRAMUSCULAR; INTRAVENOUS at 21:29

## 2024-04-01 RX ADMIN — MAGNESIUM OXIDE 400 MG (241.3 MG MAGNESIUM) TABLET 400 MG: TABLET at 18:05

## 2024-04-01 RX ADMIN — THIAMINE HYDROCHLORIDE 200 MG: 100 INJECTION, SOLUTION INTRAMUSCULAR; INTRAVENOUS at 21:33

## 2024-04-01 RX ADMIN — HYDROMORPHONE HYDROCHLORIDE 0.5 MG: 1 INJECTION, SOLUTION INTRAMUSCULAR; INTRAVENOUS; SUBCUTANEOUS at 10:38

## 2024-04-01 RX ADMIN — THIAMINE HYDROCHLORIDE 200 MG: 100 INJECTION, SOLUTION INTRAMUSCULAR; INTRAVENOUS at 14:18

## 2024-04-01 RX ADMIN — IOPAMIDOL 100 ML: 612 INJECTION, SOLUTION INTRAVENOUS at 07:18

## 2024-04-01 RX ADMIN — FLUOXETINE HYDROCHLORIDE 40 MG: 20 CAPSULE ORAL at 18:05

## 2024-04-01 RX ADMIN — HYDROMORPHONE HYDROCHLORIDE 0.5 MG: 1 INJECTION, SOLUTION INTRAMUSCULAR; INTRAVENOUS; SUBCUTANEOUS at 21:22

## 2024-04-01 RX ADMIN — NADOLOL 40 MG: 40 TABLET ORAL at 18:05

## 2024-04-01 RX ADMIN — ONDANSETRON 4 MG: 2 INJECTION INTRAMUSCULAR; INTRAVENOUS at 13:23

## 2024-04-01 RX ADMIN — Medication 10 ML: at 21:22

## 2024-04-01 RX ADMIN — PANCRELIPASE 24000 UNITS OF LIPASE: 60000; 12000; 38000 CAPSULE, DELAYED RELEASE PELLETS ORAL at 18:05

## 2024-04-01 RX ADMIN — POTASSIUM CHLORIDE AND SODIUM CHLORIDE 125 ML/HR: 900; 150 INJECTION, SOLUTION INTRAVENOUS at 21:43

## 2024-04-01 RX ADMIN — PANTOPRAZOLE SODIUM 80 MG: 40 INJECTION, POWDER, LYOPHILIZED, FOR SOLUTION INTRAVENOUS at 07:00

## 2024-04-01 RX ADMIN — LORAZEPAM 2 MG: 1 TABLET ORAL at 17:40

## 2024-04-01 RX ADMIN — LEVOTHYROXINE SODIUM 100 MCG: 100 TABLET ORAL at 18:05

## 2024-04-01 RX ADMIN — PANTOPRAZOLE SODIUM 40 MG: 40 INJECTION, POWDER, FOR SOLUTION INTRAVENOUS at 21:37

## 2024-04-01 NOTE — ED NOTES
".Nursing report ED to floor  Bekah Gibson  53 y.o.  female    HPI :  HPI (Adult)  Stated Reason for Visit: abd since yesterday  History Obtained From: patient    Chief Complaint  Chief Complaint   Patient presents with    Abdominal Pain       Admitting doctor:   Familia Benson MD    Admitting diagnosis:   The encounter diagnosis was Epigastric pain.    Code status:   Current Code Status       Date Active Code Status Order ID Comments User Context       Prior            Allergies:   Benzonatate, Ketorolac tromethamine, Phenergan [promethazine hcl], Cucumber extract, and Promethazine-phenylephrine    Isolation:   No active isolations    Intake and Output  No intake or output data in the 24 hours ending 04/01/24 1114    Weight:       04/01/24  0559   Weight: 61.2 kg (135 lb)       Most recent vitals:   Vitals:    04/01/24 0559 04/01/24 0633 04/01/24 0647 04/01/24 0920   BP:  151/99  157/99   Pulse: 105  78 75   Resp: 20      Temp: 98.4 °F (36.9 °C)      SpO2: 95%  96% 92%   Weight: 61.2 kg (135 lb)      Height: 165.1 cm (65\")          Active LDAs/IV Access:   Lines, Drains & Airways       Active LDAs       Name Placement date Placement time Site Days    Peripheral IV 04/01/24 0639 Anterior;Right Forearm 04/01/24  0639  Forearm  less than 1                    Labs (abnormal labs have a star):   Labs Reviewed   COMPREHENSIVE METABOLIC PANEL - Abnormal; Notable for the following components:       Result Value    Glucose 148 (*)     AST (SGOT) 57 (*)     Alkaline Phosphatase 165 (*)     All other components within normal limits    Narrative:     GFR Normal >60  Chronic Kidney Disease <60  Kidney Failure <15     URINALYSIS W/ MICROSCOPIC IF INDICATED (NO CULTURE) - Abnormal; Notable for the following components:    Leuk Esterase, UA Small (1+) (*)     All other components within normal limits   CBC WITH AUTO DIFFERENTIAL - Abnormal; Notable for the following components:    Platelets 76 (*)     Lymphocyte % 17.8 (*)     " All other components within normal limits   URINALYSIS, MICROSCOPIC ONLY - Abnormal; Notable for the following components:    WBC, UA 6-10 (*)     Bacteria, UA 1+ (*)     Squamous Epithelial Cells, UA 3-6 (*)     All other components within normal limits   PROTIME-INR - Abnormal; Notable for the following components:    Protime 17.0 (*)     INR 1.36 (*)     All other components within normal limits   URINE DRUG SCREEN - Abnormal; Notable for the following components:    Amphet/Methamphet, Screen Positive (*)     Oxycodone Screen, Urine Positive (*)     Fentanyl, Urine Positive (*)     All other components within normal limits    Narrative:     Negative Thresholds Per Drugs Screened:    Amphetamines                 500 ng/ml  Barbiturates                 200 ng/ml  Benzodiazepines              100 ng/ml  Cocaine                      300 ng/ml  Methadone                    300 ng/ml  Opiates                      300 ng/ml  Oxycodone                    100 ng/ml  THC                           50 ng/ml  Fentanyl                       5 ng/ml      The Normal Value for all drugs tested is negative. This report includes final unconfirmed screening results to be used for medical treatment purposes only. Unconfirmed results must not be used for non-medical purposes such as employment or legal testing. Clinical consideration should be applied to any drug of abuse test, particularly when unconfirmed results are used.           LIPASE - Normal   APTT - Normal   ETHANOL   RAINBOW DRAW    Narrative:     The following orders were created for panel order [place] Draw.  Procedure                               Abnormality         Status                     ---------                               -----------         ------                     [place] Top[[id number]]                                         Final result                 Please view results for these tests on the individual orders.   CBC AND DIFFERENTIAL    Narrative:     The  following orders were created for panel order CBC & Differential.  Procedure                               Abnormality         Status                     ---------                               -----------         ------                     CBC Auto Differential[[id number]]        Abnormal            Final result                 Please view results for these tests on the individual orders.   EXTRA TUBES    Narrative:     The following orders were created for panel order Extra Tubes.  Procedure                               Abnormality         Status                     ---------                               -----------         ------                     Lavender Top[[id number]]                                                                Gold Top - SST[[id number]]                                   Final result               Light Blue Top[[id number]]                                   Final result                 Please view results for these tests on the individual orders.   GOLD TOP - SST   LIGHT BLUE TOP   GRAY TOP       EKG:   No orders to display       Meds given in ED:   Medications   hydrOXYzine (ATARAX) tablet 50 mg (has no administration in time range)   pantoprazole (PROTONIX) injection 80 mg (80 mg Intravenous Given 4/1/24 0700)   thiamine (B-1) 100 mg in sodium chloride 0.9 % 1,000 mL infusion (125 mL/hr Intravenous New Bag 4/1/24 0928)   morphine injection 4 mg (4 mg Intravenous Given 4/1/24 0701)   iopamidol (ISOVUE-300) 61 % injection 100 mL (100 mL Intravenous Given by Other 4/1/24 0718)   HYDROmorphone (DILAUDID) injection 0.5 mg (0.5 mg Intravenous Given 4/1/24 1038)       Imaging results:  No radiology results for the last day    Ambulatory status:   -    Social issues:   Social History     Socioeconomic History    Marital status:    Tobacco Use    Smoking status: Every Day     Current packs/day: 0.25     Average packs/day: 0.3 packs/day for 15.0 years (3.8 ttl pk-yrs)     Types: Cigarettes      Passive exposure: Current    Smokeless tobacco: Never    Tobacco comments:     Rarely smoke sometimes will to   Vaping Use    Vaping status: Never Used   Substance and Sexual Activity    Alcohol use: Not Currently     Alcohol/week: 5.0 - 10.0 standard drinks of alcohol     Types: 5 - 10 Shots of liquor per week     Comment: 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    Drug use: Not Currently    Sexual activity: Not Currently     Partners: Male     Birth control/protection: Post-menopausal, Natural family planning/Rhythm     Comment: cinthia- menepausal       Peripheral Neurovascular       Neuro Cognitive  Neuro Cognitive (Adult)  Cognitive/Neuro/Behavioral WDL: WDL, mood/behavior, orientation  Orientation: oriented x 4  Mood/Behavior: cooperative, calm    Learning  Learning Assessment (Adult)  Learning Readiness and Ability: no barriers identified  Education Provided  Person Taught: patient, family member/friend  Education Outcome Evaluation: verbalizes understanding    Respiratory  Respiratory (Adult)  Airway WDL: WDL  Respiratory WDL  Respiratory WDL: WDL    Abdominal Pain       Pain Assessments  Pain (Adult)  (0-10) Pain Rating: Rest: 7  (0-10) Pain Rating: Activity: 7    NIH Stroke Scale       Danielle Desai RN  04/01/24 11:14 EDT

## 2024-04-01 NOTE — NURSING NOTE
04/01/24 1456   Wound 04/01/24 Left foot    Placement Date: 04/01/24   Present on Original Admission: Yes  Side: Left  Location: foot  Primary Wound Type: (c)    Dressing Appearance dry;intact   Base pink;red   Drainage Amount none   Wound 04/01/24 upper    Placement Date: 04/01/24   Present on Original Admission: Yes  Orientation: upper  Location: (c)    Dressing Appearance open to air   Base clean;red   Periwound intact   Edges irregular   Drainage Characteristics/Odor serosanguineous   Drainage Amount scant   Wound 04/01/24 Left anterior hand   Placement Date: 04/01/24   Present on Original Admission: Yes  Side: Left  Orientation: anterior  Location: hand   Dressing Appearance dry;intact   Base clean;pink;red   Edges irregular   Drainage Amount none     WOCN: History bone graft left foot, Is due to see ortho next week. After she showers daily applies Bactroban, roll gauze and ace wrap. Left hand with small scattered wounds. Patient states she picks at them. Silicone border dressing applied. Open wound upper chest, patient states from eczema. Cleanse with saline, apply antibiotic ointment and cover with bandaid.

## 2024-04-01 NOTE — H&P
Patient Name:  Bekah Gibson  YOB: 1970  MRN:  [id number]  Admit Date:  4/1/2024  Patient Care Team:  Tylor Davis as PCP - General (Family Medicine)  Deion Saldana MD as Consulting Physician (Urology)  Adalberto Hoffman MD as Consulting Physician (Gastroenterology)  Eliazar Quinonez MD (Hematology)      Subjective   History Present Illness     Chief Complaint   Patient presents with    Abdominal Pain       Ms. Gibson is a 53 y.o. smoker with a history of hypothyroidism and alcohol abuse with cirrhosis, esophageal varices, and chronic pancreatitis that presents to [facility] complaining of epigastric pain which started this AM and was associated with dry heaving and a non-bloody, non-bilious episode of emesis. She did have a couple episodes of diarrhea which were not bloody. She states that she had been sober but had some stress and relapsed about 4 days ago and had been drinking alcohol daily since then. She states that her last drink was yesterday. She was able to eat some cheese and crackers last night but nothing since.     Review of Systems   All other systems reviewed and are negative.    Personal History     Past Medical History:   Diagnosis Date    Acromioclavicular separation 1/2021    Ankle sprain 2/2004    no operation necessary  At Time unsure    Anxiety     Arthritis     Arthritis of back Unsure    Diseased    Cirrhosis     Disease of thyroid gland     ETOH abuse     SOBER FOR 3 MONTHS    Fracture, clavicle 1/2021    shattered clavicel on issue slip and fall    Fracture, foot Unsure    Frozen shoulder 1 /2009    4    GERD (gastroesophageal reflux disease)     History of kidney stones     5 YR AGO    Hypertension     Left shoulder pain     Low back strain 1/21    Lumbosacral disc disease 1/21    MDD (major depressive disorder)     Neck strain Unsure    Pancreatitis     Periarthritis of shoulder see above    Rotator cuff syndrome odre for shoulder replacement     unable to receive due to low platelets    Tennis elbow Unsure    Unsurpassed    Thrombocytopenia      Past Surgical History:   Procedure Laterality Date    CLAVICLE SURGERY Right 2018    ENDOSCOPY N/A 10/26/2022    Procedure: ESOPHAGOGASTRODUODENOSCOPY wtih banding;  Surgeon: Ag Patel MD;  Location: [facility] ENDOSCOPY;  Service: Gastroenterology;  Laterality: N/A;  pre: melena  post: esophageal varicies with banding x2 bands    ENDOSCOPY N/A 2023    Procedure: ESOPHAGOGASTRODUODENOSCOPY;  Surgeon: Ag Patel MD;  Location: [facility] ENDOSCOPY;  Service: Gastroenterology;  Laterality: N/A;  PRE OP - MAROON STOOLS  POST OP - SM ESOPHAGEAL VARICES    ESOPHAGEAL VARICES LIGATION      FOOT SURGERY  12    JOINT REPLACEMENT Bilateral     GREAT TOE    KIDNEY STONE SURGERY      LITHOTRIPSY AND STENT (R SIDE)    LAPAROSCOPIC TUBAL LIGATION      NECK SURGERY  3/07    Not sure of dates    SIGMOIDOSCOPY N/A 2023    Procedure: SIGMOIDOSCOPY FLEXIBLE;  Surgeon: Ag Patel MD;  Location: [facility] ENDOSCOPY;  Service: Gastroenterology;  Laterality: N/A;  PRE OP - MAROON STOOLS  POST OP - RECTAL VARICES    TOTAL SHOULDER ARTHROPLASTY Left 2023    Procedure: reverse total shoulder arthroplasty;  Surgeon: Giovanni Denise MD;  [place] [facility] [place] [facility] [place]  Service: [place]opedics;  Laterality: Left;     Family History   Problem Relation Age of Onset    Rheumatologic disease Mother         congestive heart diseased    Osteoporosis Mother             Thyroid disease Father     Leukemia Father     Cancer Father         Leukemia  deseased     Broken bones Father     Clotting disorder Father     Drug abuse Brother     Malig Hyperthermia Neg Hx      Social History     Tobacco Use    Smoking status: Every Day     Current packs/day: 0.25     Average packs/day: 0.3 packs/day for 15.0 years (3.8 ttl pk-yrs)     Types: Cigarettes     Passive exposure: Current    Smokeless tobacco: Never    Tobacco  comments:     Rarely smoke sometimes will to   Vaping Use    Vaping status: Never Used   Substance Use Topics    Alcohol use: Not Currently     Alcohol/week: 5.0 - 10.0 standard drinks of alcohol     Types: 5 - 10 Shots of liquor per week     Comment: 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    Drug use: Not Currently     Current Facility-Administered Medications on File Prior to Encounter   Medication Dose Route Frequency Provider Last Rate Last Admin    sodium chloride 0.9 % flush 10 mL  10 mL Intravenous Q12H Callie Quiroz MD        sodium chloride 0.9 % flush 10 mL  10 mL Intravenous PRN Callie Quiroz MD        sodium chloride 0.9 % flush 20 mL  20 mL Intravenous PRCallie Crawford MD         Current Outpatient Medications on File Prior to Encounter   Medication Sig Dispense Refill    ferrous sulfate 325 (65 FE) MG tablet Take 1 tablet by mouth Daily With Breakfast.      FLUoxetine (PROzac) 20 MG capsule Take 1 capsule by mouth Daily.      folic acid (FOLVITE) 1 MG tablet Take 1 tablet by mouth Daily.      hydrOXYzine pamoate (VISTARIL) 50 MG capsule       lactulose (CHRONULAC) 10 GM/15ML solution       levothyroxine (SYNTHROID, LEVOTHROID) 100 MCG tablet TAKE ONE TABLET BY MOUTH DAILY 30 tablet 3    MAGnesium-Oxide 400 (240 Mg) MG tablet Daily. HOLD PRIOR TO SURG      Multiple Vitamin (MULTIVITAMIN) capsule Take 1 capsule by mouth Daily. HOLD PRIOR TO SURG      nadolol (CORGARD) 40 MG tablet Take 1 tablet by mouth every night at bedtime.      naloxone (NARCAN) 4 MG/0.1ML nasal spray Call 911. Don't prime. [place] in 1 nostril for overdose. Repeat in 2-3 minutes in other nostril if no or minimal breathing/responsiveness. 2 each 0    ondansetron ODT (ZOFRAN-ODT) 4 MG disintegrating tablet Place 1 tablet on the tongue Every 8 (Eight) Hours As Needed for Nausea or Vomiting. 10 tablet 0    pancrelipase, Lip-Prot-Amyl, (CREON) 28394-44416 units capsule delayed-release particles capsule Take  "2 capsules by mouth 3 (Three) Times a Day With Meals. 180 capsule 0    pantoprazole (PROTONIX) 40 MG EC tablet 1 tablet Daily.      rOPINIRole (REQUIP) 1 MG tablet       sucralfate (CARAFATE) 1 g tablet Take 1 tablet by mouth 4 (Four) Times a Day.      thiamine (VITAMIN B1) 100 MG tablet Take 1 tablet by mouth Daily. 30 tablet 0    traZODone (DESYREL) 50 MG tablet Take 1 tablet by mouth At Night As Needed.       Allergies   Allergen Reactions    Benzonatate Nausea Only and Other (See Comments)     Rash     Ketorolac Tromethamine Other (See Comments)     \"I cannot take because of liver problems\"    Phenergan [Promethazine Hcl] Hives    Cucumber Extract Rash    Promethazine-Phenylephrine Other (See Comments)     \"FEEL WEIRD\"       Objective    Objective     Vital Signs  Temp:  [98.4 °F (36.9 °C)] 98.4 °F (36.9 °C)  Heart Rate:  [] 75  Resp:  [20] 20  BP: (151-157)/(99) 157/99  SpO2:  [92 %-96 %] 92 %  on   ;   Device (Oxygen Therapy): room air  Body mass index is 22.47 kg/m².    Physical Exam  Vitals and nursing note reviewed.   Constitutional:       General: She is not in acute distress.     Appearance: She is not toxic-appearing or diaphoretic.   HENT:      Head: Normocephalic and atraumatic.      Nose: Nose normal.      Mouth/Throat:      Mouth: Mucous membranes are moist.      Pharynx: Oropharynx is clear.   Eyes:      Conjunctiva/sclera: Conjunctivae normal.      Pupils: Pupils are equal, round, and reactive to light.   Cardiovascular:      Rate and Rhythm: Normal rate and regular rhythm.      Pulses: Normal pulses.   Pulmonary:      Effort: Pulmonary effort is normal.      Breath sounds: Normal breath sounds.   Abdominal:      General: Bowel sounds are normal.      Palpations: Abdomen is soft.      Tenderness: There is abdominal tenderness (mild, epigastric). There is no guarding or rebound.   Musculoskeletal:         General: No swelling or tenderness.      Cervical back: Neck supple.      Comments: Left " great toe with surgical pins c/d/i   Skin:     General: Skin is warm and dry.      Capillary Refill: Capillary refill takes less than 2 seconds.   Neurological:      General: No focal deficit present.      Mental Status: She is alert and oriented to person, place, and time.      Motor: No tremor.   Psychiatric:         Mood and Affect: Mood normal.         Behavior: Behavior normal.       Results Review:  I reviewed the patient's new clinical results.  I reviewed the patient's new imaging results and agree with the interpretation.  I reviewed the patient's other test results and agree with the interpretation  I personally viewed and interpreted the patient's EKG/Telemetry data  Discussed with ED provider.    Lab Results (last 24 hours)       Procedure Component Value Units Date/Time    CBC & Differential [[id number]]  (Abnormal) Collected: 04/01/24 0639    Specimen: Blood from Arm, Right Updated: 04/01/24 0658    Narrative:      The following orders were created for panel order CBC & Differential.  Procedure                               Abnormality         Status                     ---------                               -----------         ------                     CBC Auto Differential[[id number]]        Abnormal            Final result                 Please view results for these tests on the individual orders.    Comprehensive Metabolic Panel [[id number]]  (Abnormal) Collected: 04/01/24 0639    Specimen: Blood from Arm, Right Updated: 04/01/24 0706     Glucose 148 mg/dL      BUN 10 mg/dL      Creatinine 0.64 mg/dL      Sodium 141 mmol/L      Potassium 3.8 mmol/L      Chloride 107 mmol/L      CO2 24.0 mmol/L      Calcium 9.1 mg/dL      Total Protein 7.2 g/dL      Albumin 4.2 g/dL      ALT (SGPT) 25 U/L      AST (SGOT) 57 U/L      Alkaline Phosphatase 165 U/L      Total Bilirubin 1.2 mg/dL      Globulin 3.0 gm/dL      A/G Ratio 1.4 g/dL      BUN/Creatinine Ratio 15.6     Anion Gap 10.0 mmol/L      eGFR 104.5  mL/min/1.73     Narrative:      GFR Normal >60  Chronic Kidney Disease <60  Kidney Failure <15      Lipase [[id number]]  (Normal) Collected: 04/01/24 0639    Specimen: Blood from Arm, Right Updated: 04/01/24 0706     Lipase 30 U/L     CBC Auto Differential [[id number]]  (Abnormal) Collected: 04/01/24 0639    Specimen: Blood from Arm, Right Updated: 04/01/24 0658     WBC 4.16 10*3/mm3      RBC 4.49 10*6/mm3      Hemoglobin 14.0 g/dL      Hematocrit 41.8 %      MCV 93.1 fL      MCH 31.2 pg      MCHC 33.5 g/dL      RDW 13.8 %      RDW-SD 47.0 fl      MPV 10.9 fL      Platelets 76 10*3/mm3      Neutrophil % 67.6 %      Lymphocyte % 17.8 %      Monocyte % 11.5 %      Eosinophil % 2.4 %      Basophil % 0.5 %      Immature Grans % 0.2 %      Neutrophils, Absolute 2.81 10*3/mm3      Lymphocytes, Absolute 0.74 10*3/mm3      Monocytes, Absolute 0.48 10*3/mm3      Eosinophils, Absolute 0.10 10*3/mm3      Basophils, Absolute 0.02 10*3/mm3      Immature Grans, Absolute 0.01 10*3/mm3      nRBC 0.0 /100 WBC     Protime-INR [[id number]]  (Abnormal) Collected: 04/01/24 0639    Specimen: Blood Updated: 04/01/24 0712     Protime 17.0 Seconds      INR 1.36    aPTT [[id number]]  (Normal) Collected: 04/01/24 0639    Specimen: Blood Updated: 04/01/24 0712     PTT 33.5 seconds     Ethanol [[id number]] Collected: 04/01/24 0639    Specimen: Blood from Arm, Right Updated: 04/01/24 0706     Ethanol <10 mg/dL      Ethanol % <0.010 %     Urinalysis With Microscopic If Indicated (No Culture) - Urine, Clean Catch [[id number]]  (Abnormal) Collected: 04/01/24 0641    Specimen: Urine, Clean Catch Updated: 04/01/24 0652     Color, UA Yellow     Appearance, UA Clear     pH, UA 6.0     Specific Gravity, UA 1.021     Glucose, UA Negative     Ketones, UA Negative     Bilirubin, UA Negative     Blood, UA Negative     Protein, UA Negative     Leuk Esterase, UA Small (1+)     Nitrite, UA Negative     Urobilinogen, UA 0.2 E.U./dL    Urinalysis, Microscopic Only -  Urine, Clean Catch [[id number]]  (Abnormal) Collected: 04/01/24 0641    Specimen: Urine, Clean Catch Updated: 04/01/24 0719     RBC, UA None Seen /HPF      WBC, UA 6-10 /HPF      Bacteria, UA 1+ /HPF      Squamous Epithelial Cells, UA 3-6 /HPF      Hyaline Casts, UA None Seen /LPF      Methodology Manual Light Microscopy    Urine Drug Screen - Urine, Clean Catch [[id number]]  (Abnormal) Collected: 04/01/24 0641    Specimen: Urine, Clean Catch Updated: 04/01/24 0737     Amphet/Methamphet, Screen Positive     Barbiturates Screen, Urine Negative     Benzodiazepine Screen, Urine Negative     Cocaine Screen, Urine Negative     Opiate Screen Negative     THC, Screen, Urine Negative     Methadone Screen, Urine Negative     Oxycodone Screen, Urine Positive     Fentanyl, Urine Positive    Narrative:      Negative Thresholds Per Drugs Screened:    Amphetamines                 500 ng/ml  Barbiturates                 200 ng/ml  Benzodiazepines              100 ng/ml  Cocaine                      300 ng/ml  Methadone                    300 ng/ml  Opiates                      300 ng/ml  Oxycodone                    100 ng/ml  THC                           50 ng/ml  Fentanyl                       5 ng/ml      The Normal Value for all drugs tested is negative. This report includes final unconfirmed screening results to be used for medical treatment purposes only. Unconfirmed results must not be used for non-medical purposes such as employment or legal testing. Clinical consideration should be applied to any drug of abuse test, particularly when unconfirmed results are used.                    Imaging Results (Last 24 Hours)       Procedure Component Value Units Date/Time    CT Abdomen Pelvis With Contrast [[id number]] Collected: 04/01/24 0748     Updated: 04/01/24 1017    Narrative:      CT SCAN OF THE ABDOMEN AND PELVIS WITH INTRAVENOUS CONTRAST     HISTORY: Previous pancreatitis. Recent alcohol relapse. Previous kidney  stones.      FINDINGS: The CT scan was performed as an emergency procedure through  the abdomen and pelvis with intravenous contrast. It is compared to  previous exams including noncontrast CT scan of the abdomen and pelvis  dated 10/07/2023. The following findings are present:  1. The kidneys are normal in size and there is symmetric renal  enhancement. There is no evidence of renal stone or obstruction.  2. There are are numerous calcifications scattered throughout the  pancreas consistent with changes of chronic calcific pancreatitis.  However, there is currently no evidence of inflammatory change  surrounding the pancreas which appears similar to the study of  10/07/2023.  3. There are several tiny gallstones layering within the gallbladder.  There is no gallbladder wall thickening or other changes to suggest  acute cholecystitis.  4. There is enlargement of the liver and spleen and there is mild  diffuse fatty infiltration of the liver that is better visualized on  today's exam with intravenous contrast. The hepatosplenomegaly is  unchanged from 10/07/2023. There are no focal liver lesions. The adrenal  glands are unremarkable.  5. There is no aortic aneurysm or retroperitoneal lymphadenopathy. The  large and small bowel loops are normal in caliber and show no  inflammatory change. The appendix appears normal. No abnormality is seen  in the pelvis.              Radiation dose reduction techniques were utilized, including automated  exposure control and exposure modulation based on body size.      This report was finalized on 4/1/2024 10:14 AM by Dr. Philippe Urrutia M.D  on Workstation: [id number]                   No orders to display        Assessment/Plan     Active Hospital Problems    Diagnosis  POA    **Epigastric pain [R10.13]  Yes    Acute alcoholic gastritis without hemorrhage [K29.20]  Yes    Alcohol-induced chronic pancreatitis [K86.0]  Yes    Essential hypertension [I10]  Yes    Alcoholic cirrhosis [K70.30]  Yes     Hashimoto's disease [E06.3]  Yes      Resolved Hospital Problems   No resolved problems to display.   Epigastric Pain  - abdominal examination and labs are benign, CT abdomen/pelvis shows known chronic pancreas changes but nothing acute-no evidence of acute inflammation  - will start on IV protonix and carafate empirically for likely alcoholic gastritis, no evidence of bleeding  - provide pain control as needed  - resume creon  - start with clear liquid diet and advance to a low fat diet as tolerated    Alcoholic Cirrhosis  - has hx of varices and portosystemic encephalopathy but appears well-compensated  - will resume home nadolol and lactulose    EtOH Abuse  - had a recent relapse, last drink was yesterday  - no signs of withdrawal but will cover with CIWA protocol and replace vitamins  - her UDS was positive for amphetamines, fentanyl, and oxycodone-she denies use of these but admits to using cannabis    Hypothyroidism due to Hashimoto's Thyroiditis  - resume levothyroxine    Depression/Anxiety  - resume home prozac and PRN hydroxyzine    Recent Transfixation of the Left First Metatarsal Joint  - continue local wound care  - patient states she has close follow up with her orthopedic surgeon    I discussed the patient's findings and my recommendations with patient, nursing staff, and ED provider.    VTE Prophylaxis - SCDs.  Code Status - Full code.       Familia Benson MD  [facility]  04/01/24  11:22 EDT

## 2024-04-01 NOTE — LETTER
[facility]  [phone]    ACCESS CENTER STATEMENT OF DISPOSITION        I, Bekah Gibson, was assessed in the [facility] at [facility] on 4/3/2024.  I understand the recommendations below and what follow-up action is expected of me.      Substance Use Intensive Outpatient Programs:    [facility] [place] [facility]""  [phone]    [facility]  [phone]    [facility])  [phone]    [facility]  [phone]    [facility]  [phone]          ________________________________  Clinician Signature    4/3/2024  13:28 EDT

## 2024-04-01 NOTE — ED PROVIDER NOTES
EMERGENCY DEPARTMENT ENCOUNTER      Room Number:  16/16  PCP: Tylor Davis  Independent Historians: Patient  Patient Care Team:  Tylor Davis as PCP - General (Family Medicine)  Deion Saldana MD as Consulting Physician (Urology)  Adalberto Hoffman MD as Consulting Physician (Gastroenterology)  Eliazar Quinonez MD (Hematology)       HPI:  Chief Complaint: Abdominal pain    A complete HPI/ROS/PMH/PSH/SH/FH are unobtainable due to: None    Chronic or social conditions impacting patient care (Social Determinants of Health): None      Context: Bekha Gibson is a 53 y.o. female with a PMH significant for IBS, alcoholic hepatitis, GI bleed, lupus, pancytopenia, esophageal varices, acute on chronic pancreatitis who presents to the ED c/o acute epigastric abdominal pain with nausea and dry heaving.  Symptoms have been present for the past couple of days since relapsing on alcohol.  The patient states that she has been drinking 8 shots per day for the past several days.  Denies development of fever, chills.  No recent abdominal surgeries or antibiotic use.  Symptoms feel similar to prior pancreatitis flareups.      Upon review of prior external notes (non-ED) -and- Review of prior external test results outside of this encounter it appears the patient was evaluated via telemedicine with behavioral health for alcohol use disorder on March 18, 2023.  The patient had a normal potassium on 1/25/2024 and a normal magnesium on 1/23/2024.      PAST MEDICAL HISTORY  Active Ambulatory Problems     Diagnosis Date Noted    Irritable bowel syndrome with both constipation and diarrhea 06/05/2017    Peripheral neuropathy 06/05/2017    Vitamin D deficiency 06/05/2017    History of HPV infection 06/05/2017    Hypothyroidism 06/05/2017    Tobacco dependence due to cigarettes 06/05/2017    Acute kidney injury 12/28/2015    Ascites 06/06/2016    E. coli UTI (urinary tract infection) 06/06/2016    Alcohol withdrawal syndrome, with  delirium 06/29/2018    Alcoholic hepatitis 06/29/2018    GI bleed 06/29/2018    Acute post-hemorrhagic anemia 06/29/2018    Thrombocytopenia 06/29/2018    Open wound of right shoulder region 06/29/2018    Pancreatic insufficiency 07/04/2018    Alcoholic ketoacidosis 07/21/2019    Chronic alcohol abuse 07/29/2019    ESBL (extended spectrum beta-lactamase) producing bacteria infection 07/29/2019    Abnormal weight loss 12/13/2019    Hashimoto's disease 12/13/2019    Chronic fatigue 12/14/2019    History of alcohol use disorder 09/21/2021    Alcohol intoxication 09/21/2021    Lupus     Hypomagnesemia     Pancytopenia     Alcoholic cirrhosis     Bilateral lower abdominal discomfort 09/21/2021    Primary hypertension 10/24/2022    Melena 10/24/2022    Arthritis of shoulder 12/19/2022    Esophageal varices 01/16/2023    Essential hypertension 01/16/2023    Acute on chronic pancreatitis 01/22/2023    Abdominal pain 01/22/2023    Pancreatitis 10/07/2023    Leukopenia 10/07/2023     Resolved Ambulatory Problems     Diagnosis Date Noted    Acute renal failure 02/20/2017    Kidney stone 06/05/2017    Hypotension 07/01/2018    Nausea and vomiting 09/21/2021    Acute GI bleeding 10/24/2022     Past Medical History:   Diagnosis Date    Acromioclavicular separation 1/2021    Ankle sprain 2/2004    Anxiety     Arthritis     Arthritis of back Unsure    Cirrhosis     Disease of thyroid gland     ETOH abuse     Fracture, clavicle 1/2021    Fracture, foot Unsure    Frozen shoulder 1 /2009    GERD (gastroesophageal reflux disease)     History of kidney stones     Hypertension     Left shoulder pain     Low back strain 1/21    Lumbosacral disc disease 1/21    MDD (major depressive disorder)     Neck strain Unsure    Periarthritis of shoulder see above    Rotator cuff syndrome odre for shoulder replacement    Tennis elbow Unsure         PAST SURGICAL HISTORY  Past Surgical History:   Procedure Laterality Date    CLAVICLE SURGERY Right  2018    ENDOSCOPY N/A 10/26/2022    Procedure: ESOPHAGOGASTRODUODENOSCOPY wtih banding;  Surgeon: Ag Patel MD;  Location:  [facility];  Service: Gastroenterology;  Laterality: N/A;  pre: melena  post: esophageal varicies with banding x2 bands    ENDOSCOPY N/A 2023    Procedure: ESOPHAGOGASTRODUODENOSCOPY;  Surgeon: Ag Patel MD;  Location:  [facility];  Service: Gastroenterology;  Laterality: N/A;  PRE OP - MAROON STOOLS  POST OP - SM ESOPHAGEAL VARICES    ESOPHAGEAL VARICES LIGATION      FOOT SURGERY  14    JOINT REPLACEMENT Bilateral     GREAT TOE    KIDNEY STONE SURGERY      LITHOTRIPSY AND STENT (R SIDE)    LAPAROSCOPIC TUBAL LIGATION      NECK SURGERY  3/07    Not sure of dates    SIGMOIDOSCOPY N/A 2023    Procedure: SIGMOIDOSCOPY FLEXIBLE;  Surgeon: Ag Patel MD;  [facility];  Service: Gastroenterology;  Laterality: N/A;  PRE OP - MAROON STOOLS  POST OP - RECTAL VARICES    TOTAL SHOULDER ARTHROPLASTY Left 2023    Procedure: reverse total shoulder arthroplasty;  Surgeon: Giovanni Denise MD;  [place]  [facility]  Service: Orthopedics;  Laterality: Left;         FAMILY HISTORY  Family History   Problem Relation Age of Onset    Rheumatologic disease Mother         congestive heart diseased    Osteoporosis Mother             Thyroid disease Father     Leukemia Father     Cancer Father         Leukemia  deseased     Broken bones Father     Clotting disorder Father     Drug abuse Brother     Malig Hyperthermia Neg Hx          SOCIAL HISTORY  Social History     Socioeconomic History    Marital status:    Tobacco Use    Smoking status: Every Day     Current packs/day: 0.25     Average packs/day: 0.3 packs/day for 15.0 years (3.8 ttl pk-yrs)     Types: Cigarettes     Passive exposure: Current    Smokeless tobacco: Never    Tobacco comments:     Rarely smoke sometimes will to   Vaping Use    Vaping status: Never Used   Substance  and Sexual Activity    Alcohol use: Not Currently     Alcohol/week: 5.0 - 10.0 standard drinks of alcohol     Types: 5 - 10 Shots of liquor per week     Comment: 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    Drug use: Not Currently    Sexual activity: Not Currently     Partners: Male     Birth control/protection: Post-menopausal, Natural family planning/Rhythm     Comment: cinthia- menepausal         ALLERGIES  Benzonatate, Ketorolac tromethamine, Phenergan [promethazine hcl], Cucumber extract, and Promethazine-phenylephrine      REVIEW OF SYSTEMS  Included in HPI  All systems reviewed and negative except for those discussed in HPI.      PHYSICAL EXAM    I have reviewed the triage vital signs and nursing notes.    ED Triage Vitals   Temp Heart Rate Resp BP SpO2   04/01/24 0559 04/01/24 0559 04/01/24 0559 04/01/24 0633 04/01/24 0559   98.4 °F (36.9 °C) 105 20 151/99 95 %      Temp src Heart Rate Source Patient Position BP Location FiO2 (%)   -- -- -- -- --              Physical Exam  Constitutional:       General: She is not in acute distress.     Appearance: She is well-developed. She is ill-appearing. She is not toxic-appearing.   HENT:      Head: Normocephalic and atraumatic.   Eyes:      General: No scleral icterus.     Conjunctiva/sclera: Conjunctivae normal.   Neck:      Trachea: No tracheal deviation.   Cardiovascular:      Rate and Rhythm: Normal rate and regular rhythm.   Pulmonary:      Effort: Pulmonary effort is normal.      Breath sounds: Normal breath sounds.   Abdominal:      Palpations: Abdomen is soft.      Tenderness: There is abdominal tenderness in the epigastric area.   Musculoskeletal:         General: No deformity.      Cervical back: Normal range of motion.   Lymphadenopathy:      Cervical: No cervical adenopathy.   Skin:     General: Skin is warm and dry.   Neurological:      Mental Status: She is alert and oriented to person, place, and time.   Psychiatric:         Behavior: Behavior normal.         Vital signs  and nursing notes reviewed.      PPE: I wore a surgical mask throughout my encounters with the pt. I performed hand hygiene on entry into the pt room and upon exit.      LAB RESULTS  No results found for this or any previous visit (from the past 24 hour(s)).      RADIOLOGY  No Radiology Exams Resulted Within Past 24 Hours      MEDICATIONS GIVEN IN ER  Medications - No data to display      ORDERS PLACED DURING THIS VISIT:  Orders Placed This Encounter   Procedures    Comprehensive Metabolic Panel    Lipase    Urinalysis With Microscopic If Indicated (No Culture) - Urine, Clean Catch    CBC Auto Differential    CBC & Differential         OUTPATIENT MEDICATION MANAGEMENT:  Current Facility-Administered Medications Ordered in Epic   Medication Dose Route Frequency Provider Last Rate Last Admin    sodium chloride 0.9 % flush 10 mL  10 mL Intravenous Q12H Callie Quiroz MD        sodium chloride 0.9 % flush 10 mL  10 mL Intravenous PRN Callie Quiroz MD        sodium chloride 0.9 % flush 20 mL  20 mL Intravenous PRN Callie Quiroz MD         Current Outpatient Medications Ordered in Epic   Medication Sig Dispense Refill    ferrous sulfate 325 (65 FE) MG tablet Take 1 tablet by mouth Daily With Breakfast.      FLUoxetine (PROzac) 20 MG capsule Take 1 capsule by mouth Daily.      folic acid (FOLVITE) 1 MG tablet Take 1 tablet by mouth Daily.      hydrOXYzine pamoate (VISTARIL) 50 MG capsule       lactulose (CHRONULAC) 10 GM/15ML solution       levothyroxine (SYNTHROID, LEVOTHROID) 100 MCG tablet TAKE ONE TABLET BY MOUTH DAILY 30 tablet 3    MAGnesium-Oxide 400 (240 Mg) MG tablet Daily. HOLD PRIOR TO SURG      Multiple Vitamin (MULTIVITAMIN) capsule Take 1 capsule by mouth Daily. HOLD PRIOR TO SURG      nadolol (CORGARD) 40 MG tablet Take 1 tablet by mouth every night at bedtime.      naloxone (NARCAN) 4 MG/0.1ML nasal spray Call 911. Don't prime. [place] in 1 nostril for overdose.  Repeat in 2-3 minutes in other nostril if no or minimal breathing/responsiveness. 2 each 0    ondansetron ODT (ZOFRAN-ODT) 4 MG disintegrating tablet Place 1 tablet on the tongue Every 8 (Eight) Hours As Needed for Nausea or Vomiting. 10 tablet 0    pancrelipase, Lip-Prot-Amyl, (CREON) 93984-29632 units capsule delayed-release particles capsule Take 2 capsules by mouth 3 (Three) Times a Day With Meals. 180 capsule 0    pantoprazole (PROTONIX) 40 MG EC tablet 1 tablet Daily.      rOPINIRole (REQUIP) 1 MG tablet       sucralfate (CARAFATE) 1 g tablet Take 1 tablet by mouth 4 (Four) Times a Day.      thiamine (VITAMIN B1) 100 MG tablet Take 1 tablet by mouth Daily. 30 tablet 0    traZODone (DESYREL) 50 MG tablet Take 1 tablet by mouth At Night As Needed.               PROGRESS, DATA ANALYSIS, CONSULTS, AND MEDICAL DECISION MAKING  All labs have been independently interpreted by me.  All radiology studies have been reviewed by me. All EKG's have been independently viewed and interpreted by me.  Discussion below represents my analysis of pertinent findings related to patient's condition, differential diagnosis, treatment plan and final disposition.      DIFFERENTIAL DIAGNOSIS INCLUDE BUT NOT LIMITED TO:     Differential diagnosis includes but is not limited to:  - Hepatobiliary pathology such as cholecystitis, cholangitis, and symptomatic cholelithiasis  - Pancreatitis  - Dyspepsia  - Small bowel obstruction  - Appendicitis  - Diverticulitis  - UTI including pyelonephritis  - Ureteral stone  - Zoster  - Colitis, including infectious and ischemic  - Atypical ACS    Clinical Scores: N/A      ED Course as of 04/03/24 0549   Mon Apr 01, 2024   9251 I spoke with the radiologist at this time regarding the CT scan.  Changes of chronic pancreatitis with no acute findings. [DC]   3528 I assessed the patient at the bedside.  She is still complaining of severe pain in the upper abdomen.  Will redosed with pain medications.   Continuing IV fluids infusion [ERIC]   1057 I discussed with Dr. Benson from [facility] about this patient.  He agrees to accept her to the hospitalist service for further medical management today. [ERIC]      ED Course User Index  [DC] Jaime Leyva PA  [ERIC] Mukesh Andujar MD         AS OF 06:47 EDT VITALS:    BP - 151/99  HR - 105  TEMP - 98.4 °F (36.9 °C)  O2 SATS - 95%    COMPLEXITY OF CARE  The patient requires admission.      DIAGNOSIS  Final diagnoses:   Epigastric pain         DISPOSITION  ED Disposition       ED Disposition   Decision to Admit    Condition   --    Comment   Level of Care: Telemetry [5]   Diagnosis: Epigastric pain [537757]   Admitting Physician: FRANCINE BENSON [[id number]]   Attending Physician: FRANCINE BENSON [[id number]]                  Please note that portions of this document were completed with a voice recognition program.    Note Disclaimer: At [facility], we believe that sharing information builds trust and better relationships. You are receiving this note because you recently visited [facility]. It is possible you will see health information before a provider has talked with you about it. This kind of information can be easy to misunderstand. To help you fully understand what it means for your health, we urge you to discuss this note with your provider.         Jaime Leyva PA  04/03/24 0500

## 2024-04-01 NOTE — ED PROVIDER NOTES
EMERGENCY DEPARTMENT MD ATTESTATION NOTE    Room Number:  S516/1  PCP: Tylor Davis  Independent Historians: Patient    HPI:  A complete HPI/ROS/PMH/PSH/SH/FH are unobtainable due to: None    Chronic or social conditions impacting patient care (Social Determinants of Health): None      Context: Bekah Gibson is a 53 y.o. female with a medical history of alcohol abuse, pancreatitis and IBS who presents to the ED c/o acute abdominal pain with nausea and vomiting.  Patient says that she had a relapse with binge drinking of alcohol over the weekend.  Subsequent to that, she developed a lot of abdominal pain with nausea and vomiting and she is concerned that she is having a flareup of her pancreatitis.  She denies any fevers or chills.  Denies any dysuria.  Denies any blood in the stools.        Review of prior external notes (non-ED) -and- Review of prior external test results outside of this encounter: I independently reviewed the history and physical note from October 7, 2023 when patient presented for similar symptoms of abdominal pain and possible pancreatitis features.      PHYSICAL EXAM    I have reviewed the triage vital signs and nursing notes.    ED Triage Vitals   Temp Heart Rate Resp BP SpO2   04/01/24 0559 04/01/24 0559 04/01/24 0559 04/01/24 0633 04/01/24 0559   98.4 °F (36.9 °C) 105 20 151/99 95 %      Temp src Heart Rate Source Patient Position BP Location FiO2 (%)   -- -- -- -- --              Physical Exam  GENERAL: alert, appears uncomfortable but no acute distress  SKIN: Warm, dry  HENT: Normocephalic, atraumatic  EYES: no scleral icterus  CV: regular rhythm, regular rate  RESPIRATORY: normal effort, lungs clear  ABDOMEN: soft, nondistended, moderate tenderness in the central and epigastric areas but no peritoneal features elicited.  McBurney's point is negative.  MUSCULOSKELETAL: no deformity  NEURO: alert, moves all extremities, follows commands            MEDICATIONS GIVEN IN ER  Medications    hydrOXYzine (ATARAX) tablet 50 mg (50 mg Oral Not Given 4/1/24 1409)   sodium chloride 0.9 % flush 10 mL (10 mL Intravenous Given 4/3/24 0843)   sodium chloride 0.9 % flush 10 mL (has no administration in time range)   sodium chloride 0.9 % infusion 40 mL (has no administration in time range)   nitroglycerin (NITROSTAT) SL tablet 0.4 mg (has no administration in time range)   sennosides-docusate (PERICOLACE) 8.6-50 MG per tablet 2 tablet (has no administration in time range)     And   polyethylene glycol (MIRALAX) packet 17 g (has no administration in time range)     And   bisacodyl (DULCOLAX) EC tablet 5 mg (has no administration in time range)     And   bisacodyl (DULCOLAX) suppository 10 mg (has no administration in time range)   acetaminophen (TYLENOL) tablet 650 mg (has no administration in time range)     Or   acetaminophen (TYLENOL) 160 MG/5ML oral solution 650 mg (has no administration in time range)     Or   acetaminophen (TYLENOL) suppository 650 mg (has no administration in time range)   melatonin tablet 5 mg (has no administration in time range)   calcium carbonate (TUMS) chewable tablet 500 mg (200 mg elemental) (has no administration in time range)   Potassium Replacement - Follow Nurse / BPA Driven Protocol (has no administration in time range)   Magnesium Standard Dose Replacement - Follow Nurse / BPA Driven Protocol (has no administration in time range)   Phosphorus Replacement - Follow Nurse / BPA Driven Protocol (has no administration in time range)   Calcium Replacement - Follow Nurse / BPA Driven Protocol (has no administration in time range)   thiamine (B-1) injection 200 mg (200 mg Intravenous Given 4/3/24 0605)     Followed by   thiamine (VITAMIN B-1) tablet 100 mg (has no administration in time range)   folic acid (FOLVITE) tablet 1 mg (1 mg Oral Given 4/3/24 0844)   multivitamin with minerals 1 tablet (1 tablet Oral Given 4/3/24 0839)   pantoprazole (PROTONIX) injection 40 mg (40 mg  Intravenous Given 4/3/24 0840)   sucralfate (CARAFATE) tablet 1 g (1 g Oral Given 4/3/24 1120)   mupirocin (BACTROBAN) 2 % ointment 1 Application (1 Application Topical Given 4/3/24 0843)   FLUoxetine (PROzac) capsule 40 mg (40 mg Oral Given 4/3/24 0839)   hydrOXYzine pamoate (VISTARIL) capsule 50 mg (has no administration in time range)   levothyroxine (SYNTHROID, LEVOTHROID) tablet 100 mcg (100 mcg Oral Given 4/3/24 0605)   lactulose (CHRONULAC) 10 GM/15ML solution 10 g (10 g Oral Given 4/3/24 0840)   Magnesium Oxide -Mg Supplement tablet 400 mg (400 mg Oral Given 4/3/24 0839)   mirtazapine (REMERON) tablet 15 mg (15 mg Oral Given 4/3/24 0839)   nadolol (CORGARD) tablet 40 mg (40 mg Oral Given 4/3/24 0850)   pancrelipase (Lip-Prot-Amyl) (CREON) capsule 24,000 units of lipase (24,000 units of lipase Oral Given 4/3/24 1120)   prochlorperazine (COMPAZINE) injection 5 mg (5 mg Intravenous Given 4/2/24 1623)   pantoprazole (PROTONIX) injection 80 mg (80 mg Intravenous Given 4/1/24 0700)   thiamine (B-1) 100 mg in sodium chloride 0.9 % 1,000 mL infusion (125 mL/hr Intravenous New Bag 4/1/24 0928)   morphine injection 4 mg (4 mg Intravenous Given 4/1/24 0701)   iopamidol (ISOVUE-300) 61 % injection 100 mL (100 mL Intravenous Given by Other 4/1/24 0718)   HYDROmorphone (DILAUDID) injection 0.5 mg (0.5 mg Intravenous Given 4/1/24 1038)   magnesium sulfate 4g/100mL (PREMIX) infusion (0 g Intravenous Stopped 4/2/24 2024)         ORDERS PLACED DURING THIS VISIT:  Orders Placed This Encounter   Procedures    CT Abdomen Pelvis With Contrast    Comprehensive Metabolic Panel    Lipase    Urinalysis With Microscopic If Indicated (No Culture) - Urine, Clean Catch    CBC Auto Differential    Urinalysis, Microscopic Only - Urine, Clean Catch    Protime-INR    aPTT    Ethanol    Urine Drug Screen - Urine, Clean Catch    Oracle Draw    Magnesium    Phosphorus    CBC Auto Differential    Comprehensive Metabolic Panel    Comprehensive  Metabolic Panel    CBC (No Diff)    Diet: Regular/House, Gastrointestinal; Fat-Restricted, Low Irritant; Fluid Consistency: Thin (IDDSI 0)    Vital Signs    Intake & Output    Weigh Patient    Oral Care    Place Sequential Compression Device    Maintain Sequential Compression Device    Maintain IV Access    Telemetry - Place Orders & Notify Provider of Results When Patient Experiences Acute Chest Pain, Dysrhythmia or Respiratory Distress    May Be Off Telemetry for Tests    Up With Assistance    Notify Provider (With Default Parameters)    Vital Signs    Obtain Baseline Clinical [place] Withdrawal Assessment - Ar (CIWA-Ar), Sedation Scale & Vital Signs    Clinical [place] Withdrawal Assessment (CIWA-Ar)    If CIWA-Ar Score Less Than 8 For 3 Consecutive Assessments, Monitor Every 4 Hours & Discontinue Assessment When CIWA-Ar Less Than 8 for 24 Hours    Obtain Pre & Post Sedation Scores With Every Lorazepam Dose - Hold For POSS Greater Than 2 or RASS of -3 or Less    Notify Provider - Withdrawal    Notify Provider of Abnormal Lab Results    Notify Provider - Vitals    Wound Care    Wound Care    Advance Diet As Tolerated -Regular, Fat Restricted    Opioid Administration - Document EtCO2 and / or SpO2 With Each Set of Vitals & Any Change in Patient Status    Opioid Administration - Notify Provider Hypercapnic Monitoring    Opioid Administration - Continuous Pulse Oximetry (SpO2)    CIWA Q4 Hours X3    CIWA Q12 Hours X3    Code Status and Medical Interventions:    [facility] (on-call MD unless specified) Details    Inpatient Access Center Consult    ECG 12 Lead Other; chest pressure and tightness    Telemetry Scan    Telemetry Scan    Telemetry Scan    Telemetry Scan    Telemetry Scan    Telemetry Scan    Telemetry Scan    Wound Ostomy Eval & Treat    Insert Peripheral IV    Initiate Observation Status    Discharge patient    Seizure Precautions    Safety Precautions    CBC & Differential    Extra Tubes    Gold Top - SST     Light Blue Top    Gray Top         PROCEDURES  Procedures            PROGRESS, DATA ANALYSIS, CONSULTS, AND MEDICAL DECISION MAKING  All labs have been independently interpreted by me.  All radiology studies have been reviewed by me. All EKG's have been independently viewed and interpreted by me.  Discussion below represents my analysis of pertinent findings related to patient's condition, differential diagnosis, treatment plan and final disposition.    Differential diagnosis includes but is not limited to pancreatitis, peptic ulcer disease, gastritis, bowel obstruction, viral enteritis.    Clinical Scores:                   ED Course as of 04/03/24 1158   Mon Apr 01, 2024   0734 I spoke with the radiologist at this time regarding the CT scan.  Changes of chronic pancreatitis with no acute findings. [DC]   0909 I assessed the patient at the bedside.  She is still complaining of severe pain in the upper abdomen.  Will redosed with pain medications.  Continuing IV fluids infusion [ERIC]   3107 I discussed with Dr. Benson from [facility] about this patient.  He agrees to accept her to the hospitalist service for further medical management today. [ERIC]      ED Course User Index  [DC] Jaime Leyva PA  [ERIC] Mukesh Andujar MD       MDM:   I independently interpreted the abdomen CT study and my findings are: No free air, no small bowel obstruction pattern    Patient continues to complain of abdominal pain and nausea symptoms despite initial medications and IV fluids given.  The CT scan was reassuring for no radiographic features of acute pancreatitis and her lipase is in normal range.  There are gallstones noted on the CT scan and these may be contributing to some of her pain as well.  I think the more likely clinical diagnosis is acute alcoholic gastritis related to her recent binge drinking over the weekend.  We gave a dose of PPI here.  Will make arrangements to admit her to the hospitalist for further symptomatic  management and possible GI consultation today if necessary.  She is agreeable with this plan.    COMPLEXITY OF CARE  The patient requires admission.    Please note that portions of this document were completed with a voice recognition program.    Note Disclaimer: At [facility], we believe that sharing information builds trust and better relationships. You are receiving this note because you recently visited [facility]. It is possible you will see health information before a provider has talked with you about it. This kind of information can be easy to misunderstand. To help you fully understand what it means for your health, we urge you to discuss this note with your provider.         Mukesh Andujar MD  04/02/24 3384       Mukesh Andujar MD  04/03/24 5145

## 2024-04-02 LAB
ALBUMIN SERPL-MCNC: 3.3 G/DL (ref 3.5–5.2)
ALBUMIN/GLOB SERPL: 1.3 G/DL
ALP SERPL-CCNC: 134 U/L (ref 39–117)
ALT SERPL W P-5'-P-CCNC: 19 U/L (ref 1–33)
ANION GAP SERPL CALCULATED.3IONS-SCNC: 12.4 MMOL/L (ref 5–15)
AST SERPL-CCNC: 41 U/L (ref 1–32)
BASOPHILS # BLD AUTO: 0.02 10*3/MM3 (ref 0–0.2)
BASOPHILS NFR BLD AUTO: 0.7 % (ref 0–1.5)
BILIRUB SERPL-MCNC: 1.8 MG/DL (ref 0–1.2)
BUN SERPL-MCNC: 7 MG/DL (ref 6–20)
BUN/CREAT SERPL: 12.5 (ref 7–25)
CALCIUM SPEC-SCNC: 8.2 MG/DL (ref 8.6–10.5)
CHLORIDE SERPL-SCNC: 106 MMOL/L (ref 98–107)
CO2 SERPL-SCNC: 18.6 MMOL/L (ref 22–29)
CREAT SERPL-MCNC: 0.56 MG/DL (ref 0.57–1)
DEPRECATED RDW RBC AUTO: 46.1 FL (ref 37–54)
EGFRCR SERPLBLD CKD-EPI 2021: 107.9 ML/MIN/1.73
EOSINOPHIL # BLD AUTO: 0.11 10*3/MM3 (ref 0–0.4)
EOSINOPHIL NFR BLD AUTO: 3.9 % (ref 0.3–6.2)
ERYTHROCYTE [DISTWIDTH] IN BLOOD BY AUTOMATED COUNT: 13.5 % (ref 12.3–15.4)
GLOBULIN UR ELPH-MCNC: 2.5 GM/DL
GLUCOSE SERPL-MCNC: 113 MG/DL (ref 65–99)
HCT VFR BLD AUTO: 36.3 % (ref 34–46.6)
HGB BLD-MCNC: 12.2 G/DL (ref 12–15.9)
LYMPHOCYTES # BLD AUTO: 0.93 10*3/MM3 (ref 0.7–3.1)
LYMPHOCYTES NFR BLD AUTO: 32.9 % (ref 19.6–45.3)
MAGNESIUM SERPL-MCNC: 1.3 MG/DL (ref 1.6–2.6)
MCH RBC QN AUTO: 31.3 PG (ref 26.6–33)
MCHC RBC AUTO-ENTMCNC: 33.6 G/DL (ref 31.5–35.7)
MCV RBC AUTO: 93.1 FL (ref 79–97)
MONOCYTES # BLD AUTO: 0.33 10*3/MM3 (ref 0.1–0.9)
MONOCYTES NFR BLD AUTO: 11.7 % (ref 5–12)
NEUTROPHILS NFR BLD AUTO: 1.42 10*3/MM3 (ref 1.7–7)
NEUTROPHILS NFR BLD AUTO: 50.1 % (ref 42.7–76)
PHOSPHATE SERPL-MCNC: 2.5 MG/DL (ref 2.5–4.5)
PLATELET # BLD AUTO: 57 10*3/MM3 (ref 140–450)
PMV BLD AUTO: 11.4 FL (ref 6–12)
POTASSIUM SERPL-SCNC: 3.9 MMOL/L (ref 3.5–5.2)
PROT SERPL-MCNC: 5.8 G/DL (ref 6–8.5)
QT INTERVAL: 435 MS
QTC INTERVAL: 508 MS
RBC # BLD AUTO: 3.9 10*6/MM3 (ref 3.77–5.28)
SODIUM SERPL-SCNC: 137 MMOL/L (ref 136–145)
WBC NRBC COR # BLD AUTO: 2.83 10*3/MM3 (ref 3.4–10.8)

## 2024-04-02 PROCEDURE — 96366 THER/PROPH/DIAG IV INF ADDON: CPT

## 2024-04-02 PROCEDURE — G0378 HOSPITAL OBSERVATION PER HR: HCPCS

## 2024-04-02 PROCEDURE — 36415 COLL VENOUS BLD VENIPUNCTURE: CPT | Performed by: INTERNAL MEDICINE

## 2024-04-02 PROCEDURE — 96361 HYDRATE IV INFUSION ADD-ON: CPT

## 2024-04-02 PROCEDURE — 84100 ASSAY OF PHOSPHORUS: CPT | Performed by: INTERNAL MEDICINE

## 2024-04-02 PROCEDURE — 96376 TX/PRO/DX INJ SAME DRUG ADON: CPT

## 2024-04-02 PROCEDURE — 93010 ELECTROCARDIOGRAM REPORT: CPT | Performed by: INTERNAL MEDICINE

## 2024-04-02 PROCEDURE — 85025 COMPLETE CBC W/AUTO DIFF WBC: CPT | Performed by: INTERNAL MEDICINE

## 2024-04-02 PROCEDURE — 80053 COMPREHEN METABOLIC PANEL: CPT | Performed by: NURSE PRACTITIONER

## 2024-04-02 PROCEDURE — 25010000002 MAGNESIUM SULFATE 4 GM/100ML SOLUTION: Performed by: STUDENT IN AN ORGANIZED HEALTH CARE EDUCATION/TRAINING PROGRAM

## 2024-04-02 PROCEDURE — 25010000002 THIAMINE HCL 200 MG/2ML SOLUTION: Performed by: INTERNAL MEDICINE

## 2024-04-02 PROCEDURE — 83735 ASSAY OF MAGNESIUM: CPT | Performed by: INTERNAL MEDICINE

## 2024-04-02 PROCEDURE — 25010000002 PROCHLORPERAZINE 10 MG/2ML SOLUTION: Performed by: STUDENT IN AN ORGANIZED HEALTH CARE EDUCATION/TRAINING PROGRAM

## 2024-04-02 PROCEDURE — 25010000002 HYDROMORPHONE PER 4 MG: Performed by: INTERNAL MEDICINE

## 2024-04-02 PROCEDURE — 25010000002 LORAZEPAM PER 2 MG: Performed by: INTERNAL MEDICINE

## 2024-04-02 PROCEDURE — 96365 THER/PROPH/DIAG IV INF INIT: CPT

## 2024-04-02 PROCEDURE — 96375 TX/PRO/DX INJ NEW DRUG ADDON: CPT

## 2024-04-02 PROCEDURE — 25010000002 SODIUM CHLORIDE 0.9 % WITH KCL 20 MEQ 20-0.9 MEQ/L-% SOLUTION: Performed by: INTERNAL MEDICINE

## 2024-04-02 PROCEDURE — 93005 ELECTROCARDIOGRAM TRACING: CPT | Performed by: STUDENT IN AN ORGANIZED HEALTH CARE EDUCATION/TRAINING PROGRAM

## 2024-04-02 RX ORDER — PROCHLORPERAZINE EDISYLATE 5 MG/ML
5 INJECTION INTRAMUSCULAR; INTRAVENOUS EVERY 6 HOURS PRN
Status: DISCONTINUED | OUTPATIENT
Start: 2024-04-02 | End: 2024-04-03 | Stop reason: HOSPADM

## 2024-04-02 RX ORDER — MAGNESIUM SULFATE HEPTAHYDRATE 40 MG/ML
4 INJECTION, SOLUTION INTRAVENOUS ONCE
Status: COMPLETED | OUTPATIENT
Start: 2024-04-02 | End: 2024-04-02

## 2024-04-02 RX ADMIN — THIAMINE HYDROCHLORIDE 200 MG: 100 INJECTION, SOLUTION INTRAMUSCULAR; INTRAVENOUS at 16:22

## 2024-04-02 RX ADMIN — PROCHLORPERAZINE EDISYLATE 5 MG: 5 INJECTION INTRAMUSCULAR; INTRAVENOUS at 16:23

## 2024-04-02 RX ADMIN — Medication 1 TABLET: at 09:30

## 2024-04-02 RX ADMIN — THIAMINE HYDROCHLORIDE 200 MG: 100 INJECTION, SOLUTION INTRAMUSCULAR; INTRAVENOUS at 05:36

## 2024-04-02 RX ADMIN — LORAZEPAM 2 MG: 2 INJECTION INTRAMUSCULAR; INTRAVENOUS at 05:32

## 2024-04-02 RX ADMIN — HYDROMORPHONE HYDROCHLORIDE 0.5 MG: 1 INJECTION, SOLUTION INTRAMUSCULAR; INTRAVENOUS; SUBCUTANEOUS at 09:29

## 2024-04-02 RX ADMIN — PANCRELIPASE 24000 UNITS OF LIPASE: 60000; 12000; 38000 CAPSULE, DELAYED RELEASE PELLETS ORAL at 12:27

## 2024-04-02 RX ADMIN — PANTOPRAZOLE SODIUM 40 MG: 40 INJECTION, POWDER, FOR SOLUTION INTRAVENOUS at 09:29

## 2024-04-02 RX ADMIN — MAGNESIUM SULFATE IN WATER FOR 4 G: 40 INJECTION INTRAVENOUS at 12:25

## 2024-04-02 RX ADMIN — Medication 10 ML: at 20:24

## 2024-04-02 RX ADMIN — PANCRELIPASE 24000 UNITS OF LIPASE: 60000; 12000; 38000 CAPSULE, DELAYED RELEASE PELLETS ORAL at 17:45

## 2024-04-02 RX ADMIN — MIRTAZAPINE 15 MG: 15 TABLET, FILM COATED ORAL at 09:30

## 2024-04-02 RX ADMIN — HYDROCODONE BITARTRATE AND ACETAMINOPHEN 1 TABLET: 5; 325 TABLET ORAL at 05:30

## 2024-04-02 RX ADMIN — THIAMINE HYDROCHLORIDE 200 MG: 100 INJECTION, SOLUTION INTRAMUSCULAR; INTRAVENOUS at 22:24

## 2024-04-02 RX ADMIN — PANTOPRAZOLE SODIUM 40 MG: 40 INJECTION, POWDER, FOR SOLUTION INTRAVENOUS at 20:24

## 2024-04-02 RX ADMIN — LORAZEPAM 2 MG: 2 INJECTION INTRAMUSCULAR; INTRAVENOUS at 00:47

## 2024-04-02 RX ADMIN — POTASSIUM CHLORIDE AND SODIUM CHLORIDE 125 ML/HR: 900; 150 INJECTION, SOLUTION INTRAVENOUS at 05:40

## 2024-04-02 RX ADMIN — MAGNESIUM OXIDE 400 MG (241.3 MG MAGNESIUM) TABLET 400 MG: TABLET at 09:30

## 2024-04-02 RX ADMIN — SUCRALFATE 1 G: 1 TABLET ORAL at 16:53

## 2024-04-02 RX ADMIN — HYDROMORPHONE HYDROCHLORIDE 0.5 MG: 1 INJECTION, SOLUTION INTRAMUSCULAR; INTRAVENOUS; SUBCUTANEOUS at 16:15

## 2024-04-02 RX ADMIN — MUPIROCIN 1 APPLICATION: 20 OINTMENT TOPICAL at 09:29

## 2024-04-02 RX ADMIN — FOLIC ACID 1 MG: 1 TABLET ORAL at 09:30

## 2024-04-02 RX ADMIN — FLUOXETINE HYDROCHLORIDE 40 MG: 20 CAPSULE ORAL at 09:30

## 2024-04-02 RX ADMIN — LACTULOSE 10 G: 10 SOLUTION ORAL at 08:44

## 2024-04-02 RX ADMIN — HYDROMORPHONE HYDROCHLORIDE 0.5 MG: 1 INJECTION, SOLUTION INTRAMUSCULAR; INTRAVENOUS; SUBCUTANEOUS at 02:40

## 2024-04-02 RX ADMIN — SUCRALFATE 1 G: 1 TABLET ORAL at 08:44

## 2024-04-02 RX ADMIN — PANCRELIPASE 24000 UNITS OF LIPASE: 60000; 12000; 38000 CAPSULE, DELAYED RELEASE PELLETS ORAL at 08:44

## 2024-04-02 RX ADMIN — POTASSIUM CHLORIDE AND SODIUM CHLORIDE 125 ML/HR: 900; 150 INJECTION, SOLUTION INTRAVENOUS at 19:00

## 2024-04-02 RX ADMIN — SUCRALFATE 1 G: 1 TABLET ORAL at 20:24

## 2024-04-02 RX ADMIN — LACTULOSE 10 G: 10 SOLUTION ORAL at 16:23

## 2024-04-02 RX ADMIN — SUCRALFATE 1 G: 1 TABLET ORAL at 12:27

## 2024-04-02 RX ADMIN — HYDROCODONE BITARTRATE AND ACETAMINOPHEN 1 TABLET: 5; 325 TABLET ORAL at 20:34

## 2024-04-02 RX ADMIN — Medication 10 ML: at 09:31

## 2024-04-02 RX ADMIN — NADOLOL 40 MG: 40 TABLET ORAL at 09:30

## 2024-04-02 NOTE — PROGRESS NOTES
Name: Bekah Gibson ADMIT: 2024   : 1970  PCP: Tylor Davis    MRN: [id number] LOS: 0 days   AGE/SEX: 53 y.o. female  ROOM: [facility]     Subjective   Subjective   She is sleeping and awakens but falls back to sleep quickly. She has been receiving ativan. Complains of epigastric pain.        Objective   Objective   Vital Signs  Temp:  [97.4 °F (36.3 °C)-98.4 °F (36.9 °C)] 98.1 °F (36.7 °C)  Heart Rate:  [71-90] 82  Resp:  [2-20] 16  BP: (119-158)/(77-99) 128/81  SpO2:  [88 %-98 %] 93 %  on   ;   Device (Oxygen Therapy): room air  Body mass index is 22.47 kg/m².  Physical Exam  Vitals reviewed.   Constitutional:       Appearance: She is well-developed. She is ill-appearing (non toxic).   HENT:      Head: Normocephalic and atraumatic.      Mouth/Throat:      Mouth: Mucous membranes are moist.   Cardiovascular:      Rate and Rhythm: Normal rate and regular rhythm.   Pulmonary:      Effort: Pulmonary effort is normal. No respiratory distress.      Breath sounds: Normal breath sounds.   Abdominal:      General: Bowel sounds are normal. There is no distension.      Palpations: Abdomen is soft.      Tenderness: There is abdominal tenderness.   Skin:     General: Skin is warm and dry.   Neurological:      General: No focal deficit present.      Mental Status: She is alert and oriented to person, place, and time.   Psychiatric:         Mood and Affect: Mood normal.         Behavior: Behavior normal.         Thought Content: Thought content normal.       Results Review     I reviewed the patient's new clinical results.  Results from last 7 days   Lab Units 24  0544 24  0639   WBC 10*3/mm3 2.83* 4.16   HEMOGLOBIN g/dL 12.2 14.0   PLATELETS 10*3/mm3 57* 76*     Results from last 7 days   Lab Units 24  0639   SODIUM mmol/L 141   POTASSIUM mmol/L 3.8   CHLORIDE mmol/L 107   CO2 mmol/L 24.0   BUN mg/dL 10   CREATININE mg/dL 0.64   GLUCOSE mg/dL 148*   EGFR mL/min/1.73 104.5     Results from last 7  "days   Lab Units 04/01/24  0639   ALBUMIN g/dL 4.2   BILIRUBIN mg/dL 1.2   ALK PHOS U/L 165*   AST (SGOT) U/L 57*   ALT (SGPT) U/L 25     Results from last 7 days   Lab Units 04/02/24  0544 04/01/24  0639   CALCIUM mg/dL  --  9.1   ALBUMIN g/dL  --  4.2   MAGNESIUM mg/dL 1.3*  --    PHOSPHORUS mg/dL 2.5  --        No results found for: \"HGBA1C\", \"POCGLU\"    No radiology results for the last day    I have personally reviewed all medications:  Scheduled Medications  FLUoxetine, 40 mg, Oral, Daily  folic acid, 1 mg, Oral, Daily  hydrOXYzine, 50 mg, Oral, Once  lactulose, 10 g, Oral, BID  levothyroxine, 100 mcg, Oral, Daily  LORazepam, 2 mg, Oral, Q6H  Magnesium Oxide -Mg Supplement, 400 mg, Oral, Daily  mirtazapine, 15 mg, Oral, Daily  multivitamin with minerals, 1 tablet, Oral, Daily  mupirocin, 1 Application, Topical, Q24H  nadolol, 40 mg, Oral, Q24H  pancrelipase (Lip-Prot-Amyl), 24,000 units of lipase, Oral, TID With Meals  pantoprazole, 40 mg, Intravenous, Q12H  sodium chloride, 10 mL, Intravenous, Q12H  sucralfate, 1 g, Oral, 4x Daily AC & at Bedtime  thiamine (B-1) IV, 200 mg, Intravenous, Q8H   Followed by  [START ON 4/6/2024] thiamine, 100 mg, Oral, Daily    Infusions  sodium chloride 0.9 % with KCl 20 mEq, 125 mL/hr, Last Rate: 125 mL/hr (04/02/24 0540)    Diet  Diet: Liquid; Clear Liquid; Fluid Consistency: Thin (IDDSI 0)    I have personally reviewed:  [x]  Laboratory   [x]  Microbiology   [x]  Radiology   [x]  EKG/Telemetry  [x]  Cardiology/Vascular   []  Pathology    []  Records       Assessment/Plan     Active Hospital Problems    Diagnosis  POA    **Epigastric pain [R10.13]  Yes    Acute alcoholic gastritis without hemorrhage [K29.20]  Yes    Alcohol-induced chronic pancreatitis [K86.0]  Yes    Essential hypertension [I10]  Yes    Alcoholic cirrhosis [K70.30]  Yes    Hashimoto's disease [E06.3]  Yes      Resolved Hospital Problems   No resolved problems to display.       53 y.o. female admitted with " Epigastric pain.     Epigastric Pain  - abdominal examination and labs are benign, CT abdomen/pelvis shows known chronic pancreas changes but nothing acute-no evidence of acute inflammation. Lipase normal.   -  continue IV protonix and carafate empirically for likely alcoholic gastritis, no evidence of bleeding  - provide pain control as needed  - resume creon  - continue clear liquid diet and advance to a low fat diet as tolerated     Alcoholic Cirrhosis  - has hx of varices and portosystemic encephalopathy but appears well-compensated  - will resume home nadolol and lactulose     EtOH Abuse  - had a recent relapse, last drink was 3/31  -  CIWA protocol and replace vitamins. Ativan scheduled x4 doses but will stop as she seems overmedicated  - her UDS was positive for amphetamines, fentanyl, and oxycodone-she denies use of these but admits to using cannabis     Hypothyroidism due to Hashimoto's Thyroiditis  - resume levothyroxine     Depression/Anxiety  -   home prozac and PRN hydroxyzine     Recent Transfixation of the Left First Metatarsal Joint  - continue local wound care  - patient states she has close follow up with her orthopedic surgeon           VTE Prophylaxis - SCDs.  Code Status - Full code.     AMBAR Trinh  [facility]  04/02/24  08:44 EDT

## 2024-04-02 NOTE — PLAN OF CARE
Pt continues to have generalized abdominal pain, pain medications administered per request.  CIWA protocol remains in place, with scores ranging from 3 to 12, PRN CIWA Ativan administered per guidelines.  Pt resting well in bed, will continue to monitor and observe.     Goal Outcome Evaluation:  Plan of Care Reviewed With: patient        Progress: no change      Problem: Adult Inpatient Plan of Care  Goal: Plan of Care Review  Outcome: Ongoing, Progressing  Flowsheets (Taken 4/2/2024 9717)  Progress: no change  Plan of Care Reviewed With: patient     Problem: Pain Acute  Goal: Acceptable Pain Control and Functional Ability  Outcome: Ongoing, Progressing

## 2024-04-03 ENCOUNTER — READMISSION MANAGEMENT (OUTPATIENT)
Dept: CALL CENTER | Facility: HOSPITAL | Age: 54
End: 2024-04-03
Payer: MEDICAID

## 2024-04-03 VITALS
WEIGHT: 135 LBS | BODY MASS INDEX: 22.49 KG/M2 | OXYGEN SATURATION: 98 % | RESPIRATION RATE: 18 BRPM | HEART RATE: 75 BPM | DIASTOLIC BLOOD PRESSURE: 87 MMHG | TEMPERATURE: 98.8 F | HEIGHT: 65 IN | SYSTOLIC BLOOD PRESSURE: 137 MMHG

## 2024-04-03 PROBLEM — F10.931 ALCOHOL WITHDRAWAL SYNDROME, WITH DELIRIUM: Status: RESOLVED | Noted: 2018-06-29 | Resolved: 2024-04-03

## 2024-04-03 LAB
ALBUMIN SERPL-MCNC: 3.4 G/DL (ref 3.5–5.2)
ALBUMIN/GLOB SERPL: 1.4 G/DL
ALP SERPL-CCNC: 145 U/L (ref 39–117)
ALT SERPL W P-5'-P-CCNC: 18 U/L (ref 1–33)
ANION GAP SERPL CALCULATED.3IONS-SCNC: 11.3 MMOL/L (ref 5–15)
AST SERPL-CCNC: 42 U/L (ref 1–32)
BILIRUB SERPL-MCNC: 1.6 MG/DL (ref 0–1.2)
BUN SERPL-MCNC: 4 MG/DL (ref 6–20)
BUN/CREAT SERPL: 7.8 (ref 7–25)
CALCIUM SPEC-SCNC: 8.1 MG/DL (ref 8.6–10.5)
CHLORIDE SERPL-SCNC: 105 MMOL/L (ref 98–107)
CO2 SERPL-SCNC: 20.7 MMOL/L (ref 22–29)
CREAT SERPL-MCNC: 0.51 MG/DL (ref 0.57–1)
DEPRECATED RDW RBC AUTO: 44.3 FL (ref 37–54)
EGFRCR SERPLBLD CKD-EPI 2021: 110.4 ML/MIN/1.73
ERYTHROCYTE [DISTWIDTH] IN BLOOD BY AUTOMATED COUNT: 13.1 % (ref 12.3–15.4)
GLOBULIN UR ELPH-MCNC: 2.5 GM/DL
GLUCOSE SERPL-MCNC: 128 MG/DL (ref 65–99)
HCT VFR BLD AUTO: 37.8 % (ref 34–46.6)
HGB BLD-MCNC: 12.8 G/DL (ref 12–15.9)
MAGNESIUM SERPL-MCNC: 1.8 MG/DL (ref 1.6–2.6)
MCH RBC QN AUTO: 31.1 PG (ref 26.6–33)
MCHC RBC AUTO-ENTMCNC: 33.9 G/DL (ref 31.5–35.7)
MCV RBC AUTO: 91.7 FL (ref 79–97)
PHOSPHATE SERPL-MCNC: 2.7 MG/DL (ref 2.5–4.5)
PLATELET # BLD AUTO: 59 10*3/MM3 (ref 140–450)
PMV BLD AUTO: 11.5 FL (ref 6–12)
POTASSIUM SERPL-SCNC: 4 MMOL/L (ref 3.5–5.2)
PROT SERPL-MCNC: 5.9 G/DL (ref 6–8.5)
RBC # BLD AUTO: 4.12 10*6/MM3 (ref 3.77–5.28)
SODIUM SERPL-SCNC: 137 MMOL/L (ref 136–145)
WBC NRBC COR # BLD AUTO: 3.28 10*3/MM3 (ref 3.4–10.8)

## 2024-04-03 PROCEDURE — G0378 HOSPITAL OBSERVATION PER HR: HCPCS

## 2024-04-03 PROCEDURE — 96361 HYDRATE IV INFUSION ADD-ON: CPT

## 2024-04-03 PROCEDURE — 83735 ASSAY OF MAGNESIUM: CPT | Performed by: INTERNAL MEDICINE

## 2024-04-03 PROCEDURE — 25010000002 HYDROMORPHONE PER 4 MG: Performed by: INTERNAL MEDICINE

## 2024-04-03 PROCEDURE — 96376 TX/PRO/DX INJ SAME DRUG ADON: CPT

## 2024-04-03 PROCEDURE — 85027 COMPLETE CBC AUTOMATED: CPT | Performed by: NURSE PRACTITIONER

## 2024-04-03 PROCEDURE — 25010000002 SODIUM CHLORIDE 0.9 % WITH KCL 20 MEQ 20-0.9 MEQ/L-% SOLUTION: Performed by: INTERNAL MEDICINE

## 2024-04-03 PROCEDURE — 80053 COMPREHEN METABOLIC PANEL: CPT | Performed by: NURSE PRACTITIONER

## 2024-04-03 PROCEDURE — 36415 COLL VENOUS BLD VENIPUNCTURE: CPT | Performed by: NURSE PRACTITIONER

## 2024-04-03 PROCEDURE — 25010000002 THIAMINE HCL 200 MG/2ML SOLUTION: Performed by: INTERNAL MEDICINE

## 2024-04-03 PROCEDURE — 90791 PSYCH DIAGNOSTIC EVALUATION: CPT

## 2024-04-03 PROCEDURE — 25010000002 SODIUM CHLORIDE 0.9 % WITH KCL 20 MEQ 20-0.9 MEQ/L-% SOLUTION: Performed by: NURSE PRACTITIONER

## 2024-04-03 PROCEDURE — 84100 ASSAY OF PHOSPHORUS: CPT | Performed by: INTERNAL MEDICINE

## 2024-04-03 RX ADMIN — SUCRALFATE 1 G: 1 TABLET ORAL at 11:20

## 2024-04-03 RX ADMIN — NADOLOL 40 MG: 40 TABLET ORAL at 08:50

## 2024-04-03 RX ADMIN — PANCRELIPASE 24000 UNITS OF LIPASE: 60000; 12000; 38000 CAPSULE, DELAYED RELEASE PELLETS ORAL at 11:20

## 2024-04-03 RX ADMIN — THIAMINE HYDROCHLORIDE 200 MG: 100 INJECTION, SOLUTION INTRAMUSCULAR; INTRAVENOUS at 06:05

## 2024-04-03 RX ADMIN — THIAMINE HYDROCHLORIDE 200 MG: 100 INJECTION, SOLUTION INTRAMUSCULAR; INTRAVENOUS at 13:34

## 2024-04-03 RX ADMIN — POTASSIUM CHLORIDE AND SODIUM CHLORIDE 75 ML/HR: 900; 150 INJECTION, SOLUTION INTRAVENOUS at 10:47

## 2024-04-03 RX ADMIN — SUCRALFATE 1 G: 1 TABLET ORAL at 06:47

## 2024-04-03 RX ADMIN — Medication 1 TABLET: at 08:39

## 2024-04-03 RX ADMIN — Medication 10 ML: at 08:43

## 2024-04-03 RX ADMIN — LACTULOSE 10 G: 10 SOLUTION ORAL at 08:40

## 2024-04-03 RX ADMIN — PANCRELIPASE 24000 UNITS OF LIPASE: 60000; 12000; 38000 CAPSULE, DELAYED RELEASE PELLETS ORAL at 08:39

## 2024-04-03 RX ADMIN — LEVOTHYROXINE SODIUM 100 MCG: 100 TABLET ORAL at 06:05

## 2024-04-03 RX ADMIN — MUPIROCIN 1 APPLICATION: 20 OINTMENT TOPICAL at 08:43

## 2024-04-03 RX ADMIN — MAGNESIUM OXIDE 400 MG (241.3 MG MAGNESIUM) TABLET 400 MG: TABLET at 08:39

## 2024-04-03 RX ADMIN — FLUOXETINE HYDROCHLORIDE 40 MG: 20 CAPSULE ORAL at 08:39

## 2024-04-03 RX ADMIN — HYDROMORPHONE HYDROCHLORIDE 0.5 MG: 1 INJECTION, SOLUTION INTRAMUSCULAR; INTRAVENOUS; SUBCUTANEOUS at 02:24

## 2024-04-03 RX ADMIN — FOLIC ACID 1 MG: 1 TABLET ORAL at 08:44

## 2024-04-03 RX ADMIN — PANTOPRAZOLE SODIUM 40 MG: 40 INJECTION, POWDER, FOR SOLUTION INTRAVENOUS at 08:40

## 2024-04-03 RX ADMIN — HYDROCODONE BITARTRATE AND ACETAMINOPHEN 1 TABLET: 5; 325 TABLET ORAL at 08:38

## 2024-04-03 RX ADMIN — MIRTAZAPINE 15 MG: 15 TABLET, FILM COATED ORAL at 08:39

## 2024-04-03 RX ADMIN — POTASSIUM CHLORIDE AND SODIUM CHLORIDE 125 ML/HR: 900; 150 INJECTION, SOLUTION INTRAVENOUS at 01:08

## 2024-04-03 NOTE — THERAPY DISCHARGE NOTE
[facility]  [phone]    ACCESS CENTER STATEMENT OF DISPOSITION        I, Bekah Gibson, was assessed in the [facility] at [facility] on 4/3/2024.  I understand the recommendations below and what follow-up action is expected of me.      Substance Use Intensive Outpatient Programs:    [facility]  [phone]    [facility]  [phone]    [facility])  [phone]    [facility]  [phone]    [facility]  [phone]          ________________________________  Clinician Signature    4/3/2024  13:47 EDT

## 2024-04-03 NOTE — PROGRESS NOTES
Name: Bekah Gibson ADMIT: 2024   : 1970  PCP: Tylor Davis    MRN: [id number] LOS: 0 days   AGE/SEX: 53 y.o. female  ROOM: [facility]     Subjective   Subjective   She is sleeping but awakens easily. She is more oriented today. Still with abdominal pain and nausea but better. Willing to try advance in diet. No hallucinations but still anxious with tremors.        Objective   Objective   Vital Signs  Temp:  [97.9 °F (36.6 °C)-98.8 °F (37.1 °C)] 97.9 °F (36.6 °C)  Heart Rate:  [74-88] 84  Resp:  [16-18] 18  BP: (106-167)/(85-96) 134/92  SpO2:  [93 %-99 %] 95 %  on   ;   Device (Oxygen Therapy): room air  Body mass index is 22.47 kg/m².  Physical Exam  Vitals reviewed.   Constitutional:       Appearance: She is well-developed. She is ill-appearing (non toxic).   HENT:      Head: Normocephalic and atraumatic.      Mouth/Throat:      Mouth: Mucous membranes are moist.   Cardiovascular:      Rate and Rhythm: Normal rate and regular rhythm.   Pulmonary:      Effort: Pulmonary effort is normal. No respiratory distress.      Breath sounds: Normal breath sounds.   Abdominal:      General: Bowel sounds are normal. There is no distension.      Palpations: Abdomen is soft.      Tenderness: There is abdominal tenderness.   Skin:     General: Skin is warm and dry.   Neurological:      General: No focal deficit present.      Mental Status: She is alert and oriented to person, place, and time.   Psychiatric:         Mood and Affect: Mood normal.       Results Review     I reviewed the patient's new clinical results.  Results from last 7 days   Lab Units 24  04224  0544 24  0639   WBC 10*3/mm3 3.28* 2.83* 4.16   HEMOGLOBIN g/dL 12.8 12.2 14.0   PLATELETS 10*3/mm3 59* 57* 76*     Results from last 7 days   Lab Units 24  04224  0544 24  0639   SODIUM mmol/L 137 137 141   POTASSIUM mmol/L 4.0 3.9 3.8   CHLORIDE mmol/L 105 106 107   CO2 mmol/L 20.7* 18.6* 24.0   BUN mg/dL 4* 7 10  "  CREATININE mg/dL 0.51* 0.56* 0.64   GLUCOSE mg/dL 128* 113* 148*   EGFR mL/min/1.73 110.4 107.9 104.5     Results from last 7 days   Lab Units 04/03/24  0429 04/02/24  0544 04/01/24  0639   ALBUMIN g/dL 3.4* 3.3* 4.2   BILIRUBIN mg/dL 1.6* 1.8* 1.2   ALK PHOS U/L 145* 134* 165*   AST (SGOT) U/L 42* 41* 57*   ALT (SGPT) U/L 18 19 25     Results from last 7 days   Lab Units 04/03/24  0429 04/02/24  0544 04/01/24  0639   CALCIUM mg/dL 8.1* 8.2* 9.1   ALBUMIN g/dL 3.4* 3.3* 4.2   MAGNESIUM mg/dL 1.8 1.3*  --    PHOSPHORUS mg/dL 2.7 2.5  --        No results found for: \"HGBA1C\", \"POCGLU\"    No radiology results for the last day    I have personally reviewed all medications:  Scheduled Medications  FLUoxetine, 40 mg, Oral, Daily  folic acid, 1 mg, Oral, Daily  hydrOXYzine, 50 mg, Oral, Once  lactulose, 10 g, Oral, BID  levothyroxine, 100 mcg, Oral, Daily  Magnesium Oxide -Mg Supplement, 400 mg, Oral, Daily  mirtazapine, 15 mg, Oral, Daily  multivitamin with minerals, 1 tablet, Oral, Daily  mupirocin, 1 Application, Topical, Q24H  nadolol, 40 mg, Oral, Q24H  pancrelipase (Lip-Prot-Amyl), 24,000 units of lipase, Oral, TID With Meals  pantoprazole, 40 mg, Intravenous, Q12H  sodium chloride, 10 mL, Intravenous, Q12H  sucralfate, 1 g, Oral, 4x Daily AC & at Bedtime  thiamine (B-1) IV, 200 mg, Intravenous, Q8H   Followed by  [START ON 4/6/2024] thiamine, 100 mg, Oral, Daily    Infusions  sodium chloride 0.9 % with KCl 20 mEq, 75 mL/hr, Last Rate: 125 mL/hr (04/03/24 0605)    Diet  Diet: Regular/House, Gastrointestinal; Fat-Restricted, Low Irritant; Fluid Consistency: Thin (IDDSI 0)    I have personally reviewed:  [x]  Laboratory   [x]  Microbiology   [x]  Radiology   [x]  EKG/Telemetry  [x]  Cardiology/Vascular   []  Pathology    []  Records       Assessment/Plan     Active Hospital Problems    Diagnosis  POA    **Epigastric pain [R10.13]  Yes    Acute alcoholic gastritis without hemorrhage [K29.20]  Yes    Alcohol-induced " chronic pancreatitis [K86.0]  Yes    Essential hypertension [I10]  Yes    Alcoholic cirrhosis [K70.30]  Yes    Hashimoto's disease [E06.3]  Yes      Resolved Hospital Problems   No resolved problems to display.       53 y.o. female admitted with Epigastric pain.     Epigastric Pain  - abdominal examination and labs are benign, CT abdomen/pelvis shows known chronic pancreas changes but nothing acute-no evidence of acute inflammation. Lipase normal.  Advance diet   -  continue IV protonix and carafate empirically for likely alcoholic gastritis, no evidence of bleeding  - stop narcotics   - continue  creon  - continue clear liquid diet and advance to a low fat diet as tolerated     Alcoholic Cirrhosis  - has hx of varices and portosystemic encephalopathy but appears well-compensated  -   home nadolol and lactulose     EtOH Abuse  - had a recent relapse, last drink was 3/31  -  [facility] protocol and replace vitamins. Ativan scheduled x4 doses but will stop as she seems overmedicated  - her UDS was positive for amphetamines, fentanyl, and oxycodone-she denies use of these but admits to using cannabis. Stop narcotics      Hypothyroidism due to Hashimoto's Thyroiditis  - resume levothyroxine     Depression/Anxiety  -   home prozac and PRN hydroxyzine     Recent Transfixation of the Left First Metatarsal Joint  - continue local wound care  - patient states she has close follow up with her orthopedic surgeon           VTE Prophylaxis - SCDs.  Code Status - Full code. DC tmrw 4/4     AMBAR rTinh  [facility]  04/03/24  10:18 EDT

## 2024-04-03 NOTE — CONSULTS
"[facility] Center consult d/t ETOH. Pt presented to ED on 4/1/24 w/ c/o abdominal pain and transferred to S5 for further tx of epigastric pain. Primary RN reports no behavioral issues and planning for d/c today following [facility] consult. Previously seen by [facility]ESS in 6/2018, 9/2021, 10/2022, 1/2023, and 10/2023 (see previous notes).    Pt in room alone upon entry. Introduced self and role. Pt is a 54 yo  female. A&Ox4. Presents as unkempt w/ anxious mood and restricted affect. Cooperative yet guarded towards clinician. Speech was rushed and pt required vocal prompts to remain alert during assessment. UDS + amphetamines, oxycodone, and fentanyl. BAL normal at admission. CIWA 0 at 8:38 this AM. Denies withdrawals sxs but rates cravings for tobacco 4/10, ETOH 3/10, and THC 2/10 (10 being strongest). Denies use of any other substances despite + UDS. Stated \"It scares me because I don't know where that came from\" referring to + UDS. Rates current depression 5/10 and anxiety 6/10 (10 being worst). Denies SI/HI/AVH. Reports sleep is \"up and down\" but appetite is \"pretty good.\" Current stressors include \"a lot of things going on that I can't handle\" and recently being involved w/ \"toxic people.\" Pt very guarded around current stressors but reports being able to discuss stressors w/ support system.     MENTAL HEALTH/SUBSTANCE USE HX: Pt has experienced anxiety and depression across her lifetime, including panic attacks and occasional SI that have contributed to substance use. Previously hospitalized at [facility] for depression and SI but unable to recall timelines. Recently hospitalized at [facility] for \"a couple days\" in January 2024 for medication management d/t previous psychiatrist working there. Denied hx of HI/AVH. Currently prescribed Prozac (40 mg/day), Atarax (50 mg/day), Vistaril (50 mg 2x/day), and Remeron (15 mg 1x/day). OP psychiatrist is Dr. Aline Mahajan MD. Reports psychiatrist is " "referring her to OP counselor but she has not received further info at this time. Per chart, pt experienced loss of parents that contributed to beginning substance use, as well as recent loss of father in law. Acknowledges family hx of mental health issues but did not elaborate. Denies family hx of suicide. Acknowledges family hx of ETOH use. Reports currently smoking 2-3 cigarettes/day w/ first use being 15 yrs ago and last use being day of hospital admission. Recently relapsed on ETOH last Thursday and has drank 8 bottles of peach paz daily until hospitalization. Reports she had previously maintained sobriety for 90 days prior to relapse. Stated \"I was angry about a trigger\" but would not elaborate. Reports smoking THC 1x/week w/ last use being 2 weeks ago. Previous RANDY tx through [place], [facility], and [facility]. Most recent was [facility] in which she stepped down to IOP following residential tx. Described this as \"successful\" and wanting to \"get back to my routine and coping skills so I can stay away from toxic people.\" Identified sober supports through AA and one friend. Attends AA 5x/week and maintains contact w/ sponsor.    SOCIAL HX: Pt has been  to her spouse for 8 yrs. Has 2 adult daughters from previous marriage. Lives at home w/ spouse and dogs. Reports feeling safe at home. Support system includes children, spouse, 2 brothers, and AA sponsor. Completed HS and earned a master's degree. Retired . Denies  involvement or legal hx. Describes herself as \"spiritual\" and enjoys playing guitar, singing, gardening, and yoga.     PLAN: Discussed results from + UDS and pt continues to deny use of substances outside of tobacco, ETOH, and THC. Reflected on previous success with residential RANDY tx and stepping down to IOP for substance use. Recommended pt return to previous substance use IOP [facility] where she experienced success in sobriety or consider an " alternative option. Pt receptive to list of substance use IOP's in the area that accept her insurance. Pt reports intending to continue OP psychiatry services through Dr. Aline Mahajan MD and planning to f/u w/ psychiatrist to determine status of referral for OP counseling. Plans to continue attending AA meetings 5x/week and remaining in contact w/ sponsor. Pt and medical team denied any further concerns/needs at this time. [facility] will sign off d/t pt's scheduled d/c today.

## 2024-04-03 NOTE — PLAN OF CARE
Goal Outcome Evaluation:      This patient was sleeping most of the morning. She has been more awake this afternoon. Her  is at her bedside. She requests her analgesics as often as she can take them.

## 2024-04-03 NOTE — DISCHARGE SUMMARY
Patient Name: Bekah Gibson  : 1970  MRN: [id number]    Date of Admission: 2024  Date of Discharge:  4/3/2024  Primary Care Physician: Tylor Davis      Chief Complaint:   Abdominal Pain      Discharge Diagnoses     Active Hospital Problems    Diagnosis  POA    **Epigastric pain [R10.13]  Yes    Acute alcoholic gastritis without hemorrhage [K29.20]  Yes    Alcohol-induced chronic pancreatitis [K86.0]  Yes    Essential hypertension [I10]  Yes    Alcoholic cirrhosis [K70.30]  Yes    Hashimoto's disease [E06.3]  Yes      Resolved Hospital Problems    Diagnosis Date Resolved POA    Alcohol withdrawal syndrome, with delirium [F10.931] 2024 Yes        Hospital Course     Ms. Gibson is a 53 y.o. female with a history of alcohol cirrhosis, chronic pancreatitis, ETOH abuse, hypothyroidism, depression, recent transfixation of left first metatarsal joint that presents with abdominal pain and ETOH withdrawal.  CT abdomen pelvis does not demonstrate acute pancreatitis or other acute abnormality.  She denies overt bleeding nor has any been witnessed this admission.  She has a history of varices on nadolol.  Last EGD in  demonstrated gastritis likely related to EtOH.  She reports that she had been sober but recently relapsed with alcohol, etiology of abdominal pain is likely from EtOH induced gastritis.  She has had mild alcohol withdrawal this admission that is resolved.  Patient is now tolerating a diet and has been counseled on the importance of alcohol cessation with counseling session provided by access department.  All other chronic issues remained stable.  She has been advised to follow-up with her gastroenterologist and to continue Protonix, Carafate and alcohol cessation.       Day of Discharge     Subjective:  Mild abdominal pain but no n/v or overt bleeding. No ETOH withdrawal symptoms.     Physical Exam:  Temp:  [97.9 °F (36.6 °C)-98.8 °F (37.1 °C)] 98.8 °F (37.1 °C)  Heart Rate:  [74-88]  75  Resp:  [16-18] 18  BP: (106-167)/(85-92) 137/87  Body mass index is 22.47 kg/m².  Physical Exam  Vitals reviewed.   Constitutional:       Appearance: She is well-developed.   Cardiovascular:      Rate and Rhythm: Normal rate and regular rhythm.   Pulmonary:      Effort: Pulmonary effort is normal. No respiratory distress.      Breath sounds: Normal breath sounds.   Abdominal:      General: Bowel sounds are normal. There is no distension.      Palpations: Abdomen is soft.      Tenderness: There is no abdominal tenderness. There is no guarding.   Skin:     General: Skin is warm and dry.   Neurological:      General: No focal deficit present.      Mental Status: She is alert and oriented to person, place, and time. Mental status is at baseline.   Psychiatric:         Mood and Affect: Mood normal.         Behavior: Behavior normal.         Thought Content: Thought content normal.         Consultants     Consult Orders (all) (From admission, onward)       Start     Ordered    04/03/24 1145  [facility] Consult  Once        Provider:  (Not yet assigned)    04/03/24 1145    04/01/24 0943  [facility] (on-call MD unless specified) Details  Once        Specialty:  Hospitalist  Provider:  Familia Benson MD    04/01/24 0942                  Procedures     * Surgery not found *    Imaging Results (All)       Procedure Component Value Units Date/Time    CT Abdomen Pelvis With Contrast [[id number]] Collected: 04/01/24 0748     Updated: 04/01/24 1017    Narrative:      CT SCAN OF THE ABDOMEN AND PELVIS WITH INTRAVENOUS CONTRAST     HISTORY: Previous pancreatitis. Recent alcohol relapse. Previous kidney  stones.     FINDINGS: The CT scan was performed as an emergency procedure through  the abdomen and pelvis with intravenous contrast. It is compared to  previous exams including noncontrast CT scan of the abdomen and pelvis  dated 10/07/2023. The following findings are present:  1. The kidneys are normal in size and there  is symmetric renal  enhancement. There is no evidence of renal stone or obstruction.  2. There are are numerous calcifications scattered throughout the  pancreas consistent with changes of chronic calcific pancreatitis.  However, there is currently no evidence of inflammatory change  surrounding the pancreas which appears similar to the study of  10/07/2023.  3. There are several tiny gallstones layering within the gallbladder.  There is no gallbladder wall thickening or other changes to suggest  acute cholecystitis.  4. There is enlargement of the liver and spleen and there is mild  diffuse fatty infiltration of the liver that is better visualized on  today's exam with intravenous contrast. The hepatosplenomegaly is  unchanged from 10/07/2023. There are no focal liver lesions. The adrenal  glands are unremarkable.  5. There is no aortic aneurysm or retroperitoneal lymphadenopathy. The  large and small bowel loops are normal in caliber and show no  inflammatory change. The appendix appears normal. No abnormality is seen  in the pelvis.              Radiation dose reduction techniques were utilized, including automated  exposure control and exposure modulation based on body size.      This report was finalized on 4/1/2024 10:14 AM by Dr. Philippe Urrutia M.D  on Workstation: [id number]                 Pertinent Labs     Results from last 7 days   Lab Units 04/03/24 0429 04/02/24  0544 04/01/24  0639   WBC 10*3/mm3 3.28* 2.83* 4.16   HEMOGLOBIN g/dL 12.8 12.2 14.0   PLATELETS 10*3/mm3 59* 57* 76*     Results from last 7 days   Lab Units 04/03/24 0429 04/02/24  0544 04/01/24  0639   SODIUM mmol/L 137 137 141   POTASSIUM mmol/L 4.0 3.9 3.8   CHLORIDE mmol/L 105 106 107   CO2 mmol/L 20.7* 18.6* 24.0   BUN mg/dL 4* 7 10   CREATININE mg/dL 0.51* 0.56* 0.64   GLUCOSE mg/dL 128* 113* 148*   EGFR mL/min/1.73 110.4 107.9 104.5     Results from last 7 days   Lab Units 04/03/24 0429 04/02/24  0544 04/01/24  0639   ALBUMIN g/dL  "3.4* 3.3* 4.2   BILIRUBIN mg/dL 1.6* 1.8* 1.2   ALK PHOS U/L 145* 134* 165*   AST (SGOT) U/L 42* 41* 57*   ALT (SGPT) U/L 18 19 25     Results from last 7 days   Lab Units 04/03/24  0429 04/02/24  0544 04/01/24  0639   CALCIUM mg/dL 8.1* 8.2* 9.1   ALBUMIN g/dL 3.4* 3.3* 4.2   MAGNESIUM mg/dL 1.8 1.3*  --    PHOSPHORUS mg/dL 2.7 2.5  --      Results from last 7 days   Lab Units 04/01/24  0639   LIPASE U/L 30             Invalid input(s): \"LDLCALC\"          Test Results Pending at Discharge       Discharge Details        Discharge Medications        Continue These Medications        Instructions Start Date   cephalexin 500 MG capsule  Commonly known as: KEFLEX   500 mg, Oral, Daily      Creon 34950-84748 units capsule delayed-release particles capsule  Generic drug: pancrelipase (Lip-Prot-Amyl)   24,000 units of lipase, Oral, 3 Times Daily With Meals      ferrous sulfate 325 (65 FE) MG tablet   325 mg, Oral, Daily With Breakfast      FLUoxetine 20 MG capsule  Commonly known as: PROzac   40 mg, Oral, Daily      folic acid 1 MG tablet  Commonly known as: FOLVITE   1 mg, Oral, Daily      hydrOXYzine pamoate 50 MG capsule  Commonly known as: VISTARIL   Take 1 capsule by mouth 2 (Two) Times a Day As Needed for Anxiety.      lactulose 10 GM/15ML solution  Commonly known as: CHRONULAC   No dose, route, or frequency recorded.      levothyroxine 100 MCG tablet  Commonly known as: SYNTHROID, LEVOTHROID   TAKE ONE TABLET BY MOUTH DAILY      MAGnesium-Oxide 400 (240 Mg) MG tablet  Generic drug: Magnesium Oxide -Mg Supplement   Daily, HOLD PRIOR TO SURG      mirtazapine 15 MG tablet  Commonly known as: REMERON   15 mg, Oral, Daily      multivitamin capsule   1 capsule, Oral, Daily, HOLD PRIOR TO SURG      nadolol 40 MG tablet  Commonly known as: CORGARD   40 mg, Oral, Every Night at Bedtime      ondansetron ODT 4 MG disintegrating tablet  Commonly known as: ZOFRAN-ODT   4 mg, Translingual, Every 8 Hours PRN      pantoprazole 40 " "MG EC tablet  Commonly known as: PROTONIX   40 mg, Daily      sucralfate 1 g tablet  Commonly known as: CARAFATE   1 g, Oral, 4 Times Daily      thiamine 100 MG tablet  Commonly known as: VITAMIN B1   100 mg, Oral, Daily      vitamin D 1.25 MG (52456 UT) capsule capsule  Commonly known as: ERGOCALCIFEROL  Notes to patient: Take as directed   50,000 Units, Oral, Every 7 Days             Stop These Medications      traZODone 50 MG tablet  Commonly known as: DESYREL              Allergies   Allergen Reactions    Benzonatate Nausea Only and Other (See Comments)     Rash     Ketorolac Tromethamine Other (See Comments)     \"I cannot take because of liver problems\"    Phenergan [Promethazine Hcl] Hives    Cucumber Extract Rash    Promethazine-Phenylephrine Other (See Comments)     \"FEEL WEIRD\"       Discharge Disposition:  Home or Self Care      Discharge Diet:  Diet Order   Procedures    Diet: Regular/House, Gastrointestinal; Fat-Restricted, Low Irritant; Fluid Consistency: Thin (IDDSI 0)       Discharge Activity:       CODE STATUS:    Code Status and Medical Interventions:   Ordered at: 04/01/24 1121     Code Status (Patient has no pulse and is not breathing):    CPR (Attempt to Resuscitate)     Medical Interventions (Patient has pulse or is breathing):    Full Support       Future Appointments   Date Time Provider Department Center   5/8/2024 10:00 AM Giovanni Denise MD [facility]      Follow-up Information       Tylor Davis Follow up on 4/3/2024.    Specialty: Family Medicine  Why: Dr Office will contact Ms Arthur for follow up appointment  Contact information:  [address] [place] [id number] [phone]                             Time Spent on Discharge:  Greater than 55 minutes      AMBAR Trinh  [facility]  04/03/24  14:16 EDT            "

## 2024-04-03 NOTE — CASE MANAGEMENT/SOCIAL WORK
Discharge Planning Assessment  [facility]     Patient Name: Bekah Gibson  MRN: [id number]  Today's Date: 4/3/2024    Admit Date: 4/1/2024    Plan: Home with spouse   Discharge Needs Assessment       Row Name 04/03/24 1139       Living Environment    People in Home alone    Current Living Arrangements home    Potentially Unsafe Housing Conditions none    In the past 12 months has the electric, gas, oil, or water company threatened to shut off services in your home? No    Primary Care Provided by self    Provides Primary Care For no one    Family Caregiver if Needed spouse    Quality of Family Relationships supportive    Able to Return to Prior Arrangements yes       Resource/Environmental Concerns    Resource/Environmental Concerns none       Transportation Needs    In the past 12 months, has lack of transportation kept you from medical appointments or from getting medications? no    In the past 12 months, has lack of transportation kept you from meetings, work, or from getting things needed for daily living? No       Food Insecurity    Within the past 12 months, you worried that your food would run out before you got the money to buy more. Never true    Within the past 12 months, the food you bought just didn't last and you didn't have money to get more. Never true       Transition Planning    Patient/Family Anticipates Transition to home    Patient/Family Anticipated Services at Transition none    Transportation Anticipated family or friend will provide       Discharge Needs Assessment    Readmission Within the Last 30 Days no previous admission in last 30 days    Equipment Currently Used at Home none    Concerns to be Addressed denies needs/concerns at this time    Anticipated Changes Related to Illness none    Equipment Needed After Discharge none    Provided Post Acute Provider List? N/A    Provided Post Acute Provider Quality & Resource List? N/A                   Discharge Plan       Row Name 04/03/24  1140       Plan    Plan Home with spouse    Plan Comments S/W pt at bedside.  Pt was drowsy, but was able to answer questions.  Facesheet info confirmed.  Pt lives in a house w/ 3 PAGE w/ her spouse Jeff.  She does not use any home DME. Pt states she can drive, but Jeff does most of the driving.  No hx of HH or SNF.  Pt plans to return home with her  upon DC and denies any DC needs at this time. .........Suzy RICKS/ [facility]                  Continued Care and Services - Admitted Since 4/1/2024    No active coordination exists for this encounter.       Expected Discharge Date and Time       Expected Discharge Date Expected Discharge Time    Apr 4, 2024            Demographic Summary       Row Name 04/03/24 1138       General Information    Admission Type observation    Arrived From home    Referral Source admission list    Reason for Consult discharge planning    Preferred Language English                   Functional Status       Row Name 04/03/24 1138       Functional Status    Usual Activity Tolerance good    Current Activity Tolerance good       Physical Activity    On average, how many days per week do you engage in moderate to strenuous exercise (like a brisk walk)? 0 days    On average, how many minutes do you engage in exercise at this level? 0 min    Number of minutes of exercise per week 0       Assessment of Health Literacy    How often do you have someone help you read hospital materials? Sometimes       Functional Status, IADL    Medications independent    Meal Preparation independent;assistive person    Housekeeping independent;assistive person    Laundry independent;assistive person    Shopping independent;assistive person       Employment/    Employment Status retired                               Suzy Mathew RN

## 2024-04-03 NOTE — PLAN OF CARE
Goal Outcome Evaluation:  Plan of Care Reviewed With: patient        Progress: improving          Pt alert and oriented x 4, resp even and unlabored, NAD noted. Pt able to accurately recall timeline of events, pleasant mood, appropriate judgment.  Pt verbalized understanding of care plan.

## 2024-04-04 NOTE — OUTREACH NOTE
Prep Survey      Flowsheet Row Responses   [facility] facility patient discharged from? [place]   Is LACE score < 7 ? No   Eligibility Readm Mgmt   Discharge diagnosis Epigastric pain   Does the patient have one of the following disease processes/diagnoses(primary or secondary)? Other   Does the patient have Home health ordered? No   Is there a DME ordered? No   Prep survey completed? Yes            HARITHA ROSAS - Registered Nurse

## 2024-04-09 ENCOUNTER — READMISSION MANAGEMENT (OUTPATIENT)
Dept: CALL CENTER | Facility: HOSPITAL | Age: 54
End: 2024-04-09
Payer: MEDICAID

## 2024-04-09 NOTE — OUTREACH NOTE
Medical Week 1 Survey      Flowsheet Row Responses   [facility] [place] patient discharged from? [place]   Does the patient have one of the following disease processes/diagnoses(primary or secondary)? Other   Week 1 attempt successful? No   Unsuccessful attempts Attempt 1  [attempted pt and spouse]            BRENDA STAHL - Registered Nurse

## 2024-04-12 ENCOUNTER — READMISSION MANAGEMENT (OUTPATIENT)
Dept: CALL CENTER | Facility: HOSPITAL | Age: 54
End: 2024-04-12
Payer: MEDICAID

## 2024-04-12 NOTE — OUTREACH NOTE
Medical Week 1 Survey      Flowsheet Row Responses   [facility] patient discharged from? [place]   Does the patient have one of the following disease processes/diagnoses(primary or secondary)? Other   Week 1 attempt successful? No   Unsuccessful attempts Attempt 2            Loyda BE - Registered Nurse

## 2024-04-17 ENCOUNTER — READMISSION MANAGEMENT (OUTPATIENT)
Dept: CALL CENTER | Facility: HOSPITAL | Age: 54
End: 2024-04-17
Payer: MEDICAID

## 2024-04-17 NOTE — OUTREACH NOTE
Medical Week 1 Survey      Flowsheet Row Responses   [facility] patient discharged from? [place]   Does the patient have one of the following disease processes/diagnoses(primary or secondary)? Other   Week 1 attempt successful? No   Unsuccessful attempts Attempt 3            Marley RAWLS - Registered Nurse

## 2024-04-25 ENCOUNTER — OFFICE VISIT (OUTPATIENT)
Age: 54
End: 2024-04-25
Payer: MEDICAID

## 2024-04-25 VITALS — BODY MASS INDEX: 22.49 KG/M2 | TEMPERATURE: 98.2 F | HEIGHT: 65 IN | WEIGHT: 135 LBS

## 2024-04-25 DIAGNOSIS — M19.011 ARTHRITIS OF RIGHT SHOULDER REGION: Primary | ICD-10-CM

## 2024-04-25 PROCEDURE — 99213 OFFICE O/P EST LOW 20 MIN: CPT | Performed by: NURSE PRACTITIONER

## 2024-04-25 RX ORDER — NALTREXONE HYDROCHLORIDE 50 MG/1
50 TABLET, FILM COATED ORAL DAILY
COMMUNITY
Start: 2024-02-19

## 2024-04-25 RX ORDER — OXYCODONE HYDROCHLORIDE AND ACETAMINOPHEN 5; 325 MG/1; MG/1
1 TABLET ORAL
COMMUNITY
Start: 2024-04-11 | End: 2024-05-11

## 2024-04-25 NOTE — PROGRESS NOTES
"Patient: Bekah Gibson    YOB: 1970    Medical Record Number: [id number]    Chief Complaints:  Right shoulder pain    History of Present Illness:     53 y.o. female patient who presents with a complaint of right shoulder pain. She reports that the symptoms first started many years ago.  Reports her pain has significantly increased over the last 3 months.  Localizes the majority of her pain to the deltoid region.  Pain is severe, constant and aching.  She reports pain is significantly worse with lying on the right side, playing guitar, and certain reaching and lifting motions.  She has tried rest, heat, and pain medications without relief.  She denies any shooting pain down the arm, weakness, numbness or paresthesias.  Of note, she reports her left shoulder is doing well.  She is very pleased with the outcome of her replacement surgery.     Allergies:   Allergies   Allergen Reactions    Benzonatate Nausea Only and Other (See Comments)     Rash     Ketorolac Tromethamine Other (See Comments)     \"I cannot take because of liver problems\"    Phenergan [Promethazine Hcl] Hives    Cucumber Extract Rash    Promethazine-Phenylephrine Other (See Comments)     \"FEEL WEIRD\"       Home Medications:    Current Outpatient Medications:     cephalexin (KEFLEX) 500 MG capsule, Take 1 capsule by mouth Daily., Disp: , Rfl:     ferrous sulfate 325 (65 FE) MG tablet, Take 1 tablet by mouth Daily With Breakfast., Disp: , Rfl:     FLUoxetine (PROzac) 20 MG capsule, Take 2 capsules by mouth Daily., Disp: , Rfl:     folic acid (FOLVITE) 1 MG tablet, Take 1 tablet by mouth Daily., Disp: , Rfl:     hydrOXYzine pamoate (VISTARIL) 50 MG capsule, Take 1 capsule by mouth 2 (Two) Times a Day As Needed for Anxiety., Disp: , Rfl:     lactulose (CHRONULAC) 10 GM/15ML solution, , Disp: , Rfl:     levothyroxine (SYNTHROID, LEVOTHROID) 100 MCG tablet, TAKE ONE TABLET BY MOUTH DAILY, Disp: 30 tablet, Rfl: 3    MAGnesium-Oxide 400 (240 Mg) " MG tablet, Daily. HOLD PRIOR TO SURG, Disp: , Rfl:     mirtazapine (REMERON) 15 MG tablet, Take 1 tablet by mouth Daily., Disp: , Rfl:     Multiple Vitamin (MULTIVITAMIN) capsule, Take 1 capsule by mouth Daily. HOLD PRIOR TO SURG, Disp: , Rfl:     nadolol (CORGARD) 40 MG tablet, Take 1 tablet by mouth every night at bedtime., Disp: , Rfl:     oxyCODONE-acetaminophen (PERCOCET) 5-325 MG per tablet, Take 1 tablet by mouth., Disp: , Rfl:     pancrelipase, Lip-Prot-Amyl, (CREON) 89385-49265 units capsule delayed-release particles capsule, Take 2 capsules by mouth 3 (Three) Times a Day With Meals., Disp: 180 capsule, Rfl: 0    pantoprazole (PROTONIX) 40 MG EC tablet, 1 tablet Daily., Disp: , Rfl:     sucralfate (CARAFATE) 1 g tablet, Take 1 tablet by mouth 4 (Four) Times a Day., Disp: , Rfl:     thiamine (VITAMIN B1) 100 MG tablet, Take 1 tablet by mouth Daily., Disp: 30 tablet, Rfl: 0    vitamin D (ERGOCALCIFEROL) 1.25 MG (32112 UT) capsule capsule, Take 1 capsule by mouth Every 7 (Seven) Days., Disp: , Rfl:     naltrexone (DEPADE) 50 MG tablet, Take 1 tablet by mouth Daily. (Patient not taking: Reported on 4/25/2024), Disp: , Rfl:     ondansetron ODT (ZOFRAN-ODT) 4 MG disintegrating tablet, Place 1 tablet on the tongue Every 8 (Eight) Hours As Needed for Nausea or Vomiting. (Patient not taking: Reported on 4/25/2024), Disp: 10 tablet, Rfl: 0  No current facility-administered medications for this visit.    Facility-Administered Medications Ordered in Other Visits:     sodium chloride 0.9 % flush 10 mL, 10 mL, Intravenous, Q12H, Callie Quiroz MD    sodium chloride 0.9 % flush 10 mL, 10 mL, Intravenous, PRN, Callie Quiroz MD    sodium chloride 0.9 % flush 20 mL, 20 mL, Intravenous, PRN, Callie Quiroz MD    Past Medical History:   Diagnosis Date    Acromioclavicular separation 1/2021    Ankle sprain 2/2004    no operation necessary  At Time unsure    Anxiety     Arthritis      Arthritis of back Unsure    Diseased    Cirrhosis     Disease of thyroid gland     ETOH abuse     SOBER FOR 3 MONTHS    Fracture, clavicle 1/2021    shattered clavicel on issue slip and fall    Fracture, foot Unsure    Frozen shoulder 1 /2009    4    GERD (gastroesophageal reflux disease)     History of kidney stones     5 YR AGO    Hypertension     Left shoulder pain     Low back strain 1/21    Lumbosacral disc disease 1/21    MDD (major depressive disorder)     Neck strain Unsure    Pancreatitis     Periarthritis of shoulder see above    Rotator cuff syndrome odre for shoulder replacement    unable to receive due to low platelets    Tennis elbow Unsure    Unsurpassed    Thrombocytopenia        Past Surgical History:   Procedure Laterality Date    CLAVICLE SURGERY Right 2018    ENDOSCOPY N/A 10/26/2022    Procedure: ESOPHAGOGASTRODUODENOSCOPY wtih banding;  Surgeon: Ag Patel MD;  Location: [facility] ENDOSCOPY;  Service: Gastroenterology;  Laterality: N/A;  pre: melena  post: esophageal varicies with banding x2 bands    ENDOSCOPY N/A 01/18/2023    Procedure: ESOPHAGOGASTRODUODENOSCOPY;  Surgeon: gA Patel MD;  Location: [facility] ENDOSCOPY;  Service: Gastroenterology;  Laterality: N/A;  PRE OP - MAROON STOOLS  POST OP - SM ESOPHAGEAL VARICES    ESOPHAGEAL VARICES LIGATION      FOOT SURGERY  2/2/14    JOINT REPLACEMENT Bilateral     GREAT TOE    KIDNEY STONE SURGERY      LITHOTRIPSY AND STENT (R SIDE)    LAPAROSCOPIC TUBAL LIGATION  2005    NECK SURGERY  3/07    Not sure of dates    SIGMOIDOSCOPY N/A 01/18/2023    Procedure: SIGMOIDOSCOPY FLEXIBLE;  Surgeon: Ag Patel MD;  Location: [facility] ENDOSCOPY;  Service: Gastroenterology;  Laterality: N/A;  PRE OP - MAROON STOOLS  POST OP - RECTAL VARICES    TOTAL SHOULDER ARTHROPLASTY Left 05/09/2023    Procedure: reverse total shoulder arthroplasty;  Surgeon: Giovanni Denise MD;  Location:  [facility];  Service: Orthopedics;  Laterality: Left;       Social  History     Occupational History    Not on file   Tobacco Use    Smoking status: Every Day     Current packs/day: 0.25     Average packs/day: 0.3 packs/day for 15.0 years (3.8 ttl pk-yrs)     Types: Cigarettes     Passive exposure: Current    Smokeless tobacco: Never    Tobacco comments:     Rarely smoke sometimes will to   Vaping Use    Vaping status: Never Used   Substance and Sexual Activity    Alcohol use: Not Currently     Alcohol/week: 5.0 - 10.0 standard drinks of alcohol     Types: 5 - 10 Shots of liquor per week     Comment: 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    Drug use: Not Currently    Sexual activity: Not Currently     Partners: Male     Birth control/protection: Post-menopausal, Natural family planning/Rhythm     Comment: cinthia- menepausal      Social History     Social History Narrative    Not on file       Family History   Problem Relation Age of Onset    Rheumatologic disease Mother         congestive heart diseased    Osteoporosis Mother             Thyroid disease Father     Leukemia Father     Cancer Father         Leukemia  deseased     Broken bones Father     Clotting disorder Father     Drug abuse Brother     Malig Hyperthermia Neg Hx        Review of Systems:      Constitutional: Denies fever, shaking or chills   Eyes: Denies change in visual acuity   HEENT: Denies nasal congestion or sore throat   Respiratory: Denies cough or shortness of breath   Cardiovascular: Denies chest pain or edema  Endocrine: Denies tremors, palpitations, intolerance of heat or cold, polyuria, polydipsia.  GI: Denies abdominal pain, nausea, vomiting, bloody stools or diarrhea  : Denies frequency, urgency, incontinence, retention, or nocturia.  Musculoskeletal: Denies numbness, tingling or loss of motor function   Integument: Denies rash, lesion or ulceration   Neurologic: Denies headache or focal weakness, deficits  Heme: Denies spontaneous or excessive bleeding, epistaxis, hematuria, melena, fatigue, enlarged or  "tender lymph nodes.      All other pertinent positives and negatives as noted above in HPI.    Physical Exam:   53 y.o. female  Vitals:    04/25/24 1036   Temp: 98.2 °F (36.8 °C)   Weight: 61.2 kg (135 lb)   Height: 165.1 cm (65\")       General:  Patient is awake and alert.  Appears in no acute distress or discomfort.    Psych:  Affect and demeanor are appropriate.    Eyes:  Conjunctiva and sclera appear grossly normal.  Eyes track well and EOM seem to be intact.    Ears:  No gross abnormalities.  Hearing adequate for the exam.    Cardiovascular:  Regular rate and rhythm.    Lungs:  Good chest expansion.  Breathing unlabored.    Spine:  Neck appears grossly normal.  No palpable masses or adenopathy.  Good motion.  Spurling's maneuver is negative for any shoulder or arm symptoms.    Extremities:  Right shoulder is examined.  Skin is benign.  No obvious gross abnormalities.  No palpable masses or adenopathy.  Moderate tenderness noted over anterior glenohumeral joint and rotator interval.  Motion is to 165° of forward elevation, 65° external rotation, 120° horizontal abduction, internal rotation to back pocket.  No evident instability.  Rotator cuff strength seems to be well preserved but the exam is limited due to discomfort with resisted active motion.  Good motor and sensory function in her lower arm and hand.  Palpable radial pulse.  Brisk capillary refill.         Radiology:  AP, scapular Y, and axillary views of the right shoulder are ordered by myself and reviewed to evaluate the patient's complaint.  No comparison films are immediately available.  The x-rays show significant glenohumeral osteoarthritis with joint space narrowing, osteophyte formation, and subchondral sclerosis.  The acromiohumeral interval measures normal.  Evidence of previous clavicle fracture consistent with her history.     Assessment/Plan:  Right shoulder osteoarthritis    We discussed treatment options in detail including conservative " treatment versus surgical options.  Regarding conservative treatment, we discussed appropriate activity modifications, anti-inflammatories, injections, and physical therapy.  We also discussed the option of an arthroplasty and all that would entail.  I have recommended that we start with a conservative approach and the patient agrees.    She has acknowledged understanding of the information.  She has an upcoming appointment with Dr. Denise for left shoulder annual follow up.  She would like to discuss possible shoulder replacement surgery for the right shoulder.  If she decides to wait on surgery, she may ask Dr. Denise to give her a cortisone injection during that visit.  I have given her a prescription for a transdermal pain/anti-inflammatory cream through [facility].  The risks and dosing instructions for this medication were discussed.  Going forward, patient will follow-up with me on an as-needed basis.    AMBAR Greene    04/25/2024    CC to Tylor Davis    Much of this encounter note is an electronic transcription/translation of spoken language to printed text. The electronic translation of spoken language may permit erroneous, or at times, nonsensical words or phrases to be inadvertently transcribed.  Although I have reviewed the note for such errors, some may still exist.      Answers submitted by the patient for this visit:  Primary Reason for Visit (Submitted on 4/24/2024)  What is the primary reason for your visit?: Extremity Pain  Lower Extremity Injury Questionnaire (Submitted on 4/24/2024)  Chief Complaint: Extremity pain  Injury: No  Pain location: right arm  Pain quality: aching  Pain - numeric: 6/10  Progression since onset: worse  tingling: No  numbness: Yes  difficulty holding things: Yes  upper extremity swelling: No  redness: No  Additional information: May be caused by repeated movement of right hand ie dina mccormack

## 2024-04-26 ENCOUNTER — TELEPHONE (OUTPATIENT)
Dept: ORTHOPEDIC SURGERY | Facility: CLINIC | Age: 54
End: 2024-04-26

## 2024-04-26 NOTE — TELEPHONE ENCOUNTER
Caller: Bekah Gibson    Relationship: Self    Best call back number: [phone] (home)       Requested Prescriptions: COMPOUND CREAM   Requested Prescriptions      No prescriptions requested or ordered in this encounter        Pharmacy where request should be sent:  [facility]    Last office visit with prescribing clinician: 4/25/2024   Last telemedicine visit with prescribing clinician: Visit date not found   Next office visit with prescribing clinician: Visit date not found         Arsenio Coelho Rep   04/26/24 14:59 EDT

## 2024-05-08 ENCOUNTER — OFFICE VISIT (OUTPATIENT)
Dept: ORTHOPEDIC SURGERY | Facility: CLINIC | Age: 54
End: 2024-05-08
Payer: MEDICAID

## 2024-05-08 VITALS — WEIGHT: 140.3 LBS | TEMPERATURE: 98.6 F | BODY MASS INDEX: 23.38 KG/M2 | HEIGHT: 65 IN

## 2024-05-08 DIAGNOSIS — M25.511 ACUTE PAIN OF RIGHT SHOULDER: ICD-10-CM

## 2024-05-08 DIAGNOSIS — M25.512 ACUTE PAIN OF LEFT SHOULDER: ICD-10-CM

## 2024-05-08 DIAGNOSIS — Z96.612 H/O TOTAL SHOULDER REPLACEMENT, LEFT: ICD-10-CM

## 2024-05-08 DIAGNOSIS — Z09 SURGERY FOLLOW-UP: ICD-10-CM

## 2024-05-08 PROCEDURE — 1159F MED LIST DOCD IN RCRD: CPT | Performed by: ORTHOPAEDIC SURGERY

## 2024-05-08 PROCEDURE — 73030 X-RAY EXAM OF SHOULDER: CPT | Performed by: ORTHOPAEDIC SURGERY

## 2024-05-08 PROCEDURE — 99213 OFFICE O/P EST LOW 20 MIN: CPT | Performed by: ORTHOPAEDIC SURGERY

## 2024-05-08 PROCEDURE — 20610 DRAIN/INJ JOINT/BURSA W/O US: CPT | Performed by: ORTHOPAEDIC SURGERY

## 2024-05-08 PROCEDURE — 1160F RVW MEDS BY RX/DR IN RCRD: CPT | Performed by: ORTHOPAEDIC SURGERY

## 2024-05-08 RX ORDER — METHYLPREDNISOLONE ACETATE 80 MG/ML
80 INJECTION, SUSPENSION INTRA-ARTICULAR; INTRALESIONAL; INTRAMUSCULAR; SOFT TISSUE
Status: COMPLETED | OUTPATIENT
Start: 2024-05-08 | End: 2024-05-08

## 2024-05-08 RX ORDER — LIDOCAINE HYDROCHLORIDE 10 MG/ML
2 INJECTION, SOLUTION EPIDURAL; INFILTRATION; INTRACAUDAL; PERINEURAL
Status: COMPLETED | OUTPATIENT
Start: 2024-05-08 | End: 2024-05-08

## 2024-05-08 RX ORDER — FLUOXETINE HYDROCHLORIDE 40 MG/1
40 CAPSULE ORAL DAILY
COMMUNITY
Start: 2024-04-01

## 2024-05-08 RX ORDER — AMLODIPINE BESYLATE 2.5 MG/1
TABLET ORAL
COMMUNITY
Start: 2024-04-30

## 2024-05-08 RX ADMIN — LIDOCAINE HYDROCHLORIDE 2 ML: 10 INJECTION, SOLUTION EPIDURAL; INFILTRATION; INTRACAUDAL; PERINEURAL at 10:41

## 2024-05-08 RX ADMIN — METHYLPREDNISOLONE ACETATE 80 MG: 80 INJECTION, SUSPENSION INTRA-ARTICULAR; INTRALESIONAL; INTRAMUSCULAR; SOFT TISSUE at 10:41

## 2024-05-10 DIAGNOSIS — Z79.2 NEED FOR ANTIBIOTIC PROPHYLAXIS FOR DENTAL PROCEDURE: Primary | ICD-10-CM

## 2024-05-10 RX ORDER — CEPHALEXIN 500 MG/1
CAPSULE ORAL
Qty: 4 CAPSULE | Refills: 0 | Status: SHIPPED | OUTPATIENT
Start: 2024-05-10

## 2024-07-03 ENCOUNTER — HOSPITAL ENCOUNTER (OUTPATIENT)
Facility: HOSPITAL | Age: 54
Setting detail: OBSERVATION
LOS: 1 days | Discharge: HOME | End: 2024-07-05
Attending: EMERGENCY MEDICINE | Admitting: STUDENT IN AN ORGANIZED HEALTH CARE EDUCATION/TRAINING PROGRAM
Payer: MEDICAID

## 2024-07-03 ENCOUNTER — APPOINTMENT (OUTPATIENT)
Dept: CT IMAGING | Facility: HOSPITAL | Age: 54
End: 2024-07-03
Payer: MEDICAID

## 2024-07-03 DIAGNOSIS — K85.20 ALCOHOL-INDUCED ACUTE PANCREATITIS, UNSPECIFIED COMPLICATION STATUS: Primary | ICD-10-CM

## 2024-07-03 DIAGNOSIS — K70.30 ALCOHOLIC CIRRHOSIS, UNSPECIFIED WHETHER ASCITES PRESENT: ICD-10-CM

## 2024-07-03 DIAGNOSIS — K85.20 ALCOHOL-INDUCED ACUTE PANCREATITIS WITHOUT INFECTION OR NECROSIS: ICD-10-CM

## 2024-07-03 PROBLEM — F10.939 ALCOHOL WITHDRAWAL: Status: ACTIVE | Noted: 2024-07-03

## 2024-07-03 PROBLEM — K85.90 ACUTE PANCREATITIS: Status: ACTIVE | Noted: 2024-07-03

## 2024-07-03 LAB
ALBUMIN SERPL-MCNC: 4.2 G/DL (ref 3.5–5.2)
ALBUMIN/GLOB SERPL: 1.3 G/DL
ALP SERPL-CCNC: 145 U/L (ref 39–117)
ALT SERPL W P-5'-P-CCNC: 54 U/L (ref 1–33)
ANION GAP SERPL CALCULATED.3IONS-SCNC: 13.7 MMOL/L (ref 5–15)
AST SERPL-CCNC: 97 U/L (ref 1–32)
BACTERIA UR QL AUTO: ABNORMAL /HPF
BASOPHILS # BLD AUTO: 0.03 10*3/MM3 (ref 0–0.2)
BASOPHILS NFR BLD AUTO: 0.9 % (ref 0–1.5)
BILIRUB SERPL-MCNC: 1.2 MG/DL (ref 0–1.2)
BILIRUB UR QL STRIP: NEGATIVE
BUN SERPL-MCNC: 6 MG/DL (ref 6–20)
BUN/CREAT SERPL: 11.3 (ref 7–25)
CALCIUM SPEC-SCNC: 9.5 MG/DL (ref 8.6–10.5)
CHLORIDE SERPL-SCNC: 105 MMOL/L (ref 98–107)
CLARITY UR: CLEAR
CO2 SERPL-SCNC: 21.3 MMOL/L (ref 22–29)
COLOR UR: YELLOW
CREAT SERPL-MCNC: 0.53 MG/DL (ref 0.57–1)
D-LACTATE SERPL-SCNC: 1.6 MMOL/L (ref 0.5–2)
DEPRECATED RDW RBC AUTO: 46.8 FL (ref 37–54)
EGFRCR SERPLBLD CKD-EPI 2021: 108.7 ML/MIN/1.73
EOSINOPHIL # BLD AUTO: 0.11 10*3/MM3 (ref 0–0.4)
EOSINOPHIL NFR BLD AUTO: 3.2 % (ref 0.3–6.2)
ERYTHROCYTE [DISTWIDTH] IN BLOOD BY AUTOMATED COUNT: 13.9 % (ref 12.3–15.4)
ETHANOL BLD-MCNC: <10 MG/DL (ref 0–10)
ETHANOL UR QL: <0.01 %
GLOBULIN UR ELPH-MCNC: 3.2 GM/DL
GLUCOSE SERPL-MCNC: 156 MG/DL (ref 65–99)
GLUCOSE UR STRIP-MCNC: NEGATIVE MG/DL
HCT VFR BLD AUTO: 46.6 % (ref 34–46.6)
HGB BLD-MCNC: 15.8 G/DL (ref 12–15.9)
HGB UR QL STRIP.AUTO: NEGATIVE
HOLD SPECIMEN: NORMAL
HOLD SPECIMEN: NORMAL
HYALINE CASTS UR QL AUTO: ABNORMAL /LPF
IMM GRANULOCYTES # BLD AUTO: 0.01 10*3/MM3 (ref 0–0.05)
IMM GRANULOCYTES NFR BLD AUTO: 0.3 % (ref 0–0.5)
KETONES UR QL STRIP: NEGATIVE
LEUKOCYTE ESTERASE UR QL STRIP.AUTO: ABNORMAL
LIPASE SERPL-CCNC: 30 U/L (ref 13–60)
LYMPHOCYTES # BLD AUTO: 0.85 10*3/MM3 (ref 0.7–3.1)
LYMPHOCYTES NFR BLD AUTO: 24.7 % (ref 19.6–45.3)
MAGNESIUM SERPL-MCNC: 1.5 MG/DL (ref 1.6–2.6)
MCH RBC QN AUTO: 31 PG (ref 26.6–33)
MCHC RBC AUTO-ENTMCNC: 33.9 G/DL (ref 31.5–35.7)
MCV RBC AUTO: 91.4 FL (ref 79–97)
MONOCYTES # BLD AUTO: 0.46 10*3/MM3 (ref 0.1–0.9)
MONOCYTES NFR BLD AUTO: 13.4 % (ref 5–12)
NEUTROPHILS NFR BLD AUTO: 1.98 10*3/MM3 (ref 1.7–7)
NEUTROPHILS NFR BLD AUTO: 57.5 % (ref 42.7–76)
NITRITE UR QL STRIP: NEGATIVE
PH UR STRIP.AUTO: 6 [PH] (ref 5–8)
PLATELET # BLD AUTO: 66 10*3/MM3 (ref 140–450)
PMV BLD AUTO: 11.6 FL (ref 6–12)
POTASSIUM SERPL-SCNC: 3.7 MMOL/L (ref 3.5–5.2)
PROT SERPL-MCNC: 7.4 G/DL (ref 6–8.5)
PROT UR QL STRIP: NEGATIVE
RBC # BLD AUTO: 5.1 10*6/MM3 (ref 3.77–5.28)
RBC # UR STRIP: ABNORMAL /HPF
REF LAB TEST METHOD: ABNORMAL
SODIUM SERPL-SCNC: 140 MMOL/L (ref 136–145)
SP GR UR STRIP: 1.01 (ref 1–1.03)
SQUAMOUS #/AREA URNS HPF: ABNORMAL /HPF
UROBILINOGEN UR QL STRIP: ABNORMAL
WBC # UR STRIP: ABNORMAL /HPF
WBC NRBC COR # BLD AUTO: 3.44 10*3/MM3 (ref 3.4–10.8)
WHOLE BLOOD HOLD COAG: NORMAL
WHOLE BLOOD HOLD SPECIMEN: NORMAL

## 2024-07-03 PROCEDURE — 25510000001 IOPAMIDOL 61 % SOLUTION: Performed by: EMERGENCY MEDICINE

## 2024-07-03 PROCEDURE — 96375 TX/PRO/DX INJ NEW DRUG ADDON: CPT

## 2024-07-03 PROCEDURE — 96366 THER/PROPH/DIAG IV INF ADDON: CPT

## 2024-07-03 PROCEDURE — 25810000003 SODIUM CHLORIDE 0.9 % SOLUTION: Performed by: EMERGENCY MEDICINE

## 2024-07-03 PROCEDURE — 25010000002 HYDROMORPHONE PER 4 MG: Performed by: STUDENT IN AN ORGANIZED HEALTH CARE EDUCATION/TRAINING PROGRAM

## 2024-07-03 PROCEDURE — G0378 HOSPITAL OBSERVATION PER HR: HCPCS

## 2024-07-03 PROCEDURE — 90791 PSYCH DIAGNOSTIC EVALUATION: CPT

## 2024-07-03 PROCEDURE — 82077 ASSAY SPEC XCP UR&BREATH IA: CPT | Performed by: EMERGENCY MEDICINE

## 2024-07-03 PROCEDURE — 96376 TX/PRO/DX INJ SAME DRUG ADON: CPT

## 2024-07-03 PROCEDURE — 96361 HYDRATE IV INFUSION ADD-ON: CPT

## 2024-07-03 PROCEDURE — 81001 URINALYSIS AUTO W/SCOPE: CPT | Performed by: EMERGENCY MEDICINE

## 2024-07-03 PROCEDURE — 85025 COMPLETE CBC W/AUTO DIFF WBC: CPT | Performed by: EMERGENCY MEDICINE

## 2024-07-03 PROCEDURE — 83605 ASSAY OF LACTIC ACID: CPT | Performed by: EMERGENCY MEDICINE

## 2024-07-03 PROCEDURE — 25010000002 PHENOBARBITAL PER 120 MG: Performed by: STUDENT IN AN ORGANIZED HEALTH CARE EDUCATION/TRAINING PROGRAM

## 2024-07-03 PROCEDURE — 99285 EMERGENCY DEPT VISIT HI MDM: CPT

## 2024-07-03 PROCEDURE — 83735 ASSAY OF MAGNESIUM: CPT | Performed by: EMERGENCY MEDICINE

## 2024-07-03 PROCEDURE — 25010000002 MORPHINE PER 10 MG: Performed by: EMERGENCY MEDICINE

## 2024-07-03 PROCEDURE — 96365 THER/PROPH/DIAG IV INF INIT: CPT

## 2024-07-03 PROCEDURE — 25010000002 ONDANSETRON PER 1 MG: Performed by: EMERGENCY MEDICINE

## 2024-07-03 PROCEDURE — 80053 COMPREHEN METABOLIC PANEL: CPT | Performed by: EMERGENCY MEDICINE

## 2024-07-03 PROCEDURE — 25010000002 THIAMINE HCL 200 MG/2ML SOLUTION: Performed by: EMERGENCY MEDICINE

## 2024-07-03 PROCEDURE — 74177 CT ABD & PELVIS W/CONTRAST: CPT

## 2024-07-03 PROCEDURE — 96367 TX/PROPH/DG ADDL SEQ IV INF: CPT

## 2024-07-03 PROCEDURE — 25010000002 MAGNESIUM SULFATE 2 GM/50ML SOLUTION: Performed by: PHYSICIAN ASSISTANT

## 2024-07-03 PROCEDURE — 83690 ASSAY OF LIPASE: CPT | Performed by: EMERGENCY MEDICINE

## 2024-07-03 RX ORDER — MIDAZOLAM HYDROCHLORIDE 1 MG/ML
2 INJECTION INTRAMUSCULAR; INTRAVENOUS
Status: DISCONTINUED | OUTPATIENT
Start: 2024-07-03 | End: 2024-07-05 | Stop reason: HOSPADM

## 2024-07-03 RX ORDER — PHENOBARBITAL SODIUM 65 MG/ML
65 INJECTION, SOLUTION INTRAMUSCULAR; INTRAVENOUS ONCE
Status: COMPLETED | OUTPATIENT
Start: 2024-07-04 | End: 2024-07-04

## 2024-07-03 RX ORDER — MIRTAZAPINE 15 MG/1
15 TABLET, FILM COATED ORAL NIGHTLY PRN
Status: DISCONTINUED | OUTPATIENT
Start: 2024-07-03 | End: 2024-07-05 | Stop reason: HOSPADM

## 2024-07-03 RX ORDER — FAMOTIDINE 10 MG/ML
20 INJECTION, SOLUTION INTRAVENOUS ONCE
Status: DISCONTINUED | OUTPATIENT
Start: 2024-07-03 | End: 2024-07-03

## 2024-07-03 RX ORDER — ONDANSETRON 4 MG/1
4 TABLET, ORALLY DISINTEGRATING ORAL EVERY 6 HOURS PRN
Status: DISCONTINUED | OUTPATIENT
Start: 2024-07-03 | End: 2024-07-05 | Stop reason: HOSPADM

## 2024-07-03 RX ORDER — ACETAMINOPHEN 325 MG/1
650 TABLET ORAL EVERY 4 HOURS PRN
Status: DISCONTINUED | OUTPATIENT
Start: 2024-07-03 | End: 2024-07-05 | Stop reason: HOSPADM

## 2024-07-03 RX ORDER — DIPHENOXYLATE HYDROCHLORIDE AND ATROPINE SULFATE 2.5; .025 MG/1; MG/1
1 TABLET ORAL DAILY
Status: DISCONTINUED | OUTPATIENT
Start: 2024-07-03 | End: 2024-07-05 | Stop reason: HOSPADM

## 2024-07-03 RX ORDER — PHENOBARBITAL 32.4 MG/1
32.4 TABLET ORAL ONCE
Status: DISCONTINUED | OUTPATIENT
Start: 2024-07-06 | End: 2024-07-05 | Stop reason: HOSPADM

## 2024-07-03 RX ORDER — FLUOXETINE HYDROCHLORIDE 20 MG/1
40 CAPSULE ORAL DAILY
Status: DISCONTINUED | OUTPATIENT
Start: 2024-07-03 | End: 2024-07-05 | Stop reason: HOSPADM

## 2024-07-03 RX ORDER — BISACODYL 10 MG
10 SUPPOSITORY, RECTAL RECTAL DAILY PRN
Status: DISCONTINUED | OUTPATIENT
Start: 2024-07-03 | End: 2024-07-05 | Stop reason: HOSPADM

## 2024-07-03 RX ORDER — SODIUM CHLORIDE 9 MG/ML
125 INJECTION, SOLUTION INTRAVENOUS CONTINUOUS
Status: DISCONTINUED | OUTPATIENT
Start: 2024-07-03 | End: 2024-07-04

## 2024-07-03 RX ORDER — MIDAZOLAM HYDROCHLORIDE 1 MG/ML
4 INJECTION INTRAMUSCULAR; INTRAVENOUS
Status: DISCONTINUED | OUTPATIENT
Start: 2024-07-03 | End: 2024-07-05 | Stop reason: HOSPADM

## 2024-07-03 RX ORDER — PANTOPRAZOLE SODIUM 40 MG/10ML
40 INJECTION, POWDER, LYOPHILIZED, FOR SOLUTION INTRAVENOUS
Status: DISCONTINUED | OUTPATIENT
Start: 2024-07-03 | End: 2024-07-05 | Stop reason: HOSPADM

## 2024-07-03 RX ORDER — CHLORDIAZEPOXIDE HYDROCHLORIDE 25 MG/1
25 CAPSULE, GELATIN COATED ORAL ONCE
Status: COMPLETED | OUTPATIENT
Start: 2024-07-03 | End: 2024-07-03

## 2024-07-03 RX ORDER — MORPHINE SULFATE 2 MG/ML
4 INJECTION, SOLUTION INTRAMUSCULAR; INTRAVENOUS ONCE
Status: COMPLETED | OUTPATIENT
Start: 2024-07-03 | End: 2024-07-03

## 2024-07-03 RX ORDER — AMLODIPINE BESYLATE 2.5 MG/1
2.5 TABLET ORAL
Status: DISCONTINUED | OUTPATIENT
Start: 2024-07-03 | End: 2024-07-05 | Stop reason: HOSPADM

## 2024-07-03 RX ORDER — ONDANSETRON 2 MG/ML
4 INJECTION INTRAMUSCULAR; INTRAVENOUS ONCE
Status: COMPLETED | OUTPATIENT
Start: 2024-07-03 | End: 2024-07-03

## 2024-07-03 RX ORDER — FAMOTIDINE 10 MG/ML
20 INJECTION, SOLUTION INTRAVENOUS ONCE
Status: COMPLETED | OUTPATIENT
Start: 2024-07-03 | End: 2024-07-03

## 2024-07-03 RX ORDER — SODIUM CHLORIDE 0.9 % (FLUSH) 0.9 %
10 SYRINGE (ML) INJECTION AS NEEDED
Status: DISCONTINUED | OUTPATIENT
Start: 2024-07-03 | End: 2024-07-05 | Stop reason: HOSPADM

## 2024-07-03 RX ORDER — FOLIC ACID 1 MG/1
1 TABLET ORAL DAILY
Status: DISCONTINUED | OUTPATIENT
Start: 2024-07-03 | End: 2024-07-05 | Stop reason: HOSPADM

## 2024-07-03 RX ORDER — MORPHINE SULFATE 2 MG/ML
2 INJECTION, SOLUTION INTRAMUSCULAR; INTRAVENOUS ONCE
Status: COMPLETED | OUTPATIENT
Start: 2024-07-03 | End: 2024-07-03

## 2024-07-03 RX ORDER — HYDROMORPHONE HYDROCHLORIDE 1 MG/ML
0.5 INJECTION, SOLUTION INTRAMUSCULAR; INTRAVENOUS; SUBCUTANEOUS
Status: DISCONTINUED | OUTPATIENT
Start: 2024-07-03 | End: 2024-07-05 | Stop reason: HOSPADM

## 2024-07-03 RX ORDER — ONDANSETRON 2 MG/ML
4 INJECTION INTRAMUSCULAR; INTRAVENOUS EVERY 6 HOURS PRN
Status: DISCONTINUED | OUTPATIENT
Start: 2024-07-03 | End: 2024-07-05 | Stop reason: HOSPADM

## 2024-07-03 RX ORDER — MIDAZOLAM HYDROCHLORIDE 5 MG/ML
4 INJECTION INTRAMUSCULAR; INTRAVENOUS
Status: DISCONTINUED | OUTPATIENT
Start: 2024-07-03 | End: 2024-07-05 | Stop reason: HOSPADM

## 2024-07-03 RX ORDER — MAGNESIUM SULFATE HEPTAHYDRATE 40 MG/ML
2 INJECTION, SOLUTION INTRAVENOUS ONCE
Status: COMPLETED | OUTPATIENT
Start: 2024-07-03 | End: 2024-07-03

## 2024-07-03 RX ORDER — LEVOTHYROXINE SODIUM 0.1 MG/1
100 TABLET ORAL DAILY
Status: DISCONTINUED | OUTPATIENT
Start: 2024-07-04 | End: 2024-07-05 | Stop reason: HOSPADM

## 2024-07-03 RX ORDER — NICOTINE 21 MG/24HR
1 PATCH, TRANSDERMAL 24 HOURS TRANSDERMAL EVERY 24 HOURS
Status: DISCONTINUED | OUTPATIENT
Start: 2024-07-03 | End: 2024-07-05 | Stop reason: HOSPADM

## 2024-07-03 RX ORDER — SODIUM CHLORIDE 0.9 % (FLUSH) 0.9 %
10 SYRINGE (ML) INJECTION EVERY 12 HOURS SCHEDULED
Status: DISCONTINUED | OUTPATIENT
Start: 2024-07-03 | End: 2024-07-05 | Stop reason: HOSPADM

## 2024-07-03 RX ORDER — SODIUM CHLORIDE 9 MG/ML
40 INJECTION, SOLUTION INTRAVENOUS AS NEEDED
Status: DISCONTINUED | OUTPATIENT
Start: 2024-07-03 | End: 2024-07-05 | Stop reason: HOSPADM

## 2024-07-03 RX ORDER — SODIUM CHLORIDE 9 MG/ML
1000 INJECTION, SOLUTION INTRAVENOUS ONCE
Status: COMPLETED | OUTPATIENT
Start: 2024-07-03 | End: 2024-07-03

## 2024-07-03 RX ORDER — PHENOBARBITAL 32.4 MG/1
32.4 TABLET ORAL ONCE
Status: DISCONTINUED | OUTPATIENT
Start: 2024-07-05 | End: 2024-07-05 | Stop reason: HOSPADM

## 2024-07-03 RX ORDER — BISACODYL 5 MG/1
5 TABLET, DELAYED RELEASE ORAL DAILY PRN
Status: DISCONTINUED | OUTPATIENT
Start: 2024-07-03 | End: 2024-07-05 | Stop reason: HOSPADM

## 2024-07-03 RX ORDER — LACTULOSE 10 G/15ML
10 SOLUTION ORAL DAILY
Status: DISCONTINUED | OUTPATIENT
Start: 2024-07-03 | End: 2024-07-05 | Stop reason: HOSPADM

## 2024-07-03 RX ORDER — PHENOBARBITAL 32.4 MG/1
32.4 TABLET ORAL ONCE
Status: COMPLETED | OUTPATIENT
Start: 2024-07-05 | End: 2024-07-05

## 2024-07-03 RX ORDER — POLYETHYLENE GLYCOL 3350 17 G/17G
17 POWDER, FOR SOLUTION ORAL DAILY PRN
Status: DISCONTINUED | OUTPATIENT
Start: 2024-07-03 | End: 2024-07-05 | Stop reason: HOSPADM

## 2024-07-03 RX ORDER — ACETAMINOPHEN 160 MG/5ML
650 SOLUTION ORAL EVERY 4 HOURS PRN
Status: DISCONTINUED | OUTPATIENT
Start: 2024-07-03 | End: 2024-07-05 | Stop reason: HOSPADM

## 2024-07-03 RX ORDER — AMOXICILLIN 250 MG
2 CAPSULE ORAL 2 TIMES DAILY PRN
Status: DISCONTINUED | OUTPATIENT
Start: 2024-07-03 | End: 2024-07-05 | Stop reason: HOSPADM

## 2024-07-03 RX ORDER — LORAZEPAM 1 MG/1
2 TABLET ORAL
Status: DISCONTINUED | OUTPATIENT
Start: 2024-07-03 | End: 2024-07-05 | Stop reason: HOSPADM

## 2024-07-03 RX ORDER — ACETAMINOPHEN 650 MG/1
650 SUPPOSITORY RECTAL EVERY 4 HOURS PRN
Status: DISCONTINUED | OUTPATIENT
Start: 2024-07-03 | End: 2024-07-05 | Stop reason: HOSPADM

## 2024-07-03 RX ORDER — NALOXONE HCL 0.4 MG/ML
0.4 VIAL (ML) INJECTION
Status: DISCONTINUED | OUTPATIENT
Start: 2024-07-03 | End: 2024-07-05 | Stop reason: HOSPADM

## 2024-07-03 RX ORDER — HYDROCODONE BITARTRATE AND ACETAMINOPHEN 5; 325 MG/1; MG/1
1 TABLET ORAL EVERY 6 HOURS PRN
Status: DISCONTINUED | OUTPATIENT
Start: 2024-07-03 | End: 2024-07-05 | Stop reason: HOSPADM

## 2024-07-03 RX ORDER — LORAZEPAM 1 MG/1
1 TABLET ORAL
Status: DISCONTINUED | OUTPATIENT
Start: 2024-07-03 | End: 2024-07-05 | Stop reason: HOSPADM

## 2024-07-03 RX ORDER — THIAMINE HYDROCHLORIDE 100 MG/ML
200 INJECTION, SOLUTION INTRAMUSCULAR; INTRAVENOUS ONCE
Status: COMPLETED | OUTPATIENT
Start: 2024-07-03 | End: 2024-07-03

## 2024-07-03 RX ADMIN — CHLORDIAZEPOXIDE HYDROCHLORIDE 25 MG: 25 CAPSULE ORAL at 06:34

## 2024-07-03 RX ADMIN — PHENOBARBITAL SODIUM 113.8 MG: 65 INJECTION, SOLUTION INTRAMUSCULAR; INTRAVENOUS at 12:15

## 2024-07-03 RX ADMIN — SODIUM CHLORIDE 1000 ML: 9 INJECTION, SOLUTION INTRAVENOUS at 04:11

## 2024-07-03 RX ADMIN — FOLIC ACID 1 MG: 5 INJECTION, SOLUTION INTRAMUSCULAR; INTRAVENOUS; SUBCUTANEOUS at 04:21

## 2024-07-03 RX ADMIN — PANTOPRAZOLE SODIUM 40 MG: 40 INJECTION, POWDER, FOR SOLUTION INTRAVENOUS at 17:14

## 2024-07-03 RX ADMIN — LACTULOSE 10 G: 10 SOLUTION ORAL at 14:15

## 2024-07-03 RX ADMIN — FLUOXETINE HYDROCHLORIDE 40 MG: 20 CAPSULE ORAL at 14:16

## 2024-07-03 RX ADMIN — PANCRELIPASE 24000 UNITS OF LIPASE: 60000; 12000; 38000 CAPSULE, DELAYED RELEASE PELLETS ORAL at 14:17

## 2024-07-03 RX ADMIN — HYDROMORPHONE HYDROCHLORIDE 0.5 MG: 1 INJECTION, SOLUTION INTRAMUSCULAR; INTRAVENOUS; SUBCUTANEOUS at 22:09

## 2024-07-03 RX ADMIN — PANTOPRAZOLE SODIUM 40 MG: 40 INJECTION, POWDER, FOR SOLUTION INTRAVENOUS at 10:58

## 2024-07-03 RX ADMIN — MAGNESIUM SULFATE HEPTAHYDRATE 2 G: 2 INJECTION, SOLUTION INTRAVENOUS at 05:15

## 2024-07-03 RX ADMIN — SODIUM CHLORIDE 125 ML/HR: 9 INJECTION, SOLUTION INTRAVENOUS at 16:02

## 2024-07-03 RX ADMIN — MORPHINE SULFATE 4 MG: 2 INJECTION, SOLUTION INTRAMUSCULAR; INTRAVENOUS at 05:19

## 2024-07-03 RX ADMIN — Medication 1 TABLET: at 20:29

## 2024-07-03 RX ADMIN — IOPAMIDOL 85 ML: 612 INJECTION, SOLUTION INTRAVENOUS at 05:30

## 2024-07-03 RX ADMIN — HYDROCODONE BITARTRATE AND ACETAMINOPHEN 1 TABLET: 5; 325 TABLET ORAL at 14:29

## 2024-07-03 RX ADMIN — THIAMINE HYDROCHLORIDE 200 MG: 100 INJECTION, SOLUTION INTRAMUSCULAR; INTRAVENOUS at 04:51

## 2024-07-03 RX ADMIN — HYDROMORPHONE HYDROCHLORIDE 0.5 MG: 1 INJECTION, SOLUTION INTRAMUSCULAR; INTRAVENOUS; SUBCUTANEOUS at 16:01

## 2024-07-03 RX ADMIN — ONDANSETRON 4 MG: 2 INJECTION INTRAMUSCULAR; INTRAVENOUS at 04:19

## 2024-07-03 RX ADMIN — PHENOBARBITAL SODIUM 113.8 MG: 65 INJECTION, SOLUTION INTRAMUSCULAR; INTRAVENOUS at 14:15

## 2024-07-03 RX ADMIN — HYDROCODONE BITARTRATE AND ACETAMINOPHEN 1 TABLET: 5; 325 TABLET ORAL at 20:29

## 2024-07-03 RX ADMIN — PANCRELIPASE 24000 UNITS OF LIPASE: 60000; 12000; 38000 CAPSULE, DELAYED RELEASE PELLETS ORAL at 17:14

## 2024-07-03 RX ADMIN — AMLODIPINE BESYLATE 2.5 MG: 2.5 TABLET ORAL at 14:16

## 2024-07-03 RX ADMIN — HYDROMORPHONE HYDROCHLORIDE 0.5 MG: 1 INJECTION, SOLUTION INTRAMUSCULAR; INTRAVENOUS; SUBCUTANEOUS at 10:57

## 2024-07-03 RX ADMIN — Medication 10 ML: at 10:57

## 2024-07-03 RX ADMIN — SODIUM CHLORIDE 125 ML/HR: 9 INJECTION, SOLUTION INTRAVENOUS at 06:34

## 2024-07-03 RX ADMIN — Medication 100 MG: at 14:17

## 2024-07-03 RX ADMIN — NICOTINE 1 PATCH: 21 PATCH, EXTENDED RELEASE TRANSDERMAL at 14:17

## 2024-07-03 RX ADMIN — FAMOTIDINE 20 MG: 10 INJECTION INTRAVENOUS at 04:19

## 2024-07-03 RX ADMIN — MORPHINE SULFATE 2 MG: 2 INJECTION, SOLUTION INTRAMUSCULAR; INTRAVENOUS at 04:19

## 2024-07-03 RX ADMIN — FOLIC ACID 1 MG: 1 TABLET ORAL at 14:17

## 2024-07-03 RX ADMIN — PHENOBARBITAL SODIUM 113.8 MG: 65 INJECTION, SOLUTION INTRAMUSCULAR; INTRAVENOUS at 17:14

## 2024-07-03 NOTE — ED PROVIDER NOTES
EMERGENCY DEPARTMENT ENCOUNTER  Room Number:  01/01  PCP: Tylor Davis  Independent Historians: Patient      HPI:  Chief Complaint: had concerns including Withdrawal (Alcohol ), Abdominal Pain, and Nausea.     A complete HPI/ROS/PMH/PSH/SH/FH are unobtainable due to: None    Chronic or social conditions impacting patient care (Social Determinants of Health): None      Context: Bekah Gibson is a 54 y.o. female with a medical history of alcoholism, pancreatitis, hepatitis, cirrhosis, pancreatic insufficiency, and gastritis presents emergency department complaining of epigastric abdominal pain that began around 12 hours ago.  Patient reports that she has been sober from alcohol for a few months and relapsed about 3 days ago.  She has been drinking numerous airplane bottles of peach paz over the last 3 days.  She says her last episode of pancreatitis was about a year ago.  She denies nausea, vomiting or diarrhea.  She denies fever or chills.      Review of prior external notes (non-ED) -and- Review of prior external test results outside of this encounter:   Patient was admitted to [facility] on 4/7/2024 to 4/11/2024 for alcoholic ketoacidosis.  Patient was in withdrawal and was given multiple doses of Ativan.  She was having hematemesis and was evaluated by GI.  She had an EGD which showed gastric varices and portal hypertensive gastropathy and duodenitis.  She is maintained on a PPI and was to be followed up with GI.    I reviewed labs from 4/11/2024, hemoglobin 13, creatinine 0.57      PAST MEDICAL HISTORY  Active Ambulatory Problems     Diagnosis Date Noted    Irritable bowel syndrome with both constipation and diarrhea 06/05/2017    Peripheral neuropathy 06/05/2017    Vitamin D deficiency 06/05/2017    History of HPV infection 06/05/2017    Hypothyroidism 06/05/2017    Tobacco dependence due to cigarettes 06/05/2017    Acute kidney injury 12/28/2015    Ascites 06/06/2016    E. coli UTI (urinary tract  infection) 06/06/2016    Alcoholic hepatitis 06/29/2018    GI bleed 06/29/2018    Acute post-hemorrhagic anemia 06/29/2018    Thrombocytopenia 06/29/2018    Open wound of right shoulder region 06/29/2018    Pancreatic insufficiency 07/04/2018    Alcoholic ketoacidosis 07/21/2019    Chronic alcohol abuse 07/29/2019    ESBL (extended spectrum beta-lactamase) producing bacteria infection 07/29/2019    Abnormal weight loss 12/13/2019    Hashimoto's disease 12/13/2019    Chronic fatigue 12/14/2019    History of alcohol use disorder 09/21/2021    Alcohol intoxication 09/21/2021    Lupus     Hypomagnesemia     Pancytopenia     Alcoholic cirrhosis     Bilateral lower abdominal discomfort 09/21/2021    Primary hypertension 10/24/2022    Melena 10/24/2022    Arthritis of shoulder 12/19/2022    Esophageal varices 01/16/2023    Essential hypertension 01/16/2023    Alcohol-induced chronic pancreatitis 01/22/2023    Abdominal pain 01/22/2023    Pancreatitis 10/07/2023    Leukopenia 10/07/2023    Epigastric pain 04/01/2024    Acute alcoholic gastritis without hemorrhage 04/01/2024     Resolved Ambulatory Problems     Diagnosis Date Noted    Acute renal failure 02/20/2017    Kidney stone 06/05/2017    Alcohol withdrawal syndrome, with delirium 06/29/2018    Hypotension 07/01/2018    Nausea and vomiting 09/21/2021    Acute GI bleeding 10/24/2022     Past Medical History:   Diagnosis Date    Acromioclavicular separation 1/2021    Ankle sprain 2/2004    Anxiety     Arthritis     Arthritis of back Unsure    Cirrhosis     Disease of thyroid gland     ETOH abuse     Fracture, clavicle 1/2021    Fracture, foot Unsure    Frozen shoulder 1 /2009    GERD (gastroesophageal reflux disease)     History of kidney stones     Hypertension     Left shoulder pain     Low back strain 1/21    Lumbosacral disc disease 1/21    MDD (major depressive disorder)     Neck strain Unsure    Periarthritis of shoulder see above    Rotator cuff syndrome odre for  shoulder replacement    Tennis elbow Unsure         PAST SURGICAL HISTORY  Past Surgical History:   Procedure Laterality Date    CLAVICLE SURGERY Right 2018    ENDOSCOPY N/A 10/26/2022    Procedure: ESOPHAGOGASTRODUODENOSCOPY wtih banding;  Surgeon: Ag Patel MD;  Location: [facility] ENDOSCOPY;  Service: Gastroenterology;  Laterality: N/A;  pre: melena  post: esophageal varicies with banding x2 bands    ENDOSCOPY N/A 2023    Procedure: ESOPHAGOGASTRODUODENOSCOPY;  Surgeon: Ag Patel MD;  Location[facility] END[facility]COPY;  Service: Gastroenterology;  Laterality: N/A;  PRE OP - MAROON STOOLS  POST OP - SM ESOPHAGEAL VARICES    ESOPHAGEAL VARICES LIGATION      FOOT SURGERY  12    JOINT REPLACEMENT Bilateral     GREAT TOE    KIDNEY STONE SURGERY      LITHOTRIPSY AND STENT (R SIDE)    LAPAROSCOPIC TUBAL LIGATION      NECK SURGERY  3/07    Not sure of dates    SIGMOIDOSCOPY N/A 2023    Procedure: SIGMOIDOSCOPY FLEXIBLE;  Surgeon: Ag Patel MD;  Location: [facility] ENDOSCOPY;  Service: Gastroenterology;  Laterality: N/A;  PRE OP - MAROON STOOLS  POST OP - RECTAL VARICES    TOTAL SHOULDER ARTHROPLASTY Left 2023    Procedure: reverse total shoulder arthroplasty;  Surgeon: Giovanni Denise MD;  Location: [facility] OR [facility];  Service: Orthopedics;  Laterality: Left;         FAMILY HISTORY  Family History   Problem Relation Age of Onset    Rheumatologic disease Mother         congestive heart diseased    Osteoporosis Mother             Thyroid disease Father     Leukemia Father     Cancer Father         Leukemia  deseased     Broken bones Father     Clotting disorder Father     Drug abuse Brother     Malig Hyperthermia Neg Hx          SOCIAL HISTORY  Social History     Socioeconomic History    Marital status:    Tobacco Use    Smoking status: Every Day     Current packs/day: 0.25     Average packs/day: 0.3 packs/day for 15.0 years (3.8 ttl pk-yrs)     Types: Cigarettes     Passive  exposure: Current    Smokeless tobacco: Never    Tobacco comments:     Rarely smoke sometimes will to   Vaping Use    Vaping status: Never Used   Substance and Sexual Activity    Alcohol use: Not Currently     Alcohol/week: 5.0 - 10.0 standard drinks of alcohol     Types: 5 - 10 Shots of liquor per week     Comment: 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    Drug use: Not Currently    Sexual activity: Not Currently     Partners: Male     Birth control/protection: Post-menopausal, Natural family planning/Rhythm     Comment: cinthia- menepausal         ALLERGIES  Benzonatate, Ketorolac tromethamine, Phenergan [promethazine hcl], Cucumber extract, and Promethazine-phenylephrine      REVIEW OF SYSTEMS  Included in HPI  All systems reviewed and negative except for those discussed in HPI.      PHYSICAL EXAM    I have reviewed the triage vital signs and nursing notes.    ED Triage Vitals   Temp Heart Rate Resp BP SpO2   07/03/24 0348 07/03/24 0348 07/03/24 0348 07/03/24 0349 07/03/24 0348   98.5 °F (36.9 °C) 104 16 141/97 96 %      Temp src Heart Rate Source Patient Position BP Location FiO2 (%)   07/03/24 0348 07/03/24 0348 -- 07/03/24 0349 --   Tympanic Monitor  Right arm        Physical Exam  Constitutional:       General: She is not in acute distress.     Appearance: Normal appearance. She is obese.   HENT:      Head: Normocephalic and atraumatic.      Nose: Nose normal.      Mouth/Throat:      Mouth: Mucous membranes are moist.   Eyes:      Extraocular Movements: Extraocular movements intact.      Pupils: Pupils are equal, round, and reactive to light.   Cardiovascular:      Rate and Rhythm: Normal rate and regular rhythm.      Pulses: Normal pulses.      Heart sounds: Normal heart sounds.   Pulmonary:      Effort: Pulmonary effort is normal. No respiratory distress.      Breath sounds: Normal breath sounds.   Abdominal:      General: Abdomen is flat. There is no distension.      Palpations: Abdomen is soft.      Tenderness: There is  abdominal tenderness in the epigastric area.   Musculoskeletal:         General: Normal range of motion.      Cervical back: Normal range of motion and neck supple.   Skin:     General: Skin is warm and dry.      Capillary Refill: Capillary refill takes less than 2 seconds.   Neurological:      General: No focal deficit present.      Mental Status: She is alert and oriented to person, place, and time.   Psychiatric:         Mood and Affect: Mood normal.         Behavior: Behavior normal.         LAB RESULTS  Recent Results (from the past 24 hour(s))   Potassium    Collection Time: 07/04/24  9:28 PM    Specimen: Blood   Result Value Ref Range    Potassium 4.0 3.5 - 5.2 mmol/L   Comprehensive Metabolic Panel    Collection Time: 07/05/24  4:34 AM    Specimen: Blood   Result Value Ref Range    Glucose 117 (H) 65 - 99 mg/dL    BUN 6 6 - 20 mg/dL    Creatinine 0.44 (L) 0.57 - 1.00 mg/dL    Sodium 137 136 - 145 mmol/L    Potassium 3.8 3.5 - 5.2 mmol/L    Chloride 105 98 - 107 mmol/L    CO2 20.3 (L) 22.0 - 29.0 mmol/L    Calcium 8.9 8.6 - 10.5 mg/dL    Total Protein 6.4 6.0 - 8.5 g/dL    Albumin 3.6 3.5 - 5.2 g/dL    ALT (SGPT) 40 (H) 1 - 33 U/L    AST (SGOT) 57 (H) 1 - 32 U/L    Alkaline Phosphatase 138 (H) 39 - 117 U/L    Total Bilirubin 0.9 0.0 - 1.2 mg/dL    Globulin 2.8 gm/dL    A/G Ratio 1.3 g/dL    BUN/Creatinine Ratio 13.6 7.0 - 25.0    Anion Gap 11.7 5.0 - 15.0 mmol/L    eGFR 113.7 >60.0 mL/min/1.73   CBC (No Diff)    Collection Time: 07/05/24  4:34 AM    Specimen: Blood   Result Value Ref Range    WBC 2.52 (L) 3.40 - 10.80 10*3/mm3    RBC 4.57 3.77 - 5.28 10*6/mm3    Hemoglobin 14.1 12.0 - 15.9 g/dL    Hematocrit 41.6 34.0 - 46.6 %    MCV 91.0 79.0 - 97.0 fL    MCH 30.9 26.6 - 33.0 pg    MCHC 33.9 31.5 - 35.7 g/dL    RDW 13.8 12.3 - 15.4 %    RDW-SD 46.1 37.0 - 54.0 fl    MPV 11.2 6.0 - 12.0 fL    Platelets 52 (L) 140 - 450 10*3/mm3         RADIOLOGY  CT Abdomen Pelvis With Contrast    Result Date:  7/3/2024  Patient: BA JENNINGS  Time Out: 05:54 Exam(s): CT ABDOMEN + PELVIS With Contrast IV Amt: 85 EXAM:   CT Abdomen and Pelvis With Intravenous Contrast CLINICAL HISTORY:    Reason for exam: epigastric pain, h o pancreatitis. TECHNIQUE:   Axial computed tomography images of the abdomen and pelvis with intravenous contrast.  CTDI is 8.38 mGy and DLP is 404 mGy-cm.  This CT exam was performed according to the principle of ALARA (As Low As Reasonably Achievable) by using one or more of the following dose reduction techniques: automated exposure control, adjustment of the mA and or kV according to patient size, and or use of iterative reconstruction technique. COMPARISON: 4 1 24 FINDINGS:   Lung bases:  Unremarkable.  No mass.  No consolidation.  ABDOMEN:   Liver: Hepatic steatosis.  Nodular hepatic contour consistent with chronic liver disease.  No focal hepatic lesion is identified.  There is stigmata of portal hypertension including portosystemic varices.   Gallbladder and bile ducts: Small gallstones are seen within the gallbladder lumen.  No evidence of gallbladder wall thickening or pericholecystic infiltration.  There is no intra-or extra hepatic biliary ductal dilatation.   Pancreas: Again seen is a moderately atrophic pancreas, with diffuse pancreatic parenchymal calcifications that are most consistent with prior or chronic pancreatitis.  There is minimal fat stranding regional to the pancreatic head, which may be associated with an early or mild focal pancreatitis.  No loculated peripancreatic fluid collection.  No dilatation of the main pancreatic duct.   Spleen: Borderline splenomegaly.  The spleen measures up to 13.5 cm in the craniocaudal dimension.   Adrenals:  Unremarkable.  No mass.   Kidneys and ureters:  Unremarkable.  No solid mass.  No hydronephrosis.   Stomach and bowel: No evidence of bowel obstruction.  No definite bowel wall thickening.  Colonic diverticulosis, without evidence of an  acute diverticulitis..  PELVIS:   Appendix:  No findings to suggest acute appendicitis.   Bladder:  Unremarkable.  No mass.   Reproductive:  Unremarkable as visualized.  ABDOMEN and PELVIS:   Intraperitoneal space:  Unremarkable.  No free air.  No significant fluid collection.   Bones joints:  No acute fracture.  No dislocation.   Soft tissues:  Unremarkable.   Vasculature:  Unremarkable.  No abdominal aortic aneurysm.   Lymph nodes:  Unremarkable.  No enlarged lymph nodes. IMPRESSION:     Minimal fat stranding seen regional to the pancreatic head, which may be associated with an early or mild focal pancreatitis.  Please correlate with patient's serum pancreatic enzymes. Again seen is moderate pancreatic atrophy with diffuse pancreatic parenchymal calcifications, consistent with prior or chronic pancreatitis.   No loculated peripancreatic fluid collection. Small gallstones seen within the gallbladder lumen.  No findings to suggest an acute cholecystitis. Nodular hepatic contour consistent with hepatic cirrhosis.  Stigmata of portal hypertension. Colonic diverticulosis without evidence of an acute diverticulitis.     Electronically signed by Amanuel Leach M.D. on 07-03-24 at 0554       MEDICATIONS GIVEN IN ER  Medications   sodium chloride 0.9 % flush 10 mL (has no administration in time range)   sodium chloride 0.9 % infusion 1,000 mL (has no administration in time range)   folic acid 1 mg in sodium chloride 0.9 % 50 mL IVPB (has no administration in time range)   thiamine (B-1) injection 200 mg (has no administration in time range)   ondansetron (ZOFRAN) injection 4 mg (has no administration in time range)   famotidine (PEPCID) injection 20 mg (has no administration in time range)   morphine injection 2 mg (has no administration in time range)           OUTPATIENT MEDICATION MANAGEMENT:  Current Facility-Administered Medications Ordered in Epic   Medication Dose Route Frequency Provider Last Rate Last Admin     famotidine (PEPCID) injection 20 mg  20 mg Intravenous Once Gama Mendez MD        folic acid 1 mg in sodium chloride 0.9 % 50 mL IVPB  1 mg Intravenous Once Gama Mendez MD        morphine injection 2 mg  2 mg Intravenous Once Gama Mendez MD        ondansetron (ZOFRAN) injection 4 mg  4 mg Intravenous Once Gama Mendez MD        sodium chloride 0.9 % flush 10 mL  10 mL Intravenous Q12H Callie Quiroz MD        sodium chloride 0.9 % flush 10 mL  10 mL Intravenous PRN Callie Quiroz MD        sodium chloride 0.9 % flush 10 mL  10 mL Intravenous PRN Gama Mendez MD        sodium chloride 0.9 % flush 20 mL  20 mL Intravenous PRN Callie Quiroz MD        sodium chloride 0.9 % infusion 1,000 mL  1,000 mL Intravenous Once Gama Mendez MD        thiamine (B-1) injection 200 mg  200 mg Intravenous Once Gama Mendez MD         Current Outpatient Medications Ordered in Epic   Medication Sig Dispense Refill    amLODIPine (NORVASC) 2.5 MG tablet       cephalexin (KEFLEX) 500 MG capsule TAKE ALL FOUR CAPSULES BY MOUTH ONE HOUR PRIOR TO DENTAL APPT 4 capsule 0    ferrous sulfate 325 (65 FE) MG tablet Take 1 tablet by mouth Daily With Breakfast.      FLUoxetine (PROzac) 40 MG capsule Take 1 capsule by mouth Daily.      folic acid (FOLVITE) 1 MG tablet Take 1 tablet by mouth Daily.      hydrOXYzine pamoate (VISTARIL) 50 MG capsule Take 1 capsule by mouth 2 (Two) Times a Day As Needed for Anxiety. (Patient not taking: Reported on 5/8/2024)      Ibuprofen 3 %, Gabapentin 10 %, Baclofen 2 %, lidocaine 4 % Apply 1-2 g topically to the appropriate area as directed 3 (Three) to 4 (Four) times daily. 90 g 5    lactulose (CHRONULAC) 10 GM/15ML solution       levothyroxine (SYNTHROID, LEVOTHROID) 100 MCG tablet TAKE ONE TABLET BY MOUTH DAILY 30 tablet 3    MAGnesium-Oxide 400 (240 Mg) MG tablet Daily. HOLD PRIOR TO SURG       mirtazapine (REMERON) 15 MG tablet Take 1 tablet by mouth Daily.      Multiple Vitamin (MULTIVITAMIN) capsule Take 1 capsule by mouth Daily. HOLD PRIOR TO SURG      naltrexone (DEPADE) 50 MG tablet Take 1 tablet by mouth Daily.      ondansetron ODT (ZOFRAN-ODT) 4 MG disintegrating tablet Place 1 tablet on the tongue Every 8 (Eight) Hours As Needed for Nausea or Vomiting. 10 tablet 0    pancrelipase, Lip-Prot-Amyl, (CREON) 45847-12996 units capsule delayed-release particles capsule Take 2 capsules by mouth 3 (Three) Times a Day With Meals. 180 capsule 0    pantoprazole (PROTONIX) 40 MG EC tablet 1 tablet Daily.      sucralfate (CARAFATE) 1 g tablet Take 1 tablet by mouth 4 (Four) Times a Day. (Patient not taking: Reported on 5/8/2024)      thiamine (VITAMIN B1) 100 MG tablet Take 1 tablet by mouth Daily. 30 tablet 0    vitamin D (ERGOCALCIFEROL) 1.25 MG (40686 UT) capsule capsule Take 1 capsule by mouth Every 7 (Seven) Days.             PROGRESS, DATA ANALYSIS, CONSULTS, AND MEDICAL DECISION MAKING  ORDERS PLACED DURING THIS VISIT:  Orders Placed This Encounter   Procedures    CT Abdomen Pelvis With Contrast    [place] Draw    Comprehensive Metabolic Panel    Lipase    Urinalysis With Microscopic If Indicated (No Culture) - Urine, Clean Catch    Lactic Acid, Plasma    Magnesium    CBC Auto Differential    Ethanol    Insert Peripheral IV    CBC & Differential    Green Top (Gel)    Lavender Top    Gold Top - SST    Light Blue Top       All labs have been independently interpreted by me.  All radiology studies have been reviewed by me. All EKG's have been independently viewed and interpreted by me.  Discussion below represents my analysis of pertinent findings related to patient's condition, differential diagnosis, treatment plan and final disposition.    Differential diagnosis includes but is not limited to:   My differential diagnosis for abdominal pain includes but is not limited to:  Gastritis, gastroenteritis,  peptic ulcer disease, GERD, esophageal perforation, acute appendicitis, mesenteric adenitis, Meckel’s diverticulum, epiploic appendagitis, diverticulitis, colon cancer, ulcerative colitis, Crohn’s disease, intussusception, small bowel obstruction, adhesions, ischemic bowel, perforated viscus, ileus, obstipation, biliary colic, cholecystitis, cholelithiasis, [place], hepatitis, pancreatitis, common bile duct obstruction, cholangitis, bile leak, splenic trauma, splenic rupture, splenic infarction, splenic abscess, abdominal abscess, ascites, spontaneous bacterial peritonitis, hernia, UTI, cystitis, prostatitis, ureterolithiasis, urinary obstruction, ovarian cyst, torsion, pregnancy, ectopic pregnancy, PID, pelvic abscess, mittelschmerz, endometriosis, AAA, myocardial infarction, pneumonia, cancer, porphyria, DKA, medications, sickle cell, viral syndrome, zoster      ED Course:  ED Course as of 07/05/24 2006 Wed Jul 03, 2024 0403 I discussed the case with Dr. Mendez and he agrees to evaluate the patient at the bedside.    [CC]   0437 WBC: 3.44 [CC]   0442 Lactate: 1.6 [CC]   0446 Ethanol: <10 [CC]   0447 Platelets(!): 66  baseline [CC]   0447 Magnesium(!): 1.5 [CC]   0448 ALT (SGPT)(!): 54 [CC]   0448 AST (SGOT)(!): 97 [CC]   0448 Alkaline Phosphatase(!): 145 [CC]   0449 Lipase: 30 [CC]   0608 Patient reassessed and informed of CT findings suggesting mild acute pancreatitis.  Patient would really like to stop drinking.  She has experienced some mild withdrawal symptoms previously including hallucinations but never seizures.  She remains nauseated.  I recommended admission for pain control, management of withdrawal symptoms until her pancreatitis resolves. [JR]   0640 Discussed with Dr. Harden, [facility] hospitalist, who agreed to admit on behalf of of Dr. Kalpesh Trujillo. [JR]      ED Course User Index  [CC] Cassandra Hong PA-C  [JR] Gama Mendez MD           AS OF 04:04 EDT VITALS:    BP - 141/97  HR -  104  TEMP - 98.5 °F (36.9 °C) (Tympanic)  O2 SATS - 96%        COMPLEXITY OF CARE  The patient requires admission.        DIAGNOSIS  Final diagnoses:   Alcohol-induced acute pancreatitis, unspecified complication status   Alcoholic cirrhosis, unspecified whether ascites present         DISPOSITION  ED Disposition       ED Disposition   Decision to Admit    Condition   --    Comment   Level of Care: Telemetry [5]   Diagnosis: Alcohol withdrawal [291.81.ICD-9-CM]   Admitting Physician: RADHA TIJERINA [[id number]]   Attending Physician: RADHA TIJERINA [[id number]]   Certification: I Certify That Inpatient Hospital Services Are Medically Necessary For Greater Than 2 Midnights                        Please note that portions of this document were completed with a voice recognition program.    Note Disclaimer: At [facility], we believe that sharing information builds trust and better relationships. You are receiving this note because you recently visited [facility]. It is possible you will see health information before a provider has talked with you about it. This kind of information can be easy to misunderstand. To help you fully understand what it means for your health, we urge you to discuss this note with your provider.     Cassandra Hong PA-C  07/05/24 2006

## 2024-07-03 NOTE — PLAN OF CARE
Problem: Adult Inpatient Plan of Care  Goal: Plan of Care Review  Flowsheets (Taken 7/3/2024 1630)  Outcome Evaluation: VSS, AOX4. CIWA 5. Pain  meds given, Room air. Assist x1 to restroom, Makes needs known. Call light with in reach, Bed alarm on  Goal: Absence of Hospital-Acquired Illness or Injury  Intervention: Identify and Manage Fall Risk  Recent Flowsheet Documentation  Taken 7/3/2024 1400 by Charity Kim RN  Safety Promotion/Fall Prevention:   fall prevention program maintained   nonskid shoes/slippers when out of bed  Taken 7/3/2024 0850 by Charity Kim RN  Safety Promotion/Fall Prevention:   safety round/check completed   fall prevention program maintained  Intervention: Prevent Skin Injury  Recent Flowsheet Documentation  Taken 7/3/2024 1400 by Charity Kim RN  Body Position: position changed independently  Skin Protection:   adhesive use limited   tubing/devices free from skin contact  Taken 7/3/2024 0850 by Charity Kim RN  Body Position: position changed independently  Skin Protection:   adhesive use limited   tubing/devices free from skin contact  Intervention: Prevent and Manage VTE (Venous Thromboembolism) Risk  Recent Flowsheet Documentation  Taken 7/3/2024 1400 by Charity Kim RN  Activity Management: activity encouraged  Taken 7/3/2024 0850 by Charity Kim RN  Activity Management: activity encouraged  Intervention: Prevent Infection  Recent Flowsheet Documentation  Taken 7/3/2024 0850 by Charity Kim RN  Infection Prevention:   hand hygiene promoted   visitors restricted/screened  Goal: Optimal Comfort and Wellbeing  Intervention: Provide Person-Centered Care  Recent Flowsheet Documentation  Taken 7/3/2024 1400 by Charity Kim RN  Trust Relationship/Rapport:   choices provided   care explained  Taken 7/3/2024 0850 by Charity Kim RN  Trust Relationship/Rapport:   choices provided   care explained  Goal: Readiness for Transition of Care  Intervention:  Mutually Develop Transition Plan  Recent Flowsheet Documentation  Taken 7/3/2024 0847 by Charity Kim RN  Transportation Anticipated: family or friend will provide  Patient/Family Anticipated Services at Transition: none  Patient/Family Anticipates Transition to: home with family  Taken 7/3/2024 0846 by Charity Kim RN  Equipment Currently Used at Home: none  Goal: Plan of Care Review  Recent Flowsheet Documentation  Taken 7/3/2024 1630 by Charity Kim RN  Outcome Evaluation: VSS, AOX4. CIWA 5. Pain  meds given, Room air. Assist x1 to restroom, Makes needs known. Call light with in reach, Bed alarm on  Goal: Absence of Hospital-Acquired Illness or Injury  Intervention: Identify and Manage Fall Risk  Recent Flowsheet Documentation  Taken 7/3/2024 1400 by Charity Kim RN  Safety Promotion/Fall Prevention:   fall prevention program maintained   nonskid shoes/slippers when out of bed  Taken 7/3/2024 0850 by Charity Kim RN  Safety Promotion/Fall Prevention:   safety round/check completed   fall prevention program maintained  Intervention: Prevent Skin Injury  Recent Flowsheet Documentation  Taken 7/3/2024 1400 by Charity Kim RN  Body Position: position changed independently  Skin Protection:   adhesive use limited   tubing/devices free from skin contact  Taken 7/3/2024 0850 by Charity Kim RN  Body Position: position changed independently  Skin Protection:   adhesive use limited   tubing/devices free from skin contact  Intervention: Prevent and Manage VTE (Venous Thromboembolism) Risk  Recent Flowsheet Documentation  Taken 7/3/2024 1400 by Charity Kim RN  Activity Management: activity encouraged  Taken 7/3/2024 0850 by Charity Kim RN  Activity Management: activity encouraged  Intervention: Prevent Infection  Recent Flowsheet Documentation  Taken 7/3/2024 0850 by Charity Kim RN  Infection Prevention:   hand hygiene promoted   visitors restricted/screened  Goal:  Optimal Comfort and Wellbeing  Intervention: Provide Person-Centered Care  Recent Flowsheet Documentation  Taken 7/3/2024 1400 by Charity Kim RN  Trust Relationship/Rapport:   choices provided   care explained  Taken 7/3/2024 0850 by Charity Kim RN  Trust Relationship/Rapport:   choices provided   care explained  Goal: Readiness for Transition of Care  Intervention: Mutually Develop Transition Plan  Recent Flowsheet Documentation  Taken 7/3/2024 0847 by Chariyt Kim RN  Transportation Anticipated: family or friend will provide  Patient/Family Anticipated Services at Transition: none  Patient/Family Anticipates Transition to: home with family  Taken 7/3/2024 0846 by Charity Kim, RN  Equipment Currently Used at Home: none   Goal Outcome Evaluation:              Outcome Evaluation: VSS, AOX4. CIWA 5. Pain  meds given, Room air. Assist x1 to restroom, Makes needs known. Call light with in reach, Bed alarm on

## 2024-07-03 NOTE — Clinical Note
Level of Care: Telemetry [5]   Diagnosis: Acute pancreatitis [577.0.ICD-9-CM]   Admitting Physician: RADHA TIJERINA [[id number]]   Attending Physician: RADHA TIJERINA [[id number]]

## 2024-07-03 NOTE — ED PROVIDER NOTES
MD ATTESTATION NOTE    The PATO and I have discussed this patient's history, physical exam, MDM, and treatment plan.  I have reviewed the documentation and personally had a face to face interaction with the patient. I affirm the documentation and agree with the treatment and plan.  The attached note describes my personal findings.      I provided a substantive portion of the medical decision making.        Brief HPI: 54-year-old female with history of alcoholism, pancreatitis, hepatitis, cirrhosis, gastritis, presents with abdominal pain onset yesterday.  She states that she had been sober for a while but just relapsed a few days ago and began drinking again.  Pain is in the upper abdomen.  She denies radiation to her back.  She has had a lot of nausea but no vomiting.  She denies black or bloody stool.  No fever.    PHYSICAL EXAM  ED Triage Vitals   Temp Heart Rate Resp BP SpO2   07/03/24 0348 07/03/24 0348 07/03/24 0348 07/03/24 0349 07/03/24 0348   98.5 °F (36.9 °C) 104 16 141/97 96 %      Temp src Heart Rate Source Patient Position BP Location FiO2 (%)   07/03/24 0348 07/03/24 0348 -- 07/03/24 0349 --   Tympanic Monitor  Right arm          GENERAL: Awake and alert, appears uncomfortable but no acute distress   HENT: nares patent  EYES: no scleral icterus, EOMI  CV: regular rhythm, normal rate  RESPIRATORY: normal effort  ABDOMEN: soft, moderate tenderness epigastric and right upper quadrant without rebound or guarding  MUSCULOSKELETAL: no deformity  NEURO: alert, moves all extremities, follows commands  PSYCH:  calm, cooperative  SKIN: warm, dry    Vital signs and nursing notes reviewed.        Medical Decision Making:  ED Course as of 07/03/24 0640   Wed Jul 03, 2024   0403 I discussed the case with Dr. Mendez and he agrees to evaluate the patient at the bedside.    [CC]   0437 WBC: 3.44 [CC]   0442 Lactate: 1.6 [CC]   0446 Ethanol: <10 [CC]   0447 Platelets(!): 66  baseline [CC]   0447 Magnesium(!): 1.5 [CC]    0448 ALT (SGPT)(!): 54 [CC]   0448 AST (SGOT)(!): 97 [CC]   0448 Alkaline Phosphatase(!): 145 [CC]   0449 Lipase: 30 [CC]   0608 Patient reassessed and informed of CT findings suggesting mild acute pancreatitis.  Patient would really like to stop drinking.  She has experienced some mild withdrawal symptoms previously including hallucinations but never seizures.  She remains nauseated.  I recommended admission for pain control, management of withdrawal symptoms until her pancreatitis resolves. [JR]   0640 Discussed with Dr. Harden, [facility] hospitalist, who agreed to admit on behalf of of Dr. Kalpesh Trujillo. [JR]      ED Course User Index  [CC] Cassandra Hong, PA-C  [JR] Gama Mendez MD         Final diagnoses:   Alcohol-induced acute pancreatitis, unspecified complication status   Alcoholic cirrhosis, unspecified whether ascites present       ADMIT       Gama Mendez MD  07/03/24 0629

## 2024-07-03 NOTE — CASE MANAGEMENT/SOCIAL WORK
Discharge Planning Assessment  [facility]     Patient Name: Bekah Gibson  MRN: [id number]  Today's Date: 7/3/2024    Admit Date: 7/3/2024    Plan: Home with spouse   Discharge Needs Assessment       Row Name 07/03/24 1652       Living Environment    People in Home spouse    Current Living Arrangements home    Potentially Unsafe Housing Conditions none    Primary Care Provided by self    Provides Primary Care For no one    Family Caregiver if Needed none    Quality of Family Relationships unable to assess    Able to Return to Prior Arrangements yes       Resource/Environmental Concerns    Resource/Environmental Concerns none    Transportation Concerns none       Transition Planning    Patient/Family Anticipates Transition to home with family    Patient/Family Anticipated Services at Transition none    Transportation Anticipated family or friend will provide       Discharge Needs Assessment    Readmission Within the Last 30 Days no previous admission in last 30 days    Equipment Currently Used at Home none    Concerns to be Addressed denies needs/concerns at this time    Anticipated Changes Related to Illness none    Equipment Needed After Discharge none                   Discharge Plan       Row Name 07/03/24 1651       Plan    Plan Home with spouse    Patient/Family in Agreement with Plan yes    Provided Post Acute Provider List? N/A    Provided Post Acute Provider Quality & Resource List? N/A    Plan Comments CCP met with patient at bedside. Introduced self and explained role of CCP. Patient confirmed the information on her face sheet is accurate. Patients PCP is Tylor Davis. Patient is enrolled into [facility]. Patient lives at home with spouse. Patient has used [facility] in the past. Patient does not use any DME. Patient is independent at home. Patient plans home at discharge. Spouse can transport. CCP following.                  Continued Care and Services - Admitted Since 7/3/2024    No active coordination exists  for this encounter.          Demographic Summary       Row Name 07/03/24 1651       General Information    Admission Type inpatient    Arrived From emergency department    Referral Source admission list    Reason for Consult discharge planning    Preferred Language English                   Functional Status       Row Name 07/03/24 1651       Functional Status    Usual Activity Tolerance good    Current Activity Tolerance good       Functional Status, IADL    Medications independent    Meal Preparation independent    Housekeeping independent    Laundry independent    Shopping independent       Mental Status    General Appearance WDL WDL       Mental Status Summary    Recent Changes in Mental Status/Cognitive Functioning no changes       Employment/    Employment Status retired                   Psychosocial    No documentation.                  Abuse/Neglect    No documentation.                  Legal    No documentation.                  Substance Abuse    No documentation.                  Patient Forms    No documentation.

## 2024-07-03 NOTE — THERAPY DISCHARGE NOTE
[facility]  [phone]    ACCESS CENTER STATEMENT OF DISPOSITION        I, Bekah TRISTAN Gibson, was assessed in the [facility] at [facility] on 7/3/2024.  I understand the recommendations below and what follow-up action is expected of me.    Medication Management:  Jennifer Israel, [facility]  [phone]  Alexa Hodge, [facility]  [phone]  [facility]  [phone]    Counseling:  Len Rosenbaum, [facility]  [phone]  Rebeca Agrawal, [facility]  [phone]  Gloria Benjamin, [facility]  [phone]        ________________________________  Clinician Signature    7/3/2024  14:30 EDT

## 2024-07-03 NOTE — H&P
Patient Name:  Bekah Gibson  YOB: 1970  MRN:  [id number]  Admit Date:  7/3/2024  Patient Care Team:  Tylor Davis as PCP - General (Family Medicine)  Deion Saldana MD as Consulting Physician (Urology)  Adalberto Hoffman MD as Consulting Physician (Gastroenterology)  Eliazar Quinonez MD (Hematology)      Subjective   History Present Illness     Chief Complaint   Patient presents with    Withdrawal     Alcohol     Abdominal Pain    Nausea       History of Present Illness  Ms. Gibson is a 54 y.o. history of hypertension, pancreatic insufficiency, alcohol use disorder presenting with recent alcohol use relapse and abdominal pain.  Some nausea without any vomiting.  No diarrhea, constipation, hematochezia, melena.  CT abdomen showing fat standing around the pancreas as well as evidence of previous episodes of pancreatitis with calcifications.  Patient's last drink was yesterday at 5 PM, abdominal pain started at 4 PM.  States that it is sharp and points to her epigastric region radiates to her entire abdomen.  Does not radiate to the back.    Review of Systems   Constitutional:  Positive for fatigue. Negative for chills and fever.   Respiratory:  Negative for cough and shortness of breath.    Cardiovascular:  Negative for chest pain and leg swelling.   Gastrointestinal:  Positive for abdominal pain and nausea. Negative for diarrhea.        Personal History     Past Medical History:   Diagnosis Date    Acromioclavicular separation 1/2021    Ankle sprain 2/2004    no operation necessary  At Time unsure    Anxiety     Arthritis     Arthritis of back Unsure    Diseased    Cirrhosis     Disease of thyroid gland     ETOH abuse     SOBER FOR 3 MONTHS    Fracture, clavicle 1/2021    shattered clavicel on issue slip and fall    Fracture, foot Unsure    Frozen shoulder 1 /2009    4    GERD (gastroesophageal reflux disease)     History of kidney stones     5 YR AGO    Hypertension     Left shoulder  pain     Low back strain     Lumbosacral disc disease     MDD (major depressive disorder)     Neck strain Unsure    Pancreatitis     Periarthritis of shoulder see above    Rotator cuff syndrome odre for shoulder replacement    unable to receive due to low platelets    Tennis elbow Unsure    Unsurpassed    Thrombocytopenia      Past Surgical History:   Procedure Laterality Date    CLAVICLE SURGERY Right 2018    ENDOSCOPY N/A 10/26/2022    Procedure: ESOPHAGOGASTRODUODENOSCOPY wtih banding;  Surgeon: Ag Patel MD;  Location: [facility] ENDOSCOPY;  Service: Gastroenterology;  Laterality: N/A;  pre: melena  post: esophageal varicies with banding x2 bands    ENDOSCOPY N/A 2023    Procedure: ESOPHAGOGASTRODUODENOSCOPY;  Surgeon: Ag Patel MD;  Location: [facility] ENDOSCOPY;  Service: Gastroenterology;  Laterality: N/A;  PRE OP - MAROON STOOLS  POST OP - SM ESOPHAGEAL VARICES    ESOPHAGEAL VARICES LIGATION      FOOT SURGERY  12    JOINT REPLACEMENT Bilateral     GREAT TOE    KIDNEY STONE SURGERY      LITHOTRIPSY AND STENT (R SIDE)    LAPAROSCOPIC TUBAL LIGATION      NECK SURGERY  3/07    Not sure of dates    SIGMOIDOSCOPY N/A 2023    Procedure: SIGMOIDOSCOPY FLEXIBLE;  Surgeon: Ag Patel MD;  Location: [facility] ENDOSCOPY;  Service: Gastroenterology;  Laterality: N/A;  PRE OP - MAROON STOOLS  POST OP - RECTAL VARICES    TOTAL SHOULDER ARTHROPLASTY Left 2023    Procedure: reverse total shoulder arthroplasty;  Surgeon: Giovanni Denise MD;  Location: [facility] OR [facility];  Service: Orthopedics;  Laterality: Left;     Family History   Problem Relation Age of Onset    Rheumatologic disease Mother         congestive heart diseased    Osteoporosis Mother             Thyroid disease Father     Leukemia Father     Cancer Father         Leukemia  deseased     Broken bones Father     Clotting disorder Father     Drug abuse Brother     Malig Hyperthermia Neg Hx      Social History      Tobacco Use    Smoking status: Every Day     Current packs/day: 0.25     Average packs/day: 0.3 packs/day for 15.0 years (3.8 ttl pk-yrs)     Types: Cigarettes     Passive exposure: Current    Smokeless tobacco: Never    Tobacco comments:     Rarely smoke sometimes will to   Vaping Use    Vaping status: Never Used   Substance Use Topics    Alcohol use: Not Currently     Alcohol/week: 5.0 - 10.0 standard drinks of alcohol     Types: 5 - 10 Shots of liquor per week     Comment: 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    Drug use: Not Currently     Current Facility-Administered Medications on File Prior to Encounter   Medication Dose Route Frequency Provider Last Rate Last Admin    sodium chloride 0.9 % flush 10 mL  10 mL Intravenous Q12H Callie Quiroz MD        sodium chloride 0.9 % flush 10 mL  10 mL Intravenous PRN Callie Quiroz MD        sodium chloride 0.9 % flush 20 mL  20 mL Intravenous PRN Callie Quiroz MD         Current Outpatient Medications on File Prior to Encounter   Medication Sig Dispense Refill    amLODIPine (NORVASC) 2.5 MG tablet       ferrous sulfate 325 (65 FE) MG tablet Take 1 tablet by mouth Daily With Breakfast.      FLUoxetine (PROzac) 40 MG capsule Take 1 capsule by mouth Daily.      folic acid (FOLVITE) 1 MG tablet Take 1 tablet by mouth Daily.      Ibuprofen 3 %, Gabapentin 10 %, Baclofen 2 %, lidocaine 4 % Apply 1-2 g topically to the appropriate area as directed 3 (Three) to 4 (Four) times daily. 90 g 5    lactulose (CHRONULAC) 10 GM/15ML solution       levothyroxine (SYNTHROID, LEVOTHROID) 100 MCG tablet TAKE ONE TABLET BY MOUTH DAILY 30 tablet 3    MAGnesium-Oxide 400 (240 Mg) MG tablet Daily. HOLD PRIOR TO SURG      mirtazapine (REMERON) 15 MG tablet Take 1 tablet by mouth Daily.      Multiple Vitamin (MULTIVITAMIN) capsule Take 1 capsule by mouth Daily. HOLD PRIOR TO SURG      pancrelipase, Lip-Prot-Amyl, (CREON) 36837-69376 units capsule  "delayed-release particles capsule Take 2 capsules by mouth 3 (Three) Times a Day With Meals. 180 capsule 0    pancrelipase, Lip-Prot-Amyl, (CREON) 6000-71942 units capsule delayed-release particles capsule Take 1 capsule by mouth 3 (Three) Times a Day With Meals.      pantoprazole (PROTONIX) 40 MG EC tablet 1 tablet Daily.      thiamine (VITAMIN B1) 100 MG tablet Take 1 tablet by mouth Daily. 30 tablet 0    vitamin D (ERGOCALCIFEROL) 1.25 MG (08858 UT) capsule capsule Take 1 capsule by mouth Every 7 (Seven) Days.      cephalexin (KEFLEX) 500 MG capsule TAKE ALL FOUR CAPSULES BY MOUTH ONE HOUR PRIOR TO DENTAL APPT 4 capsule 0    ondansetron ODT (ZOFRAN-ODT) 4 MG disintegrating tablet Place 1 tablet on the tongue Every 8 (Eight) Hours As Needed for Nausea or Vomiting. 10 tablet 0    [DISCONTINUED] hydrOXYzine pamoate (VISTARIL) 50 MG capsule Take 1 capsule by mouth 2 (Two) Times a Day As Needed for Anxiety. (Patient not taking: Reported on 5/8/2024)      [DISCONTINUED] naltrexone (DEPADE) 50 MG tablet Take 1 tablet by mouth Daily.      [DISCONTINUED] sucralfate (CARAFATE) 1 g tablet Take 1 tablet by mouth 4 (Four) Times a Day. (Patient not taking: Reported on 5/8/2024)       Allergies   Allergen Reactions    Benzonatate Nausea Only and Other (See Comments)     Rash     Ketorolac Tromethamine Other (See Comments)     \"I cannot take because of liver problems\"    Phenergan [Promethazine Hcl] Hives    Cucumber Extract Rash    Promethazine-Phenylephrine Other (See Comments)     \"FEEL WEIRD\"       Objective    Objective     Vital Signs  Temp:  [97.9 °F (36.6 °C)-98.5 °F (36.9 °C)] 97.9 °F (36.6 °C)  Heart Rate:  [] 70  Resp:  [16-18] 18  BP: (141-148)/(81-97) 147/85  SpO2:  [94 %-96 %] 95 %  on   ;   Device (Oxygen Therapy): room air  Body mass index is 24.13 kg/m².    Physical Exam  Constitutional:       General: She is not in acute distress.     Appearance: She is ill-appearing.   Cardiovascular:      Rate and " Rhythm: Normal rate and regular rhythm.   Pulmonary:      Effort: Pulmonary effort is normal. No respiratory distress.   Abdominal:      General: Abdomen is flat. There is no distension.      Tenderness: There is abdominal tenderness (Epigastric). There is no guarding or rebound.   Musculoskeletal:         General: No swelling or deformity. Normal range of motion.   Skin:     General: Skin is warm and dry.   Neurological:      General: No focal deficit present.      Mental Status: She is alert. Mental status is at baseline.         Results Review:  I reviewed the patient's new clinical results.  I reviewed the patient's new imaging results and agree with the interpretation.  I reviewed the patient's other test results and agree with the interpretation  I personally viewed and interpreted the patient's EKG/Telemetry data  Discussed with ED provider.    Lab Results (last 24 hours)       Procedure Component Value Units Date/Time    CBC & Differential [[id number]]  (Abnormal) Collected: 07/03/24 0411    Specimen: Blood Updated: 07/03/24 0436    Narrative:      The following orders were created for panel order CBC & Differential.  Procedure                               Abnormality         Status                     ---------                               -----------         ------                     CBC Auto Differential[[id number]]        Abnormal            Final result                 Please view results for these tests on the individual orders.    Comprehensive Metabolic Panel [[id number]]  (Abnormal) Collected: 07/03/24 0411    Specimen: Blood Updated: 07/03/24 0447     Glucose 156 mg/dL      BUN 6 mg/dL      Creatinine 0.53 mg/dL      Sodium 140 mmol/L      Potassium 3.7 mmol/L      Comment: Slight hemolysis detected by analyzer. Result may be falsely elevated.        Chloride 105 mmol/L      CO2 21.3 mmol/L      Calcium 9.5 mg/dL      Total Protein 7.4 g/dL      Albumin 4.2 g/dL      ALT (SGPT) 54 U/L      AST  (SGOT) 97 U/L      Comment: Slight hemolysis detected by analyzer. Result may be falsely elevated.        Alkaline Phosphatase 145 U/L      Total Bilirubin 1.2 mg/dL      Globulin 3.2 gm/dL      A/G Ratio 1.3 g/dL      BUN/Creatinine Ratio 11.3     Anion Gap 13.7 mmol/L      eGFR 108.7 mL/min/1.73     Narrative:      GFR Normal >60  Chronic Kidney Disease <60  Kidney Failure <15      Lipase [[id number]]  (Normal) Collected: 07/03/24 0411    Specimen: Blood Updated: 07/03/24 0443     Lipase 30 U/L     Urinalysis With Microscopic If Indicated (No Culture) - Urine, Clean Catch [[id number]]  (Abnormal) Collected: 07/03/24 0411    Specimen: Urine, Clean Catch Updated: 07/03/24 0428     Color, UA Yellow     Appearance, UA Clear     pH, UA 6.0     Specific Gravity, UA 1.009     Glucose, UA Negative     Ketones, UA Negative     Bilirubin, UA Negative     Blood, UA Negative     Protein, UA Negative     Leuk Esterase, UA Moderate (2+)     Nitrite, UA Negative     Urobilinogen, UA 0.2 E.U./dL    Lactic Acid, Plasma [[id number]]  (Normal) Collected: 07/03/24 0411    Specimen: Blood Updated: 07/03/24 0440     Lactate 1.6 mmol/L     Magnesium [[id number]]  (Abnormal) Collected: 07/03/24 0411    Specimen: Blood Updated: 07/03/24 0447     Magnesium 1.5 mg/dL     CBC Auto Differential [[id number]]  (Abnormal) Collected: 07/03/24 0411    Specimen: Blood Updated: 07/03/24 0436     WBC 3.44 10*3/mm3      RBC 5.10 10*6/mm3      Hemoglobin 15.8 g/dL      Hematocrit 46.6 %      MCV 91.4 fL      MCH 31.0 pg      MCHC 33.9 g/dL      RDW 13.9 %      RDW-SD 46.8 fl      MPV 11.6 fL      Platelets 66 10*3/mm3      Neutrophil % 57.5 %      Lymphocyte % 24.7 %      Monocyte % 13.4 %      Eosinophil % 3.2 %      Basophil % 0.9 %      Immature Grans % 0.3 %      Neutrophils, Absolute 1.98 10*3/mm3      Lymphocytes, Absolute 0.85 10*3/mm3      Monocytes, Absolute 0.46 10*3/mm3      Eosinophils, Absolute 0.11 10*3/mm3      Basophils, Absolute 0.03  10*3/mm3      Immature Grans, Absolute 0.01 10*3/mm3     Ethanol [[id number]] Collected: 07/03/24 0411    Specimen: Blood Updated: 07/03/24 0443     Ethanol <10 mg/dL      Ethanol % <0.010 %     Urinalysis, Microscopic Only - Urine, Clean Catch [[id number]]  (Abnormal) Collected: 07/03/24 0411    Specimen: Urine, Clean Catch Updated: 07/03/24 0431     RBC, UA 0-2 /HPF      WBC, UA 11-20 /HPF      Bacteria, UA None Seen /HPF      Squamous Epithelial Cells, UA 3-6 /HPF      Hyaline Casts, UA None Seen /LPF      Methodology Automated Microscopy            Imaging Results (Last 24 Hours)       Procedure Component Value Units Date/Time    CT Abdomen Pelvis With Contrast [[id number]] Collected: 07/03/24 0555     Updated: 07/03/24 0555    Narrative:        Patient: BA JENNINGS  Time Out: 05:54  Exam(s): CT ABDOMEN + PELVIS With Contrast IV Amt: 85    EXAM:    CT Abdomen and Pelvis With Intravenous Contrast    CLINICAL HISTORY:     Reason for exam: epigastric pain, h o pancreatitis.    TECHNIQUE:    Axial computed tomography images of the abdomen and pelvis with   intravenous contrast.  CTDI is 8.38 mGy and DLP is 404 mGy-cm.  This CT   exam was performed according to the principle of ALARA (As Low As   Reasonably Achievable) by using one or more of the following dose   reduction techniques: automated exposure control, adjustment of the mA   and or kV according to patient size, and or use of iterative   reconstruction technique.    COMPARISON:  4 1 24    FINDINGS:    Lung bases:  Unremarkable.  No mass.  No consolidation.     ABDOMEN:    Liver: Hepatic steatosis.  Nodular hepatic contour consistent with   chronic liver disease.  No focal hepatic lesion is identified.  There is   stigmata of portal hypertension including portosystemic varices.    Gallbladder and bile ducts: Small gallstones are seen within the   gallbladder lumen.  No evidence of gallbladder wall thickening or   pericholecystic infiltration.  There is no  intra-or extra hepatic biliary   ductal dilatation.    Pancreas: Again seen is a moderately atrophic pancreas, with diffuse   pancreatic parenchymal calcifications that are most consistent with prior   or chronic pancreatitis.  There is minimal fat stranding regional to the   pancreatic head, which may be associated with an early or mild focal   pancreatitis.  No loculated peripancreatic fluid collection.  No   dilatation of the main pancreatic duct.    Spleen: Borderline splenomegaly.  The spleen measures up to 13.5 cm in   the craniocaudal dimension.    Adrenals:  Unremarkable.  No mass.    Kidneys and ureters:  Unremarkable.  No solid mass.  No hydronephrosis.    Stomach and bowel: No evidence of bowel obstruction.  No definite bowel   wall thickening.  Colonic diverticulosis, without evidence of an acute   diverticulitis..     PELVIS:    Appendix:  No findings to suggest acute appendicitis.    Bladder:  Unremarkable.  No mass.    Reproductive:  Unremarkable as visualized.     ABDOMEN and PELVIS:    Intraperitoneal space:  Unremarkable.  No free air.  No significant   fluid collection.    Bones joints:  No acute fracture.  No dislocation.    Soft tissues:  Unremarkable.    Vasculature:  Unremarkable.  No abdominal aortic aneurysm.    Lymph nodes:  Unremarkable.  No enlarged lymph nodes.    IMPRESSION:       Minimal fat stranding seen regional to the pancreatic head, which may be   associated with an early or mild focal pancreatitis.  Please correlate   with patient's serum pancreatic enzymes.    Again seen is moderate pancreatic atrophy with diffuse pancreatic   parenchymal calcifications, consistent with prior or chronic pancreatitis.    No loculated peripancreatic fluid collection.    Small gallstones seen within the gallbladder lumen.  No findings to   suggest an acute cholecystitis.    Nodular hepatic contour consistent with hepatic cirrhosis.  Stigmata of   portal hypertension.    Colonic diverticulosis  without evidence of an acute diverticulitis.      Impression:          Electronically signed by Amanuel Leach M.D. on 07-03-24 at 0554                Telemetry Scan   Final Result           Assessment/Plan     Active Hospital Problems    Diagnosis  POA    **Acute pancreatitis [K85.90]  Yes    Alcohol withdrawal [F10.939]  Yes    Acute alcoholic gastritis without hemorrhage [K29.20]  Yes    Esophageal varices [I85.00]  Yes    Primary hypertension [I10]  Yes    Hypomagnesemia [E83.42]  Yes    Alcoholic cirrhosis [K70.30]  Yes    Chronic alcohol abuse [F10.10]  Yes    Pancreatic insufficiency [K86.89]  Yes    Thrombocytopenia [D69.6]  Yes    Alcoholic hepatitis [K70.10]  Yes    Tobacco dependence due to cigarettes [F17.210]  Yes      Resolved Hospital Problems   No resolved problems to display.     Acute on chronic pancreatitis  Acute alcoholic gastritis  -Pain control, bowel regimen  -IV PPI, IV fluids, clear liquid diet advance as tolerated  -Resume Creon to be taken with meals when she is able to tolerate food    Alcohol use disorder with withdrawal  Alcoholic hepatitis  -History of delirium and remote history of withdrawal seizures  -CIWA protocol, phenobarbital  -Multivitamin, folate, thiamine    Alcohol cirrhosis with esophageal varices  -Per chart review she had been on nadolol in the past, will have to discuss if she is still taking this medication  -Continue lactulose    Chronic thrombocytopenia, stable, baseline    Hypertension-continue home Norvasc    Hypothyroidism-continue home Synthroid      I discussed the patient's findings and my recommendations with patient and nursing staff.    VTE Prophylaxis - SCDs.  Code Status - Full code.       Kalpesh Trujillo MD  [facility]  07/03/24  10:52 EDT

## 2024-07-03 NOTE — CONSULTS
"[facility] consult d/t ETOH. Pt  admitted for alcohol withdrawal. BAL negative and UDS not collected. Chart reviewed, pt seen by [facility] several times in the past, last in April 2024 (see prior notes for full psychosocial, mental health and substance use hx). RN reports pt doing \"fine\". Pt A&Ox4. Pt pleasant and cooperative. Rates depression \"4/10\" and anxiety \"7/10\" today (10 being the worst). Denies SI/HI, hallucinations or wish to be dead. Current stressors: marital issues, external marital affairs/relationship, boredom. Pt reports has had three episodes of relapse with ETOH since being seen by [facility] in April d/t these stressors. Pt states she and her spouse have not been intimate in 7 years and she engages in \"heavy petting and kissing with others, but no sex\". Pt reports she has been going to AA meetings 3 times a week, attending her home group and speaking with her sponsor every day up until her last relapse which was on Saturday. Pt reports her relapses have consisted of drinking 1 pint each day for 3-4 days straight. Pt states she also engages in using cocaine monthly (last time on Saturday) \"my  is a crack head\". Pt smokes THC 1 x week, last on Saturday. Pt still smokes 1/2PPD cigs. Pt reports continuing to see psychiatrist Dr. Aline Mahajan \"addictionologist\". Pt currently prescribed Prozac, Atarax, Vistaril and Remeron. Pt would like to see another psychiatrist to address major depression, anxiety and PTSD; along with seeing counselor for same. Pt given resources for outpatient mental health treatment and psychiatrist. Pt states she is appreciative of resources, but does not want to be followed by [facility] \"I know what I need to do\". Respected pt's wishes. Pt v/u of how to request [facility] to return if needed.   [facility] will sign off at this time, please re-consult if needed.   "

## 2024-07-03 NOTE — SIGNIFICANT NOTE
07/03/24 1514   OTHER   Discipline physical therapist   Rehab Time/Intention   Session Not Performed other (see comments)  (Patient states that she has ambulated to the bathroom and is currently at her baseline mobility level. She denies need for acute PT at this time, PT will sign-off. Please reorder if status changes.)   Therapy Assessment/Plan (PT)   Criteria for Skilled Interventions Met (PT) no;no problems identified which require skilled intervention

## 2024-07-03 NOTE — ED NOTES
"Nursing report ED to floor  Bekah Gibson  54 y.o.  female    HPI :  HPI (Adult)  Stated Reason for Visit: Abdominal pain; pt states she relapsed (alcohol) yesteday and her last drink was around 1700.  History Obtained From: patient    Chief Complaint  Chief Complaint   Patient presents with    Withdrawal     Alcohol     Abdominal Pain    Nausea       Admitting doctor:   Kalpesh Trujillo MD    Admitting diagnosis:   The primary encounter diagnosis was Alcohol-induced acute pancreatitis, unspecified complication status. A diagnosis of Alcoholic cirrhosis, unspecified whether ascites present was also pertinent to this visit.    Code status:   Current Code Status       Date Active Code Status Order ID Comments User Context       Prior            Allergies:   Benzonatate, Ketorolac tromethamine, Phenergan [promethazine hcl], Cucumber extract, and Promethazine-phenylephrine    Isolation:   No active isolations    Intake and Output    Intake/Output Summary (Last 24 hours) at 7/3/2024 0717  Last data filed at 7/3/2024 0516  Gross per 24 hour   Intake 1050 ml   Output --   Net 1050 ml       Weight:       07/03/24  0348   Weight: 65.8 kg (145 lb)       Most recent vitals:   Vitals:    07/03/24 0348 07/03/24 0349 07/03/24 0511 07/03/24 0611   BP:  141/97 148/93 144/81   BP Location:  Right arm     Pulse: 104  79 75   Resp: 16  16 16   Temp: 98.5 °F (36.9 °C)      TempSrc: Tympanic      SpO2: 96%  94% 94%   Weight: 65.8 kg (145 lb)      Height: 165.1 cm (65\")          Active LDAs/IV Access:   Lines, Drains & Airways       Active LDAs       Name Placement date Placement time Site Days    Peripheral IV 07/03/24 0409 Left;Posterior Hand 07/03/24  0409  Hand  less than 1                    Labs (abnormal labs have a star):   Labs Reviewed   COMPREHENSIVE METABOLIC PANEL - Abnormal; Notable for the following components:       Result Value    Glucose 156 (*)     Creatinine 0.53 (*)     CO2 21.3 (*)     ALT (SGPT) 54 (*)     AST (SGOT) " 97 (*)     Alkaline Phosphatase 145 (*)     All other components within normal limits    Narrative:     GFR Normal >60  Chronic Kidney Disease <60  Kidney Failure <15     URINALYSIS W/ MICROSCOPIC IF INDICATED (NO CULTURE) - Abnormal; Notable for the following components:    Leuk Esterase, UA Moderate (2+) (*)     All other components within normal limits   MAGNESIUM - Abnormal; Notable for the following components:    Magnesium 1.5 (*)     All other components within normal limits   CBC WITH AUTO DIFFERENTIAL - Abnormal; Notable for the following components:    Platelets 66 (*)     Monocyte % 13.4 (*)     All other components within normal limits   URINALYSIS, MICROSCOPIC ONLY - Abnormal; Notable for the following components:    WBC, UA 11-20 (*)     Squamous Epithelial Cells, UA 3-6 (*)     All other components within normal limits   LIPASE - Normal   LACTIC ACID, PLASMA - Normal   RAINBOW DRAW    Narrative:     The following orders were created for panel order [facility] Draw.  Procedure                               Abnormality         Status                     ---------                               -----------         ------                     Green Top (Gel)[[id number]]                                  Final result               Lavender Top[[id number]]                                     Final result               Gold Top - SST[[id number]]                                   Final result               Light Blue Top[[id number]]                                   Final result                 Please view results for these tests on the individual orders.   ETHANOL   CBC AND DIFFERENTIAL    Narrative:     The following orders were created for panel order CBC & Differential.  Procedure                               Abnormality         Status                     ---------                               -----------         ------                     CBC Auto Differential[[id number]]        Abnormal            Final result                  Please view results for these tests on the individual orders.   GREEN TOP   LAVENDER TOP   GOLD TOP - SST   LIGHT BLUE TOP       EKG:   Telemetry Scan   Final Result          Meds given in ED:   Medications   sodium chloride 0.9 % flush 10 mL (has no administration in time range)   sodium chloride 0.9 % infusion (125 mL/hr Intravenous New Bag 7/3/24 0634)   sodium chloride 0.9 % infusion 1,000 mL (0 mL Intravenous Stopped 7/3/24 0516)   folic acid 1 mg in sodium chloride 0.9 % 50 mL IVPB (0 mg Intravenous Stopped 7/3/24 0451)   thiamine (B-1) injection 200 mg (200 mg Intravenous Given 7/3/24 0451)   ondansetron (ZOFRAN) injection 4 mg (4 mg Intravenous Given 7/3/24 0419)   famotidine (PEPCID) injection 20 mg (20 mg Intravenous Given 7/3/24 0419)   morphine injection 2 mg (2 mg Intravenous Given 7/3/24 0419)   magnesium sulfate 2g/50 mL (PREMIX) infusion (2 g Intravenous New Bag 7/3/24 0515)   morphine injection 4 mg (4 mg Intravenous Given 7/3/24 0519)   iopamidol (ISOVUE-300) 61 % injection 85 mL (85 mL Intravenous Given 7/3/24 0530)   chlordiazePOXIDE (LIBRIUM) capsule 25 mg (25 mg Oral Given 7/3/24 0634)       Imaging results:  CT Abdomen Pelvis With Contrast    Result Date: 7/3/2024  Electronically signed by Amanuel Leach M.D. on 07-03-24 at 0554     Ambulatory status:   - ad diane    Social issues:   Social History     Socioeconomic History    Marital status:    Tobacco Use    Smoking status: Every Day     Current packs/day: 0.25     Average packs/day: 0.3 packs/day for 15.0 years (3.8 ttl pk-yrs)     Types: Cigarettes     Passive exposure: Current    Smokeless tobacco: Never    Tobacco comments:     Rarely smoke sometimes will to   Vaping Use    Vaping status: Never Used   Substance and Sexual Activity    Alcohol use: Not Currently     Alcohol/week: 5.0 - 10.0 standard drinks of alcohol     Types: 5 - 10 Shots of liquor per week     Comment: 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    Drug use: Not Currently     Sexual activity: Not Currently     Partners: Male     Birth control/protection: Post-menopausal, Natural family planning/Rhythm     Comment: cinthia- menepausal       Peripheral Neurovascular  Peripheral Neurovascular (Adult)  Peripheral Neurovascular WDL: WDL    Neuro Cognitive  Neuro Cognitive (Adult)  Cognitive/Neuro/Behavioral WDL: WDL    Learning  Learning Assessment (Adult)  Learning Readiness and Ability: no barriers identified  Education Provided  Person Taught: patient  Teaching Method: verbal instruction  Teaching Focus: symptom/problem overview, diagnostic test  Education Outcome Evaluation: verbalizes understanding    Respiratory  Respiratory WDL  Respiratory WDL: WDL  Breath Sounds  Breath Sounds: All Fields  All Lung Fields Breath Sounds: Anterior:, Posterior:, Lateral:, clear, equal bilaterally    Abdominal Pain       Pain Assessments  Pain (Adult)  (0-10) Pain Rating: Rest: 5  (0-10) Pain Rating: Activity: 6  Pain Location: abdomen  Response to Pain Interventions: interventions effective per patient    NIH Stroke Scale       Lisbet Lopez RN  07/03/24 07:17 EDT

## 2024-07-03 NOTE — ED TRIAGE NOTES
Pt ambulatory to er pv c/o Abdominal pain; pt states she relapsed (alcohol) the last several days and had her last drink yesterday around 1700. States she feels like she is in withdrawal, pt states she has had withdrawal seizures before but it has been many years.

## 2024-07-04 LAB
ALBUMIN SERPL-MCNC: 3.4 G/DL (ref 3.5–5.2)
ALBUMIN/GLOB SERPL: 1.5 G/DL
ALP SERPL-CCNC: 115 U/L (ref 39–117)
ALT SERPL W P-5'-P-CCNC: 40 U/L (ref 1–33)
ANION GAP SERPL CALCULATED.3IONS-SCNC: 16.3 MMOL/L (ref 5–15)
AST SERPL-CCNC: 70 U/L (ref 1–32)
BILIRUB SERPL-MCNC: 1.1 MG/DL (ref 0–1.2)
BUN SERPL-MCNC: 4 MG/DL (ref 6–20)
BUN/CREAT SERPL: 10.5 (ref 7–25)
CALCIUM SPEC-SCNC: 8.1 MG/DL (ref 8.6–10.5)
CHLORIDE SERPL-SCNC: 107 MMOL/L (ref 98–107)
CO2 SERPL-SCNC: 13.7 MMOL/L (ref 22–29)
CREAT SERPL-MCNC: 0.38 MG/DL (ref 0.57–1)
DEPRECATED RDW RBC AUTO: 45.6 FL (ref 37–54)
EGFRCR SERPLBLD CKD-EPI 2021: 117.8 ML/MIN/1.73
ERYTHROCYTE [DISTWIDTH] IN BLOOD BY AUTOMATED COUNT: 13.6 % (ref 12.3–15.4)
GLOBULIN UR ELPH-MCNC: 2.3 GM/DL
GLUCOSE SERPL-MCNC: 105 MG/DL (ref 65–99)
HCT VFR BLD AUTO: 39.3 % (ref 34–46.6)
HGB BLD-MCNC: 13.3 G/DL (ref 12–15.9)
MCH RBC QN AUTO: 30.9 PG (ref 26.6–33)
MCHC RBC AUTO-ENTMCNC: 33.8 G/DL (ref 31.5–35.7)
MCV RBC AUTO: 91.4 FL (ref 79–97)
PLATELET # BLD AUTO: 47 10*3/MM3 (ref 140–450)
PMV BLD AUTO: 12.2 FL (ref 6–12)
POTASSIUM SERPL-SCNC: 3.2 MMOL/L (ref 3.5–5.2)
POTASSIUM SERPL-SCNC: 4 MMOL/L (ref 3.5–5.2)
PROT SERPL-MCNC: 5.7 G/DL (ref 6–8.5)
RBC # BLD AUTO: 4.3 10*6/MM3 (ref 3.77–5.28)
SODIUM SERPL-SCNC: 137 MMOL/L (ref 136–145)
WBC NRBC COR # BLD AUTO: 2.06 10*3/MM3 (ref 3.4–10.8)

## 2024-07-04 PROCEDURE — 96376 TX/PRO/DX INJ SAME DRUG ADON: CPT

## 2024-07-04 PROCEDURE — G0378 HOSPITAL OBSERVATION PER HR: HCPCS

## 2024-07-04 PROCEDURE — 84132 ASSAY OF SERUM POTASSIUM: CPT | Performed by: STUDENT IN AN ORGANIZED HEALTH CARE EDUCATION/TRAINING PROGRAM

## 2024-07-04 PROCEDURE — 85027 COMPLETE CBC AUTOMATED: CPT | Performed by: STUDENT IN AN ORGANIZED HEALTH CARE EDUCATION/TRAINING PROGRAM

## 2024-07-04 PROCEDURE — 80053 COMPREHEN METABOLIC PANEL: CPT | Performed by: STUDENT IN AN ORGANIZED HEALTH CARE EDUCATION/TRAINING PROGRAM

## 2024-07-04 PROCEDURE — 25010000002 PHENOBARBITAL PER 120 MG: Performed by: STUDENT IN AN ORGANIZED HEALTH CARE EDUCATION/TRAINING PROGRAM

## 2024-07-04 PROCEDURE — 25010000002 HYDROMORPHONE PER 4 MG: Performed by: STUDENT IN AN ORGANIZED HEALTH CARE EDUCATION/TRAINING PROGRAM

## 2024-07-04 RX ORDER — POTASSIUM CHLORIDE 750 MG/1
40 TABLET, FILM COATED, EXTENDED RELEASE ORAL EVERY 4 HOURS
Status: COMPLETED | OUTPATIENT
Start: 2024-07-04 | End: 2024-07-04

## 2024-07-04 RX ADMIN — NICOTINE 1 PATCH: 21 PATCH, EXTENDED RELEASE TRANSDERMAL at 10:44

## 2024-07-04 RX ADMIN — PHENOBARBITAL SODIUM 65 MG: 65 INJECTION INTRAMUSCULAR at 17:26

## 2024-07-04 RX ADMIN — LACTULOSE 10 G: 10 SOLUTION ORAL at 08:32

## 2024-07-04 RX ADMIN — Medication 10 ML: at 08:33

## 2024-07-04 RX ADMIN — AMLODIPINE BESYLATE 2.5 MG: 2.5 TABLET ORAL at 08:32

## 2024-07-04 RX ADMIN — HYDROCODONE BITARTRATE AND ACETAMINOPHEN 1 TABLET: 5; 325 TABLET ORAL at 17:26

## 2024-07-04 RX ADMIN — PANCRELIPASE 24000 UNITS OF LIPASE: 60000; 12000; 38000 CAPSULE, DELAYED RELEASE PELLETS ORAL at 08:32

## 2024-07-04 RX ADMIN — FLUOXETINE HYDROCHLORIDE 40 MG: 20 CAPSULE ORAL at 08:32

## 2024-07-04 RX ADMIN — HYDROMORPHONE HYDROCHLORIDE 0.5 MG: 1 INJECTION, SOLUTION INTRAMUSCULAR; INTRAVENOUS; SUBCUTANEOUS at 21:40

## 2024-07-04 RX ADMIN — HYDROCODONE BITARTRATE AND ACETAMINOPHEN 1 TABLET: 5; 325 TABLET ORAL at 05:24

## 2024-07-04 RX ADMIN — PANTOPRAZOLE SODIUM 40 MG: 40 INJECTION, POWDER, FOR SOLUTION INTRAVENOUS at 17:26

## 2024-07-04 RX ADMIN — HYDROCODONE BITARTRATE AND ACETAMINOPHEN 1 TABLET: 5; 325 TABLET ORAL at 11:47

## 2024-07-04 RX ADMIN — Medication 100 MG: at 08:32

## 2024-07-04 RX ADMIN — HYDROMORPHONE HYDROCHLORIDE 0.5 MG: 1 INJECTION, SOLUTION INTRAMUSCULAR; INTRAVENOUS; SUBCUTANEOUS at 08:32

## 2024-07-04 RX ADMIN — Medication 1 TABLET: at 08:32

## 2024-07-04 RX ADMIN — POTASSIUM CHLORIDE 40 MEQ: 750 TABLET, EXTENDED RELEASE ORAL at 11:46

## 2024-07-04 RX ADMIN — PANCRELIPASE 24000 UNITS OF LIPASE: 60000; 12000; 38000 CAPSULE, DELAYED RELEASE PELLETS ORAL at 11:46

## 2024-07-04 RX ADMIN — HYDROMORPHONE HYDROCHLORIDE 0.5 MG: 1 INJECTION, SOLUTION INTRAMUSCULAR; INTRAVENOUS; SUBCUTANEOUS at 13:10

## 2024-07-04 RX ADMIN — LEVOTHYROXINE SODIUM 100 MCG: 100 TABLET ORAL at 08:32

## 2024-07-04 RX ADMIN — MIRTAZAPINE 15 MG: 15 TABLET, FILM COATED ORAL at 21:40

## 2024-07-04 RX ADMIN — PANTOPRAZOLE SODIUM 40 MG: 40 INJECTION, POWDER, FOR SOLUTION INTRAVENOUS at 05:23

## 2024-07-04 RX ADMIN — PHENOBARBITAL SODIUM 65 MG: 65 INJECTION INTRAMUSCULAR at 05:23

## 2024-07-04 RX ADMIN — Medication 10 ML: at 20:54

## 2024-07-04 RX ADMIN — FOLIC ACID 1 MG: 1 TABLET ORAL at 08:32

## 2024-07-04 RX ADMIN — PANCRELIPASE 24000 UNITS OF LIPASE: 60000; 12000; 38000 CAPSULE, DELAYED RELEASE PELLETS ORAL at 17:26

## 2024-07-04 RX ADMIN — POTASSIUM CHLORIDE 40 MEQ: 750 TABLET, EXTENDED RELEASE ORAL at 17:26

## 2024-07-04 NOTE — PROGRESS NOTES
Name: Bekah Gibson ADMIT: 7/3/2024   : 1970  PCP: Davis Tylor    MRN: [id number] LOS: 1 days   AGE/SEX: 54 y.o. female  ROOM: [facility], [facility]     Subjective   Subjective   Patient's abdominal pain improved. Some nausea without vomiting. Tolerating diet. Required oral IV pain meds yesterday.    Review of Systems   Constitutional:  Positive for fatigue. Negative for chills and fever.   Respiratory:  Negative for cough and shortness of breath.    Cardiovascular:  Negative for chest pain and leg swelling.   Gastrointestinal:  Positive for abdominal pain and nausea. Negative for diarrhea.     As above     Objective   Objective   Vital Signs  Temp:  [97.3 °F (36.3 °C)-98.4 °F (36.9 °C)] 97.3 °F (36.3 °C)  Heart Rate:  [77-93] 87  Resp:  [18] 18  BP: (121-153)/(73-99) 138/85  SpO2:  [91 %-100 %] 100 %  on   ;   Device (Oxygen Therapy): room air  Body mass index is 24.13 kg/m².  Physical Exam  Constitutional:       General: She is not in acute distress.     Appearance: She is not ill-appearing.   Cardiovascular:      Rate and Rhythm: Normal rate and regular rhythm.   Pulmonary:      Effort: Pulmonary effort is normal. No respiratory distress.   Abdominal:      General: Abdomen is flat. There is no distension.      Tenderness: There is abdominal tenderness (epigastric).   Musculoskeletal:         General: No swelling or deformity. Normal range of motion.   Skin:     General: Skin is warm and dry.   Neurological:      General: No focal deficit present.      Mental Status: She is alert. Mental status is at baseline.         Results Review     I reviewed the patient's new clinical results.  Results from last 7 days   Lab Units 24  0517 24  0411   WBC 10*3/mm3 2.06* 3.44   HEMOGLOBIN g/dL 13.3 15.8   PLATELETS 10*3/mm3 47* 66*     Results from last 7 days   Lab Units 24  0411   SODIUM mmol/L 140   POTASSIUM mmol/L 3.7   CHLORIDE mmol/L 105   CO2 mmol/L 21.3*   BUN mg/dL 6   CREATININE mg/dL 0.53*  "  GLUCOSE mg/dL 156*   Estimated Creatinine Clearance: 123.1 mL/min (A) (by C-G formula based on SCr of 0.53 mg/dL (L)).  Results from last 7 days   Lab Units 07/03/24  0411   ALBUMIN g/dL 4.2   BILIRUBIN mg/dL 1.2   ALK PHOS U/L 145*   AST (SGOT) U/L 97*   ALT (SGPT) U/L 54*     Results from last 7 days   Lab Units 07/03/24  0411   CALCIUM mg/dL 9.5   ALBUMIN g/dL 4.2   MAGNESIUM mg/dL 1.5*     Results from last 7 days   Lab Units 07/03/24  0411   LACTATE mmol/L 1.6     COVID19   Date Value Ref Range Status   09/21/2021 Not Detected Not Detected - Ref. Range Final     SARS-CoV-2, NAVIN   Date Value Ref Range Status   01/18/2021 Not Detected Not Detected Final     Comment:     This nucleic acid amplification test was developed and its performance  characteristics determined by [employer]. Nucleic acid  amplification tests include PCR and TMA. This test has not been FDA  cleared or approved. This test has been authorized by FDA under an  Emergency Use Authorization (EUA). This test is only authorized for  the duration of time the declaration that circumstances exist  justifying the authorization of the emergency use of in vitro  diagnostic tests for detection of SARS-CoV-2 virus and/or diagnosis  of COVID-19 infection under section 564(b)(1) of the Act, 21 U.S.C.  360bbb-3(b) (1), unless the authorization is terminated or revoked  sooner.  When diagnostic testing is negative, the possibility of a false  negative result should be considered in the context of a patient's  recent exposures and the presence of clinical signs and symptoms  consistent with COVID-19. An individual without symptoms of COVID-19  and who is not shedding SARS-CoV-2 virus would expect to have a  negative (not detected) result in this assay.     No results found for: \"HGBA1C\", \"POCGLU\"    CT Abdomen Pelvis With Contrast  Narrative: Patient: BA JENNINGS  Time Out: 05:54  Exam(s): CT ABDOMEN + PELVIS With Contrast IV Amt: 85    EXAM:    " CT Abdomen and Pelvis With Intravenous Contrast    CLINICAL HISTORY:     Reason for exam: epigastric pain, h o pancreatitis.    TECHNIQUE:    Axial computed tomography images of the abdomen and pelvis with   intravenous contrast.  CTDI is 8.38 mGy and DLP is 404 mGy-cm.  This CT   exam was performed according to the principle of ALARA (As Low As   Reasonably Achievable) by using one or more of the following dose   reduction techniques: automated exposure control, adjustment of the mA   and or kV according to patient size, and or use of iterative   reconstruction technique.    COMPARISON:  4 1 24    FINDINGS:    Lung bases:  Unremarkable.  No mass.  No consolidation.     ABDOMEN:    Liver: Hepatic steatosis.  Nodular hepatic contour consistent with   chronic liver disease.  No focal hepatic lesion is identified.  There is   stigmata of portal hypertension including portosystemic varices.    Gallbladder and bile ducts: Small gallstones are seen within the   gallbladder lumen.  No evidence of gallbladder wall thickening or   pericholecystic infiltration.  There is no intra-or extra hepatic biliary   ductal dilatation.    Pancreas: Again seen is a moderately atrophic pancreas, with diffuse   pancreatic parenchymal calcifications that are most consistent with prior   or chronic pancreatitis.  There is minimal fat stranding regional to the   pancreatic head, which may be associated with an early or mild focal   pancreatitis.  No loculated peripancreatic fluid collection.  No   dilatation of the main pancreatic duct.    Spleen: Borderline splenomegaly.  The spleen measures up to 13.5 cm in   the craniocaudal dimension.    Adrenals:  Unremarkable.  No mass.    Kidneys and ureters:  Unremarkable.  No solid mass.  No hydronephrosis.    Stomach and bowel: No evidence of bowel obstruction.  No definite bowel   wall thickening.  Colonic diverticulosis, without evidence of an acute   diverticulitis..     PELVIS:    Appendix:  No  findings to suggest acute appendicitis.    Bladder:  Unremarkable.  No mass.    Reproductive:  Unremarkable as visualized.     ABDOMEN and PELVIS:    Intraperitoneal space:  Unremarkable.  No free air.  No significant   fluid collection.    Bones joints:  No acute fracture.  No dislocation.    Soft tissues:  Unremarkable.    Vasculature:  Unremarkable.  No abdominal aortic aneurysm.    Lymph nodes:  Unremarkable.  No enlarged lymph nodes.    IMPRESSION:       Minimal fat stranding seen regional to the pancreatic head, which may be   associated with an early or mild focal pancreatitis.  Please correlate   with patient's serum pancreatic enzymes.    Again seen is moderate pancreatic atrophy with diffuse pancreatic   parenchymal calcifications, consistent with prior or chronic pancreatitis.    No loculated peripancreatic fluid collection.    Small gallstones seen within the gallbladder lumen.  No findings to   suggest an acute cholecystitis.    Nodular hepatic contour consistent with hepatic cirrhosis.  Stigmata of   portal hypertension.    Colonic diverticulosis without evidence of an acute diverticulitis.  Impression: Electronically signed by Amanuel Leach M.D. on 07-03-24 at 0554    I reviewed the patient's daily medications.  Scheduled Medications  amLODIPine, 2.5 mg, Oral, Q24H  FLUoxetine, 40 mg, Oral, Daily  folic acid, 1 mg, Oral, Daily  lactulose, 10 g, Oral, Daily  levothyroxine, 100 mcg, Oral, Daily  multivitamin, 1 tablet, Oral, Daily  nicotine, 1 patch, Transdermal, Q24H  pancrelipase (Lip-Prot-Amyl), 24,000 units of lipase, Oral, TID With Meals  pantoprazole, 40 mg, Intravenous, BID AC  PHENobarbital, 65 mg, Intravenous, Once   Followed by  [START ON 7/5/2024] PHENobarbital, 32.4 mg, Oral, Once   Followed by  [START ON 7/5/2024] PHENobarbital, 32.4 mg, Oral, Once   Followed by  [START ON 7/6/2024] PHENobarbital, 32.4 mg, Oral, Once  sodium chloride, 10 mL, Intravenous, Q12H  thiamine, 100 mg, Oral,  Daily    Infusions  sodium chloride, 125 mL/hr, Last Rate: 125 mL/hr (07/03/24 1602)    Diet  Diet: Gastrointestinal; Fiber-Restricted; Texture: Soft to Chew (NDD 3); Soft to Chew: Whole Meat; Fluid Consistency: Thin (IDDSI 0)         I have personally reviewed:  [x]  Laboratory   []  Microbiology   []  Radiology   []  EKG/Telemetry   []  Cardiology/Vascular   []  Pathology   []  Records     Assessment/Plan     Active Hospital Problems    Diagnosis  POA    **Acute pancreatitis [K85.90]  Yes    Alcohol withdrawal [F10.939]  Yes    Acute alcoholic gastritis without hemorrhage [K29.20]  Yes    Esophageal varices [I85.00]  Yes    Primary hypertension [I10]  Yes    Hypomagnesemia [E83.42]  Yes    Alcoholic cirrhosis [K70.30]  Yes    Chronic alcohol abuse [F10.10]  Yes    Pancreatic insufficiency [K86.89]  Yes    Thrombocytopenia [D69.6]  Yes    Alcoholic hepatitis [K70.10]  Yes    Tobacco dependence due to cigarettes [F17.210]  Yes      Resolved Hospital Problems   No resolved problems to display.       54 y.o. female admitted with Acute pancreatitis.    Acute on chronic pancreatitis  Acute alcoholic gastritis  -Pain control, bowel regimen  -IV PPI, IV fluids, GI soft diet,  advance as tolerated  -Continue Creon      Alcohol use disorder with withdrawal  Alcoholic hepatitis  -History of delirium and remote history of withdrawal seizures  -CIWA protocol, phenobarbital  -Multivitamin, folate, thiamine     Alcohol cirrhosis with esophageal varices  -Per chart review she had been on nadolol in the past, will have to discuss if she is still taking this medication  -Continue lactulose     Chronic thrombocytopenia, stable, baseline     Hypertension-continue home Norvasc     Hypothyroidism-continue home Synthroid    SCDs for DVT prophylaxis.  Full code.  Discussed with patient and nursing staff.  Anticipate discharge home tomorrow.    Expected Discharge Date: 7/5/2024; Expected Discharge Time:       Kalpesh Trujillo MD  [facility]  07/04/24  08:48 EDT

## 2024-07-04 NOTE — PLAN OF CARE
Goal Outcome Evaluation:      VSS throughout shift. Pt called out multiple times for staff during shift. Pain well controlled per MAR. Pt no complaints at this time.

## 2024-07-05 ENCOUNTER — READMISSION MANAGEMENT (OUTPATIENT)
Dept: CALL CENTER | Facility: HOSPITAL | Age: 54
End: 2024-07-05
Payer: MEDICAID

## 2024-07-05 VITALS
TEMPERATURE: 97.7 F | SYSTOLIC BLOOD PRESSURE: 148 MMHG | BODY MASS INDEX: 24.16 KG/M2 | OXYGEN SATURATION: 97 % | HEIGHT: 65 IN | WEIGHT: 145 LBS | DIASTOLIC BLOOD PRESSURE: 99 MMHG | HEART RATE: 75 BPM | RESPIRATION RATE: 18 BRPM

## 2024-07-05 LAB
ALBUMIN SERPL-MCNC: 3.6 G/DL (ref 3.5–5.2)
ALBUMIN/GLOB SERPL: 1.3 G/DL
ALP SERPL-CCNC: 138 U/L (ref 39–117)
ALT SERPL W P-5'-P-CCNC: 40 U/L (ref 1–33)
ANION GAP SERPL CALCULATED.3IONS-SCNC: 11.7 MMOL/L (ref 5–15)
AST SERPL-CCNC: 57 U/L (ref 1–32)
BILIRUB SERPL-MCNC: 0.9 MG/DL (ref 0–1.2)
BUN SERPL-MCNC: 6 MG/DL (ref 6–20)
BUN/CREAT SERPL: 13.6 (ref 7–25)
CALCIUM SPEC-SCNC: 8.9 MG/DL (ref 8.6–10.5)
CHLORIDE SERPL-SCNC: 105 MMOL/L (ref 98–107)
CO2 SERPL-SCNC: 20.3 MMOL/L (ref 22–29)
CREAT SERPL-MCNC: 0.44 MG/DL (ref 0.57–1)
DEPRECATED RDW RBC AUTO: 46.1 FL (ref 37–54)
EGFRCR SERPLBLD CKD-EPI 2021: 113.7 ML/MIN/1.73
ERYTHROCYTE [DISTWIDTH] IN BLOOD BY AUTOMATED COUNT: 13.8 % (ref 12.3–15.4)
GLOBULIN UR ELPH-MCNC: 2.8 GM/DL
GLUCOSE SERPL-MCNC: 117 MG/DL (ref 65–99)
HCT VFR BLD AUTO: 41.6 % (ref 34–46.6)
HGB BLD-MCNC: 14.1 G/DL (ref 12–15.9)
MCH RBC QN AUTO: 30.9 PG (ref 26.6–33)
MCHC RBC AUTO-ENTMCNC: 33.9 G/DL (ref 31.5–35.7)
MCV RBC AUTO: 91 FL (ref 79–97)
PLATELET # BLD AUTO: 52 10*3/MM3 (ref 140–450)
PMV BLD AUTO: 11.2 FL (ref 6–12)
POTASSIUM SERPL-SCNC: 3.8 MMOL/L (ref 3.5–5.2)
PROT SERPL-MCNC: 6.4 G/DL (ref 6–8.5)
RBC # BLD AUTO: 4.57 10*6/MM3 (ref 3.77–5.28)
SODIUM SERPL-SCNC: 137 MMOL/L (ref 136–145)
WBC NRBC COR # BLD AUTO: 2.52 10*3/MM3 (ref 3.4–10.8)

## 2024-07-05 PROCEDURE — 96376 TX/PRO/DX INJ SAME DRUG ADON: CPT

## 2024-07-05 PROCEDURE — 80053 COMPREHEN METABOLIC PANEL: CPT | Performed by: STUDENT IN AN ORGANIZED HEALTH CARE EDUCATION/TRAINING PROGRAM

## 2024-07-05 PROCEDURE — G0378 HOSPITAL OBSERVATION PER HR: HCPCS

## 2024-07-05 PROCEDURE — 85027 COMPLETE CBC AUTOMATED: CPT | Performed by: STUDENT IN AN ORGANIZED HEALTH CARE EDUCATION/TRAINING PROGRAM

## 2024-07-05 RX ORDER — FLUOXETINE HYDROCHLORIDE 20 MG/1
60 CAPSULE ORAL DAILY
Qty: 90 CAPSULE | Refills: 0 | Status: SHIPPED | OUTPATIENT
Start: 2024-07-05 | End: 2024-08-04

## 2024-07-05 RX ORDER — HYDROCODONE BITARTRATE AND ACETAMINOPHEN 5; 325 MG/1; MG/1
1 TABLET ORAL EVERY 8 HOURS PRN
Qty: 9 TABLET | Refills: 0 | Status: SHIPPED | OUTPATIENT
Start: 2024-07-05 | End: 2024-07-08

## 2024-07-05 RX ADMIN — AMLODIPINE BESYLATE 2.5 MG: 2.5 TABLET ORAL at 08:31

## 2024-07-05 RX ADMIN — PANCRELIPASE 24000 UNITS OF LIPASE: 60000; 12000; 38000 CAPSULE, DELAYED RELEASE PELLETS ORAL at 08:31

## 2024-07-05 RX ADMIN — FLUOXETINE HYDROCHLORIDE 40 MG: 20 CAPSULE ORAL at 08:30

## 2024-07-05 RX ADMIN — Medication 10 ML: at 08:32

## 2024-07-05 RX ADMIN — PANTOPRAZOLE SODIUM 40 MG: 40 INJECTION, POWDER, FOR SOLUTION INTRAVENOUS at 08:31

## 2024-07-05 RX ADMIN — LEVOTHYROXINE SODIUM 100 MCG: 100 TABLET ORAL at 08:30

## 2024-07-05 RX ADMIN — FOLIC ACID 1 MG: 1 TABLET ORAL at 08:31

## 2024-07-05 RX ADMIN — HYDROCODONE BITARTRATE AND ACETAMINOPHEN 1 TABLET: 5; 325 TABLET ORAL at 02:19

## 2024-07-05 RX ADMIN — Medication 100 MG: at 08:30

## 2024-07-05 RX ADMIN — Medication 1 TABLET: at 08:30

## 2024-07-05 RX ADMIN — PHENOBARBITAL 32.4 MG: 32.4 TABLET ORAL at 05:45

## 2024-07-05 RX ADMIN — HYDROCODONE BITARTRATE AND ACETAMINOPHEN 1 TABLET: 5; 325 TABLET ORAL at 08:30

## 2024-07-05 RX ADMIN — LACTULOSE 10 G: 10 SOLUTION ORAL at 08:31

## 2024-07-05 NOTE — PLAN OF CARE
Patient resting in bed which is in the lowest position with call light in reach. Pain levels addressed overnight with PRN medications. All needs addressed. No acute events overnight.       Problem: Adult Inpatient Plan of Care  Goal: Plan of Care Review  Outcome: Ongoing, Progressing  Flowsheets (Taken 7/5/2024 0548)  Plan of Care Reviewed With: patient     Problem: Pain Acute  Goal: Acceptable Pain Control and Functional Ability  Outcome: Ongoing, Progressing  Intervention: Prevent or Manage Pain  Flowsheets (Taken 7/5/2024 0548)  Sensory Stimulation Regulation:   quiet environment promoted   lighting decreased   care clustered   television on  Sleep/Rest Enhancement:   awakenings minimized   room darkened  Medication Review/Management: medications reviewed  Intervention: Develop Pain Management Plan  Flowsheets (Taken 7/5/2024 0548)  Pain Management Interventions:   around-the-clock dosing utilized   care clustered   diversional activity provided   pain management plan reviewed with patient/caregiver   quiet environment facilitated  Intervention: Optimize Psychosocial Wellbeing  Flowsheets  Taken 7/5/2024 0548  Supportive Measures:   active listening utilized   decision-making supported   self-care encouraged  Taken 7/4/2024 2000  Supportive Measures:   active listening utilized   goal-setting facilitated   self-care encouraged   verbalization of feelings encouraged  Diversional Activities:   television   smartphone   Goal Outcome Evaluation:  Plan of Care Reviewed With: patient

## 2024-07-05 NOTE — OUTREACH NOTE
Prep Survey      Flowsheet Row Responses   [facility] facility patient discharged from? [place]   Is LACE score < 7 ? No   Eligibility Readm Mgmt   Discharge diagnosis Acute pancreatitis   Does the patient have one of the following disease processes/diagnoses(primary or secondary)? Other   Prep survey completed? Yes            Aline BE - Registered Nurse

## 2024-07-05 NOTE — DISCHARGE SUMMARY
Patient Name: Bekah Gibson  : 1970  MRN: [id number]    Date of Admission: 7/3/2024  Date of Discharge:  2024  Primary Care Physician: Tylor Davis      Chief Complaint:   Withdrawal (Alcohol ), Abdominal Pain, and Nausea      Discharge Diagnoses     Active Hospital Problems    Diagnosis  POA    **Acute pancreatitis [K85.90]  Yes    Alcohol withdrawal [F10.939]  Yes    Acute alcoholic gastritis without hemorrhage [K29.20]  Yes    Esophageal varices [I85.00]  Yes    Primary hypertension [I10]  Yes    Hypomagnesemia [E83.42]  Yes    Alcoholic cirrhosis [K70.30]  Yes    Chronic alcohol abuse [F10.10]  Yes    Pancreatic insufficiency [K86.89]  Yes    Thrombocytopenia [D69.6]  Yes    Alcoholic hepatitis [K70.10]  Yes    Tobacco dependence due to cigarettes [F17.210]  Yes      Resolved Hospital Problems   No resolved problems to display.        Hospital Course     Ms. Gibson is a 54 y.o. female with a history of alcohol use disorder, chronic thrombocytopenia, hypertension, hypothyroidism presenting with acute abdominal pain found to have mild acute on chronic pancreatitis as well as acute alcoholic gastritis.  Patient is being has improved and she is GI soft diet without any nausea or vomiting.  Did recently relapse with alcohol and has a history of pretty severe alcohol withdrawal, received a dose of Librium in the ER and has gotten phenobarb here has not required any CIWA PRNs.  Patient does not feel like she is withdrawing.  She had a mild alcohol hepatitis, LFTs are downtrending at time of discharge.      Recommend CMP at follow-up to ensure resolution of alcoholic hepatitis    At the time of discharge patient was told to take all medications as prescribed, keep all follow-up appointments, and call their doctor or return to the hospital with any worsening or concerning symptoms.    Day of Discharge     Subjective:  Patient resting comfortably in bed and was asleep prior to my arrival.  Patient has  been tolerating a  diet is eating the last few meals without any nausea or vomiting.  Discussed for discharge plan home today.  Patient is agreeable to discharge.    Review of Systems   Constitutional:  Negative for chills and fever.   Respiratory:  Negative for cough and shortness of breath.    Cardiovascular:  Negative for chest pain and leg swelling.   Gastrointestinal:  Positive for abdominal pain. Negative for diarrhea and nausea.       Physical Exam:  Temp:  [97.7 °F (36.5 °C)-98.6 °F (37 °C)] 97.7 °F (36.5 °C)  Heart Rate:  [75-88] 75  Resp:  [18-20] 18  BP: (115-148)/(74-99) 148/99  Body mass index is 24.13 kg/m².  Physical Exam  Constitutional:       General: She is not in acute distress.     Appearance: She is not ill-appearing.   Cardiovascular:      Rate and Rhythm: Normal rate and regular rhythm.   Pulmonary:      Effort: Pulmonary effort is normal. No respiratory distress.   Abdominal:      General: Abdomen is flat. There is no distension.      Tenderness: There is abdominal tenderness (epigastric), improved  Musculoskeletal:         General: No swelling or deformity. Normal range of motion.   Skin:     General: Skin is warm and dry.   Neurological:      General: No focal deficit present.      Mental Status: She is alert. Mental status is at baseline.  Consultants     Consult Orders (all) (From admission, onward)       Start     Ordered    07/03/24 1055  [facility]patient [facility] Consult  Once        Provider:  (Not yet assigned)    07/03/24 1054    07/03/24 0608  [facility] (on-call MD unless specified) Details  Once        Specialty:  Hospitalist  Provider:  (Not yet assigned)    07/03/24 0607                  Procedures     Imaging Results (All)       Procedure Component Value Units Date/Time    CT Abdomen Pelvis With Contrast [[id number]] Collected: 07/03/24 0555     Updated: 07/03/24 0555    Narrative:        Patient: BA JENNINGS  Time Out: 05:54  Exam(s): CT ABDOMEN + PELVIS With Contrast IV Amt:  85    EXAM:    CT Abdomen and Pelvis With Intravenous Contrast    CLINICAL HISTORY:     Reason for exam: epigastric pain, h o pancreatitis.    TECHNIQUE:    Axial computed tomography images of the abdomen and pelvis with   intravenous contrast.  CTDI is 8.38 mGy and DLP is 404 mGy-cm.  This CT   exam was performed according to the principle of ALARA (As Low As   Reasonably Achievable) by using one or more of the following dose   reduction techniques: automated exposure control, adjustment of the mA   and or kV according to patient size, and or use of iterative   reconstruction technique.    COMPARISON:  4 1 24    FINDINGS:    Lung bases:  Unremarkable.  No mass.  No consolidation.     ABDOMEN:    Liver: Hepatic steatosis.  Nodular hepatic contour consistent with   chronic liver disease.  No focal hepatic lesion is identified.  There is   stigmata of portal hypertension including portosystemic varices.    Gallbladder and bile ducts: Small gallstones are seen within the   gallbladder lumen.  No evidence of gallbladder wall thickening or   pericholecystic infiltration.  There is no intra-or extra hepatic biliary   ductal dilatation.    Pancreas: Again seen is a moderately atrophic pancreas, with diffuse   pancreatic parenchymal calcifications that are most consistent with prior   or chronic pancreatitis.  There is minimal fat stranding regional to the   pancreatic head, which may be associated with an early or mild focal   pancreatitis.  No loculated peripancreatic fluid collection.  No   dilatation of the main pancreatic duct.    Spleen: Borderline splenomegaly.  The spleen measures up to 13.5 cm in   the craniocaudal dimension.    Adrenals:  Unremarkable.  No mass.    Kidneys and ureters:  Unremarkable.  No solid mass.  No hydronephrosis.    Stomach and bowel: No evidence of bowel obstruction.  No definite bowel   wall thickening.  Colonic diverticulosis, without evidence of an acute   diverticulitis..     PELVIS:     Appendix:  No findings to suggest acute appendicitis.    Bladder:  Unremarkable.  No mass.    Reproductive:  Unremarkable as visualized.     ABDOMEN and PELVIS:    Intraperitoneal space:  Unremarkable.  No free air.  No significant   fluid collection.    Bones joints:  No acute fracture.  No dislocation.    Soft tissues:  Unremarkable.    Vasculature:  Unremarkable.  No abdominal aortic aneurysm.    Lymph nodes:  Unremarkable.  No enlarged lymph nodes.    IMPRESSION:       Minimal fat stranding seen regional to the pancreatic head, which may be   associated with an early or mild focal pancreatitis.  Please correlate   with patient's serum pancreatic enzymes.    Again seen is moderate pancreatic atrophy with diffuse pancreatic   parenchymal calcifications, consistent with prior or chronic pancreatitis.    No loculated peripancreatic fluid collection.    Small gallstones seen within the gallbladder lumen.  No findings to   suggest an acute cholecystitis.    Nodular hepatic contour consistent with hepatic cirrhosis.  Stigmata of   portal hypertension.    Colonic diverticulosis without evidence of an acute diverticulitis.      Impression:          Electronically signed by Amanuel Leach M.D. on 07-03-24 at 0554            Pertinent Labs     Results from last 7 days   Lab Units 07/05/24 0434 07/04/24 0517 07/03/24  0411   WBC 10*3/mm3 2.52* 2.06* 3.44   HEMOGLOBIN g/dL 14.1 13.3 15.8   PLATELETS 10*3/mm3 52* 47* 66*     Results from last 7 days   Lab Units 07/05/24 0434 07/04/24 2128 07/04/24 0517 07/03/24  0411   SODIUM mmol/L 137  --  137 140   POTASSIUM mmol/L 3.8 4.0 3.2* 3.7   CHLORIDE mmol/L 105  --  107 105   CO2 mmol/L 20.3*  --  13.7* 21.3*   BUN mg/dL 6  --  4* 6   CREATININE mg/dL 0.44*  --  0.38* 0.53*   GLUCOSE mg/dL 117*  --  105* 156*   Estimated Creatinine Clearance: 148.3 mL/min (A) (by C-G formula based on SCr of 0.44 mg/dL (L)).  Results from last 7 days   Lab Units 07/05/24 0434 07/04/24 0517  "07/03/24  0411   ALBUMIN g/dL 3.6 3.4* 4.2   BILIRUBIN mg/dL 0.9 1.1 1.2   ALK PHOS U/L 138* 115 145*   AST (SGOT) U/L 57* 70* 97*   ALT (SGPT) U/L 40* 40* 54*     Results from last 7 days   Lab Units 07/05/24  0434 07/04/24  0517 07/03/24  0411   CALCIUM mg/dL 8.9 8.1* 9.5   ALBUMIN g/dL 3.6 3.4* 4.2   MAGNESIUM mg/dL  --   --  1.5*     Results from last 7 days   Lab Units 07/03/24  0411   LIPASE U/L 30             Invalid input(s): \"LDLCALC\"        Test Results Pending at Discharge       Discharge Details        Discharge Medications        New Medications        Instructions Start Date   HYDROcodone-acetaminophen 5-325 MG per tablet  Commonly known as: NORCO   1 tablet, Oral, Every 8 Hours PRN             Changes to Medications        Instructions Start Date   Creon 29757-37372 units capsule delayed-release particles capsule  Generic drug: pancrelipase (Lip-Prot-Amyl)  What changed: Another medication with the same name was removed. Continue taking this medication, and follow the directions you see here.   24,000 units of lipase, Oral, 3 Times Daily With Meals      FLUoxetine 20 MG capsule  Commonly known as: PROzac  What changed:   medication strength  how much to take   60 mg, Oral, Daily             Continue These Medications        Instructions Start Date   amLODIPine 2.5 MG tablet  Commonly known as: NORVASC       ferrous sulfate 325 (65 FE) MG tablet   325 mg, Oral, Daily With Breakfast      folic acid 1 MG tablet  Commonly known as: FOLVITE   1 mg, Oral, Daily      Ibuprofen 3 %, Gabapentin 10 %, Baclofen 2 %, lidocaine 4 %   1-2 g, Topical, 3 to 4 Times Daily      lactulose 10 GM/15ML solution  Commonly known as: CHRONULAC   No dose, route, or frequency recorded.      levothyroxine 100 MCG tablet  Commonly known as: SYNTHROID, LEVOTHROID   TAKE ONE TABLET BY MOUTH DAILY      MAGnesium-Oxide 400 (240 Mg) MG tablet  Generic drug: Magnesium Oxide -Mg Supplement   Daily, HOLD PRIOR TO SURG      mirtazapine 15 " "MG tablet  Commonly known as: REMERON   15 mg, Oral, Daily      multivitamin capsule   1 capsule, Oral, Daily, HOLD PRIOR TO SURG      ondansetron ODT 4 MG disintegrating tablet  Commonly known as: ZOFRAN-ODT   4 mg, Translingual, Every 8 Hours PRN      pantoprazole 40 MG EC tablet  Commonly known as: PROTONIX   40 mg, Daily      thiamine 100 MG tablet  Commonly known as: VITAMIN B1   100 mg, Oral, Daily      vitamin D 1.25 MG (71534 UT) capsule capsule  Commonly known as: ERGOCALCIFEROL   50,000 Units, Oral, Every 7 Days             Stop These Medications      cephalexin 500 MG capsule  Commonly known as: KEFLEX              Allergies   Allergen Reactions    Benzonatate Nausea Only and Other (See Comments)     Rash     Ketorolac Tromethamine Other (See Comments)     \"I cannot take because of liver problems\"    Phenergan [Promethazine Hcl] Hives    Cucumber Extract Rash    Promethazine-Phenylephrine Other (See Comments)     \"FEEL WEIRD\"         Discharge Disposition:  Home or Self Care    Discharge Diet:  Diet Order   Procedures    Diet: Gastrointestinal; Fiber-Restricted; Texture: Soft to Chew (NDD 3); Soft to Chew: Whole Meat; Fluid Consistency: Thin (IDDSI 0)       Discharge Activity:   As tolerated    CODE STATUS:    Code Status and Medical Interventions:   Ordered at: 07/04/24 0739     Code Status (Patient has no pulse and is not breathing):    CPR (Attempt to Resuscitate)     Medical Interventions (Patient has pulse or is breathing):    Full Support       No future appointments.   Follow-up Information       Tylor Davis .    Specialty: Family Medicine  Contact information:  [address] [address] [phone]                             Time Spent on Discharge:  Greater than 30 minutes      Kalpesh Trujillo MD  [facility]  07/05/24  09:36 EDT              "

## 2024-07-07 NOTE — CASE MANAGEMENT/SOCIAL WORK
Case Management Discharge Note      Final Note: Home    Provided Post Acute Provider List?: N/A  Provided Post Acute Provider Quality & Resource List?: N/A    Selected Continued Care - Discharged on 7/5/2024 Admission date: 7/3/2024 - Discharge disposition: Home or Self Care      Destination    No services have been selected for the patient.                Durable Medical Equipment    No services have been selected for the patient.                Dialysis/Infusion    No services have been selected for the patient.                Home Medical Care    No services have been selected for the patient.                Therapy    No services have been selected for the patient.                Community Resources    No services have been selected for the patient.                Community & DME    No services have been selected for the patient.                         Final Discharge Disposition Code: 01 - home or self-care

## 2024-07-09 ENCOUNTER — READMISSION MANAGEMENT (OUTPATIENT)
Dept: CALL CENTER | Facility: HOSPITAL | Age: 54
End: 2024-07-09
Payer: MEDICAID

## 2024-07-09 NOTE — OUTREACH NOTE
Medical Week 1 Survey      Flowsheet Row Responses   [facility] patient discharged from? [facility]   Does the patient have one of the following disease processes/diagnoses(primary or secondary)? Other   Week 1 attempt successful? Yes   Call start time 1127   Call end time 1128   Discharge diagnosis Acute pancreatitis   Meds reviewed with patient/caregiver? Yes   Does the patient have all medications ordered at discharge? Yes   Is the patient taking all medications as directed (includes completed medication regime)? Yes   Did the patient receive a copy of their discharge instructions? Yes   What is the patient's perception of their health status since discharge? Improving  [Brief call as pt was driving--reports she is doing great and taking medications as prescribed. Unable to complete survey in full at time of call.]   Week 1 call completed? Yes   Call end time 1128            BRENDA STAHL - Registered Nurse

## 2024-07-22 ENCOUNTER — APPOINTMENT (OUTPATIENT)
Dept: CT IMAGING | Facility: HOSPITAL | Age: 54
DRG: 439 | End: 2024-07-22
Payer: MEDICAID

## 2024-07-22 ENCOUNTER — HOSPITAL ENCOUNTER (INPATIENT)
Facility: HOSPITAL | Age: 54
LOS: 1 days | Discharge: HOME | DRG: 439 | End: 2024-07-25
Attending: EMERGENCY MEDICINE | Admitting: STUDENT IN AN ORGANIZED HEALTH CARE EDUCATION/TRAINING PROGRAM
Payer: MEDICAID

## 2024-07-22 DIAGNOSIS — F10.920 ALCOHOLIC INTOXICATION WITHOUT COMPLICATION: ICD-10-CM

## 2024-07-22 DIAGNOSIS — K86.1 ACUTE ON CHRONIC PANCREATITIS: Primary | ICD-10-CM

## 2024-07-22 DIAGNOSIS — K80.20 CALCULUS OF GALLBLADDER WITHOUT CHOLECYSTITIS WITHOUT OBSTRUCTION: ICD-10-CM

## 2024-07-22 DIAGNOSIS — K85.90 ACUTE ON CHRONIC PANCREATITIS: Primary | ICD-10-CM

## 2024-07-22 PROBLEM — Z78.9 ALCOHOL USE: Status: ACTIVE | Noted: 2024-07-22

## 2024-07-22 PROBLEM — R74.01 TRANSAMINITIS: Status: ACTIVE | Noted: 2024-07-22

## 2024-07-22 PROBLEM — F10.90 ALCOHOL USE: Status: ACTIVE | Noted: 2024-07-22

## 2024-07-22 LAB
ALBUMIN SERPL-MCNC: 4.5 G/DL (ref 3.5–5.2)
ALBUMIN/GLOB SERPL: 1.5 G/DL
ALP SERPL-CCNC: 188 U/L (ref 39–117)
ALT SERPL W P-5'-P-CCNC: 58 U/L (ref 1–33)
AMMONIA BLD-SCNC: 39 UMOL/L (ref 11–51)
ANION GAP SERPL CALCULATED.3IONS-SCNC: 15 MMOL/L (ref 5–15)
AST SERPL-CCNC: 118 U/L (ref 1–32)
BACTERIA UR QL AUTO: ABNORMAL /HPF
BASOPHILS # BLD AUTO: 0.03 10*3/MM3 (ref 0–0.2)
BASOPHILS NFR BLD AUTO: 0.6 % (ref 0–1.5)
BILIRUB SERPL-MCNC: 0.9 MG/DL (ref 0–1.2)
BILIRUB UR QL STRIP: NEGATIVE
BUN SERPL-MCNC: 10 MG/DL (ref 6–20)
BUN/CREAT SERPL: 20 (ref 7–25)
CALCIUM SPEC-SCNC: 9.3 MG/DL (ref 8.6–10.5)
CHLORIDE SERPL-SCNC: 105 MMOL/L (ref 98–107)
CLARITY UR: CLEAR
CO2 SERPL-SCNC: 21 MMOL/L (ref 22–29)
COLOR UR: YELLOW
CREAT SERPL-MCNC: 0.5 MG/DL (ref 0.57–1)
D-LACTATE SERPL-SCNC: 1.3 MMOL/L (ref 0.5–2)
D-LACTATE SERPL-SCNC: 2.5 MMOL/L (ref 0.5–2)
D-LACTATE SERPL-SCNC: 2.8 MMOL/L (ref 0.5–2)
DEPRECATED RDW RBC AUTO: 46.2 FL (ref 37–54)
EGFRCR SERPLBLD CKD-EPI 2021: 110.2 ML/MIN/1.73
EOSINOPHIL # BLD AUTO: 0.13 10*3/MM3 (ref 0–0.4)
EOSINOPHIL NFR BLD AUTO: 2.7 % (ref 0.3–6.2)
ERYTHROCYTE [DISTWIDTH] IN BLOOD BY AUTOMATED COUNT: 13.9 % (ref 12.3–15.4)
ETHANOL BLD-MCNC: 182 MG/DL (ref 0–10)
ETHANOL UR QL: 0.18 %
GLOBULIN UR ELPH-MCNC: 3.1 GM/DL
GLUCOSE SERPL-MCNC: 154 MG/DL (ref 65–99)
GLUCOSE UR STRIP-MCNC: NEGATIVE MG/DL
HCT VFR BLD AUTO: 46.5 % (ref 34–46.6)
HGB BLD-MCNC: 15.7 G/DL (ref 12–15.9)
HGB UR QL STRIP.AUTO: NEGATIVE
HOLD SPECIMEN: NORMAL
HOLD SPECIMEN: NORMAL
HYALINE CASTS UR QL AUTO: ABNORMAL /LPF
IMM GRANULOCYTES # BLD AUTO: 0.01 10*3/MM3 (ref 0–0.05)
IMM GRANULOCYTES NFR BLD AUTO: 0.2 % (ref 0–0.5)
KETONES UR QL STRIP: NEGATIVE
LEUKOCYTE ESTERASE UR QL STRIP.AUTO: ABNORMAL
LIPASE SERPL-CCNC: 60 U/L (ref 13–60)
LYMPHOCYTES # BLD AUTO: 1.88 10*3/MM3 (ref 0.7–3.1)
LYMPHOCYTES NFR BLD AUTO: 38.5 % (ref 19.6–45.3)
MAGNESIUM SERPL-MCNC: 1.5 MG/DL (ref 1.6–2.6)
MCH RBC QN AUTO: 31 PG (ref 26.6–33)
MCHC RBC AUTO-ENTMCNC: 33.8 G/DL (ref 31.5–35.7)
MCV RBC AUTO: 91.9 FL (ref 79–97)
MONOCYTES # BLD AUTO: 0.41 10*3/MM3 (ref 0.1–0.9)
MONOCYTES NFR BLD AUTO: 8.4 % (ref 5–12)
NEUTROPHILS NFR BLD AUTO: 2.42 10*3/MM3 (ref 1.7–7)
NEUTROPHILS NFR BLD AUTO: 49.6 % (ref 42.7–76)
NITRITE UR QL STRIP: NEGATIVE
NRBC BLD AUTO-RTO: 0 /100 WBC (ref 0–0.2)
PH UR STRIP.AUTO: 5.5 [PH] (ref 5–8)
PLATELET # BLD AUTO: 59 10*3/MM3 (ref 140–450)
PMV BLD AUTO: 11 FL (ref 6–12)
POTASSIUM SERPL-SCNC: 3.6 MMOL/L (ref 3.5–5.2)
PROT SERPL-MCNC: 7.6 G/DL (ref 6–8.5)
PROT UR QL STRIP: NEGATIVE
RBC # BLD AUTO: 5.06 10*6/MM3 (ref 3.77–5.28)
RBC # UR STRIP: ABNORMAL /HPF
REF LAB TEST METHOD: ABNORMAL
SODIUM SERPL-SCNC: 141 MMOL/L (ref 136–145)
SP GR UR STRIP: 1.02 (ref 1–1.03)
SQUAMOUS #/AREA URNS HPF: ABNORMAL /HPF
UROBILINOGEN UR QL STRIP: ABNORMAL
WBC # UR STRIP: ABNORMAL /HPF
WBC NRBC COR # BLD AUTO: 4.88 10*3/MM3 (ref 3.4–10.8)
WHOLE BLOOD HOLD COAG: NORMAL
WHOLE BLOOD HOLD SPECIMEN: NORMAL

## 2024-07-22 PROCEDURE — 82140 ASSAY OF AMMONIA: CPT | Performed by: EMERGENCY MEDICINE

## 2024-07-22 PROCEDURE — 82607 VITAMIN B-12: CPT | Performed by: STUDENT IN AN ORGANIZED HEALTH CARE EDUCATION/TRAINING PROGRAM

## 2024-07-22 PROCEDURE — 25010000002 ONDANSETRON PER 1 MG: Performed by: STUDENT IN AN ORGANIZED HEALTH CARE EDUCATION/TRAINING PROGRAM

## 2024-07-22 PROCEDURE — 25010000002 THIAMINE HCL 200 MG/2ML SOLUTION 2 ML VIAL: Performed by: PHYSICIAN ASSISTANT

## 2024-07-22 PROCEDURE — 25010000002 LORAZEPAM PER 2 MG: Performed by: EMERGENCY MEDICINE

## 2024-07-22 PROCEDURE — 83735 ASSAY OF MAGNESIUM: CPT | Performed by: PHYSICIAN ASSISTANT

## 2024-07-22 PROCEDURE — 83605 ASSAY OF LACTIC ACID: CPT | Performed by: PHYSICIAN ASSISTANT

## 2024-07-22 PROCEDURE — 82746 ASSAY OF FOLIC ACID SERUM: CPT | Performed by: STUDENT IN AN ORGANIZED HEALTH CARE EDUCATION/TRAINING PROGRAM

## 2024-07-22 PROCEDURE — 74177 CT ABD & PELVIS W/CONTRAST: CPT

## 2024-07-22 PROCEDURE — 99285 EMERGENCY DEPT VISIT HI MDM: CPT

## 2024-07-22 PROCEDURE — G0378 HOSPITAL OBSERVATION PER HR: HCPCS

## 2024-07-22 PROCEDURE — 25810000003 LACTATED RINGERS SOLUTION: Performed by: EMERGENCY MEDICINE

## 2024-07-22 PROCEDURE — 82077 ASSAY SPEC XCP UR&BREATH IA: CPT | Performed by: PHYSICIAN ASSISTANT

## 2024-07-22 PROCEDURE — 25510000001 IOPAMIDOL 61 % SOLUTION: Performed by: EMERGENCY MEDICINE

## 2024-07-22 PROCEDURE — 36415 COLL VENOUS BLD VENIPUNCTURE: CPT | Performed by: PHYSICIAN ASSISTANT

## 2024-07-22 PROCEDURE — 25010000002 HYDROMORPHONE PER 4 MG: Performed by: STUDENT IN AN ORGANIZED HEALTH CARE EDUCATION/TRAINING PROGRAM

## 2024-07-22 PROCEDURE — 25010000002 MAGNESIUM SULFATE 2 GM/50ML SOLUTION: Performed by: PHYSICIAN ASSISTANT

## 2024-07-22 PROCEDURE — 80053 COMPREHEN METABOLIC PANEL: CPT | Performed by: PHYSICIAN ASSISTANT

## 2024-07-22 PROCEDURE — 25010000002 MORPHINE PER 10 MG: Performed by: EMERGENCY MEDICINE

## 2024-07-22 PROCEDURE — 25010000002 THIAMINE HCL 200 MG/2ML SOLUTION: Performed by: STUDENT IN AN ORGANIZED HEALTH CARE EDUCATION/TRAINING PROGRAM

## 2024-07-22 PROCEDURE — 81001 URINALYSIS AUTO W/SCOPE: CPT | Performed by: PHYSICIAN ASSISTANT

## 2024-07-22 PROCEDURE — 85025 COMPLETE CBC W/AUTO DIFF WBC: CPT | Performed by: PHYSICIAN ASSISTANT

## 2024-07-22 PROCEDURE — 83690 ASSAY OF LIPASE: CPT | Performed by: PHYSICIAN ASSISTANT

## 2024-07-22 PROCEDURE — 25010000002 ONDANSETRON PER 1 MG: Performed by: EMERGENCY MEDICINE

## 2024-07-22 PROCEDURE — 25810000003 LACTATED RINGERS PER 1000 ML: Performed by: STUDENT IN AN ORGANIZED HEALTH CARE EDUCATION/TRAINING PROGRAM

## 2024-07-22 RX ORDER — FOLIC ACID 1 MG/1
1 TABLET ORAL DAILY
Status: DISCONTINUED | OUTPATIENT
Start: 2024-07-22 | End: 2024-07-25 | Stop reason: HOSPADM

## 2024-07-22 RX ORDER — NITROFURANTOIN 25; 75 MG/1; MG/1
100 CAPSULE ORAL 2 TIMES DAILY
Status: ON HOLD | COMMUNITY

## 2024-07-22 RX ORDER — FERROUS SULFATE 325(65) MG
325 TABLET ORAL
Status: DISCONTINUED | OUTPATIENT
Start: 2024-07-22 | End: 2024-07-25 | Stop reason: HOSPADM

## 2024-07-22 RX ORDER — LORAZEPAM 2 MG/ML
2 INJECTION INTRAMUSCULAR
Status: DISCONTINUED | OUTPATIENT
Start: 2024-07-22 | End: 2024-07-25 | Stop reason: HOSPADM

## 2024-07-22 RX ORDER — MORPHINE SULFATE 2 MG/ML
4 INJECTION, SOLUTION INTRAMUSCULAR; INTRAVENOUS ONCE
Status: COMPLETED | OUTPATIENT
Start: 2024-07-22 | End: 2024-07-22

## 2024-07-22 RX ORDER — LORAZEPAM 1 MG/1
1 TABLET ORAL
Status: DISCONTINUED | OUTPATIENT
Start: 2024-07-22 | End: 2024-07-25 | Stop reason: HOSPADM

## 2024-07-22 RX ORDER — POLYETHYLENE GLYCOL 3350 17 G/17G
17 POWDER, FOR SOLUTION ORAL DAILY PRN
Status: DISCONTINUED | OUTPATIENT
Start: 2024-07-22 | End: 2024-07-25 | Stop reason: HOSPADM

## 2024-07-22 RX ORDER — SODIUM CHLORIDE 0.9 % (FLUSH) 0.9 %
10 SYRINGE (ML) INJECTION AS NEEDED
Status: DISCONTINUED | OUTPATIENT
Start: 2024-07-22 | End: 2024-07-25 | Stop reason: HOSPADM

## 2024-07-22 RX ORDER — FLUOXETINE HYDROCHLORIDE 60 MG/1
60 TABLET, FILM COATED ORAL; ORAL DAILY
Status: ON HOLD | COMMUNITY

## 2024-07-22 RX ORDER — ONDANSETRON 2 MG/ML
4 INJECTION INTRAMUSCULAR; INTRAVENOUS ONCE
Status: COMPLETED | OUTPATIENT
Start: 2024-07-22 | End: 2024-07-22

## 2024-07-22 RX ORDER — LORAZEPAM 2 MG/ML
1 INJECTION INTRAMUSCULAR
Status: DISCONTINUED | OUTPATIENT
Start: 2024-07-22 | End: 2024-07-25 | Stop reason: HOSPADM

## 2024-07-22 RX ORDER — FOLIC ACID 1 MG/1
1 TABLET ORAL ONCE
Status: COMPLETED | OUTPATIENT
Start: 2024-07-22 | End: 2024-07-22

## 2024-07-22 RX ORDER — AMOXICILLIN 250 MG
2 CAPSULE ORAL 2 TIMES DAILY PRN
Status: DISCONTINUED | OUTPATIENT
Start: 2024-07-22 | End: 2024-07-25 | Stop reason: HOSPADM

## 2024-07-22 RX ORDER — LORAZEPAM 1 MG/1
2 TABLET ORAL
Status: DISCONTINUED | OUTPATIENT
Start: 2024-07-22 | End: 2024-07-25 | Stop reason: HOSPADM

## 2024-07-22 RX ORDER — THIAMINE HYDROCHLORIDE 100 MG/ML
200 INJECTION, SOLUTION INTRAMUSCULAR; INTRAVENOUS EVERY 8 HOURS SCHEDULED
Status: DISCONTINUED | OUTPATIENT
Start: 2024-07-22 | End: 2024-07-24

## 2024-07-22 RX ORDER — AMLODIPINE BESYLATE 5 MG/1
2.5 TABLET ORAL DAILY
Status: DISCONTINUED | OUTPATIENT
Start: 2024-07-22 | End: 2024-07-25 | Stop reason: HOSPADM

## 2024-07-22 RX ORDER — HYDROMORPHONE HYDROCHLORIDE 1 MG/ML
0.5 INJECTION, SOLUTION INTRAMUSCULAR; INTRAVENOUS; SUBCUTANEOUS EVERY 4 HOURS PRN
Status: DISCONTINUED | OUTPATIENT
Start: 2024-07-22 | End: 2024-07-25

## 2024-07-22 RX ORDER — BISACODYL 10 MG
10 SUPPOSITORY, RECTAL RECTAL DAILY PRN
Status: DISCONTINUED | OUTPATIENT
Start: 2024-07-22 | End: 2024-07-25 | Stop reason: HOSPADM

## 2024-07-22 RX ORDER — NADOLOL 40 MG/1
40 TABLET ORAL DAILY
Status: DISCONTINUED | OUTPATIENT
Start: 2024-07-22 | End: 2024-07-25 | Stop reason: HOSPADM

## 2024-07-22 RX ORDER — BISACODYL 5 MG/1
5 TABLET, DELAYED RELEASE ORAL DAILY PRN
Status: DISCONTINUED | OUTPATIENT
Start: 2024-07-22 | End: 2024-07-25 | Stop reason: HOSPADM

## 2024-07-22 RX ORDER — ONDANSETRON 2 MG/ML
4 INJECTION INTRAMUSCULAR; INTRAVENOUS EVERY 6 HOURS PRN
Status: DISCONTINUED | OUTPATIENT
Start: 2024-07-22 | End: 2024-07-25 | Stop reason: HOSPADM

## 2024-07-22 RX ORDER — FLUOXETINE HYDROCHLORIDE 20 MG/1
60 CAPSULE ORAL DAILY
Status: DISCONTINUED | OUTPATIENT
Start: 2024-07-22 | End: 2024-07-25 | Stop reason: HOSPADM

## 2024-07-22 RX ORDER — SODIUM CHLORIDE, SODIUM LACTATE, POTASSIUM CHLORIDE, CALCIUM CHLORIDE 600; 310; 30; 20 MG/100ML; MG/100ML; MG/100ML; MG/100ML
100 INJECTION, SOLUTION INTRAVENOUS CONTINUOUS
Status: DISCONTINUED | OUTPATIENT
Start: 2024-07-22 | End: 2024-07-25 | Stop reason: HOSPADM

## 2024-07-22 RX ORDER — LANOLIN ALCOHOL/MO/W.PET/CERES
400 CREAM (GRAM) TOPICAL DAILY
Status: DISCONTINUED | OUTPATIENT
Start: 2024-07-22 | End: 2024-07-25 | Stop reason: HOSPADM

## 2024-07-22 RX ORDER — PANTOPRAZOLE SODIUM 40 MG/1
40 TABLET, DELAYED RELEASE ORAL
Status: DISCONTINUED | OUTPATIENT
Start: 2024-07-23 | End: 2024-07-25 | Stop reason: HOSPADM

## 2024-07-22 RX ORDER — LACTULOSE 10 G/15ML
10 SOLUTION ORAL EVERY MORNING
Status: DISCONTINUED | OUTPATIENT
Start: 2024-07-23 | End: 2024-07-25 | Stop reason: HOSPADM

## 2024-07-22 RX ORDER — FAMOTIDINE 10 MG/ML
20 INJECTION, SOLUTION INTRAVENOUS ONCE
Status: COMPLETED | OUTPATIENT
Start: 2024-07-22 | End: 2024-07-22

## 2024-07-22 RX ORDER — LEVOTHYROXINE SODIUM 0.1 MG/1
100 TABLET ORAL
Status: DISCONTINUED | OUTPATIENT
Start: 2024-07-23 | End: 2024-07-25 | Stop reason: HOSPADM

## 2024-07-22 RX ORDER — MIRTAZAPINE 15 MG/1
15 TABLET, FILM COATED ORAL NIGHTLY
Status: DISCONTINUED | OUTPATIENT
Start: 2024-07-22 | End: 2024-07-25 | Stop reason: HOSPADM

## 2024-07-22 RX ORDER — MAGNESIUM SULFATE HEPTAHYDRATE 40 MG/ML
2 INJECTION, SOLUTION INTRAVENOUS ONCE
Status: COMPLETED | OUTPATIENT
Start: 2024-07-22 | End: 2024-07-22

## 2024-07-22 RX ORDER — NITROGLYCERIN 0.4 MG/1
0.4 TABLET SUBLINGUAL
Status: DISCONTINUED | OUTPATIENT
Start: 2024-07-22 | End: 2024-07-25 | Stop reason: HOSPADM

## 2024-07-22 RX ORDER — LORAZEPAM 2 MG/ML
1 INJECTION INTRAMUSCULAR ONCE
Status: COMPLETED | OUTPATIENT
Start: 2024-07-22 | End: 2024-07-22

## 2024-07-22 RX ORDER — NITROFURANTOIN 25; 75 MG/1; MG/1
100 CAPSULE ORAL 2 TIMES DAILY
Status: DISCONTINUED | OUTPATIENT
Start: 2024-07-22 | End: 2024-07-23

## 2024-07-22 RX ORDER — NADOLOL 40 MG/1
40 TABLET ORAL DAILY
Status: ON HOLD | COMMUNITY

## 2024-07-22 RX ORDER — ONDANSETRON 4 MG/1
4 TABLET, ORALLY DISINTEGRATING ORAL EVERY 6 HOURS PRN
Status: DISCONTINUED | OUTPATIENT
Start: 2024-07-22 | End: 2024-07-25 | Stop reason: HOSPADM

## 2024-07-22 RX ADMIN — MIRTAZAPINE 15 MG: 15 TABLET, FILM COATED ORAL at 21:18

## 2024-07-22 RX ADMIN — FAMOTIDINE 20 MG: 10 INJECTION INTRAVENOUS at 04:13

## 2024-07-22 RX ADMIN — AMLODIPINE BESYLATE 2.5 MG: 5 TABLET ORAL at 15:22

## 2024-07-22 RX ADMIN — LORAZEPAM 1 MG: 2 INJECTION INTRAMUSCULAR; INTRAVENOUS at 05:18

## 2024-07-22 RX ADMIN — FERROUS SULFATE TAB 325 MG (65 MG ELEMENTAL FE) 325 MG: 325 (65 FE) TAB at 15:22

## 2024-07-22 RX ADMIN — HYDROMORPHONE HYDROCHLORIDE 0.5 MG: 1 INJECTION, SOLUTION INTRAMUSCULAR; INTRAVENOUS; SUBCUTANEOUS at 09:13

## 2024-07-22 RX ADMIN — PANCRELIPASE 24000 UNITS OF LIPASE: 60000; 12000; 38000 CAPSULE, DELAYED RELEASE PELLETS ORAL at 15:22

## 2024-07-22 RX ADMIN — LORAZEPAM 1 MG: 1 TABLET ORAL at 15:50

## 2024-07-22 RX ADMIN — THIAMINE HYDROCHLORIDE 200 MG: 100 INJECTION, SOLUTION INTRAMUSCULAR; INTRAVENOUS at 15:23

## 2024-07-22 RX ADMIN — FOLIC ACID 1 MG: 1 TABLET ORAL at 09:15

## 2024-07-22 RX ADMIN — LORAZEPAM 1 MG: 1 TABLET ORAL at 21:18

## 2024-07-22 RX ADMIN — SODIUM CHLORIDE, POTASSIUM CHLORIDE, SODIUM LACTATE AND CALCIUM CHLORIDE 150 ML/HR: 600; 310; 30; 20 INJECTION, SOLUTION INTRAVENOUS at 09:12

## 2024-07-22 RX ADMIN — THIAMINE HYDROCHLORIDE 500 MG: 100 INJECTION, SOLUTION INTRAMUSCULAR; INTRAVENOUS at 05:38

## 2024-07-22 RX ADMIN — FLUOXETINE HYDROCHLORIDE 60 MG: 20 CAPSULE ORAL at 15:22

## 2024-07-22 RX ADMIN — MORPHINE SULFATE 4 MG: 2 INJECTION, SOLUTION INTRAMUSCULAR; INTRAVENOUS at 04:14

## 2024-07-22 RX ADMIN — HYDROMORPHONE HYDROCHLORIDE 0.5 MG: 1 INJECTION, SOLUTION INTRAMUSCULAR; INTRAVENOUS; SUBCUTANEOUS at 17:37

## 2024-07-22 RX ADMIN — PANCRELIPASE 24000 UNITS OF LIPASE: 60000; 12000; 38000 CAPSULE, DELAYED RELEASE PELLETS ORAL at 17:39

## 2024-07-22 RX ADMIN — FOLIC ACID 1 MG: 1 TABLET ORAL at 05:27

## 2024-07-22 RX ADMIN — ONDANSETRON 4 MG: 2 INJECTION INTRAMUSCULAR; INTRAVENOUS at 09:44

## 2024-07-22 RX ADMIN — THIAMINE HYDROCHLORIDE 200 MG: 100 INJECTION, SOLUTION INTRAMUSCULAR; INTRAVENOUS at 09:13

## 2024-07-22 RX ADMIN — NITROFURANTOIN MONOHYDRATE/MACROCRYSTALLINE 100 MG: 25; 75 CAPSULE ORAL at 21:18

## 2024-07-22 RX ADMIN — THIAMINE HYDROCHLORIDE 200 MG: 100 INJECTION, SOLUTION INTRAMUSCULAR; INTRAVENOUS at 21:18

## 2024-07-22 RX ADMIN — SODIUM CHLORIDE, POTASSIUM CHLORIDE, SODIUM LACTATE AND CALCIUM CHLORIDE 150 ML/HR: 600; 310; 30; 20 INJECTION, SOLUTION INTRAVENOUS at 19:03

## 2024-07-22 RX ADMIN — HYDROMORPHONE HYDROCHLORIDE 0.5 MG: 1 INJECTION, SOLUTION INTRAMUSCULAR; INTRAVENOUS; SUBCUTANEOUS at 13:26

## 2024-07-22 RX ADMIN — SODIUM CHLORIDE, POTASSIUM CHLORIDE, SODIUM LACTATE AND CALCIUM CHLORIDE 150 ML/HR: 600; 310; 30; 20 INJECTION, SOLUTION INTRAVENOUS at 12:35

## 2024-07-22 RX ADMIN — ONDANSETRON 4 MG: 2 INJECTION INTRAMUSCULAR; INTRAVENOUS at 04:11

## 2024-07-22 RX ADMIN — IOPAMIDOL 85 ML: 612 INJECTION, SOLUTION INTRAVENOUS at 05:54

## 2024-07-22 RX ADMIN — MAGNESIUM SULFATE HEPTAHYDRATE 2 G: 40 INJECTION, SOLUTION INTRAVENOUS at 05:20

## 2024-07-22 RX ADMIN — HYDROMORPHONE HYDROCHLORIDE 0.5 MG: 1 INJECTION, SOLUTION INTRAMUSCULAR; INTRAVENOUS; SUBCUTANEOUS at 22:46

## 2024-07-22 RX ADMIN — MAGNESIUM OXIDE 400 MG (241.3 MG MAGNESIUM) TABLET 400 MG: TABLET at 15:22

## 2024-07-22 RX ADMIN — NADOLOL 40 MG: 40 TABLET ORAL at 15:22

## 2024-07-22 RX ADMIN — SODIUM CHLORIDE, POTASSIUM CHLORIDE, SODIUM LACTATE AND CALCIUM CHLORIDE 1000 ML: 600; 310; 30; 20 INJECTION, SOLUTION INTRAVENOUS at 04:03

## 2024-07-22 NOTE — CONSULTS
"Access Ctr Note.    **Pt declined full consult due to full consult on 7/3/2024. See Access Ctr documentation dated the same for full history.    Met w/Pt in room #611. Introduced self/role. Pt agreeable to (lengthy) conversation w/this writer. Pt stated she had the list of outpt referrals provided to her in early. \"I know exactly where they are on my desk & I'm going to call as soon as I get home.\" Pt used AA jargon w/this writer, stating she has the \"places & things part (of recovery) down. It's the people part that's hard.\" Pt's  and much of her f.o.o. drink. This writer listened supportively to Pt. Offered a gentle challenge to Pt re: her opportunity to control what she can re: her drinking. Pointed out that while there are some people in Pt's life she may not intend to separate from (i.e. ), she can increase the # of people who are seeking & supporting sobriety in her life.     Pt offered justifications for her recent relapse & highlighted past things that didn't work for her maintaining sobriety. Pt dismissed 2 suggestions offered by this writer based on her interests.    With 10 being most, Pt rated current craving \"1 or 2\", depression \"5\" & anxiety \"7 or 8.\" No SI/HI or wish to die.    Provided Pt a copy of the tx resources she was given during last admission, so she can choose to initiate therapy contact while still inpatient.     Gave report to primary RN.     Pt agreeable to Access following briefly.     "

## 2024-07-22 NOTE — ED TRIAGE NOTES
"Pt arrives ambulatory to triage desk via PV.    Pt states \"abdominal pain that starts at my navel and goes to the right\"  x 1 day w/ diarrhea and constipation. Pt reports she was seen at [facility] for same abd pain on 7/16. Pt also reports cataract sx on 7/17.   "

## 2024-07-22 NOTE — PROGRESS NOTES
Clinical Pharmacy Services: Medication History    Bekah Gibson is a 54 y.o. female presenting to [facility] for   Chief Complaint   Patient presents with    Abdominal Pain       She  has a past medical history of Acromioclavicular separation (1/2021), Ankle sprain (2/2004), Anxiety, Arthritis, Arthritis of back (Unsure), Cirrhosis, Disease of thyroid gland, ETOH abuse, Fracture, clavicle (1/2021), Fracture, foot (Unsure), Frozen shoulder (1 /2009), GERD (gastroesophageal reflux disease), History of kidney stones, Hypertension, Left shoulder pain, Low back strain (1/21), Lumbosacral disc disease (1/21), MDD (major depressive disorder), Neck strain (Unsure), Pancreatitis, Periarthritis of shoulder (see above), Rotator cuff syndrome (odre for shoulder replacement), Tennis elbow (Unsure), and Thrombocytopenia.    Allergies as of 07/22/2024 - Reviewed 07/22/2024   Allergen Reaction Noted    Benzonatate Nausea Only and Other (See Comments) 06/05/2017    Ketorolac tromethamine Other (See Comments) 10/15/2023    Phenergan [promethazine hcl] Hives 04/25/2016    Cucumber extract Rash 05/28/2015    Promethazine-phenylephrine Other (See Comments) 02/05/2013       Medication information was obtained from: Patient   Pharmacy and Phone Number:     Prior to Admission Medications       Prescriptions Last Dose Informant Patient Reported? Taking?    amLODIPine (NORVASC) 2.5 MG tablet  Self Yes Yes    Take 1 tablet by mouth Daily.    ferrous sulfate 325 (65 FE) MG tablet  Self Yes Yes    Take 1 tablet by mouth Daily With Breakfast.    FLUoxetine (PROzac) 60 MG tablet  Self Yes Yes    Take 1 tablet by mouth Daily.    folic acid (FOLVITE) 1 MG tablet  Self Yes Yes    Take 1 tablet by mouth Daily.    Ibuprofen 3 %, Gabapentin 10 %, Baclofen 2 %, lidocaine 4 %  Self No Yes    Apply 1-2 g topically to the appropriate area as directed 3 (Three) to 4 (Four) times daily.    Patient taking differently:  Apply 1-2 g topically to  the appropriate area as directed 3 (Three) Times a Day As Needed.    lactulose (CHRONULAC) 10 GM/15ML solution  Self Yes Yes    Take 15 mL by mouth Every Morning.    levothyroxine (SYNTHROID, LEVOTHROID) 100 MCG tablet  Self No Yes    TAKE ONE TABLET BY MOUTH DAILY    Patient taking differently:  Take 1 tablet by mouth Every Morning.    MAGnesium-Oxide 400 (240 Mg) MG tablet  Self Yes Yes    Take 1 tablet by mouth Daily.    mirtazapine (REMERON) 15 MG tablet  Self Yes Yes    Take 1 tablet by mouth Every Night.    Multiple Vitamin (MULTIVITAMIN) capsule  Self Yes Yes    Take 1 capsule by mouth Daily.    nadolol (CORGARD) 40 MG tablet  Pharmacy Yes Yes    Take 1 tablet by mouth Daily.    nitrofurantoin, macrocrystal-monohydrate, (MACROBID) 100 MG capsule  Self Yes Yes    Take 1 capsule by mouth 2 (Two) Times a Day. Ends 7/30/24    ondansetron ODT (ZOFRAN-ODT) 4 MG disintegrating tablet  Self No Yes    Place 1 tablet on the tongue Every 8 (Eight) Hours As Needed for Nausea or Vomiting.    pancrelipase, Lip-Prot-Amyl, (CREON) 23929-34635 units capsule delayed-release particles capsule  Self No Yes    Take 2 capsules by mouth 3 (Three) Times a Day With Meals.    pantoprazole (PROTONIX) 40 MG EC tablet  Self Yes Yes    1 tablet Daily.    thiamine (VITAMIN B1) 100 MG tablet  Self No Yes    Take 1 tablet by mouth Daily.    vitamin D (ERGOCALCIFEROL) 1.25 MG (79470 UT) capsule capsule   Yes No    Take 1 capsule by mouth Every 7 (Seven) Days.              Medication notes:     This medication list is complete to the best of my knowledge as of 7/22/2024    Please call if questions.    Leander Helton  Medication History Technician  [phone]    7/22/2024 08:17 EDT

## 2024-07-22 NOTE — ED NOTES
Nursing report ED to floor  Bekah Gibson  54 y.o.  female    HPI :  HPI (Adult)  Stated Reason for Visit: Abdominal pain x 1 day with Diarrhea/constipation.  History Obtained From: patient  Precipitating Event(s): recent illness  Duration (Days): 1    Chief Complaint  Chief Complaint   Patient presents with    Abdominal Pain       Admitting doctor:   Kalpesh Trujillo MD    Admitting diagnosis:   The primary encounter diagnosis was Acute on chronic pancreatitis. Diagnoses of Calculus of gallbladder without cholecystitis without obstruction and Alcoholic intoxication without complication were also pertinent to this visit.    Code status:   Current Code Status       Date Active Code Status Order ID Comments User Context       7/22/2024 0725 CPR (Attempt to Resuscitate) [id number]  Burt Conn DO ED        Question Answer    Code Status (Patient has no pulse and is not breathing) CPR (Attempt to Resuscitate)    Medical Interventions (Patient has pulse or is breathing) Full    Level Of Support Discussed With Patient                    Allergies:   Benzonatate, Ketorolac tromethamine, Phenergan [promethazine hcl], Cucumber extract, and Promethazine-phenylephrine    Isolation:   No active isolations    Intake and Output  No intake or output data in the 24 hours ending 07/22/24 1031    Weight:       07/22/24  0336   Weight: 65.8 kg (145 lb)       Most recent vitals:   Vitals:    07/22/24 0831 07/22/24 0901 07/22/24 0931 07/22/24 1001   BP: 140/86 148/87 141/81 145/93   BP Location:       Patient Position:  Lying  Lying   Pulse: 102 98 94 100   Resp:       Temp:       TempSrc:       SpO2: (!) 89% 94% 93% 95%   Weight:       Height:           Active LDAs/IV Access:   Lines, Drains & Airways       Active LDAs       Name Placement date Placement time Site Days    Peripheral IV 07/22/24 0352 Anterior;Right Forearm 07/22/24  0352  Forearm  less than 1                    Labs (abnormal labs have a star):   Labs Reviewed    COMPREHENSIVE METABOLIC PANEL - Abnormal; Notable for the following components:       Result Value    Glucose 154 (*)     Creatinine 0.50 (*)     CO2 21.0 (*)     ALT (SGPT) 58 (*)     AST (SGOT) 118 (*)     Alkaline Phosphatase 188 (*)     All other components within normal limits    Narrative:     GFR Normal >60  Chronic Kidney Disease <60  Kidney Failure <15     URINALYSIS W/ MICROSCOPIC IF INDICATED (NO CULTURE) - Abnormal; Notable for the following components:    Leuk Esterase, UA Small (1+) (*)     All other components within normal limits   LACTIC ACID, PLASMA - Abnormal; Notable for the following components:    Lactate 2.8 (*)     All other components within normal limits   ETHANOL - Abnormal; Notable for the following components:    Ethanol 182 (*)     All other components within normal limits   MAGNESIUM - Abnormal; Notable for the following components:    Magnesium 1.5 (*)     All other components within normal limits   CBC WITH AUTO DIFFERENTIAL - Abnormal; Notable for the following components:    Platelets 59 (*)     All other components within normal limits   LACTIC ACID, REFLEX - Abnormal; Notable for the following components:    Lactate 2.5 (*)     All other components within normal limits   URINALYSIS, MICROSCOPIC ONLY - Abnormal; Notable for the following components:    WBC, UA 11-20 (*)     Squamous Epithelial Cells, UA 3-6 (*)     All other components within normal limits   LIPASE - Normal   AMMONIA - Normal   RAINBOW DRAW    Narrative:     The following orders were created for panel order [place] Draw.  Procedure                               Abnormality         Status                     ---------                               -----------         ------                     Green Top (Gel)[[id number]]                                  Final result               Lavender Top[[id number]]                                     Final result               Gold Top - [facility][[id number]]                                    Final result               Light Blue Top[[id number]]                                   Final result                 Please view results for these tests on the individual orders.   LACTIC ACID, REFLEX   CBC AND DIFFERENTIAL    Narrative:     The following orders were created for panel order CBC & Differential.  Procedure                               Abnormality         Status                     ---------                               -----------         ------                     CBC Auto Differential[[id number]]        Abnormal            Final result                 Please view results for these tests on the individual orders.   GREEN TOP   LAVENDER TOP   GOLD TOP - SST   LIGHT BLUE TOP       EKG:   No orders to display       Meds given in ED:   Medications   sodium chloride 0.9 % flush 10 mL (has no administration in time range)   lactated ringers infusion (150 mL/hr Intravenous New Bag 7/22/24 0912)   nitroglycerin (NITROSTAT) SL tablet 0.4 mg (has no administration in time range)   sennosides-docusate (PERICOLACE) 8.6-50 MG per tablet 2 tablet (has no administration in time range)     And   polyethylene glycol (MIRALAX) packet 17 g (has no administration in time range)     And   bisacodyl (DULCOLAX) EC tablet 5 mg (has no administration in time range)     And   bisacodyl (DULCOLAX) suppository 10 mg (has no administration in time range)   ondansetron ODT (ZOFRAN-ODT) disintegrating tablet 4 mg ( Oral Not Given:  See Alt 7/22/24 0944)     Or   ondansetron (ZOFRAN) injection 4 mg (4 mg Intravenous Given 7/22/24 0944)   Potassium Replacement - Follow Nurse / BPA Driven Protocol (has no administration in time range)   Magnesium Standard Dose Replacement - Follow Nurse / BPA Driven Protocol (has no administration in time range)   Phosphorus Replacement - Follow Nurse / BPA Driven Protocol (has no administration in time range)   Calcium Replacement - Follow Nurse / BPA Driven Protocol (has no administration  in time range)   HYDROmorphone (DILAUDID) injection 0.5 mg (0.5 mg Intravenous Given 7/22/24 0913)   thiamine (B-1) injection 200 mg (200 mg Intravenous Given 7/22/24 0913)     Followed by   thiamine (VITAMIN B-1) tablet 100 mg (has no administration in time range)   folic acid (FOLVITE) tablet 1 mg (1 mg Oral Given 7/22/24 0915)   LORazepam (ATIVAN) tablet 1 mg (has no administration in time range)     Or   LORazepam (ATIVAN) injection 1 mg (has no administration in time range)     Or   LORazepam (ATIVAN) tablet 2 mg (has no administration in time range)     Or   LORazepam (ATIVAN) injection 2 mg (has no administration in time range)     Or   LORazepam (ATIVAN) injection 2 mg (has no administration in time range)     Or   LORazepam (ATIVAN) injection 2 mg (has no administration in time range)   lactated ringers bolus 1,000 mL (0 mL Intravenous Stopped 7/22/24 0515)   ondansetron (ZOFRAN) injection 4 mg (4 mg Intravenous Given 7/22/24 0411)   famotidine (PEPCID) injection 20 mg (20 mg Intravenous Given 7/22/24 0413)   morphine injection 4 mg (4 mg Intravenous Given 7/22/24 0414)   magnesium sulfate 2g/50 mL (PREMIX) infusion (0 g Intravenous Stopped 7/22/24 0538)   thiamine (B-1) 500 mg in sodium chloride 0.9 % 100 mL IVPB (0 mg Intravenous Stopped 7/22/24 0620)   folic acid (FOLVITE) tablet 1 mg (1 mg Oral Given 7/22/24 0527)   LORazepam (ATIVAN) injection 1 mg (1 mg Intravenous Given 7/22/24 0518)   iopamidol (ISOVUE-300) 61 % injection 100 mL (85 mL Intravenous Given 7/22/24 0554)       Imaging results:  CT Abdomen Pelvis With Contrast    Result Date: 7/22/2024  1. Subtle stranding adjacent to the pancreatic head, mildly increased compared to exam 07/03/2024, suspicious for acute on chronic pancreatitis. 2. Hepatic cirrhotic nephrology with hepatic steatosis. Portal venous hypertension with gastroesophageal varices, splenomegaly. 3. Cholelithiasis.  Radiation dose reduction techniques were utilized, including  automated exposure control and exposure modulation based on body size.   This report was finalized on 7/22/2024 6:47 AM by Dr. Anson Reynolds M.D on Workstation: [id number]       Ambulatory status:   - assist    Social issues:   Social History     Socioeconomic History    Marital status:    Tobacco Use    Smoking status: Every Day     Current packs/day: 0.25     Average packs/day: 0.3 packs/day for 15.0 years (3.8 ttl pk-yrs)     Types: Cigarettes     Passive exposure: Current    Smokeless tobacco: Never    Tobacco comments:     Rarely smoke sometimes will to   Vaping Use    Vaping status: Never Used   Substance and Sexual Activity    Alcohol use: Not Currently     Alcohol/week: 5.0 - 10.0 standard drinks of alcohol     Types: 5 - 10 Shots of liquor per week     Comment: 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    Drug use: Not Currently    Sexual activity: Not Currently     Partners: Male     Birth control/protection: Post-menopausal, Natural family planning/Rhythm     Comment: cinthia- menepausal       Peripheral Neurovascular  Peripheral Neurovascular (Adult)  Peripheral Neurovascular WDL: WDL    Neuro Cognitive  Neuro Cognitive (Adult)  Cognitive/Neuro/Behavioral WDL: WDL, orientation  Orientation: oriented x 4  Additional Documentation: Headache Assessment (Group)  Headache Assessment  Headache Location: frontal  Severity Rating (0-10): 4  Description/Character: dull    Learning  Learning Assessment (Adult)  Learning Readiness and Ability: no barriers identified    Respiratory  Respiratory WDL  Respiratory WDL: WDL, rhythm/pattern  Rhythm/Pattern, Respiratory: no shortness of breath reported, depth regular, pattern regular, unlabored    Abdominal Pain       Pain Assessments  Pain (Adult)  (0-10) Pain Rating: Rest: 7  (0-10) Pain Rating: Activity: 8  Pain Location: abdomen  Pain Side/Orientation: generalized  Pain Onset/Duration: x 1 day  Pain Description: constant, sharp, stabbing  Response to Pain Interventions:  interventions effective per patient  Pain Assessment Additional Detail  Pain Onset/Duration: x 1 day    NIH Stroke Scale       Jessica Griffith RN  07/22/24 10:31 EDT

## 2024-07-22 NOTE — PLAN OF CARE
Goal Outcome Evaluation:  Plan of Care Reviewed With: patient        Progress: improving  Outcome Evaluation: (P) Pt arrived to unit during this shift. Pt is A&O x4. RA. Pt up ad diane and is ambulating to BR with SBA. Pt c/o abdominal pain, IV pain med given per MAR. CIWA scores evaluated, ativan tablet given once. Pt on clear liquid diet. VSS.

## 2024-07-22 NOTE — ED PROVIDER NOTES
EMERGENCY DEPARTMENT MD ATTESTATION NOTE    Room Number:  02/02  PCP: Tylor Davis  Independent Historians: Patient    HPI:  A complete HPI/ROS/PMH/PSH/SH/FH are unobtainable due to: None    Chronic or social conditions impacting patient care (Social Determinants of Health): None      Context: Bekah Gibson is a 54 y.o. female with a medical history of hypertension, cirrhosis, GERD, arthritis, pancreatitis and alcohol abuse who presents to the ED c/o acute abdominal pain with diarrhea and nausea symptoms has been worsening for the past 24 hours.  Patient says her last alcoholic beverage was yesterday afternoon sometime.  She says she is experiencing severe pain in the central abdomen that radiates off towards the right side.  She denies any bright red blood per rectum.  She tried taking some Pepto-Bismol at home but it has brought no relief so far.        Review of prior external notes (non-ED) -and- Review of prior external test results outside of this encounter: I independently reviewed the hospital discharge summary from July 5, 2024 when patient was admitted for acute pancreatitis and alcoholic gastritis as well as concerns about alcohol withdrawal.    Prescription drug monitoring program review: FIORELLA reviewed by Kalpesh Trujillo MD, Mukesh Andujar MD, Burt Conn,    N/A and FIORELLA query complete and reviewed. Patient receives regular prescriptions for controlled substances.      PHYSICAL EXAM    I have reviewed the triage vital signs and nursing notes.    ED Triage Vitals   Temp Heart Rate Resp BP SpO2   07/22/24 0336 07/22/24 0336 07/22/24 0336 07/22/24 0341 07/22/24 0336   99.2 °F (37.3 °C) 114 18 146/95 95 %      Temp src Heart Rate Source Patient Position BP Location FiO2 (%)   07/22/24 0336 -- 07/22/24 0341 07/22/24 0341 --   Tympanic  Sitting Right arm        Physical Exam  GENERAL: alert, appears uncomfortable with her pain but overall nontoxic-appearing  SKIN: Warm, dry, no rashes  HENT:  Normocephalic, atraumatic  EYES: no scleral icterus  CV: regular rhythm, regular rate  RESPIRATORY: normal effort, lungs clear  ABDOMEN: soft, nondistended, mild to moderate tenderness in the central abdomen, epigastric region, and bilateral lower quadrants rather equally.  No peritoneal features elicited.  MUSCULOSKELETAL: no deformity, no asymmetry to lower extremities  NEURO: alert, moves all extremities, follows commands        MEDICATIONS GIVEN IN ER  Medications   sodium chloride 0.9 % flush 10 mL (has no administration in time range)   lactated ringers bolus 1,000 mL (0 mL Intravenous Stopped 7/22/24 0515)   ondansetron (ZOFRAN) injection 4 mg (4 mg Intravenous Given 7/22/24 0411)   famotidine (PEPCID) injection 20 mg (20 mg Intravenous Given 7/22/24 0413)   morphine injection 4 mg (4 mg Intravenous Given 7/22/24 0414)   magnesium sulfate 2g/50 mL (PREMIX) infusion (0 g Intravenous Stopped 7/22/24 0538)   thiamine (B-1) 500 mg in sodium chloride 0.9 % 100 mL IVPB (0 mg Intravenous Stopped 7/22/24 0620)   folic acid (FOLVITE) tablet 1 mg (1 mg Oral Given 7/22/24 0527)   LORazepam (ATIVAN) injection 1 mg (1 mg Intravenous Given 7/22/24 0518)   iopamidol (ISOVUE-300) 61 % injection 100 mL (85 mL Intravenous Given 7/22/24 0554)         ORDERS PLACED DURING THIS VISIT:  Orders Placed This Encounter   Procedures    CT Abdomen Pelvis With Contrast    [place] Draw    Comprehensive Metabolic Panel    Lipase    Urinalysis With Microscopic If Indicated (No Culture) - Urine, Clean Catch    Lactic Acid, Plasma    Ethanol    Magnesium    CBC Auto Differential    Ammonia    STAT Lactic Acid, Reflex    Urinalysis, Microscopic Only - Urine, Clean Catch    [facility] (on-call MD unless specified) Details    Insert Peripheral IV    Initiate Observation Status    CBC & Differential    Green Top (Gel)    Lavender Top    Gold Top - SST    Light Blue Top         PROCEDURES  Procedures            PROGRESS, DATA ANALYSIS, CONSULTS, AND  MEDICAL DECISION MAKING  All labs have been independently interpreted by me.  All radiology studies have been reviewed by me. All EKG's have been independently viewed and interpreted by me.  Discussion below represents my analysis of pertinent findings related to patient's condition, differential diagnosis, treatment plan and final disposition.    Differential diagnosis includes but is not limited to acute on chronic pancreatitis, bowel obstruction, hepatitis, diverticulitis, appendicitis, UTI, pyelonephritis, alcoholic gastritis.    Clinical Scores:                   ED Course as of 07/22/24 0714   Mon Jul 22, 2024   0347 I discussed the case with Dr. Andujar and he agrees to evaluate the patient at the bedside.    [CC]   0416 WBC: 4.88 [CC]   0418 Platelets(!): 59  chronic [CC]   0426 Lactate(!!): 2.8 [CC]   0427 Magnesium(!): 1.5 [CC]   0522 Ethanol(!): 182 [ERIC]   0638 I independently interpreted the abdomen CT study and my findings are: No free air, no small bowel obstruction pattern   [ERIC]   0638 I discussed with Dr. Trujillo from [facility] about this patient.  He agrees to accept her to the hospitalist service for further medical management today. [ERIC]      ED Course User Index  [CC] Cassandra Hong PA-C  [ERIC] Mukesh Andujar MD       MDM:   I independently interpreted the abdomen CT study and my findings are: No free air, no small bowel obstruction pattern      I have reviewed the labs and CT report from today's ED workup.  There are radiographic changes on the CT scan to suggest acute on chronic pancreatitis problem.  This is probably precipitated by her recent EtOH use.  Thankfully, the white blood cell count is normal and her heart rate has improved with IV fluids here.  There is no sign of alcohol withdrawal at this time but certainly she will be at risk for alcohol withdrawal symptoms over the next 24 to 48 hours if she remains NPO.  Will need to admit her to the hospital today for further supportive care.  She  is agreeable with this plan.  Paging hospitalist service for continued care needs today.    COMPLEXITY OF CARE  The patient requires admission.    Please note that portions of this document were completed with a voice recognition program.    Note Disclaimer: At [facility], we believe that sharing information builds trust and better relationships. You are receiving this note because you recently visited [facility]. It is possible you will see health information before a provider has talked with you about it. This kind of information can be easy to misunderstand. To help you fully understand what it means for your health, we urge you to discuss this note with your provider.         Mukesh Andujar MD  07/22/24 0785

## 2024-07-22 NOTE — ED PROVIDER NOTES
EMERGENCY DEPARTMENT ENCOUNTER  Room Number:  S611/1  PCP: Tylor Davis  Independent Historians: Patient      HPI:  Chief Complaint: had concerns including Abdominal Pain.     A complete HPI/ROS/PMH/PSH/SH/FH are unobtainable due to: None    Chronic or social conditions impacting patient care (Social Determinants of Health): None      Context: Bekah Gibson is a 54 y.o. female with a medical history of alcoholic hepatitis, chronic pancreatitis, alcoholism, esophageal varices, and IBS presents emergency department complaining of upper abdominal pain receiving ongoing for the last 3 weeks.  Patient has been admitted at both this hospital and at [facility] within in the last few weeks for the same abdominal pain.  She says she has a history of gallstones but denies a history of intra-abdominal surgeries.  She reports that she has had pancreatitis in the past but this pain feels different.  She says she has not had an EGD in quite a while.  She is drinking alcohol daily with her last drink yesterday.  She denies nausea, vomiting, or diarrhea worse than normal.  She reports that she has chronic diarrhea due to taking lactulose.  She denies fever or chills.  She denies chest pain or shortness of breath.  She denies urinary symptoms.      Review of prior external notes (non-ED) -and- Review of prior external test results outside of this encounter:    Patient was admitted to the hospital here on 7/3/2024 to 7/5/2024 for acute pancreatitis and alcoholic gastritis without hemorrhage.  She had some mild alcoholic hepatitis but LFTs were improving.    Patient was seen in the ED at [facility] on 7/16/2024.  She had a CT at that time which showed cirrhotic changes and portal hypertension including varices as well as cholelithiasis.  AST was 161, ALT 77    PAST MEDICAL HISTORY  Active Ambulatory Problems     Diagnosis Date Noted    Irritable bowel syndrome with both constipation and diarrhea 06/05/2017     Peripheral neuropathy 06/05/2017    Vitamin D deficiency 06/05/2017    History of HPV infection 06/05/2017    Hypothyroidism 06/05/2017    Tobacco dependence due to cigarettes 06/05/2017    Acute kidney injury 12/28/2015    Ascites 06/06/2016    E. coli UTI (urinary tract infection) 06/06/2016    Alcoholic hepatitis 06/29/2018    GI bleed 06/29/2018    Acute post-hemorrhagic anemia 06/29/2018    Thrombocytopenia 06/29/2018    Open wound of right shoulder region 06/29/2018    Pancreatic insufficiency 07/04/2018    Alcoholic ketoacidosis 07/21/2019    Chronic alcohol abuse 07/29/2019    ESBL (extended spectrum beta-lactamase) producing bacteria infection 07/29/2019    Abnormal weight loss 12/13/2019    Hashimoto's disease 12/13/2019    Chronic fatigue 12/14/2019    History of alcohol use disorder 09/21/2021    Alcohol intoxication 09/21/2021    Lupus     Hypomagnesemia     Pancytopenia     Alcoholic cirrhosis     Bilateral lower abdominal discomfort 09/21/2021    Primary hypertension 10/24/2022    Melena 10/24/2022    Arthritis of shoulder 12/19/2022    Esophageal varices 01/16/2023    Essential hypertension 01/16/2023    Alcohol-induced chronic pancreatitis 01/22/2023    Abdominal pain 01/22/2023    Pancreatitis 10/07/2023    Leukopenia 10/07/2023    Epigastric pain 04/01/2024    Acute alcoholic gastritis without hemorrhage 04/01/2024    Acute pancreatitis 07/03/2024    Alcohol withdrawal 07/03/2024     Resolved Ambulatory Problems     Diagnosis Date Noted    Acute renal failure 02/20/2017    Kidney stone 06/05/2017    Alcohol withdrawal syndrome, with delirium 06/29/2018    Hypotension 07/01/2018    Nausea and vomiting 09/21/2021    Acute GI bleeding 10/24/2022     Past Medical History:   Diagnosis Date    Acromioclavicular separation 1/2021    Ankle sprain 2/2004    Anxiety     Arthritis     Arthritis of back Unsure    Cirrhosis     Disease of thyroid gland     ETOH abuse     Fracture, clavicle 1/2021     Fracture, foot Unsure    Frozen shoulder     GERD (gastroesophageal reflux disease)     History of kidney stones     Hypertension     Left shoulder pain     Low back strain     Lumbosacral disc disease     MDD (major depressive disorder)     Neck strain Unsure    Periarthritis of shoulder see above    Rotator cuff syndrome odre for shoulder replacement    Tennis elbow Unsure         PAST SURGICAL HISTORY  Past Surgical History:   Procedure Laterality Date    CLAVICLE SURGERY Right 2018    ENDOSCOPY N/A 10/26/2022    Procedure: ESOPHAGOGASTRODUODENOSCOPY wtih banding;  Surgeon: Ag Patel MD;  Location: [facility] ENDOSCOPY;  Service: Gastroenterology;  Laterality: N/A;  pre: melena  post: esophageal varicies with banding x2 bands    ENDOSCOPY N/A 2023    Procedure: ESOPHAGOGASTRODUODENOSCOPY;  Surgeon: Ag Patel MD;  Location: [facility] ENDOSCOPY;  Service: Gastroenterology;  Laterality: N/A;  PRE OP - MAROON STOOLS  POST OP - SM ESOPHAGEAL VARICES    ESOPHAGEAL VARICES LIGATION      FOOT SURGERY  12    JOINT REPLACEMENT Bilateral     GREAT TOE    KIDNEY STONE SURGERY      LITHOTRIPSY AND STENT (R SIDE)    LAPAROSCOPIC TUBAL LIGATION      NECK SURGERY  3/07    Not sure of dates    SIGMOIDOSCOPY N/A 2023    Procedure: SIGMOIDOSCOPY FLEXIBLE;  Surgeon: Ag Patel MD;  Location: [facility] ENDOSCOPY;  Service: Gastroenterology;  Laterality: N/A;  PRE OP - MAROON STOOLS  POST OP - RECTAL VARICES    TOTAL SHOULDER ARTHROPLASTY Left 2023    Procedure: reverse total shoulder arthroplasty;  Surgeon: Giovanni Denise MD;  Location: [facility] OR [facility];  Service: Orthopedics;  Laterality: Left;         FAMILY HISTORY  Family History   Problem Relation Age of Onset    Rheumatologic disease Mother         congestive heart diseased    Osteoporosis Mother             Thyroid disease Father     Leukemia Father     Cancer Father         Leukemia  deseased     Broken bones Father      Clotting disorder Father     Drug abuse Brother     Malradha Hyperthermia Neg Hx          SOCIAL HISTORY  Social History     Socioeconomic History    Marital status:    Tobacco Use    Smoking status: Every Day     Current packs/day: 0.25     Average packs/day: 0.3 packs/day for 15.0 years (3.8 ttl pk-yrs)     Types: Cigarettes     Passive exposure: Current    Smokeless tobacco: Never    Tobacco comments:     Rarely smoke sometimes will to   Vaping Use    Vaping status: Never Used   Substance and Sexual Activity    Alcohol use: Not Currently     Alcohol/week: 5.0 - 10.0 standard drinks of alcohol     Types: 5 - 10 Shots of liquor per week     Comment: 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    Drug use: Not Currently    Sexual activity: Not Currently     Partners: Male     Birth control/protection: Post-menopausal, Natural family planning/Rhythm     Comment: cinthia- menepausal         ALLERGIES  Benzonatate, Ketorolac tromethamine, Phenergan [promethazine hcl], Cucumber extract, and Promethazine-phenylephrine      REVIEW OF SYSTEMS  Included in HPI  All systems reviewed and negative except for those discussed in HPI.      PHYSICAL EXAM    I have reviewed the triage vital signs and nursing notes.    ED Triage Vitals   Temp Heart Rate Resp BP SpO2   07/22/24 0336 07/22/24 0336 07/22/24 0336 07/22/24 0341 07/22/24 0336   99.2 °F (37.3 °C) 114 18 146/95 95 %      Temp src Heart Rate Source Patient Position BP Location FiO2 (%)   07/22/24 0336 -- 07/22/24 0341 07/22/24 0341 --   Tympanic  Sitting Right arm        Physical Exam  Constitutional:       General: She is not in acute distress.     Appearance: Normal appearance.   HENT:      Head: Normocephalic and atraumatic.      Nose: Nose normal.      Mouth/Throat:      Mouth: Mucous membranes are moist.   Eyes:      Extraocular Movements: Extraocular movements intact.      Pupils: Pupils are equal, round, and reactive to light.   Cardiovascular:      Rate and Rhythm: Normal rate and  regular rhythm.      Pulses: Normal pulses.      Heart sounds: Normal heart sounds.   Pulmonary:      Effort: Pulmonary effort is normal. No respiratory distress.      Breath sounds: Normal breath sounds.   Abdominal:      General: Abdomen is flat. There is no distension.      Palpations: Abdomen is soft.      Tenderness: There is abdominal tenderness in the right upper quadrant and epigastric area.   Musculoskeletal:         General: Normal range of motion.      Cervical back: Normal range of motion and neck supple.   Skin:     General: Skin is warm and dry.      Capillary Refill: Capillary refill takes less than 2 seconds.   Neurological:      General: No focal deficit present.      Mental Status: She is alert and oriented to person, place, and time.   Psychiatric:         Mood and Affect: Mood normal.         Behavior: Behavior normal.         LAB RESULTS  Recent Results (from the past 24 hour(s))   Comprehensive Metabolic Panel    Collection Time: 07/22/24  3:55 AM    Specimen: Blood   Result Value Ref Range    Glucose 154 (H) 65 - 99 mg/dL    BUN 10 6 - 20 mg/dL    Creatinine 0.50 (L) 0.57 - 1.00 mg/dL    Sodium 141 136 - 145 mmol/L    Potassium 3.6 3.5 - 5.2 mmol/L    Chloride 105 98 - 107 mmol/L    CO2 21.0 (L) 22.0 - 29.0 mmol/L    Calcium 9.3 8.6 - 10.5 mg/dL    Total Protein 7.6 6.0 - 8.5 g/dL    Albumin 4.5 3.5 - 5.2 g/dL    ALT (SGPT) 58 (H) 1 - 33 U/L    AST (SGOT) 118 (H) 1 - 32 U/L    Alkaline Phosphatase 188 (H) 39 - 117 U/L    Total Bilirubin 0.9 0.0 - 1.2 mg/dL    Globulin 3.1 gm/dL    A/G Ratio 1.5 g/dL    BUN/Creatinine Ratio 20.0 7.0 - 25.0    Anion Gap 15.0 5.0 - 15.0 mmol/L    eGFR 110.2 >60.0 mL/min/1.73   Lipase    Collection Time: 07/22/24  3:55 AM    Specimen: Blood   Result Value Ref Range    Lipase 60 13 - 60 U/L   Urinalysis With Microscopic If Indicated (No Culture) - Urine, Clean Catch    Collection Time: 07/22/24  3:55 AM    Specimen: Urine, Clean Catch   Result Value Ref Range     Color, UA Yellow Yellow, Straw    Appearance, UA Clear Clear    pH, UA 5.5 5.0 - 8.0    Specific Gravity, UA 1.022 1.005 - 1.030    Glucose, UA Negative Negative    Ketones, UA Negative Negative    Bilirubin, UA Negative Negative    Blood, UA Negative Negative    Protein, UA Negative Negative    Leuk Esterase, UA Small (1+) (A) Negative    Nitrite, UA Negative Negative    Urobilinogen, UA 0.2 E.U./dL 0.2 - 1.0 E.U./dL   Lactic Acid, Plasma    Collection Time: 07/22/24  3:55 AM    Specimen: Blood   Result Value Ref Range    Lactate 2.8 (C) 0.5 - 2.0 mmol/L   Ethanol    Collection Time: 07/22/24  3:55 AM    Specimen: Blood   Result Value Ref Range    Ethanol 182 (H) 0 - 10 mg/dL    Ethanol % 0.182 %   Magnesium    Collection Time: 07/22/24  3:55 AM    Specimen: Blood   Result Value Ref Range    Magnesium 1.5 (L) 1.6 - 2.6 mg/dL   Green Top (Gel)    Collection Time: 07/22/24  3:55 AM   Result Value Ref Range    Extra Tube Hold for add-ons.    Lavender Top    Collection Time: 07/22/24  3:55 AM   Result Value Ref Range    Extra Tube hold for add-on    Gold Top - SST    Collection Time: 07/22/24  3:55 AM   Result Value Ref Range    Extra Tube Hold for add-ons.    Light Blue Top    Collection Time: 07/22/24  3:55 AM   Result Value Ref Range    Extra Tube Hold for add-ons.    CBC Auto Differential    Collection Time: 07/22/24  3:55 AM    Specimen: Blood   Result Value Ref Range    WBC 4.88 3.40 - 10.80 10*3/mm3    RBC 5.06 3.77 - 5.28 10*6/mm3    Hemoglobin 15.7 12.0 - 15.9 g/dL    Hematocrit 46.5 34.0 - 46.6 %    MCV 91.9 79.0 - 97.0 fL    MCH 31.0 26.6 - 33.0 pg    MCHC 33.8 31.5 - 35.7 g/dL    RDW 13.9 12.3 - 15.4 %    RDW-SD 46.2 37.0 - 54.0 fl    MPV 11.0 6.0 - 12.0 fL    Platelets 59 (L) 140 - 450 10*3/mm3    Neutrophil % 49.6 42.7 - 76.0 %    Lymphocyte % 38.5 19.6 - 45.3 %    Monocyte % 8.4 5.0 - 12.0 %    Eosinophil % 2.7 0.3 - 6.2 %    Basophil % 0.6 0.0 - 1.5 %    Immature Grans % 0.2 0.0 - 0.5 %    Neutrophils,  Absolute 2.42 1.70 - 7.00 10*3/mm3    Lymphocytes, Absolute 1.88 0.70 - 3.10 10*3/mm3    Monocytes, Absolute 0.41 0.10 - 0.90 10*3/mm3    Eosinophils, Absolute 0.13 0.00 - 0.40 10*3/mm3    Basophils, Absolute 0.03 0.00 - 0.20 10*3/mm3    Immature Grans, Absolute 0.01 0.00 - 0.05 10*3/mm3    nRBC 0.0 0.0 - 0.2 /100 WBC   Urinalysis, Microscopic Only - Urine, Clean Catch    Collection Time: 07/22/24  3:55 AM    Specimen: Urine, Clean Catch   Result Value Ref Range    RBC, UA 0-2 None Seen, 0-2 /HPF    WBC, UA 11-20 (A) None Seen, 0-2 /HPF    Bacteria, UA None Seen None Seen /HPF    Squamous Epithelial Cells, UA 3-6 (A) None Seen, 0-2 /HPF    Hyaline Casts, UA 0-2 None Seen /LPF    Methodology Automated Microscopy    Ammonia    Collection Time: 07/22/24  4:30 AM    Specimen: Blood   Result Value Ref Range    Ammonia 39 11 - 51 umol/L   STAT Lactic Acid, Reflex    Collection Time: 07/22/24  9:18 AM    Specimen: Blood   Result Value Ref Range    Lactate 2.5 (C) 0.5 - 2.0 mmol/L   STAT Lactic Acid, Reflex    Collection Time: 07/22/24 12:14 PM    Specimen: Blood   Result Value Ref Range    Lactate 1.3 0.5 - 2.0 mmol/L         RADIOLOGY  CT Abdomen Pelvis With Contrast    Result Date: 7/22/2024  CT ABDOMEN PELVIS W CONTRAST-  HISTORY: 54 years of age, Female. Upper abdominal pain  TECHNIQUE:  CT includes axial imaging from the lung bases to the trochanters with intravenous contrast and with use of oral contrast. Data reconstructed in coronal and sagittal planes. Radiation dose reduction techniques were utilized, including automated exposure control and exposure modulation based on body size.  COMPARISON: CT abdomen pelvis 07/03/2024, 04/01/2024, 10/07/2023.  FINDINGS: Lung bases appear clear. Hepatic steatosis with cirrhotic morphology. Small gallstones layer dependently within the gallbladder. Evidence of portal venous hypertension with gastric and esophageal varices and splenomegaly. Recanalization of the portal vein.   Moderate stenosis of the celiac artery origin with poststenotic dilatation. Adrenal glands, kidneys appear within normal limits. Extensive pancreatic calcifications consistent with chronic pancreatitis. There is mild stranding adjacent to the pancreatic head that appears slightly increased compared to the prior exam 19 days ago suspicious for mild acute on chronic pancreatitis. No pancreatic ductal dilatation.  No evidence for bowel obstruction. No ascites or evidence for intra-abdominal abscess formation. No evidence for appendicitis.      1. Subtle stranding adjacent to the pancreatic head, mildly increased compared to exam 07/03/2024, suspicious for acute on chronic pancreatitis. 2. Hepatic cirrhotic nephrology with hepatic steatosis. Portal venous hypertension with gastroesophageal varices, splenomegaly. 3. Cholelithiasis.  Radiation dose reduction techniques were utilized, including automated exposure control and exposure modulation based on body size.   This report was finalized on 7/22/2024 6:47 AM by Dr. Anson Reynolds M.D on Workstation: [id number]         MEDICATIONS GIVEN IN ER  Medications   sodium chloride 0.9 % flush 10 mL (has no administration in time range)   lactated ringers infusion (150 mL/hr Intravenous New Bag 7/22/24 0632)   nitroglycerin (NITROSTAT) SL tablet 0.4 mg (has no administration in time range)   sennosides-docusate (PERICOLACE) 8.6-50 MG per tablet 2 tablet (has no administration in time range)     And   polyethylene glycol (MIRALAX) packet 17 g (has no administration in time range)     And   bisacodyl (DULCOLAX) EC tablet 5 mg (has no administration in time range)     And   bisacodyl (DULCOLAX) suppository 10 mg (has no administration in time range)   ondansetron ODT (ZOFRAN-ODT) disintegrating tablet 4 mg ( Oral Not Given:  See Alt 7/22/24 0944)     Or   ondansetron (ZOFRAN) injection 4 mg (4 mg Intravenous Given 7/22/24 0944)   Potassium Replacement - Follow Nurse / BPA  Driven Protocol (has no administration in time range)   Magnesium Standard Dose Replacement - Follow Nurse / BPA Driven Protocol (has no administration in time range)   Phosphorus Replacement - Follow Nurse / BPA Driven Protocol (has no administration in time range)   Calcium Replacement - Follow Nurse / BPA Driven Protocol (has no administration in time range)   HYDROmorphone (DILAUDID) injection 0.5 mg (0.5 mg Intravenous Given 7/22/24 1737)   thiamine (B-1) injection 200 mg (200 mg Intravenous Given 7/22/24 1523)     Followed by   thiamine (VITAMIN B-1) tablet 100 mg (has no administration in time range)   folic acid (FOLVITE) tablet 1 mg (1 mg Oral Given 7/22/24 0915)   LORazepam (ATIVAN) tablet 1 mg (1 mg Oral Given 7/22/24 1550)     Or   LORazepam (ATIVAN) injection 1 mg ( Intravenous Not Given:  See Alt 7/22/24 1550)     Or   LORazepam (ATIVAN) tablet 2 mg ( Oral Not Given:  See Alt 7/22/24 1550)     Or   LORazepam (ATIVAN) injection 2 mg ( Intravenous Not Given:  See Alt 7/22/24 1550)     Or   LORazepam (ATIVAN) injection 2 mg ( Intravenous Not Given:  See Alt 7/22/24 1550)     Or   LORazepam (ATIVAN) injection 2 mg ( Intramuscular Not Given:  See Alt 7/22/24 1550)   amLODIPine (NORVASC) tablet 2.5 mg (2.5 mg Oral Given 7/22/24 1522)   ferrous sulfate tablet 325 mg (325 mg Oral Given 7/22/24 1522)   FLUoxetine (PROzac) capsule 60 mg (60 mg Oral Given 7/22/24 1522)   lactulose (CHRONULAC) 10 GM/15ML solution 10 g (has no administration in time range)   levothyroxine (SYNTHROID, LEVOTHROID) tablet 100 mcg (has no administration in time range)   Magnesium Oxide -Mg Supplement tablet 400 mg (400 mg Oral Given 7/22/24 1522)   mirtazapine (REMERON) tablet 15 mg (has no administration in time range)   nadolol (CORGARD) tablet 40 mg (40 mg Oral Given 7/22/24 1522)   nitrofurantoin (macrocrystal-monohydrate) (MACROBID) capsule 100 mg (has no administration in time range)   pancrelipase (Lip-Prot-Amyl) (CREON) capsule  24,000 units of lipase (24,000 units of lipase Oral Given 7/22/24 6029)   pantoprazole (PROTONIX) EC tablet 40 mg (has no administration in time range)   lactated ringers bolus 1,000 mL (0 mL Intravenous Stopped 7/22/24 0515)   ondansetron (ZOFRAN) injection 4 mg (4 mg Intravenous Given 7/22/24 0411)   famotidine (PEPCID) injection 20 mg (20 mg Intravenous Given 7/22/24 0413)   morphine injection 4 mg (4 mg Intravenous Given 7/22/24 0414)   magnesium sulfate 2g/50 mL (PREMIX) infusion (0 g Intravenous Stopped 7/22/24 0538)   thiamine (B-1) 500 mg in sodium chloride 0.9 % 100 mL IVPB (0 mg Intravenous Stopped 7/22/24 0620)   folic acid (FOLVITE) tablet 1 mg (1 mg Oral Given 7/22/24 0527)   LORazepam (ATIVAN) injection 1 mg (1 mg Intravenous Given 7/22/24 0518)   iopamidol (ISOVUE-300) 61 % injection 100 mL (85 mL Intravenous Given 7/22/24 0554)           OUTPATIENT MEDICATION MANAGEMENT:  Current Facility-Administered Medications Ordered in Epic   Medication Dose Route Frequency Provider Last Rate Last Admin    amLODIPine (NORVASC) tablet 2.5 mg  2.5 mg Oral Daily Senia, Burt, DO   2.5 mg at 07/22/24 1522    sennosides-docusate (PERICOLACE) 8.6-50 MG per tablet 2 tablet  2 tablet Oral BID PRN Senia, Burt, DO        And    polyethylene glycol (MIRALAX) packet 17 g  17 g Oral Daily PRN [place], Burt, DO        And    bisacodyl (DULCOLAX) EC tablet 5 mg  5 mg Oral Daily PRN Senia, Burt, DO        And    bisacodyl (DULCOLAX) suppository 10 mg  10 mg Rectal Daily PRN Senia, Burt, DO        Calcium Replacement - Follow Nurse / BPA Driven Protocol   Does not apply PRN [place], Burt, DO        ferrous sulfate tablet 325 mg  325 mg Oral Daily With Breakfast Senia, Burt, DO   325 mg at 07/22/24 1522    FLUoxetine (PROzac) capsule 60 mg  60 mg Oral Daily [place], Burt, DO   60 mg at 07/22/24 1522    folic acid (FOLVITE) tablet 1 mg  1 mg Oral Daily Burt Conn DO   1 mg at 07/22/24 0915    HYDROmorphone (DILAUDID) injection  0.5 mg  0.5 mg Intravenous Q4H PRN [place], Burt, DO   0.5 mg at 07/22/24 1737    lactated ringers infusion  150 mL/hr Intravenous Continuous [place], Burt,  mL/hr at 07/22/24 1903 150 mL/hr at 07/22/24 1903    [START ON 7/23/2024] lactulose (CHRONULAC) 10 GM/15ML solution 10 g  10 g Oral QAM Jeff Connmy, DO        [START ON 7/23/2024] levothyroxine (SYNTHROID, LEVOTHROID) tablet 100 mcg  100 mcg Oral Q AM Senia, Burt, DO        LORazepam (ATIVAN) tablet 1 mg  1 mg Oral Q1H PRN [place], Burt, DO   1 mg at 07/22/24 1550    Or    LORazepam (ATIVAN) injection 1 mg  1 mg Intravenous Q1H PRN Senia, Burt, DO        Or    LORazepam (ATIVAN) tablet 2 mg  2 mg Oral Q1H PRN Senia, Burt, DO        Or    LORazepam (ATIVAN) injection 2 mg  2 mg Intravenous Q1H PRN [place], Burt, DO        Or    LORazepam (ATIVAN) injection 2 mg  2 mg Intravenous Q15 Min PRN [place], Burt, DO        Or    LORazepam (ATIVAN) injection 2 mg  2 mg Intramuscular Q15 Min PRN [place], Burt, DO        Magnesium Oxide -Mg Supplement tablet 400 mg  400 mg Oral Daily Senia, Burt, DO   400 mg at 07/22/24 1522    Magnesium Standard Dose Replacement - Follow Nurse / BPA Driven Protocol   Does not apply PRN Senia, Burt, DO        mirtazapine (REMERON) tablet 15 mg  15 mg Oral Nightly [place], Burt, DO        nadolol (CORGARD) tablet 40 mg  40 mg Oral Daily Senia, Burt, DO   40 mg at 07/22/24 1522    nitrofurantoin (macrocrystal-monohydrate) (MACROBID) capsule 100 mg  100 mg Oral BID [place], Burt, DO        nitroglycerin (NITROSTAT) SL tablet 0.4 mg  0.4 mg Sublingual Q5 Min PRN Senia, Burt, DO        ondansetron ODT (ZOFRAN-ODT) disintegrating tablet 4 mg  4 mg Oral Q6H PRN [place], Burt, DO        Or    ondansetron (ZOFRAN) injection 4 mg  4 mg Intravenous Q6H PRN Burt Conn DO   4 mg at 07/22/24 0944    pancrelipase (Lip-Prot-Amyl) (CREON) capsule 24,000 units of lipase  24,000 units of lipase Oral TID With Meals Burt Conn,    24,000 units of  lipase at 07/22/24 1739    [START ON 7/23/2024] pantoprazole (PROTONIX) EC tablet 40 mg  40 mg Oral Q AM Burt Conn DO        Phosphorus Replacement - Follow Nurse / BPA Driven Protocol   Does not apply PRN Burt Conn DO        Potassium Replacement - Follow Nurse / BPA Driven Protocol   Does not apply PRN Burt Conn DO        sodium chloride 0.9 % flush 10 mL  10 mL Intravenous Q12H Callie Quiroz MD        sodium chloride 0.9 % flush 10 mL  10 mL Intravenous PRN Callie Quiroz MD        sodium chloride 0.9 % flush 10 mL  10 mL Intravenous PRN Cassandra Hong PA-C        sodium chloride 0.9 % flush 20 mL  20 mL Intravenous PRN Callie Quiroz MD        thiamine (B-1) injection 200 mg  200 mg Intravenous Q8H Burt Conn DO   200 mg at 07/22/24 1523    Followed by    [START ON 7/27/2024] thiamine (VITAMIN B-1) tablet 100 mg  100 mg Oral Daily Burt Conn DO         No current [facility]-ordered outpatient medications on file.             PROGRESS, DATA ANALYSIS, CONSULTS, AND MEDICAL DECISION MAKING  ORDERS PLACED DURING THIS VISIT:  Orders Placed This Encounter   Procedures    CT Abdomen Pelvis With Contrast    [place] Draw    Comprehensive Metabolic Panel    Lipase    Urinalysis With Microscopic If Indicated (No Culture) - Urine, Clean Catch    Lactic Acid, Plasma    Ethanol    Magnesium    CBC Auto Differential    Ammonia    STAT Lactic Acid, Reflex    Urinalysis, Microscopic Only - Urine, Clean Catch    Magnesium    Comprehensive Metabolic Panel    STAT Lactic Acid, Reflex    Diet: Liquid; Clear Liquid; Fluid Consistency: Thin (IDDSI 0)    Maintain IV Access    Telemetry - Place Orders & Notify Provider of Results When Patient Experiences Acute Chest Pain, Dysrhythmia or Respiratory Distress    May Be Off Telemetry for Tests    Activity - Ad Janine    Intake & Output    Oral Care    Place Sequential Compression Device    Maintain Sequential Compression Device     Vital Signs    Continuous Pulse Oximetry    Obtain Baseline Clinical [place] Withdrawal Assessment - Ar (CIWA-Ar), Sedation Scale & Vital Signs    Clinical [place] Withdrawal Assessment (CIWA-Ar)    If CIWA-Ar Score Less Than 8 For 3 Consecutive Assessments, Monitor Every 4 Hours & Discontinue Assessment When CIWA-Ar Less Than 8 for 24 Hours    Obtain Pre & Post Sedation Scores With Every Sedative Dose - Hold For POSS Greater Than 2 or RASS of -3 or Less    Notify Provider - Withdrawal    Notify Provider of Abnormal Lab Results    Notify Provider - Vitals    Elevate HOB    Daily Weights    Code Status and Medical Interventions:    [facility] (on-call MD unless specified) Details    Inpatient Access Center Consult    Oxygen Therapy- Nasal Cannula; Titrate 1-6 LPM Per SpO2; 90 - 95%    Telemetry Scan    Telemetry Scan    Insert Peripheral IV    Initiate Observation Status    Seizure Precautions    Safety Precautions    Aspiration Precautions    Fall Precautions    CBC & Differential    Green Top (Gel)    Lavender Top    Gold Top - SST    Light Blue Top    CBC & Differential       All labs have been independently interpreted by me.  All radiology studies have been reviewed by me. All EKG's have been independently viewed and interpreted by me.  Discussion below represents my analysis of pertinent findings related to patient's condition, differential diagnosis, treatment plan and final disposition.    Differential diagnosis includes but is not limited to:   My differential diagnosis for abdominal pain includes but is not limited to:  Gastritis, gastroenteritis, peptic ulcer disease, GERD, esophageal perforation, acute appendicitis, mesenteric adenitis, Meckel’s diverticulum, epiploic appendagitis, diverticulitis, colon cancer, ulcerative colitis, Crohn’s disease, intussusception, small bowel obstruction, adhesions, ischemic bowel, perforated viscus, ileus, obstipation, biliary colic, cholecystitis, cholelithiasis, Yoan-Jf  Lane, hepatitis, pancreatitis, common bile duct obstruction, cholangitis, bile leak, splenic trauma, splenic rupture, splenic infarction, splenic abscess, abdominal abscess, ascites, spontaneous bacterial peritonitis, hernia, UTI, cystitis, prostatitis, ureterolithiasis, urinary obstruction, ovarian cyst, torsion, pregnancy, ectopic pregnancy, PID, pelvic abscess, mittelschmerz, endometriosis, AAA, myocardial infarction, pneumonia, cancer, porphyria, DKA, medications, sickle cell, viral syndrome, zoster      ED Course:  ED Course as of 07/22/24 2006 Mon Jul 22, 2024   0347 I discussed the case with Dr. Andujar and he agrees to evaluate the patient at the bedside.    [CC]   0416 WBC: 4.88 [CC]   0418 Platelets(!): 59  chronic [CC]   0426 Lactate(!!): 2.8 [CC]   0427 Magnesium(!): 1.5 [CC]   0522 Ethanol(!): 182 [ERIC]   0638 I independently interpreted the abdomen CT study and my findings are: No free air, no small bowel obstruction pattern   [ERIC]   0638 I discussed with Dr. Trujillo from [facility] about this patient.  He agrees to accept her to the hospitalist service for further medical management today. [ERIC]      ED Course User Index  [CC] Cassandra Hong PA-C  [ERIC] Mukesh Andujar MD           AS OF 20:06 EDT VITALS:    BP - 142/91  HR - 93  TEMP - 98.6 °F (37 °C) (Oral)  O2 SATS - 97%        MDM:  Patient is a 54-year-old female with history of alcoholism who presents emergency department today with intractable abdominal pain.  On arrival here in the emergency department vitals are reassuring, she is afebrile.  My exam the patient appears uncomfortable but nontoxic.  Patient does admit to drinking alcohol today.  Patient was evaluated with labs which are overall reassuring.  She did have a lactic acidosis but no source of infection was identified, suspect this is secondary to her alcoholism.  She was also hypomagnesemic which was replaced intravenously.  Her alcohol was elevated.  Platelets are chronically low and  stable.  Patient was subsequent evaluated with a CT of the abdomen pelvis which shows signs of acute on chronic pancreatitis.  Patient will require admission to the hospital for pain control.  She is stable at time of admission.      COMPLEXITY OF CARE  The patient requires admission.        DIAGNOSIS  Final diagnoses:   Acute on chronic pancreatitis   Calculus of gallbladder without cholecystitis without obstruction   Alcoholic intoxication without complication         DISPOSITION  ED Disposition       ED Disposition   Decision to Admit    Condition   --    Comment   Level of Care: Telemetry [5]   Diagnosis: Acute on chronic pancreatitis [[id number]]   Admitting Physician: RADHA TIJERINA [[id number]]   Attending Physician: RADHA TIJERINA [[id number]]                      Please note that portions of this document were completed with a voice recognition program.    Note Disclaimer: At [facility], we believe that sharing information builds trust and better relationships. You are receiving this note because you recently visited [facility]. It is possible you will see health information before a provider has talked with you about it. This kind of information can be easy to misunderstand. To help you fully understand what it means for your health, we urge you to discuss this note with your provider.     Cassandra Hong PA-C  07/22/24 2008

## 2024-07-22 NOTE — H&P
"    Patient Name:  Bekah Gibson  YOB: 1970  MRN:  [id number]  Admit Date:  7/22/2024  Patient Care Team:  Tylor Davis as PCP - General (Family Medicine)  Deion Saldana MD as Consulting Physician (Urology)  Adalberto Hoffman MD as Consulting Physician (Gastroenterology)  Eliazar Quinonez MD (Hematology)      Subjective   History Present Illness     Chief Complaint   Patient presents with    Abdominal Pain       Ms. Gibson is a 54 y.o. smoker with a history of Chronic pancreatitis, ETOH use, Cirrhosis complicated by portal hypertension, esophageal varices and splenomegaly, hypothyroidism, neuropathy, vitamin D deficiency, hypertension that presents to [facility] complaining of abrupt onset epigastric abdominal pain that began ~1 day ago prior to arrival. Pain described as \"sharp and stabbing\", rated 7/10 in severity at present. Symptoms constant and progressive in nature since onset. Denies alleviating factors. She believes this was triggered by ETOH use, she states that she went to the beach over the weekend and drank a lot of alcohol, and symptoms began after that. She denies recollection of other possible inciting factors. Endorses associated nausea, intermittent non-bloody emesis, intermittent loose stools. Denies fever, chills, chest pain, palpitations, dyspnea, dizziness, lightheadedness or other acute complaints. She has history of ETOH use, off and on use per patient, last drink was on evening of 07/21, but she is unable to specify exact time. She is being admitted for further evaluation and management.     Personal History     Past Medical History:   Diagnosis Date    Acromioclavicular separation 1/2021    Ankle sprain 2/2004    no operation necessary  At Time unsure    Anxiety     Arthritis     Arthritis of back Unsure    Diseased    Cirrhosis     Disease of thyroid gland     ETOH abuse     SOBER FOR 3 MONTHS    Fracture, clavicle 1/2021    shattered clavicel on " issue slip and fall    Fracture, foot Unsure    Frozen shoulder     4    GERD (gastroesophageal reflux disease)     History of kidney stones     5 YR AGO    Hypertension     Left shoulder pain     Low back strain     Lumbosacral disc disease     MDD (major depressive disorder)     Neck strain Unsure    Pancreatitis     Periarthritis of shoulder see above    Rotator cuff syndrome odre for shoulder replacement    unable to receive due to low platelets    Tennis elbow Unsure    Unsurpassed    Thrombocytopenia      Past Surgical History:   Procedure Laterality Date    CLAVICLE SURGERY Right 2018    ENDOSCOPY N/A 10/26/2022    Procedure: ESOPHAGOGASTRODUODENOSCOPY wtih banding;  Surgeon: Ag Patel MD;  Location: [facility] ENDOSCOPY;  Service: Gastroenterology;  Laterality: N/A;  pre: melena  post: esophageal varicies with banding x2 bands    ENDOSCOPY N/A 2023    Procedure: ESOPHAGOGASTRODUODENOSCOPY;  Surgeon: Ag Patel MD;  Location: [facility] ENDOSCOPY;  Service: Gastroenterology;  Laterality: N/A;  PRE OP - MAROON STOOLS  POST OP - SM ESOPHAGEAL VARICES    ESOPHAGEAL VARICES LIGATION      FOOT SURGERY  12    JOINT REPLACEMENT Bilateral     GREAT TOE    KIDNEY STONE SURGERY      LITHOTRIPSY AND STENT (R SIDE)    LAPAROSCOPIC TUBAL LIGATION      NECK SURGERY  3/07    Not sure of dates    SIGMOIDOSCOPY N/A 2023    Procedure: SIGMOIDOSCOPY FLEXIBLE;  Surgeon: Ag Patel MD;  Location: [facility] ENDOSCOPY;  Service: Gastroenterology;  Laterality: N/A;  PRE OP - MAROON STOOLS  POST OP - RECTAL VARICES    TOTAL SHOULDER ARTHROPLASTY Left 2023    Procedure: reverse total shoulder arthroplasty;  Surgeon: Giovanni Denise MD;  [place] [facility] [place] [facility]  Service: Orthopedics;  Laterality: Left;     Family History   Problem Relation Age of Onset    Rheumatologic disease Mother         congestive heart diseased    Osteoporosis Mother             Thyroid disease Father      Leukemia Father     Cancer Father         Leukemia  deseased 6/17    Broken bones Father     Clotting disorder Father     Drug abuse Brother     Malig Hyperthermia Neg Hx      Social History     Tobacco Use    Smoking status: Every Day     Current packs/day: 0.25     Average packs/day: 0.3 packs/day for 15.0 years (3.8 ttl pk-yrs)     Types: Cigarettes     Passive exposure: Current    Smokeless tobacco: Never    Tobacco comments:     Rarely smoke sometimes will to   Vaping Use    Vaping status: Never Used   Substance Use Topics    Alcohol use: Not Currently     Alcohol/week: 5.0 - 10.0 standard drinks of alcohol     Types: 5 - 10 Shots of liquor per week     Comment: 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    Drug use: Not Currently     Current Facility-Administered Medications on File Prior to Encounter   Medication Dose Route Frequency Provider Last Rate Last Admin    sodium chloride 0.9 % flush 10 mL  10 mL Intravenous Q12H Callie Quiroz MD        sodium chloride 0.9 % flush 10 mL  10 mL Intravenous PRN Callie Quiroz MD        sodium chloride 0.9 % flush 20 mL  20 mL Intravenous PRN Callie Quiroz MD         Current Outpatient Medications on File Prior to Encounter   Medication Sig Dispense Refill    amLODIPine (NORVASC) 2.5 MG tablet       ferrous sulfate 325 (65 FE) MG tablet Take 1 tablet by mouth Daily With Breakfast.      FLUoxetine (PROzac) 20 MG capsule Take 3 capsules by mouth Daily for 30 days. 90 capsule 0    folic acid (FOLVITE) 1 MG tablet Take 1 tablet by mouth Daily.      Ibuprofen 3 %, Gabapentin 10 %, Baclofen 2 %, lidocaine 4 % Apply 1-2 g topically to the appropriate area as directed 3 (Three) to 4 (Four) times daily. 90 g 5    lactulose (CHRONULAC) 10 GM/15ML solution       levothyroxine (SYNTHROID, LEVOTHROID) 100 MCG tablet TAKE ONE TABLET BY MOUTH DAILY 30 tablet 3    MAGnesium-Oxide 400 (240 Mg) MG tablet Daily. HOLD PRIOR TO SURG      mirtazapine (REMERON) 15  "MG tablet Take 1 tablet by mouth Daily.      Multiple Vitamin (MULTIVITAMIN) capsule Take 1 capsule by mouth Daily. HOLD PRIOR TO SURG      ondansetron ODT (ZOFRAN-ODT) 4 MG disintegrating tablet Place 1 tablet on the tongue Every 8 (Eight) Hours As Needed for Nausea or Vomiting. 10 tablet 0    pancrelipase, Lip-Prot-Amyl, (CREON) 80983-28556 units capsule delayed-release particles capsule Take 2 capsules by mouth 3 (Three) Times a Day With Meals. 180 capsule 0    pantoprazole (PROTONIX) 40 MG EC tablet 1 tablet Daily.      thiamine (VITAMIN B1) 100 MG tablet Take 1 tablet by mouth Daily. 30 tablet 0    vitamin D (ERGOCALCIFEROL) 1.25 MG (01180 UT) capsule capsule Take 1 capsule by mouth Every 7 (Seven) Days.       Allergies   Allergen Reactions    Benzonatate Nausea Only and Other (See Comments)     Rash     Ketorolac Tromethamine Other (See Comments)     \"I cannot take because of liver problems\"    Phenergan [Promethazine Hcl] Hives    Cucumber Extract Rash    Promethazine-Phenylephrine Other (See Comments)     \"FEEL WEIRD\"       Objective    Objective     Vital Signs  Temp:  [99.2 °F (37.3 °C)] 99.2 °F (37.3 °C)  Heart Rate:  [] 96  Resp:  [18] 18  BP: (112-162)/(71-97) 162/97  SpO2:  [88 %-97 %] 97 %  on  Flow (L/min):  [2-3] 3;   Device (Oxygen Therapy): nasal cannula  Body mass index is 24.13 kg/m².    Physical Exam  Vitals and nursing note reviewed.   Constitutional:       Appearance: She is ill-appearing.   Cardiovascular:      Rate and Rhythm: Regular rhythm. Tachycardia present.      Pulses: Normal pulses.      Heart sounds: Normal heart sounds.   Pulmonary:      Effort: Pulmonary effort is normal. No respiratory distress.      Breath sounds: Normal breath sounds. No wheezing.   Abdominal:      General: There is no distension.      Palpations: Abdomen is soft.      Tenderness: There is abdominal tenderness.   Skin:     General: Skin is warm and dry.   Neurological:      General: No focal deficit " present.      Mental Status: She is alert and oriented to person, place, and time.   Psychiatric:         Mood and Affect: Mood is anxious.         Results Review:  I reviewed the patient's new clinical results.  I reviewed the patient's new imaging results and agree with the interpretation.  I reviewed the patient's other test results and agree with the interpretation  I personally viewed and interpreted the patient's EKG/Telemetry data  Discussed with ED provider.    Lab Results (last 24 hours)       Procedure Component Value Units Date/Time    CBC & Differential [[id number]]  (Abnormal) Collected: 07/22/24 0355    Specimen: Blood Updated: 07/22/24 0416    Narrative:      The following orders were created for panel order CBC & Differential.  Procedure                               Abnormality         Status                     ---------                               -----------         ------                     CBC Auto Differential[[id number]]        Abnormal            Final result                 Please view results for these tests on the individual orders.    Comprehensive Metabolic Panel [[id number]]  (Abnormal) Collected: 07/22/24 0355    Specimen: Blood Updated: 07/22/24 0422     Glucose 154 mg/dL      BUN 10 mg/dL      Creatinine 0.50 mg/dL      Sodium 141 mmol/L      Potassium 3.6 mmol/L      Chloride 105 mmol/L      CO2 21.0 mmol/L      Calcium 9.3 mg/dL      Total Protein 7.6 g/dL      Albumin 4.5 g/dL      ALT (SGPT) 58 U/L      AST (SGOT) 118 U/L      Alkaline Phosphatase 188 U/L      Total Bilirubin 0.9 mg/dL      Globulin 3.1 gm/dL      A/G Ratio 1.5 g/dL      BUN/Creatinine Ratio 20.0     Anion Gap 15.0 mmol/L      eGFR 110.2 mL/min/1.73     Narrative:      GFR Normal >60  Chronic Kidney Disease <60  Kidney Failure <15      Lipase [[id number]]  (Normal) Collected: 07/22/24 0355    Specimen: Blood Updated: 07/22/24 0422     Lipase 60 U/L     Urinalysis With Microscopic If Indicated (No Culture) -  Urine, Clean Catch [[id number]]  (Abnormal) Collected: 07/22/24 0355    Specimen: Urine, Clean Catch Updated: 07/22/24 0432     Color, UA Yellow     Appearance, UA Clear     pH, UA 5.5     Specific Gravity, UA 1.022     Glucose, UA Negative     Ketones, UA Negative     Bilirubin, UA Negative     Blood, UA Negative     Protein, UA Negative     Leuk Esterase, UA Small (1+)     Nitrite, UA Negative     Urobilinogen, UA 0.2 E.U./dL    Lactic Acid, Plasma [[id number]]  (Abnormal) Collected: 07/22/24 0355    Specimen: Blood Updated: 07/22/24 0426     Lactate 2.8 mmol/L     Ethanol [[id number]]  (Abnormal) Collected: 07/22/24 0355    Specimen: Blood Updated: 07/22/24 0422     Ethanol 182 mg/dL      Ethanol % 0.182 %     Magnesium [[id number]]  (Abnormal) Collected: 07/22/24 0355    Specimen: Blood Updated: 07/22/24 0422     Magnesium 1.5 mg/dL     CBC Auto Differential [[id number]]  (Abnormal) Collected: 07/22/24 0355    Specimen: Blood Updated: 07/22/24 0416     WBC 4.88 10*3/mm3      RBC 5.06 10*6/mm3      Hemoglobin 15.7 g/dL      Hematocrit 46.5 %      MCV 91.9 fL      MCH 31.0 pg      MCHC 33.8 g/dL      RDW 13.9 %      RDW-SD 46.2 fl      MPV 11.0 fL      Platelets 59 10*3/mm3      Neutrophil % 49.6 %      Lymphocyte % 38.5 %      Monocyte % 8.4 %      Eosinophil % 2.7 %      Basophil % 0.6 %      Immature Grans % 0.2 %      Neutrophils, Absolute 2.42 10*3/mm3      Lymphocytes, Absolute 1.88 10*3/mm3      Monocytes, Absolute 0.41 10*3/mm3      Eosinophils, Absolute 0.13 10*3/mm3      Basophils, Absolute 0.03 10*3/mm3      Immature Grans, Absolute 0.01 10*3/mm3      nRBC 0.0 /100 WBC     Urinalysis, Microscopic Only - Urine, Clean Catch [[id number]]  (Abnormal) Collected: 07/22/24 0355    Specimen: Urine, Clean Catch Updated: 07/22/24 0432     RBC, UA 0-2 /HPF      WBC, UA 11-20 /HPF      Bacteria, UA None Seen /HPF      Squamous Epithelial Cells, UA 3-6 /HPF      Hyaline Casts, UA 0-2 /LPF      Methodology Automated  Microscopy    Ammonia [[id number]]  (Normal) Collected: 07/22/24 0430    Specimen: Blood Updated: 07/22/24 0509     Ammonia 39 umol/L             Imaging Results (Last 24 Hours)       Procedure Component Value Units Date/Time    CT Abdomen Pelvis With Contrast [[id number]] Collected: 07/22/24 0611     Updated: 07/22/24 0650    Narrative:      CT ABDOMEN PELVIS W CONTRAST-     HISTORY: 54 years of age, Female. Upper abdominal pain     TECHNIQUE:  CT includes axial imaging from the lung bases to the  trochanters with intravenous contrast and with use of oral contrast.  Data reconstructed in coronal and sagittal planes. Radiation dose  reduction techniques were utilized, including automated exposure control  and exposure modulation based on body size.     COMPARISON: CT abdomen pelvis 07/03/2024, 04/01/2024, 10/07/2023.     FINDINGS: Lung bases appear clear. Hepatic steatosis with cirrhotic  morphology. Small gallstones layer dependently within the gallbladder.  Evidence of portal venous hypertension with gastric and esophageal  varices and splenomegaly. Recanalization of the portal vein.     Moderate stenosis of the celiac artery origin with poststenotic  dilatation. Adrenal glands, kidneys appear within normal limits.  Extensive pancreatic calcifications consistent with chronic  pancreatitis. There is mild stranding adjacent to the pancreatic head  that appears slightly increased compared to the prior exam 19 days ago  suspicious for mild acute on chronic pancreatitis. No pancreatic ductal  dilatation.     No evidence for bowel obstruction. No ascites or evidence for  intra-abdominal abscess formation. No evidence for appendicitis.       Impression:      1. Subtle stranding adjacent to the pancreatic head, mildly increased  compared to exam 07/03/2024, suspicious for acute on chronic  pancreatitis.  2. Hepatic cirrhotic nephrology with hepatic steatosis. Portal venous  hypertension with gastroesophageal varices,  splenomegaly.  3. Cholelithiasis.     Radiation dose reduction techniques were utilized, including automated  exposure control and exposure modulation based on body size.        This report was finalized on 7/22/2024 6:47 AM by Dr. Anson Reynolds M.D on Workstation: [id number]                   No orders to display        Assessment/Plan     Active Hospital Problems    Diagnosis  POA    **Acute on chronic pancreatitis [K85.90, K86.1]  Yes    Transaminitis [R74.01]  Yes    Cholelithiasis [K80.20]  Yes    Alcohol use [Z78.9]  Yes    Esophageal varices [I85.00]  Yes    Primary hypertension [I10]  Yes    Alcoholic cirrhosis [K70.30]  Yes    Hypomagnesemia [E83.42]  Yes    Hashimoto's disease [E06.3]  Yes    Pancreatic insufficiency [K86.89]  Yes    Hypothyroidism [E03.9]  Yes    Vitamin D deficiency [E55.9]  Yes    Peripheral neuropathy [G62.9]  Yes      Resolved Hospital Problems   No resolved problems to display.     Ms. Gibson is a 54 y.o. smoker with a history of Chronic pancreatitis, ETOH use, Cirrhosis complicated by portal hypertension, esophageal varices and splenomegaly, hypothyroidism, neuropathy, vitamin D deficiency, hypertension that presents to [facility] complaining of abrupt onset epigastric abdominal pain that began ~1 day ago prior to arrival.     Acute on chronic pancreatitis  Nausea with vomiting  - Patient with complaints of epigastric abdominal pain. Imaging obtained and showed findings consistent with acute on chronic pancreatitis. Lipase normal, but meets 2/3 diagnostic criteria. She does have cholelithiasis but no mention of cholecystitis. Suspect that etiology of acute flare is secondary to ETOH, given acute use with onset of symptoms shortly thereafter, rather than biliary disease, but can consider surgery consult in the future if indicated to get their impression.  - IVF, PRN medications for symptom control, advance diet as tolerated, will start with clear liquids.  Continue home medication for chronic pancreatic insufficiency.    Transaminitis  - LFT elevated on most recent labs, likely 2/2 ETOH, pancreatitis. CT also showing cholelithiasis but no mention of findings concerning for cholecystitis or biliary ductal dilatation. No indications to warrant acute intervention for now, however, will continue to closely monitor. If no improvement and/or worsens, can consider further workup as indicated.  - Order repeat CMP in AM for reassessment.    Alcohol use  - ETOH level on admission: 193  - CIWA protocol, MV, thiamine, folic acid  - Telemetry, pulse oximetry, seizure precautions, aspiration precautions  - Consult Access center. Will follow their plans/recommendations. Greatly appreciate their help.    Cirrhosis complicated by portal hypertension, esophageal varices and splenomegaly  - No evidence of acute decompensation, continue treatments as prescribed. Closely monitor.    Hypomagnesemia  - Mag low on most recent labs; Will replete via electrolyte protocol. Follow up repeat labs to guide ongoing management decisions. Replete PRN per protocol. Continue to monitor    Hypertension  - Blood pressure appears stable and acceptable acutely at this time. No indications to warrant acute changes/intervention at this time.  - Continue current medications as prescribed. Trend BP to guide ongoing management decisions.    Hypothyroidism  - Continue levothyroxine as prescribed.      I discussed the patient's findings and my recommendations with patient, nursing staff, and ED provider.    VTE Prophylaxis - SCDs.  Code Status - Full code.       Burt Conn DO  [facility]  07/22/24  07:32 EDT

## 2024-07-23 LAB
ALBUMIN SERPL-MCNC: 3.7 G/DL (ref 3.5–5.2)
ALBUMIN/GLOB SERPL: 1.4 G/DL
ALP SERPL-CCNC: 147 U/L (ref 39–117)
ALT SERPL W P-5'-P-CCNC: 45 U/L (ref 1–33)
ANION GAP SERPL CALCULATED.3IONS-SCNC: 6 MMOL/L (ref 5–15)
AST SERPL-CCNC: 100 U/L (ref 1–32)
BASOPHILS # BLD AUTO: 0.01 10*3/MM3 (ref 0–0.2)
BASOPHILS # BLD AUTO: 0.02 10*3/MM3 (ref 0–0.2)
BASOPHILS NFR BLD AUTO: 0.4 % (ref 0–1.5)
BASOPHILS NFR BLD AUTO: 0.8 % (ref 0–1.5)
BILIRUB SERPL-MCNC: 2 MG/DL (ref 0–1.2)
BUN SERPL-MCNC: 3 MG/DL (ref 6–20)
BUN/CREAT SERPL: 7.7 (ref 7–25)
CALCIUM SPEC-SCNC: 9.1 MG/DL (ref 8.6–10.5)
CHLORIDE SERPL-SCNC: 100 MMOL/L (ref 98–107)
CO2 SERPL-SCNC: 26 MMOL/L (ref 22–29)
CREAT SERPL-MCNC: 0.39 MG/DL (ref 0.57–1)
DEPRECATED RDW RBC AUTO: 45.7 FL (ref 37–54)
DEPRECATED RDW RBC AUTO: 48.7 FL (ref 37–54)
EGFRCR SERPLBLD CKD-EPI 2021: 117 ML/MIN/1.73
EOSINOPHIL # BLD AUTO: 0.09 10*3/MM3 (ref 0–0.4)
EOSINOPHIL # BLD AUTO: 0.1 10*3/MM3 (ref 0–0.4)
EOSINOPHIL NFR BLD AUTO: 3.6 % (ref 0.3–6.2)
EOSINOPHIL NFR BLD AUTO: 4.3 % (ref 0.3–6.2)
ERYTHROCYTE [DISTWIDTH] IN BLOOD BY AUTOMATED COUNT: 13.8 % (ref 12.3–15.4)
ERYTHROCYTE [DISTWIDTH] IN BLOOD BY AUTOMATED COUNT: 14.3 % (ref 12.3–15.4)
FOLATE SERPL-MCNC: 19.4 NG/ML (ref 4.78–24.2)
GLOBULIN UR ELPH-MCNC: 2.6 GM/DL
GLUCOSE SERPL-MCNC: 108 MG/DL (ref 65–99)
HCT VFR BLD AUTO: 40.7 % (ref 34–46.6)
HCT VFR BLD AUTO: 42.2 % (ref 34–46.6)
HGB BLD-MCNC: 13.8 G/DL (ref 12–15.9)
HGB BLD-MCNC: 13.9 G/DL (ref 12–15.9)
IMM GRANULOCYTES # BLD AUTO: 0 10*3/MM3 (ref 0–0.05)
IMM GRANULOCYTES # BLD AUTO: 0.01 10*3/MM3 (ref 0–0.05)
IMM GRANULOCYTES NFR BLD AUTO: 0 % (ref 0–0.5)
IMM GRANULOCYTES NFR BLD AUTO: 0.4 % (ref 0–0.5)
LYMPHOCYTES # BLD AUTO: 0.73 10*3/MM3 (ref 0.7–3.1)
LYMPHOCYTES # BLD AUTO: 0.81 10*3/MM3 (ref 0.7–3.1)
LYMPHOCYTES NFR BLD AUTO: 31.1 % (ref 19.6–45.3)
LYMPHOCYTES NFR BLD AUTO: 32.3 % (ref 19.6–45.3)
MAGNESIUM SERPL-MCNC: 2.2 MG/DL (ref 1.6–2.6)
MCH RBC QN AUTO: 30.6 PG (ref 26.6–33)
MCH RBC QN AUTO: 31.1 PG (ref 26.6–33)
MCHC RBC AUTO-ENTMCNC: 32.9 G/DL (ref 31.5–35.7)
MCHC RBC AUTO-ENTMCNC: 33.9 G/DL (ref 31.5–35.7)
MCV RBC AUTO: 91.7 FL (ref 79–97)
MCV RBC AUTO: 93 FL (ref 79–97)
MONOCYTES # BLD AUTO: 0.3 10*3/MM3 (ref 0.1–0.9)
MONOCYTES # BLD AUTO: 0.32 10*3/MM3 (ref 0.1–0.9)
MONOCYTES NFR BLD AUTO: 12 % (ref 5–12)
MONOCYTES NFR BLD AUTO: 13.6 % (ref 5–12)
NEUTROPHILS NFR BLD AUTO: 1.19 10*3/MM3 (ref 1.7–7)
NEUTROPHILS NFR BLD AUTO: 1.28 10*3/MM3 (ref 1.7–7)
NEUTROPHILS NFR BLD AUTO: 50.6 % (ref 42.7–76)
NEUTROPHILS NFR BLD AUTO: 50.9 % (ref 42.7–76)
OSMOLALITY UR: 400 MOSM/KG
PLATELET # BLD AUTO: 36 10*3/MM3 (ref 140–450)
PLATELET # BLD AUTO: 36 10*3/MM3 (ref 140–450)
PLATELET # BLD AUTO: 37 10*3/MM3 (ref 140–450)
PLATELETS.RETICULATED NFR BLD AUTO: 13.4 % (ref 0.9–6.5)
PMV BLD AUTO: 11.1 FL (ref 6–12)
PMV BLD AUTO: 12.7 FL (ref 6–12)
POTASSIUM SERPL-SCNC: 3.2 MMOL/L (ref 3.5–5.2)
POTASSIUM SERPL-SCNC: 3.9 MMOL/L (ref 3.5–5.2)
PROT SERPL-MCNC: 6.3 G/DL (ref 6–8.5)
RBC # BLD AUTO: 4.44 10*6/MM3 (ref 3.77–5.28)
RBC # BLD AUTO: 4.54 10*6/MM3 (ref 3.77–5.28)
SODIUM SERPL-SCNC: 132 MMOL/L (ref 136–145)
SODIUM UR-SCNC: 165 MMOL/L
T4 FREE SERPL-MCNC: 1.5 NG/DL (ref 0.92–1.68)
TSH SERPL DL<=0.05 MIU/L-ACNC: 0.02 UIU/ML (ref 0.27–4.2)
URATE SERPL-MCNC: 3.5 MG/DL (ref 2.4–5.7)
VIT B12 BLD-MCNC: 889 PG/ML (ref 211–946)
WBC NRBC COR # BLD AUTO: 2.35 10*3/MM3 (ref 3.4–10.8)
WBC NRBC COR # BLD AUTO: 2.51 10*3/MM3 (ref 3.4–10.8)

## 2024-07-23 PROCEDURE — 25010000002 HYDROMORPHONE PER 4 MG: Performed by: STUDENT IN AN ORGANIZED HEALTH CARE EDUCATION/TRAINING PROGRAM

## 2024-07-23 PROCEDURE — 84439 ASSAY OF FREE THYROXINE: CPT | Performed by: STUDENT IN AN ORGANIZED HEALTH CARE EDUCATION/TRAINING PROGRAM

## 2024-07-23 PROCEDURE — G0378 HOSPITAL OBSERVATION PER HR: HCPCS

## 2024-07-23 PROCEDURE — 25010000002 THIAMINE HCL 200 MG/2ML SOLUTION: Performed by: STUDENT IN AN ORGANIZED HEALTH CARE EDUCATION/TRAINING PROGRAM

## 2024-07-23 PROCEDURE — 85025 COMPLETE CBC W/AUTO DIFF WBC: CPT | Performed by: STUDENT IN AN ORGANIZED HEALTH CARE EDUCATION/TRAINING PROGRAM

## 2024-07-23 PROCEDURE — 80050 GENERAL HEALTH PANEL: CPT | Performed by: STUDENT IN AN ORGANIZED HEALTH CARE EDUCATION/TRAINING PROGRAM

## 2024-07-23 PROCEDURE — 25810000003 LACTATED RINGERS PER 1000 ML: Performed by: STUDENT IN AN ORGANIZED HEALTH CARE EDUCATION/TRAINING PROGRAM

## 2024-07-23 PROCEDURE — 84300 ASSAY OF URINE SODIUM: CPT | Performed by: STUDENT IN AN ORGANIZED HEALTH CARE EDUCATION/TRAINING PROGRAM

## 2024-07-23 PROCEDURE — 83935 ASSAY OF URINE OSMOLALITY: CPT | Performed by: STUDENT IN AN ORGANIZED HEALTH CARE EDUCATION/TRAINING PROGRAM

## 2024-07-23 PROCEDURE — 83735 ASSAY OF MAGNESIUM: CPT | Performed by: STUDENT IN AN ORGANIZED HEALTH CARE EDUCATION/TRAINING PROGRAM

## 2024-07-23 PROCEDURE — 84132 ASSAY OF SERUM POTASSIUM: CPT | Performed by: STUDENT IN AN ORGANIZED HEALTH CARE EDUCATION/TRAINING PROGRAM

## 2024-07-23 PROCEDURE — 85055 RETICULATED PLATELET ASSAY: CPT | Performed by: STUDENT IN AN ORGANIZED HEALTH CARE EDUCATION/TRAINING PROGRAM

## 2024-07-23 PROCEDURE — 84550 ASSAY OF BLOOD/URIC ACID: CPT | Performed by: STUDENT IN AN ORGANIZED HEALTH CARE EDUCATION/TRAINING PROGRAM

## 2024-07-23 RX ORDER — POTASSIUM CHLORIDE 750 MG/1
40 TABLET, FILM COATED, EXTENDED RELEASE ORAL EVERY 4 HOURS
Status: COMPLETED | OUTPATIENT
Start: 2024-07-23 | End: 2024-07-23

## 2024-07-23 RX ADMIN — SODIUM CHLORIDE, POTASSIUM CHLORIDE, SODIUM LACTATE AND CALCIUM CHLORIDE 100 ML/HR: 600; 310; 30; 20 INJECTION, SOLUTION INTRAVENOUS at 19:38

## 2024-07-23 RX ADMIN — PANCRELIPASE 24000 UNITS OF LIPASE: 60000; 12000; 38000 CAPSULE, DELAYED RELEASE PELLETS ORAL at 09:00

## 2024-07-23 RX ADMIN — THIAMINE HYDROCHLORIDE 200 MG: 100 INJECTION, SOLUTION INTRAMUSCULAR; INTRAVENOUS at 06:24

## 2024-07-23 RX ADMIN — MIRTAZAPINE 15 MG: 15 TABLET, FILM COATED ORAL at 20:46

## 2024-07-23 RX ADMIN — HYDROMORPHONE HYDROCHLORIDE 0.5 MG: 1 INJECTION, SOLUTION INTRAMUSCULAR; INTRAVENOUS; SUBCUTANEOUS at 13:12

## 2024-07-23 RX ADMIN — POTASSIUM CHLORIDE 40 MEQ: 750 TABLET, EXTENDED RELEASE ORAL at 12:45

## 2024-07-23 RX ADMIN — PANTOPRAZOLE SODIUM 40 MG: 40 TABLET, DELAYED RELEASE ORAL at 06:25

## 2024-07-23 RX ADMIN — HYDROMORPHONE HYDROCHLORIDE 0.5 MG: 1 INJECTION, SOLUTION INTRAMUSCULAR; INTRAVENOUS; SUBCUTANEOUS at 03:32

## 2024-07-23 RX ADMIN — MAGNESIUM OXIDE 400 MG (241.3 MG MAGNESIUM) TABLET 400 MG: TABLET at 09:00

## 2024-07-23 RX ADMIN — FERROUS SULFATE TAB 325 MG (65 MG ELEMENTAL FE) 325 MG: 325 (65 FE) TAB at 09:01

## 2024-07-23 RX ADMIN — NITROFURANTOIN MONOHYDRATE/MACROCRYSTALLINE 100 MG: 25; 75 CAPSULE ORAL at 09:00

## 2024-07-23 RX ADMIN — PANCRELIPASE 24000 UNITS OF LIPASE: 60000; 12000; 38000 CAPSULE, DELAYED RELEASE PELLETS ORAL at 17:44

## 2024-07-23 RX ADMIN — THIAMINE HYDROCHLORIDE 200 MG: 100 INJECTION, SOLUTION INTRAMUSCULAR; INTRAVENOUS at 15:21

## 2024-07-23 RX ADMIN — SODIUM CHLORIDE, POTASSIUM CHLORIDE, SODIUM LACTATE AND CALCIUM CHLORIDE 150 ML/HR: 600; 310; 30; 20 INJECTION, SOLUTION INTRAVENOUS at 03:32

## 2024-07-23 RX ADMIN — POTASSIUM CHLORIDE 40 MEQ: 750 TABLET, EXTENDED RELEASE ORAL at 09:01

## 2024-07-23 RX ADMIN — LORAZEPAM 1 MG: 1 TABLET ORAL at 20:46

## 2024-07-23 RX ADMIN — NADOLOL 40 MG: 40 TABLET ORAL at 09:01

## 2024-07-23 RX ADMIN — LACTULOSE 10 G: 10 SOLUTION ORAL at 06:25

## 2024-07-23 RX ADMIN — FLUOXETINE HYDROCHLORIDE 60 MG: 20 CAPSULE ORAL at 09:00

## 2024-07-23 RX ADMIN — LEVOTHYROXINE SODIUM 100 MCG: 100 TABLET ORAL at 06:25

## 2024-07-23 RX ADMIN — SODIUM CHLORIDE, POTASSIUM CHLORIDE, SODIUM LACTATE AND CALCIUM CHLORIDE 150 ML/HR: 600; 310; 30; 20 INJECTION, SOLUTION INTRAVENOUS at 10:33

## 2024-07-23 RX ADMIN — HYDROMORPHONE HYDROCHLORIDE 0.5 MG: 1 INJECTION, SOLUTION INTRAMUSCULAR; INTRAVENOUS; SUBCUTANEOUS at 09:02

## 2024-07-23 RX ADMIN — PANCRELIPASE 24000 UNITS OF LIPASE: 60000; 12000; 38000 CAPSULE, DELAYED RELEASE PELLETS ORAL at 12:44

## 2024-07-23 RX ADMIN — HYDROMORPHONE HYDROCHLORIDE 0.5 MG: 1 INJECTION, SOLUTION INTRAMUSCULAR; INTRAVENOUS; SUBCUTANEOUS at 17:44

## 2024-07-23 RX ADMIN — FOLIC ACID 1 MG: 1 TABLET ORAL at 09:00

## 2024-07-23 RX ADMIN — HYDROMORPHONE HYDROCHLORIDE 0.5 MG: 1 INJECTION, SOLUTION INTRAMUSCULAR; INTRAVENOUS; SUBCUTANEOUS at 23:04

## 2024-07-23 RX ADMIN — AMLODIPINE BESYLATE 2.5 MG: 5 TABLET ORAL at 09:01

## 2024-07-23 RX ADMIN — NICOTINE 1 PATCH: 7 PATCH, EXTENDED RELEASE TRANSDERMAL at 15:20

## 2024-07-23 NOTE — PROGRESS NOTES
Discharge Planning Assessment  [facility]     Patient Name: Bekah Gibson  MRN: [id number]  Today's Date: 7/23/2024    Admit Date: 7/22/2024    Plan: Return home with spouse   Discharge Needs Assessment       Row Name 07/23/24 1021       Living Environment    People in Home spouse    Name(s) of People in Home Karely Aguilar    Current Living Arrangements home    Potentially Unsafe Housing Conditions none    Primary Care Provided by self    Provides Primary Care For no one    Family Caregiver if Needed spouse    Family Caregiver Names  Jeff [phone]    Quality of Family Relationships helpful    Able to Return to Prior Arrangements yes       Resource/Environmental Concerns    Resource/Environmental Concerns none    Transportation Concerns none       Transportation Needs    In the past 12 months, has lack of transportation kept you from medical appointments or from getting medications? no    In the past 12 months, has lack of transportation kept you from meetings, work, or from getting things needed for daily living? No       Food Insecurity    Within the past 12 months, you worried that your food would run out before you got the money to buy more. Never true    Within the past 12 months, the food you bought just didn't last and you didn't have money to get more. Never true       Transition Planning    Patient/Family Anticipates Transition to home with family    Patient/Family Anticipated Services at Transition none    Transportation Anticipated family or friend will provide       Discharge Needs Assessment    Readmission Within the Last 30 Days other (see comments)  Recurrent problem    Equipment Currently Used at Home none    Concerns to be Addressed denies needs/concerns at this time    Anticipated Changes Related to Illness none    Equipment Needed After Discharge none    Current Discharge Risk substance use/abuse                   Discharge Plan       Row Name 07/23/24 1022       Plan    Plan Return  home with spouse    Patient/Family in Agreement with Plan yes    Plan Comments Spoke with patient at bedside.  She lives with  Jeff [phone], is IADL, uses no DME, has used [facility] in the past and has never been to SNF.  PCP is Dr. Tylor Davis and pharmacy is [facility] on [address] @ [place].  Patient plans to return home with spouse at [place].  [facility] is following.  CCP will follow as needed.  Clifford HORNE                  Continued Care and Services - Admitted Since 7/22/2024    No active coordination exists for this encounter.       Expected Discharge Date and Time       Expected Discharge Date Expected Discharge Time    Jul 25, 2024            Demographic Summary       Row Name 07/23/24 1020       General Information    Admission Type observation    Arrived From home    Referral Source admission list    Reason for Consult discharge planning    Preferred Language English                   Functional Status       Row Name 07/23/24 1020       Functional Status    Usual Activity Tolerance moderate    Current Activity Tolerance moderate       Functional Status, IADL    Medications independent    Meal Preparation independent;assistive person      Housekeeping independent    Laundry independent    Shopping independent;assistive person       Mental Status    General Appearance WDL WDL       Mental Status Summary    Recent Changes in Mental Status/Cognitive Functioning no changes                               Becky S. [place], RN

## 2024-07-23 NOTE — PROGRESS NOTES
Name: Bekah Gibson ADMIT: 2024   : 1970  PCP: Davis Tylor    MRN: [id number] LOS: 0 days   AGE/SEX: 54 y.o. female  ROOM: [facility]     Subjective   Subjective   Patient seen and examined this morning. Hospital day 1. At time of my examination, patient is awake, alert and oriented x3, resting comfortably in bed. She continues to have epigastric pain, nausea, but slightly improved from prior. She is on clear liquid diet, doesn't have much appetite, but has tolerated this okay so far.      Objective   Objective   Vital Signs  Temp:  [98.2 °F (36.8 °C)-98.8 °F (37.1 °C)] 98.8 °F (37.1 °C)  Heart Rate:  [78-93] 80  Resp:  [18-20] 18  BP: (138-156)/(85-91) 138/85  SpO2:  [97 %-100 %] 99 %  on  Flow (L/min):  [2] 2;   Device (Oxygen Therapy): room air  Body mass index is 26.19 kg/m².  Physical Exam  Vitals and nursing note reviewed.   Constitutional:       General: She is not in acute distress.     Appearance: She is ill-appearing.   Cardiovascular:      Rate and Rhythm: Normal rate and regular rhythm.      Pulses: Normal pulses.      Heart sounds: Normal heart sounds.   Pulmonary:      Effort: Pulmonary effort is normal. No respiratory distress.      Breath sounds: Normal breath sounds. No wheezing.   Abdominal:      General: There is no distension.      Palpations: Abdomen is soft.      Tenderness: There is abdominal tenderness. There is no guarding or rebound.   Skin:     General: Skin is warm and dry.   Neurological:      General: No focal deficit present.      Mental Status: She is alert and oriented to person, place, and time.   Psychiatric:         Mood and Affect: Mood is anxious.       Results Review     I reviewed the patient's new clinical results.  Results from last 7 days   Lab Units 24  0812 24  0355   WBC 10*3/mm3 2.51* 4.88   HEMOGLOBIN g/dL 13.9 15.7   PLATELETS 10*3/mm3 37* 59*     Results from last 7 days   Lab Units 24  0812 24  0355   SODIUM mmol/L 132* 141  "  POTASSIUM mmol/L 3.2* 3.6   CHLORIDE mmol/L 100 105   CO2 mmol/L 26.0 21.0*   BUN mg/dL 3* 10   CREATININE mg/dL 0.39* 0.50*   GLUCOSE mg/dL 108* 154*   EGFR mL/min/1.73 117.0 110.2     Results from last 7 days   Lab Units 07/23/24  0812 07/22/24  0355   ALBUMIN g/dL 3.7 4.5   BILIRUBIN mg/dL 2.0* 0.9   ALK PHOS U/L 147* 188*   AST (SGOT) U/L 100* 118*   ALT (SGPT) U/L 45* 58*     Results from last 7 days   Lab Units 07/23/24  0812 07/22/24  0355   CALCIUM mg/dL 9.1 9.3   ALBUMIN g/dL 3.7 4.5   MAGNESIUM mg/dL 2.2 1.5*     Results from last 7 days   Lab Units 07/22/24  1214 07/22/24  0918 07/22/24  0355   LACTATE mmol/L 1.3 2.5* 2.8*     No results found for: \"HGBA1C\", \"POCGLU\"    CT Abdomen Pelvis With Contrast    Result Date: 7/22/2024  1. Subtle stranding adjacent to the pancreatic head, mildly increased compared to exam 07/03/2024, suspicious for acute on chronic pancreatitis. 2. Hepatic cirrhotic nephrology with hepatic steatosis. Portal venous hypertension with gastroesophageal varices, splenomegaly. 3. Cholelithiasis.  Radiation dose reduction techniques were utilized, including automated exposure control and exposure modulation based on body size.   This report was finalized on 7/22/2024 6:47 AM by Dr. Anson Reynolds M.D on Workstation: [id number]       I have personally reviewed all medications:  Scheduled Medications  amLODIPine, 2.5 mg, Oral, Daily  ferrous sulfate, 325 mg, Oral, Daily With Breakfast  FLUoxetine, 60 mg, Oral, Daily  folic acid, 1 mg, Oral, Daily  lactulose, 10 g, Oral, QAM  levothyroxine, 100 mcg, Oral, Q AM  Magnesium Oxide -Mg Supplement, 400 mg, Oral, Daily  mirtazapine, 15 mg, Oral, Nightly  nadolol, 40 mg, Oral, Daily  nitrofurantoin (macrocrystal-monohydrate), 100 mg, Oral, BID  pancrelipase (Lip-Prot-Amyl), 24,000 units of lipase, Oral, TID With Meals  pantoprazole, 40 mg, Oral, Q AM  potassium chloride ER, 40 mEq, Oral, Q4H  thiamine (B-1) IV, 200 mg, Intravenous, Q8H   " Followed by  [START ON 7/27/2024] thiamine, 100 mg, Oral, Daily    Infusions  lactated ringers, 150 mL/hr, Last Rate: 150 mL/hr (07/23/24 1033)    Diet  Diet: Liquid; Full Liquid; Fluid Consistency: Thin (IDDSI 0)    I have personally reviewed:  [x]  Laboratory   []  Microbiology   [x]  Radiology   [x]  EKG/Telemetry  []  Cardiology/Vascular   []  Pathology    []  Records       Assessment/Plan     Active Hospital Problems    Diagnosis  POA    **Acute on chronic pancreatitis [K85.90, K86.1]  Yes    Transaminitis [R74.01]  Yes    Cholelithiasis [K80.20]  Yes    Alcohol use [Z78.9]  Yes    Esophageal varices [I85.00]  Yes    Primary hypertension [I10]  Yes    Alcoholic cirrhosis [K70.30]  Yes    Hypomagnesemia [E83.42]  Yes    Hashimoto's disease [E06.3]  Yes    Pancreatic insufficiency [K86.89]  Yes    Hypothyroidism [E03.9]  Yes    Vitamin D deficiency [E55.9]  Yes    Peripheral neuropathy [G62.9]  Yes      Resolved Hospital Problems   No resolved problems to display.     Ms. Gibson is a 54 y.o. smoker with a history of Chronic pancreatitis, ETOH use, Cirrhosis complicated by portal hypertension, esophageal varices and splenomegaly, hypothyroidism, neuropathy, vitamin D deficiency, hypertension that presents to [facility] complaining of abrupt onset epigastric abdominal pain that began ~1 day ago prior to arrival, admitted with Acute on chronic pancreatitis.     Acute on chronic pancreatitis  Nausea with vomiting  - Patient with complaints of epigastric abdominal pain. Imaging obtained and showed findings consistent with acute on chronic pancreatitis. Lipase normal, but meets 2/3 diagnostic criteria. She does have cholelithiasis but no mention of cholecystitis. Suspect that etiology of acute flare is secondary to ETOH, given acute use with onset of symptoms shortly thereafter, rather than biliary disease, but can consider surgery consult in the future if indicated to get their impression if symptoms do  not improve or worsen.  - Symptoms still present, but slightly improved. Advance to full liquid diet now and see how she tolerates this. Otherwise, continue IVF, PRN medications for symptom control, advance diet as tolerated. Continue home medication for chronic pancreatic insufficiency.     Transaminitis  - LFT elevated on most recent labs, likely 2/2 ETOH, pancreatitis. CT also showing cholelithiasis but no mention of findings concerning for cholecystitis or biliary ductal dilatation. Values are improving when compared to prior. No indications to warrant acute intervention for now, however, will continue to closely monitor. If no improvement and/or worsens, can consider further workup as indicated.  - Order repeat CMP in AM for reassessment.     Alcohol use  - ETOH level on admission: 193  - CIWA protocol, MV, thiamine, folic acid  - Telemetry, pulse oximetry, seizure precautions, aspiration precautions  - Consulted [facility]. Will follow their plans/recommendations. Greatly appreciate their help.    Hypokalemia  - K low on most recent labs; Will replete via electrolyte protocol. Follow up repeat labs to guide ongoing management decisions. Replete PRN per protocol. Continue to monitor    Hyponatremia  - Na level worsened on most recent labs, despite fluids.  - Order urine sodium, urine osmolality, uric acid, TSH.    Decreased WBC count  Thrombocytopenia  - Noted on labs, going to order STAT repeat CBC now to ensure changes noted are accurate. Order B12, folate.  - Follow up results of repeat testing to guide further management.    Cirrhosis complicated by portal hypertension, esophageal varices and splenomegaly  - No evidence of acute decompensation, continue treatments as prescribed. Closely monitor.     Hypomagnesemia  - Mag low on prior labs, repleted via electrolyte protocol. Follow up repeat labs to guide ongoing management decisions. Replete PRN per protocol. Continue to monitor     Hypertension  - Blood  pressure appears stable and acceptable acutely at this time. No indications to warrant acute changes/intervention at this time.  - Continue current medications as prescribed. Trend BP to guide ongoing management decisions.     Hypothyroidism  - Continue levothyroxine as prescribed.       SCDs for DVT prophylaxis.  Full code.  Discussed with patient and nursing staff.  Anticipate discharge home later this week.  Expected Discharge Date: 7/25/2024; Expected Discharge Time:       Burt Conn DO  [facility]  07/23/24

## 2024-07-23 NOTE — PLAN OF CARE
Goal Outcome Evaluation:  Plan of Care Reviewed With: patient        Progress: improving  Outcome Evaluation: (P) Pt A&Ox4. VSS. Pt up ad diane, amulating to BR c SBA. Pt c/o abdominal pain, PRN pain medications given, effective per patient. Potassium replaced per protocol. CIWA scores evaluated. IV fluids given.

## 2024-07-23 NOTE — NURSING NOTE
"[facility] follow-up d/t ETOH.     Patient RIB. The patient reported she was doing \"o.k.\" The patient reported good sleep. When asked about ETOH withdrawal the patient voiced she has some anxiety and a headache. Last CIWA 3. The patient voiced some ETOH cravings and rated it 2/10. The patient acknowledged receiving recourses from [facility] but stated she has not had time to review them yet. The patient voiced she plans on seeking medication management and then doing therapy. [facility] following.   "

## 2024-07-23 NOTE — PLAN OF CARE
Goal Outcome Evaluation:  Plan of Care Reviewed With: patient        Progress: improving  Outcome Evaluation: Pt A&Ox4. VSS. Pt ambulating to BR with SBA. Pt c/o abdominal pain relieved by prn medication. Pt tolerating clear liquid diet. CIWA scores evaluated and pt medicated accordingly. Plan of care continues.

## 2024-07-24 PROBLEM — E87.6 HYPOKALEMIA: Status: ACTIVE | Noted: 2024-07-24

## 2024-07-24 PROBLEM — N39.0 URINARY TRACT INFECTION: Status: ACTIVE | Noted: 2024-07-24

## 2024-07-24 PROBLEM — D70.9 NEUTROPENIA: Status: ACTIVE | Noted: 2024-07-24

## 2024-07-24 PROBLEM — D72.819 WBC DECREASED: Status: ACTIVE | Noted: 2024-07-24

## 2024-07-24 LAB
ALBUMIN SERPL-MCNC: 3.4 G/DL (ref 3.5–5.2)
ALBUMIN/GLOB SERPL: 1.3 G/DL
ALP SERPL-CCNC: 138 U/L (ref 39–117)
ALT SERPL W P-5'-P-CCNC: 49 U/L (ref 1–33)
ANION GAP SERPL CALCULATED.3IONS-SCNC: 10 MMOL/L (ref 5–15)
APTT PPP: 33.2 SECONDS (ref 22.7–35.4)
AST SERPL-CCNC: 101 U/L (ref 1–32)
BASOPHILS # BLD AUTO: 0.02 10*3/MM3 (ref 0–0.2)
BASOPHILS NFR BLD AUTO: 0.8 % (ref 0–1.5)
BILIRUB SERPL-MCNC: 1.6 MG/DL (ref 0–1.2)
BILIRUB UR QL STRIP: NEGATIVE
BUN SERPL-MCNC: 4 MG/DL (ref 6–20)
BUN/CREAT SERPL: 8.9 (ref 7–25)
CALCIUM SPEC-SCNC: 8.9 MG/DL (ref 8.6–10.5)
CHLORIDE SERPL-SCNC: 103 MMOL/L (ref 98–107)
CLARITY UR: CLEAR
CO2 SERPL-SCNC: 23 MMOL/L (ref 22–29)
COLOR UR: YELLOW
CREAT SERPL-MCNC: 0.45 MG/DL (ref 0.57–1)
DEPRECATED RDW RBC AUTO: 44.6 FL (ref 37–54)
EGFRCR SERPLBLD CKD-EPI 2021: 113.1 ML/MIN/1.73
EOSINOPHIL # BLD AUTO: 0.09 10*3/MM3 (ref 0–0.4)
EOSINOPHIL NFR BLD AUTO: 3.5 % (ref 0.3–6.2)
ERYTHROCYTE [DISTWIDTH] IN BLOOD BY AUTOMATED COUNT: 13.8 % (ref 12.3–15.4)
FIBRINOGEN PPP-MCNC: 238 MG/DL (ref 219–464)
GLOBULIN UR ELPH-MCNC: 2.7 GM/DL
GLUCOSE SERPL-MCNC: 108 MG/DL (ref 65–99)
GLUCOSE UR STRIP-MCNC: NEGATIVE MG/DL
HCT VFR BLD AUTO: 39 % (ref 34–46.6)
HGB BLD-MCNC: 13.5 G/DL (ref 12–15.9)
HGB UR QL STRIP.AUTO: NEGATIVE
IMM GRANULOCYTES # BLD AUTO: 0.02 10*3/MM3 (ref 0–0.05)
IMM GRANULOCYTES NFR BLD AUTO: 0.8 % (ref 0–0.5)
INR PPP: 1.4 (ref 0.9–1.1)
KETONES UR QL STRIP: NEGATIVE
LEUKOCYTE ESTERASE UR QL STRIP.AUTO: NEGATIVE
LYMPHOCYTES # BLD AUTO: 0.76 10*3/MM3 (ref 0.7–3.1)
LYMPHOCYTES NFR BLD AUTO: 29.6 % (ref 19.6–45.3)
MAGNESIUM SERPL-MCNC: 1.7 MG/DL (ref 1.6–2.6)
MCH RBC QN AUTO: 31.4 PG (ref 26.6–33)
MCHC RBC AUTO-ENTMCNC: 34.6 G/DL (ref 31.5–35.7)
MCV RBC AUTO: 90.7 FL (ref 79–97)
MONOCYTES # BLD AUTO: 0.34 10*3/MM3 (ref 0.1–0.9)
MONOCYTES NFR BLD AUTO: 13.2 % (ref 5–12)
NEUTROPHILS NFR BLD AUTO: 1.34 10*3/MM3 (ref 1.7–7)
NEUTROPHILS NFR BLD AUTO: 52.1 % (ref 42.7–76)
NITRITE UR QL STRIP: NEGATIVE
PH UR STRIP.AUTO: 8 [PH] (ref 5–8)
PLATELET # BLD AUTO: 34 10*3/MM3 (ref 140–450)
PMV BLD AUTO: 12.3 FL (ref 6–12)
POTASSIUM SERPL-SCNC: 3.6 MMOL/L (ref 3.5–5.2)
POTASSIUM SERPL-SCNC: 4.3 MMOL/L (ref 3.5–5.2)
PROT SERPL-MCNC: 6.1 G/DL (ref 6–8.5)
PROT UR QL STRIP: NEGATIVE
PROTHROMBIN TIME: 17.4 SECONDS (ref 11.7–14.2)
RBC # BLD AUTO: 4.3 10*6/MM3 (ref 3.77–5.28)
SODIUM SERPL-SCNC: 136 MMOL/L (ref 136–145)
SP GR UR STRIP: 1.01 (ref 1–1.03)
UROBILINOGEN UR QL STRIP: NORMAL
WBC NRBC COR # BLD AUTO: 2.57 10*3/MM3 (ref 3.4–10.8)

## 2024-07-24 PROCEDURE — 85730 THROMBOPLASTIN TIME PARTIAL: CPT | Performed by: STUDENT IN AN ORGANIZED HEALTH CARE EDUCATION/TRAINING PROGRAM

## 2024-07-24 PROCEDURE — 85384 FIBRINOGEN ACTIVITY: CPT | Performed by: STUDENT IN AN ORGANIZED HEALTH CARE EDUCATION/TRAINING PROGRAM

## 2024-07-24 PROCEDURE — 84132 ASSAY OF SERUM POTASSIUM: CPT | Performed by: STUDENT IN AN ORGANIZED HEALTH CARE EDUCATION/TRAINING PROGRAM

## 2024-07-24 PROCEDURE — 85610 PROTHROMBIN TIME: CPT | Performed by: STUDENT IN AN ORGANIZED HEALTH CARE EDUCATION/TRAINING PROGRAM

## 2024-07-24 PROCEDURE — 83735 ASSAY OF MAGNESIUM: CPT | Performed by: STUDENT IN AN ORGANIZED HEALTH CARE EDUCATION/TRAINING PROGRAM

## 2024-07-24 PROCEDURE — 87040 BLOOD CULTURE FOR BACTERIA: CPT | Performed by: STUDENT IN AN ORGANIZED HEALTH CARE EDUCATION/TRAINING PROGRAM

## 2024-07-24 PROCEDURE — 80053 COMPREHEN METABOLIC PANEL: CPT | Performed by: STUDENT IN AN ORGANIZED HEALTH CARE EDUCATION/TRAINING PROGRAM

## 2024-07-24 PROCEDURE — 25810000003 LACTATED RINGERS PER 1000 ML: Performed by: STUDENT IN AN ORGANIZED HEALTH CARE EDUCATION/TRAINING PROGRAM

## 2024-07-24 PROCEDURE — 81003 URINALYSIS AUTO W/O SCOPE: CPT | Performed by: STUDENT IN AN ORGANIZED HEALTH CARE EDUCATION/TRAINING PROGRAM

## 2024-07-24 PROCEDURE — 25010000002 PIPERACILLIN SOD-TAZOBACTAM PER 1 G

## 2024-07-24 PROCEDURE — 99223 1ST HOSP IP/OBS HIGH 75: CPT | Performed by: INTERNAL MEDICINE

## 2024-07-24 PROCEDURE — 25010000002 HYDROMORPHONE PER 4 MG: Performed by: STUDENT IN AN ORGANIZED HEALTH CARE EDUCATION/TRAINING PROGRAM

## 2024-07-24 PROCEDURE — 36415 COLL VENOUS BLD VENIPUNCTURE: CPT | Performed by: STUDENT IN AN ORGANIZED HEALTH CARE EDUCATION/TRAINING PROGRAM

## 2024-07-24 PROCEDURE — 85025 COMPLETE CBC W/AUTO DIFF WBC: CPT | Performed by: STUDENT IN AN ORGANIZED HEALTH CARE EDUCATION/TRAINING PROGRAM

## 2024-07-24 RX ORDER — POTASSIUM CHLORIDE 750 MG/1
40 TABLET, FILM COATED, EXTENDED RELEASE ORAL EVERY 4 HOURS
Status: COMPLETED | OUTPATIENT
Start: 2024-07-24 | End: 2024-07-24

## 2024-07-24 RX ADMIN — PIPERACILLIN AND TAZOBACTAM 3.38 G: 3; .375 INJECTION, POWDER, FOR SOLUTION INTRAVENOUS at 05:56

## 2024-07-24 RX ADMIN — NICOTINE 1 PATCH: 7 PATCH, EXTENDED RELEASE TRANSDERMAL at 09:17

## 2024-07-24 RX ADMIN — PANCRELIPASE 24000 UNITS OF LIPASE: 60000; 12000; 38000 CAPSULE, DELAYED RELEASE PELLETS ORAL at 10:33

## 2024-07-24 RX ADMIN — LEVOTHYROXINE SODIUM 100 MCG: 100 TABLET ORAL at 04:20

## 2024-07-24 RX ADMIN — POTASSIUM CHLORIDE 40 MEQ: 750 TABLET, EXTENDED RELEASE ORAL at 10:33

## 2024-07-24 RX ADMIN — AMLODIPINE BESYLATE 2.5 MG: 5 TABLET ORAL at 09:12

## 2024-07-24 RX ADMIN — POTASSIUM CHLORIDE 40 MEQ: 750 TABLET, EXTENDED RELEASE ORAL at 13:44

## 2024-07-24 RX ADMIN — SODIUM CHLORIDE, POTASSIUM CHLORIDE, SODIUM LACTATE AND CALCIUM CHLORIDE 100 ML/HR: 600; 310; 30; 20 INJECTION, SOLUTION INTRAVENOUS at 15:49

## 2024-07-24 RX ADMIN — FLUOXETINE HYDROCHLORIDE 60 MG: 20 CAPSULE ORAL at 10:33

## 2024-07-24 RX ADMIN — PIPERACILLIN AND TAZOBACTAM 3.38 G: 3; .375 INJECTION, POWDER, FOR SOLUTION INTRAVENOUS at 15:48

## 2024-07-24 RX ADMIN — MIRTAZAPINE 15 MG: 15 TABLET, FILM COATED ORAL at 21:30

## 2024-07-24 RX ADMIN — PIPERACILLIN AND TAZOBACTAM 3.38 G: 3; .375 INJECTION, POWDER, FOR SOLUTION INTRAVENOUS at 23:47

## 2024-07-24 RX ADMIN — PANCRELIPASE 24000 UNITS OF LIPASE: 60000; 12000; 38000 CAPSULE, DELAYED RELEASE PELLETS ORAL at 12:58

## 2024-07-24 RX ADMIN — MAGNESIUM OXIDE 400 MG (241.3 MG MAGNESIUM) TABLET 400 MG: TABLET at 09:13

## 2024-07-24 RX ADMIN — HYDROMORPHONE HYDROCHLORIDE 0.5 MG: 1 INJECTION, SOLUTION INTRAMUSCULAR; INTRAVENOUS; SUBCUTANEOUS at 21:33

## 2024-07-24 RX ADMIN — PANCRELIPASE 24000 UNITS OF LIPASE: 60000; 12000; 38000 CAPSULE, DELAYED RELEASE PELLETS ORAL at 17:51

## 2024-07-24 RX ADMIN — HYDROMORPHONE HYDROCHLORIDE 0.5 MG: 1 INJECTION, SOLUTION INTRAMUSCULAR; INTRAVENOUS; SUBCUTANEOUS at 04:20

## 2024-07-24 RX ADMIN — LORAZEPAM 1 MG: 1 TABLET ORAL at 21:30

## 2024-07-24 RX ADMIN — SODIUM CHLORIDE, POTASSIUM CHLORIDE, SODIUM LACTATE AND CALCIUM CHLORIDE 100 ML/HR: 600; 310; 30; 20 INJECTION, SOLUTION INTRAVENOUS at 05:56

## 2024-07-24 RX ADMIN — PANTOPRAZOLE SODIUM 40 MG: 40 TABLET, DELAYED RELEASE ORAL at 04:20

## 2024-07-24 RX ADMIN — NADOLOL 40 MG: 40 TABLET ORAL at 09:12

## 2024-07-24 RX ADMIN — FERROUS SULFATE TAB 325 MG (65 MG ELEMENTAL FE) 325 MG: 325 (65 FE) TAB at 09:13

## 2024-07-24 RX ADMIN — HYDROMORPHONE HYDROCHLORIDE 0.5 MG: 1 INJECTION, SOLUTION INTRAMUSCULAR; INTRAVENOUS; SUBCUTANEOUS at 17:51

## 2024-07-24 RX ADMIN — HYDROMORPHONE HYDROCHLORIDE 0.5 MG: 1 INJECTION, SOLUTION INTRAMUSCULAR; INTRAVENOUS; SUBCUTANEOUS at 13:44

## 2024-07-24 RX ADMIN — Medication 100 MG: at 15:48

## 2024-07-24 RX ADMIN — PIPERACILLIN AND TAZOBACTAM 3.38 G: 3; .375 INJECTION, POWDER, FOR SOLUTION INTRAVENOUS at 00:55

## 2024-07-24 RX ADMIN — FOLIC ACID 1 MG: 1 TABLET ORAL at 09:12

## 2024-07-24 RX ADMIN — HYDROMORPHONE HYDROCHLORIDE 0.5 MG: 1 INJECTION, SOLUTION INTRAMUSCULAR; INTRAVENOUS; SUBCUTANEOUS at 09:27

## 2024-07-24 NOTE — CONSULTS
.     REASON FOR CONSULTATION:     Provide an opinion on any further workup or treatment of severe thrombocytopenia                             REQUESTING PHYSICIAN: Burt Conn DO      RECORDS OBTAINED:  Records of the patient's history including those obtained from the referring provider were reviewed and summarized in detail.    HISTORY OF PRESENT ILLNESS:  The patient is a 54 y.o. year old female whom we were consulted for evaluation of severe thrombocytopenia.     This patient is known to our service for her thrombocytopenia due to the previous hospitalization. She has chronic liver disease/cirrhosis secondary to alcoholism and splenomegaly with chronic thrombocytopenia with baseline platelet counts in the 30,000 to 45,000 range. This patient presented to [facility] Emergency Room this time on 07/22/2024 complaining of new onset abdominal pain associated with diarrhea and nausea after they spent a weekend on the beach and drank a lot of alcohol. She developed severe abdominal pain in the central abdomen probably within a day after the large quantity of drinking. She tried taking some Pepto-Bismol but not helping. At the time of ER visit, this patient had alcohol level of 193. Other laboratory studies on 07/22/2024 reported platelets 59,000 with hemoglobin 15.7, MCV 91.9, WBC 4880, neutrophils 2420 and elevated lactate at 2.8. Normal lipase at 60. Folate 19.4 and B12 at 889. Chemistry lab reported elevated AST at 118, ALT 58, alkaline phosphatase 188, and normal bilirubin 0.9, glucose 154 and normal electrolytes. Renal function, creatinine 0.5. Laboratory study yesterday on 07/23/2024 reported worsening thrombocytopenia with platelets 37,000 and leukocytopenia, WBC 2510, neutrophils 1280. Normal hemoglobin at 13.9. Chemistry lab showed elevated total bilirubin 2.0 with , ALT 45, alkaline phosphatase 147. Potassium was 3.2, sodium 132, and creatinine 0.39. TSH was 0.018 and free T4 was normal at  1.5.     Today, platelets are 34,000 with WBC of 2570, neutrophils 1340 and hemoglobin 13.5. Chemistry lab reported total bilirubin 1.6, alkaline phosphatase 138, , and ALT 49. Normal renal function. Normalized potassium and sodium.     The patient has been given medication to prevent DT. She also has been given folic acid and vitamin B1. She also has been on Zosyn q.8 h.       This patient is new to me with all the problems listed above. I reviewed medical records from the previous hospitalization and also the admission note from this time and also note from [facility], Dr. Conn.            Past Medical History:   Diagnosis Date    Acromioclavicular separation 1/2021    Ankle sprain 2/2004    no operation necessary  At Time unsure    Anxiety     Arthritis     Arthritis of back Unsure    Diseased    Cirrhosis     Disease of thyroid gland     ETOH abuse     SOBER FOR 3 MONTHS    Fracture, clavicle 1/2021    shattered clavicel on issue slip and fall    Fracture, foot Unsure    Frozen shoulder 1 /2009    4    GERD (gastroesophageal reflux disease)     History of kidney stones     5 YR AGO    Hypertension     Left shoulder pain     Low back strain 1/21    Lumbosacral disc disease 1/21    MDD (major depressive disorder)     Neck strain Unsure    Pancreatitis     Periarthritis of shoulder see above    Rotator cuff syndrome odre for shoulder replacement    unable to receive due to low platelets    Tennis elbow Unsure    Unsurpassed    Thrombocytopenia      Past Surgical History:   Procedure Laterality Date    CLAVICLE SURGERY Right 2018    ENDOSCOPY N/A 10/26/2022    Procedure: ESOPHAGOGASTRODUODENOSCOPY wtih banding;  Surgeon: Ag Patel MD;  Location: [facility] ENDOSCOPY;  Service: Gastroenterology;  Laterality: N/A;  pre: melena  post: esophageal varicies with banding x2 bands    ENDOSCOPY N/A 01/18/2023    Procedure: ESOPHAGOGASTRODUODENOSCOPY;  Surgeon: Ag Patel MD;  Location: [facility] ENDOSCOPY;  Service:  Gastroenterology;  Laterality: N/A;  PRE OP - MAROON STOOLS  POST OP - SM ESOPHAGEAL VARICES    ESOPHAGEAL VARICES LIGATION      FOOT SURGERY  2/2/14    JOINT REPLACEMENT Bilateral     GREAT TOE    KIDNEY STONE SURGERY      LITHOTRIPSY AND STENT (R SIDE)    LAPAROSCOPIC TUBAL LIGATION  2005    NECK SURGERY  3/07    Not sure of dates    SIGMOIDOSCOPY N/A 01/18/2023    Procedure: SIGMOIDOSCOPY FLEXIBLE;  Surgeon: Ag Patel MD;  Location: [facility] ENDOSCOPY;  Service: Gastroenterology;  Laterality: N/A;  PRE OP - MAROON STOOLS  POST OP - RECTAL VARICES    TOTAL SHOULDER ARTHROPLASTY Left 05/09/2023    Procedure: reverse total shoulder arthroplasty;  Surgeon: Giovanni Denise MD;  [place] [facility] [place] [facility];  Service: Orthopedics;  Laterality: Left;       MEDICATIONS    Current Facility-Administered Medications:     amLODIPine (NORVASC) tablet 2.5 mg, 2.5 mg, Oral, Daily, Senia, Burt, DO, 2.5 mg at 07/24/24 0912    sennosides-docusate (PERICOLACE) 8.6-50 MG per tablet 2 tablet, 2 tablet, Oral, BID PRN **AND** polyethylene glycol (MIRALAX) packet 17 g, 17 g, Oral, Daily PRN **AND** bisacodyl (DULCOLAX) EC tablet 5 mg, 5 mg, Oral, Daily PRN **AND** bisacodyl (DULCOLAX) suppository 10 mg, 10 mg, Rectal, Daily PRN, Senia, Burt, DO    Calcium Replacement - Follow Nurse / BPA Driven Protocol, , Does not apply, PRN, Senia, Burt, DO    ferrous sulfate tablet 325 mg, 325 mg, Oral, Daily With Breakfast, Cavalier, Burt, DO, 325 mg at 07/24/24 0913    FLUoxetine (PROzac) capsule 60 mg, 60 mg, Oral, Daily, Cavalier, Burt, DO, 60 mg at 07/23/24 0900    folic acid (FOLVITE) tablet 1 mg, 1 mg, Oral, Daily, Senia, Burt, DO, 1 mg at 07/24/24 0912    HYDROmorphone (DILAUDID) injection 0.5 mg, 0.5 mg, Intravenous, Q4H PRN, Senia, Burt, DO, 0.5 mg at 07/24/24 0927    lactated ringers infusion, 100 mL/hr, Intravenous, Continuous, Burt Conn DO, Last Rate: 100 mL/hr at 07/24/24 0556, 100 mL/hr at 07/24/24 0556    [Held by provider]  lactulose (CHRONULAC) 10 GM/15ML solution 10 g, 10 g, Oral, QAM, Senia, Burt, DO, 10 g at 07/23/24 0625    levothyroxine (SYNTHROID, LEVOTHROID) tablet 100 mcg, 100 mcg, Oral, Q AM, Senia, Burt, DO, 100 mcg at 07/24/24 0420    LORazepam (ATIVAN) tablet 1 mg, 1 mg, Oral, Q1H PRN, 1 mg at 07/23/24 2046 **OR** LORazepam (ATIVAN) injection 1 mg, 1 mg, Intravenous, Q1H PRN **OR** LORazepam (ATIVAN) tablet 2 mg, 2 mg, Oral, Q1H PRN **OR** LORazepam (ATIVAN) injection 2 mg, 2 mg, Intravenous, Q1H PRN **OR** LORazepam (ATIVAN) injection 2 mg, 2 mg, Intravenous, Q15 Min PRN **OR** LORazepam (ATIVAN) injection 2 mg, 2 mg, Intramuscular, Q15 Min PRN, Burt Conn, DO    Magnesium Oxide -Mg Supplement tablet 400 mg, 400 mg, Oral, Daily, [place]ita, Burt, DO, 400 mg at 07/24/24 0913    Magnesium Standard Dose Replacement - Follow Nurse / BPA Driven Protocol, , Does not apply, PRN, Senia, Burt, DO    mirtazapine (REMERON) tablet 15 mg, 15 mg, Oral, Nightly, [place], Burt, DO, 15 mg at 07/23/24 2046    nadolol (CORGARD) tablet 40 mg, 40 mg, Oral, Daily, Senia, Burt, DO, 40 mg at 07/24/24 0912    nicotine (NICODERM CQ) 7 MG/24HR patch 1 patch, 1 patch, Transdermal, Q24H, Senia, Burt, DO, 1 patch at 07/24/24 0917    nitroglycerin (NITROSTAT) SL tablet 0.4 mg, 0.4 mg, Sublingual, Q5 Min PRN, Senia, Burt, DO    ondansetron ODT (ZOFRAN-ODT) disintegrating tablet 4 mg, 4 mg, Oral, Q6H PRN **OR** ondansetron (ZOFRAN) injection 4 mg, 4 mg, Intravenous, Q6H PRN, Burt Conn DO, 4 mg at 07/22/24 0944    pancrelipase (Lip-Prot-Amyl) (CREON) capsule 24,000 units of lipase, 24,000 units of lipase, Oral, TID With Meals, Burt Conn DO, 24,000 units of lipase at 07/23/24 1744    pantoprazole (PROTONIX) EC tablet 40 mg, 40 mg, Oral, Q AM, Burt Conn DO, 40 mg at 07/24/24 0420    Phosphorus Replacement - Follow Nurse / BPA Driven Protocol, , Does not apply, PRN, Burt Conn DO    piperacillin-tazobactam (ZOSYN) 3.375 g IVPB in 100 mL NS  "MBP (CD), 3.375 g, Intravenous, Q8H, Amy Segura, APRN, 3.375 g at 07/24/24 0556    potassium chloride (K-DUR,KLOR-CON) ER tablet 40 mEq, 40 mEq, Oral, Q4H, Burt Conn, DO    Potassium Replacement - Follow Nurse / BPA Driven Protocol, , Does not apply, PRN, Senia, Burt, DO    sodium chloride 0.9 % flush 10 mL, 10 mL, Intravenous, PRN, Cassandra Hong PA-C    thiamine (B-1) injection 200 mg, 200 mg, Intravenous, Q8H, 200 mg at 07/23/24 1521 **FOLLOWED BY** [START ON 7/27/2024] thiamine (VITAMIN B-1) tablet 100 mg, 100 mg, Oral, Daily, Burt Conn, DO    Facility-Administered Medications Ordered in Other Encounters:     sodium chloride 0.9 % flush 10 mL, 10 mL, Intravenous, Q12H, Callie Quiroz MD    sodium chloride 0.9 % flush 10 mL, 10 mL, Intravenous, PRN, Callie Quiroz MD    sodium chloride 0.9 % flush 20 mL, 20 mL, Intravenous, PRN, Callie Quiroz MD    ALLERGIES:     Allergies   Allergen Reactions    Benzonatate Nausea Only and Other (See Comments)     Rash     Ketorolac Tromethamine Other (See Comments)     \"I cannot take because of liver problems\"    Phenergan [Promethazine Hcl] Hives    Cucumber Extract Rash    Promethazine-Phenylephrine Other (See Comments)     \"FEEL WEIRD\"       SOCIAL HISTORY:       Social History     Socioeconomic History    Marital status:    Tobacco Use    Smoking status: Every Day     Current packs/day: 0.25     Average packs/day: 0.3 packs/day for 15.0 years (3.8 ttl pk-yrs)     Types: Cigarettes     Passive exposure: Current    Smokeless tobacco: Never    Tobacco comments:     Rarely smoke sometimes will to   Vaping Use    Vaping status: Never Used   Substance and Sexual Activity    Alcohol use: Not Currently     Alcohol/week: 5.0 - 10.0 standard drinks of alcohol     Types: 5 - 10 Shots of liquor per week     Comment: 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    Drug use: Not Currently    Sexual activity: Not Currently     Partners: " Male     Birth control/protection: Post-menopausal, Natural family planning/Rhythm     Comment: cinthia- menepausal         FAMILY HISTORY:  Family History   Problem Relation Age of Onset    Rheumatologic disease Mother         congestive heart diseased    Osteoporosis Mother             Thyroid disease Father     Leukemia Father     Cancer Father         Leukemia  deseased     Broken bones Father     Clotting disorder Father     Drug abuse Brother     Malig Hyperthermia Neg Hx        REVIEW OF SYSTEMS:  Review of Systems   Constitutional:  Negative for chills, diaphoresis and fever.   HENT:  Negative for nosebleeds, sore throat and trouble swallowing.    Respiratory:  Negative for cough, shortness of breath and wheezing.    Cardiovascular:  Negative for chest pain and leg swelling.   Gastrointestinal:  Positive for abdominal pain, nausea and vomiting. Negative for blood in stool.   Genitourinary:  Negative for hematuria.   Musculoskeletal:  Negative for joint swelling.   Skin:  Negative for color change and pallor.   Neurological:  Negative for syncope.   Hematological:  Negative for adenopathy. Bruises/bleeds easily.   Psychiatric/Behavioral:  Negative for confusion.               Vitals:    24 2041 24 0018 24 0500 24 0722   BP: 110/67 133/81  126/90   BP Location: Left arm Left arm  Left arm   Patient Position: Lying Lying  Lying   Pulse: 81 79  89   Resp: 18 18  18   Temp: 98.2 °F (36.8 °C) 98.1 °F (36.7 °C)  97.7 °F (36.5 °C)   TempSrc: Oral Oral  Oral   SpO2: 98% 100%     Weight:   66.1 kg (145 lb 11.6 oz)    Height:              No data to display               PHYSICAL EXAM:      CONSTITUTIONAL:  Vital signs reviewed.  Well-developed well-nourished female.  No distress, looks comfortable.  EYES:  Conjunctivae and lids unremarkable.  EARS,NOSE,MOUTH,THROAT:  Ears and nose appear unremarkable.  Lips appear unremarkable.  RESPIRATORY:  Normal respiratory effort.  Lungs clear to  auscultation bilaterally.  CARDIOVASCULAR:  Normal S1, S2.  No murmurs rubs or gallops.    GASTROINTESTINAL: Abdomen appears unremarkable.  Nontender.   LYMPHATIC:  No cervical, supraclavicular, axillary lymphadenopathy.  MUSCULOSKELETAL: No lower extremity edema.  No cyanosis or clubbing.  SKIN:  Warm.  No rashes.  1 small bruise to the right lower leg.        RECENT LABS:  CBC:      Lab 07/24/24  0756 07/23/24  1841 07/23/24  1259 07/23/24  0812 07/22/24  0355   WBC 2.57*  --  2.35* 2.51* 4.88   HEMOGLOBIN 13.5  --  13.8 13.9 15.7   HEMATOCRIT 39.0  --  40.7 42.2 46.5   PLATELETS 34* 36* 36* 37* 59*   NEUTROS ABS 1.34*  --  1.19* 1.28* 2.42   IMMATURE GRANS (ABS) 0.02  --  0.00 0.01 0.01   LYMPHS ABS 0.76  --  0.73 0.81 1.88   MONOS ABS 0.34  --  0.32 0.30 0.41   EOS ABS 0.09  --  0.10 0.09 0.13   MCV 90.7  --  91.7 93.0 91.9     CMP:        Lab 07/24/24  0755 07/23/24  1752 07/23/24  0812 07/22/24  0355   SODIUM 136  --  132* 141   POTASSIUM 3.6 3.9 3.2* 3.6   CHLORIDE 103  --  100 105   CO2 23.0  --  26.0 21.0*   ANION GAP 10.0  --  6.0 15.0   BUN 4*  --  3* 10   CREATININE 0.45*  --  0.39* 0.50*   EGFR 113.1  --  117.0 110.2   GLUCOSE 108*  --  108* 154*   CALCIUM 8.9  --  9.1 9.3   MAGNESIUM 1.7  --  2.2 1.5*   TOTAL PROTEIN 6.1  --  6.3 7.6   ALBUMIN 3.4*  --  3.7 4.5   GLOBULIN 2.7  --  2.6 3.1   ALT (SGPT) 49*  --  45* 58*   AST (SGOT) 101*  --  100* 118*   BILIRUBIN 1.6*  --  2.0* 0.9   ALK PHOS 138*  --  147* 188*   LIPASE  --   --   --  60         Assessment & Plan     ASSESSMENT:    1. Alcohol abuse with acute alcohol toxicity and nausea and vomiting. She was admitted for acute on chronic pancreatitis. Also, there is evidence of cholelithiasis. The patient is managed by primary team and started on IV antibiotics. Patient may need cholecystectomy in the future.     2. Alcohol abuse with alcohol level 193 at the time of admission. The patient had binge drinking prior to the admission.     3. Chronic  thrombocytopenia in the setting of chronic liver disease/cirrhosis and splenomegaly. Patient had worsening thrombocytopenia from baseline level around 50,000 down to the 30,000ish. However, the patient has no active bleeding, no epistaxis, gum bleeding. No evidence of hematuria or hematochezia. I recommended no platelets transfusion unless the patient has spontaneous bleeding or platelets drop less than 100,000 even without bleeding.     4. Urinary tract infection. The patient is symptomatic and urinalysis showed possible urinary tract infection. Blood culture requested and pending. Patient has been put on Zosyn considering she has mild neutropenia now and increased risk for infection. Management per primary team.     5. Leukocytopenia. I think this is also acute due to her illness. Currently, the patient is afebrile. I recommended observation for now. Her WBC is slightly better including neutrophil counts. There is no need to start on G-CSF growth factor.     6. Hypothyroidism managed per primary team.     7. Hypokalemia managed per primary team, normalization of potassium level today.     8. Hyponatremia also likely related to her nausea, vomiting and alcoholism. Sodium level has normalized, managed per primary team.     RECOMMENDATIONS:    1. From hematology point of view, currently there is no need/indication for platelet transfusion.   2. There is no need for G-CSF growth factor currently.   3. Continue to monitor CBC with differentiation.   4. Transfuse 1 unit platelets if platelet count is < 10,000 or she develops spontaneous bleeding such as gum bleeding, epistaxis, hematuria or hematochezia.   5. Continue supportive care.   6. Expecting platelets and WBC will gradually improve to baseline level.       Discussed with the patient at bedside.     We will sign off. Please call if have any questions.     I spoke with nursing staff today.      DALE CARABALLO M.D., Ph.D.

## 2024-07-24 NOTE — PAYOR COMM NOTE
"Ba Gibson (54 y.o. Female)        PLEASE SEE ATTACHED FOR INPT AUTH.   PT CAME IN ON 7/22 AS OBS AND CONVERTED TO INPT ON 7/24/24.    REF#[id number]    PLEASE CALL   OR  [phone]    THANK YOU    ARTIS SHAH LPN CCP   Date of Birth   1970    Social Security Number       Address   [address] [place] [address]    Home Phone   [phone]    MRN   [id number]       Hindu   Worship    Marital Status                               Admission Date   7/22/24  OBS   7/24/2024  INPT Admission Type   Emergency    Admitting Provider   Burt Conn DO    Attending Provider   Burt Conn DO    Department, Room/Bed   [address], S611/1       Discharge Date       Discharge Disposition       Discharge Destination                                 Attending Provider: Burt Conn DO    Allergies: Benzonatate, Ketorolac Tromethamine, Phenergan [Promethazine Hcl], Cucumber Extract, Promethazine-phenylephrine    Isolation: None   Infection: None   Code Status: CPR    Ht: 165.1 cm (65\")   Wt: 66.1 kg (145 lb 11.6 oz)    Admission Cmt: None   Principal Problem: Acute on chronic pancreatitis [K85.90,K86.1]                   Active Insurance as of 7/22/2024       Primary Coverage       Payor Plan Insurance Group Employer/Plan Group    ANTHEM MEDICAID ANTH MEDICAID [id number]       Payor Plan Address Payor Plan Phone Number Payor Plan Fax Number Effective Dates    [address] [address] [phone]  6/1/2018 - None Entered    [facility] [id number]         Subscriber Name Subscriber Birth Date Member ID       BA GIBSON 1970 [id number]                     Emergency Contacts        (Rel.) Home Phone Work Phone Mobile Phone    Jeff Gibson (Spouse) [phone] -- [phone]              [place]: [facility] [id number]  Tax ID [id number]     History & Physical        Burt Conn DO at 07/22/24 0732              Patient Name:  Ba HUDSON" "Arthur  YOB: 1970  MRN:  [id number]  Admit Date:  7/22/2024  Patient Care Team:  Tylor Davis as PCP - General (Family Medicine)  Deion Saldana MD as Consulting Physician (Urology)  Adalberto Hoffman MD as Consulting Physician (Gastroenterology)  Eliazar Quinonez MD (Hematology)      Subjective  History Present Illness     Chief Complaint   Patient presents with    Abdominal Pain       Ms. Gibson is a 54 y.o. smoker with a history of Chronic pancreatitis, ETOH use, Cirrhosis complicated by portal hypertension, esophageal varices and splenomegaly, hypothyroidism, neuropathy, vitamin D deficiency, hypertension that presents to [facility] complaining of abrupt onset epigastric abdominal pain that began ~1 day ago prior to arrival. Pain described as \"sharp and stabbing\", rated 7/10 in severity at present. Symptoms constant and progressive in nature since onset. Denies alleviating factors. She believes this was triggered by ETOH use, she states that she went to the beach over the weekend and drank a lot of alcohol, and symptoms began after that. She denies recollection of other possible inciting factors. Endorses associated nausea, intermittent non-bloody emesis, intermittent loose stools. Denies fever, chills, chest pain, palpitations, dyspnea, dizziness, lightheadedness or other acute complaints. She has history of ETOH use, off and on use per patient, last drink was on evening of 07/21, but she is unable to specify exact time. She is being admitted for further evaluation and management.     Personal History     Past Medical History:   Diagnosis Date    Acromioclavicular separation 1/2021    Ankle sprain 2/2004    no operation necessary  At Time unsure    Anxiety     Arthritis     Arthritis of back Unsure    Diseased    Cirrhosis     Disease of thyroid gland     ETOH abuse     SOBER FOR 3 MONTHS    Fracture, clavicle 1/2021    shattered clavicel on issue slip and fall    " Fracture, foot Unsure    Frozen shoulder     4    GERD (gastroesophageal reflux disease)     History of kidney stones     5 YR AGO    Hypertension     Left shoulder pain     Low back strain     Lumbosacral disc disease     MDD (major depressive disorder)     Neck strain Unsure    Pancreatitis     Periarthritis of shoulder see above    Rotator cuff syndrome odre for shoulder replacement    unable to receive due to low platelets    Tennis elbow Unsure    Unsurpassed    Thrombocytopenia      Past Surgical History:   Procedure Laterality Date    CLAVICLE SURGERY Right 2018    ENDOSCOPY N/A 10/26/2022    Procedure: ESOPHAGOGASTRODUODENOSCOPY wtih banding;  Surgeon: Ag Patel MD;  Location: [facility] ENDOSCOPY;  Service: Gastroenterology;  Laterality: N/A;  pre: melena  post: esophageal varicies with banding x2 bands    ENDOSCOPY N/A 2023    Procedure: ESOPHAGOGASTRODUODENOSCOPY;  Surgeon: Ag Patel MD;  Location: [facility] ENDOSCOPY;  Service: Gastroenterology;  Laterality: N/A;  PRE OP - MAROON STOOLS  POST OP - SM ESOPHAGEAL VARICES    ESOPHAGEAL VARICES LIGATION      FOOT SURGERY  12    JOINT REPLACEMENT Bilateral     GREAT TOE    KIDNEY STONE SURGERY      LITHOTRIPSY AND STENT (R SIDE)    LAPAROSCOPIC TUBAL LIGATION      NECK SURGERY  3/07    Not sure of dates    SIGMOIDOSCOPY N/A 2023    Procedure: SIGMOIDOSCOPY FLEXIBLE;  Surgeon: Ag Patel MD;  Location: [facility] ENDOSCOPY;  Service: Gastroenterology;  Laterality: N/A;  PRE OP - MAROON STOOLS  POST OP - RECTAL VARICES    TOTAL SHOULDER ARTHROPLASTY Left 2023    Procedure: reverse total shoulder arthroplasty;  Surgeon: Giovanni Denise MD;  [place] [facility] [place] [facility]  Service: Orthopedics;  Laterality: Left;     Family History   Problem Relation Age of Onset    Rheumatologic disease Mother         congestive heart diseased    Osteoporosis Mother             Thyroid disease Father     Leukemia Father     Cancer  Father         Leukemia  deseased 6/17    Broken bones Father     Clotting disorder Father     Drug abuse Brother     Malig Hyperthermia Neg Hx      Social History     Tobacco Use    Smoking status: Every Day     Current packs/day: 0.25     Average packs/day: 0.3 packs/day for 15.0 years (3.8 ttl pk-yrs)     Types: Cigarettes     Passive exposure: Current    Smokeless tobacco: Never    Tobacco comments:     Rarely smoke sometimes will to   Vaping Use    Vaping status: Never Used   Substance Use Topics    Alcohol use: Not Currently     Alcohol/week: 5.0 - 10.0 standard drinks of alcohol     Types: 5 - 10 Shots of liquor per week     Comment: 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    Drug use: Not Currently     Current Facility-Administered Medications on File Prior to Encounter   Medication Dose Route Frequency Provider Last Rate Last Admin    sodium chloride 0.9 % flush 10 mL  10 mL Intravenous Q12H Callie Quiroz MD        sodium chloride 0.9 % flush 10 mL  10 mL Intravenous PRN Callie uQiroz MD        sodium chloride 0.9 % flush 20 mL  20 mL Intravenous PRCallie Crawford MD         Current Outpatient Medications on File Prior to Encounter   Medication Sig Dispense Refill    amLODIPine (NORVASC) 2.5 MG tablet       ferrous sulfate 325 (65 FE) MG tablet Take 1 tablet by mouth Daily With Breakfast.      FLUoxetine (PROzac) 20 MG capsule Take 3 capsules by mouth Daily for 30 days. 90 capsule 0    folic acid (FOLVITE) 1 MG tablet Take 1 tablet by mouth Daily.      Ibuprofen 3 %, Gabapentin 10 %, Baclofen 2 %, lidocaine 4 % Apply 1-2 g topically to the appropriate area as directed 3 (Three) to 4 (Four) times daily. 90 g 5    lactulose (CHRONULAC) 10 GM/15ML solution       levothyroxine (SYNTHROID, LEVOTHROID) 100 MCG tablet TAKE ONE TABLET BY MOUTH DAILY 30 tablet 3    MAGnesium-Oxide 400 (240 Mg) MG tablet Daily. HOLD PRIOR TO SURG      mirtazapine (REMERON) 15 MG tablet Take 1 tablet by  "mouth Daily.      Multiple Vitamin (MULTIVITAMIN) capsule Take 1 capsule by mouth Daily. HOLD PRIOR TO SURG      ondansetron ODT (ZOFRAN-ODT) 4 MG disintegrating tablet Place 1 tablet on the tongue Every 8 (Eight) Hours As Needed for Nausea or Vomiting. 10 tablet 0    pancrelipase, Lip-Prot-Amyl, (CREON) 16039-61893 units capsule delayed-release particles capsule Take 2 capsules by mouth 3 (Three) Times a Day With Meals. 180 capsule 0    pantoprazole (PROTONIX) 40 MG EC tablet 1 tablet Daily.      thiamine (VITAMIN B1) 100 MG tablet Take 1 tablet by mouth Daily. 30 tablet 0    vitamin D (ERGOCALCIFEROL) 1.25 MG (39104 UT) capsule capsule Take 1 capsule by mouth Every 7 (Seven) Days.       Allergies   Allergen Reactions    Benzonatate Nausea Only and Other (See Comments)     Rash     Ketorolac Tromethamine Other (See Comments)     \"I cannot take because of liver problems\"    Phenergan [Promethazine Hcl] Hives    Cucumber Extract Rash    Promethazine-Phenylephrine Other (See Comments)     \"FEEL WEIRD\"       Objective   Objective     Vital Signs  Temp:  [99.2 °F (37.3 °C)] 99.2 °F (37.3 °C)  Heart Rate:  [] 96  Resp:  [18] 18  BP: (112-162)/(71-97) 162/97  SpO2:  [88 %-97 %] 97 %  on  Flow (L/min):  [2-3] 3;   Device (Oxygen Therapy): nasal cannula  Body mass index is 24.13 kg/m².    Physical Exam  Vitals and nursing note reviewed.   Constitutional:       Appearance: She is ill-appearing.   Cardiovascular:      Rate and Rhythm: Regular rhythm. Tachycardia present.      Pulses: Normal pulses.      Heart sounds: Normal heart sounds.   Pulmonary:      Effort: Pulmonary effort is normal. No respiratory distress.      Breath sounds: Normal breath sounds. No wheezing.   Abdominal:      General: There is no distension.      Palpations: Abdomen is soft.      Tenderness: There is abdominal tenderness.   Skin:     General: Skin is warm and dry.   Neurological:      General: No focal deficit present.      Mental Status: She " is alert and oriented to person, place, and time.   Psychiatric:         Mood and Affect: Mood is anxious.         Results Review:  I reviewed the patient's new clinical results.  I reviewed the patient's new imaging results and agree with the interpretation.  I reviewed the patient's other test results and agree with the interpretation  I personally viewed and interpreted the patient's EKG/Telemetry data  Discussed with ED provider.    Lab Results (last 24 hours)       Procedure Component Value Units Date/Time    CBC & Differential [[id number]]  (Abnormal) Collected: 07/22/24 0355    Specimen: Blood Updated: 07/22/24 0416    Narrative:      The following orders were created for panel order CBC & Differential.  Procedure                               Abnormality         Status                     ---------                               -----------         ------                     CBC Auto Differential[[id number]]        Abnormal            Final result                 Please view results for these tests on the individual orders.    Comprehensive Metabolic Panel [[id number]]  (Abnormal) Collected: 07/22/24 0355    Specimen: Blood Updated: 07/22/24 0422     Glucose 154 mg/dL      BUN 10 mg/dL      Creatinine 0.50 mg/dL      Sodium 141 mmol/L      Potassium 3.6 mmol/L      Chloride 105 mmol/L      CO2 21.0 mmol/L      Calcium 9.3 mg/dL      Total Protein 7.6 g/dL      Albumin 4.5 g/dL      ALT (SGPT) 58 U/L      AST (SGOT) 118 U/L      Alkaline Phosphatase 188 U/L      Total Bilirubin 0.9 mg/dL      Globulin 3.1 gm/dL      A/G Ratio 1.5 g/dL      BUN/Creatinine Ratio 20.0     Anion Gap 15.0 mmol/L      eGFR 110.2 mL/min/1.73     Narrative:      GFR Normal >60  Chronic Kidney Disease <60  Kidney Failure <15      Lipase [[id number]]  (Normal) Collected: 07/22/24 0355    Specimen: Blood Updated: 07/22/24 0422     Lipase 60 U/L     Urinalysis With Microscopic If Indicated (No Culture) - Urine, Clean Catch [[id number]]   (Abnormal) Collected: 07/22/24 0355    Specimen: Urine, Clean Catch Updated: 07/22/24 0432     Color, UA Yellow     Appearance, UA Clear     pH, UA 5.5     Specific Gravity, UA 1.022     Glucose, UA Negative     Ketones, UA Negative     Bilirubin, UA Negative     Blood, UA Negative     Protein, UA Negative     Leuk Esterase, UA Small (1+)     Nitrite, UA Negative     Urobilinogen, UA 0.2 E.U./dL    Lactic Acid, Plasma [[id number]]  (Abnormal) Collected: 07/22/24 0355    Specimen: Blood Updated: 07/22/24 0426     Lactate 2.8 mmol/L     Ethanol [[id number]]  (Abnormal) Collected: 07/22/24 0355    Specimen: Blood Updated: 07/22/24 0422     Ethanol 182 mg/dL      Ethanol % 0.182 %     Magnesium [[id number]]  (Abnormal) Collected: 07/22/24 0355    Specimen: Blood Updated: 07/22/24 0422     Magnesium 1.5 mg/dL     CBC Auto Differential [[id number]]  (Abnormal) Collected: 07/22/24 0355    Specimen: Blood Updated: 07/22/24 0416     WBC 4.88 10*3/mm3      RBC 5.06 10*6/mm3      Hemoglobin 15.7 g/dL      Hematocrit 46.5 %      MCV 91.9 fL      MCH 31.0 pg      MCHC 33.8 g/dL      RDW 13.9 %      RDW-SD 46.2 fl      MPV 11.0 fL      Platelets 59 10*3/mm3      Neutrophil % 49.6 %      Lymphocyte % 38.5 %      Monocyte % 8.4 %      Eosinophil % 2.7 %      Basophil % 0.6 %      Immature Grans % 0.2 %      Neutrophils, Absolute 2.42 10*3/mm3      Lymphocytes, Absolute 1.88 10*3/mm3      Monocytes, Absolute 0.41 10*3/mm3      Eosinophils, Absolute 0.13 10*3/mm3      Basophils, Absolute 0.03 10*3/mm3      Immature Grans, Absolute 0.01 10*3/mm3      nRBC 0.0 /100 WBC     Urinalysis, Microscopic Only - Urine, Clean Catch [[id number]]  (Abnormal) Collected: 07/22/24 0355    Specimen: Urine, Clean Catch Updated: 07/22/24 0432     RBC, UA 0-2 /HPF      WBC, UA 11-20 /HPF      Bacteria, UA None Seen /HPF      Squamous Epithelial Cells, UA 3-6 /HPF      Hyaline Casts, UA 0-2 /LPF      Methodology Automated Microscopy    Ammonia [[id number]]   (Normal) Collected: 07/22/24 0430    Specimen: Blood Updated: 07/22/24 0509     Ammonia 39 umol/L             Imaging Results (Last 24 Hours)       Procedure Component Value Units Date/Time    CT Abdomen Pelvis With Contrast [[id number]] Collected: 07/22/24 0611     Updated: 07/22/24 0650    Narrative:      CT ABDOMEN PELVIS W CONTRAST-     HISTORY: 54 years of age, Female. Upper abdominal pain     TECHNIQUE:  CT includes axial imaging from the lung bases to the  trochanters with intravenous contrast and with use of oral contrast.  Data reconstructed in coronal and sagittal planes. Radiation dose  reduction techniques were utilized, including automated exposure control  and exposure modulation based on body size.     COMPARISON: CT abdomen pelvis 07/03/2024, 04/01/2024, 10/07/2023.     FINDINGS: Lung bases appear clear. Hepatic steatosis with cirrhotic  morphology. Small gallstones layer dependently within the gallbladder.  Evidence of portal venous hypertension with gastric and esophageal  varices and splenomegaly. Recanalization of the portal vein.     Moderate stenosis of the celiac artery origin with poststenotic  dilatation. Adrenal glands, kidneys appear within normal limits.  Extensive pancreatic calcifications consistent with chronic  pancreatitis. There is mild stranding adjacent to the pancreatic head  that appears slightly increased compared to the prior exam 19 days ago  suspicious for mild acute on chronic pancreatitis. No pancreatic ductal  dilatation.     No evidence for bowel obstruction. No ascites or evidence for  intra-abdominal abscess formation. No evidence for appendicitis.       Impression:      1. Subtle stranding adjacent to the pancreatic head, mildly increased  compared to exam 07/03/2024, suspicious for acute on chronic  pancreatitis.  2. Hepatic cirrhotic nephrology with hepatic steatosis. Portal venous  hypertension with gastroesophageal varices, splenomegaly.  3. Cholelithiasis.      Radiation dose reduction techniques were utilized, including automated  exposure control and exposure modulation based on body size.        This report was finalized on 7/22/2024 6:47 AM by Dr. Anson Reynolds M.D on Workstation: [id number]                   No orders to display        Assessment/Plan     Active Hospital Problems    Diagnosis  POA    **Acute on chronic pancreatitis [K85.90, K86.1]  Yes    Transaminitis [R74.01]  Yes    Cholelithiasis [K80.20]  Yes    Alcohol use [Z78.9]  Yes    Esophageal varices [I85.00]  Yes    Primary hypertension [I10]  Yes    Alcoholic cirrhosis [K70.30]  Yes    Hypomagnesemia [E83.42]  Yes    Hashimoto's disease [E06.3]  Yes    Pancreatic insufficiency [K86.89]  Yes    Hypothyroidism [E03.9]  Yes    Vitamin D deficiency [E55.9]  Yes    Peripheral neuropathy [G62.9]  Yes      Resolved Hospital Problems   No resolved problems to display.     Ms. Gibson is a 54 y.o. smoker with a history of Chronic pancreatitis, ETOH use, Cirrhosis complicated by portal hypertension, esophageal varices and splenomegaly, hypothyroidism, neuropathy, vitamin D deficiency, hypertension that presents to [facility] complaining of abrupt onset epigastric abdominal pain that began ~1 day ago prior to arrival.     Acute on chronic pancreatitis  Nausea with vomiting  - Patient with complaints of epigastric abdominal pain. Imaging obtained and showed findings consistent with acute on chronic pancreatitis. Lipase normal, but meets 2/3 diagnostic criteria. She does have cholelithiasis but no mention of cholecystitis. Suspect that etiology of acute flare is secondary to ETOH, given acute use with onset of symptoms shortly thereafter, rather than biliary disease, but can consider surgery consult in the future if indicated to get their impression.  - IVF, PRN medications for symptom control, advance diet as tolerated, will start with clear liquids. Continue home medication for chronic  pancreatic insufficiency.    Transaminitis  - LFT elevated on most recent labs, likely 2/2 ETOH, pancreatitis. CT also showing cholelithiasis but no mention of findings concerning for cholecystitis or biliary ductal dilatation. No indications to warrant acute intervention for now, however, will continue to closely monitor. If no improvement and/or worsens, can consider further workup as indicated.  - Order repeat CMP in AM for reassessment.    Alcohol use  - ETOH level on admission: 193  - CIWA protocol, MV, thiamine, folic acid  - Telemetry, pulse oximetry, seizure precautions, aspiration precautions  - Consult Access center. Will follow their plans/recommendations. Greatly appreciate their help.    Cirrhosis complicated by portal hypertension, esophageal varices and splenomegaly  - No evidence of acute decompensation, continue treatments as prescribed. Closely monitor.    Hypomagnesemia  - Mag low on most recent labs; Will replete via electrolyte protocol. Follow up repeat labs to guide ongoing management decisions. Replete PRN per protocol. Continue to monitor    Hypertension  - Blood pressure appears stable and acceptable acutely at this time. No indications to warrant acute changes/intervention at this time.  - Continue current medications as prescribed. Trend BP to guide ongoing management decisions.    Hypothyroidism  - Continue levothyroxine as prescribed.      I discussed the patient's findings and my recommendations with patient, nursing staff, and ED provider.    VTE Prophylaxis - SCDs.  Code Status - Full code.       Burt Conn DO  [facility]  07/22/24  07:32 EDT      Electronically signed by Burt Conn DO at 07/22/24 1244          Emergency Department Notes        Chely Barton RN at 07/22/24 1134          Attempted to call report; on hold 4+min   Will try to call again in a few.    Electronically signed by Chely Barton RN at 07/22/24 0691       Gladis  Jessica ROSAS RN at 07/22/24 1031              Mukesh Andujar MD at 07/22/24 0436           EMERGENCY DEPARTMENT MD ATTESTATION NOTE    Room Number:  02/02  PCP: Tylor Davis  Independent Historians: Patient    HPI:  A complete HPI/ROS/PMH/PSH/SH/FH are unobtainable due to: None    Chronic or social conditions impacting patient care (Social Determinants of Health): None      Context: Bekah Gibson is a 54 y.o. female with a medical history of hypertension, cirrhosis, GERD, arthritis, pancreatitis and alcohol abuse who presents to the ED c/o acute abdominal pain with diarrhea and nausea symptoms has been worsening for the past 24 hours.  Patient says her last alcoholic beverage was yesterday afternoon sometime.  She says she is experiencing severe pain in the central abdomen that radiates off towards the right side.  She denies any bright red blood per rectum.  She tried taking some Pepto-Bismol at home but it has brought no relief so far.        Review of prior external notes (non-ED) -and- Review of prior external test results outside of this encounter: I independently reviewed the hospital discharge summary from July 5, 2024 when patient was admitted for acute pancreatitis and alcoholic gastritis as well as concerns about alcohol withdrawal.    Prescription drug monitoring program review: FIORELLA reviewed by Kalpesh Trujillo MD, Mukesh Andujar MD, Burt Conn DO   N/A and FIORELLA query complete and reviewed. Patient receives regular prescriptions for controlled substances.      PHYSICAL EXAM    I have reviewed the triage vital signs and nursing notes.    ED Triage Vitals   Temp Heart Rate Resp BP SpO2   07/22/24 0336 07/22/24 0336 07/22/24 0336 07/22/24 0341 07/22/24 0336   99.2 °F (37.3 °C) 114 18 146/95 95 %      Temp src Heart Rate Source Patient Position BP Location FiO2 (%)   07/22/24 0336 -- 07/22/24 0341 07/22/24 0341 --   Tympanic  Sitting Right arm        Physical Exam  GENERAL: alert, appears  uncomfortable with her pain but overall nontoxic-appearing  SKIN: Warm, dry, no rashes  HENT: Normocephalic, atraumatic  EYES: no scleral icterus  CV: regular rhythm, regular rate  RESPIRATORY: normal effort, lungs clear  ABDOMEN: soft, nondistended, mild to moderate tenderness in the central abdomen, epigastric region, and bilateral lower quadrants rather equally.  No peritoneal features elicited.  MUSCULOSKELETAL: no deformity, no asymmetry to lower extremities  NEURO: alert, moves all extremities, follows commands        MEDICATIONS GIVEN IN ER  Medications   sodium chloride 0.9 % flush 10 mL (has no administration in time range)   lactated ringers bolus 1,000 mL (0 mL Intravenous Stopped 7/22/24 0515)   ondansetron (ZOFRAN) injection 4 mg (4 mg Intravenous Given 7/22/24 0411)   famotidine (PEPCID) injection 20 mg (20 mg Intravenous Given 7/22/24 0413)   morphine injection 4 mg (4 mg Intravenous Given 7/22/24 0414)   magnesium sulfate 2g/50 mL (PREMIX) infusion (0 g Intravenous Stopped 7/22/24 0538)   thiamine (B-1) 500 mg in sodium chloride 0.9 % 100 mL IVPB (0 mg Intravenous Stopped 7/22/24 0620)   folic acid (FOLVITE) tablet 1 mg (1 mg Oral Given 7/22/24 0527)   LORazepam (ATIVAN) injection 1 mg (1 mg Intravenous Given 7/22/24 0518)   iopamidol (ISOVUE-300) 61 % injection 100 mL (85 mL Intravenous Given 7/22/24 0554)         ORDERS PLACED DURING THIS VISIT:  Orders Placed This Encounter   Procedures    CT Abdomen Pelvis With Contrast    [place] Draw    Comprehensive Metabolic Panel    Lipase    Urinalysis With Microscopic If Indicated (No Culture) - Urine, Clean Catch    Lactic Acid, Plasma    Ethanol    Magnesium    CBC Auto Differential    Ammonia    STAT Lactic Acid, Reflex    Urinalysis, Microscopic Only - Urine, Clean Catch    [facility] (on-call MD unless specified) Details    Insert Peripheral IV    Initiate Observation Status    CBC & Differential    Green Top (Gel)    Lavender Top    Gold Top - SST    Light  Blue Top         PROCEDURES  Procedures            PROGRESS, DATA ANALYSIS, CONSULTS, AND MEDICAL DECISION MAKING  All labs have been independently interpreted by me.  All radiology studies have been reviewed by me. All EKG's have been independently viewed and interpreted by me.  Discussion below represents my analysis of pertinent findings related to patient's condition, differential diagnosis, treatment plan and final disposition.    Differential diagnosis includes but is not limited to acute on chronic pancreatitis, bowel obstruction, hepatitis, diverticulitis, appendicitis, UTI, pyelonephritis, alcoholic gastritis.    Clinical Scores:                   ED Course as of 07/22/24 0714   Mon Jul 22, 2024   0347 I discussed the case with Dr. Andujar and he agrees to evaluate the patient at the bedside.    [CC]   0416 WBC: 4.88 [CC]   0418 Platelets(!): 59  chronic [CC]   0426 Lactate(!!): 2.8 [CC]   0427 Magnesium(!): 1.5 [CC]   0522 Ethanol(!): 182 [ERIC]   0638 I independently interpreted the abdomen CT study and my findings are: No free air, no small bowel obstruction pattern   [ERIC]   0638 I discussed with Dr. Trujillo from [facility] about this patient.  He agrees to accept her to the hospitalist service for further medical management today. [ERIC]      ED Course User Index  [CC] Cassandra Hong, PA-ANDREZ  [ERIC] Mukesh Andujar MD       MDM:   I independently interpreted the abdomen CT study and my findings are: No free air, no small bowel obstruction pattern      I have reviewed the labs and CT report from today's ED workup.  There are radiographic changes on the CT scan to suggest acute on chronic pancreatitis problem.  This is probably precipitated by her recent EtOH use.  Thankfully, the white blood cell count is normal and her heart rate has improved with IV fluids here.  There is no sign of alcohol withdrawal at this time but certainly she will be at risk for alcohol withdrawal symptoms over the next 24 to 48 hours if she  remains NPO.  Will need to admit her to the hospital today for further supportive care.  She is agreeable with this plan.  Paging hospitalist service for continued care needs today.    COMPLEXITY OF CARE  The patient requires admission.    Please note that portions of this document were completed with a voice recognition program.    Note Disclaimer: At [facility], we believe that sharing information builds trust and better relationships. You are receiving this note because you recently visited [facility]. It is possible you will see health information before a provider has talked with you about it. This kind of information can be easy to misunderstand. To help you fully understand what it means for your health, we urge you to discuss this note with your provider.         Mukesh Andujar MD  07/22/24 0714      Electronically signed by Mukesh Andujar MD at 07/22/24 0714       Cassandra Hong PA-C at 07/22/24 0350       Attestation signed by Mukesh Andujar MD at 07/23/24 0857        SHARED APC FACE TO FACE: I performed a substantive part of the MDM during the patient's E/M visit. I personally evaluated and examined the patient. I personally made or approved the documented management plan and acknowledge its risk of complications.   Mukesh Andujar MD 7/23/2024 08:57 EDT                          EMERGENCY DEPARTMENT ENCOUNTER  Room Number:  S611/1  PCP: Tylor Davis  Independent Historians: Patient      HPI:  Chief Complaint: had concerns including Abdominal Pain.     A complete HPI/ROS/PMH/PSH/SH/FH are unobtainable due to: None    Chronic or social conditions impacting patient care (Social Determinants of Health): None      Context: Bekah Gibson is a 54 y.o. female with a medical history of alcoholic hepatitis, chronic pancreatitis, alcoholism, esophageal varices, and IBS presents emergency department complaining of upper abdominal pain receiving ongoing for the last 3 weeks.  Patient has been  admitted at both this hospital and at [facility] within in the last few weeks for the same abdominal pain.  She says she has a history of gallstones but denies a history of intra-abdominal surgeries.  She reports that she has had pancreatitis in the past but this pain feels different.  She says she has not had an EGD in quite a while.  She is drinking alcohol daily with her last drink yesterday.  She denies nausea, vomiting, or diarrhea worse than normal.  She reports that she has chronic diarrhea due to taking lactulose.  She denies fever or chills.  She denies chest pain or shortness of breath.  She denies urinary symptoms.      Review of prior external notes (non-ED) -and- Review of prior external test results outside of this encounter:    Patient was admitted to the hospital here on 7/3/2024 to 7/5/2024 for acute pancreatitis and alcoholic gastritis without hemorrhage.  She had some mild alcoholic hepatitis but LFTs were improving.    Patient was seen in the ED at [facility] on 7/16/2024.  She had a CT at that time which showed cirrhotic changes and portal hypertension including varices as well as cholelithiasis.  AST was 161, ALT 77    PAST MEDICAL HISTORY  Active Ambulatory Problems     Diagnosis Date Noted    Irritable bowel syndrome with both constipation and diarrhea 06/05/2017    Peripheral neuropathy 06/05/2017    Vitamin D deficiency 06/05/2017    History of HPV infection 06/05/2017    Hypothyroidism 06/05/2017    Tobacco dependence due to cigarettes 06/05/2017    Acute kidney injury 12/28/2015    Ascites 06/06/2016    E. coli UTI (urinary tract infection) 06/06/2016    Alcoholic hepatitis 06/29/2018    GI bleed 06/29/2018    Acute post-hemorrhagic anemia 06/29/2018    Thrombocytopenia 06/29/2018    Open wound of right shoulder region 06/29/2018    Pancreatic insufficiency 07/04/2018    Alcoholic ketoacidosis 07/21/2019    Chronic alcohol abuse 07/29/2019    ESBL (extended spectrum  beta-lactamase) producing bacteria infection 07/29/2019    Abnormal weight loss 12/13/2019    Hashimoto's disease 12/13/2019    Chronic fatigue 12/14/2019    History of alcohol use disorder 09/21/2021    Alcohol intoxication 09/21/2021    Lupus     Hypomagnesemia     Pancytopenia     Alcoholic cirrhosis     Bilateral lower abdominal discomfort 09/21/2021    Primary hypertension 10/24/2022    Melena 10/24/2022    Arthritis of shoulder 12/19/2022    Esophageal varices 01/16/2023    Essential hypertension 01/16/2023    Alcohol-induced chronic pancreatitis 01/22/2023    Abdominal pain 01/22/2023    Pancreatitis 10/07/2023    Leukopenia 10/07/2023    Epigastric pain 04/01/2024    Acute alcoholic gastritis without hemorrhage 04/01/2024    Acute pancreatitis 07/03/2024    Alcohol withdrawal 07/03/2024     Resolved Ambulatory Problems     Diagnosis Date Noted    Acute renal failure 02/20/2017    Kidney stone 06/05/2017    Alcohol withdrawal syndrome, with delirium 06/29/2018    Hypotension 07/01/2018    Nausea and vomiting 09/21/2021    Acute GI bleeding 10/24/2022     Past Medical History:   Diagnosis Date    Acromioclavicular separation 1/2021    Ankle sprain 2/2004    Anxiety     Arthritis     Arthritis of back Unsure    Cirrhosis     Disease of thyroid gland     ETOH abuse     Fracture, clavicle 1/2021    Fracture, foot Unsure    Frozen shoulder 1 /2009    GERD (gastroesophageal reflux disease)     History of kidney stones     Hypertension     Left shoulder pain     Low back strain 1/21    Lumbosacral disc disease 1/21    MDD (major depressive disorder)     Neck strain Unsure    Periarthritis of shoulder see above    Rotator cuff syndrome odre for shoulder replacement    Tennis elbow Unsure         PAST SURGICAL HISTORY  Past Surgical History:   Procedure Laterality Date    CLAVICLE SURGERY Right 2018    ENDOSCOPY N/A 10/26/2022    Procedure: ESOPHAGOGASTRODUODENOSCOPY wtih banding;  Surgeon: Ag Patel MD;   [facility];  Service: Gastroenterology;  Laterality: N/A;  pre: melena  post: esophageal varicies with banding x2 bands    ENDOSCOPY N/A 2023    Procedure: ESOPHAGOGASTRODUODENOSCOPY;  Surgeon: Ag Patel MD;  [facility];  Service: Gastroenterology;  Laterality: N/A;  PRE OP - MAROON STOOLS  POST OP - SM ESOPHAGEAL VARICES    ESOPHAGEAL VARICES LIGATION      FOOT SURGERY  12    JOINT REPLACEMENT Bilateral     GREAT TOE    KIDNEY STONE SURGERY      LITHOTRIPSY AND STENT (R SIDE)    LAPAROSCOPIC TUBAL LIGATION      NECK SURGERY  3/07    Not sure of dates    SIGMOIDOSCOPY N/A 2023    Procedure: SIGMOIDOSCOPY FLEXIBLE;  Surgeon: Ag Patel MD;  [facility];  Service: Gastroenterology;  Laterality: N/A;  PRE OP - MAROON STOOLS  POST OP - RECTAL VARICES    TOTAL SHOULDER ARTHROPLASTY Left 2023    Procedure: reverse total shoulder arthroplasty;  Surgeon: Giovanni Denise MD;  Location[facility];  Service: Orthopedics;  Laterality: Left;         FAMILY HISTORY  Family History   Problem Relation Age of Onset    Rheumatologic disease Mother         congestive heart diseased    Osteoporosis Mother             Thyroid disease Father     Leukemia Father     Cancer Father         Leukemia  deseased     Broken bones Father     Clotting disorder Father     Drug abuse Brother     Malig Hyperthermia Neg Hx          SOCIAL HISTORY  Social History     Socioeconomic History    Marital status:    Tobacco Use    Smoking status: Every Day     Current packs/day: 0.25     Average packs/day: 0.3 packs/day for 15.0 years (3.8 ttl pk-yrs)     Types: Cigarettes     Passive exposure: Current    Smokeless tobacco: Never    Tobacco comments:     Rarely smoke sometimes will to   Vaping Use    Vaping status: Never Used   Substance and Sexual Activity    Alcohol use: Not Currently     Alcohol/week: 5.0 - 10.0 standard drinks of alcohol     Types:  5 - 10 Shots of liquor per week     Comment: 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    Drug use: Not Currently    Sexual activity: Not Currently     Partners: Male     Birth control/protection: Post-menopausal, Natural family planning/Rhythm     Comment: cinthia- menepausal         ALLERGIES  Benzonatate, Ketorolac tromethamine, Phenergan [promethazine hcl], Cucumber extract, and Promethazine-phenylephrine      REVIEW OF SYSTEMS  Included in HPI  All systems reviewed and negative except for those discussed in HPI.      PHYSICAL EXAM    I have reviewed the triage vital signs and nursing notes.    ED Triage Vitals   Temp Heart Rate Resp BP SpO2   07/22/24 0336 07/22/24 0336 07/22/24 0336 07/22/24 0341 07/22/24 0336   99.2 °F (37.3 °C) 114 18 146/95 95 %      Temp src Heart Rate Source Patient Position BP Location FiO2 (%)   07/22/24 0336 -- 07/22/24 0341 07/22/24 0341 --   Tympanic  Sitting Right arm        Physical Exam  Constitutional:       General: She is not in acute distress.     Appearance: Normal appearance.   HENT:      Head: Normocephalic and atraumatic.      Nose: Nose normal.      Mouth/Throat:      Mouth: Mucous membranes are moist.   Eyes:      Extraocular Movements: Extraocular movements intact.      Pupils: Pupils are equal, round, and reactive to light.   Cardiovascular:      Rate and Rhythm: Normal rate and regular rhythm.      Pulses: Normal pulses.      Heart sounds: Normal heart sounds.   Pulmonary:      Effort: Pulmonary effort is normal. No respiratory distress.      Breath sounds: Normal breath sounds.   Abdominal:      General: Abdomen is flat. There is no distension.      Palpations: Abdomen is soft.      Tenderness: There is abdominal tenderness in the right upper quadrant and epigastric area.   Musculoskeletal:         General: Normal range of motion.      Cervical back: Normal range of motion and neck supple.   Skin:     General: Skin is warm and dry.      Capillary Refill: Capillary refill takes less than 2  seconds.   Neurological:      General: No focal deficit present.      Mental Status: She is alert and oriented to person, place, and time.   Psychiatric:         Mood and Affect: Mood normal.         Behavior: Behavior normal.         LAB RESULTS  Recent Results (from the past 24 hour(s))   Comprehensive Metabolic Panel    Collection Time: 07/22/24  3:55 AM    Specimen: Blood   Result Value Ref Range    Glucose 154 (H) 65 - 99 mg/dL    BUN 10 6 - 20 mg/dL    Creatinine 0.50 (L) 0.57 - 1.00 mg/dL    Sodium 141 136 - 145 mmol/L    Potassium 3.6 3.5 - 5.2 mmol/L    Chloride 105 98 - 107 mmol/L    CO2 21.0 (L) 22.0 - 29.0 mmol/L    Calcium 9.3 8.6 - 10.5 mg/dL    Total Protein 7.6 6.0 - 8.5 g/dL    Albumin 4.5 3.5 - 5.2 g/dL    ALT (SGPT) 58 (H) 1 - 33 U/L    AST (SGOT) 118 (H) 1 - 32 U/L    Alkaline Phosphatase 188 (H) 39 - 117 U/L    Total Bilirubin 0.9 0.0 - 1.2 mg/dL    Globulin 3.1 gm/dL    A/G Ratio 1.5 g/dL    BUN/Creatinine Ratio 20.0 7.0 - 25.0    Anion Gap 15.0 5.0 - 15.0 mmol/L    eGFR 110.2 >60.0 mL/min/1.73   Lipase    Collection Time: 07/22/24  3:55 AM    Specimen: Blood   Result Value Ref Range    Lipase 60 13 - 60 U/L   Urinalysis With Microscopic If Indicated (No Culture) - Urine, Clean Catch    Collection Time: 07/22/24  3:55 AM    Specimen: Urine, Clean Catch   Result Value Ref Range    Color, UA Yellow Yellow, Straw    Appearance, UA Clear Clear    pH, UA 5.5 5.0 - 8.0    Specific Gravity, UA 1.022 1.005 - 1.030    Glucose, UA Negative Negative    Ketones, UA Negative Negative    Bilirubin, UA Negative Negative    Blood, UA Negative Negative    Protein, UA Negative Negative    Leuk Esterase, UA Small (1+) (A) Negative    Nitrite, UA Negative Negative    Urobilinogen, UA 0.2 E.U./dL 0.2 - 1.0 E.U./dL   Lactic Acid, Plasma    Collection Time: 07/22/24  3:55 AM    Specimen: Blood   Result Value Ref Range    Lactate 2.8 (C) 0.5 - 2.0 mmol/L   Ethanol    Collection Time: 07/22/24  3:55 AM    Specimen:  Blood   Result Value Ref Range    Ethanol 182 (H) 0 - 10 mg/dL    Ethanol % 0.182 %   Magnesium    Collection Time: 07/22/24  3:55 AM    Specimen: Blood   Result Value Ref Range    Magnesium 1.5 (L) 1.6 - 2.6 mg/dL   Green Top (Gel)    Collection Time: 07/22/24  3:55 AM   Result Value Ref Range    Extra Tube Hold for add-ons.    Lavender Top    Collection Time: 07/22/24  3:55 AM   Result Value Ref Range    Extra Tube hold for add-on    Gold Top - SST    Collection Time: 07/22/24  3:55 AM   Result Value Ref Range    Extra Tube Hold for add-ons.    Light Blue Top    Collection Time: 07/22/24  3:55 AM   Result Value Ref Range    Extra Tube Hold for add-ons.    CBC Auto Differential    Collection Time: 07/22/24  3:55 AM    Specimen: Blood   Result Value Ref Range    WBC 4.88 3.40 - 10.80 10*3/mm3    RBC 5.06 3.77 - 5.28 10*6/mm3    Hemoglobin 15.7 12.0 - 15.9 g/dL    Hematocrit 46.5 34.0 - 46.6 %    MCV 91.9 79.0 - 97.0 fL    MCH 31.0 26.6 - 33.0 pg    MCHC 33.8 31.5 - 35.7 g/dL    RDW 13.9 12.3 - 15.4 %    RDW-SD 46.2 37.0 - 54.0 fl    MPV 11.0 6.0 - 12.0 fL    Platelets 59 (L) 140 - 450 10*3/mm3    Neutrophil % 49.6 42.7 - 76.0 %    Lymphocyte % 38.5 19.6 - 45.3 %    Monocyte % 8.4 5.0 - 12.0 %    Eosinophil % 2.7 0.3 - 6.2 %    Basophil % 0.6 0.0 - 1.5 %    Immature Grans % 0.2 0.0 - 0.5 %    Neutrophils, Absolute 2.42 1.70 - 7.00 10*3/mm3    Lymphocytes, Absolute 1.88 0.70 - 3.10 10*3/mm3    Monocytes, Absolute 0.41 0.10 - 0.90 10*3/mm3    Eosinophils, Absolute 0.13 0.00 - 0.40 10*3/mm3    Basophils, Absolute 0.03 0.00 - 0.20 10*3/mm3    Immature Grans, Absolute 0.01 0.00 - 0.05 10*3/mm3    nRBC 0.0 0.0 - 0.2 /100 WBC   Urinalysis, Microscopic Only - Urine, Clean Catch    Collection Time: 07/22/24  3:55 AM    Specimen: Urine, Clean Catch   Result Value Ref Range    RBC, UA 0-2 None Seen, 0-2 /HPF    WBC, UA 11-20 (A) None Seen, 0-2 /HPF    Bacteria, UA None Seen None Seen /HPF    Squamous Epithelial Cells, UA 3-6  (A) None Seen, 0-2 /HPF    Hyaline Casts, UA 0-2 None Seen /LPF    Methodology Automated Microscopy    Ammonia    Collection Time: 07/22/24  4:30 AM    Specimen: Blood   Result Value Ref Range    Ammonia 39 11 - 51 umol/L   STAT Lactic Acid, Reflex    Collection Time: 07/22/24  9:18 AM    Specimen: Blood   Result Value Ref Range    Lactate 2.5 (C) 0.5 - 2.0 mmol/L   STAT Lactic Acid, Reflex    Collection Time: 07/22/24 12:14 PM    Specimen: Blood   Result Value Ref Range    Lactate 1.3 0.5 - 2.0 mmol/L         RADIOLOGY  CT Abdomen Pelvis With Contrast    Result Date: 7/22/2024  CT ABDOMEN PELVIS W CONTRAST-  HISTORY: 54 years of age, Female. Upper abdominal pain  TECHNIQUE:  CT includes axial imaging from the lung bases to the trochanters with intravenous contrast and with use of oral contrast. Data reconstructed in coronal and sagittal planes. Radiation dose reduction techniques were utilized, including automated exposure control and exposure modulation based on body size.  COMPARISON: CT abdomen pelvis 07/03/2024, 04/01/2024, 10/07/2023.  FINDINGS: Lung bases appear clear. Hepatic steatosis with cirrhotic morphology. Small gallstones layer dependently within the gallbladder. Evidence of portal venous hypertension with gastric and esophageal varices and splenomegaly. Recanalization of the portal vein.  Moderate stenosis of the celiac artery origin with poststenotic dilatation. Adrenal glands, kidneys appear within normal limits. Extensive pancreatic calcifications consistent with chronic pancreatitis. There is mild stranding adjacent to the pancreatic head that appears slightly increased compared to the prior exam 19 days ago suspicious for mild acute on chronic pancreatitis. No pancreatic ductal dilatation.  No evidence for bowel obstruction. No ascites or evidence for intra-abdominal abscess formation. No evidence for appendicitis.      1. Subtle stranding adjacent to the pancreatic head, mildly increased  compared to exam 07/03/2024, suspicious for acute on chronic pancreatitis. 2. Hepatic cirrhotic nephrology with hepatic steatosis. Portal venous hypertension with gastroesophageal varices, splenomegaly. 3. Cholelithiasis.  Radiation dose reduction techniques were utilized, including automated exposure control and exposure modulation based on body size.   This report was finalized on 7/22/2024 6:47 AM by Dr. Anson Reynolds M.D on Workstation: [id number]         MEDICATIONS GIVEN IN ER  Medications   sodium chloride 0.9 % flush 10 mL (has no administration in time range)   lactated ringers infusion (150 mL/hr Intravenous New Bag 7/22/24 1903)   nitroglycerin (NITROSTAT) SL tablet 0.4 mg (has no administration in time range)   sennosides-docusate (PERICOLACE) 8.6-50 MG per tablet 2 tablet (has no administration in time range)     And   polyethylene glycol (MIRALAX) packet 17 g (has no administration in time range)     And   bisacodyl (DULCOLAX) EC tablet 5 mg (has no administration in time range)     And   bisacodyl (DULCOLAX) suppository 10 mg (has no administration in time range)   ondansetron ODT (ZOFRAN-ODT) disintegrating tablet 4 mg ( Oral Not Given:  See Alt 7/22/24 0944)     Or   ondansetron (ZOFRAN) injection 4 mg (4 mg Intravenous Given 7/22/24 0944)   Potassium Replacement - Follow Nurse / BPA Driven Protocol (has no administration in time range)   Magnesium Standard Dose Replacement - Follow Nurse / BPA Driven Protocol (has no administration in time range)   Phosphorus Replacement - Follow Nurse / BPA Driven Protocol (has no administration in time range)   Calcium Replacement - Follow Nurse / BPA Driven Protocol (has no administration in time range)   HYDROmorphone (DILAUDID) injection 0.5 mg (0.5 mg Intravenous Given 7/22/24 1737)   thiamine (B-1) injection 200 mg (200 mg Intravenous Given 7/22/24 1523)     Followed by   thiamine (VITAMIN B-1) tablet 100 mg (has no administration in time range)   folic  acid (FOLVITE) tablet 1 mg (1 mg Oral Given 7/22/24 0915)   LORazepam (ATIVAN) tablet 1 mg (1 mg Oral Given 7/22/24 1550)     Or   LORazepam (ATIVAN) injection 1 mg ( Intravenous Not Given:  See Alt 7/22/24 1550)     Or   LORazepam (ATIVAN) tablet 2 mg ( Oral Not Given:  See Alt 7/22/24 1550)     Or   LORazepam (ATIVAN) injection 2 mg ( Intravenous Not Given:  See Alt 7/22/24 1550)     Or   LORazepam (ATIVAN) injection 2 mg ( Intravenous Not Given:  See Alt 7/22/24 1550)     Or   LORazepam (ATIVAN) injection 2 mg ( Intramuscular Not Given:  See Alt 7/22/24 1550)   amLODIPine (NORVASC) tablet 2.5 mg (2.5 mg Oral Given 7/22/24 1522)   ferrous sulfate tablet 325 mg (325 mg Oral Given 7/22/24 1522)   FLUoxetine (PROzac) capsule 60 mg (60 mg Oral Given 7/22/24 1522)   lactulose (CHRONULAC) 10 GM/15ML solution 10 g (has no administration in time range)   levothyroxine (SYNTHROID, LEVOTHROID) tablet 100 mcg (has no administration in time range)   Magnesium Oxide -Mg Supplement tablet 400 mg (400 mg Oral Given 7/22/24 1522)   mirtazapine (REMERON) tablet 15 mg (has no administration in time range)   nadolol (CORGARD) tablet 40 mg (40 mg Oral Given 7/22/24 1522)   nitrofurantoin (macrocrystal-monohydrate) (MACROBID) capsule 100 mg (has no administration in time range)   pancrelipase (Lip-Prot-Amyl) (CREON) capsule 24,000 units of lipase (24,000 units of lipase Oral Given 7/22/24 1739)   pantoprazole (PROTONIX) EC tablet 40 mg (has no administration in time range)   lactated ringers bolus 1,000 mL (0 mL Intravenous Stopped 7/22/24 0515)   ondansetron (ZOFRAN) injection 4 mg (4 mg Intravenous Given 7/22/24 2091)   famotidine (PEPCID) injection 20 mg (20 mg Intravenous Given 7/22/24 0413)   morphine injection 4 mg (4 mg Intravenous Given 7/22/24 6833)   magnesium sulfate 2g/50 mL (PREMIX) infusion (0 g Intravenous Stopped 7/22/24 5913)   thiamine (B-1) 500 mg in sodium chloride 0.9 % 100 mL IVPB (0 mg Intravenous Stopped  7/22/24 0620)   folic acid (FOLVITE) tablet 1 mg (1 mg Oral Given 7/22/24 0527)   LORazepam (ATIVAN) injection 1 mg (1 mg Intravenous Given 7/22/24 0518)   iopamidol (ISOVUE-300) 61 % injection 100 mL (85 mL Intravenous Given 7/22/24 0554)           OUTPATIENT MEDICATION MANAGEMENT:  Current Facility-Administered Medications Ordered in Epic   Medication Dose Route Frequency Provider Last Rate Last Admin    amLODIPine (NORVASC) tablet 2.5 mg  2.5 mg Oral Daily Albion, Burt, DO   2.5 mg at 07/22/24 1522    sennosides-docusate (PERICOLACE) 8.6-50 MG per tablet 2 tablet  2 tablet Oral BID PRN Albion, Burt, DO        And    polyethylene glycol (MIRALAX) packet 17 g  17 g Oral Daily PRN Albion, Burt, DO        And    bisacodyl (DULCOLAX) EC tablet 5 mg  5 mg Oral Daily PRN Senia, Burt, DO        And    bisacodyl (DULCOLAX) suppository 10 mg  10 mg Rectal Daily PRN Albion, Burt, DO        Calcium Replacement - Follow Nurse / BPA Driven Protocol   Does not apply PRN Senia, Burt, DO        ferrous sulfate tablet 325 mg  325 mg Oral Daily With Breakfast Albion, Burt, DO   325 mg at 07/22/24 1522    FLUoxetine (PROzac) capsule 60 mg  60 mg Oral Daily Albion, Burt, DO   60 mg at 07/22/24 1522    folic acid (FOLVITE) tablet 1 mg  1 mg Oral Daily Albion, Burt, DO   1 mg at 07/22/24 0915    HYDROmorphone (DILAUDID) injection 0.5 mg  0.5 mg Intravenous Q4H PRN Senia, Burt, DO   0.5 mg at 07/22/24 1737    lactated ringers infusion  150 mL/hr Intravenous Continuous Albion, Burt,  mL/hr at 07/22/24 1903 150 mL/hr at 07/22/24 1903    [START ON 7/23/2024] lactulose (CHRONULAC) 10 GM/15ML solution 10 g  10 g Oral QAM Burt Conn, DO        [START ON 7/23/2024] levothyroxine (SYNTHROID, LEVOTHROID) tablet 100 mcg  100 mcg Oral Q AM Burt Conn DO        LORazepam (ATIVAN) tablet 1 mg  1 mg Oral Q1H PRN Burt Conn DO   1 mg at 07/22/24 1550    Or    LORazepam (ATIVAN) injection 1 mg  1 mg Intravenous Q1H PRN Burt Conn,          Or    LORazepam (ATIVAN) tablet 2 mg  2 mg Oral Q1H PRN Burt Conn, DO        Or    LORazepam (ATIVAN) injection 2 mg  2 mg Intravenous Q1H PRN Burt Conn, DO        Or    LORazepam (ATIVAN) injection 2 mg  2 mg Intravenous Q15 Min PRN Burt Conn, DO        Or    LORazepam (ATIVAN) injection 2 mg  2 mg Intramuscular Q15 Min PRN Senia, Burt, DO        Magnesium Oxide -Mg Supplement tablet 400 mg  400 mg Oral Daily Senia, Burt, DO   400 mg at 07/22/24 1522    Magnesium Standard Dose Replacement - Follow Nurse / BPA Driven Protocol   Does not apply PRN Burt Conn, DO        mirtazapine (REMERON) tablet 15 mg  15 mg Oral Nightly Burt Conn DO        nadolol (CORGARD) tablet 40 mg  40 mg Oral Daily Burt Conn, DO   40 mg at 07/22/24 1522    nitrofurantoin (macrocrystal-monohydrate) (MACROBID) capsule 100 mg  100 mg Oral BID Burt Conn DO        nitroglycerin (NITROSTAT) SL tablet 0.4 mg  0.4 mg Sublingual Q5 Min PRN Burt Conn, DO        ondansetron ODT (ZOFRAN-ODT) disintegrating tablet 4 mg  4 mg Oral Q6H PRN Burt Conn,         Or    ondansetron (ZOFRAN) injection 4 mg  4 mg Intravenous Q6H PRN Senia, Burt, DO   4 mg at 07/22/24 0944    pancrelipase (Lip-Prot-Amyl) (CREON) capsule 24,000 units of lipase  24,000 units of lipase Oral TID With Meals Burt Conn, DO   24,000 units of lipase at 07/22/24 1739    [START ON 7/23/2024] pantoprazole (PROTONIX) EC tablet 40 mg  40 mg Oral Q AM Burt Conn, DO        Phosphorus Replacement - Follow Nurse / BPA Driven Protocol   Does not apply PRN Butr Conn, DO        Potassium Replacement - Follow Nurse / BPA Driven Protocol   Does not apply PRN Burt Conn, DO        sodium chloride 0.9 % flush 10 mL  10 mL Intravenous Q12H Callie Quiroz MD        sodium chloride 0.9 % flush 10 mL  10 mL Intravenous PRN Callie Quiroz MD        sodium chloride 0.9 % flush 10 mL  10 mL Intravenous PRN Cassandra Hong PA-C         sodium chloride 0.9 % flush 20 mL  20 mL Intravenous PRN Callie Quiroz MD        thiamine (B-1) injection 200 mg  200 mg Intravenous Q8H Burt Conn DO   200 mg at 07/22/24 1523    Followed by    [START ON 7/27/2024] thiamine (VITAMIN B-1) tablet 100 mg  100 mg Oral Daily Burt Conn DO         No current Epic-ordered outpatient medications on file.             PROGRESS, DATA ANALYSIS, CONSULTS, AND MEDICAL DECISION MAKING  ORDERS PLACED DURING THIS VISIT:  Orders Placed This Encounter   Procedures    CT Abdomen Pelvis With Contrast    [place] Draw    Comprehensive Metabolic Panel    Lipase    Urinalysis With Microscopic If Indicated (No Culture) - Urine, Clean Catch    Lactic Acid, Plasma    Ethanol    Magnesium    CBC Auto Differential    Ammonia    STAT Lactic Acid, Reflex    Urinalysis, Microscopic Only - Urine, Clean Catch    Magnesium    Comprehensive Metabolic Panel    STAT Lactic Acid, Reflex    Diet: Liquid; Clear Liquid; Fluid Consistency: Thin (IDDSI 0)    Maintain IV Access    Telemetry - Place Orders & Notify Provider of Results When Patient Experiences Acute Chest Pain, Dysrhythmia or Respiratory Distress    May Be Off Telemetry for Tests    Activity - Ad Janine    Intake & Output    Oral Care    Place Sequential Compression Device    Maintain Sequential Compression Device    Vital Signs    Continuous Pulse Oximetry    Obtain Baseline Clinical Tropic Withdrawal Assessment - Ar (CIWA-Ar), Sedation Scale & Vital Signs    Clinical [place]ropic Withdrawal Assessment (CIWA-Ar)    If CIWA-Ar Score Less Than 8 For 3 Consecutive Assessments, Monitor Every 4 Hours & Discontinue Assessment When CIWA-Ar Less Than 8 for 24 Hours    Obtain Pre & Post Sedation Scores With Every Sedative Dose - Hold For POSS Greater Than 2 or RASS of -3 or Less    Notify Provider - Withdrawal    Notify Provider of Abnormal Lab Results    Notify Provider - Vitals    Elevate HOB    Daily Weights    Code Status and  Medical Interventions:    [facility] (on-call MD unless specified) Details    Inpatient Access Center Consult    Oxygen Therapy- Nasal Cannula; Titrate 1-6 LPM Per SpO2; 90 - 95%    Telemetry Scan    Telemetry Scan    Insert Peripheral IV    Initiate Observation Status    Seizure Precautions    Safety Precautions    Aspiration Precautions    Fall Precautions    CBC & Differential    Green Top (Gel)    Lavender Top    Gold Top - SST    Light Blue Top    CBC & Differential       All labs have been independently interpreted by me.  All radiology studies have been reviewed by me. All EKG's have been independently viewed and interpreted by me.  Discussion below represents my analysis of pertinent findings related to patient's condition, differential diagnosis, treatment plan and final disposition.    Differential diagnosis includes but is not limited to:   My differential diagnosis for abdominal pain includes but is not limited to:  Gastritis, gastroenteritis, peptic ulcer disease, GERD, esophageal perforation, acute appendicitis, mesenteric adenitis, Meckel’s diverticulum, epiploic appendagitis, diverticulitis, colon cancer, ulcerative colitis, Crohn’s disease, intussusception, small bowel obstruction, adhesions, ischemic bowel, perforated viscus, ileus, obstipation, biliary colic, cholecystitis, cholelithiasis, [place], hepatitis, pancreatitis, common bile duct obstruction, cholangitis, bile leak, splenic trauma, splenic rupture, splenic infarction, splenic abscess, abdominal abscess, ascites, spontaneous bacterial peritonitis, hernia, UTI, cystitis, prostatitis, ureterolithiasis, urinary obstruction, ovarian cyst, torsion, pregnancy, ectopic pregnancy, PID, pelvic abscess, mittelschmerz, endometriosis, AAA, myocardial infarction, pneumonia, cancer, porphyria, DKA, medications, sickle cell, viral syndrome, zoster      ED Course:  ED Course as of 07/22/24 2006 Mon Jul 22, 2024   4178 I discussed the case with   Pratima and he agrees to evaluate the patient at the bedside.    [CC]   0416 WBC: 4.88 [CC]   0418 Platelets(!): 59  chronic [CC]   0426 Lactate(!!): 2.8 [CC]   0427 Magnesium(!): 1.5 [CC]   0522 Ethanol(!): 182 [ERIC]   0638 I independently interpreted the abdomen CT study and my findings are: No free air, no small bowel obstruction pattern   [ERIC]   0638 I discussed with Dr. Trujillo from [facility] about this patient.  He agrees to accept her to the hospitalist service for further medical management today. [ERIC]      ED Course User Index  [CC] Cassandra Hong PA-C  [ERIC] Mukesh Andujar MD           AS OF 20:06 EDT VITALS:    BP - 142/91  HR - 93  TEMP - 98.6 °F (37 °C) (Oral)  O2 SATS - 97%        MDM:  Patient is a 54-year-old female with history of alcoholism who presents emergency department today with intractable abdominal pain.  On arrival here in the emergency department vitals are reassuring, she is afebrile.  My exam the patient appears uncomfortable but nontoxic.  Patient does admit to drinking alcohol today.  Patient was evaluated with labs which are overall reassuring.  She did have a lactic acidosis but no source of infection was identified, suspect this is secondary to her alcoholism.  She was also hypomagnesemic which was replaced intravenously.  Her alcohol was elevated.  Platelets are chronically low and stable.  Patient was subsequent evaluated with a CT of the abdomen pelvis which shows signs of acute on chronic pancreatitis.  Patient will require admission to the hospital for pain control.  She is stable at time of admission.      COMPLEXITY OF CARE  The patient requires admission.        DIAGNOSIS  Final diagnoses:   Acute on chronic pancreatitis   Calculus of gallbladder without cholecystitis without obstruction   Alcoholic intoxication without complication         DISPOSITION  ED Disposition       ED Disposition   Decision to Admit    Condition   --    Comment   Level of Care: Telemetry [5]   Diagnosis:  "Acute on chronic pancreatitis [[id number]]   Admitting Physician: RADHA TIJERINA [[id number]]   Attending Physician: RADHA TIJERINA [[id number]]                      Please note that portions of this document were completed with a voice recognition program.    Note Disclaimer: At [facility], we believe that sharing information builds trust and better relationships. You are receiving this note because you recently visited [facility]. It is possible you will see health information before a provider has talked with you about it. This kind of information can be easy to misunderstand. To help you fully understand what it means for your health, we urge you to discuss this note with your provider.     Cassandra Hong PA-C  07/22/24 2008      Electronically signed by Mukesh Andujar MD at 07/23/24 0857       Tanya Mcdonald, RN at 07/22/24 0340          Pt arrives ambulatory to triage desk via PV.    Pt states \"abdominal pain that starts at my navel and goes to the right\"  x 1 day w/ diarrhea and constipation. Pt reports she was seen at [facility] for same abd pain on 7/16. Pt also reports cataract sx on 7/17.     Electronically signed by Tanya Mcdonald, RN at 07/22/24 0342       Oxygen Therapy (since admission)       Date/Time SpO2 Device (Oxygen Therapy) Flow (L/min) Oxygen Concentration (%) ETCO2 (mmHg)    07/24/24 0722 -- room air -- -- --    07/24/24 0045 -- room air -- -- --    07/24/24 0018 100 room air -- -- --    07/23/24 2041 98 room air -- -- --    07/23/24 2035 -- room air -- -- --    07/23/24 1421 -- room air -- -- --    07/23/24 1334 -- room air -- -- --    07/23/24 0858 -- room air -- -- --    07/23/24 0719 -- room air -- -- --    07/22/24 2334 99 room air -- -- --    07/22/24 2315 -- room air -- -- --    07/22/24 2110 -- room air -- -- --    07/22/24 2014 100 room air -- -- --    07/22/24 1547 -- room air -- -- --    07/22/24 1356 -- nasal cannula 2 -- --    07/22/24 1229 -- nasal cannula 2 -- --    " 07/22/24 1210 97 nasal cannula -- -- --    07/22/24 1101 93 -- -- -- --    07/22/24 1001 95 -- -- -- --    07/22/24 0931 93 -- -- -- --    07/22/24 0901 94 -- -- -- --    07/22/24 0837 -- nasal cannula 2 -- --    07/22/24 0831 89 -- -- -- --    07/22/24 0737 100 -- -- -- --    07/22/24 0721 -- room air -- -- --    07/22/24 0603 97 -- -- -- --    07/22/24 0601 -- nasal cannula 3 -- --    07/22/24 0534 88 nasal cannula 3 -- --    07/22/24 0531 89 nasal cannula 2 -- --    07/22/24 0431 93 -- -- -- --    07/22/24 0336 95 -- -- -- --          Intake & Output (last 3 days)         07/21 0701 07/22 0700 07/22 0701 07/23 0700 07/23 0701 07/24 0700 07/24 0701  07/25 0700    P.O.   480 240    Total Intake(mL/kg)   480 (7.3) 240 (3.6)    Urine (mL/kg/hr)   450 (0.3)     Stool   0     Total Output   450     Net   +30 +240            Urine Unmeasured Occurrence  5 x 7 x 2 x    Stool Unmeasured Occurrence  1 x 5 x            Lines, Drains & Airways       Active LDAs       Name Placement date Placement time Site Days    Peripheral IV 07/22/24 2058 Anterior;Left Forearm 07/22/24 2058  Forearm  1    Peripheral IV 07/23/24 1613 Right Forearm 07/23/24  1613  Forearm  less than 1              Inactive LDAs       Name Placement date Placement time Removal date Removal time Site Days    [REMOVED] Peripheral IV 07/04/24 1238 Left Forearm 07/04/24  1238  07/22/24  0357  Forearm  17    [REMOVED] Peripheral IV 07/22/24 0352 Anterior;Right Forearm 07/22/24  0352  07/22/24  1910  Forearm  less than 1                  CIWA (last 3 days)        Date/Time CIWA-Ar Score    07/24/24 0045 3     07/23/24 2045 8     07/23/24 2035 8     07/23/24 1421 3     07/23/24 0858 3     07/23/24 0345 2     07/22/24 2315 3     07/22/24 2110 8     07/22/24 1650 1     07/22/24 1547 8     07/22/24 1229 6     07/22/24 10:11:52 7     07/22/24 09:05:42 4     07/22/24 07:36:53 1                   Medication Administration Report for Bekah Gibson as of 7/22/24  through 7/24/24     Legend:    Given Hold Not Given Due Canceled Entry Other Actions    Time Time (Time) Time Time-Action         Discontinued     Completed     Future     MAR Hold     Linked             Medications 07/22/24 07/23/24 07/24/24     amLODIPine (NORVASC) tablet 2.5 mg  Dose: 2.5 mg  Freq: Daily Route: PO  Start: 07/22/24 1345     Admin Instructions:   Hold for SBP less than 100, DBP less than 60.  Caution: Look alike/sound alike drug alert. Avoid grapefruit juice.      1522-Given          0901-Given          0912-Given             sennosides-docusate (PERICOLACE) 8.6-50 MG per tablet 2 tablet  Dose: 2 tablet  Freq: 2 Times Daily PRN Route: PO  PRN Reason: Constipation  Start: 07/22/24 0722     Admin Instructions:   Start bowel management regimen if patient has not had a bowel movement after 12 hours.           And   polyethylene glycol (MIRALAX) packet 17 g  Dose: 17 g  Freq: Daily PRN Route: PO  PRN Reason: Constipation  PRN Comment: Use if senna-docusate is ineffective  Start: 07/22/24 0722     Admin Instructions:   Use if no bowel movement after 12 hours. Mix in 6-8 ounces of water.  Use 4-8 ounces of water, tea, or juice for each 17 gram dose.           And   bisacodyl (DULCOLAX) EC tablet 5 mg  Dose: 5 mg  Freq: Daily PRN Route: PO  PRN Reason: Constipation  PRN Comment: Use if polyethylene glycol is ineffective  Start: 07/22/24 0722     Admin Instructions:   Use if no bowel movement after 12 hours.  Swallow whole. Do not crush, split, or chew tablet.           And   bisacodyl (DULCOLAX) suppository 10 mg  Dose: 10 mg  Freq: Daily PRN Route: RE  PRN Reason: Constipation  PRN Comment: Use if bisacodyl oral is ineffective  Start: 07/22/24 0722     Admin Instructions:   Use if no bowel movement after 12 hours.  Hold for diarrhea           Calcium Replacement - Follow Nurse / BPA Driven Protocol  Freq: As Needed Route: XX  PRN Reason: Other  Start: 07/22/24 0723     Admin Instructions:   Open Order &  "Select \"[facility] Electrolyte Replacement Protocol Algorithm\" to View Details           ferrous sulfate tablet 325 mg  Dose: 325 mg  Freq: Daily With Breakfast Route: PO  Start: 07/22/24 1345     Admin Instructions:   Swallow whole. Do not crush, split, or chew. Take with food if GI upset occurs.      1522-Given          0901-Given          0913-Given            FLUoxetine (PROzac) capsule 60 mg  Dose: 60 mg  Freq: Daily Route: PO  Start: 07/22/24 1345     Admin Instructions:   Caution: Look alike/sound alike drug alert      1522-Given          0900-Given          0900            folic acid (FOLVITE) tablet 1 mg  Dose: 1 mg  Freq: Daily Route: PO  Start: 07/22/24 0900      0915-Given          0900-Given          0912-Given            HYDROmorphone (DILAUDID) injection 0.5 mg  Dose: 0.5 mg  Freq: Every 4 Hours PRN Route: IV  PRN Reasons: Moderate Pain,Severe Pain  Start: 07/22/24 0725 End: 07/27/24 0724     Admin Instructions:   Based on patient request - if ordered for moderate or severe pain, provider allows for administration of a medication prescribed for a lower pain scale.  If given for pain, use the following pain scale:  Mild Pain = Pain Score of 1-3, CPOT 1-2  Moderate Pain = Pain Score of 4-6, CPOT 3-4  Severe Pain = Pain Score of 7-10, CPOT 5-8      0913-Given     1326-Given     1737-Given       2246-Given          0332-Given     0902-Given     1312-Given       1744-Given     2304-Given         0420-Given     0927-Given           lactated ringers infusion  Rate: 100 mL/hr Dose: 100 mL/hr  Freq: Continuous Route: IV  Start: 07/22/24 0738      0912-New Bag     1235-New Bag     1535-Currently Infusing       1742-Currently Infusing     1903-New Bag         0332-New Bag     0650-Currently Infusing     1033-New Bag       1244-Rate/Dose Change     1938-New Bag         0556-New Bag            lactulose (CHRONULAC) 10 GM/15ML solution 10 g  Dose: 10 g  Freq: Every Morning Route: PO  Start: 07/23/24 0700     Admin " Instructions:   May be mixed with fruit juice, water, or milk.       0625-Given     1846-Held by provider [C]         0700-Held by provider            levothyroxine (SYNTHROID, LEVOTHROID) tablet 100 mcg  Dose: 100 mcg  Freq: Every Early Morning Route: PO  Start: 07/23/24 0600     Admin Instructions:   Take on empty stomach.       0625-Given          0420-Given             LORazepam (ATIVAN) tablet 1 mg  Dose: 1 mg  Freq: Every 1 Hour PRN Route: PO  PRN Reason: Withdrawal  PRN Comment: For CIWA-Ar 8-10  Start: 07/22/24 0726 End: 07/27/24 0725     Admin Instructions:   Reassess 1 Hour After Administration   Caution: Look alike/sound alike drug alert      1550-Given     2118-Given         2046-Given             Or   LORazepam (ATIVAN) injection 1 mg  Dose: 1 mg  Freq: Every 1 Hour PRN Route: IV  PRN Reason: Withdrawal  PRN Comment: For CIWA-Ar 8-10  Start: 07/22/24 0726 End: 07/27/24 0725     Admin Instructions:   Reassess 1 Hour After Administration   Caution: Look alike/sound alike drug alert. Dilute 1:1 with normal saline.      1550-Not Given:  See Alt     2118-Not Given:  See Alt         2046-Not Given:  See Alt             Or   LORazepam (ATIVAN) tablet 2 mg  Dose: 2 mg  Freq: Every 1 Hour PRN Route: PO  PRN Reason: Withdrawal  PRN Comment: For CIWA-Ar 11-15 or -160, DBP , -125  Start: 07/22/24 0726 End: 07/27/24 0725     Admin Instructions:   Reassess 1 Hour After Administration.   Caution: Look alike/sound alike drug alert      1550-Not Given:  See Alt     2118-Not Given:  See Alt         2046-Not Given:  See Alt             Or   LORazepam (ATIVAN) injection 2 mg  Dose: 2 mg  Freq: Every 1 Hour PRN Route: IV  PRN Reason: Withdrawal  PRN Comment: For CIWA-Ar 11-15 or -160, DBP , -125  Start: 07/22/24 0726 End: 07/27/24 0725     Admin Instructions:   Reassess 1 Hour After Administration   Caution: Look alike/sound alike drug alert. Dilute 1:1 with normal saline.      1550-Not  "Given:  See Alt     2118-Not Given:  See Alt         2046-Not Given:  See Alt             Or   LORazepam (ATIVAN) injection 2 mg  Dose: 2 mg  Freq: Every 15 Minutes PRN Route: IV  PRN Reason: Withdrawal  PRN Comment: For CIWA-Ar Greater Than 15 or SBP greater than 160, DBP greater than 110, or HR greater than 125.  Start: 07/22/24 0726 End: 07/27/24 0725     Admin Instructions:   Reassess 15 Minutes After Each Administration.  If CIWA-Ar Does Not Decrease Contact Provider To Discuss Transfer to Higher Level of Care.   Caution: Look alike/sound alike drug alert. Dilute 1:1 with normal saline.      1550-Not Given:  See Alt     2118-Not Given:  See Alt         2046-Not Given:  See Alt             Or   LORazepam (ATIVAN) injection 2 mg  Dose: 2 mg  Freq: Every 15 Minutes PRN Route: IM  PRN Reason: Withdrawal  PRN Comment: For CIWA-Ar Greater Than 15 or SBP greater than 160, DBP greater than 110, or HR greater than 125.  Start: 07/22/24 0726 End: 07/27/24 0725     Admin Instructions:   Reassess 15 Minutes After Each Administration.  If CIWA-Ar Does Not Decrease Contact Provider To Discuss Transfer to Higher Level of Care.   Caution: Look alike/sound alike drug alert. Dilute 1:1 with normal saline.      1550-Not Given:  See Alt     2118-Not Given:  See Alt         2046-Not Given:  See Alt             Magnesium Oxide -Mg Supplement tablet 400 mg  Dose: 400 mg  Freq: Daily Route: PO  Start: 07/22/24 1345      1522-Given          0900-Given          0913-Given            Magnesium Standard Dose Replacement - Follow Nurse / BPA Driven Protocol  Freq: As Needed Route: XX  PRN Reason: Other  Start: 07/22/24 0723     Admin Instructions:   Open Order & Select \"[facility] Electrolyte Replacement Protocol Algorithm\" to View Details           mirtazapine (REMERON) tablet 15 mg  Dose: 15 mg  Freq: Nightly Route: PO  Start: 07/22/24 2100 2118-Given          2046-Given 2100            nadolol (CORGARD) tablet 40 mg  Dose: 40 " mg  Freq: Daily Route: PO  Start: 07/22/24 1345     Admin Instructions:   Hold for SBP less than 100, DBP less than 60, or heart rate less than 50. If a dose is held, please contact the provider.      1522-Given          0901-Given          0912-Given            nicotine (NICODERM CQ) 7 MG/24HR patch 1 patch  Dose: 1 patch  Freq: Every 24 Hours Scheduled Route: TD  Start: 07/23/24 1430     Admin Instructions:   Apply to clean, dry, nonhairy area of skin (typically upper arm or shoulder)  Dispose of nicotine replacement therapies and their wrappers in non-hazardous pharmaceutical waste or in regular trash.       1520-Medication Applied          0916-Medication Removed     0917-Medication Applied           nitroglycerin (NITROSTAT) SL tablet 0.4 mg  Dose: 0.4 mg  Freq: Every 5 Minutes PRN Route: SL  PRN Reason: Chest Pain  PRN Comment: Only if SBP Greater Than 100  Start: 07/22/24 0722     Admin Instructions:   If Pain Unrelieved After 3 Doses Notify MD  May administer up to 3 doses per episode.            ondansetron ODT (ZOFRAN-ODT) disintegrating tablet 4 mg  Dose: 4 mg  Freq: Every 6 Hours PRN Route: PO  PRN Reasons: Nausea,Vomiting  Start: 07/22/24 0722     Admin Instructions:   If BOTH ondansetron (ZOFRAN) and promethazine (PHENERGAN) are ordered use ondansetron first and THEN promethazine IF ondansetron is ineffective.  Place on tongue and allow to dissolve.      0944-Not Given:  See Alt              Or   ondansetron (ZOFRAN) injection 4 mg  Dose: 4 mg  Freq: Every 6 Hours PRN Route: IV  PRN Reasons: Nausea,Vomiting  Start: 07/22/24 0722      0944-Given              pancrelipase (Lip-Prot-Amyl) (CREON) capsule 24,000 units of lipase  Dose: 24,000 units of lipase  Freq: 3 Times Daily With Meals Route: PO  Start: 07/22/24 1345     Admin Instructions:   Give with meals.  Swallow whole.  Do not crush or chew capsule. For tube administration: may open capsules and add to juice.      1522-Given     1739-Given          "0900-Given     1244-Given     1744-Given        0800     1200     1800          pantoprazole (PROTONIX) EC tablet 40 mg  Dose: 40 mg  Freq: Every Early Morning Route: PO  Start: 07/23/24 0600     Admin Instructions:   Do not crush or chew the capsules or tablets. The drug may not work as designed if the capsule or tablet is crushed or chewed. Swallow whole.  Swallow whole; do not crush, split, or chew.       0625-Given          0420-Given            Phosphorus Replacement - Follow Nurse / BPA Driven Protocol  Freq: As Needed Route: XX  PRN Reason: Other  Start: 07/22/24 0723     Admin Instructions:   Open Order & Select \"[facility] Electrolyte Replacement Protocol Algorithm\" to View Details           piperacillin-tazobactam (ZOSYN) 3.375 g IVPB in 100 mL NS MBP (CD)  Dose: 3.375 g  Freq: Every 8 Hours Route: IV  Indications of Use: URINARY TRACT INFECTION  Start: 07/24/24 0615 End: 07/29/24 0614        0556-New Bag     1415     2215          potassium chloride (K-DUR,KLOR-CON) ER tablet 40 mEq  Dose: 40 mEq  Freq: Every 4 Hours Route: PO  Start: 07/24/24 1015 End: 07/24/24 1814     Admin Instructions:   Do not crush or chew the capsules or tablets. The drug may not work as designed if the capsule or tablet is crushed or chewed. Swallow whole.  Swallow whole; do not crush, split, or chew.        1015     1415           Potassium Replacement - Follow Nurse / BPA Driven Protocol  Freq: As Needed Route: XX  PRN Reason: Other  Start: 07/22/24 0723     Admin Instructions:   Open Order & Select \"[facility] Electrolyte Replacement Protocol Algorithm\" to View Details           sodium chloride 0.9 % flush 10 mL  Dose: 10 mL  Freq: As Needed Route: IV  PRN Reason: Line Care  Start: 07/22/24 0350            thiamine (B-1) injection 200 mg  Dose: 200 mg  Freq: Every 8 Hours Scheduled Route: IV  Start: 07/22/24 0742 End: 07/27/24 0559     Admin Instructions:   Doses of up to 250mg, give over 1-2 minutes (IV push). Doses 250mg and above, give " over 30 minutes.      0913-Given     1523-Given     2118-Given        0624-Given     1521-Given     (2304)-Not Given        (0556)-Not Given     1400     2200          Followed by   thiamine (VITAMIN B-1) tablet 100 mg  Dose: 100 mg  Freq: Daily Route: PO  Start: 07/27/24 0900           Completed Medications  Medications 07/22/24 07/23/24 07/24/24      famotidine (PEPCID) injection 20 mg  Dose: 20 mg  Freq: Once Route: IV  Start: 07/22/24 0416 End: 07/22/24 0413     Admin Instructions:   Give IV push over 2 minutes.      0413-Given              folic acid (FOLVITE) tablet 1 mg  Dose: 1 mg  Freq: Once Route: PO  Start: 07/22/24 0443 End: 07/22/24 0527      0527-Given              iopamidol (ISOVUE-300) 61 % injection 100 mL  Dose: 100 mL  Freq: Once in Imaging Route: IV  Start: 07/22/24 0559 End: 07/22/24 0554      0554-Given              lactated ringers bolus 1,000 mL  Dose: 1,000 mL  Freq: Once Route: IV  Start: 07/22/24 0416 End: 07/22/24 0515      0403-New Bag     0515-Stopped             LORazepam (ATIVAN) injection 1 mg  Dose: 1 mg  Freq: Once Route: IV  Start: 07/22/24 0448 End: 07/22/24 0518     Admin Instructions:    Caution: Look alike/sound alike drug alert. Dilute 1:1 with normal saline.      0518-Given              magnesium sulfate 2g/50 mL (PREMIX) infusion  Dose: 2 g  Freq: Once Route: IV  Start: 07/22/24 0443 End: 07/22/24 0538      0520-New Bag     0538-Stopped             morphine injection 4 mg  Dose: 4 mg  Freq: Once Route: IV  Start: 07/22/24 0416 End: 07/22/24 0414     Admin Instructions:   Based on patient request - if ordered for moderate or severe pain, provider allows for administration of a medication prescribed for a lower pain scale.  If given for pain, use the following pain scale:  Mild Pain = Pain Score of 1-3, CPOT 1-2  Moderate Pain = Pain Score of 4-6, CPOT 3-4  Severe Pain = Pain Score of 7-10, CPOT 5-8      0414-Given              ondansetron (ZOFRAN) injection 4 mg  Dose: 4  "mg  Freq: Once Route: IV  Start: 24 0416 End: 24 041     Admin Instructions:   \"If multiple N/V medications ordered, use in the following order: Ondansetron, Prochlorperazine, Promethazine. Use PO unless patient refuses or patient unable to swallow.\"      0411-Given              piperacillin-tazobactam (ZOSYN) 3.375 g IVPB in 100 mL NS MBP (CD)  Dose: 3.375 g  Freq: Once Route: IV  Start: 24 0015 End: 24 0125        0055-New Bag            potassium chloride (K-DUR,KLOR-CON) ER tablet 40 mEq  Dose: 40 mEq  Freq: Every 4 Hours Route: PO  Start: 24 0945 End: 24 1245     Admin Instructions:   Do not crush or chew the capsules or tablets. The drug may not work as designed if the capsule or tablet is crushed or chewed. Swallow whole.  Swallow whole; do not crush, split, or chew.       0901-Given     1245-Given            thiamine (B-1) 500 mg in sodium chloride 0.9 % 100 mL IVPB  Dose: 500 mg  Freq: Once Route: IV  Start: 24 0600 End: 24 0620     Admin Instructions:   Doses of up to 250mg, give over 1-2 minutes (IV push). Doses 250mg and above, give over 30 minutes.      0538-New Bag     0620-Stopped             Discontinued Medications  Medications 24      nitrofurantoin (macrocrystal-monohydrate) (MACROBID) capsule 100 mg  Dose: 100 mg  Freq: 2 Times Daily Route: PO  Indications of Use: URINARY TRACT INFECTION  Start: 24 2100 End: 24 1830      2118-Given          0900-Given             Pharmacy to Dose Zosyn  Freq: Continuous PRN Route: XX  PRN Reason: Consult  Indications of Use: URINARY TRACT INFECTION  Start: 24 230 End: 24 0805                    Physician Progress Notes (all)        Burt Conn DO at 24 0936              Name: Bekah Gibson ADMIT: 2024   : 1970  PCP: Tylor Davis    MRN: [id number] LOS: 0 days   AGE/SEX: 54 y.o. female  ROOM: [facility]     Subjective   Subjective   Patient seen " and examined this morning. Hospital day 1. At time of my examination, patient is awake, alert and oriented x3, resting comfortably in bed. She continues to have epigastric pain, nausea, but slightly improved from prior. She is on clear liquid diet, doesn't have much appetite, but has tolerated this okay so far.     Objective   Objective   Vital Signs  Temp:  [98.2 °F (36.8 °C)-98.8 °F (37.1 °C)] 98.8 °F (37.1 °C)  Heart Rate:  [78-93] 80  Resp:  [18-20] 18  BP: (138-156)/(85-91) 138/85  SpO2:  [97 %-100 %] 99 %  on  Flow (L/min):  [2] 2;   Device (Oxygen Therapy): room air  Body mass index is 26.19 kg/m².  Physical Exam  Vitals and nursing note reviewed.   Constitutional:       General: She is not in acute distress.     Appearance: She is ill-appearing.   Cardiovascular:      Rate and Rhythm: Normal rate and regular rhythm.      Pulses: Normal pulses.      Heart sounds: Normal heart sounds.   Pulmonary:      Effort: Pulmonary effort is normal. No respiratory distress.      Breath sounds: Normal breath sounds. No wheezing.   Abdominal:      General: There is no distension.      Palpations: Abdomen is soft.      Tenderness: There is abdominal tenderness. There is no guarding or rebound.   Skin:     General: Skin is warm and dry.   Neurological:      General: No focal deficit present.      Mental Status: She is alert and oriented to person, place, and time.   Psychiatric:         Mood and Affect: Mood is anxious.       Results Review     I reviewed the patient's new clinical results.  Results from last 7 days   Lab Units 07/23/24  0812 07/22/24  0355   WBC 10*3/mm3 2.51* 4.88   HEMOGLOBIN g/dL 13.9 15.7   PLATELETS 10*3/mm3 37* 59*     Results from last 7 days   Lab Units 07/23/24  0812 07/22/24  0355   SODIUM mmol/L 132* 141   POTASSIUM mmol/L 3.2* 3.6   CHLORIDE mmol/L 100 105   CO2 mmol/L 26.0 21.0*   BUN mg/dL 3* 10   CREATININE mg/dL 0.39* 0.50*   GLUCOSE mg/dL 108* 154*   EGFR mL/min/1.73 117.0 110.2     Results  "from last 7 days   Lab Units 07/23/24  0812 07/22/24  0355   ALBUMIN g/dL 3.7 4.5   BILIRUBIN mg/dL 2.0* 0.9   ALK PHOS U/L 147* 188*   AST (SGOT) U/L 100* 118*   ALT (SGPT) U/L 45* 58*     Results from last 7 days   Lab Units 07/23/24  0812 07/22/24  0355   CALCIUM mg/dL 9.1 9.3   ALBUMIN g/dL 3.7 4.5   MAGNESIUM mg/dL 2.2 1.5*     Results from last 7 days   Lab Units 07/22/24  1214 07/22/24  0918 07/22/24  0355   LACTATE mmol/L 1.3 2.5* 2.8*     No results found for: \"HGBA1C\", \"POCGLU\"    CT Abdomen Pelvis With Contrast    Result Date: 7/22/2024  1. Subtle stranding adjacent to the pancreatic head, mildly increased compared to exam 07/03/2024, suspicious for acute on chronic pancreatitis. 2. Hepatic cirrhotic nephrology with hepatic steatosis. Portal venous hypertension with gastroesophageal varices, splenomegaly. 3. Cholelithiasis.  Radiation dose reduction techniques were utilized, including automated exposure control and exposure modulation based on body size.   This report was finalized on 7/22/2024 6:47 AM by Dr. Anson Reynolds M.D on Workstation: [id number]       I have personally reviewed all medications:  Scheduled Medications  amLODIPine, 2.5 mg, Oral, Daily  ferrous sulfate, 325 mg, Oral, Daily With Breakfast  FLUoxetine, 60 mg, Oral, Daily  folic acid, 1 mg, Oral, Daily  lactulose, 10 g, Oral, QAM  levothyroxine, 100 mcg, Oral, Q AM  Magnesium Oxide -Mg Supplement, 400 mg, Oral, Daily  mirtazapine, 15 mg, Oral, Nightly  nadolol, 40 mg, Oral, Daily  nitrofurantoin (macrocrystal-monohydrate), 100 mg, Oral, BID  pancrelipase (Lip-Prot-Amyl), 24,000 units of lipase, Oral, TID With Meals  pantoprazole, 40 mg, Oral, Q AM  potassium chloride ER, 40 mEq, Oral, Q4H  thiamine (B-1) IV, 200 mg, Intravenous, Q8H   Followed by  [START ON 7/27/2024] thiamine, 100 mg, Oral, Daily    Infusions  lactated ringers, 150 mL/hr, Last Rate: 150 mL/hr (07/23/24 1033)    Diet  Diet: Liquid; Full Liquid; Fluid Consistency: " Thin (IDDSI 0)    I have personally reviewed:  [x]  Laboratory   []  Microbiology   [x]  Radiology   [x]  EKG/Telemetry  []  Cardiology/Vascular   []  Pathology    []  Records      Assessment/Plan     Active Hospital Problems    Diagnosis  POA    **Acute on chronic pancreatitis [K85.90, K86.1]  Yes    Transaminitis [R74.01]  Yes    Cholelithiasis [K80.20]  Yes    Alcohol use [Z78.9]  Yes    Esophageal varices [I85.00]  Yes    Primary hypertension [I10]  Yes    Alcoholic cirrhosis [K70.30]  Yes    Hypomagnesemia [E83.42]  Yes    Hashimoto's disease [E06.3]  Yes    Pancreatic insufficiency [K86.89]  Yes    Hypothyroidism [E03.9]  Yes    Vitamin D deficiency [E55.9]  Yes    Peripheral neuropathy [G62.9]  Yes      Resolved Hospital Problems   No resolved problems to display.     Ms. Gibson is a 54 y.o. smoker with a history of Chronic pancreatitis, ETOH use, Cirrhosis complicated by portal hypertension, esophageal varices and splenomegaly, hypothyroidism, neuropathy, vitamin D deficiency, hypertension that presents to [facility] complaining of abrupt onset epigastric abdominal pain that began ~1 day ago prior to arrival, admitted with Acute on chronic pancreatitis.     Acute on chronic pancreatitis  Nausea with vomiting  - Patient with complaints of epigastric abdominal pain. Imaging obtained and showed findings consistent with acute on chronic pancreatitis. Lipase normal, but meets 2/3 diagnostic criteria. She does have cholelithiasis but no mention of cholecystitis. Suspect that etiology of acute flare is secondary to ETOH, given acute use with onset of symptoms shortly thereafter, rather than biliary disease, but can consider surgery consult in the future if indicated to get their impression if symptoms do not improve or worsen.  - Symptoms still present, but slightly improved. Advance to full liquid diet now and see how she tolerates this. Otherwise, continue IVF, PRN medications for symptom control,  advance diet as tolerated. Continue home medication for chronic pancreatic insufficiency.     Transaminitis  - LFT elevated on most recent labs, likely 2/2 ETOH, pancreatitis. CT also showing cholelithiasis but no mention of findings concerning for cholecystitis or biliary ductal dilatation. Values are improving when compared to prior. No indications to warrant acute intervention for now, however, will continue to closely monitor. If no improvement and/or worsens, can consider further workup as indicated.  - Order repeat CMP in AM for reassessment.     Alcohol use  - ETOH level on admission: 193  - CIWA protocol, MV, thiamine, folic acid  - Telemetry, pulse oximetry, seizure precautions, aspiration precautions  - Consulted [facility]. Will follow their plans/recommendations. Greatly appreciate their help.    Hypokalemia  - K low on most recent labs; Will replete via electrolyte protocol. Follow up repeat labs to guide ongoing management decisions. Replete PRN per protocol. Continue to monitor    Hyponatremia  - Na level worsened on most recent labs, despite fluids.  - Order urine sodium, urine osmolality, uric acid, TSH.    Decreased WBC count  Thrombocytopenia  - Noted on labs, going to order STAT repeat CBC now to ensure changes noted are accurate. Order B12, folate.  - Follow up results of repeat testing to guide further management.    Cirrhosis complicated by portal hypertension, esophageal varices and splenomegaly  - No evidence of acute decompensation, continue treatments as prescribed. Closely monitor.     Hypomagnesemia  - Mag low on prior labs, repleted via electrolyte protocol. Follow up repeat labs to guide ongoing management decisions. Replete PRN per protocol. Continue to monitor     Hypertension  - Blood pressure appears stable and acceptable acutely at this time. No indications to warrant acute changes/intervention at this time.  - Continue current medications as prescribed. Trend BP to guide  "ongoing management decisions.     Hypothyroidism  - Continue levothyroxine as prescribed.       SCDs for DVT prophylaxis.  Full code.  Discussed with patient and nursing staff.  Anticipate discharge home later this week.  Expected Discharge Date: 7/25/2024; Expected Discharge Time:       Burt Conn DO  [facility]  07/23/24          Electronically signed by Burt Conn DO at 07/23/24 1220          Consult Notes (all)        Sheeba Kwong LMFT at 07/22/24 1714        Consult Orders    1. Inpatient [facility] Consult [[id number]] ordered by Burt Conn DO at 07/22/24 0727                 [facility] Note.    **Pt declined full consult due to full consult on 7/3/2024. See [facility] documentation dated the same for full history.    Met w/Pt in room #611. Introduced self/role. Pt agreeable to (lengthy) conversation w/this writer. Pt stated she had the list of outpt referrals provided to her in early. \"I know exactly where they are on my desk & I'm going to call as soon as I get home.\" Pt used AA jargon w/this writer, stating she has the \"places & things part (of recovery) down. It's the people part that's hard.\" Pt's  and much of her f.o.o. drink. This writer listened supportively to Pt. Offered a gentle challenge to Pt re: her opportunity to control what she can re: her drinking. Pointed out that while there are some people in Pt's life she may not intend to separate from (i.e. ), she can increase the # of people who are seeking & supporting sobriety in her life.     Pt offered justifications for her recent relapse & highlighted past things that didn't work for her maintaining sobriety. Pt dismissed 2 suggestions offered by this writer based on her interests.    With 10 being most, Pt rated current craving \"1 or 2\", depression \"5\" & anxiety \"7 or 8.\" No SI/HI or wish to die.    Provided Pt a copy of the tx resources she was given during last admission, so she can choose " to initiate therapy contact while still inpatient.     Gave report to primary RN.     Pt agreeable to Access following briefly.       Electronically signed by Sheeba Kwong LMFT at 24 1732              Burt Conn DO   Physician  Hospitalist     Progress Notes     Addendum     Date of Service: 24  Creation Time: 24 1113     Expand All Collapse All[]Expand All by Default         Name: Bekah Gibson ADMIT: 2024   : 1970  PCP: Tylor Davis    MRN: [id number] LOS: 0 days   AGE/SEX: 54 y.o. female  ROOM: [facility]      Subjective      Subjective  Patient seen and examined this morning. Hospital day 2, she is awake and resting in bed, still having epigastric pain, but improving. Tolerated liquid diet, would like to try advancing diet today. Platelets remain low, slightly worse.        Objective      Objective  Vital Signs  Temp:  [97.7 °F (36.5 °C)-98.2 °F (36.8 °C)] 97.7 °F (36.5 °C)  Heart Rate:  [73-89] 89  Resp:  [18] 18  BP: (110-134)/(67-90) 126/90  SpO2:  [98 %-100 %] 100 %  on   ;   Device (Oxygen Therapy): room air  Body mass index is 24.25 kg/m².  Physical Exam  Vitals and nursing note reviewed.   Constitutional:       General: She is not in acute distress.     Appearance: She is ill-appearing.   Cardiovascular:      Rate and Rhythm: Normal rate and regular rhythm.      Pulses: Normal pulses.      Heart sounds: Normal heart sounds.   Pulmonary:      Effort: Pulmonary effort is normal. No respiratory distress.      Breath sounds: Normal breath sounds. No wheezing or rhonchi.   Abdominal:      General: There is no distension.      Palpations: Abdomen is soft.      Tenderness: There is abdominal tenderness. There is no guarding or rebound.   Skin:     General: Skin is warm and dry.   Neurological:      General: No focal deficit present.      Mental Status: She is alert and oriented to person, place, and time.   Psychiatric:         Mood and Affect: Mood is anxious.        "  Results Review     I reviewed the patient's new clinical results.           Results from last 7 days   Lab Units 07/24/24  0756 07/23/24  1841 07/23/24  1259 07/23/24  0812 07/22/24  0355   WBC 10*3/mm3 2.57*  --  2.35* 2.51* 4.88   HEMOGLOBIN g/dL 13.5  --  13.8 13.9 15.7   PLATELETS 10*3/mm3 34* 36* 36* 37* 59*              Results from last 7 days   Lab Units 07/24/24  0755 07/23/24  1752 07/23/24  0812 07/22/24  0355   SODIUM mmol/L 136  --  132* 141   POTASSIUM mmol/L 3.6 3.9 3.2* 3.6   CHLORIDE mmol/L 103  --  100 105   CO2 mmol/L 23.0  --  26.0 21.0*   BUN mg/dL 4*  --  3* 10   CREATININE mg/dL 0.45*  --  0.39* 0.50*   GLUCOSE mg/dL 108*  --  108* 154*   EGFR mL/min/1.73 113.1  --  117.0 110.2             Results from last 7 days   Lab Units 07/24/24  0755 07/23/24  0812 07/22/24  0355   ALBUMIN g/dL 3.4* 3.7 4.5   BILIRUBIN mg/dL 1.6* 2.0* 0.9   ALK PHOS U/L 138* 147* 188*   AST (SGOT) U/L 101* 100* 118*   ALT (SGPT) U/L 49* 45* 58*             Results from last 7 days   Lab Units 07/24/24  0755 07/23/24  0812 07/22/24  0355   CALCIUM mg/dL 8.9 9.1 9.3   ALBUMIN g/dL 3.4* 3.7 4.5   MAGNESIUM mg/dL 1.7 2.2 1.5*             Results from last 7 days   Lab Units 07/22/24  1214 07/22/24  0918 07/22/24  0355   LACTATE mmol/L 1.3 2.5* 2.8*      No results found for: \"HGBA1C\", \"POCGLU\"     No radiology results for the last day     I have personally reviewed all medications:  Scheduled Medications    Scheduled Medication   amLODIPine, 2.5 mg, Oral, Daily  ferrous sulfate, 325 mg, Oral, Daily With Breakfast  FLUoxetine, 60 mg, Oral, Daily  folic acid, 1 mg, Oral, Daily  [Held by provider] lactulose, 10 g, Oral, QAM  levothyroxine, 100 mcg, Oral, Q AM  Magnesium Oxide -Mg Supplement, 400 mg, Oral, Daily  mirtazapine, 15 mg, Oral, Nightly  nadolol, 40 mg, Oral, Daily  nicotine, 1 patch, Transdermal, Q24H  pancrelipase (Lip-Prot-Amyl), 24,000 units of lipase, Oral, TID With Meals  pantoprazole, 40 mg, Oral, Q " AM  piperacillin-tazobactam, 3.375 g, Intravenous, Q8H  potassium chloride ER, 40 mEq, Oral, Q4H  thiamine (B-1) IV, 200 mg, Intravenous, Q8H   Followed by  [START ON 7/27/2024] thiamine, 100 mg, Oral, Daily      Infusions    Infusion Medications   lactated ringers, 100 mL/hr, Last Rate: 100 mL/hr (07/24/24 0556)      Diet  Diet: Liquid; Full Liquid; Fluid Consistency: Thin (IDDSI 0)     I have personally reviewed:  [x]  Laboratory   []  Microbiology   [x]  Radiology   [x]  EKG/Telemetry  []  Cardiology/Vascular   []  Pathology    [x]  Records           Assessment/Plan            Active Hospital Problems     Diagnosis   POA    **Acute on chronic pancreatitis [K85.90, K86.1]   Yes    WBC decreased [D72.819]   No    Hypokalemia [E87.6]   No    Urinary tract infection [N39.0]   Yes    Transaminitis [R74.01]   Yes    Cholelithiasis [K80.20]   Yes    Alcohol use [Z78.9]   Yes    Esophageal varices [I85.00]   Yes    Primary hypertension [I10]   Yes    Alcoholic cirrhosis [K70.30]   Yes    Hypomagnesemia [E83.42]   Yes    Hashimoto's disease [E06.3]   Yes    Pancreatic insufficiency [K86.89]   Yes    Thrombocytopenia [D69.6]   Yes    Hypothyroidism [E03.9]   Yes    Vitamin D deficiency [E55.9]   Yes    Peripheral neuropathy [G62.9]   Yes       Resolved Hospital Problems   No resolved problems to display.      Ms. Gibson is a 54 y.o. smoker with a history of Chronic pancreatitis, ETOH use, Cirrhosis complicated by portal hypertension, esophageal varices and splenomegaly, hypothyroidism, neuropathy, vitamin D deficiency, hypertension that presents to [facility] complaining of abrupt onset epigastric abdominal pain that began ~1 day ago prior to arrival, admitted with Acute on chronic pancreatitis.     Acute on chronic pancreatitis  Nausea with vomiting  Cholelithiasis  - Patient with complaints of epigastric abdominal pain. Imaging obtained and showed findings consistent with acute on chronic pancreatitis.  Lipase normal, but meets 2/3 diagnostic criteria. She does have cholelithiasis but no mention of cholecystitis. Suspect that etiology of acute flare is secondary to ETOH, given acute use with onset of symptoms shortly thereafter, rather than biliary disease, however, given the presence of stones, likely warrants evaluation while she is here.   - Symptoms still present, but are improving, she has tolerated liquid diet and is asking for food today. She remains on IVF. Will advance diet to GI soft today.   - Spoke with General surgery NP regarding findings of cholelithiasis and if there is any possible need for cholecystectomy in the future, they agree to consult and will see patient. Will place consult now, greatly appreciate their help.  - Continue home medication for chronic pancreatic insufficiency.     Transaminitis  - LFT elevated on most recent labs, likely 2/2 ETOH, pancreatitis. CT also showing cholelithiasis but no mention of findings concerning for cholecystitis or biliary ductal dilatation. Values are slightly improved overall, but relatively unchanged from prior. No indications to warrant acute intervention for now, however, will continue to closely monitor. If no improvement and/or worsens, can consider further workup as indicated, such as abdominal US.  - Order repeat CMP in AM for reassessment.     Alcohol use  - ETOH level on admission: 193  - CIWA protocol, MV, thiamine, folic acid  - Telemetry, pulse oximetry, seizure precautions, aspiration precautions  - Consulted [facility]. Will follow their plans/recommendations. Greatly appreciate their help.     Decreased WBC count  Thrombocytopenia  - Noted on labs, platelets slightly worse. Ordered further workup, IPF elevated, B12/folate acceptable. PT 17.4, INR 1.40, PTT 33.2, fibrinogen normal.   - Chart reviewed, she follows with Hematology at [place], previous notes stated thrombocytopenia felt to be due to cirrhosis, possibly ITP in the past, She has  received Decadron previously, which improved platelet counts.  - Given her history, in setting of worsening values, going to ask Hematology to see now and assist with management. Greatly appreciate their help.  - Order repeat BMP in AM for reassessment.     Urinary tract infection  - Patient endorsing increased urinary urgency and frequency. She was on Macrobid PTA. UA showing findings concerning for possible infection. Urine culture not reflexively obtained in ED, so this has been requested as add on. Blood cultures ordered.  - She has been started on empiric Zosyn given complaints above, in setting of worsening WBC count, which would also cover possible underlying GI illness if present. Can continue this as prescribed for now, follow up culture results, can discontinue if workup unremarkable.     Cirrhosis complicated by portal hypertension, esophageal varices and splenomegaly  - No evidence of acute decompensation, continue treatments as prescribed. Closely monitor. Lactulose held on 07/23 and 07/24 because patient was having >5 BM daily despite being on once daily dosing. Will tentatively plan to resume tomorrow morning on 07/25/24, but closely monitor this, titrate as needed.     Hypertension  - Blood pressure appears stable and acceptable acutely at this time. No indications to warrant acute changes/intervention at this time.  - Continue current medications as prescribed. Trend BP to guide ongoing management decisions.     Hypothyroidism  - TSH low on labs, worse from most recent prior value. FT4 normal. Values could be skewed in setting of acute illness. She does not have evidence at present of thyrotoxicosis, inclined to leave current treatment regimen as prescribed for now and recommend that she have close outpatient follow up for repeat testing to determine if medication adjustments warranted.     Hypomagnesemia  - Mag low on prior labs, repleted via electrolyte protocol. Follow up repeat labs to guide  ongoing management decisions. Replete PRN per protocol. Continue to monitor     Hypokalemia  - K low on prior labs, repleted via electrolyte protocol. Follow up repeat labs to guide ongoing management decisions. Replete PRN per protocol. Continue to monitor     Hyponatremia  - Na level worsened on labs 07/23, further workup ordered and reviewed. Urine studies skewed because she has been receiving IVF. Nonetheless, most recent Na has improved and normalized on most recent testing.  - Continue monitoring for now.      Portions of this text have been copied and I have reviewed these. Documentation accurate as of 07/24/24.         SCDs for DVT prophylaxis.  Full code.  Discussed with patient and nursing staff.  Anticipate discharge home later this week.  Expected Discharge Date: 7/25/2024; Expected Discharge Time:         Burt Conn DO  [facility]  07/24/24                 Revision History

## 2024-07-24 NOTE — PLAN OF CARE
Goal Outcome Evaluation:  Plan of Care Reviewed With: patient        Progress: improving  Outcome Evaluation: Pt A&Ox4. VSS, on RA. Pt c/o abdominal pain controlled with PRN medication. CIWA scores evaluated and pt medicated appropriately. IV fluids continue. Zosyn started. Plan of care continues.

## 2024-07-24 NOTE — NURSING NOTE
[facility] center follow up regarding ETOH: chart reviewed. Last CIWA score of 3. Pt was given [facility] resources prior. Plans to follow up with outpatient therapy. Following.

## 2024-07-24 NOTE — PROGRESS NOTES
Name: Bekah Gibson ADMIT: 2024   : 1970  PCP: Davis Tylor    MRN: [id number] LOS: 0 days   AGE/SEX: 54 y.o. female  ROOM: [facility]     Subjective   Subjective   Patient seen and examined this morning. Hospital day 2, she is awake and resting in bed, still having epigastric pain, but improving. Tolerated liquid diet, would like to try advancing diet today. Platelets remain low, slightly worse.     Objective   Objective   Vital Signs  Temp:  [97.7 °F (36.5 °C)-98.2 °F (36.8 °C)] 97.7 °F (36.5 °C)  Heart Rate:  [73-89] 89  Resp:  [18] 18  BP: (110-134)/(67-90) 126/90  SpO2:  [98 %-100 %] 100 %  on   ;   Device (Oxygen Therapy): room air  Body mass index is 24.25 kg/m².  Physical Exam  Vitals and nursing note reviewed.   Constitutional:       General: She is not in acute distress.     Appearance: She is ill-appearing.   Cardiovascular:      Rate and Rhythm: Normal rate and regular rhythm.      Pulses: Normal pulses.      Heart sounds: Normal heart sounds.   Pulmonary:      Effort: Pulmonary effort is normal. No respiratory distress.      Breath sounds: Normal breath sounds. No wheezing or rhonchi.   Abdominal:      General: There is no distension.      Palpations: Abdomen is soft.      Tenderness: There is abdominal tenderness. There is no guarding or rebound.   Skin:     General: Skin is warm and dry.   Neurological:      General: No focal deficit present.      Mental Status: She is alert and oriented to person, place, and time.   Psychiatric:         Mood and Affect: Mood is anxious.       Results Review     I reviewed the patient's new clinical results.  Results from last 7 days   Lab Units 24  0756 24  1841 24  1259 24  0812 24  0355   WBC 10*3/mm3 2.57*  --  2.35* 2.51* 4.88   HEMOGLOBIN g/dL 13.5  --  13.8 13.9 15.7   PLATELETS 10*3/mm3 34* 36* 36* 37* 59*     Results from last 7 days   Lab Units 24  0755 24  1752 24  0812 24  0355   SODIUM  "mmol/L 136  --  132* 141   POTASSIUM mmol/L 3.6 3.9 3.2* 3.6   CHLORIDE mmol/L 103  --  100 105   CO2 mmol/L 23.0  --  26.0 21.0*   BUN mg/dL 4*  --  3* 10   CREATININE mg/dL 0.45*  --  0.39* 0.50*   GLUCOSE mg/dL 108*  --  108* 154*   EGFR mL/min/1.73 113.1  --  117.0 110.2     Results from last 7 days   Lab Units 07/24/24  0755 07/23/24  0812 07/22/24  0355   ALBUMIN g/dL 3.4* 3.7 4.5   BILIRUBIN mg/dL 1.6* 2.0* 0.9   ALK PHOS U/L 138* 147* 188*   AST (SGOT) U/L 101* 100* 118*   ALT (SGPT) U/L 49* 45* 58*     Results from last 7 days   Lab Units 07/24/24  0755 07/23/24  0812 07/22/24  0355   CALCIUM mg/dL 8.9 9.1 9.3   ALBUMIN g/dL 3.4* 3.7 4.5   MAGNESIUM mg/dL 1.7 2.2 1.5*     Results from last 7 days   Lab Units 07/22/24  1214 07/22/24  0918 07/22/24  0355   LACTATE mmol/L 1.3 2.5* 2.8*     No results found for: \"HGBA1C\", \"POCGLU\"    No radiology results for the last day    I have personally reviewed all medications:  Scheduled Medications  amLODIPine, 2.5 mg, Oral, Daily  ferrous sulfate, 325 mg, Oral, Daily With Breakfast  FLUoxetine, 60 mg, Oral, Daily  folic acid, 1 mg, Oral, Daily  [Held by provider] lactulose, 10 g, Oral, QAM  levothyroxine, 100 mcg, Oral, Q AM  Magnesium Oxide -Mg Supplement, 400 mg, Oral, Daily  mirtazapine, 15 mg, Oral, Nightly  nadolol, 40 mg, Oral, Daily  nicotine, 1 patch, Transdermal, Q24H  pancrelipase (Lip-Prot-Amyl), 24,000 units of lipase, Oral, TID With Meals  pantoprazole, 40 mg, Oral, Q AM  piperacillin-tazobactam, 3.375 g, Intravenous, Q8H  potassium chloride ER, 40 mEq, Oral, Q4H  thiamine (B-1) IV, 200 mg, Intravenous, Q8H   Followed by  [START ON 7/27/2024] thiamine, 100 mg, Oral, Daily    Infusions  lactated ringers, 100 mL/hr, Last Rate: 100 mL/hr (07/24/24 0556)    Diet  Diet: Liquid; Full Liquid; Fluid Consistency: Thin (IDDSI 0)    I have personally reviewed:  [x]  Laboratory   []  Microbiology   [x]  Radiology   [x]  EKG/Telemetry  []  Cardiology/Vascular   []  " Pathology    [x]  Records       Assessment/Plan     Active Hospital Problems    Diagnosis  POA    **Acute on chronic pancreatitis [K85.90, K86.1]  Yes    WBC decreased [D72.819]  No    Hypokalemia [E87.6]  No    Urinary tract infection [N39.0]  Yes    Transaminitis [R74.01]  Yes    Cholelithiasis [K80.20]  Yes    Alcohol use [Z78.9]  Yes    Esophageal varices [I85.00]  Yes    Primary hypertension [I10]  Yes    Alcoholic cirrhosis [K70.30]  Yes    Hypomagnesemia [E83.42]  Yes    Hashimoto's disease [E06.3]  Yes    Pancreatic insufficiency [K86.89]  Yes    Thrombocytopenia [D69.6]  Yes    Hypothyroidism [E03.9]  Yes    Vitamin D deficiency [E55.9]  Yes    Peripheral neuropathy [G62.9]  Yes      Resolved Hospital Problems   No resolved problems to display.     Ms. Gibson is a 54 y.o. smoker with a history of Chronic pancreatitis, ETOH use, Cirrhosis complicated by portal hypertension, esophageal varices and splenomegaly, hypothyroidism, neuropathy, vitamin D deficiency, hypertension that presents to [facility] complaining of abrupt onset epigastric abdominal pain that began ~1 day ago prior to arrival, admitted with Acute on chronic pancreatitis.     Acute on chronic pancreatitis  Nausea with vomiting  Cholelithiasis  - Patient with complaints of epigastric abdominal pain. Imaging obtained and showed findings consistent with acute on chronic pancreatitis. Lipase normal, but meets 2/3 diagnostic criteria. She does have cholelithiasis but no mention of cholecystitis. Suspect that etiology of acute flare is secondary to ETOH, given acute use with onset of symptoms shortly thereafter, rather than biliary disease, however, given the presence of stones, likely warrants evaluation while she is here.   - Symptoms still present, but are improving, she has tolerated liquid diet and is asking for food today. She remains on IVF. Will advance diet to GI soft today.   - Spoke with General surgery NP regarding findings  of cholelithiasis and if there is any possible need for cholecystectomy in the future, they agree to consult and will see patient. Will place consult now, greatly appreciate their help.  - Continue home medication for chronic pancreatic insufficiency.     Transaminitis  - LFT elevated on most recent labs, likely 2/2 ETOH, pancreatitis. CT also showing cholelithiasis but no mention of findings concerning for cholecystitis or biliary ductal dilatation. Values are slightly improved overall, but relatively unchanged from prior. No indications to warrant acute intervention for now, however, will continue to closely monitor. If no improvement and/or worsens, can consider further workup as indicated, such as abdominal US.  - Order repeat CMP in AM for reassessment.     Alcohol use  - ETOH level on admission: 193  - CIWA protocol, MV, thiamine, folic acid  - Telemetry, pulse oximetry, seizure precautions, aspiration precautions  - Consulted [facility] center. Will follow their plans/recommendations. Greatly appreciate their help.    Decreased WBC count  Thrombocytopenia  - Noted on labs, platelets slightly worse. Ordered further workup, IPF elevated, B12/folate acceptable. PT 17.4, INR 1.40, PTT 33.2, fibrinogen normal.   - Chart reviewed, she follows with Hematology at [place], previous notes stated thrombocytopenia felt to be due to cirrhosis, possibly ITP in the past, She has received Decadron previously, which improved platelet counts.  - Given her history, in setting of worsening values, going to ask Hematology to see now and assist with management. Greatly appreciate their help.  - Order repeat BMP in AM for reassessment.    Urinary tract infection  - Patient endorsing increased urinary urgency and frequency. She was on Macrobid PTA. UA showing findings concerning for possible infection. Urine culture not reflexively obtained in ED, so this has been requested as add on. Blood cultures ordered.  - She has been started on  empiric Zosyn given complaints above, in setting of worsening WBC count, which would also cover possible underlying GI illness if present. Can continue this as prescribed for now, follow up culture results, can discontinue if workup unremarkable.    Cirrhosis complicated by portal hypertension, esophageal varices and splenomegaly  - No evidence of acute decompensation, continue treatments as prescribed. Closely monitor. Lactulose held on 07/23 and 07/24 because patient was having >5 BM daily despite being on once daily dosing. Will tentatively plan to resume tomorrow morning on 07/25/24, but closely monitor this, titrate as needed.     Hypertension  - Blood pressure appears stable and acceptable acutely at this time. No indications to warrant acute changes/intervention at this time.  - Continue current medications as prescribed. Trend BP to guide ongoing management decisions.     Hypothyroidism  - TSH low on labs, worse from most recent prior value. FT4 normal. Values could be skewed in setting of acute illness. She does not have evidence at present of thyrotoxicosis, inclined to leave current treatment regimen as prescribed for now and recommend that she have close outpatient follow up for repeat testing to determine if medication adjustments warranted.     Hypomagnesemia  - Mag low on prior labs, repleted via electrolyte protocol. Follow up repeat labs to guide ongoing management decisions. Replete PRN per protocol. Continue to monitor     Hypokalemia  - K low on prior labs, repleted via electrolyte protocol. Follow up repeat labs to guide ongoing management decisions. Replete PRN per protocol. Continue to monitor    Hyponatremia  - Na level worsened on labs 07/23, further workup ordered and reviewed. Urine studies skewed because she has been receiving IVF. Nonetheless, most recent Na has improved and normalized on most recent testing.  - Continue monitoring for now.     Portions of this text have been copied and  I have reviewed these. Documentation accurate as of 07/24/24.       SCDs for DVT prophylaxis.  Full code.  Discussed with patient and nursing staff.  Anticipate discharge home later this week.  Expected Discharge Date: 7/25/2024; Expected Discharge Time:       Burt Conn DO  [facility]  07/24/24

## 2024-07-24 NOTE — PROGRESS NOTES
[facility] Clinical Pharmacy Services: Piperacillin-Tazobactam Consult    Pt Name: Bekah Gibson   : 1970    Indication: Urinary Tract Infection    Relevant clinical data and objective history reviewed:    Past Medical History:   Diagnosis Date    Acromioclavicular separation 2021    Ankle sprain 2004    no operation necessary  At Time unsure    Anxiety     Arthritis     Arthritis of back Unsure    Diseased    Cirrhosis     Disease of thyroid gland     ETOH abuse     SOBER FOR 3 MONTHS    Fracture, clavicle 2021    shattered clavicel on issue slip and fall    Fracture, foot Unsure    Frozen shoulder     4    GERD (gastroesophageal reflux disease)     History of kidney stones     5 YR AGO    Hypertension     Left shoulder pain     Low back strain     Lumbosacral disc disease     MDD (major depressive disorder)     Neck strain Unsure    Pancreatitis     Periarthritis of shoulder see above    Rotator cuff syndrome odre for shoulder replacement    unable to receive due to low platelets    Tennis elbow Unsure    Unsurpassed    Thrombocytopenia      Creatinine   Date Value Ref Range Status   2024 0.39 (L) 0.57 - 1.00 mg/dL Final   2024 0.50 (L) 0.57 - 1.00 mg/dL Final   2024 0.44 (L) 0.57 - 1.00 mg/dL Final   2023 0.48 (L) 0.55 - 1.02 mg/dL Final   2023 0.55 0.55 - 1.02 mg/dL Final   2022 0.56 0.55 - 1.02 mg/dL Final     BUN   Date Value Ref Range Status   2024 3 (L) 6 - 20 mg/dL Final   2023 9 (L) 10 - 20 mg/dL Final     Estimated Creatinine Clearance: 159.7 mL/min (A) (by C-G formula based on SCr of 0.39 mg/dL (L)).    Lab Results   Component Value Date    WBC 2.35 (L) 2024     Temp Readings from Last 3 Encounters:   24 98.2 °F (36.8 °C) (Oral)   24 97.7 °F (36.5 °C) (Oral)   24 99.1 °F (37.3 °C) (Temporal)      Assessment/Plan  Estimated CrCl >20 mL/min at this time; BMI 26.19 kg/m2  Will start  piperacillin-tazobactam 3.375 g IV every 8 hours     Pharmacy will continue to follow daily while on piperacillin-tazobactam and adjust as needed. Thank you for this consult.    Neal Ruiz [facility]  Clinical Pharmacist

## 2024-07-24 NOTE — PLAN OF CARE
Goal Outcome Evaluation:  Plan of Care Reviewed With: patient        Progress: improving  Outcome Evaluation: (P) A&Ox4. VSS. RA. Pt c/o abdominal pain, PRN pain medication given, effective per pt. CIWA scores evaluated. IV fluids and abx given. Pt advanced to GI soft diet, tolerating well. Potassium replaced.

## 2024-07-25 ENCOUNTER — READMISSION MANAGEMENT (OUTPATIENT)
Dept: CALL CENTER | Facility: HOSPITAL | Age: 54
End: 2024-07-25
Payer: MEDICAID

## 2024-07-25 VITALS
HEART RATE: 72 BPM | TEMPERATURE: 97.4 F | BODY MASS INDEX: 23.43 KG/M2 | HEIGHT: 65 IN | OXYGEN SATURATION: 95 % | SYSTOLIC BLOOD PRESSURE: 129 MMHG | DIASTOLIC BLOOD PRESSURE: 77 MMHG | WEIGHT: 140.65 LBS | RESPIRATION RATE: 18 BRPM

## 2024-07-25 LAB
ALBUMIN SERPL-MCNC: 3.4 G/DL (ref 3.5–5.2)
ALBUMIN/GLOB SERPL: 1.4 G/DL
ALP SERPL-CCNC: 133 U/L (ref 39–117)
ALT SERPL W P-5'-P-CCNC: 46 U/L (ref 1–33)
ANION GAP SERPL CALCULATED.3IONS-SCNC: 10 MMOL/L (ref 5–15)
AST SERPL-CCNC: 79 U/L (ref 1–32)
BASOPHILS # BLD MANUAL: 0.03 10*3/MM3 (ref 0–0.2)
BASOPHILS NFR BLD MANUAL: 1 % (ref 0–1.5)
BILIRUB SERPL-MCNC: 1.5 MG/DL (ref 0–1.2)
BUN SERPL-MCNC: 5 MG/DL (ref 6–20)
BUN/CREAT SERPL: 9.6 (ref 7–25)
CALCIUM SPEC-SCNC: 8.9 MG/DL (ref 8.6–10.5)
CHLORIDE SERPL-SCNC: 104 MMOL/L (ref 98–107)
CO2 SERPL-SCNC: 23 MMOL/L (ref 22–29)
CREAT SERPL-MCNC: 0.52 MG/DL (ref 0.57–1)
DEPRECATED RDW RBC AUTO: 45.7 FL (ref 37–54)
EGFRCR SERPLBLD CKD-EPI 2021: 109.2 ML/MIN/1.73
EOSINOPHIL # BLD MANUAL: 0.16 10*3/MM3 (ref 0–0.4)
EOSINOPHIL NFR BLD MANUAL: 5.1 % (ref 0.3–6.2)
ERYTHROCYTE [DISTWIDTH] IN BLOOD BY AUTOMATED COUNT: 13.9 % (ref 12.3–15.4)
GLOBULIN UR ELPH-MCNC: 2.5 GM/DL
GLUCOSE SERPL-MCNC: 143 MG/DL (ref 65–99)
HCT VFR BLD AUTO: 39.9 % (ref 34–46.6)
HGB BLD-MCNC: 13.5 G/DL (ref 12–15.9)
LYMPHOCYTES # BLD MANUAL: 1.24 10*3/MM3 (ref 0.7–3.1)
LYMPHOCYTES NFR BLD MANUAL: 8.1 % (ref 5–12)
MAGNESIUM SERPL-MCNC: 1.4 MG/DL (ref 1.6–2.6)
MCH RBC QN AUTO: 31.1 PG (ref 26.6–33)
MCHC RBC AUTO-ENTMCNC: 33.8 G/DL (ref 31.5–35.7)
MCV RBC AUTO: 91.9 FL (ref 79–97)
MONOCYTES # BLD: 0.25 10*3/MM3 (ref 0.1–0.9)
NEUTROPHILS # BLD AUTO: 1.46 10*3/MM3 (ref 1.7–7)
NEUTROPHILS NFR BLD MANUAL: 46.5 % (ref 42.7–76)
PLAT MORPH BLD: NORMAL
PLATELET # BLD AUTO: 42 10*3/MM3 (ref 140–450)
PMV BLD AUTO: 12.1 FL (ref 6–12)
POTASSIUM SERPL-SCNC: 3.8 MMOL/L (ref 3.5–5.2)
PROT SERPL-MCNC: 5.9 G/DL (ref 6–8.5)
RBC # BLD AUTO: 4.34 10*6/MM3 (ref 3.77–5.28)
RBC MORPH BLD: NORMAL
SODIUM SERPL-SCNC: 137 MMOL/L (ref 136–145)
VARIANT LYMPHS NFR BLD MANUAL: 39.4 % (ref 19.6–45.3)
WBC MORPH BLD: NORMAL
WBC NRBC COR # BLD AUTO: 3.14 10*3/MM3 (ref 3.4–10.8)

## 2024-07-25 PROCEDURE — 25810000003 LACTATED RINGERS PER 1000 ML: Performed by: STUDENT IN AN ORGANIZED HEALTH CARE EDUCATION/TRAINING PROGRAM

## 2024-07-25 PROCEDURE — 25010000002 MAGNESIUM SULFATE 2 GM/50ML SOLUTION: Performed by: HOSPITALIST

## 2024-07-25 PROCEDURE — 36415 COLL VENOUS BLD VENIPUNCTURE: CPT | Performed by: STUDENT IN AN ORGANIZED HEALTH CARE EDUCATION/TRAINING PROGRAM

## 2024-07-25 PROCEDURE — 25010000002 PIPERACILLIN SOD-TAZOBACTAM PER 1 G

## 2024-07-25 PROCEDURE — 99222 1ST HOSP IP/OBS MODERATE 55: CPT

## 2024-07-25 PROCEDURE — 85025 COMPLETE CBC W/AUTO DIFF WBC: CPT | Performed by: STUDENT IN AN ORGANIZED HEALTH CARE EDUCATION/TRAINING PROGRAM

## 2024-07-25 PROCEDURE — 85007 BL SMEAR W/DIFF WBC COUNT: CPT | Performed by: STUDENT IN AN ORGANIZED HEALTH CARE EDUCATION/TRAINING PROGRAM

## 2024-07-25 PROCEDURE — 83735 ASSAY OF MAGNESIUM: CPT | Performed by: STUDENT IN AN ORGANIZED HEALTH CARE EDUCATION/TRAINING PROGRAM

## 2024-07-25 PROCEDURE — 80053 COMPREHEN METABOLIC PANEL: CPT | Performed by: STUDENT IN AN ORGANIZED HEALTH CARE EDUCATION/TRAINING PROGRAM

## 2024-07-25 PROCEDURE — 25010000002 HYDROMORPHONE PER 4 MG: Performed by: STUDENT IN AN ORGANIZED HEALTH CARE EDUCATION/TRAINING PROGRAM

## 2024-07-25 RX ORDER — MAGNESIUM SULFATE HEPTAHYDRATE 40 MG/ML
2 INJECTION, SOLUTION INTRAVENOUS
Status: DISPENSED | OUTPATIENT
Start: 2024-07-25 | End: 2024-07-25

## 2024-07-25 RX ADMIN — FOLIC ACID 1 MG: 1 TABLET ORAL at 08:25

## 2024-07-25 RX ADMIN — NICOTINE 1 PATCH: 7 PATCH, EXTENDED RELEASE TRANSDERMAL at 08:27

## 2024-07-25 RX ADMIN — NADOLOL 40 MG: 40 TABLET ORAL at 08:25

## 2024-07-25 RX ADMIN — FLUOXETINE HYDROCHLORIDE 60 MG: 20 CAPSULE ORAL at 08:25

## 2024-07-25 RX ADMIN — AMLODIPINE BESYLATE 2.5 MG: 5 TABLET ORAL at 08:24

## 2024-07-25 RX ADMIN — MAGNESIUM OXIDE 400 MG (241.3 MG MAGNESIUM) TABLET 400 MG: TABLET at 08:25

## 2024-07-25 RX ADMIN — PANCRELIPASE 24000 UNITS OF LIPASE: 60000; 12000; 38000 CAPSULE, DELAYED RELEASE PELLETS ORAL at 12:08

## 2024-07-25 RX ADMIN — LEVOTHYROXINE SODIUM 100 MCG: 100 TABLET ORAL at 04:56

## 2024-07-25 RX ADMIN — HYDROMORPHONE HYDROCHLORIDE 0.5 MG: 1 INJECTION, SOLUTION INTRAMUSCULAR; INTRAVENOUS; SUBCUTANEOUS at 08:24

## 2024-07-25 RX ADMIN — PANCRELIPASE 24000 UNITS OF LIPASE: 60000; 12000; 38000 CAPSULE, DELAYED RELEASE PELLETS ORAL at 08:25

## 2024-07-25 RX ADMIN — MAGNESIUM SULFATE HEPTAHYDRATE 2 G: 40 INJECTION, SOLUTION INTRAVENOUS at 08:24

## 2024-07-25 RX ADMIN — Medication 100 MG: at 08:25

## 2024-07-25 RX ADMIN — FERROUS SULFATE TAB 325 MG (65 MG ELEMENTAL FE) 325 MG: 325 (65 FE) TAB at 08:24

## 2024-07-25 RX ADMIN — PANTOPRAZOLE SODIUM 40 MG: 40 TABLET, DELAYED RELEASE ORAL at 04:57

## 2024-07-25 RX ADMIN — SODIUM CHLORIDE, POTASSIUM CHLORIDE, SODIUM LACTATE AND CALCIUM CHLORIDE 100 ML/HR: 600; 310; 30; 20 INJECTION, SOLUTION INTRAVENOUS at 01:21

## 2024-07-25 RX ADMIN — HYDROMORPHONE HYDROCHLORIDE 0.5 MG: 1 INJECTION, SOLUTION INTRAMUSCULAR; INTRAVENOUS; SUBCUTANEOUS at 03:05

## 2024-07-25 RX ADMIN — MAGNESIUM SULFATE HEPTAHYDRATE 2 G: 40 INJECTION, SOLUTION INTRAVENOUS at 11:33

## 2024-07-25 RX ADMIN — PIPERACILLIN AND TAZOBACTAM 3.38 G: 3; .375 INJECTION, POWDER, FOR SOLUTION INTRAVENOUS at 08:25

## 2024-07-25 NOTE — PLAN OF CARE
Goal Outcome Evaluation:  Plan of Care Reviewed With: patient        Progress: improving  Outcome Evaluation: Pt A&Ox4. VSS on RA. Pt c/o abdominal pain relieved by PRN medication. CIWA scores evaluated and medicated appropriately. IV fluids and abx continue. Pt tolerating GI soft diet. Plan of care continues.

## 2024-07-25 NOTE — PAYOR COMM NOTE
"Ba Gibson (54 y.o. Female)        PLEASE SEE ATTACHED DC SUMMARY    REF#[id number]     THANK YOU    ARTIS SHAH LPN CCP       Date of Birth   1970    Social Security Number       Address   [address] [id number]    Home Phone   [phone]    MRN   [id number]       Rastafarian   Lutheran    Marital Status                               Admission Date   24    Admission Type   Emergency    Admitting Provider   Burt Conn DO    Attending Provider       Department, Room/Bed   [address], S611/1       Discharge Date   2024    Discharge Disposition   Home or Self Care    Discharge Destination                                 Attending Provider: (none)   Allergies: Benzonatate, Ketorolac Tromethamine, Phenergan [Promethazine Hcl], Cucumber Extract, Promethazine-phenylephrine    Isolation: None   Infection: None   Code Status: CPR    Ht: 165.1 cm (65\")   Wt: 63.8 kg (140 lb 10.5 oz)    Admission Cmt: None   Principal Problem: Acute on chronic pancreatitis [K85.90,K86.1]                   Active Insurance as of 2024       Primary Coverage       Payor Plan Insurance Group Employer/Plan Group    ANTHEM MEDICAID ANTHEM MEDICAID [id number]       Payor Plan Address Payor Plan Phone Number Payor Plan Fax Number Effective Dates    [address] [address] [phone]  2018 - None Entered    [facility] [id number]         Subscriber Name Subscriber Birth Date Member ID       BA GIBSON 1970 [id number]                     Emergency Contacts        (Rel.) Home Phone Work Phone Mobile Phone    Jeff Gibson (Spouse) [phone] -- [phone]                 Discharge Summary        Familia Martinez MD at 24 1335              Patient Name: Ba Gibson  : 1970  MRN: [id number]    Date of Admission: 2024  Date of Discharge:  2024  Primary Care Physician: Tylor Davis      Chief Complaint: "   Abdominal Pain      Discharge Diagnoses     Active Hospital Problems    Diagnosis  POA    **Acute on chronic pancreatitis [K85.90, K86.1]  Yes    WBC decreased [D72.819]  No    Hypokalemia [E87.6]  No    Urinary tract infection [N39.0]  Yes    Neutropenia [D70.9]  Yes    Transaminitis [R74.01]  Yes    Cholelithiasis [K80.20]  Yes    Alcohol use [Z78.9]  Yes    Esophageal varices [I85.00]  Yes    Primary hypertension [I10]  Yes    Alcoholic cirrhosis [K70.30]  Yes    Hypomagnesemia [E83.42]  Yes    Hashimoto's disease [E06.3]  Yes    Pancreatic insufficiency [K86.89]  Yes    Thrombocytopenia [D69.6]  Yes    Hypothyroidism [E03.9]  Yes    Vitamin D deficiency [E55.9]  Yes    Peripheral neuropathy [G62.9]  Yes      Resolved Hospital Problems   No resolved problems to display.        Hospital Course     Ms. Gibson is a 54 y.o. smoker with a history of Chronic pancreatitis, ETOH use, Cirrhosis complicated by portal hypertension, esophageal varices and splenomegaly, hypothyroidism, neuropathy, vitamin D deficiency, hypertension that presents to [facility] complaining of abrupt onset epigastric abdominal pain that began ~1 day ago prior to arrival. See H&P for details. She was admitted with Acute on chronic pancreatitis. ETOH level 182 on arrival, likely trigger for pancreatitis.  Patient admitted that she had been binging recently.  Her lipase was normal but she does have some chronic pancreatitis so it may not elevate significantly.  Imaging showed subtle stranding adjacent to the pancreatic head consistent with acute on chronic pancreatitis.  Hepatic cirrhotic morphology was also seen.  She was admitted and started on IV fluids and kept n.p.o. initially.  Pain was controlled with IV Dilaudid.  General surgery saw her and preliminary note looks like they are not planning any intervention for the cholelithiasis as she has no signs of acute cholecystitis and certainly has no significant findings on  abdominal exam.  She has seen steady improvement in her pain control and has been able to advance her diet back to solid foods without any significant decompensation.  She did have some significant leukopenia and thrombocytopenia which actually are somewhat chronic.  Hematology saw her and did not recommend any additional changes for this.  She continues on Creon and should continue to do so to assist with digestion.  Electrolytes have been replaced per protocol.  We monitored for alcohol withdrawal and treated with thiamine.  Today she is asking to be discharged home and since she is tolerating diet with adequate pain control she certainly seems stable to do so.  She has been given appropriate resources for alcohol treatment and is highly motivated to quit.  She is going to reestablish with AA.        Day of Discharge     Subjective:  Feeling better.  Pain level only 2-3 out of 10 which she says is her normal amount.    Physical Exam:  Temp:  [97.4 °F (36.3 °C)-98.2 °F (36.8 °C)] 97.4 °F (36.3 °C)  Heart Rate:  [72-74] 72  Resp:  [18] 18  BP: (126-152)/(77-93) 129/77  Body mass index is 23.41 kg/m².  Physical Exam  Vitals and nursing note reviewed.   Constitutional:       General: She is not in acute distress.  HENT:      Head: Normocephalic and atraumatic.   Eyes:      General: No scleral icterus.  Neck:      Vascular: No JVD.   Cardiovascular:      Rate and Rhythm: Normal rate and regular rhythm.      Heart sounds: No murmur heard.  Pulmonary:      Effort: Pulmonary effort is normal. No respiratory distress.   Abdominal:      General: There is no distension.      Palpations: Abdomen is soft.      Tenderness: There is no abdominal tenderness.   Musculoskeletal:         General: No swelling or tenderness.   Skin:     General: Skin is warm and dry.      Coloration: Skin is not jaundiced.      Findings: No rash.   Neurological:      Mental Status: She is alert and oriented to person, place, and time.   Psychiatric:          Mood and Affect: Mood normal.         Behavior: Behavior normal.         Consultants     Consult Orders (all) (From admission, onward)       Start     Ordered    07/24/24 1430  Inpatient General Surgery Consult  Once        Specialty:  General Surgery  Provider:  Katerina St MD    07/24/24 1429    07/24/24 0944  Hematology & Oncology Inpatient Consult  Once        Specialty:  Hematology and Oncology  Provider:  Yong Lewis MD    07/24/24 0944    07/22/24 0728  Inpatient Access Center Consult  Once        Provider:  Aric Vega III, MD    07/22/24 0727    07/22/24 0630  [facility] (on-call MD unless specified) Details  Once        Specialty:  Hospitalist  Provider:  Kalpesh Trujillo MD    07/22/24 0629                  Procedures     Imaging Results (All)       Procedure Component Value Units Date/Time    CT Abdomen Pelvis With Contrast [[id number]] Collected: 07/22/24 0611     Updated: 07/22/24 0650    Narrative:      CT ABDOMEN PELVIS W CONTRAST-     HISTORY: 54 years of age, Female. Upper abdominal pain     TECHNIQUE:  CT includes axial imaging from the lung bases to the  trochanters with intravenous contrast and with use of oral contrast.  Data reconstructed in coronal and sagittal planes. Radiation dose  reduction techniques were utilized, including automated exposure control  and exposure modulation based on body size.     COMPARISON: CT abdomen pelvis 07/03/2024, 04/01/2024, 10/07/2023.     FINDINGS: Lung bases appear clear. Hepatic steatosis with cirrhotic  morphology. Small gallstones layer dependently within the gallbladder.  Evidence of portal venous hypertension with gastric and esophageal  varices and splenomegaly. Recanalization of the portal vein.     Moderate stenosis of the celiac artery origin with poststenotic  dilatation. Adrenal glands, kidneys appear within normal limits.  Extensive pancreatic calcifications consistent with chronic  pancreatitis. There is mild stranding  adjacent to the pancreatic head  that appears slightly increased compared to the prior exam 19 days ago  suspicious for mild acute on chronic pancreatitis. No pancreatic ductal  dilatation.     No evidence for bowel obstruction. No ascites or evidence for  intra-abdominal abscess formation. No evidence for appendicitis.       Impression:      1. Subtle stranding adjacent to the pancreatic head, mildly increased  compared to exam 07/03/2024, suspicious for acute on chronic  pancreatitis.  2. Hepatic cirrhotic nephrology with hepatic steatosis. Portal venous  hypertension with gastroesophageal varices, splenomegaly.  3. Cholelithiasis.     Radiation dose reduction techniques were utilized, including automated  exposure control and exposure modulation based on body size.        This report was finalized on 7/22/2024 6:47 AM by Dr. Anson Reynolds M.D on Workstation: [id number]                 Pertinent Labs     Results from last 7 days   Lab Units 07/25/24  0553 07/24/24  0756 07/23/24  1841 07/23/24  1259 07/23/24  0812   WBC 10*3/mm3 3.14* 2.57*  --  2.35* 2.51*   HEMOGLOBIN g/dL 13.5 13.5  --  13.8 13.9   PLATELETS 10*3/mm3 42* 34* 36* 36* 37*     Results from last 7 days   Lab Units 07/25/24  0553 07/24/24  1803 07/24/24  0755 07/23/24  1752 07/23/24  0812 07/22/24  0355   SODIUM mmol/L 137  --  136  --  132* 141   POTASSIUM mmol/L 3.8 4.3 3.6 3.9 3.2* 3.6   CHLORIDE mmol/L 104  --  103  --  100 105   CO2 mmol/L 23.0  --  23.0  --  26.0 21.0*   BUN mg/dL 5*  --  4*  --  3* 10   CREATININE mg/dL 0.52*  --  0.45*  --  0.39* 0.50*   GLUCOSE mg/dL 143*  --  108*  --  108* 154*   EGFR mL/min/1.73 109.2  --  113.1  --  117.0 110.2     Results from last 7 days   Lab Units 07/25/24  0553 07/24/24  0755 07/23/24  0812 07/22/24  0355   ALBUMIN g/dL 3.4* 3.4* 3.7 4.5   BILIRUBIN mg/dL 1.5* 1.6* 2.0* 0.9   ALK PHOS U/L 133* 138* 147* 188*   AST (SGOT) U/L 79* 101* 100* 118*   ALT (SGPT) U/L 46* 49* 45* 58*     Results from  "last 7 days   Lab Units 07/25/24  0553 07/24/24  0755 07/23/24  0812 07/22/24  0355   CALCIUM mg/dL 8.9 8.9 9.1 9.3   ALBUMIN g/dL 3.4* 3.4* 3.7 4.5   MAGNESIUM mg/dL 1.4* 1.7 2.2 1.5*     Results from last 7 days   Lab Units 07/22/24  0430 07/22/24  0355   LIPASE U/L  --  60   AMMONIA umol/L 39  --        Results from last 7 days   Lab Units 07/23/24  1340 07/23/24  0812   SODIUM UR mmol/L 165  --    OSMOLALITY UR mOsm/kg 400  --    URIC ACID mg/dL  --  3.5         Invalid input(s): \"LDLCALC\"          Test Results Pending at Discharge     Pending Labs       Order Current Status    Blood Culture - Blood, Arm, Left In process    Blood Culture - Blood, Hand, Right In process            Discharge Details        Discharge Medications        Changes to Medications        Instructions Start Date   levothyroxine 100 MCG tablet  Commonly known as: SYNTHROID, LEVOTHROID  What changed: when to take this   TAKE ONE TABLET BY MOUTH DAILY             Continue These Medications        Instructions Start Date   amLODIPine 2.5 MG tablet  Commonly known as: NORVASC   Take 1 tablet by mouth Daily.      Creon 40699-21350 units capsule delayed-release particles capsule  Generic drug: pancrelipase (Lip-Prot-Amyl)   24,000 units of lipase, Oral, 3 Times Daily With Meals      ferrous sulfate 325 (65 FE) MG tablet   325 mg, Oral, Daily With Breakfast      FLUoxetine 60 MG tablet  Commonly known as: PROzac   60 mg, Oral, Daily      folic acid 1 MG tablet  Commonly known as: FOLVITE   1 mg, Oral, Daily      Ibuprofen 3 %, Gabapentin 10 %, Baclofen 2 %, lidocaine 4 %   1-2 g, Topical, 3 to 4 Times Daily      lactulose 10 GM/15ML solution  Commonly known as: CHRONULAC   10 g, Oral, Every Morning      MAGnesium-Oxide 400 (240 Mg) MG tablet  Generic drug: Magnesium Oxide -Mg Supplement   400 mg, Oral, Daily      mirtazapine 15 MG tablet  Commonly known as: REMERON   15 mg, Oral, Nightly      multivitamin capsule   1 capsule, Oral, Daily    " "  nadolol 40 MG tablet  Commonly known as: CORGARD   40 mg, Oral, Daily      nitrofurantoin (macrocrystal-monohydrate) 100 MG capsule  Commonly known as: MACROBID   100 mg, Oral, 2 Times Daily, Ends 7/30/24      ondansetron ODT 4 MG disintegrating tablet  Commonly known as: ZOFRAN-ODT   4 mg, Translingual, Every 8 Hours PRN      pantoprazole 40 MG EC tablet  Commonly known as: PROTONIX   40 mg, Daily      thiamine 100 MG tablet  Commonly known as: VITAMIN B1   100 mg, Oral, Daily      vitamin D 1.25 MG (12370 UT) capsule capsule  Commonly known as: ERGOCALCIFEROL   50,000 Units, Oral, Every 7 Days               Allergies   Allergen Reactions    Benzonatate Nausea Only and Other (See Comments)     Rash     Ketorolac Tromethamine Other (See Comments)     \"I cannot take because of liver problems\"    Phenergan [Promethazine Hcl] Hives    Cucumber Extract Rash    Promethazine-Phenylephrine Other (See Comments)     \"FEEL WEIRD\"       Discharge Disposition:  Home or Self Care      Discharge Diet:  Diet Order   Procedures    Diet: Regular/House, Gastrointestinal; Low Irritant; Texture: Soft to Chew (NDD 3); Soft to Chew: Whole Meat; Fluid Consistency: Thin (IDDSI 0)       Discharge Activity:       CODE STATUS:    Code Status and Medical Interventions:   Ordered at: 07/22/24 0725     Level Of Support Discussed With:    Patient     Code Status (Patient has no pulse and is not breathing):    CPR (Attempt to Resuscitate)     Medical Interventions (Patient has pulse or is breathing):    Full       No future appointments.   Follow-up Information       Tylor Davis .    Specialty: Family Medicine  Contact information:  [address] [address] [phone]                             Time Spent on Discharge:  Greater than 30 minutes      Familia Martinez MD  [facility]  07/25/24  13:36 EDT              Electronically signed by Familia Martinez MD at 07/25/24 1343   "     Discharge Order (From admission, onward)       Start     Ordered    07/25/24 2055  Discharge patient  Once        Expected Discharge Date: 07/25/24   Discharge Disposition: Home or Self Care   Physician of Record for Attribution - Please select from Treatment Team: SHANTA ARELLANO [[id number]]   Review needed by CMO to determine Physician of Record: No      Question Answer Comment   Physician of Record for Attribution - Please select from Treatment Team SHANTA ARELLANO    Review needed by CMO to determine Physician of Record No        07/25/24 1404

## 2024-07-25 NOTE — NURSING NOTE
Access center follow up regarding ETOH: chart reviewed. Last CIWA score of 2. No acute changes overnight. Pt was given [facility] resources prior. Access following and will provide additional resources/support as needed.

## 2024-07-25 NOTE — CONSULTS
General Surgery     Summary:     Bekah Gibson is a 54 y.o. year old with epigastric abdominal pain. She has chronic cirrhosis, pancreatitis, and thrombocytopenia related to alcoholism. Cholelithiasis noted on CT dating back to 2022. No evidence of acute cholecystitis. Her labs today are Alk phos 133, AST 79, ALT 46, total bilirubin 1.5, WBC 3.14, Hgb 13.5, and platelets 42. Elevated liver enzymes the past 2 years.     Plan:   No evidence of acute cholecystitis. No indications for surgical intervention at this time. Discussed with Dr. St, symptoms, labs, and CT reviewed, consistent with chronic cirrhosis unrelated to her cholelithiasis. Due to her chronic condition, would not recommend surgical intervention unless acute cholecystitis.     Chief Complaint:    cholelithiasis    History of Present Illness:     Bekah Gibson is a 54 y.o. year old with a history of chronic liver disease, pancreatitis, thrombocytopenia, ETOH abuse, esophageal varices, splenomegaly, hypothyroidism, GERD, and HTN who presented to the ED on 7/22 with complaints of abrupt onset epigastric abdominal pain that started after a weekend of binge drinking. She describes the pain as a constant sharp pain. Denies nausea or vomiting. Denies fever or chills. States her pain has improved since admission and she is tolerating her diet without nausea or vomiting.     Past Medical History:   Past Medical History:   Diagnosis Date    Acromioclavicular separation 1/2021    Ankle sprain 2/2004    no operation necessary  At Time unsure    Anxiety     Arthritis     Arthritis of back Unsure    Diseased    Cirrhosis     Disease of thyroid gland     ETOH abuse     SOBER FOR 3 MONTHS    Fracture, clavicle 1/2021    shattered clavicel on issue slip and fall    Fracture, foot Unsure    Frozen shoulder 1 /2009    4    GERD (gastroesophageal reflux disease)     History of kidney stones     5 YR AGO    Hypertension     Left shoulder pain     Low back strain 1/21     Lumbosacral disc disease     MDD (major depressive disorder)     Neck strain Unsure    Pancreatitis     Periarthritis of shoulder see above    Rotator cuff syndrome odre for shoulder replacement    unable to receive due to low platelets    Tennis elbow Unsure    Unsurpassed    Thrombocytopenia           Past Surgical History:    Past Surgical History:   Procedure Laterality Date    CLAVICLE SURGERY Right 2018    ENDOSCOPY N/A 10/26/2022    Procedure: ESOPHAGOGASTRODUODENOSCOPY wtih banding;  Surgeon: Ag Patel MD;  Location: [facility] ENDOSCOPY;  Service: Gastroenterology;  Laterality: N/A;  pre: melena  post: esophageal varicies with banding x2 bands    ENDOSCOPY N/A 2023    Procedure: ESOPHAGOGASTRODUODENOSCOPY;  Surgeon: Ag Patel MD;  Location: [facility] ENDOSCOPY;  Service: Gastroenterology;  Laterality: N/A;  PRE OP - MAROON STOOLS  POST OP - SM ESOPHAGEAL VARICES    ESOPHAGEAL VARICES LIGATION      FOOT SURGERY  12    JOINT REPLACEMENT Bilateral     GREAT TOE    KIDNEY STONE SURGERY      LITHOTRIPSY AND STENT (R SIDE)    LAPAROSCOPIC TUBAL LIGATION      NECK SURGERY  3/07    Not sure of dates    SIGMOIDOSCOPY N/A 2023    Procedure: SIGMOIDOSCOPY FLEXIBLE;  Surgeon: Ag Patel MD;  Location: [facility] ENDOSCOPY;  Service: Gastroenterology;  Laterality: N/A;  PRE OP - MAROON STOOLS  POST OP - RECTAL VARICES    TOTAL SHOULDER ARTHROPLASTY Left 2023    Procedure: reverse total shoulder arthroplasty;  Surgeon: Giovanni Denise MD;  Location: [facility] OR [facility];  Service: Orthopedics;  Laterality: Left;         Family History:    Family History   Problem Relation Age of Onset    Rheumatologic disease Mother         congestive heart diseased    Osteoporosis Mother             Thyroid disease Father     Leukemia Father     Cancer Father         Leukemia  deseased     Broken bones Father     Clotting disorder Father     Drug abuse Brother     Malig Hyperthermia Neg Hx   "        Social History:    Social History     Socioeconomic History    Marital status:    Tobacco Use    Smoking status: Every Day     Current packs/day: 0.25     Average packs/day: 0.3 packs/day for 15.0 years (3.8 ttl pk-yrs)     Types: Cigarettes     Passive exposure: Current    Smokeless tobacco: Never    Tobacco comments:     Rarely smoke sometimes will to   Vaping Use    Vaping status: Never Used   Substance and Sexual Activity    Alcohol use: Not Currently     Alcohol/week: 5.0 - 10.0 standard drinks of alcohol     Types: 5 - 10 Shots of liquor per week     Comment: 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    Drug use: Not Currently    Sexual activity: Not Currently     Partners: Male     Birth control/protection: Post-menopausal, Natural family planning/Rhythm     Comment: cinthia- menepausal       Allergies:   Allergies   Allergen Reactions    Benzonatate Nausea Only and Other (See Comments)     Rash     Ketorolac Tromethamine Other (See Comments)     \"I cannot take because of liver problems\"    Phenergan [Promethazine Hcl] Hives    Cucumber Extract Rash    Promethazine-Phenylephrine Other (See Comments)     \"FEEL WEIRD\"       Medications:   Amlodipine  Ferous sulfate  Prozac  Lactulose  Synthroid  Magnesium  Remeron  Corgard  Creon  Protonix  Thiamine  Vitamin D    Radiology/Endoscopy:    CT Abdomen pelvis with contrast 7/22/24  IMPRESSION:  1. Subtle stranding adjacent to the pancreatic head, mildly increased  compared to exam 07/03/2024, suspicious for acute on chronic  pancreatitis.  2. Hepatic cirrhotic nephrology with hepatic steatosis. Portal venous  hypertension with gastroesophageal varices, splenomegaly.  3. Cholelithiasis.     This report was finalized on 7/22/2024 6:47 AM by Dr. Anson Reynolds M.D on Workstation: [id number]       Labs:    Results from last 7 days   Lab Units 07/25/24  0553 07/24/24  0756 07/23/24  1841 07/23/24  1259 07/23/24  0812 07/22/24  0355   WBC 10*3/mm3 3.14* 2.57*  --  2.35* " 2.51* 4.88   HEMOGLOBIN g/dL 13.5 13.5  --  13.8 13.9 15.7   HEMATOCRIT % 39.9 39.0  --  40.7 42.2 46.5   PLATELETS 10*3/mm3 42* 34* 36* 36* 37* 59*     Results from last 7 days   Lab Units 07/25/24  0553 07/24/24  1803 07/24/24  0755 07/23/24  1752 07/23/24  0812   SODIUM mmol/L 137  --  136  --  132*   POTASSIUM mmol/L 3.8 4.3 3.6   < > 3.2*   CHLORIDE mmol/L 104  --  103  --  100   CO2 mmol/L 23.0  --  23.0  --  26.0   BUN mg/dL 5*  --  4*  --  3*   CREATININE mg/dL 0.52*  --  0.45*  --  0.39*   CALCIUM mg/dL 8.9  --  8.9  --  9.1   BILIRUBIN mg/dL 1.5*  --  1.6*  --  2.0*   ALK PHOS U/L 133*  --  138*  --  147*   ALT (SGPT) U/L 46*  --  49*  --  45*   AST (SGOT) U/L 79*  --  101*  --  100*   GLUCOSE mg/dL 143*  --  108*  --  108*    < > = values in this interval not displayed.       Review of Systems   Constitutional:  Negative for chills and fever.   HENT:  Negative for swollen glands and trouble swallowing.    Respiratory:  Negative for chest tightness and shortness of breath.    Cardiovascular:  Negative for chest pain and palpitations.   Gastrointestinal:  Positive for abdominal pain. Negative for nausea and vomiting.   Genitourinary:  Negative for dysuria.   Musculoskeletal:  Negative for arthralgias and myalgias.   Skin:  Negative for rash and wound.   Neurological:  Negative for dizziness and light-headedness.   Psychiatric/Behavioral:  The patient is not nervous/anxious.         Physical Exam  Constitutional:       General: She is not in acute distress.     Appearance: Normal appearance. She is not ill-appearing.   HENT:      Head: Normocephalic and atraumatic.   Eyes:      Extraocular Movements: Extraocular movements intact.      Pupils: Pupils are equal, round, and reactive to light.   Cardiovascular:      Rate and Rhythm: Normal rate.   Pulmonary:      Effort: Pulmonary effort is normal.   Abdominal:      General: There is no distension.      Palpations: Abdomen is soft.      Tenderness: There is  abdominal tenderness. There is no guarding or rebound.      Hernia: No hernia is present.   Skin:     General: Skin is warm and dry.   Neurological:      General: No focal deficit present.      Mental Status: She is alert.   Psychiatric:         Mood and Affect: Mood normal.                     AMBAR Roberson   General and Endoscopic Surgery  [facility]    [address], [address]  [place], [place], [address]  P: [phone]  F: [phone]

## 2024-07-25 NOTE — DISCHARGE SUMMARY
Patient Name: Bekah Gibson  : 1970  MRN: [id number]    Date of Admission: 2024  Date of Discharge:  2024  Primary Care Physician: Tylor Davis      Chief Complaint:   Abdominal Pain      Discharge Diagnoses     Active Hospital Problems    Diagnosis  POA    **Acute on chronic pancreatitis [K85.90, K86.1]  Yes    WBC decreased [D72.819]  No    Hypokalemia [E87.6]  No    Urinary tract infection [N39.0]  Yes    Neutropenia [D70.9]  Yes    Transaminitis [R74.01]  Yes    Cholelithiasis [K80.20]  Yes    Alcohol use [Z78.9]  Yes    Esophageal varices [I85.00]  Yes    Primary hypertension [I10]  Yes    Alcoholic cirrhosis [K70.30]  Yes    Hypomagnesemia [E83.42]  Yes    Hashimoto's disease [E06.3]  Yes    Pancreatic insufficiency [K86.89]  Yes    Thrombocytopenia [D69.6]  Yes    Hypothyroidism [E03.9]  Yes    Vitamin D deficiency [E55.9]  Yes    Peripheral neuropathy [G62.9]  Yes      Resolved Hospital Problems   No resolved problems to display.        Hospital Course     Ms. Gibson is a 54 y.o. smoker with a history of Chronic pancreatitis, ETOH use, Cirrhosis complicated by portal hypertension, esophageal varices and splenomegaly, hypothyroidism, neuropathy, vitamin D deficiency, hypertension that presents to [facility] complaining of abrupt onset epigastric abdominal pain that began ~1 day ago prior to arrival. See H&P for details. She was admitted with Acute on chronic pancreatitis. ETOH level 182 on arrival, likely trigger for pancreatitis.  Patient admitted that she had been binging recently.  Her lipase was normal but she does have some chronic pancreatitis so it may not elevate significantly.  Imaging showed subtle stranding adjacent to the pancreatic head consistent with acute on chronic pancreatitis.  Hepatic cirrhotic morphology was also seen.  She was admitted and started on IV fluids and kept n.p.o. initially.  Pain was controlled with IV Dilaudid.  General surgery saw  her and preliminary note looks like they are not planning any intervention for the cholelithiasis as she has no signs of acute cholecystitis and certainly has no significant findings on abdominal exam.  She has seen steady improvement in her pain control and has been able to advance her diet back to solid foods without any significant decompensation.  She did have some significant leukopenia and thrombocytopenia which actually are somewhat chronic.  Hematology saw her and did not recommend any additional changes for this.  She continues on Creon and should continue to do so to assist with digestion.  Electrolytes have been replaced per protocol.  We monitored for alcohol withdrawal and treated with thiamine.  Today she is asking to be discharged home and since she is tolerating diet with adequate pain control she certainly seems stable to do so.  She has been given appropriate resources for alcohol treatment and is highly motivated to quit.  She is going to reestablish with AA.        Day of Discharge     Subjective:  Feeling better.  Pain level only 2-3 out of 10 which she says is her normal amount.    Physical Exam:  Temp:  [97.4 °F (36.3 °C)-98.2 °F (36.8 °C)] 97.4 °F (36.3 °C)  Heart Rate:  [72-74] 72  Resp:  [18] 18  BP: (126-152)/(77-93) 129/77  Body mass index is 23.41 kg/m².  Physical Exam  Vitals and nursing note reviewed.   Constitutional:       General: She is not in acute distress.  HENT:      Head: Normocephalic and atraumatic.   Eyes:      General: No scleral icterus.  Neck:      Vascular: No JVD.   Cardiovascular:      Rate and Rhythm: Normal rate and regular rhythm.      Heart sounds: No murmur heard.  Pulmonary:      Effort: Pulmonary effort is normal. No respiratory distress.   Abdominal:      General: There is no distension.      Palpations: Abdomen is soft.      Tenderness: There is no abdominal tenderness.   Musculoskeletal:         General: No swelling or tenderness.   Skin:     General: Skin  is warm and dry.      Coloration: Skin is not jaundiced.      Findings: No rash.   Neurological:      Mental Status: She is alert and oriented to person, place, and time.   Psychiatric:         Mood and Affect: Mood normal.         Behavior: Behavior normal.         Consultants     Consult Orders (all) (From admission, onward)       Start     Ordered    07/24/24 1430  Inpatient General Surgery Consult  Once        Specialty:  General Surgery  Provider:  Katerina St MD    07/24/24 1429    07/24/24 0944  Hematology & Oncology Inpatient Consult  Once        Specialty:  Hematology and Oncology  Provider:  Yong Lewis MD    07/24/24 0944    07/22/24 0728  [facility] Consult  Once        Provider:  Aric Vega III, MD    07/22/24 0727    07/22/24 0630  [facility] (on-call MD unless specified) Details  Once        Specialty:  Hospitalist  Provider:  Kalpesh Trujillo MD    07/22/24 0629                  Procedures     Imaging Results (All)       Procedure Component Value Units Date/Time    CT Abdomen Pelvis With Contrast [[id number]] Collected: 07/22/24 0611     Updated: 07/22/24 0650    Narrative:      CT ABDOMEN PELVIS W CONTRAST-     HISTORY: 54 years of age, Female. Upper abdominal pain     TECHNIQUE:  CT includes axial imaging from the lung bases to the  trochanters with intravenous contrast and with use of oral contrast.  Data reconstructed in coronal and sagittal planes. Radiation dose  reduction techniques were utilized, including automated exposure control  and exposure modulation based on body size.     COMPARISON: CT abdomen pelvis 07/03/2024, 04/01/2024, 10/07/2023.     FINDINGS: Lung bases appear clear. Hepatic steatosis with cirrhotic  morphology. Small gallstones layer dependently within the gallbladder.  Evidence of portal venous hypertension with gastric and esophageal  varices and splenomegaly. Recanalization of the portal vein.     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 Adrenal glands, kidneys appear within normal limits.  Extensive pancreatic calcifications consistent with chronic  pancreatitis. There is mild stranding adjacent to the pancreatic head  that appears slightly increased compared to the prior exam 19 days ago  suspicious for mild acute on chronic pancreatitis. No pancreatic ductal  dilatation.     No evidence for bowel obstruction. No ascites or evidence for  intra-abdominal abscess formation. No evidence for appendicitis.       Impression:      1. Subtle stranding adjacent to the pancreatic head, mildly increased  compared to exam 07/03/2024, suspicious for acute on chronic  pancreatitis.  2. Hepatic cirrhotic nephrology with hepatic steatosis. Portal venous  hypertension with gastroesophageal varices, splenomegaly.  3. Cholelithiasis.     Radiation dose reduction techniques were utilized, including automated  exposure control and exposure modulation based on body size.        This report was finalized on 7/22/2024 6:47 AM by Dr. Anson Reynolds M.D on Workstation: [id number]                 Pertinent Labs     Results from last 7 days   Lab Units 07/25/24  0553 07/24/24  0756 07/23/24  1841 07/23/24  1259 07/23/24  0812   WBC 10*3/mm3 3.14* 2.57*  --  2.35* 2.51*   HEMOGLOBIN g/dL 13.5 13.5  --  13.8 13.9   PLATELETS 10*3/mm3 42* 34* 36* 36* 37*     Results from last 7 days   Lab Units 07/25/24  0553 07/24/24  1803 07/24/24  0755 07/23/24  1752 07/23/24  0812 07/22/24  0355   SODIUM mmol/L 137  --  136  --  132* 141   POTASSIUM mmol/L 3.8 4.3 3.6 3.9 3.2* 3.6   CHLORIDE mmol/L 104  --  103  --  100 105   CO2 mmol/L 23.0  --  23.0  --  26.0 21.0*   BUN mg/dL 5*  --  4*  --  3* 10   CREATININE mg/dL 0.52*  --  0.45*  --  0.39* 0.50*   GLUCOSE mg/dL 143*  --  108*  --  108* 154*   EGFR mL/min/1.73 109.2  --  113.1  --  117.0 110.2     Results from last 7 days   Lab Units 07/25/24  0553 07/24/24  0755 07/23/24  0812 07/22/24  0355  "  ALBUMIN g/dL 3.4* 3.4* 3.7 4.5   BILIRUBIN mg/dL 1.5* 1.6* 2.0* 0.9   ALK PHOS U/L 133* 138* 147* 188*   AST (SGOT) U/L 79* 101* 100* 118*   ALT (SGPT) U/L 46* 49* 45* 58*     Results from last 7 days   Lab Units 07/25/24  0553 07/24/24  0755 07/23/24  0812 07/22/24  0355   CALCIUM mg/dL 8.9 8.9 9.1 9.3   ALBUMIN g/dL 3.4* 3.4* 3.7 4.5   MAGNESIUM mg/dL 1.4* 1.7 2.2 1.5*     Results from last 7 days   Lab Units 07/22/24  0430 07/22/24  0355   LIPASE U/L  --  60   AMMONIA umol/L 39  --        Results from last 7 days   Lab Units 07/23/24  1340 07/23/24  0812   SODIUM UR mmol/L 165  --    OSMOLALITY UR mOsm/kg 400  --    URIC ACID mg/dL  --  3.5         Invalid input(s): \"LDLCALC\"          Test Results Pending at Discharge     Pending Labs       Order Current Status    Blood Culture - Blood, Arm, Left In process    Blood Culture - Blood, Hand, Right In process            Discharge Details        Discharge Medications        Changes to Medications        Instructions Start Date   levothyroxine 100 MCG tablet  Commonly known as: SYNTHROID, LEVOTHROID  What changed: when to take this   TAKE ONE TABLET BY MOUTH DAILY             Continue These Medications        Instructions Start Date   amLODIPine 2.5 MG tablet  Commonly known as: NORVASC   Take 1 tablet by mouth Daily.      Creon 88834-51203 units capsule delayed-release particles capsule  Generic drug: pancrelipase (Lip-Prot-Amyl)   24,000 units of lipase, Oral, 3 Times Daily With Meals      ferrous sulfate 325 (65 FE) MG tablet   325 mg, Oral, Daily With Breakfast      FLUoxetine 60 MG tablet  Commonly known as: PROzac   60 mg, Oral, Daily      folic acid 1 MG tablet  Commonly known as: FOLVITE   1 mg, Oral, Daily      Ibuprofen 3 %, Gabapentin 10 %, Baclofen 2 %, lidocaine 4 %   1-2 g, Topical, 3 to 4 Times Daily      lactulose 10 GM/15ML solution  Commonly known as: CHRONULAC   10 g, Oral, Every Morning      MAGnesium-Oxide 400 (240 Mg) MG tablet  Generic drug: " "Magnesium Oxide -Mg Supplement   400 mg, Oral, Daily      mirtazapine 15 MG tablet  Commonly known as: REMERON   15 mg, Oral, Nightly      multivitamin capsule   1 capsule, Oral, Daily      nadolol 40 MG tablet  Commonly known as: CORGARD   40 mg, Oral, Daily      nitrofurantoin (macrocrystal-monohydrate) 100 MG capsule  Commonly known as: MACROBID   100 mg, Oral, 2 Times Daily, Ends 7/30/24      ondansetron ODT 4 MG disintegrating tablet  Commonly known as: ZOFRAN-ODT   4 mg, Translingual, Every 8 Hours PRN      pantoprazole 40 MG EC tablet  Commonly known as: PROTONIX   40 mg, Daily      thiamine 100 MG tablet  Commonly known as: VITAMIN B1   100 mg, Oral, Daily      vitamin D 1.25 MG (03188 UT) capsule capsule  Commonly known as: ERGOCALCIFEROL   50,000 Units, Oral, Every 7 Days               Allergies   Allergen Reactions    Benzonatate Nausea Only and Other (See Comments)     Rash     Ketorolac Tromethamine Other (See Comments)     \"I cannot take because of liver problems\"    Phenergan [Promethazine Hcl] Hives    Cucumber Extract Rash    Promethazine-Phenylephrine Other (See Comments)     \"FEEL WEIRD\"       Discharge Disposition:  Home or Self Care      Discharge Diet:  Diet Order   Procedures    Diet: Regular/House, Gastrointestinal; Low Irritant; Texture: Soft to Chew (NDD 3); Soft to Chew: Whole Meat; Fluid Consistency: Thin (IDDSI 0)       Discharge Activity:       CODE STATUS:    Code Status and Medical Interventions:   Ordered at: 07/22/24 0725     Level Of Support Discussed With:    Patient     Code Status (Patient has no pulse and is not breathing):    CPR (Attempt to Resuscitate)     Medical Interventions (Patient has pulse or is breathing):    Full       No future appointments.   Follow-up Information       Tylor Davis .    Specialty: Family Medicine  Contact information:  [address]  [address] [address] [phone]                             Time Spent on Discharge:  Greater than " 30 minutes      Familia Martinez MD  [facility]  07/25/24  13:36 EDT

## 2024-07-26 NOTE — PROGRESS NOTES
Case Management Discharge Note      Final Note: DC'd home 7/25 with spouse.  Has [facility] resources provided by [facility]                 Transportation Services  Private: Car    Final Discharge Disposition Code: 01 - home or self-care

## 2024-07-26 NOTE — OUTREACH NOTE
Prep Survey      Flowsheet Row Responses   [facility] facility patient discharged from? [facility]   Is LACE score < 7 ? No   Eligibility Readm Mgmt   Discharge diagnosis Acute on chronic pancreatitis   Does the patient have one of the following disease processes/diagnoses(primary or secondary)? Other   Does the patient have Home health ordered? No   Is there a DME ordered? No   Prep survey completed? Yes            SARWAT EAST - Registered Nurse

## 2024-07-29 LAB
BACTERIA SPEC AEROBE CULT: NORMAL
BACTERIA SPEC AEROBE CULT: NORMAL

## 2024-07-30 ENCOUNTER — READMISSION MANAGEMENT (OUTPATIENT)
Dept: CALL CENTER | Facility: HOSPITAL | Age: 54
End: 2024-07-30
Payer: MEDICAID

## 2024-07-30 NOTE — OUTREACH NOTE
Medical Week 1 Survey      Flowsheet Row Responses   [facility] patient discharged from? [place]   Does the patient have one of the following disease processes/diagnoses(primary or secondary)? Other   Week 1 attempt successful? Yes   Call start time 1034   Call end time 1036   Discharge diagnosis Acute on chronic pancreatitis   Meds reviewed with patient/caregiver? Yes   Is the patient taking all medications as directed (includes completed medication regime)? Yes   Does the patient have a primary care provider?  Yes   Comments regarding PCP PCP appointment is tomorrow   Has home health visited the patient within 72 hours of discharge? N/A   Psychosocial issues? No   Did the patient receive a copy of their discharge instructions? Yes   Nursing interventions Reviewed instructions with patient   What is the patient's perception of their health status since discharge? Improving   Is the patient/caregiver able to teach back signs and symptoms related to disease process for when to call PCP? Yes   Is the patient/caregiver able to teach back signs and symptoms related to disease process for when to call 911? Yes   Is the patient/caregiver able to teach back the hierarchy of who to call/visit for symptoms/problems? PCP, Specialist, Home health nurse, Urgent Care, ED, 911 Yes   If the patient is a current smoker, are they able to teach back resources for cessation? [id number]   Week 1 call completed? Yes   Call end time 1036            Mara MONROY - Licensed Nurse

## 2024-08-05 ENCOUNTER — READMISSION MANAGEMENT (OUTPATIENT)
Dept: CALL CENTER | Facility: HOSPITAL | Age: 54
End: 2024-08-05
Payer: MEDICAID

## 2024-08-05 ENCOUNTER — HOSPITAL ENCOUNTER (OUTPATIENT)
Facility: HOSPITAL | Age: 54
Setting detail: OBSERVATION
Discharge: HOME | End: 2024-08-07
Attending: EMERGENCY MEDICINE | Admitting: STUDENT IN AN ORGANIZED HEALTH CARE EDUCATION/TRAINING PROGRAM
Payer: MEDICAID

## 2024-08-05 DIAGNOSIS — R10.13 ACUTE EPIGASTRIC PAIN: ICD-10-CM

## 2024-08-05 DIAGNOSIS — K86.1 ACUTE ON CHRONIC PANCREATITIS: Primary | ICD-10-CM

## 2024-08-05 DIAGNOSIS — K85.90 ACUTE ON CHRONIC PANCREATITIS: Primary | ICD-10-CM

## 2024-08-05 DIAGNOSIS — R79.89 ELEVATED LFTS: ICD-10-CM

## 2024-08-05 PROBLEM — F19.10 POLYSUBSTANCE ABUSE: Status: ACTIVE | Noted: 2024-08-05

## 2024-08-05 LAB
ADV 40+41 DNA STL QL NAA+NON-PROBE: NOT DETECTED
ALBUMIN SERPL-MCNC: 3.7 G/DL (ref 3.5–5.2)
ALBUMIN SERPL-MCNC: 4.3 G/DL (ref 3.5–5.2)
ALBUMIN/GLOB SERPL: 1.3 G/DL
ALBUMIN/GLOB SERPL: 1.4 G/DL
ALP SERPL-CCNC: 110 U/L (ref 39–117)
ALP SERPL-CCNC: 136 U/L (ref 39–117)
ALT SERPL W P-5'-P-CCNC: 41 U/L (ref 1–33)
ALT SERPL W P-5'-P-CCNC: 47 U/L (ref 1–33)
AMPHET+METHAMPHET UR QL: POSITIVE
ANION GAP SERPL CALCULATED.3IONS-SCNC: 10 MMOL/L (ref 5–15)
ANION GAP SERPL CALCULATED.3IONS-SCNC: 11 MMOL/L (ref 5–15)
AST SERPL-CCNC: 54 U/L (ref 1–32)
AST SERPL-CCNC: 61 U/L (ref 1–32)
ASTRO TYP 1-8 RNA STL QL NAA+NON-PROBE: NOT DETECTED
BACTERIA UR QL AUTO: ABNORMAL /HPF
BARBITURATES UR QL SCN: POSITIVE
BASOPHILS # BLD AUTO: 0.04 10*3/MM3 (ref 0–0.2)
BASOPHILS NFR BLD AUTO: 0.9 % (ref 0–1.5)
BENZODIAZ UR QL SCN: POSITIVE
BILIRUB SERPL-MCNC: 1.3 MG/DL (ref 0–1.2)
BILIRUB SERPL-MCNC: 1.3 MG/DL (ref 0–1.2)
BILIRUB UR QL STRIP: NEGATIVE
BUN SERPL-MCNC: 8 MG/DL (ref 6–20)
BUN SERPL-MCNC: 9 MG/DL (ref 6–20)
BUN/CREAT SERPL: 15.4 (ref 7–25)
BUN/CREAT SERPL: 18.4 (ref 7–25)
C CAYETANENSIS DNA STL QL NAA+NON-PROBE: NOT DETECTED
C COLI+JEJ+UPSA DNA STL QL NAA+NON-PROBE: NOT DETECTED
CALCIUM SPEC-SCNC: 9.2 MG/DL (ref 8.6–10.5)
CALCIUM SPEC-SCNC: 9.6 MG/DL (ref 8.6–10.5)
CANNABINOIDS SERPL QL: POSITIVE
CHLORIDE SERPL-SCNC: 105 MMOL/L (ref 98–107)
CHLORIDE SERPL-SCNC: 108 MMOL/L (ref 98–107)
CLARITY UR: CLEAR
CO2 SERPL-SCNC: 23 MMOL/L (ref 22–29)
CO2 SERPL-SCNC: 24 MMOL/L (ref 22–29)
COCAINE UR QL: NEGATIVE
COLOR UR: ABNORMAL
CREAT SERPL-MCNC: 0.49 MG/DL (ref 0.57–1)
CREAT SERPL-MCNC: 0.52 MG/DL (ref 0.57–1)
CRYPTOSP DNA STL QL NAA+NON-PROBE: NOT DETECTED
D-LACTATE SERPL-SCNC: 1.3 MMOL/L (ref 0.5–2)
DEPRECATED RDW RBC AUTO: 49 FL (ref 37–54)
DEPRECATED RDW RBC AUTO: 50.4 FL (ref 37–54)
E HISTOLYT DNA STL QL NAA+NON-PROBE: NOT DETECTED
EAEC PAA PLAS AGGR+AATA ST NAA+NON-PRB: NOT DETECTED
EC STX1+STX2 GENES STL QL NAA+NON-PROBE: NOT DETECTED
EGFRCR SERPLBLD CKD-EPI 2021: 109.2 ML/MIN/1.73
EGFRCR SERPLBLD CKD-EPI 2021: 110.8 ML/MIN/1.73
EOSINOPHIL # BLD AUTO: 0.15 10*3/MM3 (ref 0–0.4)
EOSINOPHIL NFR BLD AUTO: 3.2 % (ref 0.3–6.2)
EPEC EAE GENE STL QL NAA+NON-PROBE: NOT DETECTED
ERYTHROCYTE [DISTWIDTH] IN BLOOD BY AUTOMATED COUNT: 14.5 % (ref 12.3–15.4)
ERYTHROCYTE [DISTWIDTH] IN BLOOD BY AUTOMATED COUNT: 14.7 % (ref 12.3–15.4)
ETEC LTA+ST1A+ST1B TOX ST NAA+NON-PROBE: NOT DETECTED
ETHANOL BLD-MCNC: <10 MG/DL (ref 0–10)
ETHANOL UR QL: <0.01 %
FENTANYL UR-MCNC: NEGATIVE NG/ML
G LAMBLIA DNA STL QL NAA+NON-PROBE: NOT DETECTED
GLOBULIN UR ELPH-MCNC: 2.8 GM/DL
GLOBULIN UR ELPH-MCNC: 3 GM/DL
GLUCOSE SERPL-MCNC: 105 MG/DL (ref 65–99)
GLUCOSE SERPL-MCNC: 121 MG/DL (ref 65–99)
GLUCOSE UR STRIP-MCNC: NEGATIVE MG/DL
HCT VFR BLD AUTO: 41.3 % (ref 34–46.6)
HCT VFR BLD AUTO: 46.1 % (ref 34–46.6)
HGB BLD-MCNC: 14.1 G/DL (ref 12–15.9)
HGB BLD-MCNC: 15.4 G/DL (ref 12–15.9)
HGB UR QL STRIP.AUTO: NEGATIVE
HOLD SPECIMEN: NORMAL
HOLD SPECIMEN: NORMAL
HYALINE CASTS UR QL AUTO: ABNORMAL /LPF
IMM GRANULOCYTES # BLD AUTO: 0.01 10*3/MM3 (ref 0–0.05)
IMM GRANULOCYTES NFR BLD AUTO: 0.2 % (ref 0–0.5)
KETONES UR QL STRIP: NEGATIVE
LEUKOCYTE ESTERASE UR QL STRIP.AUTO: ABNORMAL
LIPASE SERPL-CCNC: 98 U/L (ref 13–60)
LYMPHOCYTES # BLD AUTO: 1.41 10*3/MM3 (ref 0.7–3.1)
LYMPHOCYTES NFR BLD AUTO: 30.3 % (ref 19.6–45.3)
MAGNESIUM SERPL-MCNC: 1.8 MG/DL (ref 1.6–2.6)
MCH RBC QN AUTO: 31.8 PG (ref 26.6–33)
MCH RBC QN AUTO: 31.8 PG (ref 26.6–33)
MCHC RBC AUTO-ENTMCNC: 33.4 G/DL (ref 31.5–35.7)
MCHC RBC AUTO-ENTMCNC: 34.1 G/DL (ref 31.5–35.7)
MCV RBC AUTO: 93.2 FL (ref 79–97)
MCV RBC AUTO: 95.1 FL (ref 79–97)
METHADONE UR QL SCN: NEGATIVE
MONOCYTES # BLD AUTO: 0.54 10*3/MM3 (ref 0.1–0.9)
MONOCYTES NFR BLD AUTO: 11.6 % (ref 5–12)
NEUTROPHILS NFR BLD AUTO: 2.5 10*3/MM3 (ref 1.7–7)
NEUTROPHILS NFR BLD AUTO: 53.8 % (ref 42.7–76)
NITRITE UR QL STRIP: NEGATIVE
NOROVIRUS GI+II RNA STL QL NAA+NON-PROBE: NOT DETECTED
OPIATES UR QL: POSITIVE
OXYCODONE UR QL SCN: NEGATIVE
P SHIGELLOIDES DNA STL QL NAA+NON-PROBE: NOT DETECTED
PH UR STRIP.AUTO: 6 [PH] (ref 5–8)
PHOSPHATE SERPL-MCNC: 3.4 MG/DL (ref 2.5–4.5)
PLATELET # BLD AUTO: 76 10*3/MM3 (ref 140–450)
PLATELET # BLD AUTO: 94 10*3/MM3 (ref 140–450)
PMV BLD AUTO: 10.9 FL (ref 6–12)
PMV BLD AUTO: 11.4 FL (ref 6–12)
POTASSIUM SERPL-SCNC: 3 MMOL/L (ref 3.5–5.2)
POTASSIUM SERPL-SCNC: 3.3 MMOL/L (ref 3.5–5.2)
PROT SERPL-MCNC: 6.5 G/DL (ref 6–8.5)
PROT SERPL-MCNC: 7.3 G/DL (ref 6–8.5)
PROT UR QL STRIP: NEGATIVE
RBC # BLD AUTO: 4.43 10*6/MM3 (ref 3.77–5.28)
RBC # BLD AUTO: 4.85 10*6/MM3 (ref 3.77–5.28)
RBC # UR STRIP: ABNORMAL /HPF
REF LAB TEST METHOD: ABNORMAL
RVA RNA STL QL NAA+NON-PROBE: NOT DETECTED
S ENT+BONG DNA STL QL NAA+NON-PROBE: NOT DETECTED
SAPO I+II+IV+V RNA STL QL NAA+NON-PROBE: NOT DETECTED
SHIGELLA SP+EIEC IPAH ST NAA+NON-PROBE: NOT DETECTED
SODIUM SERPL-SCNC: 139 MMOL/L (ref 136–145)
SODIUM SERPL-SCNC: 142 MMOL/L (ref 136–145)
SP GR UR STRIP: 1.02 (ref 1–1.03)
SQUAMOUS #/AREA URNS HPF: ABNORMAL /HPF
UROBILINOGEN UR QL STRIP: ABNORMAL
V CHOL+PARA+VUL DNA STL QL NAA+NON-PROBE: NOT DETECTED
V CHOLERAE DNA STL QL NAA+NON-PROBE: NOT DETECTED
WBC # UR STRIP: ABNORMAL /HPF
WBC NRBC COR # BLD AUTO: 3.67 10*3/MM3 (ref 3.4–10.8)
WBC NRBC COR # BLD AUTO: 4.65 10*3/MM3 (ref 3.4–10.8)
WHOLE BLOOD HOLD COAG: NORMAL
WHOLE BLOOD HOLD SPECIMEN: NORMAL
Y ENTEROCOL DNA STL QL NAA+NON-PROBE: NOT DETECTED

## 2024-08-05 PROCEDURE — 80307 DRUG TEST PRSMV CHEM ANLYZR: CPT | Performed by: STUDENT IN AN ORGANIZED HEALTH CARE EDUCATION/TRAINING PROGRAM

## 2024-08-05 PROCEDURE — 99285 EMERGENCY DEPT VISIT HI MDM: CPT

## 2024-08-05 PROCEDURE — 85025 COMPLETE CBC W/AUTO DIFF WBC: CPT | Performed by: EMERGENCY MEDICINE

## 2024-08-05 PROCEDURE — 84100 ASSAY OF PHOSPHORUS: CPT | Performed by: STUDENT IN AN ORGANIZED HEALTH CARE EDUCATION/TRAINING PROGRAM

## 2024-08-05 PROCEDURE — 96361 HYDRATE IV INFUSION ADD-ON: CPT

## 2024-08-05 PROCEDURE — G0378 HOSPITAL OBSERVATION PER HR: HCPCS

## 2024-08-05 PROCEDURE — 80053 COMPREHEN METABOLIC PANEL: CPT | Performed by: NURSE PRACTITIONER

## 2024-08-05 PROCEDURE — 96374 THER/PROPH/DIAG INJ IV PUSH: CPT

## 2024-08-05 PROCEDURE — 25010000002 HYDROMORPHONE PER 4 MG: Performed by: EMERGENCY MEDICINE

## 2024-08-05 PROCEDURE — 90791 PSYCH DIAGNOSTIC EVALUATION: CPT

## 2024-08-05 PROCEDURE — 82077 ASSAY SPEC XCP UR&BREATH IA: CPT | Performed by: STUDENT IN AN ORGANIZED HEALTH CARE EDUCATION/TRAINING PROGRAM

## 2024-08-05 PROCEDURE — 80053 COMPREHEN METABOLIC PANEL: CPT | Performed by: EMERGENCY MEDICINE

## 2024-08-05 PROCEDURE — 96375 TX/PRO/DX INJ NEW DRUG ADDON: CPT

## 2024-08-05 PROCEDURE — 25810000003 LACTATED RINGERS PER 1000 ML: Performed by: NURSE PRACTITIONER

## 2024-08-05 PROCEDURE — 25010000002 ONDANSETRON PER 1 MG: Performed by: EMERGENCY MEDICINE

## 2024-08-05 PROCEDURE — 25010000002 HYDROMORPHONE PER 4 MG: Performed by: STUDENT IN AN ORGANIZED HEALTH CARE EDUCATION/TRAINING PROGRAM

## 2024-08-05 PROCEDURE — 25010000002 ONDANSETRON PER 1 MG: Performed by: NURSE PRACTITIONER

## 2024-08-05 PROCEDURE — 83735 ASSAY OF MAGNESIUM: CPT | Performed by: STUDENT IN AN ORGANIZED HEALTH CARE EDUCATION/TRAINING PROGRAM

## 2024-08-05 PROCEDURE — 85027 COMPLETE CBC AUTOMATED: CPT | Performed by: NURSE PRACTITIONER

## 2024-08-05 PROCEDURE — 87507 IADNA-DNA/RNA PROBE TQ 12-25: CPT | Performed by: STUDENT IN AN ORGANIZED HEALTH CARE EDUCATION/TRAINING PROGRAM

## 2024-08-05 PROCEDURE — 36415 COLL VENOUS BLD VENIPUNCTURE: CPT | Performed by: NURSE PRACTITIONER

## 2024-08-05 PROCEDURE — 25810000003 LACTATED RINGERS SOLUTION: Performed by: EMERGENCY MEDICINE

## 2024-08-05 PROCEDURE — 96376 TX/PRO/DX INJ SAME DRUG ADON: CPT

## 2024-08-05 PROCEDURE — 83605 ASSAY OF LACTIC ACID: CPT | Performed by: EMERGENCY MEDICINE

## 2024-08-05 PROCEDURE — 83690 ASSAY OF LIPASE: CPT | Performed by: EMERGENCY MEDICINE

## 2024-08-05 PROCEDURE — 81001 URINALYSIS AUTO W/SCOPE: CPT | Performed by: EMERGENCY MEDICINE

## 2024-08-05 RX ORDER — OXYCODONE HYDROCHLORIDE 5 MG/1
5 TABLET ORAL EVERY 6 HOURS PRN
Status: DISCONTINUED | OUTPATIENT
Start: 2024-08-05 | End: 2024-08-07 | Stop reason: HOSPADM

## 2024-08-05 RX ORDER — FLUOXETINE HYDROCHLORIDE 20 MG/1
60 CAPSULE ORAL DAILY
Status: DISCONTINUED | OUTPATIENT
Start: 2024-08-05 | End: 2024-08-07 | Stop reason: HOSPADM

## 2024-08-05 RX ORDER — SODIUM CHLORIDE 0.9 % (FLUSH) 0.9 %
10 SYRINGE (ML) INJECTION EVERY 12 HOURS SCHEDULED
Status: DISCONTINUED | OUTPATIENT
Start: 2024-08-05 | End: 2024-08-07 | Stop reason: HOSPADM

## 2024-08-05 RX ORDER — LOPERAMIDE HYDROCHLORIDE 2 MG/1
2 CAPSULE ORAL 4 TIMES DAILY PRN
Status: DISCONTINUED | OUTPATIENT
Start: 2024-08-05 | End: 2024-08-07 | Stop reason: HOSPADM

## 2024-08-05 RX ORDER — FAMOTIDINE 10 MG/ML
20 INJECTION, SOLUTION INTRAVENOUS EVERY 12 HOURS SCHEDULED
Status: DISCONTINUED | OUTPATIENT
Start: 2024-08-05 | End: 2024-08-07 | Stop reason: HOSPADM

## 2024-08-05 RX ORDER — ONDANSETRON 2 MG/ML
4 INJECTION INTRAMUSCULAR; INTRAVENOUS EVERY 6 HOURS PRN
Status: DISCONTINUED | OUTPATIENT
Start: 2024-08-05 | End: 2024-08-07 | Stop reason: HOSPADM

## 2024-08-05 RX ORDER — CALCIUM CARBONATE 500 MG/1
2 TABLET, CHEWABLE ORAL 2 TIMES DAILY PRN
Status: DISCONTINUED | OUTPATIENT
Start: 2024-08-05 | End: 2024-08-07 | Stop reason: HOSPADM

## 2024-08-05 RX ORDER — SODIUM CHLORIDE, SODIUM LACTATE, POTASSIUM CHLORIDE, CALCIUM CHLORIDE 600; 310; 30; 20 MG/100ML; MG/100ML; MG/100ML; MG/100ML
100 INJECTION, SOLUTION INTRAVENOUS CONTINUOUS
Status: DISCONTINUED | OUTPATIENT
Start: 2024-08-05 | End: 2024-08-07

## 2024-08-05 RX ORDER — MIRTAZAPINE 15 MG/1
15 TABLET, FILM COATED ORAL NIGHTLY
Status: DISCONTINUED | OUTPATIENT
Start: 2024-08-05 | End: 2024-08-07 | Stop reason: HOSPADM

## 2024-08-05 RX ORDER — POTASSIUM CHLORIDE 750 MG/1
40 TABLET, FILM COATED, EXTENDED RELEASE ORAL EVERY 4 HOURS
Status: COMPLETED | OUTPATIENT
Start: 2024-08-05 | End: 2024-08-05

## 2024-08-05 RX ORDER — LANOLIN ALCOHOL/MO/W.PET/CERES
400 CREAM (GRAM) TOPICAL DAILY
Status: DISCONTINUED | OUTPATIENT
Start: 2024-08-05 | End: 2024-08-07 | Stop reason: HOSPADM

## 2024-08-05 RX ORDER — FOLIC ACID 1 MG/1
1 TABLET ORAL DAILY
Status: DISCONTINUED | OUTPATIENT
Start: 2024-08-05 | End: 2024-08-07 | Stop reason: HOSPADM

## 2024-08-05 RX ORDER — NADOLOL 40 MG/1
40 TABLET ORAL DAILY
Status: DISCONTINUED | OUTPATIENT
Start: 2024-08-05 | End: 2024-08-07 | Stop reason: HOSPADM

## 2024-08-05 RX ORDER — HYDROMORPHONE HYDROCHLORIDE 1 MG/ML
0.5 INJECTION, SOLUTION INTRAMUSCULAR; INTRAVENOUS; SUBCUTANEOUS
Status: DISCONTINUED | OUTPATIENT
Start: 2024-08-05 | End: 2024-08-05

## 2024-08-05 RX ORDER — SODIUM CHLORIDE 0.9 % (FLUSH) 0.9 %
10 SYRINGE (ML) INJECTION AS NEEDED
Status: DISCONTINUED | OUTPATIENT
Start: 2024-08-05 | End: 2024-08-07 | Stop reason: HOSPADM

## 2024-08-05 RX ORDER — SODIUM CHLORIDE 9 MG/ML
40 INJECTION, SOLUTION INTRAVENOUS AS NEEDED
Status: DISCONTINUED | OUTPATIENT
Start: 2024-08-05 | End: 2024-08-07 | Stop reason: HOSPADM

## 2024-08-05 RX ORDER — HYDROMORPHONE HYDROCHLORIDE 1 MG/ML
0.5 INJECTION, SOLUTION INTRAMUSCULAR; INTRAVENOUS; SUBCUTANEOUS ONCE
Status: COMPLETED | OUTPATIENT
Start: 2024-08-05 | End: 2024-08-05

## 2024-08-05 RX ORDER — ONDANSETRON 2 MG/ML
4 INJECTION INTRAMUSCULAR; INTRAVENOUS ONCE
Status: COMPLETED | OUTPATIENT
Start: 2024-08-05 | End: 2024-08-05

## 2024-08-05 RX ORDER — PANTOPRAZOLE SODIUM 40 MG/1
40 TABLET, DELAYED RELEASE ORAL
Status: DISCONTINUED | OUTPATIENT
Start: 2024-08-05 | End: 2024-08-07 | Stop reason: HOSPADM

## 2024-08-05 RX ORDER — ONDANSETRON 4 MG/1
4 TABLET, ORALLY DISINTEGRATING ORAL EVERY 6 HOURS PRN
Status: DISCONTINUED | OUTPATIENT
Start: 2024-08-05 | End: 2024-08-07 | Stop reason: HOSPADM

## 2024-08-05 RX ORDER — HYDROMORPHONE HYDROCHLORIDE 1 MG/ML
0.5 INJECTION, SOLUTION INTRAMUSCULAR; INTRAVENOUS; SUBCUTANEOUS EVERY 4 HOURS PRN
Status: DISCONTINUED | OUTPATIENT
Start: 2024-08-05 | End: 2024-08-07 | Stop reason: HOSPADM

## 2024-08-05 RX ORDER — LACTULOSE 10 G/15ML
10 SOLUTION ORAL EVERY MORNING
Status: DISCONTINUED | OUTPATIENT
Start: 2024-08-05 | End: 2024-08-07 | Stop reason: HOSPADM

## 2024-08-05 RX ORDER — LEVOTHYROXINE SODIUM 0.1 MG/1
100 TABLET ORAL
Status: DISCONTINUED | OUTPATIENT
Start: 2024-08-05 | End: 2024-08-07 | Stop reason: HOSPADM

## 2024-08-05 RX ADMIN — SODIUM CHLORIDE, POTASSIUM CHLORIDE, SODIUM LACTATE AND CALCIUM CHLORIDE 125 ML/HR: 600; 310; 30; 20 INJECTION, SOLUTION INTRAVENOUS at 21:32

## 2024-08-05 RX ADMIN — POTASSIUM CHLORIDE 40 MEQ: 750 TABLET, EXTENDED RELEASE ORAL at 15:23

## 2024-08-05 RX ADMIN — MIRTAZAPINE 15 MG: 15 TABLET, FILM COATED ORAL at 22:04

## 2024-08-05 RX ADMIN — HYDROMORPHONE HYDROCHLORIDE 0.5 MG: 1 INJECTION, SOLUTION INTRAMUSCULAR; INTRAVENOUS; SUBCUTANEOUS at 03:15

## 2024-08-05 RX ADMIN — Medication 100 MG: at 08:45

## 2024-08-05 RX ADMIN — HYDROMORPHONE HYDROCHLORIDE 0.5 MG: 1 INJECTION, SOLUTION INTRAMUSCULAR; INTRAVENOUS; SUBCUTANEOUS at 04:38

## 2024-08-05 RX ADMIN — NADOLOL 40 MG: 40 TABLET ORAL at 08:45

## 2024-08-05 RX ADMIN — HYDROMORPHONE HYDROCHLORIDE 0.5 MG: 1 INJECTION, SOLUTION INTRAMUSCULAR; INTRAVENOUS; SUBCUTANEOUS at 17:10

## 2024-08-05 RX ADMIN — SODIUM CHLORIDE, POTASSIUM CHLORIDE, SODIUM LACTATE AND CALCIUM CHLORIDE 125 ML/HR: 600; 310; 30; 20 INJECTION, SOLUTION INTRAVENOUS at 13:03

## 2024-08-05 RX ADMIN — FAMOTIDINE 20 MG: 10 INJECTION INTRAVENOUS at 08:45

## 2024-08-05 RX ADMIN — PANTOPRAZOLE SODIUM 40 MG: 40 TABLET, DELAYED RELEASE ORAL at 08:46

## 2024-08-05 RX ADMIN — LOPERAMIDE HYDROCHLORIDE 2 MG: 2 CAPSULE ORAL at 21:34

## 2024-08-05 RX ADMIN — PANCRELIPASE 24000 UNITS OF LIPASE: 60000; 12000; 38000 CAPSULE, DELAYED RELEASE PELLETS ORAL at 17:15

## 2024-08-05 RX ADMIN — FLUOXETINE HYDROCHLORIDE 60 MG: 20 CAPSULE ORAL at 08:44

## 2024-08-05 RX ADMIN — ONDANSETRON 4 MG: 2 INJECTION INTRAMUSCULAR; INTRAVENOUS at 15:23

## 2024-08-05 RX ADMIN — FOLIC ACID 1 MG: 1 TABLET ORAL at 08:44

## 2024-08-05 RX ADMIN — HYDROMORPHONE HYDROCHLORIDE 0.5 MG: 1 INJECTION, SOLUTION INTRAMUSCULAR; INTRAVENOUS; SUBCUTANEOUS at 08:36

## 2024-08-05 RX ADMIN — Medication 10 ML: at 08:46

## 2024-08-05 RX ADMIN — SODIUM CHLORIDE, POTASSIUM CHLORIDE, SODIUM LACTATE AND CALCIUM CHLORIDE 1000 ML: 600; 310; 30; 20 INJECTION, SOLUTION INTRAVENOUS at 03:14

## 2024-08-05 RX ADMIN — HYDROMORPHONE HYDROCHLORIDE 0.5 MG: 1 INJECTION, SOLUTION INTRAMUSCULAR; INTRAVENOUS; SUBCUTANEOUS at 22:04

## 2024-08-05 RX ADMIN — NICOTINE 1 PATCH: 7 PATCH TRANSDERMAL at 17:10

## 2024-08-05 RX ADMIN — MAGNESIUM OXIDE 400 MG (241.3 MG MAGNESIUM) TABLET 400 MG: TABLET at 08:44

## 2024-08-05 RX ADMIN — ONDANSETRON 4 MG: 2 INJECTION INTRAMUSCULAR; INTRAVENOUS at 03:15

## 2024-08-05 RX ADMIN — FAMOTIDINE 20 MG: 10 INJECTION INTRAVENOUS at 20:12

## 2024-08-05 RX ADMIN — POTASSIUM CHLORIDE 40 MEQ: 750 TABLET, EXTENDED RELEASE ORAL at 13:03

## 2024-08-05 RX ADMIN — HYDROMORPHONE HYDROCHLORIDE 0.5 MG: 1 INJECTION, SOLUTION INTRAMUSCULAR; INTRAVENOUS; SUBCUTANEOUS at 13:00

## 2024-08-05 RX ADMIN — OXYCODONE HYDROCHLORIDE 5 MG: 5 TABLET ORAL at 20:12

## 2024-08-05 RX ADMIN — OXYCODONE HYDROCHLORIDE 5 MG: 5 TABLET ORAL at 10:59

## 2024-08-05 RX ADMIN — LEVOTHYROXINE SODIUM 100 MCG: 100 TABLET ORAL at 08:45

## 2024-08-05 RX ADMIN — SODIUM CHLORIDE, POTASSIUM CHLORIDE, SODIUM LACTATE AND CALCIUM CHLORIDE 125 ML/HR: 600; 310; 30; 20 INJECTION, SOLUTION INTRAVENOUS at 06:58

## 2024-08-05 NOTE — ED NOTES
Nursing report ED to floor  Bekah Gibson  54 y.o.  female    HPI :  HPI (Adult)  Stated Reason for Visit: Abdominal pain and diarrhea  History Obtained From: patient    Chief Complaint  Chief Complaint   Patient presents with    Abdominal Pain     Diarrhea       Admitting doctor:   Sunny Harden MD    Admitting diagnosis:   The primary encounter diagnosis was Acute on chronic pancreatitis. Diagnoses of Acute epigastric pain and Elevated LFTs were also pertinent to this visit.    Code status:   Current Code Status       Date Active Code Status Order ID Comments User Context       8/5/2024 0528 CPR (Attempt to Resuscitate) [id number]  Samina Ramires APRN ED        Question Answer    Code Status (Patient has no pulse and is not breathing) CPR (Attempt to Resuscitate)    Medical Interventions (Patient has pulse or is breathing) Full Support                    Allergies:   Benzonatate, Ketorolac tromethamine, Phenergan [promethazine hcl], Cucumber extract, and Promethazine-phenylephrine    Isolation:   No active isolations    Intake and Output  No intake or output data in the 24 hours ending 08/05/24 0535    Weight:       08/05/24  0231   Weight: 64 kg (141 lb)       Most recent vitals:   Vitals:    08/05/24 0330 08/05/24 0402 08/05/24 0432 08/05/24 0501   BP: 121/80 138/87 136/98 147/92   BP Location:       Patient Position:       Pulse: 72 79 72 71   Resp:       Temp:       TempSrc:       SpO2: 99% 97% 99% 99%   Weight:       Height:           Active LDAs/IV Access:   Lines, Drains & Airways       Active LDAs       Name Placement date Placement time Site Days    Peripheral IV 08/05/24 0313 Distal;Posterior;Right Forearm 08/05/24 0313  Forearm  less than 1                    Labs (abnormal labs have a star):   Labs Reviewed   COMPREHENSIVE METABOLIC PANEL - Abnormal; Notable for the following components:       Result Value    Glucose 121 (*)     Creatinine 0.49 (*)     Potassium 3.3 (*)     Chloride 108 (*)      ALT (SGPT) 47 (*)     AST (SGOT) 61 (*)     Alkaline Phosphatase 136 (*)     Total Bilirubin 1.3 (*)     All other components within normal limits    Narrative:     GFR Normal >60  Chronic Kidney Disease <60  Kidney Failure <15     LIPASE - Abnormal; Notable for the following components:    Lipase 98 (*)     All other components within normal limits   CBC WITH AUTO DIFFERENTIAL - Abnormal; Notable for the following components:    Platelets 94 (*)     All other components within normal limits   LACTIC ACID, PLASMA - Normal   RAINBOW DRAW    Narrative:     The following orders were created for panel order [place] Draw.  Procedure                               Abnormality         Status                     ---------                               -----------         ------                     Green Top (Gel)[[id number]]                                  Final result               Lavender Top[[id number]]                                     Final result               Gold Top - SST[[id number]]                                   Final result               Light Blue Top[[id number]]                                   Final result                 Please view results for these tests on the individual orders.   URINALYSIS W/ MICROSCOPIC IF INDICATED (NO CULTURE)   COMPREHENSIVE METABOLIC PANEL   CBC (NO DIFF)   CBC AND DIFFERENTIAL    Narrative:     The following orders were created for panel order CBC & Differential.  Procedure                               Abnormality         Status                     ---------                               -----------         ------                     CBC Auto Differential[[id number]]        Abnormal            Final result                 Please view results for these tests on the individual orders.   GREEN TOP   LAVENDER TOP   GOLD TOP - SST   LIGHT BLUE TOP       EKG:   No orders to display       Meds given in ED:   Medications   sodium chloride 0.9 % flush 10 mL (has no administration in  time range)   sodium chloride 0.9 % flush 10 mL (has no administration in time range)   sodium chloride 0.9 % flush 10 mL (has no administration in time range)   sodium chloride 0.9 % infusion 40 mL (has no administration in time range)   lactated ringers infusion (has no administration in time range)   ondansetron ODT (ZOFRAN-ODT) disintegrating tablet 4 mg (has no administration in time range)     Or   ondansetron (ZOFRAN) injection 4 mg (has no administration in time range)   calcium carbonate (TUMS) chewable tablet 500 mg (200 mg elemental) (has no administration in time range)   famotidine (PEPCID) injection 20 mg (has no administration in time range)   HYDROmorphone (DILAUDID) injection 0.5 mg (has no administration in time range)   lactated ringers bolus 1,000 mL (0 mL Intravenous Stopped 8/5/24 0515)   ondansetron (ZOFRAN) injection 4 mg (4 mg Intravenous Given 8/5/24 0315)   HYDROmorphone (DILAUDID) injection 0.5 mg (0.5 mg Intravenous Given 8/5/24 0315)   HYDROmorphone (DILAUDID) injection 0.5 mg (0.5 mg Intravenous Given 8/5/24 0438)       Imaging results:  No radiology results for the last day    Ambulatory status:   - assist x 1     Social issues:   Social History     Socioeconomic History    Marital status:    Tobacco Use    Smoking status: Every Day     Current packs/day: 0.25     Average packs/day: 0.3 packs/day for 15.0 years (3.8 ttl pk-yrs)     Types: Cigarettes     Passive exposure: Current    Smokeless tobacco: Never    Tobacco comments:     Rarely smoke sometimes will to   Vaping Use    Vaping status: Never Used   Substance and Sexual Activity    Alcohol use: Not Currently     Alcohol/week: 5.0 - 10.0 standard drinks of alcohol     Types: 5 - 10 Shots of liquor per week     Comment: 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    Drug use: Not Currently    Sexual activity: Not Currently     Partners: Male     Birth control/protection: Post-menopausal, Natural family planning/Rhythm     Comment: cinthia-  menepausal       Peripheral Neurovascular  Peripheral Neurovascular (Adult)  Peripheral Neurovascular WDL: WDL    Neuro Cognitive  Neuro Cognitive (Adult)  Cognitive/Neuro/Behavioral WDL: WDL    Learning  Learning Assessment (Adult)  Learning Readiness and Ability: no barriers identified    Respiratory  Respiratory WDL  Respiratory WDL: WDL  Rhythm/Pattern, Respiratory: no shortness of breath reported, depth regular, pattern regular, unlabored    Abdominal Pain       Pain Assessments  Pain (Adult)  (0-10) Pain Rating: Rest: 7  (0-10) Pain Rating: Activity: 8  Pain Location: abdomen  Pain Side/Orientation: upper  Pain Description: intermittent    NIH Stroke Scale       Tanya Mcdonald RN  08/05/24 05:35 EDT

## 2024-08-05 NOTE — ED PROVIDER NOTES
EMERGENCY DEPARTMENT ENCOUNTER    Room Number:  13/13  PCP: Tylor Davis  Historian: Patient    I initially evaluated the patient at 2:55 AM    HPI:  Chief Complaint: Upper abdominal pain  A complete HPI/ROS/PMH/PSH/SH/FH are unobtainable due to: Nothing  Context: Bekah Gibson is a 54 y.o. female with a medical history of chronic pancreatitis, hypertension, GERD, alcohol abuse who presents to the ED c/o acute upper abdominal pain.  Pain began about 24 hours ago.  Pain is sharp and stabbing.  Pain is constant but waxing and waning.  Pain does not radiate.  Nothing makes the pain better or worse.  She initially had several episodes of nonbloody diarrhea but this has since resolved.  She denies fever, chills, chest pain, nausea, vomiting, flank pain, or dysuria.  She was seen in the ED at [facility] yesterday.  CT scan showed some mild stranding around the pancreas.  Lipase was normal.  Patient was diagnosed with a UTI and discharged on antibiotics but she has not filled them yet.  Patient has been able to tolerate fluids.  She has a history of alcohol abuse but no longer drinks alcohol.  She has had a tubal ligation.            PAST MEDICAL HISTORY  Active Ambulatory Problems     Diagnosis Date Noted    Irritable bowel syndrome with both constipation and diarrhea 06/05/2017    Peripheral neuropathy 06/05/2017    Vitamin D deficiency 06/05/2017    History of HPV infection 06/05/2017    Hypothyroidism 06/05/2017    Tobacco dependence due to cigarettes 06/05/2017    Acute kidney injury 12/28/2015    Ascites 06/06/2016    E. coli UTI (urinary tract infection) 06/06/2016    Alcoholic hepatitis 06/29/2018    GI bleed 06/29/2018    Acute post-hemorrhagic anemia 06/29/2018    Thrombocytopenia 06/29/2018    Open wound of right shoulder region 06/29/2018    Pancreatic insufficiency 07/04/2018    Alcoholic ketoacidosis 07/21/2019    Chronic alcohol abuse 07/29/2019    ESBL (extended spectrum beta-lactamase)  producing bacteria infection 07/29/2019    Abnormal weight loss 12/13/2019    Hashimoto's disease 12/13/2019    Chronic fatigue 12/14/2019    History of alcohol use disorder 09/21/2021    Alcohol intoxication 09/21/2021    Lupus     Hypomagnesemia     Pancytopenia     Alcoholic cirrhosis     Bilateral lower abdominal discomfort 09/21/2021    Primary hypertension 10/24/2022    Melena 10/24/2022    Arthritis of shoulder 12/19/2022    Esophageal varices 01/16/2023    Essential hypertension 01/16/2023    Alcohol-induced chronic pancreatitis 01/22/2023    Abdominal pain 01/22/2023    Pancreatitis 10/07/2023    Leukopenia 10/07/2023    Epigastric pain 04/01/2024    Acute alcoholic gastritis without hemorrhage 04/01/2024    Acute pancreatitis 07/03/2024    Alcohol withdrawal 07/03/2024    Acute on chronic pancreatitis 07/22/2024    Transaminitis 07/22/2024    Cholelithiasis 07/22/2024    Alcohol use 07/22/2024    WBC decreased 07/24/2024    Hypokalemia 07/24/2024    Urinary tract infection 07/24/2024    Neutropenia 07/24/2024     Resolved Ambulatory Problems     Diagnosis Date Noted    Acute renal failure 02/20/2017    Kidney stone 06/05/2017    Alcohol withdrawal syndrome, with delirium 06/29/2018    Hypotension 07/01/2018    Nausea and vomiting 09/21/2021    Acute GI bleeding 10/24/2022     Past Medical History:   Diagnosis Date    Acromioclavicular separation 1/2021    Ankle sprain 2/2004    Anxiety     Arthritis     Arthritis of back Unsure    Cirrhosis     Disease of thyroid gland     ETOH abuse     Fracture, clavicle 1/2021    Fracture, foot Unsure    Frozen shoulder 1 /2009    GERD (gastroesophageal reflux disease)     History of kidney stones     Hypertension     Left shoulder pain     Low back strain 1/21    Lumbosacral disc disease 1/21    MDD (major depressive disorder)     Neck strain Unsure    Periarthritis of shoulder see above    Rotator cuff syndrome odre for shoulder replacement    Tennis elbow Unsure          PAST SURGICAL HISTORY  Past Surgical History:   Procedure Laterality Date    CLAVICLE SURGERY Right 2018    ENDOSCOPY N/A 10/26/2022    Procedure: ESOPHAGOGASTRODUODENOSCOPY wtih banding;  Surgeon: Ag Patel MD;  [facility];  Service: Gastroenterology;  Laterality: N/A;  pre: melena  post: esophageal varicies with banding x2 bands    ENDOSCOPY N/A 2023    Procedure: ESOPHAGOGASTRODUODENOSCOPY;  Surgeon: Ag Patel MD;  [facility];  Service: Gastroenterology;  Laterality: N/A;  PRE OP - MAROON STOOLS  POST OP - SM ESOPHAGEAL VARICES    ESOPHAGEAL VARICES LIGATION      FOOT SURGERY  14    JOINT REPLACEMENT Bilateral     GREAT TOE    KIDNEY STONE SURGERY      LITHOTRIPSY AND STENT (R SIDE)    LAPAROSCOPIC TUBAL LIGATION      NECK SURGERY  3/07    Not sure of dates    SIGMOIDOSCOPY N/A 2023    Procedure: SIGMOIDOSCOPY FLEXIBLE;  Surgeon: Ag Patel MD;  Location: [facility] ENDOSCOPY;  Service: Gastroenterology;  Laterality: N/A;  PRE OP - MAROON STOOLS  POST OP - RECTAL VARICES    TOTAL SHOULDER ARTHROPLASTY Left 2023    Procedure: reverse total shoulder arthroplasty;  Surgeon: Giovanni Denise MD;  Location:  [facility] [place] [facility];  Service: Orthopedics;  Laterality: Left;         FAMILY HISTORY  Family History   Problem Relation Age of Onset    Rheumatologic disease Mother         congestive heart diseased    Osteoporosis Mother             Thyroid disease Father     Leukemia Father     Cancer Father         Leukemia  deseased     Broken bones Father     Clotting disorder Father     Drug abuse Brother     Malig Hyperthermia Neg Hx          SOCIAL HISTORY  Social History     Socioeconomic History    Marital status:    Tobacco Use    Smoking status: Every Day     Current packs/day: 0.25     Average packs/day: 0.3 packs/day for 15.0 years (3.8 ttl pk-yrs)     Types: Cigarettes     Passive exposure: Current    Smokeless tobacco: Never     Tobacco comments:     Rarely smoke sometimes will to   Vaping Use    Vaping status: Never Used   Substance and Sexual Activity    Alcohol use: Not Currently     Alcohol/week: 5.0 - 10.0 standard drinks of alcohol     Types: 5 - 10 Shots of liquor per week     Comment: 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    Drug use: Not Currently    Sexual activity: Not Currently     Partners: Male     Birth control/protection: Post-menopausal, Natural family planning/Rhythm     Comment: cinthia- menepausal         ALLERGIES  Benzonatate, Ketorolac tromethamine, Phenergan [promethazine hcl], Cucumber extract, and Promethazine-phenylephrine    REVIEW OF SYSTEMS  Review of Systems  Included in HPI  All systems reviewed and negative except for those discussed in HPI.      PHYSICAL EXAM  ED Triage Vitals   Temp Heart Rate Resp BP SpO2   08/05/24 0231 08/05/24 0231 08/05/24 0231 08/05/24 0230 08/05/24 0231   97.5 °F (36.4 °C) 94 18 143/89 97 %      Temp src Heart Rate Source Patient Position BP Location FiO2 (%)   08/05/24 0231 08/05/24 0231 08/05/24 0230 08/05/24 0230 --   Tympanic Monitor Sitting Right arm        Physical Exam      GENERAL: Awake, alert, oriented x 3.  Well-developed, well-nourished and nontoxic-appearing female.  Resting comfortably in no acute distress  HENT: NCAT, nares patent, moist mucous membranes  EYES: no scleral icterus  CV: regular rhythm, normal rate  RESPIRATORY: normal effort, clear to auscultation bilaterally  ABDOMEN: soft, there is epigastric tenderness without rebound or guarding, bowel sounds are normal, no CVA tenderness  MUSCULOSKELETAL: Extremities are nontender with full range of motion  NEURO: Speech is normal.  No facial droop.  PSYCH:  calm, cooperative  SKIN: warm, dry    Vital signs and nursing notes reviewed.          LAB RESULTS  Recent Results (from the past 24 hour(s))   Comprehensive Metabolic Panel    Collection Time: 08/05/24  2:42 AM    Specimen: Blood   Result Value Ref Range    Glucose 121 (H)  65 - 99 mg/dL    BUN 9 6 - 20 mg/dL    Creatinine 0.49 (L) 0.57 - 1.00 mg/dL    Sodium 142 136 - 145 mmol/L    Potassium 3.3 (L) 3.5 - 5.2 mmol/L    Chloride 108 (H) 98 - 107 mmol/L    CO2 23.0 22.0 - 29.0 mmol/L    Calcium 9.6 8.6 - 10.5 mg/dL    Total Protein 7.3 6.0 - 8.5 g/dL    Albumin 4.3 3.5 - 5.2 g/dL    ALT (SGPT) 47 (H) 1 - 33 U/L    AST (SGOT) 61 (H) 1 - 32 U/L    Alkaline Phosphatase 136 (H) 39 - 117 U/L    Total Bilirubin 1.3 (H) 0.0 - 1.2 mg/dL    Globulin 3.0 gm/dL    A/G Ratio 1.4 g/dL    BUN/Creatinine Ratio 18.4 7.0 - 25.0    Anion Gap 11.0 5.0 - 15.0 mmol/L    eGFR 110.8 >60.0 mL/min/1.73   Lipase    Collection Time: 08/05/24  2:42 AM    Specimen: Blood   Result Value Ref Range    Lipase 98 (H) 13 - 60 U/L   Lactic Acid, Plasma    Collection Time: 08/05/24  2:42 AM    Specimen: Blood   Result Value Ref Range    Lactate 1.3 0.5 - 2.0 mmol/L   Green Top (Gel)    Collection Time: 08/05/24  2:42 AM   Result Value Ref Range    Extra Tube Hold for add-ons.    Lavender Top    Collection Time: 08/05/24  2:42 AM   Result Value Ref Range    Extra Tube hold for add-on    Gold Top - SST    Collection Time: 08/05/24  2:42 AM   Result Value Ref Range    Extra Tube Hold for add-ons.    Light Blue Top    Collection Time: 08/05/24  2:42 AM   Result Value Ref Range    Extra Tube Hold for add-ons.    CBC Auto Differential    Collection Time: 08/05/24  2:42 AM    Specimen: Blood   Result Value Ref Range    WBC 4.65 3.40 - 10.80 10*3/mm3    RBC 4.85 3.77 - 5.28 10*6/mm3    Hemoglobin 15.4 12.0 - 15.9 g/dL    Hematocrit 46.1 34.0 - 46.6 %    MCV 95.1 79.0 - 97.0 fL    MCH 31.8 26.6 - 33.0 pg    MCHC 33.4 31.5 - 35.7 g/dL    RDW 14.7 12.3 - 15.4 %    RDW-SD 50.4 37.0 - 54.0 fl    MPV 11.4 6.0 - 12.0 fL    Platelets 94 (L) 140 - 450 10*3/mm3    Neutrophil % 53.8 42.7 - 76.0 %    Lymphocyte % 30.3 19.6 - 45.3 %    Monocyte % 11.6 5.0 - 12.0 %    Eosinophil % 3.2 0.3 - 6.2 %    Basophil % 0.9 0.0 - 1.5 %    Immature Grans  % 0.2 0.0 - 0.5 %    Neutrophils, Absolute 2.50 1.70 - 7.00 10*3/mm3    Lymphocytes, Absolute 1.41 0.70 - 3.10 10*3/mm3    Monocytes, Absolute 0.54 0.10 - 0.90 10*3/mm3    Eosinophils, Absolute 0.15 0.00 - 0.40 10*3/mm3    Basophils, Absolute 0.04 0.00 - 0.20 10*3/mm3    Immature Grans, Absolute 0.01 0.00 - 0.05 10*3/mm3       Ordered the above labs and reviewed the results.        RADIOLOGY  No Radiology Exams Resulted Within Past 24 Hours    Ordered the above noted radiological studies. Reviewed by me in PACS.            PROCEDURES  Procedures        OUTPATIENT MEDICATION MANAGEMENT:  Current Facility-Administered Medications Ordered in Epic   Medication Dose Route Frequency Provider Last Rate Last Admin    calcium carbonate (TUMS) chewable tablet 500 mg (200 mg elemental)  2 tablet Oral BID PRN Samina Ramires APRN        famotidine (PEPCID) injection 20 mg  20 mg Intravenous Q12H Samina Ramires APRN        HYDROmorphone (DILAUDID) injection 0.5 mg  0.5 mg Intravenous Q2H PRN Samina Ramires APRN        lactated ringers infusion  125 mL/hr Intravenous Continuous Samina Ramires APRN        ondansetron ODT (ZOFRAN-ODT) disintegrating tablet 4 mg  4 mg Oral Q6H PRN Samina Ramires APRN        Or    ondansetron (ZOFRAN) injection 4 mg  4 mg Intravenous Q6H PRN Samina Ramires APRN        sodium chloride 0.9 % flush 10 mL  10 mL Intravenous Q12H Callie Quiroz MD        sodium chloride 0.9 % flush 10 mL  10 mL Intravenous PRN Callie Quiroz MD        sodium chloride 0.9 % flush 10 mL  10 mL Intravenous PRN Babar Martinez MD        sodium chloride 0.9 % flush 10 mL  10 mL Intravenous Q12H Samina Ramires APRN        sodium chloride 0.9 % flush 10 mL  10 mL Intravenous PRN Samina Ramires APRN        sodium chloride 0.9 % flush 20 mL  20 mL Intravenous PRN Callie Quiroz MD        sodium chloride 0.9 % infusion 40 mL  40  mL Intravenous Samina Everett APRN         Current Outpatient Medications Ordered in Epic   Medication Sig Dispense Refill    amLODIPine (NORVASC) 2.5 MG tablet Take 1 tablet by mouth Daily.      ferrous sulfate 325 (65 FE) MG tablet Take 1 tablet by mouth Daily With Breakfast.      FLUoxetine (PROzac) 60 MG tablet Take 1 tablet by mouth Daily.      folic acid (FOLVITE) 1 MG tablet Take 1 tablet by mouth Daily.      Ibuprofen 3 %, Gabapentin 10 %, Baclofen 2 %, lidocaine 4 % Apply 1-2 g topically to the appropriate area as directed 3 (Three) to 4 (Four) times daily. (Patient taking differently: Apply 1-2 g topically to the appropriate area as directed 3 (Three) Times a Day As Needed.) 90 g 5    lactulose (CHRONULAC) 10 GM/15ML solution Take 15 mL by mouth Every Morning.      levothyroxine (SYNTHROID, LEVOTHROID) 100 MCG tablet TAKE ONE TABLET BY MOUTH DAILY (Patient taking differently: Take 1 tablet by mouth Every Morning.) 30 tablet 3    MAGnesium-Oxide 400 (240 Mg) MG tablet Take 1 tablet by mouth Daily.      mirtazapine (REMERON) 15 MG tablet Take 1 tablet by mouth Every Night.      Multiple Vitamin (MULTIVITAMIN) capsule Take 1 capsule by mouth Daily.      nadolol (CORGARD) 40 MG tablet Take 1 tablet by mouth Daily.      nitrofurantoin, macrocrystal-monohydrate, (MACROBID) 100 MG capsule Take 1 capsule by mouth 2 (Two) Times a Day. Ends 7/30/24      ondansetron ODT (ZOFRAN-ODT) 4 MG disintegrating tablet Place 1 tablet on the tongue Every 8 (Eight) Hours As Needed for Nausea or Vomiting. 10 tablet 0    pancrelipase, Lip-Prot-Amyl, (CREON) 00543-52741 units capsule delayed-release particles capsule Take 2 capsules by mouth 3 (Three) Times a Day With Meals. 180 capsule 0    pantoprazole (PROTONIX) 40 MG EC tablet 1 tablet Daily.      thiamine (VITAMIN B1) 100 MG tablet Take 1 tablet by mouth Daily. 30 tablet 0    vitamin D (ERGOCALCIFEROL) 1.25 MG (81981 UT) capsule capsule Take 1 capsule by mouth Every  7 (Seven) Days.             MEDICATIONS GIVEN IN ER  Medications   sodium chloride 0.9 % flush 10 mL (has no administration in time range)   sodium chloride 0.9 % flush 10 mL (has no administration in time range)   sodium chloride 0.9 % flush 10 mL (has no administration in time range)   sodium chloride 0.9 % infusion 40 mL (has no administration in time range)   lactated ringers infusion (has no administration in time range)   ondansetron ODT (ZOFRAN-ODT) disintegrating tablet 4 mg (has no administration in time range)     Or   ondansetron (ZOFRAN) injection 4 mg (has no administration in time range)   calcium carbonate (TUMS) chewable tablet 500 mg (200 mg elemental) (has no administration in time range)   famotidine (PEPCID) injection 20 mg (has no administration in time range)   HYDROmorphone (DILAUDID) injection 0.5 mg (has no administration in time range)   lactated ringers bolus 1,000 mL (0 mL Intravenous Stopped 8/5/24 0515)   ondansetron (ZOFRAN) injection 4 mg (4 mg Intravenous Given 8/5/24 0315)   HYDROmorphone (DILAUDID) injection 0.5 mg (0.5 mg Intravenous Given 8/5/24 0315)   HYDROmorphone (DILAUDID) injection 0.5 mg (0.5 mg Intravenous Given 8/5/24 0438)                   MEDICAL DECISION MAKING, PROGRESS, and CONSULTS    All labs have been independently reviewed by me.  All radiology studies have been reviewed by me and I have also reviewed the radiology report.   EKG's independently viewed and interpreted by me.  Discussion below represents my analysis of pertinent findings related to patient's condition, differential diagnosis, treatment plan and final disposition.      Additional sources:    - Discussed/ obtained information from independent historians: None    - External (non-ED) record review: Patient was admitted here 7/22 through 7/25/2024 for acute on chronic pancreatitis.  CT abdomen/pelvis showed some subtle stranding adjacent to the pancreatic head consistent with acute on chronic  pancreatitis.  There were cirrhotic changes of the liver and cholelithiasis.  She was seen by general surgery.  She was seen in the ED at [facility] yesterday for upper abdominal pain.  CT abdomen/pelvis showed changes of chronic pancreatitis with minimal stranding about the head and uncinate process.  Bilirubin was 2.1.  Lipase 44.  White blood cell count 5.2.    Patient has multiple admissions here and at other facilities for abdominal pain and pancreatitis.    -Prescription drug monitoring program review:     N/A    - Chronic or social conditions impacting patient care (Social Determinants of Health): None          Orders placed during this visit:  Orders Placed This Encounter   Procedures    [place] Draw    Comprehensive Metabolic Panel    Lipase    Urinalysis With Microscopic If Indicated (No Culture) - Urine, Clean Catch    Lactic Acid, Plasma    CBC Auto Differential    Comprehensive Metabolic Panel    CBC (No Diff)    NPO Diet NPO Type: Sips with Meds, Ice Chips    Undress & Gown    Vital Signs    Intake & Output    Weigh Patient    Oral Care    Saline Lock & Maintain IV Access    Place Sequential Compression Device    Maintain Sequential Compression Device    Code Status and Medical Interventions: CPR (Attempt to Resuscitate); Full Support    LHA (on-call MD unless specified) Details    Insert Peripheral IV    Insert Peripheral IV    Initiate Observation Status    CBC & Differential    Green Top (Gel)    Lavender Top    Gold Top - SST    Light Blue Top         Additional orders considered but not ordered:          Differential diagnosis includes, but is not limited to:    Differential diagnosis includes but is not limited to:  - hepatobiliary pathology such as cholecystitis, cholangitis, and symptomatic cholelithiasis  - Pancreatitis  - Dyspepsia  - Small bowel obstruction  - Appendicitis  - Diverticulitis  - UTI including pyelonephritis  - Ureteral stone  - Zoster  - Colitis, including  infectious and ischemic  - Atypical ACS        Independent interpretation of labs, radiology studies, and discussions with consultants:  ED Course as of 08/05/24 0552   Mon Aug 05, 2024   0254 BP: 143/89 [WH]   0254 Temp: 97.5 °F (36.4 °C) [WH]   0254 Heart Rate: 94 [WH]   0254 Resp: 18 [WH]   0254 SpO2: 97 % [WH]   0254 Device (Oxygen Therapy): room air [WH]   0306 Patient presents to the ED complaining of acute epigastric pain.  She has some diarrhea yesterday but it is gone now.  She has a history of chronic pancreatitis.  She denies nausea or vomiting.  She does not have an acute abdomen.  Obtain labs for further evaluation.  She will be given IV fluids and IV medication for pain and nausea.  Differential diagnosis includes but is not limited to: Pancreatitis, gastritis, gastroenteritis, GERD, [WH]   0416 Lipase(!): 98  44 yesterday [WH]   0416 Lactate: 1.3 [WH]   0416 WBC: 4.65 [WH]   0416 Hemoglobin: 15.4 [WH]   0416 Glucose(!): 121 [WH]   0416 Creatinine(!): 0.49 [WH]   0416 Potassium(!): 3.3 [WH]   0416 Chloride(!): 108 [WH]   0417 ALT (SGPT)(!): 47 [WH]   0417 AST (SGOT)(!): 61 [WH]   0417 Alkaline Phosphatase(!): 136 [WH]   0417 Total Bilirubin(!): 1.3  LFTs are basically unchanged from 11 days ago [WH]   0515 Test results and diagnoses discussed with the patient.  Her symptoms have improved with IV fluids and IV pain medication but she is still complaining of pain.  Shared decision making was discussed and patient was offered admission.  She is agreeable with this. [WH]   0526 Case discussed with PATO Haas, she agrees to admit the patient to Dr. Harden.  Pertinent history, exam findings, test results, ED course, and diagnoses were discussed with her. [WH]   0551 MDM: Patient presented to the ED complaining of acute epigastric pain.  She has a history of chronic pancreatitis.  She did not have acute abdomen.  She was seen at another ED yesterday.  Lipase was normal then but was 98 today.  LFTs are  elevated but stable.  CT scan done yesterday showed some inflammatory changes around the head of the pancreas.  Patient was given IV fluids and IV pain medication.  Symptoms improved but did not resolve.  She will be admitted. [WH]      ED Course User Index  [WH] Babar Martinez MD         COMPLEXITY OF CARE  The patient requires admission.      DIAGNOSIS  Final diagnoses:   Acute on chronic pancreatitis   Acute epigastric pain   Elevated LFTs         DISPOSITION  ADMISSION    Discussed treatment plan and reason for admission with pt/family and admitting physician.  Pt/family voiced understanding of the plan for admission for further testing/treatment as needed.               Latest Documented Vital Signs:  AS OF 05:52 EDT VITALS:    BP - 147/92  HR - 71  TEMP - 97.5 °F (36.4 °C) (Tympanic)  O2 SATS - 99%            --    Please note that portions of this were completed with a voice recognition program.       Note Disclaimer: At [facility], we believe that sharing information builds trust and better relationships. You are receiving this note because you are receiving care at [facility] or recently visited. It is possible you will see health information before a provider has talked with you about it. This kind of information can be easy to misunderstand. To help you fully understand what it means for your health, we urge you to discuss this note with your provider.             Babar Martinez MD  08/05/24 [id number]

## 2024-08-05 NOTE — CONSULTS
"[facility] consult d/t drugs. Pt presented to ED today w/ c/o abdominal pain and diarrhea then [facility] tx of chronic pancreatitis. Primary RN reports pt presents as anxious and reports not drinking ETOH in the past week. Discussed pt's hx of repeated hospitalizations for ETOH use. Previously seen by [facility] 9/2021, 10/2022, 1/2023, 10/2023, 4/2024, and 7/2024 twice (see previous consult notes for full mental health/substance use/social hx). Previously hospitalized and d/c at [facility] on 7/25/24.     Pt in room alone upon entry. Introduced self and role. Pt is a 53 yo  female. A&Ox4. Presents as somewhat unkempt w/ anxious mood and congruent affect. Cooperative towards clinician and observable restlessness, as evidenced by scratching her remote throughout assessment. Fair judgment/insight. UDS + for THC, barbiturates, opiates, benzos, and amphetamines/methamphetamines (opiates/barbiturates/benzos given during previous hospitalization & likely false + for amphetamines/methamphetamines d/t Prozac prescription). BAL normal. Rates current depression 5/10 and anxiety 8/10 (10 being worst). Denies current wish to be dead/SI/HI/AVH. Denies safety concerns at home. Denies issues w/ sleep except recently being woken up by abdominal pain. Reports not feeling hungry since previous hospitalization. Current stressors include \"my human relationships,\" marital issues, trying to maintain sobriety, and anxious to feel better & return home.     MENTAL HEALTH/SUBSTANCE USE: Pt continues to struggle w/ anxiety/depression, stating \"I'm a sweetheart to everyone else but I don't do well to myself.\" Denies any major changes in her mental health since previous hospitalization but does report that she has scheduled an intake appt with a medication provider previously given to her by [facility]. Appt is scheduled for Thursday at 11 AM and pt states they are able to refer her to a therapist within the office. " "Currently prescribed Prozac 60 mg daily and Remeron 15 mg nightly. Stated that she doesn't take Remeron often d/t it not being effective for sleep or anxiety. Pt is interested in learning alternative coping skills for managing stress rather than turning to ETOH. Reports that she hasn't drank ETOH since previous hospitalization and \"it hasn't been easy.\" Stated that she's trying to spend time around her supports that don't drink and play guitar/sing to cope. Denies current ETOH cravings. Continues to smoke 5-6 cigarettes daily and THC 1-2x weekly. Last use of cocaine was 2 weeks ago w/ spouse. Pt no longer involved w/ AA, stating \"it didn't help me as much as I hoped it would.\"     PLAN: Inquired around pt's plan for maintaining sobriety. Pt stated \"I think I'm just going to do it on my own this time.\" Reflected on pt's hx w/ residential RANDY tx and RANDY IOP but pt had negative perception of many of the places she has been. Educated pt on biological component to cravings and their automatic nature in response to stress. Offered to provide additional resources for higher level of care for RANDY tx but pt denied, stating that she is focused on pursuing medication management and counseling at this time. Reports that she has \"no it won't\" post its in her car and serenity prayer in her bathroom at home to help remind her that alcohol won't help anything and she is in control. Pt requested nicotine patch and primary RN was informed. Denied further needs/questions/concerns at this time. [facility] following.  "

## 2024-08-05 NOTE — H&P
Patient Name:  Bekah Gibson  YOB: 1970  MRN:  [id number]  Admit Date:  8/5/2024  Patient Care Team:  Tylor Davis as PCP - General (Family Medicine)  Deion Saldana MD as Consulting Physician (Urology)  Adalberto Hoffman MD as Consulting Physician (Gastroenterology)  Eliazar Quinonez MD (Hematology)      Subjective   History Present Illness     Chief Complaint   Patient presents with    Abdominal Pain     Diarrhea             History of Present Illness      Patient is a 54 y.o. smoker with a history of Chronic pancreatitis, ETOH use, Cirrhosis complicated by portal hypertension, esophageal varices and splenomegaly, hypothyroidism, neuropathy, vitamin D deficiency, hypertension that presents to [facility] complaining of worsening epigastric abdominal pain that began yesterday.  Sharp, intermittent, radiates to the back.   Nothing makes the pain better or worse.  Reports multiple episodes of diarrhea but resolved.  No vomiting but feels nauseous.  No fevers no chills.  Patient went to the ED at [facility] yesterday, CT scan showed mild stranding around the pancreas.  Lipase was normal.  Patient was diagnosed with a UTI and discharged on antibiotics.  Patient never filled out antibiotics.  Denies urinary complaints, denies dysuria.        Review of Systems   GENERAL: No fevers, chills, sweats.    RESPIRATORY: No cough, shortness of breath, hemoptysis or wheezing.   CVS: No chest pain, palpitations, orthopnea, dyspnea on exertion   GI: No melena or hematochezia.+ abdominal pain. + nausea, vomiting, constipation, diarrhea    Personal History     Past Medical History:   Diagnosis Date    Acromioclavicular separation 1/2021    Ankle sprain 2/2004    no operation necessary  At Time unsure    Anxiety     Arthritis     Arthritis of back Unsure    Diseased    Cirrhosis     Disease of thyroid gland     ETOH abuse     SOBER FOR 3 MONTHS    Fracture, clavicle 1/2021     shattered clavicel on issue slip and fall    Fracture, foot Unsure    Frozen shoulder     4    GERD (gastroesophageal reflux disease)     History of kidney stones     5 YR AGO    Hypertension     Left shoulder pain     Low back strain     Lumbosacral disc disease     MDD (major depressive disorder)     Neck strain Unsure    Pancreatitis     Periarthritis of shoulder see above    Rotator cuff syndrome odre for shoulder replacement    unable to receive due to low platelets    Tennis elbow Unsure    Unsurpassed    Thrombocytopenia      Past Surgical History:   Procedure Laterality Date    CLAVICLE SURGERY Right 2018    ENDOSCOPY N/A 10/26/2022    Procedure: ESOPHAGOGASTRODUODENOSCOPY wtih banding;  Surgeon: Ag Patel MD;  Location: [facility] ENDOSCOPY;  Service: Gastroenterology;  Laterality: N/A;  pre: melena  post: esophageal varicies with banding x2 bands    ENDOSCOPY N/A 2023    Procedure: ESOPHAGOGASTRODUODENOSCOPY;  Surgeon: Ag Patel MD;  Location: [facility] ENDOSCOPY;  Service: Gastroenterology;  Laterality: N/A;  PRE OP - MAROON STOOLS  POST OP - SM ESOPHAGEAL VARICES    ESOPHAGEAL VARICES LIGATION      FOOT SURGERY  12    JOINT REPLACEMENT Bilateral     GREAT TOE    KIDNEY STONE SURGERY      LITHOTRIPSY AND STENT (R SIDE)    LAPAROSCOPIC TUBAL LIGATION      NECK SURGERY  3/07    Not sure of dates    SIGMOIDOSCOPY N/A 2023    Procedure: SIGMOIDOSCOPY FLEXIBLE;  Surgeon: Ag Patel MD;  Location: [facility] ENDOSCOPY;  Service: Gastroenterology;  Laterality: N/A;  PRE OP - MAROON STOOLS  POST OP - RECTAL VARICES    TOTAL SHOULDER ARTHROPLASTY Left 2023    Procedure: reverse total shoulder arthroplasty;  Surgeon: Giovanni Denise MD;  Location:  [facility];  Service: Orthopedics;  Laterality: Left;     Family History   Problem Relation Age of Onset    Rheumatologic disease Mother         congestive heart diseased    Osteoporosis Mother             Thyroid  disease Father     Leukemia Father     Cancer Father         Leukemia  deseased 6/17    Broken bones Father     Clotting disorder Father     Drug abuse Brother     Malig Hyperthermia Neg Hx      Social History     Tobacco Use    Smoking status: Every Day     Current packs/day: 0.25     Average packs/day: 0.3 packs/day for 15.0 years (3.8 ttl pk-yrs)     Types: Cigarettes     Passive exposure: Current    Smokeless tobacco: Never    Tobacco comments:     Rarely smoke sometimes will to   Vaping Use    Vaping status: Never Used   Substance Use Topics    Alcohol use: Not Currently     Alcohol/week: 5.0 - 10.0 standard drinks of alcohol     Types: 5 - 10 Shots of liquor per week     Comment: 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    Drug use: Not Currently     Current Facility-Administered Medications on File Prior to Encounter   Medication Dose Route Frequency Provider Last Rate Last Admin    sodium chloride 0.9 % flush 10 mL  10 mL Intravenous Q12H Callie Quiroz MD        sodium chloride 0.9 % flush 10 mL  10 mL Intravenous PRN Callie Quiroz MD        sodium chloride 0.9 % flush 20 mL  20 mL Intravenous PRCallie Crawford MD         Current Outpatient Medications on File Prior to Encounter   Medication Sig Dispense Refill    amLODIPine (NORVASC) 2.5 MG tablet Take 1 tablet by mouth Daily.      ferrous sulfate 325 (65 FE) MG tablet Take 1 tablet by mouth Daily With Breakfast.      FLUoxetine (PROzac) 60 MG tablet Take 1 tablet by mouth Daily.      folic acid (FOLVITE) 1 MG tablet Take 1 tablet by mouth Daily.      Ibuprofen 3 %, Gabapentin 10 %, Baclofen 2 %, lidocaine 4 % Apply 1-2 g topically to the appropriate area as directed 3 (Three) to 4 (Four) times daily. (Patient taking differently: Apply 1-2 g topically to the appropriate area as directed 3 (Three) Times a Day As Needed.) 90 g 5    lactulose (CHRONULAC) 10 GM/15ML solution Take 15 mL by mouth Every Morning.      levothyroxine  "(SYNTHROID, LEVOTHROID) 100 MCG tablet TAKE ONE TABLET BY MOUTH DAILY (Patient taking differently: Take 1 tablet by mouth Every Morning.) 30 tablet 3    MAGnesium-Oxide 400 (240 Mg) MG tablet Take 1 tablet by mouth Daily.      mirtazapine (REMERON) 15 MG tablet Take 1 tablet by mouth Every Night.      Multiple Vitamin (MULTIVITAMIN) capsule Take 1 capsule by mouth Daily.      nitrofurantoin, macrocrystal-monohydrate, (MACROBID) 100 MG capsule Take 1 capsule by mouth 2 (Two) Times a Day. Ends 7/30/24      ondansetron ODT (ZOFRAN-ODT) 4 MG disintegrating tablet Place 1 tablet on the tongue Every 8 (Eight) Hours As Needed for Nausea or Vomiting. 10 tablet 0    pancrelipase, Lip-Prot-Amyl, (CREON) 22455-59882 units capsule delayed-release particles capsule Take 2 capsules by mouth 3 (Three) Times a Day With Meals. 180 capsule 0    pantoprazole (PROTONIX) 40 MG EC tablet 1 tablet Daily.      thiamine (VITAMIN B1) 100 MG tablet Take 1 tablet by mouth Daily. 30 tablet 0    nadolol (CORGARD) 40 MG tablet Take 1 tablet by mouth Daily.      vitamin D (ERGOCALCIFEROL) 1.25 MG (68183 UT) capsule capsule Take 1 capsule by mouth Every 7 (Seven) Days.       Allergies   Allergen Reactions    Benzonatate Nausea Only and Other (See Comments)     Rash     Ketorolac Tromethamine Other (See Comments)     \"I cannot take because of liver problems\"    Phenergan [Promethazine Hcl] Hives    Cucumber Extract Rash    Promethazine-Phenylephrine Other (See Comments)     \"FEEL WEIRD\"       Objective    Objective     Vital Signs  Temp:  [97.5 °F (36.4 °C)-98.2 °F (36.8 °C)] 98.2 °F (36.8 °C)  Heart Rate:  [71-94] 75  Resp:  [16-18] 16  BP: (121-147)/(79-98) 134/92  SpO2:  [93 %-100 %] 100 %  on   ;   Device (Oxygen Therapy): room air  Body mass index is 22.82 kg/m².    Physical Exam  General: Alert and oriented x3, no acute distress.  HEENT: Normocephalic, atraumatic  Eyes: PERRL, EOMI, anicteric sclerae  Lungs: Clear to auscultation bilaterally, " no crackles or wheezes  CV: Regular rate and rhythm, no murmurs rubs or gallops  Abdomen: Soft, nondistended,+ epigastric tenderness to palpation  Extremities: No significant peripheral edema  Skin: Clean/dry/intact, no rashes  Neuro: Cranial nerves II through XII intact, no gross focal neurological deficits appreciated  Psych: Appropriate mood and affect    Results Review:  I reviewed the patient's new clinical results.  I reviewed the patient's new imaging results and agree with the interpretation.  I reviewed the patient's other test results and agree with the interpretation  I personally viewed and interpreted the patient's EKG/Telemetry data  Discussed with ED provider.    Lab Results (last 24 hours)       Procedure Component Value Units Date/Time    CBC & Differential [[id number]]  (Abnormal) Collected: 08/05/24 0242    Specimen: Blood Updated: 08/05/24 0303    Narrative:      The following orders were created for panel order CBC & Differential.  Procedure                               Abnormality         Status                     ---------                               -----------         ------                     CBC Auto Differential[[id number]]        Abnormal            Final result                 Please view results for these tests on the individual orders.    Comprehensive Metabolic Panel [[id number]]  (Abnormal) Collected: 08/05/24 0242    Specimen: Blood Updated: 08/05/24 0314     Glucose 121 mg/dL      BUN 9 mg/dL      Creatinine 0.49 mg/dL      Sodium 142 mmol/L      Potassium 3.3 mmol/L      Chloride 108 mmol/L      CO2 23.0 mmol/L      Calcium 9.6 mg/dL      Total Protein 7.3 g/dL      Albumin 4.3 g/dL      ALT (SGPT) 47 U/L      AST (SGOT) 61 U/L      Alkaline Phosphatase 136 U/L      Total Bilirubin 1.3 mg/dL      Globulin 3.0 gm/dL      A/G Ratio 1.4 g/dL      BUN/Creatinine Ratio 18.4     Anion Gap 11.0 mmol/L      eGFR 110.8 mL/min/1.73     Narrative:      GFR Normal >60  Chronic Kidney  Disease <60  Kidney Failure <15      Lipase [[id number]]  (Abnormal) Collected: 08/05/24 0242    Specimen: Blood Updated: 08/05/24 0314     Lipase 98 U/L     Lactic Acid, Plasma [[id number]]  (Normal) Collected: 08/05/24 0242    Specimen: Blood Updated: 08/05/24 0311     Lactate 1.3 mmol/L     CBC Auto Differential [[id number]]  (Abnormal) Collected: 08/05/24 0242    Specimen: Blood Updated: 08/05/24 0303     WBC 4.65 10*3/mm3      RBC 4.85 10*6/mm3      Hemoglobin 15.4 g/dL      Hematocrit 46.1 %      MCV 95.1 fL      MCH 31.8 pg      MCHC 33.4 g/dL      RDW 14.7 %      RDW-SD 50.4 fl      MPV 11.4 fL      Platelets 94 10*3/mm3      Neutrophil % 53.8 %      Lymphocyte % 30.3 %      Monocyte % 11.6 %      Eosinophil % 3.2 %      Basophil % 0.9 %      Immature Grans % 0.2 %      Neutrophils, Absolute 2.50 10*3/mm3      Lymphocytes, Absolute 1.41 10*3/mm3      Monocytes, Absolute 0.54 10*3/mm3      Eosinophils, Absolute 0.15 10*3/mm3      Basophils, Absolute 0.04 10*3/mm3      Immature Grans, Absolute 0.01 10*3/mm3     Magnesium [[id number]]  (Normal) Collected: 08/05/24 0242    Specimen: Blood Updated: 08/05/24 0651     Magnesium 1.8 mg/dL     Phosphorus [[id number]]  (Normal) Collected: 08/05/24 0242    Specimen: Blood Updated: 08/05/24 0653     Phosphorus 3.4 mg/dL     Urinalysis With Microscopic If Indicated (No Culture) - Urine, Clean Catch [[id number]]  (Abnormal) Collected: 08/05/24 0625    Specimen: Urine, Clean Catch Updated: 08/05/24 0650     Color, UA Dark Yellow     Appearance, UA Clear     pH, UA 6.0     Specific Gravity, UA 1.019     Glucose, UA Negative     Ketones, UA Negative     Bilirubin, UA Negative     Blood, UA Negative     Protein, UA Negative     Leuk Esterase, UA Moderate (2+)     Nitrite, UA Negative     Urobilinogen, UA 1.0 E.U./dL    Urinalysis, Microscopic Only - Urine, Clean Catch [[id number]]  (Abnormal) Collected: 08/05/24 0625    Specimen: Urine, Clean Catch Updated: 08/05/24 0650     RBC, UA  0-2 /HPF      WBC, UA 3-5 /HPF      Bacteria, UA None Seen /HPF      Squamous Epithelial Cells, UA 3-6 /HPF      Hyaline Casts, UA 0-2 /LPF      Methodology Automated Microscopy    Urine Drug Screen - Urine, Clean Catch [[id number]]  (Abnormal) Collected: 08/05/24 0625    Specimen: Urine, Clean Catch Updated: 08/05/24 0803     Amphet/Methamphet, Screen Positive     Barbiturates Screen, Urine Positive     Benzodiazepine Screen, Urine Positive     Cocaine Screen, Urine Negative     Opiate Screen Positive     THC, Screen, Urine Positive     Methadone Screen, Urine Negative     Oxycodone Screen, Urine Negative     Fentanyl, Urine Negative    Narrative:      Negative Thresholds Per Drugs Screened:    Amphetamines                 500 ng/ml  Barbiturates                 200 ng/ml  Benzodiazepines              100 ng/ml  Cocaine                      300 ng/ml  Methadone                    300 ng/ml  Opiates                      300 ng/ml  Oxycodone                    100 ng/ml  THC                           50 ng/ml  Fentanyl                       5 ng/ml      The Normal Value for all drugs tested is negative. This report includes final unconfirmed screening results to be used for medical treatment purposes only. Unconfirmed results must not be used for non-medical purposes such as employment or legal testing. Clinical consideration should be applied to any drug of abuse test, particularly when unconfirmed results are used.            CBC (No Diff) [[id number]]  (Abnormal) Collected: 08/05/24 0627    Specimen: Blood Updated: 08/05/24 0642     WBC 3.67 10*3/mm3      RBC 4.43 10*6/mm3      Hemoglobin 14.1 g/dL      Hematocrit 41.3 %      MCV 93.2 fL      MCH 31.8 pg      MCHC 34.1 g/dL      RDW 14.5 %      RDW-SD 49.0 fl      MPV 10.9 fL      Platelets 76 10*3/mm3     Comprehensive Metabolic Panel [[id number]]  (Abnormal) Collected: 08/05/24 0751    Specimen: Blood Updated: 08/05/24 0856     Glucose 105 mg/dL      BUN 8 mg/dL       Creatinine 0.52 mg/dL      Sodium 139 mmol/L      Potassium 3.0 mmol/L      Chloride 105 mmol/L      CO2 24.0 mmol/L      Calcium 9.2 mg/dL      Total Protein 6.5 g/dL      Albumin 3.7 g/dL      ALT (SGPT) 41 U/L      AST (SGOT) 54 U/L      Alkaline Phosphatase 110 U/L      Total Bilirubin 1.3 mg/dL      Globulin 2.8 gm/dL      A/G Ratio 1.3 g/dL      BUN/Creatinine Ratio 15.4     Anion Gap 10.0 mmol/L      eGFR 109.2 mL/min/1.73     Narrative:      GFR Normal >60  Chronic Kidney Disease <60  Kidney Failure <15      Ethanol [[id number]] Collected: 08/05/24 0751    Specimen: Blood Updated: 08/05/24 0856     Ethanol <10 mg/dL      Ethanol % <0.010 %             Imaging Results (Last 24 Hours)       ** No results found for the last 24 hours. **                No orders to display        Assessment/Plan     Active Hospital Problems    Diagnosis  POA    **Acute on chronic pancreatitis [K85.90, K86.1]  Yes    Polysubstance abuse [F19.10]  Unknown    Hypokalemia [E87.6]  Yes    Primary hypertension [I10]  Yes    History of alcohol use disorder [Z87.898]  Yes    Peripheral neuropathy [G62.9]  Yes    Hypothyroidism [E03.9]  Yes      Resolved Hospital Problems   No resolved problems to display.       Patient is a 54 y.o. smoker with a history of Chronic pancreatitis, ETOH use, Cirrhosis complicated by portal hypertension, esophageal varices and splenomegaly, hypothyroidism, neuropathy, vitamin D deficiency, hypertension that presents to [facility] complaining of worsening epigastric abdominal pain that began yesterday.    Acute on chronic pancreatitis  -Patient with history of chronic pain status secondary to alcohol use with recurrent hospitalizations, recently was admitted to the hospital on 07/22/2024 through 07/25/2024 eval acute on chronic pancreatitis secondary to alcohol use.  EtOH level on admission.  Patient denies alcohol use since.  Was 182 EtOH level on admission was less than 10.  -CT scan at  outside hospital yesterday 08//24 showed changes of chronic pancreatitis again demonstrated. There is minimal stranding about the head and uncinate process of the pancreas not   definitely seen previously. This could represent mild or early changes  of acute pancreatitis.   -Initiated on IV fluids, pain management with as needed IV Dilaudid for severe pain,.  Oxycodone for moderate pain., clear liquid diet.  -LFTs mildly elevated but stable, improved compared to prior.  Monitor.      Polysubstance abuse  -Urine drug screen on admission was positive for amphetamines, barbiturates, benzodiazepines, THC, and opiates.  -Opiates likely positive secondary to IV Dilaudid patient received,  -[facility] consult.      Hypokalemia  -Potassium 3.4.  Replacement ordered.    UTI?  -Patient was initiated on antibiotics yesterday for UTI but never picked up the prescription and has not taken prior to admission., urinalysis on admission positive for leukocytes, 3-5 WBC.  Negative bacteria.  -Patient denies urinary symptoms.  No suspicion for UTI.  Monitor off antibiotics.        I discussed the patient's findings and my recommendations with patient and ED provider.    VTE Prophylaxis - SCDs.  Code Status - Full code.       Power Laughlin MD  [facility]  08/05/24  11:23 EDT

## 2024-08-06 LAB
ALBUMIN SERPL-MCNC: 3.5 G/DL (ref 3.5–5.2)
ALBUMIN/GLOB SERPL: 1.8 G/DL
ALP SERPL-CCNC: 106 U/L (ref 39–117)
ALT SERPL W P-5'-P-CCNC: 35 U/L (ref 1–33)
ANION GAP SERPL CALCULATED.3IONS-SCNC: 7.1 MMOL/L (ref 5–15)
AST SERPL-CCNC: 48 U/L (ref 1–32)
BILIRUB SERPL-MCNC: 1.2 MG/DL (ref 0–1.2)
BUN SERPL-MCNC: 6 MG/DL (ref 6–20)
BUN/CREAT SERPL: 13.6 (ref 7–25)
CALCIUM SPEC-SCNC: 8.8 MG/DL (ref 8.6–10.5)
CHLORIDE SERPL-SCNC: 107 MMOL/L (ref 98–107)
CO2 SERPL-SCNC: 22.9 MMOL/L (ref 22–29)
CREAT SERPL-MCNC: 0.44 MG/DL (ref 0.57–1)
DEPRECATED RDW RBC AUTO: 48.2 FL (ref 37–54)
EGFRCR SERPLBLD CKD-EPI 2021: 113.7 ML/MIN/1.73
ERYTHROCYTE [DISTWIDTH] IN BLOOD BY AUTOMATED COUNT: 14.3 % (ref 12.3–15.4)
GLOBULIN UR ELPH-MCNC: 2 GM/DL
GLUCOSE SERPL-MCNC: 103 MG/DL (ref 65–99)
HCT VFR BLD AUTO: 40.1 % (ref 34–46.6)
HGB BLD-MCNC: 13.5 G/DL (ref 12–15.9)
INR PPP: 1.29 (ref 0.9–1.1)
MAGNESIUM SERPL-MCNC: 1.5 MG/DL (ref 1.6–2.6)
MCH RBC QN AUTO: 31.6 PG (ref 26.6–33)
MCHC RBC AUTO-ENTMCNC: 33.7 G/DL (ref 31.5–35.7)
MCV RBC AUTO: 93.9 FL (ref 79–97)
PHOSPHATE SERPL-MCNC: 3.1 MG/DL (ref 2.5–4.5)
PLATELET # BLD AUTO: 65 10*3/MM3 (ref 140–450)
PMV BLD AUTO: 11.7 FL (ref 6–12)
POTASSIUM SERPL-SCNC: 3.8 MMOL/L (ref 3.5–5.2)
PROT SERPL-MCNC: 5.5 G/DL (ref 6–8.5)
PROTHROMBIN TIME: 16.3 SECONDS (ref 11.7–14.2)
RBC # BLD AUTO: 4.27 10*6/MM3 (ref 3.77–5.28)
SODIUM SERPL-SCNC: 137 MMOL/L (ref 136–145)
WBC NRBC COR # BLD AUTO: 2.86 10*3/MM3 (ref 3.4–10.8)

## 2024-08-06 PROCEDURE — 80053 COMPREHEN METABOLIC PANEL: CPT | Performed by: STUDENT IN AN ORGANIZED HEALTH CARE EDUCATION/TRAINING PROGRAM

## 2024-08-06 PROCEDURE — 25010000002 HYDROMORPHONE PER 4 MG: Performed by: STUDENT IN AN ORGANIZED HEALTH CARE EDUCATION/TRAINING PROGRAM

## 2024-08-06 PROCEDURE — 85027 COMPLETE CBC AUTOMATED: CPT | Performed by: STUDENT IN AN ORGANIZED HEALTH CARE EDUCATION/TRAINING PROGRAM

## 2024-08-06 PROCEDURE — 84100 ASSAY OF PHOSPHORUS: CPT | Performed by: STUDENT IN AN ORGANIZED HEALTH CARE EDUCATION/TRAINING PROGRAM

## 2024-08-06 PROCEDURE — 83735 ASSAY OF MAGNESIUM: CPT | Performed by: STUDENT IN AN ORGANIZED HEALTH CARE EDUCATION/TRAINING PROGRAM

## 2024-08-06 PROCEDURE — 85610 PROTHROMBIN TIME: CPT | Performed by: STUDENT IN AN ORGANIZED HEALTH CARE EDUCATION/TRAINING PROGRAM

## 2024-08-06 PROCEDURE — G0378 HOSPITAL OBSERVATION PER HR: HCPCS

## 2024-08-06 PROCEDURE — 96361 HYDRATE IV INFUSION ADD-ON: CPT

## 2024-08-06 PROCEDURE — 96376 TX/PRO/DX INJ SAME DRUG ADON: CPT

## 2024-08-06 PROCEDURE — 63710000001 ONDANSETRON ODT 4 MG TABLET DISPERSIBLE: Performed by: NURSE PRACTITIONER

## 2024-08-06 PROCEDURE — 25810000003 LACTATED RINGERS PER 1000 ML: Performed by: NURSE PRACTITIONER

## 2024-08-06 RX ADMIN — PANCRELIPASE 24000 UNITS OF LIPASE: 60000; 12000; 38000 CAPSULE, DELAYED RELEASE PELLETS ORAL at 08:40

## 2024-08-06 RX ADMIN — HYDROMORPHONE HYDROCHLORIDE 0.5 MG: 1 INJECTION, SOLUTION INTRAMUSCULAR; INTRAVENOUS; SUBCUTANEOUS at 04:02

## 2024-08-06 RX ADMIN — FOLIC ACID 1 MG: 1 TABLET ORAL at 08:40

## 2024-08-06 RX ADMIN — LOPERAMIDE HYDROCHLORIDE 2 MG: 2 CAPSULE ORAL at 17:30

## 2024-08-06 RX ADMIN — MAGNESIUM OXIDE 400 MG (241.3 MG MAGNESIUM) TABLET 400 MG: TABLET at 08:40

## 2024-08-06 RX ADMIN — PANCRELIPASE 24000 UNITS OF LIPASE: 60000; 12000; 38000 CAPSULE, DELAYED RELEASE PELLETS ORAL at 11:02

## 2024-08-06 RX ADMIN — PANTOPRAZOLE SODIUM 40 MG: 40 TABLET, DELAYED RELEASE ORAL at 06:08

## 2024-08-06 RX ADMIN — LEVOTHYROXINE SODIUM 100 MCG: 100 TABLET ORAL at 06:08

## 2024-08-06 RX ADMIN — Medication 100 MG: at 08:40

## 2024-08-06 RX ADMIN — MIRTAZAPINE 15 MG: 15 TABLET, FILM COATED ORAL at 20:24

## 2024-08-06 RX ADMIN — FAMOTIDINE 20 MG: 10 INJECTION INTRAVENOUS at 20:24

## 2024-08-06 RX ADMIN — HYDROMORPHONE HYDROCHLORIDE 0.5 MG: 1 INJECTION, SOLUTION INTRAMUSCULAR; INTRAVENOUS; SUBCUTANEOUS at 10:56

## 2024-08-06 RX ADMIN — OXYCODONE HYDROCHLORIDE 5 MG: 5 TABLET ORAL at 02:12

## 2024-08-06 RX ADMIN — SODIUM CHLORIDE, POTASSIUM CHLORIDE, SODIUM LACTATE AND CALCIUM CHLORIDE 125 ML/HR: 600; 310; 30; 20 INJECTION, SOLUTION INTRAVENOUS at 06:06

## 2024-08-06 RX ADMIN — FAMOTIDINE 20 MG: 10 INJECTION INTRAVENOUS at 10:56

## 2024-08-06 RX ADMIN — OXYCODONE HYDROCHLORIDE 5 MG: 5 TABLET ORAL at 08:40

## 2024-08-06 RX ADMIN — ONDANSETRON 4 MG: 4 TABLET, ORALLY DISINTEGRATING ORAL at 16:05

## 2024-08-06 RX ADMIN — FLUOXETINE HYDROCHLORIDE 60 MG: 20 CAPSULE ORAL at 08:40

## 2024-08-06 RX ADMIN — OXYCODONE HYDROCHLORIDE 5 MG: 5 TABLET ORAL at 22:02

## 2024-08-06 RX ADMIN — NICOTINE 1 PATCH: 7 PATCH TRANSDERMAL at 10:55

## 2024-08-06 RX ADMIN — PANCRELIPASE 24000 UNITS OF LIPASE: 60000; 12000; 38000 CAPSULE, DELAYED RELEASE PELLETS ORAL at 18:43

## 2024-08-06 RX ADMIN — OXYCODONE HYDROCHLORIDE 5 MG: 5 TABLET ORAL at 16:05

## 2024-08-06 NOTE — PROGRESS NOTES
Pt called [facility] to get a copy of the resource list she was given on 7/3 24. Pt has an appt this Thursday but did not have the address and forgot the name of the practice. [facility] took pt a copy of the discharge resource list.   N/A

## 2024-08-06 NOTE — PLAN OF CARE
Goal Outcome Evaluation:  Plan of Care Reviewed With: patient        Progress: no change  Outcome Evaluation: VSS. Max CIWA score 1. IV and PO pain medication with relief. IVF infusing. Immodium given. GI panel negative. Tolerating clear liquid diet.

## 2024-08-06 NOTE — PROGRESS NOTES
" LOS: 0 days     Name: Bekah Gibson  Age: 54 y.o.  Sex: female  :  1970  MRN: [id number]         Primary Care Physician: Tylor Davis    Subjective   Subjective  She reports improvement of her abdominal pain.  Wishes to advance to full liquid diet.  No nausea or vomiting.  No diarrhea or constipation.    Objective   Vital Signs  Temp:  [96.9 °F (36.1 °C)-97.8 °F (36.6 °C)] 97.6 °F (36.4 °C)  Heart Rate:  [67-77] 72  Resp:  [16] 16  BP: (124-136)/(81-89) 136/87  Body mass index is 22.82 kg/m².    Objective:  General Appearance:  Comfortable and in no acute distress.    Vital signs: (most recent): Blood pressure 136/87, pulse 72, temperature 97.6 °F (36.4 °C), temperature source Oral, resp. rate 16, height 165.1 cm (65\"), weight 62.2 kg (137 lb 2 oz), last menstrual period 2013, SpO2 92%, not currently breastfeeding.    Lungs:  Normal effort and normal respiratory rate.  She is not in respiratory distress.  There are decreased breath sounds.    Heart: Normal rate.  Regular rhythm.    Abdomen: Abdomen is soft.  Bowel sounds are normal.   (Mild epigastric tenderness).     Extremities: There is no dependent edema or local swelling.    Neurological: Patient is alert and oriented to person, place and time.    Skin:  Warm and dry.                Results Review:       I reviewed the patient's new clinical results.    Results from last 7 days   Lab Units 24  0604 24  0627 24  0242   WBC 10*3/mm3 2.86* 3.67 4.65   HEMOGLOBIN g/dL 13.5 14.1 15.4   PLATELETS 10*3/mm3 65* 76* 94*     Results from last 7 days   Lab Units 24  0604 24  0751 24  0242   SODIUM mmol/L 137 139 142   POTASSIUM mmol/L 3.8 3.0* 3.3*   CHLORIDE mmol/L 107 105 108*   CO2 mmol/L 22.9 24.0 23.0   BUN mg/dL 6 8 9   CREATININE mg/dL 0.44* 0.52* 0.49*   CALCIUM mg/dL 8.8 9.2 9.6   GLUCOSE mg/dL 103* 105* 121*     Results from last 7 days   Lab Units 24  0604   INR  1.29*             Scheduled Meds: "   famotidine, 20 mg, Intravenous, Q12H  FLUoxetine, 60 mg, Oral, Daily  folic acid, 1 mg, Oral, Daily  lactulose, 10 g, Oral, QAM  levothyroxine, 100 mcg, Oral, Q AM  Magnesium Oxide -Mg Supplement, 400 mg, Oral, Daily  mirtazapine, 15 mg, Oral, Nightly  nadolol, 40 mg, Oral, Daily  nicotine, 1 patch, Transdermal, Q24H  pancrelipase (Lip-Prot-Amyl), 24,000 units of lipase, Oral, TID With Meals  pantoprazole, 40 mg, Oral, Q AM  sodium chloride, 10 mL, Intravenous, Q12H  thiamine, 100 mg, Oral, Daily      PRN Meds:     calcium carbonate    HYDROmorphone    loperamide    ondansetron ODT **OR** ondansetron    oxyCODONE    sodium chloride    sodium chloride    sodium chloride  Continuous Infusions:  lactated ringers, 125 mL/hr, Last Rate: 125 mL/hr (08/06/24 0606)        Assessment & Plan   Active Hospital Problems    Diagnosis  POA    **Acute on chronic pancreatitis [K85.90, K86.1]  Yes    Polysubstance abuse [F19.10]  Unknown    Hypokalemia [E87.6]  Yes    Primary hypertension [I10]  Yes    History of alcohol use disorder [Z87.898]  Yes    Peripheral neuropathy [G62.9]  Yes    Hypothyroidism [E03.9]  Yes      Resolved Hospital Problems   No resolved problems to display.       Assessment & Plan    -Continue supportive care with IV fluids, analgesics, antiemetics.  Patient reports improvement of her symptoms.  Will advance to full liquids and as tolerated thereafter.  -No evidence of alcohol withdrawal  -Monitor LFTs, overall improved  -No evidence of UTI and monitoring off antibiotics  -Replace electrolytes as needed  -Monitor thrombocytopenia closely.  This is a chronic issue for her.  No signs of active bleeding.    Dispo  Possible discharge in 1 to 2 days depending on progress with diet advancement and pain control    Expected Discharge Date: 8/8/2024; Expected Discharge Time:      Dimas Johnson MD  [facility]  08/06/24  10:44 EDT

## 2024-08-06 NOTE — OUTREACH NOTE
Medical Week 2 Survey      Flowsheet Row Responses   [facility] patient discharged from? [place]   Does the patient have one of the following disease processes/diagnoses(primary or secondary)? Other   Week 2 attempt successful? No   Unsuccessful attempts Attempt 1   Revoke Readmitted            Rhianna MONROY - Registered Nurse

## 2024-08-06 NOTE — PROGRESS NOTES
Continued Stay Note  [facility]     Patient Name: Bekah Gibson  MRN: [id number]  Today's Date: 8/6/2024    Admit Date: 8/5/2024    Plan: Home no needs   Discharge Plan       Row Name 08/06/24 1155       Plan    Plan Home no needs    Plan Comments Introduced self and role of CCP. Patient confirmed DC plan is to return to home. Stated she is independent with ADL's and uses no DMEs. Stated her spouse will assist as needed and will provide transportation at DC. Denies any needs/equipment.                   Discharge Codes    No documentation.                 Expected Discharge Date and Time       Expected Discharge Date Expected Discharge Time    Aug 8, 2024               Suzy Davenport RN

## 2024-08-06 NOTE — PROGRESS NOTES
[facility] follow up d/t drugs; this writer reviewed chart and spoke with RN Chely. Per RN, patient is doing fine; she slept some overnight.  Last CIWA 1 at 20:12.  Per EMR, patient declined [facility] resources; she wants to pursue medication management and counseling. No further needs/concerns noted at this time per RN and/or medical team; [facility] to continue following.

## 2024-08-06 NOTE — PLAN OF CARE
Goal Outcome Evaluation:  Plan of Care Reviewed With: patient        Progress: improving  Outcome Evaluation: CIWA score 0, vss, afebrile, ivf infusing, adequate pain control with Roxicodone and Dilaudid, tolerating gi soft diet, voiding, given Immodium x1.

## 2024-08-07 ENCOUNTER — READMISSION MANAGEMENT (OUTPATIENT)
Dept: CALL CENTER | Facility: HOSPITAL | Age: 54
End: 2024-08-07
Payer: MEDICAID

## 2024-08-07 VITALS
OXYGEN SATURATION: 94 % | BODY MASS INDEX: 22.85 KG/M2 | RESPIRATION RATE: 16 BRPM | DIASTOLIC BLOOD PRESSURE: 83 MMHG | HEART RATE: 67 BPM | WEIGHT: 137.13 LBS | TEMPERATURE: 97.3 F | SYSTOLIC BLOOD PRESSURE: 129 MMHG | HEIGHT: 65 IN

## 2024-08-07 LAB
ALBUMIN SERPL-MCNC: 3.5 G/DL (ref 3.5–5.2)
ALBUMIN/GLOB SERPL: 1.3 G/DL
ALP SERPL-CCNC: 114 U/L (ref 39–117)
ALT SERPL W P-5'-P-CCNC: 43 U/L (ref 1–33)
ANION GAP SERPL CALCULATED.3IONS-SCNC: 9 MMOL/L (ref 5–15)
AST SERPL-CCNC: 59 U/L (ref 1–32)
BILIRUB SERPL-MCNC: 1 MG/DL (ref 0–1.2)
BUN SERPL-MCNC: 6 MG/DL (ref 6–20)
BUN/CREAT SERPL: 12 (ref 7–25)
CALCIUM SPEC-SCNC: 9 MG/DL (ref 8.6–10.5)
CHLORIDE SERPL-SCNC: 105 MMOL/L (ref 98–107)
CO2 SERPL-SCNC: 24 MMOL/L (ref 22–29)
CREAT SERPL-MCNC: 0.5 MG/DL (ref 0.57–1)
DEPRECATED RDW RBC AUTO: 49.6 FL (ref 37–54)
EGFRCR SERPLBLD CKD-EPI 2021: 110.2 ML/MIN/1.73
ERYTHROCYTE [DISTWIDTH] IN BLOOD BY AUTOMATED COUNT: 14.4 % (ref 12.3–15.4)
GLOBULIN UR ELPH-MCNC: 2.8 GM/DL
GLUCOSE SERPL-MCNC: 117 MG/DL (ref 65–99)
HCT VFR BLD AUTO: 40.4 % (ref 34–46.6)
HGB BLD-MCNC: 13.7 G/DL (ref 12–15.9)
MCH RBC QN AUTO: 31.9 PG (ref 26.6–33)
MCHC RBC AUTO-ENTMCNC: 33.9 G/DL (ref 31.5–35.7)
MCV RBC AUTO: 94 FL (ref 79–97)
PLATELET # BLD AUTO: 62 10*3/MM3 (ref 140–450)
PMV BLD AUTO: 12.1 FL (ref 6–12)
POTASSIUM SERPL-SCNC: 3.6 MMOL/L (ref 3.5–5.2)
PROT SERPL-MCNC: 6.3 G/DL (ref 6–8.5)
RBC # BLD AUTO: 4.3 10*6/MM3 (ref 3.77–5.28)
SODIUM SERPL-SCNC: 138 MMOL/L (ref 136–145)
WBC NRBC COR # BLD AUTO: 2.53 10*3/MM3 (ref 3.4–10.8)

## 2024-08-07 PROCEDURE — 85027 COMPLETE CBC AUTOMATED: CPT | Performed by: INTERNAL MEDICINE

## 2024-08-07 PROCEDURE — 96376 TX/PRO/DX INJ SAME DRUG ADON: CPT

## 2024-08-07 PROCEDURE — 80053 COMPREHEN METABOLIC PANEL: CPT | Performed by: INTERNAL MEDICINE

## 2024-08-07 PROCEDURE — 25810000003 LACTATED RINGERS PER 1000 ML: Performed by: INTERNAL MEDICINE

## 2024-08-07 PROCEDURE — G0378 HOSPITAL OBSERVATION PER HR: HCPCS

## 2024-08-07 RX ORDER — OXYCODONE HYDROCHLORIDE 5 MG/1
5 TABLET ORAL EVERY 6 HOURS PRN
Qty: 12 TABLET | Refills: 0 | Status: SHIPPED | OUTPATIENT
Start: 2024-08-07

## 2024-08-07 RX ADMIN — Medication 100 MG: at 08:57

## 2024-08-07 RX ADMIN — FOLIC ACID 1 MG: 1 TABLET ORAL at 08:57

## 2024-08-07 RX ADMIN — MAGNESIUM OXIDE 400 MG (241.3 MG MAGNESIUM) TABLET 400 MG: TABLET at 08:57

## 2024-08-07 RX ADMIN — SODIUM CHLORIDE, POTASSIUM CHLORIDE, SODIUM LACTATE AND CALCIUM CHLORIDE 100 ML/HR: 600; 310; 30; 20 INJECTION, SOLUTION INTRAVENOUS at 04:27

## 2024-08-07 RX ADMIN — FAMOTIDINE 20 MG: 10 INJECTION INTRAVENOUS at 09:04

## 2024-08-07 RX ADMIN — OXYCODONE HYDROCHLORIDE 5 MG: 5 TABLET ORAL at 04:10

## 2024-08-07 RX ADMIN — FLUOXETINE HYDROCHLORIDE 60 MG: 20 CAPSULE ORAL at 08:57

## 2024-08-07 RX ADMIN — NADOLOL 40 MG: 40 TABLET ORAL at 08:56

## 2024-08-07 RX ADMIN — PANTOPRAZOLE SODIUM 40 MG: 40 TABLET, DELAYED RELEASE ORAL at 06:24

## 2024-08-07 RX ADMIN — PANCRELIPASE 24000 UNITS OF LIPASE: 60000; 12000; 38000 CAPSULE, DELAYED RELEASE PELLETS ORAL at 08:57

## 2024-08-07 RX ADMIN — LEVOTHYROXINE SODIUM 100 MCG: 100 TABLET ORAL at 06:24

## 2024-08-07 RX ADMIN — OXYCODONE HYDROCHLORIDE 5 MG: 5 TABLET ORAL at 11:59

## 2024-08-07 RX ADMIN — PANCRELIPASE 24000 UNITS OF LIPASE: 60000; 12000; 38000 CAPSULE, DELAYED RELEASE PELLETS ORAL at 11:59

## 2024-08-07 NOTE — NURSING NOTE
Reviewed AVS with patient, all questions answered, patient verbalized understanding, iv removed, patient received meds to bed.

## 2024-08-07 NOTE — DISCHARGE SUMMARY
Date of Admission: 8/5/2024  Date of Discharge:  8/7/2024  Primary Care Physician: Tylor Davis     Discharge Diagnosis:  Active Hospital Problems    Diagnosis  POA    **Acute on chronic pancreatitis [K85.90, K86.1]  Yes    Polysubstance abuse [F19.10]  Unknown    Hypokalemia [E87.6]  Yes    Primary hypertension [I10]  Yes    History of alcohol use disorder [Z87.898]  Yes    Peripheral neuropathy [G62.9]  Yes    Hypothyroidism [E03.9]  Yes      Resolved Hospital Problems   No resolved problems to display.       DETAILS OF HOSPITAL STAY     Pertinent Test Results and Procedures Performed    CT scan of the abdomen and pelvis:  Changes of chronic pancreatitis again demonstrated. There is minimalstranding about the head and uncinate process of the pancreas notdefinitely seen previously. This could represent mild or early changesof acute pancreatitis.Appearance of the colon and distal small bowel reflective ofnonspecific enterocolitis     Hospital Course  This is a 54-year-old female with history of chronic pancreaittis who presented to the emergency room with complaints of epigastric pain.  Please see H&P for full details.  Lipase was elevated to a mild degree and she had CT findings representing mild changes of acute pancreatitis.  She was admitted and started on bowel rest, IV fluids, analgesics and antiemetics.  She fairly rapidly improved with this regimen and is now back on a regular diet with minimal pain.  She appears stable for discharge home be released back home today.    Physical Exam at Discharge:  General: No acute distress, AAOx3  HEENT: EOMI, PERRL  Cardiovascular: +s1 and s2, RRR  Lungs: No rhonchi or wheezing  Abdomen: soft, nontender    Consults:   Consults       Date and Time Order Name Status Description    8/5/2024  5:16 AM [facility] (on-call MD unless specified) Details      7/24/2024  2:30 PM Inpatient General Surgery Consult Completed     7/24/2024  9:44 AM Hematology & Oncology Inpatient Consult  Completed               Condition on Discharge: Stable, improved    Discharge Disposition  Home or Self Care    Discharge Medications     Discharge Medications        New Medications        Instructions Start Date   oxyCODONE 5 MG immediate release tablet  Commonly known as: ROXICODONE   5 mg, Oral, Every 6 Hours PRN             Changes to Medications        Instructions Start Date   levothyroxine 100 MCG tablet  Commonly known as: SYNTHROID, LEVOTHROID  What changed: when to take this   TAKE ONE TABLET BY MOUTH DAILY             Continue These Medications        Instructions Start Date   amLODIPine 2.5 MG tablet  Commonly known as: NORVASC   Take 1 tablet by mouth Daily.      Creon 72733-53236 units capsule delayed-release particles capsule  Generic drug: pancrelipase (Lip-Prot-Amyl)   24,000 units of lipase, Oral, 3 Times Daily With Meals      ferrous sulfate 325 (65 FE) MG tablet   325 mg, Oral, Daily With Breakfast      FLUoxetine 60 MG tablet  Commonly known as: PROzac   60 mg, Oral, Daily      folic acid 1 MG tablet  Commonly known as: FOLVITE   1 mg, Oral, Daily      Ibuprofen 3 %, Gabapentin 10 %, Baclofen 2 %, lidocaine 4 %   1-2 g, Topical, 3 to 4 Times Daily      lactulose 10 GM/15ML solution  Commonly known as: CHRONULAC   10 g, Oral, Every Morning      MAGnesium-Oxide 400 (240 Mg) MG tablet  Generic drug: Magnesium Oxide -Mg Supplement   400 mg, Oral, Daily      mirtazapine 15 MG tablet  Commonly known as: REMERON   15 mg, Oral, Nightly      multivitamin capsule   1 capsule, Oral, Daily      nadolol 40 MG tablet  Commonly known as: CORGARD   40 mg, Oral, Daily      ondansetron ODT 4 MG disintegrating tablet  Commonly known as: ZOFRAN-ODT   4 mg, Translingual, Every 8 Hours PRN      pantoprazole 40 MG EC tablet  Commonly known as: PROTONIX   40 mg, Daily      thiamine 100 MG tablet  Commonly known as: VITAMIN B1   100 mg, Oral, Daily      vitamin D 1.25 MG (12919 UT) capsule capsule  Commonly known as:  ERGOCALCIFEROL   50,000 Units, Oral, Every 7 Days             Stop These Medications      nitrofurantoin (macrocrystal-monohydrate) 100 MG capsule  Commonly known as: MACROBID              Discharge Diet:   Diet Instructions       Diet: Regular/House Diet, Gastrointestinal Diets; Fiber-Restricted, Low Irritant; Soft to Chew (NDD 3); Whole Meat; Thin (IDDSI 0)      Discharge Diet:  Regular/House Diet  Gastrointestinal Diets       Gastrointestinal Diet:  Fiber-Restricted  Low Irritant       Texture: Soft to Chew (NDD 3)    Soft to Chew: Whole Meat    Fluid Consistency: Thin (IDDSI 0)            Activity at Discharge:   Activity Instructions       Activity as Tolerated              Follow-up Appointments  Future Appointments   Date Time Provider Department [place]   8/26/2024  8:10 AM Giovanni Denise MD [facility]     Additional Instructions for the Follow-ups that You Need to Schedule       Discharge Follow-up with PCP   As directed       Currently Documented PCP:    Tylor Davis    PCP Phone Number:    [phone]     Follow Up Details: 1 week                  I have examined and discussed discharge planning with the patient today.    Dimas Johnson MD  08/07/24  09:19 EDT    Time: Discharge greater than 30 min

## 2024-08-07 NOTE — PLAN OF CARE
Goal Outcome Evaluation:  Plan of Care Reviewed With: patient        Progress: improving  Outcome Evaluation: oxycodone given for pain control, no c/o of nausea, up ad diane, room air, lilliana GI soft diet, ivf infusing per order, voiding freely, resting between care, CIWA score 2

## 2024-08-07 NOTE — CASE MANAGEMENT/SOCIAL WORK
Case Management Discharge Note      Final Note: Home         Selected Continued Care - Discharged on 8/7/2024 Admission date: 8/5/2024 - Discharge disposition: Home or Self Care      Destination    No services have been selected for the patient.                Durable Medical Equipment    No services have been selected for the patient.                Dialysis/Infusion    No services have been selected for the patient.                Home Medical Care    No services have been selected for the patient.                Therapy    No services have been selected for the patient.                Community Resources    No services have been selected for the patient.                Community & DME    No services have been selected for the patient.                         Final Discharge Disposition Code: 01 - home or self-care

## 2024-08-07 NOTE — PROGRESS NOTES
"[facility] f[facility]u d/t [facility]. Chart reviewed, spoke w/ RN and met w/ pt. RN reports pt discharging home today. Pt A&Ox4. Rates anxiety \"6/10\" and depression \"2/10\" today (10 being the worst). Denies SI/HI, hallucinations or wish to be dead. Pt states has appointment for outpatient mental health tomorrow at [facility]. Pt denies [facility] resources at this time. No other needs/concerns.  [facility] following through discharge.   "

## 2024-08-08 NOTE — OUTREACH NOTE
Prep Survey      Flowsheet Row Responses   [facility] discharged from? [place]   Is LACE score < 7 ? No   Eligibility Readm Mgmt   Discharge diagnosis Acute on chronic pancreatitis   Does the patient have one of the following disease processes/diagnoses(primary or secondary)? Other   Does the patient have Home health ordered? No   Is there a DME ordered? No   Prep survey completed? Yes            Rhianna MONROY - Registered Nurse

## 2024-08-12 ENCOUNTER — READMISSION MANAGEMENT (OUTPATIENT)
Dept: CALL CENTER | Facility: HOSPITAL | Age: 54
End: 2024-08-12
Payer: MEDICAID

## 2024-08-12 NOTE — OUTREACH NOTE
Medical Week 1 Survey      Flowsheet Row Responses   [facility] patient discharged from? [place]   Does the patient have one of the following disease processes/diagnoses(primary or secondary)? Other   Week 1 attempt successful? No   Unsuccessful attempts Attempt 1  [All numbers listed were attempted-no answer]            Savi H - Registered Nurse

## 2024-08-15 ENCOUNTER — READMISSION MANAGEMENT (OUTPATIENT)
Dept: CALL CENTER | Facility: HOSPITAL | Age: 54
End: 2024-08-15
Payer: MEDICAID

## 2024-08-15 NOTE — OUTREACH NOTE
Medical Week 1 Survey      Flowsheet Row Responses   [facility] [place] patient discharged from? [place]   Does the patient have one of the following disease processes/diagnoses(primary or secondary)? Other   Week 1 attempt successful? Yes   Call start time 1047   Call end time 1048   Discharge diagnosis Acute on chronic pancreatitis   Meds reviewed with patient/caregiver? Yes   Is the patient having any side effects they believe may be caused by any medication additions or changes? No   Does the patient have all medications ordered at discharge? Yes   Is the patient taking all medications as directed (includes completed medication regime)? Yes   Does the patient have a primary care provider?  Yes   Does the patient have an appointment with their PCP within 7 days of discharge? No   What is preventing the patient from scheduling follow up appointments within 7 days of discharge? Haven't had time   Nursing Interventions Educated patient on importance of making appointment, Advised patient to make appointment   Has the patient kept scheduled appointments due by today? Yes   Has home health visited the patient within 72 hours of discharge? N/A   Psychosocial issues? No   Did the patient receive a copy of their discharge instructions? Yes   Nursing interventions Reviewed instructions with patient   What is the patient's perception of their health status since discharge? Improving   Is the patient/caregiver able to teach back the hierarchy of who to call/visit for symptoms/problems? PCP, Specialist, Home health nurse, Urgent Care, ED, 911 Yes   Week 1 call completed? Yes   Graduated Yes   Graduated/Revoked comments Pt reports she is doing great. Pt will call PCP for FU appt. No needs at this time.   Call end time 1048            UMER RUIZ - Registered Nurse

## 2024-08-26 ENCOUNTER — OFFICE VISIT (OUTPATIENT)
Dept: ORTHOPEDIC SURGERY | Facility: CLINIC | Age: 54
End: 2024-08-26
Payer: MEDICAID

## 2024-08-26 VITALS — BODY MASS INDEX: 22.82 KG/M2 | TEMPERATURE: 97.8 F | WEIGHT: 137 LBS | HEIGHT: 65 IN

## 2024-08-26 DIAGNOSIS — M19.019 ARTHRITIS OF SHOULDER: Primary | ICD-10-CM

## 2024-08-26 PROCEDURE — 20610 DRAIN/INJ JOINT/BURSA W/O US: CPT | Performed by: ORTHOPAEDIC SURGERY

## 2024-08-26 PROCEDURE — 1159F MED LIST DOCD IN RCRD: CPT | Performed by: ORTHOPAEDIC SURGERY

## 2024-08-26 PROCEDURE — 1160F RVW MEDS BY RX/DR IN RCRD: CPT | Performed by: ORTHOPAEDIC SURGERY

## 2024-08-26 RX ORDER — METHYLPREDNISOLONE ACETATE 80 MG/ML
1 INJECTION, SUSPENSION INTRA-ARTICULAR; INTRALESIONAL; INTRAMUSCULAR; SOFT TISSUE
Status: COMPLETED | OUTPATIENT
Start: 2024-08-26 | End: 2024-08-26

## 2024-08-26 RX ORDER — LAMOTRIGINE 25 MG/1
TABLET ORAL DAILY
COMMUNITY
Start: 2024-08-08

## 2024-08-26 RX ORDER — LIDOCAINE HYDROCHLORIDE 20 MG/ML
2 INJECTION, SOLUTION EPIDURAL; INFILTRATION; INTRACAUDAL; PERINEURAL
Status: COMPLETED | OUTPATIENT
Start: 2024-08-26 | End: 2024-08-26

## 2024-08-26 RX ADMIN — METHYLPREDNISOLONE ACETATE 1 ML: 80 INJECTION, SUSPENSION INTRA-ARTICULAR; INTRALESIONAL; INTRAMUSCULAR; SOFT TISSUE at 08:45

## 2024-08-26 RX ADMIN — LIDOCAINE HYDROCHLORIDE 2 ML: 20 INJECTION, SOLUTION EPIDURAL; INFILTRATION; INTRACAUDAL; PERINEURAL at 08:45

## 2024-08-26 NOTE — PROGRESS NOTES
Ms. Gibson follows up for her right shoulder.  She would like to get a repeat injection today.  The risk, benefits and alternatives were discussed.  She consented and injection was performed as described below.  She will follow-up as needed.    Large Joint Arthrocentesis: R glenohumeral  Date/Time: 8/26/2024 8:45 AM  Consent given by: patient  Site marked: site marked  Timeout: Immediately prior to procedure a time out was called to verify the correct patient, procedure, equipment, support staff and site/side marked as required   Supporting Documentation  Indications: pain   Procedure Details  Location: shoulder - R glenohumeral  Preparation: Patient was prepped and draped in the usual sterile fashion  Needle gauge: 21G.  Approach: anterior  Medications administered: 1 mL methylPREDNISolone acetate 80 MG/ML; 2 mL lidocaine PF 2% 2 %  Patient tolerance: patient tolerated the procedure well with no immediate complications

## 2024-10-24 ENCOUNTER — APPOINTMENT (OUTPATIENT)
Dept: CT IMAGING | Facility: HOSPITAL | Age: 54
End: 2024-10-24
Payer: MEDICAID

## 2024-10-24 ENCOUNTER — HOSPITAL ENCOUNTER (INPATIENT)
Facility: HOSPITAL | Age: 54
LOS: 2 days | Discharge: HOME | End: 2024-10-26
Attending: EMERGENCY MEDICINE | Admitting: HOSPITALIST
Payer: MEDICAID

## 2024-10-24 DIAGNOSIS — F10.29 ALCOHOL DEPENDENCE WITH UNSPECIFIED ALCOHOL-INDUCED DISORDER: ICD-10-CM

## 2024-10-24 DIAGNOSIS — K85.90 ACUTE PANCREATITIS, UNSPECIFIED COMPLICATION STATUS, UNSPECIFIED PANCREATITIS TYPE: Primary | ICD-10-CM

## 2024-10-24 PROBLEM — K85.20 ALCOHOL-INDUCED ACUTE PANCREATITIS WITHOUT INFECTION OR NECROSIS: Status: ACTIVE | Noted: 2024-07-03

## 2024-10-24 PROBLEM — F10.239 ALCOHOL DEPENDENCE WITH WITHDRAWAL: Status: ACTIVE | Noted: 2024-10-24

## 2024-10-24 LAB
ALBUMIN SERPL-MCNC: 4.3 G/DL (ref 3.5–5.2)
ALBUMIN/GLOB SERPL: 1.2 G/DL
ALP SERPL-CCNC: 191 U/L (ref 39–117)
ALT SERPL W P-5'-P-CCNC: 62 U/L (ref 1–33)
ANION GAP SERPL CALCULATED.3IONS-SCNC: 20.3 MMOL/L (ref 5–15)
AST SERPL-CCNC: 170 U/L (ref 1–32)
BACTERIA UR QL AUTO: ABNORMAL /HPF
BASOPHILS # BLD AUTO: 0.02 10*3/MM3 (ref 0–0.2)
BASOPHILS NFR BLD AUTO: 0.6 % (ref 0–1.5)
BILIRUB SERPL-MCNC: 1.5 MG/DL (ref 0–1.2)
BILIRUB UR QL STRIP: NEGATIVE
BUN SERPL-MCNC: 7 MG/DL (ref 6–20)
BUN/CREAT SERPL: 10 (ref 7–25)
CALCIUM SPEC-SCNC: 9.6 MG/DL (ref 8.6–10.5)
CHLORIDE SERPL-SCNC: 102 MMOL/L (ref 98–107)
CLARITY UR: CLEAR
CO2 SERPL-SCNC: 21.7 MMOL/L (ref 22–29)
COLOR UR: ABNORMAL
CREAT SERPL-MCNC: 0.7 MG/DL (ref 0.57–1)
DEPRECATED RDW RBC AUTO: 48.4 FL (ref 37–54)
EGFRCR SERPLBLD CKD-EPI 2021: 101.6 ML/MIN/1.73
EOSINOPHIL # BLD AUTO: 0.01 10*3/MM3 (ref 0–0.4)
EOSINOPHIL NFR BLD AUTO: 0.3 % (ref 0.3–6.2)
ERYTHROCYTE [DISTWIDTH] IN BLOOD BY AUTOMATED COUNT: 13.7 % (ref 12.3–15.4)
GLOBULIN UR ELPH-MCNC: 3.6 GM/DL
GLUCOSE SERPL-MCNC: 143 MG/DL (ref 65–99)
GLUCOSE UR STRIP-MCNC: NEGATIVE MG/DL
HCT VFR BLD AUTO: 47.1 % (ref 34–46.6)
HGB BLD-MCNC: 15.9 G/DL (ref 12–15.9)
HGB UR QL STRIP.AUTO: NEGATIVE
HYALINE CASTS UR QL AUTO: ABNORMAL /LPF
IMM GRANULOCYTES # BLD AUTO: 0.02 10*3/MM3 (ref 0–0.05)
IMM GRANULOCYTES NFR BLD AUTO: 0.6 % (ref 0–0.5)
KETONES UR QL STRIP: ABNORMAL
LEUKOCYTE ESTERASE UR QL STRIP.AUTO: ABNORMAL
LIPASE SERPL-CCNC: 43 U/L (ref 13–60)
LYMPHOCYTES # BLD AUTO: 0.54 10*3/MM3 (ref 0.7–3.1)
LYMPHOCYTES NFR BLD AUTO: 16.7 % (ref 19.6–45.3)
MCH RBC QN AUTO: 32.2 PG (ref 26.6–33)
MCHC RBC AUTO-ENTMCNC: 33.8 G/DL (ref 31.5–35.7)
MCV RBC AUTO: 95.3 FL (ref 79–97)
MONOCYTES # BLD AUTO: 0.23 10*3/MM3 (ref 0.1–0.9)
MONOCYTES NFR BLD AUTO: 7.1 % (ref 5–12)
NEUTROPHILS NFR BLD AUTO: 2.42 10*3/MM3 (ref 1.7–7)
NEUTROPHILS NFR BLD AUTO: 74.7 % (ref 42.7–76)
NITRITE UR QL STRIP: NEGATIVE
NRBC BLD AUTO-RTO: 0 /100 WBC (ref 0–0.2)
PH UR STRIP.AUTO: 5.5 [PH] (ref 5–8)
PLATELET # BLD AUTO: 53 10*3/MM3 (ref 140–450)
PMV BLD AUTO: 11.7 FL (ref 6–12)
POTASSIUM SERPL-SCNC: 3.2 MMOL/L (ref 3.5–5.2)
PROT SERPL-MCNC: 7.9 G/DL (ref 6–8.5)
PROT UR QL STRIP: NEGATIVE
RBC # BLD AUTO: 4.94 10*6/MM3 (ref 3.77–5.28)
RBC # UR STRIP: ABNORMAL /HPF
REF LAB TEST METHOD: ABNORMAL
SODIUM SERPL-SCNC: 144 MMOL/L (ref 136–145)
SP GR UR STRIP: 1.02 (ref 1–1.03)
SQUAMOUS #/AREA URNS HPF: ABNORMAL /HPF
UROBILINOGEN UR QL STRIP: ABNORMAL
WBC # UR STRIP: ABNORMAL /HPF
WBC NRBC COR # BLD AUTO: 3.24 10*3/MM3 (ref 3.4–10.8)

## 2024-10-24 PROCEDURE — 25810000003 SODIUM CHLORIDE 0.9 % SOLUTION: Performed by: EMERGENCY MEDICINE

## 2024-10-24 PROCEDURE — 25010000002 ONDANSETRON PER 1 MG: Performed by: EMERGENCY MEDICINE

## 2024-10-24 PROCEDURE — 90791 PSYCH DIAGNOSTIC EVALUATION: CPT

## 2024-10-24 PROCEDURE — 96361 HYDRATE IV INFUSION ADD-ON: CPT

## 2024-10-24 PROCEDURE — 25010000002 LORAZEPAM PER 2 MG: Performed by: HOSPITALIST

## 2024-10-24 PROCEDURE — 25010000002 HYDROMORPHONE PER 4 MG: Performed by: HOSPITALIST

## 2024-10-24 PROCEDURE — 25510000001 IOPAMIDOL 61 % SOLUTION: Performed by: EMERGENCY MEDICINE

## 2024-10-24 PROCEDURE — 25010000002 THIAMINE HCL 200 MG/2ML SOLUTION: Performed by: HOSPITALIST

## 2024-10-24 PROCEDURE — 25010000002 HYDROMORPHONE PER 4 MG: Performed by: EMERGENCY MEDICINE

## 2024-10-24 PROCEDURE — 25810000003 LACTATED RINGERS PER 1000 ML: Performed by: HOSPITALIST

## 2024-10-24 PROCEDURE — 63710000001 ONDANSETRON ODT 4 MG TABLET DISPERSIBLE: Performed by: HOSPITALIST

## 2024-10-24 PROCEDURE — 81001 URINALYSIS AUTO W/SCOPE: CPT | Performed by: PHYSICIAN ASSISTANT

## 2024-10-24 PROCEDURE — 96375 TX/PRO/DX INJ NEW DRUG ADDON: CPT

## 2024-10-24 PROCEDURE — 99285 EMERGENCY DEPT VISIT HI MDM: CPT

## 2024-10-24 PROCEDURE — 80053 COMPREHEN METABOLIC PANEL: CPT | Performed by: PHYSICIAN ASSISTANT

## 2024-10-24 PROCEDURE — 74177 CT ABD & PELVIS W/CONTRAST: CPT

## 2024-10-24 PROCEDURE — 96376 TX/PRO/DX INJ SAME DRUG ADON: CPT

## 2024-10-24 PROCEDURE — 84132 ASSAY OF SERUM POTASSIUM: CPT | Performed by: HOSPITALIST

## 2024-10-24 PROCEDURE — 85025 COMPLETE CBC W/AUTO DIFF WBC: CPT | Performed by: PHYSICIAN ASSISTANT

## 2024-10-24 PROCEDURE — 83690 ASSAY OF LIPASE: CPT | Performed by: PHYSICIAN ASSISTANT

## 2024-10-24 RX ORDER — POTASSIUM CHLORIDE 750 MG/1
40 TABLET, FILM COATED, EXTENDED RELEASE ORAL EVERY 4 HOURS
Status: COMPLETED | OUTPATIENT
Start: 2024-10-24 | End: 2024-10-24

## 2024-10-24 RX ORDER — IOPAMIDOL 612 MG/ML
85 INJECTION, SOLUTION INTRAVASCULAR
Status: COMPLETED | OUTPATIENT
Start: 2024-10-24 | End: 2024-10-24

## 2024-10-24 RX ORDER — SODIUM CHLORIDE, SODIUM LACTATE, POTASSIUM CHLORIDE, CALCIUM CHLORIDE 600; 310; 30; 20 MG/100ML; MG/100ML; MG/100ML; MG/100ML
125 INJECTION, SOLUTION INTRAVENOUS CONTINUOUS
Status: DISCONTINUED | OUTPATIENT
Start: 2024-10-24 | End: 2024-10-26 | Stop reason: HOSPADM

## 2024-10-24 RX ORDER — FOLIC ACID 1 MG/1
1 TABLET ORAL DAILY
Status: DISCONTINUED | OUTPATIENT
Start: 2024-10-24 | End: 2024-10-26 | Stop reason: HOSPADM

## 2024-10-24 RX ORDER — LEVOTHYROXINE SODIUM 100 UG/1
100 TABLET ORAL
Status: DISCONTINUED | OUTPATIENT
Start: 2024-10-25 | End: 2024-10-26 | Stop reason: HOSPADM

## 2024-10-24 RX ORDER — LORAZEPAM 1 MG/1
1 TABLET ORAL
Status: DISCONTINUED | OUTPATIENT
Start: 2024-10-24 | End: 2024-10-26 | Stop reason: HOSPADM

## 2024-10-24 RX ORDER — LORAZEPAM 1 MG/1
2 TABLET ORAL
Status: DISCONTINUED | OUTPATIENT
Start: 2024-10-24 | End: 2024-10-26 | Stop reason: HOSPADM

## 2024-10-24 RX ORDER — LORAZEPAM 2 MG/ML
1 INJECTION INTRAMUSCULAR
Status: DISCONTINUED | OUTPATIENT
Start: 2024-10-24 | End: 2024-10-26 | Stop reason: HOSPADM

## 2024-10-24 RX ORDER — ACETAMINOPHEN 325 MG/1
650 TABLET ORAL EVERY 4 HOURS PRN
Status: DISCONTINUED | OUTPATIENT
Start: 2024-10-24 | End: 2024-10-26 | Stop reason: HOSPADM

## 2024-10-24 RX ORDER — LACTULOSE 10 G/15ML
10 SOLUTION ORAL EVERY MORNING
Status: DISCONTINUED | OUTPATIENT
Start: 2024-10-25 | End: 2024-10-26 | Stop reason: HOSPADM

## 2024-10-24 RX ORDER — LORAZEPAM 2 MG/ML
2 INJECTION INTRAMUSCULAR
Status: DISCONTINUED | OUTPATIENT
Start: 2024-10-24 | End: 2024-10-26 | Stop reason: HOSPADM

## 2024-10-24 RX ORDER — ONDANSETRON 2 MG/ML
4 INJECTION INTRAMUSCULAR; INTRAVENOUS ONCE
Status: COMPLETED | OUTPATIENT
Start: 2024-10-24 | End: 2024-10-24

## 2024-10-24 RX ORDER — SODIUM CHLORIDE 9 MG/ML
125 INJECTION, SOLUTION INTRAVENOUS CONTINUOUS
Status: DISCONTINUED | OUTPATIENT
Start: 2024-10-24 | End: 2024-10-24

## 2024-10-24 RX ORDER — POTASSIUM CHLORIDE 750 MG/1
10 TABLET, FILM COATED, EXTENDED RELEASE ORAL 2 TIMES DAILY
Status: DISCONTINUED | OUTPATIENT
Start: 2024-10-24 | End: 2024-10-26 | Stop reason: HOSPADM

## 2024-10-24 RX ORDER — POTASSIUM CHLORIDE 750 MG/1
10 TABLET, EXTENDED RELEASE ORAL
COMMUNITY

## 2024-10-24 RX ORDER — MIRTAZAPINE 15 MG/1
15 TABLET, FILM COATED ORAL NIGHTLY
Status: DISCONTINUED | OUTPATIENT
Start: 2024-10-24 | End: 2024-10-26 | Stop reason: HOSPADM

## 2024-10-24 RX ORDER — CLONIDINE HYDROCHLORIDE 0.1 MG/1
0.1 TABLET ORAL EVERY 4 HOURS PRN
Status: DISCONTINUED | OUTPATIENT
Start: 2024-10-24 | End: 2024-10-26 | Stop reason: HOSPADM

## 2024-10-24 RX ORDER — LANOLIN ALCOHOL/MO/W.PET/CERES
400 CREAM (GRAM) TOPICAL DAILY
Status: DISCONTINUED | OUTPATIENT
Start: 2024-10-25 | End: 2024-10-26 | Stop reason: HOSPADM

## 2024-10-24 RX ORDER — THIAMINE HYDROCHLORIDE 100 MG/ML
200 INJECTION, SOLUTION INTRAMUSCULAR; INTRAVENOUS EVERY 8 HOURS SCHEDULED
Status: DISCONTINUED | OUTPATIENT
Start: 2024-10-24 | End: 2024-10-26 | Stop reason: HOSPADM

## 2024-10-24 RX ORDER — PANTOPRAZOLE SODIUM 40 MG/1
40 TABLET, DELAYED RELEASE ORAL DAILY
Status: DISCONTINUED | OUTPATIENT
Start: 2024-10-24 | End: 2024-10-26 | Stop reason: HOSPADM

## 2024-10-24 RX ORDER — NADOLOL 40 MG/1
40 TABLET ORAL DAILY
Status: DISCONTINUED | OUTPATIENT
Start: 2024-10-24 | End: 2024-10-26 | Stop reason: HOSPADM

## 2024-10-24 RX ORDER — HYDROMORPHONE HYDROCHLORIDE 1 MG/ML
0.5 INJECTION, SOLUTION INTRAMUSCULAR; INTRAVENOUS; SUBCUTANEOUS ONCE
Status: COMPLETED | OUTPATIENT
Start: 2024-10-24 | End: 2024-10-24

## 2024-10-24 RX ORDER — NITROGLYCERIN 0.4 MG/1
0.4 TABLET SUBLINGUAL
Status: DISCONTINUED | OUTPATIENT
Start: 2024-10-24 | End: 2024-10-26 | Stop reason: HOSPADM

## 2024-10-24 RX ORDER — ONDANSETRON 2 MG/ML
4 INJECTION INTRAMUSCULAR; INTRAVENOUS EVERY 6 HOURS PRN
Status: DISCONTINUED | OUTPATIENT
Start: 2024-10-24 | End: 2024-10-26 | Stop reason: HOSPADM

## 2024-10-24 RX ORDER — RISPERIDONE 0.5 MG/1
1 TABLET ORAL NIGHTLY
COMMUNITY

## 2024-10-24 RX ORDER — OXYCODONE HYDROCHLORIDE 5 MG/1
5 TABLET ORAL EVERY 6 HOURS PRN
Status: DISCONTINUED | OUTPATIENT
Start: 2024-10-24 | End: 2024-10-26 | Stop reason: HOSPADM

## 2024-10-24 RX ORDER — RISPERIDONE 1 MG/1
1 TABLET ORAL NIGHTLY
Status: DISCONTINUED | OUTPATIENT
Start: 2024-10-24 | End: 2024-10-26 | Stop reason: HOSPADM

## 2024-10-24 RX ORDER — HYDROMORPHONE HYDROCHLORIDE 1 MG/ML
0.5 INJECTION, SOLUTION INTRAMUSCULAR; INTRAVENOUS; SUBCUTANEOUS
Status: DISCONTINUED | OUTPATIENT
Start: 2024-10-24 | End: 2024-10-26 | Stop reason: HOSPADM

## 2024-10-24 RX ORDER — ONDANSETRON 4 MG/1
4 TABLET, ORALLY DISINTEGRATING ORAL EVERY 6 HOURS PRN
Status: DISCONTINUED | OUTPATIENT
Start: 2024-10-24 | End: 2024-10-26 | Stop reason: HOSPADM

## 2024-10-24 RX ORDER — AMLODIPINE BESYLATE 5 MG/1
2.5 TABLET ORAL DAILY
Status: DISCONTINUED | OUTPATIENT
Start: 2024-10-25 | End: 2024-10-26 | Stop reason: HOSPADM

## 2024-10-24 RX ADMIN — PANTOPRAZOLE SODIUM 40 MG: 40 TABLET, DELAYED RELEASE ORAL at 15:49

## 2024-10-24 RX ADMIN — SODIUM CHLORIDE, POTASSIUM CHLORIDE, SODIUM LACTATE AND CALCIUM CHLORIDE 125 ML/HR: 600; 310; 30; 20 INJECTION, SOLUTION INTRAVENOUS at 23:04

## 2024-10-24 RX ADMIN — SODIUM CHLORIDE 125 ML/HR: 9 INJECTION, SOLUTION INTRAVENOUS at 10:01

## 2024-10-24 RX ADMIN — LORAZEPAM 1 MG: 2 INJECTION, SOLUTION INTRAMUSCULAR; INTRAVENOUS at 14:16

## 2024-10-24 RX ADMIN — ONDANSETRON 4 MG: 4 TABLET, ORALLY DISINTEGRATING ORAL at 21:24

## 2024-10-24 RX ADMIN — ONDANSETRON 4 MG: 2 INJECTION, SOLUTION INTRAMUSCULAR; INTRAVENOUS at 10:03

## 2024-10-24 RX ADMIN — THIAMINE HYDROCHLORIDE 200 MG: 100 INJECTION, SOLUTION INTRAMUSCULAR; INTRAVENOUS at 17:36

## 2024-10-24 RX ADMIN — MIRTAZAPINE 15 MG: 15 TABLET, FILM COATED ORAL at 21:12

## 2024-10-24 RX ADMIN — NADOLOL 40 MG: 40 TABLET ORAL at 15:49

## 2024-10-24 RX ADMIN — HYDROMORPHONE HYDROCHLORIDE 0.5 MG: 1 INJECTION, SOLUTION INTRAMUSCULAR; INTRAVENOUS; SUBCUTANEOUS at 10:04

## 2024-10-24 RX ADMIN — FOLIC ACID 1 MG: 1 TABLET ORAL at 15:49

## 2024-10-24 RX ADMIN — HYDROMORPHONE HYDROCHLORIDE 0.5 MG: 1 INJECTION, SOLUTION INTRAMUSCULAR; INTRAVENOUS; SUBCUTANEOUS at 15:49

## 2024-10-24 RX ADMIN — PANCRELIPASE 24000 UNITS OF LIPASE: 60000; 12000; 38000 CAPSULE, DELAYED RELEASE PELLETS ORAL at 17:36

## 2024-10-24 RX ADMIN — OXYCODONE HYDROCHLORIDE 5 MG: 5 TABLET ORAL at 21:12

## 2024-10-24 RX ADMIN — SODIUM CHLORIDE, POTASSIUM CHLORIDE, SODIUM LACTATE AND CALCIUM CHLORIDE 125 ML/HR: 600; 310; 30; 20 INJECTION, SOLUTION INTRAVENOUS at 14:15

## 2024-10-24 RX ADMIN — IOPAMIDOL 85 ML: 612 INJECTION, SOLUTION INTRAVENOUS at 11:01

## 2024-10-24 RX ADMIN — POTASSIUM CHLORIDE 40 MEQ: 750 TABLET, EXTENDED RELEASE ORAL at 21:12

## 2024-10-24 RX ADMIN — POTASSIUM CHLORIDE 40 MEQ: 750 TABLET, EXTENDED RELEASE ORAL at 15:48

## 2024-10-24 RX ADMIN — THIAMINE HYDROCHLORIDE 200 MG: 100 INJECTION, SOLUTION INTRAMUSCULAR; INTRAVENOUS at 21:12

## 2024-10-24 RX ADMIN — RISPERIDONE 1 MG: 1 TABLET, FILM COATED ORAL at 21:13

## 2024-10-24 RX ADMIN — LORAZEPAM 1 MG: 1 TABLET ORAL at 21:24

## 2024-10-24 NOTE — H&P
Patient Name:  Bekah Gibson  YOB: 1970  MRN:  [id number]  Admit Date:  10/24/2024  Patient Care Team:  Tylor Davis as PCP - General (Family Medicine)  Deion Saldana MD as Consulting Physician (Urology)  Adalberto Hoffman MD as Consulting Physician (Gastroenterology)  Eliazar Quinonez MD (Hematology)      Subjective   History Present Illness     Chief Complaint   Patient presents with    Abdominal Pain       Ms. Gibson is a 54 y.o. female with a history of chronic pancreatitis due to alcohol abuse that presents to [facility] complaining of abdominal pain.    Abdominal Pain  This is a recurrent problem. The onset quality is gradual. The problem occurs constantly. The pain is located in the epigastric region. The pain is moderate. The quality of the pain is aching. The abdominal pain radiates to the back. Associated symptoms include diarrhea (on lactulose), nausea and vomiting. The pain is aggravated by certain positions and eating. The pain is relieved by Nothing.         Review of Systems   Gastrointestinal:  Positive for abdominal pain, diarrhea (on lactulose), nausea and vomiting.   Psychiatric/Behavioral:  Positive for dysphoric mood (going through divorce).         Personal History     Past Medical History:   Diagnosis Date    Acromioclavicular separation 1/2021    Ankle sprain 2/2004    no operation necessary  At Time unsure    Anxiety     Arthritis     Arthritis of back Unsure    Diseased    Cirrhosis     Disease of thyroid gland     ETOH abuse     SOBER FOR 3 MONTHS    Fracture, clavicle 1/2021    shattered clavicel on issue slip and fall    Fracture, foot Unsure    Frozen shoulder 1 /2009    4    GERD (gastroesophageal reflux disease)     History of kidney stones     5 YR AGO    Hypertension     Left shoulder pain     Low back strain 1/21    Lumbosacral disc disease 1/21    MDD (major depressive disorder)     Neck strain Unsure    Pancreatitis     Periarthritis  of shoulder see above    Rotator cuff syndrome odre for shoulder replacement    unable to receive due to low platelets    Tennis elbow Unsure    Unsurpassed    Thrombocytopenia      Past Surgical History:   Procedure Laterality Date    CLAVICLE SURGERY Right 2018    ENDOSCOPY N/A 10/26/2022    Procedure: ESOPHAGOGASTRODUODENOSCOPY wtih banding;  Surgeon: Ag Patel MD;  Location: [facility] ENDOSCOPY;  Service: Gastroenterology;  Laterality: N/A;  pre: melena  post: esophageal varicies with banding x2 bands    ENDOSCOPY N/A 2023    Procedure: ESOPHAGOGASTRODUODENOSCOPY;  Surgeon: Ag Patel MD;  Location: [facility] ENDOSCOPY;  Service: Gastroenterology;  Laterality: N/A;  PRE OP - MAROON STOOLS  POST OP - SM ESOPHAGEAL VARICES    ESOPHAGEAL VARICES LIGATION      FOOT SURGERY  12    JOINT REPLACEMENT Bilateral     GREAT TOE    KIDNEY STONE SURGERY      LITHOTRIPSY AND STENT (R SIDE)    LAPAROSCOPIC TUBAL LIGATION      NECK SURGERY  3/07    Not sure of dates    SIGMOIDOSCOPY N/A 2023    Procedure: SIGMOIDOSCOPY FLEXIBLE;  Surgeon: Ag Patel MD;  Location: [facility] ENDOSCOPY;  Service: Gastroenterology;  Laterality: N/A;  PRE OP - MAROON STOOLS  POST OP - RECTAL VARICES    TOTAL SHOULDER ARTHROPLASTY Left 2023    Procedure: reverse total shoulder arthroplasty;  Surgeon: Giovanni Denise MD;  Location: [facility];  Service: Orthopedics;  Laterality: Left;     Family History   Problem Relation Age of Onset    Rheumatologic disease Mother         congestive heart diseased    Osteoporosis Mother             Thyroid disease Father     Leukemia Father     Cancer Father         Leukemia  deseased     Broken bones Father     Clotting disorder Father     Drug abuse Brother     Malig Hyperthermia Neg Hx      Social History     Tobacco Use    Smoking status: Every Day     Current packs/day: 0.25     Average packs/day: 0.3 packs/day for 15.0 years (3.8 ttl pk-yrs)     Types: Cigarettes      Passive exposure: Current    Smokeless tobacco: Never    Tobacco comments:     Rarely smoke sometimes will to   Vaping Use    Vaping status: Never Used   Substance Use Topics    Alcohol use: Not Currently     Alcohol/week: 5.0 - 10.0 standard drinks of alcohol     Types: 5 - 10 Shots of liquor per week     Comment: 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    Drug use: Not Currently     Current Facility-Administered Medications on File Prior to Encounter   Medication Dose Route Frequency Provider Last Rate Last Admin    sodium chloride 0.9 % flush 10 mL  10 mL Intravenous Q12H Callie Quiroz MD        sodium chloride 0.9 % flush 10 mL  10 mL Intravenous PRN Callie Quiroz MD        sodium chloride 0.9 % flush 20 mL  20 mL Intravenous PRCallie Crawford MD         Current Outpatient Medications on File Prior to Encounter   Medication Sig Dispense Refill    amLODIPine (NORVASC) 2.5 MG tablet Take 1 tablet by mouth Daily.      Apoaequorin (PREVAGEN PO) Take 1 dose by mouth Daily.      ferrous sulfate 325 (65 FE) MG tablet Take 1 tablet by mouth Daily With Breakfast.      FLUoxetine (PROzac) 20 MG tablet Take 3 tablets by mouth Daily.      folic acid (FOLVITE) 1 MG tablet Take 1 tablet by mouth Daily.      Ibuprofen 3 %, Gabapentin 10 %, Baclofen 2 %, lidocaine 4 % Apply 1-2 g topically to the appropriate area as directed 3 (Three) to 4 (Four) times daily. (Patient taking differently: Apply 1-2 g topically to the appropriate area as directed 3 (Three) Times a Day As Needed.) 90 g 5    lactulose (CHRONULAC) 10 GM/15ML solution Take 15 mL by mouth Every Morning.      levothyroxine (SYNTHROID, LEVOTHROID) 100 MCG tablet TAKE ONE TABLET BY MOUTH DAILY (Patient taking differently: Take 1 tablet by mouth Every Morning.) 30 tablet 3    MAGnesium-Oxide 400 (240 Mg) MG tablet Take 1 tablet by mouth Daily.      mirtazapine (REMERON) 15 MG tablet Take 1 tablet by mouth Every Night.      Multiple  "Vitamin (MULTIVITAMIN) capsule Take 1 capsule by mouth Daily.      nadolol (CORGARD) 40 MG tablet Take 1 tablet by mouth Daily.      ondansetron ODT (ZOFRAN-ODT) 4 MG disintegrating tablet Place 1 tablet on the tongue Every 8 (Eight) Hours As Needed for Nausea or Vomiting. 10 tablet 0    oxyCODONE (ROXICODONE) 5 MG immediate release tablet Take 1 tablet by mouth Every 6 (Six) Hours As Needed for Moderate Pain. 12 tablet 0    pancrelipase, Lip-Prot-Amyl, (CREON) 38451-84243 units capsule delayed-release particles capsule Take 2 capsules by mouth 3 (Three) Times a Day With Meals. 180 capsule 0    pantoprazole (PROTONIX) 40 MG EC tablet Take 1 tablet by mouth Daily.      potassium chloride 10 MEQ CR tablet Take 1 tablet by mouth 2 (Two) Times a Day.      risperiDONE (risperDAL) 0.5 MG tablet Take 2 tablets by mouth Every Night.      thiamine (VITAMIN B1) 100 MG tablet Take 1 tablet by mouth Daily. 30 tablet 0    lamoTRIgine (LaMICtal) 25 MG tablet Take 1 tablet by mouth 2 (Two) Times a Day. (Patient not taking: Reported on 10/24/2024)      [DISCONTINUED] vitamin D (ERGOCALCIFEROL) 1.25 MG (53546 UT) capsule capsule Take 1 capsule by mouth Every 7 (Seven) Days.       Allergies   Allergen Reactions    Benzonatate Nausea Only and Other (See Comments)     Rash     Ketorolac Tromethamine Other (See Comments)     \"I cannot take because of liver problems\"    Phenergan [Promethazine Hcl] Hives    Cucumber Extract Rash    Promethazine-Phenylephrine Other (See Comments)     \"FEEL WEIRD\"       Objective    Objective     Vital Signs  Temp:  [97.9 °F (36.6 °C)] 97.9 °F (36.6 °C)  Heart Rate:  [105-130] 111  Resp:  [18] 18  BP: (142-157)/(73-89) 142/73  SpO2:  [92 %-99 %] 94 %  on   ;   Device (Oxygen Therapy): room air  Body mass index is 24.46 kg/m².    Physical Exam  Vitals and nursing note reviewed.   Constitutional:       General: She is not in acute distress.     Appearance: She is ill-appearing.   HENT:      Head: " Normocephalic and atraumatic.   Cardiovascular:      Rate and Rhythm: Regular rhythm. Tachycardia present.   Pulmonary:      Effort: Pulmonary effort is normal.      Breath sounds: Normal breath sounds.   Abdominal:      General: Bowel sounds are normal. There is no distension.      Palpations: Abdomen is soft.      Tenderness: There is abdominal tenderness. There is no guarding.   Musculoskeletal:         General: Normal range of motion.   Skin:     General: Skin is warm and dry.   Neurological:      Mental Status: She is alert and oriented to person, place, and time.   Psychiatric:         Behavior: Behavior normal.         Results Review:  I reviewed the patient's new clinical results.  I reviewed the patient's new imaging results and agree with the interpretation.  I reviewed the patient's other test results and agree with the interpretation  I personally viewed and interpreted the patient's EKG/Telemetry data  Discussed with ED provider.    Lab Results (last 24 hours)       Procedure Component Value Units Date/Time    CBC & Differential [[id number]]  (Abnormal) Collected: 10/24/24 0851    Specimen: Blood Updated: 10/24/24 0905    Narrative:      The following orders were created for panel order CBC & Differential.  Procedure                               Abnormality         Status                     ---------                               -----------         ------                     CBC Auto Differential[[id number]]        Abnormal            Final result                 Please view results for these tests on the individual orders.    Comprehensive Metabolic Panel [[id number]]  (Abnormal) Collected: 10/24/24 0851    Specimen: Blood Updated: 10/24/24 0916     Glucose 143 mg/dL      BUN 7 mg/dL      Creatinine 0.70 mg/dL      Sodium 144 mmol/L      Potassium 3.2 mmol/L      Chloride 102 mmol/L      CO2 21.7 mmol/L      Calcium 9.6 mg/dL      Total Protein 7.9 g/dL      Albumin 4.3 g/dL      ALT (SGPT) 62 U/L       AST (SGOT) 170 U/L      Alkaline Phosphatase 191 U/L      Total Bilirubin 1.5 mg/dL      Globulin 3.6 gm/dL      A/G Ratio 1.2 g/dL      BUN/Creatinine Ratio 10.0     Anion Gap 20.3 mmol/L      eGFR 101.6 mL/min/1.73     Narrative:      GFR Normal >60  Chronic Kidney Disease <60  Kidney Failure <15      Lipase [[id number]]  (Normal) Collected: 10/24/24 0851    Specimen: Blood Updated: 10/24/24 0934     Lipase 43 U/L     CBC Auto Differential [[id number]]  (Abnormal) Collected: 10/24/24 0851    Specimen: Blood Updated: 10/24/24 0905     WBC 3.24 10*3/mm3      RBC 4.94 10*6/mm3      Hemoglobin 15.9 g/dL      Hematocrit 47.1 %      MCV 95.3 fL      MCH 32.2 pg      MCHC 33.8 g/dL      RDW 13.7 %      RDW-SD 48.4 fl      MPV 11.7 fL      Platelets 53 10*3/mm3      Neutrophil % 74.7 %      Lymphocyte % 16.7 %      Monocyte % 7.1 %      Eosinophil % 0.3 %      Basophil % 0.6 %      Immature Grans % 0.6 %      Neutrophils, Absolute 2.42 10*3/mm3      Lymphocytes, Absolute 0.54 10*3/mm3      Monocytes, Absolute 0.23 10*3/mm3      Eosinophils, Absolute 0.01 10*3/mm3      Basophils, Absolute 0.02 10*3/mm3      Immature Grans, Absolute 0.02 10*3/mm3      nRBC 0.0 /100 WBC     Urinalysis With Microscopic If Indicated (No Culture) - Urine, Clean Catch [[id number]]  (Abnormal) Collected: 10/24/24 0947    Specimen: Urine, Clean Catch Updated: 10/24/24 1007     Color, UA Dark Yellow     Appearance, UA Clear     pH, UA 5.5     Specific Gravity, UA 1.017     Glucose, UA Negative     Ketones, UA 15 mg/dL (1+)     Bilirubin, UA Negative     Blood, UA Negative     Protein, UA Negative     Leuk Esterase, UA Moderate (2+)     Nitrite, UA Negative     Urobilinogen, UA 1.0 E.U./dL    Urinalysis, Microscopic Only - Urine, Clean Catch [[id number]]  (Abnormal) Collected: 10/24/24 0947    Specimen: Urine, Clean Catch Updated: 10/24/24 1007     RBC, UA 0-2 /HPF      WBC, UA 11-20 /HPF      Bacteria, UA Trace /HPF      Squamous Epithelial Cells, UA  7-12 /HPF      Hyaline Casts, UA 0-2 /LPF      Methodology Automated Microscopy            Imaging Results (Last 24 Hours)       Procedure Component Value Units Date/Time    CT Abdomen Pelvis With Contrast [[id number]] Collected: 10/24/24 1122     Updated: 10/24/24 1122    Narrative:      CT ABDOMEN AND PELVIS WITH IV CONTRAST     HISTORY: 54-year-old female with epigastric pain. Pancreatitis history.  Alcohol.     TECHNIQUE: Radiation dose reduction techniques were utilized, including  automated exposure control and exposure modulation based on body size.   3 mm images were obtained through the abdomen and pelvis after the  administration of IV contrast. Compared with previous CT 07/22/2024.     FINDINGS:  1. There are mild changes of acute pancreatitis predominantly involving  the pancreatic head. There are changes of extensive chronic  pancreatitis. There is no peripancreatic fluid or fluid collection.  Portal vein and SMV are patent. The splenic vein segment just proximal  to the portal venous confluence is absent/thrombosed and the splenic  vein is otherwise patent, stable.     2. The liver is grossly cirrhotic and has extensive heterogeneous fatty  infiltration. Spleen measures 14 cm in craniocaudad span. There are  prominent GE junction and perigastric varices. There is no ascites.     3. There is cholelithiasis without cholecystitis and there is no biliary  dilatation. Adrenals and kidneys appear unremarkable. Urinary bladder  wall appears thickened, but the bladder is partially collapsed. Uterus  and adnexa appear unremarkable.     4. There is a paucity of formed stool within the colon. There is no  colonic thickening or evidence for colitis and there is no appendicitis.                 No orders to display     Assessment/Plan   Assessment & Plan   Active Hospital Problems    Diagnosis  POA    **Alcohol-induced acute on chronic pancreatitis without infection or necrosis [K85.20]  Yes    Alcohol dependence  with withdrawal [F10.239]  Yes    Hypokalemia [E87.6]  Yes    Essential hypertension [I10]  Yes    Alcoholic cirrhosis [K70.30]  Yes    Alcoholic ketoacidosis [E87.29]  Yes    Alcoholic hepatitis [K70.10]  Yes    Thrombocytopenia [D69.6]  Yes    Peripheral neuropathy [G62.9]  Yes      Resolved Hospital Problems   No resolved problems to display.       54 y.o. female admitted with Alcohol-induced acute pancreatitis without infection or necrosis.    Admitted to telemetry unit.  Monitor vital signs (per unit routine), pain and telemetry.  Monitor oxygenation and provide supplemental oxygen if needed for goal > 95%.  Will allow clear liquid diet.  IVF LR @ 125 cc/hr.  Pain management with prn IV DILAUDID.  Will treat nausea with prn IV ZOFRAN.  Access Center to see.  CIWA protocol.  Monitor platelets closely.  No evidence of bleeding.  SCDs for DVT prophylaxis.  Full code.  Discussed with patient and ED provider.      Sunny Harden MD  [facility]  10/24/24  12:45 EDT

## 2024-10-24 NOTE — ED PROVIDER NOTES
EMERGENCY DEPARTMENT ENCOUNTER  Room Number:  32/32  PCP: Tylor Davis  Independent Historians: Patient      HPI:  Chief Complaint: had concerns including Abdominal Pain.     A complete HPI/ROS/PMH/PSH/SH/FH are unobtainable due to: Nothing      Context: Bekah Gibson is a 54 y.o. female with a medical history of pancreatitis, alcohol dependence who presents to the ED c/o acute abdominal pain.  She states that she recently relapsed and began drinking again.  She has been going through divorce which has been a stressor for her.  She had her last drink yesterday.  She began having epigastric abdominal pain that feels similar to prior episodes of pancreatitis.  She has had nausea but no vomiting.  She also reports loose stools which is typical for her as she takes lactulose due to history of cirrhosis.  She denies fever.  She does report some radiation of pain to her back.  She denies black or bloody stool.      Review of prior external notes (non-ED) -and- Review of prior external test results outside of this encounter: See ED course below        PAST MEDICAL HISTORY  Active Ambulatory Problems     Diagnosis Date Noted    Irritable bowel syndrome with both constipation and diarrhea 06/05/2017    Peripheral neuropathy 06/05/2017    Vitamin D deficiency 06/05/2017    History of HPV infection 06/05/2017    Hypothyroidism 06/05/2017    Tobacco dependence due to cigarettes 06/05/2017    Acute kidney injury 12/28/2015    Ascites 06/06/2016    E. coli UTI (urinary tract infection) 06/06/2016    Alcoholic hepatitis 06/29/2018    GI bleed 06/29/2018    Acute post-hemorrhagic anemia 06/29/2018    Thrombocytopenia 06/29/2018    Open wound of right shoulder region 06/29/2018    Pancreatic insufficiency 07/04/2018    Alcoholic ketoacidosis 07/21/2019    Chronic alcohol abuse 07/29/2019    ESBL (extended spectrum beta-lactamase) producing bacteria infection 07/29/2019    Abnormal weight loss 12/13/2019    Hashimoto's disease  12/13/2019    Chronic fatigue 12/14/2019    History of alcohol use disorder 09/21/2021    Alcohol intoxication 09/21/2021    Lupus     Hypomagnesemia     Pancytopenia     Alcoholic cirrhosis     Bilateral lower abdominal discomfort 09/21/2021    Primary hypertension 10/24/2022    Melena 10/24/2022    Arthritis of shoulder 12/19/2022    Esophageal varices 01/16/2023    Essential hypertension 01/16/2023    Alcohol-induced chronic pancreatitis 01/22/2023    Abdominal pain 01/22/2023    Pancreatitis 10/07/2023    Leukopenia 10/07/2023    Epigastric pain 04/01/2024    Acute alcoholic gastritis without hemorrhage 04/01/2024    Acute pancreatitis 07/03/2024    Alcohol withdrawal 07/03/2024    Acute on chronic pancreatitis 07/22/2024    Transaminitis 07/22/2024    Cholelithiasis 07/22/2024    Alcohol use 07/22/2024    WBC decreased 07/24/2024    Hypokalemia 07/24/2024    Urinary tract infection 07/24/2024    Neutropenia 07/24/2024    Polysubstance abuse 08/05/2024     Resolved Ambulatory Problems     Diagnosis Date Noted    Acute renal failure 02/20/2017    Kidney stone 06/05/2017    Alcohol withdrawal syndrome, with delirium 06/29/2018    Hypotension 07/01/2018    Nausea and vomiting 09/21/2021    Acute GI bleeding 10/24/2022     Past Medical History:   Diagnosis Date    Acromioclavicular separation 1/2021    Ankle sprain 2/2004    Anxiety     Arthritis     Arthritis of back Unsure    Cirrhosis     Disease of thyroid gland     ETOH abuse     Fracture, clavicle 1/2021    Fracture, foot Unsure    Frozen shoulder 1 /2009    GERD (gastroesophageal reflux disease)     History of kidney stones     Hypertension     Left shoulder pain     Low back strain 1/21    Lumbosacral disc disease 1/21    MDD (major depressive disorder)     Neck strain Unsure    Periarthritis of shoulder see above    Rotator cuff syndrome odre for shoulder replacement    Tennis elbow Unsure         PAST SURGICAL HISTORY  Past Surgical History:   Procedure  Laterality Date    CLAVICLE SURGERY Right 2018    ENDOSCOPY N/A 10/26/2022    Procedure: ESOPHAGOGASTRODUODENOSCOPY wtih banding;  Surgeon: Ag Patel MD;  Location:  [facility];  Service: Gastroenterology;  Laterality: N/A;  pre: melena  post: esophageal varicies with banding x2 bands    ENDOSCOPY N/A 2023    Procedure: ESOPHAGOGASTRODUODENOSCOPY;  Surgeon: Ag Patel MD;  Location: [facility];  Service: Gastroenterology;  Laterality: N/A;  PRE OP - MAROON STOOLS  POST OP - SM ESOPHAGEAL VARICES    ESOPHAGEAL VARICES LIGATION      FOOT SURGERY  12    JOINT REPLACEMENT Bilateral     GREAT TOE    KIDNEY STONE SURGERY      LITHOTRIPSY AND STENT (R SIDE)    LAPAROSCOPIC TUBAL LIGATION      NECK SURGERY  3/07    Not sure of dates    SIGMOIDOSCOPY N/A 2023    Procedure: SIGMOIDOSCOPY FLEXIBLE;  Surgeon: Ag Patel MD;  Location: [facility];  Service: Gastroenterology;  Laterality: N/A;  PRE OP - MAROON STOOLS  POST OP - RECTAL VARICES    TOTAL SHOULDER ARTHROPLASTY Left 2023    Procedure: reverse total shoulder arthroplasty;  Surgeon: Giovanni Denise MD;  Location:  [facility] [place];  Service: Orthopedics;  Laterality: Left;         FAMILY HISTORY  Family History   Problem Relation Age of Onset    Rheumatologic disease Mother         congestive heart diseased    Osteoporosis Mother             Thyroid disease Father     Leukemia Father     Cancer Father         Leukemia  deseased     Broken bones Father     Clotting disorder Father     Drug abuse Brother     Malig Hyperthermia Neg Hx          SOCIAL HISTORY  Social History     Socioeconomic History    Marital status:    Tobacco Use    Smoking status: Every Day     Current packs/day: 0.25     Average packs/day: 0.3 packs/day for 15.0 years (3.8 ttl pk-yrs)     Types: Cigarettes     Passive exposure: Current    Smokeless tobacco: Never    Tobacco comments:     Rarely smoke sometimes will to   Vaping  Use    Vaping status: Never Used   Substance and Sexual Activity    Alcohol use: Not Currently     Alcohol/week: 5.0 - 10.0 standard drinks of alcohol     Types: 5 - 10 Shots of liquor per week     Comment: 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    Drug use: Not Currently    Sexual activity: Not Currently     Partners: Male     Birth control/protection: Post-menopausal, Natural family planning/Rhythm     Comment: cinthia- menepausal         ALLERGIES  Benzonatate, Ketorolac tromethamine, Phenergan [promethazine hcl], Cucumber extract, and Promethazine-phenylephrine      REVIEW OF SYSTEMS  Review of all 14 systems is negative other than stated in the HPI above.      PHYSICAL EXAM    I have reviewed the triage vital signs and nursing notes.    ED Triage Vitals   Temp Heart Rate Resp BP SpO2   10/24/24 0836 10/24/24 0836 10/24/24 0836 10/24/24 0846 10/24/24 0836   97.9 °F (36.6 °C) (!) 130 18 157/89 99 %      Temp src Heart Rate Source Patient Position BP Location FiO2 (%)   10/24/24 0836 10/24/24 0836 -- -- --   Tympanic Monitor            GENERAL: awake and alert, appears uncomfortable, no acute distress  HENT: Normocephalic, atraumatic  EYES: no scleral icterus, pupils 3 mm reactive bilaterally  CV: regular rhythm, regular rate  RESPIRATORY: normal effort  ABDOMEN: soft, mild diffuse tenderness that is maximal in the epigastric region without rebound or guarding, abdomen nondistended  MUSCULOSKELETAL: no deformity  NEURO: alert, moves all extremities, follows commands, GCS 15, cranial nerves II through XII intact, speech fluent and clear  PSYCH: calm, cooperative  SKIN: Warm, dry          LAB RESULTS  Recent Results (from the past 24 hours)   Comprehensive Metabolic Panel    Collection Time: 10/24/24  8:51 AM    Specimen: Blood   Result Value Ref Range    Glucose 143 (H) 65 - 99 mg/dL    BUN 7 6 - 20 mg/dL    Creatinine 0.70 0.57 - 1.00 mg/dL    Sodium 144 136 - 145 mmol/L    Potassium 3.2 (L) 3.5 - 5.2 mmol/L    Chloride 102 98 - 107  mmol/L    CO2 21.7 (L) 22.0 - 29.0 mmol/L    Calcium 9.6 8.6 - 10.5 mg/dL    Total Protein 7.9 6.0 - 8.5 g/dL    Albumin 4.3 3.5 - 5.2 g/dL    ALT (SGPT) 62 (H) 1 - 33 U/L    AST (SGOT) 170 (H) 1 - 32 U/L    Alkaline Phosphatase 191 (H) 39 - 117 U/L    Total Bilirubin 1.5 (H) 0.0 - 1.2 mg/dL    Globulin 3.6 gm/dL    A/G Ratio 1.2 g/dL    BUN/Creatinine Ratio 10.0 7.0 - 25.0    Anion Gap 20.3 (H) 5.0 - 15.0 mmol/L    eGFR 101.6 >60.0 mL/min/1.73   Lipase    Collection Time: 10/24/24  8:51 AM    Specimen: Blood   Result Value Ref Range    Lipase 43 13 - 60 U/L   CBC Auto Differential    Collection Time: 10/24/24  8:51 AM    Specimen: Blood   Result Value Ref Range    WBC 3.24 (L) 3.40 - 10.80 10*3/mm3    RBC 4.94 3.77 - 5.28 10*6/mm3    Hemoglobin 15.9 12.0 - 15.9 g/dL    Hematocrit 47.1 (H) 34.0 - 46.6 %    MCV 95.3 79.0 - 97.0 fL    MCH 32.2 26.6 - 33.0 pg    MCHC 33.8 31.5 - 35.7 g/dL    RDW 13.7 12.3 - 15.4 %    RDW-SD 48.4 37.0 - 54.0 fl    MPV 11.7 6.0 - 12.0 fL    Platelets 53 (L) 140 - 450 10*3/mm3    Neutrophil % 74.7 42.7 - 76.0 %    Lymphocyte % 16.7 (L) 19.6 - 45.3 %    Monocyte % 7.1 5.0 - 12.0 %    Eosinophil % 0.3 0.3 - 6.2 %    Basophil % 0.6 0.0 - 1.5 %    Immature Grans % 0.6 (H) 0.0 - 0.5 %    Neutrophils, Absolute 2.42 1.70 - 7.00 10*3/mm3    Lymphocytes, Absolute 0.54 (L) 0.70 - 3.10 10*3/mm3    Monocytes, Absolute 0.23 0.10 - 0.90 10*3/mm3    Eosinophils, Absolute 0.01 0.00 - 0.40 10*3/mm3    Basophils, Absolute 0.02 0.00 - 0.20 10*3/mm3    Immature Grans, Absolute 0.02 0.00 - 0.05 10*3/mm3    nRBC 0.0 0.0 - 0.2 /100 WBC   Urinalysis With Microscopic If Indicated (No Culture) - Urine, Clean Catch    Collection Time: 10/24/24  9:47 AM    Specimen: Urine, Clean Catch   Result Value Ref Range    Color, UA Dark Yellow (A) Yellow, Straw    Appearance, UA Clear Clear    pH, UA 5.5 5.0 - 8.0    Specific Gravity, UA 1.017 1.005 - 1.030    Glucose, UA Negative Negative    Ketones, UA 15 mg/dL (1+) (A)  Negative    Bilirubin, UA Negative Negative    Blood, UA Negative Negative    Protein, UA Negative Negative    Leuk Esterase, UA Moderate (2+) (A) Negative    Nitrite, UA Negative Negative    Urobilinogen, UA 1.0 E.U./dL 0.2 - 1.0 E.U./dL   Urinalysis, Microscopic Only - Urine, Clean Catch    Collection Time: 10/24/24  9:47 AM    Specimen: Urine, Clean Catch   Result Value Ref Range    RBC, UA 0-2 None Seen, 0-2 /HPF    WBC, UA 11-20 (A) None Seen, 0-2 /HPF    Bacteria, UA Trace (A) None Seen /HPF    Squamous Epithelial Cells, UA 7-12 (A) None Seen, 0-2 /HPF    Hyaline Casts, UA 0-2 None Seen /LPF    Methodology Automated Microscopy        The above labs were ordered by me and independently reviewed by me.     RADIOLOGY  CT Abdomen Pelvis With Contrast    Result Date: 10/24/2024  CT ABDOMEN AND PELVIS WITH IV CONTRAST  HISTORY: 54-year-old female with epigastric pain. Pancreatitis history. Alcohol.  TECHNIQUE: Radiation dose reduction techniques were utilized, including automated exposure control and exposure modulation based on body size. 3 mm images were obtained through the abdomen and pelvis after the administration of IV contrast. Compared with previous CT 07/22/2024.  FINDINGS: 1. There are mild changes of acute pancreatitis predominantly involving the pancreatic head. There are changes of extensive chronic pancreatitis. There is no peripancreatic fluid or fluid collection. Portal vein and SMV are patent. The splenic vein segment just proximal to the portal venous confluence is absent/thrombosed and the splenic vein is otherwise patent, stable.  2. The liver is grossly cirrhotic and has extensive heterogeneous fatty infiltration. Spleen measures 14 cm in craniocaudad span. There are prominent GE junction and perigastric varices. There is no ascites.  3. There is cholelithiasis without cholecystitis and there is no biliary dilatation. Adrenals and kidneys appear unremarkable. Urinary bladder wall appears  thickened, but the bladder is partially collapsed. Uterus and adnexa appear unremarkable.  4. There is a paucity of formed stool within the colon. There is no colonic thickening or evidence for colitis and there is no appendicitis.       The above radiology studies were ordered by me.  See ED course for independent interpretations.     MEDICATIONS GIVEN IN ER  Medications   sodium chloride 0.9 % infusion (125 mL/hr Intravenous New Bag 10/24/24 1001)   HYDROmorphone (DILAUDID) injection 0.5 mg (0.5 mg Intravenous Given 10/24/24 1004)   ondansetron (ZOFRAN) injection 4 mg (4 mg Intravenous Given 10/24/24 1003)   iopamidol (ISOVUE-300) 61 % injection 85 mL (85 mL Intravenous Given by Other 10/24/24 1101)         ORDERS PLACED DURING THIS VISIT:  Orders Placed This Encounter   Procedures    CT Abdomen Pelvis With Contrast    Comprehensive Metabolic Panel    Lipase    Urinalysis With Microscopic If Indicated (No Culture) - Urine, Clean Catch    CBC Auto Differential    Urinalysis, Microscopic Only - Urine, Clean Catch    [facility] (on-call MD unless specified) Details    Inpatient Admission    CBC & Differential         OUTPATIENT MEDICATION MANAGEMENT:  Current Facility-Administered Medications Ordered in Epic   Medication Dose Route Frequency Provider Last Rate Last Admin    sodium chloride 0.9 % flush 10 mL  10 mL Intravenous Q12H Callie Quiroz MD        sodium chloride 0.9 % flush 10 mL  10 mL Intravenous PRN Callie Quiroz MD        sodium chloride 0.9 % flush 20 mL  20 mL Intravenous PRN Callie Quiroz MD        sodium chloride 0.9 % infusion  125 mL/hr Intravenous Continuous Gama Mendez  mL/hr at 10/24/24 1001 125 mL/hr at 10/24/24 1001     Current Outpatient Medications Ordered in Epic   Medication Sig Dispense Refill    amLODIPine (NORVASC) 2.5 MG tablet Take 1 tablet by mouth Daily.      ferrous sulfate 325 (65 FE) MG tablet Take 1 tablet by mouth Daily  With Breakfast.      FLUoxetine (PROzac) 60 MG tablet Take 1 tablet by mouth Daily.      folic acid (FOLVITE) 1 MG tablet Take 1 tablet by mouth Daily.      Ibuprofen 3 %, Gabapentin 10 %, Baclofen 2 %, lidocaine 4 % Apply 1-2 g topically to the appropriate area as directed 3 (Three) to 4 (Four) times daily. (Patient taking differently: Apply 1-2 g topically to the appropriate area as directed 3 (Three) Times a Day As Needed.) 90 g 5    lactulose (CHRONULAC) 10 GM/15ML solution Take 15 mL by mouth Every Morning.      lamoTRIgine (LaMICtal) 25 MG tablet Daily.      levothyroxine (SYNTHROID, LEVOTHROID) 100 MCG tablet TAKE ONE TABLET BY MOUTH DAILY (Patient taking differently: Take 1 tablet by mouth Every Morning.) 30 tablet 3    MAGnesium-Oxide 400 (240 Mg) MG tablet Take 1 tablet by mouth Daily.      mirtazapine (REMERON) 15 MG tablet Take 1 tablet by mouth Every Night.      Multiple Vitamin (MULTIVITAMIN) capsule Take 1 capsule by mouth Daily.      nadolol (CORGARD) 40 MG tablet Take 1 tablet by mouth Daily.      ondansetron ODT (ZOFRAN-ODT) 4 MG disintegrating tablet Place 1 tablet on the tongue Every 8 (Eight) Hours As Needed for Nausea or Vomiting. 10 tablet 0    oxyCODONE (ROXICODONE) 5 MG immediate release tablet Take 1 tablet by mouth Every 6 (Six) Hours As Needed for Moderate Pain. 12 tablet 0    pancrelipase, Lip-Prot-Amyl, (CREON) 29282-90769 units capsule delayed-release particles capsule Take 2 capsules by mouth 3 (Three) Times a Day With Meals. 180 capsule 0    pantoprazole (PROTONIX) 40 MG EC tablet 1 tablet Daily.      thiamine (VITAMIN B1) 100 MG tablet Take 1 tablet by mouth Daily. 30 tablet 0    vitamin D (ERGOCALCIFEROL) 1.25 MG (12417 UT) capsule capsule Take 1 capsule by mouth Every 7 (Seven) Days.           PROCEDURES  Procedures            PROGRESS, DATA ANALYSIS, CONSULTS, AND MEDICAL DECISION MAKING  All labs have been independently interpreted by me.  All radiology studies have been  reviewed by me. All EKG's have been independently viewed and interpreted by me.  Discussion below represents my analysis of pertinent findings related to patient's condition, differential diagnosis, treatment plan and final disposition.    Differential diagnosis includes but is not limited to:  Pancreatitis  Gastritis  Esophagitis  Cholecystitis  Alcoholic hepatitis      Clinical Scores:                  ED Course as of 10/24/24 1200   Thu Oct 24, 2024   0857 I reviewed recent urogynecology office visit from 10/16/2024 with Dr. Lazo, patient was seen for urge incontinence. [JR]   0858 I reviewed discharge summary from 8/7/2024 where patient was hospitalized for acute on chronic pancreatitis, with history of alcohol use disorder. [JR]   0930 ALT (SGPT)(!): 62 [JR]   0930 AST (SGOT)(!): 170 [JR]   0930 Alkaline Phosphatase(!): 191 [JR]   0930 Total Bilirubin(!): 1.5 [JR]   0930 WBC(!): 3.24 [JR]   1119 Lipase: 43 [JR]   1157 Discussed with Dr. Harden, [facility] hospitalist, who agrees to admit for further management. [JR]      ED Course User Index  [JR] Gama Mendez MD             AS OF 12:00 EDT VITALS:    BP - 157/89  HR - 105  TEMP - 97.9 °F (36.6 °C) (Tympanic)  O2 SATS - 99%    COMPLEXITY OF CARE  The patient requires admission.      Chronic or social conditions impacting patient care (Social Determinants of Health):     DIAGNOSIS  Final diagnoses:   Acute pancreatitis, unspecified complication status, unspecified pancreatitis type   Alcohol dependence with unspecified alcohol-induced disorder           DISPOSITION  ADMIT      Prescription drug monitoring program review:           Please note that portions of this document were completed with a voice recognition program.    Note Disclaimer: At [facility], we believe that sharing information builds trust and better relationships. You are receiving this note because you recently visited [facility]. It is possible you will see health information before a  provider has talked with you about it. This kind of information can be easy to misunderstand. To help you fully understand what it means for your health, we urge you to discuss this note with your provider.         Gama Mendez MD  10/24/24 1202

## 2024-10-24 NOTE — LETTER
[facility]  [phone]    ACCESS CENTER STATEMENT OF DISPOSITION        I, Bekah TRISTAN Gibson, was assessed in the [facility] at [facility] on 10/24/2024.  I understand the recommendations below and what follow-up action is expected of me.      Medication Management:  Jennifer Israel, [facility]  [phone]  Alexa , [facility]  [phone]  [facility]  [phone]     Counseling:  Len Rosenbaum, [facility]  [phone]  Rebeca Agrawal, [facility]  [phone]  Gloria Benjamin, [facility]  [phone]                ________________________________  Patient/Parent/Guardian/POA Signature    ________________________________  Clinician Signature    10/24/2024  14:24 EDT

## 2024-10-24 NOTE — PROGRESS NOTES
Clinical Pharmacy Services: Medication History    Bekah Gibson is a 54 y.o. female presenting to [facility] for   Chief Complaint   Patient presents with    Abdominal Pain       She  has a past medical history of Acromioclavicular separation (1/2021), Ankle sprain (2/2004), Anxiety, Arthritis, Arthritis of back (Unsure), Cirrhosis, Disease of thyroid gland, ETOH abuse, Fracture, clavicle (1/2021), Fracture, foot (Unsure), Frozen shoulder (1 /2009), GERD (gastroesophageal reflux disease), History of kidney stones, Hypertension, Left shoulder pain, Low back strain (1/21), Lumbosacral disc disease (1/21), MDD (major depressive disorder), Neck strain (Unsure), Pancreatitis, Periarthritis of shoulder (see above), Rotator cuff syndrome (odre for shoulder replacement), Tennis elbow (Unsure), and Thrombocytopenia.    Allergies as of 10/24/2024 - Reviewed 10/24/2024   Allergen Reaction Noted    Benzonatate Nausea Only and Other (See Comments) 06/05/2017    Ketorolac tromethamine Other (See Comments) 10/15/2023    Phenergan [promethazine hcl] Hives 04/25/2016    Cucumber extract Rash 05/28/2015    Promethazine-phenylephrine Other (See Comments) 02/05/2013       Medication information was obtained from: Patient   Pharmacy and Phone Number:     Prior to Admission Medications       Prescriptions Last Dose Informant Patient Reported? Taking?    amLODIPine (NORVASC) 2.5 MG tablet 10/24/2024 Self, Pharmacy Yes Yes    Take 1 tablet by mouth Daily.    Apoaequorin (PREVAGEN PO)  Self Yes Yes    Take 1 dose by mouth Daily.    ferrous sulfate 325 (65 FE) MG tablet 10/24/2024 Self Yes Yes    Take 1 tablet by mouth Daily With Breakfast.    FLUoxetine (PROzac) 20 MG tablet 10/24/2024 Self Yes Yes    Take 3 tablets by mouth Daily.    folic acid (FOLVITE) 1 MG tablet 10/23/2024 Self Yes Yes    Take 1 tablet by mouth Daily.    Ibuprofen 3 %, Gabapentin 10 %, Baclofen 2 %, lidocaine 4 % Past Month Self No Yes    Apply 1-2 g  topically to the appropriate area as directed 3 (Three) to 4 (Four) times daily.    Patient taking differently:  Apply 1-2 g topically to the appropriate area as directed 3 (Three) Times a Day As Needed.    lactulose (CHRONULAC) 10 GM/15ML solution 10/24/2024 Self Yes Yes    Take 15 mL by mouth Every Morning.    levothyroxine (SYNTHROID, LEVOTHROID) 100 MCG tablet 10/24/2024 Self, Pharmacy No Yes    TAKE ONE TABLET BY MOUTH DAILY    Patient taking differently:  Take 1 tablet by mouth Every Morning.    MAGnesium-Oxide 400 (240 Mg) MG tablet 10/24/2024 Self Yes Yes    Take 1 tablet by mouth Daily.    mirtazapine (REMERON) 15 MG tablet 10/23/2024 Self Yes Yes    Take 1 tablet by mouth Every Night.    Multiple Vitamin (MULTIVITAMIN) capsule 10/24/2024 Self Yes Yes    Take 1 capsule by mouth Daily.    nadolol (CORGARD) 40 MG tablet Not Taking Pharmacy Yes Yes    Take 1 tablet by mouth Daily.    ondansetron ODT (ZOFRAN-ODT) 4 MG disintegrating tablet 10/23/2024 Self No Yes    Place 1 tablet on the tongue Every 8 (Eight) Hours As Needed for Nausea or Vomiting.    oxyCODONE (ROXICODONE) 5 MG immediate release tablet Past Week Self No Yes    Take 1 tablet by mouth Every 6 (Six) Hours As Needed for Moderate Pain.    pancrelipase, Lip-Prot-Amyl, (CREON) 15332-09374 units capsule delayed-release particles capsule Past Week Self No Yes    Take 2 capsules by mouth 3 (Three) Times a Day With Meals.    pantoprazole (PROTONIX) 40 MG EC tablet 10/23/2024 Self Yes Yes    Take 1 tablet by mouth Daily.    potassium chloride 10 MEQ CR tablet 10/24/2024 Self Yes Yes    Take 1 tablet by mouth 2 (Two) Times a Day.    risperiDONE (risperDAL) 0.5 MG tablet 10/23/2024 Pharmacy, Self Yes Yes    Take 2 tablets by mouth Every Night.    thiamine (VITAMIN B1) 100 MG tablet 10/24/2024 Pharmacy, Self No Yes    Take 1 tablet by mouth Daily.    lamoTRIgine (LaMICtal) 25 MG tablet Not Taking Pharmacy Yes No    Take 1 tablet by mouth 2 (Two) Times a Day.     Patient not taking:  Reported on 10/24/2024              Medication notes:     This medication list is complete to the best of my knowledge as of 10/24/2024    Please call if questions.    Leander Helton  Medication History Technician  [phone]    10/24/2024 12:23 EDT

## 2024-10-24 NOTE — CONSULTS
"REASON FOR ACCESS CONSULT:     ETOH    HPI:  Pt admitted with alcohol-induced pancreatitis. No UDS or ethanol drawn at this admission. Pt known to [facility] - last seen 8/2024 for drugs, 7/2024 x 2, 2023, 2022, 2021 for alcohol - see notes.    Pt was found RIB. She is A&O. Most current CIWA=10 over the 1400 hr. She stated she feels withdrawal s/s of \"headache, body is twitchy\". She rated her current craving for ETOH 0/10 (10 being the worst). Her last drink was yesterday.    Since her last admission to the hospital this past August, the pt stated \"things have been going pretty good... I was getting involved with more physical activity.. going to yoga 2-3 days/week\". She was able to abstain from ETOH up until about a week ago due to \"I had a hard week\". She relayed relationship issues with  and she has met someone else. She does acknowledge both her  and man friend both are substance users.    She denied SI/HI/AVH now or in recent past. She rated current level of anxiety 7/10 (10 being the worst), depression 4/10. She stated she has been getting good sleep. Up until her abdominal issue started her appetite was \"not too bad\". Current stressors include \"the separation\".     SUBSTANCE USE HX: Again, pt stated she just started drinking \"about a week ago\". She's been drinking \"little airplane bottles, 4-5 of them in a 3-4 hour period\". She drank \"most of the days of last week\". She is still smoking THC \"occasionally.. maybe once a week.. couple hits\". She denied other drug use at this time. Hx of cocaine use - last use July. Hx of inpt and outpt [facility] treatment. Hx of AA activity - last meeting couple weeks ago. Has a sponsor, Georgiana, apparently texted each other last week. Longest sober is \"four months last year\". She is a 1/4 PPD cig smoker.    MENTAL HEALTH HX: Hx of anxiety and depression in her lifetime, panic attacks and occasional SI. Hx of inpt psych hospitalizations - last time [facility] 1/2024. Pt " currently following with psych APRN Tamie Cxo, who is managing home prescriptions of Prozac 60 mg daily, Risperdal 1 mg Q HS, Remeron 15 mg Q HS PRN (for insomnia). Pt has just begun to see therapist, Alyson, at [facility]. Pt stated both are aware of her substance abuse. Denied past suicide attempts/self-harm.    SOCIAL HX: Pt has been  to Jeff for 9.5 yrs, but they are currently . She reports they are still friendly and supportive of each other. She stated she is living in a safe environment. Pt has two adult children, ages 23 and 20, and reports good relationships with both. She earned a master's degree, used to work in special education, currently retired. She denied current legalities. Support system includes her brothers and their wives, children, and friends.     PLAN:  Pt reports she has researched a couple of women's [facility] programs and is considering inpt treatment again after discharge. She also reported she will attend more AA. Discussed biological component of her disease, triggers which led her to drink, importance of pt developing a plan for managing cravings associated w/ triggers. She declined additional [facility] resources from [facility]. [facility] will follow.

## 2024-10-24 NOTE — ED NOTES
"Nursing report ED to floor  Bekah Gibson  54 y.o.  female    HPI :  HPI  Stated Reason for Visit: abd pain  History Obtained From: patient    Chief Complaint  Chief Complaint   Patient presents with    Abdominal Pain       Admitting doctor:   Sunny Harden MD    Admitting diagnosis:   The primary encounter diagnosis was Acute pancreatitis, unspecified complication status, unspecified pancreatitis type. A diagnosis of Alcohol dependence with unspecified alcohol-induced disorder was also pertinent to this visit.    Code status:   Current Code Status       Date Active Code Status Order ID Comments User Context       10/24/2024 1250 CPR (Attempt to Resuscitate) [id number]  Sunny Harden MD ED        Question Answer    Code Status (Patient has no pulse and is not breathing) CPR (Attempt to Resuscitate)    Medical Interventions (Patient has pulse or is breathing) Full    Level Of Support Discussed With Patient                    Allergies:   Benzonatate, Ketorolac tromethamine, Phenergan [promethazine hcl], Cucumber extract, and Promethazine-phenylephrine    Isolation:   No active isolations    Intake and Output  No intake or output data in the 24 hours ending 10/24/24 1304    Weight:       10/24/24  0849   Weight: 66.7 kg (147 lb)       Most recent vitals:   Vitals:    10/24/24 0846 10/24/24 0849 10/24/24 1146 10/24/24 1200   BP: 157/89  142/73    Pulse: 105   111   Resp:       Temp:       TempSrc:       SpO2: 99%  92% 94%   Weight:  66.7 kg (147 lb)     Height:  165.1 cm (65\")         Active LDAs/IV [facility]:   Lines, Drains & Airways       Active LDAs       Name Placement date Placement time Site Days    Peripheral IV 10/24/24 0854 Right Antecubital 10/24/24  0854  Antecubital  less than 1                    Labs (abnormal labs have a star):   Labs Reviewed   COMPREHENSIVE METABOLIC PANEL - Abnormal; Notable for the following components:       Result Value    Glucose 143 (*)     Potassium 3.2 (*)     CO2 21.7 (*)     " ALT (SGPT) 62 (*)     AST (SGOT) 170 (*)     Alkaline Phosphatase 191 (*)     Total Bilirubin 1.5 (*)     Anion Gap 20.3 (*)     All other components within normal limits    Narrative:     GFR Normal >60  Chronic Kidney Disease <60  Kidney Failure <15     URINALYSIS W/ MICROSCOPIC IF INDICATED (NO CULTURE) - Abnormal; Notable for the following components:    Color, UA Dark Yellow (*)     Ketones, UA 15 mg/dL (1+) (*)     Leuk Esterase, UA Moderate (2+) (*)     All other components within normal limits   CBC WITH AUTO DIFFERENTIAL - Abnormal; Notable for the following components:    WBC 3.24 (*)     Hematocrit 47.1 (*)     Platelets 53 (*)     Lymphocyte % 16.7 (*)     Immature Grans % 0.6 (*)     Lymphocytes, Absolute 0.54 (*)     All other components within normal limits   URINALYSIS, MICROSCOPIC ONLY - Abnormal; Notable for the following components:    WBC, UA 11-20 (*)     Bacteria, UA Trace (*)     Squamous Epithelial Cells, UA 7-12 (*)     All other components within normal limits   LIPASE - Normal   CBC AND DIFFERENTIAL    Narrative:     The following orders were created for panel order CBC & Differential.  Procedure                               Abnormality         Status                     ---------                               -----------         ------                     CBC Auto Differential[[id number]]        Abnormal            Final result                 Please view results for these tests on the individual orders.       EKG:   No orders to display       Meds given in ED:   Medications   levothyroxine (SYNTHROID, LEVOTHROID) tablet 100 mcg (has no administration in time range)   Magnesium Oxide -Mg Supplement tablet 400 mg (has no administration in time range)   pantoprazole (PROTONIX) EC tablet 40 mg (has no administration in time range)   lactulose (CHRONULAC) 10 GM/15ML solution 10 g (has no administration in time range)   pancrelipase (Lip-Prot-Amyl) (CREON) capsule 24,000 units of lipase (has  no administration in time range)   mirtazapine (REMERON) tablet 15 mg (has no administration in time range)   amLODIPine (NORVASC) tablet 2.5 mg (has no administration in time range)   nadolol (CORGARD) tablet 40 mg (has no administration in time range)   oxyCODONE (ROXICODONE) immediate release tablet 5 mg (has no administration in time range)   risperiDONE (risperDAL) tablet 1 mg (has no administration in time range)   potassium chloride (K-DUR,KLOR-CON) ER tablet 10 mEq (has no administration in time range)   FLUoxetine (PROzac) capsule 20 mg (has no administration in time range)   HYDROmorphone (DILAUDID) injection 0.5 mg (has no administration in time range)   nitroglycerin (NITROSTAT) SL tablet 0.4 mg (has no administration in time range)   acetaminophen (TYLENOL) tablet 650 mg (has no administration in time range)   ondansetron ODT (ZOFRAN-ODT) disintegrating tablet 4 mg (has no administration in time range)     Or   ondansetron (ZOFRAN) injection 4 mg (has no administration in time range)   cloNIDine (CATAPRES) tablet 0.1 mg (has no administration in time range)   lactated ringers infusion (has no administration in time range)   Potassium Replacement - Follow Nurse / BPA Driven Protocol (has no administration in time range)   thiamine (B-1) injection 200 mg (has no administration in time range)     Followed by   thiamine (VITAMIN B-1) tablet 100 mg (has no administration in time range)   folic acid (FOLVITE) tablet 1 mg (has no administration in time range)   LORazepam (ATIVAN) tablet 1 mg (has no administration in time range)     Or   LORazepam (ATIVAN) injection 1 mg (has no administration in time range)     Or   LORazepam (ATIVAN) tablet 2 mg (has no administration in time range)     Or   LORazepam (ATIVAN) injection 2 mg (has no administration in time range)     Or   LORazepam (ATIVAN) injection 2 mg (has no administration in time range)     Or   LORazepam (ATIVAN) injection 2 mg (has no administration  in time range)   HYDROmorphone (DILAUDID) injection 0.5 mg (0.5 mg Intravenous Given 10/24/24 1004)   ondansetron (ZOFRAN) injection 4 mg (4 mg Intravenous Given 10/24/24 1003)   iopamidol (ISOVUE-300) 61 % injection 85 mL (85 mL Intravenous Given by Other 10/24/24 1101)       Imaging results:  No radiology results for the last day    Ambulatory status:   - assist     Social issues:   Social History     Socioeconomic History    Marital status:    Tobacco Use    Smoking status: Every Day     Current packs/day: 0.25     Average packs/day: 0.3 packs/day for 15.0 years (3.8 ttl pk-yrs)     Types: Cigarettes     Passive exposure: Current    Smokeless tobacco: Never    Tobacco comments:     Rarely smoke sometimes will to   Vaping Use    Vaping status: Never Used   Substance and Sexual Activity    Alcohol use: Not Currently     Alcohol/week: 5.0 - 10.0 standard drinks of alcohol     Types: 5 - 10 Shots of liquor per week     Comment: 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    Drug use: Not Currently    Sexual activity: Not Currently     Partners: Male     Birth control/protection: Post-menopausal, Natural family planning/Rhythm     Comment: cinthia- menepausal       Peripheral Neurovascular       Neuro Cognitive       Learning       Respiratory       Abdominal Pain       Pain Assessments  Pain (Adult)  (0-10) Pain Rating: Rest: 7  Response to Pain Interventions: nonverbal indicators absent/decreased    NIH Stroke Scale       Jerri Alonso RN  10/24/24 13:04 EDT

## 2024-10-25 LAB
ALBUMIN SERPL-MCNC: 3.3 G/DL (ref 3.5–5.2)
ALBUMIN/GLOB SERPL: 1.1 G/DL
ALP SERPL-CCNC: 137 U/L (ref 39–117)
ALT SERPL W P-5'-P-CCNC: 43 U/L (ref 1–33)
ANION GAP SERPL CALCULATED.3IONS-SCNC: 10.7 MMOL/L (ref 5–15)
AST SERPL-CCNC: 101 U/L (ref 1–32)
BILIRUB SERPL-MCNC: 2.1 MG/DL (ref 0–1.2)
BUN SERPL-MCNC: 6 MG/DL (ref 6–20)
BUN/CREAT SERPL: 10.7 (ref 7–25)
CALCIUM SPEC-SCNC: 8.4 MG/DL (ref 8.6–10.5)
CHLORIDE SERPL-SCNC: 105 MMOL/L (ref 98–107)
CO2 SERPL-SCNC: 23.3 MMOL/L (ref 22–29)
CREAT SERPL-MCNC: 0.56 MG/DL (ref 0.57–1)
DEPRECATED RDW RBC AUTO: 47.5 FL (ref 37–54)
EGFRCR SERPLBLD CKD-EPI 2021: 107.3 ML/MIN/1.73
ERYTHROCYTE [DISTWIDTH] IN BLOOD BY AUTOMATED COUNT: 13.3 % (ref 12.3–15.4)
GLOBULIN UR ELPH-MCNC: 3 GM/DL
GLUCOSE SERPL-MCNC: 94 MG/DL (ref 65–99)
HCT VFR BLD AUTO: 40.3 % (ref 34–46.6)
HGB BLD-MCNC: 13.5 G/DL (ref 12–15.9)
INR PPP: 1.41 (ref 0.9–1.1)
MAGNESIUM SERPL-MCNC: 1.3 MG/DL (ref 1.6–2.6)
MCH RBC QN AUTO: 32.1 PG (ref 26.6–33)
MCHC RBC AUTO-ENTMCNC: 33.5 G/DL (ref 31.5–35.7)
MCV RBC AUTO: 96 FL (ref 79–97)
PLATELET # BLD AUTO: 40 10*3/MM3 (ref 140–450)
PMV BLD AUTO: 10.9 FL (ref 6–12)
POTASSIUM SERPL-SCNC: 4 MMOL/L (ref 3.5–5.2)
POTASSIUM SERPL-SCNC: 4 MMOL/L (ref 3.5–5.2)
PROT SERPL-MCNC: 6.3 G/DL (ref 6–8.5)
PROTHROMBIN TIME: 17.5 SECONDS (ref 11.7–14.2)
RBC # BLD AUTO: 4.2 10*6/MM3 (ref 3.77–5.28)
SODIUM SERPL-SCNC: 139 MMOL/L (ref 136–145)
WBC NRBC COR # BLD AUTO: 2.43 10*3/MM3 (ref 3.4–10.8)

## 2024-10-25 PROCEDURE — 25010000002 HYDROMORPHONE PER 4 MG: Performed by: HOSPITALIST

## 2024-10-25 PROCEDURE — 80053 COMPREHEN METABOLIC PANEL: CPT | Performed by: HOSPITALIST

## 2024-10-25 PROCEDURE — 96376 TX/PRO/DX INJ SAME DRUG ADON: CPT

## 2024-10-25 PROCEDURE — 25810000003 LACTATED RINGERS PER 1000 ML: Performed by: HOSPITALIST

## 2024-10-25 PROCEDURE — 25010000002 MAGNESIUM SULFATE 2 GM/50ML SOLUTION: Performed by: HOSPITALIST

## 2024-10-25 PROCEDURE — 85610 PROTHROMBIN TIME: CPT | Performed by: HOSPITALIST

## 2024-10-25 PROCEDURE — 96366 THER/PROPH/DIAG IV INF ADDON: CPT

## 2024-10-25 PROCEDURE — 36415 COLL VENOUS BLD VENIPUNCTURE: CPT | Performed by: HOSPITALIST

## 2024-10-25 PROCEDURE — 96361 HYDRATE IV INFUSION ADD-ON: CPT

## 2024-10-25 PROCEDURE — 83735 ASSAY OF MAGNESIUM: CPT | Performed by: HOSPITALIST

## 2024-10-25 PROCEDURE — 96365 THER/PROPH/DIAG IV INF INIT: CPT

## 2024-10-25 PROCEDURE — 25010000002 THIAMINE HCL 200 MG/2ML SOLUTION: Performed by: HOSPITALIST

## 2024-10-25 PROCEDURE — 85027 COMPLETE CBC AUTOMATED: CPT | Performed by: HOSPITALIST

## 2024-10-25 PROCEDURE — 63710000001 ONDANSETRON ODT 4 MG TABLET DISPERSIBLE: Performed by: HOSPITALIST

## 2024-10-25 RX ORDER — ECHINACEA PURPUREA EXTRACT 125 MG
2 TABLET ORAL AS NEEDED
Status: DISCONTINUED | OUTPATIENT
Start: 2024-10-25 | End: 2024-10-26 | Stop reason: HOSPADM

## 2024-10-25 RX ORDER — NICOTINE 21 MG/24HR
1 PATCH, TRANSDERMAL 24 HOURS TRANSDERMAL
Status: DISCONTINUED | OUTPATIENT
Start: 2024-10-25 | End: 2024-10-26 | Stop reason: HOSPADM

## 2024-10-25 RX ORDER — MAGNESIUM SULFATE HEPTAHYDRATE 40 MG/ML
2 INJECTION, SOLUTION INTRAVENOUS
Status: COMPLETED | OUTPATIENT
Start: 2024-10-25 | End: 2024-10-25

## 2024-10-25 RX ADMIN — NICOTINE 1 PATCH: 14 PATCH TRANSDERMAL at 09:59

## 2024-10-25 RX ADMIN — THIAMINE HYDROCHLORIDE 200 MG: 100 INJECTION, SOLUTION INTRAMUSCULAR; INTRAVENOUS at 21:17

## 2024-10-25 RX ADMIN — POTASSIUM CHLORIDE 10 MEQ: 750 TABLET, EXTENDED RELEASE ORAL at 08:38

## 2024-10-25 RX ADMIN — OXYCODONE HYDROCHLORIDE 5 MG: 5 TABLET ORAL at 04:08

## 2024-10-25 RX ADMIN — POTASSIUM CHLORIDE 10 MEQ: 750 TABLET, EXTENDED RELEASE ORAL at 21:17

## 2024-10-25 RX ADMIN — LORAZEPAM 1 MG: 1 TABLET ORAL at 22:11

## 2024-10-25 RX ADMIN — AMLODIPINE BESYLATE 2.5 MG: 5 TABLET ORAL at 08:38

## 2024-10-25 RX ADMIN — HYDROMORPHONE HYDROCHLORIDE 0.5 MG: 1 INJECTION, SOLUTION INTRAMUSCULAR; INTRAVENOUS; SUBCUTANEOUS at 09:59

## 2024-10-25 RX ADMIN — THIAMINE HYDROCHLORIDE 200 MG: 100 INJECTION, SOLUTION INTRAMUSCULAR; INTRAVENOUS at 06:22

## 2024-10-25 RX ADMIN — MIRTAZAPINE 15 MG: 15 TABLET, FILM COATED ORAL at 21:17

## 2024-10-25 RX ADMIN — MAGNESIUM SULFATE HEPTAHYDRATE 2 G: 40 INJECTION, SOLUTION INTRAVENOUS at 12:15

## 2024-10-25 RX ADMIN — MAGNESIUM SULFATE HEPTAHYDRATE 2 G: 40 INJECTION, SOLUTION INTRAVENOUS at 14:00

## 2024-10-25 RX ADMIN — LACTULOSE 10 G: 10 SOLUTION ORAL at 06:22

## 2024-10-25 RX ADMIN — FOLIC ACID 1 MG: 1 TABLET ORAL at 08:38

## 2024-10-25 RX ADMIN — OXYCODONE HYDROCHLORIDE 5 MG: 5 TABLET ORAL at 16:00

## 2024-10-25 RX ADMIN — MAGNESIUM OXIDE 400 MG (241.3 MG MAGNESIUM) TABLET 400 MG: TABLET at 08:38

## 2024-10-25 RX ADMIN — OXYCODONE HYDROCHLORIDE 5 MG: 5 TABLET ORAL at 22:11

## 2024-10-25 RX ADMIN — PANCRELIPASE 24000 UNITS OF LIPASE: 60000; 12000; 38000 CAPSULE, DELAYED RELEASE PELLETS ORAL at 17:57

## 2024-10-25 RX ADMIN — NADOLOL 40 MG: 40 TABLET ORAL at 08:38

## 2024-10-25 RX ADMIN — ONDANSETRON 4 MG: 4 TABLET, ORALLY DISINTEGRATING ORAL at 21:17

## 2024-10-25 RX ADMIN — LORAZEPAM 1 MG: 1 TABLET ORAL at 08:49

## 2024-10-25 RX ADMIN — MAGNESIUM SULFATE HEPTAHYDRATE 2 G: 40 INJECTION, SOLUTION INTRAVENOUS at 09:59

## 2024-10-25 RX ADMIN — PANTOPRAZOLE SODIUM 40 MG: 40 TABLET, DELAYED RELEASE ORAL at 08:38

## 2024-10-25 RX ADMIN — PANCRELIPASE 24000 UNITS OF LIPASE: 60000; 12000; 38000 CAPSULE, DELAYED RELEASE PELLETS ORAL at 08:38

## 2024-10-25 RX ADMIN — SODIUM CHLORIDE, POTASSIUM CHLORIDE, SODIUM LACTATE AND CALCIUM CHLORIDE 125 ML/HR: 600; 310; 30; 20 INJECTION, SOLUTION INTRAVENOUS at 06:35

## 2024-10-25 RX ADMIN — PANCRELIPASE 24000 UNITS OF LIPASE: 60000; 12000; 38000 CAPSULE, DELAYED RELEASE PELLETS ORAL at 12:15

## 2024-10-25 RX ADMIN — LEVOTHYROXINE SODIUM 100 MCG: 100 TABLET ORAL at 06:22

## 2024-10-25 RX ADMIN — RISPERIDONE 1 MG: 1 TABLET, FILM COATED ORAL at 21:17

## 2024-10-25 RX ADMIN — FLUOXETINE HYDROCHLORIDE 20 MG: 20 CAPSULE ORAL at 08:38

## 2024-10-25 RX ADMIN — THIAMINE HYDROCHLORIDE 200 MG: 100 INJECTION, SOLUTION INTRAMUSCULAR; INTRAVENOUS at 14:00

## 2024-10-25 RX ADMIN — SALINE NASAL SPRAY 2 SPRAY: 1.5 SOLUTION NASAL at 12:15

## 2024-10-25 NOTE — PLAN OF CARE
Goal Outcome Evaluation:  Plan of Care Reviewed With: patient        Progress: no change  Outcome Evaluation:     A&O x4. VSS. SR on telemetry. Room air while awake. Placed on 2L via n/c while sleeping due to sats dropping into the 80's.     C/O abdominal pain and headache.   PRN pain medication given.       CIWA scores 9, 1, 2.        K replaced.           Will continue to monitor.

## 2024-10-25 NOTE — CASE MANAGEMENT/SOCIAL WORK
Discharge Planning Assessment  [facility]     Patient Name: Bekah Gibson  MRN: [id number]  Today's Date: 10/25/2024    Admit Date: 10/24/2024    Plan: Home with    Discharge Needs Assessment       Row Name 10/25/24 1208       Living Environment    People in Home spouse    Name(s) of People in Home Jeff    Current Living Arrangements home    Potentially Unsafe Housing Conditions none    Primary Care Provided by self    Family Caregiver if Needed spouse    Quality of Family Relationships involved;helpful    Able to Return to Prior Arrangements yes       Resource/Environmental Concerns    Resource/Environmental Concerns none    Transportation Concerns none       Transition Planning    Patient/Family Anticipates Transition to home with family    Patient/Family Anticipated Services at Transition none    Transportation Anticipated family or friend will provide       Discharge Needs Assessment    Readmission Within the Last 30 Days no previous admission in last 30 days    Equipment Currently Used at Home none    Concerns to be Addressed no discharge needs identified    Anticipated Changes Related to Illness none    Equipment Needed After Discharge none                   Discharge Plan       Row Name 10/25/24 0311       Plan    Plan Home with     Patient/Family in Agreement with Plan yes    Plan Comments Spoke with patient at bedside, introduced self, explained CCP role and verified face sheet and pharmacy information. Pt lives with her  Jeff, in 1 level home with no PAGE, she is IADLS, she uses no medical equipment. She has no HH no SNF history. She plans to return home with Jeff to transport, denies dc needs. CCP will follow - Trinh S.                  Continued Care and Services - Admitted Since 10/24/2024    No active coordination exists for this encounter.       Expected Discharge Date and Time       Expected Discharge Date Expected Discharge Time    Oct 26, 2024            Demographic Summary        Row Name 10/25/24 1208       General Information    Admission Type inpatient                   Functional Status       Row Name 10/25/24 1208       Functional Status    Usual Activity Tolerance excellent    Current Activity Tolerance good       Assessment of Health Literacy    Health Literacy Good       Functional Status, IADL    Medications independent    Meal Preparation independent    Housekeeping independent    Laundry independent    Shopping independent       Mental Status    General Appearance WDL WDL       Mental Status Summary    Recent Changes in Mental Status/Cognitive Functioning no changes                   Psychosocial    No documentation.                  Abuse/Neglect    No documentation.                  Legal       Row Name 10/25/24 1208       Financial/Legal    Who Manages Finances if Patient Unable                    Substance Abuse    No documentation.                  Patient Forms    No documentation.                     Trinh Chapman RN

## 2024-10-25 NOTE — NURSING NOTE
[facility] follow-up d/t ETOH.     Patient RIB. The patient reported she is feeling better today and was able to sleep last night. The patient reported she is experiencing a headache and muscle spasms and that her nurse is aware. Last CIWA 9. The patient voiced she has [facility] treatment resources provided by [facility] provided on previous encounters. The patient voiced she has a therapist who specializes in addiction and established this through resources provided by [facility]. The patient voiced she plans on continuing with AA but d/t going through a divorce currently she is not sure about other treatments. The patient denied any other needs at this time. [facility] following.

## 2024-10-25 NOTE — PAYOR COMM NOTE
"Ba Gibson (54 y.o. Female)        PLEASE SEE ATTACHED FOR INPT AUTH.     REF#[id number]    PLEASE CALL  OR  [phone]    THANK YOU    ARTIS SHAH LPN CCP   Date of Birth   1970    Social Security Number       Address   [address] [id number]    Home Phone   [phone]    MRN   [id number]       Gnosticism   Protestant    Marital Status                               Admission Date   10/24/24    Admission Type   Emergency    Admitting Provider   Sunny Harden MD    Attending Provider   Sunny Harden MD    Department, Room/Bed   [address], [address]/1       Discharge Date       Discharge Disposition       Discharge Destination                                 Attending Provider: Sunny Harden MD    Allergies: Benzonatate, Ketorolac Tromethamine, Phenergan [Promethazine Hcl], Cucumber Extract, Promethazine-phenylephrine    Isolation: None   Infection: None   Code Status: CPR    Ht: 165.1 cm (65\")   Wt: 63.8 kg (140 lb 9.6 oz)    Admission Cmt: None   Principal Problem: Alcohol-induced acute on chronic pancreatitis without infection or necrosis [K85.20]                   Active Insurance as of 10/24/2024       Primary Coverage       Payor Plan Insurance Group Employer/Plan Group    ANTHEM MEDICAID ANTHEM MEDICAID [id number]       Payor Plan Address Payor Plan Phone Number Payor Plan Fax Number Effective Dates    [address] [address] [phone]  6/1/2018 - None Entered    [facility] [id number]         Subscriber Name Subscriber Birth Date Member ID       BA GIBSON TRISTAN 1970 [id number]                     Emergency Contacts        (Rel.) Home Phone Work Phone Mobile Phone    Jeff Gibson (Spouse) [phone] -- [phone]              [place]: [facility] [id number]  Tax ID [id number]     History & Physical        Sunny Harden MD at 10/24/24 1156              Patient Name:  Ba HUDSON Arthur  Date of Birth:  " 1970  MRN:  [id number]  Admit Date:  10/24/2024  Patient Care Team:  Tylor Davis as PCP - General (Family Medicine)  Deion Saldana MD as Consulting Physician (Urology)  Adalberto Hoffman MD as Consulting Physician (Gastroenterology)  Eliazar Quinonez MD (Hematology)      Subjective  History Present Illness     Chief Complaint   Patient presents with    Abdominal Pain       Ms. Gibson is a 54 y.o. female with a history of chronic pancreatitis due to alcohol abuse that presents to [facility] complaining of abdominal pain.    Abdominal Pain  This is a recurrent problem. The onset quality is gradual. The problem occurs constantly. The pain is located in the epigastric region. The pain is moderate. The quality of the pain is aching. The abdominal pain radiates to the back. Associated symptoms include diarrhea (on lactulose), nausea and vomiting. The pain is aggravated by certain positions and eating. The pain is relieved by Nothing.         Review of Systems   Gastrointestinal:  Positive for abdominal pain, diarrhea (on lactulose), nausea and vomiting.   Psychiatric/Behavioral:  Positive for dysphoric mood (going through divorce).         Personal History     Past Medical History:   Diagnosis Date    Acromioclavicular separation 1/2021    Ankle sprain 2/2004    no operation necessary  At Time unsure    Anxiety     Arthritis     Arthritis of back Unsure    Diseased    Cirrhosis     Disease of thyroid gland     ETOH abuse     SOBER FOR 3 MONTHS    Fracture, clavicle 1/2021    shattered clavicel on issue slip and fall    Fracture, foot Unsure    Frozen shoulder 1 /2009    4    GERD (gastroesophageal reflux disease)     History of kidney stones     5 YR AGO    Hypertension     Left shoulder pain     Low back strain 1/21    Lumbosacral disc disease 1/21    MDD (major depressive disorder)     Neck strain Unsure    Pancreatitis     Periarthritis of shoulder see above    Rotator cuff syndrome odre  for shoulder replacement    unable to receive due to low platelets    Tennis elbow Unsure    Unsurpassed    Thrombocytopenia      Past Surgical History:   Procedure Laterality Date    CLAVICLE SURGERY Right 2018    ENDOSCOPY N/A 10/26/2022    Procedure: ESOPHAGOGASTRODUODENOSCOPY wtih banding;  Surgeon: Ag Patel MD;  Location: [facility] ENDOSCOPY;  Service: Gastroenterology;  Laterality: N/A;  pre: melena  post: esophageal varicies with banding x2 bands    ENDOSCOPY N/A 2023    Procedure: ESOPHAGOGASTRODUODENOSCOPY;  Surgeon: Ag Patel MD;  Location: [facility] ENDOSCOPY;  Service: Gastroenterology;  Laterality: N/A;  PRE OP - MAROON STOOLS  POST OP - SM ESOPHAGEAL VARICES    ESOPHAGEAL VARICES LIGATION      FOOT SURGERY  14    JOINT REPLACEMENT Bilateral     GREAT TOE    KIDNEY STONE SURGERY      LITHOTRIPSY AND STENT (R SIDE)    LAPAROSCOPIC TUBAL LIGATION      NECK SURGERY  3/07    Not sure of dates    SIGMOIDOSCOPY N/A 2023    Procedure: SIGMOIDOSCOPY FLEXIBLE;  Surgeon: Ag Patel MD;  Location: [facility] ENDOSCOPY;  Service: Gastroenterology;  Laterality: N/A;  PRE OP - MAROON STOOLS  POST OP - RECTAL VARICES    TOTAL SHOULDER ARTHROPLASTY Left 2023    Procedure: reverse total shoulder arthroplasty;  Surgeon: Giovanni Denise MD;  Location: [facility] OR [facility] [place];  Service: Orthopedics;  Laterality: Left;     Family History   Problem Relation Age of Onset    Rheumatologic disease Mother         congestive heart diseased    Osteoporosis Mother             Thyroid disease Father     Leukemia Father     Cancer Father         Leukemia  deseased     Broken bones Father     Clotting disorder Father     Drug abuse Brother     Malig Hyperthermia Neg Hx      Social History     Tobacco Use    Smoking status: Every Day     Current packs/day: 0.25     Average packs/day: 0.3 packs/day for 15.0 years (3.8 ttl pk-yrs)     Types: Cigarettes     Passive exposure: Current    Smokeless tobacco:  Never    Tobacco comments:     Rarely smoke sometimes will to   Vaping Use    Vaping status: Never Used   Substance Use Topics    Alcohol use: Not Currently     Alcohol/week: 5.0 - 10.0 standard drinks of alcohol     Types: 5 - 10 Shots of liquor per week     Comment: 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    Drug use: Not Currently     Current Facility-Administered Medications on File Prior to Encounter   Medication Dose Route Frequency Provider Last Rate Last Admin    sodium chloride 0.9 % flush 10 mL  10 mL Intravenous Q12H Callie Quiroz MD        sodium chloride 0.9 % flush 10 mL  10 mL Intravenous PRN Callie Quiroz MD        sodium chloride 0.9 % flush 20 mL  20 mL Intravenous PRCallie Crawford MD         Current Outpatient Medications on File Prior to Encounter   Medication Sig Dispense Refill    amLODIPine (NORVASC) 2.5 MG tablet Take 1 tablet by mouth Daily.      Apoaequorin (PREVAGEN PO) Take 1 dose by mouth Daily.      ferrous sulfate 325 (65 FE) MG tablet Take 1 tablet by mouth Daily With Breakfast.      FLUoxetine (PROzac) 20 MG tablet Take 3 tablets by mouth Daily.      folic acid (FOLVITE) 1 MG tablet Take 1 tablet by mouth Daily.      Ibuprofen 3 %, Gabapentin 10 %, Baclofen 2 %, lidocaine 4 % Apply 1-2 g topically to the appropriate area as directed 3 (Three) to 4 (Four) times daily. (Patient taking differently: Apply 1-2 g topically to the appropriate area as directed 3 (Three) Times a Day As Needed.) 90 g 5    lactulose (CHRONULAC) 10 GM/15ML solution Take 15 mL by mouth Every Morning.      levothyroxine (SYNTHROID, LEVOTHROID) 100 MCG tablet TAKE ONE TABLET BY MOUTH DAILY (Patient taking differently: Take 1 tablet by mouth Every Morning.) 30 tablet 3    MAGnesium-Oxide 400 (240 Mg) MG tablet Take 1 tablet by mouth Daily.      mirtazapine (REMERON) 15 MG tablet Take 1 tablet by mouth Every Night.      Multiple Vitamin (MULTIVITAMIN) capsule Take 1 capsule by mouth  "Daily.      nadolol (CORGARD) 40 MG tablet Take 1 tablet by mouth Daily.      ondansetron ODT (ZOFRAN-ODT) 4 MG disintegrating tablet Place 1 tablet on the tongue Every 8 (Eight) Hours As Needed for Nausea or Vomiting. 10 tablet 0    oxyCODONE (ROXICODONE) 5 MG immediate release tablet Take 1 tablet by mouth Every 6 (Six) Hours As Needed for Moderate Pain. 12 tablet 0    pancrelipase, Lip-Prot-Amyl, (CREON) 60450-15350 units capsule delayed-release particles capsule Take 2 capsules by mouth 3 (Three) Times a Day With Meals. 180 capsule 0    pantoprazole (PROTONIX) 40 MG EC tablet Take 1 tablet by mouth Daily.      potassium chloride 10 MEQ CR tablet Take 1 tablet by mouth 2 (Two) Times a Day.      risperiDONE (risperDAL) 0.5 MG tablet Take 2 tablets by mouth Every Night.      thiamine (VITAMIN B1) 100 MG tablet Take 1 tablet by mouth Daily. 30 tablet 0    lamoTRIgine (LaMICtal) 25 MG tablet Take 1 tablet by mouth 2 (Two) Times a Day. (Patient not taking: Reported on 10/24/2024)      [DISCONTINUED] vitamin D (ERGOCALCIFEROL) 1.25 MG (27905 UT) capsule capsule Take 1 capsule by mouth Every 7 (Seven) Days.       Allergies   Allergen Reactions    Benzonatate Nausea Only and Other (See Comments)     Rash     Ketorolac Tromethamine Other (See Comments)     \"I cannot take because of liver problems\"    Phenergan [Promethazine Hcl] Hives    Cucumber Extract Rash    Promethazine-Phenylephrine Other (See Comments)     \"FEEL WEIRD\"       Objective   Objective     Vital Signs  Temp:  [97.9 °F (36.6 °C)] 97.9 °F (36.6 °C)  Heart Rate:  [105-130] 111  Resp:  [18] 18  BP: (142-157)/(73-89) 142/73  SpO2:  [92 %-99 %] 94 %  on   ;   Device (Oxygen Therapy): room air  Body mass index is 24.46 kg/m².    Physical Exam  Vitals and nursing note reviewed.   Constitutional:       General: She is not in acute distress.     Appearance: She is ill-appearing.   HENT:      Head: Normocephalic and atraumatic.   Cardiovascular:      Rate and " Rhythm: Regular rhythm. Tachycardia present.   Pulmonary:      Effort: Pulmonary effort is normal.      Breath sounds: Normal breath sounds.   Abdominal:      General: Bowel sounds are normal. There is no distension.      Palpations: Abdomen is soft.      Tenderness: There is abdominal tenderness. There is no guarding.   Musculoskeletal:         General: Normal range of motion.   Skin:     General: Skin is warm and dry.   Neurological:      Mental Status: She is alert and oriented to person, place, and time.   Psychiatric:         Behavior: Behavior normal.         Results Review:  I reviewed the patient's new clinical results.  I reviewed the patient's new imaging results and agree with the interpretation.  I reviewed the patient's other test results and agree with the interpretation  I personally viewed and interpreted the patient's EKG/Telemetry data  Discussed with ED provider.    Lab Results (last 24 hours)       Procedure Component Value Units Date/Time    CBC & Differential [[id number]]  (Abnormal) Collected: 10/24/24 0851    Specimen: Blood Updated: 10/24/24 0905    Narrative:      The following orders were created for panel order CBC & Differential.  Procedure                               Abnormality         Status                     ---------                               -----------         ------                     CBC Auto Differential[[id number]]        Abnormal            Final result                 Please view results for these tests on the individual orders.    Comprehensive Metabolic Panel [[id number]]  (Abnormal) Collected: 10/24/24 0851    Specimen: Blood Updated: 10/24/24 0916     Glucose 143 mg/dL      BUN 7 mg/dL      Creatinine 0.70 mg/dL      Sodium 144 mmol/L      Potassium 3.2 mmol/L      Chloride 102 mmol/L      CO2 21.7 mmol/L      Calcium 9.6 mg/dL      Total Protein 7.9 g/dL      Albumin 4.3 g/dL      ALT (SGPT) 62 U/L      AST (SGOT) 170 U/L      Alkaline Phosphatase 191 U/L       Total Bilirubin 1.5 mg/dL      Globulin 3.6 gm/dL      A/G Ratio 1.2 g/dL      BUN/Creatinine Ratio 10.0     Anion Gap 20.3 mmol/L      eGFR 101.6 mL/min/1.73     Narrative:      GFR Normal >60  Chronic Kidney Disease <60  Kidney Failure <15      Lipase [[id number]]  (Normal) Collected: 10/24/24 0851    Specimen: Blood Updated: 10/24/24 0934     Lipase 43 U/L     CBC Auto Differential [[id number]]  (Abnormal) Collected: 10/24/24 0851    Specimen: Blood Updated: 10/24/24 0905     WBC 3.24 10*3/mm3      RBC 4.94 10*6/mm3      Hemoglobin 15.9 g/dL      Hematocrit 47.1 %      MCV 95.3 fL      MCH 32.2 pg      MCHC 33.8 g/dL      RDW 13.7 %      RDW-SD 48.4 fl      MPV 11.7 fL      Platelets 53 10*3/mm3      Neutrophil % 74.7 %      Lymphocyte % 16.7 %      Monocyte % 7.1 %      Eosinophil % 0.3 %      Basophil % 0.6 %      Immature Grans % 0.6 %      Neutrophils, Absolute 2.42 10*3/mm3      Lymphocytes, Absolute 0.54 10*3/mm3      Monocytes, Absolute 0.23 10*3/mm3      Eosinophils, Absolute 0.01 10*3/mm3      Basophils, Absolute 0.02 10*3/mm3      Immature Grans, Absolute 0.02 10*3/mm3      nRBC 0.0 /100 WBC     Urinalysis With Microscopic If Indicated (No Culture) - Urine, Clean Catch [[id number]]  (Abnormal) Collected: 10/24/24 0947    Specimen: Urine, Clean Catch Updated: 10/24/24 1007     Color, UA Dark Yellow     Appearance, UA Clear     pH, UA 5.5     Specific Gravity, UA 1.017     Glucose, UA Negative     Ketones, UA 15 mg/dL (1+)     Bilirubin, UA Negative     Blood, UA Negative     Protein, UA Negative     Leuk Esterase, UA Moderate (2+)     Nitrite, UA Negative     Urobilinogen, UA 1.0 E.U./dL    Urinalysis, Microscopic Only - Urine, Clean Catch [[id number]]  (Abnormal) Collected: 10/24/24 0947    Specimen: Urine, Clean Catch Updated: 10/24/24 1007     RBC, UA 0-2 /HPF      WBC, UA 11-20 /HPF      Bacteria, UA Trace /HPF      Squamous Epithelial Cells, UA 7-12 /HPF      Hyaline Casts, UA 0-2 /LPF      Methodology  Automated Microscopy            Imaging Results (Last 24 Hours)       Procedure Component Value Units Date/Time    CT Abdomen Pelvis With Contrast [[id number]] Collected: 10/24/24 1122     Updated: 10/24/24 1122    Narrative:      CT ABDOMEN AND PELVIS WITH IV CONTRAST     HISTORY: 54-year-old female with epigastric pain. Pancreatitis history.  Alcohol.     TECHNIQUE: Radiation dose reduction techniques were utilized, including  automated exposure control and exposure modulation based on body size.   3 mm images were obtained through the abdomen and pelvis after the  administration of IV contrast. Compared with previous CT 07/22/2024.     FINDINGS:  1. There are mild changes of acute pancreatitis predominantly involving  the pancreatic head. There are changes of extensive chronic  pancreatitis. There is no peripancreatic fluid or fluid collection.  Portal vein and SMV are patent. The splenic vein segment just proximal  to the portal venous confluence is absent/thrombosed and the splenic  vein is otherwise patent, stable.     2. The liver is grossly cirrhotic and has extensive heterogeneous fatty  infiltration. Spleen measures 14 cm in craniocaudad span. There are  prominent GE junction and perigastric varices. There is no ascites.     3. There is cholelithiasis without cholecystitis and there is no biliary  dilatation. Adrenals and kidneys appear unremarkable. Urinary bladder  wall appears thickened, but the bladder is partially collapsed. Uterus  and adnexa appear unremarkable.     4. There is a paucity of formed stool within the colon. There is no  colonic thickening or evidence for colitis and there is no appendicitis.                 No orders to display     Assessment/Plan   Assessment & Plan  Active Hospital Problems    Diagnosis  POA    **Alcohol-induced acute on chronic pancreatitis without infection or necrosis [K85.20]  Yes    Alcohol dependence with withdrawal [F10.239]  Yes    Hypokalemia [E87.6]  Yes     Essential hypertension [I10]  Yes    Alcoholic cirrhosis [K70.30]  Yes    Alcoholic ketoacidosis [E87.29]  Yes    Alcoholic hepatitis [K70.10]  Yes    Thrombocytopenia [D69.6]  Yes    Peripheral neuropathy [G62.9]  Yes      Resolved Hospital Problems   No resolved problems to display.       54 y.o. female admitted with Alcohol-induced acute pancreatitis without infection or necrosis.    Admitted to telemetry unit.  Monitor vital signs (per unit routine), pain and telemetry.  Monitor oxygenation and provide supplemental oxygen if needed for goal > 95%.  Will allow clear liquid diet.  IVF LR @ 125 cc/hr.  Pain management with prn IV DILAUDID.  Will treat nausea with prn IV ZOFRAN.  Access Center to see.  CIWA protocol.  Monitor platelets closely.  No evidence of bleeding.  SCDs for DVT prophylaxis.  Full code.  Discussed with patient and ED provider.      Sunny Harden MD  [facility]  10/24/24  12:45 EDT      Electronically signed by Sunny Harden MD at 10/24/24 1245          Emergency Department Notes        Jerri Alonso RN at 10/24/24 1304              Gama Mendez MD at 10/24/24 0958           EMERGENCY DEPARTMENT ENCOUNTER  Room Number:  32/32  PCP: Tylor Davis  Independent Historians: Patient      HPI:  Chief Complaint: had concerns including Abdominal Pain.     A complete HPI/ROS/PMH/PSH/SH/FH are unobtainable due to: Nothing      Context: Bekah Gibson is a 54 y.o. female with a medical history of pancreatitis, alcohol dependence who presents to the ED c/o acute abdominal pain.  She states that she recently relapsed and began drinking again.  She has been going through divorce which has been a stressor for her.  She had her last drink yesterday.  She began having epigastric abdominal pain that feels similar to prior episodes of pancreatitis.  She has had nausea but no vomiting.  She also reports loose stools which is typical for her as she takes lactulose due to  history of cirrhosis.  She denies fever.  She does report some radiation of pain to her back.  She denies black or bloody stool.      Review of prior external notes (non-ED) -and- Review of prior external test results outside of this encounter: See ED course below        PAST MEDICAL HISTORY  Active Ambulatory Problems     Diagnosis Date Noted    Irritable bowel syndrome with both constipation and diarrhea 06/05/2017    Peripheral neuropathy 06/05/2017    Vitamin D deficiency 06/05/2017    History of HPV infection 06/05/2017    Hypothyroidism 06/05/2017    Tobacco dependence due to cigarettes 06/05/2017    Acute kidney injury 12/28/2015    Ascites 06/06/2016    E. coli UTI (urinary tract infection) 06/06/2016    Alcoholic hepatitis 06/29/2018    GI bleed 06/29/2018    Acute post-hemorrhagic anemia 06/29/2018    Thrombocytopenia 06/29/2018    Open wound of right shoulder region 06/29/2018    Pancreatic insufficiency 07/04/2018    Alcoholic ketoacidosis 07/21/2019    Chronic alcohol abuse 07/29/2019    ESBL (extended spectrum beta-lactamase) producing bacteria infection 07/29/2019    Abnormal weight loss 12/13/2019    Hashimoto's disease 12/13/2019    Chronic fatigue 12/14/2019    History of alcohol use disorder 09/21/2021    Alcohol intoxication 09/21/2021    Lupus     Hypomagnesemia     Pancytopenia     Alcoholic cirrhosis     Bilateral lower abdominal discomfort 09/21/2021    Primary hypertension 10/24/2022    Melena 10/24/2022    Arthritis of shoulder 12/19/2022    Esophageal varices 01/16/2023    Essential hypertension 01/16/2023    Alcohol-induced chronic pancreatitis 01/22/2023    Abdominal pain 01/22/2023    Pancreatitis 10/07/2023    Leukopenia 10/07/2023    Epigastric pain 04/01/2024    Acute alcoholic gastritis without hemorrhage 04/01/2024    Acute pancreatitis 07/03/2024    Alcohol withdrawal 07/03/2024    Acute on chronic pancreatitis 07/22/2024    Transaminitis 07/22/2024    Cholelithiasis 07/22/2024     Alcohol use 07/22/2024    WBC decreased 07/24/2024    Hypokalemia 07/24/2024    Urinary tract infection 07/24/2024    Neutropenia 07/24/2024    Polysubstance abuse 08/05/2024     Resolved Ambulatory Problems     Diagnosis Date Noted    Acute renal failure 02/20/2017    Kidney stone 06/05/2017    Alcohol withdrawal syndrome, with delirium 06/29/2018    Hypotension 07/01/2018    Nausea and vomiting 09/21/2021    Acute GI bleeding 10/24/2022     Past Medical History:   Diagnosis Date    Acromioclavicular separation 1/2021    Ankle sprain 2/2004    Anxiety     Arthritis     Arthritis of back Unsure    Cirrhosis     Disease of thyroid gland     ETOH abuse     Fracture, clavicle 1/2021    Fracture, foot Unsure    Frozen shoulder 1 /2009    GERD (gastroesophageal reflux disease)     History of kidney stones     Hypertension     Left shoulder pain     Low back strain 1/21    Lumbosacral disc disease 1/21    MDD (major depressive disorder)     Neck strain Unsure    Periarthritis of shoulder see above    Rotator cuff syndrome odre for shoulder replacement    Tennis elbow Unsure         PAST SURGICAL HISTORY  Past Surgical History:   Procedure Laterality Date    CLAVICLE SURGERY Right 2018    ENDOSCOPY N/A 10/26/2022    Procedure: ESOPHAGOGASTRODUODENOSCOPY wtih banding;  Surgeon: Ag Patel MD;  Location: [facility] ENDOSCOPY;  Service: Gastroenterology;  Laterality: N/A;  pre: melena  post: esophageal varicies with banding x2 bands    ENDOSCOPY N/A 01/18/2023    Procedure: ESOPHAGOGASTRODUODENOSCOPY;  Surgeon: Ag Patel MD;  Location: [facility] ENDOSCOPY;  Service: Gastroenterology;  Laterality: N/A;  PRE OP - MAROON STOOLS  POST OP - SM ESOPHAGEAL VARICES    ESOPHAGEAL VARICES LIGATION      FOOT SURGERY  2/2/14    JOINT REPLACEMENT Bilateral     GREAT TOE    KIDNEY STONE SURGERY      LITHOTRIPSY AND STENT (R SIDE)    LAPAROSCOPIC TUBAL LIGATION  2005    NECK SURGERY  3/07    Not sure of dates    SIGMOIDOSCOPY N/A  2023    Procedure: SIGMOIDOSCOPY FLEXIBLE;  Surgeon: Ag Patel MD;  Location: [facility] ENDOSCOPY;  Service: Gastroenterology;  Laterality: N/A;  PRE OP - MAROON STOOLS  POST OP - RECTAL VARICES    TOTAL SHOULDER ARTHROPLASTY Left 2023    Procedure: reverse total shoulder arthroplasty;  Surgeon: Giovanni Denise MD;  [place] [facility] [place] [facility] [place]  Service: [place]opedics;  Laterality: Left;         FAMILY HISTORY  Family History   Problem Relation Age of Onset    Rheumatologic disease Mother         congestive heart diseased    Osteoporosis Mother             Thyroid disease Father     Leukemia Father     Cancer Father         Leukemia  deseased     Broken bones Father     Clotting disorder Father     Drug abuse Brother     Malig Hyperthermia Neg Hx          SOCIAL HISTORY  Social History     Socioeconomic History    Marital status:    Tobacco Use    Smoking status: Every Day     Current packs/day: 0.25     Average packs/day: 0.3 packs/day for 15.0 years (3.8 ttl pk-yrs)     Types: Cigarettes     Passive exposure: Current    Smokeless tobacco: Never    Tobacco comments:     Rarely smoke sometimes will to   Vaping Use    Vaping status: Never Used   Substance and Sexual Activity    Alcohol use: Not Currently     Alcohol/week: 5.0 - 10.0 standard drinks of alcohol     Types: 5 - 10 Shots of liquor per week     Comment: 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    Drug use: Not Currently    Sexual activity: Not Currently     Partners: Male     Birth control/protection: Post-menopausal, Natural family planning/Rhythm     Comment: cinthia- menepausal         ALLERGIES  Benzonatate, Ketorolac tromethamine, Phenergan [promethazine hcl], Cucumber extract, and Promethazine-phenylephrine      REVIEW OF SYSTEMS  Review of all 14 systems is negative other than stated in the HPI above.      PHYSICAL EXAM    I have reviewed the triage vital signs and nursing notes.    ED Triage Vitals   Temp Heart Rate Resp BP SpO2   10/24/24  0836 10/24/24 0836 10/24/24 0836 10/24/24 0846 10/24/24 0836   97.9 °F (36.6 °C) (!) 130 18 157/89 99 %      Temp src Heart Rate Source Patient Position BP Location FiO2 (%)   10/24/24 0836 10/24/24 0836 -- -- --   Tympanic Monitor            GENERAL: awake and alert, appears uncomfortable, no acute distress  HENT: Normocephalic, atraumatic  EYES: no scleral icterus, pupils 3 mm reactive bilaterally  CV: regular rhythm, regular rate  RESPIRATORY: normal effort  ABDOMEN: soft, mild diffuse tenderness that is maximal in the epigastric region without rebound or guarding, abdomen nondistended  MUSCULOSKELETAL: no deformity  NEURO: alert, moves all extremities, follows commands, GCS 15, cranial nerves II through XII intact, speech fluent and clear  PSYCH: calm, cooperative  SKIN: Warm, dry          LAB RESULTS  Recent Results (from the past 24 hours)   Comprehensive Metabolic Panel    Collection Time: 10/24/24  8:51 AM    Specimen: Blood   Result Value Ref Range    Glucose 143 (H) 65 - 99 mg/dL    BUN 7 6 - 20 mg/dL    Creatinine 0.70 0.57 - 1.00 mg/dL    Sodium 144 136 - 145 mmol/L    Potassium 3.2 (L) 3.5 - 5.2 mmol/L    Chloride 102 98 - 107 mmol/L    CO2 21.7 (L) 22.0 - 29.0 mmol/L    Calcium 9.6 8.6 - 10.5 mg/dL    Total Protein 7.9 6.0 - 8.5 g/dL    Albumin 4.3 3.5 - 5.2 g/dL    ALT (SGPT) 62 (H) 1 - 33 U/L    AST (SGOT) 170 (H) 1 - 32 U/L    Alkaline Phosphatase 191 (H) 39 - 117 U/L    Total Bilirubin 1.5 (H) 0.0 - 1.2 mg/dL    Globulin 3.6 gm/dL    A/G Ratio 1.2 g/dL    BUN/Creatinine Ratio 10.0 7.0 - 25.0    Anion Gap 20.3 (H) 5.0 - 15.0 mmol/L    eGFR 101.6 >60.0 mL/min/1.73   Lipase    Collection Time: 10/24/24  8:51 AM    Specimen: Blood   Result Value Ref Range    Lipase 43 13 - 60 U/L   CBC Auto Differential    Collection Time: 10/24/24  8:51 AM    Specimen: Blood   Result Value Ref Range    WBC 3.24 (L) 3.40 - 10.80 10*3/mm3    RBC 4.94 3.77 - 5.28 10*6/mm3    Hemoglobin 15.9 12.0 - 15.9 g/dL    Hematocrit  47.1 (H) 34.0 - 46.6 %    MCV 95.3 79.0 - 97.0 fL    MCH 32.2 26.6 - 33.0 pg    MCHC 33.8 31.5 - 35.7 g/dL    RDW 13.7 12.3 - 15.4 %    RDW-SD 48.4 37.0 - 54.0 fl    MPV 11.7 6.0 - 12.0 fL    Platelets 53 (L) 140 - 450 10*3/mm3    Neutrophil % 74.7 42.7 - 76.0 %    Lymphocyte % 16.7 (L) 19.6 - 45.3 %    Monocyte % 7.1 5.0 - 12.0 %    Eosinophil % 0.3 0.3 - 6.2 %    Basophil % 0.6 0.0 - 1.5 %    Immature Grans % 0.6 (H) 0.0 - 0.5 %    Neutrophils, Absolute 2.42 1.70 - 7.00 10*3/mm3    Lymphocytes, Absolute 0.54 (L) 0.70 - 3.10 10*3/mm3    Monocytes, Absolute 0.23 0.10 - 0.90 10*3/mm3    Eosinophils, Absolute 0.01 0.00 - 0.40 10*3/mm3    Basophils, Absolute 0.02 0.00 - 0.20 10*3/mm3    Immature Grans, Absolute 0.02 0.00 - 0.05 10*3/mm3    nRBC 0.0 0.0 - 0.2 /100 WBC   Urinalysis With Microscopic If Indicated (No Culture) - Urine, Clean Catch    Collection Time: 10/24/24  9:47 AM    Specimen: Urine, Clean Catch   Result Value Ref Range    Color, UA Dark Yellow (A) Yellow, Straw    Appearance, UA Clear Clear    pH, UA 5.5 5.0 - 8.0    Specific Gravity, UA 1.017 1.005 - 1.030    Glucose, UA Negative Negative    Ketones, UA 15 mg/dL (1+) (A) Negative    Bilirubin, UA Negative Negative    Blood, UA Negative Negative    Protein, UA Negative Negative    Leuk Esterase, UA Moderate (2+) (A) Negative    Nitrite, UA Negative Negative    Urobilinogen, UA 1.0 E.U./dL 0.2 - 1.0 E.U./dL   Urinalysis, Microscopic Only - Urine, Clean Catch    Collection Time: 10/24/24  9:47 AM    Specimen: Urine, Clean Catch   Result Value Ref Range    RBC, UA 0-2 None Seen, 0-2 /HPF    WBC, UA 11-20 (A) None Seen, 0-2 /HPF    Bacteria, UA Trace (A) None Seen /HPF    Squamous Epithelial Cells, UA 7-12 (A) None Seen, 0-2 /HPF    Hyaline Casts, UA 0-2 None Seen /LPF    Methodology Automated Microscopy        The above labs were ordered by me and independently reviewed by me.     RADIOLOGY  CT Abdomen Pelvis With Contrast    Result Date: 10/24/2024  CT  ABDOMEN AND PELVIS WITH IV CONTRAST  HISTORY: 54-year-old female with epigastric pain. Pancreatitis history. Alcohol.  TECHNIQUE: Radiation dose reduction techniques were utilized, including automated exposure control and exposure modulation based on body size. 3 mm images were obtained through the abdomen and pelvis after the administration of IV contrast. Compared with previous CT 07/22/2024.  FINDINGS: 1. There are mild changes of acute pancreatitis predominantly involving the pancreatic head. There are changes of extensive chronic pancreatitis. There is no peripancreatic fluid or fluid collection. Portal vein and SMV are patent. The splenic vein segment just proximal to the portal venous confluence is absent/thrombosed and the splenic vein is otherwise patent, stable.  2. The liver is grossly cirrhotic and has extensive heterogeneous fatty infiltration. Spleen measures 14 cm in craniocaudad span. There are prominent GE junction and perigastric varices. There is no ascites.  3. There is cholelithiasis without cholecystitis and there is no biliary dilatation. Adrenals and kidneys appear unremarkable. Urinary bladder wall appears thickened, but the bladder is partially collapsed. Uterus and adnexa appear unremarkable.  4. There is a paucity of formed stool within the colon. There is no colonic thickening or evidence for colitis and there is no appendicitis.       The above radiology studies were ordered by me.  See ED course for independent interpretations.     MEDICATIONS GIVEN IN ER  Medications   sodium chloride 0.9 % infusion (125 mL/hr Intravenous New Bag 10/24/24 1001)   HYDROmorphone (DILAUDID) injection 0.5 mg (0.5 mg Intravenous Given 10/24/24 1004)   ondansetron (ZOFRAN) injection 4 mg (4 mg Intravenous Given 10/24/24 1003)   iopamidol (ISOVUE-300) 61 % injection 85 mL (85 mL Intravenous Given by Other 10/24/24 1101)         ORDERS PLACED DURING THIS VISIT:  Orders Placed This Encounter   Procedures    CT  Abdomen Pelvis With Contrast    Comprehensive Metabolic Panel    Lipase    Urinalysis With Microscopic If Indicated (No Culture) - Urine, Clean Catch    CBC Auto Differential    Urinalysis, Microscopic Only - Urine, Clean Catch    [facility] (on-call MD unless specified) Details    Inpatient Admission    CBC & Differential         OUTPATIENT MEDICATION MANAGEMENT:  Current Facility-Administered Medications Ordered in Epic   Medication Dose Route Frequency Provider Last Rate Last Admin    sodium chloride 0.9 % flush 10 mL  10 mL Intravenous Q12H Callie Quiroz MD        sodium chloride 0.9 % flush 10 mL  10 mL Intravenous PRN Callie Quiroz MD        sodium chloride 0.9 % flush 20 mL  20 mL Intravenous PRN Callie Quiroz MD        sodium chloride 0.9 % infusion  125 mL/hr Intravenous Continuous Gama Mendez  mL/hr at 10/24/24 1001 125 mL/hr at 10/24/24 1001     Current Outpatient Medications Ordered in Epic   Medication Sig Dispense Refill    amLODIPine (NORVASC) 2.5 MG tablet Take 1 tablet by mouth Daily.      ferrous sulfate 325 (65 FE) MG tablet Take 1 tablet by mouth Daily With Breakfast.      FLUoxetine (PROzac) 60 MG tablet Take 1 tablet by mouth Daily.      folic acid (FOLVITE) 1 MG tablet Take 1 tablet by mouth Daily.      Ibuprofen 3 %, Gabapentin 10 %, Baclofen 2 %, lidocaine 4 % Apply 1-2 g topically to the appropriate area as directed 3 (Three) to 4 (Four) times daily. (Patient taking differently: Apply 1-2 g topically to the appropriate area as directed 3 (Three) Times a Day As Needed.) 90 g 5    lactulose (CHRONULAC) 10 GM/15ML solution Take 15 mL by mouth Every Morning.      lamoTRIgine (LaMICtal) 25 MG tablet Daily.      levothyroxine (SYNTHROID, LEVOTHROID) 100 MCG tablet TAKE ONE TABLET BY MOUTH DAILY (Patient taking differently: Take 1 tablet by mouth Every Morning.) 30 tablet 3    MAGnesium-Oxide 400 (240 Mg) MG tablet Take 1 tablet by mouth  Daily.      mirtazapine (REMERON) 15 MG tablet Take 1 tablet by mouth Every Night.      Multiple Vitamin (MULTIVITAMIN) capsule Take 1 capsule by mouth Daily.      nadolol (CORGARD) 40 MG tablet Take 1 tablet by mouth Daily.      ondansetron ODT (ZOFRAN-ODT) 4 MG disintegrating tablet Place 1 tablet on the tongue Every 8 (Eight) Hours As Needed for Nausea or Vomiting. 10 tablet 0    oxyCODONE (ROXICODONE) 5 MG immediate release tablet Take 1 tablet by mouth Every 6 (Six) Hours As Needed for Moderate Pain. 12 tablet 0    pancrelipase, Lip-Prot-Amyl, (CREON) 66665-31236 units capsule delayed-release particles capsule Take 2 capsules by mouth 3 (Three) Times a Day With Meals. 180 capsule 0    pantoprazole (PROTONIX) 40 MG EC tablet 1 tablet Daily.      thiamine (VITAMIN B1) 100 MG tablet Take 1 tablet by mouth Daily. 30 tablet 0    vitamin D (ERGOCALCIFEROL) 1.25 MG (92317 UT) capsule capsule Take 1 capsule by mouth Every 7 (Seven) Days.           PROCEDURES  Procedures            PROGRESS, DATA ANALYSIS, CONSULTS, AND MEDICAL DECISION MAKING  All labs have been independently interpreted by me.  All radiology studies have been reviewed by me. All EKG's have been independently viewed and interpreted by me.  Discussion below represents my analysis of pertinent findings related to patient's condition, differential diagnosis, treatment plan and final disposition.    Differential diagnosis includes but is not limited to:  Pancreatitis  Gastritis  Esophagitis  Cholecystitis  Alcoholic hepatitis      Clinical Scores:                  ED Course as of 10/24/24 1200   Thu Oct 24, 2024   3227 I reviewed recent urogynecology office visit from 10/16/2024 with Dr. Lazo, patient was seen for urge incontinence. [JR]   0858 I reviewed discharge summary from 8/7/2024 where patient was hospitalized for acute on chronic pancreatitis, with history of alcohol use disorder. [JR]   0930 ALT (SGPT)(!): 62 [JR]   0930 AST (SGOT)(!): 170 [JR]    0930 Alkaline Phosphatase(!): 191 [JR]   0930 Total Bilirubin(!): 1.5 [JR]   0930 WBC(!): 3.24 [JR]   1119 Lipase: 43 [JR]   1157 Discussed with Dr. Harden, [facility] hospitalist, who agrees to admit for further management. [JR]      ED Course User Index  [JR] Gama Mendez MD             AS OF 12:00 EDT VITALS:    BP - 157/89  HR - 105  TEMP - 97.9 °F (36.6 °C) (Tympanic)  O2 SATS - 99%    COMPLEXITY OF CARE  The patient requires admission.      Chronic or social conditions impacting patient care (Social Determinants of Health):     DIAGNOSIS  Final diagnoses:   Acute pancreatitis, unspecified complication status, unspecified pancreatitis type   Alcohol dependence with unspecified alcohol-induced disorder           DISPOSITION  ADMIT      Prescription drug monitoring program review:           Please note that portions of this document were completed with a voice recognition program.    Note Disclaimer: At [facility], we believe that sharing information builds trust and better relationships. You are receiving this note because you recently visited [facility]. It is possible you will see health information before a provider has talked with you about it. This kind of information can be easy to misunderstand. To help you fully understand what it means for your health, we urge you to discuss this note with your provider.         Gama Mendez MD  10/24/24 1201      Electronically signed by Gama Mendez MD at 10/24/24 1201       Degonda, Janet, RN at 10/24/24 0835          Abd pain started yest aft.  She has had nausea and diarrhea    Electronically signed by Degonda, Janet, RN at 10/24/24 0835       CIWA (since admission)        Date/Time CIWA-Ar Total    10/25/24 0800 9     10/25/24 0545 2     10/25/24 0009 1     10/24/24 2204 1     10/24/24 2112 9     10/24/24 1531 4     10/24/24 14:01:08 10     10/24/24 1253 4                       Oxygen Therapy (since admission)       Date/Time SpO2  Device (Oxygen Therapy) Flow (L/min) (Oxygen Therapy) Oxygen Concentration (%) ETCO2 (mmHg)    10/25/24 0740 95 room air -- -- --    10/25/24 0654 -- room air -- -- --    10/25/24 0009 -- nasal cannula 2 -- --    10/24/24 2317 91 -- -- -- --    10/24/24 2226 93 -- -- -- --    10/24/24 2224 88 nasal cannula 2 -- --    10/24/24 2222 87 -- -- -- --    10/24/24 2202 89 -- -- -- --    10/24/24 2200 88 -- -- -- --    10/24/24 205 -- room air -- -- --    10/24/24 1939 94 -- -- -- --    10/24/24 1600 -- room air -- -- --    10/24/24 1445 95 room air -- -- --    10/24/24 1342 95 -- -- -- --    10/24/24 1200 94 -- -- -- --    10/24/24 1146 92 -- -- -- --    10/24/24 0846 99 -- -- -- --    10/24/24 0836 99 room air -- -- --          Intake & Output (last 2 days)         10/23 0701  10/24 0700 10/24 0701  10/25 0700 10/25 0701  10/26 0700    P.O.  120     Total Intake(mL/kg)  120 (1.9)     Net  +120            Urine Unmeasured Occurrence  4 x           Lines, Drains & Airways       Active LDAs       Name Placement date Placement time Site Days    Peripheral IV 10/24/24 1648 Anterior;Left Forearm 10/24/24  1648  Forearm  less than 1              Inactive LDAs       Name Placement date Placement time Removal date Removal time Site Days    [REMOVED] Peripheral IV 10/24/24 0854 Right Antecubital 10/24/24  0854  10/24/24  1648  Antecubital  less than 1             Physician Progress Notes (all)        Sunny Harden MD at 10/25/24 0802            Name: Bekah Gibson ADMIT: 10/24/2024   : 1970  PCP: Tylor Davis    MRN: [id number] LOS: 1 days   AGE/SEX: 54 y.o. female  ROOM: [facility]     Subjective   Subjective   Feels somewhat better today.  Slightly less abdominal pain.  Hallucinations.      Objective   Objective   Vital Signs  Temp:  [97.3 °F (36.3 °C)-98.4 °F (36.9 °C)] 98.4 °F (36.9 °C)  Heart Rate:  [] 88  Resp:  [18] 18  BP: (128-157)/(73-92) 128/86  SpO2:  [87 %-99 %] 91 %  on  Flow (L/min) (Oxygen Therapy):   [2] 2;   Device (Oxygen Therapy): room air  Body mass index is 23.4 kg/m².    Intake/Output Summary (Last 24 hours) at 10/25/2024 0803  Last data filed at 10/24/2024 2051  Gross per 24 hour   Intake 120 ml   Output --   Net 120 ml     Wt Readings from Last 1 Encounters:   10/24/24 1445 63.8 kg (140 lb 9.6 oz)   10/24/24 0849 66.7 kg (147 lb)       Physical Exam  Vitals and nursing note reviewed.   Constitutional:       General: She is not in acute distress.  Cardiovascular:      Rate and Rhythm: Normal rate and regular rhythm.   Pulmonary:      Effort: Pulmonary effort is normal.      Breath sounds: Normal breath sounds.   Abdominal:      General: Bowel sounds are normal.      Palpations: Abdomen is soft.      Tenderness: There is abdominal tenderness.   Musculoskeletal:         General: No swelling.   Skin:     General: Skin is warm and dry.   Neurological:      Mental Status: She is alert. Mental status is at baseline.         Results Review      I reviewed the patient's new clinical results.  Results from last 7 days   Lab Units 10/25/24  0608 10/24/24  0851   WBC 10*3/mm3 2.43* 3.24*   HEMOGLOBIN g/dL 13.5 15.9   PLATELETS 10*3/mm3 40* 53*     Results from last 7 days   Lab Units 10/25/24  0608 10/24/24  2355 10/24/24  0851   SODIUM mmol/L 139  --  144   POTASSIUM mmol/L 4.0 4.0 3.2*   CHLORIDE mmol/L 105  --  102   CO2 mmol/L 23.3  --  21.7*   BUN mg/dL 6  --  7   CREATININE mg/dL 0.56*  --  0.70   GLUCOSE mg/dL 94  --  143*   EGFR mL/min/1.73 107.3  --  101.6     Results from last 7 days   Lab Units 10/25/24  0608 10/24/24  0851   ALBUMIN g/dL 3.3* 4.3   BILIRUBIN mg/dL 2.1* 1.5*   ALK PHOS U/L 137* 191*   AST (SGOT) U/L 101* 170*   ALT (SGPT) U/L 43* 62*     Results from last 7 days   Lab Units 10/25/24  0608 10/24/24  0851   CALCIUM mg/dL 8.4* 9.6   ALBUMIN g/dL 3.3* 4.3   MAGNESIUM mg/dL 1.3*  --    corrected calcium = 9.0  (nl 8.6 - 10.5 mg/dL)  Results from last 7 days   Lab Units 10/24/24  0851   LIPASE  U/L 43       Results from last 7 days   Lab Units 10/25/24  0608   PROTIME Seconds 17.5*   INR  1.41*   Maddrey's discriminant function ~ 27           I have personally reviewed all medications:  Scheduled Medications  amLODIPine, 2.5 mg, Oral, Daily  FLUoxetine, 20 mg, Oral, Q12H  folic acid, 1 mg, Oral, Daily  lactulose, 10 g, Oral, QAM  levothyroxine, 100 mcg, Oral, Q AM  Magnesium Oxide -Mg Supplement, 400 mg, Oral, Daily  mirtazapine, 15 mg, Oral, Nightly  nadolol, 40 mg, Oral, Daily  pancrelipase (Lip-Prot-Amyl), 24,000 units of lipase, Oral, TID With Meals  pantoprazole, 40 mg, Oral, Daily  potassium chloride, 10 mEq, Oral, BID  risperiDONE, 1 mg, Oral, Nightly  thiamine (B-1) IV, 200 mg, Intravenous, Q8H   Followed by  [START ON 10/29/2024] thiamine, 100 mg, Oral, Daily    Infusions  lactated ringers, 125 mL/hr, Last Rate: 125 mL/hr (10/25/24 0635)    Diet  Diet: Liquid; Clear Liquid; Fluid Consistency: Thin (IDDSI 0)    I have personally reviewed:  [x]  Laboratory   [x]  Microbiology   [x]  Radiology   [x]  EKG/Telemetry  [x]  Cardiology/Vascular   []  Pathology    []  Records  CIWA (last 3 days)        Date/Time CIWA-Ar Total    10/25/24 0545 2     10/25/24 0009 1     10/24/24 2204 1     10/24/24 2112 9     10/24/24 1531 4     10/24/24 14:01:08 10     10/24/24 1253 4                     Assessment/Plan     Active Hospital Problems    Diagnosis  POA    **Alcohol-induced acute on chronic pancreatitis without infection or necrosis [K85.20]  Yes    Alcohol dependence with withdrawal [F10.239]  Yes    Hypokalemia [E87.6]  Yes    Essential hypertension [I10]  Yes    Alcoholic cirrhosis [K70.30]  Yes    Alcoholic ketoacidosis [E87.29]  Yes    Alcoholic hepatitis [K70.10]  Yes    Thrombocytopenia [D69.6]  Yes    Peripheral neuropathy [G62.9]  Yes      Resolved Hospital Problems   No resolved problems to display.       54 y.o. female with acute on chronic pancreatitis due to alcohol abuse.    Seems to be doing  reasonably well in regards to the pancreatitis.  Will advance from clear liquid to full liquid diet and if tolerates that we will proceed with low-fat diet.  Will stop fluids when she is tolerating regular food.  Has been counseled again on alcohol cessation.  [facility] following.  Her last drink was 2 days ago.  If withdrawal scores stay low and she is tolerating regular food could possibly discharge as early as tomorrow.  Low suspicion that she will develop severe withdrawals but we shall see.    Will have physical therapy ambulate her some today.   Replace magnesium  Monitor platelets, no evidence of bleeding      SCDs for DVT prophylaxis.  Full code.  Discussed with patient.  Anticipate discharge home in 1-2 days.  Expected Discharge Date: 10/26/2024; Expected Discharge Time:       Sunny Harden MD  [facility]  10/25/24  08:03 EDT    Electronically signed by Sunny Harden MD at 10/25/24 5317

## 2024-10-25 NOTE — PROGRESS NOTES
Name: Bekah Gibson ADMIT: 10/24/2024   : 1970  PCP: Davis Tylor    MRN: [id number] LOS: 1 days   AGE/SEX: 54 y.o. female  ROOM: [facility]     Subjective   Subjective   Feels somewhat better today.  Slightly less abdominal pain.  Hallucinations.       Objective   Objective   Vital Signs  Temp:  [97.3 °F (36.3 °C)-98.4 °F (36.9 °C)] 98.4 °F (36.9 °C)  Heart Rate:  [] 88  Resp:  [18] 18  BP: (128-157)/(73-92) 128/86  SpO2:  [87 %-99 %] 91 %  on  Flow (L/min) (Oxygen Therapy):  [2] 2;   Device (Oxygen Therapy): room air  Body mass index is 23.4 kg/m².    Intake/Output Summary (Last 24 hours) at 10/25/2024 0803  Last data filed at 10/24/2024 2051  Gross per 24 hour   Intake 120 ml   Output --   Net 120 ml     Wt Readings from Last 1 Encounters:   10/24/24 1445 63.8 kg (140 lb 9.6 oz)   10/24/24 0849 66.7 kg (147 lb)       Physical Exam  Vitals and nursing note reviewed.   Constitutional:       General: She is not in acute distress.  Cardiovascular:      Rate and Rhythm: Normal rate and regular rhythm.   Pulmonary:      Effort: Pulmonary effort is normal.      Breath sounds: Normal breath sounds.   Abdominal:      General: Bowel sounds are normal.      Palpations: Abdomen is soft.      Tenderness: There is abdominal tenderness.   Musculoskeletal:         General: No swelling.   Skin:     General: Skin is warm and dry.   Neurological:      Mental Status: She is alert. Mental status is at baseline.         Results Review      I reviewed the patient's new clinical results.  Results from last 7 days   Lab Units 10/25/24  0608 10/24/24  0851   WBC 10*3/mm3 2.43* 3.24*   HEMOGLOBIN g/dL 13.5 15.9   PLATELETS 10*3/mm3 40* 53*     Results from last 7 days   Lab Units 10/25/24  0608 10/24/24  2355 10/24/24  0851   SODIUM mmol/L 139  --  144   POTASSIUM mmol/L 4.0 4.0 3.2*   CHLORIDE mmol/L 105  --  102   CO2 mmol/L 23.3  --  21.7*   BUN mg/dL 6  --  7   CREATININE mg/dL 0.56*  --  0.70   GLUCOSE mg/dL 94  --   143*   EGFR mL/min/1.73 107.3  --  101.6     Results from last 7 days   Lab Units 10/25/24  0608 10/24/24  0851   ALBUMIN g/dL 3.3* 4.3   BILIRUBIN mg/dL 2.1* 1.5*   ALK PHOS U/L 137* 191*   AST (SGOT) U/L 101* 170*   ALT (SGPT) U/L 43* 62*     Results from last 7 days   Lab Units 10/25/24  0608 10/24/24  0851   CALCIUM mg/dL 8.4* 9.6   ALBUMIN g/dL 3.3* 4.3   MAGNESIUM mg/dL 1.3*  --    corrected calcium = 9.0  (nl 8.6 - 10.5 mg/dL)  Results from last 7 days   Lab Units 10/24/24  0851   LIPASE U/L 43       Results from last 7 days   Lab Units 10/25/24  0608   PROTIME Seconds 17.5*   INR  1.41*   Iva's discriminant function ~ 27           I have personally reviewed all medications:  Scheduled Medications  amLODIPine, 2.5 mg, Oral, Daily  FLUoxetine, 20 mg, Oral, Q12H  folic acid, 1 mg, Oral, Daily  lactulose, 10 g, Oral, QAM  levothyroxine, 100 mcg, Oral, Q AM  Magnesium Oxide -Mg Supplement, 400 mg, Oral, Daily  mirtazapine, 15 mg, Oral, Nightly  nadolol, 40 mg, Oral, Daily  pancrelipase (Lip-Prot-Amyl), 24,000 units of lipase, Oral, TID With Meals  pantoprazole, 40 mg, Oral, Daily  potassium chloride, 10 mEq, Oral, BID  risperiDONE, 1 mg, Oral, Nightly  thiamine (B-1) IV, 200 mg, Intravenous, Q8H   Followed by  [START ON 10/29/2024] thiamine, 100 mg, Oral, Daily    Infusions  lactated ringers, 125 mL/hr, Last Rate: 125 mL/hr (10/25/24 0635)    Diet  Diet: Liquid; Clear Liquid; Fluid Consistency: Thin (IDDSI 0)    I have personally reviewed:  [x]  Laboratory   [x]  Microbiology   [x]  Radiology   [x]  EKG/Telemetry  [x]  Cardiology/Vascular   []  Pathology    []  Records  CIWA (last 3 days)        Date/Time CIWA-Ar Total    10/25/24 5454 2     10/25/24 0009 1     10/24/24 2204 1     10/24/24 2112 9     10/24/24 1531 4     10/24/24 14:01:08 10     10/24/24 1253 4                      Assessment/Plan     Active Hospital Problems    Diagnosis  POA    **Alcohol-induced acute on chronic pancreatitis without  infection or necrosis [K85.20]  Yes    Alcohol dependence with withdrawal [F10.239]  Yes    Hypokalemia [E87.6]  Yes    Essential hypertension [I10]  Yes    Alcoholic cirrhosis [K70.30]  Yes    Alcoholic ketoacidosis [E87.29]  Yes    Alcoholic hepatitis [K70.10]  Yes    Thrombocytopenia [D69.6]  Yes    Peripheral neuropathy [G62.9]  Yes      Resolved Hospital Problems   No resolved problems to display.       54 y.o. female with acute on chronic pancreatitis due to alcohol abuse.    Seems to be doing reasonably well in regards to the pancreatitis.  Will advance from clear liquid to full liquid diet and if tolerates that we will proceed with low-fat diet.  Will stop fluids when she is tolerating regular food.  Has been counseled again on alcohol cessation.  Access Center following.  Her last drink was 2 days ago.  If withdrawal scores stay low and she is tolerating regular food could possibly discharge as early as tomorrow.  Low suspicion that she will develop severe withdrawals but we shall see.    Will have physical therapy ambulate her some today.   Replace magnesium  Monitor platelets, no evidence of bleeding      SCDs for DVT prophylaxis.  Full code.  Discussed with patient.  Anticipate discharge home in 1-2 days.  Expected Discharge Date: 10/26/2024; Expected Discharge Time:       Sunny Harden MD  [facility]  10/25/24  08:03 EDT

## 2024-10-26 ENCOUNTER — READMISSION MANAGEMENT (OUTPATIENT)
Dept: CALL CENTER | Facility: HOSPITAL | Age: 54
End: 2024-10-26
Payer: MEDICAID

## 2024-10-26 VITALS
HEIGHT: 65 IN | SYSTOLIC BLOOD PRESSURE: 118 MMHG | DIASTOLIC BLOOD PRESSURE: 85 MMHG | OXYGEN SATURATION: 93 % | HEART RATE: 76 BPM | BODY MASS INDEX: 23.43 KG/M2 | TEMPERATURE: 98.1 F | RESPIRATION RATE: 18 BRPM | WEIGHT: 140.6 LBS

## 2024-10-26 LAB
ALBUMIN SERPL-MCNC: 3.4 G/DL (ref 3.5–5.2)
ALBUMIN/GLOB SERPL: 1.1 G/DL
ALP SERPL-CCNC: 141 U/L (ref 39–117)
ALT SERPL W P-5'-P-CCNC: 43 U/L (ref 1–33)
ANION GAP SERPL CALCULATED.3IONS-SCNC: 9.5 MMOL/L (ref 5–15)
AST SERPL-CCNC: 93 U/L (ref 1–32)
BILIRUB SERPL-MCNC: 1.8 MG/DL (ref 0–1.2)
BUN SERPL-MCNC: 6 MG/DL (ref 6–20)
BUN/CREAT SERPL: 10.9 (ref 7–25)
CALCIUM SPEC-SCNC: 8.6 MG/DL (ref 8.6–10.5)
CHLORIDE SERPL-SCNC: 106 MMOL/L (ref 98–107)
CO2 SERPL-SCNC: 23.5 MMOL/L (ref 22–29)
CREAT SERPL-MCNC: 0.55 MG/DL (ref 0.57–1)
DEPRECATED RDW RBC AUTO: 46.6 FL (ref 37–54)
EGFRCR SERPLBLD CKD-EPI 2021: 107.7 ML/MIN/1.73
ERYTHROCYTE [DISTWIDTH] IN BLOOD BY AUTOMATED COUNT: 13.1 % (ref 12.3–15.4)
GLOBULIN UR ELPH-MCNC: 3 GM/DL
GLUCOSE SERPL-MCNC: 108 MG/DL (ref 65–99)
HCT VFR BLD AUTO: 41.9 % (ref 34–46.6)
HGB BLD-MCNC: 14.2 G/DL (ref 12–15.9)
INR PPP: 1.38 (ref 0.9–1.1)
MAGNESIUM SERPL-MCNC: 1.8 MG/DL (ref 1.6–2.6)
MCH RBC QN AUTO: 32.3 PG (ref 26.6–33)
MCHC RBC AUTO-ENTMCNC: 33.9 G/DL (ref 31.5–35.7)
MCV RBC AUTO: 95.4 FL (ref 79–97)
PLATELET # BLD AUTO: 39 10*3/MM3 (ref 140–450)
PMV BLD AUTO: 12.1 FL (ref 6–12)
POTASSIUM SERPL-SCNC: 3.7 MMOL/L (ref 3.5–5.2)
PROT SERPL-MCNC: 6.4 G/DL (ref 6–8.5)
PROTHROMBIN TIME: 17.2 SECONDS (ref 11.7–14.2)
RBC # BLD AUTO: 4.39 10*6/MM3 (ref 3.77–5.28)
SODIUM SERPL-SCNC: 139 MMOL/L (ref 136–145)
WBC NRBC COR # BLD AUTO: 2.61 10*3/MM3 (ref 3.4–10.8)

## 2024-10-26 PROCEDURE — 80053 COMPREHEN METABOLIC PANEL: CPT | Performed by: HOSPITALIST

## 2024-10-26 PROCEDURE — 85610 PROTHROMBIN TIME: CPT | Performed by: HOSPITALIST

## 2024-10-26 PROCEDURE — 25010000002 THIAMINE HCL 200 MG/2ML SOLUTION: Performed by: HOSPITALIST

## 2024-10-26 PROCEDURE — 83735 ASSAY OF MAGNESIUM: CPT | Performed by: HOSPITALIST

## 2024-10-26 PROCEDURE — 96376 TX/PRO/DX INJ SAME DRUG ADON: CPT

## 2024-10-26 PROCEDURE — 85027 COMPLETE CBC AUTOMATED: CPT | Performed by: HOSPITALIST

## 2024-10-26 RX ORDER — OXYCODONE HYDROCHLORIDE 5 MG/1
5 TABLET ORAL EVERY 6 HOURS PRN
Qty: 12 TABLET | Refills: 0 | Status: SHIPPED | OUTPATIENT
Start: 2024-10-26

## 2024-10-26 RX ADMIN — AMLODIPINE BESYLATE 2.5 MG: 5 TABLET ORAL at 08:28

## 2024-10-26 RX ADMIN — FOLIC ACID 1 MG: 1 TABLET ORAL at 08:28

## 2024-10-26 RX ADMIN — OXYCODONE HYDROCHLORIDE 5 MG: 5 TABLET ORAL at 04:35

## 2024-10-26 RX ADMIN — THIAMINE HYDROCHLORIDE 200 MG: 100 INJECTION, SOLUTION INTRAMUSCULAR; INTRAVENOUS at 06:17

## 2024-10-26 RX ADMIN — POTASSIUM CHLORIDE 10 MEQ: 750 TABLET, EXTENDED RELEASE ORAL at 08:28

## 2024-10-26 RX ADMIN — PANCRELIPASE 24000 UNITS OF LIPASE: 60000; 12000; 38000 CAPSULE, DELAYED RELEASE PELLETS ORAL at 08:28

## 2024-10-26 RX ADMIN — NICOTINE 1 PATCH: 14 PATCH TRANSDERMAL at 08:29

## 2024-10-26 RX ADMIN — PANTOPRAZOLE SODIUM 40 MG: 40 TABLET, DELAYED RELEASE ORAL at 08:28

## 2024-10-26 RX ADMIN — OXYCODONE HYDROCHLORIDE 5 MG: 5 TABLET ORAL at 10:41

## 2024-10-26 RX ADMIN — MAGNESIUM OXIDE 400 MG (241.3 MG MAGNESIUM) TABLET 400 MG: TABLET at 08:28

## 2024-10-26 RX ADMIN — PANCRELIPASE 24000 UNITS OF LIPASE: 60000; 12000; 38000 CAPSULE, DELAYED RELEASE PELLETS ORAL at 12:29

## 2024-10-26 RX ADMIN — LACTULOSE 10 G: 10 SOLUTION ORAL at 06:17

## 2024-10-26 RX ADMIN — LEVOTHYROXINE SODIUM 100 MCG: 100 TABLET ORAL at 06:17

## 2024-10-26 RX ADMIN — FLUOXETINE HYDROCHLORIDE 20 MG: 20 CAPSULE ORAL at 08:28

## 2024-10-26 RX ADMIN — NADOLOL 40 MG: 40 TABLET ORAL at 08:28

## 2024-10-26 NOTE — NURSING NOTE
Access center follow up d/t ETOH. Spoke with patient and also with patient's RN.     Patient awake, pleasant. States she has a therapist scheduled to see that specializes in addiction. States she has a  on retainer for her impending divorce; states she wants and needs to get away from her  because of his drinking. Spoke with patient at length re importance of maintaining sobriety and participating in activities that do not evolve around drinking. Patient appears future oriented and hopeful. Probable discharge today.

## 2024-10-26 NOTE — DISCHARGE SUMMARY
Date of Discharge:  10/26/2024    PCP: Tylor Davis    Discharge Diagnosis:   Active Hospital Problems    Diagnosis  POA    **Alcohol-induced acute on chronic pancreatitis without infection or necrosis [K85.20]  Yes    Alcohol dependence with withdrawal [F10.239]  Yes    Hypokalemia [E87.6]  Yes    Essential hypertension [I10]  Yes    Alcoholic cirrhosis [K70.30]  Yes    Alcoholic ketoacidosis [E87.29]  Yes    Alcoholic hepatitis [K70.10]  Yes    Thrombocytopenia [D69.6]  Yes    Peripheral neuropathy [G62.9]  Yes      Resolved Hospital Problems   No resolved problems to display.          Consults       No orders found from 9/25/2024 to 10/25/2024.          Hospital Course  54 y.o. female with past medical history significant for alcohol abuse, presents to the hospital, she has been diagnosed to have  Acute on chronic alcohol induced pancreatitis doing reasonably well.  She is tolerating p.o. intake well.  Overall patient has clinically been doing better, she is not in any kind of alcohol withdrawal at this point of time.  I have seen and examined patient at bedside, discharge day management is more than 30 minutes.  Plan of care was discussed with the patient and patient has verbalized understanding.  Patient will follow-up with PCP and GI on outpatient basis.      Temp:  [97.7 °F (36.5 °C)-98.1 °F (36.7 °C)] 98.1 °F (36.7 °C)  Heart Rate:  [76-85] 76  Resp:  [18] 18  BP: (118-130)/(72-86) 118/85  Body mass index is 23.4 kg/m².    Physical Exam  General, awake and alert.  Head and ENT, normocephalic and atraumatic.  Lungs, symmetric expansion, equal air entry bilaterally.  Heart, regular rate and rhythm.  Abdomen, soft and nontender.  Extremities, no clubbing or cyanosis.  Neuro, no focal deficits.  Skin: Warm and no rash.  Psych, normal mood and affect.  Musculoskeletal, joint examination is grossly normal.      Disposition: Home or Self Care       Discharge Medications        Changes to Medications         Instructions Start Date   levothyroxine 100 MCG tablet  Commonly known as: SYNTHROID, LEVOTHROID  What changed: when to take this   TAKE ONE TABLET BY MOUTH DAILY             Continue These Medications        Instructions Start Date   amLODIPine 2.5 MG tablet  Commonly known as: NORVASC   Take 1 tablet by mouth Daily.      Creon 76893-33465 units capsule delayed-release particles capsule  Generic drug: pancrelipase (Lip-Prot-Amyl)   24,000 units of lipase, Oral, 3 Times Daily With Meals      ferrous sulfate 325 (65 FE) MG tablet   325 mg, Daily With Breakfast      FLUoxetine 20 MG tablet  Commonly known as: PROzac   60 mg, Daily      folic acid 1 MG tablet  Commonly known as: FOLVITE   1 mg, Daily      Ibuprofen 3 %, Gabapentin 10 %, Baclofen 2 %, lidocaine 4 %   1-2 g, Topical, 3 to 4 Times Daily      lactulose 10 GM/15ML solution  Commonly known as: CHRONULAC   10 g, Every Morning      MAGnesium-Oxide 400 (240 Mg) MG tablet  Generic drug: Magnesium Oxide -Mg Supplement   400 mg, Daily      mirtazapine 15 MG tablet  Commonly known as: REMERON   15 mg, Nightly      multivitamin capsule   1 capsule, Daily      nadolol 40 MG tablet  Commonly known as: CORGARD   40 mg, Daily      ondansetron ODT 4 MG disintegrating tablet  Commonly known as: ZOFRAN-ODT   4 mg, Translingual, Every 8 Hours PRN      oxyCODONE 5 MG immediate release tablet  Commonly known as: ROXICODONE   5 mg, Oral, Every 6 Hours PRN      pantoprazole 40 MG EC tablet  Commonly known as: PROTONIX   40 mg, Daily      potassium chloride 10 MEQ CR tablet   10 mEq, 2 Times Daily      PREVAGEN PO   1 dose, Daily      risperiDONE 0.5 MG tablet  Commonly known as: risperDAL   1 mg, Oral, Nightly      thiamine 100 MG tablet  Commonly known as: VITAMIN B1   100 mg, Oral, Daily             ASK your doctor about these medications        Instructions Start Date   lamoTRIgine 25 MG tablet  Commonly known as: LaMICtal   25 mg, 2 Times Daily                Additional  Instructions for the Follow-ups that You Need to Schedule       Discharge Follow-up with PCP   As directed       Currently Documented PCP:    Tylor Davis    PCP Phone Number:    [phone]     Follow Up Details: Follow-up with PCP in 7 days.               Follow-up Information       Tylor Davis .    Specialty: Family Medicine  Why: Follow-up with PCP in 7 days.  Contact information:  [address]  [place] [address]  [phone]                          No future appointments.     Hilario Hills MD  [facility]  10/26/24    Discharge time spent greater than 30 minutes.

## 2024-10-26 NOTE — OUTREACH NOTE
Prep Survey      Flowsheet Row Responses   [facility] facility patient discharged from? [facility]   Is LACE score < 7 ? No   Eligibility Readm Mgmt   Discharge diagnosis Acute on chronic alcohol induced pancreatitis   Does the patient have one of the following disease processes/diagnoses(primary or secondary)? Other   Does the patient have Home health ordered? No   Is there a DME ordered? No   Prep survey completed? Yes            SARWAT EAST - Registered Nurse

## 2024-10-26 NOTE — SIGNIFICANT NOTE
10/26/24 1015   OTHER   Discipline physical therapist   Rehab Time/Intention   Session Not Performed other (see comments)  (RN reports she is DC'ing today and does not need skilled PT)   Therapy Assessment/Plan (PT)   Criteria for Skilled Interventions Met (PT) no

## 2024-10-26 NOTE — PLAN OF CARE
Goal Outcome Evaluation:  Plan of Care Reviewed With: patient        Progress: no change  Outcome Evaluation:     A&O x4. VSS. SR on telemetry. Room air.       C/O abdominal pain.   PRN pain medication given.       CIWA scores charted.   PRN atavan given once.       Tolerating diet.   IVF's not infusing per MD.         MAG replaced on prior shift.   Lab to recheck this morning.       Platelets trending down    39          Will continue to monitor.                              no

## 2024-10-26 NOTE — NURSING NOTE
Patient DCd home with spouse; pain meds to be ordered to [facility] pharmacy per Dr. Hills; IV removed; DC ppw reviewed with pt.

## 2024-10-26 NOTE — CASE MANAGEMENT/SOCIAL WORK
Case Management Discharge Note      Final Note: home         Selected Continued Care - Discharged on 10/26/2024 Admission date: 10/24/2024 - Discharge disposition: Home or Self Care      Destination    No services have been selected for the patient.                Durable Medical Equipment    No services have been selected for the patient.                Dialysis/Infusion    No services have been selected for the patient.                Home Medical Care    No services have been selected for the patient.                Therapy    No services have been selected for the patient.                Community Resources    No services have been selected for the patient.                Community & DME    No services have been selected for the patient.                         Final Discharge Disposition Code: 01 - home or self-care

## 2024-10-28 ENCOUNTER — TELEPHONE (OUTPATIENT)
Dept: ORTHOPEDIC SURGERY | Facility: CLINIC | Age: 54
End: 2024-10-28

## 2024-10-28 NOTE — TELEPHONE ENCOUNTER
Caller: Bekah Gibson    Relationship to patient: Self    Best call back number:     Chief complaint: RT SHOULDER     Type of visit: FUP TO DISCUSS SURGERY     Requested date: ASAP     If rescheduling, when is the original appointment: 12/2/24     Additional notes:WOULD LIKE TO GET IN SOONER IF POSSIBLE

## 2024-10-29 ENCOUNTER — READMISSION MANAGEMENT (OUTPATIENT)
Dept: CALL CENTER | Facility: HOSPITAL | Age: 54
End: 2024-10-29
Payer: MEDICAID

## 2024-10-29 NOTE — OUTREACH NOTE
Medical Week 1 Survey      Flowsheet Row Responses   [facility] patient discharged from? [place]   Does the patient have one of the following disease processes/diagnoses(primary or secondary)? Other   Week 1 attempt successful? Yes   Call start time 1045   Call end time 1047   Discharge diagnosis Acute on chronic alcohol induced pancreatitis   Person spoke with today (if not patient) and relationship    Meds reviewed with patient/caregiver? Yes   Is the patient taking all medications as directed (includes completed medication regime)? Yes   Does the patient have a primary care provider?  Yes   Does the patient have an appointment with their PCP within 7 days of discharge? Yes   Has the patient kept scheduled appointments due by today? N/A   Comments Has appt today   Psychosocial issues? No   Did the patient receive a copy of their discharge instructions? Yes   Nursing interventions Reviewed instructions with patient   What is the patient's perception of their health status since discharge? Improving   Is the patient/caregiver able to teach back signs and symptoms related to disease process for when to call PCP? Yes   Is the patient/caregiver able to teach back signs and symptoms related to disease process for when to call 911? Yes   Is the patient/caregiver able to teach back the hierarchy of who to call/visit for symptoms/problems? PCP, Specialist, Home health nurse, Urgent Care, ED, 911 Yes   Additional teach back comments  says she is doing better.   Week 1 call completed? Yes   Graduated Yes   Graduated/Revoked comments Denies questions or needs at this time.   Call end time 1047            Tonya CONTI - Licensed Nurse

## 2024-11-04 ENCOUNTER — OFFICE VISIT (OUTPATIENT)
Dept: ORTHOPEDIC SURGERY | Facility: CLINIC | Age: 54
End: 2024-11-04
Payer: MEDICAID

## 2024-11-04 VITALS — WEIGHT: 147.2 LBS | BODY MASS INDEX: 24.53 KG/M2 | HEIGHT: 65 IN | TEMPERATURE: 98.2 F

## 2024-11-04 DIAGNOSIS — M19.019 ARTHRITIS OF SHOULDER: Primary | ICD-10-CM

## 2024-11-04 PROCEDURE — 99214 OFFICE O/P EST MOD 30 MIN: CPT | Performed by: ORTHOPAEDIC SURGERY

## 2024-11-04 PROCEDURE — 1159F MED LIST DOCD IN RCRD: CPT | Performed by: ORTHOPAEDIC SURGERY

## 2024-11-04 PROCEDURE — 1160F RVW MEDS BY RX/DR IN RCRD: CPT | Performed by: ORTHOPAEDIC SURGERY

## 2024-11-04 NOTE — PROGRESS NOTES
"  Patient: Bekah Gibson    YOB: 1970    Medical Record Number: [id number]    Chief Complaints: Follow-up regarding right shoulder pain     History of Present Illness:     54 y.o. female patient who presents for follow-up of the right shoulder. She reports progressively worsening pain and dysfunction.  The last injection did not help nearly as much. She reports difficulty performing daily activities.  She comes in today wanting to discuss treatment options.  She reports that her left shoulder replacement is doing great.    Allergies:   Allergies   Allergen Reactions    Benzonatate Nausea Only and Other (See Comments)     Rash     Ketorolac Tromethamine Other (See Comments)     \"I cannot take because of liver problems\"    Phenergan [Promethazine Hcl] Hives    Cucumber Extract Rash    Promethazine-Phenylephrine Other (See Comments)     \"FEEL WEIRD\"       Home Medications:    Current Outpatient Medications:     amLODIPine (NORVASC) 2.5 MG tablet, Take 1 tablet by mouth Daily., Disp: , Rfl:     Apoaequorin (PREVAGEN PO), Take 1 dose by mouth Daily., Disp: , Rfl:     ferrous sulfate 325 (65 FE) MG tablet, Take 1 tablet by mouth Daily With Breakfast., Disp: , Rfl:     FLUoxetine (PROzac) 20 MG tablet, Take 3 tablets by mouth Daily., Disp: , Rfl:     folic acid (FOLVITE) 1 MG tablet, Take 1 tablet by mouth Daily., Disp: , Rfl:     Ibuprofen 3 %, Gabapentin 10 %, Baclofen 2 %, lidocaine 4 %, Apply 1-2 g topically to the appropriate area as directed 3 (Three) to 4 (Four) times daily. (Patient taking differently: Apply 1-2 g topically to the appropriate area as directed 3 (Three) Times a Day As Needed.), Disp: 90 g, Rfl: 5    lactulose (CHRONULAC) 10 GM/15ML solution, Take 15 mL by mouth Every Morning., Disp: , Rfl:     levothyroxine (SYNTHROID, LEVOTHROID) 100 MCG tablet, TAKE ONE TABLET BY MOUTH DAILY (Patient taking differently: Take 1 tablet by mouth Every Morning.), Disp: 30 tablet, Rfl: 3    " MAGnesium-Oxide 400 (240 Mg) MG tablet, Take 1 tablet by mouth Daily., Disp: , Rfl:     mirtazapine (REMERON) 15 MG tablet, Take 1 tablet by mouth Every Night., Disp: , Rfl:     Multiple Vitamin (MULTIVITAMIN) capsule, Take 1 capsule by mouth Daily., Disp: , Rfl:     nadolol (CORGARD) 40 MG tablet, Take 1 tablet by mouth Daily., Disp: , Rfl:     ondansetron ODT (ZOFRAN-ODT) 4 MG disintegrating tablet, Place 1 tablet on the tongue Every 8 (Eight) Hours As Needed for Nausea or Vomiting., Disp: 10 tablet, Rfl: 0    pancrelipase, Lip-Prot-Amyl, (CREON) 58066-60968 units capsule delayed-release particles capsule, Take 2 capsules by mouth 3 (Three) Times a Day With Meals., Disp: 180 capsule, Rfl: 0    pantoprazole (PROTONIX) 40 MG EC tablet, Take 1 tablet by mouth Daily., Disp: , Rfl:     potassium chloride 10 MEQ CR tablet, Take 1 tablet by mouth 2 (Two) Times a Day., Disp: , Rfl:     risperiDONE (risperDAL) 0.5 MG tablet, Take 2 tablets by mouth Every Night., Disp: , Rfl:     thiamine (VITAMIN B1) 100 MG tablet, Take 1 tablet by mouth Daily., Disp: 30 tablet, Rfl: 0    lamoTRIgine (LaMICtal) 25 MG tablet, Take 1 tablet by mouth 2 (Two) Times a Day. (Patient not taking: Reported on 11/4/2024), Disp: , Rfl:     oxyCODONE (ROXICODONE) 5 MG immediate release tablet, Take 1 tablet by mouth Every 6 (Six) Hours As Needed for Severe Pain. (Patient not taking: Reported on 11/4/2024), Disp: 12 tablet, Rfl: 0  No current facility-administered medications for this visit.    Facility-Administered Medications Ordered in Other Visits:     sodium chloride 0.9 % flush 10 mL, 10 mL, Intravenous, Q12H, Callie Quiroz MD    sodium chloride 0.9 % flush 10 mL, 10 mL, Intravenous, PRN, Callie Quiroz MD    sodium chloride 0.9 % flush 20 mL, 20 mL, Intravenous, PRN, Callie Quiroz MD    Past Medical History:   Diagnosis Date    Acromioclavicular separation 1/2021    Ankle sprain 2/2004    no  operation necessary  At Time unsure    Anxiety     Arthritis     Arthritis of back Unsure    Diseased    Cirrhosis     Disease of thyroid gland     ETOH abuse     SOBER FOR 3 MONTHS    Fracture, clavicle 1/2021    shattered clavicel on issue slip and fall    Fracture, foot Unsure    Frozen shoulder 1 /2009    4    GERD (gastroesophageal reflux disease)     History of kidney stones     5 YR AGO    Hypertension     Left shoulder pain     Low back strain 1/21    Lumbosacral disc disease 1/21    MDD (major depressive disorder)     Neck strain Unsure    Pancreatitis     Periarthritis of shoulder see above    Rotator cuff syndrome odre for shoulder replacement    unable to receive due to low platelets    Tennis elbow Unsure    Unsurpassed    Thrombocytopenia        Past Surgical History:   Procedure Laterality Date    CLAVICLE SURGERY Right 2018    ENDOSCOPY N/A 10/26/2022    Procedure: ESOPHAGOGASTRODUODENOSCOPY wtih banding;  Surgeon: Ag Patel MD;  Location: [facility] ENDOSCOPY;  Service: Gastroenterology;  Laterality: N/A;  pre: melena  post: esophageal varicies with banding x2 bands    ENDOSCOPY N/A 01/18/2023    Procedure: ESOPHAGOGASTRODUODENOSCOPY;  Surgeon: Ag Patel MD;  Location: [facility] ENDOSCOPY;  Service: Gastroenterology;  Laterality: N/A;  PRE OP - MAROON STOOLS  POST OP - SM ESOPHAGEAL VARICES    ESOPHAGEAL VARICES LIGATION      FOOT SURGERY  2/2/14    JOINT REPLACEMENT Bilateral     GREAT TOE    KIDNEY STONE SURGERY      LITHOTRIPSY AND STENT (R SIDE)    LAPAROSCOPIC TUBAL LIGATION  2005    NECK SURGERY  3/07    Not sure of dates    SIGMOIDOSCOPY N/A 01/18/2023    Procedure: SIGMOIDOSCOPY FLEXIBLE;  Surgeon: Ag Patel MD;  Location: [facility] ENDOSCOPY;  Service: Gastroenterology;  Laterality: N/A;  PRE OP - MAROON STOOLS  POST OP - RECTAL VARICES    TOTAL SHOULDER ARTHROPLASTY Left 05/09/2023    Procedure: reverse total shoulder arthroplasty;  Surgeon: Giovanni Denise MD;  Location: [facility] OR  [facility];  Service: Orthopedics;  Laterality: Left;    TRIGGER POINT INJECTION         Social History     Occupational History    Not on file   Tobacco Use    Smoking status: Some Days     Current packs/day: 0.25     Average packs/day: 0.3 packs/day for 15.0 years (3.8 ttl pk-yrs)     Types: Cigarettes     Passive exposure: Current    Smokeless tobacco: Never    Tobacco comments:     Rarely smoke sometimes will to   Vaping Use    Vaping status: Never Used   Substance and Sexual Activity    Alcohol use: Not Currently     Alcohol/week: 5.0 - 10.0 standard drinks of alcohol     Comment: 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    Drug use: Never    Sexual activity: Yes     Partners: Male     Birth control/protection: Post-menopausal     Comment: cinthia- menepausal      Social History     Social History Narrative    Not on file       Family History   Problem Relation Age of Onset    Rheumatologic disease Mother         congestive heart diseased    Osteoporosis Mother             Thyroid disease Father     Leukemia Father     Cancer Father         Leukemia  deseased     Broken bones Father     Clotting disorder Father     Drug abuse Brother     Malig Hyperthermia Neg Hx        Review of Systems:      Constitutional: Denies fever, shaking or chills   Eyes: Denies change in visual acuity   HEENT: Denies nasal congestion or sore throat   Respiratory: Denies cough or shortness of breath   Cardiovascular: Denies chest pain or edema  Endocrine: Denies tremors, palpitations, intolerance of heat or cold, polyuria, polydipsia.  GI: Denies abdominal pain, nausea, vomiting, bloody stools or diarrhea  : Denies frequency, urgency, incontinence, retention, or nocturia.  Musculoskeletal: Denies numbness, tingling or loss of motor function except as above  Integument: Denies rash, lesion or ulceration   Neurologic: Denies headache or focal weakness, deficits  Heme: Denies spontaneous or excessive bleeding, epistaxis, hematuria, melena,  "fatigue, enlarged or tender lymph nodes.      All other pertinent positives and negatives as noted above in HPI.    Physical Exam:   54 y.o. female  Vitals:    11/04/24 1001   Temp: 98.2 °F (36.8 °C)   TempSrc: Temporal   Weight: 66.8 kg (147 lb 3.2 oz)   Height: 165.1 cm (65\")     General:  Patient is awake and alert.  Appears in no acute distress or discomfort.    Psych:  Affect and demeanor are appropriate.    Cardiovascular:  Regular rate and rhythm.    Lungs:  Good chest expansion.  Breathing unlabored.    Extremities:  Right shoulder is examined.  Skin is benign.  Moderate anterior tenderness.  Active motion is limited and uncomfortable-- 110 FE, 25 ER, IR just barely to her back pocket.  Intact deltoid function and axillary nerve sensation.  Normal motor and sensory function in the lower arm and hand.  Palpable radial pulse.  Brisk capillary refill.    Imaging: Previous x-rays of the right shoulder are reviewed and show endstage glenohumeral osteoarthritis with severe glenoid wear.    Assessment/Plan: Right shoulder end-stage osteoarthritis    We discussed the natural history of this condition and all available treatment options, both surgical and nonsurgical.  All questions posed by the patient were answered in detail. She wants to take some time to think about the options.  She will let us know how she wants to proceed.    Giovanni Denise MD  11/04/2024    "

## 2024-11-06 ENCOUNTER — TELEPHONE (OUTPATIENT)
Dept: ORTHOPEDIC SURGERY | Facility: CLINIC | Age: 54
End: 2024-11-06
Payer: MEDICAID

## 2024-11-17 ENCOUNTER — APPOINTMENT (OUTPATIENT)
Dept: CT IMAGING | Facility: HOSPITAL | Age: 54
End: 2024-11-17
Payer: MEDICAID

## 2024-11-17 ENCOUNTER — HOSPITAL ENCOUNTER (INPATIENT)
Facility: HOSPITAL | Age: 54
LOS: 4 days | Discharge: HOME | End: 2024-11-21
Attending: EMERGENCY MEDICINE | Admitting: INTERNAL MEDICINE
Payer: MEDICAID

## 2024-11-17 DIAGNOSIS — R93.5 ABNORMAL CT OF THE ABDOMEN: ICD-10-CM

## 2024-11-17 DIAGNOSIS — R11.2 NAUSEA AND VOMITING, UNSPECIFIED VOMITING TYPE: Primary | ICD-10-CM

## 2024-11-17 DIAGNOSIS — N39.0 UTI (URINARY TRACT INFECTION) WITH PYURIA: ICD-10-CM

## 2024-11-17 DIAGNOSIS — F10.139 ALCOHOL ABUSE WITH WITHDRAWAL: ICD-10-CM

## 2024-11-17 DIAGNOSIS — E83.42 HYPOMAGNESEMIA: ICD-10-CM

## 2024-11-17 DIAGNOSIS — E87.29 ALCOHOLIC KETOACIDOSIS: ICD-10-CM

## 2024-11-17 LAB
ALBUMIN SERPL-MCNC: 4.2 G/DL (ref 3.5–5.2)
ALBUMIN/GLOB SERPL: 1.3 G/DL
ALP SERPL-CCNC: 162 U/L (ref 39–117)
ALT SERPL W P-5'-P-CCNC: 73 U/L (ref 1–33)
AMMONIA BLD-SCNC: 29 UMOL/L (ref 11–51)
AMPHET+METHAMPHET UR QL: NEGATIVE
ANION GAP SERPL CALCULATED.3IONS-SCNC: 17.9 MMOL/L (ref 5–15)
AST SERPL-CCNC: 194 U/L (ref 1–32)
BACTERIA UR QL AUTO: ABNORMAL /HPF
BARBITURATES UR QL SCN: NEGATIVE
BASOPHILS # BLD AUTO: 0.04 10*3/MM3 (ref 0–0.2)
BASOPHILS NFR BLD AUTO: 0.9 % (ref 0–1.5)
BENZODIAZ UR QL SCN: NEGATIVE
BILIRUB SERPL-MCNC: 2.1 MG/DL (ref 0–1.2)
BILIRUB UR QL STRIP: NEGATIVE
BUN SERPL-MCNC: 9 MG/DL (ref 6–20)
BUN/CREAT SERPL: 12.5 (ref 7–25)
CALCIUM SPEC-SCNC: 9.5 MG/DL (ref 8.6–10.5)
CANNABINOIDS SERPL QL: NEGATIVE
CHLORIDE SERPL-SCNC: 103 MMOL/L (ref 98–107)
CLARITY UR: ABNORMAL
CO2 SERPL-SCNC: 19.1 MMOL/L (ref 22–29)
COCAINE UR QL: NEGATIVE
COD CRY URNS QL: ABNORMAL /HPF
COLOR UR: ABNORMAL
CREAT SERPL-MCNC: 0.72 MG/DL (ref 0.57–1)
DEPRECATED RDW RBC AUTO: 45.8 FL (ref 37–54)
EGFRCR SERPLBLD CKD-EPI 2021: 98.3 ML/MIN/1.73
EOSINOPHIL # BLD AUTO: 0.12 10*3/MM3 (ref 0–0.4)
EOSINOPHIL NFR BLD AUTO: 2.6 % (ref 0.3–6.2)
ERYTHROCYTE [DISTWIDTH] IN BLOOD BY AUTOMATED COUNT: 13.2 % (ref 12.3–15.4)
ETHANOL BLD-MCNC: 71 MG/DL (ref 0–10)
ETHANOL UR QL: 0.07 %
FENTANYL UR-MCNC: NEGATIVE NG/ML
GLOBULIN UR ELPH-MCNC: 3.3 GM/DL
GLUCOSE SERPL-MCNC: 117 MG/DL (ref 65–99)
GLUCOSE UR STRIP-MCNC: NEGATIVE MG/DL
HCT VFR BLD AUTO: 47.8 % (ref 34–46.6)
HGB BLD-MCNC: 16.3 G/DL (ref 12–15.9)
HGB UR QL STRIP.AUTO: ABNORMAL
HYALINE CASTS UR QL AUTO: ABNORMAL /LPF
IMM GRANULOCYTES # BLD AUTO: 0.01 10*3/MM3 (ref 0–0.05)
IMM GRANULOCYTES NFR BLD AUTO: 0.2 % (ref 0–0.5)
INR PPP: 1.36 (ref 0.9–1.1)
KETONES UR QL STRIP: ABNORMAL
LEUKOCYTE ESTERASE UR QL STRIP.AUTO: ABNORMAL
LIPASE SERPL-CCNC: 23 U/L (ref 13–60)
LYMPHOCYTES # BLD AUTO: 1.05 10*3/MM3 (ref 0.7–3.1)
LYMPHOCYTES NFR BLD AUTO: 23.1 % (ref 19.6–45.3)
MAGNESIUM SERPL-MCNC: 1.4 MG/DL (ref 1.6–2.6)
MCH RBC QN AUTO: 31.9 PG (ref 26.6–33)
MCHC RBC AUTO-ENTMCNC: 34.1 G/DL (ref 31.5–35.7)
MCV RBC AUTO: 93.5 FL (ref 79–97)
METHADONE UR QL SCN: NEGATIVE
MONOCYTES # BLD AUTO: 0.4 10*3/MM3 (ref 0.1–0.9)
MONOCYTES NFR BLD AUTO: 8.8 % (ref 5–12)
NEUTROPHILS NFR BLD AUTO: 2.93 10*3/MM3 (ref 1.7–7)
NEUTROPHILS NFR BLD AUTO: 64.4 % (ref 42.7–76)
NITRITE UR QL STRIP: POSITIVE
OPIATES UR QL: POSITIVE
OXYCODONE UR QL SCN: POSITIVE
PH UR STRIP.AUTO: 6 [PH] (ref 5–8)
PHOSPHATE SERPL-MCNC: 2.7 MG/DL (ref 2.5–4.5)
PLATELET # BLD AUTO: 56 10*3/MM3 (ref 140–450)
PMV BLD AUTO: 12.2 FL (ref 6–12)
POTASSIUM SERPL-SCNC: 3.6 MMOL/L (ref 3.5–5.2)
PROT SERPL-MCNC: 7.5 G/DL (ref 6–8.5)
PROT UR QL STRIP: ABNORMAL
PROTHROMBIN TIME: 17 SECONDS (ref 11.7–14.2)
RBC # BLD AUTO: 5.11 10*6/MM3 (ref 3.77–5.28)
RBC # UR STRIP: ABNORMAL /HPF
REF LAB TEST METHOD: ABNORMAL
SODIUM SERPL-SCNC: 140 MMOL/L (ref 136–145)
SP GR UR STRIP: 1.02 (ref 1–1.03)
SQUAMOUS #/AREA URNS HPF: ABNORMAL /HPF
UROBILINOGEN UR QL STRIP: ABNORMAL
WBC # UR STRIP: ABNORMAL /HPF
WBC NRBC COR # BLD AUTO: 4.55 10*3/MM3 (ref 3.4–10.8)

## 2024-11-17 PROCEDURE — 25010000002 THIAMINE HCL 200 MG/2ML SOLUTION: Performed by: INTERNAL MEDICINE

## 2024-11-17 PROCEDURE — 80307 DRUG TEST PRSMV CHEM ANLYZR: CPT | Performed by: EMERGENCY MEDICINE

## 2024-11-17 PROCEDURE — 25010000002 ONDANSETRON PER 1 MG: Performed by: INTERNAL MEDICINE

## 2024-11-17 PROCEDURE — 87040 BLOOD CULTURE FOR BACTERIA: CPT | Performed by: INTERNAL MEDICINE

## 2024-11-17 PROCEDURE — 25510000001 IOPAMIDOL 61 % SOLUTION: Performed by: EMERGENCY MEDICINE

## 2024-11-17 PROCEDURE — 85025 COMPLETE CBC W/AUTO DIFF WBC: CPT | Performed by: EMERGENCY MEDICINE

## 2024-11-17 PROCEDURE — 83690 ASSAY OF LIPASE: CPT | Performed by: EMERGENCY MEDICINE

## 2024-11-17 PROCEDURE — 25010000002 MAGNESIUM SULFATE 2 GM/50ML SOLUTION: Performed by: EMERGENCY MEDICINE

## 2024-11-17 PROCEDURE — 82140 ASSAY OF AMMONIA: CPT | Performed by: EMERGENCY MEDICINE

## 2024-11-17 PROCEDURE — 74177 CT ABD & PELVIS W/CONTRAST: CPT

## 2024-11-17 PROCEDURE — 99285 EMERGENCY DEPT VISIT HI MDM: CPT

## 2024-11-17 PROCEDURE — 25810000003 SODIUM CHLORIDE 0.9 % SOLUTION: Performed by: EMERGENCY MEDICINE

## 2024-11-17 PROCEDURE — 81001 URINALYSIS AUTO W/SCOPE: CPT | Performed by: EMERGENCY MEDICINE

## 2024-11-17 PROCEDURE — 25010000002 MORPHINE PER 10 MG: Performed by: INTERNAL MEDICINE

## 2024-11-17 PROCEDURE — 25010000002 MORPHINE PER 10 MG: Performed by: EMERGENCY MEDICINE

## 2024-11-17 PROCEDURE — 36415 COLL VENOUS BLD VENIPUNCTURE: CPT | Performed by: INTERNAL MEDICINE

## 2024-11-17 PROCEDURE — 87086 URINE CULTURE/COLONY COUNT: CPT | Performed by: EMERGENCY MEDICINE

## 2024-11-17 PROCEDURE — 87186 SC STD MICRODIL/AGAR DIL: CPT | Performed by: EMERGENCY MEDICINE

## 2024-11-17 PROCEDURE — 84100 ASSAY OF PHOSPHORUS: CPT | Performed by: EMERGENCY MEDICINE

## 2024-11-17 PROCEDURE — 90791 PSYCH DIAGNOSTIC EVALUATION: CPT

## 2024-11-17 PROCEDURE — 85610 PROTHROMBIN TIME: CPT | Performed by: EMERGENCY MEDICINE

## 2024-11-17 PROCEDURE — 80053 COMPREHEN METABOLIC PANEL: CPT | Performed by: EMERGENCY MEDICINE

## 2024-11-17 PROCEDURE — 25010000002 ONDANSETRON PER 1 MG: Performed by: EMERGENCY MEDICINE

## 2024-11-17 PROCEDURE — 25010000002 CEFTRIAXONE PER 250 MG: Performed by: EMERGENCY MEDICINE

## 2024-11-17 PROCEDURE — 82077 ASSAY SPEC XCP UR&BREATH IA: CPT | Performed by: EMERGENCY MEDICINE

## 2024-11-17 PROCEDURE — 25010000002 LORAZEPAM PER 2 MG: Performed by: EMERGENCY MEDICINE

## 2024-11-17 PROCEDURE — 87077 CULTURE AEROBIC IDENTIFY: CPT | Performed by: EMERGENCY MEDICINE

## 2024-11-17 PROCEDURE — 83735 ASSAY OF MAGNESIUM: CPT | Performed by: EMERGENCY MEDICINE

## 2024-11-17 RX ORDER — IOPAMIDOL 612 MG/ML
100 INJECTION, SOLUTION INTRAVASCULAR
Status: COMPLETED | OUTPATIENT
Start: 2024-11-17 | End: 2024-11-17

## 2024-11-17 RX ORDER — LORAZEPAM 2 MG/ML
1 INJECTION INTRAMUSCULAR ONCE
Status: COMPLETED | OUTPATIENT
Start: 2024-11-17 | End: 2024-11-17

## 2024-11-17 RX ORDER — AMLODIPINE BESYLATE 2.5 MG/1
2.5 TABLET ORAL DAILY
Status: DISCONTINUED | OUTPATIENT
Start: 2024-11-18 | End: 2024-11-21 | Stop reason: HOSPADM

## 2024-11-17 RX ORDER — THIAMINE HYDROCHLORIDE 100 MG/ML
200 INJECTION, SOLUTION INTRAMUSCULAR; INTRAVENOUS EVERY 8 HOURS SCHEDULED
Status: DISCONTINUED | OUTPATIENT
Start: 2024-11-17 | End: 2024-11-21 | Stop reason: HOSPADM

## 2024-11-17 RX ORDER — BISACODYL 10 MG
10 SUPPOSITORY, RECTAL RECTAL DAILY PRN
Status: DISCONTINUED | OUTPATIENT
Start: 2024-11-17 | End: 2024-11-21 | Stop reason: HOSPADM

## 2024-11-17 RX ORDER — DIPHENOXYLATE HYDROCHLORIDE AND ATROPINE SULFATE 2.5; .025 MG/1; MG/1
1 TABLET ORAL DAILY
Status: DISCONTINUED | OUTPATIENT
Start: 2024-11-17 | End: 2024-11-21 | Stop reason: HOSPADM

## 2024-11-17 RX ORDER — PANTOPRAZOLE SODIUM 40 MG/10ML
80 INJECTION, POWDER, LYOPHILIZED, FOR SOLUTION INTRAVENOUS ONCE
Status: COMPLETED | OUTPATIENT
Start: 2024-11-17 | End: 2024-11-17

## 2024-11-17 RX ORDER — LORAZEPAM 2 MG/ML
1 INJECTION INTRAMUSCULAR
Status: DISCONTINUED | OUTPATIENT
Start: 2024-11-17 | End: 2024-11-21 | Stop reason: HOSPADM

## 2024-11-17 RX ORDER — LORAZEPAM 1 MG/1
1 TABLET ORAL DAILY
Status: COMPLETED | OUTPATIENT
Start: 2024-11-20 | End: 2024-11-20

## 2024-11-17 RX ORDER — MULTIPLE VITAMINS W/ MINERALS TAB 9MG-400MCG
1 TAB ORAL DAILY
Status: DISCONTINUED | OUTPATIENT
Start: 2024-11-17 | End: 2024-11-21 | Stop reason: HOSPADM

## 2024-11-17 RX ORDER — MIRTAZAPINE 15 MG/1
15 TABLET, FILM COATED ORAL NIGHTLY
Status: DISCONTINUED | OUTPATIENT
Start: 2024-11-17 | End: 2024-11-21 | Stop reason: HOSPADM

## 2024-11-17 RX ORDER — ONDANSETRON 2 MG/ML
4 INJECTION INTRAMUSCULAR; INTRAVENOUS EVERY 6 HOURS PRN
Status: DISCONTINUED | OUTPATIENT
Start: 2024-11-17 | End: 2024-11-21 | Stop reason: HOSPADM

## 2024-11-17 RX ORDER — LORAZEPAM 1 MG/1
2 TABLET ORAL
Status: DISCONTINUED | OUTPATIENT
Start: 2024-11-17 | End: 2024-11-21 | Stop reason: HOSPADM

## 2024-11-17 RX ORDER — ONDANSETRON 2 MG/ML
4 INJECTION INTRAMUSCULAR; INTRAVENOUS ONCE
Status: COMPLETED | OUTPATIENT
Start: 2024-11-17 | End: 2024-11-17

## 2024-11-17 RX ORDER — RISPERIDONE 1 MG/1
1 TABLET ORAL NIGHTLY
Status: DISCONTINUED | OUTPATIENT
Start: 2024-11-17 | End: 2024-11-21 | Stop reason: HOSPADM

## 2024-11-17 RX ORDER — FOLIC ACID 1 MG/1
1 TABLET ORAL DAILY
Status: DISCONTINUED | OUTPATIENT
Start: 2024-11-17 | End: 2024-11-21 | Stop reason: HOSPADM

## 2024-11-17 RX ORDER — LACTULOSE 10 G/15ML
10 SOLUTION ORAL EVERY MORNING
Status: DISCONTINUED | OUTPATIENT
Start: 2024-11-18 | End: 2024-11-21 | Stop reason: HOSPADM

## 2024-11-17 RX ORDER — ACETAMINOPHEN 650 MG/1
325 SUPPOSITORY RECTAL EVERY 4 HOURS PRN
Status: DISCONTINUED | OUTPATIENT
Start: 2024-11-17 | End: 2024-11-21 | Stop reason: HOSPADM

## 2024-11-17 RX ORDER — CLONIDINE HYDROCHLORIDE 0.1 MG/1
0.1 TABLET ORAL EVERY 4 HOURS PRN
Status: DISCONTINUED | OUTPATIENT
Start: 2024-11-17 | End: 2024-11-21 | Stop reason: HOSPADM

## 2024-11-17 RX ORDER — LEVOTHYROXINE SODIUM 88 UG/1
88 TABLET ORAL DAILY
Status: DISCONTINUED | OUTPATIENT
Start: 2024-11-18 | End: 2024-11-21 | Stop reason: HOSPADM

## 2024-11-17 RX ORDER — MORPHINE SULFATE 2 MG/ML
2 INJECTION, SOLUTION INTRAMUSCULAR; INTRAVENOUS ONCE
Status: COMPLETED | OUTPATIENT
Start: 2024-11-17 | End: 2024-11-17

## 2024-11-17 RX ORDER — ACETAMINOPHEN 325 MG/1
650 TABLET ORAL EVERY 4 HOURS PRN
Status: DISCONTINUED | OUTPATIENT
Start: 2024-11-17 | End: 2024-11-21 | Stop reason: HOSPADM

## 2024-11-17 RX ORDER — LORAZEPAM 1 MG/1
1 TABLET ORAL EVERY 6 HOURS
Status: DISPENSED | OUTPATIENT
Start: 2024-11-18 | End: 2024-11-19

## 2024-11-17 RX ORDER — ACETAMINOPHEN 160 MG/5ML
650 SOLUTION ORAL EVERY 4 HOURS PRN
Status: DISCONTINUED | OUTPATIENT
Start: 2024-11-17 | End: 2024-11-21 | Stop reason: HOSPADM

## 2024-11-17 RX ORDER — LORAZEPAM 1 MG/1
2 TABLET ORAL EVERY 6 HOURS
Status: DISPENSED | OUTPATIENT
Start: 2024-11-17 | End: 2024-11-18

## 2024-11-17 RX ORDER — SODIUM CHLORIDE 0.9 % (FLUSH) 0.9 %
10 SYRINGE (ML) INJECTION AS NEEDED
Status: DISCONTINUED | OUTPATIENT
Start: 2024-11-17 | End: 2024-11-21 | Stop reason: HOSPADM

## 2024-11-17 RX ORDER — LORAZEPAM 2 MG/ML
2 INJECTION INTRAMUSCULAR
Status: DISCONTINUED | OUTPATIENT
Start: 2024-11-17 | End: 2024-11-21 | Stop reason: HOSPADM

## 2024-11-17 RX ORDER — MORPHINE SULFATE 2 MG/ML
2 INJECTION, SOLUTION INTRAMUSCULAR; INTRAVENOUS EVERY 4 HOURS PRN
Status: DISCONTINUED | OUTPATIENT
Start: 2024-11-17 | End: 2024-11-20

## 2024-11-17 RX ORDER — LORAZEPAM 1 MG/1
1 TABLET ORAL
Status: DISCONTINUED | OUTPATIENT
Start: 2024-11-17 | End: 2024-11-21 | Stop reason: HOSPADM

## 2024-11-17 RX ORDER — POLYETHYLENE GLYCOL 3350 17 G/17G
17 POWDER, FOR SOLUTION ORAL DAILY PRN
Status: DISCONTINUED | OUTPATIENT
Start: 2024-11-17 | End: 2024-11-21 | Stop reason: HOSPADM

## 2024-11-17 RX ORDER — MAGNESIUM SULFATE HEPTAHYDRATE 40 MG/ML
2 INJECTION, SOLUTION INTRAVENOUS ONCE
Status: COMPLETED | OUTPATIENT
Start: 2024-11-17 | End: 2024-11-17

## 2024-11-17 RX ORDER — POTASSIUM CHLORIDE 750 MG/1
10 TABLET, FILM COATED, EXTENDED RELEASE ORAL DAILY
Status: DISCONTINUED | OUTPATIENT
Start: 2024-11-17 | End: 2024-11-21 | Stop reason: HOSPADM

## 2024-11-17 RX ORDER — BISACODYL 5 MG/1
5 TABLET, DELAYED RELEASE ORAL DAILY PRN
Status: DISCONTINUED | OUTPATIENT
Start: 2024-11-17 | End: 2024-11-21 | Stop reason: HOSPADM

## 2024-11-17 RX ORDER — ONDANSETRON 4 MG/1
4 TABLET, ORALLY DISINTEGRATING ORAL EVERY 6 HOURS PRN
Status: DISCONTINUED | OUTPATIENT
Start: 2024-11-17 | End: 2024-11-21 | Stop reason: HOSPADM

## 2024-11-17 RX ORDER — PANTOPRAZOLE SODIUM 40 MG/1
40 TABLET, DELAYED RELEASE ORAL DAILY
Status: DISCONTINUED | OUTPATIENT
Start: 2024-11-17 | End: 2024-11-21 | Stop reason: HOSPADM

## 2024-11-17 RX ORDER — SOLIFENACIN SUCCINATE 10 MG/1
10 TABLET, FILM COATED ORAL
COMMUNITY
Start: 2024-11-15

## 2024-11-17 RX ORDER — LORAZEPAM 1 MG/1
1 TABLET ORAL EVERY 12 HOURS SCHEDULED
Status: COMPLETED | OUTPATIENT
Start: 2024-11-19 | End: 2024-11-19

## 2024-11-17 RX ORDER — AMOXICILLIN 250 MG
2 CAPSULE ORAL 2 TIMES DAILY PRN
Status: DISCONTINUED | OUTPATIENT
Start: 2024-11-17 | End: 2024-11-21 | Stop reason: HOSPADM

## 2024-11-17 RX ORDER — LEVOTHYROXINE SODIUM 88 UG/1
88 TABLET ORAL DAILY
COMMUNITY

## 2024-11-17 RX ADMIN — Medication 1 TABLET: at 20:25

## 2024-11-17 RX ADMIN — CEFTRIAXONE SODIUM 1000 MG: 1 INJECTION, POWDER, FOR SOLUTION INTRAMUSCULAR; INTRAVENOUS at 10:54

## 2024-11-17 RX ADMIN — MAGNESIUM OXIDE TAB 400 MG (241.3 MG ELEMENTAL MG) 400 MG: 400 (241.3 MG) TAB at 20:25

## 2024-11-17 RX ADMIN — POTASSIUM CHLORIDE 10 MEQ: 750 TABLET, EXTENDED RELEASE ORAL at 20:24

## 2024-11-17 RX ADMIN — RISPERIDONE 1 MG: 1 TABLET, FILM COATED ORAL at 21:58

## 2024-11-17 RX ADMIN — PANTOPRAZOLE SODIUM 80 MG: 40 INJECTION, POWDER, FOR SOLUTION INTRAVENOUS at 09:50

## 2024-11-17 RX ADMIN — THIAMINE HYDROCHLORIDE 200 MG: 100 INJECTION, SOLUTION INTRAMUSCULAR; INTRAVENOUS at 15:19

## 2024-11-17 RX ADMIN — ONDANSETRON 4 MG: 2 INJECTION, SOLUTION INTRAMUSCULAR; INTRAVENOUS at 09:15

## 2024-11-17 RX ADMIN — FOLIC ACID 1 MG: 1 TABLET ORAL at 15:19

## 2024-11-17 RX ADMIN — LORAZEPAM 2 MG: 1 TABLET ORAL at 15:20

## 2024-11-17 RX ADMIN — MIRTAZAPINE 15 MG: 15 TABLET, FILM COATED ORAL at 20:25

## 2024-11-17 RX ADMIN — THIAMINE HYDROCHLORIDE 200 MG: 100 INJECTION, SOLUTION INTRAMUSCULAR; INTRAVENOUS at 21:58

## 2024-11-17 RX ADMIN — ONDANSETRON 4 MG: 2 INJECTION, SOLUTION INTRAMUSCULAR; INTRAVENOUS at 16:06

## 2024-11-17 RX ADMIN — LORAZEPAM 1 MG: 2 INJECTION INTRAMUSCULAR; INTRAVENOUS at 09:46

## 2024-11-17 RX ADMIN — MORPHINE SULFATE 2 MG: 2 INJECTION, SOLUTION INTRAMUSCULAR; INTRAVENOUS at 17:06

## 2024-11-17 RX ADMIN — SODIUM CHLORIDE 500 ML: 9 INJECTION, SOLUTION INTRAVENOUS at 09:16

## 2024-11-17 RX ADMIN — FLUOXETINE HYDROCHLORIDE 60 MG: 20 CAPSULE ORAL at 21:58

## 2024-11-17 RX ADMIN — MAGNESIUM SULFATE IN WATER 2 G: 40 INJECTION, SOLUTION INTRAVENOUS at 09:52

## 2024-11-17 RX ADMIN — MORPHINE SULFATE 2 MG: 2 INJECTION, SOLUTION INTRAMUSCULAR; INTRAVENOUS at 13:08

## 2024-11-17 RX ADMIN — MORPHINE SULFATE 2 MG: 2 INJECTION, SOLUTION INTRAMUSCULAR; INTRAVENOUS at 09:46

## 2024-11-17 RX ADMIN — PANTOPRAZOLE SODIUM 40 MG: 40 TABLET, DELAYED RELEASE ORAL at 20:24

## 2024-11-17 RX ADMIN — Medication 1 TABLET: at 15:20

## 2024-11-17 RX ADMIN — LORAZEPAM 2 MG: 1 TABLET ORAL at 20:25

## 2024-11-17 RX ADMIN — IOPAMIDOL 85 ML: 612 INJECTION, SOLUTION INTRAVENOUS at 11:14

## 2024-11-17 RX ADMIN — PANCRELIPASE 24000 UNITS OF LIPASE: 60000; 12000; 38000 CAPSULE, DELAYED RELEASE PELLETS ORAL at 20:24

## 2024-11-17 NOTE — CONSULTS
"REASON FOR ACCESS CONSULT:     ETOH    HPI: Pt called for EMS to bring her to the hospital due to nausea and vomiting. Pt has hx of alcohol abuse, cirrhosis and pancreatitis.  Her PAZ at time draw today was 0.072%, her UDS + for opiates and oxycodone.    Access consulted on this pt just this past 10/24/24 - see note.   Also seen 8/2024 for drugs, 7/2024 x 2, 2023, 2022, 2021 for alcohol - see those notes.    Pt was found resting on gurney. She is A&O at this time. Pt stated since her discharge from the hospital about three weeks ago she was able to stop drinking for a couple of those weeks but began drinking last Friday due to stress from an upcoming divorce and also hanging around friend who drink. Her last drink was yesterday \"2 little brandies\". She stated she currently feels withdrawal s/s of \"anxiety and shaky\". CIWA's have ranged from 2-3 thus far. She rated her current craving for a drink of ETOH 3/10 (10 being the worst).     She denied current SI/HI/AVH. She rated her current anxiety level 7/10, depression 5/10. She has no complaints regards her sleep and appetite. Current stressors include an impending divorce and having to sell her house due to same.     SUBSTANCE USE HX:  As above. She is still smoking THC \"occasionally.. maybe once a week.. couple hits\". She explained her positive drug screen today that she took leftover pain pills prescribed to her because of abdominal pain. Hx of cocaine use - last use July. Hx of several inpt and outpt [facility] treatment. Hx of AA activity - last meeting this past week and has been attended 3-4 x weekly. Has people in the program she can text. Longest sober is \"four months last year\". She is a 1/4 PPD cig smoker, but stated she isn't smoking right now.     MENTAL HEALTH HX: Hx of anxiety and depression in her lifetime, panic attacks and occasional SI. Hx of inpt psych hospitalizations - last time [facility] 1/2024. Pt currently following with psych AMBAR Cox, " "who is managing home prescriptions of Prozac 60 mg daily, Risperdal 1 mg Q HS (however, ran out this past Tuesday and hasn't filled), Remeron 15 mg Q HS PRN (for insomnia). She stated her psych NP aware of her substance use. Pt had just begun to see therapist, but quit because \"we didn't fit\". She is hoping that therapist will refer her to someone else in the practice. Denied past suicide attempts/self-harm.     SOCIAL HX: Pt has been  to Jeff for 9.5 yrs, but they are currently  and will be . She reports they are still friendly and supportive of each other. She has a boyfriend, Quincy. She stated she has been living off and on with friends. She is not afraid for her safety.  Pt has two adult children, ages 23 and 20, and reports good relationships with both. She earned a master's degree, used to work in special education, currently retired. She denied current legalities. Support system includes her brothers and their wives, children, and friends. She does acknowledge both her  and man friend both are substance users.      PLAN:   Discussed biological component of her disease, triggers which led her to drink, importance of pt developing a plan for managing cravings associated w/ triggers. Discussed alcoholism as a progressive disease as evidenced by her return so quickly to the hospital after drinking.  Discussed continued consequences to body/brain and life if she continues to drink. Referrals for [facility] treatment were given to the pt. Access will follow.  "

## 2024-11-17 NOTE — PROGRESS NOTES
Clinical Pharmacy Services: Medication History    Bekah Gibson is a 54 y.o. female presenting to [facility] for Alcohol withdrawal [F10.939]    She  has a past medical history of Acromioclavicular separation (1/2021), Ankle sprain (2/2004), Anxiety, Arthritis, Arthritis of back (Unsure), Cirrhosis, Disease of thyroid gland, ETOH abuse, Fracture, clavicle (1/2021), Fracture, foot (Unsure), Frozen shoulder (1 /2009), GERD (gastroesophageal reflux disease), History of kidney stones, Hypertension, Left shoulder pain, Low back strain (1/21), Lumbosacral disc disease (1/21), MDD (major depressive disorder), Neck strain (Unsure), Pancreatitis, Periarthritis of shoulder (see above), Rotator cuff syndrome (odre for shoulder replacement), Tennis elbow (Unsure), and Thrombocytopenia.    Allergies as of 11/17/2024 - Reviewed 11/17/2024   Allergen Reaction Noted    Benzonatate Nausea Only and Other (See Comments) 06/05/2017    Ketorolac tromethamine Other (See Comments) 10/15/2023    Phenergan [promethazine hcl] Hives 04/25/2016    Cucumber extract Rash 05/28/2015    Promethazine-phenylephrine Other (See Comments) 02/05/2013       Medication information was obtained from: Patient  Pharmacy and Phone Number:     Prior to Admission Medications       Prescriptions Last Dose Informant Patient Reported? Taking?    amLODIPine (NORVASC) 2.5 MG tablet 11/17/2024 Self, Pharmacy Yes Yes    Take 1 tablet by mouth Daily.    Apoaequorin (PREVAGEN PO) Not Taking Self Yes No    Take 1 dose by mouth Daily.    Patient not taking:  Reported on 11/17/2024    bismuth subsalicylate (PEPTO BISMOL) 262 MG/15ML suspension 11/17/2024 Self Yes Yes    Take 15 mL by mouth Every 6 (Six) Hours As Needed for Indigestion. Pt reports filling predosed cup about 3/4 full and drinks daily.    ferrous sulfate 325 (65 FE) MG tablet 11/16/2024 Self Yes Yes    Take 1 tablet by mouth Daily With Breakfast. Taking OTC    FLUoxetine (PROzac) 20 MG tablet 11/16/2024  Self Yes Yes    Take 3 tablets by mouth Daily.    folic acid (FOLVITE) 1 MG tablet 11/16/2024 Self Yes Yes    Take 1 tablet by mouth Daily.    Ibuprofen 3 %, Gabapentin 10 %, Baclofen 2 %, lidocaine 4 % Past Week Self No Yes    Apply 1-2 g topically to the appropriate area as directed 3 (Three) to 4 (Four) times daily.    Patient taking differently:  Apply 1-2 g topically to the appropriate area as directed 3 (Three) Times a Day As Needed. Applies to shoulder prn    lactulose (CHRONULAC) 10 GM/15ML solution 11/16/2024 Self Yes Yes    Take 15 mL by mouth Every Morning.    lamoTRIgine (LaMICtal) 25 MG tablet  Pharmacy Yes No    Take 1 tablet by mouth 2 (Two) Times a Day.    Patient not taking:  Reported on 11/4/2024    levothyroxine (SYNTHROID, LEVOTHROID) 100 MCG tablet Not Taking Self No No    TAKE ONE TABLET BY MOUTH DAILY    Patient not taking:  Reported on 11/17/2024    levothyroxine (SYNTHROID, LEVOTHROID) 88 MCG tablet 11/17/2024 Self Yes Yes    Take 1 tablet by mouth Daily.    MAGnesium-Oxide 400 (240 Mg) MG tablet 11/16/2024 Self Yes Yes    Take 1 tablet by mouth Daily. Takes OTC    mirtazapine (REMERON) 15 MG tablet 11/16/2024 Self Yes Yes    Take 1 tablet by mouth Every Night. Takes PRN    Multiple Vitamin (MULTIVITAMIN) capsule 11/16/2024 Self Yes Yes    Take 1 capsule by mouth Daily.    nadolol (CORGARD) 40 MG tablet Not Taking Pharmacy Yes No    Take 1 tablet by mouth Daily.    Patient not taking:  Reported on 11/17/2024    ondansetron ODT (ZOFRAN-ODT) 4 MG disintegrating tablet  Self No Yes    Place 1 tablet on the tongue Every 8 (Eight) Hours As Needed for Nausea or Vomiting.    Patient taking differently:  Place 1 tablet on the tongue Every 8 (Eight) Hours As Needed for Nausea or Vomiting. Currently out of prescription, ran out about a week ago    oxyCODONE (ROXICODONE) 5 MG immediate release tablet 11/16/2024 Self No Yes    Take 1 tablet by mouth Every 6 (Six) Hours As Needed for Severe Pain.     Patient taking differently:  Take 1 tablet by mouth Every 6 (Six) Hours As Needed for Severe Pain. Took 0.5 tablets last night    pancrelipase, Lip-Prot-Amyl, (CREON) 04615-35761 units capsule delayed-release particles capsule 11/16/2024 Self No Yes    Take 2 capsules by mouth 3 (Three) Times a Day With Meals.    pantoprazole (PROTONIX) 40 MG EC tablet Past Week Self Yes Yes    Take 1 tablet by mouth Daily. Waiting for refill    potassium chloride 10 MEQ CR tablet 11/16/2024 Self Yes Yes    Take 1 tablet by mouth. Taking OTC once daily    risperiDONE (risperDAL) 0.5 MG tablet Past Week Self Yes Yes    Take 2 tablets by mouth Every Night. Pt ran out and last dose was Thursday night    solifenacin (VESICARE) 10 MG tablet 11/16/2024 Self Yes Yes    Take 1 tablet by mouth. Takes 1-2 times per week, does not like SE (dry mouth)    thiamine (VITAMIN B1) 100 MG tablet 11/16/2024 Self No Yes    Take 1 tablet by mouth Daily.              Medication notes: Patient reports she has run out of several medications and is waiting to her back from prescriber and pharmacy. She has not been taking the ferrous gluconate and has been getting OTC iron.     She also reports taking mirtazapine prn to help her sleep.     She last took ondansetron about a week ago and has run out of her prescription, is waiting for a refill.     She did take 0.5 tablets of oxycodone last night due to her abdominal pain and has been doing this for the past 3 days.     She is also waiting on refills of her Protonix and last dose was Thursday.     She also ran out of the potassium chloride but states she has been taking OTC potassium chloride.     She lastly reports running out of risperidone on Thursday and states this helps her bipolar. She states her pharmacy ran out and she was instructed to call Monday.     She takes pepto bismol daily and drinks about 3/4 of the predosed cup that comes with the bottle each day.     She only takes the solifenacin 1-2  times per week and states she does not like the SE she experiences including dry mouth.     This medication list is complete to the best of my knowledge as of 11/17/2024    Please call if questions.    Junior Saxena  Pharmacy Intern  Phone [phone]  11/17/2024 14:19 EST

## 2024-11-17 NOTE — H&P
HISTORY AND PHYSICAL   [facility]        Date of Admission: 2024  Patient Identification:  Name: Bekah Gibson  Age: 54 y.o.  Sex: female  :  1970  MRN: [id number]                     Primary Care Physician: Tylor Davis    Chief Complaint:  54 year old female presented to the emergency room with abdominal pain, nausea and vomiting which started this morning; she denies fever or chills; no sick contacts; she has a history of alcohol dependence; she was able to keep anything down; she has a past history of pancreatitis as well;     History of Present Illness:   As above    Past Medical History:  Past Medical History:   Diagnosis Date    Acromioclavicular separation 2021    Ankle sprain 2004    no operation necessary  At Time unsure    Anxiety     Arthritis     Arthritis of back Unsure    Diseased    Cirrhosis     Disease of thyroid gland     ETOH abuse     SOBER FOR 3 MONTHS    Fracture, clavicle 2021    shattered clavicel on issue slip and fall    Fracture, foot Unsure    Frozen shoulder     4    GERD (gastroesophageal reflux disease)     History of kidney stones     5 YR AGO    Hypertension     Left shoulder pain     Low back strain     Lumbosacral disc disease     MDD (major depressive disorder)     Neck strain Unsure    Pancreatitis     Periarthritis of shoulder see above    Rotator cuff syndrome odre for shoulder replacement    unable to receive due to low platelets    Tennis elbow Unsure    Unsurpassed    Thrombocytopenia      Past Surgical History:  Past Surgical History:   Procedure Laterality Date    CLAVICLE SURGERY Right 2018    ENDOSCOPY N/A 10/26/2022    Procedure: ESOPHAGOGASTRODUODENOSCOPY wtih banding;  Surgeon: Ag Patel MD;  Location: [facility] ENDOSCOPY;  Service: Gastroenterology;  Laterality: N/A;  pre: melena  post: esophageal varicies with banding x2 bands    ENDOSCOPY N/A 2023    Procedure: ESOPHAGOGASTRODUODENOSCOPY;  Surgeon:  Ag Patel MD;  Location: [facility] ENDOSCOPY;  Service: Gastroenterology;  Laterality: N/A;  PRE OP - MAROON STOOLS  POST OP - SM ESOPHAGEAL VARICES    ESOPHAGEAL VARICES LIGATION      FOOT SURGERY  2/2/14    JOINT REPLACEMENT Bilateral     GREAT TOE    KIDNEY STONE SURGERY      LITHOTRIPSY AND STENT (R SIDE)    LAPAROSCOPIC TUBAL LIGATION  2005    NECK SURGERY  3/07    Not sure of dates    SIGMOIDOSCOPY N/A 01/18/2023    Procedure: SIGMOIDOSCOPY FLEXIBLE;  Surgeon: Ag Patel MD;  Location: [facility] ENDOSCOPY;  Service: Gastroenterology;  Laterality: N/A;  PRE OP - MAROON STOOLS  POST OP - RECTAL VARICES    TOTAL SHOULDER ARTHROPLASTY Left 05/09/2023    Procedure: reverse total shoulder arthroplasty;  Surgeon: Giovanni Denise MD;  Location:  [facility];  Service: Orthopedics;  Laterality: Left;    TRIGGER POINT INJECTION  8/24      Home Meds:  Medications Prior to Admission   Medication Sig Dispense Refill Last Dose/Taking    amLODIPine (NORVASC) 2.5 MG tablet Take 1 tablet by mouth Daily.   11/17/2024    bismuth subsalicylate (PEPTO BISMOL) 262 MG/15ML suspension Take 15 mL by mouth Every 6 (Six) Hours As Needed for Indigestion. Pt reports filling predosed cup about 3/4 full and drinks daily.   11/17/2024    ferrous sulfate 325 (65 FE) MG tablet Take 1 tablet by mouth Daily With Breakfast. Taking OTC   11/16/2024    FLUoxetine (PROzac) 20 MG tablet Take 3 tablets by mouth Daily.   11/16/2024    folic acid (FOLVITE) 1 MG tablet Take 1 tablet by mouth Daily.   11/16/2024    Ibuprofen 3 %, Gabapentin 10 %, Baclofen 2 %, lidocaine 4 % Apply 1-2 g topically to the appropriate area as directed 3 (Three) to 4 (Four) times daily. (Patient taking differently: Apply 1-2 g topically to the appropriate area as directed 3 (Three) Times a Day As Needed. Applies to shoulder prn) 90 g 5 Past Week    lactulose (CHRONULAC) 10 GM/15ML solution Take 15 mL by mouth Every Morning.   11/16/2024    levothyroxine (SYNTHROID,  LEVOTHROID) 88 MCG tablet Take 1 tablet by mouth Daily.   11/17/2024    MAGnesium-Oxide 400 (240 Mg) MG tablet Take 1 tablet by mouth Daily. Takes OTC   11/16/2024    mirtazapine (REMERON) 15 MG tablet Take 1 tablet by mouth Every Night. Takes PRN   11/16/2024    Multiple Vitamin (MULTIVITAMIN) capsule Take 1 capsule by mouth Daily.   11/16/2024    ondansetron ODT (ZOFRAN-ODT) 4 MG disintegrating tablet Place 1 tablet on the tongue Every 8 (Eight) Hours As Needed for Nausea or Vomiting. (Patient taking differently: Place 1 tablet on the tongue Every 8 (Eight) Hours As Needed for Nausea or Vomiting. Currently out of prescription, ran out about a week ago) 10 tablet 0 Taking Differently    oxyCODONE (ROXICODONE) 5 MG immediate release tablet Take 1 tablet by mouth Every 6 (Six) Hours As Needed for Severe Pain. (Patient taking differently: Take 1 tablet by mouth Every 6 (Six) Hours As Needed for Severe Pain. Took 0.5 tablets last night) 12 tablet 0 11/16/2024    pancrelipase, Lip-Prot-Amyl, (CREON) 56552-82504 units capsule delayed-release particles capsule Take 2 capsules by mouth 3 (Three) Times a Day With Meals. 180 capsule 0 11/16/2024    pantoprazole (PROTONIX) 40 MG EC tablet Take 1 tablet by mouth Daily. Waiting for refill   Past Week    potassium chloride 10 MEQ CR tablet Take 1 tablet by mouth. Taking OTC once daily   11/16/2024    risperiDONE (risperDAL) 0.5 MG tablet Take 2 tablets by mouth Every Night. Pt ran out and last dose was Thursday night   Past Week    solifenacin (VESICARE) 10 MG tablet Take 1 tablet by mouth. Takes 1-2 times per week, does not like SE (dry mouth)   11/16/2024    thiamine (VITAMIN B1) 100 MG tablet Take 1 tablet by mouth Daily. 30 tablet 0 11/16/2024    Apoaequorin (PREVAGEN PO) Take 1 dose by mouth Daily. (Patient not taking: Reported on 11/17/2024)   Not Taking    lamoTRIgine (LaMICtal) 25 MG tablet Take 1 tablet by mouth 2 (Two) Times a Day. (Patient not taking: Reported on  "2024)       levothyroxine (SYNTHROID, LEVOTHROID) 100 MCG tablet TAKE ONE TABLET BY MOUTH DAILY (Patient not taking: Reported on 2024) 30 tablet 3 Not Taking    nadolol (CORGARD) 40 MG tablet Take 1 tablet by mouth Daily. (Patient not taking: Reported on 2024)   Not Taking       Allergies:  Allergies   Allergen Reactions    Benzonatate Nausea Only and Other (See Comments)     Rash     Ketorolac Tromethamine Other (See Comments)     \"I cannot take because of liver problems\"    Phenergan [Promethazine Hcl] Hives    Cucumber Extract Rash    Promethazine-Phenylephrine Other (See Comments)     \"FEEL WEIRD\"     Immunizations:  Immunization History   Administered Date(s) Administered    COVID-19 (MODERNA) 1st,2nd,3rd Dose Monovalent 2021    COVID-19 (UNSPECIFIED) 07/15/2021    Influenza, Unspecified 2016, 10/11/2016, 10/01/2018     Social History:   Social History     Social History Narrative    Not on file     Social History     Socioeconomic History    Marital status:    Tobacco Use    Smoking status: Some Days     Current packs/day: 0.25     Average packs/day: 0.3 packs/day for 15.0 years (3.8 ttl pk-yrs)     Types: Cigarettes     Passive exposure: Current    Smokeless tobacco: Never    Tobacco comments:     Rarely smoke sometimes will to   Vaping Use    Vaping status: Never Used   Substance and Sexual Activity    Alcohol use: Not Currently     Alcohol/week: 5.0 - 10.0 standard drinks of alcohol     Comment: 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    Drug use: Never    Sexual activity: Yes     Partners: Male     Birth control/protection: Post-menopausal     Comment: cinthia- menepausal       Family History:  Family History   Problem Relation Age of Onset    Rheumatologic disease Mother         congestive heart diseased    Osteoporosis Mother             Thyroid disease Father     Leukemia Father     Cancer Father         Leukemia  deseased     Broken bones Father     Clotting disorder Father  " "   Drug abuse Brother     Anette Hyperthermia Neg Hx         Review of Systems  See history of present illness and past medical history.  Patient denies headache, dizziness, syncope, falls, trauma, change in vision, change in hearing, change in taste, changes in weight, changes in appetite, focal weakness, numbness, or paresthesia.  Patient denies chest pain, palpitations, dyspnea, orthopnea, PND, cough, sinus pressure, rhinorrhea, epistaxis, hemoptysis, nausea, vomiting,hematemesis, diarrhea, constipation or hematochezia.  Denies cold or heat intolerance, polydipsia, polyuria, polyphagia. Denies hematuria, pyuria, dysuria, hesitancy, frequency or urgency. Denies consumption of raw and under cooked meats foods or change in water source.  Denies fever, chills, sweats, night sweats.  Denies missing any routine medications. Remainder of ROS is negative.    Objective:  T Max 24 hrs: Temp (24hrs), Av.5 °F (36.9 °C), Min:98 °F (36.7 °C), Max:98.9 °F (37.2 °C)    Vitals Ranges:   Temp:  [98 °F (36.7 °C)-98.9 °F (37.2 °C)] 98.2 °F (36.8 °C)  Heart Rate:  [] 96  Resp:  [18] 18  BP: (106-149)/(71-97) 106/94      Exam:  /94 (BP Location: Right arm, Patient Position: Lying)   Pulse 96   Temp 98.2 °F (36.8 °C) (Oral)   Resp 18   Ht 165.1 cm (65\")   Wt 66.8 kg (147 lb 4.3 oz)   [facility] 2013 Comment: NATANAEL  SpO2 100%   BMI 24.51 kg/m²     General Appearance:    Alert, cooperative, no distress, appears stated age   Head:    Normocephalic, without obvious abnormality, atraumatic   Eyes:    PERRL, conjunctivae/corneas clear, EOM's intact, both eyes   Ears:    Normal external ear canals, both ears   Nose:   Nares normal, septum midline, mucosa normal, no drainage    or sinus tenderness   Throat:   Lips, mucosa, and tongue normal   Neck:   Supple, symmetrical, trachea midline, no adenopathy;     thyroid:  no enlargement/tenderness/nodules; no carotid    bruit or JVD   Back:     Symmetric, no curvature, ROM " normal, no CVA tenderness   Lungs:     Decreased breath sounds bilaterally, respirations unlabored   Chest Wall:    No tenderness or deformity    Heart:    Regular rate and rhythm, S1 and S2 normal, no murmur, rub   or gallop   Abdomen:     Soft, nontender, bowel sounds active all four quadrants,     no masses, no hepatomegaly, no splenomegaly   Extremities:   Extremities normal, atraumatic, no cyanosis or edema                       .    Data Review:  Labs in chart were reviewed.  WBC   Date Value Ref Range Status   11/17/2024 4.55 3.40 - 10.80 10*3/mm3 Final     Hemoglobin   Date Value Ref Range Status   11/17/2024 16.3 (H) 12.0 - 15.9 g/dL Final     Hematocrit   Date Value Ref Range Status   11/17/2024 47.8 (H) 34.0 - 46.6 % Final     Platelets   Date Value Ref Range Status   11/17/2024 56 (L) 140 - 450 10*3/mm3 Final     Sodium   Date Value Ref Range Status   11/17/2024 140 136 - 145 mmol/L Final     Potassium   Date Value Ref Range Status   11/17/2024 3.6 3.5 - 5.2 mmol/L Final     Comment:     Specimen hemolyzed.  Result may be falsely elevated.     Chloride   Date Value Ref Range Status   11/17/2024 103 98 - 107 mmol/L Final     CO2   Date Value Ref Range Status   11/17/2024 19.1 (L) 22.0 - 29.0 mmol/L Final     BUN   Date Value Ref Range Status   11/17/2024 9 6 - 20 mg/dL Final     Creatinine   Date Value Ref Range Status   11/17/2024 0.72 0.57 - 1.00 mg/dL Final     Glucose   Date Value Ref Range Status   11/17/2024 117 (H) 65 - 99 mg/dL Final     Calcium   Date Value Ref Range Status   11/17/2024 9.5 8.6 - 10.5 mg/dL Final     Magnesium   Date Value Ref Range Status   11/17/2024 1.4 (L) 1.6 - 2.6 mg/dL Final     Phosphorus   Date Value Ref Range Status   11/17/2024 2.7 2.5 - 4.5 mg/dL Final     AST (SGOT)   Date Value Ref Range Status   11/17/2024 194 (H) 1 - 32 U/L Final     Comment:     Specimen hemolyzed.  Result may be falsely elevated.     ALT (SGPT)   Date Value Ref Range Status   11/17/2024 73 (H) 1  - 33 U/L Final     Comment:     Specimen hemolyzed.  Result may  be falsely elevated.     Alkaline Phosphatase   Date Value Ref Range Status   11/17/2024 162 (H) 39 - 117 U/L Final                Imaging Results (All)       Procedure Component Value Units Date/Time    CT Abdomen Pelvis With Contrast [[id number]] Collected: 11/17/24 1340     Updated: 11/17/24 1340    Narrative:      CT OF THE ABDOMEN AND PELVIS WITH CONTRAST 11/17/2024     HISTORY: Abdominal pain.     Spiral images were obtained from the lung bases to the symphysis pubis  after intravenous contrast. No oral contrast was given.     There is mild bilateral lower lobe probable atelectasis.     There is severe fatty infiltration of the liver. Minimal cholelithiasis  is seen. No gallbladder inflammatory changes are seen. Spleen is at the  upper limits of normal for size. Pancreas is atrophic and there are  multiple small calcifications in the pancreas likely from changes of  chronic pancreatitis. Minimal haziness around the pancreatic head is  seen. Adrenals and kidneys appear unremarkable. Uterus and urinary  bladder appear unremarkable except for a tiny air bubble in the anterior  aspect of the urinary bladder on image 128 possibly from Manzo  catheterization. There is some submucosal fat deposition in the colon  probably from chronic inflammation but no acute bowel wall thickening or  bowel dilatation is seen. No significant free fluid is seen.       Impression:      1. Pancreatic atrophy with pancreatic calcifications likely from chronic  pancreatitis.  2. Mild injection around the pancreatic head could be related to mild  changes of acute pancreatitis. Please correlate.  3. Fatty infiltration of the liver.  4. Mild bilateral lower lobe atelectasis.  5. Cholelithiasis.  6. Tiny air bubble in the anterior urinary bladder. Please correlate for  possible Manzo catheterization.     Radiation dose reduction techniques were utilized, including  automated  exposure control and exposure modulation based on body size.                    Assessment:  Active Hospital Problems    Diagnosis  POA    **Alcohol withdrawal [F10.939]  Yes      Resolved Hospital Problems   No resolved problems to display.   Alcohol dependence  Hypothyroidism  Cirrhosis  Hyperglycemia  Acute on chronic pancreatitis  Uti  Hypomagnesemia  hypertension    Plan:  Replace mag  Antibiotics  [facility] protocol  Access center to see  Monitor on telemetry  Dw patient and ed provider    Carolann Scott MD  11/17/2024  18:27 EST

## 2024-11-17 NOTE — ED PROVIDER NOTES
EMERGENCY DEPARTMENT ENCOUNTER    Room Number:  31/31  PCP: Tylor Davis  Independent Historians: Patient and EMS    HPI:  Chief Complaint: had concerns including Vomiting.      A complete HPI/ROS/PMH/PSH/SH/FH are unobtainable due to: None    Chronic or social conditions impacting patient care (Social Determinants of Health): None      Context: Bekah Gibson is a 54 y.o. female with a medical history of alcohol abuse with cirrhosis, hypothyroidism, pancreatitis who presents to the ED c/o acute nausea and vomiting that started early this morning.  Patient's last drink was yesterday.  Patient unable to tolerate even water and currently dry heaving.  Patient denies any dysuria, hematuria, fevers, cough.  Patient does endorse generalized abdominal cramping.        Review of prior external notes (non-ED) -and- Review of prior external test results outside of this encounter: I reviewed discharge summary from patient's last admission.  Patient admitted with acute on chronic alcohol induced pancreatitis.  Patient transition to p.o. diet with alcohol withdrawal treated during her stay.    Prescription drug monitoring program review:     N/A    PAST MEDICAL HISTORY  Active Ambulatory Problems     Diagnosis Date Noted    Irritable bowel syndrome with both constipation and diarrhea 06/05/2017    Peripheral neuropathy 06/05/2017    Vitamin D deficiency 06/05/2017    History of HPV infection 06/05/2017    Hypothyroidism 06/05/2017    Tobacco dependence due to cigarettes 06/05/2017    Acute kidney injury 12/28/2015    Ascites 06/06/2016    E. coli UTI (urinary tract infection) 06/06/2016    Alcoholic hepatitis 06/29/2018    GI bleed 06/29/2018    Acute post-hemorrhagic anemia 06/29/2018    Thrombocytopenia 06/29/2018    Open wound of right shoulder region 06/29/2018    Pancreatic insufficiency 07/04/2018    Alcoholic ketoacidosis 07/21/2019    Chronic alcohol abuse 07/29/2019    ESBL (extended spectrum beta-lactamase)  producing bacteria infection 07/29/2019    Abnormal weight loss 12/13/2019    Hashimoto's disease 12/13/2019    Chronic fatigue 12/14/2019    History of alcohol use disorder 09/21/2021    Alcohol intoxication 09/21/2021    Lupus     Hypomagnesemia     Pancytopenia     Alcoholic cirrhosis     Bilateral lower abdominal discomfort 09/21/2021    Primary hypertension 10/24/2022    Melena 10/24/2022    Arthritis of shoulder 12/19/2022    Esophageal varices 01/16/2023    Essential hypertension 01/16/2023    Alcohol-induced chronic pancreatitis 01/22/2023    Abdominal pain 01/22/2023    Pancreatitis 10/07/2023    Leukopenia 10/07/2023    Epigastric pain 04/01/2024    Acute alcoholic gastritis without hemorrhage 04/01/2024    Alcohol-induced acute on chronic pancreatitis without infection or necrosis 07/03/2024    Alcohol withdrawal 07/03/2024    Acute on chronic pancreatitis 07/22/2024    Transaminitis 07/22/2024    Cholelithiasis 07/22/2024    Alcohol use 07/22/2024    WBC decreased 07/24/2024    Hypokalemia 07/24/2024    Urinary tract infection 07/24/2024    Neutropenia 07/24/2024    Polysubstance abuse 08/05/2024    Alcohol dependence with withdrawal 10/24/2024     Resolved Ambulatory Problems     Diagnosis Date Noted    Acute renal failure 02/20/2017    Kidney stone 06/05/2017    Alcohol withdrawal syndrome, with delirium 06/29/2018    Hypotension 07/01/2018    Nausea and vomiting 09/21/2021    Acute GI bleeding 10/24/2022     Past Medical History:   Diagnosis Date    Acromioclavicular separation 1/2021    Ankle sprain 2/2004    Anxiety     Arthritis     Arthritis of back Unsure    Cirrhosis     Disease of thyroid gland     ETOH abuse     Fracture, clavicle 1/2021    Fracture, foot Unsure    Frozen shoulder 1 /2009    GERD (gastroesophageal reflux disease)     History of kidney stones     Hypertension     Left shoulder pain     Low back strain 1/21    Lumbosacral disc disease 1/21    MDD (major depressive disorder)      Neck strain Unsure    Periarthritis of shoulder see above    Rotator cuff syndrome odre for shoulder replacement    Tennis elbow Unsure         PAST SURGICAL HISTORY  Past Surgical History:   Procedure Laterality Date    CLAVICLE SURGERY Right 2018    ENDOSCOPY N/A 10/26/2022    Procedure: ESOPHAGOGASTRODUODENOSCOPY wtih banding;  Surgeon: Ag Patel MD;  Location: [facility] ENDOSCOPY;  Service: Gastroenterology;  Laterality: N/A;  pre: melena  post: esophageal varicies with banding x2 bands    ENDOSCOPY N/A 2023    Procedure: ESOPHAGOGASTRODUODENOSCOPY;  Surgeon: Ag Patel MD;  Location: [facility] ENDOSCOPY;  Service: Gastroenterology;  Laterality: N/A;  PRE OP - MAROON STOOLS  POST OP - SM ESOPHAGEAL VARICES    ESOPHAGEAL VARICES LIGATION      FOOT SURGERY  12    JOINT REPLACEMENT Bilateral     GREAT TOE    KIDNEY STONE SURGERY      LITHOTRIPSY AND STENT (R SIDE)    LAPAROSCOPIC TUBAL LIGATION      NECK SURGERY  3/07    Not sure of dates    SIGMOIDOSCOPY N/A 2023    Procedure: SIGMOIDOSCOPY FLEXIBLE;  Surgeon: Ag Patel MD;  Location: [facility] ENDOSCOPY;  Service: Gastroenterology;  Laterality: N/A;  PRE OP - MAROON STOOLS  POST OP - RECTAL VARICES    TOTAL SHOULDER ARTHROPLASTY Left 2023    Procedure: reverse total shoulder arthroplasty;  Surgeon: Giovanni Denise MD;  Location: [facility] OR [facility] [place];  Service: Orthopedics;  Laterality: Left;    TRIGGER POINT INJECTION           FAMILY HISTORY  Family History   Problem Relation Age of Onset    Rheumatologic disease Mother         congestive heart diseased    Osteoporosis Mother             Thyroid disease Father     Leukemia Father     Cancer Father         Leukemia  deseased     Broken bones Father     Clotting disorder Father     Drug abuse Brother     Malig Hyperthermia Neg Hx          SOCIAL HISTORY  Social History     Socioeconomic History    Marital status:    Tobacco Use    Smoking status: Some Days      Current packs/day: 0.25     Average packs/day: 0.3 packs/day for 15.0 years (3.8 ttl pk-yrs)     Types: Cigarettes     Passive exposure: Current    Smokeless tobacco: Never    Tobacco comments:     Rarely smoke sometimes will to   Vaping Use    Vaping status: Never Used   Substance and Sexual Activity    Alcohol use: Not Currently     Alcohol/week: 5.0 - 10.0 standard drinks of alcohol     Comment: 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    Drug use: Never    Sexual activity: Yes     Partners: Male     Birth control/protection: Post-menopausal     Comment: cinthia- menepausal         ALLERGIES  Benzonatate, Ketorolac tromethamine, Phenergan [promethazine hcl], Cucumber extract, and Promethazine-phenylephrine        REVIEW OF SYSTEMS  Review of Systems  Included in HPI  All systems reviewed and negative except for those discussed in HPI.      PHYSICAL EXAM    I have reviewed the triage vital signs and nursing notes.    ED Triage Vitals [11/17/24 0836]   Temp Heart Rate Resp BP SpO2   98.9 °F (37.2 °C) 104 18 132/74 97 %      Temp src Heart Rate Source Patient Position BP Location FiO2 (%)   -- -- -- -- --       Physical Exam  GENERAL: Cooperative and conversant disheveled female, anxious, alert, no acute distress  SKIN: Warm, dry  HENT: Normocephalic, atraumatic  EYES: no scleral icterus, EOMI  CV: regular rhythm, accelerated rate  RESPIRATORY: normal effort, lungs clear, no wheezing  ABDOMEN: soft, diffuse mild tenderness to palpation with no rebound and no guarding, nondistended  MUSCULOSKELETAL: no deformity  NEURO: alert, moves all extremities, follows commands                                                                   LAB RESULTS  Recent Results (from the past 24 hours)   Urinalysis With Microscopic If Indicated (No Culture) - Urine, Clean Catch    Collection Time: 11/17/24  8:46 AM    Specimen: Urine, Clean Catch   Result Value Ref Range    Color, UA Savi (A) Yellow, Straw    Appearance, UA Turbid (A) Clear    pH, UA 6.0  5.0 - 8.0    Specific Gravity, UA 1.021 1.005 - 1.030    Glucose, UA Negative Negative    Ketones, UA Trace (A) Negative    Bilirubin, UA Negative Negative    Blood, UA Moderate (2+) (A) Negative    Protein,  mg/dL (2+) (A) Negative    Leuk Esterase, UA Large (3+) (A) Negative    Nitrite, UA Positive (A) Negative    Urobilinogen, UA 1.0 E.U./dL 0.2 - 1.0 E.U./dL   Urine Drug Screen - Urine, Clean Catch    Collection Time: 11/17/24  8:46 AM    Specimen: Urine, Clean Catch   Result Value Ref Range    Amphet/Methamphet, Screen Negative Negative    Barbiturates Screen, Urine Negative Negative    Benzodiazepine Screen, Urine Negative Negative    Cocaine Screen, Urine Negative Negative    Opiate Screen Positive (A) Negative    THC, Screen, Urine Negative Negative    Methadone Screen, Urine Negative Negative    Oxycodone Screen, Urine Positive (A) Negative    Fentanyl, Urine Negative Negative   Urinalysis, Microscopic Only - Urine, Clean Catch    Collection Time: 11/17/24  8:46 AM    Specimen: Urine, Clean Catch   Result Value Ref Range    RBC, UA Too Numerous to Count (A) None Seen, 0-2 /HPF    WBC, UA Too Numerous to Count (A) None Seen, 0-2 /HPF    Bacteria, UA 4+ (A) None Seen /HPF    Squamous Epithelial Cells, UA None Seen None Seen, 0-2 /HPF    Hyaline Casts, UA None Seen None Seen /LPF    Calcium Oxalate Crystals, UA Small/1+ None Seen /HPF    Methodology Manual Light Microscopy    Comprehensive Metabolic Panel    Collection Time: 11/17/24  9:04 AM    Specimen: Blood   Result Value Ref Range    Glucose 117 (H) 65 - 99 mg/dL    BUN 9 6 - 20 mg/dL    Creatinine 0.72 0.57 - 1.00 mg/dL    Sodium 140 136 - 145 mmol/L    Potassium 3.6 3.5 - 5.2 mmol/L    Chloride 103 98 - 107 mmol/L    CO2 19.1 (L) 22.0 - 29.0 mmol/L    Calcium 9.5 8.6 - 10.5 mg/dL    Total Protein 7.5 6.0 - 8.5 g/dL    Albumin 4.2 3.5 - 5.2 g/dL    ALT (SGPT) 73 (H) 1 - 33 U/L    AST (SGOT) 194 (H) 1 - 32 U/L    Alkaline Phosphatase 162 (H) 39 -  117 U/L    Total Bilirubin 2.1 (H) 0.0 - 1.2 mg/dL    Globulin 3.3 gm/dL    A/G Ratio 1.3 g/dL    BUN/Creatinine Ratio 12.5 7.0 - 25.0    Anion Gap 17.9 (H) 5.0 - 15.0 mmol/L    eGFR 98.3 >60.0 mL/min/1.73   Lipase    Collection Time: 11/17/24  9:04 AM    Specimen: Blood   Result Value Ref Range    Lipase 23 13 - 60 U/L   Ethanol    Collection Time: 11/17/24  9:04 AM    Specimen: Blood   Result Value Ref Range    Ethanol 71 (H) 0 - 10 mg/dL    Ethanol % 0.071 %   Magnesium    Collection Time: 11/17/24  9:04 AM    Specimen: Blood   Result Value Ref Range    Magnesium 1.4 (L) 1.6 - 2.6 mg/dL   Phosphorus    Collection Time: 11/17/24  9:04 AM    Specimen: Blood   Result Value Ref Range    Phosphorus 2.7 2.5 - 4.5 mg/dL   Ammonia    Collection Time: 11/17/24  9:04 AM    Specimen: Blood   Result Value Ref Range    Ammonia 29 11 - 51 umol/L   CBC Auto Differential    Collection Time: 11/17/24  9:04 AM    Specimen: Blood   Result Value Ref Range    WBC 4.55 3.40 - 10.80 10*3/mm3    RBC 5.11 3.77 - 5.28 10*6/mm3    Hemoglobin 16.3 (H) 12.0 - 15.9 g/dL    Hematocrit 47.8 (H) 34.0 - 46.6 %    MCV 93.5 79.0 - 97.0 fL    MCH 31.9 26.6 - 33.0 pg    MCHC 34.1 31.5 - 35.7 g/dL    RDW 13.2 12.3 - 15.4 %    RDW-SD 45.8 37.0 - 54.0 fl    MPV 12.2 (H) 6.0 - 12.0 fL    Platelets 56 (L) 140 - 450 10*3/mm3    Neutrophil % 64.4 42.7 - 76.0 %    Lymphocyte % 23.1 19.6 - 45.3 %    Monocyte % 8.8 5.0 - 12.0 %    Eosinophil % 2.6 0.3 - 6.2 %    Basophil % 0.9 0.0 - 1.5 %    Immature Grans % 0.2 0.0 - 0.5 %    Neutrophils, Absolute 2.93 1.70 - 7.00 10*3/mm3    Lymphocytes, Absolute 1.05 0.70 - 3.10 10*3/mm3    Monocytes, Absolute 0.40 0.10 - 0.90 10*3/mm3    Eosinophils, Absolute 0.12 0.00 - 0.40 10*3/mm3    Basophils, Absolute 0.04 0.00 - 0.20 10*3/mm3    Immature Grans, Absolute 0.01 0.00 - 0.05 10*3/mm3   Protime-INR    Collection Time: 11/17/24  9:17 AM    Specimen: Blood   Result Value Ref Range    Protime 17.0 (H) 11.7 - 14.2 Seconds     INR 1.36 (H) 0.90 - 1.10         RADIOLOGY  CT Abdomen Pelvis With Contrast    Result Date: 11/17/2024  CT OF THE ABDOMEN AND PELVIS WITH CONTRAST 11/17/2024  HISTORY: Abdominal pain.  Spiral images were obtained from the lung bases to the symphysis pubis after intravenous contrast. No oral contrast was given.  There is mild bilateral lower lobe probable atelectasis.  There is severe fatty infiltration of the liver. Minimal cholelithiasis is seen. No gallbladder inflammatory changes are seen. Spleen is at the upper limits of normal for size. Pancreas is atrophic and there are multiple small calcifications in the pancreas likely from changes of chronic pancreatitis. Minimal haziness around the pancreatic head is seen. Adrenals and kidneys appear unremarkable. Uterus and urinary bladder appear unremarkable except for a tiny air bubble in the anterior aspect of the urinary bladder on image 128 possibly from Manzo catheterization. There is some submucosal fat deposition in the colon probably from chronic inflammation but no acute bowel wall thickening or bowel dilatation is seen. No significant free fluid is seen.      1. Pancreatic atrophy with pancreatic calcifications likely from chronic pancreatitis. 2. Mild injection around the pancreatic head could be related to mild changes of acute pancreatitis. Please correlate. 3. Fatty infiltration of the liver. 4. Mild bilateral lower lobe atelectasis. 5. Cholelithiasis. 6. Tiny air bubble in the anterior urinary bladder. Please correlate for possible Manzo catheterization.  Radiation dose reduction techniques were utilized, including automated exposure control and exposure modulation based on body size.          MEDICATIONS GIVEN IN ER  Medications   sodium chloride 0.9 % flush 10 mL (has no administration in time range)   acetaminophen (TYLENOL) tablet 650 mg (has no administration in time range)     Or   acetaminophen (TYLENOL) 160 MG/5ML oral solution 650 mg (has no  administration in time range)     Or   acetaminophen (TYLENOL) suppository 325 mg (has no administration in time range)   ondansetron ODT (ZOFRAN-ODT) disintegrating tablet 4 mg (has no administration in time range)     Or   ondansetron (ZOFRAN) injection 4 mg (has no administration in time range)   melatonin tablet 2.5 mg (has no administration in time range)   sennosides-docusate (PERICOLACE) 8.6-50 MG per tablet 2 tablet (has no administration in time range)     And   polyethylene glycol (MIRALAX) packet 17 g (has no administration in time range)     And   bisacodyl (DULCOLAX) EC tablet 5 mg (has no administration in time range)     And   bisacodyl (DULCOLAX) suppository 10 mg (has no administration in time range)   cloNIDine (CATAPRES) tablet 0.1 mg (has no administration in time range)   Magnesium Standard Dose Replacement - Follow Nurse / BPA Driven Protocol (has no administration in time range)   thiamine (B-1) injection 200 mg (has no administration in time range)     Followed by   thiamine (VITAMIN B-1) tablet 100 mg (has no administration in time range)   folic acid (FOLVITE) tablet 1 mg (has no administration in time range)   multivitamin with minerals 1 tablet (has no administration in time range)   LORazepam (ATIVAN) tablet 2 mg (has no administration in time range)     Followed by   LORazepam (ATIVAN) tablet 1 mg (has no administration in time range)     Followed by   LORazepam (ATIVAN) tablet 1 mg (has no administration in time range)     Followed by   LORazepam (ATIVAN) tablet 1 mg (has no administration in time range)   LORazepam (ATIVAN) tablet 1 mg (has no administration in time range)     Or   LORazepam (ATIVAN) injection 1 mg (has no administration in time range)     Or   LORazepam (ATIVAN) tablet 2 mg (has no administration in time range)     Or   LORazepam (ATIVAN) injection 2 mg (has no administration in time range)     Or   LORazepam (ATIVAN) injection 2 mg (has no administration in time  range)     Or   LORazepam (ATIVAN) injection 2 mg (has no administration in time range)   morphine injection 2 mg (has no administration in time range)   cefTRIAXone (ROCEPHIN) 1,000 mg in sodium chloride 0.9 % 100 mL MBP (has no administration in time range)   sodium chloride 0.9 % bolus 500 mL (0 mL Intravenous Stopped 11/17/24 1020)   ondansetron (ZOFRAN) injection 4 mg (4 mg Intravenous Given 11/17/24 0915)   pantoprazole (PROTONIX) injection 80 mg (80 mg Intravenous Given 11/17/24 0950)   morphine injection 2 mg (2 mg Intravenous Given 11/17/24 0946)   magnesium sulfate 2g/50 mL (PREMIX) infusion (0 g Intravenous Stopped 11/17/24 1020)   LORazepam (ATIVAN) injection 1 mg (1 mg Intravenous Given 11/17/24 0946)   cefTRIAXone (ROCEPHIN) 1,000 mg in sodium chloride 0.9 % 100 mL MBP (0 mg Intravenous Stopped 11/17/24 1130)   iopamidol (ISOVUE-300) 61 % injection 100 mL (85 mL Intravenous Given by Other 11/17/24 1114)   morphine injection 2 mg (2 mg Intravenous Given 11/17/24 1308)         ORDERS PLACED DURING THIS VISIT:  Orders Placed This Encounter   Procedures    Urine Culture - Urine,    Blood Culture - Blood,    Blood Culture - Blood,    CT Abdomen Pelvis With Contrast    Comprehensive Metabolic Panel    Protime-INR    Lipase    Urinalysis With Microscopic If Indicated (No Culture) - Urine, Clean Catch    Ethanol    Urine Drug Screen - Urine, Clean Catch    Magnesium    Phosphorus    Ammonia    CBC Auto Differential    Urinalysis, Microscopic Only - Urine, Clean Catch    Basic Metabolic Panel    CBC (No Diff)    Diet: Cardiac; Healthy Heart (2-3 Na+); Fluid Consistency: Thin (IDDSI 0)    Vital Signs    Up with assistance    Daily Weights    Strict Intake & Output    Oral Care    Place Sequential Compression Device    Maintain Sequential Compression Device    Vital Signs    Continuous Pulse Oximetry    Obtain baseline Clinical [place] Withdrawal Assessment - Ar, sedation scale, and vital signs    Clinical  [place] Withdrawal Assessment (CIWA-Ar)    If CIWA-Ar Score Less Than 8 For 3 Consecutive Assessments, Monitor Every 4 Hours & Discontinue Assessment When CIWA-Ar Less Than 8 for 24 Hours    Obtain pre and post sedation scores with every Lorazepam dose    Notify physician for breakthrough symptoms of withdrawal and to restart protocol    Notify physician of abnormal lab results    Notify physician    Code Status and Medical Interventions: CPR (Attempt to Resuscitate); Full    [facility] (on-call MD unless specified) Details    Inpatient consult to [facility]    Insert Peripheral IV    Inpatient Admission    Seizure Precautions    Fall Precautions    Safety Precautions    CBC & Differential         OUTPATIENT MEDICATION MANAGEMENT:  Current Facility-Administered Medications Ordered in Epic   Medication Dose Route Frequency Provider Last Rate Last Admin    acetaminophen (TYLENOL) tablet 650 mg  650 mg Oral Q4H PRN Carolann Scott MD        Or    acetaminophen (TYLENOL) 160 MG/5ML oral solution 650 mg  650 mg Oral Q4H PRN Carolann Scott MD        Or    acetaminophen (TYLENOL) suppository 325 mg  325 mg Rectal Q4H PRN Carolann Scott MD        sennosides-docusate (PERICOLACE) 8.6-50 MG per tablet 2 tablet  2 tablet Oral BID PRN Carolann Scott MD        And    polyethylene glycol (MIRALAX) packet 17 g  17 g Oral Daily PRN Carolann Scott MD        And    bisacodyl (DULCOLAX) EC tablet 5 mg  5 mg Oral Daily PRN Carolann Scott MD        And    bisacodyl (DULCOLAX) suppository 10 mg  10 mg Rectal Daily PRN Carolann Scott MD        [START ON 11/18/2024] cefTRIAXone (ROCEPHIN) 1,000 mg in sodium chloride 0.9 % 100 mL MBP  1,000 mg Intravenous Q24H Carolann Scott MD        cloNIDine (CATAPRES) tablet 0.1 mg  0.1 mg Oral Q4H PRN Carolann Scott MD        folic acid (FOLVITE) tablet 1 mg  1 mg Oral Daily Carolann Scott MD        LORazepam (ATIVAN) tablet 1  mg  1 mg Oral Q1H PRN StingCarolann jain MD        Or    LORazepam (ATIVAN) injection 1 mg  1 mg Intravenous Q1H PRN StingCarolann jain MD        Or    LORazepam (ATIVAN) tablet 2 mg  2 mg Oral Q1H PRN Tyler, Carolann Owens MD        Or    LORazepam (ATIVAN) injection 2 mg  2 mg Intravenous Q1H PRN Tyler, Carolann Owens MD        Or    LORazepam (ATIVAN) injection 2 mg  2 mg Intravenous Q15 Min PRN Stingcristobal, Carolann Owens MD        Or    LORazepam (ATIVAN) injection 2 mg  2 mg Intramuscular Q15 Min PRN Tyler, Carolann Owens MD        LORazepam (ATIVAN) tablet 2 mg  2 mg Oral Q6H Stingcristobal, Carolann Owens MD        Followed by    [START ON 11/18/2024] LORazepam (ATIVAN) tablet 1 mg  1 mg Oral Q6H Carolann Scott MD        Followed by    [START ON 11/19/2024] LORazepam (ATIVAN) tablet 1 mg  1 mg Oral Q12H StingCarolann jain MD        Followed by    [START ON 11/20/2024] LORazepam (ATIVAN) tablet 1 mg  1 mg Oral Daily Stingcristobal, Carolann Owens MD        Magnesium Standard Dose Replacement - Follow Nurse / BPA Driven Protocol   Not Applicable PRN Stingl, Carolann Owens MD        melatonin tablet 2.5 mg  2.5 mg Oral Nightly PRN Stingcristobal, Carolann Owens MD        morphine injection 2 mg  2 mg Intravenous Q4H PRN Stingcristobal, Carolann Owens MD        multivitamin with minerals 1 tablet  1 tablet Oral Daily Stingcristobal, Carolann Owens MD        ondansetron ODT (ZOFRAN-ODT) disintegrating tablet 4 mg  4 mg Oral Q6H PRN StingCarolann jain MD        Or    ondansetron (ZOFRAN) injection 4 mg  4 mg Intravenous Q6H PRN Stingcristobal, Carolann Owens MD        sodium chloride 0.9 % flush 10 mL  10 mL Intravenous Q12H Callie Quiroz MD        sodium chloride 0.9 % flush 10 mL  10 mL Intravenous PRN Callie Quiroz MD        sodium chloride 0.9 % flush 10 mL  10 mL Intravenous PRN Joanne Mckeon MD        sodium chloride 0.9 % flush 20 mL  20 mL Intravenous PRN Callie Quiroz MD         thiamine (B-1) injection 200 mg  200 mg Intravenous Q8H Carolann Scott MD        Followed by    [START ON 11/22/2024] thiamine (VITAMIN B-1) tablet 100 mg  100 mg Oral Daily Carolann Scott MD         Current Outpatient Medications Ordered in Epic   Medication Sig Dispense Refill    amLODIPine (NORVASC) 2.5 MG tablet Take 1 tablet by mouth Daily.      bismuth subsalicylate (PEPTO BISMOL) 262 MG/15ML suspension Take 15 mL by mouth Every 6 (Six) Hours As Needed for Indigestion. Pt reports filling predosed cup about 3/4 full and drinks daily.      ferrous sulfate 325 (65 FE) MG tablet Take 1 tablet by mouth Daily With Breakfast. Taking OTC      FLUoxetine (PROzac) 20 MG tablet Take 3 tablets by mouth Daily.      folic acid (FOLVITE) 1 MG tablet Take 1 tablet by mouth Daily.      Ibuprofen 3 %, Gabapentin 10 %, Baclofen 2 %, lidocaine 4 % Apply 1-2 g topically to the appropriate area as directed 3 (Three) to 4 (Four) times daily. (Patient taking differently: Apply 1-2 g topically to the appropriate area as directed 3 (Three) Times a Day As Needed. Applies to shoulder prn) 90 g 5    lactulose (CHRONULAC) 10 GM/15ML solution Take 15 mL by mouth Every Morning.      levothyroxine (SYNTHROID, LEVOTHROID) 88 MCG tablet Take 1 tablet by mouth Daily.      MAGnesium-Oxide 400 (240 Mg) MG tablet Take 1 tablet by mouth Daily. Takes OTC      mirtazapine (REMERON) 15 MG tablet Take 1 tablet by mouth Every Night. Takes PRN      Multiple Vitamin (MULTIVITAMIN) capsule Take 1 capsule by mouth Daily.      ondansetron ODT (ZOFRAN-ODT) 4 MG disintegrating tablet Place 1 tablet on the tongue Every 8 (Eight) Hours As Needed for Nausea or Vomiting. (Patient taking differently: Place 1 tablet on the tongue Every 8 (Eight) Hours As Needed for Nausea or Vomiting. Currently out of prescription, ran out about a week ago) 10 tablet 0    oxyCODONE (ROXICODONE) 5 MG immediate release tablet Take 1 tablet by mouth Every 6 (Six)  Hours As Needed for Severe Pain. (Patient taking differently: Take 1 tablet by mouth Every 6 (Six) Hours As Needed for Severe Pain. Took 0.5 tablets last night) 12 tablet 0    pancrelipase, Lip-Prot-Amyl, (CREON) 36264-38956 units capsule delayed-release particles capsule Take 2 capsules by mouth 3 (Three) Times a Day With Meals. 180 capsule 0    pantoprazole (PROTONIX) 40 MG EC tablet Take 1 tablet by mouth Daily. Waiting for refill      potassium chloride 10 MEQ CR tablet Take 1 tablet by mouth. Taking OTC once daily      risperiDONE (risperDAL) 0.5 MG tablet Take 2 tablets by mouth Every Night. Pt ran out and last dose was Thursday night      solifenacin (VESICARE) 10 MG tablet Take 1 tablet by mouth. Takes 1-2 times per week, does not like SE (dry mouth)      thiamine (VITAMIN B1) 100 MG tablet Take 1 tablet by mouth Daily. 30 tablet 0    Apoaequorin (PREVAGEN PO) Take 1 dose by mouth Daily. (Patient not taking: Reported on 11/17/2024)      lamoTRIgine (LaMICtal) 25 MG tablet Take 1 tablet by mouth 2 (Two) Times a Day. (Patient not taking: Reported on 11/4/2024)      levothyroxine (SYNTHROID, LEVOTHROID) 100 MCG tablet TAKE ONE TABLET BY MOUTH DAILY (Patient not taking: Reported on 11/17/2024) 30 tablet 3    nadolol (CORGARD) 40 MG tablet Take 1 tablet by mouth Daily. (Patient not taking: Reported on 11/17/2024)           PROCEDURES  Procedures            PROGRESS, DATA ANALYSIS, CONSULTS, AND MEDICAL DECISION MAKING  All labs have been independently interpreted by me.  All radiology studies have been reviewed by me. All EKG's have been independently viewed and interpreted by me.  Discussion below represents my analysis of pertinent findings related to patient's condition, differential diagnosis, treatment plan and final disposition.    Differential diagnosis includes but is not limited to   pancreatitis, cholecystitis/biliary colic, PUD, gastritis, pneumonia, ureteral stone, sbo, hernia, colitis,  diverticulitis, AAA, malignancy, gastroenteritis, food intolerances. Abdominal exam is without peritoneal signs. There is no evidence of acute abdomen at this time. I have a low suspicion for vascular catastrophe, bowel obstruction or viscus perforation. This patient´s presentation is most consistent with alcoholic gastritis or recurrent pancreatitis with early alcohol withdrawal features.  Will assess for electrolyte disturbances.  Plan serum labs including EtOH level, urinalysis, pain control, antiemetics, IV fluids and serial reassessment.  Will consider indications for abdominal imaging.    ED Course as of 11/17/24 1443   Sun Nov 17, 2024   1301 Waiting CT abdomen pelvis results.  Patient aware. [AR]   3765 I discussed patient's case with Dr. Scott with [facility] who is amenable to admitting the patient for further care.  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    DIAGNOSIS  Final diagnoses:   Nausea and vomiting, unspecified vomiting type   Alcoholic ketoacidosis   Hypomagnesemia   Alcohol abuse with withdrawal   UTI (urinary tract infection) with pyuria   Abnormal CT of the abdomen         DISPOSITION  ED Disposition       ED Disposition   Decision to Admit    Condition   --    Comment   Level of Care: Telemetry [5]   Diagnosis: Alcohol withdrawal [291.81.ICD-9-CM]   Admitting Physician: JAQUELIN SCOTT [[id number]]   Attending Physician: JAQUELIN SCOTT [[id number]]   Certification: I certify that inpatient services are medically necessary for this patient for a duration of greater than two midnights. See H&P and MD Progress Notes for additional information about the  patient's course of treatment.                  Please note that portions of this document were completed with a voice recognition program.    Note Disclaimer: At [facility], we believe that sharing information builds trust and better relationships. You are receiving this note because you recently visited [facility]. It is possible you will see health information before a provider has talked with you about it. This kind of information can be easy to misunderstand. To help you fully understand what it means for your health, we urge you to discuss this note with your provider.         Joanne Mckeon MD  11/17/24 8856

## 2024-11-17 NOTE — ED NOTES
Pt arrives via ems for vomiting that started earlier this morning. Pt has been dry-heaving for ems   Pt has cirrhosis and was drinking last night

## 2024-11-17 NOTE — ED NOTES
..Nursing report ED to floor  Bekah Gibson  54 y.o.  female    HPI :  HPI  Stated Reason for Visit: vomiting  History Obtained From: EMS  Duration (Days): 1    Chief Complaint  Chief Complaint   Patient presents with    Vomiting       Admitting doctor:   Carolann Scott MD    Admitting diagnosis:   The primary encounter diagnosis was Nausea and vomiting, unspecified vomiting type. Diagnoses of Alcoholic ketoacidosis, Hypomagnesemia, Alcohol abuse with withdrawal, and UTI (urinary tract infection) with pyuria were also pertinent to this visit.    Code status:   Current Code Status       Date Active Code Status Order ID Comments User Context       11/17/2024 1352 CPR (Attempt to Resuscitate) [id number]  Carolann Scott MD ED        Question Answer    Code Status (Patient has no pulse and is not breathing) CPR (Attempt to Resuscitate)    Medical Interventions (Patient has pulse or is breathing) Full                    Allergies:   Benzonatate, Ketorolac tromethamine, Phenergan [promethazine hcl], Cucumber extract, and Promethazine-phenylephrine    Isolation:   No active isolations    Intake and Output    Intake/Output Summary (Last 24 hours) at 11/17/2024 1355  Last data filed at 11/17/2024 1130  Gross per 24 hour   Intake 100 ml   Output --   Net 100 ml       Weight:       11/17/24  0836   Weight: 66.8 kg (147 lb 4.3 oz)       Most recent vitals:   Vitals:    11/17/24 1055 11/17/24 1131 11/17/24 1201 11/17/24 1301   BP: 123/80 132/71 143/80 149/97   Pulse: 101 101 116 93   Resp:       Temp:    98 °F (36.7 °C)   TempSrc:    Oral   SpO2: 94% 94% 92% 96%   Weight:       Height:           Active LDAs/IV Access:   Lines, Drains & Airways       Active LDAs       Name Placement date Placement time Site Days    Peripheral IV 11/17/24 0915 Posterior;Proximal;Right Forearm 11/17/24  0915  Forearm  less than 1                    Labs (abnormal labs have a star):   Labs Reviewed   COMPREHENSIVE METABOLIC PANEL -  Abnormal; Notable for the following components:       Result Value    Glucose 117 (*)     CO2 19.1 (*)     ALT (SGPT) 73 (*)     AST (SGOT) 194 (*)     Alkaline Phosphatase 162 (*)     Total Bilirubin 2.1 (*)     Anion Gap 17.9 (*)     All other components within normal limits    Narrative:     GFR Normal >60  Chronic Kidney Disease <60  Kidney Failure <15     PROTIME-INR - Abnormal; Notable for the following components:    Protime 17.0 (*)     INR 1.36 (*)     All other components within normal limits   URINALYSIS W/ MICROSCOPIC IF INDICATED (NO CULTURE) - Abnormal; Notable for the following components:    Color, UA Savi (*)     Appearance, UA Turbid (*)     Ketones, UA Trace (*)     Blood, UA Moderate (2+) (*)     Protein,  mg/dL (2+) (*)     Leuk Esterase, UA Large (3+) (*)     Nitrite, UA Positive (*)     All other components within normal limits   ETHANOL - Abnormal; Notable for the following components:    Ethanol 71 (*)     All other components within normal limits   URINE DRUG SCREEN - Abnormal; Notable for the following components:    Opiate Screen Positive (*)     Oxycodone Screen, Urine Positive (*)     All other components within normal limits    Narrative:     Negative Thresholds Per Drugs Screened:    Amphetamines                 500 ng/ml  Barbiturates                 200 ng/ml  Benzodiazepines              100 ng/ml  Cocaine                      300 ng/ml  Methadone                    300 ng/ml  Opiates                      300 ng/ml  Oxycodone                    100 ng/ml  THC                           50 ng/ml  Fentanyl                       5 ng/ml      The Normal Value for all drugs tested is negative. This report includes final unconfirmed screening results to be used for medical treatment purposes only. Unconfirmed results must not be used for non-medical purposes such as employment or legal testing. Clinical consideration should be applied to any drug of abuse test, particularly when  unconfirmed results are used.           MAGNESIUM - Abnormal; Notable for the following components:    Magnesium 1.4 (*)     All other components within normal limits   CBC WITH AUTO DIFFERENTIAL - Abnormal; Notable for the following components:    Hemoglobin 16.3 (*)     Hematocrit 47.8 (*)     MPV 12.2 (*)     Platelets 56 (*)     All other components within normal limits   URINALYSIS, MICROSCOPIC ONLY - Abnormal; Notable for the following components:    RBC, UA Too Numerous to Count (*)     WBC, UA Too Numerous to Count (*)     Bacteria, UA 4+ (*)     All other components within normal limits   LIPASE - Normal   PHOSPHORUS - Normal   AMMONIA - Normal   URINE CULTURE   BLOOD CULTURE   BLOOD CULTURE   CBC AND DIFFERENTIAL    Narrative:     The following orders were created for panel order CBC & Differential.  Procedure                               Abnormality         Status                     ---------                               -----------         ------                     CBC Auto Differential[[id number]]        Abnormal            Final result                 Please view results for these tests on the individual orders.       EKG:   No orders to display       Meds given in ED:   Medications   sodium chloride 0.9 % flush 10 mL (has no administration in time range)   acetaminophen (TYLENOL) tablet 650 mg (has no administration in time range)     Or   acetaminophen (TYLENOL) 160 MG/5ML oral solution 650 mg (has no administration in time range)     Or   acetaminophen (TYLENOL) suppository 325 mg (has no administration in time range)   ondansetron ODT (ZOFRAN-ODT) disintegrating tablet 4 mg (has no administration in time range)     Or   ondansetron (ZOFRAN) injection 4 mg (has no administration in time range)   melatonin tablet 2.5 mg (has no administration in time range)   sennosides-docusate (PERICOLACE) 8.6-50 MG per tablet 2 tablet (has no administration in time range)     And   polyethylene glycol (MIRALAX)  packet 17 g (has no administration in time range)     And   bisacodyl (DULCOLAX) EC tablet 5 mg (has no administration in time range)     And   bisacodyl (DULCOLAX) suppository 10 mg (has no administration in time range)   cloNIDine (CATAPRES) tablet 0.1 mg (has no administration in time range)   Magnesium Standard Dose Replacement - Follow Nurse / BPA Driven Protocol (has no administration in time range)   thiamine (B-1) injection 200 mg (has no administration in time range)     Followed by   thiamine (VITAMIN B-1) tablet 100 mg (has no administration in time range)   folic acid (FOLVITE) tablet 1 mg (has no administration in time range)   multivitamin with minerals 1 tablet (has no administration in time range)   LORazepam (ATIVAN) tablet 2 mg (has no administration in time range)     Followed by   LORazepam (ATIVAN) tablet 1 mg (has no administration in time range)     Followed by   LORazepam (ATIVAN) tablet 1 mg (has no administration in time range)     Followed by   LORazepam (ATIVAN) tablet 1 mg (has no administration in time range)   LORazepam (ATIVAN) tablet 1 mg (has no administration in time range)     Or   LORazepam (ATIVAN) injection 1 mg (has no administration in time range)     Or   LORazepam (ATIVAN) tablet 2 mg (has no administration in time range)     Or   LORazepam (ATIVAN) injection 2 mg (has no administration in time range)     Or   LORazepam (ATIVAN) injection 2 mg (has no administration in time range)     Or   LORazepam (ATIVAN) injection 2 mg (has no administration in time range)   morphine injection 2 mg (has no administration in time range)   cefTRIAXone (ROCEPHIN) 1,000 mg in sodium chloride 0.9 % 100 mL MBP (has no administration in time range)   sodium chloride 0.9 % bolus 500 mL (0 mL Intravenous Stopped 11/17/24 1020)   ondansetron (ZOFRAN) injection 4 mg (4 mg Intravenous Given 11/17/24 0915)   pantoprazole (PROTONIX) injection 80 mg (80 mg Intravenous Given 11/17/24 0950)   morphine  injection 2 mg (2 mg Intravenous Given 11/17/24 0946)   magnesium sulfate 2g/50 mL (PREMIX) infusion (0 g Intravenous Stopped 11/17/24 1020)   LORazepam (ATIVAN) injection 1 mg (1 mg Intravenous Given 11/17/24 0946)   cefTRIAXone (ROCEPHIN) 1,000 mg in sodium chloride 0.9 % 100 mL MBP (0 mg Intravenous Stopped 11/17/24 1130)   iopamidol (ISOVUE-300) 61 % injection 100 mL (85 mL Intravenous Given by Other 11/17/24 1114)   morphine injection 2 mg (2 mg Intravenous Given 11/17/24 1308)       Imaging results:  CT Abdomen Pelvis With Contrast    Result Date: 11/17/2024  1. Pancreatic atrophy with pancreatic calcifications likely from chronic pancreatitis. 2. Mild injection around the pancreatic head could be related to mild changes of acute pancreatitis. Please correlate. 3. Fatty infiltration of the liver. 4. Mild bilateral lower lobe atelectasis. 5. Cholelithiasis. 6. Tiny air bubble in the anterior urinary bladder. Please correlate for possible Manzo catheterization.  Radiation dose reduction techniques were utilized, including automated exposure control and exposure modulation based on body size.        Ambulatory status:   - up with assist x2    Social issues:   Social History     Socioeconomic History    Marital status:    Tobacco Use    Smoking status: Some Days     Current packs/day: 0.25     Average packs/day: 0.3 packs/day for 15.0 years (3.8 ttl pk-yrs)     Types: Cigarettes     Passive exposure: Current    Smokeless tobacco: Never    Tobacco comments:     Rarely smoke sometimes will to   Vaping Use    Vaping status: Never Used   Substance and Sexual Activity    Alcohol use: Not Currently     Alcohol/week: 5.0 - 10.0 standard drinks of alcohol     Comment: 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    Drug use: Never    Sexual activity: Yes     Partners: Male     Birth control/protection: Post-menopausal     Comment: cinthia- menepausal       Peripheral Neurovascular  Peripheral Neurovascular (Adult)  Peripheral Neurovascular  WDL: WDL, pulse assessment  Pulse Assessment: radial    Neuro Cognitive  Neuro Cognitive (Adult)  Cognitive/Neuro/Behavioral WDL: mood/behavior  Orientation: oriented x 4  Speech: clear, logical  Mood/Behavior: cooperative, calm    Learning  Learning Assessment  Learning Readiness and Ability: no barriers identified    Respiratory  Respiratory WDL  Respiratory WDL: WDL, rhythm/pattern  Rhythm/Pattern, Respiratory: depth regular, pattern regular, unlabored, no shortness of breath reported    Abdominal Pain       Pain Assessments  Pain (Adult)  (0-10) Pain Rating: Rest: 7  (0-10) Pain Rating: Activity: 7  Pain Location: abdomen  Pain Side/Orientation: generalized  Pain Description: squeezing, tightness  Pain Management Interventions: quiet environment facilitated, pillow support provided, position adjusted, pain management plan reviewed with patient/caregiver, relaxation techniques promoted  Response to Pain Interventions: interventions effective per patient, nonverbal indicators absent/decreased    NIH Stroke Scale       Harriett Harmon RN  11/17/24 13:55 EST

## 2024-11-18 PROBLEM — E83.42 HYPOMAGNESEMIA: Status: ACTIVE | Noted: 2024-11-18

## 2024-11-18 LAB
ANION GAP SERPL CALCULATED.3IONS-SCNC: 11.7 MMOL/L (ref 5–15)
BUN SERPL-MCNC: 11 MG/DL (ref 6–20)
BUN/CREAT SERPL: 18 (ref 7–25)
CALCIUM SPEC-SCNC: 8.8 MG/DL (ref 8.6–10.5)
CHLORIDE SERPL-SCNC: 103 MMOL/L (ref 98–107)
CO2 SERPL-SCNC: 22.3 MMOL/L (ref 22–29)
CREAT SERPL-MCNC: 0.61 MG/DL (ref 0.57–1)
DEPRECATED RDW RBC AUTO: 43.7 FL (ref 37–54)
EGFRCR SERPLBLD CKD-EPI 2021: 105.1 ML/MIN/1.73
ERYTHROCYTE [DISTWIDTH] IN BLOOD BY AUTOMATED COUNT: 12.9 % (ref 12.3–15.4)
GLUCOSE SERPL-MCNC: 132 MG/DL (ref 65–99)
HCT VFR BLD AUTO: 38.9 % (ref 34–46.6)
HGB BLD-MCNC: 13.5 G/DL (ref 12–15.9)
MCH RBC QN AUTO: 32.3 PG (ref 26.6–33)
MCHC RBC AUTO-ENTMCNC: 34.7 G/DL (ref 31.5–35.7)
MCV RBC AUTO: 93.1 FL (ref 79–97)
PLATELET # BLD AUTO: 37 10*3/MM3 (ref 140–450)
PMV BLD AUTO: 12.1 FL (ref 6–12)
POTASSIUM SERPL-SCNC: 4 MMOL/L (ref 3.5–5.2)
RBC # BLD AUTO: 4.18 10*6/MM3 (ref 3.77–5.28)
SODIUM SERPL-SCNC: 137 MMOL/L (ref 136–145)
WBC NRBC COR # BLD AUTO: 2.35 10*3/MM3 (ref 3.4–10.8)

## 2024-11-18 PROCEDURE — 25010000002 THIAMINE HCL 200 MG/2ML SOLUTION: Performed by: INTERNAL MEDICINE

## 2024-11-18 PROCEDURE — 85027 COMPLETE CBC AUTOMATED: CPT | Performed by: INTERNAL MEDICINE

## 2024-11-18 PROCEDURE — 80048 BASIC METABOLIC PNL TOTAL CA: CPT | Performed by: INTERNAL MEDICINE

## 2024-11-18 PROCEDURE — 25010000002 MORPHINE PER 10 MG: Performed by: INTERNAL MEDICINE

## 2024-11-18 PROCEDURE — 97162 PT EVAL MOD COMPLEX 30 MIN: CPT | Performed by: PHYSICAL THERAPIST

## 2024-11-18 PROCEDURE — 25010000002 CEFTRIAXONE PER 250 MG: Performed by: INTERNAL MEDICINE

## 2024-11-18 RX ADMIN — CEFTRIAXONE SODIUM 1000 MG: 1 INJECTION, POWDER, FOR SOLUTION INTRAMUSCULAR; INTRAVENOUS at 10:13

## 2024-11-18 RX ADMIN — LORAZEPAM 2 MG: 1 TABLET ORAL at 10:13

## 2024-11-18 RX ADMIN — RISPERIDONE 1 MG: 1 TABLET, FILM COATED ORAL at 20:57

## 2024-11-18 RX ADMIN — FLUOXETINE HYDROCHLORIDE 60 MG: 20 CAPSULE ORAL at 10:13

## 2024-11-18 RX ADMIN — LORAZEPAM 1 MG: 1 TABLET ORAL at 20:56

## 2024-11-18 RX ADMIN — PANCRELIPASE 24000 UNITS OF LIPASE: 60000; 12000; 38000 CAPSULE, DELAYED RELEASE PELLETS ORAL at 17:28

## 2024-11-18 RX ADMIN — POTASSIUM CHLORIDE 10 MEQ: 750 TABLET, EXTENDED RELEASE ORAL at 10:13

## 2024-11-18 RX ADMIN — THIAMINE HYDROCHLORIDE 200 MG: 100 INJECTION, SOLUTION INTRAMUSCULAR; INTRAVENOUS at 20:57

## 2024-11-18 RX ADMIN — MORPHINE SULFATE 2 MG: 2 INJECTION, SOLUTION INTRAMUSCULAR; INTRAVENOUS at 13:32

## 2024-11-18 RX ADMIN — MORPHINE SULFATE 2 MG: 2 INJECTION, SOLUTION INTRAMUSCULAR; INTRAVENOUS at 18:19

## 2024-11-18 RX ADMIN — Medication 1 TABLET: at 10:12

## 2024-11-18 RX ADMIN — PANCRELIPASE 24000 UNITS OF LIPASE: 60000; 12000; 38000 CAPSULE, DELAYED RELEASE PELLETS ORAL at 10:13

## 2024-11-18 RX ADMIN — THIAMINE HYDROCHLORIDE 200 MG: 100 INJECTION, SOLUTION INTRAMUSCULAR; INTRAVENOUS at 13:33

## 2024-11-18 RX ADMIN — PANTOPRAZOLE SODIUM 40 MG: 40 TABLET, DELAYED RELEASE ORAL at 10:13

## 2024-11-18 RX ADMIN — MIRTAZAPINE 15 MG: 15 TABLET, FILM COATED ORAL at 20:57

## 2024-11-18 RX ADMIN — Medication 1 TABLET: at 10:13

## 2024-11-18 RX ADMIN — THIAMINE HYDROCHLORIDE 200 MG: 100 INJECTION, SOLUTION INTRAMUSCULAR; INTRAVENOUS at 06:35

## 2024-11-18 RX ADMIN — LACTULOSE 10 G: 10 SOLUTION ORAL at 06:35

## 2024-11-18 RX ADMIN — FOLIC ACID 1 MG: 1 TABLET ORAL at 10:13

## 2024-11-18 RX ADMIN — MAGNESIUM OXIDE TAB 400 MG (241.3 MG ELEMENTAL MG) 400 MG: 400 (241.3 MG) TAB at 10:13

## 2024-11-18 RX ADMIN — LEVOTHYROXINE SODIUM 88 MCG: 0.09 TABLET ORAL at 10:13

## 2024-11-18 RX ADMIN — PANCRELIPASE 24000 UNITS OF LIPASE: 60000; 12000; 38000 CAPSULE, DELAYED RELEASE PELLETS ORAL at 13:33

## 2024-11-18 RX ADMIN — AMLODIPINE BESYLATE 2.5 MG: 2.5 TABLET ORAL at 10:13

## 2024-11-18 NOTE — PAYOR COMM NOTE
"Ba Gibson (54 y.o. Female)        PLEASE SEE ATTACHED FOR INPT AUTH.    REF#[id number]    PLEASE CALL  OR  [phone]    THANK YOU    ARTIS SHAH LPN CCP   Date of Birth   1970    Social Security Number       Address   [address] [id number]    Home Phone   [phone]    MRN   [id number]       Buddhism   Pentecostal    Marital Status                               Admission Date   11/17/24    Admission Type   Emergency    Admitting Provider   Carolann Scott MD    Attending Provider   Michael Sanchez MD    Department, Room/Bed   [address], S620/1       Discharge Date       Discharge Disposition       Discharge Destination                                 Attending Provider: Michael Sanhcez MD    Allergies: Benzonatate, Ketorolac Tromethamine, Phenergan [Promethazine Hcl], Cucumber Extract, Promethazine-phenylephrine    Isolation: None   Infection: None   Code Status: CPR    Ht: 165.1 cm (65\")   Wt: 67 kg (147 lb 11.3 oz)    Admission Cmt: None   Principal Problem: Alcohol withdrawal [F10.939]                   Active Insurance as of 11/17/2024       Primary Coverage       Payor Plan Insurance Group Employer/Plan Group    ANTHEM MEDICAID ANTH MEDICAID [id number]       Payor Plan Address Payor Plan Phone Number Payor Plan Fax Number Effective Dates    [address] [address] [phone]  6/1/2018 - None Entered    [facility] [id number]         Subscriber Name Subscriber Birth Date Member ID       BA GIBSON 1970 [id number]                     Emergency Contacts        (Rel.) Home Phone Work Phone Mobile Phone    Jeff Gibson (Spouse) [phone] -- [phone]              [place]: NPI [id number]  Tax ID [id number]     History & Physical        Carolann Scott MD at 11/17/24 1827          HISTORY AND PHYSICAL   [facility]        Date of Admission: 11/17/2024  Patient " Identification:  Name: Bekah Gibson  Age: 54 y.o.  Sex: female  :  1970  MRN: [id number]                     Primary Care Physician: Tylor Davis    Chief Complaint:  54 year old female presented to the emergency room with abdominal pain, nausea and vomiting which started this morning; she denies fever or chills; no sick contacts; she has a history of alcohol dependence; she was able to keep anything down; she has a past history of pancreatitis as well;     History of Present Illness:   As above    Past Medical History:  Past Medical History:   Diagnosis Date    Acromioclavicular separation 2021    Ankle sprain 2004    no operation necessary  At Time unsure    Anxiety     Arthritis     Arthritis of back Unsure    Diseased    Cirrhosis     Disease of thyroid gland     ETOH abuse     SOBER FOR 3 MONTHS    Fracture, clavicle 2021    shattered clavicel on issue slip and fall    Fracture, foot Unsure    Frozen shoulder     4    GERD (gastroesophageal reflux disease)     History of kidney stones     5 YR AGO    Hypertension     Left shoulder pain     Low back strain     Lumbosacral disc disease     MDD (major depressive disorder)     Neck strain Unsure    Pancreatitis     Periarthritis of shoulder see above    Rotator cuff syndrome odre for shoulder replacement    unable to receive due to low platelets    Tennis elbow Unsure    Unsurpassed    Thrombocytopenia      Past Surgical History:  Past Surgical History:   Procedure Laterality Date    CLAVICLE SURGERY Right 2018    ENDOSCOPY N/A 10/26/2022    Procedure: ESOPHAGOGASTRODUODENOSCOPY wtih banding;  Surgeon: Ag Patel MD;  Location: [facility] ENDOSCOPY;  Service: Gastroenterology;  Laterality: N/A;  pre: melena  post: esophageal varicies with banding x2 bands    ENDOSCOPY N/A 2023    Procedure: ESOPHAGOGASTRODUODENOSCOPY;  Surgeon: Ag Patel MD;  Location: [facility] ENDOSCOPY;  Service: Gastroenterology;  Laterality: N/A;  PRE  OP - MAROON STOOLS  POST OP - SM ESOPHAGEAL VARICES    ESOPHAGEAL VARICES LIGATION      FOOT SURGERY  2/2/14    JOINT REPLACEMENT Bilateral     GREAT TOE    KIDNEY STONE SURGERY      LITHOTRIPSY AND STENT (R SIDE)    LAPAROSCOPIC TUBAL LIGATION  2005    NECK SURGERY  3/07    Not sure of dates    SIGMOIDOSCOPY N/A 01/18/2023    Procedure: SIGMOIDOSCOPY FLEXIBLE;  Surgeon: Ag Patel MD;  Location: [facility] ENDOSCOPY;  Service: Gastroenterology;  Laterality: N/A;  PRE OP - MAROON STOOLS  POST OP - RECTAL VARICES    TOTAL SHOULDER ARTHROPLASTY Left 05/09/2023    Procedure: reverse total shoulder arthroplasty;  Surgeon: Giovanni Denise MD;  Location:  [facility];  Service: Orthopedics;  Laterality: Left;    TRIGGER POINT INJECTION  8/24      Home Meds:  Medications Prior to Admission   Medication Sig Dispense Refill Last Dose/Taking    amLODIPine (NORVASC) 2.5 MG tablet Take 1 tablet by mouth Daily.   11/17/2024    bismuth subsalicylate (PEPTO BISMOL) 262 MG/15ML suspension Take 15 mL by mouth Every 6 (Six) Hours As Needed for Indigestion. Pt reports filling predosed cup about 3/4 full and drinks daily.   11/17/2024    ferrous sulfate 325 (65 FE) MG tablet Take 1 tablet by mouth Daily With Breakfast. Taking OTC   11/16/2024    FLUoxetine (PROzac) 20 MG tablet Take 3 tablets by mouth Daily.   11/16/2024    folic acid (FOLVITE) 1 MG tablet Take 1 tablet by mouth Daily.   11/16/2024    Ibuprofen 3 %, Gabapentin 10 %, Baclofen 2 %, lidocaine 4 % Apply 1-2 g topically to the appropriate area as directed 3 (Three) to 4 (Four) times daily. (Patient taking differently: Apply 1-2 g topically to the appropriate area as directed 3 (Three) Times a Day As Needed. Applies to shoulder prn) 90 g 5 Past Week    lactulose (CHRONULAC) 10 GM/15ML solution Take 15 mL by mouth Every Morning.   11/16/2024    levothyroxine (SYNTHROID, LEVOTHROID) 88 MCG tablet Take 1 tablet by mouth Daily.   11/17/2024    MAGnesium-Oxide 400 (240 Mg) MG  tablet Take 1 tablet by mouth Daily. Takes OTC   11/16/2024    mirtazapine (REMERON) 15 MG tablet Take 1 tablet by mouth Every Night. Takes PRN   11/16/2024    Multiple Vitamin (MULTIVITAMIN) capsule Take 1 capsule by mouth Daily.   11/16/2024    ondansetron ODT (ZOFRAN-ODT) 4 MG disintegrating tablet Place 1 tablet on the tongue Every 8 (Eight) Hours As Needed for Nausea or Vomiting. (Patient taking differently: Place 1 tablet on the tongue Every 8 (Eight) Hours As Needed for Nausea or Vomiting. Currently out of prescription, ran out about a week ago) 10 tablet 0 Taking Differently    oxyCODONE (ROXICODONE) 5 MG immediate release tablet Take 1 tablet by mouth Every 6 (Six) Hours As Needed for Severe Pain. (Patient taking differently: Take 1 tablet by mouth Every 6 (Six) Hours As Needed for Severe Pain. Took 0.5 tablets last night) 12 tablet 0 11/16/2024    pancrelipase, Lip-Prot-Amyl, (CREON) 75222-58140 units capsule delayed-release particles capsule Take 2 capsules by mouth 3 (Three) Times a Day With Meals. 180 capsule 0 11/16/2024    pantoprazole (PROTONIX) 40 MG EC tablet Take 1 tablet by mouth Daily. Waiting for refill   Past Week    potassium chloride 10 MEQ CR tablet Take 1 tablet by mouth. Taking OTC once daily   11/16/2024    risperiDONE (risperDAL) 0.5 MG tablet Take 2 tablets by mouth Every Night. Pt ran out and last dose was Thursday night   Past Week    solifenacin (VESICARE) 10 MG tablet Take 1 tablet by mouth. Takes 1-2 times per week, does not like SE (dry mouth)   11/16/2024    thiamine (VITAMIN B1) 100 MG tablet Take 1 tablet by mouth Daily. 30 tablet 0 11/16/2024    Apoaequorin (PREVAGEN PO) Take 1 dose by mouth Daily. (Patient not taking: Reported on 11/17/2024)   Not Taking    lamoTRIgine (LaMICtal) 25 MG tablet Take 1 tablet by mouth 2 (Two) Times a Day. (Patient not taking: Reported on 11/4/2024)       levothyroxine (SYNTHROID, LEVOTHROID) 100 MCG tablet TAKE ONE TABLET BY MOUTH DAILY  "(Patient not taking: Reported on 2024) 30 tablet 3 Not Taking    nadolol (CORGARD) 40 MG tablet Take 1 tablet by mouth Daily. (Patient not taking: Reported on 2024)   Not Taking       Allergies:  Allergies   Allergen Reactions    Benzonatate Nausea Only and Other (See Comments)     Rash     Ketorolac Tromethamine Other (See Comments)     \"I cannot take because of liver problems\"    Phenergan [Promethazine Hcl] Hives    Cucumber Extract Rash    Promethazine-Phenylephrine Other (See Comments)     \"FEEL WEIRD\"     Immunizations:  Immunization History   Administered Date(s) Administered    COVID-19 (MODERNA) 1st,2nd,3rd Dose Monovalent 2021    COVID-19 (UNSPECIFIED) 07/15/2021    Influenza, Unspecified 2016, 10/11/2016, 10/01/2018     Social History:   Social History     Social History Narrative    Not on file     Social History     Socioeconomic History    Marital status:    Tobacco Use    Smoking status: Some Days     Current packs/day: 0.25     Average packs/day: 0.3 packs/day for 15.0 years (3.8 ttl pk-yrs)     Types: Cigarettes     Passive exposure: Current    Smokeless tobacco: Never    Tobacco comments:     Rarely smoke sometimes will to   Vaping Use    Vaping status: Never Used   Substance and Sexual Activity    Alcohol use: Not Currently     Alcohol/week: 5.0 - 10.0 standard drinks of alcohol     Comment: 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    Drug use: Never    Sexual activity: Yes     Partners: Male     Birth control/protection: Post-menopausal     Comment: cinthia- menepausal       Family History:  Family History   Problem Relation Age of Onset    Rheumatologic disease Mother         congestive heart diseased    Osteoporosis Mother             Thyroid disease Father     Leukemia Father     Cancer Father         Leukemia  deseased     Broken bones Father     Clotting disorder Father     Drug abuse Brother     Malig Hyperthermia Neg Hx         Review of Systems  See history of " "present illness and past medical history.  Patient denies headache, dizziness, syncope, falls, trauma, change in vision, change in hearing, change in taste, changes in weight, changes in appetite, focal weakness, numbness, or paresthesia.  Patient denies chest pain, palpitations, dyspnea, orthopnea, PND, cough, sinus pressure, rhinorrhea, epistaxis, hemoptysis, nausea, vomiting,hematemesis, diarrhea, constipation or hematochezia.  Denies cold or heat intolerance, polydipsia, polyuria, polyphagia. Denies hematuria, pyuria, dysuria, hesitancy, frequency or urgency. Denies consumption of raw and under cooked meats foods or change in water source.  Denies fever, chills, sweats, night sweats.  Denies missing any routine medications. Remainder of ROS is negative.    Objective:  T Max 24 hrs: Temp (24hrs), Av.5 °F (36.9 °C), Min:98 °F (36.7 °C), Max:98.9 °F (37.2 °C)    Vitals Ranges:   Temp:  [98 °F (36.7 °C)-98.9 °F (37.2 °C)] 98.2 °F (36.8 °C)  Heart Rate:  [] 96  Resp:  [18] 18  BP: (106-149)/(71-97) 106/94      Exam:  /94 (BP Location: Right arm, Patient Position: Lying)   Pulse 96   Temp 98.2 °F (36.8 °C) (Oral)   Resp 18   Ht 165.1 cm (65\")   Wt 66.8 kg (147 lb 4.3 oz)   LMP 2013 Comment: NATANAEL  SpO2 100%   BMI 24.51 kg/m²     General Appearance:    Alert, cooperative, no distress, appears stated age   Head:    Normocephalic, without obvious abnormality, atraumatic   Eyes:    PERRL, conjunctivae/corneas clear, EOM's intact, both eyes   Ears:    Normal external ear canals, both ears   Nose:   Nares normal, septum midline, mucosa normal, no drainage    or sinus tenderness   Throat:   Lips, mucosa, and tongue normal   Neck:   Supple, symmetrical, trachea midline, no adenopathy;     thyroid:  no enlargement/tenderness/nodules; no carotid    bruit or JVD   Back:     Symmetric, no curvature, ROM normal, no CVA tenderness   Lungs:     Decreased breath sounds bilaterally, respirations unlabored "   Chest Wall:    No tenderness or deformity    Heart:    Regular rate and rhythm, S1 and S2 normal, no murmur, rub   or gallop   Abdomen:     Soft, nontender, bowel sounds active all four quadrants,     no masses, no hepatomegaly, no splenomegaly   Extremities:   Extremities normal, atraumatic, no cyanosis or edema                       .    Data Review:  Labs in chart were reviewed.  WBC   Date Value Ref Range Status   11/17/2024 4.55 3.40 - 10.80 10*3/mm3 Final     Hemoglobin   Date Value Ref Range Status   11/17/2024 16.3 (H) 12.0 - 15.9 g/dL Final     Hematocrit   Date Value Ref Range Status   11/17/2024 47.8 (H) 34.0 - 46.6 % Final     Platelets   Date Value Ref Range Status   11/17/2024 56 (L) 140 - 450 10*3/mm3 Final     Sodium   Date Value Ref Range Status   11/17/2024 140 136 - 145 mmol/L Final     Potassium   Date Value Ref Range Status   11/17/2024 3.6 3.5 - 5.2 mmol/L Final     Comment:     Specimen hemolyzed.  Result may be falsely elevated.     Chloride   Date Value Ref Range Status   11/17/2024 103 98 - 107 mmol/L Final     CO2   Date Value Ref Range Status   11/17/2024 19.1 (L) 22.0 - 29.0 mmol/L Final     BUN   Date Value Ref Range Status   11/17/2024 9 6 - 20 mg/dL Final     Creatinine   Date Value Ref Range Status   11/17/2024 0.72 0.57 - 1.00 mg/dL Final     Glucose   Date Value Ref Range Status   11/17/2024 117 (H) 65 - 99 mg/dL Final     Calcium   Date Value Ref Range Status   11/17/2024 9.5 8.6 - 10.5 mg/dL Final     Magnesium   Date Value Ref Range Status   11/17/2024 1.4 (L) 1.6 - 2.6 mg/dL Final     Phosphorus   Date Value Ref Range Status   11/17/2024 2.7 2.5 - 4.5 mg/dL Final     AST (SGOT)   Date Value Ref Range Status   11/17/2024 194 (H) 1 - 32 U/L Final     Comment:     Specimen hemolyzed.  Result may be falsely elevated.     ALT (SGPT)   Date Value Ref Range Status   11/17/2024 73 (H) 1 - 33 U/L Final     Comment:     Specimen hemolyzed.  Result may  be falsely elevated.     Alkaline  Phosphatase   Date Value Ref Range Status   11/17/2024 162 (H) 39 - 117 U/L Final                Imaging Results (All)       Procedure Component Value Units Date/Time    CT Abdomen Pelvis With Contrast [[id number]] Collected: 11/17/24 1340     Updated: 11/17/24 1340    Narrative:      CT OF THE ABDOMEN AND PELVIS WITH CONTRAST 11/17/2024     HISTORY: Abdominal pain.     Spiral images were obtained from the lung bases to the symphysis pubis  after intravenous contrast. No oral contrast was given.     There is mild bilateral lower lobe probable atelectasis.     There is severe fatty infiltration of the liver. Minimal cholelithiasis  is seen. No gallbladder inflammatory changes are seen. Spleen is at the  upper limits of normal for size. Pancreas is atrophic and there are  multiple small calcifications in the pancreas likely from changes of  chronic pancreatitis. Minimal haziness around the pancreatic head is  seen. Adrenals and kidneys appear unremarkable. Uterus and urinary  bladder appear unremarkable except for a tiny air bubble in the anterior  aspect of the urinary bladder on image 128 possibly from Manzo  catheterization. There is some submucosal fat deposition in the colon  probably from chronic inflammation but no acute bowel wall thickening or  bowel dilatation is seen. No significant free fluid is seen.       Impression:      1. Pancreatic atrophy with pancreatic calcifications likely from chronic  pancreatitis.  2. Mild injection around the pancreatic head could be related to mild  changes of acute pancreatitis. Please correlate.  3. Fatty infiltration of the liver.  4. Mild bilateral lower lobe atelectasis.  5. Cholelithiasis.  6. Tiny air bubble in the anterior urinary bladder. Please correlate for  possible Manzo catheterization.     Radiation dose reduction techniques were utilized, including automated  exposure control and exposure modulation based on body size.                    Assessment:  Active  Hospital Problems    Diagnosis  POA    **Alcohol withdrawal [F10.939]  Yes      Resolved Hospital Problems   No resolved problems to display.   Alcohol dependence  Hypothyroidism  Cirrhosis  Hyperglycemia  Acute on chronic pancreatitis  Uti  Hypomagnesemia  hypertension    Plan:  Replace mag  Antibiotics  [facility] protocol  Access center to see  Monitor on telemetry  Dw patient and ed provider    Carolann Scott MD  11/17/2024  18:27 EST      Electronically signed by Carolann Scott MD at 11/17/24 1831          Emergency Department Notes        Harriett Harmon, RN at 11/17/24 1355          ..Nursing report ED to floor  Bekah Gibson  54 y.o.  female    HPI :  HPI  Stated Reason for Visit: vomiting  History Obtained From: EMS  Duration (Days): 1    Chief Complaint  Chief Complaint   Patient presents with    Vomiting       Admitting doctor:   Carolann Scott MD    Admitting diagnosis:   The primary encounter diagnosis was Nausea and vomiting, unspecified vomiting type. Diagnoses of Alcoholic ketoacidosis, Hypomagnesemia, Alcohol abuse with withdrawal, and UTI (urinary tract infection) with pyuria were also pertinent to this visit.    Code status:   Current Code Status       Date Active Code Status Order ID Comments User Context       11/17/2024 1357 CPR (Attempt to Resuscitate) [id number]  Carolann Scott MD ED        Question Answer    Code Status (Patient has no pulse and is not breathing) CPR (Attempt to Resuscitate)    Medical Interventions (Patient has pulse or is breathing) Full                    Allergies:   Benzonatate, Ketorolac tromethamine, Phenergan [promethazine hcl], Cucumber extract, and Promethazine-phenylephrine    Isolation:   No active isolations    Intake and Output    Intake/Output Summary (Last 24 hours) at 11/17/2024 1355  Last data filed at 11/17/2024 1130  Gross per 24 hour   Intake 100 ml   Output --   Net 100 ml       Weight:       11/17/24  0836   Weight: 66.8 kg (147  lb 4.3 oz)       Most recent vitals:   Vitals:    11/17/24 1055 11/17/24 1131 11/17/24 1201 11/17/24 1301   BP: 123/80 132/71 143/80 149/97   Pulse: 101 101 116 93   Resp:       Temp:    98 °F (36.7 °C)   TempSrc:    Oral   SpO2: 94% 94% 92% 96%   Weight:       Height:           Active LDAs/IV Access:   Lines, Drains & Airways       Active LDAs       Name Placement date Placement time Site Days    Peripheral IV 11/17/24 0915 Posterior;Proximal;Right Forearm 11/17/24 0915  Forearm  less than 1                    Labs (abnormal labs have a star):   Labs Reviewed   COMPREHENSIVE METABOLIC PANEL - Abnormal; Notable for the following components:       Result Value    Glucose 117 (*)     CO2 19.1 (*)     ALT (SGPT) 73 (*)     AST (SGOT) 194 (*)     Alkaline Phosphatase 162 (*)     Total Bilirubin 2.1 (*)     Anion Gap 17.9 (*)     All other components within normal limits    Narrative:     GFR Normal >60  Chronic Kidney Disease <60  Kidney Failure <15     PROTIME-INR - Abnormal; Notable for the following components:    Protime 17.0 (*)     INR 1.36 (*)     All other components within normal limits   URINALYSIS W/ MICROSCOPIC IF INDICATED (NO CULTURE) - Abnormal; Notable for the following components:    Color, UA Savi (*)     Appearance, UA Turbid (*)     Ketones, UA Trace (*)     Blood, UA Moderate (2+) (*)     Protein,  mg/dL (2+) (*)     Leuk Esterase, UA Large (3+) (*)     Nitrite, UA Positive (*)     All other components within normal limits   ETHANOL - Abnormal; Notable for the following components:    Ethanol 71 (*)     All other components within normal limits   URINE DRUG SCREEN - Abnormal; Notable for the following components:    Opiate Screen Positive (*)     Oxycodone Screen, Urine Positive (*)     All other components within normal limits    Narrative:     Negative Thresholds Per Drugs Screened:    Amphetamines                 500 ng/ml  Barbiturates                 200 ng/ml  Benzodiazepines               100 ng/ml  Cocaine                      300 ng/ml  Methadone                    300 ng/ml  Opiates                      300 ng/ml  Oxycodone                    100 ng/ml  THC                           50 ng/ml  Fentanyl                       5 ng/ml      The Normal Value for all drugs tested is negative. This report includes final unconfirmed screening results to be used for medical treatment purposes only. Unconfirmed results must not be used for non-medical purposes such as employment or legal testing. Clinical consideration should be applied to any drug of abuse test, particularly when unconfirmed results are used.           MAGNESIUM - Abnormal; Notable for the following components:    Magnesium 1.4 (*)     All other components within normal limits   CBC WITH AUTO DIFFERENTIAL - Abnormal; Notable for the following components:    Hemoglobin 16.3 (*)     Hematocrit 47.8 (*)     MPV 12.2 (*)     Platelets 56 (*)     All other components within normal limits   URINALYSIS, MICROSCOPIC ONLY - Abnormal; Notable for the following components:    RBC, UA Too Numerous to Count (*)     WBC, UA Too Numerous to Count (*)     Bacteria, UA 4+ (*)     All other components within normal limits   LIPASE - Normal   PHOSPHORUS - Normal   AMMONIA - Normal   URINE CULTURE   BLOOD CULTURE   BLOOD CULTURE   CBC AND DIFFERENTIAL    Narrative:     The following orders were created for panel order CBC & Differential.  Procedure                               Abnormality         Status                     ---------                               -----------         ------                     CBC Auto Differential[[id number]]        Abnormal            Final result                 Please view results for these tests on the individual orders.       EKG:   No orders to display       Meds given in ED:   Medications   sodium chloride 0.9 % flush 10 mL (has no administration in time range)   acetaminophen (TYLENOL) tablet 650 mg (has no  administration in time range)     Or   acetaminophen (TYLENOL) 160 MG/5ML oral solution 650 mg (has no administration in time range)     Or   acetaminophen (TYLENOL) suppository 325 mg (has no administration in time range)   ondansetron ODT (ZOFRAN-ODT) disintegrating tablet 4 mg (has no administration in time range)     Or   ondansetron (ZOFRAN) injection 4 mg (has no administration in time range)   melatonin tablet 2.5 mg (has no administration in time range)   sennosides-docusate (PERICOLACE) 8.6-50 MG per tablet 2 tablet (has no administration in time range)     And   polyethylene glycol (MIRALAX) packet 17 g (has no administration in time range)     And   bisacodyl (DULCOLAX) EC tablet 5 mg (has no administration in time range)     And   bisacodyl (DULCOLAX) suppository 10 mg (has no administration in time range)   cloNIDine (CATAPRES) tablet 0.1 mg (has no administration in time range)   Magnesium Standard Dose Replacement - Follow Nurse / BPA Driven Protocol (has no administration in time range)   thiamine (B-1) injection 200 mg (has no administration in time range)     Followed by   thiamine (VITAMIN B-1) tablet 100 mg (has no administration in time range)   folic acid (FOLVITE) tablet 1 mg (has no administration in time range)   multivitamin with minerals 1 tablet (has no administration in time range)   LORazepam (ATIVAN) tablet 2 mg (has no administration in time range)     Followed by   LORazepam (ATIVAN) tablet 1 mg (has no administration in time range)     Followed by   LORazepam (ATIVAN) tablet 1 mg (has no administration in time range)     Followed by   LORazepam (ATIVAN) tablet 1 mg (has no administration in time range)   LORazepam (ATIVAN) tablet 1 mg (has no administration in time range)     Or   LORazepam (ATIVAN) injection 1 mg (has no administration in time range)     Or   LORazepam (ATIVAN) tablet 2 mg (has no administration in time range)     Or   LORazepam (ATIVAN) injection 2 mg (has no  administration in time range)     Or   LORazepam (ATIVAN) injection 2 mg (has no administration in time range)     Or   LORazepam (ATIVAN) injection 2 mg (has no administration in time range)   morphine injection 2 mg (has no administration in time range)   cefTRIAXone (ROCEPHIN) 1,000 mg in sodium chloride 0.9 % 100 mL MBP (has no administration in time range)   sodium chloride 0.9 % bolus 500 mL (0 mL Intravenous Stopped 11/17/24 1020)   ondansetron (ZOFRAN) injection 4 mg (4 mg Intravenous Given 11/17/24 0915)   pantoprazole (PROTONIX) injection 80 mg (80 mg Intravenous Given 11/17/24 0950)   morphine injection 2 mg (2 mg Intravenous Given 11/17/24 0946)   magnesium sulfate 2g/50 mL (PREMIX) infusion (0 g Intravenous Stopped 11/17/24 1020)   LORazepam (ATIVAN) injection 1 mg (1 mg Intravenous Given 11/17/24 0946)   cefTRIAXone (ROCEPHIN) 1,000 mg in sodium chloride 0.9 % 100 mL MBP (0 mg Intravenous Stopped 11/17/24 1130)   iopamidol (ISOVUE-300) 61 % injection 100 mL (85 mL Intravenous Given by Other 11/17/24 1114)   morphine injection 2 mg (2 mg Intravenous Given 11/17/24 1308)       Imaging results:  CT Abdomen Pelvis With Contrast    Result Date: 11/17/2024  1. Pancreatic atrophy with pancreatic calcifications likely from chronic pancreatitis. 2. Mild injection around the pancreatic head could be related to mild changes of acute pancreatitis. Please correlate. 3. Fatty infiltration of the liver. 4. Mild bilateral lower lobe atelectasis. 5. Cholelithiasis. 6. Tiny air bubble in the anterior urinary bladder. Please correlate for possible Manzo catheterization.  Radiation dose reduction techniques were utilized, including automated exposure control and exposure modulation based on body size.        Ambulatory status:   - up with assist x2    Social issues:   Social History     Socioeconomic History    Marital status:    Tobacco Use    Smoking status: Some Days     Current packs/day: 0.25     Average  packs/day: 0.3 packs/day for 15.0 years (3.8 ttl pk-yrs)     Types: Cigarettes     Passive exposure: Current    Smokeless tobacco: Never    Tobacco comments:     Rarely smoke sometimes will to   Vaping Use    Vaping status: Never Used   Substance and Sexual Activity    Alcohol use: Not Currently     Alcohol/week: 5.0 - 10.0 standard drinks of alcohol     Comment: 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    Drug use: Never    Sexual activity: Yes     Partners: Male     Birth control/protection: Post-menopausal     Comment: cinthia- menepausal       Peripheral Neurovascular  Peripheral Neurovascular (Adult)  Peripheral Neurovascular WDL: WDL, pulse assessment  Pulse Assessment: radial    Neuro Cognitive  Neuro Cognitive (Adult)  Cognitive/Neuro/Behavioral WDL: mood/behavior  Orientation: oriented x 4  Speech: clear, logical  Mood/Behavior: cooperative, calm    Learning  Learning Assessment  Learning Readiness and Ability: no barriers identified    Respiratory  Respiratory WDL  Respiratory WDL: WDL, rhythm/pattern  Rhythm/Pattern, Respiratory: depth regular, pattern regular, unlabored, no shortness of breath reported    Abdominal Pain       Pain Assessments  Pain (Adult)  (0-10) Pain Rating: Rest: 7  (0-10) Pain Rating: Activity: 7  Pain Location: abdomen  Pain Side/Orientation: generalized  Pain Description: squeezing, tightness  Pain Management Interventions: quiet environment facilitated, pillow support provided, position adjusted, pain management plan reviewed with patient/caregiver, relaxation techniques promoted  Response to Pain Interventions: interventions effective per patient, nonverbal indicators absent/decreased    NIH Stroke Scale       Harriett Harmon RN  11/17/24 13:55 EST      Electronically signed by Harriett Harmon RN at 11/17/24 5006       Joanne Mckeon MD at 11/17/24 0837           EMERGENCY DEPARTMENT ENCOUNTER    Room Number:  31/31  PCP: Tylor Davis  Independent Historians: Patient and EMS    HPI:  Chief  Complaint: had concerns including Vomiting.      A complete HPI/ROS/PMH/PSH/SH/FH are unobtainable due to: None    Chronic or social conditions impacting patient care (Social Determinants of Health): None      Context: Bekah Gibson is a 54 y.o. female with a medical history of alcohol abuse with cirrhosis, hypothyroidism, pancreatitis who presents to the ED c/o acute nausea and vomiting that started early this morning.  Patient's last drink was yesterday.  Patient unable to tolerate even water and currently dry heaving.  Patient denies any dysuria, hematuria, fevers, cough.  Patient does endorse generalized abdominal cramping.        Review of prior external notes (non-ED) -and- Review of prior external test results outside of this encounter: I reviewed discharge summary from patient's last admission.  Patient admitted with acute on chronic alcohol induced pancreatitis.  Patient transition to p.o. diet with alcohol withdrawal treated during her stay.    Prescription drug monitoring program review:     N/A    PAST MEDICAL HISTORY  Active Ambulatory Problems     Diagnosis Date Noted    Irritable bowel syndrome with both constipation and diarrhea 06/05/2017    Peripheral neuropathy 06/05/2017    Vitamin D deficiency 06/05/2017    History of HPV infection 06/05/2017    Hypothyroidism 06/05/2017    Tobacco dependence due to cigarettes 06/05/2017    Acute kidney injury 12/28/2015    Ascites 06/06/2016    E. coli UTI (urinary tract infection) 06/06/2016    Alcoholic hepatitis 06/29/2018    GI bleed 06/29/2018    Acute post-hemorrhagic anemia 06/29/2018    Thrombocytopenia 06/29/2018    Open wound of right shoulder region 06/29/2018    Pancreatic insufficiency 07/04/2018    Alcoholic ketoacidosis 07/21/2019    Chronic alcohol abuse 07/29/2019    ESBL (extended spectrum beta-lactamase) producing bacteria infection 07/29/2019    Abnormal weight loss 12/13/2019    Hashimoto's disease 12/13/2019    Chronic fatigue 12/14/2019     History of alcohol use disorder 09/21/2021    Alcohol intoxication 09/21/2021    Lupus     Hypomagnesemia     Pancytopenia     Alcoholic cirrhosis     Bilateral lower abdominal discomfort 09/21/2021    Primary hypertension 10/24/2022    Melena 10/24/2022    Arthritis of shoulder 12/19/2022    Esophageal varices 01/16/2023    Essential hypertension 01/16/2023    Alcohol-induced chronic pancreatitis 01/22/2023    Abdominal pain 01/22/2023    Pancreatitis 10/07/2023    Leukopenia 10/07/2023    Epigastric pain 04/01/2024    Acute alcoholic gastritis without hemorrhage 04/01/2024    Alcohol-induced acute on chronic pancreatitis without infection or necrosis 07/03/2024    Alcohol withdrawal 07/03/2024    Acute on chronic pancreatitis 07/22/2024    Transaminitis 07/22/2024    Cholelithiasis 07/22/2024    Alcohol use 07/22/2024    WBC decreased 07/24/2024    Hypokalemia 07/24/2024    Urinary tract infection 07/24/2024    Neutropenia 07/24/2024    Polysubstance abuse 08/05/2024    Alcohol dependence with withdrawal 10/24/2024     Resolved Ambulatory Problems     Diagnosis Date Noted    Acute renal failure 02/20/2017    Kidney stone 06/05/2017    Alcohol withdrawal syndrome, with delirium 06/29/2018    Hypotension 07/01/2018    Nausea and vomiting 09/21/2021    Acute GI bleeding 10/24/2022     Past Medical History:   Diagnosis Date    Acromioclavicular separation 1/2021    Ankle sprain 2/2004    Anxiety     Arthritis     Arthritis of back Unsure    Cirrhosis     Disease of thyroid gland     ETOH abuse     Fracture, clavicle 1/2021    Fracture, foot Unsure    Frozen shoulder 1 /2009    GERD (gastroesophageal reflux disease)     History of kidney stones     Hypertension     Left shoulder pain     Low back strain 1/21    Lumbosacral disc disease 1/21    MDD (major depressive disorder)     Neck strain Unsure    Periarthritis of shoulder see above    Rotator cuff syndrome odre for shoulder replacement    Tennis elbow Unsure          PAST SURGICAL HISTORY  Past Surgical History:   Procedure Laterality Date    CLAVICLE SURGERY Right 2018    ENDOSCOPY N/A 10/26/2022    Procedure: ESOPHAGOGASTRODUODENOSCOPY wtih banding;  Surgeon: Ag Patel MD;  [facility];  Service: Gastroenterology;  Laterality: N/A;  pre: melena  post: esophageal varicies with banding x2 bands    ENDOSCOPY N/A 2023    Procedure: ESOPHAGOGASTRODUODENOSCOPY;  Surgeon: Ag Patel MD;  [facility];  Service: Gastroenterology;  Laterality: N/A;  PRE OP - MAROON STOOLS  POST OP - SM ESOPHAGEAL VARICES    ESOPHAGEAL VARICES LIGATION      FOOT SURGERY  12    JOINT REPLACEMENT Bilateral     GREAT TOE    KIDNEY STONE SURGERY      LITHOTRIPSY AND STENT (R SIDE)    LAPAROSCOPIC TUBAL LIGATION      NECK SURGERY  3/07    Not sure of dates    SIGMOIDOSCOPY N/A 2023    Procedure: SIGMOIDOSCOPY FLEXIBLE;  Surgeon: Ag Patel MD;  Location: [facility];  Service: Gastroenterology;  Laterality: N/A;  PRE OP - MAROON STOOLS  POST OP - RECTAL VARICES    TOTAL SHOULDER ARTHROPLASTY Left 2023    Procedure: reverse total shoulder arthroplasty;  Surgeon: Giovanni Denise MD;  Location:  [facility];  Service: Orthopedics;  Laterality: Left;    TRIGGER POINT INJECTION           FAMILY HISTORY  Family History   Problem Relation Age of Onset    Rheumatologic disease Mother         congestive heart diseased    Osteoporosis Mother             Thyroid disease Father     Leukemia Father     Cancer Father         Leukemia  deseased     Broken bones Father     Clotting disorder Father     Drug abuse Brother     Malig Hyperthermia Neg Hx          SOCIAL HISTORY  Social History     Socioeconomic History    Marital status:    Tobacco Use    Smoking status: Some Days     Current packs/day: 0.25     Average packs/day: 0.3 packs/day for 15.0 years (3.8 ttl pk-yrs)     Types: Cigarettes     Passive exposure:  Current    Smokeless tobacco: Never    Tobacco comments:     Rarely smoke sometimes will to   Vaping Use    Vaping status: Never Used   Substance and Sexual Activity    Alcohol use: Not Currently     Alcohol/week: 5.0 - 10.0 standard drinks of alcohol     Comment: 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    Drug use: Never    Sexual activity: Yes     Partners: Male     Birth control/protection: Post-menopausal     Comment: cinthia- menepausal         ALLERGIES  Benzonatate, Ketorolac tromethamine, Phenergan [promethazine hcl], Cucumber extract, and Promethazine-phenylephrine        REVIEW OF SYSTEMS  Review of Systems  Included in HPI  All systems reviewed and negative except for those discussed in HPI.      PHYSICAL EXAM    I have reviewed the triage vital signs and nursing notes.    ED Triage Vitals [11/17/24 0836]   Temp Heart Rate Resp BP SpO2   98.9 °F (37.2 °C) 104 18 132/74 97 %      Temp src Heart Rate Source Patient Position BP Location FiO2 (%)   -- -- -- -- --       Physical Exam  GENERAL: Cooperative and conversant disheveled female, anxious, alert, no acute distress  SKIN: Warm, dry  HENT: Normocephalic, atraumatic  EYES: no scleral icterus, EOMI  CV: regular rhythm, accelerated rate  RESPIRATORY: normal effort, lungs clear, no wheezing  ABDOMEN: soft, diffuse mild tenderness to palpation with no rebound and no guarding, nondistended  MUSCULOSKELETAL: no deformity  NEURO: alert, moves all extremities, follows commands                                                                   LAB RESULTS  Recent Results (from the past 24 hours)   Urinalysis With Microscopic If Indicated (No Culture) - Urine, Clean Catch    Collection Time: 11/17/24  8:46 AM    Specimen: Urine, Clean Catch   Result Value Ref Range    Color, UA Savi (A) Yellow, Straw    Appearance, UA Turbid (A) Clear    pH, UA 6.0 5.0 - 8.0    Specific Gravity, UA 1.021 1.005 - 1.030    Glucose, UA Negative Negative    Ketones, UA Trace (A) Negative    Bilirubin, UA  Negative Negative    Blood, UA Moderate (2+) (A) Negative    Protein,  mg/dL (2+) (A) Negative    Leuk Esterase, UA Large (3+) (A) Negative    Nitrite, UA Positive (A) Negative    Urobilinogen, UA 1.0 E.U./dL 0.2 - 1.0 E.U./dL   Urine Drug Screen - Urine, Clean Catch    Collection Time: 11/17/24  8:46 AM    Specimen: Urine, Clean Catch   Result Value Ref Range    Amphet/Methamphet, Screen Negative Negative    Barbiturates Screen, Urine Negative Negative    Benzodiazepine Screen, Urine Negative Negative    Cocaine Screen, Urine Negative Negative    Opiate Screen Positive (A) Negative    THC, Screen, Urine Negative Negative    Methadone Screen, Urine Negative Negative    Oxycodone Screen, Urine Positive (A) Negative    Fentanyl, Urine Negative Negative   Urinalysis, Microscopic Only - Urine, Clean Catch    Collection Time: 11/17/24  8:46 AM    Specimen: Urine, Clean Catch   Result Value Ref Range    RBC, UA Too Numerous to Count (A) None Seen, 0-2 /HPF    WBC, UA Too Numerous to Count (A) None Seen, 0-2 /HPF    Bacteria, UA 4+ (A) None Seen /HPF    Squamous Epithelial Cells, UA None Seen None Seen, 0-2 /HPF    Hyaline Casts, UA None Seen None Seen /LPF    Calcium Oxalate Crystals, UA Small/1+ None Seen /HPF    Methodology Manual Light Microscopy    Comprehensive Metabolic Panel    Collection Time: 11/17/24  9:04 AM    Specimen: Blood   Result Value Ref Range    Glucose 117 (H) 65 - 99 mg/dL    BUN 9 6 - 20 mg/dL    Creatinine 0.72 0.57 - 1.00 mg/dL    Sodium 140 136 - 145 mmol/L    Potassium 3.6 3.5 - 5.2 mmol/L    Chloride 103 98 - 107 mmol/L    CO2 19.1 (L) 22.0 - 29.0 mmol/L    Calcium 9.5 8.6 - 10.5 mg/dL    Total Protein 7.5 6.0 - 8.5 g/dL    Albumin 4.2 3.5 - 5.2 g/dL    ALT (SGPT) 73 (H) 1 - 33 U/L    AST (SGOT) 194 (H) 1 - 32 U/L    Alkaline Phosphatase 162 (H) 39 - 117 U/L    Total Bilirubin 2.1 (H) 0.0 - 1.2 mg/dL    Globulin 3.3 gm/dL    A/G Ratio 1.3 g/dL    BUN/Creatinine Ratio 12.5 7.0 - 25.0     Anion Gap 17.9 (H) 5.0 - 15.0 mmol/L    eGFR 98.3 >60.0 mL/min/1.73   Lipase    Collection Time: 11/17/24  9:04 AM    Specimen: Blood   Result Value Ref Range    Lipase 23 13 - 60 U/L   Ethanol    Collection Time: 11/17/24  9:04 AM    Specimen: Blood   Result Value Ref Range    Ethanol 71 (H) 0 - 10 mg/dL    Ethanol % 0.071 %   Magnesium    Collection Time: 11/17/24  9:04 AM    Specimen: Blood   Result Value Ref Range    Magnesium 1.4 (L) 1.6 - 2.6 mg/dL   Phosphorus    Collection Time: 11/17/24  9:04 AM    Specimen: Blood   Result Value Ref Range    Phosphorus 2.7 2.5 - 4.5 mg/dL   Ammonia    Collection Time: 11/17/24  9:04 AM    Specimen: Blood   Result Value Ref Range    Ammonia 29 11 - 51 umol/L   CBC Auto Differential    Collection Time: 11/17/24  9:04 AM    Specimen: Blood   Result Value Ref Range    WBC 4.55 3.40 - 10.80 10*3/mm3    RBC 5.11 3.77 - 5.28 10*6/mm3    Hemoglobin 16.3 (H) 12.0 - 15.9 g/dL    Hematocrit 47.8 (H) 34.0 - 46.6 %    MCV 93.5 79.0 - 97.0 fL    MCH 31.9 26.6 - 33.0 pg    MCHC 34.1 31.5 - 35.7 g/dL    RDW 13.2 12.3 - 15.4 %    RDW-SD 45.8 37.0 - 54.0 fl    MPV 12.2 (H) 6.0 - 12.0 fL    Platelets 56 (L) 140 - 450 10*3/mm3    Neutrophil % 64.4 42.7 - 76.0 %    Lymphocyte % 23.1 19.6 - 45.3 %    Monocyte % 8.8 5.0 - 12.0 %    Eosinophil % 2.6 0.3 - 6.2 %    Basophil % 0.9 0.0 - 1.5 %    Immature Grans % 0.2 0.0 - 0.5 %    Neutrophils, Absolute 2.93 1.70 - 7.00 10*3/mm3    Lymphocytes, Absolute 1.05 0.70 - 3.10 10*3/mm3    Monocytes, Absolute 0.40 0.10 - 0.90 10*3/mm3    Eosinophils, Absolute 0.12 0.00 - 0.40 10*3/mm3    Basophils, Absolute 0.04 0.00 - 0.20 10*3/mm3    Immature Grans, Absolute 0.01 0.00 - 0.05 10*3/mm3   Protime-INR    Collection Time: 11/17/24  9:17 AM    Specimen: Blood   Result Value Ref Range    Protime 17.0 (H) 11.7 - 14.2 Seconds    INR 1.36 (H) 0.90 - 1.10         RADIOLOGY  CT Abdomen Pelvis With Contrast    Result Date: 11/17/2024  CT OF THE ABDOMEN AND PELVIS WITH  CONTRAST 11/17/2024  HISTORY: Abdominal pain.  Spiral images were obtained from the lung bases to the symphysis pubis after intravenous contrast. No oral contrast was given.  There is mild bilateral lower lobe probable atelectasis.  There is severe fatty infiltration of the liver. Minimal cholelithiasis is seen. No gallbladder inflammatory changes are seen. Spleen is at the upper limits of normal for size. Pancreas is atrophic and there are multiple small calcifications in the pancreas likely from changes of chronic pancreatitis. Minimal haziness around the pancreatic head is seen. Adrenals and kidneys appear unremarkable. Uterus and urinary bladder appear unremarkable except for a tiny air bubble in the anterior aspect of the urinary bladder on image 128 possibly from Manzo catheterization. There is some submucosal fat deposition in the colon probably from chronic inflammation but no acute bowel wall thickening or bowel dilatation is seen. No significant free fluid is seen.      1. Pancreatic atrophy with pancreatic calcifications likely from chronic pancreatitis. 2. Mild injection around the pancreatic head could be related to mild changes of acute pancreatitis. Please correlate. 3. Fatty infiltration of the liver. 4. Mild bilateral lower lobe atelectasis. 5. Cholelithiasis. 6. Tiny air bubble in the anterior urinary bladder. Please correlate for possible Manzo catheterization.  Radiation dose reduction techniques were utilized, including automated exposure control and exposure modulation based on body size.          MEDICATIONS GIVEN IN ER  Medications   sodium chloride 0.9 % flush 10 mL (has no administration in time range)   acetaminophen (TYLENOL) tablet 650 mg (has no administration in time range)     Or   acetaminophen (TYLENOL) 160 MG/5ML oral solution 650 mg (has no administration in time range)     Or   acetaminophen (TYLENOL) suppository 325 mg (has no administration in time range)   ondansetron ODT  (ZOFRAN-ODT) disintegrating tablet 4 mg (has no administration in time range)     Or   ondansetron (ZOFRAN) injection 4 mg (has no administration in time range)   melatonin tablet 2.5 mg (has no administration in time range)   sennosides-docusate (PERICOLACE) 8.6-50 MG per tablet 2 tablet (has no administration in time range)     And   polyethylene glycol (MIRALAX) packet 17 g (has no administration in time range)     And   bisacodyl (DULCOLAX) EC tablet 5 mg (has no administration in time range)     And   bisacodyl (DULCOLAX) suppository 10 mg (has no administration in time range)   cloNIDine (CATAPRES) tablet 0.1 mg (has no administration in time range)   Magnesium Standard Dose Replacement - Follow Nurse / BPA Driven Protocol (has no administration in time range)   thiamine (B-1) injection 200 mg (has no administration in time range)     Followed by   thiamine (VITAMIN B-1) tablet 100 mg (has no administration in time range)   folic acid (FOLVITE) tablet 1 mg (has no administration in time range)   multivitamin with minerals 1 tablet (has no administration in time range)   LORazepam (ATIVAN) tablet 2 mg (has no administration in time range)     Followed by   LORazepam (ATIVAN) tablet 1 mg (has no administration in time range)     Followed by   LORazepam (ATIVAN) tablet 1 mg (has no administration in time range)     Followed by   LORazepam (ATIVAN) tablet 1 mg (has no administration in time range)   LORazepam (ATIVAN) tablet 1 mg (has no administration in time range)     Or   LORazepam (ATIVAN) injection 1 mg (has no administration in time range)     Or   LORazepam (ATIVAN) tablet 2 mg (has no administration in time range)     Or   LORazepam (ATIVAN) injection 2 mg (has no administration in time range)     Or   LORazepam (ATIVAN) injection 2 mg (has no administration in time range)     Or   LORazepam (ATIVAN) injection 2 mg (has no administration in time range)   morphine injection 2 mg (has no administration in  time range)   cefTRIAXone (ROCEPHIN) 1,000 mg in sodium chloride 0.9 % 100 mL MBP (has no administration in time range)   sodium chloride 0.9 % bolus 500 mL (0 mL Intravenous Stopped 11/17/24 1020)   ondansetron (ZOFRAN) injection 4 mg (4 mg Intravenous Given 11/17/24 0915)   pantoprazole (PROTONIX) injection 80 mg (80 mg Intravenous Given 11/17/24 0950)   morphine injection 2 mg (2 mg Intravenous Given 11/17/24 0946)   magnesium sulfate 2g/50 mL (PREMIX) infusion (0 g Intravenous Stopped 11/17/24 1020)   LORazepam (ATIVAN) injection 1 mg (1 mg Intravenous Given 11/17/24 0946)   cefTRIAXone (ROCEPHIN) 1,000 mg in sodium chloride 0.9 % 100 mL MBP (0 mg Intravenous Stopped 11/17/24 1130)   iopamidol (ISOVUE-300) 61 % injection 100 mL (85 mL Intravenous Given by Other 11/17/24 1114)   morphine injection 2 mg (2 mg Intravenous Given 11/17/24 1308)         ORDERS PLACED DURING THIS VISIT:  Orders Placed This Encounter   Procedures    Urine Culture - Urine,    Blood Culture - Blood,    Blood Culture - Blood,    CT Abdomen Pelvis With Contrast    Comprehensive Metabolic Panel    Protime-INR    Lipase    Urinalysis With Microscopic If Indicated (No Culture) - Urine, Clean Catch    Ethanol    Urine Drug Screen - Urine, Clean Catch    Magnesium    Phosphorus    Ammonia    CBC Auto Differential    Urinalysis, Microscopic Only - Urine, Clean Catch    Basic Metabolic Panel    CBC (No Diff)    Diet: Cardiac; Healthy Heart (2-3 Na+); Fluid Consistency: Thin (IDDSI 0)    Vital Signs    Up with assistance    Daily Weights    Strict Intake & Output    Oral Care    Place Sequential Compression Device    Maintain Sequential Compression Device    Vital Signs    Continuous Pulse Oximetry    Obtain baseline Clinical [place] Withdrawal Assessment - Ar, sedation scale, and vital signs    Clinical [place] Withdrawal Assessment (CIWA-Ar)    If CIWA-Ar Score Less Than 8 For 3 Consecutive Assessments, Monitor Every 4 Hours & Discontinue  Assessment When CIWA-Ar Less Than 8 for 24 Hours    Obtain pre and post sedation scores with every Lorazepam dose    Notify physician for breakthrough symptoms of withdrawal and to restart protocol    Notify physician of abnormal lab results    Notify physician    Code Status and Medical Interventions: CPR (Attempt to Resuscitate); Full    [facility] (on-call MD unless specified) Details    Inpatient consult to Access Center    Insert Peripheral IV    Inpatient Admission    Seizure Precautions    Fall Precautions    Safety Precautions    CBC & Differential         OUTPATIENT MEDICATION MANAGEMENT:  Current Facility-Administered Medications Ordered in Epic   Medication Dose Route Frequency Provider Last Rate Last Admin    acetaminophen (TYLENOL) tablet 650 mg  650 mg Oral Q4H PRN Carolann Scott MD        Or    acetaminophen (TYLENOL) 160 MG/5ML oral solution 650 mg  650 mg Oral Q4H PRN Carolann Scott MD        Or    acetaminophen (TYLENOL) suppository 325 mg  325 mg Rectal Q4H PRN Carolann Scott MD        sennosides-docusate (PERICOLACE) 8.6-50 MG per tablet 2 tablet  2 tablet Oral BID PRN Carolann Scott MD        And    polyethylene glycol (MIRALAX) packet 17 g  17 g Oral Daily PRN Carolann Scott MD        And    bisacodyl (DULCOLAX) EC tablet 5 mg  5 mg Oral Daily PRN Carolann Scott MD        And    bisacodyl (DULCOLAX) suppository 10 mg  10 mg Rectal Daily PRN Carolann Scott MD        [START ON 11/18/2024] cefTRIAXone (ROCEPHIN) 1,000 mg in sodium chloride 0.9 % 100 mL MBP  1,000 mg Intravenous Q24H Carolann Scott MD        cloNIDine (CATAPRES) tablet 0.1 mg  0.1 mg Oral Q4H PRN Carolann Scott MD        folic acid (FOLVITE) tablet 1 mg  1 mg Oral Daily Carolann Scott MD        LORazepam (ATIVAN) tablet 1 mg  1 mg Oral Q1H PRN Carolann Scott MD        Or    LORazepam (ATIVAN) injection 1 mg  1 mg Intravenous Q1H PRN Carolann Scott  MD Roman        Or    LORazepam (ATIVAN) tablet 2 mg  2 mg Oral Q1H PRN StingCarolann jain MD        Or    LORazepam (ATIVAN) injection 2 mg  2 mg Intravenous Q1H PRN Carolann Scott MD        Or    LORazepam (ATIVAN) injection 2 mg  2 mg Intravenous Q15 Min PRN Carolann Scott MD        Or    LORazepam (ATIVAN) injection 2 mg  2 mg Intramuscular Q15 Min PRN Tyler, Carolann Owens MD        LORazepam (ATIVAN) tablet 2 mg  2 mg Oral Q6H Tyler, Carolann Owens MD        Followed by    [START ON 11/18/2024] LORazepam (ATIVAN) tablet 1 mg  1 mg Oral Q6H Carolann Scott MD        Followed by    [START ON 11/19/2024] LORazepam (ATIVAN) tablet 1 mg  1 mg Oral Q12H Carolann Scott MD        Followed by    [START ON 11/20/2024] LORazepam (ATIVAN) tablet 1 mg  1 mg Oral Daily Stingcristobal, Carolann Owens MD        Magnesium Standard Dose Replacement - Follow Nurse / BPA Driven Protocol   Not Applicable PRN Stingl, Carolann Owens MD        melatonin tablet 2.5 mg  2.5 mg Oral Nightly PRN Stingcristobal, Carolann Owens MD        morphine injection 2 mg  2 mg Intravenous Q4H PRN Stingcristobal, Carolann Owens MD        multivitamin with minerals 1 tablet  1 tablet Oral Daily Stingcristobal, Carolann Owens MD        ondansetron ODT (ZOFRAN-ODT) disintegrating tablet 4 mg  4 mg Oral Q6H PRN Carolann Scott MD        Or    ondansetron (ZOFRAN) injection 4 mg  4 mg Intravenous Q6H PRN Carolann Scott MD        sodium chloride 0.9 % flush 10 mL  10 mL Intravenous Q12H Callie Quiroz MD        sodium chloride 0.9 % flush 10 mL  10 mL Intravenous PRN Callie Quiroz MD        sodium chloride 0.9 % flush 10 mL  10 mL Intravenous PRN Joanne Mckeon MD        sodium chloride 0.9 % flush 20 mL  20 mL Intravenous PRN Callie Quiroz MD        thiamine (B-1) injection 200 mg  200 mg Intravenous Q8H Stingcristobal, Carolann Owens MD        Followed by    [START ON 11/22/2024]  thiamine (VITAMIN B-1) tablet 100 mg  100 mg Oral Daily Carolann Scott MD         Current Outpatient Medications Ordered in Epic   Medication Sig Dispense Refill    amLODIPine (NORVASC) 2.5 MG tablet Take 1 tablet by mouth Daily.      bismuth subsalicylate (PEPTO BISMOL) 262 MG/15ML suspension Take 15 mL by mouth Every 6 (Six) Hours As Needed for Indigestion. Pt reports filling predosed cup about 3/4 full and drinks daily.      ferrous sulfate 325 (65 FE) MG tablet Take 1 tablet by mouth Daily With Breakfast. Taking OTC      FLUoxetine (PROzac) 20 MG tablet Take 3 tablets by mouth Daily.      folic acid (FOLVITE) 1 MG tablet Take 1 tablet by mouth Daily.      Ibuprofen 3 %, Gabapentin 10 %, Baclofen 2 %, lidocaine 4 % Apply 1-2 g topically to the appropriate area as directed 3 (Three) to 4 (Four) times daily. (Patient taking differently: Apply 1-2 g topically to the appropriate area as directed 3 (Three) Times a Day As Needed. Applies to shoulder prn) 90 g 5    lactulose (CHRONULAC) 10 GM/15ML solution Take 15 mL by mouth Every Morning.      levothyroxine (SYNTHROID, LEVOTHROID) 88 MCG tablet Take 1 tablet by mouth Daily.      MAGnesium-Oxide 400 (240 Mg) MG tablet Take 1 tablet by mouth Daily. Takes OTC      mirtazapine (REMERON) 15 MG tablet Take 1 tablet by mouth Every Night. Takes PRN      Multiple Vitamin (MULTIVITAMIN) capsule Take 1 capsule by mouth Daily.      ondansetron ODT (ZOFRAN-ODT) 4 MG disintegrating tablet Place 1 tablet on the tongue Every 8 (Eight) Hours As Needed for Nausea or Vomiting. (Patient taking differently: Place 1 tablet on the tongue Every 8 (Eight) Hours As Needed for Nausea or Vomiting. Currently out of prescription, ran out about a week ago) 10 tablet 0    oxyCODONE (ROXICODONE) 5 MG immediate release tablet Take 1 tablet by mouth Every 6 (Six) Hours As Needed for Severe Pain. (Patient taking differently: Take 1 tablet by mouth Every 6 (Six) Hours As Needed for Severe Pain.  Took 0.5 tablets last night) 12 tablet 0    pancrelipase, Lip-Prot-Amyl, (CREON) 63539-47212 units capsule delayed-release particles capsule Take 2 capsules by mouth 3 (Three) Times a Day With Meals. 180 capsule 0    pantoprazole (PROTONIX) 40 MG EC tablet Take 1 tablet by mouth Daily. Waiting for refill      potassium chloride 10 MEQ CR tablet Take 1 tablet by mouth. Taking OTC once daily      risperiDONE (risperDAL) 0.5 MG tablet Take 2 tablets by mouth Every Night. Pt ran out and last dose was Thursday night      solifenacin (VESICARE) 10 MG tablet Take 1 tablet by mouth. Takes 1-2 times per week, does not like SE (dry mouth)      thiamine (VITAMIN B1) 100 MG tablet Take 1 tablet by mouth Daily. 30 tablet 0    Apoaequorin (PREVAGEN PO) Take 1 dose by mouth Daily. (Patient not taking: Reported on 11/17/2024)      lamoTRIgine (LaMICtal) 25 MG tablet Take 1 tablet by mouth 2 (Two) Times a Day. (Patient not taking: Reported on 11/4/2024)      levothyroxine (SYNTHROID, LEVOTHROID) 100 MCG tablet TAKE ONE TABLET BY MOUTH DAILY (Patient not taking: Reported on 11/17/2024) 30 tablet 3    nadolol (CORGARD) 40 MG tablet Take 1 tablet by mouth Daily. (Patient not taking: Reported on 11/17/2024)           PROCEDURES  Procedures            PROGRESS, DATA ANALYSIS, CONSULTS, AND MEDICAL DECISION MAKING  All labs have been independently interpreted by me.  All radiology studies have been reviewed by me. All EKG's have been independently viewed and interpreted by me.  Discussion below represents my analysis of pertinent findings related to patient's condition, differential diagnosis, treatment plan and final disposition.    Differential diagnosis includes but is not limited to   pancreatitis, cholecystitis/biliary colic, PUD, gastritis, pneumonia, ureteral stone, sbo, hernia, colitis, diverticulitis, AAA, malignancy, gastroenteritis, food intolerances. Abdominal exam is without peritoneal signs. There is no evidence of acute  abdomen at this time. I have a low suspicion for vascular catastrophe, bowel obstruction or viscus perforation. This patient´s presentation is most consistent with alcoholic gastritis or recurrent pancreatitis with early alcohol withdrawal features.  Will assess for electrolyte disturbances.  Plan serum labs including EtOH level, urinalysis, pain control, antiemetics, IV fluids and serial reassessment.  Will consider indications for abdominal imaging.    ED Course as of 11/17/24 1443   Sun Nov 17, 2024   1301 Waiting CT abdomen pelvis results.  Patient aware. [AR]   9026 I discussed patient's case with Dr. Scott with A who is amenable to admitting the patient for further care.  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    DIAGNOSIS  Final diagnoses:   Nausea and vomiting, unspecified vomiting type   Alcoholic ketoacidosis   Hypomagnesemia   Alcohol abuse with withdrawal   UTI (urinary tract infection) with pyuria   Abnormal CT of the abdomen         DISPOSITION  ED Disposition       ED Disposition   Decision to Admit    Condition   --    Comment   Level of Care: Telemetry [5]   Diagnosis: Alcohol withdrawal [291.81.ICD-9-CM]   Admitting Physician: JAQUELIN SCOTT [[id number]]   Attending Physician: JAQUELIN SCOTT [[id number]]   Certification: I certify that inpatient services are medically necessary for this patient for a duration of greater than two midnights. See H&P and MD Progress Notes for additional information about the patient's course of treatment.                  Please note that portions of this document were completed with a voice recognition program.    Note  Disclaimer: At [facility], we believe that sharing information builds trust and better relationships. You are receiving this note because you recently visited [facility]. It is possible you will see health information before a provider has talked with you about it. This kind of information can be easy to misunderstand. To help you fully understand what it means for your health, we urge you to discuss this note with your provider.         Joanne Mckeon MD  11/17/24 1443      Electronically signed by Joanne Mckeon MD at 11/17/24 1443       Rosanna Clarke, RN at 11/17/24 0834          Pt arrives via ems for vomiting that started earlier this morning. Pt has been dry-heaving for ems   Pt has cirrhosis and was drinking last night     Electronically signed by Rosanna Clarke, RN at 11/17/24 0835       Oxygen Therapy (since admission)       Date/Time SpO2 Device (Oxygen Therapy) Flow (L/min) (Oxygen Therapy) Oxygen Concentration (%) ETCO2 (mmHg)    11/18/24 1325 94 -- -- -- --    11/18/24 1013 -- room air -- -- --    11/18/24 0745 91 -- -- -- --    11/18/24 0020 -- room air -- -- --    11/18/24 0012 89 room air -- -- --    11/17/24 2330 -- room air -- -- --    11/17/24 2228 -- -- 2.5 -- --    11/17/24 2027 -- nasal cannula 2 -- --    11/17/24 2019 92 nasal cannula 2 -- --    11/17/24 1457 100 nasal cannula 2 -- --    11/17/24 1402 93 -- -- -- --    11/17/24 1353 95 -- -- -- --    11/17/24 1331 95 -- -- -- --    11/17/24 1301 96 -- -- -- --    11/17/24 1201 92 -- -- -- --    11/17/24 1131 94 -- -- -- --    11/17/24 1055 94 -- -- -- --    11/17/24 1009 93 -- -- -- --    11/17/24 0932 92 -- -- -- --    11/17/24 0836 97 room air -- -- --          Intake & Output (last 2 days)         11/16 0701 11/17 0700 11/17 0701 11/18 0700 11/18 0701 11/19 0700    P.O.  120 240    IV Piggyback  100     Total Intake(mL/kg)  220 (3.3) 240 (3.6)    Net  +220 +240           Urine Unmeasured Occurrence  1 x 1 x     Stool Unmeasured Occurrence   1 x          Lines, Drains & Airways       Active LDAs       Name Placement date Placement time Site Days    Peripheral IV 24 0915 Posterior;Proximal;Right Forearm 24  0915  Forearm  1              Inactive LDAs       None                  CIWA (last 2 days)        Date/Time CIWA-Ar Total    24 1013 1     24 0635 2     24 3     24 1457 6     24 12:59:55 3     24 09:45:30 3                          Operative/Procedure Notes (all)    No notes of this type exist for this encounter.          Physician Progress Notes (all)        Michael Sanchez MD at 24 0745            [facility]    [phone]   LOS: 1 day     Name: Bekah Gibson  Age/Sex: 54 y.o. female  :  1970        PCP: Tylor Davis  Chief Complaint   Patient presents with    Vomiting      Subjective   She feels pretty sick this morning denies any new issues or complaints.  Rested decently overnight.  She is sleeping pretty soundly right now my evaluation but does awaken.  She complains of a lot of pain in her belly.  This is her eighth admission to the hospital this year for alcohol related illness.  General: No Fever or Chills, Cardiac: No Chest Pain or Palpitations, Resp: No Cough or SOA, GI: No Nausea, Vomiting, or Diarrhea, and Other: No bleeding    amLODIPine, 2.5 mg, Oral, Daily  cefTRIAXone, 1,000 mg, Intravenous, Q24H  FLUoxetine, 60 mg, Oral, Daily  folic acid, 1 mg, Oral, Daily  lactulose, 10 g, Oral, QAM  levothyroxine, 88 mcg, Oral, Daily  LORazepam, 2 mg, Oral, Q6H   Followed by  LORazepam, 1 mg, Oral, Q6H   Followed by  [START ON 2024] LORazepam, 1 mg, Oral, Q12H   Followed by  [START ON 2024] LORazepam, 1 mg, Oral, Daily  magnesium oxide, 400 mg, Oral, Daily  mirtazapine, 15 mg, Oral, Nightly  multivitamin, 1 tablet, Oral, Daily  multivitamin with minerals, 1 tablet, Oral, Daily  pancrelipase (Lip-Prot-Amyl), 24,000  units of lipase, Oral, TID With Meals  pantoprazole, 40 mg, Oral, Daily  potassium chloride, 10 mEq, Oral, Daily  risperiDONE, 1 mg, Oral, Nightly  thiamine (B-1) IV, 200 mg, Intravenous, Q8H   Followed by  [START ON 11/22/2024] thiamine, 100 mg, Oral, Daily           Objective   Vital Signs  Temp:  [98 °F (36.7 °C)-98.9 °F (37.2 °C)] 98.2 °F (36.8 °C)  Heart Rate:  [] 101  Resp:  [18-22] 20  BP: (106-149)/(69-97) 118/69  Body mass index is 24.58 kg/m².    Intake/Output Summary (Last 24 hours) at 11/18/2024 0745  Last data filed at 11/17/2024 1741  Gross per 24 hour   Intake 220 ml   Output --   Net 220 ml       Physical Exam  Constitutional:       General: She is not in acute distress.     Appearance: She is ill-appearing.   Cardiovascular:      Rate and Rhythm: Normal rate and regular rhythm.   Pulmonary:      Effort: No respiratory distress.      Breath sounds: Normal breath sounds.   Abdominal:      General: Bowel sounds are normal. There is no distension.      Palpations: Abdomen is soft.      Tenderness: There is abdominal tenderness.   Neurological:      Mental Status: She is alert. Mental status is at baseline.      Comments: Somnolent lethargic but does awaken           Results Review:       I reviewed the patient's new clinical results.  Results from last 7 days   Lab Units 11/18/24  0816 11/17/24  0904   WBC 10*3/mm3 2.35* 4.55   HEMOGLOBIN g/dL 13.5 16.3*   PLATELETS 10*3/mm3 37* 56*     Results from last 7 days   Lab Units 11/18/24  0816 11/17/24  0904   SODIUM mmol/L 137 140   POTASSIUM mmol/L 4.0 3.6   CHLORIDE mmol/L 103 103   CO2 mmol/L 22.3 19.1*   BUN mg/dL 11 9   CREATININE mg/dL 0.61 0.72   CALCIUM mg/dL 8.8 9.5   MAGNESIUM mg/dL  --  1.4*   PHOSPHORUS mg/dL  --  2.7   Estimated Creatinine Clearance: 92.3 mL/min (by C-G formula based on SCr of 0.72 mg/dL).  Results from last 7 days   Lab Units 11/17/24  0846   NITRITE UA  Positive*   WBC UA /HPF Too Numerous to Count*   BACTERIA UA /HPF  4+*   SQUAM EPITHEL UA /HPF None Seen   URINECX  Culture in progress         Assessment & Plan   Active Hospital Problems    Diagnosis  POA    **Alcohol withdrawal [F10.939]  Yes    Alcohol dependence with withdrawal [F10.239]  Yes    Urinary tract infection [N39.0]  Yes    Transaminitis [R74.01]  Yes    Pancreatitis [K85.90]  Yes    Abdominal pain [R10.9]  Yes    Esophageal varices [I85.00]  Yes    Essential hypertension [I10]  Yes    Pancreatic insufficiency [K86.89]  Yes    Thrombocytopenia [D69.6]  Yes    Hypothyroidism [E03.9]  Yes    Ascites [R18.8]  Yes      Resolved Hospital Problems   No resolved problems to display.       PLAN  This is a 54-year-old lady with a history of chronic alcohol use disorder, cirrhosis with previous esophageal varices and GI bleeding, thrombocytopenia hypothyroidism and chronic pancreatitis who presents to the hospital with abdominal pain nausea and vomiting and was found to have acute on chronic pancreatitis  -Noted evidence alcohol withdrawal.  Continue symptom triggered Ativan therapy.  I do not normally like using scheduled Ativan but given her history of alcohol withdrawal issues in the eighth episode this year I do not think is necessarily a bad idea to use scheduled Ativan for the next few days.  Will see how this goes.  -Bowel rest for the acute pancreatitis and aggressive IV fluids.  She has chronic pancreatic insufficiency as well.  -Discussed the need for sobriety moving forward.  -Her urine is nitrite positive with white cells and bacteria and no epithelials.  Urine cultures pending will continue IV Rocephin for urinary tract infection.  -Correct her magnesium deficiency monitor potassium closely  -Her thrombocytopenia is probably related to her chronic alcohol usage as is her mild leukopenia.  -Plan to utilize mechanical DVT prophylaxis for now given her thrombocytopenia  -Full code      Disposition  Expected discharge date/ time has not been documented.       Michael  MD Laura  [facility]  11/18/24  07:45 EST            Electronically signed by Michael Sanchez MD at 11/18/24 1219          All medication doses during the admission are shown, including meds that are no longer on order.  Scheduled Meds Sorted by Name  for Bekah Gibson as of 11/16/24 through 11/18/24    1 Day 3 Days 7 Days 10 Days < Today >   Legend:       Medications 11/16/24 11/17/24 11/18/24   amLODIPine (NORVASC) tablet 2.5 mg  Dose: 2.5 mg  Freq: Daily Route: PO  Start: 11/18/24 0900   Admin Instructions:         1013          cefTRIAXone (ROCEPHIN) 1,000 mg in sodium chloride 0.9 % 100 mL MBP  Dose: 1,000 mg  Freq: Every 24 Hours Route: IV  Indications of Use: URINARY TRACT INFECTION  Last Dose: 1,000 mg (11/18/24 1013)  Start: 11/18/24 0900 End: 11/23/24 1159   Admin Instructions:         1013          cefTRIAXone (ROCEPHIN) 1,000 mg in sodium chloride 0.9 % 100 mL MBP  Dose: 1,000 mg  Freq: Once Route: IV  Indications of Use: URINARY TRACT INFECTION  Last Dose: Stopped (11/17/24 1130)  Start: 11/17/24 1052 End: 11/17/24 1130   Admin Instructions:        1054   1130          FLUoxetine (PROzac) capsule 60 mg  Dose: 60 mg  Freq: Daily Route: PO  Start: 11/17/24 1930   Admin Instructions:        2158        1013          folic acid (FOLVITE) tablet 1 mg  Dose: 1 mg  Freq: Daily Route: PO  Start: 11/17/24 1500     1519        1013          iopamidol (ISOVUE-300) 61 % injection 100 mL  Dose: 100 mL  Freq: Once in Imaging Route: IV  Start: 11/17/24 1130 End: 11/17/24 1114     1114           lactulose (CHRONULAC) 10 GM/15ML solution 10 g  Dose: 10 g  Freq: Every Morning Route: PO  Start: 11/18/24 0700   Admin Instructions:         0635          levothyroxine (SYNTHROID, LEVOTHROID) tablet 88 mcg  Dose: 88 mcg  Freq: Daily Route: PO  Start: 11/18/24 0900   Admin Instructions:         1013          LORazepam (ATIVAN) injection 1 mg  Dose: 1 mg  Freq: Once Route: IV  Start: 11/17/24  0955 End: 11/17/24 0946   Admin Instructions:        0946            LORazepam (ATIVAN) tablet 2 mg  Dose: 2 mg  Freq: Every 6 Hours Route: PO  Start: 11/17/24 1409 End: 11/18/24 1408   Admin Instructions:        1520 [C]   2025       (0257) [C]   1013   1408-D/C'd      Followed by   LORazepam (ATIVAN) tablet 1 mg  Dose: 1 mg  Freq: Every 6 Hours Route: PO  Start: 11/18/24 1409 End: 11/19/24 1408   Admin Instructions:         1409 2009         Followed by   LORazepam (ATIVAN) tablet 1 mg  Dose: 1 mg  Freq: Every 12 Hours Scheduled Route: PO  Start: 11/19/24 1409 End: 11/20/24 0859   Admin Instructions:            Followed by   LORazepam (ATIVAN) tablet 1 mg  Dose: 1 mg  Freq: Daily Route: PO  Start: 11/20/24 0900 End: 11/21/24 0859   Admin Instructions:            magnesium oxide (MAG-OX) tablet 400 mg  Dose: 400 mg  Freq: Daily Route: PO  Start: 11/17/24 1930 2025        1013          magnesium sulfate 2g/50 mL (PREMIX) infusion  Dose: 2 g  Freq: Once Route: IV  Last Dose: Stopped (11/17/24 1020)  Start: 11/17/24 0954 End: 11/17/24 1020     0952   1020          mirtazapine (REMERON) tablet 15 mg  Dose: 15 mg  Freq: Nightly Route: PO  Start: 11/17/24 2100     2025        2100          morphine injection 2 mg  Dose: 2 mg  Freq: Once Route: IV  Start: 11/17/24 1315 End: 11/17/24 1308   Admin Instructions:        1308           morphine injection 2 mg  Dose: 2 mg  Freq: Once Route: IV  Start: 11/17/24 0935 End: 11/17/24 0946   Admin Instructions:        0946           multivitamin (THERAGRAN) tablet 1 tablet  Dose: 1 tablet  Freq: Daily Route: PO  Start: 11/17/24 1930   Admin Instructions:        2025        1012          multivitamin with minerals 1 tablet  Dose: 1 tablet  Freq: Daily Route: PO  Start: 11/17/24 1500   Admin Instructions:        1520        1013          ondansetron (ZOFRAN) injection 4 mg  Dose: 4 mg  Freq: Once Route: IV  Start: 11/17/24 0857 End: 11/17/24 0915   Admin Instructions:         0915           pancrelipase (Lip-Prot-Amyl) (CREON) capsule 24,000 units of lipase  Dose: 24,000 units of lipase  Freq: 3 Times Daily With Meals Route: PO  Start: 11/17/24 1930   Admin Instructions:        2024 1013 1333   1800        pantoprazole (PROTONIX) EC tablet 40 mg  Dose: 40 mg  Freq: Daily Route: PO  Start: 11/17/24 1930   Admin Instructions:        2024 1013          pantoprazole (PROTONIX) injection 80 mg  Dose: 80 mg  Freq: Once Route: IV  Indications of Use: GASTROESOPHAGEAL REFLUX DISEASE  Start: 11/17/24 0935 End: 11/17/24 0950   Admin Instructions:        0950           potassium chloride (K-DUR,KLOR-CON) ER tablet 10 mEq  Dose: 10 mEq  Freq: Daily Route: PO  Start: 11/17/24 1930   Admin Instructions:        2024 1013          risperiDONE (risperDAL) tablet 1 mg  Dose: 1 mg  Freq: Nightly Route: PO  Start: 11/17/24 2100   Admin Instructions:        2158 2100          sodium chloride 0.9 % bolus 500 mL  Dose: 500 mL  Freq: Once Route: IV  Last Dose: Stopped (11/17/24 1020)  Start: 11/17/24 0857 End: 11/17/24 1020     0916   1020           thiamine (B-1) injection 200 mg  Dose: 200 mg  Freq: Every 8 Hours Scheduled Route: IV  Start: 11/17/24 1500 End: 11/22/24 1359   Admin Instructions:        1519   2158       0635   1333   2200        Followed by   thiamine (VITAMIN B-1) tablet 100 mg  Dose: 100 mg  Freq: Daily Route: PO  Start: 11/22/24 1400                     Continuous Meds Sorted by Name  for Bekah Gibson as of 11/16/24 through 11/18/24  Legend:       Medications 11/16/24 11/17/24 11/18/24               PRN Meds Sorted by Name  for Bekah Gibson as of 11/16/24 through 11/18/24  Legend:       Medications 11/16/24 11/17/24 11/18/24    acetaminophen (TYLENOL) tablet 650 mg  Dose: 650 mg  Freq: Every 4 Hours PRN Route: PO  PRN Reason: Mild Pain  Start: 11/17/24 1351   Admin Instructions:            Or   acetaminophen (TYLENOL) 160 MG/5ML oral solution 650  mg  Dose: 650 mg  Freq: Every 4 Hours PRN Route: PO  PRN Reason: Mild Pain  Start: 11/17/24 1351   Admin Instructions:            Or   acetaminophen (TYLENOL) suppository 325 mg  Dose: 325 mg  Freq: Every 4 Hours PRN Route: RE  PRN Reason: Mild Pain  Start: 11/17/24 1351   Admin Instructions:             sennosides-docusate (PERICOLACE) 8.6-50 MG per tablet 2 tablet  Dose: 2 tablet  Freq: 2 Times Daily PRN Route: PO  PRN Reason: Constipation  Start: 11/17/24 1352   Admin Instructions:            And   polyethylene glycol (MIRALAX) packet 17 g  Dose: 17 g  Freq: Daily PRN Route: PO  PRN Reason: Constipation  PRN Comment: Use if senna-docusate is ineffective  Start: 11/17/24 1352   Admin Instructions:            And   bisacodyl (DULCOLAX) EC tablet 5 mg  Dose: 5 mg  Freq: Daily PRN Route: PO  PRN Reason: Constipation  PRN Comment: Use if polyethylene glycol is ineffective  Start: 11/17/24 1352   Admin Instructions:            And   bisacodyl (DULCOLAX) suppository 10 mg  Dose: 10 mg  Freq: Daily PRN Route: RE  PRN Reason: Constipation  PRN Comment: Use if bisacodyl oral is ineffective  Start: 11/17/24 1352   Admin Instructions:            Calcium Replacement - Follow Nurse / BPA Driven Protocol  Freq: As Needed Route: XX  PRN Reason: Other  Start: 11/17/24 1839   Admin Instructions:            cloNIDine (CATAPRES) tablet 0.1 mg  Dose: 0.1 mg  Freq: Every 4 Hours PRN Route: PO  PRN Reason: High Blood Pressure  PRN Comment: sbp greater than 160  Start: 11/17/24 1352   Admin Instructions:             LORazepam (ATIVAN) tablet 1 mg  Dose: 1 mg  Freq: Every 1 Hour PRN Route: PO  PRN Reason: Withdrawal  PRN Comment: For CIWA-Ar 8-10  Start: 11/17/24 1353 End: 11/22/24 1352   Admin Instructions:            Or   LORazepam (ATIVAN) injection 1 mg  Dose: 1 mg  Freq: Every 1 Hour PRN Route: IV  PRN Reason: Withdrawal  PRN Comment: For CIWA-Ar 8-10  Start: 11/17/24 1353 End: 11/22/24 4756   Admin Instructions:            Or    LORazepam (ATIVAN) tablet 2 mg  Dose: 2 mg  Freq: Every 1 Hour PRN Route: PO  PRN Reason: Withdrawal  PRN Comment: Withdrawal. For CIWA-Ar 11-15.  Start: 11/17/24 1353 End: 11/22/24 1352   Admin Instructions:            Or   LORazepam (ATIVAN) injection 2 mg  Dose: 2 mg  Freq: Every 1 Hour PRN Route: IV  PRN Reason: Withdrawal  PRN Comment: Withdrawal. For CIWA-Ar 11-15.  Start: 11/17/24 1353 End: 11/22/24 1352   Admin Instructions:            Or   LORazepam (ATIVAN) injection 2 mg  Dose: 2 mg  Freq: Every 15 Minutes PRN Route: IV  PRN Reason: Withdrawal  PRN Comment: Withdrawal. For CIWA-Ar Greater Than 15.  Start: 11/17/24 1353 End: 11/22/24 1352   Admin Instructions:            Or   LORazepam (ATIVAN) injection 2 mg  Dose: 2 mg  Freq: Every 15 Minutes PRN Route: IM  PRN Reason: Withdrawal  PRN Comment: Withdrawal. For CIWA-Ar Greater Than 15.  Start: 11/17/24 1353 End: 11/22/24 1352   Admin Instructions:            Magnesium Standard Dose Replacement - Follow Nurse / BPA Driven Protocol  Freq: As Needed Route: XX  PRN Reason: Other  Start: 11/17/24 1839   Admin Instructions:            Magnesium Standard Dose Replacement - Follow Nurse / BPA Driven Protocol  Freq: As Needed Route: XX  PRN Reason: Other  Start: 11/17/24 1352 End: 11/17/24 1840   Admin Instructions:        1840-D/C'd         melatonin tablet 2.5 mg  Dose: 2.5 mg  Freq: Nightly PRN Route: PO  PRN Reason: Sleep  Start: 11/17/24 1352         morphine injection 2 mg  Dose: 2 mg  Freq: Every 4 Hours PRN Route: IV  PRN Reason: Moderate Pain  Start: 11/17/24 1353 End: 11/27/24 1352   Admin Instructions:        1705 (4082) 7827           ondansetron ODT (ZOFRAN-ODT) disintegrating tablet 4 mg  Dose: 4 mg  Freq: Every 6 Hours PRN Route: PO  PRN Reasons: Nausea,Vomiting  Start: 11/17/24 1351   Admin Instructions:                   Or   ondansetron (ZOFRAN) injection 4 mg  Dose: 4 mg  Freq: Every 6 Hours PRN Route: IV  PRN Reasons:  Nausea,Vomiting  Start: 11/17/24 1351     1606           Phosphorus Replacement - Follow Nurse / BPA Driven Protocol  Freq: As Needed Route: XX  PRN Reason: Other  Start: 11/17/24 1839   Admin Instructions:            Potassium Replacement - Follow Nurse / BPA Driven Protocol  Freq: As Needed Route: XX  PRN Reason: Other  Start: 11/17/24 1839   Admin Instructions:             sodium chloride 0.9 % flush 10 mL  Dose: 10 mL  Freq: As Needed Route: IV  PRN Reason: Line Care  Start: 11/17/24 0841

## 2024-11-18 NOTE — PROGRESS NOTES
Dedicated to Hospital Care    [phone]   LOS: 1 day     Name: Bekah Gibson  Age/Sex: 54 y.o. female  :  1970        PCP: Tylor Davis  Chief Complaint   Patient presents with    Vomiting      Subjective   She feels pretty sick this morning denies any new issues or complaints.  Rested decently overnight.  She is sleeping pretty soundly right now my evaluation but does awaken.  She complains of a lot of pain in her belly.  This is her eighth admission to the hospital this year for alcohol related illness.  General: No Fever or Chills, Cardiac: No Chest Pain or Palpitations, Resp: No Cough or SOA, GI: No Nausea, Vomiting, or Diarrhea, and Other: No bleeding    amLODIPine, 2.5 mg, Oral, Daily  cefTRIAXone, 1,000 mg, Intravenous, Q24H  FLUoxetine, 60 mg, Oral, Daily  folic acid, 1 mg, Oral, Daily  lactulose, 10 g, Oral, QAM  levothyroxine, 88 mcg, Oral, Daily  LORazepam, 2 mg, Oral, Q6H   Followed by  LORazepam, 1 mg, Oral, Q6H   Followed by  [START ON 2024] LORazepam, 1 mg, Oral, Q12H   Followed by  [START ON 2024] LORazepam, 1 mg, Oral, Daily  magnesium oxide, 400 mg, Oral, Daily  mirtazapine, 15 mg, Oral, Nightly  multivitamin, 1 tablet, Oral, Daily  multivitamin with minerals, 1 tablet, Oral, Daily  pancrelipase (Lip-Prot-Amyl), 24,000 units of lipase, Oral, TID With Meals  pantoprazole, 40 mg, Oral, Daily  potassium chloride, 10 mEq, Oral, Daily  risperiDONE, 1 mg, Oral, Nightly  thiamine (B-1) IV, 200 mg, Intravenous, Q8H   Followed by  [START ON 2024] thiamine, 100 mg, Oral, Daily           Objective   Vital Signs  Temp:  [98 °F (36.7 °C)-98.9 °F (37.2 °C)] 98.2 °F (36.8 °C)  Heart Rate:  [] 101  Resp:  [18-22] 20  BP: (106-149)/(69-97) 118/69  Body mass index is 24.58 kg/m².    Intake/Output Summary (Last 24 hours) at 2024 0745  Last data filed at 2024 1741  Gross per 24 hour   Intake 220 ml   Output --   Net 220 ml       Physical Exam  Constitutional:        General: She is not in acute distress.     Appearance: She is ill-appearing.   Cardiovascular:      Rate and Rhythm: Normal rate and regular rhythm.   Pulmonary:      Effort: No respiratory distress.      Breath sounds: Normal breath sounds.   Abdominal:      General: Bowel sounds are normal. There is no distension.      Palpations: Abdomen is soft.      Tenderness: There is abdominal tenderness.   Neurological:      Mental Status: She is alert. Mental status is at baseline.      Comments: Somnolent lethargic but does awaken           Results Review:       I reviewed the patient's new clinical results.  Results from last 7 days   Lab Units 11/18/24  0816 11/17/24  0904   WBC 10*3/mm3 2.35* 4.55   HEMOGLOBIN g/dL 13.5 16.3*   PLATELETS 10*3/mm3 37* 56*     Results from last 7 days   Lab Units 11/18/24  0816 11/17/24  0904   SODIUM mmol/L 137 140   POTASSIUM mmol/L 4.0 3.6   CHLORIDE mmol/L 103 103   CO2 mmol/L 22.3 19.1*   BUN mg/dL 11 9   CREATININE mg/dL 0.61 0.72   CALCIUM mg/dL 8.8 9.5   MAGNESIUM mg/dL  --  1.4*   PHOSPHORUS mg/dL  --  2.7   Estimated Creatinine Clearance: 92.3 mL/min (by C-G formula based on SCr of 0.72 mg/dL).  Results from last 7 days   Lab Units 11/17/24  0846   NITRITE UA  Positive*   WBC UA /HPF Too Numerous to Count*   BACTERIA UA /HPF 4+*   SQUAM EPITHEL UA /HPF None Seen   URINECX  Culture in progress         Assessment & Plan   Active Hospital Problems    Diagnosis  POA    **Alcohol withdrawal [F10.939]  Yes    Alcohol dependence with withdrawal [F10.239]  Yes    Urinary tract infection [N39.0]  Yes    Transaminitis [R74.01]  Yes    Pancreatitis [K85.90]  Yes    Abdominal pain [R10.9]  Yes    Esophageal varices [I85.00]  Yes    Essential hypertension [I10]  Yes    Pancreatic insufficiency [K86.89]  Yes    Thrombocytopenia [D69.6]  Yes    Hypothyroidism [E03.9]  Yes    Ascites [R18.8]  Yes      Resolved Hospital Problems   No resolved problems to display.       PLAN  This is a  54-year-old lady with a history of chronic alcohol use disorder, cirrhosis with previous esophageal varices and GI bleeding, thrombocytopenia hypothyroidism and chronic pancreatitis who presents to the hospital with abdominal pain nausea and vomiting and was found to have acute on chronic pancreatitis  -Noted evidence alcohol withdrawal.  Continue symptom triggered Ativan therapy.  I do not normally like using scheduled Ativan but given her history of alcohol withdrawal issues in the eighth episode this year I do not think is necessarily a bad idea to use scheduled Ativan for the next few days.  Will see how this goes.  -Bowel rest for the acute pancreatitis and aggressive IV fluids.  She has chronic pancreatic insufficiency as well.  -Discussed the need for sobriety moving forward.  -Her urine is nitrite positive with white cells and bacteria and no epithelials.  Urine cultures pending will continue IV Rocephin for urinary tract infection.  -Correct her magnesium deficiency monitor potassium closely  -Her thrombocytopenia is probably related to her chronic alcohol usage as is her mild leukopenia.  -Plan to utilize mechanical DVT prophylaxis for now given her thrombocytopenia  -Full code      Disposition  Expected discharge date/ time has not been documented.       Michael Sanchez MD  [facility]  11/18/24  07:45 EST

## 2024-11-18 NOTE — PROGRESS NOTES
[facility] follow up d/t EtOH; this writer reviewed chart and spoke with RN Thu. Per RN, patient slept all night; no behavioral health concerns noted at present.  Last CIWA 3 at 20:27.  Patient has [facility] treatment resources; she plans to continue with outpatient psychiatric APRN. No further needs/concerns noted at this time per RN and/or medical team; [facility] to continue following.

## 2024-11-18 NOTE — PLAN OF CARE
Goal Outcome Evaluation:  Plan of Care Reviewed With: patient        Progress: improving  Outcome Evaluation: Pt A&O, on RA. No N/V this shift. Seizure precautions in place. Rested well. VSS.

## 2024-11-18 NOTE — THERAPY EVALUATION
Patient Name: Bekah Gibson  : 1970    MRN: [id number]                              Today's Date: 2024       Admit Date: 2024    Visit Dx:     ICD-10-CM ICD-9-CM   1. Nausea and vomiting, unspecified vomiting type  R11.2 787.01   2. Alcoholic ketoacidosis  E87.29 276.2   3. Hypomagnesemia  E83.42 275.2   4. Alcohol abuse with withdrawal  F10.139 291.81     305.00   5. UTI (urinary tract infection) with pyuria  N39.0 599.0   6. Abnormal CT of the abdomen  R93.5 793.6     Patient Active Problem List   Diagnosis    Irritable bowel syndrome with both constipation and diarrhea    Peripheral neuropathy    Vitamin D deficiency    History of HPV infection    Hypothyroidism    Tobacco dependence due to cigarettes    Acute kidney injury    Ascites    E. coli UTI (urinary tract infection)    Alcoholic hepatitis    GI bleed    Acute post-hemorrhagic anemia    Thrombocytopenia    Open wound of right shoulder region    Pancreatic insufficiency    Alcoholic ketoacidosis    Chronic alcohol abuse    ESBL (extended spectrum beta-lactamase) producing bacteria infection    Abnormal weight loss    Hashimoto's disease    Chronic fatigue    History of alcohol use disorder    Alcohol intoxication    Lupus    Hypomagnesemia    Pancytopenia    Alcoholic cirrhosis    Bilateral lower abdominal discomfort    Primary hypertension    Melena    Arthritis of shoulder    Esophageal varices    Essential hypertension    Alcohol-induced chronic pancreatitis    Abdominal pain    Pancreatitis    Leukopenia    Epigastric pain    Acute alcoholic gastritis without hemorrhage    Alcohol-induced acute on chronic pancreatitis without infection or necrosis    Alcohol withdrawal    Acute on chronic pancreatitis    Transaminitis    Cholelithiasis    Alcohol use    WBC decreased    Hypokalemia    Urinary tract infection    Neutropenia    Polysubstance abuse    Alcohol dependence with withdrawal    Hypomagnesemia     Past Medical History:    Diagnosis Date    Acromioclavicular separation 1/2021    Ankle sprain 2/2004    no operation necessary  At Time unsure    Anxiety     Arthritis     Arthritis of back Unsure    Diseased    Cirrhosis     Disease of thyroid gland     ETOH abuse     SOBER FOR 3 MONTHS    Fracture, clavicle 1/2021    shattered clavicel on issue slip and fall    Fracture, foot Unsure    Frozen shoulder 1 /2009    4    GERD (gastroesophageal reflux disease)     History of kidney stones     5 YR AGO    Hypertension     Left shoulder pain     Low back strain 1/21    Lumbosacral disc disease 1/21    MDD (major depressive disorder)     Neck strain Unsure    Pancreatitis     Periarthritis of shoulder see above    Rotator cuff syndrome odre for shoulder replacement    unable to receive due to low platelets    Tennis elbow Unsure    Unsurpassed    Thrombocytopenia      Past Surgical History:   Procedure Laterality Date    CLAVICLE SURGERY Right 2018    ENDOSCOPY N/A 10/26/2022    Procedure: ESOPHAGOGASTRODUODENOSCOPY wtih banding;  Surgeon: Ag Patel MD;  Location: [facility] ENDOSCOPY;  Service: Gastroenterology;  Laterality: N/A;  pre: melena  post: esophageal varicies with banding x2 bands    ENDOSCOPY N/A 01/18/2023    Procedure: ESOPHAGOGASTRODUODENOSCOPY;  Surgeon: Ag Patel MD;  Location: [facility] ENDOSCOPY;  Service: Gastroenterology;  Laterality: N/A;  PRE OP - MAROON STOOLS  POST OP - SM ESOPHAGEAL VARICES    ESOPHAGEAL VARICES LIGATION      FOOT SURGERY  2/2/14    JOINT REPLACEMENT Bilateral     GREAT TOE    KIDNEY STONE SURGERY      LITHOTRIPSY AND STENT (R SIDE)    LAPAROSCOPIC TUBAL LIGATION  2005    NECK SURGERY  3/07    Not sure of dates    SIGMOIDOSCOPY N/A 01/18/2023    Procedure: SIGMOIDOSCOPY FLEXIBLE;  Surgeon: Ag Patel MD;  Location: [facility] ENDOSCOPY;  Service: Gastroenterology;  Laterality: N/A;  PRE OP - MAROON STOOLS  POST OP - RECTAL VARICES    TOTAL SHOULDER ARTHROPLASTY Left 05/09/2023    Procedure: reverse  total shoulder arthroplasty;  Surgeon: Giovanni Denise MD;  [facility]: [facility] [place];  Service: Orthopedics;  Laterality: Left;    TRIGGER POINT INJECTION  8/24      General Information       [facility] Name 11/18/24 1711          Physical Therapy Time and Intention    Document Type evaluation  -     Mode of Treatment individual therapy;physical therapy  -       Row Name 11/18/24 1711          General Information    Patient Profile Reviewed yes  -     Prior Level of Function independent:  -     Existing Precautions/Restrictions fall  -     Barriers to Rehab cognitive status  -       Row Name 11/18/24 1711          Living Environment    People in Home spouse  -       Row Name 11/18/24 1711          Cognition    Orientation Status (Cognition) oriented x 3  -       Row Name 11/18/24 1711          Safety Issues/Impairments Affecting Functional Mobility    Safety Issues Affecting Function (Mobility) awareness of need for assistance;safety precaution awareness;insight into deficits/self-awareness;safety precautions follow-through/compliance;impulsivity  -     Impairments Affecting Function (Mobility) balance;endurance/activity tolerance  -               User Key  (r) = Recorded By, (t) = Taken By, (c) = Cosigned By      Initials Name Provider Type     Joy Nam, PT Physical Therapist                   Mobility       Row Name 11/18/24 1712          Bed Mobility    Bed Mobility supine-sit;sit-supine  -     Supine-Sit [place] (Bed Mobility) modified independence  -     Sit-Supine [place] (Bed Mobility) modified independence  -     Assistive Device (Bed Mobility) head of bed elevated;bed rails  -       Row Name 11/18/24 1712          Sit-Stand Transfer    Sit-Stand [place] (Transfers) contact guard;minimum assist (75% patient effort)  -     Comment, (Sit-Stand Transfer) CGA from bed, min from toilet  -       Row Name 11/18/24 1712          Gait/Stairs (Locomotion)     Rich Level (Gait) contact guard;minimum assist (75% patient effort);2 person assist  -     Distance in Feet (Gait) 150  -     Deviations/Abnormal Patterns (Gait) gait speed decreased;stride length decreased  -     Comment, (Gait/Stairs) Unsteady, several lateral path deviations, variable gait speed throughout  -               User Key  (r) = Recorded By, (t) = Taken By, (c) = Cosigned By      Initials Name Provider Type    Joy Farley PT Physical Therapist                   Obj/Interventions       Row Name 11/18/24 1713          Range of Motion Comprehensive    Comment, General Range of Motion WFL  -       Row Name 11/18/24 1713          Strength Comprehensive (MMT)    Comment, General Manual Muscle Testing (MMT) Assessment [facility] Name 11/18/24 1713          Balance    Balance Assessment sitting static balance;sitting dynamic balance;standing static balance;standing dynamic balance  -     Static Sitting Balance independent  -     Dynamic Sitting Balance standby assist  -     Position, Sitting Balance unsupported;sitting edge of bed  -     Static Standing Balance contact guard  -     Dynamic Standing Balance contact guard;minimal assist  -               User Key  (r) = Recorded By, (t) = Taken By, (c) = Cosigned By      Initials Name Provider Type    Joy Farley PT Physical Therapist                   Goals/Plan       Row Name 11/18/24 1718          Bed Mobility Goal 1 (PT)    Activity/Assistive Device (Bed Mobility Goal 1, PT) bed mobility activities, all  -KH     Rich Level/Cues Needed (Bed Mobility Goal 1, PT) independent  -     Time Frame (Bed Mobility Goal 1, PT) 1 week  -       Row Name 11/18/24 1718          Transfer Goal 1 (PT)    Activity/Assistive Device (Transfer Goal 1, PT) transfers, all  -KH     Rich Level/Cues Needed (Transfer Goal 1, PT) independent  -KH     Time Frame (Transfer Goal 1, PT) 1 week  -        Row Name 11/18/24 1718          Gait Training Goal 1 (PT)    Activity/Assistive Device (Gait Training Goal 1, PT) gait (walking locomotion)  -     Thor Level (Gait Training Goal 1, PT) independent  -KH     Distance (Gait Training Goal 1, PT) 200  -KH     Time Frame (Gait Training Goal 1, PT) 1 week  -       Row Name 11/18/24 1718          Stairs Goal 1 (PT)    Activity/Assistive Device (Stairs Goal 1, PT) stairs, all skills  -     Thor Level/Cues Needed (Stairs Goal 1, PT) standby assist  -     Number of Stairs (Stairs Goal 1, PT) 2  -KH     Time Frame (Stairs Goal 1, PT) 1 week  -       Row Name 11/18/24 1718          Therapy Assessment/Plan (PT)    Planned Therapy Interventions (PT) balance training;gait training;home exercise program;stair training;transfer training;patient/family education;bed mobility training;strengthening  -               User Key  (r) = Recorded By, (t) = Taken By, (c) = Cosigned By      Initials Name Provider Type    Joy Farley, PT Physical Therapist                   Clinical Impression       Row Name 11/18/24 1713          Pain    Pain Location abdomen  -     Pre/Posttreatment Pain Comment No number given  -       Row Name 11/18/24 1717          Plan of Care Review    Plan of Care Reviewed With patient  -     Outcome Evaluation Patient presented to the ER with nausea and vomiting and was admitted with alcohol withdrawal.  Patient has history of alcohol use, cirrhosis and pancreatitis.  Patient reports she is independently mobile at baseline and fairly active, attending yoga a few times a week.  Upon arrival patient was sitting near the edge of the bed attempting to more her pants.  Patient was agreeable to therapy she was assisted with donning her pants.  She required contact-guard to min assist for all mobility secondary to impaired balance and unsteady gait.  Patient was able to ambulate 150 feet in the hallway with contact-guard to min  assist x 2 and no assistive device.  Patient was unsteady with several lateral path deviations and variable gait speed.  Patient presents with impaired balance and decreased functional mobility.  She is currently at a high risk of falls.  She reports she was living with her soon-to-be ex- but also frequently stays somewhere else.  She reports a few steps to enter the house and had no difficulties with these prior to admission.  Patient would benefit from PT to address her impairments and ensure return to baseline.  PT will follow 3 times a week.  Patient plans to DC home.  -       Row Name 11/18/24 1713          Therapy Assessment/Plan (PT)    Patient/Family Therapy Goals Statement (PT) Return home to prior level of function  -     Rehab Potential (PT) good  -     Therapy Frequency (PT) 3 times/wk  -       Row Name 11/18/24 1713          Positioning and Restraints    Pre-Treatment Position in bed  No alarm  -     Post Treatment Position bed  -KH     In Bed fowlers;call light within reach;encouraged to call for assist;exit alarm on;side rails up x3  -               User Key  (r) = Recorded By, (t) = Taken By, (c) = Cosigned By      Initials Name Provider Type    Joy Farley, PT Physical Therapist                   Outcome Measures       Row Name 11/18/24 7886          How much help from another person do you currently need...    Turning from your back to your side while in flat bed without using bedrails? 4  -KH     Moving from lying on back to sitting on the side of a flat bed without bedrails? 4  -KH     Moving to and from a bed to a chair (including a wheelchair)? 3  -KH     Standing up from a chair using your arms (e.g., wheelchair, bedside chair)? 3  -KH     Climbing 3-5 steps with a railing? 3  -KH     To walk in hospital room? 3  -KH     AM-PAC 6 Clicks Score (PT) 20  -     Highest Level of Mobility Goal 6 --> Walk 10 steps or more  -       Row Name 11/18/24 0370           Functional Assessment    Outcome Measure Options AM-PAC 6 Clicks Basic Mobility (PT)  -CHARLES               User Key  (r) = Recorded By, (t) = Taken By, (c) = Cosigned By      Initials Name Provider Type    Joy Farley, PT Physical Therapist                                 Physical Therapy Education       Title: PT OT SLP Therapies (In Progress)       Topic: Physical Therapy (Not Started)       Point: Mobility training (Not Started)       Learner Progress:  Not documented in this visit.              Point: Home exercise program (Not Started)       Learner Progress:  Not documented in this visit.              Point: Body mechanics (Not Started)       Learner Progress:  Not documented in this visit.              Point: Precautions (Not Started)       Learner Progress:  Not documented in this visit.                                  PT Recommendation and Plan  Planned Therapy Interventions (PT): balance training, gait training, home exercise program, stair training, transfer training, patient/family education, bed mobility training, strengthening  Outcome Evaluation: Patient presented to the ER with nausea and vomiting and was admitted with alcohol withdrawal.  Patient has history of alcohol use, cirrhosis and pancreatitis.  Patient reports she is independently mobile at baseline and fairly active, attending yoga a few times a week.  Upon arrival patient was sitting near the edge of the bed attempting to more her pants.  Patient was agreeable to therapy she was assisted with donning her pants.  She required contact-guard to min assist for all mobility secondary to impaired balance and unsteady gait.  Patient was able to ambulate 150 feet in the hallway with contact-guard to min assist x 2 and no assistive device.  Patient was unsteady with several lateral path deviations and variable gait speed.  Patient presents with impaired balance and decreased functional mobility.  She is currently at a high risk of  falls.  She reports she was living with her soon-to-be ex- but also frequently stays somewhere else.  She reports a few steps to enter the house and had no difficulties with these prior to admission.  Patient would benefit from PT to address her impairments and ensure return to baseline.  PT will follow 3 times a week.  Patient plans to DC home.     Time Calculation:         PT Charges       Row Name 11/18/24 1719             Time Calculation    Start Time 1609  -      Stop Time 1625  -      Time Calculation (min) 16 min  -KH      PT Received On 11/18/24  -      PT - Next Appointment 11/20/24  -      PT Goal Re-Cert Due Date 11/25/24  -                User Key  (r) = Recorded By, (t) = Taken By, (c) = Cosigned By      Initials Name Provider Type    Joy Farley, PT Physical Therapist                  Therapy Charges for Today       Code Description Service Date Service Provider Modifiers Qty    [id number] HC PT EVAL MOD COMPLEXITY 3 11/18/2024 Joy Nam, PT GP 1            PT G-Codes  Outcome Measure Options: AM-PAC 6 Clicks Basic Mobility (PT)  AM-PAC 6 Clicks Score (PT): 20  PT Discharge Summary  Anticipated Discharge Disposition (PT): home with assist    Joy Nam PT  11/18/2024

## 2024-11-18 NOTE — PLAN OF CARE
Goal Outcome Evaluation:  Plan of Care Reviewed With: patient           Outcome Evaluation: Patient presented to the ER with nausea and vomiting and was admitted with alcohol withdrawal.  Patient has history of alcohol use, cirrhosis and pancreatitis.  Patient reports she is independently mobile at baseline and fairly active, attending yoga a few times a week.  Upon arrival patient was sitting near the edge of the bed attempting to more her pants.  Patient was agreeable to therapy she was assisted with donning her pants.  She required contact-guard to min assist for all mobility secondary to impaired balance and unsteady gait.  Patient was able to ambulate 150 feet in the hallway with contact-guard to min assist x 2 and no assistive device.  Patient was unsteady with several lateral path deviations and variable gait speed.  Patient presents with impaired balance and decreased functional mobility.  She is currently at a high risk of falls.  She reports she was living with her soon-to-be ex- but also frequently stays somewhere else.  She reports a few steps to enter the house and had no difficulties with these prior to admission.  Patient would benefit from PT to address her impairments and ensure return to baseline.  PT will follow 3 times a week.  Patient plans to DC home.    Anticipated Discharge Disposition (PT): home with assist

## 2024-11-19 LAB
ALBUMIN SERPL-MCNC: 3.7 G/DL (ref 3.5–5.2)
ALBUMIN/GLOB SERPL: 1.4 G/DL
ALP SERPL-CCNC: 131 U/L (ref 39–117)
ALT SERPL W P-5'-P-CCNC: 59 U/L (ref 1–33)
ANION GAP SERPL CALCULATED.3IONS-SCNC: 9 MMOL/L (ref 5–15)
AST SERPL-CCNC: 110 U/L (ref 1–32)
BASOPHILS # BLD AUTO: 0.02 10*3/MM3 (ref 0–0.2)
BASOPHILS NFR BLD AUTO: 1 % (ref 0–1.5)
BILIRUB SERPL-MCNC: 2.2 MG/DL (ref 0–1.2)
BUN SERPL-MCNC: 9 MG/DL (ref 6–20)
BUN/CREAT SERPL: 15.5 (ref 7–25)
CALCIUM SPEC-SCNC: 9.1 MG/DL (ref 8.6–10.5)
CHLORIDE SERPL-SCNC: 105 MMOL/L (ref 98–107)
CO2 SERPL-SCNC: 23 MMOL/L (ref 22–29)
CREAT SERPL-MCNC: 0.58 MG/DL (ref 0.57–1)
DEPRECATED RDW RBC AUTO: 43.6 FL (ref 37–54)
EGFRCR SERPLBLD CKD-EPI 2021: 106.4 ML/MIN/1.73
EOSINOPHIL # BLD AUTO: 0.08 10*3/MM3 (ref 0–0.4)
EOSINOPHIL NFR BLD AUTO: 3.8 % (ref 0.3–6.2)
ERYTHROCYTE [DISTWIDTH] IN BLOOD BY AUTOMATED COUNT: 12.8 % (ref 12.3–15.4)
GLOBULIN UR ELPH-MCNC: 2.6 GM/DL
GLUCOSE SERPL-MCNC: 112 MG/DL (ref 65–99)
HCT VFR BLD AUTO: 38.1 % (ref 34–46.6)
HGB BLD-MCNC: 13.2 G/DL (ref 12–15.9)
IMM GRANULOCYTES # BLD AUTO: 0.01 10*3/MM3 (ref 0–0.05)
IMM GRANULOCYTES NFR BLD AUTO: 0.5 % (ref 0–0.5)
INR PPP: 1.44 (ref 0.9–1.1)
LYMPHOCYTES # BLD AUTO: 0.67 10*3/MM3 (ref 0.7–3.1)
LYMPHOCYTES NFR BLD AUTO: 31.9 % (ref 19.6–45.3)
MCH RBC QN AUTO: 32.4 PG (ref 26.6–33)
MCHC RBC AUTO-ENTMCNC: 34.6 G/DL (ref 31.5–35.7)
MCV RBC AUTO: 93.4 FL (ref 79–97)
MONOCYTES # BLD AUTO: 0.17 10*3/MM3 (ref 0.1–0.9)
MONOCYTES NFR BLD AUTO: 8.1 % (ref 5–12)
NEUTROPHILS NFR BLD AUTO: 1.15 10*3/MM3 (ref 1.7–7)
NEUTROPHILS NFR BLD AUTO: 54.7 % (ref 42.7–76)
PLATELET # BLD AUTO: 35 10*3/MM3 (ref 140–450)
PMV BLD AUTO: 11.3 FL (ref 6–12)
POTASSIUM SERPL-SCNC: 3.5 MMOL/L (ref 3.5–5.2)
POTASSIUM SERPL-SCNC: 4.7 MMOL/L (ref 3.5–5.2)
PROT SERPL-MCNC: 6.3 G/DL (ref 6–8.5)
PROTHROMBIN TIME: 17.7 SECONDS (ref 11.7–14.2)
RBC # BLD AUTO: 4.08 10*6/MM3 (ref 3.77–5.28)
SODIUM SERPL-SCNC: 137 MMOL/L (ref 136–145)
WBC NRBC COR # BLD AUTO: 2.1 10*3/MM3 (ref 3.4–10.8)

## 2024-11-19 PROCEDURE — 80053 COMPREHEN METABOLIC PANEL: CPT | Performed by: HOSPITALIST

## 2024-11-19 PROCEDURE — 25010000002 MORPHINE PER 10 MG: Performed by: INTERNAL MEDICINE

## 2024-11-19 PROCEDURE — 25010000002 THIAMINE HCL 200 MG/2ML SOLUTION: Performed by: INTERNAL MEDICINE

## 2024-11-19 PROCEDURE — 25010000002 CEFTRIAXONE PER 250 MG: Performed by: INTERNAL MEDICINE

## 2024-11-19 PROCEDURE — 85025 COMPLETE CBC W/AUTO DIFF WBC: CPT | Performed by: HOSPITALIST

## 2024-11-19 PROCEDURE — 84132 ASSAY OF SERUM POTASSIUM: CPT | Performed by: HOSPITALIST

## 2024-11-19 PROCEDURE — 85610 PROTHROMBIN TIME: CPT | Performed by: HOSPITALIST

## 2024-11-19 RX ORDER — POTASSIUM CHLORIDE 750 MG/1
40 TABLET, FILM COATED, EXTENDED RELEASE ORAL EVERY 4 HOURS
Status: COMPLETED | OUTPATIENT
Start: 2024-11-19 | End: 2024-11-19

## 2024-11-19 RX ADMIN — FOLIC ACID 1 MG: 1 TABLET ORAL at 10:29

## 2024-11-19 RX ADMIN — LACTULOSE 10 G: 10 SOLUTION ORAL at 05:29

## 2024-11-19 RX ADMIN — POTASSIUM CHLORIDE 40 MEQ: 750 TABLET, EXTENDED RELEASE ORAL at 10:28

## 2024-11-19 RX ADMIN — POTASSIUM CHLORIDE 40 MEQ: 750 TABLET, EXTENDED RELEASE ORAL at 11:59

## 2024-11-19 RX ADMIN — THIAMINE HYDROCHLORIDE 200 MG: 100 INJECTION, SOLUTION INTRAMUSCULAR; INTRAVENOUS at 05:29

## 2024-11-19 RX ADMIN — RISPERIDONE 1 MG: 1 TABLET, FILM COATED ORAL at 20:45

## 2024-11-19 RX ADMIN — POTASSIUM CHLORIDE 10 MEQ: 750 TABLET, EXTENDED RELEASE ORAL at 10:29

## 2024-11-19 RX ADMIN — MAGNESIUM OXIDE TAB 400 MG (241.3 MG ELEMENTAL MG) 400 MG: 400 (241.3 MG) TAB at 10:30

## 2024-11-19 RX ADMIN — LORAZEPAM 1 MG: 1 TABLET ORAL at 10:29

## 2024-11-19 RX ADMIN — CEFTRIAXONE SODIUM 1000 MG: 1 INJECTION, POWDER, FOR SOLUTION INTRAMUSCULAR; INTRAVENOUS at 11:59

## 2024-11-19 RX ADMIN — PANTOPRAZOLE SODIUM 40 MG: 40 TABLET, DELAYED RELEASE ORAL at 10:30

## 2024-11-19 RX ADMIN — MORPHINE SULFATE 2 MG: 2 INJECTION, SOLUTION INTRAMUSCULAR; INTRAVENOUS at 11:59

## 2024-11-19 RX ADMIN — AMLODIPINE BESYLATE 2.5 MG: 2.5 TABLET ORAL at 10:29

## 2024-11-19 RX ADMIN — THIAMINE HYDROCHLORIDE 200 MG: 100 INJECTION, SOLUTION INTRAMUSCULAR; INTRAVENOUS at 15:52

## 2024-11-19 RX ADMIN — MORPHINE SULFATE 2 MG: 2 INJECTION, SOLUTION INTRAMUSCULAR; INTRAVENOUS at 22:38

## 2024-11-19 RX ADMIN — PANCRELIPASE 24000 UNITS OF LIPASE: 60000; 12000; 38000 CAPSULE, DELAYED RELEASE PELLETS ORAL at 11:59

## 2024-11-19 RX ADMIN — LORAZEPAM 1 MG: 1 TABLET ORAL at 03:26

## 2024-11-19 RX ADMIN — Medication 1 TABLET: at 10:30

## 2024-11-19 RX ADMIN — MIRTAZAPINE 15 MG: 15 TABLET, FILM COATED ORAL at 20:45

## 2024-11-19 RX ADMIN — LEVOTHYROXINE SODIUM 88 MCG: 0.09 TABLET ORAL at 10:30

## 2024-11-19 RX ADMIN — PANCRELIPASE 24000 UNITS OF LIPASE: 60000; 12000; 38000 CAPSULE, DELAYED RELEASE PELLETS ORAL at 17:28

## 2024-11-19 RX ADMIN — MORPHINE SULFATE 2 MG: 2 INJECTION, SOLUTION INTRAMUSCULAR; INTRAVENOUS at 00:07

## 2024-11-19 RX ADMIN — MORPHINE SULFATE 2 MG: 2 INJECTION, SOLUTION INTRAMUSCULAR; INTRAVENOUS at 05:29

## 2024-11-19 RX ADMIN — LORAZEPAM 1 MG: 1 TABLET ORAL at 15:52

## 2024-11-19 RX ADMIN — THIAMINE HYDROCHLORIDE 200 MG: 100 INJECTION, SOLUTION INTRAMUSCULAR; INTRAVENOUS at 22:38

## 2024-11-19 RX ADMIN — FLUOXETINE HYDROCHLORIDE 60 MG: 20 CAPSULE ORAL at 10:29

## 2024-11-19 RX ADMIN — LORAZEPAM 1 MG: 1 TABLET ORAL at 20:45

## 2024-11-19 RX ADMIN — PANCRELIPASE 24000 UNITS OF LIPASE: 60000; 12000; 38000 CAPSULE, DELAYED RELEASE PELLETS ORAL at 10:29

## 2024-11-19 NOTE — PROGRESS NOTES
[facility]    [phone]   LOS: 2 days     Name: Bekah Gibson  Age/Sex: 54 y.o. female  :  1970        PCP: Tylor Davis  Chief Complaint   Patient presents with    Vomiting      Subjective   Overall feeling better today denies nausea and pain is improving.  Rested well still feels unstaeady denies confusion or auditory/visual issues  General: No Fever or Chills, Cardiac: No Chest Pain or Palpitations, Resp: No Cough or SOA, GI: No Nausea, Vomiting, or Diarrhea, and Other: No bleeding    amLODIPine, 2.5 mg, Oral, Daily  FLUoxetine, 60 mg, Oral, Daily  folic acid, 1 mg, Oral, Daily  lactulose, 10 g, Oral, QAM  levothyroxine, 88 mcg, Oral, Daily  LORazepam, 1 mg, Oral, Q6H   Followed by  LORazepam, 1 mg, Oral, Q12H   Followed by  [START ON 2024] LORazepam, 1 mg, Oral, Daily  magnesium oxide, 400 mg, Oral, Daily  mirtazapine, 15 mg, Oral, Nightly  multivitamin, 1 tablet, Oral, Daily  multivitamin with minerals, 1 tablet, Oral, Daily  pancrelipase (Lip-Prot-Amyl), 24,000 units of lipase, Oral, TID With Meals  pantoprazole, 40 mg, Oral, Daily  potassium chloride, 10 mEq, Oral, Daily  risperiDONE, 1 mg, Oral, Nightly  thiamine (B-1) IV, 200 mg, Intravenous, Q8H   Followed by  [START ON 2024] thiamine, 100 mg, Oral, Daily           Objective   Vital Signs  Temp:  [98.1 °F (36.7 °C)-98.6 °F (37 °C)] 98.1 °F (36.7 °C)  Heart Rate:  [] 100  Resp:  [17-21] 17  BP: (102-132)/(63-84) 132/84  Body mass index is 25.79 kg/m².    Intake/Output Summary (Last 24 hours) at 2024 1236  Last data filed at 2024 0936  Gross per 24 hour   Intake 820 ml   Output --   Net 820 ml       Physical Exam  Constitutional:       General: She is not in acute distress.     Appearance: She is ill-appearing.   Cardiovascular:      Rate and Rhythm: Normal rate and regular rhythm.   Pulmonary:      Effort: No respiratory distress.      Breath sounds: Normal breath sounds.   Abdominal:       General: Bowel sounds are normal. There is no distension.      Palpations: Abdomen is soft.      Tenderness: There is abdominal tenderness.   Neurological:      Mental Status: She is alert. Mental status is at baseline.      Comments: Somnolent lethargic but does awaken           Results Review:       I reviewed the patient's new clinical results.  Results from last 7 days   Lab Units 11/19/24  0845 11/18/24  0816 11/17/24  0904   WBC 10*3/mm3 2.10* 2.35* 4.55   HEMOGLOBIN g/dL 13.2 13.5 16.3*   PLATELETS 10*3/mm3 35* 37* 56*     Results from last 7 days   Lab Units 11/19/24  0555 11/18/24  0816 11/17/24  0904   SODIUM mmol/L 137 137 140   POTASSIUM mmol/L 3.5 4.0 3.6   CHLORIDE mmol/L 105 103 103   CO2 mmol/L 23.0 22.3 19.1*   BUN mg/dL 9 11 9   CREATININE mg/dL 0.58 0.61 0.72   CALCIUM mg/dL 9.1 8.8 9.5   MAGNESIUM mg/dL  --   --  1.4*   PHOSPHORUS mg/dL  --   --  2.7   Estimated Creatinine Clearance: 106.5 mL/min (by C-G formula based on SCr of 0.58 mg/dL).  Results from last 7 days   Lab Units 11/17/24  0846   NITRITE UA  Positive*   WBC UA /HPF Too Numerous to Count*   BACTERIA UA /HPF 4+*   SQUAM EPITHEL UA /HPF None Seen   URINECX  >100,000 CFU/mL Gram Negative Bacilli*         Assessment & Plan   Active Hospital Problems    Diagnosis  POA    **Alcohol withdrawal [F10.939]  Yes    Hypomagnesemia [E83.42]  Unknown    Alcohol dependence with withdrawal [F10.239]  Yes    Urinary tract infection [N39.0]  Yes    Transaminitis [R74.01]  Yes    Pancreatitis [K85.90]  Yes    Abdominal pain [R10.9]  Yes    Esophageal varices [I85.00]  Yes    Essential hypertension [I10]  Yes    Pancreatic insufficiency [K86.89]  Yes    Thrombocytopenia [D69.6]  Yes    Hypothyroidism [E03.9]  Yes    Ascites [R18.8]  Yes      Resolved Hospital Problems   No resolved problems to display.       PLAN  This is a 54-year-old lady with a history of chronic alcohol use disorder, cirrhosis with previous esophageal varices and GI bleeding,  thrombocytopenia hypothyroidism and chronic pancreatitis who presents to the hospital with abdominal pain nausea and vomiting and was found to have acute on chronic pancreatitis  -Noted evidence alcohol withdrawal.  Continue symptom triggered Ativan therapy.  I do not normally like using scheduled Ativan but given her history of alcohol withdrawal issues in the eighth episode this year I do not think is necessarily a bad idea to use scheduled Ativan for the next few days.  Will see how this goes.  Not requiring additional meds at this point  -Bowel rest for the acute pancreatitis and aggressive IV fluids.  She has chronic pancreatic insufficiency as well.  Plan to advance diet this AM  -On Lactulose with hx of cirrhosis and PSE  -Discussed the need for sobriety moving forward.  -Her urine is nitrite positive with white cells and bacteria and no epithelials.  Urine cultures with GNRs she completed  IV Rocephin for urinary tract infection.  -Electrolytes stabilized today  -Her thrombocytopenia and leukopenia are probably related to her chronic alcohol usage as is her mild leukopenia.  -Plan to utilize mechanical DVT prophylaxis for now given her thrombocytopenia  -Full code      Disposition  Expected Discharge Date: 11/21/2024; Expected Discharge Time:        Michael Sanchez MD  [facility]  11/19/24  12:36 EST

## 2024-11-19 NOTE — PROGRESS NOTES
[facility] did follow up on pt. Pt's current CIWA is a 4. Pt has list of [facility] tx resources. Pt has outpt psychiatric APRN and is working to get a new therapist. Pt sleepy. [facility] will continue to follow.

## 2024-11-19 NOTE — PROGRESS NOTES
Discharge Planning Assessment  [facility]     Patient Name: Bekah Ozuna  MRN: [id number]  Today's Date: 11/19/2024    Admit Date: 11/17/2024    Plan: Home   Discharge Needs Assessment       Row Name 11/19/24 1530       Living Environment    People in Home spouse    Name(s) of People in Home Jeff ozuna spouse    Potentially Unsafe Housing Conditions none    Primary Care Provided by self    Provides Primary Care For no one    Family Caregiver if Needed spouse;other (see comments)    Family Caregiver Names Jeff ozuna spouse    Quality of Family Relationships supportive    Able to Return to Prior Arrangements yes       Resource/Environmental Concerns    Resource/Environmental Concerns none       Transition Planning    Patient/Family Anticipates Transition to home with family    Patient/Family Anticipated Services at Transition none       Discharge Needs Assessment    Equipment Currently Used at Home none    Concerns to be Addressed denies needs/concerns at this time;no discharge needs identified                   Discharge Plan       Row Name 11/19/24 1531       Plan    Plan Home    Plan Comments Spoke with pt at bedside to screen for DCP/needs.  Pt's SDOH completed.  Pt did confirm her facesheet as correct including PCP Dr. Davis.  Pt reports that at baseline she is totally independent and does not anticipate have any needs.  Pt did confirm she has recieved [facility] tx resources from [facility].  Pt reports that she gets her home meds filled at [facility].  Pt stated that someoen in her family will transport her home at [place]                  Continued Care and Services - Admitted Since 11/17/2024    No active coordination exists for this encounter.       Expected Discharge Date and Time       Expected Discharge Date Expected Discharge Time    Nov 21, 2024            Demographic Summary       Row Name 11/19/24 1529       General Information    Admission Type inpatient    Arrived From home    Referral Source  admission list    Reason for Consult discharge planning    Preferred Language English                   Functional Status       Row Name 11/19/24 1529       Functional Status    Usual Activity Tolerance good    Current Activity Tolerance fair       Functional Status, IADL    Medications independent    Meal Preparation independent    Housekeeping independent    Laundry independent    Shopping independent       Employment/    Employment Status retired                   Psychosocial    No documentation.                  Abuse/Neglect    No documentation.                  Legal    No documentation.                  Substance Abuse    No documentation.                  Patient Forms    No documentation.                     WALESKA Bustillos

## 2024-11-19 NOTE — PLAN OF CARE
Goal Outcome Evaluation:      Pt resting in bed quietly. Medicated prn for pain. Ativan given scheduled per order. Mildly Anxious at times. Room air. Ad diane.

## 2024-11-20 LAB
ALBUMIN SERPL-MCNC: 3.5 G/DL (ref 3.5–5.2)
ALBUMIN/GLOB SERPL: 1.3 G/DL
ALP SERPL-CCNC: 167 U/L (ref 39–117)
ALT SERPL W P-5'-P-CCNC: 80 U/L (ref 1–33)
ANION GAP SERPL CALCULATED.3IONS-SCNC: 6 MMOL/L (ref 5–15)
AST SERPL-CCNC: 179 U/L (ref 1–32)
BACTERIA SPEC AEROBE CULT: ABNORMAL
BACTERIA SPEC AEROBE CULT: ABNORMAL
BASOPHILS # BLD AUTO: 0.03 10*3/MM3 (ref 0–0.2)
BASOPHILS NFR BLD AUTO: 0.9 % (ref 0–1.5)
BILIRUB SERPL-MCNC: 1.2 MG/DL (ref 0–1.2)
BUN SERPL-MCNC: 9 MG/DL (ref 6–20)
BUN/CREAT SERPL: 15.3 (ref 7–25)
CALCIUM SPEC-SCNC: 8.6 MG/DL (ref 8.6–10.5)
CHLORIDE SERPL-SCNC: 107 MMOL/L (ref 98–107)
CO2 SERPL-SCNC: 23 MMOL/L (ref 22–29)
CREAT SERPL-MCNC: 0.59 MG/DL (ref 0.57–1)
DEPRECATED RDW RBC AUTO: 45 FL (ref 37–54)
EGFRCR SERPLBLD CKD-EPI 2021: 105.9 ML/MIN/1.73
EOSINOPHIL # BLD AUTO: 0.16 10*3/MM3 (ref 0–0.4)
EOSINOPHIL NFR BLD AUTO: 5.1 % (ref 0.3–6.2)
ERYTHROCYTE [DISTWIDTH] IN BLOOD BY AUTOMATED COUNT: 12.9 % (ref 12.3–15.4)
GLOBULIN UR ELPH-MCNC: 2.7 GM/DL
GLUCOSE SERPL-MCNC: 131 MG/DL (ref 65–99)
HCT VFR BLD AUTO: 40.9 % (ref 34–46.6)
HGB BLD-MCNC: 14.2 G/DL (ref 12–15.9)
IMM GRANULOCYTES # BLD AUTO: 0.03 10*3/MM3 (ref 0–0.05)
IMM GRANULOCYTES NFR BLD AUTO: 0.9 % (ref 0–0.5)
INR PPP: 1.4 (ref 0.9–1.1)
LYMPHOCYTES # BLD AUTO: 0.93 10*3/MM3 (ref 0.7–3.1)
LYMPHOCYTES NFR BLD AUTO: 29.4 % (ref 19.6–45.3)
MAGNESIUM SERPL-MCNC: 1.5 MG/DL (ref 1.6–2.6)
MCH RBC QN AUTO: 33.1 PG (ref 26.6–33)
MCHC RBC AUTO-ENTMCNC: 34.7 G/DL (ref 31.5–35.7)
MCV RBC AUTO: 95.3 FL (ref 79–97)
MONOCYTES # BLD AUTO: 0.3 10*3/MM3 (ref 0.1–0.9)
MONOCYTES NFR BLD AUTO: 9.5 % (ref 5–12)
NEUTROPHILS NFR BLD AUTO: 1.71 10*3/MM3 (ref 1.7–7)
NEUTROPHILS NFR BLD AUTO: 54.2 % (ref 42.7–76)
PLATELET # BLD AUTO: 37 10*3/MM3 (ref 140–450)
PMV BLD AUTO: 12.7 FL (ref 6–12)
POTASSIUM SERPL-SCNC: 3.7 MMOL/L (ref 3.5–5.2)
PROT SERPL-MCNC: 6.2 G/DL (ref 6–8.5)
PROTHROMBIN TIME: 17.4 SECONDS (ref 11.7–14.2)
RBC # BLD AUTO: 4.29 10*6/MM3 (ref 3.77–5.28)
SODIUM SERPL-SCNC: 136 MMOL/L (ref 136–145)
WBC NRBC COR # BLD AUTO: 3.16 10*3/MM3 (ref 3.4–10.8)

## 2024-11-20 PROCEDURE — 85025 COMPLETE CBC W/AUTO DIFF WBC: CPT | Performed by: HOSPITALIST

## 2024-11-20 PROCEDURE — 25010000002 MORPHINE PER 10 MG: Performed by: INTERNAL MEDICINE

## 2024-11-20 PROCEDURE — 25010000002 THIAMINE HCL 200 MG/2ML SOLUTION: Performed by: INTERNAL MEDICINE

## 2024-11-20 PROCEDURE — 97530 THERAPEUTIC ACTIVITIES: CPT

## 2024-11-20 PROCEDURE — 85610 PROTHROMBIN TIME: CPT | Performed by: HOSPITALIST

## 2024-11-20 PROCEDURE — 83735 ASSAY OF MAGNESIUM: CPT | Performed by: HOSPITALIST

## 2024-11-20 PROCEDURE — 80053 COMPREHEN METABOLIC PANEL: CPT | Performed by: HOSPITALIST

## 2024-11-20 RX ORDER — OXYCODONE HYDROCHLORIDE 5 MG/1
5 TABLET ORAL EVERY 6 HOURS PRN
Status: DISCONTINUED | OUTPATIENT
Start: 2024-11-20 | End: 2024-11-21 | Stop reason: HOSPADM

## 2024-11-20 RX ADMIN — LORAZEPAM 1 MG: 1 TABLET ORAL at 08:46

## 2024-11-20 RX ADMIN — RISPERIDONE 1 MG: 1 TABLET, FILM COATED ORAL at 20:49

## 2024-11-20 RX ADMIN — OXYCODONE HYDROCHLORIDE 5 MG: 5 TABLET ORAL at 11:56

## 2024-11-20 RX ADMIN — POTASSIUM CHLORIDE 10 MEQ: 750 TABLET, EXTENDED RELEASE ORAL at 08:46

## 2024-11-20 RX ADMIN — MAGNESIUM OXIDE TAB 400 MG (241.3 MG ELEMENTAL MG) 400 MG: 400 (241.3 MG) TAB at 08:45

## 2024-11-20 RX ADMIN — FLUOXETINE HYDROCHLORIDE 60 MG: 20 CAPSULE ORAL at 08:45

## 2024-11-20 RX ADMIN — Medication 1 TABLET: at 08:46

## 2024-11-20 RX ADMIN — AMLODIPINE BESYLATE 2.5 MG: 2.5 TABLET ORAL at 08:46

## 2024-11-20 RX ADMIN — Medication 1 TABLET: at 08:45

## 2024-11-20 RX ADMIN — THIAMINE HYDROCHLORIDE 200 MG: 100 INJECTION, SOLUTION INTRAMUSCULAR; INTRAVENOUS at 06:38

## 2024-11-20 RX ADMIN — LEVOTHYROXINE SODIUM 88 MCG: 0.09 TABLET ORAL at 08:46

## 2024-11-20 RX ADMIN — THIAMINE HYDROCHLORIDE 200 MG: 100 INJECTION, SOLUTION INTRAMUSCULAR; INTRAVENOUS at 17:30

## 2024-11-20 RX ADMIN — PANCRELIPASE 24000 UNITS OF LIPASE: 60000; 12000; 38000 CAPSULE, DELAYED RELEASE PELLETS ORAL at 11:56

## 2024-11-20 RX ADMIN — PANTOPRAZOLE SODIUM 40 MG: 40 TABLET, DELAYED RELEASE ORAL at 08:46

## 2024-11-20 RX ADMIN — FOLIC ACID 1 MG: 1 TABLET ORAL at 08:45

## 2024-11-20 RX ADMIN — THIAMINE HYDROCHLORIDE 200 MG: 100 INJECTION, SOLUTION INTRAMUSCULAR; INTRAVENOUS at 22:14

## 2024-11-20 RX ADMIN — OXYCODONE HYDROCHLORIDE 5 MG: 5 TABLET ORAL at 18:20

## 2024-11-20 RX ADMIN — MORPHINE SULFATE 2 MG: 2 INJECTION, SOLUTION INTRAMUSCULAR; INTRAVENOUS at 04:57

## 2024-11-20 RX ADMIN — PANCRELIPASE 24000 UNITS OF LIPASE: 60000; 12000; 38000 CAPSULE, DELAYED RELEASE PELLETS ORAL at 08:46

## 2024-11-20 RX ADMIN — MIRTAZAPINE 15 MG: 15 TABLET, FILM COATED ORAL at 20:49

## 2024-11-20 RX ADMIN — PANCRELIPASE 24000 UNITS OF LIPASE: 60000; 12000; 38000 CAPSULE, DELAYED RELEASE PELLETS ORAL at 17:31

## 2024-11-20 RX ADMIN — LACTULOSE 10 G: 10 SOLUTION ORAL at 06:38

## 2024-11-20 NOTE — PLAN OF CARE
Goal Outcome Evaluation:  Plan of Care Reviewed With: patient        Progress: improving  Outcome Evaluation: Pt agreed to PT session, was napping , so returned to bed after session, no alarm upon entry, pt states she gets up to BR ad diane, pt modified indep w/bed mob and tfers, pt lilliana amb ~150ft CG/SBA by end of amb, suggest she call nsg for assist to BR w/seizure prec    Anticipated Discharge Disposition (PT): home with assist

## 2024-11-20 NOTE — PLAN OF CARE
Goal Outcome Evaluation:  Plan of Care Reviewed With: patient        Progress: no change  Outcome Evaluation: Pt c/o abd pain, Morphine given. CIWA scores: 3-5, seizure precautions in place. Rested well. VSS.

## 2024-11-20 NOTE — THERAPY TREATMENT NOTE
Patient Name: Bekah Gibson  : 1970    MRN: [id number]                              Today's Date: 2024       Admit Date: 2024    Visit Dx:     ICD-10-CM ICD-9-CM   1. Nausea and vomiting, unspecified vomiting type  R11.2 787.01   2. Alcoholic ketoacidosis  E87.29 276.2   3. Hypomagnesemia  E83.42 275.2   4. Alcohol abuse with withdrawal  F10.139 291.81     305.00   5. UTI (urinary tract infection) with pyuria  N39.0 599.0   6. Abnormal CT of the abdomen  R93.5 793.6     Patient Active Problem List   Diagnosis    Irritable bowel syndrome with both constipation and diarrhea    Peripheral neuropathy    Vitamin D deficiency    History of HPV infection    Hypothyroidism    Tobacco dependence due to cigarettes    Acute kidney injury    Ascites    E. coli UTI (urinary tract infection)    Alcoholic hepatitis    GI bleed    Acute post-hemorrhagic anemia    Thrombocytopenia    Open wound of right shoulder region    Pancreatic insufficiency    Alcoholic ketoacidosis    Chronic alcohol abuse    ESBL (extended spectrum beta-lactamase) producing bacteria infection    Abnormal weight loss    Hashimoto's disease    Chronic fatigue    History of alcohol use disorder    Alcohol intoxication    Lupus    Hypomagnesemia    Pancytopenia    Alcoholic cirrhosis    Bilateral lower abdominal discomfort    Primary hypertension    Melena    Arthritis of shoulder    Esophageal varices    Essential hypertension    Alcohol-induced chronic pancreatitis    Abdominal pain    Pancreatitis    Leukopenia    Epigastric pain    Acute alcoholic gastritis without hemorrhage    Alcohol-induced acute on chronic pancreatitis without infection or necrosis    Alcohol withdrawal    Acute on chronic pancreatitis    Transaminitis    Cholelithiasis    Alcohol use    WBC decreased    Hypokalemia    Urinary tract infection    Neutropenia    Polysubstance abuse    Alcohol dependence with withdrawal    Hypomagnesemia     Past Medical History:    Diagnosis Date    Acromioclavicular separation 1/2021    Ankle sprain 2/2004    no operation necessary  At Time unsure    Anxiety     Arthritis     Arthritis of back Unsure    Diseased    Cirrhosis     Disease of thyroid gland     ETOH abuse     SOBER FOR 3 MONTHS    Fracture, clavicle 1/2021    shattered clavicel on issue slip and fall    Fracture, foot Unsure    Frozen shoulder 1 /2009    4    GERD (gastroesophageal reflux disease)     History of kidney stones     5 YR AGO    Hypertension     Left shoulder pain     Low back strain 1/21    Lumbosacral disc disease 1/21    MDD (major depressive disorder)     Neck strain Unsure    Pancreatitis     Periarthritis of shoulder see above    Rotator cuff syndrome odre for shoulder replacement    unable to receive due to low platelets    Tennis elbow Unsure    Unsurpassed    Thrombocytopenia      Past Surgical History:   Procedure Laterality Date    CLAVICLE SURGERY Right 2018    ENDOSCOPY N/A 10/26/2022    Procedure: ESOPHAGOGASTRODUODENOSCOPY wtih banding;  Surgeon: Ag Patel MD;  Location: [facility] ENDOSCOPY;  Service: Gastroenterology;  Laterality: N/A;  pre: melena  post: esophageal varicies with banding x2 bands    ENDOSCOPY N/A 01/18/2023    Procedure: ESOPHAGOGASTRODUODENOSCOPY;  Surgeon: Ag Patel MD;  Location: [facility] ENDOSCOPY;  Service: Gastroenterology;  Laterality: N/A;  PRE OP - MAROON STOOLS  POST OP - SM ESOPHAGEAL VARICES    ESOPHAGEAL VARICES LIGATION      FOOT SURGERY  2/2/14    JOINT REPLACEMENT Bilateral     GREAT TOE    KIDNEY STONE SURGERY      LITHOTRIPSY AND STENT (R SIDE)    LAPAROSCOPIC TUBAL LIGATION  2005    NECK SURGERY  3/07    Not sure of dates    SIGMOIDOSCOPY N/A 01/18/2023    Procedure: SIGMOIDOSCOPY FLEXIBLE;  Surgeon: Ag Patel MD;  Location: [facility] ENDOSCOPY;  Service: Gastroenterology;  Laterality: N/A;  PRE OP - MAROON STOOLS  POST OP - RECTAL VARICES    TOTAL SHOULDER ARTHROPLASTY Left 05/09/2023    Procedure: reverse  total shoulder arthroplasty;  Surgeon: Giovanni Denise MD;  [facility]: [facility] OR [facility];  Service: Orthopedics;  Laterality: Left;    TRIGGER POINT INJECTION  8/24      General Information       Row Name 11/20/24 1646          Physical Therapy Time and Intention    Document Type therapy note (daily note);discharge treatment  -     Mode of Treatment individual therapy;physical therapy  -       Row Name 11/20/24 1646          General Information    Patient Profile Reviewed yes  -     Existing Precautions/Restrictions fall  -       Row Name 11/20/24 1646          Living Environment    People in Home significant other  -       Row Name 11/20/24 1646          Cognition    Orientation Status (Cognition) oriented x 3  -       Row Name 11/20/24 1646          Safety Issues/Impairments Affecting Functional Mobility    Safety Issues Affecting Function (Mobility) problem-solving  -     Impairments Affecting Function (Mobility) balance  -               User Key  (r) = Recorded By, (t) = Taken By, (c) = Cosigned By      Initials Name Provider Type     Samina Fatima PTA Physical Therapist Assistant                   Mobility       Row Name 11/20/24 1647          Bed Mobility    Supine-Sit [place] (Bed Mobility) modified independence  -     Sit-Supine [place] (Bed Mobility) modified independence  -     Assistive Device (Bed Mobility) head of bed elevated  -       Row Name 11/20/24 1647          Sit-Stand Transfer    Sit-Stand [place] (Transfers) standby assist;supervision;verbal cues  2 person just for safety , but balance and cognition have improved  -       Row Name 11/20/24 1647          Gait/Stairs (Locomotion)    [place] Level (Gait) contact guard;standby assist  SBA by end of amb  -     Assistive Device (Gait) other (see comments)  HHA-none  -     Distance in Feet (Gait) 150  -JM     Deviations/Abnormal Patterns (Gait) rachana decreased;base of support, narrow  -      Comment, (Gait/Stairs) no overt LOB this date, did awaken from rest so first few steps were slightly unsteady, then improved to SBA  -JM               User Key  (r) = Recorded By, (t) = Taken By, (c) = Cosigned By      Initials Name Provider Type    Samina Ndiaye PTA Physical Therapist Assistant                   Obj/Interventions    No documentation.                  Goals/Plan    No documentation.                  Clinical Impression       Row Name 11/20/24 1650          Pain    Pretreatment Pain Rating 0/10 - no pain  -JM     Posttreatment Pain Rating 0/10 - no pain  -JM       Row Name 11/20/24 1650          Plan of Care Review    Plan of Care Reviewed With patient  -     Progress improving  -     Outcome Evaluation Pt agreed to PT session, was napping , so returned to bed after session, no alarm upon entry, pt states she gets up to BR ad diane, pt modified indep w/bed mob and tfers, pt lilliana amb ~150ft CG/SBA by end of amb, suggest she call nsg for assist to BR w/seizure prec  -       Row Name 11/20/24 1650          Therapy Assessment/Plan (PT)    Rehab Potential (PT) good  -     Criteria for Skilled Interventions Met (PT) yes  -JM       Row Name 11/20/24 1650          Vital Signs    O2 Delivery Pre Treatment room air  -JM       Row Name 11/20/24 1650          Positioning and Restraints    Pre-Treatment Position in bed  -JM     Post Treatment Position bed  -JM     In Bed fowlers;call light within reach;encouraged to call for assist;notified nsg  no alarm upon entry, pt educ to call for assist  -               User Key  (r) = Recorded By, (t) = Taken By, (c) = Cosigned By      Initials Name Provider Type    Samina Ndiaye PTA Physical Therapist Assistant                   Outcome Measures       Row Name 11/20/24 1655 11/20/24 0855       How much help from another person do you currently need...    Turning from your back to your side while in flat bed without using bedrails? 4  -JM 4  -DH     Moving from lying on back to sitting on the side of a flat bed without bedrails? 4  - 4  -DH    Moving to and from a bed to a chair (including a wheelchair)? 4  - 3  -DH    Standing up from a chair using your arms (e.g., wheelchair, bedside chair)? 3  - 3  -DH    Climbing 3-5 steps with a railing? 3  - 3  -DH    To walk in hospital room? 3  - 3  -DH    AM-PAC 6 Clicks Score (PT) 21  - 20  -    Highest Level of Mobility Goal 6 --> Walk 10 steps or more  - 6 --> Walk 10 steps or more  -              User Key  (r) = Recorded By, (t) = Taken By, (c) = Cosigned By      Initials Name Provider Type    Samina Ndiaye PTA Physical Therapist Assistant    DH Lopez, Alayna, RN Registered Nurse                                 Physical Therapy Education       Title: PT OT SLP Therapies (Done)       Topic: Physical Therapy (Done)       Point: Mobility training (Done)       Learning Progress Summary            Patient Acceptance, E,TB,D, VU by  at 11/20/2024 1656                      Point: Home exercise program (Done)       Learning Progress Summary            Patient Acceptance, E,TB,D, VU by  at 11/20/2024 1656                      Point: Body mechanics (Done)       Learning Progress Summary            Patient Acceptance, E,TB,D, VU by  at 11/20/2024 1656                      Point: Precautions (Done)       Learning Progress Summary            Patient Acceptance, E,TB,D, VU by  at 11/20/2024 1656                                      User Key       Initials Effective Dates Name Provider Type Discipline     03/07/18 -  Samnia Fatima PTA Physical Therapist Assistant PT                  PT Recommendation and Plan     Progress: improving  Outcome Evaluation: Pt agreed to PT session, was napping , so returned to bed after session, no alarm upon entry, pt states she gets up to BR ad diane, pt modified indep w/bed mob and tfers, pt lilliana amb ~150ft CG/SBA by end of amb, suggest she call nsg for assist to  BR w/seizure prec     Time Calculation:         PT Charges       Row Name 11/20/24 1656             Time Calculation    Start Time 1553  -      Stop Time 1606  -      Time Calculation (min) 13 min  -      PT Received On 11/20/24  -                User Key  (r) = Recorded By, (t) = Taken By, (c) = Cosigned By      Initials Name Provider Type    Samina Ndiaye PTA Physical Therapist Assistant                  Therapy Charges for Today       Code Description Service Date Service Provider Modifiers Qty    [id number]  PT THERAPEUTIC ACT EA 15 MIN 11/20/2024 Samina Fatima PTA GP 1            PT G-Codes  Outcome Measure Options: AM-PAC 6 Clicks Basic Mobility (PT)  AM-PAC 6 Clicks Score (PT): 21  PT Discharge Summary  Anticipated Discharge Disposition (PT): home with assist    Samina Fatima PTA  11/20/2024

## 2024-11-20 NOTE — PROGRESS NOTES
" LOS: 3 days     Name: Bekah Gibson  Age: 54 y.o.  Sex: female  :  1970  MRN: [id number]         Primary Care Physician: Tylor Davis    Subjective   Subjective  Denies any tremors or anxiety.  Abdominal pain improved.  Tolerated diet advancement yesterday.    Objective   Vital Signs  Temp:  [97.5 °F (36.4 °C)-98.2 °F (36.8 °C)] 97.5 °F (36.4 °C)  Heart Rate:  [] 96  Resp:  [18] 18  BP: (109-130)/(72-89) 130/89  Body mass index is 25.68 kg/m².    Objective:  General Appearance:  Comfortable and in no acute distress.    Vital signs: (most recent): Blood pressure 130/89, pulse 96, temperature 97.5 °F (36.4 °C), temperature source Oral, resp. rate 18, height 165.1 cm (65\"), weight 70 kg (154 lb 5.2 oz), last menstrual period 2013, SpO2 93%, not currently breastfeeding.    Lungs:  Normal effort and normal respiratory rate.  She is not in respiratory distress.  There are decreased breath sounds.    Heart: Normal rate.  Regular rhythm.    Abdomen: Abdomen is soft.  Bowel sounds are normal.   There is no abdominal tenderness.     Extremities: There is no dependent edema or local swelling.    Neurological: Patient is alert and oriented to person, place and time.    Skin:  Warm and dry.                Results Review:       I reviewed the patient's new clinical results.    Results from last 7 days   Lab Units 24  0521 24  0845 24  0816 24  0904   WBC 10*3/mm3 3.16* 2.10* 2.35* 4.55   HEMOGLOBIN g/dL 14.2 13.2 13.5 16.3*   PLATELETS 10*3/mm3 37* 35* 37* 56*     Results from last 7 days   Lab Units 24  0521 24  1615 24  0555 24  0816 24  0904   SODIUM mmol/L 136  --  137 137 140   POTASSIUM mmol/L 3.7 4.7 3.5 4.0 3.6   CHLORIDE mmol/L 107  --  105 103 103   CO2 mmol/L 23.0  --  23.0 22.3 19.1*   BUN mg/dL 9  --  9 11 9   CREATININE mg/dL 0.59  --  0.58 0.61 0.72   CALCIUM mg/dL 8.6  --  9.1 8.8 9.5   GLUCOSE mg/dL 131*  --  112* 132* 117*     Results " from last 7 days   Lab Units 11/20/24  0521 11/19/24  0555 11/17/24  0917   INR  1.40* 1.44* 1.36*             Scheduled Meds:   amLODIPine, 2.5 mg, Oral, Daily  FLUoxetine, 60 mg, Oral, Daily  folic acid, 1 mg, Oral, Daily  lactulose, 10 g, Oral, QAM  levothyroxine, 88 mcg, Oral, Daily  magnesium oxide, 400 mg, Oral, Daily  mirtazapine, 15 mg, Oral, Nightly  multivitamin, 1 tablet, Oral, Daily  multivitamin with minerals, 1 tablet, Oral, Daily  pancrelipase (Lip-Prot-Amyl), 24,000 units of lipase, Oral, TID With Meals  pantoprazole, 40 mg, Oral, Daily  potassium chloride, 10 mEq, Oral, Daily  risperiDONE, 1 mg, Oral, Nightly  thiamine (B-1) IV, 200 mg, Intravenous, Q8H   Followed by  [START ON 11/22/2024] thiamine, 100 mg, Oral, Daily      PRN Meds:     acetaminophen **OR** acetaminophen **OR** acetaminophen    senna-docusate sodium **AND** polyethylene glycol **AND** bisacodyl **AND** bisacodyl    Calcium Replacement - Follow Nurse / BPA Driven Protocol    cloNIDine    LORazepam **OR** LORazepam **OR** LORazepam **OR** LORazepam **OR** LORazepam **OR** LORazepam    Magnesium Standard Dose Replacement - Follow Nurse / BPA Driven Protocol    melatonin    ondansetron ODT **OR** ondansetron    oxyCODONE    Phosphorus Replacement - Follow Nurse / BPA Driven Protocol    Potassium Replacement - Follow Nurse / BPA Driven Protocol    [COMPLETED] Insert Peripheral IV **AND** sodium chloride  Continuous Infusions:       Assessment & Plan   Active Hospital Problems    Diagnosis  POA    **Alcohol withdrawal [F10.939]  Yes    Hypomagnesemia [E83.42]  Unknown    Alcohol dependence with withdrawal [F10.239]  Yes    Urinary tract infection [N39.0]  Yes    Transaminitis [R74.01]  Yes    Pancreatitis [K85.90]  Yes    Abdominal pain [R10.9]  Yes    Esophageal varices [I85.00]  Yes    Essential hypertension [I10]  Yes    Pancreatic insufficiency [K86.89]  Yes    Thrombocytopenia [D69.6]  Yes    Hypothyroidism [E03.9]  Yes    Ascites  [R18.8]  Yes      Resolved Hospital Problems   No resolved problems to display.       Assessment & Plan    -Now off of scheduled Ativan and we will observe today to make sure she does not slip backwards with any signs/symptoms of alcohol withdrawal.  -Acute on chronic pancreatitis improved.  Tolerating diet advancement.  Change IV morphine to oral Roxicodone  -She completed a 3-day course of Rocephin for polymicrobial UTI including E. coli and Proteus  -Thrombocytopenia and leukopenia are stable and related to her chronic alcoholism  -Anticipate discharge home tomorrow if today goes smoothly.    Expected Discharge Date: 11/21/2024; Expected Discharge Time:      Dimas Johnson MD  [facility]  11/20/24  12:47 EST

## 2024-11-20 NOTE — PROGRESS NOTES
[facility] follow up d/t EtOH; this writer reviewed chart and spoke with RN Giovanau. Per RN, patient is doing well, no behavioral health concerns noted at present; she seemed to sleep well overnight.  Last CIWA 5 at 00:31.  The patient has [facility] treatment resources; she plans to continue with outpatient psychiatric APRN and get established with outpatient counselor.  Discharge plan is home; CCP involved and providing assistance. No further needs/concerns noted at this time per RN and/or medical team; [facility] to continue following.

## 2024-11-21 ENCOUNTER — READMISSION MANAGEMENT (OUTPATIENT)
Dept: CALL CENTER | Facility: HOSPITAL | Age: 54
End: 2024-11-21
Payer: MEDICAID

## 2024-11-21 VITALS
BODY MASS INDEX: 25.82 KG/M2 | TEMPERATURE: 98.2 F | RESPIRATION RATE: 20 BRPM | WEIGHT: 154.98 LBS | HEIGHT: 65 IN | OXYGEN SATURATION: 93 % | DIASTOLIC BLOOD PRESSURE: 90 MMHG | HEART RATE: 88 BPM | SYSTOLIC BLOOD PRESSURE: 131 MMHG

## 2024-11-21 LAB
ALBUMIN SERPL-MCNC: 3.5 G/DL (ref 3.5–5.2)
ALBUMIN/GLOB SERPL: 1.3 G/DL
ALP SERPL-CCNC: 161 U/L (ref 39–117)
ALT SERPL W P-5'-P-CCNC: 79 U/L (ref 1–33)
ANION GAP SERPL CALCULATED.3IONS-SCNC: 10.2 MMOL/L (ref 5–15)
AST SERPL-CCNC: 136 U/L (ref 1–32)
BASOPHILS # BLD AUTO: 0.03 10*3/MM3 (ref 0–0.2)
BASOPHILS NFR BLD AUTO: 0.9 % (ref 0–1.5)
BILIRUB SERPL-MCNC: 1.2 MG/DL (ref 0–1.2)
BUN SERPL-MCNC: 9 MG/DL (ref 6–20)
BUN/CREAT SERPL: 16.1 (ref 7–25)
CALCIUM SPEC-SCNC: 8.7 MG/DL (ref 8.6–10.5)
CHLORIDE SERPL-SCNC: 106 MMOL/L (ref 98–107)
CO2 SERPL-SCNC: 22.8 MMOL/L (ref 22–29)
CREAT SERPL-MCNC: 0.56 MG/DL (ref 0.57–1)
DEPRECATED RDW RBC AUTO: 43.6 FL (ref 37–54)
EGFRCR SERPLBLD CKD-EPI 2021: 107.3 ML/MIN/1.73
EOSINOPHIL # BLD AUTO: 0.14 10*3/MM3 (ref 0–0.4)
EOSINOPHIL NFR BLD AUTO: 4.2 % (ref 0.3–6.2)
ERYTHROCYTE [DISTWIDTH] IN BLOOD BY AUTOMATED COUNT: 12.7 % (ref 12.3–15.4)
GLOBULIN UR ELPH-MCNC: 2.8 GM/DL
GLUCOSE SERPL-MCNC: 149 MG/DL (ref 65–99)
HCT VFR BLD AUTO: 38.6 % (ref 34–46.6)
HGB BLD-MCNC: 13.5 G/DL (ref 12–15.9)
IMM GRANULOCYTES # BLD AUTO: 0.03 10*3/MM3 (ref 0–0.05)
IMM GRANULOCYTES NFR BLD AUTO: 0.9 % (ref 0–0.5)
INR PPP: 1.36 (ref 0.9–1.1)
LYMPHOCYTES # BLD AUTO: 0.94 10*3/MM3 (ref 0.7–3.1)
LYMPHOCYTES NFR BLD AUTO: 28.1 % (ref 19.6–45.3)
MCH RBC QN AUTO: 33.1 PG (ref 26.6–33)
MCHC RBC AUTO-ENTMCNC: 35 G/DL (ref 31.5–35.7)
MCV RBC AUTO: 94.6 FL (ref 79–97)
MONOCYTES # BLD AUTO: 0.32 10*3/MM3 (ref 0.1–0.9)
MONOCYTES NFR BLD AUTO: 9.6 % (ref 5–12)
NEUTROPHILS NFR BLD AUTO: 1.88 10*3/MM3 (ref 1.7–7)
NEUTROPHILS NFR BLD AUTO: 56.3 % (ref 42.7–76)
PLATELET # BLD AUTO: 37 10*3/MM3 (ref 140–450)
PMV BLD AUTO: 12.8 FL (ref 6–12)
POTASSIUM SERPL-SCNC: 3.7 MMOL/L (ref 3.5–5.2)
PROT SERPL-MCNC: 6.3 G/DL (ref 6–8.5)
PROTHROMBIN TIME: 17 SECONDS (ref 11.7–14.2)
RBC # BLD AUTO: 4.08 10*6/MM3 (ref 3.77–5.28)
SODIUM SERPL-SCNC: 139 MMOL/L (ref 136–145)
WBC NRBC COR # BLD AUTO: 3.34 10*3/MM3 (ref 3.4–10.8)

## 2024-11-21 PROCEDURE — 80053 COMPREHEN METABOLIC PANEL: CPT | Performed by: HOSPITALIST

## 2024-11-21 PROCEDURE — 85610 PROTHROMBIN TIME: CPT | Performed by: HOSPITALIST

## 2024-11-21 PROCEDURE — 25010000002 THIAMINE HCL 200 MG/2ML SOLUTION: Performed by: INTERNAL MEDICINE

## 2024-11-21 PROCEDURE — 85025 COMPLETE CBC W/AUTO DIFF WBC: CPT | Performed by: HOSPITALIST

## 2024-11-21 RX ADMIN — PANCRELIPASE 24000 UNITS OF LIPASE: 60000; 12000; 38000 CAPSULE, DELAYED RELEASE PELLETS ORAL at 08:43

## 2024-11-21 RX ADMIN — LEVOTHYROXINE SODIUM 88 MCG: 0.09 TABLET ORAL at 08:43

## 2024-11-21 RX ADMIN — FOLIC ACID 1 MG: 1 TABLET ORAL at 08:44

## 2024-11-21 RX ADMIN — OXYCODONE HYDROCHLORIDE 5 MG: 5 TABLET ORAL at 02:58

## 2024-11-21 RX ADMIN — Medication 1 TABLET: at 08:44

## 2024-11-21 RX ADMIN — FLUOXETINE HYDROCHLORIDE 60 MG: 20 CAPSULE ORAL at 08:44

## 2024-11-21 RX ADMIN — AMLODIPINE BESYLATE 2.5 MG: 2.5 TABLET ORAL at 08:43

## 2024-11-21 RX ADMIN — PANTOPRAZOLE SODIUM 40 MG: 40 TABLET, DELAYED RELEASE ORAL at 08:44

## 2024-11-21 RX ADMIN — MAGNESIUM OXIDE TAB 400 MG (241.3 MG ELEMENTAL MG) 400 MG: 400 (241.3 MG) TAB at 08:44

## 2024-11-21 RX ADMIN — LACTULOSE 10 G: 10 SOLUTION ORAL at 06:09

## 2024-11-21 RX ADMIN — THIAMINE HYDROCHLORIDE 200 MG: 100 INJECTION, SOLUTION INTRAMUSCULAR; INTRAVENOUS at 06:09

## 2024-11-21 RX ADMIN — POTASSIUM CHLORIDE 10 MEQ: 750 TABLET, EXTENDED RELEASE ORAL at 08:44

## 2024-11-21 NOTE — PAYOR COMM NOTE
"Ba Gibson (54 y.o. Female)        PLEASE SEE ATTACHED DC [employer]    REF#[id number]     THANK YOU    ARTIS SHAH LPN CCP   Date of Birth   1970    Social Security Number       Address   [address] [id number]    Home Phone   [phone]    MRN   [id number]       Gnosticist   Yazdanism    Marital Status   Legally                             Admission Date   11/17/24    Admission Type   Emergency    Admitting Provider   Carolann Scott MD    Attending Provider       Department, Room/Bed   [address], [address]/1       Discharge Date   11/21/2024    Discharge Disposition   Home or Self Care    Discharge Destination                                 Attending Provider: (none)   Allergies: Benzonatate, Ketorolac Tromethamine, Phenergan [Promethazine Hcl], Cucumber Extract, Promethazine-phenylephrine    Isolation: None   Infection: None   Code Status: CPR    Ht: 165.1 cm (65\")   Wt: 70.3 kg (154 lb 15.7 oz)    Admission Cmt: None   Principal Problem: Alcohol withdrawal [F10.939]                   Active Insurance as of 11/17/2024       Primary Coverage       Payor Plan Insurance Group Employer/Plan Group    ANTHEM MEDICAID ANTHEM MEDICAID [id number]       Payor Plan Address Payor Plan Phone Number Payor Plan Fax Number Effective Dates    [address] [address] [phone]  6/1/2018 - None Entered    [facility] [id number]         Subscriber Name Subscriber Birth Date Member ID       BA GIBSON 1970 [id number]                     Emergency Contacts        (Rel.) Home Phone Work Phone Mobile Phone    Jeff Gibson (Spouse) [phone] -- [phone]                 Discharge Summary        Dimas Johnson MD at 11/21/24 0953            Date of Admission: 11/17/2024  Date of Discharge:  11/21/2024  Primary Care Physician: Tylor Davis     Discharge Diagnosis:  Active Hospital Problems    Diagnosis  POA    **Alcohol " withdrawal [F10.939]  Yes    Hypomagnesemia [E83.42]  Unknown    Alcohol dependence with withdrawal [F10.239]  Yes    Urinary tract infection [N39.0]  Yes    Transaminitis [R74.01]  Yes    Pancreatitis [K85.90]  Yes    Abdominal pain [R10.9]  Yes    Esophageal varices [I85.00]  Yes    Essential hypertension [I10]  Yes    Pancreatic insufficiency [K86.89]  Yes    Thrombocytopenia [D69.6]  Yes    Hypothyroidism [E03.9]  Yes    Ascites [R18.8]  Yes      Resolved Hospital Problems   No resolved problems to display.       DETAILS OF HOSPITAL STAY     Pertinent Test Results and Procedures Performed    CT scan of the abdomen and pelvis:  1. Pancreatic atrophy with pancreatic calcifications likely from chronic   pancreatitis.   2. Mild injection around the pancreatic head could be related to mild   changes of acute pancreatitis. Please correlate.   3. Fatty infiltration of the liver.   4. Mild bilateral lower lobe atelectasis.   5. Cholelithiasis.   6. Tiny air bubble in the anterior urinary bladder. Please correlate for   possible Manzo catheterization.     Hospital Course  This is a 54-year-old lady with a long history of alcohol use disorder cirrhosis and chronic pancreatitis who presented to the hospital with alcohol withdrawal acute on chronic pancreatitis with abdominal pain nausea and vomiting.  Please see H&P for full details of admission.  He only had mild alcohol withdrawal and this is now resolved.  Her acute on chronic pancreatitis resolved quickly and she is no longer having abdominal pain.  Her diet was advanced successfully.  She is now medically stable to be discharged back home.  She also was treated for UTI and received a 3-day course of Rocephin.    Physical Exam at Discharge:  General: No acute distress, AAOx3  HEENT: EOMI, PERRL  Cardiovascular: +s1 and s2, RRR  Lungs: No rhonchi or wheezing  Abdomen: soft, nontender    Consults:   Consults       Date and Time Order Name Status Description    11/17/2024   1:34 PM [facility] (on-call MD unless specified) Details                Condition on Discharge: Stable, improved    Discharge Disposition  Home or Self Care    Discharge Medications     Discharge Medications        Changes to Medications        Instructions Start Date   levothyroxine 88 MCG tablet  Commonly known as: SYNTHROID, LEVOTHROID  What changed: Another medication with the same name was removed. Continue taking this medication, and follow the directions you see here.   88 mcg, Daily             Continue These Medications        Instructions Start Date   amLODIPine 2.5 MG tablet  Commonly known as: NORVASC   Take 1 tablet by mouth Daily.      bismuth subsalicylate 262 MG/15ML suspension  Commonly known as: PEPTO BISMOL   15 mL, Every 6 Hours PRN      Creon [id number] units capsule delayed-release particles capsule  Generic drug: pancrelipase (Lip-Prot-Amyl)   24,000 units of lipase, Oral, 3 Times Daily With Meals      ferrous sulfate 325 (65 FE) MG tablet   325 mg, Daily With Breakfast      FLUoxetine 20 MG tablet  Commonly known as: PROzac   60 mg, Daily      folic acid 1 MG tablet  Commonly known as: FOLVITE   1 mg, Daily      Ibuprofen 3 %, Gabapentin 10 %, Baclofen 2 %, lidocaine 4 %   1-2 g, Topical, 3 to 4 Times Daily      lactulose 10 GM/15ML solution  Commonly known as: CHRONULAC   10 g, Every Morning      MAGnesium-Oxide 400 (240 Mg) MG tablet  Generic drug: Magnesium Oxide -Mg Supplement   400 mg, Daily      mirtazapine 15 MG tablet  Commonly known as: REMERON   15 mg, Nightly      multivitamin capsule   1 capsule, Daily      ondansetron ODT 4 MG disintegrating tablet  Commonly known as: ZOFRAN-ODT   4 mg, Translingual, Every 8 Hours PRN      oxyCODONE 5 MG immediate release tablet  Commonly known as: ROXICODONE   5 mg, Oral, Every 6 Hours PRN      pantoprazole 40 MG EC tablet  Commonly known as: PROTONIX   40 mg, Daily      potassium chloride 10 MEQ CR tablet   10 mEq      risperiDONE 0.5 MG  tablet  Commonly known as: risperDAL   1 mg, Nightly      solifenacin 10 MG tablet  Commonly known as: VESICARE   10 mg, Oral, Takes 1-2 times per week, does not like SE (dry mouth)      thiamine 100 MG tablet  Commonly known as: VITAMIN B1   100 mg, Oral, Daily             Stop These Medications      lamoTRIgine 25 MG tablet  Commonly known as: LaMICtal     nadolol 40 MG tablet  Commonly known as: CORGARD     PREVAGEN PO              Discharge Diet:   Diet Instructions       Diet: Regular/House Diet, Gastrointestinal Diets; Fat-Restricted; Regular (IDDSI 7); Thin (IDDSI 0)      Discharge Diet:  Regular/House Diet  Gastrointestinal Diets       Gastrointestinal Diet: Fat-Restricted    Texture: Regular (IDDSI 7)    Fluid Consistency: Thin (IDDSI 0)            Activity at Discharge:   Activity Instructions       Activity as Tolerated              Follow-up Appointments  Future Appointments   Date Time Provider Department Center   11/27/2024  8:30 AM Giovanni Denise MD [facility]     Additional Instructions for the Follow-ups that You Need to Schedule       Discharge Follow-up with PCP   As directed       Currently Documented PCP:    Tylor Davis    PCP Phone Number:    [phone]     Follow Up Details: 1 week                Test Results Pending at Discharge  Pending Labs       Order Current Status    Blood Culture - Blood, Hand, Left Preliminary result    Blood Culture - Blood, Hand, Right Preliminary result            I have examined and discussed discharge planning with the patient today.    Dimas Johnson MD  11/21/24  09:53 EST    Time: Discharge greater than 30 min            Electronically signed by Dimas Johnson MD at 11/21/24 0955       Discharge Order (From admission, onward)       Start     Ordered    11/21/24 0812  Discharge patient  Once        Expected Discharge Date: 11/21/24   Discharge Disposition: Home or Self Care   Physician of Record for Attribution - Please  select from Treatment Team: TED SMITH [[id number]]   Review needed by CMO to determine Physician of Record: No      Question Answer Comment   Physician of Record for Attribution - Please select from Treatment Team TED SMITH    Review needed by CMO to determine Physician of Record No        11/21/24 0813

## 2024-11-21 NOTE — DISCHARGE SUMMARY
Date of Admission: 11/17/2024  Date of Discharge:  11/21/2024  Primary Care Physician: Tylor Davis     Discharge Diagnosis:  Active Hospital Problems    Diagnosis  POA    **Alcohol withdrawal [F10.939]  Yes    Hypomagnesemia [E83.42]  Unknown    Alcohol dependence with withdrawal [F10.239]  Yes    Urinary tract infection [N39.0]  Yes    Transaminitis [R74.01]  Yes    Pancreatitis [K85.90]  Yes    Abdominal pain [R10.9]  Yes    Esophageal varices [I85.00]  Yes    Essential hypertension [I10]  Yes    Pancreatic insufficiency [K86.89]  Yes    Thrombocytopenia [D69.6]  Yes    Hypothyroidism [E03.9]  Yes    Ascites [R18.8]  Yes      Resolved Hospital Problems   No resolved problems to display.       DETAILS OF HOSPITAL STAY     Pertinent Test Results and Procedures Performed    CT scan of the abdomen and pelvis:  1. Pancreatic atrophy with pancreatic calcifications likely from chronic   pancreatitis.   2. Mild injection around the pancreatic head could be related to mild   changes of acute pancreatitis. Please correlate.   3. Fatty infiltration of the liver.   4. Mild bilateral lower lobe atelectasis.   5. Cholelithiasis.   6. Tiny air bubble in the anterior urinary bladder. Please correlate for   possible Manzo catheterization.     Hospital Course  This is a 54-year-old lady with a long history of alcohol use disorder cirrhosis and chronic pancreatitis who presented to the hospital with alcohol withdrawal acute on chronic pancreatitis with abdominal pain nausea and vomiting.  Please see H&P for full details of admission.  He only had mild alcohol withdrawal and this is now resolved.  Her acute on chronic pancreatitis resolved quickly and she is no longer having abdominal pain.  Her diet was advanced successfully.  She is now medically stable to be discharged back home.  She also was treated for UTI and received a 3-day course of Rocephin.    Physical Exam at Discharge:  General: No acute distress, AAOx3  HEENT:  EOMI, PERRL  Cardiovascular: +s1 and s2, RRR  Lungs: No rhonchi or wheezing  Abdomen: soft, nontender    Consults:   Consults       Date and Time Order Name Status Description    11/17/2024  1:34 PM [facility] (on-call MD unless specified) Details                Condition on Discharge: Stable, improved    Discharge Disposition  Home or Self Care    Discharge Medications     Discharge Medications        Changes to Medications        Instructions Start Date   levothyroxine 88 MCG tablet  Commonly known as: SYNTHROID, LEVOTHROID  What changed: Another medication with the same name was removed. Continue taking this medication, and follow the directions you see here.   88 mcg, Daily             Continue These Medications        Instructions Start Date   amLODIPine 2.5 MG tablet  Commonly known as: NORVASC   Take 1 tablet by mouth Daily.      bismuth subsalicylate 262 MG/15ML suspension  Commonly known as: PEPTO BISMOL   15 mL, Every 6 Hours PRN      Creon [id number] units capsule delayed-release particles capsule  Generic drug: pancrelipase (Lip-Prot-Amyl)   24,000 units of lipase, Oral, 3 Times Daily With Meals      ferrous sulfate 325 (65 FE) MG tablet   325 mg, Daily With Breakfast      FLUoxetine 20 MG tablet  Commonly known as: PROzac   60 mg, Daily      folic acid 1 MG tablet  Commonly known as: FOLVITE   1 mg, Daily      Ibuprofen 3 %, Gabapentin 10 %, Baclofen 2 %, lidocaine 4 %   1-2 g, Topical, 3 to 4 Times Daily      lactulose 10 GM/15ML solution  Commonly known as: CHRONULAC   10 g, Every Morning      MAGnesium-Oxide 400 (240 Mg) MG tablet  Generic drug: Magnesium Oxide -Mg Supplement   400 mg, Daily      mirtazapine 15 MG tablet  Commonly known as: REMERON   15 mg, Nightly      multivitamin capsule   1 capsule, Daily      ondansetron ODT 4 MG disintegrating tablet  Commonly known as: ZOFRAN-ODT   4 mg, Translingual, Every 8 Hours PRN      oxyCODONE 5 MG immediate release tablet  Commonly known as: ROXICODONE   5  mg, Oral, Every 6 Hours PRN      pantoprazole 40 MG EC tablet  Commonly known as: PROTONIX   40 mg, Daily      potassium chloride 10 MEQ CR tablet   10 mEq      risperiDONE 0.5 MG tablet  Commonly known as: risperDAL   1 mg, Nightly      solifenacin 10 MG tablet  Commonly known as: VESICARE   10 mg, Oral, Takes 1-2 times per week, does not like SE (dry mouth)      thiamine 100 MG tablet  Commonly known as: VITAMIN B1   100 mg, Oral, Daily             Stop These Medications      lamoTRIgine 25 MG tablet  Commonly known as: LaMICtal     nadolol 40 MG tablet  Commonly known as: CORGARD     PREVAGEN PO              Discharge Diet:   Diet Instructions       Diet: Regular/House Diet, Gastrointestinal Diets; Fat-Restricted; Regular (IDDSI 7); Thin (IDDSI 0)      Discharge Diet:  Regular/House Diet  Gastrointestinal Diets       Gastrointestinal Diet: Fat-Restricted    Texture: Regular (IDDSI 7)    Fluid Consistency: Thin (IDDSI 0)            Activity at Discharge:   Activity Instructions       Activity as Tolerated              Follow-up Appointments  Future Appointments   Date Time Provider Department [place]   11/27/2024  8:30 AM Giovanni Denise MD [facility]     Additional Instructions for the Follow-ups that You Need to Schedule       Discharge Follow-up with PCP   As directed       Currently Documented PCP:    Tylor Davis    PCP Phone Number:    [phone]     Follow Up Details: 1 week                Test Results Pending at Discharge  Pending Labs       Order Current Status    Blood Culture - Blood, Hand, Left Preliminary result    Blood Culture - Blood, Hand, Right Preliminary result            I have examined and discussed discharge planning with the patient today.    Dimas Johnson MD  11/21/24  09:53 EST    Time: Discharge greater than 30 min

## 2024-11-21 NOTE — PLAN OF CARE
Goal Outcome Evaluation:  Plan of Care Reviewed With: patient        Progress: improving  Outcome Evaluation: PRN pain med given for abd pain. Pt rested well. CIWA scores: 0-2. Rested well. Assist stand by to bathroom. Possible discharge today. VSS.

## 2024-11-21 NOTE — PLAN OF CARE
Goal Outcome Evaluation:              Outcome Evaluation: Pt being discharged home via private vehicle provided by patient's boyfriend. Discharge paperwork, medications, and foloow-up visits reviewed.

## 2024-11-22 LAB
BACTERIA SPEC AEROBE CULT: NORMAL
BACTERIA SPEC AEROBE CULT: NORMAL

## 2024-11-22 NOTE — OUTREACH NOTE
Prep Survey      Flowsheet Row Responses   [facility] [place] patient discharged from? [place]   Is LACE score < 7 ? No   Eligibility Not Eligible   What are the reasons patient is not eligible? Other  [alcohol withdrawal]   Does the patient have one of the following disease processes/diagnoses(primary or secondary)? Other   Prep survey completed? Yes            [facility] - Registered Nurse

## 2024-11-27 ENCOUNTER — OFFICE VISIT (OUTPATIENT)
Dept: ORTHOPEDIC SURGERY | Facility: CLINIC | Age: 54
End: 2024-11-27
Payer: MEDICAID

## 2024-11-27 VITALS — HEIGHT: 65 IN | TEMPERATURE: 98.2 F | WEIGHT: 143.6 LBS | BODY MASS INDEX: 23.93 KG/M2

## 2024-11-27 DIAGNOSIS — M19.019 ARTHRITIS OF SHOULDER: Primary | ICD-10-CM

## 2024-11-27 RX ORDER — LIDOCAINE HYDROCHLORIDE 10 MG/ML
2 INJECTION, SOLUTION EPIDURAL; INFILTRATION; INTRACAUDAL; PERINEURAL
Status: COMPLETED | OUTPATIENT
Start: 2024-11-27 | End: 2024-11-27

## 2024-11-27 RX ORDER — METHYLPREDNISOLONE ACETATE 80 MG/ML
80 INJECTION, SUSPENSION INTRA-ARTICULAR; INTRALESIONAL; INTRAMUSCULAR; SOFT TISSUE
Status: COMPLETED | OUTPATIENT
Start: 2024-11-27 | End: 2024-11-27

## 2024-11-27 RX ADMIN — METHYLPREDNISOLONE ACETATE 80 MG: 80 INJECTION, SUSPENSION INTRA-ARTICULAR; INTRALESIONAL; INTRAMUSCULAR; SOFT TISSUE at 10:43

## 2024-11-27 RX ADMIN — LIDOCAINE HYDROCHLORIDE 2 ML: 10 INJECTION, SOLUTION EPIDURAL; INFILTRATION; INTRACAUDAL; PERINEURAL at 10:43

## 2024-11-27 NOTE — PROGRESS NOTES
Ms. Gibson comes in today for follow-up.  Injections have worked well in the past.  The patient would like to get a repeat injection today.  The risks, benefits and alternatives were discussed and the patient consented.  Going forward, the patient will follow-up as needed.    Giovanni Denise MD    11/27/2024     Large Joint Arthrocentesis: R subacromial bursa  Date/Time: 11/27/2024 10:43 AM  Consent given by: patient  Site marked: site marked  Timeout: Immediately prior to procedure a time out was called to verify the correct patient, procedure, equipment, support staff and site/side marked as required   Supporting Documentation  Indications: pain   Procedure Details  Location: shoulder - R subacromial bursa  Preparation: Patient was prepped and draped in the usual sterile fashion  Needle gauge: 21G.  Approach: posterior  Medications administered: 80 mg methylPREDNISolone acetate 80 MG/ML; 2 mL lidocaine PF 1% 1 %  Patient tolerance: patient tolerated the procedure well with no immediate complications

## 2024-12-03 DIAGNOSIS — M19.011 ARTHRITIS OF RIGHT SHOULDER REGION: ICD-10-CM

## 2025-04-03 ENCOUNTER — APPOINTMENT (OUTPATIENT)
Dept: GENERAL RADIOLOGY | Facility: HOSPITAL | Age: 55
End: 2025-04-03
Payer: MEDICAID

## 2025-04-03 ENCOUNTER — HOSPITAL ENCOUNTER (EMERGENCY)
Facility: HOSPITAL | Age: 55
Discharge: HOME | End: 2025-04-03
Attending: EMERGENCY MEDICINE
Payer: MEDICAID

## 2025-04-03 VITALS
RESPIRATION RATE: 18 BRPM | OXYGEN SATURATION: 96 % | HEART RATE: 98 BPM | SYSTOLIC BLOOD PRESSURE: 147 MMHG | TEMPERATURE: 98.1 F | DIASTOLIC BLOOD PRESSURE: 87 MMHG

## 2025-04-03 DIAGNOSIS — F10.11 HISTORY OF ALCOHOL ABUSE: ICD-10-CM

## 2025-04-03 DIAGNOSIS — R79.89 ELEVATED LFTS: ICD-10-CM

## 2025-04-03 DIAGNOSIS — F10.920 ALCOHOLIC INTOXICATION WITHOUT COMPLICATION: Primary | ICD-10-CM

## 2025-04-03 DIAGNOSIS — R07.89 ATYPICAL CHEST PAIN: ICD-10-CM

## 2025-04-03 LAB
ALBUMIN SERPL-MCNC: 4 G/DL (ref 3.5–5.2)
ALBUMIN/GLOB SERPL: 1.1 G/DL
ALP SERPL-CCNC: 163 U/L (ref 39–117)
ALT SERPL W P-5'-P-CCNC: 86 U/L (ref 1–33)
ANION GAP SERPL CALCULATED.3IONS-SCNC: 15.3 MMOL/L (ref 5–15)
AST SERPL-CCNC: 200 U/L (ref 1–32)
BASOPHILS # BLD AUTO: 0.04 10*3/MM3 (ref 0–0.2)
BASOPHILS NFR BLD AUTO: 0.8 % (ref 0–1.5)
BILIRUB SERPL-MCNC: 1.5 MG/DL (ref 0–1.2)
BUN SERPL-MCNC: 8 MG/DL (ref 6–20)
BUN/CREAT SERPL: 10.8 (ref 7–25)
CALCIUM SPEC-SCNC: 9 MG/DL (ref 8.6–10.5)
CHLORIDE SERPL-SCNC: 107 MMOL/L (ref 98–107)
CO2 SERPL-SCNC: 20.7 MMOL/L (ref 22–29)
CREAT SERPL-MCNC: 0.74 MG/DL (ref 0.57–1)
D DIMER PPP FEU-MCNC: 0.56 MCGFEU/ML (ref 0–0.56)
DEPRECATED RDW RBC AUTO: 51.1 FL (ref 37–54)
EGFRCR SERPLBLD CKD-EPI 2021: 95.1 ML/MIN/1.73
EOSINOPHIL # BLD AUTO: 0.03 10*3/MM3 (ref 0–0.4)
EOSINOPHIL NFR BLD AUTO: 0.6 % (ref 0.3–6.2)
ERYTHROCYTE [DISTWIDTH] IN BLOOD BY AUTOMATED COUNT: 14 % (ref 12.3–15.4)
ETHANOL BLD-MCNC: 309 MG/DL (ref 0–10)
ETHANOL UR QL: 0.31 %
GEN 5 1HR TROPONIN T REFLEX: <6 NG/L
GLOBULIN UR ELPH-MCNC: 3.5 GM/DL
GLUCOSE SERPL-MCNC: 109 MG/DL (ref 65–99)
HCT VFR BLD AUTO: 48.3 % (ref 34–46.6)
HGB BLD-MCNC: 15.8 G/DL (ref 12–15.9)
HOLD SPECIMEN: NORMAL
HOLD SPECIMEN: NORMAL
IMM GRANULOCYTES # BLD AUTO: 0.02 10*3/MM3 (ref 0–0.05)
IMM GRANULOCYTES NFR BLD AUTO: 0.4 % (ref 0–0.5)
LIPASE SERPL-CCNC: 22 U/L (ref 13–60)
LYMPHOCYTES # BLD AUTO: 1.32 10*3/MM3 (ref 0.7–3.1)
LYMPHOCYTES NFR BLD AUTO: 27.8 % (ref 19.6–45.3)
MCH RBC QN AUTO: 32.3 PG (ref 26.6–33)
MCHC RBC AUTO-ENTMCNC: 32.7 G/DL (ref 31.5–35.7)
MCV RBC AUTO: 98.8 FL (ref 79–97)
MONOCYTES # BLD AUTO: 0.28 10*3/MM3 (ref 0.1–0.9)
MONOCYTES NFR BLD AUTO: 5.9 % (ref 5–12)
NEUTROPHILS NFR BLD AUTO: 3.06 10*3/MM3 (ref 1.7–7)
NEUTROPHILS NFR BLD AUTO: 64.5 % (ref 42.7–76)
NRBC BLD AUTO-RTO: 0 /100 WBC (ref 0–0.2)
PLATELET # BLD AUTO: 53 10*3/MM3 (ref 140–450)
PMV BLD AUTO: 12 FL (ref 6–12)
POTASSIUM SERPL-SCNC: 3.8 MMOL/L (ref 3.5–5.2)
PROT SERPL-MCNC: 7.5 G/DL (ref 6–8.5)
QT INTERVAL: 411 MS
QTC INTERVAL: 503 MS
RBC # BLD AUTO: 4.89 10*6/MM3 (ref 3.77–5.28)
SODIUM SERPL-SCNC: 143 MMOL/L (ref 136–145)
TROPONIN T NUMERIC DELTA: NORMAL
TROPONIN T SERPL HS-MCNC: <6 NG/L
WBC NRBC COR # BLD AUTO: 4.75 10*3/MM3 (ref 3.4–10.8)
WHOLE BLOOD HOLD COAG: NORMAL
WHOLE BLOOD HOLD SPECIMEN: NORMAL

## 2025-04-03 PROCEDURE — 36415 COLL VENOUS BLD VENIPUNCTURE: CPT | Performed by: EMERGENCY MEDICINE

## 2025-04-03 PROCEDURE — 84484 ASSAY OF TROPONIN QUANT: CPT | Performed by: EMERGENCY MEDICINE

## 2025-04-03 PROCEDURE — 85025 COMPLETE CBC W/AUTO DIFF WBC: CPT | Performed by: EMERGENCY MEDICINE

## 2025-04-03 PROCEDURE — 82077 ASSAY SPEC XCP UR&BREATH IA: CPT | Performed by: EMERGENCY MEDICINE

## 2025-04-03 PROCEDURE — 83690 ASSAY OF LIPASE: CPT | Performed by: EMERGENCY MEDICINE

## 2025-04-03 PROCEDURE — 71045 X-RAY EXAM CHEST 1 VIEW: CPT

## 2025-04-03 PROCEDURE — 99284 EMERGENCY DEPT VISIT MOD MDM: CPT

## 2025-04-03 PROCEDURE — 80053 COMPREHEN METABOLIC PANEL: CPT | Performed by: EMERGENCY MEDICINE

## 2025-04-03 PROCEDURE — 85379 FIBRIN DEGRADATION QUANT: CPT | Performed by: EMERGENCY MEDICINE

## 2025-04-03 PROCEDURE — 93005 ELECTROCARDIOGRAM TRACING: CPT | Performed by: EMERGENCY MEDICINE

## 2025-04-03 PROCEDURE — 25810000003 LACTATED RINGERS SOLUTION: Performed by: EMERGENCY MEDICINE

## 2025-04-03 PROCEDURE — 93010 ELECTROCARDIOGRAM REPORT: CPT | Performed by: INTERNAL MEDICINE

## 2025-04-03 RX ORDER — SODIUM CHLORIDE 0.9 % (FLUSH) 0.9 %
10 SYRINGE (ML) INJECTION AS NEEDED
Status: DISCONTINUED | OUTPATIENT
Start: 2025-04-03 | End: 2025-04-03 | Stop reason: HOSPADM

## 2025-04-03 RX ADMIN — SODIUM CHLORIDE, POTASSIUM CHLORIDE, SODIUM LACTATE AND CALCIUM CHLORIDE 1000 ML: 600; 310; 30; 20 INJECTION, SOLUTION INTRAVENOUS at 12:50

## 2025-04-03 NOTE — ED PROVIDER NOTES
EMERGENCY DEPARTMENT ENCOUNTER    Room Number:  37/37  PCP: Tylor Davis  Historian: Patient, EMS    I initially evaluated the patient at 12:24 PM    HPI:  Chief Complaint: Left-sided chest pain  A complete HPI/ROS/PMH/PSH/SH/FH are unobtainable due to: Nothing  Context: Bekah Gibson is a 54 y.o. female with a medical history of hypertension, anxiety, pancreatitis, alcohol abuse who presents to the ED from home by EMS c/o acute left-sided chest pain.  Pain woke the patient up around 4 AM.  Pain is sharp and constant.  Pain radiates into the left lateral chest wall.  Pain is worse with movement and taking deep breaths.  Patient denies recent injury, shortness of breath, nausea, vomiting, sweating, cough, fever, dizziness, syncope, leg pain, or leg swelling.  Patient drinks alcohol regularly.  Last drink was last night.  Denies history of heart disease or PE/DVT.  Denies recent illness.            PAST MEDICAL HISTORY  Active Ambulatory Problems     Diagnosis Date Noted    Irritable bowel syndrome with both constipation and diarrhea 06/05/2017    Peripheral neuropathy 06/05/2017    Vitamin D deficiency 06/05/2017    History of HPV infection 06/05/2017    Hypothyroidism 06/05/2017    Tobacco dependence due to cigarettes 06/05/2017    Acute kidney injury 12/28/2015    Ascites 06/06/2016    E. coli UTI (urinary tract infection) 06/06/2016    Alcoholic hepatitis 06/29/2018    GI bleed 06/29/2018    Acute post-hemorrhagic anemia 06/29/2018    Thrombocytopenia 06/29/2018    Open wound of right shoulder region 06/29/2018    Pancreatic insufficiency 07/04/2018    Alcoholic ketoacidosis 07/21/2019    Chronic alcohol abuse 07/29/2019    ESBL (extended spectrum beta-lactamase) producing bacteria infection 07/29/2019    Abnormal weight loss 12/13/2019    Hashimoto's disease 12/13/2019    Chronic fatigue 12/14/2019    History of alcohol use disorder 09/21/2021    Alcohol intoxication 09/21/2021    Lupus     Hypomagnesemia      Pancytopenia     Alcoholic cirrhosis     Bilateral lower abdominal discomfort 09/21/2021    Primary hypertension 10/24/2022    Melena 10/24/2022    Arthritis of shoulder 12/19/2022    Esophageal varices 01/16/2023    Essential hypertension 01/16/2023    Alcohol-induced chronic pancreatitis 01/22/2023    Abdominal pain 01/22/2023    Pancreatitis 10/07/2023    Leukopenia 10/07/2023    Epigastric pain 04/01/2024    Acute alcoholic gastritis without hemorrhage 04/01/2024    Alcohol-induced acute on chronic pancreatitis without infection or necrosis 07/03/2024    Alcohol withdrawal 07/03/2024    Acute on chronic pancreatitis 07/22/2024    Transaminitis 07/22/2024    Cholelithiasis 07/22/2024    Alcohol use 07/22/2024    WBC decreased 07/24/2024    Hypokalemia 07/24/2024    Urinary tract infection 07/24/2024    Neutropenia 07/24/2024    Polysubstance abuse 08/05/2024    Alcohol dependence with withdrawal 10/24/2024    Hypomagnesemia 11/18/2024     Resolved Ambulatory Problems     Diagnosis Date Noted    Acute renal failure 02/20/2017    Kidney stone 06/05/2017    Alcohol withdrawal syndrome, with delirium 06/29/2018    Hypotension 07/01/2018    Nausea and vomiting 09/21/2021    Acute GI bleeding 10/24/2022     Past Medical History:   Diagnosis Date    Acromioclavicular separation 1/2021    Ankle sprain 2/2004    Anxiety     Arthritis     Arthritis of back Unsure    Cirrhosis     Disease of thyroid gland     ETOH abuse     Fracture, clavicle 1/2021    Fracture, foot Unsure    Frozen shoulder 1 /2009    GERD (gastroesophageal reflux disease)     History of kidney stones     Hypertension     Left shoulder pain     Low back strain 1/21    Lumbosacral disc disease 1/21    MDD (major depressive disorder)     Neck strain Unsure    Periarthritis of shoulder see above    Rotator cuff syndrome odre for shoulder replacement    Tennis elbow Unsure         PAST SURGICAL HISTORY  Past Surgical History:   Procedure Laterality Date     CLAVICLE SURGERY Right 2018    ENDOSCOPY N/A 10/26/2022    Procedure: ESOPHAGOGASTRODUODENOSCOPY wtih banding;  Surgeon: Ag Patel MD;  Location:  [facility];  Service: Gastroenterology;  Laterality: N/A;  pre: melena  post: esophageal varicies with banding x2 bands    ENDOSCOPY N/A 2023    Procedure: ESOPHAGOGASTRODUODENOSCOPY;  Surgeon: Ag Patel MD;  [facility];  Service: Gastroenterology;  Laterality: N/A;  PRE OP - MAROON STOOLS  POST OP - SM ESOPHAGEAL VARICES    ESOPHAGEAL VARICES LIGATION      FOOT SURGERY  14    JOINT REPLACEMENT Bilateral     GREAT TOE    KIDNEY STONE SURGERY      LITHOTRIPSY AND STENT (R SIDE)    LAPAROSCOPIC TUBAL LIGATION      NECK SURGERY  3/07    Not sure of dates    SIGMOIDOSCOPY N/A 2023    Procedure: SIGMOIDOSCOPY FLEXIBLE;  Surgeon: Ag Patel MD;  Location:  [facility];  Service: Gastroenterology;  Laterality: N/A;  PRE OP - MAROON STOOLS  POST OP - RECTAL VARICES    TOTAL SHOULDER ARTHROPLASTY Left 2023    Procedure: reverse total shoulder arthroplasty;  Surgeon: Giovanni Denise MD;  Location:  [facility];  Service: Orthopedics;  Laterality: Left;    TRIGGER POINT INJECTION           FAMILY HISTORY  Family History   Problem Relation Age of Onset    Rheumatologic disease Mother         congestive heart diseased    Osteoporosis Mother             Thyroid disease Father     Leukemia Father     Cancer Father         Leukemia  deseased     Broken bones Father     Clotting disorder Father     Drug abuse Brother     Malig Hyperthermia Neg Hx          SOCIAL HISTORY  Social History     Socioeconomic History    Marital status:    Tobacco Use    Smoking status: Some Days     Current packs/day: 0.25     Average packs/day: 0.3 packs/day for 15.0 years (3.8 ttl pk-yrs)     Types: Cigarettes     Passive exposure: Current    Smokeless tobacco: Never    Tobacco comments:     Rarely smoke sometimes will  to   Vaping Use    Vaping status: Never Used   Substance and Sexual Activity    Alcohol use: Yes     Alcohol/week: 5.0 - 10.0 standard drinks of alcohol     Types: 5 - 10 Standard drinks or equivalent per week    Drug use: Never    Sexual activity: Yes     Partners: Male     Birth control/protection: Post-menopausal     Comment: cinthia- menepausal         ALLERGIES  Benzonatate, Ketorolac tromethamine, Phenergan [promethazine hcl], Cucumber extract, and Promethazine-phenylephrine    REVIEW OF SYSTEMS  Review of Systems  Included in HPI  All systems reviewed and negative except for those discussed in HPI.      PHYSICAL EXAM  ED Triage Vitals   Temp Heart Rate Resp BP SpO2   04/03/25 1154 04/03/25 1154 04/03/25 1154 04/03/25 1154 04/03/25 1217   98.1 °F (36.7 °C) 99 18 110/48 95 %      Temp src Heart Rate Source Patient Position BP Location FiO2 (%)   -- -- -- -- --              Physical Exam      GENERAL: Awake, alert, oriented x 3.  Nontoxic-appearing female.  Appears older than stated age.    HENT: NCAT, nares patent  EYES: no scleral icterus  CV: regular rhythm, normal rate, equal bilateral radial pulses  RESPIRATORY: normal effort, clear to auscultation bilaterally  ABDOMEN: soft, nontender, no CVA tenderness  MUSCULOSKELETAL: There is tenderness over the left anterior and lateral chest wall.  No crepitus.  No signs of trauma to the chest.  C/T/L-spine are nontender.  Extremities are nontender with full range of motion no calf tenderness.  No pedal edema  NEURO: Speech is normal.  No facial droop.  Follows command  PSYCH:  calm, cooperative  SKIN: warm, dry    Vital signs and nursing notes reviewed.          LAB RESULTS  Recent Results (from the past 24 hours)   Green Top (Gel)    Collection Time: 04/03/25 12:18 PM   Result Value Ref Range    Extra Tube Hold for add-ons.    Lavender Top    Collection Time: 04/03/25 12:18 PM   Result Value Ref Range    Extra Tube hold for add-on    Gold Top - SST    Collection Time:  04/03/25 12:18 PM   Result Value Ref Range    Extra Tube Hold for add-ons.    Light Blue Top    Collection Time: 04/03/25 12:18 PM   Result Value Ref Range    Extra Tube Hold for add-ons.    Comprehensive Metabolic Panel    Collection Time: 04/03/25 12:18 PM    Specimen: Blood   Result Value Ref Range    Glucose 109 (H) 65 - 99 mg/dL    BUN 8 6 - 20 mg/dL    Creatinine 0.74 0.57 - 1.00 mg/dL    Sodium 143 136 - 145 mmol/L    Potassium 3.8 3.5 - 5.2 mmol/L    Chloride 107 98 - 107 mmol/L    CO2 20.7 (L) 22.0 - 29.0 mmol/L    Calcium 9.0 8.6 - 10.5 mg/dL    Total Protein 7.5 6.0 - 8.5 g/dL    Albumin 4.0 3.5 - 5.2 g/dL    ALT (SGPT) 86 (H) 1 - 33 U/L    AST (SGOT) 200 (H) 1 - 32 U/L    Alkaline Phosphatase 163 (H) 39 - 117 U/L    Total Bilirubin 1.5 (H) 0.0 - 1.2 mg/dL    Globulin 3.5 gm/dL    A/G Ratio 1.1 g/dL    BUN/Creatinine Ratio 10.8 7.0 - 25.0    Anion Gap 15.3 (H) 5.0 - 15.0 mmol/L    eGFR 95.1 >60.0 mL/min/1.73   D-dimer, Quantitative    Collection Time: 04/03/25 12:18 PM    Specimen: Blood   Result Value Ref Range    D-Dimer, Quantitative 0.56 0.00 - 0.56 MCGFEU/mL   High Sensitivity Troponin T    Collection Time: 04/03/25 12:18 PM    Specimen: Blood   Result Value Ref Range    HS Troponin T <6 <14 ng/L   Ethanol    Collection Time: 04/03/25 12:18 PM    Specimen: Blood   Result Value Ref Range    Ethanol 309 (H) 0 - 10 mg/dL    Ethanol % 0.309 %   Lipase    Collection Time: 04/03/25 12:18 PM    Specimen: Blood   Result Value Ref Range    Lipase 22 13 - 60 U/L   CBC Auto Differential    Collection Time: 04/03/25 12:18 PM    Specimen: Blood   Result Value Ref Range    WBC 4.75 3.40 - 10.80 10*3/mm3    RBC 4.89 3.77 - 5.28 10*6/mm3    Hemoglobin 15.8 12.0 - 15.9 g/dL    Hematocrit 48.3 (H) 34.0 - 46.6 %    MCV 98.8 (H) 79.0 - 97.0 fL    MCH 32.3 26.6 - 33.0 pg    MCHC 32.7 31.5 - 35.7 g/dL    RDW 14.0 12.3 - 15.4 %    RDW-SD 51.1 37.0 - 54.0 fl    MPV 12.0 6.0 - 12.0 fL    Platelets 53 (L) 140 - 450 10*3/mm3     Neutrophil % 64.5 42.7 - 76.0 %    Lymphocyte % 27.8 19.6 - 45.3 %    Monocyte % 5.9 5.0 - 12.0 %    Eosinophil % 0.6 0.3 - 6.2 %    Basophil % 0.8 0.0 - 1.5 %    Immature Grans % 0.4 0.0 - 0.5 %    Neutrophils, Absolute 3.06 1.70 - 7.00 10*3/mm3    Lymphocytes, Absolute 1.32 0.70 - 3.10 10*3/mm3    Monocytes, Absolute 0.28 0.10 - 0.90 10*3/mm3    Eosinophils, Absolute 0.03 0.00 - 0.40 10*3/mm3    Basophils, Absolute 0.04 0.00 - 0.20 10*3/mm3    Immature Grans, Absolute 0.02 0.00 - 0.05 10*3/mm3    nRBC 0.0 0.0 - 0.2 /100 WBC   ECG 12 Lead Chest Pain    Collection Time: 04/03/25 12:38 PM   Result Value Ref Range    QT Interval 411 ms    QTC Interval 503 ms   High Sensitivity Troponin T 1Hr    Collection Time: 04/03/25  1:24 PM    Specimen: Arm, Left; Blood   Result Value Ref Range    HS Troponin T <6 <14 ng/L    Troponin T Numeric Delta         Ordered the above labs and reviewed the results.        RADIOLOGY  XR Chest 1 View  Result Date: 4/3/2025  XR CHEST 1 VW-4/3/2025  HISTORY: Chest pain.  Heart size is within normal limits. Lungs appear free of acute infiltrates. Left shoulder prosthesis is partially visualized. Soft tissue fold overlies the left upper lung.      1. No acute process.   This report was finalized on 4/3/2025 1:14 PM by Dr. Suresh Singh M.D on Workstation: [id number]        Ordered the above noted radiological studies. Reviewed by me in PACS.            PROCEDURES  Procedures        OUTPATIENT MEDICATION MANAGEMENT:  Current Facility-Administered Medications Ordered in Epic   Medication Dose Route Frequency Provider Last Rate Last Admin    sodium chloride 0.9 % flush 10 mL  10 mL Intravenous Q12H Callie Quiroz MD        sodium chloride 0.9 % flush 10 mL  10 mL Intravenous PRN Callie Quiroz MD        sodium chloride 0.9 % flush 20 mL  20 mL Intravenous PRN Callie Quiroz MD         Current Outpatient Medications Ordered in Epic   Medication Sig  Dispense Refill    amLODIPine (NORVASC) 2.5 MG tablet Take 1 tablet by mouth Daily.      B Complex Vitamins (VITAMIN B COMPLEX PO) Take  by mouth.      bismuth subsalicylate (PEPTO BISMOL) 262 MG/15ML suspension Take 15 mL by mouth Every 6 (Six) Hours As Needed for Indigestion. Pt reports filling predosed cup about 3/4 full and drinks daily.      brompheniramine-pseudoephedrine-DM 30-2-10 MG/5ML syrup Take 5 mL by mouth 4 (Four) Times a Day As Needed for Congestion or Cough. 118 mL 0    ferrous gluconate (FERGON) 324 MG tablet Take 1 tablet by mouth Daily.      ferrous sulfate 325 (65 FE) MG tablet Take 1 tablet by mouth Daily With Breakfast. Taking OTC      FLUoxetine (PROzac) 20 MG capsule       FLUoxetine (PROzac) 20 MG tablet Take 3 tablets by mouth Daily.      folic acid (FOLVITE) 1 MG tablet Take 1 tablet by mouth Daily.      Ibuprofen 3 %, Gabapentin 10 %, Baclofen 2 %, lidocaine 4 % Apply 1-2 g topically to the appropriate area as directed 3 (Three) to 4 (Four) times daily. 90 g 5    lactulose (CHRONULAC) 10 GM/15ML solution Take 15 mL by mouth Every Morning.      levothyroxine (SYNTHROID, LEVOTHROID) 88 MCG tablet Take 1 tablet by mouth Daily.      MAGnesium-Oxide 400 (240 Mg) MG tablet Take 1 tablet by mouth Daily. Takes OTC      mirtazapine (REMERON) 15 MG tablet Take 1 tablet by mouth Every Night. Takes PRN      Multiple Vitamin (MULTIVITAMIN) capsule Take 1 capsule by mouth Daily.      nadolol (CORGARD) 40 MG tablet Take 1 tablet by mouth Daily.      ondansetron ODT (ZOFRAN-ODT) 4 MG disintegrating tablet Place 1 tablet on the tongue Every 8 (Eight) Hours As Needed for Nausea or Vomiting. (Patient taking differently: Place 1 tablet on the tongue Every 8 (Eight) Hours As Needed for Nausea or Vomiting. Currently out of prescription, ran out about a week ago) 10 tablet 0    oxyCODONE (ROXICODONE) 5 MG immediate release tablet Take 1 tablet by mouth Every 6 (Six) Hours As Needed for Severe Pain. (Patient  taking differently: Take 1 tablet by mouth Every 6 (Six) Hours As Needed for Severe Pain. Took 0.5 tablets last night) 12 tablet 0    pancrelipase, Lip-Prot-Amyl, (CREON) 09596-89272 units capsule delayed-release particles capsule Take 2 capsules by mouth 3 (Three) Times a Day With Meals. 180 capsule 0    pantoprazole (PROTONIX) 40 MG EC tablet Take 1 tablet by mouth Daily. Waiting for refill      potassium chloride 10 MEQ CR tablet Take 1 tablet by mouth. Taking OTC once daily      risperiDONE (risperDAL) 0.5 MG tablet Take 2 tablets by mouth Every Night. Pt ran out and last dose was Thursday night      solifenacin (VESICARE) 10 MG tablet Take 1 tablet by mouth. Takes 1-2 times per week, does not like SE (dry mouth)      thiamine (VITAMIN B1) 100 MG tablet Take 1 tablet by mouth Daily. 30 tablet 0           MEDICATIONS GIVEN IN ER  Medications   lactated ringers bolus 1,000 mL (0 mL Intravenous Stopped 4/3/25 1506)                   MEDICAL DECISION MAKING, PROGRESS, and CONSULTS    All labs have been independently reviewed by me.  All radiology studies have been reviewed by me and I have also reviewed the radiology report.   EKG's independently viewed and interpreted by me.  Discussion below represents my analysis of pertinent findings related to patient's condition, differential diagnosis, treatment plan and final disposition.      Additional sources:    - Discussed/ obtained information from independent historians: EMS    - External (non-ED) record review: Patient has multiple previous admissions here for acute on chronic pancreatitis.  She was last admitted here in November 2024 for withdrawal, nausea, vomiting, and acute on chronic pancreatitis.  CT abdomen/pelvis done at that time showed pancreatic atrophy with pancreatic calcifications.  There was cholelithiasis.    -Prescription drug monitoring program review:     N/A    - Chronic or social conditions impacting patient care (Social Determinants of Health):  None          Orders placed during this visit:  Orders Placed This Encounter   Procedures    XR Chest 1 View    [place] Draw    Comprehensive Metabolic Panel    D-dimer, Quantitative    High Sensitivity Troponin T    Ethanol    Lipase    CBC Auto Differential    High Sensitivity Troponin T 1Hr    Monitor Blood Pressure    ECG 12 Lead Chest Pain    Green Top (Gel)    Lavender Top    Gold Top - SST    Light Blue Top    CBC & Differential         Additional orders considered but not ordered:          Differential diagnosis includes, but is not limited to:    DDx includes but is not limited to acute coronary syndrome, pulmonary embolism, thoracic aortic dissection, pneumonia, pneumothorax, musculoskeletal pain, GERD or esophageal spasm, anxiety, myocarditis/pericarditis, esophageal rupture, pancreatitis.                Independent interpretation of labs, radiology studies, and discussions with consultants:  ED Course as of 04/03/25 1822   Thu Apr 03, 2025   1223 BP: 127/86 [WH]   1223 Temp: 98.1 °F (36.7 °C) [WH]   1223 Heart Rate: 97 [WH]   1223 Resp: 18 [WH]   1223 SpO2: 95 % [WH]   1241 EKG personally interpreted by me at 12:41 PM.  My personal interpretation is:          EKG time: 12:38 PM  Rhythm/Rate: Sinus rhythm, rate 90  P waves and UT: Normal  QRS, axis: Normal axis  ST and T waves: Normal    Interpreted Contemporaneously by me, independently viewed  EKG is not significantly changed compared to prior EKG done on 4/2/2024   [WH]   1249 D-Dimer, Quant: 0.56 [WH]   1304 Ethanol(!): 309 [WH]   1305 Lipase: 22 [WH]   1305 HS Troponin T: <6 [WH]   1305 Glucose(!): 109 [WH]   1305 BUN: 8 [WH]   1305 Creatinine: 0.74 [WH]   1305 ALT (SGPT)(!): 86 [WH]   1305 AST (SGOT)(!): 200 [WH]   1305 Alkaline Phosphatase(!): 163 [WH]   1305 Total Bilirubin(!): 1.5  LFTs are basically stable from 4 months ago [WH]   1305 Chest x-ray personally interpreted by me.  My personal interpretation is: Heart size is normal.  Lungs are  clear.  No pleural effusion. [WH]   1332 WBC: 4.75 [WH]   1332 Hemoglobin: 15.8 [WH]   1332 Platelets(!): 53  37 four months ago [WH]   1333 Patient is resting comfortably. [WH]   1405 HS Troponin T: <6 [WH]   1445 Patient is resting comfortably.  She is asking to go home.  Test results and diagnoses were discussed with the patient. She did not drive herself to the ED. she states she will call and report.  Speech is clear and fluent.  Patient was advised to follow-up with her PCP in the next 1 to 2 weeks.  Return precautions were discussed.  All questions were answered. [WH]   1457 MDM: Patient presented to the ED with sharp left-sided chest pain.  She was not tachycardic, febrile, or hypoxic.  D-dimer was negative.  Troponin was negative x 2.  EKG did not have any acute ischemic changes.  Pain was reproducible with palpation.  Patient has a history of alcohol abuse.  Alcohol level was 309.  She was given IV fluids.  Chest pain seems to be musculoskeletal.  Have low suspicion for PE or acute coronary syndrome. [WH]      ED Course User Index  [WH] Babar Martinez MD         COMPLEXITY OF CARE        DIAGNOSIS  Final diagnoses:   Alcoholic intoxication without complication   Atypical chest pain   History of alcohol abuse   Elevated LFTs         DISPOSITION  DISCHARGE    Patient discharged in stable condition.    Reviewed implications of results, diagnosis, meds, responsibility to follow up, warning signs and symptoms of possible worsening, potential complications and reasons to return to ER, including worsening/persistent pain, fever, trouble breathing, or other concern.    Patient/Family voiced understanding of above instructions.    Discussed plan for discharge, as there is no emergent indication for admission. Patient referred to primary care provider for BP management due to today's BP. Pt/family is agreeable and understands need for follow up and repeat testing.  Pt is aware that discharge does not mean that  nothing is wrong but it indicates no emergency is present that requires admission and they must continue care with follow-up as given below or physician of their choice.     FOLLOW-UP  Tylor Davis  [address]  [address] [address] [phone]    Go in 1 week           Medication List        Changed      ondansetron ODT 4 MG disintegrating tablet  Commonly known as: ZOFRAN-ODT  Place 1 tablet on the tongue Every 8 (Eight) Hours As Needed for Nausea or Vomiting.  What changed: additional instructions     oxyCODONE 5 MG immediate release tablet  Commonly known as: ROXICODONE  Take 1 tablet by mouth Every 6 (Six) Hours As Needed for Severe Pain.  What changed: additional instructions                        Latest Documented Vital Signs:  AS OF 18:22 EDT VITALS:    BP - 147/87  HR - 98  TEMP - 98.1 °F (36.7 °C)  O2 SATS - 96%            --    Please note that portions of this were completed with a voice recognition program.       Note Disclaimer: At [facility], we believe that sharing information builds trust and better relationships. You are receiving this note because you are receiving care at [facility] or recently visited. It is possible you will see health information before a provider has talked with you about it. This kind of information can be easy to misunderstand. To help you fully understand what it means for your health, we urge you to discuss this note with your provider.             Babar Martinez MD  04/03/25 [id number]

## 2025-04-03 NOTE — DISCHARGE INSTRUCTIONS
Take ibuprofen as needed for pain.  Follow-up with your primary care doctor in the next 1 week.  Return to the emergency department for worsening/persistent symptoms, fever, trouble breathing, or other concern.

## 2025-04-03 NOTE — ED TRIAGE NOTES
Patient to ED via EMS from home. Patient c/o pain with breathing that began this morning at 0400. Patient reports last drink of etoh was 0500 this morning.

## 2025-04-05 ENCOUNTER — APPOINTMENT (OUTPATIENT)
Dept: CT IMAGING | Facility: HOSPITAL | Age: 55
End: 2025-04-05
Payer: MEDICAID

## 2025-04-05 ENCOUNTER — HOSPITAL ENCOUNTER (EMERGENCY)
Facility: HOSPITAL | Age: 55
Discharge: HOME | End: 2025-04-05
Attending: STUDENT IN AN ORGANIZED HEALTH CARE EDUCATION/TRAINING PROGRAM
Payer: MEDICAID

## 2025-04-05 VITALS
DIASTOLIC BLOOD PRESSURE: 113 MMHG | RESPIRATION RATE: 16 BRPM | WEIGHT: 143.52 LBS | HEART RATE: 102 BPM | BODY MASS INDEX: 23.91 KG/M2 | OXYGEN SATURATION: 95 % | TEMPERATURE: 98.8 F | HEIGHT: 65 IN | SYSTOLIC BLOOD PRESSURE: 173 MMHG

## 2025-04-05 DIAGNOSIS — R10.32 LLQ PAIN: Primary | ICD-10-CM

## 2025-04-05 DIAGNOSIS — F41.9 ANXIETY: ICD-10-CM

## 2025-04-05 LAB
ALBUMIN SERPL-MCNC: 4.4 G/DL (ref 3.5–5.2)
ALBUMIN/GLOB SERPL: 1.5 G/DL
ALP SERPL-CCNC: 191 U/L (ref 39–117)
ALT SERPL W P-5'-P-CCNC: 81 U/L (ref 1–33)
ANION GAP SERPL CALCULATED.3IONS-SCNC: 14.6 MMOL/L (ref 5–15)
AST SERPL-CCNC: 160 U/L (ref 1–32)
BACTERIA UR QL AUTO: ABNORMAL /HPF
BASOPHILS # BLD AUTO: 0.04 10*3/MM3 (ref 0–0.2)
BASOPHILS NFR BLD AUTO: 0.7 % (ref 0–1.5)
BILIRUB SERPL-MCNC: 1.3 MG/DL (ref 0–1.2)
BILIRUB UR QL STRIP: NEGATIVE
BUN SERPL-MCNC: 7 MG/DL (ref 6–20)
BUN/CREAT SERPL: 9.2 (ref 7–25)
CALCIUM SPEC-SCNC: 9.2 MG/DL (ref 8.6–10.5)
CHLORIDE SERPL-SCNC: 101 MMOL/L (ref 98–107)
CLARITY UR: CLEAR
CO2 SERPL-SCNC: 19.4 MMOL/L (ref 22–29)
COLOR UR: YELLOW
CREAT SERPL-MCNC: 0.76 MG/DL (ref 0.57–1)
DEPRECATED RDW RBC AUTO: 51 FL (ref 37–54)
EGFRCR SERPLBLD CKD-EPI 2021: 92.1 ML/MIN/1.73
EOSINOPHIL # BLD AUTO: 0.06 10*3/MM3 (ref 0–0.4)
EOSINOPHIL NFR BLD AUTO: 1 % (ref 0.3–6.2)
ERYTHROCYTE [DISTWIDTH] IN BLOOD BY AUTOMATED COUNT: 14.2 % (ref 12.3–15.4)
GLOBULIN UR ELPH-MCNC: 3 GM/DL
GLUCOSE SERPL-MCNC: 123 MG/DL (ref 65–99)
GLUCOSE UR STRIP-MCNC: NEGATIVE MG/DL
HCT VFR BLD AUTO: 44.7 % (ref 34–46.6)
HGB BLD-MCNC: 14.9 G/DL (ref 12–15.9)
HGB UR QL STRIP.AUTO: NEGATIVE
HYALINE CASTS UR QL AUTO: ABNORMAL /LPF
IMM GRANULOCYTES # BLD AUTO: 0.02 10*3/MM3 (ref 0–0.05)
IMM GRANULOCYTES NFR BLD AUTO: 0.3 % (ref 0–0.5)
KETONES UR QL STRIP: NEGATIVE
LEUKOCYTE ESTERASE UR QL STRIP.AUTO: ABNORMAL
LIPASE SERPL-CCNC: 31 U/L (ref 13–60)
LYMPHOCYTES # BLD AUTO: 1.85 10*3/MM3 (ref 0.7–3.1)
LYMPHOCYTES NFR BLD AUTO: 31.8 % (ref 19.6–45.3)
MCH RBC QN AUTO: 32.5 PG (ref 26.6–33)
MCHC RBC AUTO-ENTMCNC: 33.3 G/DL (ref 31.5–35.7)
MCV RBC AUTO: 97.4 FL (ref 79–97)
MONOCYTES # BLD AUTO: 0.52 10*3/MM3 (ref 0.1–0.9)
MONOCYTES NFR BLD AUTO: 9 % (ref 5–12)
NEUTROPHILS NFR BLD AUTO: 3.32 10*3/MM3 (ref 1.7–7)
NEUTROPHILS NFR BLD AUTO: 57.2 % (ref 42.7–76)
NITRITE UR QL STRIP: NEGATIVE
PH UR STRIP.AUTO: 6.5 [PH] (ref 5–8)
PLATELET # BLD AUTO: 49 10*3/MM3 (ref 140–450)
PMV BLD AUTO: 12.1 FL (ref 6–12)
POTASSIUM SERPL-SCNC: 3.4 MMOL/L (ref 3.5–5.2)
PROT SERPL-MCNC: 7.4 G/DL (ref 6–8.5)
PROT UR QL STRIP: NEGATIVE
RBC # BLD AUTO: 4.59 10*6/MM3 (ref 3.77–5.28)
RBC # UR STRIP: ABNORMAL /HPF
REF LAB TEST METHOD: ABNORMAL
SODIUM SERPL-SCNC: 135 MMOL/L (ref 136–145)
SP GR UR STRIP: 1.01 (ref 1–1.03)
SQUAMOUS #/AREA URNS HPF: ABNORMAL /HPF
UROBILINOGEN UR QL STRIP: ABNORMAL
WBC # UR STRIP: ABNORMAL /HPF
WBC NRBC COR # BLD AUTO: 5.81 10*3/MM3 (ref 3.4–10.8)

## 2025-04-05 PROCEDURE — 83690 ASSAY OF LIPASE: CPT | Performed by: STUDENT IN AN ORGANIZED HEALTH CARE EDUCATION/TRAINING PROGRAM

## 2025-04-05 PROCEDURE — 96374 THER/PROPH/DIAG INJ IV PUSH: CPT

## 2025-04-05 PROCEDURE — 85025 COMPLETE CBC W/AUTO DIFF WBC: CPT | Performed by: STUDENT IN AN ORGANIZED HEALTH CARE EDUCATION/TRAINING PROGRAM

## 2025-04-05 PROCEDURE — 81001 URINALYSIS AUTO W/SCOPE: CPT | Performed by: STUDENT IN AN ORGANIZED HEALTH CARE EDUCATION/TRAINING PROGRAM

## 2025-04-05 PROCEDURE — 25010000002 MORPHINE PER 10 MG: Performed by: STUDENT IN AN ORGANIZED HEALTH CARE EDUCATION/TRAINING PROGRAM

## 2025-04-05 PROCEDURE — 25010000002 ONDANSETRON PER 1 MG: Performed by: STUDENT IN AN ORGANIZED HEALTH CARE EDUCATION/TRAINING PROGRAM

## 2025-04-05 PROCEDURE — 36415 COLL VENOUS BLD VENIPUNCTURE: CPT

## 2025-04-05 PROCEDURE — 96375 TX/PRO/DX INJ NEW DRUG ADDON: CPT

## 2025-04-05 PROCEDURE — 25810000003 LACTATED RINGERS SOLUTION: Performed by: STUDENT IN AN ORGANIZED HEALTH CARE EDUCATION/TRAINING PROGRAM

## 2025-04-05 PROCEDURE — 25510000001 IOPAMIDOL 61 % SOLUTION: Performed by: STUDENT IN AN ORGANIZED HEALTH CARE EDUCATION/TRAINING PROGRAM

## 2025-04-05 PROCEDURE — 74177 CT ABD & PELVIS W/CONTRAST: CPT

## 2025-04-05 PROCEDURE — 99285 EMERGENCY DEPT VISIT HI MDM: CPT

## 2025-04-05 PROCEDURE — 80053 COMPREHEN METABOLIC PANEL: CPT | Performed by: STUDENT IN AN ORGANIZED HEALTH CARE EDUCATION/TRAINING PROGRAM

## 2025-04-05 RX ORDER — IOPAMIDOL 612 MG/ML
100 INJECTION, SOLUTION INTRAVASCULAR
Status: COMPLETED | OUTPATIENT
Start: 2025-04-05 | End: 2025-04-05

## 2025-04-05 RX ORDER — MORPHINE SULFATE 2 MG/ML
4 INJECTION, SOLUTION INTRAMUSCULAR; INTRAVENOUS ONCE
Status: COMPLETED | OUTPATIENT
Start: 2025-04-05 | End: 2025-04-05

## 2025-04-05 RX ORDER — ONDANSETRON 2 MG/ML
4 INJECTION INTRAMUSCULAR; INTRAVENOUS ONCE
Status: COMPLETED | OUTPATIENT
Start: 2025-04-05 | End: 2025-04-05

## 2025-04-05 RX ORDER — HYDROXYZINE HYDROCHLORIDE 25 MG/1
25 TABLET, FILM COATED ORAL 3 TIMES DAILY PRN
Qty: 21 TABLET | Refills: 0 | Status: SHIPPED | OUTPATIENT
Start: 2025-04-05

## 2025-04-05 RX ADMIN — ONDANSETRON 4 MG: 2 INJECTION, SOLUTION INTRAMUSCULAR; INTRAVENOUS at 17:26

## 2025-04-05 RX ADMIN — MORPHINE SULFATE 4 MG: 2 INJECTION, SOLUTION INTRAMUSCULAR; INTRAVENOUS at 17:25

## 2025-04-05 RX ADMIN — IOPAMIDOL 85 ML: 612 INJECTION, SOLUTION INTRAVENOUS at 17:42

## 2025-04-05 RX ADMIN — SODIUM CHLORIDE, POTASSIUM CHLORIDE, SODIUM LACTATE AND CALCIUM CHLORIDE 1000 ML: 600; 310; 30; 20 INJECTION, SOLUTION INTRAVENOUS at 17:26

## 2025-04-05 NOTE — ED NOTES
Patient states she woke up at approximately 3 a.m. after a night of drinking alcohol and had a seizure and diarrhea. Patient states she is an alcoholic and tries not to drink. Patient c/o abd pain 7:10.

## 2025-04-05 NOTE — ED PROVIDER NOTES
EMERGENCY DEPARTMENT ENCOUNTER  Room Number:  17/17  PCP: Tylor Davis  Independent Historians: Patient      HPI:  Chief Complaint: had concerns including Shortness of Breath.     A complete HPI/ROS/PMH/PSH/SH/FH are unobtainable due to: None    Chronic or social conditions impacting patient care (Social Determinants of Health): None      Context: Bekah Gibson is a 54 y.o. female with a medical history of alcoholic pancreatitis, hepatitis, splenic vein thrombosis, who presents to the ED c/o acute  left lower quadrant abdominal pain onset approximately 14 hours prior to arrival.  Patient reports she is an alcoholic and has had pancreatitis many times in the past.  He states this does not feel a pancreatitis.  She notes she has had diarrhea, however she takes lactulose for her cirrhosis.  No fever chills nausea or vomiting.  No urinary symptoms.    Review of prior external notes (non-ED) -and- Review of prior external test results outside of this encounter:  I reviewed CT abdomen pelvis from 11/17/2024.  Pancreatic atrophy with calcifications likely from chronic pancreatitis.      Prescription drug monitoring program review:         PAST MEDICAL HISTORY  Active Ambulatory Problems     Diagnosis Date Noted    Irritable bowel syndrome with both constipation and diarrhea 06/05/2017    Peripheral neuropathy 06/05/2017    Vitamin D deficiency 06/05/2017    History of HPV infection 06/05/2017    Hypothyroidism 06/05/2017    Tobacco dependence due to cigarettes 06/05/2017    Acute kidney injury 12/28/2015    Ascites 06/06/2016    E. coli UTI (urinary tract infection) 06/06/2016    Alcoholic hepatitis 06/29/2018    GI bleed 06/29/2018    Acute post-hemorrhagic anemia 06/29/2018    Thrombocytopenia 06/29/2018    Open wound of right shoulder region 06/29/2018    Pancreatic insufficiency 07/04/2018    Alcoholic ketoacidosis 07/21/2019    Chronic alcohol abuse 07/29/2019    ESBL (extended spectrum beta-lactamase) producing  bacteria infection 07/29/2019    Abnormal weight loss 12/13/2019    Hashimoto's disease 12/13/2019    Chronic fatigue 12/14/2019    History of alcohol use disorder 09/21/2021    Alcohol intoxication 09/21/2021    Lupus     Hypomagnesemia     Pancytopenia     Alcoholic cirrhosis     Bilateral lower abdominal discomfort 09/21/2021    Primary hypertension 10/24/2022    Melena 10/24/2022    Arthritis of shoulder 12/19/2022    Esophageal varices 01/16/2023    Essential hypertension 01/16/2023    Alcohol-induced chronic pancreatitis 01/22/2023    Abdominal pain 01/22/2023    Pancreatitis 10/07/2023    Leukopenia 10/07/2023    Epigastric pain 04/01/2024    Acute alcoholic gastritis without hemorrhage 04/01/2024    Alcohol-induced acute on chronic pancreatitis without infection or necrosis 07/03/2024    Alcohol withdrawal 07/03/2024    Acute on chronic pancreatitis 07/22/2024    Transaminitis 07/22/2024    Cholelithiasis 07/22/2024    Alcohol use 07/22/2024    WBC decreased 07/24/2024    Hypokalemia 07/24/2024    Urinary tract infection 07/24/2024    Neutropenia 07/24/2024    Polysubstance abuse 08/05/2024    Alcohol dependence with withdrawal 10/24/2024    Hypomagnesemia 11/18/2024     Resolved Ambulatory Problems     Diagnosis Date Noted    Acute renal failure 02/20/2017    Kidney stone 06/05/2017    Alcohol withdrawal syndrome, with delirium 06/29/2018    Hypotension 07/01/2018    Nausea and vomiting 09/21/2021    Acute GI bleeding 10/24/2022     Past Medical History:   Diagnosis Date    Acromioclavicular separation 1/2021    Ankle sprain 2/2004    Anxiety     Arthritis     Arthritis of back Unsure    Cirrhosis     Disease of thyroid gland     ETOH abuse     Fracture, clavicle 1/2021    Fracture, foot Unsure    Frozen shoulder 1 /2009    GERD (gastroesophageal reflux disease)     History of kidney stones     Hypertension     Left shoulder pain     Low back strain 1/21    Lumbosacral disc disease 1/21    MDD (major  depressive disorder)     Neck strain Unsure    Periarthritis of shoulder see above    Rotator cuff syndrome odre for shoulder replacement    Tennis elbow Unsure         PAST SURGICAL HISTORY  Past Surgical History:   Procedure Laterality Date    CLAVICLE SURGERY Right 2018    ENDOSCOPY N/A 10/26/2022    Procedure: ESOPHAGOGASTRODUODENOSCOPY wtih banding;  Surgeon: Ag Patel MD;  Location: [facility] ENDOSCOPY;  Service: Gastroenterology;  Laterality: N/A;  pre: melena  post: esophageal varicies with banding x2 bands    ENDOSCOPY N/A 2023    Procedure: ESOPHAGOGASTRODUODENOSCOPY;  Surgeon: Ag Patel MD;  Location: [facility] ENDOSCOPY;  Service: Gastroenterology;  Laterality: N/A;  PRE OP - MAROON STOOLS  POST OP - SM ESOPHAGEAL VARICES    ESOPHAGEAL VARICES LIGATION      FOOT SURGERY  12    JOINT REPLACEMENT Bilateral     GREAT TOE    KIDNEY STONE SURGERY      LITHOTRIPSY AND STENT (R SIDE)    LAPAROSCOPIC TUBAL LIGATION      NECK SURGERY  3/07    Not sure of dates    SIGMOIDOSCOPY N/A 2023    Procedure: SIGMOIDOSCOPY FLEXIBLE;  Surgeon: Ag Patel MD;  Location: [facility] ENDOSCOPY;  Service: Gastroenterology;  Laterality: N/A;  PRE OP - MAROON STOOLS  POST OP - RECTAL VARICES    TOTAL SHOULDER ARTHROPLASTY Left 2023    Procedure: reverse total shoulder arthroplasty;  Surgeon: Giovanni Denise MD;  Location: [facility] OR [facility];  Service: Orthopedics;  Laterality: Left;    TRIGGER POINT INJECTION           FAMILY HISTORY  Family History   Problem Relation Age of Onset    Rheumatologic disease Mother         congestive heart diseased    Osteoporosis Mother             Thyroid disease Father     Leukemia Father     Cancer Father         Leukemia  deseased     Broken bones Father     Clotting disorder Father     Drug abuse Brother     Malig Hyperthermia Neg Hx          SOCIAL HISTORY  Social History     Socioeconomic History    Marital status:    Tobacco Use    Smoking  status: Some Days     Current packs/day: 0.25     Average packs/day: 0.3 packs/day for 15.0 years (3.8 ttl pk-yrs)     Types: Cigarettes     Passive exposure: Current    Smokeless tobacco: Never    Tobacco comments:     Rarely smoke sometimes will to   Vaping Use    Vaping status: Never Used   Substance and Sexual Activity    Alcohol use: Yes     Alcohol/week: 5.0 - 10.0 standard drinks of alcohol     Types: 5 - 10 Standard drinks or equivalent per week    Drug use: Never    Sexual activity: Yes     Partners: Male     Birth control/protection: Post-menopausal     Comment: cinthia- menepausal       ALLERGIES  Benzonatate, Ketorolac tromethamine, Phenergan [promethazine hcl], Cucumber extract, and Promethazine-phenylephrine      PHYSICAL EXAM    I have reviewed the triage vital signs and nursing notes.    ED Triage Vitals   Temp Heart Rate Resp BP SpO2   03/02/25 1448 03/02/25 1448 03/02/25 1448 03/02/25 1450 03/02/25 1448   97.4 °F (36.3 °C) 95 16 141/84 97 %      Temp src Heart Rate Source Patient Position BP Location FiO2 (%)   -- -- -- -- --              Physical Exam  GENERAL: alert, uncomfortable appearing, nontoxic  SKIN: Warm, dry  HENT: Normocephalic, atraumatic  EYES: no scleral icterus  CV: regular rhythm, regular rate  RESPIRATORY: normal effort, lungs clear  ABDOMEN: soft, nondistended, moderate left lower quadrant tenderness without rebound or guarding  MUSCULOSKELETAL: no deformity  NEURO: alert, moves all extremities, follows commands        LAB RESULTS  Recent Results (from the past 24 hours)   Comprehensive Metabolic Panel    Collection Time: 04/05/25  4:17 PM    Specimen: Blood   Result Value Ref Range    Glucose 123 (H) 65 - 99 mg/dL    BUN 7 6 - 20 mg/dL    Creatinine 0.76 0.57 - 1.00 mg/dL    Sodium 135 (L) 136 - 145 mmol/L    Potassium 3.4 (L) 3.5 - 5.2 mmol/L    Chloride 101 98 - 107 mmol/L    CO2 19.4 (L) 22.0 - 29.0 mmol/L    Calcium 9.2 8.6 - 10.5 mg/dL    Total Protein 7.4 6.0 - 8.5 g/dL     Albumin 4.4 3.5 - 5.2 g/dL    ALT (SGPT) 81 (H) 1 - 33 U/L    AST (SGOT) 160 (H) 1 - 32 U/L    Alkaline Phosphatase 191 (H) 39 - 117 U/L    Total Bilirubin 1.3 (H) 0.0 - 1.2 mg/dL    Globulin 3.0 gm/dL    A/G Ratio 1.5 g/dL    BUN/Creatinine Ratio 9.2 7.0 - 25.0    Anion Gap 14.6 5.0 - 15.0 mmol/L    eGFR 92.1 >60.0 mL/min/1.73   Lipase    Collection Time: 04/05/25  4:17 PM    Specimen: Blood   Result Value Ref Range    Lipase 31 13 - 60 U/L   CBC Auto Differential    Collection Time: 04/05/25  4:17 PM    Specimen: Blood   Result Value Ref Range    WBC 5.81 3.40 - 10.80 10*3/mm3    RBC 4.59 3.77 - 5.28 10*6/mm3    Hemoglobin 14.9 12.0 - 15.9 g/dL    Hematocrit 44.7 34.0 - 46.6 %    MCV 97.4 (H) 79.0 - 97.0 fL    MCH 32.5 26.6 - 33.0 pg    MCHC 33.3 31.5 - 35.7 g/dL    RDW 14.2 12.3 - 15.4 %    RDW-SD 51.0 37.0 - 54.0 fl    MPV 12.1 (H) 6.0 - 12.0 fL    Platelets 49 (C) 140 - 450 10*3/mm3    Neutrophil % 57.2 42.7 - 76.0 %    Lymphocyte % 31.8 19.6 - 45.3 %    Monocyte % 9.0 5.0 - 12.0 %    Eosinophil % 1.0 0.3 - 6.2 %    Basophil % 0.7 0.0 - 1.5 %    Immature Grans % 0.3 0.0 - 0.5 %    Neutrophils, Absolute 3.32 1.70 - 7.00 10*3/mm3    Lymphocytes, Absolute 1.85 0.70 - 3.10 10*3/mm3    Monocytes, Absolute 0.52 0.10 - 0.90 10*3/mm3    Eosinophils, Absolute 0.06 0.00 - 0.40 10*3/mm3    Basophils, Absolute 0.04 0.00 - 0.20 10*3/mm3    Immature Grans, Absolute 0.02 0.00 - 0.05 10*3/mm3   Urinalysis With Culture If Indicated - Urine, Clean Catch    Collection Time: 04/05/25  4:43 PM    Specimen: Urine, Clean Catch   Result Value Ref Range    Color, UA Yellow Yellow, Straw    Appearance, UA Clear Clear    pH, UA 6.5 5.0 - 8.0    Specific Gravity, UA 1.006 1.005 - 1.030    Glucose, UA Negative Negative    Ketones, UA Negative Negative    Bilirubin, UA Negative Negative    Blood, UA Negative Negative    Protein, UA Negative Negative    Leuk Esterase, UA Small (1+) (A) Negative    Nitrite, UA Negative Negative     Urobilinogen, UA 0.2 E.U./dL 0.2 - 1.0 E.U./dL   Urinalysis, Microscopic Only - Urine, Clean Catch    Collection Time: 04/05/25  4:43 PM    Specimen: Urine, Clean Catch   Result Value Ref Range    RBC, UA 0-2 None Seen, 0-2 /HPF    WBC, UA 3-5 (A) None Seen, 0-2 /HPF    Bacteria, UA None Seen None Seen /HPF    Squamous Epithelial Cells, UA 0-2 None Seen, 0-2 /HPF    Hyaline Casts, UA None Seen None Seen /LPF    Methodology Automated Microscopy        RADIOLOGY  CT Abdomen Pelvis With Contrast  Result Date: 4/5/2025  CT ABDOMEN PELVIS W CONTRAST-  HISTORY: Left lower quadrant pain and diarrhea.  TECHNIQUE:  CT of the abdomen and pelvis was performed following the administration of intravenous contrast. Reformatted images were reviewed. Radiation dose reduction techniques were utilized, including automated exposure control and exposure modulation based on body size.  COMPARISON: CT 11/17/2024  FINDINGS:  There is mild curvilinear atelectasis/scarring in the right lower lobe. Pleural spaces are clear. There are cirrhotic liver morphology with hepatic steatosis. There is cholelithiasis. The spleen is normal in size. The pancreas is atrophic with numerous coarse pancreatic calcifications, likely sequela of prior pancreatitis. The adrenal glands are within normal limits. The kidneys are within normal limits. There is narrowing of the celiac trunk origin with dilatation of the celiac trunk to 1 cm. This is similar to prior. There are gastroesophageal varices and collateral vessels. There is mild calcific atherosclerosis. There is a circumaortic left renal vein, a developmental variant. No pathologically enlarged lymph nodes are identified. There are no dilated small bowel loops. The appendix is within normal limits. The colon is decompressed and poorly assessed. The bladder is mildly distended. The uterus is present. There is no adnexal mass. There is osseous demineralization. There is degenerative disc disease.        1.   Cirrhotic liver morphology with moderate steatosis and sequela portal hypertension, including varices/collateral vessels. 2.  Sequela of prior pancreatitis. 3.  Cholelithiasis.   This report was finalized on 4/5/2025 6:02 PM by Dr. Lisa Thompson M.D on Workstation: [id number]          MEDICATIONS GIVEN IN ER  Medications   lactated ringers bolus 1,000 mL (1,000 mL Intravenous New Bag 4/5/25 1726)   morphine injection 4 mg (4 mg Intravenous Given 4/5/25 1725)   ondansetron (ZOFRAN) injection 4 mg (4 mg Intravenous Given 4/5/25 1726)   iopamidol (ISOVUE-300) 61 % injection 100 mL (85 mL Intravenous Given by Other 4/5/25 1742)         ORDERS PLACED DURING THIS VISIT:  Orders Placed This Encounter   Procedures    CT Abdomen Pelvis With Contrast    Comprehensive Metabolic Panel    Lipase    Urinalysis With Culture If Indicated - Urine, Clean Catch    CBC Auto Differential    Urinalysis, Microscopic Only - Urine, Clean Catch    CBC & Differential         OUTPATIENT MEDICATION MANAGEMENT:  Current Facility-Administered Medications Ordered in Epic   Medication Dose Route Frequency Provider Last Rate Last Admin    sodium chloride 0.9 % flush 10 mL  10 mL Intravenous Q12H Callie Quiroz MD        sodium chloride 0.9 % flush 10 mL  10 mL Intravenous PRN Callie Quiroz MD        sodium chloride 0.9 % flush 20 mL  20 mL Intravenous PRN Callie Quiroz MD         Current Outpatient Medications Ordered in Epic   Medication Sig Dispense Refill    amLODIPine (NORVASC) 2.5 MG tablet Take 1 tablet by mouth Daily.      B Complex Vitamins (VITAMIN B COMPLEX PO) Take  by mouth.      bismuth subsalicylate (PEPTO BISMOL) 262 MG/15ML suspension Take 15 mL by mouth Every 6 (Six) Hours As Needed for Indigestion. Pt reports filling predosed cup about 3/4 full and drinks daily.      brompheniramine-pseudoephedrine-DM 30-2-10 MG/5ML syrup Take 5 mL by mouth 4 (Four) Times a Day As Needed for  Congestion or Cough. 118 mL 0    ferrous gluconate (FERGON) 324 MG tablet Take 1 tablet by mouth Daily.      ferrous sulfate 325 (65 FE) MG tablet Take 1 tablet by mouth Daily With Breakfast. Taking OTC      FLUoxetine (PROzac) 20 MG capsule       FLUoxetine (PROzac) 20 MG tablet Take 3 tablets by mouth Daily.      folic acid (FOLVITE) 1 MG tablet Take 1 tablet by mouth Daily.      hydrOXYzine (ATARAX) 25 MG tablet Take 1 tablet by mouth 3 (Three) Times a Day As Needed for Anxiety. 21 tablet 0    Ibuprofen 3 %, Gabapentin 10 %, Baclofen 2 %, lidocaine 4 % Apply 1-2 g topically to the appropriate area as directed 3 (Three) to 4 (Four) times daily. 90 g 5    lactulose (CHRONULAC) 10 GM/15ML solution Take 15 mL by mouth Every Morning.      levothyroxine (SYNTHROID, LEVOTHROID) 88 MCG tablet Take 1 tablet by mouth Daily.      MAGnesium-Oxide 400 (240 Mg) MG tablet Take 1 tablet by mouth Daily. Takes OTC      mirtazapine (REMERON) 15 MG tablet Take 1 tablet by mouth Every Night. Takes PRN      Multiple Vitamin (MULTIVITAMIN) capsule Take 1 capsule by mouth Daily.      nadolol (CORGARD) 40 MG tablet Take 1 tablet by mouth Daily.      ondansetron ODT (ZOFRAN-ODT) 4 MG disintegrating tablet Place 1 tablet on the tongue Every 8 (Eight) Hours As Needed for Nausea or Vomiting. (Patient taking differently: Place 1 tablet on the tongue Every 8 (Eight) Hours As Needed for Nausea or Vomiting. Currently out of prescription, ran out about a week ago) 10 tablet 0    oxyCODONE (ROXICODONE) 5 MG immediate release tablet Take 1 tablet by mouth Every 6 (Six) Hours As Needed for Severe Pain. (Patient taking differently: Take 1 tablet by mouth Every 6 (Six) Hours As Needed for Severe Pain. Took 0.5 tablets last night) 12 tablet 0    pancrelipase, Lip-Prot-Amyl, (CREON) 99843-39445 units capsule delayed-release particles capsule Take 2 capsules by mouth 3 (Three) Times a Day With Meals. 180 capsule 0    pantoprazole (PROTONIX) 40 MG EC  tablet Take 1 tablet by mouth Daily. Waiting for refill      potassium chloride 10 MEQ CR tablet Take 1 tablet by mouth. Taking OTC once daily      risperiDONE (risperDAL) 0.5 MG tablet Take 2 tablets by mouth Every Night. Pt ran out and last dose was Thursday night      solifenacin (VESICARE) 10 MG tablet Take 1 tablet by mouth. Takes 1-2 times per week, does not like SE (dry mouth)      thiamine (VITAMIN B1) 100 MG tablet Take 1 tablet by mouth Daily. 30 tablet 0         PROCEDURES  Procedures            PROGRESS, DATA ANALYSIS, CONSULTS, AND MEDICAL DECISION MAKING  All labs have been independently interpreted by me.  All radiology studies have been reviewed by me. All EKG's have been independently viewed and interpreted by me.  Discussion below represents my analysis of pertinent findings related to patient's condition, differential diagnosis, treatment plan and final disposition.    Differential diagnosis includes but is not limited to diverticulitis, colitis, UTI, appendicitis, gastroenteritis, pancreatitis, PINO, sepsis.    Clinical Scores:                                       ED Course as of 04/05/25 1833   Sat Apr 05, 2025   1557 Patient presents to the ED for evaluation of left lower quadrant abdominal pain onset approximately 14 hours prior to arrival.  Patient reports she is an alcoholic and has had pancreatitis many times in the past.  He states this does not feel a pancreatitis.  She notes she has had diarrhea, however she takes lactulose for her cirrhosis.  No fever chills nausea or vomiting.  No urinary symptoms.    On exam patient with mild left lower quadrant tenderness without rebound or guarding.  Afebrile nontoxic-appearing    Will obtain basic labs, lipase, urine, CT abdomen pelvis.  Give IV fluids morphine and Zofran.  Patient is agreeable with plan [DN]   1602 SpO2: 96 %  RA [DN]   1602 Heart Rate: 102 [DN]   1602 BP: 158/95 [DN]   1602 Temp: 98.8 °F (37.1 °C) [DN]   1640 Platelets(!!):  49  stable [DN]   1649 Lipase: 31 [DN]   1656 Comprehensive Metabolic Panel(!)  LFTs elevated, but stable [DN]   1806 Bacteria, UA: None Seen [DN]   1806 WBC, UA(!): 3-5 [DN]   1806 CT Abdomen Pelvis With Contrast  I reviewed patient's CT image(s), there is no pericolonic stranding, interpreted by self  I reviewed the radiologist's interpretation of above image(s)   [DN]   1820 I discussed results with patient, she is agreeable with plan for discharge home.  Reassuring workup.  Her primary concern at this time is her anxiety.  She started BuSpar 2 weeks ago with her psychiatrist.  Discussed plan for short prescription of hydroxyzine, need to call psychiatrist on Monday to follow-up for better management of her anxiety.  Patient expressed understanding is agreeable plan for discharge home at this time [DN]      ED Course User Index  [DN] Vishal Clarke MD             AS OF 18:33 EDT VITALS:    BP - 158/98  HR - 103  TEMP - 98.8 °F (37.1 °C) (Tympanic)  O2 SATS - 94%    COMPLEXITY OF CARE  Admission was considered but after careful review of the patient's presentation, physical examination, diagnostic results, and response to treatment the patient may be safely discharged with outpatient follow-up.      DIAGNOSIS  Final diagnoses:   LLQ pain   Anxiety         DISPOSITION  ED Disposition       ED Disposition   Discharge    Condition   Stable    Comment   --               New Medications Ordered This Visit   Medications    lactated ringers bolus 1,000 mL    morphine injection 4 mg    ondansetron (ZOFRAN) injection 4 mg    iopamidol (ISOVUE-300) 61 % injection 100 mL    hydrOXYzine (ATARAX) 25 MG tablet     Sig: Take 1 tablet by mouth 3 (Three) Times a Day As Needed for Anxiety.     Dispense:  21 tablet     Refill:  0       Please note that portions of this document were completed with a voice recognition program.    Note Disclaimer: At [facility], we believe that sharing information builds trust and better  relationships. You are receiving this note because you recently visited [facility]. It is possible you will see health information before a provider has talked with you about it. This kind of information can be easy to misunderstand. To help you fully understand what it means for your health, we urge you to discuss this note with your provider.         Vishal Clarke MD  04/05/25 [id number]       Vishal Clarke MD  04/05/25 1759

## 2025-04-05 NOTE — ED TRIAGE NOTES
Patient to ed from home via ems with complaints of SOA and anxiety, states she drank a pint of alcohol to help her anxiety. Told ems she thought she had a seizure this morning

## 2025-05-23 ENCOUNTER — HOSPITAL ENCOUNTER (EMERGENCY)
Facility: HOSPITAL | Age: 55
Discharge: HOME | End: 2025-05-23
Attending: STUDENT IN AN ORGANIZED HEALTH CARE EDUCATION/TRAINING PROGRAM
Payer: MEDICAID

## 2025-05-23 ENCOUNTER — APPOINTMENT (OUTPATIENT)
Dept: CT IMAGING | Facility: HOSPITAL | Age: 55
End: 2025-05-23
Payer: MEDICAID

## 2025-05-23 VITALS
DIASTOLIC BLOOD PRESSURE: 101 MMHG | TEMPERATURE: 98.1 F | HEIGHT: 65 IN | SYSTOLIC BLOOD PRESSURE: 157 MMHG | WEIGHT: 152 LBS | BODY MASS INDEX: 25.33 KG/M2 | HEART RATE: 71 BPM | OXYGEN SATURATION: 99 % | RESPIRATION RATE: 18 BRPM

## 2025-05-23 DIAGNOSIS — R19.7 NAUSEA VOMITING AND DIARRHEA: Primary | ICD-10-CM

## 2025-05-23 DIAGNOSIS — R11.2 NAUSEA VOMITING AND DIARRHEA: Primary | ICD-10-CM

## 2025-05-23 DIAGNOSIS — Z87.19 HISTORY OF CIRRHOSIS: ICD-10-CM

## 2025-05-23 LAB
ALBUMIN SERPL-MCNC: 4.4 G/DL (ref 3.5–5.2)
ALBUMIN/GLOB SERPL: 1.3 G/DL
ALP SERPL-CCNC: 129 U/L (ref 39–117)
ALT SERPL W P-5'-P-CCNC: 57 U/L (ref 1–33)
ANION GAP SERPL CALCULATED.3IONS-SCNC: 15 MMOL/L (ref 5–15)
AST SERPL-CCNC: 89 U/L (ref 1–32)
BACTERIA UR QL AUTO: ABNORMAL /HPF
BASOPHILS # BLD AUTO: 0.06 10*3/MM3 (ref 0–0.2)
BASOPHILS NFR BLD AUTO: 1.2 % (ref 0–1.5)
BILIRUB SERPL-MCNC: 1.4 MG/DL (ref 0–1.2)
BILIRUB UR QL STRIP: NEGATIVE
BUN SERPL-MCNC: 12 MG/DL (ref 6–20)
BUN/CREAT SERPL: 17.1 (ref 7–25)
CALCIUM SPEC-SCNC: 9.2 MG/DL (ref 8.6–10.5)
CHLORIDE SERPL-SCNC: 105 MMOL/L (ref 98–107)
CLARITY UR: CLEAR
CO2 SERPL-SCNC: 21 MMOL/L (ref 22–29)
COLOR UR: ABNORMAL
CREAT SERPL-MCNC: 0.7 MG/DL (ref 0.57–1)
DEPRECATED RDW RBC AUTO: 47.4 FL (ref 37–54)
EGFRCR SERPLBLD CKD-EPI 2021: 101.6 ML/MIN/1.73
EOSINOPHIL # BLD AUTO: 0.16 10*3/MM3 (ref 0–0.4)
EOSINOPHIL NFR BLD AUTO: 3.1 % (ref 0.3–6.2)
ERYTHROCYTE [DISTWIDTH] IN BLOOD BY AUTOMATED COUNT: 14 % (ref 12.3–15.4)
ETHANOL BLD-MCNC: <10 MG/DL (ref 0–10)
ETHANOL UR QL: <0.01 %
GLOBULIN UR ELPH-MCNC: 3.3 GM/DL
GLUCOSE SERPL-MCNC: 142 MG/DL (ref 65–99)
GLUCOSE UR STRIP-MCNC: NEGATIVE MG/DL
HCT VFR BLD AUTO: 46.9 % (ref 34–46.6)
HGB BLD-MCNC: 16.6 G/DL (ref 12–15.9)
HGB UR QL STRIP.AUTO: NEGATIVE
HYALINE CASTS UR QL AUTO: ABNORMAL /LPF
IMM GRANULOCYTES # BLD AUTO: 0.02 10*3/MM3 (ref 0–0.05)
IMM GRANULOCYTES NFR BLD AUTO: 0.4 % (ref 0–0.5)
KETONES UR QL STRIP: NEGATIVE
LEUKOCYTE ESTERASE UR QL STRIP.AUTO: ABNORMAL
LIPASE SERPL-CCNC: 22 U/L (ref 13–60)
LYMPHOCYTES # BLD AUTO: 1.07 10*3/MM3 (ref 0.7–3.1)
LYMPHOCYTES NFR BLD AUTO: 20.9 % (ref 19.6–45.3)
MCH RBC QN AUTO: 32.8 PG (ref 26.6–33)
MCHC RBC AUTO-ENTMCNC: 35.4 G/DL (ref 31.5–35.7)
MCV RBC AUTO: 92.7 FL (ref 79–97)
MONOCYTES # BLD AUTO: 0.32 10*3/MM3 (ref 0.1–0.9)
MONOCYTES NFR BLD AUTO: 6.2 % (ref 5–12)
NEUTROPHILS NFR BLD AUTO: 3.5 10*3/MM3 (ref 1.7–7)
NEUTROPHILS NFR BLD AUTO: 68.2 % (ref 42.7–76)
NITRITE UR QL STRIP: NEGATIVE
PH UR STRIP.AUTO: 6 [PH] (ref 5–8)
PLATELET # BLD AUTO: 61 10*3/MM3 (ref 140–450)
PMV BLD AUTO: 12 FL (ref 6–12)
POTASSIUM SERPL-SCNC: 3.7 MMOL/L (ref 3.5–5.2)
PROT SERPL-MCNC: 7.7 G/DL (ref 6–8.5)
PROT UR QL STRIP: ABNORMAL
RBC # BLD AUTO: 5.06 10*6/MM3 (ref 3.77–5.28)
RBC # UR STRIP: ABNORMAL /HPF
REF LAB TEST METHOD: ABNORMAL
SODIUM SERPL-SCNC: 141 MMOL/L (ref 136–145)
SP GR UR STRIP: 1.02 (ref 1–1.03)
SQUAMOUS #/AREA URNS HPF: ABNORMAL /HPF
UROBILINOGEN UR QL STRIP: ABNORMAL
WBC # UR STRIP: ABNORMAL /HPF
WBC NRBC COR # BLD AUTO: 5.13 10*3/MM3 (ref 3.4–10.8)

## 2025-05-23 PROCEDURE — 85025 COMPLETE CBC W/AUTO DIFF WBC: CPT | Performed by: PHYSICIAN ASSISTANT

## 2025-05-23 PROCEDURE — 82077 ASSAY SPEC XCP UR&BREATH IA: CPT | Performed by: STUDENT IN AN ORGANIZED HEALTH CARE EDUCATION/TRAINING PROGRAM

## 2025-05-23 PROCEDURE — 74177 CT ABD & PELVIS W/CONTRAST: CPT

## 2025-05-23 PROCEDURE — 81001 URINALYSIS AUTO W/SCOPE: CPT | Performed by: PHYSICIAN ASSISTANT

## 2025-05-23 PROCEDURE — 25810000003 SODIUM CHLORIDE 0.9 % SOLUTION: Performed by: STUDENT IN AN ORGANIZED HEALTH CARE EDUCATION/TRAINING PROGRAM

## 2025-05-23 PROCEDURE — 96374 THER/PROPH/DIAG INJ IV PUSH: CPT

## 2025-05-23 PROCEDURE — 83690 ASSAY OF LIPASE: CPT | Performed by: PHYSICIAN ASSISTANT

## 2025-05-23 PROCEDURE — 25010000002 ONDANSETRON PER 1 MG: Performed by: STUDENT IN AN ORGANIZED HEALTH CARE EDUCATION/TRAINING PROGRAM

## 2025-05-23 PROCEDURE — 99285 EMERGENCY DEPT VISIT HI MDM: CPT

## 2025-05-23 PROCEDURE — 96375 TX/PRO/DX INJ NEW DRUG ADDON: CPT

## 2025-05-23 PROCEDURE — 25510000001 IOPAMIDOL 61 % SOLUTION: Performed by: STUDENT IN AN ORGANIZED HEALTH CARE EDUCATION/TRAINING PROGRAM

## 2025-05-23 PROCEDURE — 25010000002 MORPHINE PER 10 MG: Performed by: STUDENT IN AN ORGANIZED HEALTH CARE EDUCATION/TRAINING PROGRAM

## 2025-05-23 PROCEDURE — 80053 COMPREHEN METABOLIC PANEL: CPT | Performed by: PHYSICIAN ASSISTANT

## 2025-05-23 RX ORDER — MORPHINE SULFATE 2 MG/ML
4 INJECTION, SOLUTION INTRAMUSCULAR; INTRAVENOUS ONCE
Status: COMPLETED | OUTPATIENT
Start: 2025-05-23 | End: 2025-05-23

## 2025-05-23 RX ORDER — IOPAMIDOL 612 MG/ML
85 INJECTION, SOLUTION INTRAVASCULAR
Status: COMPLETED | OUTPATIENT
Start: 2025-05-23 | End: 2025-05-23

## 2025-05-23 RX ORDER — ONDANSETRON 2 MG/ML
4 INJECTION INTRAMUSCULAR; INTRAVENOUS ONCE
Status: COMPLETED | OUTPATIENT
Start: 2025-05-23 | End: 2025-05-23

## 2025-05-23 RX ADMIN — ONDANSETRON 4 MG: 2 INJECTION, SOLUTION INTRAMUSCULAR; INTRAVENOUS at 07:30

## 2025-05-23 RX ADMIN — IOPAMIDOL 85 ML: 612 INJECTION, SOLUTION INTRAVENOUS at 08:35

## 2025-05-23 RX ADMIN — SODIUM CHLORIDE 500 ML: 9 INJECTION, SOLUTION INTRAVENOUS at 07:29

## 2025-05-23 RX ADMIN — MORPHINE SULFATE 4 MG: 2 INJECTION, SOLUTION INTRAMUSCULAR; INTRAVENOUS at 07:31

## 2025-05-23 NOTE — DISCHARGE INSTRUCTIONS
Zofran you have at home as needed for recurrent nausea or vomiting  Recheck With your primary care doctor in 2 business days  Return to the emergency department for persistent vomiting, increasing abdominal pain, fever, any concern

## 2025-05-23 NOTE — ED PROVIDER NOTES
EMERGENCY DEPARTMENT ENCOUNTER  Room Number:  08/08  PCP: Tylor Davis  Independent Historians: Patient      HPI:  Chief Complaint: had concerns including Abdominal Pain.     A complete HPI/ROS/PMH/PSH/SH/FH are unobtainable due to: None    Chronic or social conditions impacting patient care (Social Determinants of Health): None      Context: The patient is a 54 y.o. female with a medical history of alcohol abuse, ascites, cirrhosis, lupus, pancreatic insufficiency, pancreatitis, esophageal varices with ligationwho presents to the ED c/o acute generalized abdominal pain nausea vomiting diarrhea that woke her from sleep around 2 or 3 AM.  Estimates she has had about 4 episodes of vomiting and about 8 episodes of diarrhea, stools and emesis nonbloody.  She reports generalized abdominal cramping.  Denies any fevers chills hematuria or dysuria, states she has chronic urinary frequency.  She has previously had tubal ligation, denies any other abdominal surgeries.      Review of prior external notes (non-ED) -and- Review of prior external test results outside of this encounter:  Labs performed 4/5/2025 and potassium was 3.4, sodium 135, creatinine 0.76, LFTs were elevated, platelets were 49        PAST MEDICAL HISTORY  Active Ambulatory Problems     Diagnosis Date Noted    Irritable bowel syndrome with both constipation and diarrhea 06/05/2017    Peripheral neuropathy 06/05/2017    Vitamin D deficiency 06/05/2017    History of HPV infection 06/05/2017    Hypothyroidism 06/05/2017    Tobacco dependence due to cigarettes 06/05/2017    Acute kidney injury 12/28/2015    Ascites 06/06/2016    E. coli UTI (urinary tract infection) 06/06/2016    Alcoholic hepatitis 06/29/2018    GI bleed 06/29/2018    Acute post-hemorrhagic anemia 06/29/2018    Thrombocytopenia 06/29/2018    Open wound of right shoulder region 06/29/2018    Pancreatic insufficiency 07/04/2018    Alcoholic ketoacidosis 07/21/2019    Chronic alcohol abuse  07/29/2019    ESBL (extended spectrum beta-lactamase) producing bacteria infection 07/29/2019    Abnormal weight loss 12/13/2019    Hashimoto's disease 12/13/2019    Chronic fatigue 12/14/2019    History of alcohol use disorder 09/21/2021    Alcohol intoxication 09/21/2021    Lupus     Hypomagnesemia     Pancytopenia     Alcoholic cirrhosis     Bilateral lower abdominal discomfort 09/21/2021    Primary hypertension 10/24/2022    Melena 10/24/2022    Arthritis of shoulder 12/19/2022    Esophageal varices 01/16/2023    Essential hypertension 01/16/2023    Alcohol-induced chronic pancreatitis 01/22/2023    Abdominal pain 01/22/2023    Pancreatitis 10/07/2023    Leukopenia 10/07/2023    Epigastric pain 04/01/2024    Acute alcoholic gastritis without hemorrhage 04/01/2024    Alcohol-induced acute on chronic pancreatitis without infection or necrosis 07/03/2024    Alcohol withdrawal 07/03/2024    Acute on chronic pancreatitis 07/22/2024    Transaminitis 07/22/2024    Cholelithiasis 07/22/2024    Alcohol use 07/22/2024    WBC decreased 07/24/2024    Hypokalemia 07/24/2024    Urinary tract infection 07/24/2024    Neutropenia 07/24/2024    Polysubstance abuse 08/05/2024    Alcohol dependence with withdrawal 10/24/2024    Hypomagnesemia 11/18/2024     Resolved Ambulatory Problems     Diagnosis Date Noted    Acute renal failure 02/20/2017    Kidney stone 06/05/2017    Alcohol withdrawal syndrome, with delirium 06/29/2018    Hypotension 07/01/2018    Nausea and vomiting 09/21/2021    Acute GI bleeding 10/24/2022     Past Medical History:   Diagnosis Date    Acromioclavicular separation 1/2021    Ankle sprain 2/2004    Anxiety     Arthritis     Arthritis of back Unsure    Cirrhosis     Disease of thyroid gland     ETOH abuse     Fracture, clavicle 1/2021    Fracture, foot Unsure    Frozen shoulder 1 /2009    GERD (gastroesophageal reflux disease)     History of kidney stones     Hypertension     Left shoulder pain     Low back  strain     Lumbosacral disc disease     MDD (major depressive disorder)     Neck strain Unsure    Periarthritis of shoulder see above    Rotator cuff syndrome odre for shoulder replacement    Tennis elbow Unsure         PAST SURGICAL HISTORY  Past Surgical History:   Procedure Laterality Date    CLAVICLE SURGERY Right 2018    ENDOSCOPY N/A 10/26/2022    Procedure: ESOPHAGOGASTRODUODENOSCOPY wtih banding;  Surgeon: Ag Patel MD;  Location: [facility] ENDOSCOPY;  Service: Gastroenterology;  Laterality: N/A;  pre: melena  post: esophageal varicies with banding x2 bands    ENDOSCOPY N/A 2023    Procedure: ESOPHAGOGASTRODUODENOSCOPY;  Surgeon: Ag Patel MD;  Location: [facility] ENDOSCOPY;  Service: Gastroenterology;  Laterality: N/A;  PRE OP - MAROON STOOLS  POST OP - SM ESOPHAGEAL VARICES    ESOPHAGEAL VARICES LIGATION      FOOT SURGERY  12    JOINT REPLACEMENT Bilateral     GREAT TOE    KIDNEY STONE SURGERY      LITHOTRIPSY AND STENT (R SIDE)    LAPAROSCOPIC TUBAL LIGATION      NECK SURGERY  3/07    Not sure of dates    SIGMOIDOSCOPY N/A 2023    Procedure: SIGMOIDOSCOPY FLEXIBLE;  Surgeon: Ag Patel MD;  Location: [facility] ENDOSCOPY;  Service: Gastroenterology;  Laterality: N/A;  PRE OP - MAROON STOOLS  POST OP - RECTAL VARICES    TOTAL SHOULDER ARTHROPLASTY Left 2023    Procedure: reverse total shoulder arthroplasty;  Surgeon: Giovanni Denise MD;  [place] [facility] [place] [facility] [place]  Service: Orthopedics;  Laterality: Left;    TRIGGER POINT INJECTION           FAMILY HISTORY  Family History   Problem Relation Age of Onset    Rheumatologic disease Mother         congestive heart diseased    Osteoporosis Mother             Thyroid disease Father     Leukemia Father     Cancer Father         Leukemia  deseased     Broken bones Father     Clotting disorder Father     Drug abuse Brother     Malig Hyperthermia Neg Hx          SOCIAL HISTORY  Social History     Socioeconomic  History    Marital status:    Tobacco Use    Smoking status: Some Days     Current packs/day: 0.25     Average packs/day: 0.3 packs/day for 15.0 years (3.8 ttl pk-yrs)     Types: Cigarettes     Passive exposure: Current    Smokeless tobacco: Never    Tobacco comments:     Rarely smoke sometimes will to   Vaping Use    Vaping status: Never Used   Substance and Sexual Activity    Alcohol use: Yes     Alcohol/week: 5.0 - 10.0 standard drinks of alcohol     Types: 5 - 10 Standard drinks or equivalent per week    Drug use: Never    Sexual activity: Yes     Partners: Male     Birth control/protection: Post-menopausal     Comment: cinthia- menepausal         ALLERGIES  Benzonatate, Ketorolac tromethamine, Phenergan [promethazine hcl], Cucumber extract, and Promethazine-phenylephrine      REVIEW OF SYSTEMS  Review of Systems  Included in HPI  All systems reviewed and negative except for those discussed in HPI.      PHYSICAL EXAM    I have reviewed the triage vital signs and nursing notes.    ED Triage Vitals   Temp Heart Rate Resp BP SpO2   05/23/25 0630 05/23/25 0630 05/23/25 0630 05/23/25 0637 05/23/25 0630   97.7 °F (36.5 °C) 84 18 (!) 150/109 97 %      Temp src Heart Rate Source Patient Position BP Location FiO2 (%)   05/23/25 0630 05/23/25 0630 -- -- --   Oral Monitor          Physical Exam  GENERAL: alert, no acute distress  SKIN: Warm, dry  HENT: Normocephalic, atraumatic  EYES: no scleral icterus  CV: regular rhythm, regular rate  RESPIRATORY: normal effort, lungs clear  ABDOMEN: nondistended soft, mild generalized tenderness, normal bowel sounds, no guarding or rigidity  MUSCULOSKELETAL: no deformity  NEURO: alert, moves all extremities, follows commands            LAB RESULTS  Recent Results (from the past 24 hours)   Urinalysis With Microscopic If Indicated (No Culture) - Urine, Clean Catch    Collection Time: 05/23/25  7:14 AM    Specimen: Urine, Clean Catch   Result Value Ref Range    Color, UA Dark Yellow  (A) Yellow, Straw    Appearance, UA Clear Clear    pH, UA 6.0 5.0 - 8.0    Specific Gravity, UA 1.023 1.005 - 1.030    Glucose, UA Negative Negative    Ketones, UA Negative Negative    Bilirubin, UA Negative Negative    Blood, UA Negative Negative    Protein, UA 30 mg/dL (1+) (A) Negative    Leuk Esterase, UA Small (1+) (A) Negative    Nitrite, UA Negative Negative    Urobilinogen, UA 0.2 E.U./dL 0.2 - 1.0 E.U./dL   Urinalysis, Microscopic Only - Urine, Clean Catch    Collection Time: 05/23/25  7:14 AM    Specimen: Urine, Clean Catch   Result Value Ref Range    RBC, UA 0-2 None Seen, 0-2 /HPF    WBC, UA 11-20 (A) None Seen, 0-2 /HPF    Bacteria, UA None Seen None Seen /HPF    Squamous Epithelial Cells, UA 7-12 (A) None Seen, 0-2 /HPF    Hyaline Casts, UA 0-2 None Seen /LPF    Methodology Automated Microscopy    Comprehensive Metabolic Panel    Collection Time: 05/23/25  7:25 AM    Specimen: Blood   Result Value Ref Range    Glucose 142 (H) 65 - 99 mg/dL    BUN 12 6 - 20 mg/dL    Creatinine 0.70 0.57 - 1.00 mg/dL    Sodium 141 136 - 145 mmol/L    Potassium 3.7 3.5 - 5.2 mmol/L    Chloride 105 98 - 107 mmol/L    CO2 21.0 (L) 22.0 - 29.0 mmol/L    Calcium 9.2 8.6 - 10.5 mg/dL    Total Protein 7.7 6.0 - 8.5 g/dL    Albumin 4.4 3.5 - 5.2 g/dL    ALT (SGPT) 57 (H) 1 - 33 U/L    AST (SGOT) 89 (H) 1 - 32 U/L    Alkaline Phosphatase 129 (H) 39 - 117 U/L    Total Bilirubin 1.4 (H) 0.0 - 1.2 mg/dL    Globulin 3.3 gm/dL    A/G Ratio 1.3 g/dL    BUN/Creatinine Ratio 17.1 7.0 - 25.0    Anion Gap 15.0 5.0 - 15.0 mmol/L    eGFR 101.6 >60.0 mL/min/1.73   Lipase    Collection Time: 05/23/25  7:25 AM    Specimen: Blood   Result Value Ref Range    Lipase 22 13 - 60 U/L   CBC Auto Differential    Collection Time: 05/23/25  7:25 AM    Specimen: Blood   Result Value Ref Range    WBC 5.13 3.40 - 10.80 10*3/mm3    RBC 5.06 3.77 - 5.28 10*6/mm3    Hemoglobin 16.6 (H) 12.0 - 15.9 g/dL    Hematocrit 46.9 (H) 34.0 - 46.6 %    MCV 92.7 79.0 -  97.0 fL    MCH 32.8 26.6 - 33.0 pg    MCHC 35.4 31.5 - 35.7 g/dL    RDW 14.0 12.3 - 15.4 %    RDW-SD 47.4 37.0 - 54.0 fl    MPV 12.0 6.0 - 12.0 fL    Platelets 61 (L) 140 - 450 10*3/mm3    Neutrophil % 68.2 42.7 - 76.0 %    Lymphocyte % 20.9 19.6 - 45.3 %    Monocyte % 6.2 5.0 - 12.0 %    Eosinophil % 3.1 0.3 - 6.2 %    Basophil % 1.2 0.0 - 1.5 %    Immature Grans % 0.4 0.0 - 0.5 %    Neutrophils, Absolute 3.50 1.70 - 7.00 10*3/mm3    Lymphocytes, Absolute 1.07 0.70 - 3.10 10*3/mm3    Monocytes, Absolute 0.32 0.10 - 0.90 10*3/mm3    Eosinophils, Absolute 0.16 0.00 - 0.40 10*3/mm3    Basophils, Absolute 0.06 0.00 - 0.20 10*3/mm3    Immature Grans, Absolute 0.02 0.00 - 0.05 10*3/mm3   Ethanol    Collection Time: 05/23/25  7:25 AM    Specimen: Blood   Result Value Ref Range    Ethanol <10 0 - 10 mg/dL    Ethanol % <0.010 %         RADIOLOGY  CT Abdomen Pelvis With Contrast  Result Date: 5/23/2025  CT ABDOMEN PELVIS W CONTRAST-  DATE OF EXAM: 5/23/2025 8:23 AM  INDICATION: acute generalized abdominal pain, nausea and vomiting and diarrhea.  COMPARISON: CT 4/5/2025, 11/17/2024, 10/24/2024, and 1/16/2023.  TECHNIQUE: Multiple contiguous axial images were acquired through the abdomen and pelvis following the intravenous administration of 85 mL of Isovue-300. Reformatted coronal and sagittal sequences were also reviewed. Radiation dose reduction techniques were utilized, including automated exposure control and exposure modulation based on body size.  FINDINGS: Multifocal bibasilar subsegmental atelectasis and/are scarring.  Diffusely heterogeneous hepatic attenuation/enhancement with nodular liver contours, consistent with underlying hepatocellular disease/cirrhosis. Cholelithiasis without specific CT evidence of acute cholecystitis. The spleen is unremarkable. Calcifications throughout the pancreatic parenchyma, consistent with sequela chronic pancreatitis. The adrenal glands are unremarkable. Multifocal scarring in each  kidney. Both kidneys are otherwise unremarkable. The urinary bladder is nondistended. Diffuse urinary bladder wall thickening may be accentuated by under distention. The uterus and adnexa are unremarkable in CT appearance.  Mild diffuse gastric wall thickening could be accentuated by underdistention but could reflect a nonspecific gastritis. Mild circumferential diffuse low-attenuation thickening of the wall of the colon an rectum, which could reflect sequela of chronic inflammatory bowel disease. No bowel obstruction. The appendix is normal.  No free fluid in the abdomen or pelvis. No free intraperitoneal air. No pathologically enlarged lymph nodes in the abdomen or pelvis. Multiple venous varicosities in the abdomen and pelvis with recanalization of the periumbilical vein, consistent with sequela chronic portal venous hypertension. Normal variant circumaortic left renal vein. Diffuse osteopenia. Mild to moderate multilevel lumbar spondylosis. No acute osseous abnormality or concerning osseous lesion.       1. Diffuse gastric wall thickening could be accentuated by underdistention but could also reflect a nonspecific gastritis. 2. Circumferential predominately low-attenuation thickening of the wall of the colon and rectum, which could reflect sequela of chronic inflammatory bowel disease. 3. Cirrhotic liver morphology with evidence of portal venous hypertension including venous varicosities and recanalization of the periumbilical vein. 4. Sequela of chronic pancreatitis. 5. Cholelithiasis without specific CT evidence of acute cholecystitis.  This report was finalized on 5/23/2025 9:14 AM by Carlyle Stein MD on Workstation: [id number]          MEDICATIONS GIVEN IN ER  Medications   sodium chloride 0.9 % bolus 500 mL (0 mL Intravenous Stopped 5/23/25 0759)   ondansetron (ZOFRAN) injection 4 mg (4 mg Intravenous Given 5/23/25 0730)   morphine injection 4 mg (4 mg Intravenous Given 5/23/25 0731)   iopamidol  (ISOVUE-300) 61 % injection 85 mL (85 mL Intravenous Given 5/23/25 0835)         ORDERS PLACED DURING THIS VISIT:  Orders Placed This Encounter   Procedures    CT Abdomen Pelvis With Contrast    Comprehensive Metabolic Panel    Lipase    Urinalysis With Microscopic If Indicated (No Culture) - Urine, Clean Catch    CBC Auto Differential    Ethanol    Urinalysis, Microscopic Only - Urine, Clean Catch    CBC & Differential         OUTPATIENT MEDICATION MANAGEMENT:  Current Facility-Administered Medications Ordered in Epic   Medication Dose Route Frequency Provider Last Rate Last Admin    sodium chloride 0.9 % flush 10 mL  10 mL Intravenous Q12H Callie Quiroz MD        sodium chloride 0.9 % flush 10 mL  10 mL Intravenous PRN Callie Quiroz MD        sodium chloride 0.9 % flush 20 mL  20 mL Intravenous PRN Callie Quiroz MD         Current Outpatient Medications Ordered in Epic   Medication Sig Dispense Refill    amLODIPine (NORVASC) 2.5 MG tablet Take 1 tablet by mouth Daily.      B Complex Vitamins (VITAMIN B COMPLEX PO) Take  by mouth.      bismuth subsalicylate (PEPTO BISMOL) 262 MG/15ML suspension Take 15 mL by mouth Every 6 (Six) Hours As Needed for Indigestion. Pt reports filling predosed cup about 3/4 full and drinks daily.      brompheniramine-pseudoephedrine-DM 30-2-10 MG/5ML syrup Take 5 mL by mouth 4 (Four) Times a Day As Needed for Congestion or Cough. 118 mL 0    ferrous gluconate (FERGON) 324 MG tablet Take 1 tablet by mouth Daily.      ferrous sulfate 325 (65 FE) MG tablet Take 1 tablet by mouth Daily With Breakfast. Taking OTC      FLUoxetine (PROzac) 20 MG capsule       FLUoxetine (PROzac) 20 MG tablet Take 3 tablets by mouth Daily.      folic acid (FOLVITE) 1 MG tablet Take 1 tablet by mouth Daily.      hydrOXYzine (ATARAX) 25 MG tablet Take 1 tablet by mouth 3 (Three) Times a Day As Needed for Anxiety. 21 tablet 0    Ibuprofen 3 %, Gabapentin 10 %,  Baclofen 2 %, lidocaine 4 % Apply 1-2 g topically to the appropriate area as directed 3 (Three) to 4 (Four) times daily. 90 g 5    lactulose (CHRONULAC) 10 GM/15ML solution Take 15 mL by mouth Every Morning.      levothyroxine (SYNTHROID, LEVOTHROID) 88 MCG tablet Take 1 tablet by mouth Daily.      MAGnesium-Oxide 400 (240 Mg) MG tablet Take 1 tablet by mouth Daily. Takes OTC      mirtazapine (REMERON) 15 MG tablet Take 1 tablet by mouth Every Night. Takes PRN      Multiple Vitamin (MULTIVITAMIN) capsule Take 1 capsule by mouth Daily.      nadolol (CORGARD) 40 MG tablet Take 1 tablet by mouth Daily.      ondansetron ODT (ZOFRAN-ODT) 4 MG disintegrating tablet Place 1 tablet on the tongue Every 8 (Eight) Hours As Needed for Nausea or Vomiting. (Patient taking differently: Place 1 tablet on the tongue Every 8 (Eight) Hours As Needed for Nausea or Vomiting. Currently out of prescription, ran out about a week ago) 10 tablet 0    oxyCODONE (ROXICODONE) 5 MG immediate release tablet Take 1 tablet by mouth Every 6 (Six) Hours As Needed for Severe Pain. (Patient taking differently: Take 1 tablet by mouth Every 6 (Six) Hours As Needed for Severe Pain. Took 0.5 tablets last night) 12 tablet 0    pancrelipase, Lip-Prot-Amyl, (CREON) 30829-35247 units capsule delayed-release particles capsule Take 2 capsules by mouth 3 (Three) Times a Day With Meals. 180 capsule 0    pantoprazole (PROTONIX) 40 MG EC tablet Take 1 tablet by mouth Daily. Waiting for refill      potassium chloride 10 MEQ CR tablet Take 1 tablet by mouth. Taking OTC once daily      risperiDONE (risperDAL) 0.5 MG tablet Take 2 tablets by mouth Every Night. Pt ran out and last dose was Thursday night      solifenacin (VESICARE) 10 MG tablet Take 1 tablet by mouth. Takes 1-2 times per week, does not like SE (dry mouth)      thiamine (VITAMIN B1) 100 MG tablet Take 1 tablet by mouth Daily. 30 tablet 0         PROCEDURES  Procedures            PROGRESS, DATA ANALYSIS,  CONSULTS, AND MEDICAL DECISION MAKING  All labs have been independently interpreted by me.  All radiology studies have been reviewed by me. All EKG's have been independently viewed and interpreted by me.  Discussion below represents my analysis of pertinent findings related to patient's condition, differential diagnosis, treatment plan and final disposition.    DIFFERENTIAL    Differential diagnosis includes but is not limited to:  - hepatobiliary pathology such as cholecystitis, cholangitis, and symptomatic cholelithiasis  - Pancreatitis  - Dyspepsia  - Small bowel obstruction  - Appendicitis  - Diverticulitis  - UTI including pyelonephritis  - Ureteral stone  - Zoster  - Colitis, including infectious and ischemic  - Atypical ACS      Clinical Scores:                  ED Course as of 05/23/25 1006   Fri May 23, 2025   1004 Glucose(!): 142 [KA]   1004 Creatinine: 0.70 [KA]   1004 ALT (SGPT)(!): 57 [KA]   1004 Alkaline Phosphatase(!): 129 [KA]   1004 Total Bilirubin(!): 1.4 [KA]   1004 AST (SGOT)(!): 89  Stable elevated LFTs [KA]   1004 Platelets(!): 61  Chronic thrombocytopenia, improved from priors over the last 6 months [KA]   1004 Ethanol: <10 [KA]   1004 Lipase: 22 [KA]   1004 Bacteria, UA: None Seen [KA]   1004 The patient, she is resting, no distress.  Has not had any nausea vomiting or diarrhea over the course of her ER evaluation.  Suspect an infectious cause as she had sudden onset of intense symptoms and they are now resolved.  Abdomen is benign.  Counseled her on all of her lab and imaging results including all incidental findings. No history of IBD, she is established with gastroenterology and I've encouraged her to follow up with them as well.  Overall labs and imaging appear stable.  Close follow-up with her primary care doctor and return precautions discussed. [KA]      ED Course User Index  [KA] Yanira Lee PA-C             AS OF 10:06 EDT VITALS:    BP - 149/90  HR - 70  TEMP - 97.7 °F (36.5  °C) (Oral)  O2 SATS - 97%    COMPLEXITY OF CARE  Admission was considered but after careful review of the patient's presentation, physical examination, diagnostic results, and response to treatment the patient may be safely discharged with outpatient follow-up.      DIAGNOSIS  Final diagnoses:   Nausea vomiting and diarrhea   History of cirrhosis         DISPOSITION  ED Disposition       ED Disposition   Discharge    Condition   Stable    Comment   --                  FOLLOW UP  Tylor Davis  [address]  [facility] [address]  [facility] [id number] [phone]    In 2 days      [facility] EMERGENCY DEPARTMENT  [address] [facility] [address] [phone]    If symptoms worsen or any concerns        Prescribed Medications     Medication List        Changed      ondansetron ODT 4 MG disintegrating tablet  Commonly known as: ZOFRAN-ODT  Place 1 tablet on the tongue Every 8 (Eight) Hours As Needed for Nausea or Vomiting.  What changed: additional instructions     oxyCODONE 5 MG immediate release tablet  Commonly known as: ROXICODONE  Take 1 tablet by mouth Every 6 (Six) Hours As Needed for Severe Pain.  What changed: additional instructions                        Please note that portions of this document were completed with a voice recognition program.    Note Disclaimer: At [facility], we believe that sharing information builds trust and better relationships. You are receiving this note because you recently visited [facility]. It is possible you will see health information before a provider has talked with you about it. This kind of information can be easy to misunderstand. To help you fully understand what it means for your health, we urge you to discuss this note with your provider.         Yanira Lee PA-C  05/23/25 [id number]

## 2025-05-24 NOTE — ED PROVIDER NOTES
EMERGENCY DEPARTMENT MD ATTESTATION NOTE    Room Number:  08/08  PCP: Tylor Davis  Independent Historians: Patient    HPI:    Context: Bekah Gibson is a 54 y.o. female with a medical history of alcohol dependency, cirrhosis, pancreatitis who presents to the ED c/o acute abdominal pain, nausea, vomiting.  Patient has had associated diarrhea.  No hematemesis or bright red blood per rectum.  Abdominal pain is described as crampy in sensation.          PHYSICAL EXAM    I have reviewed the triage vital signs and nursing notes.    ED Triage Vitals   Temp Heart Rate Resp BP SpO2   05/23/25 0630 05/23/25 0630 05/23/25 0630 05/23/25 0637 05/23/25 0630   97.7 °F (36.5 °C) 84 18 (!) 150/109 97 %      Temp src Heart Rate Source Patient Position BP Location FiO2 (%)   05/23/25 0630 05/23/25 0630 05/23/25 1001 05/23/25 1001 --   Oral Monitor Lying Right arm        Physical Exam  GENERAL: alert, no acute distress  SKIN: Warm, dry  HENT: Normocephalic, atraumatic  EYES: no scleral icterus  CV: regular rhythm, regular rate  RESPIRATORY: normal effort, lungs clear  ABDOMEN: soft, nontender, nondistended  MUSCULOSKELETAL: no deformity  NEURO: alert, moves all extremities, follows commands            MEDICATIONS GIVEN IN ER  Medications   sodium chloride 0.9 % bolus 500 mL (0 mL Intravenous Stopped 5/23/25 0759)   ondansetron (ZOFRAN) injection 4 mg (4 mg Intravenous Given 5/23/25 0730)   morphine injection 4 mg (4 mg Intravenous Given 5/23/25 0731)   iopamidol (ISOVUE-300) 61 % injection 85 mL (85 mL Intravenous Given 5/23/25 0835)         ORDERS PLACED DURING THIS VISIT:  Orders Placed This Encounter   Procedures    CT Abdomen Pelvis With Contrast    Comprehensive Metabolic Panel    Lipase    Urinalysis With Microscopic If Indicated (No Culture) - Urine, Clean Catch    CBC Auto Differential    Ethanol    Urinalysis, Microscopic Only - Urine, Clean Catch    CBC & Differential         PROCEDURES  Procedures            PROGRESS,  DATA ANALYSIS, CONSULTS, AND MEDICAL DECISION MAKING  All labs have been independently interpreted by me.  All radiology studies have been reviewed by me. All EKG's have been independently viewed and interpreted by me.  Discussion below represents my analysis of pertinent findings related to patient's condition, differential diagnosis, treatment plan and final disposition.    Differential diagnosis includes but is not limited to pancreatitis, gastroenteritis, diverticulitis, electrolyte abnormality, dehydration, gastritis.    Clinical Scores:                                     ED Course as of 05/24/25 0717   Fri May 23, 2025   1004 Glucose(!): 142 [KA]   1004 Creatinine: 0.70 [KA]   1004 ALT (SGPT)(!): 57 [KA]   1004 Alkaline Phosphatase(!): 129 [KA]   1004 Total Bilirubin(!): 1.4 [KA]   1004 AST (SGOT)(!): 89  Stable elevated LFTs [KA]   1004 Platelets(!): 61  Chronic thrombocytopenia, improved from priors over the last 6 months [KA]   1004 Ethanol: <10 [KA]   1004 Lipase: 22 [KA]   1004 Bacteria, UA: None Seen [KA]   1004 The patient, she is resting, no distress.  Has not had any nausea vomiting or diarrhea over the course of her ER evaluation.  Suspect an infectious cause as she had sudden onset of intense symptoms and they are now resolved.  Abdomen is benign.  Counseled her on all of her lab and imaging results including all incidental findings. No history of IBD, she is established with gastroenterology and I've encouraged her to follow up with them as well.  Overall labs and imaging appear stable.  Close follow-up with her primary care doctor and return precautions discussed. [KA]      ED Course User Index  [KA] Yanira Lee PA-C       MDM: Workup in the emergency department is notable for elevation of liver enzymes consistent with patient's alcohol use, no other significant abnormalities outside of thrombocytopenia.  Radiological evaluation demonstrates gastric wall thickening.  Suspect patient's  symptoms are secondary to her alcohol use likely gastritis.  Will have patient follow-up with her gastroenterologist on outpatient basis.      COMPLEXITY OF CARE  Admission was considered but after careful review of the patient's presentation, physical examination, diagnostic results, and response to treatment the patient may be safely discharged with outpatient follow-up.    Please note that portions of this document were completed with a voice recognition program.    Note Disclaimer: At [facility], we believe that sharing information builds trust and better relationships. You are receiving this note because you recently visited [facility]. It is possible you will see health information before a provider has talked with you about it. This kind of information can be easy to misunderstand. To help you fully understand what it means for your health, we urge you to discuss this note with your provider.         Familia Crum MD  05/24/25 0719

## 2025-05-30 ENCOUNTER — APPOINTMENT (OUTPATIENT)
Dept: CT IMAGING | Facility: HOSPITAL | Age: 55
End: 2025-05-30
Payer: MEDICAID

## 2025-05-30 ENCOUNTER — HOSPITAL ENCOUNTER (INPATIENT)
Facility: HOSPITAL | Age: 55
LOS: 1 days | Discharge: HOME | End: 2025-06-03
Attending: EMERGENCY MEDICINE | Admitting: STUDENT IN AN ORGANIZED HEALTH CARE EDUCATION/TRAINING PROGRAM
Payer: MEDICAID

## 2025-05-30 ENCOUNTER — APPOINTMENT (OUTPATIENT)
Dept: ULTRASOUND IMAGING | Facility: HOSPITAL | Age: 55
End: 2025-05-30
Payer: MEDICAID

## 2025-05-30 DIAGNOSIS — K29.20 ACUTE ALCOHOLIC GASTRITIS WITHOUT HEMORRHAGE: ICD-10-CM

## 2025-05-30 DIAGNOSIS — K80.20 CALCULUS OF GALLBLADDER WITHOUT CHOLECYSTITIS WITHOUT OBSTRUCTION: ICD-10-CM

## 2025-05-30 DIAGNOSIS — F10.939 ALCOHOL WITHDRAWAL SYNDROME WITH COMPLICATION: ICD-10-CM

## 2025-05-30 DIAGNOSIS — R10.10 UPPER ABDOMINAL PAIN: Primary | ICD-10-CM

## 2025-05-30 DIAGNOSIS — R74.01 TRANSAMINITIS: ICD-10-CM

## 2025-05-30 LAB
ALBUMIN SERPL-MCNC: 4.4 G/DL (ref 3.5–5.2)
ALBUMIN/GLOB SERPL: 1.3 G/DL
ALP SERPL-CCNC: 121 U/L (ref 39–117)
ALT SERPL W P-5'-P-CCNC: 70 U/L (ref 1–33)
ANION GAP SERPL CALCULATED.3IONS-SCNC: 18 MMOL/L (ref 5–15)
AST SERPL-CCNC: 120 U/L (ref 1–32)
BASOPHILS # BLD AUTO: 0.03 10*3/MM3 (ref 0–0.2)
BASOPHILS NFR BLD AUTO: 0.6 % (ref 0–1.5)
BILIRUB SERPL-MCNC: 2 MG/DL (ref 0–1.2)
BILIRUB UR QL STRIP: NEGATIVE
BUN SERPL-MCNC: 12 MG/DL (ref 6–20)
BUN/CREAT SERPL: 16.2 (ref 7–25)
CALCIUM SPEC-SCNC: 8.9 MG/DL (ref 8.6–10.5)
CHLORIDE SERPL-SCNC: 106 MMOL/L (ref 98–107)
CLARITY UR: CLEAR
CO2 SERPL-SCNC: 18 MMOL/L (ref 22–29)
COLOR UR: ABNORMAL
CREAT SERPL-MCNC: 0.74 MG/DL (ref 0.57–1)
DEPRECATED RDW RBC AUTO: 50.4 FL (ref 37–54)
EGFRCR SERPLBLD CKD-EPI 2021: 95.1 ML/MIN/1.73
EOSINOPHIL # BLD AUTO: 0.03 10*3/MM3 (ref 0–0.4)
EOSINOPHIL NFR BLD AUTO: 0.6 % (ref 0.3–6.2)
ERYTHROCYTE [DISTWIDTH] IN BLOOD BY AUTOMATED COUNT: 14.3 % (ref 12.3–15.4)
ETHANOL BLD-MCNC: 122 MG/DL (ref 0–10)
ETHANOL UR QL: 0.12 %
GLOBULIN UR ELPH-MCNC: 3.3 GM/DL
GLUCOSE SERPL-MCNC: 134 MG/DL (ref 65–99)
GLUCOSE UR STRIP-MCNC: NEGATIVE MG/DL
HCT VFR BLD AUTO: 47.4 % (ref 34–46.6)
HGB BLD-MCNC: 16.5 G/DL (ref 12–15.9)
HGB UR QL STRIP.AUTO: NEGATIVE
IMM GRANULOCYTES # BLD AUTO: 0.01 10*3/MM3 (ref 0–0.05)
IMM GRANULOCYTES NFR BLD AUTO: 0.2 % (ref 0–0.5)
KETONES UR QL STRIP: ABNORMAL
LEUKOCYTE ESTERASE UR QL STRIP.AUTO: NEGATIVE
LIPASE SERPL-CCNC: 19 U/L (ref 13–60)
LYMPHOCYTES # BLD AUTO: 1.46 10*3/MM3 (ref 0.7–3.1)
LYMPHOCYTES NFR BLD AUTO: 29.6 % (ref 19.6–45.3)
MCH RBC QN AUTO: 33.1 PG (ref 26.6–33)
MCHC RBC AUTO-ENTMCNC: 34.8 G/DL (ref 31.5–35.7)
MCV RBC AUTO: 95.2 FL (ref 79–97)
MONOCYTES # BLD AUTO: 0.44 10*3/MM3 (ref 0.1–0.9)
MONOCYTES NFR BLD AUTO: 8.9 % (ref 5–12)
NEUTROPHILS NFR BLD AUTO: 2.97 10*3/MM3 (ref 1.7–7)
NEUTROPHILS NFR BLD AUTO: 60.1 % (ref 42.7–76)
NITRITE UR QL STRIP: NEGATIVE
NRBC BLD AUTO-RTO: 0 /100 WBC (ref 0–0.2)
PH UR STRIP.AUTO: 6 [PH] (ref 5–8)
PLATELET # BLD AUTO: 53 10*3/MM3 (ref 140–450)
PMV BLD AUTO: 12 FL (ref 6–12)
POTASSIUM SERPL-SCNC: 3.2 MMOL/L (ref 3.5–5.2)
PROT SERPL-MCNC: 7.7 G/DL (ref 6–8.5)
PROT UR QL STRIP: ABNORMAL
RBC # BLD AUTO: 4.98 10*6/MM3 (ref 3.77–5.28)
SODIUM SERPL-SCNC: 142 MMOL/L (ref 136–145)
SP GR UR STRIP: >1.03 (ref 1–1.03)
UROBILINOGEN UR QL STRIP: ABNORMAL
WBC NRBC COR # BLD AUTO: 4.94 10*3/MM3 (ref 3.4–10.8)

## 2025-05-30 PROCEDURE — 82077 ASSAY SPEC XCP UR&BREATH IA: CPT | Performed by: EMERGENCY MEDICINE

## 2025-05-30 PROCEDURE — 96365 THER/PROPH/DIAG IV INF INIT: CPT

## 2025-05-30 PROCEDURE — 83690 ASSAY OF LIPASE: CPT | Performed by: PHYSICIAN ASSISTANT

## 2025-05-30 PROCEDURE — 85025 COMPLETE CBC W/AUTO DIFF WBC: CPT | Performed by: PHYSICIAN ASSISTANT

## 2025-05-30 PROCEDURE — 99254 IP/OBS CNSLTJ NEW/EST MOD 60: CPT | Performed by: NURSE PRACTITIONER

## 2025-05-30 PROCEDURE — 25010000002 ONDANSETRON PER 1 MG: Performed by: EMERGENCY MEDICINE

## 2025-05-30 PROCEDURE — 74177 CT ABD & PELVIS W/CONTRAST: CPT

## 2025-05-30 PROCEDURE — 96375 TX/PRO/DX INJ NEW DRUG ADDON: CPT

## 2025-05-30 PROCEDURE — 25010000002 FAMOTIDINE 10 MG/ML SOLUTION: Performed by: PHYSICIAN ASSISTANT

## 2025-05-30 PROCEDURE — 81003 URINALYSIS AUTO W/O SCOPE: CPT | Performed by: PHYSICIAN ASSISTANT

## 2025-05-30 PROCEDURE — 25810000003 SODIUM CHLORIDE 0.9 % SOLUTION: Performed by: PHYSICIAN ASSISTANT

## 2025-05-30 PROCEDURE — G0378 HOSPITAL OBSERVATION PER HR: HCPCS

## 2025-05-30 PROCEDURE — 25010000002 LORAZEPAM PER 2 MG: Performed by: EMERGENCY MEDICINE

## 2025-05-30 PROCEDURE — 25510000001 IOPAMIDOL 61 % SOLUTION: Performed by: EMERGENCY MEDICINE

## 2025-05-30 PROCEDURE — 96376 TX/PRO/DX INJ SAME DRUG ADON: CPT

## 2025-05-30 PROCEDURE — 76705 ECHO EXAM OF ABDOMEN: CPT

## 2025-05-30 PROCEDURE — 80053 COMPREHEN METABOLIC PANEL: CPT | Performed by: PHYSICIAN ASSISTANT

## 2025-05-30 PROCEDURE — P9612 CATHETERIZE FOR URINE SPEC: HCPCS

## 2025-05-30 PROCEDURE — 25010000002 FOLIC ACID 5 MG/ML SOLUTION 10 ML VIAL: Performed by: PHYSICIAN ASSISTANT

## 2025-05-30 PROCEDURE — 25010000002 ONDANSETRON PER 1 MG: Performed by: PHYSICIAN ASSISTANT

## 2025-05-30 PROCEDURE — 25010000002 DROPERIDOL PER 5 MG: Performed by: PHYSICIAN ASSISTANT

## 2025-05-30 PROCEDURE — 25010000002 LORAZEPAM PER 2 MG: Performed by: STUDENT IN AN ORGANIZED HEALTH CARE EDUCATION/TRAINING PROGRAM

## 2025-05-30 PROCEDURE — 99285 EMERGENCY DEPT VISIT HI MDM: CPT

## 2025-05-30 PROCEDURE — 25010000002 THIAMINE PER 100 MG: Performed by: STUDENT IN AN ORGANIZED HEALTH CARE EDUCATION/TRAINING PROGRAM

## 2025-05-30 PROCEDURE — 25010000002 THIAMINE PER 100 MG: Performed by: PHYSICIAN ASSISTANT

## 2025-05-30 PROCEDURE — 25010000002 MORPHINE PER 10 MG: Performed by: EMERGENCY MEDICINE

## 2025-05-30 RX ORDER — ONDANSETRON 2 MG/ML
4 INJECTION INTRAMUSCULAR; INTRAVENOUS ONCE
Status: COMPLETED | OUTPATIENT
Start: 2025-05-30 | End: 2025-05-30

## 2025-05-30 RX ORDER — SODIUM CHLORIDE 0.9 % (FLUSH) 0.9 %
10 SYRINGE (ML) INJECTION AS NEEDED
Status: DISCONTINUED | OUTPATIENT
Start: 2025-05-30 | End: 2025-06-03 | Stop reason: HOSPADM

## 2025-05-30 RX ORDER — MULTIPLE VITAMINS W/ MINERALS TAB 9MG-400MCG
1 TAB ORAL DAILY
Status: DISCONTINUED | OUTPATIENT
Start: 2025-05-30 | End: 2025-06-03 | Stop reason: HOSPADM

## 2025-05-30 RX ORDER — BUSPIRONE HYDROCHLORIDE 10 MG/1
10 TABLET ORAL 2 TIMES DAILY
COMMUNITY
Start: 2025-04-11

## 2025-05-30 RX ORDER — LORAZEPAM 2 MG/ML
2 INJECTION INTRAMUSCULAR
Status: DISCONTINUED | OUTPATIENT
Start: 2025-05-30 | End: 2025-06-03 | Stop reason: HOSPADM

## 2025-05-30 RX ORDER — ACETAMINOPHEN 325 MG/1
650 TABLET ORAL EVERY 4 HOURS PRN
Status: DISCONTINUED | OUTPATIENT
Start: 2025-05-30 | End: 2025-06-03 | Stop reason: HOSPADM

## 2025-05-30 RX ORDER — ONDANSETRON 4 MG/1
4 TABLET, ORALLY DISINTEGRATING ORAL EVERY 6 HOURS PRN
Status: DISCONTINUED | OUTPATIENT
Start: 2025-05-30 | End: 2025-06-03 | Stop reason: HOSPADM

## 2025-05-30 RX ORDER — DROPERIDOL 2.5 MG/ML
1.25 INJECTION, SOLUTION INTRAMUSCULAR; INTRAVENOUS ONCE
Status: COMPLETED | OUTPATIENT
Start: 2025-05-30 | End: 2025-05-30

## 2025-05-30 RX ORDER — FAMOTIDINE 10 MG/ML
20 INJECTION, SOLUTION INTRAVENOUS ONCE
Status: COMPLETED | OUTPATIENT
Start: 2025-05-30 | End: 2025-05-30

## 2025-05-30 RX ORDER — LORAZEPAM 2 MG/ML
1 INJECTION INTRAMUSCULAR ONCE
Status: COMPLETED | OUTPATIENT
Start: 2025-05-30 | End: 2025-05-30

## 2025-05-30 RX ORDER — AMLODIPINE BESYLATE 2.5 MG/1
2.5 TABLET ORAL DAILY
Status: DISCONTINUED | OUTPATIENT
Start: 2025-05-31 | End: 2025-06-03 | Stop reason: HOSPADM

## 2025-05-30 RX ORDER — ACETAMINOPHEN 650 MG/1
650 SUPPOSITORY RECTAL EVERY 4 HOURS PRN
Status: DISCONTINUED | OUTPATIENT
Start: 2025-05-30 | End: 2025-06-03 | Stop reason: HOSPADM

## 2025-05-30 RX ORDER — THIAMINE HYDROCHLORIDE 100 MG/ML
200 INJECTION, SOLUTION INTRAMUSCULAR; INTRAVENOUS EVERY 8 HOURS SCHEDULED
Status: DISCONTINUED | OUTPATIENT
Start: 2025-05-30 | End: 2025-06-03 | Stop reason: HOSPADM

## 2025-05-30 RX ORDER — ESCITALOPRAM OXALATE 20 MG/1
20 TABLET ORAL EVERY MORNING
COMMUNITY
Start: 2025-05-22

## 2025-05-30 RX ORDER — ONDANSETRON 2 MG/ML
4 INJECTION INTRAMUSCULAR; INTRAVENOUS EVERY 6 HOURS PRN
Status: DISCONTINUED | OUTPATIENT
Start: 2025-05-30 | End: 2025-06-03 | Stop reason: HOSPADM

## 2025-05-30 RX ORDER — LEVOTHYROXINE SODIUM 100 UG/1
100 TABLET ORAL
Status: DISCONTINUED | OUTPATIENT
Start: 2025-05-31 | End: 2025-06-03 | Stop reason: HOSPADM

## 2025-05-30 RX ORDER — IOPAMIDOL 612 MG/ML
100 INJECTION, SOLUTION INTRAVASCULAR
Status: COMPLETED | OUTPATIENT
Start: 2025-05-30 | End: 2025-05-30

## 2025-05-30 RX ORDER — THIAMINE HYDROCHLORIDE 100 MG/ML
200 INJECTION, SOLUTION INTRAMUSCULAR; INTRAVENOUS ONCE
Status: COMPLETED | OUTPATIENT
Start: 2025-05-30 | End: 2025-05-30

## 2025-05-30 RX ORDER — AMLODIPINE BESYLATE 5 MG/1
2.5 TABLET ORAL ONCE
Status: COMPLETED | OUTPATIENT
Start: 2025-05-30 | End: 2025-05-30

## 2025-05-30 RX ORDER — SODIUM CHLORIDE 9 MG/ML
40 INJECTION, SOLUTION INTRAVENOUS AS NEEDED
Status: DISCONTINUED | OUTPATIENT
Start: 2025-05-30 | End: 2025-06-03 | Stop reason: HOSPADM

## 2025-05-30 RX ORDER — SODIUM CHLORIDE 9 MG/ML
1000 INJECTION, SOLUTION INTRAVENOUS ONCE
Status: COMPLETED | OUTPATIENT
Start: 2025-05-30 | End: 2025-05-30

## 2025-05-30 RX ORDER — POTASSIUM CHLORIDE 1500 MG/1
40 TABLET, EXTENDED RELEASE ORAL ONCE
Status: COMPLETED | OUTPATIENT
Start: 2025-05-30 | End: 2025-05-30

## 2025-05-30 RX ORDER — BISACODYL 5 MG/1
5 TABLET, DELAYED RELEASE ORAL DAILY PRN
Status: DISCONTINUED | OUTPATIENT
Start: 2025-05-30 | End: 2025-06-03 | Stop reason: HOSPADM

## 2025-05-30 RX ORDER — MIRTAZAPINE 15 MG/1
15 TABLET, FILM COATED ORAL NIGHTLY
Status: DISCONTINUED | OUTPATIENT
Start: 2025-05-30 | End: 2025-06-03 | Stop reason: HOSPADM

## 2025-05-30 RX ORDER — FOLIC ACID 1 MG/1
1 TABLET ORAL DAILY
Status: DISCONTINUED | OUTPATIENT
Start: 2025-05-30 | End: 2025-06-03 | Stop reason: HOSPADM

## 2025-05-30 RX ORDER — SODIUM CHLORIDE 0.9 % (FLUSH) 0.9 %
10 SYRINGE (ML) INJECTION EVERY 12 HOURS SCHEDULED
Status: DISCONTINUED | OUTPATIENT
Start: 2025-05-30 | End: 2025-06-03 | Stop reason: HOSPADM

## 2025-05-30 RX ORDER — PANTOPRAZOLE SODIUM 40 MG/10ML
40 INJECTION, POWDER, LYOPHILIZED, FOR SOLUTION INTRAVENOUS
Status: DISCONTINUED | OUTPATIENT
Start: 2025-05-30 | End: 2025-06-02

## 2025-05-30 RX ORDER — HYDROMORPHONE HYDROCHLORIDE 1 MG/ML
0.5 INJECTION, SOLUTION INTRAMUSCULAR; INTRAVENOUS; SUBCUTANEOUS
Status: DISCONTINUED | OUTPATIENT
Start: 2025-05-30 | End: 2025-06-03 | Stop reason: HOSPADM

## 2025-05-30 RX ORDER — POLYETHYLENE GLYCOL 3350 17 G/17G
17 POWDER, FOR SOLUTION ORAL DAILY PRN
Status: DISCONTINUED | OUTPATIENT
Start: 2025-05-30 | End: 2025-06-03 | Stop reason: HOSPADM

## 2025-05-30 RX ORDER — MORPHINE SULFATE 2 MG/ML
2 INJECTION, SOLUTION INTRAMUSCULAR; INTRAVENOUS ONCE
Status: COMPLETED | OUTPATIENT
Start: 2025-05-30 | End: 2025-05-30

## 2025-05-30 RX ORDER — AMOXICILLIN 250 MG
2 CAPSULE ORAL 2 TIMES DAILY PRN
Status: DISCONTINUED | OUTPATIENT
Start: 2025-05-30 | End: 2025-06-03 | Stop reason: HOSPADM

## 2025-05-30 RX ORDER — NITROGLYCERIN 0.4 MG/1
0.4 TABLET SUBLINGUAL
Status: DISCONTINUED | OUTPATIENT
Start: 2025-05-30 | End: 2025-06-03 | Stop reason: HOSPADM

## 2025-05-30 RX ORDER — ACETAMINOPHEN 160 MG/5ML
650 SOLUTION ORAL EVERY 4 HOURS PRN
Status: DISCONTINUED | OUTPATIENT
Start: 2025-05-30 | End: 2025-06-03 | Stop reason: HOSPADM

## 2025-05-30 RX ORDER — GABAPENTIN 100 MG/1
100 CAPSULE ORAL 3 TIMES DAILY
Status: DISCONTINUED | OUTPATIENT
Start: 2025-05-30 | End: 2025-06-03 | Stop reason: HOSPADM

## 2025-05-30 RX ORDER — LACTULOSE 10 G/15ML
10 SOLUTION ORAL EVERY MORNING
Status: DISCONTINUED | OUTPATIENT
Start: 2025-05-31 | End: 2025-05-30

## 2025-05-30 RX ORDER — BISACODYL 10 MG
10 SUPPOSITORY, RECTAL RECTAL DAILY PRN
Status: DISCONTINUED | OUTPATIENT
Start: 2025-05-30 | End: 2025-06-03 | Stop reason: HOSPADM

## 2025-05-30 RX ORDER — LORAZEPAM 1 MG/1
1 TABLET ORAL
Status: DISCONTINUED | OUTPATIENT
Start: 2025-05-30 | End: 2025-06-03 | Stop reason: HOSPADM

## 2025-05-30 RX ORDER — MORPHINE SULFATE 2 MG/ML
4 INJECTION, SOLUTION INTRAMUSCULAR; INTRAVENOUS ONCE
Status: COMPLETED | OUTPATIENT
Start: 2025-05-30 | End: 2025-05-30

## 2025-05-30 RX ORDER — LORAZEPAM 2 MG/ML
1 INJECTION INTRAMUSCULAR
Status: DISCONTINUED | OUTPATIENT
Start: 2025-05-30 | End: 2025-06-03 | Stop reason: HOSPADM

## 2025-05-30 RX ORDER — GABAPENTIN 100 MG/1
100 CAPSULE ORAL 3 TIMES DAILY
COMMUNITY
Start: 2025-05-22

## 2025-05-30 RX ORDER — BUSPIRONE HYDROCHLORIDE 10 MG/1
10 TABLET ORAL 2 TIMES DAILY
Status: DISCONTINUED | OUTPATIENT
Start: 2025-05-30 | End: 2025-06-03 | Stop reason: HOSPADM

## 2025-05-30 RX ORDER — AMANTADINE HYDROCHLORIDE 100 MG/1
TABLET ORAL
COMMUNITY
Start: 2025-05-14 | End: 2025-05-30

## 2025-05-30 RX ORDER — OXYCODONE HYDROCHLORIDE 5 MG/1
5 TABLET ORAL EVERY 6 HOURS PRN
Refills: 0 | Status: DISCONTINUED | OUTPATIENT
Start: 2025-05-30 | End: 2025-06-03 | Stop reason: HOSPADM

## 2025-05-30 RX ORDER — LORAZEPAM 1 MG/1
2 TABLET ORAL
Status: DISCONTINUED | OUTPATIENT
Start: 2025-05-30 | End: 2025-06-03 | Stop reason: HOSPADM

## 2025-05-30 RX ORDER — ESCITALOPRAM OXALATE 10 MG/1
20 TABLET ORAL EVERY MORNING
Status: DISCONTINUED | OUTPATIENT
Start: 2025-05-31 | End: 2025-06-03 | Stop reason: HOSPADM

## 2025-05-30 RX ADMIN — AMLODIPINE BESYLATE 2.5 MG: 5 TABLET ORAL at 09:37

## 2025-05-30 RX ADMIN — THIAMINE HYDROCHLORIDE 200 MG: 100 INJECTION, SOLUTION INTRAMUSCULAR; INTRAVENOUS at 03:52

## 2025-05-30 RX ADMIN — MORPHINE SULFATE 4 MG: 2 INJECTION, SOLUTION INTRAMUSCULAR; INTRAVENOUS at 03:52

## 2025-05-30 RX ADMIN — FOLIC ACID 1 MG: 5 INJECTION, SOLUTION INTRAMUSCULAR; INTRAVENOUS; SUBCUTANEOUS at 04:08

## 2025-05-30 RX ADMIN — RIFAXIMIN 550 MG: 550 TABLET ORAL at 20:46

## 2025-05-30 RX ADMIN — OXYCODONE 5 MG: 5 TABLET ORAL at 22:17

## 2025-05-30 RX ADMIN — MIRTAZAPINE 15 MG: 15 TABLET, FILM COATED ORAL at 20:46

## 2025-05-30 RX ADMIN — PANTOPRAZOLE SODIUM 40 MG: 40 INJECTION, POWDER, FOR SOLUTION INTRAVENOUS at 15:33

## 2025-05-30 RX ADMIN — ONDANSETRON 4 MG: 2 INJECTION, SOLUTION INTRAMUSCULAR; INTRAVENOUS at 06:32

## 2025-05-30 RX ADMIN — Medication 1 TABLET: at 14:27

## 2025-05-30 RX ADMIN — Medication 10 ML: at 20:46

## 2025-05-30 RX ADMIN — FOLIC ACID 1 MG: 1 TABLET ORAL at 14:27

## 2025-05-30 RX ADMIN — LORAZEPAM 1 MG: 2 INJECTION INTRAMUSCULAR; INTRAVENOUS at 09:40

## 2025-05-30 RX ADMIN — THIAMINE HYDROCHLORIDE 200 MG: 100 INJECTION, SOLUTION INTRAMUSCULAR; INTRAVENOUS at 22:10

## 2025-05-30 RX ADMIN — LORAZEPAM 2 MG: 2 INJECTION INTRAMUSCULAR; INTRAVENOUS at 14:28

## 2025-05-30 RX ADMIN — GABAPENTIN 100 MG: 100 CAPSULE ORAL at 20:46

## 2025-05-30 RX ADMIN — ONDANSETRON 4 MG: 2 INJECTION, SOLUTION INTRAMUSCULAR; INTRAVENOUS at 03:51

## 2025-05-30 RX ADMIN — LORAZEPAM 1 MG: 2 INJECTION INTRAMUSCULAR; INTRAVENOUS at 15:38

## 2025-05-30 RX ADMIN — POTASSIUM CHLORIDE 40 MEQ: 1500 TABLET, EXTENDED RELEASE ORAL at 09:27

## 2025-05-30 RX ADMIN — GABAPENTIN 100 MG: 100 CAPSULE ORAL at 15:33

## 2025-05-30 RX ADMIN — SODIUM CHLORIDE 1000 ML: 9 INJECTION, SOLUTION INTRAVENOUS at 03:48

## 2025-05-30 RX ADMIN — DROPERIDOL 1.25 MG: 2.5 INJECTION, SOLUTION INTRAMUSCULAR; INTRAVENOUS at 05:03

## 2025-05-30 RX ADMIN — THIAMINE HYDROCHLORIDE 200 MG: 100 INJECTION, SOLUTION INTRAMUSCULAR; INTRAVENOUS at 14:27

## 2025-05-30 RX ADMIN — Medication 10 ML: at 14:43

## 2025-05-30 RX ADMIN — LORAZEPAM 1 MG: 2 INJECTION INTRAMUSCULAR; INTRAVENOUS at 17:18

## 2025-05-30 RX ADMIN — LORAZEPAM 2 MG: 2 INJECTION INTRAMUSCULAR; INTRAVENOUS at 12:52

## 2025-05-30 RX ADMIN — MORPHINE SULFATE 2 MG: 2 INJECTION, SOLUTION INTRAMUSCULAR; INTRAVENOUS at 06:32

## 2025-05-30 RX ADMIN — IOPAMIDOL 85 ML: 612 INJECTION, SOLUTION INTRAVENOUS at 05:40

## 2025-05-30 RX ADMIN — BUSPIRONE HYDROCHLORIDE 10 MG: 10 TABLET ORAL at 20:46

## 2025-05-30 RX ADMIN — FAMOTIDINE 20 MG: 10 INJECTION, SOLUTION INTRAVENOUS at 04:41

## 2025-05-30 NOTE — PLAN OF CARE
Goal Outcome Evaluation:  Plan of Care Reviewed With: patient        Progress: no change  Outcome Evaluation: A&Ox4, confused at times. RA. KRISTINWA between 8-11 this shift, treated per protocol. Up SBA, bed alarm in use. IV protonix. GI and general surgery consult. C/o abd tenderness.                              [de-identified] : This is a 58-year-old female who is status post 4 weeks from a left knee arthroscopy, medial meniscectomy. Patient states that she is doing extremely well. She has worked with physical therapy and is in no pain.

## 2025-05-30 NOTE — ED NOTES
"AOX4 ambulates without assistance  Last etoh 11am 5/29  anxiety/fidgets are baseline   Ciwa 9 ativan given      Nursing report ED to floor  Bekah Gibson  54 y.o.  female    HPI :  HPI  Stated Reason for Visit: Pt arrives to ER with c/o N/V/D and upper abd pain x1 week. Pt also states she has been feeling dizzy  History Obtained From: patient    Chief Complaint  Chief Complaint   Patient presents with    Nausea    Diarrhea       Admitting doctor:   Charis Gonzalez MD    Admitting diagnosis:   The primary encounter diagnosis was Upper abdominal pain. Diagnoses of Transaminitis, Acute alcoholic gastritis without hemorrhage, Alcohol withdrawal syndrome with complication, and Calculus of gallbladder without cholecystitis without obstruction were also pertinent to this visit.    Code status:   Current Code Status       Date Active Code Status Order ID Comments User Context       Prior            Allergies:   Benzonatate, Ketorolac tromethamine, Phenergan [promethazine hcl], Cucumber extract, and Promethazine-phenylephrine    Isolation:   No active isolations    Intake and Output    Intake/Output Summary (Last 24 hours) at 5/30/2025 1221  Last data filed at 5/30/2025 0637  Gross per 24 hour   Intake 50 ml   Output --   Net 50 ml       Weight:       05/30/25  0334   Weight: 69.9 kg (154 lb)       Most recent vitals:   Vitals:    05/30/25 0334 05/30/25 0647 05/30/25 0701 05/30/25 0901   BP: (!) 154/101 144/85 157/94 (!) 165/105   Pulse: 110 112 105 (!) 121   Resp: 20 20 18    Temp: 98.8 °F (37.1 °C)      SpO2: 98% 94% 94% 94%   Weight: 69.9 kg (154 lb)      Height: 165.1 cm (65\")          Active LDAs/IV Access:   Lines, Drains & Airways       Active LDAs       Name Placement date Placement time Site Days    Peripheral IV 05/30/25 0345 20 G Anterior;Left Forearm 05/30/25  0345  Forearm  less than 1                    Labs (abnormal labs have a star):   Labs Reviewed   COMPREHENSIVE METABOLIC PANEL - Abnormal; Notable for " the following components:       Result Value    Glucose 134 (*)     Potassium 3.2 (*)     CO2 18.0 (*)     ALT (SGPT) 70 (*)     AST (SGOT) 120 (*)     Alkaline Phosphatase 121 (*)     Total Bilirubin 2.0 (*)     Anion Gap 18.0 (*)     All other components within normal limits    Narrative:     GFR Categories in Chronic Kidney Disease (CKD)              GFR Category          GFR (mL/min/1.73)    Interpretation  G1                    90 or greater        Normal or high (1)  G2                    60-89                Mild decrease (1)  G3a                   45-59                Mild to moderate decrease  G3b                   30-44                Moderate to severe decrease  G4                    15-29                Severe decrease  G5                    14 or less           Kidney failure    (1)In the absence of evidence of kidney disease, neither GFR category G1 or G2 fulfill the criteria for CKD.    eGFR calculation 2021 CKD-EPI creatinine equation, which does not include race as a factor   URINALYSIS W/ MICROSCOPIC IF INDICATED (NO CULTURE) - Abnormal; Notable for the following components:    Color, UA Savi (*)     Specific Gravity, UA >1.030 (*)     Ketones, UA Trace (*)     Protein, UA Trace (*)     All other components within normal limits    Narrative:     Urine microscopic not indicated.   CBC WITH AUTO DIFFERENTIAL - Abnormal; Notable for the following components:    Hemoglobin 16.5 (*)     Hematocrit 47.4 (*)     MCH 33.1 (*)     Platelets 53 (*)     All other components within normal limits   ETHANOL - Abnormal; Notable for the following components:    Ethanol 122 (*)     All other components within normal limits   LIPASE - Normal   CBC AND DIFFERENTIAL    Narrative:     The following orders were created for panel order CBC & Differential.  Procedure                               Abnormality         Status                     ---------                               -----------         ------                      CBC Auto Differential[[id number]]        Abnormal            Final result                 Please view results for these tests on the individual orders.       EKG:   No orders to display       Meds given in ED:   Medications   sodium chloride 0.9 % flush 10 mL (has no administration in time range)   sodium chloride 0.9 % infusion 1,000 mL (0 mL Intravenous Stopped 5/30/25 0637)   folic acid 1 mg in sodium chloride 0.9 % 50 mL IVPB (0 mg Intravenous Stopped 5/30/25 0507)   thiamine (B-1) injection 200 mg (200 mg Intravenous Given 5/30/25 0352)   ondansetron (ZOFRAN) injection 4 mg (4 mg Intravenous Given 5/30/25 0351)   morphine injection 4 mg (4 mg Intravenous Given 5/30/25 0352)   famotidine (PEPCID) injection 20 mg (20 mg Intravenous Given 5/30/25 0441)   droperidol (INAPSINE) injection 1.25 mg (1.25 mg Intravenous Given 5/30/25 0503)   iopamidol (ISOVUE-300) 61 % injection 100 mL (85 mL Intravenous Given 5/30/25 0540)   morphine injection 2 mg (2 mg Intravenous Given 5/30/25 0632)   ondansetron (ZOFRAN) injection 4 mg (4 mg Intravenous Given 5/30/25 0632)   potassium chloride (KLOR-CON M20) CR tablet 40 mEq (40 mEq Oral Given 5/30/25 0927)   LORazepam (ATIVAN) injection 1 mg (1 mg Intravenous Given 5/30/25 0940)   amLODIPine (NORVASC) tablet 2.5 mg (2.5 mg Oral Given 5/30/25 0937)       Imaging results:  US Gallbladder  Result Date: 5/30/2025  1. Gallbladder is distended and there is cholelithiasis without further evidence for cholecystitis. 2. Hepatic cirrhotic morphology with steatosis.  This report was finalized on 5/30/2025 8:26 AM by Anson Reynolds M.D on Workstation: [id number]      CT Abdomen Pelvis With Contrast  Result Date: 5/30/2025   1. Liquid stool is noted throughout the colon, in keeping with history of diarrhea. 2. Somewhat thick-walled appearance to the stomach again noted. Some of this may be related to incomplete distention, but gastritis is not excluded. 3. The patient's gallbladder  does appear distended, and there is cholelithiasis. Consider further assessment with gallbladder ultrasound.  Radiation dose reduction techniques were utilized, including automated exposure control and exposure modulation based on body size.   This report was finalized on 5/30/2025 5:58 AM by Dr. Jessy Coe M.D on Workstation: [id number]        Ambulatory status:   - ambulates without asistance    Social issues:   Social History     Socioeconomic History    Marital status:    Tobacco Use    Smoking status: Some Days     Current packs/day: 0.25     Average packs/day: 0.3 packs/day for 15.0 years (3.8 ttl pk-yrs)     Types: Cigarettes     Passive exposure: Current    Smokeless tobacco: Never    Tobacco comments:     Rarely smoke sometimes will to   Vaping Use    Vaping status: Never Used   Substance and Sexual Activity    Alcohol use: Yes     Alcohol/week: 5.0 - 10.0 standard drinks of alcohol     Types: 5 - 10 Standard drinks or equivalent per week    Drug use: Never    Sexual activity: Yes     Partners: Male     Birth control/protection: Post-menopausal     Comment: cinthia- menepausal       Peripheral Neurovascular  Peripheral Neurovascular (Adult)  Peripheral Neurovascular WDL: WDL    Neuro Cognitive  Neuro Cognitive (Adult)  Cognitive/Neuro/Behavioral WDL: .WDL except, mood/behavior  Mood/Behavior: anxious  Sedation Group  POSS (Pasero Opioid-Induced Sed Scale): 1 - Awake and alert    Learning  Learning Assessment  Learning Readiness and Ability: no barriers identified  Education Provided  Person Taught: patient  Teaching Method: verbal instruction  Teaching Focus: symptom/problem overview, diagnostic test, medication administration  Education Outcome Evaluation: verbalizes understanding    Respiratory  Respiratory WDL  Respiratory WDL: WDL    Abdominal Pain  Safety Interventions  Safety Precautions/Falls Reduction: assistive device/personal items within reach  All Alarms: none present    Pain  Assessments  Pain (Adult)  (0-10) Pain Rating: Rest: 5  (0-10) Pain Rating: Activity: 5  Pain Location: abdomen  Pain Side/Orientation: medial, lower  Response to Pain Interventions: cognitive function improved, mobility function improved    NIH Stroke Scale       Chelly Perez RN  05/30/25 12:21 EDT

## 2025-05-30 NOTE — ED PROVIDER NOTES
EMERGENCY DEPARTMENT ENCOUNTER    Room number:  03/03  Date Seen:  5/30/2025  PCP:  Tylor Davis    Laboratory Results:  Recent Results (from the past 24 hours)   Comprehensive Metabolic Panel    Collection Time: 05/30/25  3:45 AM    Specimen: Blood   Result Value Ref Range    Glucose 134 (H) 65 - 99 mg/dL    BUN 12.0 6.0 - 20.0 mg/dL    Creatinine 0.74 0.57 - 1.00 mg/dL    Sodium 142 136 - 145 mmol/L    Potassium 3.2 (L) 3.5 - 5.2 mmol/L    Chloride 106 98 - 107 mmol/L    CO2 18.0 (L) 22.0 - 29.0 mmol/L    Calcium 8.9 8.6 - 10.5 mg/dL    Total Protein 7.7 6.0 - 8.5 g/dL    Albumin 4.4 3.5 - 5.2 g/dL    ALT (SGPT) 70 (H) 1 - 33 U/L    AST (SGOT) 120 (H) 1 - 32 U/L    Alkaline Phosphatase 121 (H) 39 - 117 U/L    Total Bilirubin 2.0 (H) 0.0 - 1.2 mg/dL    Globulin 3.3 gm/dL    A/G Ratio 1.3 g/dL    BUN/Creatinine Ratio 16.2 7.0 - 25.0    Anion Gap 18.0 (H) 5.0 - 15.0 mmol/L    eGFR 95.1 >60.0 mL/min/1.73   Lipase    Collection Time: 05/30/25  3:45 AM    Specimen: Blood   Result Value Ref Range    Lipase 19 13 - 60 U/L   CBC Auto Differential    Collection Time: 05/30/25  3:45 AM    Specimen: Blood   Result Value Ref Range    WBC 4.94 3.40 - 10.80 10*3/mm3    RBC 4.98 3.77 - 5.28 10*6/mm3    Hemoglobin 16.5 (H) 12.0 - 15.9 g/dL    Hematocrit 47.4 (H) 34.0 - 46.6 %    MCV 95.2 79.0 - 97.0 fL    MCH 33.1 (H) 26.6 - 33.0 pg    MCHC 34.8 31.5 - 35.7 g/dL    RDW 14.3 12.3 - 15.4 %    RDW-SD 50.4 37.0 - 54.0 fl    MPV 12.0 6.0 - 12.0 fL    Platelets 53 (L) 140 - 450 10*3/mm3    Neutrophil % 60.1 42.7 - 76.0 %    Lymphocyte % 29.6 19.6 - 45.3 %    Monocyte % 8.9 5.0 - 12.0 %    Eosinophil % 0.6 0.3 - 6.2 %    Basophil % 0.6 0.0 - 1.5 %    Immature Grans % 0.2 0.0 - 0.5 %    Neutrophils, Absolute 2.97 1.70 - 7.00 10*3/mm3    Lymphocytes, Absolute 1.46 0.70 - 3.10 10*3/mm3    Monocytes, Absolute 0.44 0.10 - 0.90 10*3/mm3    Eosinophils, Absolute 0.03 0.00 - 0.40 10*3/mm3    Basophils, Absolute 0.03 0.00 - 0.20 10*3/mm3     Immature Grans, Absolute 0.01 0.00 - 0.05 10*3/mm3    nRBC 0.0 0.0 - 0.2 /100 WBC   Ethanol    Collection Time: 05/30/25  3:45 AM    Specimen: Blood   Result Value Ref Range    Ethanol 122 (H) 0 - 10 mg/dL    Ethanol % 0.122 %   Urinalysis With Microscopic If Indicated (No Culture) - Straight Cath    Collection Time: 05/30/25  4:28 AM    Specimen: Straight Cath; Urine   Result Value Ref Range    Color, UA Savi (A) Yellow, Straw    Appearance, UA Clear Clear    pH, UA 6.0 5.0 - 8.0    Specific Gravity, UA >1.030 (H) 1.005 - 1.030    Glucose, UA Negative Negative    Ketones, UA Trace (A) Negative    Bilirubin, UA Negative Negative    Blood, UA Negative Negative    Protein, UA Trace (A) Negative    Leuk Esterase, UA Negative Negative    Nitrite, UA Negative Negative    Urobilinogen, UA 1.0 E.U./dL 0.2 - 1.0 E.U./dL     I reviewed the above results.    Radiology:  US Gallbladder  Result Date: 5/30/2025  RIGHT UPPER QUADRANT ULTRASOUND  HISTORY: Right upper quadrant pain. Nausea and vomiting.  COMPARISON: CT abdomen and pelvis 05/30/2025.  FINDINGS: The liver measures 17.6 cm in craniocaudal dimension. Liver exhibits undulating margins with hepatic cirrhotic morphology. There is also increased hepatic echogenicity consistent with hepatic steatosis.. There is no intra or extrahepatic biliary duct dilatation. The common duct measures 3 mm. The gallbladder is distended and contains small gallstones. No gallbladder wall thickening or pericholecystic fluid.  Visualized segments the pancreas appear within normal limits.  The right kidney measures 9.7 cm in length and there is no hydronephrosis. There is no ascites.      1. Gallbladder is distended and there is cholelithiasis without further evidence for cholecystitis. 2. Hepatic cirrhotic morphology with steatosis.  This report was finalized on 5/30/2025 8:26 AM by Anson Reynolds M.D on Workstation: [id number]      CT Abdomen Pelvis With Contrast  Result Date:  5/30/2025  CT OF THE ABDOMEN AND PELVIS WITH CONTRAST  HISTORY: Acute abdominal pain  COMPARISON: None available.  TECHNIQUE: Axial CT imaging was obtained through the abdomen and pelvis. IV contrast was administered.  FINDINGS: Images through the lung bases demonstrate some scarring. The liver is cirrhotic in morphology. Patient does have a recanalized umbilical vein. There are gastroesophageal varices again seen. As was previously discussed, gastric wall appears somewhat thickened, although this may be related to incomplete distention. On current study, patient's gallbladder is mildly distended. There is cholelithiasis. Given history, consideration for gallbladder ultrasound is suggested. The pancreas is atrophic, with extensive parenchymal calcifications, in keeping with chronic pancreatitis. The kidneys enhance symmetrically. No hydronephrosis is seen. Uterus appears normal. No adnexal masses are seen. The patient does have liquid stool within the colon, which would be in keeping with history of diarrhea. The appendix is normal. There is no bowel obstruction. There is a tiny fat-containing umbilical hernia. No acute osseous abnormalities are seen.       1. Liquid stool is noted throughout the colon, in keeping with history of diarrhea. 2. Somewhat thick-walled appearance to the stomach again noted. Some of this may be related to incomplete distention, but gastritis is not excluded. 3. The patient's gallbladder does appear distended, and there is cholelithiasis. Consider further assessment with gallbladder ultrasound.  Radiation dose reduction techniques were utilized, including automated exposure control and exposure modulation based on body size.   This report was finalized on 5/30/2025 5:58 AM by Dr. Jessy Coe M.D on Workstation: [id number]      I reviewed the above results    Medications ordered in ED:  Medications   sodium chloride 0.9 % flush 10 mL (has no administration in time range)   sodium  chloride 0.9 % infusion 1,000 mL (0 mL Intravenous Stopped 5/30/25 0637)   folic acid 1 mg in sodium chloride 0.9 % 50 mL IVPB (0 mg Intravenous Stopped 5/30/25 0507)   thiamine (B-1) injection 200 mg (200 mg Intravenous Given 5/30/25 0352)   ondansetron (ZOFRAN) injection 4 mg (4 mg Intravenous Given 5/30/25 0351)   morphine injection 4 mg (4 mg Intravenous Given 5/30/25 0352)   famotidine (PEPCID) injection 20 mg (20 mg Intravenous Given 5/30/25 0441)   droperidol (INAPSINE) injection 1.25 mg (1.25 mg Intravenous Given 5/30/25 0503)   iopamidol (ISOVUE-300) 61 % injection 100 mL (85 mL Intravenous Given 5/30/25 0540)   morphine injection 2 mg (2 mg Intravenous Given 5/30/25 0632)   ondansetron (ZOFRAN) injection 4 mg (4 mg Intravenous Given 5/30/25 0632)   potassium chloride (KLOR-CON M20) CR tablet 40 mEq (40 mEq Oral Given 5/30/25 0927)   LORazepam (ATIVAN) injection 1 mg (1 mg Intravenous Given 5/30/25 0940)   amLODIPine (NORVASC) tablet 2.5 mg (2.5 mg Oral Given 5/30/25 0937)       Progress and Consult Notes:  ED Course as of 05/30/25 1014   Fri May 30, 2025   0418 WBC: 4.94 [MP]   0418 Hemoglobin(!): 16.5 [MP]   0545 CT Abdomen Pelvis With Contrast  My independent interpretation of the imaging study is no free air or bowel obstruction [AB]   0545 Ethanol(!): 122 [AB]   0615 Care given to Steph Lee PA-C, pending gallbladder US and disposition [MP]   0863 Reassessed patient, counseled her on all of her lab and imaging results.  She is tachycardic, heart rate in the 110s, hypertensive, feels like she is having withdrawal symptoms.  States she relapsed in April and has been drinking every day for several weeks.  She decided to stop drinking yesterday and last drink was at 11 AM.  She does have a very mild metabolic acidosis, suspect related to an alcoholic ketoacidosis.  Does not appear to have acute cholecystitis on her workup today.  She does have epigastric tenderness, does have findings suggestive of  gastritis with thickened gastric wall.  Based on her withdrawal symptoms, desire to stop drinking, will seek admission.  Potassium repleted in the emergency department. [KA]   1006 Patient with Dr. Gonzalez, hospitalist.  We discussed patient's history presentation workup and she agrees to admit [KA]      ED Course User Index  [AB] Dhruv Alonso MD  [KA] Yanira Lee PA-C  [MP] Nena Corey PA-C       Diagnosis:  Final diagnoses:   Upper abdominal pain   Transaminitis   Acute alcoholic gastritis without hemorrhage   Alcohol withdrawal syndrome with complication   Calculus of gallbladder without cholecystitis without obstruction          Yanira Lee PA-C  05/30/25 1014

## 2025-05-30 NOTE — H&P
Patient Name:  Bekah Gibson  YOB: 1970  MRN:  [id number]  Admit Date:  5/30/2025  Patient Care Team:  Tylor Davis as PCP - General (Family Medicine)  Deion Saldana MD as Consulting Physician (Urology)  Adalberto Hoffman MD as Consulting Physician (Gastroenterology)  Eliazar Quinonez MD (Hematology)      Subjective   History Present Illness     Chief Complaint   Patient presents with    Nausea    Diarrhea       Ms. Gibson is a 54 y.o.  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    History of Present Illness  Pleasant lady with history as above who reports that over the last week she has been developing some epigastric to right sided abdominal with pain level up to about a 4 out of 10.  She went to bed but awoke around 2 AM complaining of abdominal pain ranked in a 7 out of 10.  She has associated nausea and vomiting as well as diarrhea although she reports that her diarrhea is chronic due to her being on lactulose.  Patient presented to the ER for further evaluation.  She has a history of chronic pancreatitis and had previously been sober although she relapsed has been drinking the last few weeks-reports she drinks 4 small sized bottles of paz wine.  Imaging in the ER with CT abdomen pelvis shows evidence of gastritis but also gallstones.  Right upper quad ultrasound showed evidence of cholelithiasis without cholecystitis.    Review of Systems   Constitutional:  Negative for fatigue and fever.   Respiratory:  Negative for cough and shortness of breath.    Cardiovascular:  Negative for chest pain and palpitations.   Gastrointestinal:  Positive for abdominal pain, blood in stool and diarrhea. Negative for nausea and vomiting.   Neurological:  Negative for weakness.        Personal History     Past  Medical History:   Diagnosis Date    Acromioclavicular separation 1/2021    Ankle sprain 2/2004    no operation necessary  At Time unsure    Anxiety     Arthritis     Arthritis of back Unsure    Diseased    Cirrhosis     Disease of thyroid gland     ETOH abuse     Fracture, clavicle 1/2021    shattered clavicel on issue slip and fall    Fracture, foot Unsure    Frozen shoulder 1 /2009    4    GERD (gastroesophageal reflux disease)     History of kidney stones     5 YR AGO    Hypertension     Left shoulder pain     Low back strain 1/21    Lumbosacral disc disease 1/21    MDD (major depressive disorder)     Neck strain Unsure    Pancreatitis     Periarthritis of shoulder see above    Rotator cuff syndrome odre for shoulder replacement    unable to receive due to low platelets    Tennis elbow Unsure    Unsurpassed    Thrombocytopenia      Past Surgical History:   Procedure Laterality Date    CLAVICLE SURGERY Right 2018    ENDOSCOPY N/A 10/26/2022    Procedure: ESOPHAGOGASTRODUODENOSCOPY wtih banding;  Surgeon: Ag Patel MD;  Location: [facility] ENDOSCOPY;  Service: Gastroenterology;  Laterality: N/A;  pre: melena  post: esophageal varicies with banding x2 bands    ENDOSCOPY N/A 01/18/2023    Procedure: ESOPHAGOGASTRODUODENOSCOPY;  Surgeon: Ag Patel MD;  Location: [facility] ENDOSCOPY;  Service: Gastroenterology;  Laterality: N/A;  PRE OP - MAROON STOOLS  POST OP - SM ESOPHAGEAL VARICES    ESOPHAGEAL VARICES LIGATION      FOOT SURGERY  2/2/14    JOINT REPLACEMENT Bilateral     GREAT TOE    KIDNEY STONE SURGERY      LITHOTRIPSY AND STENT (R SIDE)    LAPAROSCOPIC TUBAL LIGATION  2005    NECK SURGERY  3/07    Not sure of dates    SIGMOIDOSCOPY N/A 01/18/2023    Procedure: SIGMOIDOSCOPY FLEXIBLE;  Surgeon: Ag Patel MD;  Location: [facility] ENDOSCOPY;  Service: Gastroenterology;  Laterality: N/A;  PRE OP - MAROON STOOLS  POST OP - RECTAL VARICES    TOTAL SHOULDER ARTHROPLASTY Left 05/09/2023    Procedure: reverse  total shoulder arthroplasty;  Surgeon: Giovanni Denise MD;  Location: [facility] OR [facility];  Service: Orthopedics;  Laterality: Left;    TRIGGER POINT INJECTION       Family History   Problem Relation Age of Onset    Rheumatologic disease Mother         congestive heart diseased    Osteoporosis Mother             Thyroid disease Father     Leukemia Father     Cancer Father         Leukemia  deseased     Broken bones Father     Clotting disorder Father     Drug abuse Brother     Malig Hyperthermia Neg Hx      Social History     Tobacco Use    Smoking status: Some Days     Current packs/day: 0.25     Average packs/day: 0.3 packs/day for 15.0 years (3.8 ttl pk-yrs)     Types: Cigarettes     Passive exposure: Current    Smokeless tobacco: Never    Tobacco comments:     Rarely smoke sometimes will to   Vaping Use    Vaping status: Never Used   Substance Use Topics    Alcohol use: Yes     Alcohol/week: 5.0 - 10.0 standard drinks of alcohol     Types: 5 - 10 Standard drinks or equivalent per week    Drug use: Never     Current Facility-Administered Medications on File Prior to Encounter   Medication Dose Route Frequency Provider Last Rate Last Admin    sodium chloride 0.9 % flush 10 mL  10 mL Intravenous Q12H Callie Quiroz MD        sodium chloride 0.9 % flush 10 mL  10 mL Intravenous PRN Callie Quiroz MD        sodium chloride 0.9 % flush 20 mL  20 mL Intravenous PRCallie Crawford MD         Current Outpatient Medications on File Prior to Encounter   Medication Sig Dispense Refill    amLODIPine (NORVASC) 2.5 MG tablet Take 1 tablet by mouth Daily.      busPIRone (BUSPAR) 10 MG tablet Take 1 tablet by mouth 2 (Two) Times a Day.      escitalopram (LEXAPRO) 20 MG tablet Take 1 tablet by mouth Every Morning.      ferrous sulfate 325 (65 FE) MG tablet Take 1 tablet by mouth Daily With Breakfast. Taking OTC      folic acid (FOLVITE) 1 MG tablet Take 1 tablet by mouth  Daily.      gabapentin (NEURONTIN) 100 MG capsule Take 1 capsule by mouth 3 (Three) Times a Day.      lactulose (CHRONULAC) 10 GM/15ML solution Take 15 mL by mouth Every Morning.      levothyroxine (SYNTHROID, LEVOTHROID) 100 MCG tablet Take 1 tablet by mouth Daily.      MAGnesium-Oxide 400 (240 Mg) MG tablet Take 1 tablet by mouth Daily. Takes OTC      mirtazapine (REMERON) 15 MG tablet Take 1 tablet by mouth Every Night. Takes PRN      Multiple Vitamin (MULTIVITAMIN) capsule Take 1 capsule by mouth Daily.      NON FORMULARY Apply  topically to the appropriate area as directed 3 (Three) to 4 (Four) times daily. Compounded Pain cream (amantadine 10%/ Meloxicam 0.5%/ Topiramate 2%/ gabapentin 6%/ lidocaine 5%)    1-2 g topically 3-4 times daily PRN      ondansetron ODT (ZOFRAN-ODT) 4 MG disintegrating tablet Place 1 tablet on the tongue Every 8 (Eight) Hours As Needed for Nausea or Vomiting. 10 tablet 0    pantoprazole (PROTONIX) 40 MG EC tablet Take 1 tablet by mouth Daily. Waiting for refill      thiamine (VITAMIN B1) 100 MG tablet Take 1 tablet by mouth Daily. 30 tablet 0    bismuth subsalicylate (PEPTO BISMOL) 262 MG/15ML suspension Take 15 mL by mouth Every 6 (Six) Hours As Needed for Indigestion. Pt reports filling predosed cup about 3/4 full and drinks daily.      FLUoxetine (PROzac) 20 MG capsule Take 1 capsule by mouth Daily. (Patient not taking: Reported on 5/30/2025)      FLUoxetine (PROzac) 20 MG tablet Take 3 tablets by mouth Daily.      hydrOXYzine (ATARAX) 25 MG tablet Take 1 tablet by mouth 3 (Three) Times a Day As Needed for Anxiety. 21 tablet 0    nadolol (CORGARD) 40 MG tablet Take 1 tablet by mouth Daily.      pancrelipase, Lip-Prot-Amyl, (CREON) 98266-74518 units capsule delayed-release particles capsule Take 2 capsules by mouth 3 (Three) Times a Day With Meals. (Patient not taking: Reported on 5/30/2025) 180 capsule 0    potassium chloride 10 MEQ CR tablet Take 2 tablets by mouth Daily. Taking  "OTC once daily      risperiDONE (risperDAL) 0.5 MG tablet Take 2 tablets by mouth Every Night. Pt ran out and last dose was Thursday night (Patient not taking: Reported on 5/30/2025)      solifenacin (VESICARE) 10 MG tablet Take 1 tablet by mouth. Takes 1-2 times per week, does not like SE (dry mouth) (Patient not taking: Reported on 5/30/2025)      [DISCONTINUED] amantadine (SYMMETREL) 100 MG tablet Take  by mouth.      [DISCONTINUED] B Complex Vitamins (VITAMIN B COMPLEX PO) Take 1 dose by mouth Daily.      [DISCONTINUED] brompheniramine-pseudoephedrine-DM 30-2-10 MG/5ML syrup Take 5 mL by mouth 4 (Four) Times a Day As Needed for Congestion or Cough. 118 mL 0    [DISCONTINUED] ferrous gluconate (FERGON) 324 MG tablet Take 1 tablet by mouth Daily.      [DISCONTINUED] Ibuprofen 3 %, Gabapentin 10 %, Baclofen 2 %, lidocaine 4 % Apply 1-2 g topically to the appropriate area as directed 3 (Three) to 4 (Four) times daily. 90 g 5    [DISCONTINUED] oxyCODONE (ROXICODONE) 5 MG immediate release tablet Take 1 tablet by mouth Every 6 (Six) Hours As Needed for Severe Pain. (Patient taking differently: Take 1 tablet by mouth Every 6 (Six) Hours As Needed for Severe Pain. Took 0.5 tablets last night) 12 tablet 0     Allergies   Allergen Reactions    Benzonatate Nausea Only and Other (See Comments)     Rash     Ketorolac Tromethamine Other (See Comments)     \"I cannot take because of liver problems\"    Phenergan [Promethazine Hcl] Hives    Cucumber Extract Rash    Promethazine-Phenylephrine Other (See Comments)     \"FEEL WEIRD\"       Objective    Objective     Vital Signs  Temp:  [98.5 °F (36.9 °C)-98.8 °F (37.1 °C)] 98.5 °F (36.9 °C)  Heart Rate:  [105-121] 115  Resp:  [18-24] 24  BP: (133-165)/() 133/93  SpO2:  [92 %-98 %] 92 %  on   ;   Device (Oxygen Therapy): room air  Body mass index is 25.63 kg/m².    Physical Exam  Constitutional:       General: She is not in acute distress.     Appearance: Normal appearance. She " is normal weight. She is ill-appearing.   HENT:      Head: Normocephalic and atraumatic.      Mouth/Throat:      Mouth: Mucous membranes are moist.      Pharynx: Oropharynx is clear.   Eyes:      Conjunctiva/sclera: Conjunctivae normal.      Pupils: Pupils are equal, round, and reactive to light.   Cardiovascular:      Rate and Rhythm: Normal rate and regular rhythm.   Pulmonary:      Effort: Pulmonary effort is normal. No respiratory distress.      Breath sounds: Normal breath sounds. No wheezing.   Abdominal:      General: Abdomen is flat. Bowel sounds are normal.      Palpations: Abdomen is soft.      Tenderness: There is abdominal tenderness.   Musculoskeletal:         General: No swelling or tenderness. Normal range of motion.      Right lower leg: No edema.      Left lower leg: No edema.   Skin:     General: Skin is warm and dry.   Neurological:      General: No focal deficit present.      Mental Status: She is alert and oriented to person, place, and time. Mental status is at baseline.   Psychiatric:         Mood and Affect: Mood normal.         Behavior: Behavior normal.         Thought Content: Thought content normal.         Judgment: Judgment normal.       Results Review:  I reviewed the patient's new clinical results.  I reviewed the patient's new imaging results and agree with the interpretation.  I reviewed the patient's other test results and agree with the interpretation  I personally viewed and interpreted the patient's EKG/Telemetry data  Discussed with ED provider.    Lab Results (last 24 hours)       Procedure Component Value Units Date/Time    CBC & Differential [[id number]]  (Abnormal) Collected: 05/30/25 0345    Specimen: Blood Updated: 05/30/25 0414    Narrative:      The following orders were created for panel order CBC & Differential.  Procedure                               Abnormality         Status                     ---------                               -----------         ------                      CBC Auto Differential[[id number]]        Abnormal            Final result                 Please view results for these tests on the individual orders.    Comprehensive Metabolic Panel [[id number]]  (Abnormal) Collected: 05/30/25 0345    Specimen: Blood Updated: 05/30/25 0428     Glucose 134 mg/dL      BUN 12.0 mg/dL      Creatinine 0.74 mg/dL      Sodium 142 mmol/L      Potassium 3.2 mmol/L      Chloride 106 mmol/L      CO2 18.0 mmol/L      Calcium 8.9 mg/dL      Total Protein 7.7 g/dL      Albumin 4.4 g/dL      ALT (SGPT) 70 U/L      AST (SGOT) 120 U/L      Alkaline Phosphatase 121 U/L      Total Bilirubin 2.0 mg/dL      Globulin 3.3 gm/dL      A/G Ratio 1.3 g/dL      BUN/Creatinine Ratio 16.2     Anion Gap 18.0 mmol/L      eGFR 95.1 mL/min/1.73     Narrative:      GFR Categories in Chronic Kidney Disease (CKD)              GFR Category          GFR (mL/min/1.73)    Interpretation  G1                    90 or greater        Normal or high (1)  G2                    60-89                Mild decrease (1)  G3a                   45-59                Mild to moderate decrease  G3b                   30-44                Moderate to severe decrease  G4                    15-29                Severe decrease  G5                    14 or less           Kidney failure    (1)In the absence of evidence of kidney disease, neither GFR category G1 or G2 fulfill the criteria for CKD.    eGFR calculation 2021 CKD-EPI creatinine equation, which does not include race as a factor    Lipase [[id number]]  (Normal) Collected: 05/30/25 0345    Specimen: Blood Updated: 05/30/25 0428     Lipase 19 U/L     CBC Auto Differential [[id number]]  (Abnormal) Collected: 05/30/25 0345    Specimen: Blood Updated: 05/30/25 0414     WBC 4.94 10*3/mm3      RBC 4.98 10*6/mm3      Hemoglobin 16.5 g/dL      Hematocrit 47.4 %      MCV 95.2 fL      MCH 33.1 pg      MCHC 34.8 g/dL      RDW 14.3 %      RDW-SD 50.4 fl      MPV 12.0 fL       Platelets 53 10*3/mm3      Neutrophil % 60.1 %      Lymphocyte % 29.6 %      Monocyte % 8.9 %      Eosinophil % 0.6 %      Basophil % 0.6 %      Immature Grans % 0.2 %      Neutrophils, Absolute 2.97 10*3/mm3      Lymphocytes, Absolute 1.46 10*3/mm3      Monocytes, Absolute 0.44 10*3/mm3      Eosinophils, Absolute 0.03 10*3/mm3      Basophils, Absolute 0.03 10*3/mm3      Immature Grans, Absolute 0.01 10*3/mm3      nRBC 0.0 /100 WBC     Ethanol [[id number]]  (Abnormal) Collected: 05/30/25 0345    Specimen: Blood Updated: 05/30/25 0428     Ethanol 122 mg/dL      Ethanol % 0.122 %     Urinalysis With Microscopic If Indicated (No Culture) - Straight Cath [[id number]]  (Abnormal) Collected: 05/30/25 0428    Specimen: Urine from Straight Cath Updated: 05/30/25 0510     Color, UA Savi     Appearance, UA Clear     pH, UA 6.0     Specific Gravity, UA >1.030     Glucose, UA Negative     Ketones, UA Trace     Bilirubin, UA Negative     Blood, UA Negative     Protein, UA Trace     Leuk Esterase, UA Negative     Nitrite, UA Negative     Urobilinogen, UA 1.0 E.U./dL    Narrative:      Urine microscopic not indicated.            Imaging Results (Last 24 Hours)       Procedure Component Value Units Date/Time    US Gallbladder [[id number]] Collected: 05/30/25 0822     Updated: 05/30/25 0829    Narrative:      RIGHT UPPER QUADRANT ULTRASOUND     HISTORY: Right upper quadrant pain. Nausea and vomiting.     COMPARISON: CT abdomen and pelvis 05/30/2025.     FINDINGS:  The liver measures 17.6 cm in craniocaudal dimension. Liver exhibits  undulating margins with hepatic cirrhotic morphology. There is also  increased hepatic echogenicity consistent with hepatic steatosis..   There is no intra or extrahepatic biliary duct dilatation. The common  duct measures 3 mm. The gallbladder is distended and contains small  gallstones. No gallbladder wall thickening or pericholecystic fluid.     Visualized segments the pancreas appear within normal  limits.  The right  kidney measures 9.7 cm in length and there is no hydronephrosis. There  is no ascites.       Impression:      1. Gallbladder is distended and there is cholelithiasis without further  evidence for cholecystitis.  2. Hepatic cirrhotic morphology with steatosis.     This report was finalized on 5/30/2025 8:26 AM by Anson Reynolds M.D  on Workstation: [id number]       CT Abdomen Pelvis With Contrast [[id number]] Collected: 05/30/25 0553     Updated: 05/30/25 0602    Narrative:      CT OF THE ABDOMEN AND PELVIS WITH CONTRAST     HISTORY: Acute abdominal pain     COMPARISON: None available.     TECHNIQUE: Axial CT imaging was obtained through the abdomen and pelvis.  IV contrast was administered.     FINDINGS:  Images through the lung bases demonstrate some scarring. The liver is  cirrhotic in morphology. Patient does have a recanalized umbilical vein.  There are gastroesophageal varices again seen. As was previously  discussed, gastric wall appears somewhat thickened, although this may be  related to incomplete distention. On current study, patient's  gallbladder is mildly distended. There is cholelithiasis. Given history,  consideration for gallbladder ultrasound is suggested. The pancreas is  atrophic, with extensive parenchymal calcifications, in keeping with  chronic pancreatitis. The kidneys enhance symmetrically. No  hydronephrosis is seen. Uterus appears normal. No adnexal masses are  seen. The patient does have liquid stool within the colon, which would  be in keeping with history of diarrhea. The appendix is normal. There is  no bowel obstruction. There is a tiny fat-containing umbilical hernia.  No acute osseous abnormalities are seen.       Impression:         1. Liquid stool is noted throughout the colon, in keeping with history  of diarrhea.  2. Somewhat thick-walled appearance to the stomach again noted. Some of  this may be related to incomplete distention, but gastritis is  not  excluded.  3. The patient's gallbladder does appear distended, and there is  cholelithiasis. Consider further assessment with gallbladder ultrasound.     Radiation dose reduction techniques were utilized, including automated  exposure control and exposure modulation based on body size.        This report was finalized on 5/30/2025 5:58 AM by Dr. Jessy Coe M.D on Workstation: [id number]                   No orders to display        Assessment/Plan     Active Hospital Problems    Diagnosis  POA    **Alcohol withdrawal [F10.939]  Yes    Transaminitis [R74.01]  Yes    Cholelithiasis [K80.20]  Yes    Abdominal pain [R10.9]  Yes    Essential hypertension [I10]  Yes    Esophageal varices [I85.00]  Yes    Alcoholic cirrhosis [K70.30]  Yes    Pancreatic insufficiency [K86.89]  Yes    Hypothyroidism [E03.9]  Yes      Resolved Hospital Problems   No resolved problems to display.       Ms. Gibson is a 54 y.o.  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    Abdominal pain  RUQ ultrasound with cholelithiasis without cholecystitis  History of chronic pancreatitis  - Patient reporting mid epigastric pain, yesterday began to have mid epigastric pain about a 4 out of 10 increased to a 7 out of 10 overnight, nonradiating she also is complaining of diarrhea but thinks that is related to her lactulose that she takes chronically  - She has a history of chronic pancreatitis and recently started drinking alcohol again after being sober, she has cholelithiasis noted on ultrasound which showed no evidence of cholecystitis  - GI consulted, considering EGD but first recommend general surgery consult for consideration of cholecystectomy; cont IV PPI  - cont CLD; lipase is normal    Alcohol use disorder with withdrawal  Cirrhosis related to alcohol use  History of esophageal varices  -  patient previously on nadolol for esophageal variceal bleeding prophylaxis but has not been taking  - GI is recommending discontinue lactulose and starting Xifaxan for HE prophylaxis  - For alcohol use, start CIWA protocol with as needed Ativan, thiamine, folate, multivitamin, ask access to see    Hypothyroidism  - Continue Synthroid    Depression/anxiety  - Continue Lexapro and BuSpar    I discussed the patient's findings and my recommendations with patient and ED provider.    VTE Prophylaxis - SCDs.  Code Status - Full code.       Charis Gonzalez MD  [facility]  05/30/25  15:28 EDT

## 2025-05-30 NOTE — ED PROVIDER NOTES
MD ATTESTATION NOTE  I supervised care provided by the APC. We have discussed this patient's history, physical exam, and treatment plan. I have reviewed the APC's note and I agree with the APC's findings and plan of care.   SHARED VISIT: This visit was performed by BOTH a physician and an APC. The substantive portion of the medical decision making was performed by this attesting physician who made or approved the management plan and takes responsibility for patient management. All studies in the APC note (if performed) were independently interpreted by me.   I have personally had a face to face encounter with the patient.     PCP: Tylor Davis  Patient Care Team:  Tylor Davis as PCP - General (Family Medicine)  Deion Saldana MD as Consulting Physician (Urology)  Adalberto Hoffman MD as Consulting Physician (Gastroenterology)  Eliazar Quinonez MD (Hematology)     Bekah Gibson is a 54 y.o. female who presents to the ED c/o abdominal pain, nausea, vomiting and diarrhea.  Patient has had symptoms for greater than a week.  She was evaluated 1 week ago but states that her symptoms have not improved.  No fevers or chills.  Patient denies recent alcohol use.  No urinary complaint.    On exam:  General: NAD.  Head: NCAT.  ENT: nares patent, no scleral icterus  Neck: Supple, trachea midline.  Cardiac: regular rate and rhythm.  Lungs: normal effort, clear to auscultation bilaterally  Abdomen: Soft, nondistended, tender to palpation in the epigastrium, no rebound tenderness, no guarding or rigidity.   Extremities: Moves all extremities well, no peripheral edema  Neuro: alert, MAEW, follows commands  Psych: calm, cooperative  Skin: Warm, dry.    Medical Decision Making:  After the initial H&P, I discussed pertinent information from history and physical exam with patient/family.  Discussed differential diagnosis.  Discussed plan for ED evaluation/work-up/treatment.  All questions answered.  Patient/family is  agreeable with plan.    ED Course as of 05/31/25 1106   Fri May 30, 2025   0418 WBC: 4.94 [MP]   0418 Hemoglobin(!): 16.5 [MP]   0545 CT Abdomen Pelvis With Contrast  My independent interpretation of the imaging study is no free air or bowel obstruction [AB]   0545 Ethanol(!): 122 [AB]   0615 Care given to Steph Lee PA-C, pending gallbladder US and disposition [MP]   0843 Reassessed patient, counseled her on all of her lab and imaging results.  She is tachycardic, heart rate in the 110s, hypertensive, feels like she is having withdrawal symptoms.  States she relapsed in April and has been drinking every day for several weeks.  She decided to stop drinking yesterday and last drink was at 11 AM.  She does have a very mild metabolic acidosis, suspect related to an alcoholic ketoacidosis.  Does not appear to have acute cholecystitis on her workup today.  She does have epigastric tenderness, does have findings suggestive of gastritis with thickened gastric wall.  Based on her withdrawal symptoms, desire to stop drinking, will seek admission.  Potassium repleted in the emergency department. [KA]   1006 Patient with Dr. Gonzalez, hospitalist.  We discussed patient's history presentation workup and she agrees to admit [KA]      ED Course User Index  [AB] Dhruv Alonso MD  [KA] Yanira Lee PA-C  [MP] Nena Corey PA-C       Diagnosis  Final diagnoses:   Upper abdominal pain   Transaminitis   Acute alcoholic gastritis without hemorrhage   Alcohol withdrawal syndrome with complication   Calculus of gallbladder without cholecystitis without obstruction        Dhruv Alonso MD  05/31/25 1106

## 2025-05-30 NOTE — ED PROVIDER NOTES
EMERGENCY DEPARTMENT ENCOUNTER  Room Number:  03/03  PCP: Tylor Davis  Independent Historians: Patient      HPI:  Chief Complaint: had concerns including Nausea and Diarrhea.     A complete HPI/ROS/PMH/PSH/SH/FH are unobtainable due to: None    Chronic or social conditions impacting patient care (Social Determinants of Health): None      Context: Bekah Gibson is a 54 y.o. female with a medical history of irritable bowel syndrome, neuropathy, vitamin D deficiency, hypothyroidism, tobacco use, hepatitis, hypertension, pancreatitis who presents to the ED c/o acute abdominal pain.  Patient reports she has had persistent pain for over 1 week.  Reports associated nausea, vomiting, diarrhea.  No fever or chills.  No dysuria.  Patient admits to tobacco use.  Denies recent alcohol use.  Patient has had prior tubal ligation and ligation of esophageal varices.  No other systemic complaints at this time.      Review of prior external notes (non-ED) -and- Review of prior external test results outside of this encounter: Patient seen in office by gastroenterology on 5/15/2025 for GERD.  Reviewed assessment and plan.  Patient prescribed Protonix. Reviewed labs collected on 5/23/2025.  CBC with hemoglobin 16.6, CMP with creatinine 0.70.    Prescription drug monitoring program review:     N/A    PAST MEDICAL HISTORY  Active Ambulatory Problems     Diagnosis Date Noted   • Irritable bowel syndrome with both constipation and diarrhea 06/05/2017   • Peripheral neuropathy 06/05/2017   • Vitamin D deficiency 06/05/2017   • History of HPV infection 06/05/2017   • Hypothyroidism 06/05/2017   • Tobacco dependence due to cigarettes 06/05/2017   • Acute kidney injury 12/28/2015   • Ascites 06/06/2016   • E. coli UTI (urinary tract infection) 06/06/2016   • Alcoholic hepatitis 06/29/2018   • GI bleed 06/29/2018   • Acute post-hemorrhagic anemia 06/29/2018   • Thrombocytopenia 06/29/2018   • Open wound of right shoulder region 06/29/2018    • Pancreatic insufficiency 07/04/2018   • Alcoholic ketoacidosis 07/21/2019   • Chronic alcohol abuse 07/29/2019   • ESBL (extended spectrum beta-lactamase) producing bacteria infection 07/29/2019   • Abnormal weight loss 12/13/2019   • Hashimoto's disease 12/13/2019   • Chronic fatigue 12/14/2019   • History of alcohol use disorder 09/21/2021   • Alcohol intoxication 09/21/2021   • Lupus    • Hypomagnesemia    • Pancytopenia    • Alcoholic cirrhosis    • Bilateral lower abdominal discomfort 09/21/2021   • Primary hypertension 10/24/2022   • Melena 10/24/2022   • Arthritis of shoulder 12/19/2022   • Esophageal varices 01/16/2023   • Essential hypertension 01/16/2023   • Alcohol-induced chronic pancreatitis 01/22/2023   • Abdominal pain 01/22/2023   • Pancreatitis 10/07/2023   • Leukopenia 10/07/2023   • Epigastric pain 04/01/2024   • Acute alcoholic gastritis without hemorrhage 04/01/2024   • Alcohol-induced acute on chronic pancreatitis without infection or necrosis 07/03/2024   • Alcohol withdrawal 07/03/2024   • Acute on chronic pancreatitis 07/22/2024   • Transaminitis 07/22/2024   • Cholelithiasis 07/22/2024   • Alcohol use 07/22/2024   • WBC decreased 07/24/2024   • Hypokalemia 07/24/2024   • Urinary tract infection 07/24/2024   • Neutropenia 07/24/2024   • Polysubstance abuse 08/05/2024   • Alcohol dependence with withdrawal 10/24/2024   • Hypomagnesemia 11/18/2024     Resolved Ambulatory Problems     Diagnosis Date Noted   • Acute renal failure 02/20/2017   • Kidney stone 06/05/2017   • Alcohol withdrawal syndrome, with delirium 06/29/2018   • Hypotension 07/01/2018   • Nausea and vomiting 09/21/2021   • Acute GI bleeding 10/24/2022     Past Medical History:   Diagnosis Date   • Acromioclavicular separation 1/2021   • Ankle sprain 2/2004   • Anxiety    • Arthritis    • Arthritis of back Unsure   • Cirrhosis    • Disease of thyroid gland    • ETOH abuse    • Fracture, clavicle 1/2021   • Fracture, foot  Unsure   • Frozen shoulder    • GERD (gastroesophageal reflux disease)    • History of kidney stones    • Hypertension    • Left shoulder pain    • Low back strain    • Lumbosacral disc disease    • MDD (major depressive disorder)    • Neck strain Unsure   • Periarthritis of shoulder see above   • Rotator cuff syndrome odre for shoulder replacement   • Tennis elbow Unsure         PAST SURGICAL HISTORY  Past Surgical History:   Procedure Laterality Date   • CLAVICLE SURGERY Right 2018   • ENDOSCOPY N/A 10/26/2022    Procedure: ESOPHAGOGASTRODUODENOSCOPY wtih banding;  Surgeon: Ag Patel MD;  Location: [facility] ENDOSCOPY;  Service: Gastroenterology;  Laterality: N/A;  pre: melena  post: esophageal varicies with banding x2 bands   • ENDOSCOPY N/A 2023    Procedure: ESOPHAGOGASTRODUODENOSCOPY;  Surgeon: Ag Patel MD;  Location: [facility] ENDOSCOPY;  Service: Gastroenterology;  Laterality: N/A;  PRE OP - MAROON STOOLS  POST OP - SM ESOPHAGEAL VARICES   • ESOPHAGEAL VARICES LIGATION     • FOOT SURGERY  12   • JOINT REPLACEMENT Bilateral     GREAT TOE   • KIDNEY STONE SURGERY      LITHOTRIPSY AND STENT (R SIDE)   • LAPAROSCOPIC TUBAL LIGATION     • NECK SURGERY  3/07    Not sure of dates   • SIGMOIDOSCOPY N/A 2023    Procedure: SIGMOIDOSCOPY FLEXIBLE;  Surgeon: Ag Patel MD;  Location: [facility] ENDOSCOPY;  Service: Gastroenterology;  Laterality: N/A;  PRE OP - MAROON STOOLS  POST OP - RECTAL VARICES   • TOTAL SHOULDER ARTHROPLASTY Left 2023    Procedure: reverse total shoulder arthroplasty;  Surgeon: Giovanni Denise MD;  Location: [facility] OR [facility];  Service: Orthopedics;  Laterality: Left;   • TRIGGER POINT INJECTION           FAMILY HISTORY  Family History   Problem Relation Age of Onset   • Rheumatologic disease Mother         congestive heart diseased   • Osteoporosis Mother            • Thyroid disease Father    • Leukemia Father    • Cancer Father          Leukemia  deseased 6/17   • Broken bones Father    • Clotting disorder Father    • Drug abuse Brother    • Malig Hyperthermia Neg Hx          SOCIAL HISTORY  Social History     Socioeconomic History   • Marital status:    Tobacco Use   • Smoking status: Some Days     Current packs/day: 0.25     Average packs/day: 0.3 packs/day for 15.0 years (3.8 ttl pk-yrs)     Types: Cigarettes     Passive exposure: Current   • Smokeless tobacco: Never   • Tobacco comments:     Rarely smoke sometimes will to   Vaping Use   • Vaping status: Never Used   Substance and Sexual Activity   • Alcohol use: Yes     Alcohol/week: 5.0 - 10.0 standard drinks of alcohol     Types: 5 - 10 Standard drinks or equivalent per week   • Drug use: Never   • Sexual activity: Yes     Partners: Male     Birth control/protection: Post-menopausal     Comment: cinthia- menepausal         ALLERGIES  Benzonatate, Ketorolac tromethamine, Phenergan [promethazine hcl], Cucumber extract, and Promethazine-phenylephrine      REVIEW OF SYSTEMS  Included in HPI  All systems reviewed and negative except for those discussed in HPI.      PHYSICAL EXAM    I have reviewed the triage vital signs and nursing notes.    ED Triage Vitals   Temp Pulse Resp BP SpO2   -- -- -- -- --      Temp src Heart Rate Source Patient Position BP Location FiO2 (%)   -- -- -- -- --       Physical Exam  Constitutional:       General: She is not in acute distress.     Appearance: She is well-developed.   HENT:      Head: Normocephalic and atraumatic.   Eyes:      Extraocular Movements: Extraocular movements intact.   Cardiovascular:      Rate and Rhythm: Regular rhythm. Tachycardia present.      Heart sounds: Normal heart sounds.   Pulmonary:      Effort: Pulmonary effort is normal.      Breath sounds: Normal breath sounds.   Abdominal:      General: There is no distension.      Tenderness: There is abdominal tenderness in the right upper quadrant and epigastric area.   Skin:     General:  Skin is warm.   Neurological:      General: No focal deficit present.      Mental Status: She is alert and oriented to person, place, and time.   Psychiatric:         Mood and Affect: Mood normal.             LAB RESULTS  Recent Results (from the past 24 hours)   Comprehensive Metabolic Panel    Collection Time: 05/30/25  3:45 AM    Specimen: Blood   Result Value Ref Range    Glucose 134 (H) 65 - 99 mg/dL    BUN 12.0 6.0 - 20.0 mg/dL    Creatinine 0.74 0.57 - 1.00 mg/dL    Sodium 142 136 - 145 mmol/L    Potassium 3.2 (L) 3.5 - 5.2 mmol/L    Chloride 106 98 - 107 mmol/L    CO2 18.0 (L) 22.0 - 29.0 mmol/L    Calcium 8.9 8.6 - 10.5 mg/dL    Total Protein 7.7 6.0 - 8.5 g/dL    Albumin 4.4 3.5 - 5.2 g/dL    ALT (SGPT) 70 (H) 1 - 33 U/L    AST (SGOT) 120 (H) 1 - 32 U/L    Alkaline Phosphatase 121 (H) 39 - 117 U/L    Total Bilirubin 2.0 (H) 0.0 - 1.2 mg/dL    Globulin 3.3 gm/dL    A/G Ratio 1.3 g/dL    BUN/Creatinine Ratio 16.2 7.0 - 25.0    Anion Gap 18.0 (H) 5.0 - 15.0 mmol/L    eGFR 95.1 >60.0 mL/min/1.73   Lipase    Collection Time: 05/30/25  3:45 AM    Specimen: Blood   Result Value Ref Range    Lipase 19 13 - 60 U/L   CBC Auto Differential    Collection Time: 05/30/25  3:45 AM    Specimen: Blood   Result Value Ref Range    WBC 4.94 3.40 - 10.80 10*3/mm3    RBC 4.98 3.77 - 5.28 10*6/mm3    Hemoglobin 16.5 (H) 12.0 - 15.9 g/dL    Hematocrit 47.4 (H) 34.0 - 46.6 %    MCV 95.2 79.0 - 97.0 fL    MCH 33.1 (H) 26.6 - 33.0 pg    MCHC 34.8 31.5 - 35.7 g/dL    RDW 14.3 12.3 - 15.4 %    RDW-SD 50.4 37.0 - 54.0 fl    MPV 12.0 6.0 - 12.0 fL    Platelets 53 (L) 140 - 450 10*3/mm3    Neutrophil % 60.1 42.7 - 76.0 %    Lymphocyte % 29.6 19.6 - 45.3 %    Monocyte % 8.9 5.0 - 12.0 %    Eosinophil % 0.6 0.3 - 6.2 %    Basophil % 0.6 0.0 - 1.5 %    Immature Grans % 0.2 0.0 - 0.5 %    Neutrophils, Absolute 2.97 1.70 - 7.00 10*3/mm3    Lymphocytes, Absolute 1.46 0.70 - 3.10 10*3/mm3    Monocytes, Absolute 0.44 0.10 - 0.90 10*3/mm3     Eosinophils, Absolute 0.03 0.00 - 0.40 10*3/mm3    Basophils, Absolute 0.03 0.00 - 0.20 10*3/mm3    Immature Grans, Absolute 0.01 0.00 - 0.05 10*3/mm3    nRBC 0.0 0.0 - 0.2 /100 WBC   Ethanol    Collection Time: 05/30/25  3:45 AM    Specimen: Blood   Result Value Ref Range    Ethanol 122 (H) 0 - 10 mg/dL    Ethanol % 0.122 %   Urinalysis With Microscopic If Indicated (No Culture) - Straight Cath    Collection Time: 05/30/25  4:28 AM    Specimen: Straight Cath; Urine   Result Value Ref Range    Color, UA Savi (A) Yellow, Straw    Appearance, UA Clear Clear    pH, UA 6.0 5.0 - 8.0    Specific Gravity, UA >1.030 (H) 1.005 - 1.030    Glucose, UA Negative Negative    Ketones, UA Trace (A) Negative    Bilirubin, UA Negative Negative    Blood, UA Negative Negative    Protein, UA Trace (A) Negative    Leuk Esterase, UA Negative Negative    Nitrite, UA Negative Negative    Urobilinogen, UA 1.0 E.U./dL 0.2 - 1.0 E.U./dL         RADIOLOGY  CT Abdomen Pelvis With Contrast  Result Date: 5/30/2025  CT OF THE ABDOMEN AND PELVIS WITH CONTRAST  HISTORY: Acute abdominal pain  COMPARISON: None available.  TECHNIQUE: Axial CT imaging was obtained through the abdomen and pelvis. IV contrast was administered.  FINDINGS: Images through the lung bases demonstrate some scarring. The liver is cirrhotic in morphology. Patient does have a recanalized umbilical vein. There are gastroesophageal varices again seen. As was previously discussed, gastric wall appears somewhat thickened, although this may be related to incomplete distention. On current study, patient's gallbladder is mildly distended. There is cholelithiasis. Given history, consideration for gallbladder ultrasound is suggested. The pancreas is atrophic, with extensive parenchymal calcifications, in keeping with chronic pancreatitis. The kidneys enhance symmetrically. No hydronephrosis is seen. Uterus appears normal. No adnexal masses are seen. The patient does have liquid stool  within the colon, which would be in keeping with history of diarrhea. The appendix is normal. There is no bowel obstruction. There is a tiny fat-containing umbilical hernia. No acute osseous abnormalities are seen.       1. Liquid stool is noted throughout the colon, in keeping with history of diarrhea. 2. Somewhat thick-walled appearance to the stomach again noted. Some of this may be related to incomplete distention, but gastritis is not excluded. 3. The patient's gallbladder does appear distended, and there is cholelithiasis. Consider further assessment with gallbladder ultrasound.  Radiation dose reduction techniques were utilized, including automated exposure control and exposure modulation based on body size.   This report was finalized on 5/30/2025 5:58 AM by Dr. Jessy Coe M.D on Workstation: [id number]          MEDICATIONS GIVEN IN ER  Medications   sodium chloride 0.9 % flush 10 mL (has no administration in time range)   morphine injection 2 mg (has no administration in time range)   ondansetron (ZOFRAN) injection 4 mg (has no administration in time range)   sodium chloride 0.9 % infusion 1,000 mL (1,000 mL Intravenous New Bag 5/30/25 0348)   folic acid 1 mg in sodium chloride 0.9 % 50 mL IVPB (0 mg Intravenous Stopped 5/30/25 0507)   thiamine (B-1) injection 200 mg (200 mg Intravenous Given 5/30/25 0352)   ondansetron (ZOFRAN) injection 4 mg (4 mg Intravenous Given 5/30/25 0351)   morphine injection 4 mg (4 mg Intravenous Given 5/30/25 0352)   famotidine (PEPCID) injection 20 mg (20 mg Intravenous Given 5/30/25 0441)   droperidol (INAPSINE) injection 1.25 mg (1.25 mg Intravenous Given 5/30/25 0503)   iopamidol (ISOVUE-300) 61 % injection 100 mL (85 mL Intravenous Given 5/30/25 0540)         ORDERS PLACED DURING THIS VISIT:  Orders Placed This Encounter   Procedures   • CT Abdomen Pelvis With Contrast   • US Gallbladder   • Comprehensive Metabolic Panel   • Lipase   • Urinalysis With Microscopic  If Indicated (No Culture) - Urine, Clean Catch   • CBC Auto Differential   • Ethanol   • Insert Peripheral IV   • CBC & Differential         OUTPATIENT MEDICATION MANAGEMENT:  Current Facility-Administered Medications Ordered in Epic   Medication Dose Route Frequency Provider Last Rate Last Admin   • morphine injection 2 mg  2 mg Intravenous Once Dhruv Alonso MD       • ondansetron (ZOFRAN) injection 4 mg  4 mg Intravenous Once Dhruv Alonso MD       • sodium chloride 0.9 % flush 10 mL  10 mL Intravenous Q12H Callie Quiroz MD       • sodium chloride 0.9 % flush 10 mL  10 mL Intravenous PRN Callie Quiroz MD       • sodium chloride 0.9 % flush 10 mL  10 mL Intravenous PRN Nena Corey PA-C       • sodium chloride 0.9 % flush 20 mL  20 mL Intravenous PRN Callie Quiroz MD         Current Outpatient Medications Ordered in Epic   Medication Sig Dispense Refill   • amLODIPine (NORVASC) 2.5 MG tablet Take 1 tablet by mouth Daily.     • B Complex Vitamins (VITAMIN B COMPLEX PO) Take  by mouth.     • bismuth subsalicylate (PEPTO BISMOL) 262 MG/15ML suspension Take 15 mL by mouth Every 6 (Six) Hours As Needed for Indigestion. Pt reports filling predosed cup about 3/4 full and drinks daily.     • brompheniramine-pseudoephedrine-DM 30-2-10 MG/5ML syrup Take 5 mL by mouth 4 (Four) Times a Day As Needed for Congestion or Cough. 118 mL 0   • ferrous gluconate (FERGON) 324 MG tablet Take 1 tablet by mouth Daily.     • ferrous sulfate 325 (65 FE) MG tablet Take 1 tablet by mouth Daily With Breakfast. Taking OTC     • FLUoxetine (PROzac) 20 MG capsule      • FLUoxetine (PROzac) 20 MG tablet Take 3 tablets by mouth Daily.     • folic acid (FOLVITE) 1 MG tablet Take 1 tablet by mouth Daily.     • hydrOXYzine (ATARAX) 25 MG tablet Take 1 tablet by mouth 3 (Three) Times a Day As Needed for Anxiety. 21 tablet 0   • Ibuprofen 3 %, Gabapentin 10 %, Baclofen 2 %, lidocaine 4 % Apply  1-2 g topically to the appropriate area as directed 3 (Three) to 4 (Four) times daily. 90 g 5   • lactulose (CHRONULAC) 10 GM/15ML solution Take 15 mL by mouth Every Morning.     • levothyroxine (SYNTHROID, LEVOTHROID) 88 MCG tablet Take 1 tablet by mouth Daily.     • MAGnesium-Oxide 400 (240 Mg) MG tablet Take 1 tablet by mouth Daily. Takes OTC     • mirtazapine (REMERON) 15 MG tablet Take 1 tablet by mouth Every Night. Takes PRN     • Multiple Vitamin (MULTIVITAMIN) capsule Take 1 capsule by mouth Daily.     • nadolol (CORGARD) 40 MG tablet Take 1 tablet by mouth Daily.     • ondansetron ODT (ZOFRAN-ODT) 4 MG disintegrating tablet Place 1 tablet on the tongue Every 8 (Eight) Hours As Needed for Nausea or Vomiting. (Patient taking differently: Place 1 tablet on the tongue Every 8 (Eight) Hours As Needed for Nausea or Vomiting. Currently out of prescription, ran out about a week ago) 10 tablet 0   • oxyCODONE (ROXICODONE) 5 MG immediate release tablet Take 1 tablet by mouth Every 6 (Six) Hours As Needed for Severe Pain. (Patient taking differently: Take 1 tablet by mouth Every 6 (Six) Hours As Needed for Severe Pain. Took 0.5 tablets last night) 12 tablet 0   • pancrelipase, Lip-Prot-Amyl, (CREON) 01246-01017 units capsule delayed-release particles capsule Take 2 capsules by mouth 3 (Three) Times a Day With Meals. 180 capsule 0   • pantoprazole (PROTONIX) 40 MG EC tablet Take 1 tablet by mouth Daily. Waiting for refill     • potassium chloride 10 MEQ CR tablet Take 1 tablet by mouth. Taking OTC once daily     • risperiDONE (risperDAL) 0.5 MG tablet Take 2 tablets by mouth Every Night. Pt ran out and last dose was Thursday night     • solifenacin (VESICARE) 10 MG tablet Take 1 tablet by mouth. Takes 1-2 times per week, does not like SE (dry mouth)     • thiamine (VITAMIN B1) 100 MG tablet Take 1 tablet by mouth Daily. 30 tablet 0           PROGRESS, DATA ANALYSIS, CONSULTS, AND MEDICAL DECISION MAKING  All labs have  been independently interpreted by me.  All radiology studies have been reviewed by me. All EKG's have been independently viewed and interpreted by me.  Discussion below represents my analysis of pertinent findings related to patient's condition, differential diagnosis, treatment plan and final disposition.    Differential diagnosis includes but is not limited to arthritis, pancreatitis, cholecystitis.        ED Course as of 05/30/25 2330   Fri May 30, 2025   0418 WBC: 4.94 [MP]   0418 Hemoglobin(!): 16.5 [MP]   0545 CT Abdomen Pelvis With Contrast  My independent interpretation of the imaging study is no free air or bowel obstruction [AB]   0545 Ethanol(!): 122 [AB]   0615 Care given to Steph Lee PA-C, pending gallbladder US and disposition [MP]   0843 Reassessed patient, counseled her on all of her lab and imaging results.  She is tachycardic, heart rate in the 110s, hypertensive, feels like she is having withdrawal symptoms.  States she relapsed in April and has been drinking every day for several weeks.  She decided to stop drinking yesterday and last drink was at 11 AM.  She does have a very mild metabolic acidosis, suspect related to an alcoholic ketoacidosis.  Does not appear to have acute cholecystitis on her workup today.  She does have epigastric tenderness, does have findings suggestive of gastritis with thickened gastric wall.  Based on her withdrawal symptoms, desire to stop drinking, will seek admission.  Potassium repleted in the emergency department. [KA]   1006 Patient with Dr. Gonzalez, hospitalist.  We discussed patient's history presentation workup and she agrees to admit [KA]      ED Course User Index  [AB] Dhruv Alonso MD  [KA] Yanira Lee PA-C  [MP] Nena Corey PA-C             AS OF 06:20 EDT VITALS:    BP - (!) 154/101  HR - 110  TEMP - 98.8 °F (37.1 °C)  O2 SATS - 98%        Please note that portions of this document were completed with a voice recognition program.    Note  Disclaimer: At [facility], we believe that sharing information builds trust and better relationships. You are receiving this note because you recently visited [facility]. It is possible you will see health information before a provider has talked with you about it. This kind of information can be easy to misunderstand. To help you fully understand what it means for your health, we urge you to discuss this note with your provider.     Nena Corey PA-C  05/30/25 0675       Nena Corey PA-C  05/30/25 0674

## 2025-05-30 NOTE — PROGRESS NOTES
Clinical Pharmacy Services: Medication History    Bekah Gibson is a 54 y.o. female presenting to [facility] for   Chief Complaint   Patient presents with    Nausea    Diarrhea       She  has a past medical history of Acromioclavicular separation (1/2021), Ankle sprain (2/2004), Anxiety, Arthritis, Arthritis of back (Unsure), Cirrhosis, Disease of thyroid gland, ETOH abuse, Fracture, clavicle (1/2021), Fracture, foot (Unsure), Frozen shoulder (1 /2009), GERD (gastroesophageal reflux disease), History of kidney stones, Hypertension, Left shoulder pain, Low back strain (1/21), Lumbosacral disc disease (1/21), MDD (major depressive disorder), Neck strain (Unsure), Pancreatitis, Periarthritis of shoulder (see above), Rotator cuff syndrome (odre for shoulder replacement), Tennis elbow (Unsure), and Thrombocytopenia.    Allergies as of 05/30/2025 - Reviewed 05/30/2025   Allergen Reaction Noted    Benzonatate Nausea Only and Other (See Comments) 06/05/2017    Ketorolac tromethamine Other (See Comments) 10/15/2023    Phenergan [promethazine hcl] Hives 04/25/2016    Cucumber extract Rash 05/28/2015    Promethazine-phenylephrine Other (See Comments) 02/05/2013       Medication information was obtained from: Patient   Pharmacy and Phone Number:     Prior to Admission Medications       Prescriptions Last Dose Informant Patient Reported? Taking?    amLODIPine (NORVASC) 2.5 MG tablet  Self, Pharmacy Yes Yes    Take 1 tablet by mouth Daily.    bismuth subsalicylate (PEPTO BISMOL) 262 MG/15ML suspension  Self Yes Yes    Take 15 mL by mouth Every 6 (Six) Hours As Needed for Indigestion. Pt reports filling predosed cup about 3/4 full and drinks daily.    busPIRone (BUSPAR) 10 MG tablet  Pharmacy Yes Yes    Take 1 tablet by mouth 2 (Two) Times a Day.    escitalopram (LEXAPRO) 20 MG tablet   Yes Yes    Take 1 tablet by mouth Every Morning.    FLUoxetine (PROzac) 20 MG capsule Not Taking Pharmacy Yes No    Take 1 capsule  by mouth Daily.    Patient not taking:  Reported on 5/30/2025    folic acid (FOLVITE) 1 MG tablet  Self, Pharmacy Yes Yes    Take 1 tablet by mouth Daily.    gabapentin (NEURONTIN) 100 MG capsule   Yes Yes    Take 1 capsule by mouth 3 (Three) Times a Day.    levothyroxine (SYNTHROID, LEVOTHROID) 100 MCG tablet  Self, Pharmacy Yes Yes    Take 1 tablet by mouth Daily.    MAGnesium-Oxide 400 (240 Mg) MG tablet  Self Yes Yes    Take 1 tablet by mouth Daily. Takes OTC    mirtazapine (REMERON) 15 MG tablet  Pharmacy Yes Yes    Take 1 tablet by mouth Every Night. Takes PRN    Multiple Vitamin (MULTIVITAMIN) capsule  Self Yes Yes    Take 1 capsule by mouth Daily.    nadolol (CORGARD) 40 MG tablet  Pharmacy Yes Yes    Take 1 tablet by mouth Daily.    NON FORMULARY  Pharmacy Yes Yes    Apply  topically to the appropriate area as directed 3 (Three) to 4 (Four) times daily. Compounded Pain cream (amantadine 10%/ Meloxicam 0.5%/ Topiramate 2%/ gabapentin 6%/ lidocaine 5%)    1-2 g topically 3-4 times daily PRN    pantoprazole (PROTONIX) 40 MG EC tablet  Self, Pharmacy Yes Yes    Take 1 tablet by mouth Daily. Waiting for refill    thiamine (VITAMIN B1) 100 MG tablet  Self, Pharmacy No Yes    Take 1 tablet by mouth Daily.    ferrous sulfate 325 (65 FE) MG tablet  Self Yes Yes    Take 1 tablet by mouth Daily With Breakfast. Taking OTC    FLUoxetine (PROzac) 20 MG tablet  Self Yes No    Take 3 tablets by mouth Daily.    hydrOXYzine (ATARAX) 25 MG tablet   No No    Take 1 tablet by mouth 3 (Three) Times a Day As Needed for Anxiety.    lactulose (CHRONULAC) 10 GM/15ML solution  Self Yes Yes    Take 15 mL by mouth Every Morning.    ondansetron ODT (ZOFRAN-ODT) 4 MG disintegrating tablet  Self No Yes    Place 1 tablet on the tongue Every 8 (Eight) Hours As Needed for Nausea or Vomiting.    pancrelipase, Lip-Prot-Amyl, (CREON) 33697-12816 units capsule delayed-release particles capsule Not Taking Self No No    Take 2 capsules by mouth 3  (Three) Times a Day With Meals.    Patient not taking:  Reported on 5/30/2025    potassium chloride 10 MEQ CR tablet  Self Yes Yes    Take 2 tablets by mouth Daily. Taking OTC once daily    risperiDONE (risperDAL) 0.5 MG tablet Not Taking Self Yes No    Take 2 tablets by mouth Every Night. Pt ran out and last dose was Thursday night    Patient not taking:  Reported on 5/30/2025    solifenacin (VESICARE) 10 MG tablet Not Taking Self Yes No    Take 1 tablet by mouth. Takes 1-2 times per week, does not like SE (dry mouth)    Patient not taking:  Reported on 5/30/2025              Medication notes:     This medication list is complete to the best of my knowledge as of 5/30/2025    Please call if questions.    Leander Helton  Medication History Technician  [phone]    5/30/2025 10:06 EDT

## 2025-05-30 NOTE — CONSULTS
Gastroenterology   Initial Inpatient Consult Note    Referring Provider:  Dr. Gonzalez    Reason for Consultation: Abdominal pain, gastritis on CT    Subjective     History of present illness:    54 y.o. 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    Patient states she had gradually worsening abdominal discomfort over the past 2 days.  Last food she ate was chicken vegetable soup 2 days ago, she did not eat anything yesterday.    She woke up in the middle of the night with vomiting, diarrhea, and abdominal pain.  She does not recall seeing bloody or black emesis.  Stools were dark but not black, she also saw scant BRB when wiping but reports history of hemorrhoids and it is not uncommon for her to see blood per rectum.  She last vomited when she was driving to the hospital at 3 AM.  Pain is improved some now but  on the right side.  Denies acid reflux or heartburn or dysphagia.    She had been sober from alcohol for 3 months but did drink 2 glasses of wine yesterday.  Alcohol screen positive.  She smokes occasional cigarettes but denies daily back use.  She reports remote history of ascites requiring paracentesis, she states she takes diuretics but they are not listed on her home medications.  Prior scopes showed esophageal/gastric/rectal varices and she takes nadolol 40 mg daily.  She has history of hepatic encephalopathy and takes lactulose.  She takes pantoprazole 40 mg daily for management of GERD.  She last saw her hepatologist Dr. Elysia Arteaga at [facility] last week.    She presented to the ER on 5/23 with same complaints but they quickly resolved.  She was suspected to have had infectious etiology.    Workup this admission shows elevated ALT, AST, alk phos, and bilirubin consistent with typical elevations in the  past for her.  Also noted to have thrombocytopenia.  Imaging shows distended gallbladder with cholelithiasis along with known history of cirrhosis.  CT did mention findings consistent with gastritis, EGDs performed in 2023 and 2024 showed esophageal/gastric varices and portal hypertensive gastropathy.        Results Reviewed:  Comprehensive Metabolic Panel (05/30/2025 03:45) K 3.2, ALT 70, , alk phos 121, TB 2  CBC & Differential (05/30/2025 03:45) Hgb 16.5, platelet 53  Lipase (05/30/2025 03:45) normal  Ethanol (05/30/2025 03:45) elevated    US Gallbladder (05/30/2025 07:14) distended gallbladder with cholelithiasis, no evidence of cholecystitis, cirrhosis with steatosis  CT Abdomen Pelvis With Contrast (05/30/2025 05:43) liquid stool in colon consistent with diarrhea, thick-walled appearance to the stomach, distended gallbladder with cholelithiasis    4/2024 EGD at [facility] -gastric varices, portal hypertensive gastropathy, duodenitis  UPPER GI ENDOSCOPY (01/18/2023 10:57) grade 1 esophageal varices without bleeding, portal hypertensive gastropathy, erythematous mucosa in the stomach, normal duodenum  FLEXIBLE SIGMOIDOSCOPY (01/18/2023 11:59) rectal varices      Past Medical History:  Past Medical History:   Diagnosis Date    Acromioclavicular separation 1/2021    Ankle sprain 2/2004    no operation necessary  At Time unsure    Anxiety     Arthritis     Arthritis of back Unsure    Diseased    Cirrhosis     Disease of thyroid gland     ETOH abuse     Fracture, clavicle 1/2021    shattered clavicel on issue slip and fall    Fracture, foot Unsure    Frozen shoulder 1 /2009    4    GERD (gastroesophageal reflux disease)     History of kidney stones     5 YR AGO    Hypertension     Left shoulder pain     Low back strain 1/21    Lumbosacral disc disease 1/21    MDD (major depressive disorder)     Neck strain Unsure    Pancreatitis     Periarthritis of shoulder see above    Rotator cuff syndrome odre for shoulder  replacement    unable to receive due to low platelets    Tennis elbow Unsure    Unsurpassed    Thrombocytopenia      Past Surgical History:  Past Surgical History:   Procedure Laterality Date    CLAVICLE SURGERY Right 2018    ENDOSCOPY N/A 10/26/2022    Procedure: ESOPHAGOGASTRODUODENOSCOPY wtih banding;  Surgeon: Ag Patel MD;  Location: [facility] ENDOSCOPY;  Service: Gastroenterology;  Laterality: N/A;  pre: melena  post: esophageal varicies with banding x2 bands    ENDOSCOPY N/A 01/18/2023    Procedure: ESOPHAGOGASTRODUODENOSCOPY;  Surgeon: Ag Patel MD;  Location: [facility] ENDOSCOPY;  Service: Gastroenterology;  Laterality: N/A;  PRE OP - MAROON STOOLS  POST OP - SM ESOPHAGEAL VARICES    ESOPHAGEAL VARICES LIGATION      FOOT SURGERY  2/2/14    JOINT REPLACEMENT Bilateral     GREAT TOE    KIDNEY STONE SURGERY      LITHOTRIPSY AND STENT (R SIDE)    LAPAROSCOPIC TUBAL LIGATION  2005    NECK SURGERY  3/07    Not sure of dates    SIGMOIDOSCOPY N/A 01/18/2023    Procedure: SIGMOIDOSCOPY FLEXIBLE;  Surgeon: Ag Patel MD;  Location: [facility] ENDOSCOPY;  Service: Gastroenterology;  Laterality: N/A;  PRE OP - MAROON STOOLS  POST OP - RECTAL VARICES    TOTAL SHOULDER ARTHROPLASTY Left 05/09/2023    Procedure: reverse total shoulder arthroplasty;  Surgeon: Giovanni Denise MD;  Location: [facility] OR [facility] [place];  Service: Orthopedics;  Laterality: Left;    TRIGGER POINT INJECTION  8/24      Social History:   Social History     Tobacco Use    Smoking status: Some Days     Current packs/day: 0.25     Average packs/day: 0.3 packs/day for 15.0 years (3.8 ttl pk-yrs)     Types: Cigarettes     Passive exposure: Current    Smokeless tobacco: Never    Tobacco comments:     Rarely smoke sometimes will to   Substance Use Topics    Alcohol use: Yes     Alcohol/week: 5.0 - 10.0 standard drinks of alcohol     Types: 5 - 10 Standard drinks or equivalent per week      Family History:  Family History   Problem Relation Age of Onset     Rheumatologic disease Mother         congestive heart diseased    Osteoporosis Mother             Thyroid disease Father     Leukemia Father     Cancer Father         Leukemia  deseased     Broken bones Father     Clotting disorder Father     Drug abuse Brother     Anette Hyperthermia Neg Hx        Home Meds:  Medications Prior to Admission   Medication Sig Dispense Refill Last Dose/Taking    amLODIPine (NORVASC) 2.5 MG tablet Take 1 tablet by mouth Daily.   2025    busPIRone (BUSPAR) 10 MG tablet Take 1 tablet by mouth 2 (Two) Times a Day.   2025    escitalopram (LEXAPRO) 20 MG tablet Take 1 tablet by mouth Every Morning.   2025    ferrous sulfate 325 (65 FE) MG tablet Take 1 tablet by mouth Daily With Breakfast. Taking OTC   2025    folic acid (FOLVITE) 1 MG tablet Take 1 tablet by mouth Daily.   2025    gabapentin (NEURONTIN) 100 MG capsule Take 1 capsule by mouth 3 (Three) Times a Day.   2025    lactulose (CHRONULAC) 10 GM/15ML solution Take 15 mL by mouth Every Morning.   2025    levothyroxine (SYNTHROID, LEVOTHROID) 100 MCG tablet Take 1 tablet by mouth Daily.   2025    MAGnesium-Oxide 400 (240 Mg) MG tablet Take 1 tablet by mouth Daily. Takes OTC   2025    mirtazapine (REMERON) 15 MG tablet Take 1 tablet by mouth Every Night. Takes PRN   2025    Multiple Vitamin (MULTIVITAMIN) capsule Take 1 capsule by mouth Daily.   2025    NON FORMULARY Apply  topically to the appropriate area as directed 3 (Three) to 4 (Four) times daily. Compounded Pain cream (amantadine 10%/ Meloxicam 0.5%/ Topiramate 2%/ gabapentin 6%/ lidocaine 5%)    1-2 g topically 3-4 times daily PRN   2025    ondansetron ODT (ZOFRAN-ODT) 4 MG disintegrating tablet Place 1 tablet on the tongue Every 8 (Eight) Hours As Needed for Nausea or Vomiting. 10 tablet 0 Past Week    pantoprazole (PROTONIX) 40 MG EC tablet Take 1 tablet by mouth Daily. Waiting for refill   2025     "thiamine (VITAMIN B1) 100 MG tablet Take 1 tablet by mouth Daily. 30 tablet 0 5/29/2025    bismuth subsalicylate (PEPTO BISMOL) 262 MG/15ML suspension Take 15 mL by mouth Every 6 (Six) Hours As Needed for Indigestion. Pt reports filling predosed cup about 3/4 full and drinks daily.   Unknown    FLUoxetine (PROzac) 20 MG capsule Take 1 capsule by mouth Daily. (Patient not taking: Reported on 5/30/2025)   Not Taking    FLUoxetine (PROzac) 20 MG tablet Take 3 tablets by mouth Daily.       hydrOXYzine (ATARAX) 25 MG tablet Take 1 tablet by mouth 3 (Three) Times a Day As Needed for Anxiety. 21 tablet 0     nadolol (CORGARD) 40 MG tablet Take 1 tablet by mouth Daily.   More than a month    pancrelipase, Lip-Prot-Amyl, (CREON) 31014-60510 units capsule delayed-release particles capsule Take 2 capsules by mouth 3 (Three) Times a Day With Meals. (Patient not taking: Reported on 5/30/2025) 180 capsule 0 Not Taking    potassium chloride 10 MEQ CR tablet Take 2 tablets by mouth Daily. Taking OTC once daily   Unknown    risperiDONE (risperDAL) 0.5 MG tablet Take 2 tablets by mouth Every Night. Pt ran out and last dose was Thursday night (Patient not taking: Reported on 5/30/2025)   Not Taking    solifenacin (VESICARE) 10 MG tablet Take 1 tablet by mouth. Takes 1-2 times per week, does not like SE (dry mouth) (Patient not taking: Reported on 5/30/2025)   Not Taking     Current Meds:   folic acid, 1 mg, Oral, Daily  multivitamin with minerals, 1 tablet, Oral, Daily  sodium chloride, 10 mL, Intravenous, Q12H  thiamine (B-1) IV, 200 mg, Intravenous, Q8H   Followed by  [START ON 6/4/2025] thiamine, 100 mg, Oral, Daily      Allergies:  Allergies   Allergen Reactions    Benzonatate Nausea Only and Other (See Comments)     Rash     Ketorolac Tromethamine Other (See Comments)     \"I cannot take because of liver problems\"    Phenergan [Promethazine Hcl] Hives    Cucumber Extract Rash    Promethazine-Phenylephrine Other (See Comments)     " "\"FEEL WEIRD\"         Review of Systems  Review of Systems   Constitutional: Negative.    HENT: Negative.     Eyes: Negative.    Respiratory: Negative.     Cardiovascular: Negative.    Gastrointestinal:  Positive for abdominal pain, anal bleeding, diarrhea and nausea. Negative for vomiting.   Endocrine: Negative.    Genitourinary: Negative.    Musculoskeletal: Negative.    Skin: Negative.    Allergic/Immunologic: Negative.    Neurological: Negative.    Hematological: Negative.    Psychiatric/Behavioral: Negative.           Objective     Vital Signs  Temp:  [98.5 °F (36.9 °C)-98.8 °F (37.1 °C)] 98.5 °F (36.9 °C)  Heart Rate:  [105-121] 115  Resp:  [18-24] 24  BP: (133-165)/() 133/93      Physical Exam  Vitals reviewed.   Constitutional:       Appearance: Normal appearance.   HENT:      Head: Normocephalic.      Nose: Nose normal.   Eyes:      Pupils: Pupils are equal, round, and reactive to light.   Cardiovascular:      Rate and Rhythm: Normal rate.      Pulses: Normal pulses.   Pulmonary:      Effort: Pulmonary effort is normal.      Breath sounds: Normal breath sounds.   Abdominal:      General: There is distension.      Palpations: Abdomen is soft.      Tenderness: There is abdominal tenderness.      Comments: RUQ tenderness   Musculoskeletal:         General: Normal range of motion.      Cervical back: Normal range of motion.      Comments: Peripheral wasting   Skin:     General: Skin is warm and dry.   Neurological:      General: No focal deficit present.      Mental Status: She is alert and oriented to person, place, and time.   Psychiatric:         Mood and Affect: Mood normal.             Results Review:     Results from last 7 days   Lab Units 05/30/25  0345   WBC 10*3/mm3 4.94   HEMOGLOBIN g/dL 16.5*   HEMATOCRIT % 47.4*   PLATELETS 10*3/mm3 53*     Results from last 7 days   Lab Units 05/30/25  0345   SODIUM mmol/L 142   POTASSIUM mmol/L 3.2*   CHLORIDE mmol/L 106   CO2 mmol/L 18.0*   BUN mg/dL 12.0 "   CREATININE mg/dL 0.74   CALCIUM mg/dL 8.9   BILIRUBIN mg/dL 2.0*   ALK PHOS U/L 121*   ALT (SGPT) U/L 70*   AST (SGOT) U/L 120*   GLUCOSE mg/dL 134*         Lab Results   Lab Value Date/Time    LIPASE 19 05/30/2025 0345    LIPASE 22 05/23/2025 0725    LIPASE 31 04/05/2025 1617    LIPASE 22 04/03/2025 1218    LIPASE 23 11/17/2024 0904    LIPASE 43 10/24/2024 0851    LIPASE 98 (H) 08/05/2024 0242    LIPASE 44 08/04/2024 0404    LIPASE 60 07/22/2024 0355    LIPASE 32 07/15/2024 2333    LIPASE 30 07/03/2024 0411    LIPASE 38 04/07/2024 0818    LIPASE 30 04/01/2024 0639    LIPASE 27 01/22/2024 0120    LIPASE 15 11/27/2023 2034    LIPASE 17 11/26/2023 0224    LIPASE 48 10/16/2023 0400    LIPASE 22 10/15/2023 0617    LIPASE 119 (H) 10/14/2023 1621    LIPASE 18 10/08/2023 0537    LIPASE 18 10/07/2023 0924    LIPASE 18 09/25/2023 2026    LIPASE 30 09/20/2023 1433    LIPASE 37 05/01/2023 0242    LIPASE 10 04/19/2023 1320    LIPASE 31 01/21/2023 2234    LIPASE 8 12/11/2022 1154    LIPASE 26 11/20/2022 1054    LIPASE 15 10/24/2022 0937    LIPASE 10 10/17/2022 0706    LIPASE 20 07/07/2022 1432    LIPASE 15 07/06/2022 0644    LIPASE 23 05/04/2022 0653    LIPASE 30 05/02/2022 1108    LIPASE 14 04/30/2022 0821    LIPASE 13 04/28/2022 0328    LIPASE 20 04/26/2022 2154    LIPASE 67 (H) 01/11/2022 0748    LIPASE 57 (H) 12/05/2021 1146    LIPASE 58 (H) 11/28/2021 0918    LIPASE 36 09/21/2021 0430    LIPASE 62 (H) 08/24/2021 0756    LIPASE 27 05/28/2021 1708    LIPASE 212 04/03/2021 2347    LIPASE 226 12/03/2020 1055    LIPASE 297 08/29/2020 1925    LIPASE 262 08/28/2020 1821    LIPASE 500 (H) 07/28/2020 0916    LIPASE 769 (H) 05/30/2020 0803    LIPASE 30 02/17/2020 0903    LIPASE 572 (H) 01/23/2020 0808    LIPASE 550 (H) 11/19/2019 0635    LIPASE 19 10/29/2019 1127    LIPASE 180 09/30/2019 1010    LIPASE 42 07/21/2019 1033    LIPASE 111 03/24/2019 1140    LIPASE 272 02/01/2019 0105    LIPASE 389 (H) 09/18/2018 0707    LIPASE 29  09/16/2018 1051    LIPASE 124 06/28/2018 0827    LIPASE 657 (H) 06/03/2018 0327    LIPASE 1,204 (H) 06/02/2018 0250    LIPASE 1,322 (H) 05/31/2018 0423    LIPASE 1,170 (H) 05/28/2018 1942    LIPASE 489 (H) 02/22/2018 0407    LIPASE 3,343 (H) 02/21/2018 1107    LIPASE 10,441 (H) 02/19/2018 1010    LIPASE 25 06/18/2017 1951       Radiology:  US Gallbladder   Final Result   1. Gallbladder is distended and there is cholelithiasis without further   evidence for cholecystitis.   2. Hepatic cirrhotic morphology with steatosis.       This report was finalized on 5/30/2025 8:26 AM by Anson Reynolds M.D   on Workstation: [id number]          CT Abdomen Pelvis With Contrast   Final Result       1. Liquid stool is noted throughout the colon, in keeping with history   of diarrhea.   2. Somewhat thick-walled appearance to the stomach again noted. Some of   this may be related to incomplete distention, but gastritis is not   excluded.   3. The patient's gallbladder does appear distended, and there is   cholelithiasis. Consider further assessment with gallbladder ultrasound.       Radiation dose reduction techniques were utilized, including automated   exposure control and exposure modulation based on body size.           This report was finalized on 5/30/2025 5:58 AM by Dr. Jessy Coe M.D on Workstation: [id number]                Assessment & Plan   Active Hospital Problems    Diagnosis     **Alcohol withdrawal        Assessment:  RUQ pain with nausea, vomiting, diarrhea   Decompensated cirrhosis with varices, ascites, hepatic encephalopathy  Distended gallbladder with cholelithiasis  Elevated LFTs, alk phos, bilirubin -consistent with baseline elevation  GERD with gastritis on CT  History of alcohol abuse and tobacco use  History of pancreatitis -lipase normal  Thrombocytopenia        Plan:  Recommend general surgery consult for consideration of cholecystectomy  Trend daily labs and check PT/INR so that MELD score  can be calculated  Continue pantoprazole 40 mg daily  If diarrhea continues, check stool studies  Hold off on lactulose and begin Xifaxan for HE prophylaxis  Antiemetics as needed  Okay for clear liquid diet  Reports recent sobriety up until yesterday when she consumed 2 glasses of wine, monitor for symptoms of withdrawal  Consider EGD if symptoms persist but will await recommendations from general surgery first  Follows with [facility]      I discussed the patients findings and my recommendations with patient.             AMBAR Yang  [facility] [address] [address]  Office: [phone]

## 2025-05-31 LAB
ALBUMIN SERPL-MCNC: 4 G/DL (ref 3.5–5.2)
ALBUMIN/GLOB SERPL: 1.5 G/DL
ALP SERPL-CCNC: 97 U/L (ref 39–117)
ALT SERPL W P-5'-P-CCNC: 50 U/L (ref 1–33)
ANION GAP SERPL CALCULATED.3IONS-SCNC: 15 MMOL/L (ref 5–15)
AST SERPL-CCNC: 89 U/L (ref 1–32)
BILIRUB CONJ SERPL-MCNC: 1.1 MG/DL (ref 0–0.3)
BILIRUB SERPL-MCNC: 3.1 MG/DL (ref 0–1.2)
BUN SERPL-MCNC: 9 MG/DL (ref 6–20)
BUN/CREAT SERPL: 13 (ref 7–25)
CALCIUM SPEC-SCNC: 8.3 MG/DL (ref 8.6–10.5)
CHLORIDE SERPL-SCNC: 104 MMOL/L (ref 98–107)
CO2 SERPL-SCNC: 21 MMOL/L (ref 22–29)
CREAT SERPL-MCNC: 0.69 MG/DL (ref 0.57–1)
DEPRECATED RDW RBC AUTO: 46.6 FL (ref 37–54)
EGFRCR SERPLBLD CKD-EPI 2021: 102 ML/MIN/1.73
ERYTHROCYTE [DISTWIDTH] IN BLOOD BY AUTOMATED COUNT: 13.6 % (ref 12.3–15.4)
GLOBULIN UR ELPH-MCNC: 2.6 GM/DL
GLUCOSE SERPL-MCNC: 95 MG/DL (ref 65–99)
HCT VFR BLD AUTO: 41.4 % (ref 34–46.6)
HGB BLD-MCNC: 14.5 G/DL (ref 12–15.9)
INR PPP: 1.73 (ref 0.9–1.1)
MAGNESIUM SERPL-MCNC: 1.4 MG/DL (ref 1.6–2.6)
MCH RBC QN AUTO: 32.8 PG (ref 26.6–33)
MCHC RBC AUTO-ENTMCNC: 35 G/DL (ref 31.5–35.7)
MCV RBC AUTO: 93.7 FL (ref 79–97)
PHOSPHATE SERPL-MCNC: 2.2 MG/DL (ref 2.5–4.5)
PHOSPHATE SERPL-MCNC: 2.7 MG/DL (ref 2.5–4.5)
PLATELET # BLD AUTO: 32 10*3/MM3 (ref 140–450)
PMV BLD AUTO: 11 FL (ref 6–12)
POTASSIUM SERPL-SCNC: 3.3 MMOL/L (ref 3.5–5.2)
POTASSIUM SERPL-SCNC: 5 MMOL/L (ref 3.5–5.2)
PROT SERPL-MCNC: 6.6 G/DL (ref 6–8.5)
PROTHROMBIN TIME: 20.3 SECONDS (ref 11.7–14.2)
RBC # BLD AUTO: 4.42 10*6/MM3 (ref 3.77–5.28)
SODIUM SERPL-SCNC: 140 MMOL/L (ref 136–145)
WBC NRBC COR # BLD AUTO: 3.14 10*3/MM3 (ref 3.4–10.8)

## 2025-05-31 PROCEDURE — 25010000002 LORAZEPAM PER 2 MG: Performed by: STUDENT IN AN ORGANIZED HEALTH CARE EDUCATION/TRAINING PROGRAM

## 2025-05-31 PROCEDURE — G0378 HOSPITAL OBSERVATION PER HR: HCPCS

## 2025-05-31 PROCEDURE — 25010000002 HYDROMORPHONE PER 4 MG: Performed by: STUDENT IN AN ORGANIZED HEALTH CARE EDUCATION/TRAINING PROGRAM

## 2025-05-31 PROCEDURE — 80053 COMPREHEN METABOLIC PANEL: CPT | Performed by: STUDENT IN AN ORGANIZED HEALTH CARE EDUCATION/TRAINING PROGRAM

## 2025-05-31 PROCEDURE — 97162 PT EVAL MOD COMPLEX 30 MIN: CPT

## 2025-05-31 PROCEDURE — 96376 TX/PRO/DX INJ SAME DRUG ADON: CPT

## 2025-05-31 PROCEDURE — 96375 TX/PRO/DX INJ NEW DRUG ADDON: CPT

## 2025-05-31 PROCEDURE — 85610 PROTHROMBIN TIME: CPT | Performed by: NURSE PRACTITIONER

## 2025-05-31 PROCEDURE — 84100 ASSAY OF PHOSPHORUS: CPT | Performed by: STUDENT IN AN ORGANIZED HEALTH CARE EDUCATION/TRAINING PROGRAM

## 2025-05-31 PROCEDURE — 25010000002 THIAMINE PER 100 MG: Performed by: STUDENT IN AN ORGANIZED HEALTH CARE EDUCATION/TRAINING PROGRAM

## 2025-05-31 PROCEDURE — 25010000002 MAGNESIUM SULFATE 2 GM/50ML SOLUTION: Performed by: STUDENT IN AN ORGANIZED HEALTH CARE EDUCATION/TRAINING PROGRAM

## 2025-05-31 PROCEDURE — 90791 PSYCH DIAGNOSTIC EVALUATION: CPT

## 2025-05-31 PROCEDURE — 99254 IP/OBS CNSLTJ NEW/EST MOD 60: CPT | Performed by: SURGERY

## 2025-05-31 PROCEDURE — 96367 TX/PROPH/DG ADDL SEQ IV INF: CPT

## 2025-05-31 PROCEDURE — 84132 ASSAY OF SERUM POTASSIUM: CPT | Performed by: STUDENT IN AN ORGANIZED HEALTH CARE EDUCATION/TRAINING PROGRAM

## 2025-05-31 PROCEDURE — 85027 COMPLETE CBC AUTOMATED: CPT | Performed by: STUDENT IN AN ORGANIZED HEALTH CARE EDUCATION/TRAINING PROGRAM

## 2025-05-31 PROCEDURE — 36415 COLL VENOUS BLD VENIPUNCTURE: CPT | Performed by: STUDENT IN AN ORGANIZED HEALTH CARE EDUCATION/TRAINING PROGRAM

## 2025-05-31 PROCEDURE — 83735 ASSAY OF MAGNESIUM: CPT | Performed by: STUDENT IN AN ORGANIZED HEALTH CARE EDUCATION/TRAINING PROGRAM

## 2025-05-31 PROCEDURE — 99232 SBSQ HOSP IP/OBS MODERATE 35: CPT | Performed by: INTERNAL MEDICINE

## 2025-05-31 PROCEDURE — 82248 BILIRUBIN DIRECT: CPT | Performed by: SURGERY

## 2025-05-31 PROCEDURE — 25010000002 ONDANSETRON PER 1 MG: Performed by: STUDENT IN AN ORGANIZED HEALTH CARE EDUCATION/TRAINING PROGRAM

## 2025-05-31 PROCEDURE — 96366 THER/PROPH/DIAG IV INF ADDON: CPT

## 2025-05-31 RX ORDER — MAGNESIUM SULFATE HEPTAHYDRATE 40 MG/ML
2 INJECTION, SOLUTION INTRAVENOUS
Status: COMPLETED | OUTPATIENT
Start: 2025-05-31 | End: 2025-05-31

## 2025-05-31 RX ORDER — POTASSIUM CHLORIDE 1500 MG/1
40 TABLET, EXTENDED RELEASE ORAL EVERY 4 HOURS
Status: COMPLETED | OUTPATIENT
Start: 2025-05-31 | End: 2025-05-31

## 2025-05-31 RX ADMIN — HYDROMORPHONE HYDROCHLORIDE 0.5 MG: 1 INJECTION, SOLUTION INTRAMUSCULAR; INTRAVENOUS; SUBCUTANEOUS at 01:37

## 2025-05-31 RX ADMIN — ONDANSETRON 4 MG: 2 INJECTION, SOLUTION INTRAMUSCULAR; INTRAVENOUS at 09:54

## 2025-05-31 RX ADMIN — Medication 2 PACKET: at 06:02

## 2025-05-31 RX ADMIN — RIFAXIMIN 550 MG: 550 TABLET ORAL at 08:00

## 2025-05-31 RX ADMIN — GABAPENTIN 100 MG: 100 CAPSULE ORAL at 16:39

## 2025-05-31 RX ADMIN — POTASSIUM CHLORIDE 40 MEQ: 1500 TABLET, EXTENDED RELEASE ORAL at 10:06

## 2025-05-31 RX ADMIN — THIAMINE HYDROCHLORIDE 200 MG: 100 INJECTION, SOLUTION INTRAMUSCULAR; INTRAVENOUS at 16:39

## 2025-05-31 RX ADMIN — BUSPIRONE HYDROCHLORIDE 10 MG: 10 TABLET ORAL at 08:00

## 2025-05-31 RX ADMIN — Medication 10 ML: at 08:00

## 2025-05-31 RX ADMIN — AMLODIPINE BESYLATE 2.5 MG: 2.5 TABLET ORAL at 08:00

## 2025-05-31 RX ADMIN — LORAZEPAM 1 MG: 1 TABLET ORAL at 22:22

## 2025-05-31 RX ADMIN — BUSPIRONE HYDROCHLORIDE 10 MG: 10 TABLET ORAL at 20:08

## 2025-05-31 RX ADMIN — MAGNESIUM SULFATE HEPTAHYDRATE 2 G: 40 INJECTION, SOLUTION INTRAVENOUS at 08:00

## 2025-05-31 RX ADMIN — THIAMINE HYDROCHLORIDE 200 MG: 100 INJECTION, SOLUTION INTRAMUSCULAR; INTRAVENOUS at 06:09

## 2025-05-31 RX ADMIN — FOLIC ACID 1 MG: 1 TABLET ORAL at 08:00

## 2025-05-31 RX ADMIN — ESCITALOPRAM 20 MG: 10 TABLET, FILM COATED ORAL at 06:02

## 2025-05-31 RX ADMIN — Medication 10 ML: at 20:08

## 2025-05-31 RX ADMIN — LORAZEPAM 1 MG: 2 INJECTION INTRAMUSCULAR; INTRAVENOUS at 18:05

## 2025-05-31 RX ADMIN — PANTOPRAZOLE SODIUM 40 MG: 40 INJECTION, POWDER, FOR SOLUTION INTRAVENOUS at 06:02

## 2025-05-31 RX ADMIN — LORAZEPAM 1 MG: 1 TABLET ORAL at 00:59

## 2025-05-31 RX ADMIN — LORAZEPAM 1 MG: 1 TABLET ORAL at 06:09

## 2025-05-31 RX ADMIN — MIRTAZAPINE 15 MG: 15 TABLET, FILM COATED ORAL at 20:08

## 2025-05-31 RX ADMIN — MAGNESIUM SULFATE HEPTAHYDRATE 2 G: 40 INJECTION, SOLUTION INTRAVENOUS at 06:02

## 2025-05-31 RX ADMIN — OXYCODONE 5 MG: 5 TABLET ORAL at 06:09

## 2025-05-31 RX ADMIN — POTASSIUM CHLORIDE 40 MEQ: 1500 TABLET, EXTENDED RELEASE ORAL at 06:02

## 2025-05-31 RX ADMIN — LEVOTHYROXINE SODIUM 100 MCG: 100 TABLET ORAL at 06:02

## 2025-05-31 RX ADMIN — LORAZEPAM 1 MG: 2 INJECTION INTRAMUSCULAR; INTRAVENOUS at 10:52

## 2025-05-31 RX ADMIN — GABAPENTIN 100 MG: 100 CAPSULE ORAL at 08:00

## 2025-05-31 RX ADMIN — RIFAXIMIN 550 MG: 550 TABLET ORAL at 20:08

## 2025-05-31 RX ADMIN — THIAMINE HYDROCHLORIDE 200 MG: 100 INJECTION, SOLUTION INTRAMUSCULAR; INTRAVENOUS at 22:22

## 2025-05-31 RX ADMIN — OXYCODONE 5 MG: 5 TABLET ORAL at 20:08

## 2025-05-31 RX ADMIN — MAGNESIUM SULFATE HEPTAHYDRATE 2 G: 40 INJECTION, SOLUTION INTRAVENOUS at 10:06

## 2025-05-31 RX ADMIN — ONDANSETRON 4 MG: 2 INJECTION, SOLUTION INTRAMUSCULAR; INTRAVENOUS at 18:05

## 2025-05-31 RX ADMIN — Medication 1 TABLET: at 08:00

## 2025-05-31 RX ADMIN — GABAPENTIN 100 MG: 100 CAPSULE ORAL at 20:08

## 2025-05-31 RX ADMIN — LORAZEPAM 2 MG: 2 INJECTION INTRAMUSCULAR; INTRAVENOUS at 15:07

## 2025-05-31 RX ADMIN — HYDROMORPHONE HYDROCHLORIDE 0.5 MG: 1 INJECTION, SOLUTION INTRAMUSCULAR; INTRAVENOUS; SUBCUTANEOUS at 09:54

## 2025-05-31 NOTE — PROGRESS NOTES
Name: Bekah Gibson ADMIT: 2025   : 1970  PCP: Davis Tylor    MRN: [id number] LOS: 0 days   AGE/SEX: 54 y.o. female  ROOM: [facility]/     Subjective   Subjective   Resting in bed, asleep but awakens to voice. Has some vomitus around her mouth, thinks she threw up earlier this morning. She continues to complain of abdominal pain in the mid-epigastric region.        Objective   Objective   Vital Signs  Temp:  [97.6 °F (36.4 °C)-98.5 °F (36.9 °C)] 97.9 °F (36.6 °C)  Heart Rate:  [] 101  Resp:  [18-24] 18  BP: (133-154)/(87-93) 139/93  SpO2:  [88 %-97 %] 91 %  on  Flow (L/min) (Oxygen Therapy):  [1] 1;   Device (Oxygen Therapy): room air  Body mass index is 25.63 kg/m².  Physical Exam  Constitutional:       General: She is not in acute distress.     Appearance: She is ill-appearing.   Cardiovascular:      Rate and Rhythm: Normal rate and regular rhythm.   Pulmonary:      Effort: Pulmonary effort is normal. No respiratory distress.      Breath sounds: Normal breath sounds. No wheezing.   Abdominal:      General: Abdomen is flat. Bowel sounds are normal.      Palpations: Abdomen is soft.      Tenderness: There is abdominal tenderness.   Musculoskeletal:         General: No swelling or tenderness.      Right lower leg: No edema.      Left lower leg: No edema.   Skin:     General: Skin is warm and dry.   Neurological:      General: No focal deficit present.      Mental Status: She is alert and oriented to person, place, and time. Mental status is at baseline.         Results Review     I reviewed the patient's new clinical results.  Results from last 7 days   Lab Units 25  0342 25  0345   WBC 10*3/mm3 3.14* 4.94   HEMOGLOBIN g/dL 14.5 16.5*   PLATELETS 10*3/mm3 32* 53*     Results from last 7 days   Lab Units 25  0342 25  0345   SODIUM mmol/L 140 142   POTASSIUM mmol/L 3.3* 3.2*   CHLORIDE mmol/L 104 106   CO2 mmol/L 21.0* 18.0*   BUN mg/dL 9.0 12.0   CREATININE mg/dL 0.69 0.74  "  GLUCOSE mg/dL 95 134*   Estimated Creatinine Clearance: 89.4 mL/min (by C-G formula based on SCr of 0.69 mg/dL).  Results from last 7 days   Lab Units 05/31/25  0342 05/30/25  0345   ALBUMIN g/dL 4.0 4.4   BILIRUBIN mg/dL 3.1* 2.0*   ALK PHOS U/L 97 121*   AST (SGOT) U/L 89* 120*   ALT (SGPT) U/L 50* 70*     Results from last 7 days   Lab Units 05/31/25  0342 05/30/25  0345   CALCIUM mg/dL 8.3* 8.9   ALBUMIN g/dL 4.0 4.4   MAGNESIUM mg/dL 1.4*  --    PHOSPHORUS mg/dL 2.2*  --        COVID19   Date Value Ref Range Status   09/21/2021 Not Detected Not Detected - Ref. Range Final     SARS-CoV-2, NAVIN   Date Value Ref Range Status   01/18/2021 Not Detected Not Detected Final     Comment:     This nucleic acid amplification test was developed and its performance  characteristics determined by [facility]. Nucleic acid  amplification tests include PCR and TMA. This test has not been FDA  cleared or approved. This test has been authorized by FDA under an  Emergency Use Authorization (EUA). This test is only authorized for  the duration of time the declaration that circumstances exist  justifying the authorization of the emergency use of in vitro  diagnostic tests for detection of SARS-CoV-2 virus and/or diagnosis  of COVID-19 infection under section 564(b)(1) of the Act, 21 U.S.C.  360bbb-3(b) (1), unless the authorization is terminated or revoked  sooner.  When diagnostic testing is negative, the possibility of a false  negative result should be considered in the context of a patient's  recent exposures and the presence of clinical signs and symptoms  consistent with COVID-19. An individual without symptoms of COVID-19  and who is not shedding SARS-CoV-2 virus would expect to have a  negative (not detected) result in this assay.     No results found for: \"HGBA1C\", \"POCGLU\"    US Gallbladder  Narrative: RIGHT UPPER QUADRANT ULTRASOUND     HISTORY: Right upper quadrant pain. Nausea and vomiting.     COMPARISON: CT " abdomen and pelvis 05/30/2025.     FINDINGS:  The liver measures 17.6 cm in craniocaudal dimension. Liver exhibits  undulating margins with hepatic cirrhotic morphology. There is also  increased hepatic echogenicity consistent with hepatic steatosis..   There is no intra or extrahepatic biliary duct dilatation. The common  duct measures 3 mm. The gallbladder is distended and contains small  gallstones. No gallbladder wall thickening or pericholecystic fluid.     Visualized segments the pancreas appear within normal limits.  The right  kidney measures 9.7 cm in length and there is no hydronephrosis. There  is no ascites.     Impression: 1. Gallbladder is distended and there is cholelithiasis without further  evidence for cholecystitis.  2. Hepatic cirrhotic morphology with steatosis.     This report was finalized on 5/30/2025 8:26 AM by Anson Reynolds M.D  on Workstation: [id number]     CT Abdomen Pelvis With Contrast  Narrative: CT OF THE ABDOMEN AND PELVIS WITH CONTRAST     HISTORY: Acute abdominal pain     COMPARISON: None available.     TECHNIQUE: Axial CT imaging was obtained through the abdomen and pelvis.  IV contrast was administered.     FINDINGS:  Images through the lung bases demonstrate some scarring. The liver is  cirrhotic in morphology. Patient does have a recanalized umbilical vein.  There are gastroesophageal varices again seen. As was previously  discussed, gastric wall appears somewhat thickened, although this may be  related to incomplete distention. On current study, patient's  gallbladder is mildly distended. There is cholelithiasis. Given history,  consideration for gallbladder ultrasound is suggested. The pancreas is  atrophic, with extensive parenchymal calcifications, in keeping with  chronic pancreatitis. The kidneys enhance symmetrically. No  hydronephrosis is seen. Uterus appears normal. No adnexal masses are  seen. The patient does have liquid stool within the colon, which would  be  in keeping with history of diarrhea. The appendix is normal. There is  no bowel obstruction. There is a tiny fat-containing umbilical hernia.  No acute osseous abnormalities are seen.     Impression:    1. Liquid stool is noted throughout the colon, in keeping with history  of diarrhea.  2. Somewhat thick-walled appearance to the stomach again noted. Some of  this may be related to incomplete distention, but gastritis is not  excluded.  3. The patient's gallbladder does appear distended, and there is  cholelithiasis. Consider further assessment with gallbladder ultrasound.     Radiation dose reduction techniques were utilized, including automated  exposure control and exposure modulation based on body size.        This report was finalized on 5/30/2025 5:58 AM by Dr. Jessy Coe M.D on Workstation: [id number]       Scheduled Medications  amLODIPine, 2.5 mg, Oral, Daily  busPIRone, 10 mg, Oral, BID  escitalopram, 20 mg, Oral, QAM  folic acid, 1 mg, Oral, Daily  gabapentin, 100 mg, Oral, TID  levothyroxine, 100 mcg, Oral, Q AM  mirtazapine, 15 mg, Oral, Nightly  multivitamin with minerals, 1 tablet, Oral, Daily  pantoprazole, 40 mg, Intravenous, Q AM  rifAXIMin, 550 mg, Oral, Q12H  sodium chloride, 10 mL, Intravenous, Q12H  thiamine (B-1) IV, 200 mg, Intravenous, Q8H   Followed by  [START ON 6/4/2025] thiamine, 100 mg, Oral, Daily    Infusions   Diet  Diet: Liquid; Clear Liquid; Fluid Consistency: Thin (IDDSI 0)         I have personally reviewed:  [x]  Laboratory   []  Microbiology   []  Radiology   [x]  EKG/Telemetry   []  Cardiology/Vascular   []  Pathology   []  Records    Assessment/Plan     Active Hospital Problems    Diagnosis  POA    **Alcohol withdrawal [F10.939]  Yes    Transaminitis [R74.01]  Yes    Cholelithiasis [K80.20]  Yes    Abdominal pain [R10.9]  Yes    Essential hypertension [I10]  Yes    Esophageal varices [I85.00]  Yes    Alcoholic cirrhosis [K70.30]  Yes    Pancreatic insufficiency  [K86.89]  Yes    Hypothyroidism [E03.9]  Yes      Resolved Hospital Problems   No resolved problems to display.     Ms. Gibson is a 54 y.o.  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     Abdominal pain  RUQ ultrasound with cholelithiasis without cholecystitis  History of chronic pancreatitis  - Patient reporting mid epigastric pain, yesterday began to have mid epigastric pain about a 4 out of 10 increased to a 7 out of 10 overnight, nonradiating she also is complaining of diarrhea but thinks that is related to her lactulose that she takes chronically  - She has a history of chronic pancreatitis and recently started drinking alcohol again after being sober, she has cholelithiasis noted on ultrasound which showed no evidence of cholecystitis  - GI consulted, considering EGD but first recommend general surgery consult for consideration of cholecystectomy; cont IV PPI  - cont CLD- pt still vomiting this morning so continue CLD/hold on advancing; lipase is normal     Alcohol use disorder with withdrawal  Cirrhosis related to alcohol use  History of esophageal varices  - patient previously on nadolol for esophageal variceal bleeding prophylaxis but has not been taking  - GI is recommending discontinue lactulose and starting Xifaxan for HE prophylaxis  - For alcohol use, start CIWA protocol with as needed Ativan, thiamine, folate, multivitamin, ask access to see  - CIWA scoring between 3-8 overnight; continue current treatment, ask Access to see.      Thrombocytopenia  - plt 32, related to alcohol use, no evidence of active bleeding  - repeat CBC    Hypothyroidism  - Continue Synthroid     Depression/anxiety  - Continue Lexapro and BuSpar    SCDs for DVT prophylaxis.  Full code.  Discussed with patient and nursing staff.  Anticipated discharge home, TBD        Charis Gonzalez,  MD [facility]  05/31/25  12:47 EDT    I wore protective equipment throughout this patient encounter including gloves.  Hand hygiene was performed before donning protective equipment and after removal when leaving the room.    Expected Discharge Date and Time       Expected Discharge Date Expected Discharge Time    Jun 2, 2025              Copied text in this note has been reviewed and is accurate as of 05/31/25.

## 2025-05-31 NOTE — PROGRESS NOTES
[facility]  Inpatient Progress Note    Reason for Follow Up: Abdominal pain, gastritis by CT    Subjective     Interval History:   Patient asleep, discussed with RN.  Diarrhea seems to be subsiding and abdominal discomfort also improved.  Request for general surgery consult noted.  Thrombocytopenia persists transaminase levels are improving..    Current Facility-Administered Medications:     acetaminophen (TYLENOL) tablet 650 mg, 650 mg, Oral, Q4H PRN **OR** acetaminophen (TYLENOL) 160 MG/5ML oral solution 650 mg, 650 mg, Oral, Q4H PRN **OR** acetaminophen (TYLENOL) suppository 650 mg, 650 mg, Rectal, Q4H PRN, Charis Gonzalez MD    amLODIPine (NORVASC) tablet 2.5 mg, 2.5 mg, Oral, Daily, Charis Gonzalez MD, 2.5 mg at 05/31/25 0800    sennosides-docusate (PERICOLACE) 8.6-50 MG per tablet 2 tablet, 2 tablet, Oral, BID PRN **AND** polyethylene glycol (MIRALAX) packet 17 g, 17 g, Oral, Daily PRN **AND** bisacodyl (DULCOLAX) EC tablet 5 mg, 5 mg, Oral, Daily PRN **AND** bisacodyl (DULCOLAX) suppository 10 mg, 10 mg, Rectal, Daily PRN, Charis Gonzalez MD    busPIRone (BUSPAR) tablet 10 mg, 10 mg, Oral, BID, Charis Gonzalez MD, 10 mg at 05/31/25 0800    Calcium Replacement - Follow Nurse / BPA Driven Protocol, , Not Applicable, PRN, Charis Gonzalez MD    escitalopram (LEXAPRO) tablet 20 mg, 20 mg, Oral, QAM, Charis Gonzalez MD, 20 mg at 05/31/25 0602    folic acid (FOLVITE) tablet 1 mg, 1 mg, Oral, Daily, Charis Gonzalez MD, 1 mg at 05/31/25 0800    gabapentin (NEURONTIN) capsule 100 mg, 100 mg, Oral, TID, Charis Gonzalez MD, 100 mg at 05/31/25 0800    HYDROmorphone (DILAUDID) injection 0.5 mg, 0.5 mg, Intravenous, Q3H PRN, Charis Gonzalez MD, 0.5 mg at 05/31/25 0137    levothyroxine (SYNTHROID, LEVOTHROID) tablet 100 mcg, 100 mcg, Oral, Q AM, Charis Gonzalez MD, 100 mcg at 05/31/25 0602    LORazepam (ATIVAN) tablet 1 mg, 1 mg, Oral, Q1H PRN, 1 mg at 05/31/25  0609 **OR** LORazepam (ATIVAN) injection 1 mg, 1 mg, Intravenous, Q1H PRN, 1 mg at 05/30/25 1718 **OR** LORazepam (ATIVAN) tablet 2 mg, 2 mg, Oral, Q1H PRN **OR** LORazepam (ATIVAN) injection 2 mg, 2 mg, Intravenous, Q1H PRN, 2 mg at 05/30/25 1428 **OR** LORazepam (ATIVAN) injection 2 mg, 2 mg, Intravenous, Q15 Min PRN **OR** LORazepam (ATIVAN) injection 2 mg, 2 mg, Intramuscular, Q15 Min PRN, Charis Gonzalez MD    Magnesium Standard Dose Replacement - Follow Nurse / BPA Driven Protocol, , Not Applicable, PRN, Charis Gonzalez MD    magnesium sulfate 2g/50 mL (PREMIX) infusion, 2 g, Intravenous, Q2H, Charis Gonzalez MD, 2 g at 05/31/25 0800    melatonin tablet 5 mg, 5 mg, Oral, Nightly PRN, Charis Gonzalez MD    mirtazapine (REMERON) tablet 15 mg, 15 mg, Oral, Nightly, Charis Gonzalez MD, 15 mg at 05/30/25 2046    multivitamin with minerals 1 tablet, 1 tablet, Oral, Daily, Charis Gonzalez MD, 1 tablet at 05/31/25 0800    nitroglycerin (NITROSTAT) SL tablet 0.4 mg, 0.4 mg, Sublingual, Q5 Min PRN, Charis Gonzalez MD    ondansetron ODT (ZOFRAN-ODT) disintegrating tablet 4 mg, 4 mg, Oral, Q6H PRN **OR** ondansetron (ZOFRAN) injection 4 mg, 4 mg, Intravenous, Q6H PRN, Charis Gonzalez MD    oxyCODONE (ROXICODONE) immediate release tablet 5 mg, 5 mg, Oral, Q6H PRN, Charis Gonzalez MD, 5 mg at 05/31/25 0609    pantoprazole (PROTONIX) injection 40 mg, 40 mg, Intravenous, Q AM, Kassandra Bone APRN, 40 mg at 05/31/25 0602    Phosphorus Replacement - Follow Nurse / BPA Driven Protocol, , Not Applicable, CLARITZA, Charis Gonzalez MD    potassium chloride (KLOR-CON M20) CR tablet 40 mEq, 40 mEq, Oral, Q4H, Charis Gonzalez MD, 40 mEq at 05/31/25 0602    Potassium Replacement - Follow Nurse / BPA Driven Protocol, , Not Applicable, CLARITZA, Charis Gonzalez MD    riFAXIMin (XIFAXAN) tablet 550 mg, 550 mg, Oral, Q12H, Kassandra Bone, APRN, 550 mg at 05/31/25 0800    [COMPLETED] Insert Peripheral  IV, , , Once **AND** sodium chloride 0.9 % flush 10 mL, 10 mL, Intravenous, PRN, Nena Corey PA-C    sodium chloride 0.9 % flush 10 mL, 10 mL, Intravenous, Q12H, Charis Gonzalez MD, 10 mL at 05/31/25 0800    sodium chloride 0.9 % flush 10 mL, 10 mL, Intravenous, PRN, Charis Gonzalez MD    sodium chloride 0.9 % infusion 40 mL, 40 mL, Intravenous, PRN, Charis Gonzalez MD    thiamine (B-1) injection 200 mg, 200 mg, Intravenous, Q8H, 200 mg at 05/31/25 0609 **FOLLOWED BY** [START ON 6/4/2025] thiamine (VITAMIN B-1) tablet 100 mg, 100 mg, Oral, Daily, Charis Gonzalez MD    Facility-Administered Medications Ordered in Other Encounters:     sodium chloride 0.9 % flush 10 mL, 10 mL, Intravenous, Q12H, Callie Quiroz MD    sodium chloride 0.9 % flush 10 mL, 10 mL, Intravenous, PRN, Callie Quiroz MD    sodium chloride 0.9 % flush 20 mL, 20 mL, Intravenous, PRN, Callie Quiroz MD  Review of Systems:    Review of systems could not be obtained due to  patient sedation status.    Objective     Vital Signs  Temp:  [97.6 °F (36.4 °C)-98.5 °F (36.9 °C)] 97.6 °F (36.4 °C)  Heart Rate:  [] 88  Resp:  [18-24] 18  BP: (133-165)/() 154/93  Body mass index is 25.63 kg/m².    Intake/Output Summary (Last 24 hours) at 5/31/2025 0820  Last data filed at 5/30/2025 1700  Gross per 24 hour   Intake 240 ml   Output --   Net 240 ml     No intake/output data recorded.     Physical Exam:   General: patient awake, alert and cooperative   Eyes: Normal lids and lashes, no scleral icterus   Neck: supple, normal ROM   Skin: warm and dry, not jaundiced   Cardiovascular: regular rhythm and rate, no murmurs auscultated   Pulm: clear to auscultation bilaterally, regular and unlabored   Abdomen: soft, nontender, nondistended; normal bowel sounds   Extremities: no rash or edema   Psychiatric: Normal mood and behavior; memory intact     Results Review:     I reviewed the patient's  new clinical results.    Results from last 7 days   Lab Units 05/31/25  0342 05/30/25  0345   WBC 10*3/mm3 3.14* 4.94   HEMOGLOBIN g/dL 14.5 16.5*   HEMATOCRIT % 41.4 47.4*   PLATELETS 10*3/mm3 32* 53*     Results from last 7 days   Lab Units 05/31/25  0342 05/30/25  0345   SODIUM mmol/L 140 142   POTASSIUM mmol/L 3.3* 3.2*   CHLORIDE mmol/L 104 106   CO2 mmol/L 21.0* 18.0*   BUN mg/dL 9.0 12.0   CREATININE mg/dL 0.69 0.74   CALCIUM mg/dL 8.3* 8.9   BILIRUBIN mg/dL 3.1* 2.0*   ALK PHOS U/L 97 121*   ALT (SGPT) U/L 50* 70*   AST (SGOT) U/L 89* 120*   GLUCOSE mg/dL 95 134*     Results from last 7 days   Lab Units 05/31/25  0342   INR  1.73*     Lab Results   Lab Value Date/Time    LIPASE 19 05/30/2025 0345    LIPASE 22 05/23/2025 0725    LIPASE 31 04/05/2025 1617    LIPASE 22 04/03/2025 1218    LIPASE 23 11/17/2024 0904    LIPASE 43 10/24/2024 0851    LIPASE 98 (H) 08/05/2024 0242    LIPASE 44 08/04/2024 0404    LIPASE 60 07/22/2024 0355    LIPASE 32 07/15/2024 2333    LIPASE 30 07/03/2024 0411    LIPASE 38 04/07/2024 0818    LIPASE 30 04/01/2024 0639    LIPASE 27 01/22/2024 0120    LIPASE 15 11/27/2023 2034    LIPASE 17 11/26/2023 0224    LIPASE 48 10/16/2023 0400    LIPASE 22 10/15/2023 0617    LIPASE 119 (H) 10/14/2023 1621    LIPASE 18 10/08/2023 0537    LIPASE 18 10/07/2023 0924    LIPASE 18 09/25/2023 2026    LIPASE 30 09/20/2023 1433    LIPASE 37 05/01/2023 0242    LIPASE 10 04/19/2023 1320    LIPASE 31 01/21/2023 2234    LIPASE 8 12/11/2022 1154    LIPASE 26 11/20/2022 1054    LIPASE 15 10/24/2022 0937    LIPASE 10 10/17/2022 0706    LIPASE 20 07/07/2022 1432    LIPASE 15 07/06/2022 0644    LIPASE 23 05/04/2022 0653    LIPASE 30 05/02/2022 1108    LIPASE 14 04/30/2022 0821    LIPASE 13 04/28/2022 0328    LIPASE 20 04/26/2022 2154    LIPASE 67 (H) 01/11/2022 0748    LIPASE 57 (H) 12/05/2021 1146    LIPASE 58 (H) 11/28/2021 0918    LIPASE 36 09/21/2021 0430    LIPASE 62 (H) 08/24/2021 0756    LIPASE 27  05/28/2021 1708    LIPASE 212 04/03/2021 2347    LIPASE 226 12/03/2020 1055    LIPASE 297 08/29/2020 1925    LIPASE 262 08/28/2020 1821    LIPASE 500 (H) 07/28/2020 0916    LIPASE 769 (H) 05/30/2020 0803    LIPASE 30 02/17/2020 0903    LIPASE 572 (H) 01/23/2020 0808    LIPASE 550 (H) 11/19/2019 0635    LIPASE 19 10/29/2019 1127    LIPASE 180 09/30/2019 1010    LIPASE 42 07/21/2019 1033    LIPASE 111 03/24/2019 1140    LIPASE 272 02/01/2019 0105    LIPASE 389 (H) 09/18/2018 0707    LIPASE 29 09/16/2018 1051    LIPASE 124 06/28/2018 0827    LIPASE 657 (H) 06/03/2018 0327    LIPASE 1,204 (H) 06/02/2018 0250    LIPASE 1,322 (H) 05/31/2018 0423    LIPASE 1,170 (H) 05/28/2018 1942    LIPASE 489 (H) 02/22/2018 0407    LIPASE 3,343 (H) 02/21/2018 1107    LIPASE 10,441 (H) 02/19/2018 1010    LIPASE 25 06/18/2017 1951       Radiology:  US Gallbladder   Final Result   1. Gallbladder is distended and there is cholelithiasis without further   evidence for cholecystitis.   2. Hepatic cirrhotic morphology with steatosis.       This report was finalized on 5/30/2025 8:26 AM by Anson Reynolds M.D   on Workstation: [id number]          CT Abdomen Pelvis With Contrast   Final Result       1. Liquid stool is noted throughout the colon, in keeping with history   of diarrhea.   2. Somewhat thick-walled appearance to the stomach again noted. Some of   this may be related to incomplete distention, but gastritis is not   excluded.   3. The patient's gallbladder does appear distended, and there is   cholelithiasis. Consider further assessment with gallbladder ultrasound.       Radiation dose reduction techniques were utilized, including automated   exposure control and exposure modulation based on body size.           This report was finalized on 5/30/2025 5:58 AM by Dr. Jessy Coe M.D on Workstation: [id number]              Assessment & Plan     Active Hospital Problems    Diagnosis     **Alcohol withdrawal     Transaminitis      Cholelithiasis     Abdominal pain     Essential hypertension     Esophageal varices     Alcoholic cirrhosis     Pancreatic insufficiency     Hypothyroidism        Assessment:  Abdominal pain with associated nausea and vomiting and diarrhea, showing improvement  Decompensated cirrhosis with known varices, ascites, and encephalopathy  Cholelithiasis  Elevated LFTs  GERD with gastritis defined by CT  History of alcohol and tobacco abuse  History of pancreatitis  Thrombocytopenia      Plan:  Continue IV PPI  Advance diet slowly as tolerated  Continue symptom medications  Await input from surgical service  I discussed the patients findings and my recommendations with nursing staff.    Noah Lauren MD

## 2025-05-31 NOTE — CONSULTS
Colorectal & General Surgery  Consultation    Patient: Bekah Gibson  YOB: 1970  MRN: [id number]      Assessment  Bekah Gibson is a 54 y.o. female with decompensated hepatic cirrhosis with esophageal varices, ascites, and history of hepatic encephalopathy who presents in consultation to me for slightly distended gallbladder with evidence cholelithiasis.  I do not see any evidence of acute cholecystitis.  Do not think that her abdominal pain or any of her symptoms are associated with her gallbladder primarily.  She does have rising hyperbilirubinemia, will fractionate.  In the setting of her decompensated cirrhosis and lack of clinical signs of acute cholecystitis, I suspect that the risk of surgery far outweighs the narrow benefit.    I will continue to follow.  Please call with questions or concerns.    History of Present Illness   Bekah Gibson is a 54 y.o. female who presents to the hospital with worsening epigastric abdominal pain that radiates around to her entire abdomen as well as nausea and vomiting.  Compared to her baseline, she says it is only slightly worse.  She reports to me that she came to the hospital because of panic attacks.    Past Medical History   Past Medical History:   Diagnosis Date    Acromioclavicular separation 1/2021    Ankle sprain 2/2004    no operation necessary  At Time unsure    Anxiety     Arthritis     Arthritis of back Unsure    Diseased    Cirrhosis     Disease of thyroid gland     ETOH abuse     Fracture, clavicle 1/2021    shattered clavicel on issue slip and fall    Fracture, foot Unsure    Frozen shoulder 1 /2009    4    GERD (gastroesophageal reflux disease)     History of kidney stones     5 YR AGO    Hypertension     Left shoulder pain     Low back strain 1/21    Lumbosacral disc disease 1/21    MDD (major depressive disorder)     Neck strain Unsure    Pancreatitis     Periarthritis of shoulder see above    Rotator cuff syndrome odre for shoulder  replacement    unable to receive due to low platelets    Tennis elbow Unsure    Unsurpassed    Thrombocytopenia         Past Surgical History   Past Surgical History:   Procedure Laterality Date    CLAVICLE SURGERY Right 2018    ENDOSCOPY N/A 10/26/2022    Procedure: ESOPHAGOGASTRODUODENOSCOPY wtih banding;  Surgeon: Ag Patel MD;  Location: [facility] ENDOSCOPY;  Service: Gastroenterology;  Laterality: N/A;  pre: melena  post: esophageal varicies with banding x2 bands    ENDOSCOPY N/A 01/18/2023    Procedure: ESOPHAGOGASTRODUODENOSCOPY;  Surgeon: Ag Patel MD;  Location: [facility] ENDOSCOPY;  Service: Gastroenterology;  Laterality: N/A;  PRE OP - MAROON STOOLS  POST OP - SM ESOPHAGEAL VARICES    ESOPHAGEAL VARICES LIGATION      FOOT SURGERY  2/2/14    JOINT REPLACEMENT Bilateral     GREAT TOE    KIDNEY STONE SURGERY      LITHOTRIPSY AND STENT (R SIDE)    LAPAROSCOPIC TUBAL LIGATION  2005    NECK SURGERY  3/07    Not sure of dates    SIGMOIDOSCOPY N/A 01/18/2023    Procedure: SIGMOIDOSCOPY FLEXIBLE;  Surgeon: Ag Patel MD;  Location: [facility] ENDOSCOPY;  Service: Gastroenterology;  Laterality: N/A;  PRE OP - MAROON STOOLS  POST OP - RECTAL VARICES    TOTAL SHOULDER ARTHROPLASTY Left 05/09/2023    Procedure: reverse total shoulder arthroplasty;  Surgeon: Giovanni Denise MD;  [place] [facility] [place] [facility]  Service: Orthopedics;  Laterality: Left;    TRIGGER POINT INJECTION  8/24       Social History  Social History     Socioeconomic History    Marital status:    Tobacco Use    Smoking status: Some Days     Current packs/day: 0.25     Average packs/day: 0.3 packs/day for 15.0 years (3.8 ttl pk-yrs)     Types: Cigarettes     Passive exposure: Current    Smokeless tobacco: Never    Tobacco comments:     Rarely smoke sometimes will to   Vaping Use    Vaping status: Never Used   Substance and Sexual Activity    Alcohol use: Yes     Alcohol/week: 5.0 - 10.0 standard drinks of alcohol     Types: 5 - 10 Standard  "drinks or equivalent per week    Drug use: Never    Sexual activity: Yes     Partners: Male     Birth control/protection: Post-menopausal     Comment: cinthia- menepausal       Family History  Family History   Problem Relation Age of Onset    Rheumatologic disease Mother         congestive heart diseased    Osteoporosis Mother             Thyroid disease Father     Leukemia Father     Cancer Father         Leukemia  deseased     Broken bones Father     Clotting disorder Father     Drug abuse Brother     Anette Hyperthermia Neg Hx        Colorectal cancer family history: None    Review of Systems  Negative except as documented in the HPI.     Allergies  Allergies   Allergen Reactions    Benzonatate Nausea Only and Other (See Comments)     Rash     Ketorolac Tromethamine Other (See Comments)     \"I cannot take because of liver problems\"    Phenergan [Promethazine Hcl] Hives    Cucumber Extract Rash    Promethazine-Phenylephrine Other (See Comments)     \"FEEL WEIRD\"       Medications    Current Facility-Administered Medications:     acetaminophen (TYLENOL) tablet 650 mg, 650 mg, Oral, Q4H PRN **OR** acetaminophen (TYLENOL) 160 MG/5ML oral solution 650 mg, 650 mg, Oral, Q4H PRN **OR** acetaminophen (TYLENOL) suppository 650 mg, 650 mg, Rectal, Q4H PRN, Charis Gonzalez MD    amLODIPine (NORVASC) tablet 2.5 mg, 2.5 mg, Oral, Daily, Charis Gonzalez MD, 2.5 mg at 25 0800    sennosides-docusate (PERICOLACE) 8.6-50 MG per tablet 2 tablet, 2 tablet, Oral, BID PRN **AND** polyethylene glycol (MIRALAX) packet 17 g, 17 g, Oral, Daily PRN **AND** bisacodyl (DULCOLAX) EC tablet 5 mg, 5 mg, Oral, Daily PRN **AND** bisacodyl (DULCOLAX) suppository 10 mg, 10 mg, Rectal, Daily PRN, Charis Gonzalez MD    busPIRone (BUSPAR) tablet 10 mg, 10 mg, Oral, BID, Charis Gonzalez MD, 10 mg at 25 0800    Calcium Replacement - Follow Nurse / BPA Driven Protocol, , Not Applicable, PRN, Charis Gonzalez MD    " escitalopram (LEXAPRO) tablet 20 mg, 20 mg, Oral, QAM, Charis Gonzalez MD, 20 mg at 05/31/25 0602    folic acid (FOLVITE) tablet 1 mg, 1 mg, Oral, Daily, Charis Gonzalez MD, 1 mg at 05/31/25 0800    gabapentin (NEURONTIN) capsule 100 mg, 100 mg, Oral, TID, Charis Gonzalez MD, 100 mg at 05/31/25 0800    HYDROmorphone (DILAUDID) injection 0.5 mg, 0.5 mg, Intravenous, Q3H PRN, Charis Gonzalez MD, 0.5 mg at 05/31/25 0954    levothyroxine (SYNTHROID, LEVOTHROID) tablet 100 mcg, 100 mcg, Oral, Q AM, Charis Gonzalez MD, 100 mcg at 05/31/25 0602    LORazepam (ATIVAN) tablet 1 mg, 1 mg, Oral, Q1H PRN, 1 mg at 05/31/25 0609 **OR** LORazepam (ATIVAN) injection 1 mg, 1 mg, Intravenous, Q1H PRN, 1 mg at 05/30/25 1718 **OR** LORazepam (ATIVAN) tablet 2 mg, 2 mg, Oral, Q1H PRN **OR** LORazepam (ATIVAN) injection 2 mg, 2 mg, Intravenous, Q1H PRN, 2 mg at 05/30/25 1428 **OR** LORazepam (ATIVAN) injection 2 mg, 2 mg, Intravenous, Q15 Min PRN **OR** LORazepam (ATIVAN) injection 2 mg, 2 mg, Intramuscular, Q15 Min PRN, Charis Gonzalez MD    Magnesium Standard Dose Replacement - Follow Nurse / BPA Driven Protocol, , Not Applicable, PRN, Charis Gonzalez MD    magnesium sulfate 2g/50 mL (PREMIX) infusion, 2 g, Intravenous, Q2H, Charis Gonzalez MD, 2 g at 05/31/25 1006    melatonin tablet 5 mg, 5 mg, Oral, Nightly PRN, Charis Gonzalez MD    mirtazapine (REMERON) tablet 15 mg, 15 mg, Oral, Nightly, Charis Gonzalez MD, 15 mg at 05/30/25 2046    multivitamin with minerals 1 tablet, 1 tablet, Oral, Daily, Charis Gonzalez MD, 1 tablet at 05/31/25 0800    nitroglycerin (NITROSTAT) SL tablet 0.4 mg, 0.4 mg, Sublingual, Q5 Min PRN, Charis Gonzalez MD    ondansetron ODT (ZOFRAN-ODT) disintegrating tablet 4 mg, 4 mg, Oral, Q6H PRN **OR** ondansetron (ZOFRAN) injection 4 mg, 4 mg, Intravenous, Q6H PRN, Charis Gonzalez MD, 4 mg at 05/31/25 0954    oxyCODONE (ROXICODONE) immediate release tablet 5 mg,  5 mg, Oral, Q6H PRN, Charis Gonzalez MD, 5 mg at 05/31/25 0609    pantoprazole (PROTONIX) injection 40 mg, 40 mg, Intravenous, Q AM, Kassandra Bone APRN, 40 mg at 05/31/25 0602    Phosphorus Replacement - Follow Nurse / BPA Driven Protocol, , Not Applicable, Carlos JERRY Margaux M, MD    Potassium Replacement - Follow Nurse / BPA Driven Protocol, , Not Applicable, Carlos JERRY Margaux M, MD    riFAXIMin (XIFAXAN) tablet 550 mg, 550 mg, Oral, Q12H, Kassandra Bone, APRN, 550 mg at 05/31/25 0800    [COMPLETED] Insert Peripheral IV, , , Once **AND** sodium chloride 0.9 % flush 10 mL, 10 mL, Intravenous, PRN, Nena Corey PA-C    sodium chloride 0.9 % flush 10 mL, 10 mL, Intravenous, Q12H, Charis Gonzalez MD, 10 mL at 05/31/25 0800    sodium chloride 0.9 % flush 10 mL, 10 mL, Intravenous, PRN, Charis Gonzalez MD    sodium chloride 0.9 % infusion 40 mL, 40 mL, Intravenous, PRN, Charis Gonzalez MD    thiamine (B-1) injection 200 mg, 200 mg, Intravenous, Q8H, 200 mg at 05/31/25 0609 **FOLLOWED BY** [START ON 6/4/2025] thiamine (VITAMIN B-1) tablet 100 mg, 100 mg, Oral, Daily, Charis Gonzalez MD    Facility-Administered Medications Ordered in Other Encounters:     sodium chloride 0.9 % flush 10 mL, 10 mL, Intravenous, Q12H, Callie Quiroz MD    sodium chloride 0.9 % flush 10 mL, 10 mL, Intravenous, PRN, Callie Quiroz MD    sodium chloride 0.9 % flush 20 mL, 20 mL, Intravenous, PRN, Callie Quiroz MD    Vital Signs  Vitals:    05/31/25 0748   BP: 139/93   Pulse: 101   Resp: 18   Temp: 97.9 °F (36.6 °C)   SpO2: 91%          Physical Exam  Constitutional: Resting comfortably, no acute distress  Neck: Supple, trachea midline  Respiratory: No increased work of breathing, Symmetric excursion  Cardiovascular: Well pefursed, no jugular venous distention evident   Abdominal: Slightly distended, soft, not particularly tender  Lymphatics: No cervical or  suprascapular adenopathy  Skin: Warm, dry, no rash on visualized skin surfaces  Musculoskeletal: Symmetric strength, no obvious gross abnormalities  Psychiatric: Alert and oriented ×3, normal affect     Laboratory Results  I have personally reviewed CBC with Ocie 3, Humoryl 14, platelets 32.  INR 1.73.  CMP with creatinine 0.69, AST 89, ALT 50, total bilirubin 3.1, direct bilirubin 1.1, bicarb 21, potassium 3.3.      Radiology  I have personally reviewed CT scan of the abdomen pelvis demonstrates some mild distention of the gallbladder but no pericholecystic fluid or gallbladder wall thickening concerning for cholecystitis.         Isrrael Holloway MD  Colorectal & General Surgery  [facility]    [address], [address]  [place], [place], [address]  P: [phone]  F: [phone]

## 2025-05-31 NOTE — PLAN OF CARE
Goal Outcome Evaluation:  Plan of Care Reviewed With: patient        Progress: no change  Outcome Evaluation: A&Ox4. CIWA trested per order. PRNs given for nausea and pain. Bed alarm set. VSS. Electrolytes replaced per MAR.

## 2025-05-31 NOTE — CONSULTS
"Access Center consult for alcohol use. Reviewed chart and spoke with patients RN, Laly. Pt had presented to ED with complaints of nausea, diarrhea, dizziness and panic attacks.  UDS not collected. Ethanol level 122 mg/dl on 5/30/25 at 0428. Introduced self and role to patient who was alone in room CVI 3107. Pt was oriented to all spheres. She kept eyes closed throughout conversation but was pleasant and answered all questions. Pt previous CIWA score was 5 at 1258. Pt had reports tremors, anxiety and sweats.     HPI: pt reports a relapse \"over the weekend\" or \"about 10 days ago.\" Pt had been in the [facility] around April 8th for 1 week for \"panic attacks\" and anxiety. She states the admission was for anxiety, but also states she was treated as dual diagnosis for her alcohol use. After detoxing, she was in the IOP program for ~ 1 week. Pt then had a   \"few weeks\" sober. Pt reports her last drinks on 5/29/25 was 4 \"Hardly [place] wine\" airplane size shots. She reports \"one bottle\" 2 days before that. She states her average use was \"3 to 4\" airplane shots daily in the last 10 days. Pt reports having an increase in panic, inability to leave the house, and spending her time sitting in a closet in her new house. Pt reported having some withdrawal hallucinations yesterday.    Substance use hx:   Pt reports inpatient admissions for alcohol abuse ~ \"7 times\", 4 of which were at [facility], and a few being facilities \"out of town.\" Pt had 6 months sober through the fall of 2024, and cites AA and having a sponsor - Ayala Ball as has aiding her success. Pt denies being in individual counseling. Pt denies history of withdrawal seizures, delirium, and blackouts. Per chart, pt has experienced alcohol delirium in 2018. See previous [facility] documents for more descriptive info on treatment hx.    Pt denies current THC use. She does report use \"a few weeks ago.\" Pt reports more social use and is not a daily user. Pt " "previous record noted Cocaine use. Pt denies current use and can't recall last time she used. Pt denies abuse of other illicit drugs and opiate abuse. She does acknowledge recent use of rx opiate, given by a friend, for treatment of her current R shoulder injury. Pt smokes 0.3 packs per day cigarettes.     Mental health hx: Pt denies SI, HI, and hallucinations today. She denies hx of SI and self harming behaviors. Pt denies specific mental health diagnosis. Pt describes herself as always being \"anxious, nervous, high strung.\" She has current stressors that may contribute to panic/anxiety such as the recent divorce of her and her  Burt, of 10 years. Pt denies abuse hx. Pt is attempting to sell the house that they lived in together and they are having no success. Pt has bought a new house in the meantime and fears she will lose it if the other house doesn't sell. Pt also reports feeling lonely in the new house on hew own. Pt has had recent nightmares. Pt current anxiety level \"6/10\" down from 9 earlier. Depression level rated as \"710\". Pt continues to take Buspar 10 mg BID, Lexapro 10 mg daily, Gabapentin 100 mg TID and Remeron 15 mg qhs \"3 times weekly prn\" for sleep. Pt reports taking her medications, but feels they must not be working in that she has had increased panic and anxiety. Pt feels her symptoms are somewhat controlled via current regimen including detox meds. Pt continues to see Dr. Aline Mahajan. Pt vaguely reports \"probably some grief\"related to her  parents.     Social: Pt lives in [facility] by self, having split from her . Pt has 2 children, Maria Eugenia Ratliff and Andrade from her first marriage. Pt has two supportive brothers. Pt parents . 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 She has her masters degree. Pt denies legal issues and  hx.    Plan: Pt was tired and not interested today in exploring resources for alcohol abuse or speaking " "more about her medications. Pt abdominal pain was level \"8/10\". She denies likelihood of attending another IP rehab. She states \"I'll probably continue to do AA and see my sponsor.\" Pt reports knowing what to do and how to do it. She states \"I need to stay away from certain people.\" Access will follow.          "

## 2025-05-31 NOTE — PLAN OF CARE
Goal Outcome Evaluation:  Plan of Care Reviewed With: patient        Progress: no change  Outcome Evaluation: BP slightly elevated, otherwise VSS.  Some complaints of pain, pain treated with PRN medication per MAR.  CIWA this shift has been between 3-8, Ativan given per protocol.  Pt is up with assist x1.  1L of oxygen placed on pt for oxygen below 90%.  Awaiting surgery consult.

## 2025-05-31 NOTE — PLAN OF CARE
Goal Outcome Evaluation:  Plan of Care Reviewed With: patient      Pt admit from home due to onset of decompensated hepatic cirrhosis with esophageal varices, ascites, alcohol withdrawal, transaminitis, chronic pancreatitis. Hx of alcohold use disorder. Pt lives alone with PLOF complete indep community distances. Several steps to enter home and flight to basement. Pt reported abdominal pain, nausea , dizziness and anxiety today. BUE/BLE observed and wfl for age and grossly > 3/5 throughout. Pt desat to 89% today during amb 4' to recliner with O2 in place. Cga for STS and amb. Further amb declined due to dizziness. Uic for meal post PT. Pt educated on PLB and improved to 94% in sitting. Pt will likely benefit from cont skilled PT to address impaired endurance and decline in gait status. Balance NT today due to impaired endurance.

## 2025-06-01 LAB
ALBUMIN SERPL-MCNC: 4 G/DL (ref 3.5–5.2)
ALBUMIN/GLOB SERPL: 1.5 G/DL
ALP SERPL-CCNC: 110 U/L (ref 39–117)
ALT SERPL W P-5'-P-CCNC: 52 U/L (ref 1–33)
ANION GAP SERPL CALCULATED.3IONS-SCNC: 11 MMOL/L (ref 5–15)
AST SERPL-CCNC: 95 U/L (ref 1–32)
BILIRUB SERPL-MCNC: 2.2 MG/DL (ref 0–1.2)
BUN SERPL-MCNC: 7 MG/DL (ref 6–20)
BUN/CREAT SERPL: 10 (ref 7–25)
CALCIUM SPEC-SCNC: 8.7 MG/DL (ref 8.6–10.5)
CHLORIDE SERPL-SCNC: 102 MMOL/L (ref 98–107)
CO2 SERPL-SCNC: 24 MMOL/L (ref 22–29)
CREAT SERPL-MCNC: 0.7 MG/DL (ref 0.57–1)
DEPRECATED RDW RBC AUTO: 51.1 FL (ref 37–54)
EGFRCR SERPLBLD CKD-EPI 2021: 101.6 ML/MIN/1.73
ERYTHROCYTE [DISTWIDTH] IN BLOOD BY AUTOMATED COUNT: 13.8 % (ref 12.3–15.4)
GLOBULIN UR ELPH-MCNC: 2.6 GM/DL
GLUCOSE SERPL-MCNC: 114 MG/DL (ref 65–99)
HCT VFR BLD AUTO: 44.4 % (ref 34–46.6)
HGB BLD-MCNC: 14.3 G/DL (ref 12–15.9)
MAGNESIUM SERPL-MCNC: 2.1 MG/DL (ref 1.6–2.6)
MCH RBC QN AUTO: 32 PG (ref 26.6–33)
MCHC RBC AUTO-ENTMCNC: 32.2 G/DL (ref 31.5–35.7)
MCV RBC AUTO: 99.3 FL (ref 79–97)
PHOSPHATE SERPL-MCNC: 2.6 MG/DL (ref 2.5–4.5)
PLATELET # BLD AUTO: 31 10*3/MM3 (ref 140–450)
PMV BLD AUTO: 12.4 FL (ref 6–12)
POTASSIUM SERPL-SCNC: 4 MMOL/L (ref 3.5–5.2)
PROT SERPL-MCNC: 6.6 G/DL (ref 6–8.5)
RBC # BLD AUTO: 4.47 10*6/MM3 (ref 3.77–5.28)
SODIUM SERPL-SCNC: 137 MMOL/L (ref 136–145)
WBC NRBC COR # BLD AUTO: 3.41 10*3/MM3 (ref 3.4–10.8)

## 2025-06-01 PROCEDURE — 84100 ASSAY OF PHOSPHORUS: CPT | Performed by: STUDENT IN AN ORGANIZED HEALTH CARE EDUCATION/TRAINING PROGRAM

## 2025-06-01 PROCEDURE — 36415 COLL VENOUS BLD VENIPUNCTURE: CPT | Performed by: STUDENT IN AN ORGANIZED HEALTH CARE EDUCATION/TRAINING PROGRAM

## 2025-06-01 PROCEDURE — 83735 ASSAY OF MAGNESIUM: CPT | Performed by: STUDENT IN AN ORGANIZED HEALTH CARE EDUCATION/TRAINING PROGRAM

## 2025-06-01 PROCEDURE — 99232 SBSQ HOSP IP/OBS MODERATE 35: CPT | Performed by: INTERNAL MEDICINE

## 2025-06-01 PROCEDURE — 80053 COMPREHEN METABOLIC PANEL: CPT | Performed by: STUDENT IN AN ORGANIZED HEALTH CARE EDUCATION/TRAINING PROGRAM

## 2025-06-01 PROCEDURE — 25010000002 THIAMINE PER 100 MG: Performed by: STUDENT IN AN ORGANIZED HEALTH CARE EDUCATION/TRAINING PROGRAM

## 2025-06-01 PROCEDURE — 99232 SBSQ HOSP IP/OBS MODERATE 35: CPT | Performed by: SURGERY

## 2025-06-01 PROCEDURE — G0378 HOSPITAL OBSERVATION PER HR: HCPCS

## 2025-06-01 PROCEDURE — 96376 TX/PRO/DX INJ SAME DRUG ADON: CPT

## 2025-06-01 PROCEDURE — 85027 COMPLETE CBC AUTOMATED: CPT | Performed by: STUDENT IN AN ORGANIZED HEALTH CARE EDUCATION/TRAINING PROGRAM

## 2025-06-01 PROCEDURE — 25010000002 HYDROMORPHONE PER 4 MG: Performed by: STUDENT IN AN ORGANIZED HEALTH CARE EDUCATION/TRAINING PROGRAM

## 2025-06-01 RX ADMIN — HYDROMORPHONE HYDROCHLORIDE 0.5 MG: 1 INJECTION, SOLUTION INTRAMUSCULAR; INTRAVENOUS; SUBCUTANEOUS at 14:38

## 2025-06-01 RX ADMIN — THIAMINE HYDROCHLORIDE 200 MG: 100 INJECTION, SOLUTION INTRAMUSCULAR; INTRAVENOUS at 05:27

## 2025-06-01 RX ADMIN — HYDROMORPHONE HYDROCHLORIDE 0.5 MG: 1 INJECTION, SOLUTION INTRAMUSCULAR; INTRAVENOUS; SUBCUTANEOUS at 21:21

## 2025-06-01 RX ADMIN — OXYCODONE 5 MG: 5 TABLET ORAL at 07:57

## 2025-06-01 RX ADMIN — BUSPIRONE HYDROCHLORIDE 10 MG: 10 TABLET ORAL at 20:26

## 2025-06-01 RX ADMIN — MIRTAZAPINE 15 MG: 15 TABLET, FILM COATED ORAL at 20:26

## 2025-06-01 RX ADMIN — PANTOPRAZOLE SODIUM 40 MG: 40 INJECTION, POWDER, FOR SOLUTION INTRAVENOUS at 05:27

## 2025-06-01 RX ADMIN — FOLIC ACID 1 MG: 1 TABLET ORAL at 07:54

## 2025-06-01 RX ADMIN — GABAPENTIN 100 MG: 100 CAPSULE ORAL at 18:08

## 2025-06-01 RX ADMIN — Medication 10 ML: at 07:55

## 2025-06-01 RX ADMIN — Medication 1 TABLET: at 07:54

## 2025-06-01 RX ADMIN — Medication 10 ML: at 21:21

## 2025-06-01 RX ADMIN — BUSPIRONE HYDROCHLORIDE 10 MG: 10 TABLET ORAL at 07:54

## 2025-06-01 RX ADMIN — ESCITALOPRAM 20 MG: 10 TABLET, FILM COATED ORAL at 05:27

## 2025-06-01 RX ADMIN — GABAPENTIN 100 MG: 100 CAPSULE ORAL at 07:54

## 2025-06-01 RX ADMIN — AMLODIPINE BESYLATE 2.5 MG: 2.5 TABLET ORAL at 07:54

## 2025-06-01 RX ADMIN — HYDROMORPHONE HYDROCHLORIDE 0.5 MG: 1 INJECTION, SOLUTION INTRAMUSCULAR; INTRAVENOUS; SUBCUTANEOUS at 00:25

## 2025-06-01 RX ADMIN — RIFAXIMIN 550 MG: 550 TABLET ORAL at 20:26

## 2025-06-01 RX ADMIN — GABAPENTIN 100 MG: 100 CAPSULE ORAL at 20:26

## 2025-06-01 RX ADMIN — THIAMINE HYDROCHLORIDE 200 MG: 100 INJECTION, SOLUTION INTRAMUSCULAR; INTRAVENOUS at 21:21

## 2025-06-01 RX ADMIN — RIFAXIMIN 550 MG: 550 TABLET ORAL at 07:54

## 2025-06-01 RX ADMIN — THIAMINE HYDROCHLORIDE 200 MG: 100 INJECTION, SOLUTION INTRAMUSCULAR; INTRAVENOUS at 14:38

## 2025-06-01 RX ADMIN — HYDROMORPHONE HYDROCHLORIDE 0.5 MG: 1 INJECTION, SOLUTION INTRAMUSCULAR; INTRAVENOUS; SUBCUTANEOUS at 10:56

## 2025-06-01 RX ADMIN — LEVOTHYROXINE SODIUM 100 MCG: 100 TABLET ORAL at 05:27

## 2025-06-01 RX ADMIN — LORAZEPAM 1 MG: 1 TABLET ORAL at 20:29

## 2025-06-01 RX ADMIN — HYDROMORPHONE HYDROCHLORIDE 0.5 MG: 1 INJECTION, SOLUTION INTRAMUSCULAR; INTRAVENOUS; SUBCUTANEOUS at 18:12

## 2025-06-01 NOTE — NURSING NOTE
"Access Center follow-up regarding. Reviewed chart and spoke briefly with RN. Last Ciwa score 3 at 1438. Pt c/o headache, anxiety, and tremors. To this writer pt reports continued R shoulder pain and abdominal pain that is \"radiating down the side.\" Pt states her nausea is decreased and she was able to tolerate her altered lunch. Pt reports getting up some to the bathroom with staff assist. Pt does report mild dizziness when up. Pt reports anxiety and depression this afternoon as a \"3 or 4 out of 10.\" Pt denies SI. Pt is eager for discharge, \"hopefully tomorrow\" and reports feeling \"bored\" stuck in the hospital. Pt cites need to get her phone and stay in contact related to wondering if her house has sold. When asked about thoughts today on her previously mentioned sober plan, she again states she knows \"what do to do, just need to do it (referring to AA/sponsor).\" Pt reflective to her recent sober period, and states she feels she wouldn't have relapsed but for dealing with recent increase in panic attacks related to \"all the stressors\" in her life. Encouraged pt to follow up with her psych Np, who today she reports as EMILY Canales, in [place]. Pt inquires about small dose of Ativan, or other benzo post discharge that may help breakthrough anxiety, but which is unlikely to be prescribed in this setting. Access following.   "

## 2025-06-01 NOTE — PROGRESS NOTES
Colorectal & General Surgery  Progress Note    Patient: Bekah Gibson  YOB: 1970  MRN: [id number]      Assessment  Bekah Gibson is a 54 y.o. female with decompensated hepatic cirrhosis with esophageal varices, ascites, thrombocytopenia, and history of hepatic encephalopathy who presents with abdominal pain.  Low suspicion for cholelithiasis as the etiology of her abdominal pain.  She is very high risk for surgical intervention.  At this time, I do not believe that cholecystectomy is in her best interest.    Bilirubin is slightly downtrending and is certainly not a solely conjugated hyperbilirubinemia, suspect decompensated cirrhosis as source.    I will sign off.  Please call with any questions or concerns.      Subjective  No significant events.  Resting comfortably.    Objective    Vitals:    06/01/25 1322   BP: 135/91   Pulse: 94   Resp: 16   Temp: 98 °F (36.7 °C)   SpO2: 92%       Physical Exam  Resting upon my evaluation    Laboratory Results  I have personally reviewed CBC with WC 3, Humoryl 14, platelet 31.  CMP with creatinine 0.7, AST 95, ALT 52, total bilirubin 2.2.  Direct bilirubin 1.1 yesterday.    Radiology  None to review         Isrrael Holloway MD  Colorectal & General Surgery  [facility]    [address], [address]  [place], [place], [address]  P: [phone]  F: [phone]

## 2025-06-01 NOTE — PLAN OF CARE
Goal Outcome Evaluation:  Plan of Care Reviewed With: patient        Progress: no change  Outcome Evaluation: VSS, some complaints of pain.  PRN pain medication given per MAR.  CIWA score between a 6-10 overnight.  Ativan given per protocol.  Tolerating a clear liquid diet.

## 2025-06-01 NOTE — PROGRESS NOTES
[facility]  Inpatient Progress Note    Reason for Follow Up: Abdominal pain, evidence of gastritis on CT    Subjective     Interval History:   Appreciate surgical input.  Discussed with patient, discussed with RN.  Patient request advance of her diet, tolerating liquids without difficulty.  She follows with the GI service at [facility] and will return to their care upon discharge.    Current Facility-Administered Medications:     acetaminophen (TYLENOL) tablet 650 mg, 650 mg, Oral, Q4H PRN **OR** acetaminophen (TYLENOL) 160 MG/5ML oral solution 650 mg, 650 mg, Oral, Q4H PRN **OR** acetaminophen (TYLENOL) suppository 650 mg, 650 mg, Rectal, Q4H PRN, Charis Gonzalez MD    amLODIPine (NORVASC) tablet 2.5 mg, 2.5 mg, Oral, Daily, Charis Gonzalez MD, 2.5 mg at 06/01/25 0754    sennosides-docusate (PERICOLACE) 8.6-50 MG per tablet 2 tablet, 2 tablet, Oral, BID PRN **AND** polyethylene glycol (MIRALAX) packet 17 g, 17 g, Oral, Daily PRN **AND** bisacodyl (DULCOLAX) EC tablet 5 mg, 5 mg, Oral, Daily PRN **AND** bisacodyl (DULCOLAX) suppository 10 mg, 10 mg, Rectal, Daily PRN, Charis Gonzalez MD    busPIRone (BUSPAR) tablet 10 mg, 10 mg, Oral, BID, Charis Gonzalez MD, 10 mg at 06/01/25 0752    Calcium Replacement - Follow Nurse / BPA Driven Protocol, , Not Applicable, PRN, Charis Gonzalez MD    escitalopram (LEXAPRO) tablet 20 mg, 20 mg, Oral, QAM, Charis Gonzalez MD, 20 mg at 06/01/25 0527    folic acid (FOLVITE) tablet 1 mg, 1 mg, Oral, Daily, Charis Gonzalez MD, 1 mg at 06/01/25 0750    gabapentin (NEURONTIN) capsule 100 mg, 100 mg, Oral, TID, Charis Gonzalez MD, 100 mg at 06/01/25 0754    HYDROmorphone (DILAUDID) injection 0.5 mg, 0.5 mg, Intravenous, Q3H PRN, Charis Gonzalez MD, 0.5 mg at 06/01/25 0025    levothyroxine (SYNTHROID, LEVOTHROID) tablet 100 mcg, 100 mcg, Oral, Q AM, Charis Gonzalez MD, 100 mcg at 06/01/25 0527    LORazepam (ATIVAN) tablet 1 mg,  1 mg, Oral, Q1H PRN, 1 mg at 05/31/25 2222 **OR** LORazepam (ATIVAN) injection 1 mg, 1 mg, Intravenous, Q1H PRN, 1 mg at 05/31/25 1805 **OR** LORazepam (ATIVAN) tablet 2 mg, 2 mg, Oral, Q1H PRN **OR** LORazepam (ATIVAN) injection 2 mg, 2 mg, Intravenous, Q1H PRN, 2 mg at 05/31/25 1507 **OR** LORazepam (ATIVAN) injection 2 mg, 2 mg, Intravenous, Q15 Min PRN **OR** LORazepam (ATIVAN) injection 2 mg, 2 mg, Intramuscular, Q15 Min PRN, Charis Gonzalez MD    Magnesium Standard Dose Replacement - Follow Nurse / BPA Driven Protocol, , Not Applicable, PRN, Charis Gonzalez MD    melatonin tablet 5 mg, 5 mg, Oral, Nightly PRN, Charis Gonzalez MD    mirtazapine (REMERON) tablet 15 mg, 15 mg, Oral, Nightly, Charis Gonzalez MD, 15 mg at 05/31/25 2008    multivitamin with minerals 1 tablet, 1 tablet, Oral, Daily, Charis Gonzalez MD, 1 tablet at 06/01/25 0754    nitroglycerin (NITROSTAT) SL tablet 0.4 mg, 0.4 mg, Sublingual, Q5 Min PRN, Charis Gonzalez MD    ondansetron ODT (ZOFRAN-ODT) disintegrating tablet 4 mg, 4 mg, Oral, Q6H PRN **OR** ondansetron (ZOFRAN) injection 4 mg, 4 mg, Intravenous, Q6H PRN, Charis Gonzalez MD, 4 mg at 05/31/25 1805    oxyCODONE (ROXICODONE) immediate release tablet 5 mg, 5 mg, Oral, Q6H PRN, Charis Gonzalez MD, 5 mg at 06/01/25 0757    pantoprazole (PROTONIX) injection 40 mg, 40 mg, Intravenous, Q AM, Kassandra Bone, AMBAR, 40 mg at 06/01/25 0527    Phosphorus Replacement - Follow Nurse / BPA Driven Protocol, , Not Applicable, PRANDREW, Charis Gonzalez MD    Potassium Replacement - Follow Nurse / BPA Driven Protocol, , Not Applicable, CLARITZA, Charis Gonzalez MD    riFAXIMin (XIFAXAN) tablet 550 mg, 550 mg, Oral, Q12H, Kassandra Bone, APRN, 550 mg at 06/01/25 0754    [COMPLETED] Insert Peripheral IV, , , Once **AND** sodium chloride 0.9 % flush 10 mL, 10 mL, Intravenous, PRN, Nena Corey PA-C    sodium chloride 0.9 % flush 10 mL, 10 mL, Intravenous, Q12H, Carlos  Charis STAHL MD, 10 mL at 06/01/25 0755    sodium chloride 0.9 % flush 10 mL, 10 mL, Intravenous, PRN, Charis Gonzalez MD    sodium chloride 0.9 % infusion 40 mL, 40 mL, Intravenous, PRN, Charis Gonzalez MD    thiamine (B-1) injection 200 mg, 200 mg, Intravenous, Q8H, 200 mg at 06/01/25 0527 **FOLLOWED BY** [START ON 6/4/2025] thiamine (VITAMIN B-1) tablet 100 mg, 100 mg, Oral, Daily, Charis Gonzalez MD    Facility-Administered Medications Ordered in Other Encounters:     sodium chloride 0.9 % flush 10 mL, 10 mL, Intravenous, Q12H, Callie Quiroz MD    sodium chloride 0.9 % flush 10 mL, 10 mL, Intravenous, PRN, Callie Quiroz MD    sodium chloride 0.9 % flush 20 mL, 20 mL, Intravenous, PRN, Callie Quiroz MD  Review of Systems:    All systems were reviewed and negative except for:  Gastrointestinal: positive for  diarrhea, nausea, pain, and vomiting    Objective     Vital Signs  Temp:  [97.8 °F (36.6 °C)-97.9 °F (36.6 °C)] 97.8 °F (36.6 °C)  Heart Rate:  [] 91  Resp:  [16] 16  BP: (132-150)/(87-96) 134/90  Body mass index is 25.63 kg/m².    Intake/Output Summary (Last 24 hours) at 6/1/2025 0850  Last data filed at 6/1/2025 0101  Gross per 24 hour   Intake 240 ml   Output --   Net 240 ml     No intake/output data recorded.     Physical Exam:   General: patient awake, alert and cooperative   Eyes: Normal lids and lashes, no scleral icterus   Neck: supple, normal ROM   Skin: warm and dry, not jaundiced   Cardiovascular: regular rhythm and rate, no murmurs auscultated   Pulm: clear to auscultation bilaterally, regular and unlabored   Abdomen: soft, nontender, nondistended; normal bowel sounds   Extremities: no rash or edema   Psychiatric: Normal mood and behavior; memory intact     Results Review:     I reviewed the patient's new clinical results.    Results from last 7 days   Lab Units 06/01/25  0319 05/31/25  0342 05/30/25  0345   WBC 10*3/mm3 3.41 3.14*  4.94   HEMOGLOBIN g/dL 14.3 14.5 16.5*   HEMATOCRIT % 44.4 41.4 47.4*   PLATELETS 10*3/mm3 31* 32* 53*     Results from last 7 days   Lab Units 06/01/25  0319 05/31/25  1510 05/31/25  0342 05/30/25  0345   SODIUM mmol/L 137  --  140 142   POTASSIUM mmol/L 4.0 5.0 3.3* 3.2*   CHLORIDE mmol/L 102  --  104 106   CO2 mmol/L 24.0  --  21.0* 18.0*   BUN mg/dL 7.0  --  9.0 12.0   CREATININE mg/dL 0.70  --  0.69 0.74   CALCIUM mg/dL 8.7  --  8.3* 8.9   BILIRUBIN mg/dL 2.2*  --  3.1* 2.0*   ALK PHOS U/L 110  --  97 121*   ALT (SGPT) U/L 52*  --  50* 70*   AST (SGOT) U/L 95*  --  89* 120*   GLUCOSE mg/dL 114*  --  95 134*     Results from last 7 days   Lab Units 05/31/25  0342   INR  1.73*     Lab Results   Lab Value Date/Time    LIPASE 19 05/30/2025 0345    LIPASE 22 05/23/2025 0725    LIPASE 31 04/05/2025 1617    LIPASE 22 04/03/2025 1218    LIPASE 23 11/17/2024 0904    LIPASE 43 10/24/2024 0851    LIPASE 98 (H) 08/05/2024 0242    LIPASE 44 08/04/2024 0404    LIPASE 60 07/22/2024 0355    LIPASE 32 07/15/2024 2333    LIPASE 30 07/03/2024 0411    LIPASE 38 04/07/2024 0818    LIPASE 30 04/01/2024 0639    LIPASE 27 01/22/2024 0120    LIPASE 15 11/27/2023 2034    LIPASE 17 11/26/2023 0224    LIPASE 48 10/16/2023 0400    LIPASE 22 10/15/2023 0617    LIPASE 119 (H) 10/14/2023 1621    LIPASE 18 10/08/2023 0537    LIPASE 18 10/07/2023 0924    LIPASE 18 09/25/2023 2026    LIPASE 30 09/20/2023 1433    LIPASE 37 05/01/2023 0242    LIPASE 10 04/19/2023 1320    LIPASE 31 01/21/2023 2234    LIPASE 8 12/11/2022 1154    LIPASE 26 11/20/2022 1054    LIPASE 15 10/24/2022 0937    LIPASE 10 10/17/2022 0706    LIPASE 20 07/07/2022 1432    LIPASE 15 07/06/2022 0644    LIPASE 23 05/04/2022 0653    LIPASE 30 05/02/2022 1108    LIPASE 14 04/30/2022 0821    LIPASE 13 04/28/2022 0328    LIPASE 20 04/26/2022 2154    LIPASE 67 (H) 01/11/2022 0748    LIPASE 57 (H) 12/05/2021 1146    LIPASE 58 (H) 11/28/2021 0918    LIPASE 36 09/21/2021 0430    LIPASE 62  (H) 08/24/2021 0756    LIPASE 27 05/28/2021 1708    LIPASE 212 04/03/2021 2347    LIPASE 226 12/03/2020 1055    LIPASE 297 08/29/2020 1925    LIPASE 262 08/28/2020 1821    LIPASE 500 (H) 07/28/2020 0916    LIPASE 769 (H) 05/30/2020 0803    LIPASE 30 02/17/2020 0903    LIPASE 572 (H) 01/23/2020 0808    LIPASE 550 (H) 11/19/2019 0635    LIPASE 19 10/29/2019 1127    LIPASE 180 09/30/2019 1010    LIPASE 42 07/21/2019 1033    LIPASE 111 03/24/2019 1140    LIPASE 272 02/01/2019 0105    LIPASE 389 (H) 09/18/2018 0707    LIPASE 29 09/16/2018 1051    LIPASE 124 06/28/2018 0827    LIPASE 657 (H) 06/03/2018 0327    LIPASE 1,204 (H) 06/02/2018 0250    LIPASE 1,322 (H) 05/31/2018 0423    LIPASE 1,170 (H) 05/28/2018 1942    LIPASE 489 (H) 02/22/2018 0407    LIPASE 3,343 (H) 02/21/2018 1107    LIPASE 10,441 (H) 02/19/2018 1010    LIPASE 25 06/18/2017 1951       Radiology:  US Gallbladder   Final Result   1. Gallbladder is distended and there is cholelithiasis without further   evidence for cholecystitis.   2. Hepatic cirrhotic morphology with steatosis.       This report was finalized on 5/30/2025 8:26 AM by Anson Reynolds M.D   on Workstation: [id number]          CT Abdomen Pelvis With Contrast   Final Result       1. Liquid stool is noted throughout the colon, in keeping with history   of diarrhea.   2. Somewhat thick-walled appearance to the stomach again noted. Some of   this may be related to incomplete distention, but gastritis is not   excluded.   3. The patient's gallbladder does appear distended, and there is   cholelithiasis. Consider further assessment with gallbladder ultrasound.       Radiation dose reduction techniques were utilized, including automated   exposure control and exposure modulation based on body size.           This report was finalized on 5/30/2025 5:58 AM by Dr. Jessy Coe M.D on Workstation: [id number]              Assessment & Plan     Active Hospital Problems    Diagnosis      **Alcohol withdrawal     Transaminitis     Cholelithiasis     Abdominal pain     Essential hypertension     Esophageal varices     Alcoholic cirrhosis     Pancreatic insufficiency     Hypothyroidism        Assessment:  Abdominal pain with associated nausea, vomiting, and diarrhea: All parameters improved  Gastritis per CT scan  Cirrhosis with known varices, ascites, and encephalopathy  Elevated LFTs: Static  History of pancreatitis  Thrombocytopenia  Cholelithiasis: Surgery evaluated, currently not a good candidate for intervention.      Plan:  Advance diet as tolerated  Anticipate follow-up with her GI service at [facility] upon discharge  Continue PPI  I discussed the patients findings and my recommendations with patient and nursing staff.    Noah Lauren MD

## 2025-06-01 NOTE — PROGRESS NOTES
Name: Bekah Gibson ADMIT: 2025   : 1970  PCP: Ryan Tylor    MRN: [id number] LOS: 0 days   AGE/SEX: 54 y.o. female  ROOM: [facility]/     Subjective   Subjective   Resting in bed, asleep but awakens to voice. She is asking to advance her diet but is still having abdominal pain; dw RN- can trial FLD at lunch but if she stats having worsening abd pain or nausea/vomiting would regress diet.        Objective   Objective   Vital Signs  Temp:  [97.8 °F (36.6 °C)-97.9 °F (36.6 °C)] 97.8 °F (36.6 °C)  Heart Rate:  [] 91  Resp:  [16] 16  BP: (132-150)/(87-96) 134/90  SpO2:  [94 %-95 %] 95 %  on  Flow (L/min) (Oxygen Therapy):  [1] 1;   Device (Oxygen Therapy): room air  Body mass index is 25.63 kg/m².  Physical Exam  Constitutional:       General: She is not in acute distress.     Appearance: She is ill-appearing.   Cardiovascular:      Rate and Rhythm: Normal rate and regular rhythm.   Pulmonary:      Effort: Pulmonary effort is normal. No respiratory distress.      Breath sounds: Normal breath sounds. No wheezing.   Abdominal:      General: Abdomen is flat. Bowel sounds are normal.      Palpations: Abdomen is soft.      Tenderness: There is abdominal tenderness.   Musculoskeletal:         General: No swelling or tenderness.      Right lower leg: No edema.      Left lower leg: No edema.   Skin:     General: Skin is warm and dry.   Neurological:      General: No focal deficit present.      Mental Status: She is alert and oriented to person, place, and time. Mental status is at baseline.         Results Review     I reviewed the patient's new clinical results.  Results from last 7 days   Lab Units 25  03125  0342 25  0345   WBC 10*3/mm3 3.41 3.14* 4.94   HEMOGLOBIN g/dL 14.3 14.5 16.5*   PLATELETS 10*3/mm3 31* 32* 53*     Results from last 7 days   Lab Units 25  0319 25  1510 25  0342 25  0345   SODIUM mmol/L 137  --  140 142   POTASSIUM mmol/L 4.0 5.0 3.3*  3.2*   CHLORIDE mmol/L 102  --  104 106   CO2 mmol/L 24.0  --  21.0* 18.0*   BUN mg/dL 7.0  --  9.0 12.0   CREATININE mg/dL 0.70  --  0.69 0.74   GLUCOSE mg/dL 114*  --  95 134*   Estimated Creatinine Clearance: 88.1 mL/min (by C-G formula based on SCr of 0.7 mg/dL).  Results from last 7 days   Lab Units 06/01/25  0319 05/31/25  0342 05/30/25  0345   ALBUMIN g/dL 4.0 4.0 4.4   BILIRUBIN mg/dL 2.2* 3.1* 2.0*   ALK PHOS U/L 110 97 121*   AST (SGOT) U/L 95* 89* 120*   ALT (SGPT) U/L 52* 50* 70*     Results from last 7 days   Lab Units 06/01/25  0319 05/31/25  1250 05/31/25  0342 05/30/25  0345   CALCIUM mg/dL 8.7  --  8.3* 8.9   ALBUMIN g/dL 4.0  --  4.0 4.4   MAGNESIUM mg/dL 2.1  --  1.4*  --    PHOSPHORUS mg/dL 2.6 2.7 2.2*  --        COVID19   Date Value Ref Range Status   09/21/2021 Not Detected Not Detected - Ref. Range Final     SARS-CoV-2, NAVIN   Date Value Ref Range Status   01/18/2021 Not Detected Not Detected Final     Comment:     This nucleic acid amplification test was developed and its performance  characteristics determined by [facility]. Nucleic acid  amplification tests include PCR and TMA. This test has not been FDA  cleared or approved. This test has been authorized by FDA under an  Emergency Use Authorization (EUA). This test is only authorized for  the duration of time the declaration that circumstances exist  justifying the authorization of the emergency use of in vitro  diagnostic tests for detection of SARS-CoV-2 virus and/or diagnosis  of COVID-19 infection under section 564(b)(1) of the Act, 21 U.S.C.  360bbb-3(b) (1), unless the authorization is terminated or revoked  sooner.  When diagnostic testing is negative, the possibility of a false  negative result should be considered in the context of a patient's  recent exposures and the presence of clinical signs and symptoms  consistent with COVID-19. An individual without symptoms of COVID-19  and who is not shedding SARS-CoV-2 virus  "would expect to have a  negative (not detected) result in this assay.     No results found for: \"HGBA1C\", \"POCGLU\"    US Gallbladder  Narrative: RIGHT UPPER QUADRANT ULTRASOUND     HISTORY: Right upper quadrant pain. Nausea and vomiting.     COMPARISON: CT abdomen and pelvis 05/30/2025.     FINDINGS:  The liver measures 17.6 cm in craniocaudal dimension. Liver exhibits  undulating margins with hepatic cirrhotic morphology. There is also  increased hepatic echogenicity consistent with hepatic steatosis..   There is no intra or extrahepatic biliary duct dilatation. The common  duct measures 3 mm. The gallbladder is distended and contains small  gallstones. No gallbladder wall thickening or pericholecystic fluid.     Visualized segments the pancreas appear within normal limits.  The right  kidney measures 9.7 cm in length and there is no hydronephrosis. There  is no ascites.     Impression: 1. Gallbladder is distended and there is cholelithiasis without further  evidence for cholecystitis.  2. Hepatic cirrhotic morphology with steatosis.     This report was finalized on 5/30/2025 8:26 AM by Anson Reynolds M.D  on Workstation: [id number]     CT Abdomen Pelvis With Contrast  Narrative: CT OF THE ABDOMEN AND PELVIS WITH CONTRAST     HISTORY: Acute abdominal pain     COMPARISON: None available.     TECHNIQUE: Axial CT imaging was obtained through the abdomen and pelvis.  IV contrast was administered.     FINDINGS:  Images through the lung bases demonstrate some scarring. The liver is  cirrhotic in morphology. Patient does have a recanalized umbilical vein.  There are gastroesophageal varices again seen. As was previously  discussed, gastric wall appears somewhat thickened, although this may be  related to incomplete distention. On current study, patient's  gallbladder is mildly distended. There is cholelithiasis. Given history,  consideration for gallbladder ultrasound is suggested. The pancreas is  atrophic, with " extensive parenchymal calcifications, in keeping with  chronic pancreatitis. The kidneys enhance symmetrically. No  hydronephrosis is seen. Uterus appears normal. No adnexal masses are  seen. The patient does have liquid stool within the colon, which would  be in keeping with history of diarrhea. The appendix is normal. There is  no bowel obstruction. There is a tiny fat-containing umbilical hernia.  No acute osseous abnormalities are seen.     Impression:    1. Liquid stool is noted throughout the colon, in keeping with history  of diarrhea.  2. Somewhat thick-walled appearance to the stomach again noted. Some of  this may be related to incomplete distention, but gastritis is not  excluded.  3. The patient's gallbladder does appear distended, and there is  cholelithiasis. Consider further assessment with gallbladder ultrasound.     Radiation dose reduction techniques were utilized, including automated  exposure control and exposure modulation based on body size.        This report was finalized on 5/30/2025 5:58 AM by Dr. Jessy Coe M.D on Workstation: [id number]       Scheduled Medications  amLODIPine, 2.5 mg, Oral, Daily  busPIRone, 10 mg, Oral, BID  escitalopram, 20 mg, Oral, QAM  folic acid, 1 mg, Oral, Daily  gabapentin, 100 mg, Oral, TID  levothyroxine, 100 mcg, Oral, Q AM  mirtazapine, 15 mg, Oral, Nightly  multivitamin with minerals, 1 tablet, Oral, Daily  pantoprazole, 40 mg, Intravenous, Q AM  rifAXIMin, 550 mg, Oral, Q12H  sodium chloride, 10 mL, Intravenous, Q12H  thiamine (B-1) IV, 200 mg, Intravenous, Q8H   Followed by  [START ON 6/4/2025] thiamine, 100 mg, Oral, Daily    Infusions   Diet  Diet: Liquid; Full Liquid; Fluid Consistency: Thin (IDDSI 0)         I have personally reviewed:  [x]  Laboratory   []  Microbiology   []  Radiology   [x]  EKG/Telemetry   []  Cardiology/Vascular   []  Pathology   []  Records    Assessment/Plan     Active Hospital Problems    Diagnosis  POA    **Alcohol  withdrawal [F10.939]  Yes    Transaminitis [R74.01]  Yes    Cholelithiasis [K80.20]  Yes    Abdominal pain [R10.9]  Yes    Essential hypertension [I10]  Yes    Esophageal varices [I85.00]  Yes    Alcoholic cirrhosis [K70.30]  Yes    Pancreatic insufficiency [K86.89]  Yes    Hypothyroidism [E03.9]  Yes      Resolved Hospital Problems   No resolved problems to display.     Ms. Gibson is a 54 y.o.  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     Abdominal pain  RUQ ultrasound with cholelithiasis without cholecystitis  History of chronic pancreatitis  - Patient reporting mid epigastric pain, began to have mid epigastric pain about a 4 out of 10 increased to a 7 out of 10 overnight, nonradiating she also is complaining of diarrhea but thinks that is related to her lactulose that she takes chronically  - She has a history of chronic pancreatitis and recently started drinking alcohol again after being sober, she has cholelithiasis noted on ultrasound which showed no evidence of cholecystitis  - GI consulted, considering EGD but first recommend general surgery consult for consideration of cholecystectomy; cont IV PPI  - Patient is requesting advancement of her diet, discussed with RN we can do a trial of full liquid at lunch but would not advance if she continues to have abdominal pain or nausea; lipase is normal  - surgery does not think her symptoms are related to her gallbladder, no evidence of acute cholecystitis, surgery risk outweighs benefit     Alcohol use disorder with withdrawal  Cirrhosis related to alcohol use  History of esophageal varices  - patient previously on nadolol for esophageal variceal bleeding prophylaxis but has not been taking  - GI is recommending discontinue lactulose and starting Xifaxan for HE prophylaxis  - For alcohol use, start CIWA protocol with as  needed Ativan, thiamine, folate, multivitamin, ask access to see  - CIWA scoring between 3-8 overnight; continue current treatment, ask Access to see.      Thrombocytopenia  - plt 32, related to alcohol use, no evidence of active bleeding  - repeat CBC-31, stable, no evidence of bleed    Hypothyroidism  - Continue Synthroid     Depression/anxiety  - Continue Lexapro and BuSpar    SCDs for DVT prophylaxis.  No chemoprophylaxis given thrombocytopenia  Full code.  Discussed with patient and nursing staff.  Anticipated discharge home, TBD        Charis Gonzalez MD  [facility]  06/01/25  12:37 EDT    I wore protective equipment throughout this patient encounter including gloves.  Hand hygiene was performed before donning protective equipment and after removal when leaving the room.    Expected Discharge Date and Time       Expected Discharge Date Expected Discharge Time    Jun 2, 2025              Copied text in this note has been reviewed and is accurate as of 06/01/25.

## 2025-06-02 LAB
ALBUMIN SERPL-MCNC: 3.9 G/DL (ref 3.5–5.2)
ALBUMIN/GLOB SERPL: 1.4 G/DL
ALP SERPL-CCNC: 124 U/L (ref 39–117)
ALT SERPL W P-5'-P-CCNC: 57 U/L (ref 1–33)
ANION GAP SERPL CALCULATED.3IONS-SCNC: 13 MMOL/L (ref 5–15)
AST SERPL-CCNC: 111 U/L (ref 1–32)
BILIRUB SERPL-MCNC: 2 MG/DL (ref 0–1.2)
BUN SERPL-MCNC: 6 MG/DL (ref 6–20)
BUN/CREAT SERPL: 7.4 (ref 7–25)
CALCIUM SPEC-SCNC: 9.5 MG/DL (ref 8.6–10.5)
CHLORIDE SERPL-SCNC: 101 MMOL/L (ref 98–107)
CO2 SERPL-SCNC: 23 MMOL/L (ref 22–29)
CREAT SERPL-MCNC: 0.81 MG/DL (ref 0.57–1)
DEPRECATED RDW RBC AUTO: 45.8 FL (ref 37–54)
EGFRCR SERPLBLD CKD-EPI 2021: 85.3 ML/MIN/1.73
ERYTHROCYTE [DISTWIDTH] IN BLOOD BY AUTOMATED COUNT: 13.3 % (ref 12.3–15.4)
GLOBULIN UR ELPH-MCNC: 2.7 GM/DL
GLUCOSE SERPL-MCNC: 111 MG/DL (ref 65–99)
HCT VFR BLD AUTO: 41.2 % (ref 34–46.6)
HGB BLD-MCNC: 14.5 G/DL (ref 12–15.9)
LIPASE SERPL-CCNC: 14 U/L (ref 13–60)
MAGNESIUM SERPL-MCNC: 1.8 MG/DL (ref 1.6–2.6)
MCH RBC QN AUTO: 33.2 PG (ref 26.6–33)
MCHC RBC AUTO-ENTMCNC: 35.2 G/DL (ref 31.5–35.7)
MCV RBC AUTO: 94.3 FL (ref 79–97)
PHOSPHATE SERPL-MCNC: 3.5 MG/DL (ref 2.5–4.5)
PLATELET # BLD AUTO: 36 10*3/MM3 (ref 140–450)
PMV BLD AUTO: 10.9 FL (ref 6–12)
POTASSIUM SERPL-SCNC: 3.7 MMOL/L (ref 3.5–5.2)
PROT SERPL-MCNC: 6.6 G/DL (ref 6–8.5)
RBC # BLD AUTO: 4.37 10*6/MM3 (ref 3.77–5.28)
SODIUM SERPL-SCNC: 137 MMOL/L (ref 136–145)
WBC NRBC COR # BLD AUTO: 4.47 10*3/MM3 (ref 3.4–10.8)

## 2025-06-02 PROCEDURE — 36415 COLL VENOUS BLD VENIPUNCTURE: CPT | Performed by: STUDENT IN AN ORGANIZED HEALTH CARE EDUCATION/TRAINING PROGRAM

## 2025-06-02 PROCEDURE — 83735 ASSAY OF MAGNESIUM: CPT | Performed by: STUDENT IN AN ORGANIZED HEALTH CARE EDUCATION/TRAINING PROGRAM

## 2025-06-02 PROCEDURE — 25010000002 FAMOTIDINE 10 MG/ML SOLUTION: Performed by: NURSE PRACTITIONER

## 2025-06-02 PROCEDURE — 80053 COMPREHEN METABOLIC PANEL: CPT | Performed by: STUDENT IN AN ORGANIZED HEALTH CARE EDUCATION/TRAINING PROGRAM

## 2025-06-02 PROCEDURE — 25010000002 THIAMINE PER 100 MG: Performed by: STUDENT IN AN ORGANIZED HEALTH CARE EDUCATION/TRAINING PROGRAM

## 2025-06-02 PROCEDURE — 85027 COMPLETE CBC AUTOMATED: CPT | Performed by: STUDENT IN AN ORGANIZED HEALTH CARE EDUCATION/TRAINING PROGRAM

## 2025-06-02 PROCEDURE — 83690 ASSAY OF LIPASE: CPT | Performed by: STUDENT IN AN ORGANIZED HEALTH CARE EDUCATION/TRAINING PROGRAM

## 2025-06-02 PROCEDURE — 99232 SBSQ HOSP IP/OBS MODERATE 35: CPT | Performed by: INTERNAL MEDICINE

## 2025-06-02 PROCEDURE — 96376 TX/PRO/DX INJ SAME DRUG ADON: CPT

## 2025-06-02 PROCEDURE — 84100 ASSAY OF PHOSPHORUS: CPT | Performed by: STUDENT IN AN ORGANIZED HEALTH CARE EDUCATION/TRAINING PROGRAM

## 2025-06-02 RX ORDER — FAMOTIDINE 10 MG/ML
20 INJECTION, SOLUTION INTRAVENOUS ONCE
Status: COMPLETED | OUTPATIENT
Start: 2025-06-02 | End: 2025-06-02

## 2025-06-02 RX ORDER — CALCIUM CARBONATE 500 MG/1
2 TABLET, CHEWABLE ORAL 2 TIMES DAILY PRN
Status: DISCONTINUED | OUTPATIENT
Start: 2025-06-02 | End: 2025-06-03 | Stop reason: HOSPADM

## 2025-06-02 RX ORDER — PANTOPRAZOLE SODIUM 40 MG/1
40 TABLET, DELAYED RELEASE ORAL
Status: DISCONTINUED | OUTPATIENT
Start: 2025-06-03 | End: 2025-06-03 | Stop reason: HOSPADM

## 2025-06-02 RX ADMIN — FOLIC ACID 1 MG: 1 TABLET ORAL at 08:18

## 2025-06-02 RX ADMIN — GABAPENTIN 100 MG: 100 CAPSULE ORAL at 08:18

## 2025-06-02 RX ADMIN — LEVOTHYROXINE SODIUM 100 MCG: 100 TABLET ORAL at 06:05

## 2025-06-02 RX ADMIN — FAMOTIDINE 20 MG: 10 INJECTION INTRAVENOUS at 20:49

## 2025-06-02 RX ADMIN — OXYCODONE 5 MG: 5 TABLET ORAL at 19:33

## 2025-06-02 RX ADMIN — LORAZEPAM 1 MG: 1 TABLET ORAL at 08:22

## 2025-06-02 RX ADMIN — AMLODIPINE BESYLATE 2.5 MG: 2.5 TABLET ORAL at 08:18

## 2025-06-02 RX ADMIN — GABAPENTIN 100 MG: 100 CAPSULE ORAL at 15:12

## 2025-06-02 RX ADMIN — ESCITALOPRAM 20 MG: 10 TABLET, FILM COATED ORAL at 06:05

## 2025-06-02 RX ADMIN — BUSPIRONE HYDROCHLORIDE 10 MG: 10 TABLET ORAL at 08:18

## 2025-06-02 RX ADMIN — RIFAXIMIN 550 MG: 550 TABLET ORAL at 20:06

## 2025-06-02 RX ADMIN — THIAMINE HYDROCHLORIDE 200 MG: 100 INJECTION, SOLUTION INTRAMUSCULAR; INTRAVENOUS at 15:12

## 2025-06-02 RX ADMIN — THIAMINE HYDROCHLORIDE 200 MG: 100 INJECTION, SOLUTION INTRAMUSCULAR; INTRAVENOUS at 20:48

## 2025-06-02 RX ADMIN — Medication 10 ML: at 20:06

## 2025-06-02 RX ADMIN — MIRTAZAPINE 15 MG: 15 TABLET, FILM COATED ORAL at 20:06

## 2025-06-02 RX ADMIN — THIAMINE HYDROCHLORIDE 200 MG: 100 INJECTION, SOLUTION INTRAMUSCULAR; INTRAVENOUS at 06:04

## 2025-06-02 RX ADMIN — GABAPENTIN 100 MG: 100 CAPSULE ORAL at 20:06

## 2025-06-02 RX ADMIN — BUSPIRONE HYDROCHLORIDE 10 MG: 10 TABLET ORAL at 20:06

## 2025-06-02 RX ADMIN — OXYCODONE 5 MG: 5 TABLET ORAL at 06:52

## 2025-06-02 RX ADMIN — RIFAXIMIN 550 MG: 550 TABLET ORAL at 08:18

## 2025-06-02 RX ADMIN — Medication 1 TABLET: at 08:18

## 2025-06-02 RX ADMIN — OXYCODONE 5 MG: 5 TABLET ORAL at 01:25

## 2025-06-02 RX ADMIN — Medication 10 ML: at 08:17

## 2025-06-02 RX ADMIN — OXYCODONE 5 MG: 5 TABLET ORAL at 12:45

## 2025-06-02 RX ADMIN — PANTOPRAZOLE SODIUM 40 MG: 40 INJECTION, POWDER, FOR SOLUTION INTRAVENOUS at 06:04

## 2025-06-02 NOTE — PROGRESS NOTES
Gastroenterology   Inpatient Progress Note    Reason for Follow Up: Cirrhosis    Subjective     Interval History:   Patient feeling better today.  She is requesting to advance diet.    Current Facility-Administered Medications:     acetaminophen (TYLENOL) tablet 650 mg, 650 mg, Oral, Q4H PRN **OR** acetaminophen (TYLENOL) 160 MG/5ML oral solution 650 mg, 650 mg, Oral, Q4H PRN **OR** acetaminophen (TYLENOL) suppository 650 mg, 650 mg, Rectal, Q4H PRN, Charis Gonzalez MD    amLODIPine (NORVASC) tablet 2.5 mg, 2.5 mg, Oral, Daily, Charis Gonzalez MD, 2.5 mg at 06/02/25 0818    sennosides-docusate (PERICOLACE) 8.6-50 MG per tablet 2 tablet, 2 tablet, Oral, BID PRN **AND** polyethylene glycol (MIRALAX) packet 17 g, 17 g, Oral, Daily PRN **AND** bisacodyl (DULCOLAX) EC tablet 5 mg, 5 mg, Oral, Daily PRN **AND** bisacodyl (DULCOLAX) suppository 10 mg, 10 mg, Rectal, Daily PRN, Charis Gonzalez MD    busPIRone (BUSPAR) tablet 10 mg, 10 mg, Oral, BID, Charis Gonzalez MD, 10 mg at 06/02/25 0818    Calcium Replacement - Follow Nurse / BPA Driven Protocol, , Not Applicable, PRN, Charis Gonzalez MD    escitalopram (LEXAPRO) tablet 20 mg, 20 mg, Oral, QAM, Charis Gonzalez MD, 20 mg at 06/02/25 0605    folic acid (FOLVITE) tablet 1 mg, 1 mg, Oral, Daily, Charis Gonzalez MD, 1 mg at 06/02/25 0818    gabapentin (NEURONTIN) capsule 100 mg, 100 mg, Oral, TID, Charis Gonzalez MD, 100 mg at 06/02/25 0818    HYDROmorphone (DILAUDID) injection 0.5 mg, 0.5 mg, Intravenous, Q3H PRN, Charis Gonzalez MD, 0.5 mg at 06/01/25 2121    levothyroxine (SYNTHROID, LEVOTHROID) tablet 100 mcg, 100 mcg, Oral, Q AM, Charis Gonzalez MD, 100 mcg at 06/02/25 0605    LORazepam (ATIVAN) tablet 1 mg, 1 mg, Oral, Q1H PRN, 1 mg at 06/02/25 0822 **OR** LORazepam (ATIVAN) injection 1 mg, 1 mg, Intravenous, Q1H PRN, 1 mg at 05/31/25 1805 **OR** LORazepam (ATIVAN) tablet 2 mg, 2 mg, Oral, Q1H PRN **OR** LORazepam (ATIVAN)  injection 2 mg, 2 mg, Intravenous, Q1H PRN, 2 mg at 05/31/25 1507 **OR** LORazepam (ATIVAN) injection 2 mg, 2 mg, Intravenous, Q15 Min PRN **OR** LORazepam (ATIVAN) injection 2 mg, 2 mg, Intramuscular, Q15 Min PRN, Charis Gonzalez MD    Magnesium Standard Dose Replacement - Follow Nurse / BPA Driven Protocol, , Not Applicable, PRN, Charis Gonzalez MD    melatonin tablet 5 mg, 5 mg, Oral, Nightly PRN, Charis Gonzalez MD    mirtazapine (REMERON) tablet 15 mg, 15 mg, Oral, Nightly, Charis Gonzalez MD, 15 mg at 06/01/25 2026    multivitamin with minerals 1 tablet, 1 tablet, Oral, Daily, Charis Gonzalez MD, 1 tablet at 06/02/25 0818    nitroglycerin (NITROSTAT) SL tablet 0.4 mg, 0.4 mg, Sublingual, Q5 Min PRN, Charis Gonzalez MD    ondansetron ODT (ZOFRAN-ODT) disintegrating tablet 4 mg, 4 mg, Oral, Q6H PRN **OR** ondansetron (ZOFRAN) injection 4 mg, 4 mg, Intravenous, Q6H PRN, Charis Gonzalez MD, 4 mg at 05/31/25 1805    oxyCODONE (ROXICODONE) immediate release tablet 5 mg, 5 mg, Oral, Q6H PRN, Charis Gonzalez MD, 5 mg at 06/02/25 0652    pantoprazole (PROTONIX) injection 40 mg, 40 mg, Intravenous, Q AM, Kassandra Bone APRN, 40 mg at 06/02/25 0604    Phosphorus Replacement - Follow Nurse / BPA Driven Protocol, , Not Applicable, Carlos JERRY Margaux M, MD    Potassium Replacement - Follow Nurse / BPA Driven Protocol, , Not Applicable, Carlos JERRY Margaux M, MD    riFAXIMin (XIFAXAN) tablet 550 mg, 550 mg, Oral, Q12H, Kassandra Bone, APRN, 550 mg at 06/02/25 0818    [COMPLETED] Insert Peripheral IV, , , Once **AND** sodium chloride 0.9 % flush 10 mL, 10 mL, Intravenous, PRN, Nena Corey PA-C    sodium chloride 0.9 % flush 10 mL, 10 mL, Intravenous, Q12H, Charis Gonzalez MD, 10 mL at 06/02/25 0817    sodium chloride 0.9 % flush 10 mL, 10 mL, Intravenous, PRN, Charis Gonzalez MD    sodium chloride 0.9 % infusion 40 mL, 40 mL, Intravenous, PRN, Charis Gonzalez MD     thiamine (B-1) injection 200 mg, 200 mg, Intravenous, Q8H, 200 mg at 06/02/25 0604 **FOLLOWED BY** [START ON 6/4/2025] thiamine (VITAMIN B-1) tablet 100 mg, 100 mg, Oral, Daily, Charis Gonzalez MD    Facility-Administered Medications Ordered in Other Encounters:     sodium chloride 0.9 % flush 10 mL, 10 mL, Intravenous, Q12H, Callie Quiroz MD    sodium chloride 0.9 % flush 10 mL, 10 mL, Intravenous, PRN, Callie Quiroz MD    sodium chloride 0.9 % flush 20 mL, 20 mL, Intravenous, PRN, Callie Quiroz MD  Review of Systems:    The following systems were reviewed and negative;  gastrointestinal    Objective     Vital Signs  Temp:  [97.9 °F (36.6 °C)-98 °F (36.7 °C)] 98 °F (36.7 °C)  Heart Rate:  [] 105  Resp:  [16-18] 18  BP: (135-149)/(90-92) 149/90  Body mass index is 25.63 kg/m².    Intake/Output Summary (Last 24 hours) at 6/2/2025 0858  Last data filed at 6/2/2025 0609  Gross per 24 hour   Intake 430 ml   Output --   Net 430 ml     No intake/output data recorded.     Physical Exam:   General: patient awake, alert and cooperative   Eyes: no scleral icterus   Skin: warm and dry, not jaundiced   Abdomen: soft, nontender, nondistended; normal bowel sounds, no masses palpated, no periumbical lymphadenopathy   Psychiatric: Appropriate affect and behavior     Results Review:     I reviewed the patient's new clinical results.    Results from last 7 days   Lab Units 06/02/25  0412 06/01/25 0319 05/31/25  0342   WBC 10*3/mm3 4.47 3.41 3.14*   HEMOGLOBIN g/dL 14.5 14.3 14.5   HEMATOCRIT % 41.2 44.4 41.4   PLATELETS 10*3/mm3 36* 31* 32*     Results from last 7 days   Lab Units 06/02/25  0412 06/01/25  0319 05/31/25  1510 05/31/25  0342   SODIUM mmol/L 137 137  --  140   POTASSIUM mmol/L 3.7 4.0 5.0 3.3*   CHLORIDE mmol/L 101 102  --  104   CO2 mmol/L 23.0 24.0  --  21.0*   BUN mg/dL 6.0 7.0  --  9.0   CREATININE mg/dL 0.81 0.70  --  0.69   CALCIUM mg/dL 9.5 8.7  --  8.3*    BILIRUBIN mg/dL 2.0* 2.2*  --  3.1*   ALK PHOS U/L 124* 110  --  97   ALT (SGPT) U/L 57* 52*  --  50*   AST (SGOT) U/L 111* 95*  --  89*   GLUCOSE mg/dL 111* 114*  --  95     Results from last 7 days   Lab Units 05/31/25  0342   INR  1.73*     Lab Results   Lab Value Date/Time    LIPASE 14 06/02/2025 0412    LIPASE 19 05/30/2025 0345    LIPASE 22 05/23/2025 0725    LIPASE 31 04/05/2025 1617    LIPASE 22 04/03/2025 1218    LIPASE 23 11/17/2024 0904    LIPASE 43 10/24/2024 0851    LIPASE 98 (H) 08/05/2024 0242    LIPASE 44 08/04/2024 0404    LIPASE 60 07/22/2024 0355    LIPASE 32 07/15/2024 2333    LIPASE 30 07/03/2024 0411    LIPASE 38 04/07/2024 0818    LIPASE 30 04/01/2024 0639    LIPASE 27 01/22/2024 0120    LIPASE 15 11/27/2023 2034    LIPASE 17 11/26/2023 0224    LIPASE 48 10/16/2023 0400    LIPASE 22 10/15/2023 0617    LIPASE 119 (H) 10/14/2023 1621    LIPASE 18 10/08/2023 0537    LIPASE 18 10/07/2023 0924    LIPASE 18 09/25/2023 2026    LIPASE 30 09/20/2023 1433    LIPASE 37 05/01/2023 0242    LIPASE 10 04/19/2023 1320    LIPASE 31 01/21/2023 2234    LIPASE 8 12/11/2022 1154    LIPASE 26 11/20/2022 1054    LIPASE 15 10/24/2022 0937    LIPASE 10 10/17/2022 0706    LIPASE 20 07/07/2022 1432    LIPASE 15 07/06/2022 0644    LIPASE 23 05/04/2022 0653    LIPASE 30 05/02/2022 1108    LIPASE 14 04/30/2022 0821    LIPASE 13 04/28/2022 0328    LIPASE 20 04/26/2022 2154    LIPASE 67 (H) 01/11/2022 0748    LIPASE 57 (H) 12/05/2021 1146    LIPASE 58 (H) 11/28/2021 0918    LIPASE 36 09/21/2021 0430    LIPASE 62 (H) 08/24/2021 0756    LIPASE 27 05/28/2021 1708    LIPASE 212 04/03/2021 2347    LIPASE 226 12/03/2020 1055    LIPASE 297 08/29/2020 1925    LIPASE 262 08/28/2020 1821    LIPASE 500 (H) 07/28/2020 0916    LIPASE 769 (H) 05/30/2020 0803    LIPASE 30 02/17/2020 0903    LIPASE 572 (H) 01/23/2020 0808    LIPASE 550 (H) 11/19/2019 0635    LIPASE 19 10/29/2019 1127    LIPASE 180 09/30/2019 1010    LIPASE 42 07/21/2019  1033    LIPASE 111 03/24/2019 1140    LIPASE 272 02/01/2019 0105    LIPASE 389 (H) 09/18/2018 0707    LIPASE 29 09/16/2018 1051    LIPASE 124 06/28/2018 0827    LIPASE 657 (H) 06/03/2018 0327    LIPASE 1,204 (H) 06/02/2018 0250    LIPASE 1,322 (H) 05/31/2018 0423    LIPASE 1,170 (H) 05/28/2018 1942    LIPASE 489 (H) 02/22/2018 0407    LIPASE 3,343 (H) 02/21/2018 1107    LIPASE 10,441 (H) 02/19/2018 1010    LIPASE 25 06/18/2017 1951       Radiology:  US Gallbladder   Final Result   1. Gallbladder is distended and there is cholelithiasis without further   evidence for cholecystitis.   2. Hepatic cirrhotic morphology with steatosis.       This report was finalized on 5/30/2025 8:26 AM by Anson Reynolds M.D   on Workstation: [id number]          CT Abdomen Pelvis With Contrast   Final Result       1. Liquid stool is noted throughout the colon, in keeping with history   of diarrhea.   2. Somewhat thick-walled appearance to the stomach again noted. Some of   this may be related to incomplete distention, but gastritis is not   excluded.   3. The patient's gallbladder does appear distended, and there is   cholelithiasis. Consider further assessment with gallbladder ultrasound.       Radiation dose reduction techniques were utilized, including automated   exposure control and exposure modulation based on body size.           This report was finalized on 5/30/2025 5:58 AM by Dr. Jessy Coe M.D on Workstation: [id number]              Assessment & Plan     Active Hospital Problems    Diagnosis     **Alcohol withdrawal     Transaminitis     Cholelithiasis     Abdominal pain     Essential hypertension     Esophageal varices     Alcoholic cirrhosis     Pancreatic insufficiency     Hypothyroidism      These problems are new to me  Assessment:  Cirrhosis with complication of esophageal varices, ascites and portosystemic encephalopathy.  Elevated liver enzymes.  Stable.  Thrombocytopenia.  Chronic.  Cholelithiasis  without evidence of cholecystitis.  Nausea and vomiting.  Improved.  Patient requesting to advance diet.      Plan:  Okay to advance to regular diet.  Would try to limit sodium to 2 g sodium diet due to ascites.  No surgical plans for cholelithiasis.  Patient has follow-up with the GI service at [facility] and will follow-up with him after discharge.  No further recommendations at this point and we will see as needed.  I discussed the patients findings and my recommendations with patient and nursing staff.    MD Young Schuster M.D.  [facility] [place] [address]  Office: [phone]

## 2025-06-02 NOTE — PROGRESS NOTES
Name: Bekah Gibson ADMIT: 2025   : 1970  PCP: Tylor Davis    MRN: [id number] LOS: 0 days   AGE/SEX: 54 y.o. female  ROOM: [facility]     Subjective   Subjective   Resting in bed, asleep but awakens to voice.  Her diet has been advanced and she is hopeful to discharge as early as tomorrow however she did have some elevated CIWA scores requiring Ativan overnight.       Objective   Objective   Vital Signs  Temp:  [97.6 °F (36.4 °C)-98 °F (36.7 °C)] 97.6 °F (36.4 °C)  Heart Rate:  [] 105  Resp:  [16-20] 20  BP: (132-149)/(81-92) 132/81  SpO2:  [92 %-93 %] 93 %  on   ;   Device (Oxygen Therapy): room air  Body mass index is 25.63 kg/m².  Physical Exam  Constitutional:       General: She is not in acute distress.     Appearance: She is ill-appearing.   Cardiovascular:      Rate and Rhythm: Normal rate and regular rhythm.   Pulmonary:      Effort: Pulmonary effort is normal. No respiratory distress.      Breath sounds: Normal breath sounds. No wheezing.   Abdominal:      General: Abdomen is flat. Bowel sounds are normal.      Palpations: Abdomen is soft.      Tenderness: There is abdominal tenderness.   Musculoskeletal:         General: No swelling or tenderness.      Right lower leg: No edema.      Left lower leg: No edema.   Skin:     General: Skin is warm and dry.   Neurological:      General: No focal deficit present.      Mental Status: She is alert and oriented to person, place, and time. Mental status is at baseline.         Results Review     I reviewed the patient's new clinical results.  Results from last 7 days   Lab Units 25  0412 25  0342 25  0345   WBC 10*3/mm3 4.47 3.41 3.14* 4.94   HEMOGLOBIN g/dL 14.5 14.3 14.5 16.5*   PLATELETS 10*3/mm3 36* 31* 32* 53*     Results from last 7 days   Lab Units 25  0412 25  0319 25  1510 25  0342 25  0345   SODIUM mmol/L 137 137  --  140 142   POTASSIUM mmol/L 3.7 4.0 5.0 3.3* 3.2*    CHLORIDE mmol/L 101 102  --  104 106   CO2 mmol/L 23.0 24.0  --  21.0* 18.0*   BUN mg/dL 6.0 7.0  --  9.0 12.0   CREATININE mg/dL 0.81 0.70  --  0.69 0.74   GLUCOSE mg/dL 111* 114*  --  95 134*   Estimated Creatinine Clearance: 76.2 mL/min (by C-G formula based on SCr of 0.81 mg/dL).  Results from last 7 days   Lab Units 06/02/25 0412 06/01/25 0319 05/31/25  0342 05/30/25  0345   ALBUMIN g/dL 3.9 4.0 4.0 4.4   BILIRUBIN mg/dL 2.0* 2.2* 3.1* 2.0*   ALK PHOS U/L 124* 110 97 121*   AST (SGOT) U/L 111* 95* 89* 120*   ALT (SGPT) U/L 57* 52* 50* 70*     Results from last 7 days   Lab Units 06/02/25 0412 06/01/25 0319 05/31/25  1250 05/31/25  0342 05/30/25  0345   CALCIUM mg/dL 9.5 8.7  --  8.3* 8.9   ALBUMIN g/dL 3.9 4.0  --  4.0 4.4   MAGNESIUM mg/dL 1.8 2.1  --  1.4*  --    PHOSPHORUS mg/dL 3.5 2.6 2.7 2.2*  --        COVID19   Date Value Ref Range Status   09/21/2021 Not Detected Not Detected - Ref. Range Final     SARS-CoV-2, NAVIN   Date Value Ref Range Status   01/18/2021 Not Detected Not Detected Final     Comment:     This nucleic acid amplification test was developed and its performance  characteristics determined by [facility]. Nucleic acid  amplification tests include PCR and TMA. This test has not been FDA  cleared or approved. This test has been authorized by FDA under an  Emergency Use Authorization (EUA). This test is only authorized for  the duration of time the declaration that circumstances exist  justifying the authorization of the emergency use of in vitro  diagnostic tests for detection of SARS-CoV-2 virus and/or diagnosis  of COVID-19 infection under section 564(b)(1) of the Act, 21 U.S.C.  360bbb-3(b) (1), unless the authorization is terminated or revoked  sooner.  When diagnostic testing is negative, the possibility of a false  negative result should be considered in the context of a patient's  recent exposures and the presence of clinical signs and symptoms  consistent with COVID-19.  "An individual without symptoms of COVID-19  and who is not shedding SARS-CoV-2 virus would expect to have a  negative (not detected) result in this assay.     No results found for: \"HGBA1C\", \"POCGLU\"    US Gallbladder  Narrative: RIGHT UPPER QUADRANT ULTRASOUND     HISTORY: Right upper quadrant pain. Nausea and vomiting.     COMPARISON: CT abdomen and pelvis 05/30/2025.     FINDINGS:  The liver measures 17.6 cm in craniocaudal dimension. Liver exhibits  undulating margins with hepatic cirrhotic morphology. There is also  increased hepatic echogenicity consistent with hepatic steatosis..   There is no intra or extrahepatic biliary duct dilatation. The common  duct measures 3 mm. The gallbladder is distended and contains small  gallstones. No gallbladder wall thickening or pericholecystic fluid.     Visualized segments the pancreas appear within normal limits.  The right  kidney measures 9.7 cm in length and there is no hydronephrosis. There  is no ascites.     Impression: 1. Gallbladder is distended and there is cholelithiasis without further  evidence for cholecystitis.  2. Hepatic cirrhotic morphology with steatosis.     This report was finalized on 5/30/2025 8:26 AM by Anson Reynolds M.D  on Workstation: [id number]     CT Abdomen Pelvis With Contrast  Narrative: CT OF THE ABDOMEN AND PELVIS WITH CONTRAST     HISTORY: Acute abdominal pain     COMPARISON: None available.     TECHNIQUE: Axial CT imaging was obtained through the abdomen and pelvis.  IV contrast was administered.     FINDINGS:  Images through the lung bases demonstrate some scarring. The liver is  cirrhotic in morphology. Patient does have a recanalized umbilical vein.  There are gastroesophageal varices again seen. As was previously  discussed, gastric wall appears somewhat thickened, although this may be  related to incomplete distention. On current study, patient's  gallbladder is mildly distended. There is cholelithiasis. Given " history,  consideration for gallbladder ultrasound is suggested. The pancreas is  atrophic, with extensive parenchymal calcifications, in keeping with  chronic pancreatitis. The kidneys enhance symmetrically. No  hydronephrosis is seen. Uterus appears normal. No adnexal masses are  seen. The patient does have liquid stool within the colon, which would  be in keeping with history of diarrhea. The appendix is normal. There is  no bowel obstruction. There is a tiny fat-containing umbilical hernia.  No acute osseous abnormalities are seen.     Impression:    1. Liquid stool is noted throughout the colon, in keeping with history  of diarrhea.  2. Somewhat thick-walled appearance to the stomach again noted. Some of  this may be related to incomplete distention, but gastritis is not  excluded.  3. The patient's gallbladder does appear distended, and there is  cholelithiasis. Consider further assessment with gallbladder ultrasound.     Radiation dose reduction techniques were utilized, including automated  exposure control and exposure modulation based on body size.        This report was finalized on 5/30/2025 5:58 AM by Dr. Jessy Coe M.D on Workstation: [id number]       Scheduled Medications  amLODIPine, 2.5 mg, Oral, Daily  busPIRone, 10 mg, Oral, BID  escitalopram, 20 mg, Oral, QAM  folic acid, 1 mg, Oral, Daily  gabapentin, 100 mg, Oral, TID  levothyroxine, 100 mcg, Oral, Q AM  mirtazapine, 15 mg, Oral, Nightly  multivitamin with minerals, 1 tablet, Oral, Daily  pantoprazole, 40 mg, Intravenous, Q AM  rifAXIMin, 550 mg, Oral, Q12H  sodium chloride, 10 mL, Intravenous, Q12H  thiamine (B-1) IV, 200 mg, Intravenous, Q8H   Followed by  [START ON 6/4/2025] thiamine, 100 mg, Oral, Daily    Infusions   Diet  Diet: Gastrointestinal; Low Irritant; Fluid Consistency: Thin (IDDSI 0)         I have personally reviewed:  [x]  Laboratory   []  Microbiology   []  Radiology   [x]  EKG/Telemetry   []  Cardiology/Vascular    []  Pathology   []  Records    Assessment/Plan     Active Hospital Problems    Diagnosis  POA    **Alcohol withdrawal [F10.939]  Yes    Transaminitis [R74.01]  Yes    Cholelithiasis [K80.20]  Yes    Abdominal pain [R10.9]  Yes    Essential hypertension [I10]  Yes    Esophageal varices [I85.00]  Yes    Alcoholic cirrhosis [K70.30]  Yes    Pancreatic insufficiency [K86.89]  Yes    Hypothyroidism [E03.9]  Yes      Resolved Hospital Problems   No resolved problems to display.     Ms. Gibson is a 54 y.o.  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     Abdominal pain  RUQ ultrasound with cholelithiasis without cholecystitis  History of chronic pancreatitis  - Patient reporting mid epigastric pain, began to have mid epigastric pain about a 4 out of 10 increased to a 7 out of 10 overnight, nonradiating she also is complaining of diarrhea but thinks that is related to her lactulose that she takes chronically  - She has a history of chronic pancreatitis and recently started drinking alcohol again after being sober, she has cholelithiasis noted on ultrasound which showed no evidence of cholecystitis  - GI consulted, they recommend outpatient follow-up with her primary GI doc  - Diet to soft/low-fat  - surgery does not think her symptoms are related to her gallbladder, no evidence of acute cholecystitis, surgery risk outweighs benefit     Alcohol use disorder with withdrawal  Cirrhosis related to alcohol use  History of esophageal varices  - patient previously on nadolol for esophageal variceal bleeding prophylaxis but has not been taking  - GI is recommending discontinue lactulose and starting Xifaxan for HE prophylaxis  - For alcohol use, start CIWA protocol with as needed Ativan, thiamine, folate, multivitamin, ask access to see  - CIWA scoring between 5-9 overnight; continue  current treatment-patient would like to see her off Ativan for at least 12 hours before discharge     Thrombocytopenia  - plt 32, related to alcohol use, no evidence of active bleeding  - repeat CBC-36, stable, no evidence of bleed    Hypothyroidism  - Continue Synthroid     Depression/anxiety  - Continue Lexapro and BuSpar    SCDs for DVT prophylaxis.  No chemoprophylaxis given thrombocytopenia  Full code.  Discussed with patient and nursing staff.  Anticipated discharge home, TBD depending on patient's CIWA scores         Charis Gonzalez MD  [facility]  06/02/25  14:05 EDT    I wore protective equipment throughout this patient encounter including gloves.  Hand hygiene was performed before donning protective equipment and after removal when leaving the room.    Expected Discharge Date and Time       Expected Discharge Date Expected Discharge Time    Jun 2, 2025              Copied text in this note has been reviewed and is accurate as of 06/02/25.

## 2025-06-02 NOTE — PAYOR COMM NOTE
"Ba Jennings (54 y.o. Female)        PLEASE SEE ATTACHED CIWA SCORES.     REF#[id number]    PLEASE CALL  OR  [phone]    THANK YOU    ARTIS PABLO MÉNDEZ [facility]   Date of Birth   1970    Social Security Number       Address   [address] [address]    Home Phone   [phone]    MRN   [id number]       Spiritism   Sikh    Marital Status                               Admission Date   5/30/2025    Admission Type   Emergency    Admitting Provider   Charis Gonzalez MD    Attending Provider   Charis Gonzalez MD    Department, Room/Bed   [facility], 3107/1       Discharge Date       Discharge Disposition       Discharge Destination                                 Attending Provider: Charis Gonzalez MD    Allergies: Benzonatate, Ketorolac Tromethamine, Phenergan [Promethazine Hcl], Cucumber Extract, Promethazine-phenylephrine    Isolation: None   Infection: None   Code Status: CPR    Ht: 165.1 cm (65\")   Wt: 69.9 kg (154 lb)    Admission Cmt: None   Principal Problem: Alcohol withdrawal [F10.939]                   Active Insurance as of 5/30/2025       Primary Coverage       Payor Plan Insurance Group Employer/Plan Group    [place] [id number]       Payor Plan Address Payor Plan Phone Number Payor Plan Fax Number Effective Dates    [employer] [address] [address] [phone]  1/1/2025 - None Entered    [facility] [id number]         Subscriber Name Subscriber Birth Date Member ID       BA JENNINGS 1970 [id number]                     Emergency Contacts        (Rel.) Home Phone Work Phone Mobile Phone    Jeff Jennings (Friend) [phone] -- [phone]               Date/Time CIWA-Ar Total    06/02/25 1420 6     06/02/25 0819 8     06/02/25 0008 6     06/01/25 2121 5     06/01/25 2024 9     06/01/25 1438 3     06/01/25 0801 4     06/01/25 0008 6     05/31/25 2220 10     05/31/25 2009 7     " 05/31/25 1632 10     05/31/25 1507 12     05/31/25 1258 5     05/31/25 1052 9     05/31/25 0800 4     05/31/25 0607 10     05/31/25 0137 5     05/31/25 0054 8     05/30/25 2055 3     05/30/25 1845 3     05/30/25 1715 10     05/30/25 1534 8     05/30/25 1411 11     05/30/25 1400 9     05/30/25 12:43:04 14     05/30/25 08:50:52 9

## 2025-06-02 NOTE — PAYOR COMM NOTE
"Ba Jennings (54 y.o. Female)        PLEASE SEE ATTACHED FOR INPT NOTIFICATION.     REF#[id number]    PLEASE CALL  OR  [phone]      THANK YOU    ARTIS SHAH LPN [facility]   Date of Birth   1970    Social Security Number       Address   [address] [address]    Home Phone   [phone]    MRN   [id number]       Hinduism   Jain    Marital Status                               Admission Date   5/30/2025 OBS   6/2/25- INPT  Admission Type   Emergency    Admitting Provider   Charis Gonzalez MD    Attending Provider   Charis Gonzalez MD    Department, Room/Bed   [facility] 3107/[facility]       Discharge Date       Discharge Disposition       Discharge Destination                                 Attending Provider: Charis Gonzalez MD    Allergies: Benzonatate, Ketorolac Tromethamine, Phenergan [Promethazine Hcl], Cucumber Extract, Promethazine-phenylephrine    Isolation: None   Infection: None   Code Status: CPR    Ht: 165.1 cm (65\")   Wt: 69.9 kg (154 lb)    Admission Cmt: None   Principal Problem: Alcohol withdrawal [F10.939]                   Active Insurance as of 5/30/2025       Primary Coverage       Payor Plan Insurance Group Employer/Plan Group    [place] [id number]       Payor Plan Address Payor Plan Phone Number Payor Plan Fax Number Effective Dates    [employer] [address] [address] [phone]  1/1/2025 - None Entered    [facility] [id number]         Subscriber Name Subscriber Birth Date Member ID       BA JENNINGS 1970 [id number]                     Emergency Contacts        (Rel.) Home Phone Work Phone Mobile Phone    Jeff Jennings (Friend) [phone] -- [phone]              [place]: NPI [id number]  Tax ID [id number]     History & Physical        Charsi Gonzalez MD at 05/30/25 1456              Patient Name:  Ba Jennings  YOB: 1970  MRN:  " [id number]  Admit Date:  5/30/2025  Patient Care Team:  Tylor Davis as PCP - General (Family Medicine)  Deion Saldana MD as Consulting Physician (Urology)  Adalberto Hoffman MD as Consulting Physician (Gastroenterology)  Eliazar Quinonez MD (Hematology)      Subjective  History Present Illness     Chief Complaint   Patient presents with    Nausea    Diarrhea       Ms. Gibson is a 54 y.o.  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    History of Present Illness  Pleasant lady with history as above who reports that over the last week she has been developing some epigastric to right sided abdominal with pain level up to about a 4 out of 10.  She went to bed but awoke around 2 AM complaining of abdominal pain ranked in a 7 out of 10.  She has associated nausea and vomiting as well as diarrhea although she reports that her diarrhea is chronic due to her being on lactulose.  Patient presented to the ER for further evaluation.  She has a history of chronic pancreatitis and had previously been sober although she relapsed has been drinking the last few weeks-reports she drinks 4 small sized bottles of paz wine.  Imaging in the ER with CT abdomen pelvis shows evidence of gastritis but also gallstones.  Right upper quad ultrasound showed evidence of cholelithiasis without cholecystitis.    Review of Systems   Constitutional:  Negative for fatigue and fever.   Respiratory:  Negative for cough and shortness of breath.    Cardiovascular:  Negative for chest pain and palpitations.   Gastrointestinal:  Positive for abdominal pain, blood in stool and diarrhea. Negative for nausea and vomiting.   Neurological:  Negative for weakness.        Personal History     Past Medical History:   Diagnosis Date    Acromioclavicular separation  1/2021    Ankle sprain 2/2004    no operation necessary  At Time unsure    Anxiety     Arthritis     Arthritis of back Unsure    Diseased    Cirrhosis     Disease of thyroid gland     ETOH abuse     Fracture, clavicle 1/2021    shattered clavicel on issue slip and fall    Fracture, foot Unsure    Frozen shoulder 1 /2009    4    GERD (gastroesophageal reflux disease)     History of kidney stones     5 YR AGO    Hypertension     Left shoulder pain     Low back strain 1/21    Lumbosacral disc disease 1/21    MDD (major depressive disorder)     Neck strain Unsure    Pancreatitis     Periarthritis of shoulder see above    Rotator cuff syndrome odre for shoulder replacement    unable to receive due to low platelets    Tennis elbow Unsure    Unsurpassed    Thrombocytopenia      Past Surgical History:   Procedure Laterality Date    CLAVICLE SURGERY Right 2018    ENDOSCOPY N/A 10/26/2022    Procedure: ESOPHAGOGASTRODUODENOSCOPY wtih banding;  Surgeon: Ag Patel MD;  Location: [facility] ENDOSCOPY;  Service: Gastroenterology;  Laterality: N/A;  pre: melena  post: esophageal varicies with banding x2 bands    ENDOSCOPY N/A 01/18/2023    Procedure: ESOPHAGOGASTRODUODENOSCOPY;  Surgeon: Ag Patel MD;  Location: [facility] ENDOSCOPY;  Service: Gastroenterology;  Laterality: N/A;  PRE OP - MAROON STOOLS  POST OP - SM ESOPHAGEAL VARICES    ESOPHAGEAL VARICES LIGATION      FOOT SURGERY  2/2/14    JOINT REPLACEMENT Bilateral     GREAT TOE    KIDNEY STONE SURGERY      LITHOTRIPSY AND STENT (R SIDE)    LAPAROSCOPIC TUBAL LIGATION  2005    NECK SURGERY  3/07    Not sure of dates    SIGMOIDOSCOPY N/A 01/18/2023    Procedure: SIGMOIDOSCOPY FLEXIBLE;  Surgeon: Ag Patel MD;  Location: [facility] ENDOSCOPY;  Service: Gastroenterology;  Laterality: N/A;  PRE OP - MAROON STOOLS  POST OP - RECTAL VARICES    TOTAL SHOULDER ARTHROPLASTY Left 05/09/2023    Procedure: reverse total shoulder arthroplasty;  Surgeon: Giovanni Denise MD;   Location: [facility];  Service: Orthopedics;  Laterality: Left;    TRIGGER POINT INJECTION       Family History   Problem Relation Age of Onset    Rheumatologic disease Mother         congestive heart diseased    Osteoporosis Mother             Thyroid disease Father     Leukemia Father     Cancer Father         Leukemia  deseased     Broken bones Father     Clotting disorder Father     Drug abuse Brother     Malig Hyperthermia Neg Hx      Social History     Tobacco Use    Smoking status: Some Days     Current packs/day: 0.25     Average packs/day: 0.3 packs/day for 15.0 years (3.8 ttl pk-yrs)     Types: Cigarettes     Passive exposure: Current    Smokeless tobacco: Never    Tobacco comments:     Rarely smoke sometimes will to   Vaping Use    Vaping status: Never Used   Substance Use Topics    Alcohol use: Yes     Alcohol/week: 5.0 - 10.0 standard drinks of alcohol     Types: 5 - 10 Standard drinks or equivalent per week    Drug use: Never     Current Facility-Administered Medications on File Prior to Encounter   Medication Dose Route Frequency Provider Last Rate Last Admin    sodium chloride 0.9 % flush 10 mL  10 mL Intravenous Q12H Callie Quiroz MD        sodium chloride 0.9 % flush 10 mL  10 mL Intravenous PRN Callie Quiroz MD        sodium chloride 0.9 % flush 20 mL  20 mL Intravenous PRN Callie Quiroz MD         Current Outpatient Medications on File Prior to Encounter   Medication Sig Dispense Refill    amLODIPine (NORVASC) 2.5 MG tablet Take 1 tablet by mouth Daily.      busPIRone (BUSPAR) 10 MG tablet Take 1 tablet by mouth 2 (Two) Times a Day.      escitalopram (LEXAPRO) 20 MG tablet Take 1 tablet by mouth Every Morning.      ferrous sulfate 325 (65 FE) MG tablet Take 1 tablet by mouth Daily With Breakfast. Taking OTC      folic acid (FOLVITE) 1 MG tablet Take 1 tablet by mouth Daily.      gabapentin (NEURONTIN) 100 MG capsule Take 1 capsule  by mouth 3 (Three) Times a Day.      lactulose (CHRONULAC) 10 GM/15ML solution Take 15 mL by mouth Every Morning.      levothyroxine (SYNTHROID, LEVOTHROID) 100 MCG tablet Take 1 tablet by mouth Daily.      MAGnesium-Oxide 400 (240 Mg) MG tablet Take 1 tablet by mouth Daily. Takes OTC      mirtazapine (REMERON) 15 MG tablet Take 1 tablet by mouth Every Night. Takes PRN      Multiple Vitamin (MULTIVITAMIN) capsule Take 1 capsule by mouth Daily.      NON FORMULARY Apply  topically to the appropriate area as directed 3 (Three) to 4 (Four) times daily. Compounded Pain cream (amantadine 10%/ Meloxicam 0.5%/ Topiramate 2%/ gabapentin 6%/ lidocaine 5%)    1-2 g topically 3-4 times daily PRN      ondansetron ODT (ZOFRAN-ODT) 4 MG disintegrating tablet Place 1 tablet on the tongue Every 8 (Eight) Hours As Needed for Nausea or Vomiting. 10 tablet 0    pantoprazole (PROTONIX) 40 MG EC tablet Take 1 tablet by mouth Daily. Waiting for refill      thiamine (VITAMIN B1) 100 MG tablet Take 1 tablet by mouth Daily. 30 tablet 0    bismuth subsalicylate (PEPTO BISMOL) 262 MG/15ML suspension Take 15 mL by mouth Every 6 (Six) Hours As Needed for Indigestion. Pt reports filling predosed cup about 3/4 full and drinks daily.      FLUoxetine (PROzac) 20 MG capsule Take 1 capsule by mouth Daily. (Patient not taking: Reported on 5/30/2025)      FLUoxetine (PROzac) 20 MG tablet Take 3 tablets by mouth Daily.      hydrOXYzine (ATARAX) 25 MG tablet Take 1 tablet by mouth 3 (Three) Times a Day As Needed for Anxiety. 21 tablet 0    nadolol (CORGARD) 40 MG tablet Take 1 tablet by mouth Daily.      pancrelipase, Lip-Prot-Amyl, (CREON) 73284-83508 units capsule delayed-release particles capsule Take 2 capsules by mouth 3 (Three) Times a Day With Meals. (Patient not taking: Reported on 5/30/2025) 180 capsule 0    potassium chloride 10 MEQ CR tablet Take 2 tablets by mouth Daily. Taking OTC once daily      risperiDONE (risperDAL) 0.5 MG tablet Take 2  "tablets by mouth Every Night. Pt ran out and last dose was Thursday night (Patient not taking: Reported on 5/30/2025)      solifenacin (VESICARE) 10 MG tablet Take 1 tablet by mouth. Takes 1-2 times per week, does not like SE (dry mouth) (Patient not taking: Reported on 5/30/2025)      [DISCONTINUED] amantadine (SYMMETREL) 100 MG tablet Take  by mouth.      [DISCONTINUED] B Complex Vitamins (VITAMIN B COMPLEX PO) Take 1 dose by mouth Daily.      [DISCONTINUED] brompheniramine-pseudoephedrine-DM 30-2-10 MG/5ML syrup Take 5 mL by mouth 4 (Four) Times a Day As Needed for Congestion or Cough. 118 mL 0    [DISCONTINUED] ferrous gluconate (FERGON) 324 MG tablet Take 1 tablet by mouth Daily.      [DISCONTINUED] Ibuprofen 3 %, Gabapentin 10 %, Baclofen 2 %, lidocaine 4 % Apply 1-2 g topically to the appropriate area as directed 3 (Three) to 4 (Four) times daily. 90 g 5    [DISCONTINUED] oxyCODONE (ROXICODONE) 5 MG immediate release tablet Take 1 tablet by mouth Every 6 (Six) Hours As Needed for Severe Pain. (Patient taking differently: Take 1 tablet by mouth Every 6 (Six) Hours As Needed for Severe Pain. Took 0.5 tablets last night) 12 tablet 0     Allergies   Allergen Reactions    Benzonatate Nausea Only and Other (See Comments)     Rash     Ketorolac Tromethamine Other (See Comments)     \"I cannot take because of liver problems\"    Phenergan [Promethazine Hcl] Hives    Cucumber Extract Rash    Promethazine-Phenylephrine Other (See Comments)     \"FEEL WEIRD\"       Objective   Objective     Vital Signs  Temp:  [98.5 °F (36.9 °C)-98.8 °F (37.1 °C)] 98.5 °F (36.9 °C)  Heart Rate:  [105-121] 115  Resp:  [18-24] 24  BP: (133-165)/() 133/93  SpO2:  [92 %-98 %] 92 %  on   ;   Device (Oxygen Therapy): room air  Body mass index is 25.63 kg/m².    Physical Exam  Constitutional:       General: She is not in acute distress.     Appearance: Normal appearance. She is normal weight. She is ill-appearing.   HENT:      Head: " Normocephalic and atraumatic.      Mouth/Throat:      Mouth: Mucous membranes are moist.      Pharynx: Oropharynx is clear.   Eyes:      Conjunctiva/sclera: Conjunctivae normal.      Pupils: Pupils are equal, round, and reactive to light.   Cardiovascular:      Rate and Rhythm: Normal rate and regular rhythm.   Pulmonary:      Effort: Pulmonary effort is normal. No respiratory distress.      Breath sounds: Normal breath sounds. No wheezing.   Abdominal:      General: Abdomen is flat. Bowel sounds are normal.      Palpations: Abdomen is soft.      Tenderness: There is abdominal tenderness.   Musculoskeletal:         General: No swelling or tenderness. Normal range of motion.      Right lower leg: No edema.      Left lower leg: No edema.   Skin:     General: Skin is warm and dry.   Neurological:      General: No focal deficit present.      Mental Status: She is alert and oriented to person, place, and time. Mental status is at baseline.   Psychiatric:         Mood and Affect: Mood normal.         Behavior: Behavior normal.         Thought Content: Thought content normal.         Judgment: Judgment normal.       Results Review:  I reviewed the patient's new clinical results.  I reviewed the patient's new imaging results and agree with the interpretation.  I reviewed the patient's other test results and agree with the interpretation  I personally viewed and interpreted the patient's EKG/Telemetry data  Discussed with ED provider.    Lab Results (last 24 hours)       Procedure Component Value Units Date/Time    CBC & Differential [[id number]]  (Abnormal) Collected: 05/30/25 0345    Specimen: Blood Updated: 05/30/25 0414    Narrative:      The following orders were created for panel order CBC & Differential.  Procedure                               Abnormality         Status                     ---------                               -----------         ------                     CBC Auto Differential[[id number]]         Abnormal            Final result                 Please view results for these tests on the individual orders.    Comprehensive Metabolic Panel [[id number]]  (Abnormal) Collected: 05/30/25 0345    Specimen: Blood Updated: 05/30/25 0428     Glucose 134 mg/dL      BUN 12.0 mg/dL      Creatinine 0.74 mg/dL      Sodium 142 mmol/L      Potassium 3.2 mmol/L      Chloride 106 mmol/L      CO2 18.0 mmol/L      Calcium 8.9 mg/dL      Total Protein 7.7 g/dL      Albumin 4.4 g/dL      ALT (SGPT) 70 U/L      AST (SGOT) 120 U/L      Alkaline Phosphatase 121 U/L      Total Bilirubin 2.0 mg/dL      Globulin 3.3 gm/dL      A/G Ratio 1.3 g/dL      BUN/Creatinine Ratio 16.2     Anion Gap 18.0 mmol/L      eGFR 95.1 mL/min/1.73     Narrative:      GFR Categories in Chronic Kidney Disease (CKD)              GFR Category          GFR (mL/min/1.73)    Interpretation  G1                    90 or greater        Normal or high (1)  G2                    60-89                Mild decrease (1)  G3a                   45-59                Mild to moderate decrease  G3b                   30-44                Moderate to severe decrease  G4                    15-29                Severe decrease  G5                    14 or less           Kidney failure    (1)In the absence of evidence of kidney disease, neither GFR category G1 or G2 fulfill the criteria for CKD.    eGFR calculation 2021 CKD-EPI creatinine equation, which does not include race as a factor    Lipase [[id number]]  (Normal) Collected: 05/30/25 0345    Specimen: Blood Updated: 05/30/25 0428     Lipase 19 U/L     CBC Auto Differential [[id number]]  (Abnormal) Collected: 05/30/25 0345    Specimen: Blood Updated: 05/30/25 0414     WBC 4.94 10*3/mm3      RBC 4.98 10*6/mm3      Hemoglobin 16.5 g/dL      Hematocrit 47.4 %      MCV 95.2 fL      MCH 33.1 pg      MCHC 34.8 g/dL      RDW 14.3 %      RDW-SD 50.4 fl      MPV 12.0 fL      Platelets 53 10*3/mm3      Neutrophil % 60.1 %      Lymphocyte  % 29.6 %      Monocyte % 8.9 %      Eosinophil % 0.6 %      Basophil % 0.6 %      Immature Grans % 0.2 %      Neutrophils, Absolute 2.97 10*3/mm3      Lymphocytes, Absolute 1.46 10*3/mm3      Monocytes, Absolute 0.44 10*3/mm3      Eosinophils, Absolute 0.03 10*3/mm3      Basophils, Absolute 0.03 10*3/mm3      Immature Grans, Absolute 0.01 10*3/mm3      nRBC 0.0 /100 WBC     Ethanol [[id number]]  (Abnormal) Collected: 05/30/25 0345    Specimen: Blood Updated: 05/30/25 0428     Ethanol 122 mg/dL      Ethanol % 0.122 %     Urinalysis With Microscopic If Indicated (No Culture) - Straight Cath [[id number]]  (Abnormal) Collected: 05/30/25 0428    Specimen: Urine from Straight Cath Updated: 05/30/25 0510     Color, UA Savi     Appearance, UA Clear     pH, UA 6.0     Specific Gravity, UA >1.030     Glucose, UA Negative     Ketones, UA Trace     Bilirubin, UA Negative     Blood, UA Negative     Protein, UA Trace     Leuk Esterase, UA Negative     Nitrite, UA Negative     Urobilinogen, UA 1.0 E.U./dL    Narrative:      Urine microscopic not indicated.            Imaging Results (Last 24 Hours)       Procedure Component Value Units Date/Time    US Gallbladder [[id number]] Collected: 05/30/25 0822     Updated: 05/30/25 0829    Narrative:      RIGHT UPPER QUADRANT ULTRASOUND     HISTORY: Right upper quadrant pain. Nausea and vomiting.     COMPARISON: CT abdomen and pelvis 05/30/2025.     FINDINGS:  The liver measures 17.6 cm in craniocaudal dimension. Liver exhibits  undulating margins with hepatic cirrhotic morphology. There is also  increased hepatic echogenicity consistent with hepatic steatosis..   There is no intra or extrahepatic biliary duct dilatation. The common  duct measures 3 mm. The gallbladder is distended and contains small  gallstones. No gallbladder wall thickening or pericholecystic fluid.     Visualized segments the pancreas appear within normal limits.  The right  kidney measures 9.7 cm in length and there is  no hydronephrosis. There  is no ascites.       Impression:      1. Gallbladder is distended and there is cholelithiasis without further  evidence for cholecystitis.  2. Hepatic cirrhotic morphology with steatosis.     This report was finalized on 5/30/2025 8:26 AM by Anson Reynolds M.D  on Workstation: [id number]       CT Abdomen Pelvis With Contrast [[id number]] Collected: 05/30/25 0553     Updated: 05/30/25 0602    Narrative:      CT OF THE ABDOMEN AND PELVIS WITH CONTRAST     HISTORY: Acute abdominal pain     COMPARISON: None available.     TECHNIQUE: Axial CT imaging was obtained through the abdomen and pelvis.  IV contrast was administered.     FINDINGS:  Images through the lung bases demonstrate some scarring. The liver is  cirrhotic in morphology. Patient does have a recanalized umbilical vein.  There are gastroesophageal varices again seen. As was previously  discussed, gastric wall appears somewhat thickened, although this may be  related to incomplete distention. On current study, patient's  gallbladder is mildly distended. There is cholelithiasis. Given history,  consideration for gallbladder ultrasound is suggested. The pancreas is  atrophic, with extensive parenchymal calcifications, in keeping with  chronic pancreatitis. The kidneys enhance symmetrically. No  hydronephrosis is seen. Uterus appears normal. No adnexal masses are  seen. The patient does have liquid stool within the colon, which would  be in keeping with history of diarrhea. The appendix is normal. There is  no bowel obstruction. There is a tiny fat-containing umbilical hernia.  No acute osseous abnormalities are seen.       Impression:         1. Liquid stool is noted throughout the colon, in keeping with history  of diarrhea.  2. Somewhat thick-walled appearance to the stomach again noted. Some of  this may be related to incomplete distention, but gastritis is not  excluded.  3. The patient's gallbladder does appear distended, and  there is  cholelithiasis. Consider further assessment with gallbladder ultrasound.     Radiation dose reduction techniques were utilized, including automated  exposure control and exposure modulation based on body size.        This report was finalized on 5/30/2025 5:58 AM by Dr. Jessy Coe M.D on Workstation: [id number]                   No orders to display        Assessment/Plan     Active Hospital Problems    Diagnosis  POA    **Alcohol withdrawal [F10.939]  Yes    Transaminitis [R74.01]  Yes    Cholelithiasis [K80.20]  Yes    Abdominal pain [R10.9]  Yes    Essential hypertension [I10]  Yes    Esophageal varices [I85.00]  Yes    Alcoholic cirrhosis [K70.30]  Yes    Pancreatic insufficiency [K86.89]  Yes    Hypothyroidism [E03.9]  Yes      Resolved Hospital Problems   No resolved problems to display.       Ms. Gibson is a 54 y.o.  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    Abdominal pain  RUQ ultrasound with cholelithiasis without cholecystitis  History of chronic pancreatitis  - Patient reporting mid epigastric pain, yesterday began to have mid epigastric pain about a 4 out of 10 increased to a 7 out of 10 overnight, nonradiating she also is complaining of diarrhea but thinks that is related to her lactulose that she takes chronically  - She has a history of chronic pancreatitis and recently started drinking alcohol again after being sober, she has cholelithiasis noted on ultrasound which showed no evidence of cholecystitis  - GI consulted, considering EGD but first recommend general surgery consult for consideration of cholecystectomy; cont IV PPI  - cont CLD; lipase is normal    Alcohol use disorder with withdrawal  Cirrhosis related to alcohol use  History of esophageal varices  - patient previously on nadolol for esophageal variceal bleeding  prophylaxis but has not been taking  - GI is recommending discontinue lactulose and starting Xifaxan for HE prophylaxis  - For alcohol use, start CIWA protocol with as needed Ativan, thiamine, folate, multivitamin, ask access to see    Hypothyroidism  - Continue Synthroid    Depression/anxiety  - Continue Lexapro and BuSpar    I discussed the patient's findings and my recommendations with patient and ED provider.    VTE Prophylaxis - SCDs.  Code Status - Full code.       Charis Gonzalez MD  [facility]  05/30/25  15:28 EDT      Electronically signed by Charis Gonzalez MD at 05/30/25 1543          Emergency Department Notes        Chelly Perez, RN at 05/30/25 1221          AOX4 ambulates without assistance  Last etoh 11am 5/29  anxiety/fidgets are baseline   Ciwa 9 ativan given      Nursing report ED to floor  Bekah Gibson  54 y.o.  female    HPI :  HPI  Stated Reason for Visit: Pt arrives to ER with c/o N/V/D and upper abd pain x1 week. Pt also states she has been feeling dizzy  History Obtained From: patient    Chief Complaint  Chief Complaint   Patient presents with    Nausea    Diarrhea       Admitting doctor:   Charis Gonzalez MD    Admitting diagnosis:   The primary encounter diagnosis was Upper abdominal pain. Diagnoses of Transaminitis, Acute alcoholic gastritis without hemorrhage, Alcohol withdrawal syndrome with complication, and Calculus of gallbladder without cholecystitis without obstruction were also pertinent to this visit.    Code status:   Current Code Status       Date Active Code Status Order ID Comments User Context       Prior            Allergies:   Benzonatate, Ketorolac tromethamine, Phenergan [promethazine hcl], Cucumber extract, and Promethazine-phenylephrine    Isolation:   No active isolations    Intake and Output    Intake/Output Summary (Last 24 hours) at 5/30/2025 1221  Last data filed at 5/30/2025 0637  Gross per 24 hour   Intake 50 ml   Output  "--   Net 50 ml       Weight:       05/30/25  0334   Weight: 69.9 kg (154 lb)       Most recent vitals:   Vitals:    05/30/25 0334 05/30/25 0647 05/30/25 0701 05/30/25 0901   BP: (!) 154/101 144/85 157/94 (!) 165/105   Pulse: 110 112 105 (!) 121   Resp: 20 20 18    Temp: 98.8 °F (37.1 °C)      SpO2: 98% 94% 94% 94%   Weight: 69.9 kg (154 lb)      Height: 165.1 cm (65\")          Active LDAs/IV Access:   Lines, Drains & Airways       Active LDAs       Name Placement date Placement time Site Days    Peripheral IV 05/30/25 0345 20 G Anterior;Left Forearm 05/30/25 0345  Forearm  less than 1                    Labs (abnormal labs have a star):   Labs Reviewed   COMPREHENSIVE METABOLIC PANEL - Abnormal; Notable for the following components:       Result Value    Glucose 134 (*)     Potassium 3.2 (*)     CO2 18.0 (*)     ALT (SGPT) 70 (*)     AST (SGOT) 120 (*)     Alkaline Phosphatase 121 (*)     Total Bilirubin 2.0 (*)     Anion Gap 18.0 (*)     All other components within normal limits    Narrative:     GFR Categories in Chronic Kidney Disease (CKD)              GFR Category          GFR (mL/min/1.73)    Interpretation  G1                    90 or greater        Normal or high (1)  G2                    60-89                Mild decrease (1)  G3a                   45-59                Mild to moderate decrease  G3b                   30-44                Moderate to severe decrease  G4                    15-29                Severe decrease  G5                    14 or less           Kidney failure    (1)In the absence of evidence of kidney disease, neither GFR category G1 or G2 fulfill the criteria for CKD.    eGFR calculation 2021 CKD-EPI creatinine equation, which does not include race as a factor   URINALYSIS W/ MICROSCOPIC IF INDICATED (NO CULTURE) - Abnormal; Notable for the following components:    Color, UA Savi (*)     Specific Gravity, UA >1.030 (*)     Ketones, UA Trace (*)     Protein, UA Trace (*)     All " other components within normal limits    Narrative:     Urine microscopic not indicated.   CBC WITH AUTO DIFFERENTIAL - Abnormal; Notable for the following components:    Hemoglobin 16.5 (*)     Hematocrit 47.4 (*)     MCH 33.1 (*)     Platelets 53 (*)     All other components within normal limits   ETHANOL - Abnormal; Notable for the following components:    Ethanol 122 (*)     All other components within normal limits   LIPASE - Normal   CBC AND DIFFERENTIAL    Narrative:     The following orders were created for panel order CBC & Differential.  Procedure                               Abnormality         Status                     ---------                               -----------         ------                     CBC Auto Differential[[id number]]        Abnormal            Final result                 Please view results for these tests on the individual orders.       EKG:   No orders to display       Meds given in ED:   Medications   sodium chloride 0.9 % flush 10 mL (has no administration in time range)   sodium chloride 0.9 % infusion 1,000 mL (0 mL Intravenous Stopped 5/30/25 0637)   folic acid 1 mg in sodium chloride 0.9 % 50 mL IVPB (0 mg Intravenous Stopped 5/30/25 0507)   thiamine (B-1) injection 200 mg (200 mg Intravenous Given 5/30/25 0352)   ondansetron (ZOFRAN) injection 4 mg (4 mg Intravenous Given 5/30/25 0351)   morphine injection 4 mg (4 mg Intravenous Given 5/30/25 0352)   famotidine (PEPCID) injection 20 mg (20 mg Intravenous Given 5/30/25 0441)   droperidol (INAPSINE) injection 1.25 mg (1.25 mg Intravenous Given 5/30/25 0503)   iopamidol (ISOVUE-300) 61 % injection 100 mL (85 mL Intravenous Given 5/30/25 0540)   morphine injection 2 mg (2 mg Intravenous Given 5/30/25 0632)   ondansetron (ZOFRAN) injection 4 mg (4 mg Intravenous Given 5/30/25 0632)   potassium chloride (KLOR-CON M20) CR tablet 40 mEq (40 mEq Oral Given 5/30/25 0927)   LORazepam (ATIVAN) injection 1 mg (1 mg Intravenous Given  5/30/25 0940)   amLODIPine (NORVASC) tablet 2.5 mg (2.5 mg Oral Given 5/30/25 0937)       Imaging results:  US Gallbladder  Result Date: 5/30/2025  1. Gallbladder is distended and there is cholelithiasis without further evidence for cholecystitis. 2. Hepatic cirrhotic morphology with steatosis.  This report was finalized on 5/30/2025 8:26 AM by Anson Reynolds M.D on Workstation: [id number]      CT Abdomen Pelvis With Contrast  Result Date: 5/30/2025   1. Liquid stool is noted throughout the colon, in keeping with history of diarrhea. 2. Somewhat thick-walled appearance to the stomach again noted. Some of this may be related to incomplete distention, but gastritis is not excluded. 3. The patient's gallbladder does appear distended, and there is cholelithiasis. Consider further assessment with gallbladder ultrasound.  Radiation dose reduction techniques were utilized, including automated exposure control and exposure modulation based on body size.   This report was finalized on 5/30/2025 5:58 AM by Dr. Jessy Coe M.D on Workstation: [id number]        Ambulatory status:   - ambulates without asistance    Social issues:   Social History     Socioeconomic History    Marital status:    Tobacco Use    Smoking status: Some Days     Current packs/day: 0.25     Average packs/day: 0.3 packs/day for 15.0 years (3.8 ttl pk-yrs)     Types: Cigarettes     Passive exposure: Current    Smokeless tobacco: Never    Tobacco comments:     Rarely smoke sometimes will to   Vaping Use    Vaping status: Never Used   Substance and Sexual Activity    Alcohol use: Yes     Alcohol/week: 5.0 - 10.0 standard drinks of alcohol     Types: 5 - 10 Standard drinks or equivalent per week    Drug use: Never    Sexual activity: Yes     Partners: Male     Birth control/protection: Post-menopausal     Comment: cinthia- menepausal       Peripheral Neurovascular  Peripheral Neurovascular (Adult)  Peripheral Neurovascular WDL: WDL    Neuro  Cognitive  Neuro Cognitive (Adult)  Cognitive/Neuro/Behavioral WDL: .WDL except, mood/behavior  Mood/Behavior: anxious  Sedation Group  POSS (Pasero Opioid-Induced Sed Scale): 1 - Awake and alert    Learning  Learning Assessment  Learning Readiness and Ability: no barriers identified  Education Provided  Person Taught: patient  Teaching Method: verbal instruction  Teaching Focus: symptom/problem overview, diagnostic test, medication administration  Education Outcome Evaluation: verbalizes understanding    Respiratory  Respiratory WDL  Respiratory WDL: WDL    Abdominal Pain  Safety Interventions  Safety Precautions/Falls Reduction: assistive device/personal items within reach  All Alarms: none present    Pain Assessments  Pain (Adult)  (0-10) Pain Rating: Rest: 5  (0-10) Pain Rating: Activity: 5  Pain Location: abdomen  Pain Side/Orientation: medial, lower  Response to Pain Interventions: cognitive function improved, mobility function improved    NIH Stroke Scale       Chelly Perez RN  05/30/25 12:21 EDT     Electronically signed by Chelly Perez RN at 05/30/25 1223       Yanira Lee PA-C at 05/30/25 0643       Attestation signed by Dhurv Alonso MD at 05/31/25 1106          SHARED APC FACE TO FACE: I performed a substantive part of the MDM during the patient's E/M visit. I personally evaluated and examined the patient. I personally made or approved the documented management plan and acknowledge its risk of complications.   Dhruv Alonso MD 5/31/2025 11:06 EDT                             EMERGENCY DEPARTMENT ENCOUNTER    Room number:  03/03  Date Seen:  5/30/2025  PCP:  Tylor Davis    Laboratory Results:  Recent Results (from the past 24 hours)   Comprehensive Metabolic Panel    Collection Time: 05/30/25  3:45 AM    Specimen: Blood   Result Value Ref Range    Glucose 134 (H) 65 - 99 mg/dL    BUN 12.0 6.0 - 20.0 mg/dL    Creatinine 0.74 0.57 - 1.00 mg/dL    Sodium 142 136 - 145 mmol/L     Potassium 3.2 (L) 3.5 - 5.2 mmol/L    Chloride 106 98 - 107 mmol/L    CO2 18.0 (L) 22.0 - 29.0 mmol/L    Calcium 8.9 8.6 - 10.5 mg/dL    Total Protein 7.7 6.0 - 8.5 g/dL    Albumin 4.4 3.5 - 5.2 g/dL    ALT (SGPT) 70 (H) 1 - 33 U/L    AST (SGOT) 120 (H) 1 - 32 U/L    Alkaline Phosphatase 121 (H) 39 - 117 U/L    Total Bilirubin 2.0 (H) 0.0 - 1.2 mg/dL    Globulin 3.3 gm/dL    A/G Ratio 1.3 g/dL    BUN/Creatinine Ratio 16.2 7.0 - 25.0    Anion Gap 18.0 (H) 5.0 - 15.0 mmol/L    eGFR 95.1 >60.0 mL/min/1.73   Lipase    Collection Time: 05/30/25  3:45 AM    Specimen: Blood   Result Value Ref Range    Lipase 19 13 - 60 U/L   CBC Auto Differential    Collection Time: 05/30/25  3:45 AM    Specimen: Blood   Result Value Ref Range    WBC 4.94 3.40 - 10.80 10*3/mm3    RBC 4.98 3.77 - 5.28 10*6/mm3    Hemoglobin 16.5 (H) 12.0 - 15.9 g/dL    Hematocrit 47.4 (H) 34.0 - 46.6 %    MCV 95.2 79.0 - 97.0 fL    MCH 33.1 (H) 26.6 - 33.0 pg    MCHC 34.8 31.5 - 35.7 g/dL    RDW 14.3 12.3 - 15.4 %    RDW-SD 50.4 37.0 - 54.0 fl    MPV 12.0 6.0 - 12.0 fL    Platelets 53 (L) 140 - 450 10*3/mm3    Neutrophil % 60.1 42.7 - 76.0 %    Lymphocyte % 29.6 19.6 - 45.3 %    Monocyte % 8.9 5.0 - 12.0 %    Eosinophil % 0.6 0.3 - 6.2 %    Basophil % 0.6 0.0 - 1.5 %    Immature Grans % 0.2 0.0 - 0.5 %    Neutrophils, Absolute 2.97 1.70 - 7.00 10*3/mm3    Lymphocytes, Absolute 1.46 0.70 - 3.10 10*3/mm3    Monocytes, Absolute 0.44 0.10 - 0.90 10*3/mm3    Eosinophils, Absolute 0.03 0.00 - 0.40 10*3/mm3    Basophils, Absolute 0.03 0.00 - 0.20 10*3/mm3    Immature Grans, Absolute 0.01 0.00 - 0.05 10*3/mm3    nRBC 0.0 0.0 - 0.2 /100 WBC   Ethanol    Collection Time: 05/30/25  3:45 AM    Specimen: Blood   Result Value Ref Range    Ethanol 122 (H) 0 - 10 mg/dL    Ethanol % 0.122 %   Urinalysis With Microscopic If Indicated (No Culture) - Straight Cath    Collection Time: 05/30/25  4:28 AM    Specimen: Straight Cath; Urine   Result Value Ref Range    Color, UA  Savi (A) Yellow, Straw    Appearance, UA Clear Clear    pH, UA 6.0 5.0 - 8.0    Specific Gravity, UA >1.030 (H) 1.005 - 1.030    Glucose, UA Negative Negative    Ketones, UA Trace (A) Negative    Bilirubin, UA Negative Negative    Blood, UA Negative Negative    Protein, UA Trace (A) Negative    Leuk Esterase, UA Negative Negative    Nitrite, UA Negative Negative    Urobilinogen, UA 1.0 E.U./dL 0.2 - 1.0 E.U./dL     I reviewed the above results.    Radiology:  US Gallbladder  Result Date: 5/30/2025  RIGHT UPPER QUADRANT ULTRASOUND  HISTORY: Right upper quadrant pain. Nausea and vomiting.  COMPARISON: CT abdomen and pelvis 05/30/2025.  FINDINGS: The liver measures 17.6 cm in craniocaudal dimension. Liver exhibits undulating margins with hepatic cirrhotic morphology. There is also increased hepatic echogenicity consistent with hepatic steatosis.. There is no intra or extrahepatic biliary duct dilatation. The common duct measures 3 mm. The gallbladder is distended and contains small gallstones. No gallbladder wall thickening or pericholecystic fluid.  Visualized segments the pancreas appear within normal limits.  The right kidney measures 9.7 cm in length and there is no hydronephrosis. There is no ascites.      1. Gallbladder is distended and there is cholelithiasis without further evidence for cholecystitis. 2. Hepatic cirrhotic morphology with steatosis.  This report was finalized on 5/30/2025 8:26 AM by Anson Reynolds M.D on Workstation: [id number]      CT Abdomen Pelvis With Contrast  Result Date: 5/30/2025  CT OF THE ABDOMEN AND PELVIS WITH CONTRAST  HISTORY: Acute abdominal pain  COMPARISON: None available.  TECHNIQUE: Axial CT imaging was obtained through the abdomen and pelvis. IV contrast was administered.  FINDINGS: Images through the lung bases demonstrate some scarring. The liver is cirrhotic in morphology. Patient does have a recanalized umbilical vein. There are gastroesophageal varices again seen. As  was previously discussed, gastric wall appears somewhat thickened, although this may be related to incomplete distention. On current study, patient's gallbladder is mildly distended. There is cholelithiasis. Given history, consideration for gallbladder ultrasound is suggested. The pancreas is atrophic, with extensive parenchymal calcifications, in keeping with chronic pancreatitis. The kidneys enhance symmetrically. No hydronephrosis is seen. Uterus appears normal. No adnexal masses are seen. The patient does have liquid stool within the colon, which would be in keeping with history of diarrhea. The appendix is normal. There is no bowel obstruction. There is a tiny fat-containing umbilical hernia. No acute osseous abnormalities are seen.       1. Liquid stool is noted throughout the colon, in keeping with history of diarrhea. 2. Somewhat thick-walled appearance to the stomach again noted. Some of this may be related to incomplete distention, but gastritis is not excluded. 3. The patient's gallbladder does appear distended, and there is cholelithiasis. Consider further assessment with gallbladder ultrasound.  Radiation dose reduction techniques were utilized, including automated exposure control and exposure modulation based on body size.   This report was finalized on 5/30/2025 5:58 AM by Dr. Jessy Coe M.D on Workstation: [id number]      I reviewed the above results    Medications ordered in ED:  Medications   sodium chloride 0.9 % flush 10 mL (has no administration in time range)   sodium chloride 0.9 % infusion 1,000 mL (0 mL Intravenous Stopped 5/30/25 0637)   folic acid 1 mg in sodium chloride 0.9 % 50 mL IVPB (0 mg Intravenous Stopped 5/30/25 0507)   thiamine (B-1) injection 200 mg (200 mg Intravenous Given 5/30/25 0352)   ondansetron (ZOFRAN) injection 4 mg (4 mg Intravenous Given 5/30/25 0351)   morphine injection 4 mg (4 mg Intravenous Given 5/30/25 0352)   famotidine (PEPCID) injection 20 mg (20  mg Intravenous Given 5/30/25 0441)   droperidol (INAPSINE) injection 1.25 mg (1.25 mg Intravenous Given 5/30/25 0503)   iopamidol (ISOVUE-300) 61 % injection 100 mL (85 mL Intravenous Given 5/30/25 0540)   morphine injection 2 mg (2 mg Intravenous Given 5/30/25 0632)   ondansetron (ZOFRAN) injection 4 mg (4 mg Intravenous Given 5/30/25 0632)   potassium chloride (KLOR-CON M20) CR tablet 40 mEq (40 mEq Oral Given 5/30/25 0927)   LORazepam (ATIVAN) injection 1 mg (1 mg Intravenous Given 5/30/25 0940)   amLODIPine (NORVASC) tablet 2.5 mg (2.5 mg Oral Given 5/30/25 0937)       Progress and Consult Notes:  ED Course as of 05/30/25 1014   Fri May 30, 2025   0418 WBC: 4.94 [MP]   0418 Hemoglobin(!): 16.5 [MP]   0545 CT Abdomen Pelvis With Contrast  My independent interpretation of the imaging study is no free air or bowel obstruction [AB]   0545 Ethanol(!): 122 [AB]   0615 Care given to Steph Lee PA-C, pending gallbladder US and disposition [MP]   0843 Reassessed patient, counseled her on all of her lab and imaging results.  She is tachycardic, heart rate in the 110s, hypertensive, feels like she is having withdrawal symptoms.  States she relapsed in April and has been drinking every day for several weeks.  She decided to stop drinking yesterday and last drink was at 11 AM.  She does have a very mild metabolic acidosis, suspect related to an alcoholic ketoacidosis.  Does not appear to have acute cholecystitis on her workup today.  She does have epigastric tenderness, does have findings suggestive of gastritis with thickened gastric wall.  Based on her withdrawal symptoms, desire to stop drinking, will seek admission.  Potassium repleted in the emergency department. [KA]   1006 Patient with Dr. Gonzalez, hospitalist.  We discussed patient's history presentation workup and she agrees to admit [KA]      ED Course User Index  [AB] Dhruv Alonso MD  [KA] Yanira Lee PA-C  [MP] Nena Corey PA-C        Diagnosis:  Final diagnoses:   Upper abdominal pain   Transaminitis   Acute alcoholic gastritis without hemorrhage   Alcohol withdrawal syndrome with complication   Calculus of gallbladder without cholecystitis without obstruction          Yanira Lee PA-C  05/30/25 1014      Electronically signed by Dhruv Alonso MD at 05/31/25 1106       Dhruv Alonso MD at 05/30/25 0332          MD ATTESTATION NOTE  I supervised care provided by the APC. We have discussed this patient's history, physical exam, and treatment plan. I have reviewed the APC's note and I agree with the APC's findings and plan of care.   SHARED VISIT: This visit was performed by BOTH a physician and an APC. The substantive portion of the medical decision making was performed by this attesting physician who made or approved the management plan and takes responsibility for patient management. All studies in the APC note (if performed) were independently interpreted by me.   I have personally had a face to face encounter with the patient.     PCP: Tylor Davis  Patient Care Team:  Tylor Davis as PCP - General (Family Medicine)  Deion Saldana MD as Consulting Physician (Urology)  Adalberto Hoffman MD as Consulting Physician (Gastroenterology)  Eliazar Quinonez MD (Hematology)     Bekah Gibson is a 54 y.o. female who presents to the ED c/o abdominal pain, nausea, vomiting and diarrhea.  Patient has had symptoms for greater than a week.  She was evaluated 1 week ago but states that her symptoms have not improved.  No fevers or chills.  Patient denies recent alcohol use.  No urinary complaint.    On exam:  General: NAD.  Head: NCAT.  ENT: nares patent, no scleral icterus  Neck: Supple, trachea midline.  Cardiac: regular rate and rhythm.  Lungs: normal effort, clear to auscultation bilaterally  Abdomen: Soft, nondistended, tender to palpation in the epigastrium, no rebound tenderness, no guarding or rigidity.   Extremities:  Moves all extremities well, no peripheral edema  Neuro: alert, MAEW, follows commands  Psych: calm, cooperative  Skin: Warm, dry.    Medical Decision Making:  After the initial H&P, I discussed pertinent information from history and physical exam with patient/family.  Discussed differential diagnosis.  Discussed plan for ED evaluation/work-up/treatment.  All questions answered.  Patient/family is agreeable with plan.    ED Course as of 05/31/25 1106   Fri May 30, 2025   0418 WBC: 4.94 [MP]   0418 Hemoglobin(!): 16.5 [MP]   0545 CT Abdomen Pelvis With Contrast  My independent interpretation of the imaging study is no free air or bowel obstruction [AB]   0545 Ethanol(!): 122 [AB]   0615 Care given to Steph Lee PA-C, pending gallbladder US and disposition [MP]   0843 Reassessed patient, counseled her on all of her lab and imaging results.  She is tachycardic, heart rate in the 110s, hypertensive, feels like she is having withdrawal symptoms.  States she relapsed in April and has been drinking every day for several weeks.  She decided to stop drinking yesterday and last drink was at 11 AM.  She does have a very mild metabolic acidosis, suspect related to an alcoholic ketoacidosis.  Does not appear to have acute cholecystitis on her workup today.  She does have epigastric tenderness, does have findings suggestive of gastritis with thickened gastric wall.  Based on her withdrawal symptoms, desire to stop drinking, will seek admission.  Potassium repleted in the emergency department. [KA]   1006 Patient with Dr. Gonzalez, hospitalist.  We discussed patient's history presentation workup and she agrees to admit [KA]      ED Course User Index  [AB] Dhruv Alonso MD  [KA] Yaniar Lee PA-C  [MP] Nena Corey PA-C       Diagnosis  Final diagnoses:   Upper abdominal pain   Transaminitis   Acute alcoholic gastritis without hemorrhage   Alcohol withdrawal syndrome with complication   Calculus of gallbladder without  cholecystitis without obstruction        Dhruv Alonso MD  05/31/25 1106      Electronically signed by Dhruv Alonso MD at 05/31/25 1106       Nena Corey PA-C at 05/30/25 0331       Attestation signed by Dhruv Alonso MD at 05/31/25 1106          SHARED APC FACE TO FACE: I performed a substantive part of the MDM during the patient's E/M visit. I personally evaluated and examined the patient. I personally made or approved the documented management plan and acknowledge its risk of complications.   Dhruv Alonso MD 5/31/2025 11:06 EDT                              EMERGENCY DEPARTMENT ENCOUNTER  Room Number:  03/03  PCP: Tylor Davis  Independent Historians: Patient      HPI:  Chief Complaint: had concerns including Nausea and Diarrhea.     A complete HPI/ROS/PMH/PSH/SH/FH are unobtainable due to: None    Chronic or social conditions impacting patient care (Social Determinants of Health): None      Context: Bekah Gibson is a 54 y.o. female with a medical history of irritable bowel syndrome, neuropathy, vitamin D deficiency, hypothyroidism, tobacco use, hepatitis, hypertension, pancreatitis who presents to the ED c/o acute abdominal pain.  Patient reports she has had persistent pain for over 1 week.  Reports associated nausea, vomiting, diarrhea.  No fever or chills.  No dysuria.  Patient admits to tobacco use.  Denies recent alcohol use.  Patient has had prior tubal ligation and ligation of esophageal varices.  No other systemic complaints at this time.      Review of prior external notes (non-ED) -and- Review of prior external test results outside of this encounter: Patient seen in office by gastroenterology on 5/15/2025 for GERD.  Reviewed assessment and plan.  Patient prescribed Protonix. Reviewed labs collected on 5/23/2025.  CBC with hemoglobin 16.6, CMP with creatinine 0.70.    Prescription drug monitoring program review:     N/A    PAST MEDICAL HISTORY  Active Ambulatory Problems      Diagnosis Date Noted    Irritable bowel syndrome with both constipation and diarrhea 06/05/2017    Peripheral neuropathy 06/05/2017    Vitamin D deficiency 06/05/2017    History of HPV infection 06/05/2017    Hypothyroidism 06/05/2017    Tobacco dependence due to cigarettes 06/05/2017    Acute kidney injury 12/28/2015    Ascites 06/06/2016    E. coli UTI (urinary tract infection) 06/06/2016    Alcoholic hepatitis 06/29/2018    GI bleed 06/29/2018    Acute post-hemorrhagic anemia 06/29/2018    Thrombocytopenia 06/29/2018    Open wound of right shoulder region 06/29/2018    Pancreatic insufficiency 07/04/2018    Alcoholic ketoacidosis 07/21/2019    Chronic alcohol abuse 07/29/2019    ESBL (extended spectrum beta-lactamase) producing bacteria infection 07/29/2019    Abnormal weight loss 12/13/2019    Hashimoto's disease 12/13/2019    Chronic fatigue 12/14/2019    History of alcohol use disorder 09/21/2021    Alcohol intoxication 09/21/2021    Lupus     Hypomagnesemia     Pancytopenia     Alcoholic cirrhosis     Bilateral lower abdominal discomfort 09/21/2021    Primary hypertension 10/24/2022    Melena 10/24/2022    Arthritis of shoulder 12/19/2022    Esophageal varices 01/16/2023    Essential hypertension 01/16/2023    Alcohol-induced chronic pancreatitis 01/22/2023    Abdominal pain 01/22/2023    Pancreatitis 10/07/2023    Leukopenia 10/07/2023    Epigastric pain 04/01/2024    Acute alcoholic gastritis without hemorrhage 04/01/2024    Alcohol-induced acute on chronic pancreatitis without infection or necrosis 07/03/2024    Alcohol withdrawal 07/03/2024    Acute on chronic pancreatitis 07/22/2024    Transaminitis 07/22/2024    Cholelithiasis 07/22/2024    Alcohol use 07/22/2024    WBC decreased 07/24/2024    Hypokalemia 07/24/2024    Urinary tract infection 07/24/2024    Neutropenia 07/24/2024    Polysubstance abuse 08/05/2024    Alcohol dependence with withdrawal 10/24/2024    Hypomagnesemia 11/18/2024     Resolved  Ambulatory Problems     Diagnosis Date Noted    Acute renal failure 02/20/2017    Kidney stone 06/05/2017    Alcohol withdrawal syndrome, with delirium 06/29/2018    Hypotension 07/01/2018    Nausea and vomiting 09/21/2021    Acute GI bleeding 10/24/2022     Past Medical History:   Diagnosis Date    Acromioclavicular separation 1/2021    Ankle sprain 2/2004    Anxiety     Arthritis     Arthritis of back Unsure    Cirrhosis     Disease of thyroid gland     ETOH abuse     Fracture, clavicle 1/2021    Fracture, foot Unsure    Frozen shoulder 1 /2009    GERD (gastroesophageal reflux disease)     History of kidney stones     Hypertension     Left shoulder pain     Low back strain 1/21    Lumbosacral disc disease 1/21    MDD (major depressive disorder)     Neck strain Unsure    Periarthritis of shoulder see above    Rotator cuff syndrome odre for shoulder replacement    Tennis elbow Unsure         PAST SURGICAL HISTORY  Past Surgical History:   Procedure Laterality Date    CLAVICLE SURGERY Right 2018    ENDOSCOPY N/A 10/26/2022    Procedure: ESOPHAGOGASTRODUODENOSCOPY wtih banding;  Surgeon: Ag Patel MD;  Location: [facility] ENDOSCOPY;  Service: Gastroenterology;  Laterality: N/A;  pre: melena  post: esophageal varicies with banding x2 bands    ENDOSCOPY N/A 01/18/2023    Procedure: ESOPHAGOGASTRODUODENOSCOPY;  Surgeon: Ag Patel MD;  Location: [facility] ENDOSCOPY;  Service: Gastroenterology;  Laterality: N/A;  PRE OP - MAROON STOOLS  POST OP - SM ESOPHAGEAL VARICES    ESOPHAGEAL VARICES LIGATION      FOOT SURGERY  2/2/14    JOINT REPLACEMENT Bilateral     GREAT TOE    KIDNEY STONE SURGERY      LITHOTRIPSY AND STENT (R SIDE)    LAPAROSCOPIC TUBAL LIGATION  2005    NECK SURGERY  3/07    Not sure of dates    SIGMOIDOSCOPY N/A 01/18/2023    Procedure: SIGMOIDOSCOPY FLEXIBLE;  Surgeon: Ag Patel MD;  Location: [facility] ENDOSCOPY;  Service: Gastroenterology;  Laterality: N/A;  PRE OP - MAROON STOOLS  POST OP - RECTAL  VARICES    TOTAL SHOULDER ARTHROPLASTY Left 2023    Procedure: reverse total shoulder arthroplasty;  Surgeon: Giovanni Denise MD;  Location: [facility] [place];  Service: Orthopedics;  Laterality: Left;    TRIGGER POINT INJECTION           FAMILY HISTORY  Family History   Problem Relation Age of Onset    Rheumatologic disease Mother         congestive heart diseased    Osteoporosis Mother             Thyroid disease Father     Leukemia Father     Cancer Father         Leukemia  deseased     Broken bones Father     Clotting disorder Father     Drug abuse Brother     Malig Hyperthermia Neg Hx          SOCIAL HISTORY  Social History     Socioeconomic History    Marital status:    Tobacco Use    Smoking status: Some Days     Current packs/day: 0.25     Average packs/day: 0.3 packs/day for 15.0 years (3.8 ttl pk-yrs)     Types: Cigarettes     Passive exposure: Current    Smokeless tobacco: Never    Tobacco comments:     Rarely smoke sometimes will to   Vaping Use    Vaping status: Never Used   Substance and Sexual Activity    Alcohol use: Yes     Alcohol/week: 5.0 - 10.0 standard drinks of alcohol     Types: 5 - 10 Standard drinks or equivalent per week    Drug use: Never    Sexual activity: Yes     Partners: Male     Birth control/protection: Post-menopausal     Comment: cinthia- menepausal         ALLERGIES  Benzonatate, Ketorolac tromethamine, Phenergan [promethazine hcl], Cucumber extract, and Promethazine-phenylephrine      REVIEW OF SYSTEMS  Included in HPI  All systems reviewed and negative except for those discussed in HPI.      PHYSICAL EXAM    I have reviewed the triage vital signs and nursing notes.    ED Triage Vitals   Temp Pulse Resp BP SpO2   -- -- -- -- --      Temp src Heart Rate Source Patient Position BP Location FiO2 (%)   -- -- -- -- --       Physical Exam  Constitutional:       General: She is not in acute distress.     Appearance: She is well-developed.   HENT:      Head:  Normocephalic and atraumatic.   Eyes:      Extraocular Movements: Extraocular movements intact.   Cardiovascular:      Rate and Rhythm: Regular rhythm. Tachycardia present.      Heart sounds: Normal heart sounds.   Pulmonary:      Effort: Pulmonary effort is normal.      Breath sounds: Normal breath sounds.   Abdominal:      General: There is no distension.      Tenderness: There is abdominal tenderness in the right upper quadrant and epigastric area.   Skin:     General: Skin is warm.   Neurological:      General: No focal deficit present.      Mental Status: She is alert and oriented to person, place, and time.   Psychiatric:         Mood and Affect: Mood normal.             LAB RESULTS  Recent Results (from the past 24 hours)   Comprehensive Metabolic Panel    Collection Time: 05/30/25  3:45 AM    Specimen: Blood   Result Value Ref Range    Glucose 134 (H) 65 - 99 mg/dL    BUN 12.0 6.0 - 20.0 mg/dL    Creatinine 0.74 0.57 - 1.00 mg/dL    Sodium 142 136 - 145 mmol/L    Potassium 3.2 (L) 3.5 - 5.2 mmol/L    Chloride 106 98 - 107 mmol/L    CO2 18.0 (L) 22.0 - 29.0 mmol/L    Calcium 8.9 8.6 - 10.5 mg/dL    Total Protein 7.7 6.0 - 8.5 g/dL    Albumin 4.4 3.5 - 5.2 g/dL    ALT (SGPT) 70 (H) 1 - 33 U/L    AST (SGOT) 120 (H) 1 - 32 U/L    Alkaline Phosphatase 121 (H) 39 - 117 U/L    Total Bilirubin 2.0 (H) 0.0 - 1.2 mg/dL    Globulin 3.3 gm/dL    A/G Ratio 1.3 g/dL    BUN/Creatinine Ratio 16.2 7.0 - 25.0    Anion Gap 18.0 (H) 5.0 - 15.0 mmol/L    eGFR 95.1 >60.0 mL/min/1.73   Lipase    Collection Time: 05/30/25  3:45 AM    Specimen: Blood   Result Value Ref Range    Lipase 19 13 - 60 U/L   CBC Auto Differential    Collection Time: 05/30/25  3:45 AM    Specimen: Blood   Result Value Ref Range    WBC 4.94 3.40 - 10.80 10*3/mm3    RBC 4.98 3.77 - 5.28 10*6/mm3    Hemoglobin 16.5 (H) 12.0 - 15.9 g/dL    Hematocrit 47.4 (H) 34.0 - 46.6 %    MCV 95.2 79.0 - 97.0 fL    MCH 33.1 (H) 26.6 - 33.0 pg    MCHC 34.8 31.5 - 35.7 g/dL     RDW 14.3 12.3 - 15.4 %    RDW-SD 50.4 37.0 - 54.0 fl    MPV 12.0 6.0 - 12.0 fL    Platelets 53 (L) 140 - 450 10*3/mm3    Neutrophil % 60.1 42.7 - 76.0 %    Lymphocyte % 29.6 19.6 - 45.3 %    Monocyte % 8.9 5.0 - 12.0 %    Eosinophil % 0.6 0.3 - 6.2 %    Basophil % 0.6 0.0 - 1.5 %    Immature Grans % 0.2 0.0 - 0.5 %    Neutrophils, Absolute 2.97 1.70 - 7.00 10*3/mm3    Lymphocytes, Absolute 1.46 0.70 - 3.10 10*3/mm3    Monocytes, Absolute 0.44 0.10 - 0.90 10*3/mm3    Eosinophils, Absolute 0.03 0.00 - 0.40 10*3/mm3    Basophils, Absolute 0.03 0.00 - 0.20 10*3/mm3    Immature Grans, Absolute 0.01 0.00 - 0.05 10*3/mm3    nRBC 0.0 0.0 - 0.2 /100 WBC   Ethanol    Collection Time: 05/30/25  3:45 AM    Specimen: Blood   Result Value Ref Range    Ethanol 122 (H) 0 - 10 mg/dL    Ethanol % 0.122 %   Urinalysis With Microscopic If Indicated (No Culture) - Straight Cath    Collection Time: 05/30/25  4:28 AM    Specimen: Straight Cath; Urine   Result Value Ref Range    Color, UA Savi (A) Yellow, Straw    Appearance, UA Clear Clear    pH, UA 6.0 5.0 - 8.0    Specific Gravity, UA >1.030 (H) 1.005 - 1.030    Glucose, UA Negative Negative    Ketones, UA Trace (A) Negative    Bilirubin, UA Negative Negative    Blood, UA Negative Negative    Protein, UA Trace (A) Negative    Leuk Esterase, UA Negative Negative    Nitrite, UA Negative Negative    Urobilinogen, UA 1.0 E.U./dL 0.2 - 1.0 E.U./dL         RADIOLOGY  CT Abdomen Pelvis With Contrast  Result Date: 5/30/2025  CT OF THE ABDOMEN AND PELVIS WITH CONTRAST  HISTORY: Acute abdominal pain  COMPARISON: None available.  TECHNIQUE: Axial CT imaging was obtained through the abdomen and pelvis. IV contrast was administered.  FINDINGS: Images through the lung bases demonstrate some scarring. The liver is cirrhotic in morphology. Patient does have a recanalized umbilical vein. There are gastroesophageal varices again seen. As was previously discussed, gastric wall appears somewhat  thickened, although this may be related to incomplete distention. On current study, patient's gallbladder is mildly distended. There is cholelithiasis. Given history, consideration for gallbladder ultrasound is suggested. The pancreas is atrophic, with extensive parenchymal calcifications, in keeping with chronic pancreatitis. The kidneys enhance symmetrically. No hydronephrosis is seen. Uterus appears normal. No adnexal masses are seen. The patient does have liquid stool within the colon, which would be in keeping with history of diarrhea. The appendix is normal. There is no bowel obstruction. There is a tiny fat-containing umbilical hernia. No acute osseous abnormalities are seen.       1. Liquid stool is noted throughout the colon, in keeping with history of diarrhea. 2. Somewhat thick-walled appearance to the stomach again noted. Some of this may be related to incomplete distention, but gastritis is not excluded. 3. The patient's gallbladder does appear distended, and there is cholelithiasis. Consider further assessment with gallbladder ultrasound.  Radiation dose reduction techniques were utilized, including automated exposure control and exposure modulation based on body size.   This report was finalized on 5/30/2025 5:58 AM by Dr. Jessy Coe M.D on Workstation: [id number]          MEDICATIONS GIVEN IN ER  Medications   sodium chloride 0.9 % flush 10 mL (has no administration in time range)   morphine injection 2 mg (has no administration in time range)   ondansetron (ZOFRAN) injection 4 mg (has no administration in time range)   sodium chloride 0.9 % infusion 1,000 mL (1,000 mL Intravenous New Bag 5/30/25 0341)   folic acid 1 mg in sodium chloride 0.9 % 50 mL IVPB (0 mg Intravenous Stopped 5/30/25 0507)   thiamine (B-1) injection 200 mg (200 mg Intravenous Given 5/30/25 0352)   ondansetron (ZOFRAN) injection 4 mg (4 mg Intravenous Given 5/30/25 0351)   morphine injection 4 mg (4 mg Intravenous Given  5/30/25 7202)   famotidine (PEPCID) injection 20 mg (20 mg Intravenous Given 5/30/25 5361)   droperidol (INAPSINE) injection 1.25 mg (1.25 mg Intravenous Given 5/30/25 8083)   iopamidol (ISOVUE-300) 61 % injection 100 mL (85 mL Intravenous Given 5/30/25 0540)         ORDERS PLACED DURING THIS VISIT:  Orders Placed This Encounter   Procedures    CT Abdomen Pelvis With Contrast    US Gallbladder    Comprehensive Metabolic Panel    Lipase    Urinalysis With Microscopic If Indicated (No Culture) - Urine, Clean Catch    CBC Auto Differential    Ethanol    Insert Peripheral IV    CBC & Differential         OUTPATIENT MEDICATION MANAGEMENT:  Current Facility-Administered Medications Ordered in Epic   Medication Dose Route Frequency Provider Last Rate Last Admin    morphine injection 2 mg  2 mg Intravenous Once Dhruv Alonso MD        ondansetron (ZOFRAN) injection 4 mg  4 mg Intravenous Once Dhruv Alonso MD        sodium chloride 0.9 % flush 10 mL  10 mL Intravenous Q12H Callie Quiroz MD        sodium chloride 0.9 % flush 10 mL  10 mL Intravenous PRN Callie Quiroz MD        sodium chloride 0.9 % flush 10 mL  10 mL Intravenous PRN Nena Corey PA-C        sodium chloride 0.9 % flush 20 mL  20 mL Intravenous PRN Callie Quiroz MD         Current Outpatient Medications Ordered in Epic   Medication Sig Dispense Refill    amLODIPine (NORVASC) 2.5 MG tablet Take 1 tablet by mouth Daily.      B Complex Vitamins (VITAMIN B COMPLEX PO) Take  by mouth.      bismuth subsalicylate (PEPTO BISMOL) 262 MG/15ML suspension Take 15 mL by mouth Every 6 (Six) Hours As Needed for Indigestion. Pt reports filling predosed cup about 3/4 full and drinks daily.      brompheniramine-pseudoephedrine-DM 30-2-10 MG/5ML syrup Take 5 mL by mouth 4 (Four) Times a Day As Needed for Congestion or Cough. 118 mL 0    ferrous gluconate (FERGON) 324 MG tablet Take 1 tablet by mouth Daily.      ferrous  sulfate 325 (65 FE) MG tablet Take 1 tablet by mouth Daily With Breakfast. Taking OTC      FLUoxetine (PROzac) 20 MG capsule       FLUoxetine (PROzac) 20 MG tablet Take 3 tablets by mouth Daily.      folic acid (FOLVITE) 1 MG tablet Take 1 tablet by mouth Daily.      hydrOXYzine (ATARAX) 25 MG tablet Take 1 tablet by mouth 3 (Three) Times a Day As Needed for Anxiety. 21 tablet 0    Ibuprofen 3 %, Gabapentin 10 %, Baclofen 2 %, lidocaine 4 % Apply 1-2 g topically to the appropriate area as directed 3 (Three) to 4 (Four) times daily. 90 g 5    lactulose (CHRONULAC) 10 GM/15ML solution Take 15 mL by mouth Every Morning.      levothyroxine (SYNTHROID, LEVOTHROID) 88 MCG tablet Take 1 tablet by mouth Daily.      MAGnesium-Oxide 400 (240 Mg) MG tablet Take 1 tablet by mouth Daily. Takes OTC      mirtazapine (REMERON) 15 MG tablet Take 1 tablet by mouth Every Night. Takes PRN      Multiple Vitamin (MULTIVITAMIN) capsule Take 1 capsule by mouth Daily.      nadolol (CORGARD) 40 MG tablet Take 1 tablet by mouth Daily.      ondansetron ODT (ZOFRAN-ODT) 4 MG disintegrating tablet Place 1 tablet on the tongue Every 8 (Eight) Hours As Needed for Nausea or Vomiting. (Patient taking differently: Place 1 tablet on the tongue Every 8 (Eight) Hours As Needed for Nausea or Vomiting. Currently out of prescription, ran out about a week ago) 10 tablet 0    oxyCODONE (ROXICODONE) 5 MG immediate release tablet Take 1 tablet by mouth Every 6 (Six) Hours As Needed for Severe Pain. (Patient taking differently: Take 1 tablet by mouth Every 6 (Six) Hours As Needed for Severe Pain. Took 0.5 tablets last night) 12 tablet 0    pancrelipase, Lip-Prot-Amyl, (CREON) 39044-53742 units capsule delayed-release particles capsule Take 2 capsules by mouth 3 (Three) Times a Day With Meals. 180 capsule 0    pantoprazole (PROTONIX) 40 MG EC tablet Take 1 tablet by mouth Daily. Waiting for refill      potassium chloride 10 MEQ CR tablet Take 1 tablet by mouth.  Taking OTC once daily      risperiDONE (risperDAL) 0.5 MG tablet Take 2 tablets by mouth Every Night. Pt ran out and last dose was Thursday night      solifenacin (VESICARE) 10 MG tablet Take 1 tablet by mouth. Takes 1-2 times per week, does not like SE (dry mouth)      thiamine (VITAMIN B1) 100 MG tablet Take 1 tablet by mouth Daily. 30 tablet 0           PROGRESS, DATA ANALYSIS, CONSULTS, AND MEDICAL DECISION MAKING  All labs have been independently interpreted by me.  All radiology studies have been reviewed by me. All EKG's have been independently viewed and interpreted by me.  Discussion below represents my analysis of pertinent findings related to patient's condition, differential diagnosis, treatment plan and final disposition.    Differential diagnosis includes but is not limited to arthritis, pancreatitis, cholecystitis.        ED Course as of 05/30/25 2330   Fri May 30, 2025   0418 WBC: 4.94 [MP]   0418 Hemoglobin(!): 16.5 [MP]   0545 CT Abdomen Pelvis With Contrast  My independent interpretation of the imaging study is no free air or bowel obstruction [AB]   0545 Ethanol(!): 122 [AB]   0615 Care given to Steph Lee PA-C, pending gallbladder US and disposition [MP]   0843 Reassessed patient, counseled her on all of her lab and imaging results.  She is tachycardic, heart rate in the 110s, hypertensive, feels like she is having withdrawal symptoms.  States she relapsed in April and has been drinking every day for several weeks.  She decided to stop drinking yesterday and last drink was at 11 AM.  She does have a very mild metabolic acidosis, suspect related to an alcoholic ketoacidosis.  Does not appear to have acute cholecystitis on her workup today.  She does have epigastric tenderness, does have findings suggestive of gastritis with thickened gastric wall.  Based on her withdrawal symptoms, desire to stop drinking, will seek admission.  Potassium repleted in the emergency department. [KA]   1006 Patient  with Dr. Gonzalez, hospitalist.  We discussed patient's history presentation workup and she agrees to admit [KA]      ED Course User Index  [AB] Dhruv Alonso MD  [KA] Yanira Lee PA-C  [MP] Nena Corey PA-C             AS OF 06:20 EDT VITALS:    BP - (!) 154/101  HR - 110  TEMP - 98.8 °F (37.1 °C)  O2 SATS - 98%        Please note that portions of this document were completed with a voice recognition program.    Note Disclaimer: At [facility], we believe that sharing information builds trust and better relationships. You are receiving this note because you recently visited [facility]. It is possible you will see health information before a provider has talked with you about it. This kind of information can be easy to misunderstand. To help you fully understand what it means for your health, we urge you to discuss this note with your provider.     Nena Corey PA-C  05/30/25 0620       Nena Corey PA-C  05/30/25 7410      Electronically signed by Dhruv Alonso MD at 05/31/25 7251

## 2025-06-02 NOTE — PLAN OF CARE
Goal Outcome Evaluation:  Plan of Care Reviewed With: patient        Progress: improving     VSS. Pt still on FL diet-tolerating well. Pt CIWA ranging 5-9. 1 dose of PO ativan given for CIWA of 9 last evening. 1 dose of dilaudid and 1 dose of oxycodone given for pain as well overnight. Pt having some diarrhea overnight. Possible DC today.

## 2025-06-02 NOTE — CASE MANAGEMENT/SOCIAL WORK
Discharge Planning Assessment  [facility]     Patient Name: Bekah Gibson  MRN: [id number]  Today's Date: 6/2/2025    Admit Date: 5/30/2025    Plan: Return home via self, denies needs   Discharge Needs Assessment       Row Name 06/02/25 1724       Living Environment    People in Home alone    Current Living Arrangements condominium    Primary Care Provided by self    Provides Primary Care For no one    Family Caregiver if Needed none    Quality of Family Relationships unable to assess    Able to Return to Prior Arrangements yes       Resource/Environmental Concerns    Resource/Environmental Concerns none    Transportation Concerns none       Transition Planning    Patient/Family Anticipates Transition to home    Patient/Family Anticipated Services at Transition none    Transportation Anticipated car, drives self       Discharge Needs Assessment    Readmission Within the Last 30 Days no previous admission in last 30 days    Equipment Currently Used at Home none    Concerns to be Addressed no discharge needs identified;denies needs/concerns at this time    Anticipated Changes Related to Illness none    Equipment Needed After Discharge none                   Discharge Plan       Row Name 06/02/25 1725       Plan    Plan Return home via self, denies needs    Patient/Family in Agreement with Plan yes    Plan Comments CCP met with patient at bedside. Introduced self and explained role. Patient lives alone in a condo. She is IADL and drives herself to appointments. She has an AD and it is on file with her PCP. She denies any AD use, DME, HH, or MIKE history. At discharge she plans to return home. She drove herself to the hospital and has her car. She denies any discharge planning needs at this time. Karyn HUDSON RN/CCP                    Expected Discharge Date and Time       Expected Discharge Date Expected Discharge Time    Luis 3, 2025            Demographic Summary       Row Name 06/02/25 1728       General Information     Admission Type inpatient    Arrived From home    Referral Source admission list    Reason for Consult discharge planning    Preferred Language English                   Functional Status       Row Name 06/02/25 1724       Functional Status    Usual Activity Tolerance good    Current Activity Tolerance good       Functional Status, IADL    Medications independent    Meal Preparation independent    Housekeeping independent    Laundry independent    Shopping independent                   Psychosocial    No documentation.                  Abuse/Neglect    No documentation.                  Legal    No documentation.                  Substance Abuse    No documentation.                  Patient Forms    No documentation.                     Trinh Shaw

## 2025-06-02 NOTE — PROGRESS NOTES
"Access did follow up with pt. Pt states she is in AA with a sponsor and \"good support\" and states she had a brief relapse due to current stressors. Pt states her plan is to return to AA. LORELEI is an 8. Access will continue to follow.  "

## 2025-06-03 ENCOUNTER — READMISSION MANAGEMENT (OUTPATIENT)
Dept: CALL CENTER | Facility: HOSPITAL | Age: 55
End: 2025-06-03
Payer: MEDICAID

## 2025-06-03 VITALS
HEART RATE: 100 BPM | OXYGEN SATURATION: 91 % | BODY MASS INDEX: 25.66 KG/M2 | TEMPERATURE: 97.5 F | HEIGHT: 65 IN | WEIGHT: 154 LBS | SYSTOLIC BLOOD PRESSURE: 133 MMHG | RESPIRATION RATE: 16 BRPM | DIASTOLIC BLOOD PRESSURE: 87 MMHG

## 2025-06-03 LAB
ALBUMIN SERPL-MCNC: 3.7 G/DL (ref 3.5–5.2)
ALBUMIN/GLOB SERPL: 1.4 G/DL
ALP SERPL-CCNC: 130 U/L (ref 39–117)
ALT SERPL W P-5'-P-CCNC: 58 U/L (ref 1–33)
ANION GAP SERPL CALCULATED.3IONS-SCNC: 9 MMOL/L (ref 5–15)
AST SERPL-CCNC: 91 U/L (ref 1–32)
BILIRUB SERPL-MCNC: 1.5 MG/DL (ref 0–1.2)
BUN SERPL-MCNC: 6 MG/DL (ref 6–20)
BUN/CREAT SERPL: 9.5 (ref 7–25)
CALCIUM SPEC-SCNC: 9.1 MG/DL (ref 8.6–10.5)
CHLORIDE SERPL-SCNC: 105 MMOL/L (ref 98–107)
CO2 SERPL-SCNC: 23 MMOL/L (ref 22–29)
CREAT SERPL-MCNC: 0.63 MG/DL (ref 0.57–1)
DEPRECATED RDW RBC AUTO: 45.2 FL (ref 37–54)
EGFRCR SERPLBLD CKD-EPI 2021: 104.3 ML/MIN/1.73
ERYTHROCYTE [DISTWIDTH] IN BLOOD BY AUTOMATED COUNT: 13.2 % (ref 12.3–15.4)
GLOBULIN UR ELPH-MCNC: 2.6 GM/DL
GLUCOSE SERPL-MCNC: 117 MG/DL (ref 65–99)
HCT VFR BLD AUTO: 39.1 % (ref 34–46.6)
HGB BLD-MCNC: 13.7 G/DL (ref 12–15.9)
MAGNESIUM SERPL-MCNC: 1.7 MG/DL (ref 1.6–2.6)
MCH RBC QN AUTO: 33.1 PG (ref 26.6–33)
MCHC RBC AUTO-ENTMCNC: 35 G/DL (ref 31.5–35.7)
MCV RBC AUTO: 94.4 FL (ref 79–97)
PHOSPHATE SERPL-MCNC: 3.8 MG/DL (ref 2.5–4.5)
PLATELET # BLD AUTO: 35 10*3/MM3 (ref 140–450)
PMV BLD AUTO: 11.8 FL (ref 6–12)
POTASSIUM SERPL-SCNC: 4 MMOL/L (ref 3.5–5.2)
PROT SERPL-MCNC: 6.3 G/DL (ref 6–8.5)
RBC # BLD AUTO: 4.14 10*6/MM3 (ref 3.77–5.28)
SODIUM SERPL-SCNC: 137 MMOL/L (ref 136–145)
WBC NRBC COR # BLD AUTO: 4.51 10*3/MM3 (ref 3.4–10.8)

## 2025-06-03 PROCEDURE — 84100 ASSAY OF PHOSPHORUS: CPT | Performed by: STUDENT IN AN ORGANIZED HEALTH CARE EDUCATION/TRAINING PROGRAM

## 2025-06-03 PROCEDURE — 80053 COMPREHEN METABOLIC PANEL: CPT | Performed by: STUDENT IN AN ORGANIZED HEALTH CARE EDUCATION/TRAINING PROGRAM

## 2025-06-03 PROCEDURE — 85027 COMPLETE CBC AUTOMATED: CPT | Performed by: STUDENT IN AN ORGANIZED HEALTH CARE EDUCATION/TRAINING PROGRAM

## 2025-06-03 PROCEDURE — 97530 THERAPEUTIC ACTIVITIES: CPT

## 2025-06-03 PROCEDURE — 25010000002 THIAMINE PER 100 MG: Performed by: STUDENT IN AN ORGANIZED HEALTH CARE EDUCATION/TRAINING PROGRAM

## 2025-06-03 PROCEDURE — 83735 ASSAY OF MAGNESIUM: CPT | Performed by: STUDENT IN AN ORGANIZED HEALTH CARE EDUCATION/TRAINING PROGRAM

## 2025-06-03 RX ORDER — OXYCODONE HYDROCHLORIDE 5 MG/1
5 TABLET ORAL EVERY 6 HOURS PRN
Qty: 12 TABLET | Refills: 0 | Status: SHIPPED | OUTPATIENT
Start: 2025-06-03 | End: 2025-06-06

## 2025-06-03 RX ORDER — DIPHENOXYLATE HYDROCHLORIDE AND ATROPINE SULFATE 2.5; .025 MG/1; MG/1
1 TABLET ORAL DAILY
Qty: 30 TABLET | Refills: 0 | Status: SHIPPED | OUTPATIENT
Start: 2025-06-04 | End: 2025-07-04

## 2025-06-03 RX ADMIN — Medication 10 ML: at 09:12

## 2025-06-03 RX ADMIN — BUSPIRONE HYDROCHLORIDE 10 MG: 10 TABLET ORAL at 09:11

## 2025-06-03 RX ADMIN — FOLIC ACID 1 MG: 1 TABLET ORAL at 09:11

## 2025-06-03 RX ADMIN — OXYCODONE 5 MG: 5 TABLET ORAL at 07:38

## 2025-06-03 RX ADMIN — THIAMINE HYDROCHLORIDE 200 MG: 100 INJECTION, SOLUTION INTRAMUSCULAR; INTRAVENOUS at 06:02

## 2025-06-03 RX ADMIN — OXYCODONE 5 MG: 5 TABLET ORAL at 01:36

## 2025-06-03 RX ADMIN — LEVOTHYROXINE SODIUM 100 MCG: 100 TABLET ORAL at 06:02

## 2025-06-03 RX ADMIN — PANTOPRAZOLE SODIUM 40 MG: 40 TABLET, DELAYED RELEASE ORAL at 06:03

## 2025-06-03 RX ADMIN — ESCITALOPRAM 20 MG: 10 TABLET, FILM COATED ORAL at 06:03

## 2025-06-03 RX ADMIN — Medication 1 TABLET: at 09:11

## 2025-06-03 RX ADMIN — RIFAXIMIN 550 MG: 550 TABLET ORAL at 09:11

## 2025-06-03 RX ADMIN — GABAPENTIN 100 MG: 100 CAPSULE ORAL at 09:11

## 2025-06-03 RX ADMIN — AMLODIPINE BESYLATE 2.5 MG: 2.5 TABLET ORAL at 09:11

## 2025-06-03 NOTE — PLAN OF CARE
Goal Outcome Evaluation:  Plan of Care Reviewed With: patient         Pt agreeable to PT today. Possible d/c home today. Pt mod/indep with bed mobility( hob elevated today and use of railing observed. Pt STS with sba. No lob or unsteadiness noted. She amb 300' with cga for the first 100' due to mild unsteadiness noted. Pt improved to sup/indep during the last 200'. Pt performed several standing ex without BUE support: sidestepping  left/right, backward amb, minisquats x3. All performed with sba. No lob or unsteadiness noted during standing activities after amb. O2 sat upon return to room 95%. Pt tolerated well. No sign of distress noted.                                  na

## 2025-06-03 NOTE — PLAN OF CARE
Goal Outcome Evaluation:  Plan of Care Reviewed With: patient        Progress: improving     VSS. Pt up with SBA to BR. Pain pill given x 2 for abd pain. CIWA scores remain low at 4. No ativan given this shift. Pepcid one time dose given for c/o heartburn. Pt tolerating diet well. No diarrhea overnight. Most likely DC home today.

## 2025-06-03 NOTE — THERAPY TREATMENT NOTE
Patient Name: Bekah Gibson  : 1970    MRN: [id number]                              Today's Date: 6/3/2025       Admit Date: 2025    Visit Dx:     ICD-10-CM ICD-9-CM   1. Upper abdominal pain  R10.10 789.09   2. Transaminitis  R74.01 790.4   3. Acute alcoholic gastritis without hemorrhage  K29.20 535.30   4. Alcohol withdrawal syndrome with complication  F10.939 291.81   5. Calculus of gallbladder without cholecystitis without obstruction  K80.20 574.20     Patient Active Problem List   Diagnosis    Irritable bowel syndrome with both constipation and diarrhea    Peripheral neuropathy    Vitamin D deficiency    History of HPV infection    Hypothyroidism    Tobacco dependence due to cigarettes    Acute kidney injury    Ascites    E. coli UTI (urinary tract infection)    Alcoholic hepatitis    GI bleed    Acute post-hemorrhagic anemia    Thrombocytopenia    Open wound of right shoulder region    Pancreatic insufficiency    Alcoholic ketoacidosis    Chronic alcohol abuse    ESBL (extended spectrum beta-lactamase) producing bacteria infection    Abnormal weight loss    Hashimoto's disease    Chronic fatigue    History of alcohol use disorder    Alcohol intoxication    Lupus    Hypomagnesemia    Pancytopenia    Alcoholic cirrhosis    Bilateral lower abdominal discomfort    Primary hypertension    Melena    Arthritis of shoulder    Esophageal varices    Essential hypertension    Alcohol-induced chronic pancreatitis    Abdominal pain    Pancreatitis    Leukopenia    Epigastric pain    Acute alcoholic gastritis without hemorrhage    Alcohol-induced acute on chronic pancreatitis without infection or necrosis    Alcohol withdrawal    Acute on chronic pancreatitis    Transaminitis    Cholelithiasis    Alcohol use    WBC decreased    Hypokalemia    Urinary tract infection    Neutropenia    Polysubstance abuse    Alcohol dependence with withdrawal    Hypomagnesemia     Past Medical History:   Diagnosis Date     Acromioclavicular separation 1/2021    Ankle sprain 2/2004    no operation necessary  At Time unsure    Anxiety     Arthritis     Arthritis of back Unsure    Diseased    Cirrhosis     Disease of thyroid gland     ETOH abuse     Fracture, clavicle 1/2021    shattered clavicel on issue slip and fall    Fracture, foot Unsure    Frozen shoulder 1 /2009    4    GERD (gastroesophageal reflux disease)     History of kidney stones     5 YR AGO    Hypertension     Left shoulder pain     Low back strain 1/21    Lumbosacral disc disease 1/21    MDD (major depressive disorder)     Neck strain Unsure    Pancreatitis     Periarthritis of shoulder see above    Rotator cuff syndrome odre for shoulder replacement    unable to receive due to low platelets    Tennis elbow Unsure    Unsurpassed    Thrombocytopenia      Past Surgical History:   Procedure Laterality Date    CLAVICLE SURGERY Right 2018    ENDOSCOPY N/A 10/26/2022    Procedure: ESOPHAGOGASTRODUODENOSCOPY wtih banding;  Surgeon: Ag Patel MD;  Location: [facility] ENDOSCOPY;  Service: Gastroenterology;  Laterality: N/A;  pre: melena  post: esophageal varicies with banding x2 bands    ENDOSCOPY N/A 01/18/2023    Procedure: ESOPHAGOGASTRODUODENOSCOPY;  Surgeon: Ag Patel MD;  Location: [facility] ENDOSCOPY;  Service: Gastroenterology;  Laterality: N/A;  PRE OP - MAROON STOOLS  POST OP - SM ESOPHAGEAL VARICES    ESOPHAGEAL VARICES LIGATION      FOOT SURGERY  2/2/14    JOINT REPLACEMENT Bilateral     GREAT TOE    KIDNEY STONE SURGERY      LITHOTRIPSY AND STENT (R SIDE)    LAPAROSCOPIC TUBAL LIGATION  2005    NECK SURGERY  3/07    Not sure of dates    SIGMOIDOSCOPY N/A 01/18/2023    Procedure: SIGMOIDOSCOPY FLEXIBLE;  Surgeon: Ag Patel MD;  Location: [facility] ENDOSCOPY;  Service: Gastroenterology;  Laterality: N/A;  PRE OP - MAROON STOOLS  POST OP - RECTAL VARICES    TOTAL SHOULDER ARTHROPLASTY Left 05/09/2023    Procedure: reverse total shoulder arthroplasty;  Surgeon:  Giovanni Denise MD;  Location: [facility];  Service: Orthopedics;  Laterality: Left;    TRIGGER POINT INJECTION  8/24      General Information       Row Name 06/03/25 0942          Physical Therapy Time and Intention    Document Type therapy note (daily note)  -SV     Mode of Treatment individual therapy;physical therapy  -SV       Row Name 06/03/25 0942          General Information    Patient Profile Reviewed yes  -SV               User Key  (r) = Recorded By, (t) = Taken By, (c) = Cosigned By      Initials Name Provider Type    SV Kika Headley, PT Physical Therapist                   Mobility       Row Name 06/03/25 0953          Bed Mobility    Bed Mobility supine-sit;sit-supine  -SV     Supine-Sit Real (Bed Mobility) modified independence  -SV     Assistive Device (Bed Mobility) bed rails;head of bed elevated  -SV       Row Name 06/03/25 0953          Sit-Stand Transfer    Sit-Stand Real (Transfers) supervision;modified independence  -SV     Comment, (Sit-Stand Transfer) no lob with STS  -SV       Row Name 06/03/25 0953          Gait/Stairs (Locomotion)    Real Level (Gait) standby assist  -SV     Distance in Feet (Gait) 300  initially unsteady in the first 100' but improved to indep/sup level  -SV               User Key  (r) = Recorded By, (t) = Taken By, (c) = Cosigned By      Initials Name Provider Type    SV Kika Headley, PT Physical Therapist                   Obj/Interventions       Row Name 06/03/25 0955          Motor Skills    Therapeutic Exercise --  pt encouraged to PLB if soa during activity  -SV       Row Name 06/03/25 0955          Balance    Balance Assessment standing dynamic balance  -SV     Comment, Balance sidestepping left/right , minisquat x3 with sba no BUE support, attempted heel raises several reps until pt reported toe sx in the past, no lob or unsteadiness noted today during standing activities  -SV               User Key  (r) = Recorded By, (t) =  Taken By, (c) = Cosigned By      Initials Name Provider Type    Kika Mcdaniel, PT Physical Therapist                   Goals/Plan    No documentation.                  Clinical Impression       Row Name 06/03/25 0957          Pain    Pretreatment Pain Rating 0/10 - no pain  -SV       Row Name 06/03/25 0957          Vital Signs    Intratreatment Heart Rate (beats/min) 107  -SV     Posttreatment Heart Rate (beats/min) 101  -SV     Pre SpO2 (%) 93  -SV     O2 Delivery Pre Treatment room air  -SV     Intra SpO2 (%) 90  -SV     O2 Delivery Intra Treatment room air  -SV     Post SpO2 (%) 96  -SV     O2 Delivery Post Treatment room air  -SV     Pre Patient Position Supine  -SV     Intra Patient Position Standing  -SV     Post Patient Position Supine  -SV       Row Name 06/03/25 0957          Positioning and Restraints    Pre-Treatment Position in bed  -SV     Post Treatment Position bed  -SV     In Bed call light within reach;notified nsg;encouraged to call for assist;fowlers  -SV               User Key  (r) = Recorded By, (t) = Taken By, (c) = Cosigned By      Initials Name Provider Type    Kika Mcdaniel, PT Physical Therapist                   Outcome Measures       Row Name 06/03/25 0958 06/03/25 0900       How much help from another person do you currently need...    Turning from your back to your side while in flat bed without using bedrails? 4  -SV 4  -DS    Moving from lying on back to sitting on the side of a flat bed without bedrails? 4  -SV 4  -DS    Moving to and from a bed to a chair (including a wheelchair)? 4  -SV 3  -DS    Standing up from a chair using your arms (e.g., wheelchair, bedside chair)? 4  -SV 3  -DS    Climbing 3-5 steps with a railing? 3  -SV 3  -DS    To walk in hospital room? 4  -SV 3  -DS    AM-PAC 6 Clicks Score (PT) 23  -SV 20  -DS              User Key  (r) = Recorded By, (t) = Taken By, (c) = Cosigned By      Initials Name Provider Type    Kika Mcdaniel, PT Physical  Therapist    Alvina Treadwell, RN Registered Nurse                                 Physical Therapy Education       Title: PT OT SLP Therapies (Done)       Topic: Physical Therapy (Done)       Point: Mobility training (Done)       Learning Progress Summary            Patient Acceptance, E, VU,NR by  at 5/31/2025 1706                      Point: Home exercise program (Done)       Learning Progress Summary            Patient Acceptance, E, VU,NR by  at 5/31/2025 1706                                      User Key       Initials Effective Dates Name Provider Type Discipline     07/11/23 -  Kika Headley, PT Physical Therapist PT                  PT Recommendation and Plan  Planned Therapy Interventions (PT): balance training, gait training, strengthening, stair training, transfer training        Time Calculation:         PT Charges       Row Name 06/03/25 1002             Time Calculation    Start Time 0942  -      Stop Time 0957  -      Time Calculation (min) 15 min  -      PT Received On 06/03/25  -      PT - Next Appointment 06/05/25 -                User Key  (r) = Recorded By, (t) = Taken By, (c) = Cosigned By      Initials Name Provider Type     Kika Headley, PT Physical Therapist                  Therapy Charges for Today       Code Description Service Date Service Provider Modifiers Qty    84723390287 HC PT THERAPEUTIC ACT EA 15 MIN 6/3/2025 Kika Headley, PT GP 1            PT G-Codes  AM-PAC 6 Clicks Score (PT): 23       Kika Headley PT  6/3/2025

## 2025-06-03 NOTE — PLAN OF CARE
Goal Outcome Evaluation:  Plan of Care Reviewed With: patient        Progress: improving  Outcome Evaluation: Appetite good. Medicated for pain this am. VSS. Remains slightly anxious. Discharge instructions and education sheets provided. Pt discharged to home.

## 2025-06-03 NOTE — PROGRESS NOTES
Case Management Discharge Note      Final Note: DC'd home         Selected Continued Care - Discharged on 6/3/2025 Admission date: 5/30/2025 - Discharge disposition: Home or Self Care                Transportation Services  Private: Car    Final Discharge Disposition Code: 01 - home or self-care

## 2025-06-03 NOTE — DISCHARGE SUMMARY
Patient Name: Bekah Gibson  : 1970  MRN: [id number]    Date of Admission: 2025  Date of Discharge:  6/3/2025  Primary Care Physician: Tylor Davis      Chief Complaint:   Nausea and Diarrhea      Discharge Diagnoses     Active Hospital Problems    Diagnosis  POA    **Alcohol withdrawal [F10.939]  Yes    Transaminitis [R74.01]  Yes    Cholelithiasis [K80.20]  Yes    Abdominal pain [R10.9]  Yes    Essential hypertension [I10]  Yes    Esophageal varices [I85.00]  Yes    Alcoholic cirrhosis [K70.30]  Yes    Pancreatic insufficiency [K86.89]  Yes    Hypothyroidism [E03.9]  Yes      Resolved Hospital Problems   No resolved problems to display.        Hospital Course     Ms. Gibson is a 54 y.o. female with a history of alcohol use disorder, chronic pancreatitis, cirrhosis related to alcohol use, esophageal varices, hypothyroidism, depression and anxiety who presented to [facility] initially complaining of abdominal pain.  Please see the admitting history and physical for further details.  She was found to have acute on chronic pancreatitis as well as alcohol withdrawal and was admitted to the hospital for further evaluation and treatment.  For her alcohol withdrawal she was treated with as needed Ativan per [facility] protocol.  The patient's withdrawal was relatively mild and she has not required Ativan in over 12 hours prior to discharge.  She met with access and was provided with alcohol use resources.   In regards to the patient's acute on chronic pancreatitis, a GI consult was received for additional recommendations.  She was treated supportively with IV fluids, bowel rest, pain medications.  Her lipase has trended down and her abdominal pain has improved.  Her diet was advanced and she is tolerating a regular/low-fat diet.  GI recommends that she continue to follow-up with her outpatient [place] [facility] provider for additional care.   Imaging on admission revealed a gallbladder with  cholelithiasis, right upper quadrant ultrasound showed no evidence of cholecystitis.  A surgery consult was requested to determine if this may be the cause of her abdominal pain.  Per surgery there is no evidence of cholecystitis and although patient does have cholelithiasis he does not believe that this is the cause of her pain.  Surgery does not recommend surgical intervention at this time, she is high risk given her continued alcohol use with significant thrombocytopenia.   The patient is stable for discharge, she is tolerating regular diet and has not required any Ativan in over 12 hours.  She has been instructed to follow-up with her PCP in a week for posthospital follow-up visit as well as her [facility] doctors.    Day of Discharge     Subjective:  Resting resting in bed, she did not require any Ativan overnight and reports that her abdominal pain is better, she would like to go home.    Physical Exam:  Temp:  [97.5 °F (36.4 °C)-98.3 °F (36.8 °C)] 97.5 °F (36.4 °C)  Heart Rate:  [] 100  Resp:  [16-20] 16  BP: (131-136)/(81-90) 133/87  Body mass index is 25.63 kg/m².  Physical Exam  Constitutional:       General: She is not in acute distress.     Appearance: She is ill-appearing.   Cardiovascular:      Rate and Rhythm: Normal rate and regular rhythm.   Pulmonary:      Effort: Pulmonary effort is normal. No respiratory distress.      Breath sounds: Normal breath sounds. No wheezing.   Abdominal:      General: Abdomen is flat. Bowel sounds are normal.      Palpations: Abdomen is soft.      Tenderness: There is abdominal tenderness (improved).   Musculoskeletal:         General: No swelling or tenderness.      Right lower leg: No edema.      Left lower leg: No edema.   Skin:     General: Skin is warm and dry.   Neurological:      General: No focal deficit present.      Mental Status: She is alert and oriented to person, place, and time. Mental status is at baseline.         Consultants     Consult Orders  (all) (From admission, onward)       Start     Ordered    05/31/25 1300  Inpatient Access Center Consult  Once        Provider:  (Not yet assigned)    05/31/25 1259    05/30/25 1509  Inpatient General Surgery Consult  Once        Specialty:  General Surgery  Provider:  Donald Hdz Jr., MD    05/30/25 1509    05/30/25 1421  Inpatient Case Management  Consult  Once        Provider:  (Not yet assigned)    05/30/25 1421    05/30/25 1313  Inpatient Gastroenterology Consult  Once        Specialty:  Gastroenterology  Provider:  Ashlyn Uribe MD    05/30/25 1312    05/30/25 0930  [facility] (on-call MD unless specified) Details  Once        Specialty:  Hospitalist  Provider:  Charis Gonzalez MD    05/30/25 0929                  Procedures     Imaging Results (All)       Procedure Component Value Units Date/Time    US Gallbladder [[id number]] Collected: 05/30/25 0822     Updated: 05/30/25 0829    Narrative:      RIGHT UPPER QUADRANT ULTRASOUND     HISTORY: Right upper quadrant pain. Nausea and vomiting.     COMPARISON: CT abdomen and pelvis 05/30/2025.     FINDINGS:  The liver measures 17.6 cm in craniocaudal dimension. Liver exhibits  undulating margins with hepatic cirrhotic morphology. There is also  increased hepatic echogenicity consistent with hepatic steatosis..   There is no intra or extrahepatic biliary duct dilatation. The common  duct measures 3 mm. The gallbladder is distended and contains small  gallstones. No gallbladder wall thickening or pericholecystic fluid.     Visualized segments the pancreas appear within normal limits.  The right  kidney measures 9.7 cm in length and there is no hydronephrosis. There  is no ascites.       Impression:      1. Gallbladder is distended and there is cholelithiasis without further  evidence for cholecystitis.  2. Hepatic cirrhotic morphology with steatosis.     This report was finalized on 5/30/2025 8:26 AM by Anson Reynolds M.D  on Workstation:  [id number]       CT Abdomen Pelvis With Contrast [[id number]] Collected: 05/30/25 0553     Updated: 05/30/25 0602    Narrative:      CT OF THE ABDOMEN AND PELVIS WITH CONTRAST     HISTORY: Acute abdominal pain     COMPARISON: None available.     TECHNIQUE: Axial CT imaging was obtained through the abdomen and pelvis.  IV contrast was administered.     FINDINGS:  Images through the lung bases demonstrate some scarring. The liver is  cirrhotic in morphology. Patient does have a recanalized umbilical vein.  There are gastroesophageal varices again seen. As was previously  discussed, gastric wall appears somewhat thickened, although this may be  related to incomplete distention. On current study, patient's  gallbladder is mildly distended. There is cholelithiasis. Given history,  consideration for gallbladder ultrasound is suggested. The pancreas is  atrophic, with extensive parenchymal calcifications, in keeping with  chronic pancreatitis. The kidneys enhance symmetrically. No  hydronephrosis is seen. Uterus appears normal. No adnexal masses are  seen. The patient does have liquid stool within the colon, which would  be in keeping with history of diarrhea. The appendix is normal. There is  no bowel obstruction. There is a tiny fat-containing umbilical hernia.  No acute osseous abnormalities are seen.       Impression:         1. Liquid stool is noted throughout the colon, in keeping with history  of diarrhea.  2. Somewhat thick-walled appearance to the stomach again noted. Some of  this may be related to incomplete distention, but gastritis is not  excluded.  3. The patient's gallbladder does appear distended, and there is  cholelithiasis. Consider further assessment with gallbladder ultrasound.     Radiation dose reduction techniques were utilized, including automated  exposure control and exposure modulation based on body size.        This report was finalized on 5/30/2025 5:58 AM by Dr. Jessy Coe M.D on  "Workstation: [id number]               Pertinent Labs     Results from last 7 days   Lab Units 06/03/25 0245 06/02/25 0412 06/01/25 0319 05/31/25  0342   WBC 10*3/mm3 4.51 4.47 3.41 3.14*   HEMOGLOBIN g/dL 13.7 14.5 14.3 14.5   PLATELETS 10*3/mm3 35* 36* 31* 32*     Results from last 7 days   Lab Units 06/03/25 0245 06/02/25 0412 06/01/25 0319 05/31/25  1510 05/31/25  0342   SODIUM mmol/L 137 137 137  --  140   POTASSIUM mmol/L 4.0 3.7 4.0 5.0 3.3*   CHLORIDE mmol/L 105 101 102  --  104   CO2 mmol/L 23.0 23.0 24.0  --  21.0*   BUN mg/dL 6.0 6.0 7.0  --  9.0   CREATININE mg/dL 0.63 0.81 0.70  --  0.69   GLUCOSE mg/dL 117* 111* 114*  --  95   Estimated Creatinine Clearance: 97.9 mL/min (by C-G formula based on SCr of 0.63 mg/dL).  Results from last 7 days   Lab Units 06/03/25 0245 06/02/25 0412 06/01/25 0319 05/31/25  0342   ALBUMIN g/dL 3.7 3.9 4.0 4.0   BILIRUBIN mg/dL 1.5* 2.0* 2.2* 3.1*   ALK PHOS U/L 130* 124* 110 97   AST (SGOT) U/L 91* 111* 95* 89*   ALT (SGPT) U/L 58* 57* 52* 50*     Results from last 7 days   Lab Units 06/03/25 0245 06/02/25 0412 06/01/25 0319 05/31/25  1250 05/31/25  0342   CALCIUM mg/dL 9.1 9.5 8.7  --  8.3*   ALBUMIN g/dL 3.7 3.9 4.0  --  4.0   MAGNESIUM mg/dL 1.7 1.8 2.1  --  1.4*   PHOSPHORUS mg/dL 3.8 3.5 2.6 2.7 2.2*     Results from last 7 days   Lab Units 06/02/25  0412 05/30/25  0345   LIPASE U/L 14 19             Invalid input(s): \"LDLCALC\"        Test Results Pending at Discharge       Discharge Details        Discharge Medications        New Medications        Instructions Start Date   multivitamin with minerals tablet tablet   1 tablet, Oral, Daily   Start Date: June 4, 2025     oxyCODONE 5 MG immediate release tablet  Commonly known as: ROXICODONE   5 mg, Oral, Every 6 Hours PRN             Changes to Medications        Instructions Start Date   FLUoxetine 20 MG tablet  Commonly known as: PROzac  What changed: Another medication with the same name was removed. " Continue taking this medication, and follow the directions you see here.   60 mg, Daily             Continue These Medications        Instructions Start Date   amLODIPine 2.5 MG tablet  Commonly known as: NORVASC   Take 1 tablet by mouth Daily.      bismuth subsalicylate 262 MG/15ML suspension  Commonly known as: PEPTO BISMOL   15 mL, Every 6 Hours PRN      busPIRone 10 MG tablet  Commonly known as: BUSPAR   10 mg, Oral, 2 Times Daily      escitalopram 20 MG tablet  Commonly known as: LEXAPRO   20 mg, Every Morning      ferrous sulfate 325 (65 FE) MG tablet   325 mg, Daily With Breakfast      folic acid 1 MG tablet  Commonly known as: FOLVITE   1 mg, Daily      gabapentin 100 MG capsule  Commonly known as: NEURONTIN   100 mg, 3 Times Daily      hydrOXYzine 25 MG tablet  Commonly known as: ATARAX   25 mg, Oral, 3 Times Daily PRN      lactulose 10 GM/15ML solution  Commonly known as: CHRONULAC   10 g, Every Morning      levothyroxine 100 MCG tablet  Commonly known as: SYNTHROID, LEVOTHROID   100 mcg, Daily      MAGnesium-Oxide 400 (240 Mg) MG tablet  Generic drug: Magnesium Oxide -Mg Supplement   400 mg, Daily      mirtazapine 15 MG tablet  Commonly known as: REMERON   15 mg, Nightly      multivitamin capsule   1 capsule, Daily      nadolol 40 MG tablet  Commonly known as: CORGARD   40 mg, Daily      NON FORMULARY   3 to 4 Times Daily      ondansetron ODT 4 MG disintegrating tablet  Commonly known as: ZOFRAN-ODT   4 mg, Translingual, Every 8 Hours PRN      pantoprazole 40 MG EC tablet  Commonly known as: PROTONIX   40 mg, Daily      potassium chloride 10 MEQ CR tablet   20 mEq, Daily      thiamine 100 MG tablet  Commonly known as: VITAMIN B1   100 mg, Oral, Daily             Stop These Medications      Creon 18889-53209 units capsule delayed-release particles capsule  Generic drug: pancrelipase (Lip-Prot-Amyl)     risperiDONE 0.5 MG tablet  Commonly known as: risperDAL     solifenacin 10 MG tablet  Commonly known as:  "VESICARE              Allergies   Allergen Reactions    Benzonatate Nausea Only and Other (See Comments)     Rash     Ketorolac Tromethamine Other (See Comments)     \"I cannot take because of liver problems\"    Phenergan [Promethazine Hcl] Hives    Cucumber Extract Rash    Promethazine-Phenylephrine Other (See Comments)     \"FEEL WEIRD\"         Discharge Disposition:  Home or Self Care    Discharge Diet:  Diet Order   Procedures    Diet: Gastrointestinal; Low Irritant; Fluid Consistency: Thin (IDDSI 0)       Discharge Activity:   Activity Instructions       Activity as Tolerated              CODE STATUS:    Code Status and Medical Interventions: CPR (Attempt to Resuscitate); Full Support   Ordered at: 05/30/25 1231     Code Status (Patient has no pulse and is not breathing):    CPR (Attempt to Resuscitate)     Medical Interventions (Patient has pulse or is breathing):    Full Support       Future Appointments   Date Time Provider Department Center   6/4/2025  9:10 AM Giovanni Denise MD [facility]     Additional Instructions for the Follow-ups that You Need to Schedule       Discharge Follow-up with PCP   As directed       Currently Documented PCP:    Tylor Davis    PCP Phone Number:    [phone]     Follow Up Details: post-hospital follow up        Discharge Follow-up with Specified Provider: TARIQ VANEGAS as already established   As directed      To: TARIQ VANEGAS as already established               Follow-up Information       Tylor Davis .    Specialty: Family Medicine  Why: post-hospital follow up  Contact information:  [address]  [place] [id number] [phone]                             Additional Instructions for the Follow-ups that You Need to Schedule       Discharge Follow-up with PCP   As directed       Currently Documented PCP:    Tylor Davis    PCP Phone Number:    [phone]     Follow Up Details: post-hospital follow up        Discharge Follow-up with Specified " Provider: TARIQ VANEGAS as already established   As directed      To: TARIQ VANEGAS as already established            Time Spent on Discharge:  I spent greater than 30 minutes on this discharge activity which included: face-to-face encounter with the patient, reviewing the data in the system, coordination of the care with the nursing staff as well as consultants, documentation, and entering orders.       Charis Gonzalez MD  [facility]  06/03/25  10:15 EDT

## 2025-06-04 ENCOUNTER — OFFICE VISIT (OUTPATIENT)
Dept: ORTHOPEDIC SURGERY | Facility: CLINIC | Age: 55
End: 2025-06-04
Payer: MEDICAID

## 2025-06-04 VITALS — WEIGHT: 149 LBS | BODY MASS INDEX: 24.83 KG/M2 | HEIGHT: 65 IN | TEMPERATURE: 98.4 F

## 2025-06-04 DIAGNOSIS — M19.011 ARTHRITIS OF RIGHT SHOULDER: ICD-10-CM

## 2025-06-04 DIAGNOSIS — M19.011 ARTHRITIS OF RIGHT SHOULDER REGION: Primary | ICD-10-CM

## 2025-06-04 RX ORDER — METHYLPREDNISOLONE ACETATE 80 MG/ML
80 INJECTION, SUSPENSION INTRA-ARTICULAR; INTRALESIONAL; INTRAMUSCULAR; SOFT TISSUE
Status: COMPLETED | OUTPATIENT
Start: 2025-06-04 | End: 2025-06-04

## 2025-06-04 RX ORDER — LACTULOSE 10 G/15ML
SOLUTION ORAL; RECTAL
COMMUNITY
Start: 2025-03-27

## 2025-06-04 RX ORDER — LIDOCAINE HYDROCHLORIDE 10 MG/ML
2 INJECTION, SOLUTION EPIDURAL; INFILTRATION; INTRACAUDAL; PERINEURAL
Status: COMPLETED | OUTPATIENT
Start: 2025-06-04 | End: 2025-06-04

## 2025-06-04 RX ORDER — RISPERIDONE 0.5 MG/1
1 TABLET ORAL DAILY
COMMUNITY

## 2025-06-04 RX ORDER — ONDANSETRON 4 MG/1
4 TABLET, FILM COATED ORAL
COMMUNITY
Start: 2025-04-29

## 2025-06-04 RX ADMIN — METHYLPREDNISOLONE ACETATE 80 MG: 80 INJECTION, SUSPENSION INTRA-ARTICULAR; INTRALESIONAL; INTRAMUSCULAR; SOFT TISSUE at 11:55

## 2025-06-04 RX ADMIN — LIDOCAINE HYDROCHLORIDE 2 ML: 10 INJECTION, SOLUTION EPIDURAL; INFILTRATION; INTRACAUDAL; PERINEURAL at 11:55

## 2025-06-04 NOTE — OUTREACH NOTE
Prep Survey      Flowsheet Row Responses   [facility] patient discharged from? [facility]   Is LACE score < 7 ? No   Eligibility Not Eligible   What are the reasons patient is not eligible? Other  [*Alcohol withdrawal]   Does the patient have one of the following disease processes/diagnoses(primary or secondary)? Other   Prep survey completed? Yes            LARON VALLADARES - Registered Nurse

## 2025-06-04 NOTE — PROGRESS NOTES
Ms. Gibson follows up again today for her right shoulder.  She would like a repeat injection.  Repeat AP, scapular Y and axillary views right shoulder are ordered and reviewed evaluate her complaint.  These are compared to previous x-rays.She appears to have advanced glenohumeral osteoarthritis which appears relatively stable compared to her previous x-rays.    The risk, benefits and alternatives to repeat injection were discussed.  She consented and the procedure was performed as described below.    Large Joint Arthrocentesis: R glenohumeral  Date/Time: 6/4/2025 11:55 AM  Consent given by: patient  Site marked: site marked  Timeout: Immediately prior to procedure a time out was called to verify the correct patient, procedure, equipment, support staff and site/side marked as required   Supporting Documentation  Indications: pain   Procedure Details  Location: shoulder - R glenohumeral  Preparation: Patient was prepped and draped in the usual sterile fashion  Needle gauge: 21 G.  Approach: anterior  Medications administered: 2 mL lidocaine PF 1% 1 %; 80 mg methylPREDNISolone acetate 80 MG/ML  Patient tolerance: patient tolerated the procedure well with no immediate complications

## 2025-06-10 ENCOUNTER — HOSPITAL ENCOUNTER (EMERGENCY)
Facility: HOSPITAL | Age: 55
Discharge: HOME | End: 2025-06-10
Attending: EMERGENCY MEDICINE | Admitting: EMERGENCY MEDICINE
Payer: MEDICAID

## 2025-06-10 VITALS
HEART RATE: 77 BPM | SYSTOLIC BLOOD PRESSURE: 117 MMHG | TEMPERATURE: 98 F | DIASTOLIC BLOOD PRESSURE: 60 MMHG | OXYGEN SATURATION: 100 % | RESPIRATION RATE: 18 BRPM

## 2025-06-10 DIAGNOSIS — R11.2 NAUSEA AND VOMITING, UNSPECIFIED VOMITING TYPE: Primary | ICD-10-CM

## 2025-06-10 DIAGNOSIS — F10.10 ALCOHOL ABUSE: ICD-10-CM

## 2025-06-10 DIAGNOSIS — R10.13 ACUTE EPIGASTRIC PAIN: ICD-10-CM

## 2025-06-10 LAB
ALBUMIN SERPL-MCNC: 4.3 G/DL (ref 3.5–5.2)
ALBUMIN/GLOB SERPL: 1.3 G/DL
ALP SERPL-CCNC: 108 U/L (ref 39–117)
ALT SERPL W P-5'-P-CCNC: 30 U/L (ref 1–33)
ANION GAP SERPL CALCULATED.3IONS-SCNC: 17 MMOL/L (ref 5–15)
AST SERPL-CCNC: 43 U/L (ref 1–32)
BILIRUB SERPL-MCNC: 0.8 MG/DL (ref 0–1.2)
BUN SERPL-MCNC: 11 MG/DL (ref 6–20)
BUN/CREAT SERPL: 8.1 (ref 7–25)
CALCIUM SPEC-SCNC: 9.3 MG/DL (ref 8.6–10.5)
CHLORIDE SERPL-SCNC: 105 MMOL/L (ref 98–107)
CO2 SERPL-SCNC: 21 MMOL/L (ref 22–29)
CREAT SERPL-MCNC: 1.36 MG/DL (ref 0.57–1)
DEPRECATED RDW RBC AUTO: 49.4 FL (ref 37–54)
EGFRCR SERPLBLD CKD-EPI 2021: 45.8 ML/MIN/1.73
ERYTHROCYTE [DISTWIDTH] IN BLOOD BY AUTOMATED COUNT: 14 % (ref 12.3–15.4)
ETHANOL BLD-MCNC: 14 MG/DL (ref 0–10)
ETHANOL UR QL: 0.01 %
GLOBULIN UR ELPH-MCNC: 3.2 GM/DL
GLUCOSE SERPL-MCNC: 134 MG/DL (ref 65–99)
HCT VFR BLD AUTO: 52.3 % (ref 34–46.6)
HGB BLD-MCNC: 17.9 G/DL (ref 12–15.9)
LIPASE SERPL-CCNC: 33 U/L (ref 13–60)
LYMPHOCYTES # BLD MANUAL: 0.98 10*3/MM3 (ref 0.7–3.1)
LYMPHOCYTES NFR BLD MANUAL: 10 % (ref 5–12)
MAGNESIUM SERPL-MCNC: 2 MG/DL (ref 1.6–2.6)
MCH RBC QN AUTO: 32.8 PG (ref 26.6–33)
MCHC RBC AUTO-ENTMCNC: 34.2 G/DL (ref 31.5–35.7)
MCV RBC AUTO: 95.8 FL (ref 79–97)
MONOCYTES # BLD: 0.82 10*3/MM3 (ref 0.1–0.9)
NEUTROPHILS # BLD AUTO: 6.38 10*3/MM3 (ref 1.7–7)
NEUTROPHILS NFR BLD MANUAL: 78 % (ref 42.7–76)
PLAT MORPH BLD: NORMAL
PLATELET # BLD AUTO: 103 10*3/MM3 (ref 140–450)
PMV BLD AUTO: 10.6 FL (ref 6–12)
POTASSIUM SERPL-SCNC: 3.7 MMOL/L (ref 3.5–5.2)
PROT SERPL-MCNC: 7.5 G/DL (ref 6–8.5)
RBC # BLD AUTO: 5.46 10*6/MM3 (ref 3.77–5.28)
RBC MORPH BLD: NORMAL
SODIUM SERPL-SCNC: 143 MMOL/L (ref 136–145)
VARIANT LYMPHS NFR BLD MANUAL: 12 % (ref 19.6–45.3)
WBC MORPH BLD: NORMAL
WBC NRBC COR # BLD AUTO: 8.18 10*3/MM3 (ref 3.4–10.8)

## 2025-06-10 PROCEDURE — 99283 EMERGENCY DEPT VISIT LOW MDM: CPT

## 2025-06-10 PROCEDURE — 96375 TX/PRO/DX INJ NEW DRUG ADDON: CPT

## 2025-06-10 PROCEDURE — 36415 COLL VENOUS BLD VENIPUNCTURE: CPT

## 2025-06-10 PROCEDURE — 82077 ASSAY SPEC XCP UR&BREATH IA: CPT | Performed by: EMERGENCY MEDICINE

## 2025-06-10 PROCEDURE — 83690 ASSAY OF LIPASE: CPT | Performed by: EMERGENCY MEDICINE

## 2025-06-10 PROCEDURE — 25010000002 FAMOTIDINE 10 MG/ML SOLUTION: Performed by: EMERGENCY MEDICINE

## 2025-06-10 PROCEDURE — 96374 THER/PROPH/DIAG INJ IV PUSH: CPT

## 2025-06-10 PROCEDURE — 85007 BL SMEAR W/DIFF WBC COUNT: CPT | Performed by: EMERGENCY MEDICINE

## 2025-06-10 PROCEDURE — 25810000003 LACTATED RINGERS SOLUTION: Performed by: EMERGENCY MEDICINE

## 2025-06-10 PROCEDURE — 85025 COMPLETE CBC W/AUTO DIFF WBC: CPT | Performed by: EMERGENCY MEDICINE

## 2025-06-10 PROCEDURE — 83735 ASSAY OF MAGNESIUM: CPT | Performed by: EMERGENCY MEDICINE

## 2025-06-10 PROCEDURE — 80053 COMPREHEN METABOLIC PANEL: CPT | Performed by: EMERGENCY MEDICINE

## 2025-06-10 PROCEDURE — 25010000002 ONDANSETRON PER 1 MG: Performed by: EMERGENCY MEDICINE

## 2025-06-10 RX ORDER — ONDANSETRON 4 MG/1
4 TABLET, ORALLY DISINTEGRATING ORAL EVERY 6 HOURS PRN
Qty: 20 TABLET | Refills: 0 | Status: SHIPPED | OUTPATIENT
Start: 2025-06-10

## 2025-06-10 RX ORDER — FAMOTIDINE 10 MG/ML
20 INJECTION, SOLUTION INTRAVENOUS ONCE
Status: COMPLETED | OUTPATIENT
Start: 2025-06-10 | End: 2025-06-10

## 2025-06-10 RX ORDER — SODIUM CHLORIDE 0.9 % (FLUSH) 0.9 %
10 SYRINGE (ML) INJECTION AS NEEDED
Status: DISCONTINUED | OUTPATIENT
Start: 2025-06-10 | End: 2025-06-10 | Stop reason: HOSPADM

## 2025-06-10 RX ORDER — ONDANSETRON 2 MG/ML
4 INJECTION INTRAMUSCULAR; INTRAVENOUS ONCE
Status: COMPLETED | OUTPATIENT
Start: 2025-06-10 | End: 2025-06-10

## 2025-06-10 RX ADMIN — SODIUM CHLORIDE, POTASSIUM CHLORIDE, SODIUM LACTATE AND CALCIUM CHLORIDE 1000 ML: 600; 310; 30; 20 INJECTION, SOLUTION INTRAVENOUS at 08:02

## 2025-06-10 RX ADMIN — SODIUM CHLORIDE, POTASSIUM CHLORIDE, SODIUM LACTATE AND CALCIUM CHLORIDE 1000 ML: 600; 310; 30; 20 INJECTION, SOLUTION INTRAVENOUS at 09:19

## 2025-06-10 RX ADMIN — FAMOTIDINE 20 MG: 10 INJECTION INTRAVENOUS at 08:02

## 2025-06-10 RX ADMIN — ONDANSETRON 4 MG: 2 INJECTION, SOLUTION INTRAMUSCULAR; INTRAVENOUS at 08:02

## 2025-06-10 NOTE — ED PROVIDER NOTES
EMERGENCY DEPARTMENT ENCOUNTER    Room Number:  16/16  PCP: Tylor Davis  Historian: Patient, EMS    I initially evaluated the patient at 7:38 AM    HPI:  Chief Complaint: Upper abdominal pain, vomiting  A complete HPI/ROS/PMH/PSH/SH/FH are unobtainable due to: Nothing  Context: Bekah Gibson is a 54 y.o. female with a medical history of chronic pancreatitis, alcohol abuse, hypertension, thrombocytopenia, GERD who presents to the ED from home by EMS c/o acute upper abdominal pain, nausea, and vomiting.  Symptoms began several hours ago.  She has had several episodes of nonbilious nonbloody vomiting.  Pain is primarily in the epigastrium and radiates into the mid back.  Pain is sharp and stabbing.  She has had a few episodes of watery nonbloody diarrhea.  Denies fever, chills, chest pain, shortness of breath, flank pain, or dysuria.  She states that she last drank alcohol about 2 days ago.  She has multiple admissions here for similar symptoms.            PAST MEDICAL HISTORY  Active Ambulatory Problems     Diagnosis Date Noted    Irritable bowel syndrome with both constipation and diarrhea 06/05/2017    Peripheral neuropathy 06/05/2017    Vitamin D deficiency 06/05/2017    History of HPV infection 06/05/2017    Hypothyroidism 06/05/2017    Tobacco dependence due to cigarettes 06/05/2017    Acute kidney injury 12/28/2015    Ascites 06/06/2016    E. coli UTI (urinary tract infection) 06/06/2016    Alcoholic hepatitis 06/29/2018    GI bleed 06/29/2018    Acute post-hemorrhagic anemia 06/29/2018    Thrombocytopenia 06/29/2018    Open wound of right shoulder region 06/29/2018    Pancreatic insufficiency 07/04/2018    Alcoholic ketoacidosis 07/21/2019    Chronic alcohol abuse 07/29/2019    ESBL (extended spectrum beta-lactamase) producing bacteria infection 07/29/2019    Abnormal weight loss 12/13/2019    Hashimoto's disease 12/13/2019    Chronic fatigue 12/14/2019    History of alcohol use disorder 09/21/2021     Alcohol intoxication 09/21/2021    Lupus     Hypomagnesemia     Pancytopenia     Alcoholic cirrhosis     Bilateral lower abdominal discomfort 09/21/2021    Primary hypertension 10/24/2022    Melena 10/24/2022    Arthritis of shoulder 12/19/2022    Esophageal varices 01/16/2023    Essential hypertension 01/16/2023    Alcohol-induced chronic pancreatitis 01/22/2023    Abdominal pain 01/22/2023    Pancreatitis 10/07/2023    Leukopenia 10/07/2023    Epigastric pain 04/01/2024    Acute alcoholic gastritis without hemorrhage 04/01/2024    Alcohol-induced acute on chronic pancreatitis without infection or necrosis 07/03/2024    Alcohol withdrawal 07/03/2024    Acute on chronic pancreatitis 07/22/2024    Transaminitis 07/22/2024    Cholelithiasis 07/22/2024    Alcohol use 07/22/2024    WBC decreased 07/24/2024    Hypokalemia 07/24/2024    Urinary tract infection 07/24/2024    Neutropenia 07/24/2024    Polysubstance abuse 08/05/2024    Alcohol dependence with withdrawal 10/24/2024    Hypomagnesemia 11/18/2024     Resolved Ambulatory Problems     Diagnosis Date Noted    Acute renal failure 02/20/2017    Kidney stone 06/05/2017    Alcohol withdrawal syndrome, with delirium 06/29/2018    Hypotension 07/01/2018    Nausea and vomiting 09/21/2021    Acute GI bleeding 10/24/2022     Past Medical History:   Diagnosis Date    Acromioclavicular separation 1/2021    Ankle sprain 2/2004    Anxiety     Arthritis     Arthritis of back Unsure    Cirrhosis     Disease of thyroid gland     ETOH abuse     Fracture, clavicle 1/2021    Fracture, foot Unsure    Frozen shoulder 1 /2009    GERD (gastroesophageal reflux disease)     History of kidney stones     Hypertension     Left shoulder pain     Low back strain 1/21    Lumbosacral disc disease 1/21    MDD (major depressive disorder)     Neck strain Unsure    Periarthritis of shoulder see above    Rotator cuff syndrome odre for shoulder replacement    Tennis elbow Unsure         PAST SURGICAL  HISTORY  Past Surgical History:   Procedure Laterality Date    CLAVICLE SURGERY Right 2018    ENDOSCOPY N/A 10/26/2022    Procedure: ESOPHAGOGASTRODUODENOSCOPY wtih banding;  Surgeon: Ag Patel MD;  Location: [facility] END[facility]COPY;  Service: Gastroenterology;  Laterality: N/A;  pre: melena  post: esophageal varicies with banding x2 bands    ENDOSCOPY N/A 2023    Procedure: ESOPHAGOGASTRODUODENOSCOPY;  Surgeon: Ag Patel MD;  Location: [facility] END[facility];  Service: Gastroenterology;  Laterality: N/A;  PRE OP - MAROON STOOLS  POST OP - SM ESOPHAGEAL VARICES    ESOPHAGEAL VARICES LIGATION      FOOT SURGERY  14    JOINT REPLACEMENT Bilateral     GREAT TOE    KIDNEY STONE SURGERY      LITHOTRIPSY AND STENT (R SIDE)    LAPAROSCOPIC TUBAL LIGATION      NECK SURGERY  3/07    Not sure of dates    SIGMOIDOSCOPY N/A 2023    Procedure: SIGMOIDOSCOPY FLEXIBLE;  Surgeon: Ag Patel MD;  Location: [facility] ENDOSCOPY;  Service: Gastroenterology;  Laterality: N/A;  PRE OP - MAROON STOOLS  POST OP - RECTAL VARICES    TOTAL SHOULDER ARTHROPLASTY Left 2023    Procedure: reverse total shoulder arthroplasty;  Surgeon: Giovanni Denise MD;  Location:  [facility] [place];  Service: Orthopedics;  Laterality: Left;    TRIGGER POINT INJECTION           FAMILY HISTORY  Family History   Problem Relation Age of Onset    Rheumatologic disease Mother         congestive heart diseased    Osteoporosis Mother             Thyroid disease Father     Leukemia Father     Cancer Father         Leukemia  deseased     Broken bones Father     Clotting disorder Father     Drug abuse Brother     Malig Hyperthermia Neg Hx          SOCIAL HISTORY  Social History     Socioeconomic History    Marital status:    Tobacco Use    Smoking status: Some Days     Current packs/day: 0.25     Average packs/day: 0.3 packs/day for 15.0 years (3.8 ttl pk-yrs)     Types: Cigarettes     Passive exposure: Current    Smokeless  tobacco: Never    Tobacco comments:     Rarely smoke sometimes will to   Vaping Use    Vaping status: Never Used   Substance and Sexual Activity    Alcohol use: Yes     Alcohol/week: 5.0 - 10.0 standard drinks of alcohol     Types: 5 - 10 Standard drinks or equivalent per week    Drug use: Never    Sexual activity: Yes     Partners: Male     Birth control/protection: Post-menopausal     Comment: cinthia- menepausal         ALLERGIES  Benzonatate, Ketorolac tromethamine, Phenergan [promethazine hcl], Cucumber extract, and Promethazine-phenylephrine    REVIEW OF SYSTEMS  Review of Systems  Included in HPI  All systems reviewed and negative except for those discussed in HPI.      PHYSICAL EXAM  ED Triage Vitals   Temp Heart Rate Resp BP SpO2   06/10/25 0721 06/10/25 0719 06/10/25 0719 06/10/25 0719 06/10/25 0719   98.3 °F (36.8 °C) 81 18 105/73 98 %      Temp src Heart Rate Source Patient Position BP Location FiO2 (%)   06/10/25 0721 06/10/25 0719 -- -- --   Oral Monitor          Physical Exam      GENERAL: Awake, alert, oriented x 3.  Chronically ill but nontoxic-appearing female who appears older than stated age.  Appears uncomfortable  HENT: NCAT, nares patent, dry mucous membrane  EYES: no scleral icterus  CV: regular rhythm, normal rate  RESPIRATORY: normal effort, clear to auscultation bilaterally  ABDOMEN: soft, nondistended, there is epigastric tenderness without rebound or guarding, no CVA tenderness  MUSCULOSKELETAL: Extremities are nontender with full range of motion  NEURO: Speech is normal.  No facial droop.  PSYCH:  calm, cooperative  SKIN: warm, dry    Vital signs and nursing notes reviewed.          LAB RESULTS  Recent Results (from the past 24 hours)   Comprehensive Metabolic Panel    Collection Time: 06/10/25  8:13 AM    Specimen: Hand, Right; Blood   Result Value Ref Range    Glucose 134 (H) 65 - 99 mg/dL    BUN 11.0 6.0 - 20.0 mg/dL    Creatinine 1.36 (H) 0.57 - 1.00 mg/dL    Sodium 143 136 - 145  mmol/L    Potassium 3.7 3.5 - 5.2 mmol/L    Chloride 105 98 - 107 mmol/L    CO2 21.0 (L) 22.0 - 29.0 mmol/L    Calcium 9.3 8.6 - 10.5 mg/dL    Total Protein 7.5 6.0 - 8.5 g/dL    Albumin 4.3 3.5 - 5.2 g/dL    ALT (SGPT) 30 1 - 33 U/L    AST (SGOT) 43 (H) 1 - 32 U/L    Alkaline Phosphatase 108 39 - 117 U/L    Total Bilirubin 0.8 0.0 - 1.2 mg/dL    Globulin 3.2 gm/dL    A/G Ratio 1.3 g/dL    BUN/Creatinine Ratio 8.1 7.0 - 25.0    Anion Gap 17.0 (H) 5.0 - 15.0 mmol/L    eGFR 45.8 (L) >60.0 mL/min/1.73   Lipase    Collection Time: 06/10/25  8:13 AM    Specimen: Hand, Right; Blood   Result Value Ref Range    Lipase 33 13 - 60 U/L   Ethanol    Collection Time: 06/10/25  8:13 AM    Specimen: Hand, Right; Blood   Result Value Ref Range    Ethanol 14 (H) 0 - 10 mg/dL    Ethanol % 0.014 %   CBC Auto Differential    Collection Time: 06/10/25  8:13 AM    Specimen: Hand, Right; Blood   Result Value Ref Range    WBC 8.18 3.40 - 10.80 10*3/mm3    RBC 5.46 (H) 3.77 - 5.28 10*6/mm3    Hemoglobin 17.9 (H) 12.0 - 15.9 g/dL    Hematocrit 52.3 (H) 34.0 - 46.6 %    MCV 95.8 79.0 - 97.0 fL    MCH 32.8 26.6 - 33.0 pg    MCHC 34.2 31.5 - 35.7 g/dL    RDW 14.0 12.3 - 15.4 %    RDW-SD 49.4 37.0 - 54.0 fl    MPV 10.6 6.0 - 12.0 fL    Platelets 103 (L) 140 - 450 10*3/mm3   Magnesium    Collection Time: 06/10/25  8:13 AM    Specimen: Hand, Right; Blood   Result Value Ref Range    Magnesium 2.0 1.6 - 2.6 mg/dL   Manual Differential    Collection Time: 06/10/25  8:13 AM    Specimen: Hand, Right; Blood   Result Value Ref Range    Neutrophil % 78.0 (H) 42.7 - 76.0 %    Lymphocyte % 12.0 (L) 19.6 - 45.3 %    Monocyte % 10.0 5.0 - 12.0 %    Neutrophils Absolute 6.38 1.70 - 7.00 10*3/mm3    Lymphocytes Absolute 0.98 0.70 - 3.10 10*3/mm3    Monocytes Absolute 0.82 0.10 - 0.90 10*3/mm3    RBC Morphology Normal Normal    WBC Morphology Normal Normal    Platelet Morphology Normal Normal       Ordered the above labs and reviewed the  results.        RADIOLOGY  No Radiology Exams Resulted Within Past 24 Hours    Ordered the above noted radiological studies. Reviewed by me in PACS.            PROCEDURES  Procedures        OUTPATIENT MEDICATION MANAGEMENT:  Current Facility-Administered Medications Ordered in Epic   Medication Dose Route Frequency Provider Last Rate Last Admin    sodium chloride 0.9 % flush 10 mL  10 mL Intravenous Q12H Callie Quiroz MD        sodium chloride 0.9 % flush 10 mL  10 mL Intravenous PRN Callie Quiroz MD        sodium chloride 0.9 % flush 10 mL  10 mL Intravenous PRN Babar Martinez MD        sodium chloride 0.9 % flush 20 mL  20 mL Intravenous PRN Callie Quiroz MD         Current Outpatient Medications Ordered in Epic   Medication Sig Dispense Refill    ondansetron ODT (ZOFRAN-ODT) 4 MG disintegrating tablet Place 1 tablet on the tongue Every 6 (Six) Hours As Needed for Nausea or Vomiting. 20 tablet 0    amLODIPine (NORVASC) 2.5 MG tablet Take 1 tablet by mouth Daily.      bismuth subsalicylate (PEPTO BISMOL) 262 MG/15ML suspension Take 15 mL by mouth Every 6 (Six) Hours As Needed for Indigestion. Pt reports filling predosed cup about 3/4 full and drinks daily.      busPIRone (BUSPAR) 10 MG tablet Take 1 tablet by mouth 2 (Two) Times a Day.      escitalopram (LEXAPRO) 20 MG tablet Take 1 tablet by mouth Every Morning.      ferrous sulfate 325 (65 FE) MG tablet Take 1 tablet by mouth Daily With Breakfast. Taking OTC      folic acid (FOLVITE) 1 MG tablet Take 1 tablet by mouth Daily.      gabapentin (NEURONTIN) 100 MG capsule Take 1 capsule by mouth 3 (Three) Times a Day.      lactulose (CHRONULAC) 10 GM/15ML solution solution (encephalopathy)       lactulose (CHRONULAC) 10 GM/15ML solution Take 15 mL by mouth Every Morning.      levothyroxine (SYNTHROID, LEVOTHROID) 100 MCG tablet Take 1 tablet by mouth Daily.      MAGnesium-Oxide 400 (240 Mg) MG tablet Take 1 tablet  by mouth Daily. Takes OTC      mirtazapine (REMERON) 15 MG tablet Take 1 tablet by mouth Every Night. Takes PRN      Multiple Vitamin (MULTIVITAMIN) capsule Take 1 capsule by mouth Daily.      multivitamin (One-Daily Multi-Vitamin) tablet tablet Take 1 tablet by mouth Daily for 30 days. 30 tablet 0    nadolol (CORGARD) 40 MG tablet Take 1 tablet by mouth Daily.      NON FORMULARY Apply  topically to the appropriate area as directed 3 (Three) to 4 (Four) times daily. Compounded Pain cream (amantadine 10%/ Meloxicam 0.5%/ Topiramate 2%/ gabapentin 6%/ lidocaine 5%)    1-2 g topically 3-4 times daily PRN      pantoprazole (PROTONIX) 40 MG EC tablet Take 1 tablet by mouth Daily. Waiting for refill      potassium chloride 10 MEQ CR tablet Take 2 tablets by mouth Daily. Taking OTC once daily      risperiDONE (risperDAL) 0.5 MG tablet Take 2 tablets by mouth Daily.      thiamine (VITAMIN B1) 100 MG tablet Take 1 tablet by mouth Daily. 30 tablet 0           MEDICATIONS GIVEN IN ER  Medications   sodium chloride 0.9 % flush 10 mL (has no administration in time range)   lactated ringers bolus 1,000 mL (0 mL Intravenous Stopped 6/10/25 0919)   ondansetron (ZOFRAN) injection 4 mg (4 mg Intravenous Given 6/10/25 0802)   famotidine (PEPCID) injection 20 mg (20 mg Intravenous Given 6/10/25 0802)   lactated ringers bolus 1,000 mL (0 mL Intravenous Stopped 6/10/25 1026)                   MEDICAL DECISION MAKING, PROGRESS, and CONSULTS    All labs have been independently reviewed by me.  All radiology studies have been reviewed by me and I have also reviewed the radiology report.   EKG's independently viewed and interpreted by me.  Discussion below represents my analysis of pertinent findings related to patient's condition, differential diagnosis, treatment plan and final disposition.      Additional sources:    - Discussed/ obtained information from independent historians: EMS    - External (non-ED) record review: Patient has  multiple admissions here and at other facilities for abdominal pain, acute on chronic pancreatitis, and alcohol withdrawal.  She was recently admitted here 5/30 through 6/3/2025 for acute on chronic pancreatitis and alcohol withdrawal.  Seen by [facility].  CT scan showed cholelithiasis.  She was seen by general surgery and cholecystectomy was not recommended.    -Prescription drug monitoring program review:     N/A    - Chronic or social conditions impacting patient care (Social Determinants of Health): None          Orders placed during this visit:  Orders Placed This Encounter   Procedures    Comprehensive Metabolic Panel    Lipase    Ethanol    CBC Auto Differential    Magnesium    Manual Differential    Insert Peripheral IV    CBC & Differential         Additional orders considered but not ordered:          Differential diagnosis includes, but is not limited to:    Differential diagnosis includes but is not limited to:  - hepatobiliary pathology such as cholecystitis, cholangitis, and symptomatic cholelithiasis  - Pancreatitis  - Dyspepsia  - Small bowel obstruction  - Appendicitis  - Diverticulitis  - UTI including pyelonephritis  - Ureteral stone  - Zoster  - Colitis, including infectious and ischemic  - Atypical ACS        Independent interpretation of labs, radiology studies, and discussions with consultants:  ED Course as of 06/10/25 1317   Tue Luis 10, 2025   0738 BP: 105/73 [WH]   0738 Temp: 98.3 °F (36.8 °C) [WH]   0738 Heart Rate: 81 [WH]   0738 Resp: 18 [WH]   0738 SpO2: 98 % [WH]   0738 Device (Oxygen Therapy): room air [WH]   0905 Magnesium: 2.0 [WH]   0905 Ethanol(!): 14 [WH]   0905 Lipase: 33 [WH]   0905 Glucose(!): 134 [WH]   0905 BUN: 11.0 [WH]   0905 Creatinine(!): 1.36  0.63 seven days ago [WH]   0905 CO2(!): 21.0 [WH]   0905 ALT (SGPT): 30 [WH]   0905 AST (SGOT)(!): 43 [WH]   0905 Alkaline Phosphatase: 108 [WH]   0905 Total Bilirubin: 0.8 [WH]   0905 Anion Gap(!): 17.0 [WH]   0905 WBC: 8.18  [WH]   0905 Hemoglobin(!): 17.9 [WH]   0906 Platelets(!): 103  35 seven days ago [WH]   0939 Patient is resting comfortably.  She has not had any vomiting while in the ED.  Heart rate is in the 70s. [WH]   1114 Patient is still complaining of abdominal abdominal discomfort but has not had any vomiting while in the ED. heart rate is in the 80s.  On reexam, there is mild epigastric tenderness without rebound or guarding.  Test results and diagnoses were discussed with her.  She will be discharged with a prescription for Zofran.  All questions were answered.  Return precautions were discussed. [WH]   1125 MDM: Patient presented to the ED with acute epigastric pain, nausea, and vomiting.  She has a history of chronic pancreatitis and alcohol abuse.  She did not have an acute abdomen.  White blood cell count, lipase and LFTs were unremarkable.  Creatinine was elevated and she was mildly acidotic.  She was given IV fluids and IV medications for nausea.  Her symptoms improved and she did not have any further vomiting while in the ED.  She did not show any signs of alcohol withdrawal.  She was comfortable going home.  Plan is for symptomatic treatment with Zofran. [WH]      ED Course User Index  [WH] Babar Martinez MD         COMPLEXITY OF CARE        DIAGNOSIS  Final diagnoses:   Nausea and vomiting, unspecified vomiting type   Acute epigastric pain   Alcohol abuse         DISPOSITION  DISCHARGE    Patient discharged in stable condition.    Reviewed implications of results, diagnosis, meds, responsibility to follow up, warning signs and symptoms of possible worsening, potential complications and reasons to return to ER, including worsening/persistent symptoms, fever, or other concern.    Patient/Family voiced understanding of above instructions.    Discussed plan for discharge, as there is no emergent indication for admission. Patient referred to primary care provider for BP management due to today's BP. Pt/family is  agreeable and understands need for follow up and repeat testing.  Pt is aware that discharge does not mean that nothing is wrong but it indicates no emergency is present that requires admission and they must continue care with follow-up as given below or physician of their choice.     FOLLOW-UP  Tylor Davis  [address]  [address]  [facility] [id number] [phone]    Schedule an appointment as soon as possible for a visit            Medication List        Changed      ondansetron ODT 4 MG disintegrating tablet  Commonly known as: ZOFRAN-ODT  Place 1 tablet on the tongue Every 6 (Six) Hours As Needed for Nausea or Vomiting.  What changed: when to take this            Stop      ondansetron 4 MG tablet  Commonly known as: ZOFRAN               Where to Get Your Medications        These medications were sent to [facility] [id number] - [address] AT [facility] - [phone] [facility] [phone] [address], [place] [id number]      Phone: [phone]   ondansetron ODT 4 MG disintegrating tablet                   Latest Documented Vital Signs:  AS OF 13:17 EDT VITALS:    BP - 117/60  HR - 77  TEMP - 98 °F (36.7 °C)  O2 SATS - 100%            --    Please note that portions of this were completed with a voice recognition program.       Note Disclaimer: At [facility], we believe that sharing information builds trust and better relationships. You are receiving this note because you are receiving care at [facility] or recently visited. It is possible you will see health information before a provider has talked with you about it. This kind of information can be easy to misunderstand. To help you fully understand what it means for your health, we urge you to discuss this note with your provider.             Babar Martinez MD  06/10/25 1067

## 2025-06-10 NOTE — DISCHARGE INSTRUCTIONS
Take medication as prescribed.  Drink plenty of fluids.  Follow-up with your primary care provider if symptoms do not improve in the next 2 to 3 days.  Return to the emergency department for worsening symptoms, fever, or other concern.

## 2025-06-10 NOTE — ED NOTES
Pt to ed from home via EMS    Pt c/o waking up with abd pain, nausea, and vomiting. Pt hx pancreatis.

## (undated) DEVICE — 3M™ IOBAN™ 2 ANTIMICROBIAL INCISE DRAPE 6640EZ: Brand: IOBAN™ 2

## (undated) DEVICE — BIT DRL SCRW PERIPH 2.7MM

## (undated) DEVICE — MULTIPLE BAND LIGATOR: Brand: SPEEDBAND SUPERVIEW SUPER 7

## (undated) DEVICE — APPL CHLORAPREP HI/LITE 26ML ORNG

## (undated) DEVICE — PK SHLDR OPN 40

## (undated) DEVICE — BITEBLOCK OMNI BLOC

## (undated) DEVICE — TRAP FLD MINIVAC MEGADYNE 100ML

## (undated) DEVICE — ADAPT CLN BIOGUARD AIR/H2O DISP

## (undated) DEVICE — GLV SURG SIGNATURE ESSENTIAL PF LTX SZ8.5

## (undated) DEVICE — LN SMPL CO2 SHTRM SD STREAM W/M LUER

## (undated) DEVICE — STRIP,CLOSURE,WOUND,MEDI-STRIP,1/2X4: Brand: MEDLINE

## (undated) DEVICE — SOL ISO/ALC 70PCT 4OZ

## (undated) DEVICE — KT ORCA ORCAPOD DISP STRL

## (undated) DEVICE — TUBING, SUCTION, 1/4" X 10', STRAIGHT: Brand: MEDLINE

## (undated) DEVICE — DRAPE,SHOULDER,BEACH ULTRAGARD: Brand: MEDLINE

## (undated) DEVICE — DRSNG TELFA PAD NONADH STR 1S 3X8IN

## (undated) DEVICE — IMPLANTABLE DEVICE
Type: IMPLANTABLE DEVICE | Site: SHOULDER | Status: NON-FUNCTIONAL
Brand: COMPREHENSIVE® REVERSE SHOULDER
Removed: 2023-05-09

## (undated) DEVICE — SKIN PREP TRAY W/CHG: Brand: MEDLINE INDUSTRIES, INC.

## (undated) DEVICE — CANN O2 ETCO2 FITS ALL CONN CO2 SMPL A/ 7IN DISP LF

## (undated) DEVICE — GLV SURG BIOGEL LTX PF 8 1/2

## (undated) DEVICE — TBG PENCL TELESCP MEGADYNE SMOKE EVAC 10FT

## (undated) DEVICE — MSK PROC CURAPLEX O2 2/ADAPT 7FT

## (undated) DEVICE — SENSR O2 OXIMAX FNGR A/ 18IN NONSTR

## (undated) DEVICE — DRSNG SURESITE WNDW 4X4.5

## (undated) DEVICE — SOL IRR NACL 0.9PCT 3000ML

## (undated) DEVICE — PREP SOL POVIDONE/IODINE BT 4OZ

## (undated) DEVICE — STCKNT IMPERV 12IN STRL

## (undated) DEVICE — SUT VIC 2/0 CT2 27IN J269H

## (undated) DEVICE — DRAPE,U/ SHT,SPLIT,PLAS,STERIL: Brand: MEDLINE

## (undated) DEVICE — DUAL CUT SAGITTAL BLADE

## (undated) DEVICE — BNDG ELAS CO-FLEX SLF ADHR 4IN5YD LF STRL

## (undated) DEVICE — SUT SILK 2/0 TIES 18IN A185H

## (undated) DEVICE — MAT FLR ABSORBENT LG 4FT 10 2.5FT